# Patient Record
Sex: FEMALE | Race: WHITE | NOT HISPANIC OR LATINO | URBAN - METROPOLITAN AREA
[De-identification: names, ages, dates, MRNs, and addresses within clinical notes are randomized per-mention and may not be internally consistent; named-entity substitution may affect disease eponyms.]

---

## 2017-04-10 ENCOUNTER — EMERGENCY (EMERGENCY)
Facility: HOSPITAL | Age: 42
LOS: 1 days | Discharge: HOME | End: 2017-04-10
Attending: EMERGENCY MEDICINE
Payer: MEDICAID

## 2017-04-10 VITALS
OXYGEN SATURATION: 96 % | HEART RATE: 66 BPM | SYSTOLIC BLOOD PRESSURE: 108 MMHG | DIASTOLIC BLOOD PRESSURE: 66 MMHG | TEMPERATURE: 98 F | RESPIRATION RATE: 18 BRPM

## 2017-04-10 VITALS
HEART RATE: 89 BPM | SYSTOLIC BLOOD PRESSURE: 119 MMHG | DIASTOLIC BLOOD PRESSURE: 68 MMHG | WEIGHT: 175.05 LBS | HEIGHT: 64 IN | TEMPERATURE: 98 F | OXYGEN SATURATION: 90 % | RESPIRATION RATE: 18 BRPM

## 2017-04-10 DIAGNOSIS — Z88.0 ALLERGY STATUS TO PENICILLIN: ICD-10-CM

## 2017-04-10 DIAGNOSIS — F11.10 OPIOID ABUSE, UNCOMPLICATED: ICD-10-CM

## 2017-04-10 LAB
ALBUMIN SERPL ELPH-MCNC: 3.4 G/DL — LOW (ref 3.5–5)
ALP SERPL-CCNC: 65 U/L — SIGNIFICANT CHANGE UP (ref 40–120)
ALT FLD-CCNC: 22 U/L DA — SIGNIFICANT CHANGE UP (ref 10–60)
AMPHET UR-MCNC: NEGATIVE — SIGNIFICANT CHANGE UP
ANION GAP SERPL CALC-SCNC: 11 MMOL/L — SIGNIFICANT CHANGE UP (ref 5–17)
APPEARANCE UR: CLEAR — SIGNIFICANT CHANGE UP
AST SERPL-CCNC: 20 U/L — SIGNIFICANT CHANGE UP (ref 10–40)
BARBITURATES UR SCN-MCNC: NEGATIVE — SIGNIFICANT CHANGE UP
BENZODIAZ UR-MCNC: NEGATIVE — SIGNIFICANT CHANGE UP
BILIRUB SERPL-MCNC: 0.6 MG/DL — SIGNIFICANT CHANGE UP (ref 0.2–1.2)
BILIRUB UR-MCNC: NEGATIVE — SIGNIFICANT CHANGE UP
BUN SERPL-MCNC: 8 MG/DL — SIGNIFICANT CHANGE UP (ref 7–18)
CALCIUM SERPL-MCNC: 8.8 MG/DL — SIGNIFICANT CHANGE UP (ref 8.4–10.5)
CHLORIDE SERPL-SCNC: 106 MMOL/L — SIGNIFICANT CHANGE UP (ref 96–108)
CK SERPL-CCNC: 144 U/L — SIGNIFICANT CHANGE UP (ref 21–215)
CO2 SERPL-SCNC: 22 MMOL/L — SIGNIFICANT CHANGE UP (ref 22–31)
COCAINE METAB.OTHER UR-MCNC: POSITIVE
COLOR SPEC: YELLOW — SIGNIFICANT CHANGE UP
CREAT SERPL-MCNC: 0.44 MG/DL — LOW (ref 0.5–1.3)
DIFF PNL FLD: NEGATIVE — SIGNIFICANT CHANGE UP
GLUCOSE SERPL-MCNC: 125 MG/DL — HIGH (ref 70–99)
GLUCOSE UR QL: NEGATIVE — SIGNIFICANT CHANGE UP
HCG SERPL-ACNC: 5116 MIU/ML — SIGNIFICANT CHANGE UP
HCG UR QL: POSITIVE
HCT VFR BLD CALC: 39.3 % — SIGNIFICANT CHANGE UP (ref 34.5–45)
HGB BLD-MCNC: 13.6 G/DL — SIGNIFICANT CHANGE UP (ref 11.5–15.5)
KETONES UR-MCNC: ABNORMAL
LEUKOCYTE ESTERASE UR-ACNC: NEGATIVE — SIGNIFICANT CHANGE UP
MCHC RBC-ENTMCNC: 31.4 PG — SIGNIFICANT CHANGE UP (ref 27–34)
MCHC RBC-ENTMCNC: 34.7 GM/DL — SIGNIFICANT CHANGE UP (ref 32–36)
MCV RBC AUTO: 90.6 FL — SIGNIFICANT CHANGE UP (ref 80–100)
METHADONE UR-MCNC: NEGATIVE — SIGNIFICANT CHANGE UP
NITRITE UR-MCNC: NEGATIVE — SIGNIFICANT CHANGE UP
OPIATES UR-MCNC: POSITIVE
PCP SPEC-MCNC: SIGNIFICANT CHANGE UP
PCP UR-MCNC: NEGATIVE — SIGNIFICANT CHANGE UP
PH UR: 8 — SIGNIFICANT CHANGE UP (ref 4.8–8)
PLATELET # BLD AUTO: 234 K/UL — SIGNIFICANT CHANGE UP (ref 150–400)
POTASSIUM SERPL-MCNC: 3.6 MMOL/L — SIGNIFICANT CHANGE UP (ref 3.5–5.3)
POTASSIUM SERPL-SCNC: 3.6 MMOL/L — SIGNIFICANT CHANGE UP (ref 3.5–5.3)
PROT SERPL-MCNC: 7.4 G/DL — SIGNIFICANT CHANGE UP (ref 6–8.3)
PROT UR-MCNC: NEGATIVE — SIGNIFICANT CHANGE UP
RBC # BLD: 4.34 M/UL — SIGNIFICANT CHANGE UP (ref 3.8–5.2)
RBC # FLD: 12.3 % — SIGNIFICANT CHANGE UP (ref 10.3–14.5)
SODIUM SERPL-SCNC: 139 MMOL/L — SIGNIFICANT CHANGE UP (ref 135–145)
SP GR SPEC: 1.01 — SIGNIFICANT CHANGE UP (ref 1.01–1.02)
THC UR QL: NEGATIVE — SIGNIFICANT CHANGE UP
TROPONIN I SERPL-MCNC: <0.015 NG/ML — SIGNIFICANT CHANGE UP (ref 0–0.04)
TSH SERPL-MCNC: 0.42 UU/ML — SIGNIFICANT CHANGE UP (ref 0.34–4.82)
UROBILINOGEN FLD QL: NEGATIVE — SIGNIFICANT CHANGE UP
WBC # BLD: 8.8 K/UL — SIGNIFICANT CHANGE UP (ref 3.8–10.5)
WBC # FLD AUTO: 8.8 K/UL — SIGNIFICANT CHANGE UP (ref 3.8–10.5)

## 2017-04-10 PROCEDURE — 84443 ASSAY THYROID STIM HORMONE: CPT

## 2017-04-10 PROCEDURE — 93005 ELECTROCARDIOGRAM TRACING: CPT

## 2017-04-10 PROCEDURE — 84702 CHORIONIC GONADOTROPIN TEST: CPT

## 2017-04-10 PROCEDURE — 81003 URINALYSIS AUTO W/O SCOPE: CPT

## 2017-04-10 PROCEDURE — 80307 DRUG TEST PRSMV CHEM ANLYZR: CPT

## 2017-04-10 PROCEDURE — 85027 COMPLETE CBC AUTOMATED: CPT

## 2017-04-10 PROCEDURE — 81025 URINE PREGNANCY TEST: CPT

## 2017-04-10 PROCEDURE — 99285 EMERGENCY DEPT VISIT HI MDM: CPT | Mod: 25

## 2017-04-10 PROCEDURE — 99285 EMERGENCY DEPT VISIT HI MDM: CPT

## 2017-04-10 PROCEDURE — 80053 COMPREHEN METABOLIC PANEL: CPT

## 2017-04-10 PROCEDURE — 82550 ASSAY OF CK (CPK): CPT

## 2017-04-10 PROCEDURE — 84484 ASSAY OF TROPONIN QUANT: CPT

## 2017-04-10 RX ORDER — NALOXONE HYDROCHLORIDE 4 MG/.1ML
1 SPRAY NASAL ONCE
Qty: 0 | Refills: 0 | Status: COMPLETED | OUTPATIENT
Start: 2017-04-10 | End: 2017-04-10

## 2017-04-10 RX ORDER — NALOXONE HYDROCHLORIDE 4 MG/.1ML
2 SPRAY NASAL ONCE
Qty: 0 | Refills: 0 | Status: DISCONTINUED | OUTPATIENT
Start: 2017-04-10 | End: 2017-04-10

## 2017-04-10 RX ORDER — SODIUM CHLORIDE 9 MG/ML
1000 INJECTION INTRAMUSCULAR; INTRAVENOUS; SUBCUTANEOUS
Qty: 0 | Refills: 0 | Status: DISCONTINUED | OUTPATIENT
Start: 2017-04-10 | End: 2017-04-14

## 2017-04-10 NOTE — ED PROVIDER NOTE - OBJECTIVE STATEMENT
42 y/o F pt with PMHx of EtOH Abuse and Drug Abuse and PSHx of  BIBA to ED s/p unresponsiveness today. EMS states pt was found unresponsive in a bathroom stall [place] a criminal court; pt is currently alert but drowsy. Pt has a history of heroin abuse, as per EMS. Pt states drowsiness; pt has not slept in four days. Pt denies head trauma, general pain, CP, SOB, n/v/d, or any other complaints. Pt also denies taking any medications daily, or having taken any medication today. Allergies: Penicillin (short breath, hives).

## 2017-04-10 NOTE — ED PROVIDER NOTE - NS ED MD SCRIBE ATTENDING SCRIBE SECTIONS
PAST MEDICAL/SURGICAL/SOCIAL HISTORY/REVIEW OF SYSTEMS/PHYSICAL EXAM/HIV/DISPOSITION/HISTORY OF PRESENT ILLNESS/VITAL SIGNS( Pullset)

## 2017-04-10 NOTE — ED PROVIDER NOTE - CONDUCTED A DETAILED DISCUSSION WITH PATIENT AND/OR GUARDIAN REGARDING, MDM
return to ED if symptoms worsen, persist or questions arise/need for outpatient follow-up need for outpatient follow-up/return to ED if symptoms worsen, persist or questions arise/lab results

## 2017-04-10 NOTE — ED ADULT TRIAGE NOTE - CHIEF COMPLAINT QUOTE
BIBA found unresponsive in bathroom stall by security per EMS. alert in triage, drowsy answering questions appropriately. Hx Heroin abuse per EMS, denies using heroin/ narcotics today. Reports not sleeping in 4 days.

## 2017-04-10 NOTE — ED PROVIDER NOTE - CHPI ED SYMPTOMS NEG
no head trauma, no general pain, no chest pain, no shortness of breath, no diarrhea/no nausea/no vomiting

## 2017-04-10 NOTE — ED PROVIDER NOTE - MEDICAL DECISION MAKING DETAILS
42 y/o F pt with Hx of heroin abuse BIBA s/p being found unresponsive in criminal court bathroom today. 40 y/o F pt with Hx of heroin abuse BIBA s/p being found unresponsive in criminal court bathroom today; suspicion for heroin ingestion. Will give Narcan, check labs, check for pregnancy, and reassess.

## 2017-04-10 NOTE — ED PROVIDER NOTE - PROGRESS NOTE DETAILS
pt is well.  pt was initially given narcane for which pt woke up from.  pt thereafter began to be very aggitated and angry and very disruptive. pt was given some diazepam which kept pt's withdrawel si/sx at bay.  pt's labs were obtained and noticed that pt is also prgnant. pt denies pregnancy and states of unknown LMP date. Pt with no ab pain, no vag bleeding and no other sis/x.  pt advsied to f/u with PMD and ob/gyn this week. pt understands. pt was waken up and upon dischage was walking around the ED in no distress, no ataxia, coherent, able to make decisions.  Pt did make phone call to her friend. pt is alet and oriented. Pt discharged. Pt given verbal instructions and refused to sign and left without discharge paperwork.

## 2019-02-07 RX ADMIN — Medication 1 PATCH: at 07:30

## 2019-02-17 ENCOUNTER — INPATIENT (INPATIENT)
Facility: HOSPITAL | Age: 44
LOS: 114 days | Discharge: PSYCHIATRIC OR SUBSTANCE USE FACILITY | End: 2019-06-12
Attending: INTERNAL MEDICINE | Admitting: INTERNAL MEDICINE
Payer: MEDICAID

## 2019-02-17 VITALS
HEIGHT: 67 IN | RESPIRATION RATE: 24 BRPM | SYSTOLIC BLOOD PRESSURE: 131 MMHG | TEMPERATURE: 102 F | DIASTOLIC BLOOD PRESSURE: 61 MMHG | WEIGHT: 130.07 LBS | OXYGEN SATURATION: 93 % | HEART RATE: 141 BPM

## 2019-02-17 LAB
ALBUMIN SERPL ELPH-MCNC: 1.9 G/DL — LOW (ref 3.3–5)
ALP SERPL-CCNC: 328 U/L — HIGH (ref 40–120)
ALT FLD-CCNC: 54 U/L — SIGNIFICANT CHANGE UP (ref 12–78)
AMPHET UR-MCNC: NEGATIVE — SIGNIFICANT CHANGE UP
ANION GAP SERPL CALC-SCNC: 24 MMOL/L — HIGH (ref 5–17)
ANISOCYTOSIS BLD QL: SLIGHT — SIGNIFICANT CHANGE UP
APPEARANCE UR: ABNORMAL
APTT BLD: 25.8 SEC — LOW (ref 27.5–36.3)
AST SERPL-CCNC: 81 U/L — HIGH (ref 15–37)
BACTERIA # UR AUTO: ABNORMAL
BARBITURATES UR SCN-MCNC: NEGATIVE — SIGNIFICANT CHANGE UP
BASOPHILS # BLD AUTO: 0 K/UL — SIGNIFICANT CHANGE UP (ref 0–0.2)
BASOPHILS NFR BLD AUTO: 0 % — SIGNIFICANT CHANGE UP (ref 0–2)
BENZODIAZ UR-MCNC: NEGATIVE — SIGNIFICANT CHANGE UP
BILIRUB SERPL-MCNC: 1.7 MG/DL — HIGH (ref 0.2–1.2)
BILIRUB UR-MCNC: ABNORMAL
BUN SERPL-MCNC: 124 MG/DL — HIGH (ref 7–23)
BURR CELLS BLD QL SMEAR: SIGNIFICANT CHANGE UP
CALCIUM SERPL-MCNC: 10.4 MG/DL — HIGH (ref 8.5–10.1)
CHLORIDE SERPL-SCNC: 88 MMOL/L — LOW (ref 96–108)
CK SERPL-CCNC: 127 U/L — SIGNIFICANT CHANGE UP (ref 26–192)
CO2 SERPL-SCNC: 14 MMOL/L — LOW (ref 22–31)
COCAINE METAB.OTHER UR-MCNC: POSITIVE — SIGNIFICANT CHANGE UP
COLOR SPEC: ABNORMAL
CREAT SERPL-MCNC: 6.42 MG/DL — HIGH (ref 0.5–1.3)
DIFF PNL FLD: ABNORMAL
ELLIPTOCYTES BLD QL SMEAR: SLIGHT — SIGNIFICANT CHANGE UP
EOSINOPHIL # BLD AUTO: 0.12 K/UL — SIGNIFICANT CHANGE UP (ref 0–0.5)
EOSINOPHIL NFR BLD AUTO: 1 % — SIGNIFICANT CHANGE UP (ref 0–6)
EPI CELLS # UR: SIGNIFICANT CHANGE UP
ETHANOL SERPL-MCNC: <10 MG/DL — SIGNIFICANT CHANGE UP (ref 0–10)
FLU A RESULT: SIGNIFICANT CHANGE UP
FLU A RESULT: SIGNIFICANT CHANGE UP
FLUAV AG NPH QL: SIGNIFICANT CHANGE UP
FLUBV AG NPH QL: SIGNIFICANT CHANGE UP
GLUCOSE BLDC GLUCOMTR-MCNC: 178 MG/DL — HIGH (ref 70–99)
GLUCOSE SERPL-MCNC: 130 MG/DL — HIGH (ref 70–99)
GLUCOSE UR QL: NEGATIVE MG/DL — SIGNIFICANT CHANGE UP
HCG SERPL-ACNC: <1 MIU/ML — SIGNIFICANT CHANGE UP
HCT VFR BLD CALC: 45.2 % — HIGH (ref 34.5–45)
HGB BLD-MCNC: 14.8 G/DL — SIGNIFICANT CHANGE UP (ref 11.5–15.5)
INR BLD: 1.07 RATIO — SIGNIFICANT CHANGE UP (ref 0.88–1.16)
KETONES UR-MCNC: NEGATIVE — SIGNIFICANT CHANGE UP
LACTATE SERPL-SCNC: 6.4 MMOL/L — CRITICAL HIGH (ref 0.7–2)
LACTATE SERPL-SCNC: 7.2 MMOL/L — CRITICAL HIGH (ref 0.7–2)
LEUKOCYTE ESTERASE UR-ACNC: ABNORMAL
LYMPHOCYTES # BLD AUTO: 1.07 K/UL — SIGNIFICANT CHANGE UP (ref 1–3.3)
LYMPHOCYTES # BLD AUTO: 9 % — LOW (ref 13–44)
MACROCYTES BLD QL: SLIGHT — SIGNIFICANT CHANGE UP
MANUAL SMEAR VERIFICATION: SIGNIFICANT CHANGE UP
MCHC RBC-ENTMCNC: 23.2 PG — LOW (ref 27–34)
MCHC RBC-ENTMCNC: 32.7 GM/DL — SIGNIFICANT CHANGE UP (ref 32–36)
MCV RBC AUTO: 71 FL — LOW (ref 80–100)
METHADONE UR-MCNC: NEGATIVE — SIGNIFICANT CHANGE UP
MICROCYTES BLD QL: SLIGHT — SIGNIFICANT CHANGE UP
MONOCYTES # BLD AUTO: 0.12 K/UL — SIGNIFICANT CHANGE UP (ref 0–0.9)
MONOCYTES NFR BLD AUTO: 1 % — LOW (ref 2–14)
NEUTROPHILS # BLD AUTO: 10.62 K/UL — HIGH (ref 1.8–7.4)
NEUTROPHILS NFR BLD AUTO: 89 % — HIGH (ref 43–77)
NITRITE UR-MCNC: POSITIVE
NRBC # BLD: 0 /100 — SIGNIFICANT CHANGE UP (ref 0–0)
NRBC # BLD: SIGNIFICANT CHANGE UP /100 WBCS (ref 0–0)
OPIATES UR-MCNC: POSITIVE — SIGNIFICANT CHANGE UP
OVALOCYTES BLD QL SMEAR: SLIGHT — SIGNIFICANT CHANGE UP
PCP SPEC-MCNC: SIGNIFICANT CHANGE UP
PCP UR-MCNC: NEGATIVE — SIGNIFICANT CHANGE UP
PH UR: 5 — SIGNIFICANT CHANGE UP (ref 5–8)
PLAT MORPH BLD: NORMAL — SIGNIFICANT CHANGE UP
PLATELET # BLD AUTO: 38 K/UL — LOW (ref 150–400)
POTASSIUM SERPL-MCNC: 4.3 MMOL/L — SIGNIFICANT CHANGE UP (ref 3.5–5.3)
POTASSIUM SERPL-SCNC: 4.3 MMOL/L — SIGNIFICANT CHANGE UP (ref 3.5–5.3)
PROT SERPL-MCNC: 7.4 GM/DL — SIGNIFICANT CHANGE UP (ref 6–8.3)
PROT UR-MCNC: 100 MG/DL
PROTHROM AB SERPL-ACNC: 12 SEC — SIGNIFICANT CHANGE UP (ref 10–12.9)
RBC # BLD: 6.37 M/UL — HIGH (ref 3.8–5.2)
RBC # FLD: 18.7 % — HIGH (ref 10.3–14.5)
RBC BLD AUTO: ABNORMAL
RBC CASTS # UR COMP ASSIST: ABNORMAL /HPF (ref 0–4)
RSV RESULT: SIGNIFICANT CHANGE UP
RSV RNA RESP QL NAA+PROBE: SIGNIFICANT CHANGE UP
SALICYLATES SERPL-MCNC: 2.2 MG/DL — LOW (ref 2.8–20)
SODIUM SERPL-SCNC: 126 MMOL/L — LOW (ref 135–145)
SP GR SPEC: 1.01 — SIGNIFICANT CHANGE UP (ref 1.01–1.02)
THC UR QL: NEGATIVE — SIGNIFICANT CHANGE UP
UROBILINOGEN FLD QL: 1 MG/DL
WBC # BLD: 11.93 K/UL — HIGH (ref 3.8–10.5)
WBC # FLD AUTO: 11.93 K/UL — HIGH (ref 3.8–10.5)
WBC UR QL: ABNORMAL

## 2019-02-17 PROCEDURE — 99291 CRITICAL CARE FIRST HOUR: CPT

## 2019-02-17 PROCEDURE — 71250 CT THORAX DX C-: CPT | Mod: 26

## 2019-02-17 PROCEDURE — 74176 CT ABD & PELVIS W/O CONTRAST: CPT | Mod: 26

## 2019-02-17 PROCEDURE — 71045 X-RAY EXAM CHEST 1 VIEW: CPT | Mod: 26

## 2019-02-17 PROCEDURE — 70450 CT HEAD/BRAIN W/O DYE: CPT | Mod: 26

## 2019-02-17 RX ORDER — ACETAMINOPHEN 500 MG
975 TABLET ORAL ONCE
Qty: 0 | Refills: 0 | Status: COMPLETED | OUTPATIENT
Start: 2019-02-17 | End: 2019-02-17

## 2019-02-17 RX ORDER — SODIUM CHLORIDE 9 MG/ML
1850 INJECTION INTRAMUSCULAR; INTRAVENOUS; SUBCUTANEOUS ONCE
Qty: 0 | Refills: 0 | Status: COMPLETED | OUTPATIENT
Start: 2019-02-17 | End: 2019-02-17

## 2019-02-17 RX ORDER — VANCOMYCIN HCL 1 G
1000 VIAL (EA) INTRAVENOUS ONCE
Qty: 0 | Refills: 0 | Status: COMPLETED | OUTPATIENT
Start: 2019-02-17 | End: 2019-02-17

## 2019-02-17 RX ORDER — NICOTINE POLACRILEX 2 MG
1 GUM BUCCAL DAILY
Qty: 0 | Refills: 0 | Status: DISCONTINUED | OUTPATIENT
Start: 2019-02-17 | End: 2019-03-20

## 2019-02-17 RX ORDER — NALOXONE HYDROCHLORIDE 4 MG/.1ML
1 SPRAY NASAL ONCE
Qty: 0 | Refills: 0 | Status: COMPLETED | OUTPATIENT
Start: 2019-02-17 | End: 2019-02-17

## 2019-02-17 RX ORDER — VANCOMYCIN HCL 1 G
750 VIAL (EA) INTRAVENOUS
Qty: 0 | Refills: 0 | Status: DISCONTINUED | OUTPATIENT
Start: 2019-02-17 | End: 2019-02-18

## 2019-02-17 RX ORDER — SODIUM CHLORIDE 9 MG/ML
1000 INJECTION INTRAMUSCULAR; INTRAVENOUS; SUBCUTANEOUS ONCE
Qty: 0 | Refills: 0 | Status: COMPLETED | OUTPATIENT
Start: 2019-02-17 | End: 2019-02-17

## 2019-02-17 RX ORDER — HEPARIN SODIUM 5000 [USP'U]/ML
5000 INJECTION INTRAVENOUS; SUBCUTANEOUS EVERY 12 HOURS
Qty: 0 | Refills: 0 | Status: DISCONTINUED | OUTPATIENT
Start: 2019-02-17 | End: 2019-02-18

## 2019-02-17 RX ORDER — SODIUM CHLORIDE 9 MG/ML
1000 INJECTION, SOLUTION INTRAVENOUS
Qty: 0 | Refills: 0 | Status: DISCONTINUED | OUTPATIENT
Start: 2019-02-17 | End: 2019-02-20

## 2019-02-17 RX ADMIN — SODIUM CHLORIDE 1850 MILLILITER(S): 9 INJECTION INTRAMUSCULAR; INTRAVENOUS; SUBCUTANEOUS at 18:22

## 2019-02-17 RX ADMIN — SODIUM CHLORIDE 1850 MILLILITER(S): 9 INJECTION INTRAMUSCULAR; INTRAVENOUS; SUBCUTANEOUS at 20:10

## 2019-02-17 RX ADMIN — Medication 250 MILLIGRAM(S): at 20:10

## 2019-02-17 RX ADMIN — Medication 975 MILLIGRAM(S): at 20:10

## 2019-02-17 RX ADMIN — Medication 1000 MILLIGRAM(S): at 21:30

## 2019-02-17 RX ADMIN — NALOXONE HYDROCHLORIDE 1 MILLIGRAM(S): 4 SPRAY NASAL at 22:26

## 2019-02-17 RX ADMIN — SODIUM CHLORIDE 1000 MILLILITER(S): 9 INJECTION INTRAMUSCULAR; INTRAVENOUS; SUBCUTANEOUS at 22:38

## 2019-02-17 RX ADMIN — Medication 975 MILLIGRAM(S): at 20:40

## 2019-02-17 NOTE — ED ADULT NURSE NOTE - OBJECTIVE STATEMENT
as per friend, pt s/p fall down stairs x 3 days, pt confused, lethargic post fall. pt received, lethargic, responsive to painful stimuli. tachypnea, tachycardia, hypotensive. chest xray done. pt on continuous monitor. as per friend, pt s/p fall down stairs x 3 days, pt known heroin user. pt confused, lethargic post fall. pt received, lethargic, responsive to painful stimuli. tachypnea, tachycardia, hypotensive. chest xray done. pt on continuous monitor.

## 2019-02-17 NOTE — H&P ADULT - NSHPLABSRESULTS_GEN_ALL_CORE
EXAM:  CT BRAIN                            PROCEDURE DATE:  02/17/2019        INTERPRETATION:  CLINICAL INFORMATION: Trauma. Fall.    TECHNIQUE: Noncontrast CT examination of the head was performed using   contiguous 5 mm CT images.    COMPARISON: There are no prior studies available for comparison.      FINDINGS:     There is no evidence of mass or acute intracranial hemorrhage. Ventricles   and sulci are normal in size and configuration for the patient's stated   age. No midline shift or other significant mass effect is noted. There is   no CT evidence of acute territorial infarct.     The visualized paranasal sinuses and tympanomastoid spaces are clear.   Orbits and orbital contents are unremarkable.    There is no depressed calvarial fracture.      IMPRESSION:     No evidence of acute intracranial hemorrhage, midline shift or CT   evidence of acute territorial infarct.    If the patient's symptoms persist, consider short interval follow-up head   CT or brain MRI if there are no MRI contraindications.      MANISHA RODRIGUEZ M.D., ATTENDING RADIOLOGIST  This document has been electronically signed. Feb 17 2019  8:36PM

## 2019-02-17 NOTE — H&P ADULT - NSHPPHYSICALEXAM_GEN_ALL_CORE
PHYSICAL EXAM:    GENERAL: NAD, poorly groomed  HEAD:  Atraumatic, Normocephalic  EYES: EOMI, PERRLA, conjunctiva and sclera clear  ENMT: No tonsillar erythema, exudates, or enlargement; Dry membranes, poor dentition  NECK: Supple, No JVD, Normal thyroid  NERVOUS SYSTEM:  lethargic, follows commands   CHEST/LUNG: +breath sounds bilaterally; + rales, no rhonchi, wheezing, or rubs  HEART: increased rate, regular rhythm; No murmurs, rubs, or gallops  ABDOMEN: Soft, +RUQ tenderness, + distension, Bowel sounds present  EXTREMITIES:  2+ Peripheral Pulses, No clubbing, cyanosis, +1 edema B/L LE  LYMPH: No lymphadenopathy noted  SKIN: multiple areas of diffuse scar tissue B/L LE and B/L UE

## 2019-02-17 NOTE — ED ADULT NURSE NOTE - ED STAT RN HANDOFF DETAILS
pt lethargic, febile, hypotensive, tachypnic, on cardiac monitor, report given to MELITON mccall pt lethargic, febrile, hypotensive, tachypnic, on cardiac monitor, report given to MELITON mccall

## 2019-02-17 NOTE — ED ADULT TRIAGE NOTE - NS ED NOTE AC HIGH RISK COUNTRIES
Endoscopy Patient Instructions


Date / Procedure(s) Performed


Jan 11, 2017.


EGD





Allergy Information


Coded Allergies:  


     Ephedrine (Verified  Adverse Reaction, Unknown, CAUSES BLOOD PRESSURE TO 

GO UP, 1/10/17)





Discharge Date / Findings


Jan 11, 2017.


Schatzki's Ring


Hiatal hernia





Medication Instructions


OK to resume all medications today as prescribed


 Reported Home Medications








 Medications  Dose


 Route/Sig


 Max Daily Dose Days Date Category Dose


Instructions


 


 Simbrinza


  (Brinzolamide-Brimonidine


 Tartr) 1 Krysta Krysta  1 Drop


 OPL BID


    1/10/17 Reported 


 


 Zantac


  (Ranitidine HCl)


 150 Mg Tab  150 Mg


 PO BID


    1/10/17 Reported 


 


 Magnesium 250 mg


  (Magnesium) 1 Tab


 Tab  1 Tab


 PO QAM


    1/10/17 Reported 


 


 Zocor


  (Simvastatin) 20


 Mg Tab  20 Mg


 PO QPM


   90 11/17/16 Reported 


 


 Jantoven


  (Warfarin Sodium)


 2 Mg Tab  1 Mg


 PO 3XWK


    12/14/15 Reported 


 


 Coumadin


  (Warfarin Sodium)


 2 Mg Tab  2 Mg


 PO 4XWK


    12/8/15 Reported 


 


 Calcium 600 + D


  (Calcium


 Carbonate-Vitamin


 D) 1 Tab Tab  1 Tabs


 PO BID


    11/27/13 Reported 


 


 Cozaar (Losartan


 Potassium) 50 Mg


 Tab  50 Mg


 PO BID


    11/27/13 Reported  WILL TAKE IN PM DEPENDING ON BP











Provider Instructions





Activity Restrictions





-  No exercising or heavy lifting for 24 hours. 


-  Do not drink alcohol the day of the procedure.


-  Do not drive a car or operate machinery until the day after the procedure.


-  Do not make any important decisions or sign important papers in 24 hours 

after the procedure.





Following Day:





-  Return to full activity which may include returning to work/school.





Diet





Start your diet with liquids and light foods (jello, soup, juice, toast).  Then 

eat your usual diet if not nauseated.





Treatment For Common After Affects





For mild abdominal pain, bloating, or excessive gas:





-  Rest


-  Eat lightly


-  Lie on right side





Follow-Up Information


Follow-up with Dr. Swift as scheduled





Anesthesia Information





What You Should Know





You have had a procedure that required some medicine to reduce anxiety and 

discomfort. This treatment is called moderate sedation.  


After receiving the treatment, you may be sleepy, but you will be able to 

breathe on your own.  The effects of the treatment may last for several hours.








Follow these instructions along with Activity/Diet recommendations noted above:





*  Do NOT do anything where dizziness or clumsiness would be dangerous.





*  Rest quietly at home today, then you can be up and about tomorrow.





*  Have a responsible person stay with you the rest of today.





*  You may have had an I.V. today.  If so, you may take the dressing off later 

today.





Recommendations


 


Call your doctor if:





*  Trouble breathing 





*  Continuous vomiting for more than 24 hours





*  Temperature above 101 degrees





*  Severe abdominal pain or bloating





*  Pain not relieved by pain medicine ordered





*  There is increased drainage or redness from any incision





*  A large amount of rectal bleeding greater than 2-3 tablespoons. 


   (If you had a polyp/s removed or have hemorrhoids, a small amount of blood -


    from the rectum is to be expected.)





*  You have any unanswered questions or concerns.





IN THE EVENT OF A SERIOUS EMERGENCY, GO TO THE NEAREST EMERGENCY ROOM





       Your discharge instructions were prepared by provider Christoph Martinez.


 Patient Instructions Signature Page








Kaye Carlson 











Patient (or Guardian) Signature/Date:____________________________________ I 

have read and understand the instructions given to me by my caregivers.








Caregiver/RN/Doctor Signature/Date:____________________________________








The above-named patient and/or guardian has received patient instructions on 

this date.


























+  Original Patient Signature Page (only) stays with chart.  Please make copy 

for patient. No

## 2019-02-17 NOTE — ED ADULT NURSE NOTE - NSIMPLEMENTINTERV_GEN_ALL_ED
Implemented All Fall Risk Interventions:  [place] to call system. Call bell, personal items and telephone within reach. Instruct patient to call for assistance. Room bathroom lighting operational. Non-slip footwear when patient is off stretcher. Physically safe environment: no spills, clutter or unnecessary equipment. Stretcher in lowest position, wheels locked, appropriate side rails in place. Provide visual cue, wrist band, yellow gown, etc. Monitor gait and stability. Monitor for mental status changes and reorient to person, place, and time. Review medications for side effects contributing to fall risk. Reinforce activity limits and safety measures with patient and family.

## 2019-02-17 NOTE — H&P ADULT - HISTORY OF PRESENT ILLNESS
44 Y/O female w/ PMHx of IV heroin abuse brought in by EMS for lethargy and cough. As per EMS, pt was found laying in bed at home, lethargic however able to follow some commands. EMS reports that pt was attempting to detox from heroin, and last known use was 3 days prior to ED presentation. As per ED, pts boyfriend reports a hx of + pt cough productive of white sputum and a fall down a flight of stairs 2/16. Labs showed serum lactate of 7.2, BUN/Cr 124/6.42, and WBC of of 11.93. Tmax 102.4, urine + for cocaine, opiates, nitrites and many bacteria. ICU called for further evaluation.

## 2019-02-17 NOTE — ED ADULT TRIAGE NOTE - CHIEF COMPLAINT QUOTE
patien confuse with difficulty breahting , as per boyfriend alana had a fall down the stairs 3 days ago , and is wvery weak and confuse since , alana is using heroin

## 2019-02-17 NOTE — H&P ADULT - ATTENDING COMMENTS
43 year old female with septic shock, now with MRSA bactermia and EFRAIN giacomoley 2/2 hypovolemia, sepsis    Plan  -TTE to rule out vegetation on valve  -vanco by level given EFRAIN  -ID consult  -Maintain MAP greater than 65 mm HG  -daily blood cultures  -follow up urine culture  -Monitor for sign of opiod withdrawal  -clonidine PRN withdrawal symptoms  - monitor uop and cr.  Maintain UOP at 0.5 mL/kg/hr.   -conitnue care in MICU    I have decided to review and order of clinical lab tests  Relevant radiology studies were reviewed  Decision made to obtain available old records  Discussion of test results with performing physician  Review and summarization of old records obtaining history from PA  I have visualized independent imaging, EKGs and radiologies studies available but not otherwise officially read  Patient is critically ill and could decompensate imminently.  The patient required immediate attention  The medically necessary treatment decisions were required due to possibility of imminent threat to the patient  the patient is unable  to participate in giving history and/or making treatment decisions;     ccm 40 min

## 2019-02-17 NOTE — ED PROVIDER NOTE - OBJECTIVE STATEMENT
Pt is a 42 yo lady with a pmhx of IV heroin abuse who presents to the ED with boyfriend because she has been coughing, and has been less alert. She last had heroin 3 days ago per boyfriend. Has been coughing, productive of white sputum. Pt has also fallen down a flight of stairs yesterday. Pt denies any chest pain, any headache. Is slow to respond, but does know name and location.

## 2019-02-18 PROBLEM — F19.10 OTHER PSYCHOACTIVE SUBSTANCE ABUSE, UNCOMPLICATED: Chronic | Status: ACTIVE | Noted: 2017-04-11

## 2019-02-18 PROBLEM — F10.10 ALCOHOL ABUSE, UNCOMPLICATED: Chronic | Status: ACTIVE | Noted: 2017-04-11

## 2019-02-18 LAB
ALBUMIN SERPL ELPH-MCNC: 1.5 G/DL — LOW (ref 3.3–5)
ALP SERPL-CCNC: 261 U/L — HIGH (ref 40–120)
ALT FLD-CCNC: 61 U/L — SIGNIFICANT CHANGE UP (ref 12–78)
ANION GAP SERPL CALC-SCNC: 15 MMOL/L — SIGNIFICANT CHANGE UP (ref 5–17)
ANION GAP SERPL CALC-SCNC: 23 MMOL/L — HIGH (ref 5–17)
AST SERPL-CCNC: 168 U/L — HIGH (ref 15–37)
BASE EXCESS BLDA CALC-SCNC: -12.8 MMOL/L — LOW (ref -2–2)
BASOPHILS # BLD AUTO: 0.11 K/UL — SIGNIFICANT CHANGE UP (ref 0–0.2)
BASOPHILS NFR BLD AUTO: 0.7 % — SIGNIFICANT CHANGE UP (ref 0–2)
BILIRUB DIRECT SERPL-MCNC: 1.48 MG/DL — HIGH (ref 0.05–0.2)
BILIRUB INDIRECT FLD-MCNC: 0.4 MG/DL — SIGNIFICANT CHANGE UP (ref 0.2–1)
BILIRUB SERPL-MCNC: 1.9 MG/DL — HIGH (ref 0.2–1.2)
BLOOD GAS COMMENTS: SIGNIFICANT CHANGE UP
BLOOD GAS SOURCE: SIGNIFICANT CHANGE UP
BUN SERPL-MCNC: 119 MG/DL — HIGH (ref 7–23)
BUN SERPL-MCNC: 129 MG/DL — HIGH (ref 7–23)
CALCIUM SERPL-MCNC: 8.2 MG/DL — LOW (ref 8.5–10.1)
CALCIUM SERPL-MCNC: 8.9 MG/DL — SIGNIFICANT CHANGE UP (ref 8.5–10.1)
CHLORIDE SERPL-SCNC: 103 MMOL/L — SIGNIFICANT CHANGE UP (ref 96–108)
CHLORIDE SERPL-SCNC: 95 MMOL/L — LOW (ref 96–108)
CO2 SERPL-SCNC: 13 MMOL/L — LOW (ref 22–31)
CO2 SERPL-SCNC: 17 MMOL/L — LOW (ref 22–31)
CREAT SERPL-MCNC: 4.64 MG/DL — HIGH (ref 0.5–1.3)
CREAT SERPL-MCNC: 5.75 MG/DL — HIGH (ref 0.5–1.3)
EOSINOPHIL # BLD AUTO: 0.01 K/UL — SIGNIFICANT CHANGE UP (ref 0–0.5)
EOSINOPHIL NFR BLD AUTO: 0.1 % — SIGNIFICANT CHANGE UP (ref 0–6)
GLUCOSE SERPL-MCNC: 102 MG/DL — HIGH (ref 70–99)
GLUCOSE SERPL-MCNC: 77 MG/DL — SIGNIFICANT CHANGE UP (ref 70–99)
GRAM STN FLD: SIGNIFICANT CHANGE UP
GRAM STN FLD: SIGNIFICANT CHANGE UP
HCO3 BLDA-SCNC: 12 MMOL/L — LOW (ref 21–29)
HCT VFR BLD CALC: 31.5 % — LOW (ref 34.5–45)
HCT VFR BLD CALC: 34.5 % — SIGNIFICANT CHANGE UP (ref 34.5–45)
HGB BLD-MCNC: 10.6 G/DL — LOW (ref 11.5–15.5)
HGB BLD-MCNC: 11.3 G/DL — LOW (ref 11.5–15.5)
HOROWITZ INDEX BLDA+IHG-RTO: 1 — SIGNIFICANT CHANGE UP
IMM GRANULOCYTES NFR BLD AUTO: 2.1 % — HIGH (ref 0–1.5)
LACTATE SERPL-SCNC: 2.2 MMOL/L — HIGH (ref 0.7–2)
LACTATE SERPL-SCNC: 8.3 MMOL/L — CRITICAL HIGH (ref 0.7–2)
LYMPHOCYTES # BLD AUTO: 0.71 K/UL — LOW (ref 1–3.3)
LYMPHOCYTES # BLD AUTO: 4.6 % — LOW (ref 13–44)
MAGNESIUM SERPL-MCNC: 2.8 MG/DL — HIGH (ref 1.6–2.6)
MCHC RBC-ENTMCNC: 23.3 PG — LOW (ref 27–34)
MCHC RBC-ENTMCNC: 23.6 PG — LOW (ref 27–34)
MCHC RBC-ENTMCNC: 32.8 GM/DL — SIGNIFICANT CHANGE UP (ref 32–36)
MCHC RBC-ENTMCNC: 33.7 GM/DL — SIGNIFICANT CHANGE UP (ref 32–36)
MCV RBC AUTO: 70.2 FL — LOW (ref 80–100)
MCV RBC AUTO: 71.3 FL — LOW (ref 80–100)
METHOD TYPE: SIGNIFICANT CHANGE UP
MONOCYTES # BLD AUTO: 0.71 K/UL — SIGNIFICANT CHANGE UP (ref 0–0.9)
MONOCYTES NFR BLD AUTO: 4.6 % — SIGNIFICANT CHANGE UP (ref 2–14)
MRSA SPEC QL CULT: SIGNIFICANT CHANGE UP
NEUTROPHILS # BLD AUTO: 13.41 K/UL — HIGH (ref 1.8–7.4)
NEUTROPHILS NFR BLD AUTO: 87.9 % — HIGH (ref 43–77)
NRBC # BLD: 0 /100 WBCS — SIGNIFICANT CHANGE UP (ref 0–0)
NRBC # BLD: 0 /100 WBCS — SIGNIFICANT CHANGE UP (ref 0–0)
PCO2 BLDA: 27 MMHG — LOW (ref 32–46)
PH BLD: 7.28 — LOW (ref 7.35–7.45)
PHOSPHATE SERPL-MCNC: 2.6 MG/DL — SIGNIFICANT CHANGE UP (ref 2.5–4.5)
PLATELET # BLD AUTO: 30 K/UL — LOW (ref 150–400)
PLATELET # BLD AUTO: 36 K/UL — LOW (ref 150–400)
PO2 BLDA: 238 MMHG — HIGH (ref 74–108)
POTASSIUM SERPL-MCNC: 3.7 MMOL/L — SIGNIFICANT CHANGE UP (ref 3.5–5.3)
POTASSIUM SERPL-MCNC: 4.1 MMOL/L — SIGNIFICANT CHANGE UP (ref 3.5–5.3)
POTASSIUM SERPL-SCNC: 3.7 MMOL/L — SIGNIFICANT CHANGE UP (ref 3.5–5.3)
POTASSIUM SERPL-SCNC: 4.1 MMOL/L — SIGNIFICANT CHANGE UP (ref 3.5–5.3)
PROT SERPL-MCNC: 5.6 GM/DL — LOW (ref 6–8.3)
RBC # BLD: 4.49 M/UL — SIGNIFICANT CHANGE UP (ref 3.8–5.2)
RBC # BLD: 4.84 M/UL — SIGNIFICANT CHANGE UP (ref 3.8–5.2)
RBC # FLD: 17.7 % — HIGH (ref 10.3–14.5)
RBC # FLD: 17.7 % — HIGH (ref 10.3–14.5)
SAO2 % BLDA: 99 % — HIGH (ref 92–96)
SODIUM SERPL-SCNC: 131 MMOL/L — LOW (ref 135–145)
SODIUM SERPL-SCNC: 135 MMOL/L — SIGNIFICANT CHANGE UP (ref 135–145)
SPECIMEN SOURCE: SIGNIFICANT CHANGE UP
SPECIMEN SOURCE: SIGNIFICANT CHANGE UP
VANCOMYCIN FLD-MCNC: 9.3 UG/ML — SIGNIFICANT CHANGE UP
WBC # BLD: 15.27 K/UL — HIGH (ref 3.8–10.5)
WBC # BLD: 18.9 K/UL — HIGH (ref 3.8–10.5)
WBC # FLD AUTO: 15.27 K/UL — HIGH (ref 3.8–10.5)
WBC # FLD AUTO: 18.9 K/UL — HIGH (ref 3.8–10.5)

## 2019-02-18 PROCEDURE — 99291 CRITICAL CARE FIRST HOUR: CPT

## 2019-02-18 PROCEDURE — 93306 TTE W/DOPPLER COMPLETE: CPT | Mod: 26

## 2019-02-18 PROCEDURE — 71045 X-RAY EXAM CHEST 1 VIEW: CPT | Mod: 26

## 2019-02-18 RX ORDER — CHLORHEXIDINE GLUCONATE 213 G/1000ML
1 SOLUTION TOPICAL
Qty: 0 | Refills: 0 | Status: DISCONTINUED | OUTPATIENT
Start: 2019-02-18 | End: 2019-02-23

## 2019-02-18 RX ORDER — ACETAMINOPHEN 500 MG
650 TABLET ORAL ONCE
Qty: 0 | Refills: 0 | Status: COMPLETED | OUTPATIENT
Start: 2019-02-18 | End: 2019-02-18

## 2019-02-18 RX ORDER — ACETAMINOPHEN 500 MG
650 TABLET ORAL EVERY 6 HOURS
Qty: 0 | Refills: 0 | Status: DISCONTINUED | OUTPATIENT
Start: 2019-02-18 | End: 2019-02-22

## 2019-02-18 RX ORDER — SODIUM BICARBONATE 1 MEQ/ML
50 SYRINGE (ML) INTRAVENOUS
Qty: 0 | Refills: 0 | Status: COMPLETED | OUTPATIENT
Start: 2019-02-18 | End: 2019-02-18

## 2019-02-18 RX ORDER — VANCOMYCIN HCL 1 G
1000 VIAL (EA) INTRAVENOUS ONCE
Qty: 0 | Refills: 0 | Status: COMPLETED | OUTPATIENT
Start: 2019-02-18 | End: 2019-02-18

## 2019-02-18 RX ORDER — SODIUM CHLORIDE 9 MG/ML
500 INJECTION INTRAMUSCULAR; INTRAVENOUS; SUBCUTANEOUS ONCE
Qty: 0 | Refills: 0 | Status: COMPLETED | OUTPATIENT
Start: 2019-02-18 | End: 2019-02-18

## 2019-02-18 RX ADMIN — Medication 1 PATCH: at 20:03

## 2019-02-18 RX ADMIN — Medication 650 MILLIGRAM(S): at 01:11

## 2019-02-18 RX ADMIN — Medication 2 MILLIGRAM(S): at 23:00

## 2019-02-18 RX ADMIN — Medication 1 PATCH: at 13:41

## 2019-02-18 RX ADMIN — Medication 0.1 MILLIGRAM(S): at 20:24

## 2019-02-18 RX ADMIN — Medication 250 MILLIGRAM(S): at 17:52

## 2019-02-18 RX ADMIN — SODIUM CHLORIDE 125 MILLILITER(S): 9 INJECTION, SOLUTION INTRAVENOUS at 09:30

## 2019-02-18 RX ADMIN — HEPARIN SODIUM 5000 UNIT(S): 5000 INJECTION INTRAVENOUS; SUBCUTANEOUS at 05:59

## 2019-02-18 RX ADMIN — SODIUM CHLORIDE 1000 MILLILITER(S): 9 INJECTION INTRAMUSCULAR; INTRAVENOUS; SUBCUTANEOUS at 05:59

## 2019-02-18 RX ADMIN — SODIUM CHLORIDE 1000 MILLILITER(S): 9 INJECTION INTRAMUSCULAR; INTRAVENOUS; SUBCUTANEOUS at 06:30

## 2019-02-18 RX ADMIN — Medication 50 MILLIEQUIVALENT(S): at 01:17

## 2019-02-18 RX ADMIN — Medication 50 MILLIEQUIVALENT(S): at 01:12

## 2019-02-18 RX ADMIN — SODIUM CHLORIDE 125 MILLILITER(S): 9 INJECTION, SOLUTION INTRAVENOUS at 00:20

## 2019-02-18 RX ADMIN — Medication 650 MILLIGRAM(S): at 02:00

## 2019-02-18 NOTE — DIETITIAN INITIAL EVALUATION ADULT. - NS AS NUTRI INTERV ENTERAL NUTRITION
Schedule/If pt remains lethargic consider NGT feeding start Vital AF 1.2 @ 30ml/hr to goal rate 55ml/wi=[id number], 1584kcal & 99gm protein/Composition/Insert enteral feeding tube/Route/Concentration/Rate/Volume

## 2019-02-18 NOTE — PROGRESS NOTE ADULT - SUBJECTIVE AND OBJECTIVE BOX
CC: Patient is a 43y old  Female who presents with a chief complaint of AMS (2019 22:56)      ## HPI:HPI:  42 Y/O female w/ PMHx of IV heroin abuse brought in by EMS for lethargy and cough. As per EMS, pt was found laying in bed at home, lethargic however able to follow some commands. EMS reports that pt was attempting to detox from heroin, and last known use was 3 days prior to ED presentation. As per ED, pts boyfriend reports a hx of + pt cough productive of white sputum and a fall down a flight of stairs . Labs showed serum lactate of 7.2, BUN/Cr 124/6.42, and WBC of of 11.93. Tmax 102.4, urine + for cocaine, opiates, nitrites and many bacteria. ICU called for further evaluation. (2019 22:56)      O/N:    ## ROS:  .ros  ## EXAM  ICU Vital Signs Last 24 Hrs  T(C): 37.6 (2019 12:00), Max: 39.1 (2019 17:59)  T(F): 99.6 (2019 12:00), Max: 102.4 (2019 17:59)  HR: 115 (2019 12:00) (114 - 141)  BP: 110/50 (2019 12:00) (93/66 - 131/61)  BP(mean): 66 (2019 12:00) (59 - 83)  ABP: --  ABP(mean): --  RR: 30 (2019 12:00) (24 - 62)  SpO2: 96% (2019 12:00) (90% - 100%)    .normalpe  ## Labs:  Lab Results:  CBC  CBC Full  -  ( 2019 04:25 )  WBC Count : 15.27 K/uL  Hemoglobin : 11.3 g/dL  Hematocrit : 34.5 %  Platelet Count - Automated : 36 K/uL  Mean Cell Volume : 71.3 fl  Mean Cell Hemoglobin : 23.3 pg  Mean Cell Hemoglobin Concentration : 32.8 gm/dL  Auto Neutrophil # : 13.41 K/uL  Auto Lymphocyte # : 0.71 K/uL  Auto Monocyte # : 0.71 K/uL  Auto Eosinophil # : 0.01 K/uL  Auto Basophil # : 0.11 K/uL  Auto Neutrophil % : 87.9 %  Auto Lymphocyte % : 4.6 %  Auto Monocyte % : 4.6 %  Auto Eosinophil % : 0.1 %  Auto Basophil % : 0.7 %    .		Differential:	[] Automated		[] Manual  Chemistry                        11.3   15.27 )-----------( 36       ( 2019 04:25 )             34.5         131<L>  |  95<L>  |  119<H>  ----------------------------<  77  4.1   |  13<L>  |  5.75<H>    Ca    8.9      2019 04:25  Phos  2.6       Mg     2.8         TPro  5.6<L>  /  Alb  1.5<L>  /  TBili  1.9<H>  /  DBili  1.48<H>  /  AST  168<H>  /  ALT  61  /  AlkPhos  261<H>      LIVER FUNCTIONS - ( 2019 04:25 )  Alb: 1.5 g/dL / Pro: 5.6 gm/dL / ALK PHOS: 261 U/L / ALT: 61 U/L / AST: 168 U/L / GGT: x           PT/INR - ( 2019 18:26 )   PT: 12.0 sec;   INR: 1.07 ratio         PTT - ( 2019 18:26 )  PTT:25.8 sec  Urinalysis Basic - ( 2019 19:28 )    Color: Ni / Appearance: very cloudy / S.015 / pH: x  Gluc: x / Ketone: Negative  / Bili: Small / Urobili: 1 mg/dL   Blood: x / Protein: 100 mg/dL / Nitrite: Positive   Leuk Esterase: Moderate / RBC: 6-10 /HPF / WBC 6-10   Sq Epi: x / Non Sq Epi: Occasional / Bacteria: Many        ABG - ( 2019 00:10 )  pH, Arterial: x     pH, Blood: 7.28  /  pCO2: 27    /  pO2: 238   / HCO3: 12    / Base Excess: -12.8 /  SaO2: 99                Lactate, Blood: 8.3 mmol/L <HH> (19 @ 04:25)  Lactate, Blood: 6.4 mmol/L <HH> (19 @ 22:19)  Lactate, Blood: 7.2 mmol/L <HH> (19 @ 18:26)      Lactate, Blood: 8.3 mmol/L (19 @ 04:25)  Lactate, Blood: 6.4 mmol/L (19 @ 22:19)  Lactate, Blood: 7.2 mmol/L (19 @ 18:26)      MICROBIOLOGY/CULTURES:  Culture Results:   Growth in aerobic bottle: Gram Positive Cocci in Clusters  Growth in anaerobic bottle: Gram Positive Cocci in Clusters ( @ 00:24)  Culture Results:   Growth in aerobic bottle: Gram Positive Cocci in Clusters  "Due to technical problems, Proteus sp. will Not be reported as part of  the BCID panel until further notice"  ***Blood Panel PCR results on this specimen are available  approximately 3 hours after the Gram stain result.***  Gram stain, PCR, and/or culture results may not always  correspond due to difference in methodologies.  ************************************************************  This PCR assay was performed using [employer].  The following targets are tested for: Enterococcus,  vancomycin resistant enterococci, Listeria monocytogenes,  coagulase negative staphylococci, S. aureus,  methicillin resistant S. aureus, Streptococcus agalactiae  (Group B), S. pneumoniae, S.pyogenes (Group A),  Acinetobacter baumannii, Enterobacter cloacae, E. coli,  Klebsiella oxytoca, K. pneumoniae, Proteus sp.,  Serratia marcescens, Haemophilus influenzae,  Neisseria meningitidis, Pseudomonas aeruginosa, Candida  albicans, C. glabrata, C krusei, C parapsilosis,  C. tropicalis and the KPC resistance gene.  Growth in anaerobic bottle: Gram Positive Cocci in Clusters ( @ 00:24)      RADIOLOGY RESULTS:< from: CT Chest No Cont (19 @ 23:18) >  IMPRESSION:   1. Multiple nodular and cavitary mass lesions are seen in the lungs.   Largest lesion is in right lower lobe measuring up to 2.0 cm.   differential diagnosis includes infection (including septic emboli), and   neoplasm (including squamous cell cancer).  2. Large dense right lower lobe dependent consolidation, likely   pneumonia. Trace right pleural effusion.    < end of copied text >          ## Medications:  MEDICATIONS  (STANDING):  heparin  Injectable 5000 Unit(s) SubCutaneous every 12 hours  lactated ringers. 1000 milliLiter(s) (125 mL/Hr) IV Continuous <Continuous>  nicotine - 21 mG/24Hr(s) Patch 1 patch Transdermal daily    ## O/E:I&O's Summary    2019 07:  -  2019 07:00  --------------------------------------------------------  IN: 3000 mL / OUT: 0 mL / NET: 3000 mL    2019 07:  -  2019 12:38  --------------------------------------------------------  IN: 625 mL / OUT: 250 mL / NET: 375 mL        ## Daily Issues  - Analgesia: N/A  - Sedation: N/A  - HOB elevation: Y  - GI ppx (ulcers): N/A  - DVT ppx: Hep SC  - Nutrition: PO diet  - Central line: N  - Rubin: Y      ## Code status:  Goals of care discussion: [x] yes [ ] no  [x] full code  [ ] DNR  [ ] DNI  [ ] CHELSI

## 2019-02-18 NOTE — PHARMACY COMMUNICATION NOTE - COMMENTS
s/w alana santillan - aware bp 110/62 but clonidine is for withdrawal symptoms; alana santillan will monitor bp

## 2019-02-18 NOTE — PROVIDER CONTACT NOTE (EICU) - ACTION/TREATMENT ORDERED:
now on vanco and levaquin to cover CAP and blood stream infection  the patient may have staph bacteremia

## 2019-02-18 NOTE — PATIENT PROFILE ADULT - NSSTREETDRUGWI_GEN_A_NUR
intense desire for drugs/diaphoresis/mood swings/irritability/nausea/agitation/depression/difficulty concentrating/disorientation/tremor(s)/vomiting

## 2019-02-18 NOTE — DIETITIAN INITIAL EVALUATION ADULT. - OTHER INFO
Pt seen for CCU admission. Unable to obtain wt & diet hx due to lethargy; pt with polysubstance abuse(ETOHA, Heroin, Cocaine,opiates), septic shock & EFRAIN.  Last BM ?

## 2019-02-18 NOTE — PROGRESS NOTE ADULT - ASSESSMENT
43 year old female with septic shock, now with MRSA bactermia septic emboli and EFRAIN giacomoley 2/2 hypovolemia, sepsis    Plan  -TTE to rule out vegetation on valve  -vanco by level given EFRAIN  -ID consult  -Maintain MAP greater than 65 mm HG  -daily blood cultures  -follow up urine culture  -Monitor for sign of opiod withdrawal  -clonidine PRN withdrawal symptoms  - monitor uop and cr.  Maintain UOP at 0.5 mL/kg/hr.   -conitnue care in MICU    I have decided to review and order of clinical lab tests  Relevant radiology studies were reviewed  Decision made to obtain available old records  Discussion of test results with performing physician  Review and summarization of old records obtaining history from PA  I have visualized independent imaging, EKGs and radiologies studies available but not otherwise officially read  Patient is critically ill and could decompensate imminently.  The patient required immediate attention  The medically necessary treatment decisions were required due to possibility of imminent threat to the patient  the patient is unable  to participate in giving history and/or making treatment decisions;     ccm 40 min .

## 2019-02-18 NOTE — CONSULT NOTE ADULT - SUBJECTIVE AND OBJECTIVE BOX
44 Y/O female w/ PMHx of IV heroin abuse brought in by EMS for lethargy and cough. As per EMS, pt was found laying in bed at home, lethargic however able to follow some commands. EMS reports that pt was attempting to detox from heroin, and last known use was 3 days prior to ED presentation. As per ED, pts boyfriend reports a hx of + pt cough productive of white sputum and a fall down a flight of stairs .   The patient is not able to provide any additional history.  Diagnosed with pneumonia, bacteremia, suspected endocarditis with septic emboli, elevated LFT's.  Noted to be in EFRAIN. Available EHR records renal function was normal in 2017.  Presently in ICU, lethargic, states her name, answers simple questions.  On IVF, makes urine.  No pressors.    PAST MEDICAL & SURGICAL HISTORY:  ETOH abuse  Drug abuse  Smoker  C Section    Allergies:  Penicillin (Short breath; Hives)    MEDICATIONS  (STANDING):  heparin  Injectable 5000 Unit(s) SubCutaneous every 12 hours  lactated ringers. 1000 milliLiter(s) (125 mL/Hr) IV Continuous <Continuous>  levoFLOXacin IVPB 750 milliGRAM(s) IV Intermittent once  levoFLOXacin IVPB      nicotine - 21 mG/24Hr(s) Patch 1 patch Transdermal daily    MEDICATIONS  (PRN):  acetaminophen  Suppository .. 650 milliGRAM(s) Rectal every 6 hours PRN Temp greater or equal to 38C (100.4F), Mild Pain (1 - 3)    I&O's Detail    2019 07:  -  2019 07:00  --------------------------------------------------------  IN:    lactated ringers.: 1000 mL    Sodium Chloride 0.9% IV Bolus: 2000 mL  Total IN: 3000 mL    OUT:  Total OUT: 0 mL    Total NET: 3000 mL      2019 07:  -  2019 12:30  --------------------------------------------------------  IN:    lactated ringers.: 625 mL  Total IN: 625 mL    OUT:    Intermittent Catheterization - Urethral: 250 mL  Total OUT: 250 mL    Total NET: 375 mL      Vital Signs Last 24 Hrs  T(C): 37.6 (2019 12:00), Max: 39.1 (2019 17:59)  T(F): 99.6 (2019 12:00), Max: 102.4 (2019 17:59)  HR: 115 (2019 12:00) (114 - 141)  BP: 110/50 (2019 12:00) (93/66 - 131/61)  BP(mean): 66 (2019 12:00) (59 - 83)  RR: 30 (2019 12:00) (24 - 62)  SpO2: 96% (2019 12:00) (90% - 100%)    PHYSICAL EXAM:  General: NAD, responds to name, states her name, answers simple questions, follows requests, moving all limbs  Poor hygiene  No JVD  Dry oral mucosa  Respiratory: b/l air entry, rhonchi  Cardiovascular: S1 S2 tachy, SM present  Gastrointestinal: soft, mildly directly diffusely tender, BS present  Extremities:  no edema  Feet warm  Skin with multiple needles entry sites, excoriations    POCT Blood Glucose.: 178 mg/dL (2019 18:09)        131<L>  |  95<L>  |  119<H>  ----------------------------<  77  4.1   |  13<L>  |  5.75<H>    Ca    8.9      2019 04:25  Phos  2.6       Mg     2.8         TPro  5.6<L>  /  Alb  1.5<L>  /  TBili  1.9<H>  /  DBili  1.48<H>  /  AST  168<H>  /  ALT  61  /  AlkPhos  261<H>      Hemoglobin: 11.3 g/dL ( @ 04:25)  Hematocrit: 34.5 % ( @ 04:25)  Potassium, Serum: 4.1 mmol/L ( @ 04:25)  Blood Urea Nitrogen, Serum: 119 mg/dL ( 04:25)  Calcium, Total Serum: 8.9 mg/dL ( @ 04:25)  Hemoglobin: 14.8 g/dL ( @ 18:26)  Hematocrit: 45.2 % ( @ 18:)  Potassium, Serum: 4.3 mmol/L ( @ 18:)  Blood Urea Nitrogen, Serum: 124 mg/dL ( @ :)  Calcium, Total Serum: 10.4 mg/dL ( @ :)      Creatinine, Serum: 5.75 ( 04:25)  Creatinine, Serum: 6.42 ( @ 18:)    CBC Full  -  ( 2019 04:25 )  WBC Count : 15.27 K/uL  Hemoglobin : 11.3 g/dL  Hematocrit : 34.5 %  Platelet Count - Automated : 36 K/uL  Mean Cell Volume : 71.3 fl  Mean Cell Hemoglobin : 23.3 pg  Mean Cell Hemoglobin Concentration : 32.8 gm/dL  Auto Neutrophil # : 13.41 K/uL  Auto Lymphocyte # : 0.71 K/uL  Auto Monocyte # : 0.71 K/uL  Auto Eosinophil # : 0.01 K/uL  Auto Basophil # : 0.11 K/uL  Auto Neutrophil % : 87.9 %  Auto Lymphocyte % : 4.6 %  Auto Monocyte % : 4.6 %  Auto Eosinophil % : 0.1 %  Auto Basophil % : 0.7 %    Urinalysis Basic - ( 2019 19:28 )    Color: Ni / Appearance: very cloudy / S.015 / pH: x  Gluc: x / Ketone: Negative  / Bili: Small / Urobili: 1 mg/dL   Blood: x / Protein: 100 mg/dL / Nitrite: Positive   Leuk Esterase: Moderate / RBC: 6-10 /HPF / WBC 6-10   Sq Epi: x / Non Sq Epi: Occasional / Bacteria: Many      ABG - ( 2019 00:10 )  pH, Arterial: x     pH, Blood: 7.28  /  pCO2: 27    /  pO2: 238   / HCO3: 12    / Base Excess: -12.8 /  SaO2: 99

## 2019-02-18 NOTE — PHARMACY COMMUNICATION NOTE - COMMENTS
s/w alana collado - pt's kidney fx is improving and random level after x1 vanco dose is 9.3; ok to give x1 vanco and will monitor

## 2019-02-18 NOTE — DIETITIAN INITIAL EVALUATION ADULT. - PERTINENT LABORATORY DATA
02-18 Na 131 mmol/L<L> Glu 77 mg/dL K+ 4.1 mmol/L Cr 5.75 mg/dL<H>  mg/dL<H> Phos 2.6 mg/dL Alb 1.5 g/dL<L> PAB n/a   Hgb 11.3 g/dL<L> Hct 34.5 % HgA1C n/a    Glucose, Serum: 77 mg/dL, WBC15.27, GFR=8

## 2019-02-18 NOTE — DIETITIAN INITIAL EVALUATION ADULT. - PERTINENT MEDS FT
acetaminophen  Suppository .. PRN  heparin  Injectable  lactated ringers.  nicotine - 21 mG/24Hr(s) Patch

## 2019-02-18 NOTE — PATIENT PROFILE ADULT - LIVES WITH
Problem: Patient Care Overview  Goal: Plan of Care Review  Outcome: Ongoing (interventions implemented as appropriate)  POC reviewed with patient. Patient AAOx4, calm and cooperative. VSS. No notable neuro or behavioral changes throughout shift. Sitter at bedside.        alone

## 2019-02-19 LAB
-  AMIKACIN: SIGNIFICANT CHANGE UP
-  AMPICILLIN/SULBACTAM: SIGNIFICANT CHANGE UP
-  AMPICILLIN: SIGNIFICANT CHANGE UP
-  AZTREONAM: SIGNIFICANT CHANGE UP
-  CEFAZOLIN: SIGNIFICANT CHANGE UP
-  CEFEPIME: SIGNIFICANT CHANGE UP
-  CEFOXITIN: SIGNIFICANT CHANGE UP
-  CEFTRIAXONE: SIGNIFICANT CHANGE UP
-  CIPROFLOXACIN: SIGNIFICANT CHANGE UP
-  ERTAPENEM: SIGNIFICANT CHANGE UP
-  GENTAMICIN: SIGNIFICANT CHANGE UP
-  IMIPENEM: SIGNIFICANT CHANGE UP
-  LEVOFLOXACIN: SIGNIFICANT CHANGE UP
-  MEROPENEM: SIGNIFICANT CHANGE UP
-  NITROFURANTOIN: SIGNIFICANT CHANGE UP
-  PIPERACILLIN/TAZOBACTAM: SIGNIFICANT CHANGE UP
-  TIGECYCLINE: SIGNIFICANT CHANGE UP
-  TOBRAMYCIN: SIGNIFICANT CHANGE UP
-  TRIMETHOPRIM/SULFAMETHOXAZOLE: SIGNIFICANT CHANGE UP
ANION GAP SERPL CALC-SCNC: 12 MMOL/L — SIGNIFICANT CHANGE UP (ref 5–17)
BUN SERPL-MCNC: 118 MG/DL — HIGH (ref 7–23)
CALCIUM SERPL-MCNC: 8.3 MG/DL — LOW (ref 8.5–10.1)
CHLORIDE SERPL-SCNC: 105 MMOL/L — SIGNIFICANT CHANGE UP (ref 96–108)
CO2 SERPL-SCNC: 20 MMOL/L — LOW (ref 22–31)
CREAT SERPL-MCNC: 3.07 MG/DL — HIGH (ref 0.5–1.3)
CULTURE RESULTS: SIGNIFICANT CHANGE UP
GLUCOSE SERPL-MCNC: 101 MG/DL — HIGH (ref 70–99)
GRAM STN FLD: SIGNIFICANT CHANGE UP
HAV IGM SER-ACNC: SIGNIFICANT CHANGE UP
HBV CORE IGM SER-ACNC: SIGNIFICANT CHANGE UP
HBV SURFACE AG SER-ACNC: SIGNIFICANT CHANGE UP
HCT VFR BLD CALC: 30.2 % — LOW (ref 34.5–45)
HCV AB S/CO SERPL IA: 15.18 S/CO — HIGH (ref 0–0.79)
HCV AB SERPL-IMP: REACTIVE
HGB BLD-MCNC: 10.3 G/DL — LOW (ref 11.5–15.5)
HIV 1 & 2 AB SERPL IA.RAPID: SIGNIFICANT CHANGE UP
MAGNESIUM SERPL-MCNC: 3 MG/DL — HIGH (ref 1.6–2.6)
MCHC RBC-ENTMCNC: 23.7 PG — LOW (ref 27–34)
MCHC RBC-ENTMCNC: 34.1 GM/DL — SIGNIFICANT CHANGE UP (ref 32–36)
MCV RBC AUTO: 69.6 FL — LOW (ref 80–100)
METHOD TYPE: SIGNIFICANT CHANGE UP
NRBC # BLD: 0 /100 WBCS — SIGNIFICANT CHANGE UP (ref 0–0)
ORGANISM # SPEC MICROSCOPIC CNT: SIGNIFICANT CHANGE UP
ORGANISM # SPEC MICROSCOPIC CNT: SIGNIFICANT CHANGE UP
PHOSPHATE SERPL-MCNC: 2 MG/DL — LOW (ref 2.5–4.5)
PLATELET # BLD AUTO: 22 K/UL — LOW (ref 150–400)
POTASSIUM SERPL-MCNC: 3.1 MMOL/L — LOW (ref 3.5–5.3)
POTASSIUM SERPL-SCNC: 3.1 MMOL/L — LOW (ref 3.5–5.3)
RBC # BLD: 4.34 M/UL — SIGNIFICANT CHANGE UP (ref 3.8–5.2)
RBC # FLD: 17.7 % — HIGH (ref 10.3–14.5)
SODIUM SERPL-SCNC: 137 MMOL/L — SIGNIFICANT CHANGE UP (ref 135–145)
SPECIMEN SOURCE: SIGNIFICANT CHANGE UP
SPECIMEN SOURCE: SIGNIFICANT CHANGE UP
VANCOMYCIN FLD-MCNC: 17 UG/ML — SIGNIFICANT CHANGE UP
WBC # BLD: 13.47 K/UL — HIGH (ref 3.8–10.5)
WBC # FLD AUTO: 13.47 K/UL — HIGH (ref 3.8–10.5)

## 2019-02-19 PROCEDURE — 99291 CRITICAL CARE FIRST HOUR: CPT

## 2019-02-19 PROCEDURE — 76700 US EXAM ABDOM COMPLETE: CPT | Mod: 26

## 2019-02-19 RX ORDER — POTASSIUM CHLORIDE 20 MEQ
20 PACKET (EA) ORAL ONCE
Qty: 0 | Refills: 0 | Status: COMPLETED | OUTPATIENT
Start: 2019-02-19 | End: 2019-02-19

## 2019-02-19 RX ORDER — IBUPROFEN 200 MG
400 TABLET ORAL ONCE
Qty: 0 | Refills: 0 | Status: COMPLETED | OUTPATIENT
Start: 2019-02-19 | End: 2019-02-19

## 2019-02-19 RX ADMIN — Medication 20 MILLIEQUIVALENT(S): at 14:59

## 2019-02-19 RX ADMIN — Medication 1 PATCH: at 12:50

## 2019-02-19 RX ADMIN — Medication 1 PATCH: at 19:56

## 2019-02-19 RX ADMIN — Medication 650 MILLIGRAM(S): at 16:05

## 2019-02-19 RX ADMIN — CHLORHEXIDINE GLUCONATE 1 APPLICATION(S): 213 SOLUTION TOPICAL at 06:00

## 2019-02-19 RX ADMIN — Medication 400 MILLIGRAM(S): at 18:21

## 2019-02-19 RX ADMIN — Medication 1 PATCH: at 07:00

## 2019-02-19 RX ADMIN — Medication 650 MILLIGRAM(S): at 16:04

## 2019-02-19 RX ADMIN — SODIUM CHLORIDE 125 MILLILITER(S): 9 INJECTION, SOLUTION INTRAVENOUS at 11:30

## 2019-02-19 NOTE — PROGRESS NOTE ADULT - ASSESSMENT
Polysubstance abuse.  Pneumonia.  Bacteremia, likely endocarditis with septic emboli.  UTI.  EFRAIN several days old likely hypovolemic/sepsis related ATN/and/or infection TIN/GN.  Slowly resolving.  Continue with volume expansion.  Antibiotics as per I.D.  Dose all meds for eGFR of 30 cc/min.  Monitor Vanco levels.  Avoid nephrotoxins/hypotension as possible.  Monitor renal indices and urine output.  Monitor electrolytes and replete K/Mg/Phos as indicated.  Thank you.

## 2019-02-19 NOTE — PROGRESS NOTE ADULT - SUBJECTIVE AND OBJECTIVE BOX
Patient seen in follow up for EFRAIN. Remains in ICU. Still encephalopathic but more alert. Takes P.O.     42 Y/O female w/ PMHx of IV heroin abuse brought in by EMS for lethargy and cough. As per EMS, pt was found laying in bed at home, lethargic however able to follow some commands. EMS reports that pt was attempting to detox from heroin, and last known use was 3 days prior to ED presentation. As per ED, pts boyfriend reports a hx of + pt cough productive of white sputum and a fall down a flight of stairs 2/16.   Diagnosed with pneumonia, bacteremia, suspected endocarditis with septic emboli, elevated LFT's, and EFRAIN.     MEDICATIONS  (STANDING):  chlorhexidine 4% Liquid 1 Application(s) Topical <User Schedule>  lactated ringers. 1000 milliLiter(s) (125 mL/Hr) IV Continuous <Continuous>  levoFLOXacin IVPB 250 milliGRAM(s) IV Intermittent every 48 hours  nicotine - 21 mG/24Hr(s) Patch 1 patch Transdermal daily    MEDICATIONS  (PRN):  acetaminophen  Suppository .. 650 milliGRAM(s) Rectal every 6 hours PRN Temp greater or equal to 38C (100.4F), Mild Pain (1 - 3)    T(C): 38.4 (02-19-19 @ 16:00), Max: 38.4 (02-19-19 @ 16:00)  HR: 109 (02-19-19 @ 16:00) (94 - 118)  BP: 138/77 (02-19-19 @ 16:00) (93/66 - 146/83)  RR: 33 (02-19-19 @ 16:00) (23 - 42)  SpO2: 100% (02-19-19 @ 16:00) (93% - 100%)  Wt(kg): --  I&O's Detail    18 Feb 2019 07:01  -  19 Feb 2019 07:00  --------------------------------------------------------  IN:    IV PiggyBack: 250 mL    lactated ringers.: 2750 mL    Oral Fluid: 120 mL  Total IN: 3120 mL    OUT:    Intermittent Catheterization - Urethral: 250 mL  Total OUT: 250 mL    Total NET: 2870 mL      19 Feb 2019 07:01  -  19 Feb 2019 16:14  --------------------------------------------------------  IN:    lactated ringers.: 750 mL    Oral Fluid: 480 mL  Total IN: 1230 mL    OUT:  Total OUT: 0 mL    Total NET: 1230 mL    PHYSICAL EXAM:  General: NAD  Dry sloughing oral mucosa  No JVD  Respiratory: b/l air entry, rhonchi  Cardiovascular: S1 S2 reg  Gastrointestinal: soft, mildly directly tender in RUQ  Extremities: no edema    CBC Full  -  ( 19 Feb 2019 06:11 )  WBC Count : 13.47 K/uL  Hemoglobin : 10.3 g/dL  Hematocrit : 30.2 %  Platelet Count - Automated : 22 K/uL  Mean Cell Volume : 69.6 fl  Mean Cell Hemoglobin : 23.7 pg  Mean Cell Hemoglobin Concentration : 34.1 gm/dL  Auto Neutrophil # : x  Auto Lymphocyte # : x  Auto Monocyte # : x  Auto Eosinophil # : x  Auto Basophil # : x  Auto Neutrophil % : x  Auto Lymphocyte % : x  Auto Monocyte % : x  Auto Eosinophil % : x  Auto Basophil % : x    02-19    137  |  105  |  118<H>  ----------------------------<  101<H>  3.1<L>   |  20<L>  |  3.07<H>    Ca    8.3<L>      19 Feb 2019 06:11  Phos  2.0     02-19  Mg     3.0     02-19    TPro  5.6<L>  /  Alb  1.5<L>  /  TBili  1.9<H>  /  DBili  1.48<H>  /  AST  168<H>  /  ALT  61  /  AlkPhos  261<H>  02-18    ABG - ( 18 Feb 2019 00:10 )  pH, Arterial: x     pH, Blood: 7.28  /  pCO2: 27    /  pO2: 238   / HCO3: 12    / Base Excess: -12.8 /  SaO2: 99          Sodium, Serum: 137 (02-19 @ 06:11)  Sodium, Serum: 135 (02-18 @ 15:26)  Sodium, Serum: 131 (02-18 @ 04:25)  Sodium, Serum: 126 (02-17 @ 18:26)    Creatinine, Serum: 3.07 (02-19 @ 06:11)  Creatinine, Serum: 4.64 (02-18 @ 15:26)  Creatinine, Serum: 5.75 (02-18 @ 04:25)  Creatinine, Serum: 6.42 (02-17 @ 18:26)    Potassium, Serum: 3.1 (02-19 @ 06:11)  Potassium, Serum: 3.7 (02-18 @ 15:26)  Potassium, Serum: 4.1 (02-18 @ 04:25)  Potassium, Serum: 4.3 (02-17 @ 18:26)    Hemoglobin: 10.3 (02-19 @ 06:11)  Hemoglobin: 10.6 (02-18 @ 15:26)  Hemoglobin: 11.3 (02-18 @ 04:25)  Hemoglobin: 14.8 (02-17 @ 18:26)

## 2019-02-19 NOTE — PROGRESS NOTE ADULT - ASSESSMENT
43F PMH IVDA, alcohol abuse, hx of pancreatitis, alcohol hepatitis, alcohol withdrawal, left frontoparietal subdural hematoma 2008 without need for neurosurgical intervention, bacterial vaginitis here with severe sepsis, septic shock, MRSA bacteremia, likely septic emboli, PNA, UTI, ARF with ATN, lactic acidosis, acute metabolic encephelopathy, likely endocarditis.      1. NEURO  - CT head without acute pathology  - encephalopathy and poor mental status may be in setting of sepsis and underlying bacteremia/PNA/UTI  - if there is no improvement in mental status will get MRI brain as septic embolic within differential  - monitor for withdrawal symptoms both from drug standpoint and from alcohol withdrawal    2. CV  - TTE without definitive valvular vegetation, however unable to clearly visualize tricuspid valve  - will have cardiology evaluate for LIZ  - hypotension resolved s/p IVF resuscitation, has not required pressors for hemodynamic support    3. PULM  - RLL PNA along with cavitary lesions noted on CT chest likely due to septic emboli due to staph infection which can cause cavitary lesions  - check sputum culture  - remains tachypneic, likely due to sepsis, lactic acidosis, and fevers    4. RENAL  - Cr slowly downtrending with IVF   - ARF with ATN likely in setting of sepsis, hypovolemia, hypotension and with underlying UTI  - supplement hypokalemia  - appreciate renal follow up    5. HEME  - thrombocytopenia possibly due to sepsis  - cont to monitor counts and for bleed    6. ID  - MRSA bacteremia with likely septic emboli to lungs with PNA and cavitary lesion  - check sputum cx  - on vanco by level due to ARF  - also with UTI  - remains on levaquin  - follow up culture sensitivities  - RVP negative  - concern for endocarditis with IVDU, will d/w cardiology LIZ    7. GEN  - spoke to boyfriend who lives in [place], he states he will be returning home tomorrow  - pt has a daughter but he does not know contact info for family  - pt lives by self  - social work eval    Critical Care Time: 50 minutes

## 2019-02-20 LAB
-  AMPICILLIN/SULBACTAM: SIGNIFICANT CHANGE UP
-  CEFAZOLIN: SIGNIFICANT CHANGE UP
-  CLINDAMYCIN: SIGNIFICANT CHANGE UP
-  DAPTOMYCIN: SIGNIFICANT CHANGE UP
-  ERYTHROMYCIN: SIGNIFICANT CHANGE UP
-  GENTAMICIN: SIGNIFICANT CHANGE UP
-  LINEZOLID: SIGNIFICANT CHANGE UP
-  OXACILLIN: SIGNIFICANT CHANGE UP
-  PENICILLIN: SIGNIFICANT CHANGE UP
-  RIFAMPIN: SIGNIFICANT CHANGE UP
-  TETRACYCLINE: SIGNIFICANT CHANGE UP
-  TRIMETHOPRIM/SULFAMETHOXAZOLE: SIGNIFICANT CHANGE UP
-  VANCOMYCIN: SIGNIFICANT CHANGE UP
ANION GAP SERPL CALC-SCNC: 11 MMOL/L — SIGNIFICANT CHANGE UP (ref 5–17)
BASE EXCESS BLDA CALC-SCNC: 0.9 MMOL/L — SIGNIFICANT CHANGE UP (ref -2–2)
BASE EXCESS BLDA CALC-SCNC: 1 MMOL/L — SIGNIFICANT CHANGE UP (ref -2–2)
BLD GP AB SCN SERPL QL: SIGNIFICANT CHANGE UP
BLOOD GAS COMMENTS: SIGNIFICANT CHANGE UP
BLOOD GAS SOURCE: SIGNIFICANT CHANGE UP
BLOOD GAS SOURCE: SIGNIFICANT CHANGE UP
BUN SERPL-MCNC: 75 MG/DL — HIGH (ref 7–23)
CALCIUM SERPL-MCNC: 8.4 MG/DL — LOW (ref 8.5–10.1)
CHLORIDE SERPL-SCNC: 115 MMOL/L — HIGH (ref 96–108)
CK MB CFR SERPL CALC: 1.5 NG/ML — SIGNIFICANT CHANGE UP (ref 0.5–3.6)
CO2 SERPL-SCNC: 21 MMOL/L — LOW (ref 22–31)
CREAT SERPL-MCNC: 1.34 MG/DL — HIGH (ref 0.5–1.3)
CULTURE RESULTS: SIGNIFICANT CHANGE UP
GLUCOSE SERPL-MCNC: 176 MG/DL — HIGH (ref 70–99)
GRAM STN FLD: SIGNIFICANT CHANGE UP
HCO3 BLDA-SCNC: 23 MMOL/L — SIGNIFICANT CHANGE UP (ref 21–29)
HCO3 BLDA-SCNC: 24 MMOL/L — SIGNIFICANT CHANGE UP (ref 21–29)
HCT VFR BLD CALC: 30 % — LOW (ref 34.5–45)
HGB BLD-MCNC: 10 G/DL — LOW (ref 11.5–15.5)
HOROWITZ INDEX BLDA+IHG-RTO: 100 — SIGNIFICANT CHANGE UP
HOROWITZ INDEX BLDA+IHG-RTO: 40 — SIGNIFICANT CHANGE UP
LACTATE SERPL-SCNC: 1.7 MMOL/L — SIGNIFICANT CHANGE UP (ref 0.7–2)
LEGIONELLA AG UR QL: NEGATIVE — SIGNIFICANT CHANGE UP
MAGNESIUM SERPL-MCNC: 2.4 MG/DL — SIGNIFICANT CHANGE UP (ref 1.6–2.6)
MCHC RBC-ENTMCNC: 23.8 PG — LOW (ref 27–34)
MCHC RBC-ENTMCNC: 33.3 GM/DL — SIGNIFICANT CHANGE UP (ref 32–36)
MCV RBC AUTO: 71.4 FL — LOW (ref 80–100)
METHOD TYPE: SIGNIFICANT CHANGE UP
NRBC # BLD: 0 /100 WBCS — SIGNIFICANT CHANGE UP (ref 0–0)
ORGANISM # SPEC MICROSCOPIC CNT: SIGNIFICANT CHANGE UP
PCO2 BLDA: 30 MMHG — LOW (ref 32–46)
PCO2 BLDA: 35 MMHG — SIGNIFICANT CHANGE UP (ref 32–46)
PH BLD: 7.46 — HIGH (ref 7.35–7.45)
PH BLD: 7.5 — HIGH (ref 7.35–7.45)
PHOSPHATE SERPL-MCNC: 2 MG/DL — LOW (ref 2.5–4.5)
PLATELET # BLD AUTO: 30 K/UL — LOW (ref 150–400)
PO2 BLDA: 319 MMHG — HIGH (ref 74–108)
PO2 BLDA: 85 MMHG — SIGNIFICANT CHANGE UP (ref 74–108)
POTASSIUM SERPL-MCNC: 2.9 MMOL/L — CRITICAL LOW (ref 3.5–5.3)
POTASSIUM SERPL-SCNC: 2.9 MMOL/L — CRITICAL LOW (ref 3.5–5.3)
RBC # BLD: 4.2 M/UL — SIGNIFICANT CHANGE UP (ref 3.8–5.2)
RBC # FLD: 18 % — HIGH (ref 10.3–14.5)
SAO2 % BLDA: 100 % — HIGH (ref 92–96)
SAO2 % BLDA: 96 % — SIGNIFICANT CHANGE UP (ref 92–96)
SODIUM SERPL-SCNC: 147 MMOL/L — HIGH (ref 135–145)
SPECIMEN SOURCE: SIGNIFICANT CHANGE UP
T3 SERPL-MCNC: 35 NG/DL — LOW (ref 80–200)
T4 AB SER-ACNC: 1.9 UG/DL — LOW (ref 4.6–12)
TROPONIN I SERPL-MCNC: 0.06 NG/ML — HIGH (ref 0.01–0.04)
TSH SERPL-MCNC: 0.31 UIU/ML — LOW (ref 0.36–3.74)
WBC # BLD: 12.62 K/UL — HIGH (ref 3.8–10.5)
WBC # FLD AUTO: 12.62 K/UL — HIGH (ref 3.8–10.5)

## 2019-02-20 PROCEDURE — 76937 US GUIDE VASCULAR ACCESS: CPT | Mod: 26,59

## 2019-02-20 PROCEDURE — 99291 CRITICAL CARE FIRST HOUR: CPT

## 2019-02-20 PROCEDURE — 71045 X-RAY EXAM CHEST 1 VIEW: CPT | Mod: 26

## 2019-02-20 PROCEDURE — 36569 INSJ PICC 5 YR+ W/O IMAGING: CPT

## 2019-02-20 PROCEDURE — 99223 1ST HOSP IP/OBS HIGH 75: CPT

## 2019-02-20 RX ORDER — POTASSIUM CHLORIDE 20 MEQ
40 PACKET (EA) ORAL EVERY 4 HOURS
Qty: 0 | Refills: 0 | Status: COMPLETED | OUTPATIENT
Start: 2019-02-20 | End: 2019-02-20

## 2019-02-20 RX ORDER — CEFAZOLIN SODIUM 1 G
2000 VIAL (EA) INJECTION EVERY 8 HOURS
Qty: 0 | Refills: 0 | Status: DISCONTINUED | OUTPATIENT
Start: 2019-02-20 | End: 2019-02-20

## 2019-02-20 RX ORDER — MIDAZOLAM HYDROCHLORIDE 1 MG/ML
0.02 INJECTION, SOLUTION INTRAMUSCULAR; INTRAVENOUS
Qty: 100 | Refills: 0 | Status: DISCONTINUED | OUTPATIENT
Start: 2019-02-20 | End: 2019-02-22

## 2019-02-20 RX ORDER — POTASSIUM CHLORIDE 20 MEQ
40 PACKET (EA) ORAL EVERY 4 HOURS
Qty: 0 | Refills: 0 | Status: DISCONTINUED | OUTPATIENT
Start: 2019-02-20 | End: 2019-02-20

## 2019-02-20 RX ORDER — DAPTOMYCIN 500 MG/10ML
430 INJECTION, POWDER, LYOPHILIZED, FOR SOLUTION INTRAVENOUS ONCE
Qty: 0 | Refills: 0 | Status: DISCONTINUED | OUTPATIENT
Start: 2019-02-20 | End: 2019-02-20

## 2019-02-20 RX ORDER — DAPTOMYCIN 500 MG/10ML
INJECTION, POWDER, LYOPHILIZED, FOR SOLUTION INTRAVENOUS
Qty: 0 | Refills: 0 | Status: DISCONTINUED | OUTPATIENT
Start: 2019-02-20 | End: 2019-02-20

## 2019-02-20 RX ORDER — CHLORHEXIDINE GLUCONATE 213 G/1000ML
15 SOLUTION TOPICAL
Qty: 0 | Refills: 0 | Status: DISCONTINUED | OUTPATIENT
Start: 2019-02-20 | End: 2019-02-25

## 2019-02-20 RX ORDER — CHLORHEXIDINE GLUCONATE 213 G/1000ML
1 SOLUTION TOPICAL
Qty: 0 | Refills: 0 | Status: DISCONTINUED | OUTPATIENT
Start: 2019-02-20 | End: 2019-03-20

## 2019-02-20 RX ORDER — IBUPROFEN 200 MG
400 TABLET ORAL ONCE
Qty: 0 | Refills: 0 | Status: COMPLETED | OUTPATIENT
Start: 2019-02-20 | End: 2019-02-21

## 2019-02-20 RX ORDER — ACETAMINOPHEN 500 MG
650 TABLET ORAL EVERY 6 HOURS
Qty: 0 | Refills: 0 | Status: DISCONTINUED | OUTPATIENT
Start: 2019-02-20 | End: 2019-02-22

## 2019-02-20 RX ORDER — MIDAZOLAM HYDROCHLORIDE 1 MG/ML
4 INJECTION, SOLUTION INTRAMUSCULAR; INTRAVENOUS ONCE
Qty: 0 | Refills: 0 | Status: DISCONTINUED | OUTPATIENT
Start: 2019-02-20 | End: 2019-02-20

## 2019-02-20 RX ORDER — POTASSIUM PHOSPHATE, MONOBASIC POTASSIUM PHOSPHATE, DIBASIC 236; 224 MG/ML; MG/ML
15 INJECTION, SOLUTION INTRAVENOUS ONCE
Qty: 0 | Refills: 0 | Status: DISCONTINUED | OUTPATIENT
Start: 2019-02-20 | End: 2019-02-20

## 2019-02-20 RX ORDER — CEFAZOLIN SODIUM 1 G
2000 VIAL (EA) INJECTION ONCE
Qty: 0 | Refills: 0 | Status: COMPLETED | OUTPATIENT
Start: 2019-02-20 | End: 2019-02-20

## 2019-02-20 RX ORDER — VANCOMYCIN HCL 1 G
1000 VIAL (EA) INTRAVENOUS EVERY 12 HOURS
Qty: 0 | Refills: 0 | Status: DISCONTINUED | OUTPATIENT
Start: 2019-02-20 | End: 2019-02-21

## 2019-02-20 RX ORDER — NOREPINEPHRINE BITARTRATE/D5W 8 MG/250ML
0.05 PLASTIC BAG, INJECTION (ML) INTRAVENOUS
Qty: 8 | Refills: 0 | Status: DISCONTINUED | OUTPATIENT
Start: 2019-02-20 | End: 2019-02-22

## 2019-02-20 RX ORDER — CIPROFLOXACIN LACTATE 400MG/40ML
400 VIAL (ML) INTRAVENOUS EVERY 12 HOURS
Qty: 0 | Refills: 0 | Status: DISCONTINUED | OUTPATIENT
Start: 2019-02-20 | End: 2019-02-21

## 2019-02-20 RX ORDER — DOPAMINE HYDROCHLORIDE 40 MG/ML
2 INJECTION, SOLUTION, CONCENTRATE INTRAVENOUS
Qty: 400 | Refills: 0 | Status: DISCONTINUED | OUTPATIENT
Start: 2019-02-20 | End: 2019-02-20

## 2019-02-20 RX ORDER — SODIUM,POTASSIUM PHOSPHATES 278-250MG
1 POWDER IN PACKET (EA) ORAL EVERY 4 HOURS
Qty: 0 | Refills: 0 | Status: COMPLETED | OUTPATIENT
Start: 2019-02-20 | End: 2019-02-21

## 2019-02-20 RX ORDER — PANTOPRAZOLE SODIUM 20 MG/1
40 TABLET, DELAYED RELEASE ORAL DAILY
Qty: 0 | Refills: 0 | Status: DISCONTINUED | OUTPATIENT
Start: 2019-02-20 | End: 2019-02-25

## 2019-02-20 RX ORDER — MIDAZOLAM HYDROCHLORIDE 1 MG/ML
2 INJECTION, SOLUTION INTRAMUSCULAR; INTRAVENOUS ONCE
Qty: 0 | Refills: 0 | Status: DISCONTINUED | OUTPATIENT
Start: 2019-02-20 | End: 2019-02-20

## 2019-02-20 RX ORDER — SODIUM CHLORIDE 9 MG/ML
1000 INJECTION, SOLUTION INTRAVENOUS
Qty: 0 | Refills: 0 | Status: DISCONTINUED | OUTPATIENT
Start: 2019-02-20 | End: 2019-02-21

## 2019-02-20 RX ORDER — PROPOFOL 10 MG/ML
5 INJECTION, EMULSION INTRAVENOUS
Qty: 1000 | Refills: 0 | Status: DISCONTINUED | OUTPATIENT
Start: 2019-02-20 | End: 2019-02-22

## 2019-02-20 RX ORDER — CEFAZOLIN SODIUM 1 G
VIAL (EA) INJECTION
Qty: 0 | Refills: 0 | Status: DISCONTINUED | OUTPATIENT
Start: 2019-02-20 | End: 2019-02-20

## 2019-02-20 RX ADMIN — Medication 1 PATCH: at 18:50

## 2019-02-20 RX ADMIN — Medication 40 MILLIEQUIVALENT(S): at 13:47

## 2019-02-20 RX ADMIN — Medication 650 MILLIGRAM(S): at 19:21

## 2019-02-20 RX ADMIN — DOPAMINE HYDROCHLORIDE 5.35 MICROGRAM(S)/KG/MIN: 40 INJECTION, SOLUTION, CONCENTRATE INTRAVENOUS at 07:03

## 2019-02-20 RX ADMIN — PANTOPRAZOLE SODIUM 40 MILLIGRAM(S): 20 TABLET, DELAYED RELEASE ORAL at 11:54

## 2019-02-20 RX ADMIN — Medication 6.68 MICROGRAM(S)/KG/MIN: at 22:35

## 2019-02-20 RX ADMIN — Medication 250 MILLIGRAM(S): at 11:53

## 2019-02-20 RX ADMIN — Medication 650 MILLIGRAM(S): at 18:21

## 2019-02-20 RX ADMIN — MIDAZOLAM HYDROCHLORIDE 2 MILLIGRAM(S): 1 INJECTION, SOLUTION INTRAMUSCULAR; INTRAVENOUS at 18:21

## 2019-02-20 RX ADMIN — Medication 250 MILLIGRAM(S): at 22:35

## 2019-02-20 RX ADMIN — Medication 1 TABLET(S): at 17:17

## 2019-02-20 RX ADMIN — MIDAZOLAM HYDROCHLORIDE 4 MILLIGRAM(S): 1 INJECTION, SOLUTION INTRAMUSCULAR; INTRAVENOUS at 06:55

## 2019-02-20 RX ADMIN — CHLORHEXIDINE GLUCONATE 1 APPLICATION(S): 213 SOLUTION TOPICAL at 04:00

## 2019-02-20 RX ADMIN — Medication 1 PATCH: at 11:54

## 2019-02-20 RX ADMIN — SODIUM CHLORIDE 125 MILLILITER(S): 9 INJECTION, SOLUTION INTRAVENOUS at 04:01

## 2019-02-20 RX ADMIN — Medication 1 PATCH: at 19:00

## 2019-02-20 RX ADMIN — PROPOFOL 2.14 MICROGRAM(S)/KG/MIN: 10 INJECTION, EMULSION INTRAVENOUS at 22:36

## 2019-02-20 RX ADMIN — Medication 650 MILLIGRAM(S): at 05:00

## 2019-02-20 RX ADMIN — Medication 200 MILLIGRAM(S): at 18:21

## 2019-02-20 RX ADMIN — CHLORHEXIDINE GLUCONATE 15 MILLILITER(S): 213 SOLUTION TOPICAL at 17:17

## 2019-02-20 RX ADMIN — Medication 1 TABLET(S): at 22:34

## 2019-02-20 RX ADMIN — Medication 6.68 MICROGRAM(S)/KG/MIN: at 11:11

## 2019-02-20 RX ADMIN — Medication 100 MILLIGRAM(S): at 06:06

## 2019-02-20 RX ADMIN — Medication 40 MILLIEQUIVALENT(S): at 17:17

## 2019-02-20 RX ADMIN — Medication 650 MILLIGRAM(S): at 04:00

## 2019-02-20 RX ADMIN — PROPOFOL 2.14 MICROGRAM(S)/KG/MIN: 10 INJECTION, EMULSION INTRAVENOUS at 11:11

## 2019-02-20 RX ADMIN — MIDAZOLAM HYDROCHLORIDE 1.43 MG/KG/HR: 1 INJECTION, SOLUTION INTRAMUSCULAR; INTRAVENOUS at 22:35

## 2019-02-20 RX ADMIN — PROPOFOL 2.14 MICROGRAM(S)/KG/MIN: 10 INJECTION, EMULSION INTRAVENOUS at 13:47

## 2019-02-20 RX ADMIN — SODIUM CHLORIDE 60 MILLILITER(S): 9 INJECTION, SOLUTION INTRAVENOUS at 21:00

## 2019-02-20 NOTE — PROGRESS NOTE ADULT - SUBJECTIVE AND OBJECTIVE BOX
Patient seen in follow up for EFRAIN. Events noted, was intubated, likely polysubstance withdrawal. Off IVF at this time.  44 Y/O female w/ PMHx of IV heroin abuse/Cocaine/ETOH in ICU WITH pneumonia, MRSA bacteremia, suspected endocarditis with septic emboli, elevated LFT's, and EFRAIN.     ETOH abuse  Drug abuse    MEDICATIONS  (STANDING):  chlorhexidine 0.12% Liquid 15 milliLiter(s) Oral Mucosa two times a day  chlorhexidine 4% Liquid 1 Application(s) Topical <User Schedule>  chlorhexidine 4% Liquid 1 Application(s) Topical <User Schedule>  ciprofloxacin   IVPB 400 milliGRAM(s) IV Intermittent every 12 hours  ibuprofen  Tablet. 400 milliGRAM(s) Oral once  nicotine - 21 mG/24Hr(s) Patch 1 patch Transdermal daily  norepinephrine Infusion 0.05 MICROgram(s)/kG/Min (6.684 mL/Hr) IV Continuous <Continuous>  pantoprazole   Suspension 40 milliGRAM(s) Oral daily  potassium acid phosphate/sodium acid phosphate tablet (K-PHOS No. 2) 1 Tablet(s) Oral every 4 hours  propofol Infusion 5 MICROgram(s)/kG/Min (2.139 mL/Hr) IV Continuous <Continuous>  vancomycin  IVPB 1000 milliGRAM(s) IV Intermittent every 12 hours    MEDICATIONS  (PRN):  acetaminophen   Tablet .. 650 milliGRAM(s) Oral every 6 hours PRN Temp greater or equal to 38C (100.4F), Mild Pain (1 - 3)  acetaminophen  Suppository .. 650 milliGRAM(s) Rectal every 6 hours PRN Temp greater or equal to 38C (100.4F), Mild Pain (1 - 3)    T(C): 37.8 (02-20-19 @ 15:54), Max: 39.8 (02-20-19 @ 05:00)  HR: 94 (02-20-19 @ 17:30) (74 - 141)  BP: 103/59 (02-20-19 @ 14:00) (88/48 - 154/122)  RR: 25 (02-20-19 @ 14:00) (19 - 49)  SpO2: 100% (02-20-19 @ 17:30) (95% - 100%)  Wt(kg): --  I&O's Detail    19 Feb 2019 07:01  -  20 Feb 2019 07:00  --------------------------------------------------------  IN:    IV PiggyBack: 50 mL    lactated ringers.: 2750 mL    Oral Fluid: 600 mL  Total IN: 3400 mL    OUT:    Voided: 800 mL  Total OUT: 800 mL    Total NET: 2600 mL      20 Feb 2019 07:01  -  20 Feb 2019 17:48  --------------------------------------------------------  IN:    DOPamine Infusion: 82.8 mL    propofol Infusion: 36.2 mL  Total IN: 119 mL    OUT:  Total OUT: 0 mL    Total NET: 119 mL      PHYSICAL EXAM:  General: vented, tremulous  No JVD  Respiratory: b/l air entry, rhonchi  Cardiovascular: S1 S2 reg  Gastrointestinal: soft, mildly directly tender in RUQ  Extremities: small dependent edema    CBC Full  -  ( 20 Feb 2019 07:06 )  WBC Count : 12.62 K/uL  Hemoglobin : 10.0 g/dL  Hematocrit : 30.0 %  Platelet Count - Automated : 30 K/uL  Mean Cell Volume : 71.4 fl  Mean Cell Hemoglobin : 23.8 pg  Mean Cell Hemoglobin Concentration : 33.3 gm/dL  Auto Neutrophil # : x  Auto Lymphocyte # : x  Auto Monocyte # : x  Auto Eosinophil # : x  Auto Basophil # : x  Auto Neutrophil % : x  Auto Lymphocyte % : x  Auto Monocyte % : x  Auto Eosinophil % : x  Auto Basophil % : x    02-20    147<H>  |  115<H>  |  75<H>  ----------------------------<  176<H>  2.9<LL>   |  21<L>  |  1.34<H>    Ca    8.4<L>      20 Feb 2019 07:08  Phos  2.0     02-20  Mg     2.4     02-20      ABG - ( 20 Feb 2019 08:56 )  pH, Arterial: x     pH, Blood: 7.46  /  pCO2: 35    /  pO2: 85    / HCO3: 24    / Base Excess: 1.0   /  SaO2: 96                  Sodium, Serum: 147 (02-20 @ 07:08)  Sodium, Serum: 137 (02-19 @ 06:11)  Sodium, Serum: 135 (02-18 @ 15:26)  Sodium, Serum: 131 (02-18 @ 04:25)  Sodium, Serum: 126 (02-17 @ 18:26)    Creatinine, Serum: 1.34 (02-20 @ 07:08)  Creatinine, Serum: 3.07 (02-19 @ 06:11)  Creatinine, Serum: 4.64 (02-18 @ 15:26)  Creatinine, Serum: 5.75 (02-18 @ 04:25)  Creatinine, Serum: 6.42 (02-17 @ 18:26)    Potassium, Serum: 2.9 (02-20 @ 07:08)  Potassium, Serum: 3.1 (02-19 @ 06:11)  Potassium, Serum: 3.7 (02-18 @ 15:26)  Potassium, Serum: 4.1 (02-18 @ 04:25)  Potassium, Serum: 4.3 (02-17 @ 18:26)    Hemoglobin: 10.0 (02-20 @ 07:06)  Hemoglobin: 10.3 (02-19 @ 06:11)  Hemoglobin: 10.6 (02-18 @ 15:26)  Hemoglobin: 11.3 (02-18 @ 04:25)  Hemoglobin: 14.8 (02-17 @ 18:26)

## 2019-02-20 NOTE — CONSULT NOTE ADULT - SUBJECTIVE AND OBJECTIVE BOX
CHIEF COMPLAINT:  Patient is a 43y old  Female who presents with a chief complaint of AMS      HPI:  44 Y/O female w/ PMHx of IV heroin abuse brought in by EMS for lethargy and cough. As per EMS, pt was found laying in bed at home, lethargic however able to follow some commands. EMS reports that pt was attempting to detox from heroin, and last known use was 3 days prior to ED presentation. As per ED, pts boyfriend reports a hx of + pt cough productive of white sputum and a fall down a flight of stairs 2/16. Labs showed serum lactate of 7.2, BUN/Cr 124/6.42, and WBC of of 11.93. Tmax 102.4, urine + for cocaine, opiates, nitrites and many bacteria. Intubated for resp protections. On pressors now. MRSA positive blood cultures.    ALLERGIES:  penicillin (Short breath; Hives)    Home Medications:  Unknown    PAST MEDICAL & SURGICAL HISTORY:  ETOH abuse  Drug abuse  C Section      FAMILY HISTORY:  Unknown    SOCIAL HISTORY:  ETOH abuse  Drug abuse    REVIEW OF SYSTEMS:  Unable as pt intubated.      PHYSICAL EXAM:  Vital Signs:  Vital Signs Last 24 Hrs  T(C): 39.1 (20 Feb 2019 08:04), Max: 39.8 (20 Feb 2019 05:00)  T(F): 102.4 (20 Feb 2019 08:04), Max: 103.7 (20 Feb 2019 05:00)  HR: 111 (20 Feb 2019 10:30) (90 - 141)  BP: 121/70 (20 Feb 2019 10:30) (92/51 - 154/122)  BP(mean): 82 (20 Feb 2019 10:30) (56 - 129)  RR: 25 (20 Feb 2019 10:30) (19 - 49)  SpO2: 100% (20 Feb 2019 10:30) (95% - 100%)  I&O's Summary    19 Feb 2019 07:01  -  20 Feb 2019 07:00  --------------------------------------------------------  IN: 3400 mL / OUT: 800 mL / NET: 2600 mL    20 Feb 2019 07:01  -  20 Feb 2019 10:57  --------------------------------------------------------  IN: 119 mL / OUT: 0 mL / NET: 119 mL    Tele: SR/SB/ST  General:  Poor overall appearance, intubated on vent.  HEENT:  NC/AT, patent nares w/ pink mucosa, no thyromegaly, nodules, adenopathy, no JVD  Chest:  Full & symmetric excursion, no increased effort, breath sounds diminished throughout. No wheeze.  Cardiovascular:  Regular rhythm, S1, S2, 1/6 BEBO at Right base.  Abdomen:  Soft, non-tender, non-distended  Extremities: no edema.  Skin:  warm/dry  Neuro/Psych: unable to assess.    LABORATORY:                          10.0   12.62 )-----------( 30       ( 20 Feb 2019 07:06 )             30.0     02-20    147<H>  |  115<H>  |  75<H>  ----------------------------<  176<H>  2.9<LL>   |  21<L>  |  1.34<H>    Ca    8.4<L>      20 Feb 2019 07:08  Phos  2.0     02-20  Mg     2.4     02-20      ABG - ( 20 Feb 2019 08:56 )  pH, Arterial: x     pH, Blood: 7.46  /  pCO2: 35    /  pO2: 85    / HCO3: 24    / Base Excess: 1.0   /  SaO2: 96          CARDIAC MARKERS ( 20 Feb 2019 07:08 )  .060 ng/mL / x     / x     / x     / 1.5 ng/mL    Gram Stain:   Growth in aerobic bottle: Gram Positive Cocci in Clusters  Growth in anaerobic bottle: Gram Positive Cocci in Clusters (02.19.19 @ 09:45)      IMAGING:  ecg: SR, nonspecific ST T abnls.      < from: Xray Chest 1 View- PORTABLE-Urgent (02.20.19 @ 08:57) >  IMPRESSION:   Life supporting devices in appropriate position.  Mild improvement in lower right lung opacity.    < end of copied text >    < from: CT Abdomen and Pelvis No Cont (02.17.19 @ 23:18) >  Impression: No acute abdominal/pelvic pathology. Large stool burden.    < end of copied text >    < from: TTE Echo Doppler w/o Cont (02.18.19 @ 09:46) >     PHYSICIAN INTERPRETATION:  Left Ventricle: Normal left ventricular size and wall thicknesses, with   normal systolic function.  Left ventricular ejection fraction, by visual estimation, is 60 to 65%.  Right Ventricle: Normal right ventricular size and function.  Left Atrium: The left atrium is normal in size. Normal left atrial size.  Right Atrium: The right atrium is normal in size. Normal right atrial   size.  Pericardium: There is no evidence of pericardial effusion.  Mitral Valve: Structurally normal mitral valve, with normal leaflet   excursion. No evidence of mitral valve regurgitation is seen.  Tricuspid Valve: The tricuspid valve is not well seen. Trivial tricuspid   regurgitation is visualized. Estimated pulmonary artery systolic pressure   is 35.2 mmHg assuming a right atrial pressure of 5 mmHg, which is   consistent with mild pulmonary hypertension. Somewhat poorly visualized,   cannot rule out possibility of valvular vegetations. Suggest LIZ, if   clinically warranted, for further evaluation.  Aortic Valve: Normal trileaflet aortic valve with normal opening. Peak Av   velocity is 2.15 m/s, mean transaortic gradient equals 9.6 mmHg. The   aortic valve mean gradient is 9.6 mmHgconsistent with normally opening   aortic valve. No evidence of aortic valve regurgitation is seen.  Pulmonic Valve: Structurally normal pulmonic valve, with normal leaflet   excursion. Mild pulmonic valve regurgitation.  Aorta: The aortic root and ascending aorta are structurally normal, with   no evidence of dilitation.  Pulmonary Artery: The main pulmonary artery is normal in size.       Summary:   1. Left ventricular ejection fraction, by visual estimation, is 60 to   65%.   2. There is no left ventricular hypertrophy.   3. Trace tricuspid regurgitation.   4. Pulmonic valve regurgitation.   5. Estimated pulmonary artery systolic pressure is 35.2 mmHg assuming a   right atrial pressure of 5 mmHg, which is consistent with mild pulmonary   hypertension.    < end of copied text >    Tricuspid Valve: The tricuspid valve is not well seen. Trivial tricuspid   regurgitation is visualized. Estimated pulmonary artery systolic pressure   is 35.2 mmHg assuming a right atrial pressure of 5 mmHg, which is   MEDICATIONS  (STANDING):  chlorhexidine 0.12% Liquid 15 milliLiter(s) Oral Mucosa two times a day  chlorhexidine 4% Liquid 1 Application(s) Topical <User Schedule>  ciprofloxacin   IVPB 400 milliGRAM(s) IV Intermittent every 12 hours  ibuprofen  Tablet. 400 milliGRAM(s) Oral once  nicotine - 21 mG/24Hr(s) Patch 1 patch Transdermal daily  norepinephrine Infusion 0.05 MICROgram(s)/kG/Min (6.684 mL/Hr) IV Continuous <Continuous>  pantoprazole   Suspension 40 milliGRAM(s) Oral daily  potassium chloride   Powder 40 milliEquivalent(s) Oral every 4 hours  potassium phosphate IVPB 15 milliMole(s) IV Intermittent once  propofol Infusion 5 MICROgram(s)/kG/Min (2.139 mL/Hr) IV Continuous <Continuous>  vancomycin  IVPB 1000 milliGRAM(s) IV Intermittent every 12 hours  consistent with mild pulmonary hypertension. Somewhat poorly visualized,   cannot rule out possibility of valvular vegetations. Suggest LIZ, if   clinically warranted, for further evaluation.    ASSESSMENT:  44 Y/O female w/ PMHx of IV heroin abuse brought in by EMS for lethargy and cough. As per EMS, pt was found laying in bed at home, lethargic however able to follow some commands. EMS reports that pt was attempting to detox from heroin, and last known use was 3 days prior to ED presentation. As per ED, pts boyfriend reports a hx of + pt cough productive of white sputum and a fall down a flight of stairs 2/16. Labs showed serum lactate of 7.2, BUN/Cr 124/6.42, and WBC of of 11.93. Tmax 102.4, urine + for cocaine, opiates, nitrites and many bacteria. Intubated for resp protections. On pressors now. MRSA positive blood cultures.    PLAN:     ICU/CCU care.    MEDICATIONS  (STANDING):  chlorhexidine 0.12% Liquid 15 milliLiter(s) Oral Mucosa two times a day  chlorhexidine 4% Liquid 1 Application(s) Topical <User Schedule>  ciprofloxacin   IVPB 400 milliGRAM(s) IV Intermittent every 12 hours  ibuprofen  Tablet. 400 milliGRAM(s) Oral once  nicotine - 21 mG/24Hr(s) Patch 1 patch Transdermal daily  norepinephrine Infusion 0.05 MICROgram(s)/kG/Min (6.684 mL/Hr) IV Continuous <Continuous>  pantoprazole   Suspension 40 milliGRAM(s) Oral daily  potassium chloride   Powder 40 milliEquivalent(s) Oral every 4 hours  potassium phosphate IVPB 15 milliMole(s) IV Intermittent once  propofol Infusion 5 MICROgram(s)/kG/Min (2.139 mL/Hr) IV Continuous <Continuous>  vancomycin  IVPB 1000 milliGRAM(s) IV Intermittent every 12 hours    Treatment for presumed endocarditis and actual sepsis.  Watch for withdrawal symptoms.  Resp/pressor support.  LIZ if needed to clarify valvular disease.  Monitor for signs of HF/valve failure/cardiogenic failure/ongoing sepsis.  May need to transfer to tertiary center if decompensates.    Patricio Lawton MD, FACC, FASSTEPHANIE, TERESITA, FACP  Director, Heart Failure Services  [facility]  , Department of Cardiology  [facility]

## 2019-02-20 NOTE — PROGRESS NOTE ADULT - ASSESSMENT
Polysubstance abuse.  Pneumonia.  MRSA bacteremia, likely endocarditis with septic emboli.  UTI.  EFRAIN due to sepsis related ATN/and/or infection TIN/GN.  Slowly resolving.  Antibiotics as per I.D.  Monitor Vanco levels.  Avoid nephrotoxins/NSAIDS/hypotension as possible.  Monitor renal indices and urine output.  Add benzodiazepines for sedation.  Monitor electrolytes and replete K/Mg/Phos PRN.  Thank you.

## 2019-02-20 NOTE — PROCEDURE NOTE - PROCEDURE
<<-----Click on this checkbox to enter Procedure Midline catheter insertion  02/20/2019    Active  JERMEY

## 2019-02-20 NOTE — PROGRESS NOTE ADULT - SUBJECTIVE AND OBJECTIVE BOX
HPI:  43F PMH of IV drug abuse (heroin, cocaine), alcohol abuse (daily vodka use), hx of pancreatitis, alcohol hepatitis, alcohol withdrawal, left frontoparietal subdural hematoma 2008 without need for neurosurgical intervention, bacterial vaginitis brought in by EMS for lethargy and cough. Pt was found laying in bed at home, lethargic but able to follow some commands. EMS reports that pt was attempting to detox from heroin with last known use 3 days prior to ED presentation. Boyfriend reports a hx of productive cough, a fall down a flight of stairs on 2/16. Labs with u tox + cocaine and opiates, serum lactate of 7.2, BUN/Cr 124/6.42, and WBC of 11.93. febrile with T 102.4, UA + nitrites. Hypotensive SBP 90s.     24 hr events:  still with fevers  worsening lethargy  tachypneic  overall has been tachychardic, but became bradycardic this morning to the 30s placed on dopamine  intubated this morning       ## ROS:  [x] unable to obtain due to intubation      ## Labs:    Complete Blood Count (02.20.19 @ 07:06)        Nucleated RBC: 0 /100 WBCs    WBC Count: 12.62 K/uL    RBC Count: 4.20 M/uL    Hemoglobin: 10.0 g/dL    Hematocrit: 30.0 %    Mean Cell Volume: 71.4 fl    Mean Cell Hemoglobin: 23.8 pg    Mean Cell Hemoglobin Conc: 33.3 gm/dL    Red Cell Distrib Width: 18.0 %    Platelet Count 30    Basic Metabolic Panel (02.20.19 @ 07:08)    Sodium, Serum: 147 mmol/L    Potassium, Serum: 2.9 mmol/L    Chloride, Serum: 115 mmol/L    Carbon Dioxide, Serum: 21 mmol/L    Anion Gap, Serum: 11 mmol/L    Blood Urea Nitrogen, Serum: 75 mg/dL    Creatinine, Serum: 1.34 mg/dL    Glucose, Serum: 176 mg/dL    Calcium, Total Serum: 8.4 mg/dL    Culture - Blood (02.18.19 @ 00:24)    Specimen Source: .Blood    Culture Results: Growth in aerobic and anaerobic bottles: Methicillin resistant    Staphylococcus aureus    Culture - Blood (02.18.19 @ 00:24)    Specimen Source: .Blood    Culture Results: Growth in aerobic and anaerobic bottles: Methicillin resistant    Staphylococcus aureus    Culture - Blood in AM (02.19.19 @ 09:45)    Growth in aerobic bottle: Gram Positive Cocci in Clusters    Growth in anaerobic bottle: Gram Positive Cocci in Clusters    Specimen Source: .Blood      Culture - Urine (02.18.19 @ 01:00)    Specimen Source: .Urine    Culture Results: >100,000 CFU/ml Escherichia coli          ## Imaging:  CXR < from: Xray Chest 1 View- PORTABLE-Urgent (02.20.19 @ 08:57) >  Life supporting devices in appropriate position.  Mild improvement in lower right lung opacity.        ## Medications:  chlorhexidine 0.12% Liquid 15 milliLiter(s) Oral Mucosa two times a day  chlorhexidine 4% Liquid 1 Application(s) Topical <User Schedule>  chlorhexidine 4% Liquid 1 Application(s) Topical <User Schedule>  ciprofloxacin   IVPB 400 milliGRAM(s) IV Intermittent every 12 hours  ibuprofen  Tablet. 400 milliGRAM(s) Oral once  nicotine - 21 mG/24Hr(s) Patch 1 patch Transdermal daily  norepinephrine Infusion 0.05 MICROgram(s)/kG/Min (6.684 mL/Hr) IV Continuous   pantoprazole   Suspension 40 milliGRAM(s) Oral daily  potassium acid phosphate/sodium acid phosphate tablet (K-PHOS No. 2) 1 Tablet(s) Oral every 4 hours  propofol Infusion 5 MICROgram(s)/kG/Min (2.139 mL/Hr) IV Continuous <Continuous>  vancomycin  IVPB 1000 milliGRAM(s) IV Intermittent every 12 hours    MEDICATIONS  (PRN):  acetaminophen   Tablet .. 650 milliGRAM(s) Oral every 6 hours PRN Temp greater or equal to 38C (100.4F), Mild Pain (1 - 3)  acetaminophen  Suppository .. 650 milliGRAM(s) Rectal every 6 hours PRN Temp greater or equal to 38C (100.4F), Mild Pain (1 - 3)    T(C): 37.8 (02-20-19 @ 15:54), Max: 39.8 (02-20-19 @ 05:00)  HR: 94 (02-20-19 @ 17:30) (74 - 141)  BP: 103/59 (02-20-19 @ 14:00) (88/48 - 154/122)  RR: 25 (02-20-19 @ 14:00) (19 - 49)  SpO2: 100% (02-20-19 @ 17:30) (95% - 100%)    I&O's Detail    19 Feb 2019 07:01  -  20 Feb 2019 07:00  --------------------------------------------------------  IN:    IV PiggyBack: 50 mL    lactated ringers.: 2750 mL    Oral Fluid: 600 mL  Total IN: 3400 mL    OUT:    Voided: 800 mL  Total OUT: 800 mL    Total NET: 2600 mL      20 Feb 2019 07:01  -  20 Feb 2019 17:48  --------------------------------------------------------  IN:    DOPamine Infusion: 82.8 mL    propofol Infusion: 36.2 mL  Total IN: 119 mL    OUT:  Total OUT: 0 mL    Total NET: 119 mL        ## P/E:  Gen: orally intubated, sedated  HEENT: PERRL, ETT in place  Resp: CTA B/L, mechanical breath sounds  CVS: S1S2 RRR  Abd: soft NT/ND +BS  Ext: no c/c/e, multiple skin scabs and scars throughout lower extremities  Neuro: sedated    CENTRAL LINE: [ ] YES [x] NO  LOCATION:   DATE INSERTED:  REMOVE: [ ] YES [ ] NO      PAN: [x] YES [ ] NO    DATE INSERTED:  REMOVE:  [ ] YES [ ] NO      A-LINE:  [ ] YES [x] NO  LOCATION:   DATE INSERTED:  REMOVE:  [ ] YES [ ] NO  EXPLAIN:    GLOBAL ISSUE/BEST PRACTICE:  Analgesia: n/a  Sedation: propofol  HOB elevation: yes  Stress ulcer prophylaxis: protonix  VTE prophylaxis: heparin sc  Oral Care: chlorhexidine  Glycemic control: n/a  Nutrition: npo    CODE STATUS: [x] full code  [ ] DNR  [ ] DNI  [ ] [facility]  Goals of care discussion: [ ] yes

## 2019-02-21 DIAGNOSIS — N17.9 ACUTE KIDNEY FAILURE, UNSPECIFIED: ICD-10-CM

## 2019-02-21 DIAGNOSIS — R76.8 OTHER SPECIFIED ABNORMAL IMMUNOLOGICAL FINDINGS IN SERUM: ICD-10-CM

## 2019-02-21 DIAGNOSIS — A41.02 SEPSIS DUE TO METHICILLIN RESISTANT STAPHYLOCOCCUS AUREUS: ICD-10-CM

## 2019-02-21 DIAGNOSIS — J15.211 PNEUMONIA DUE TO METHICILLIN SUSCEPTIBLE STAPHYLOCOCCUS AUREUS: ICD-10-CM

## 2019-02-21 DIAGNOSIS — J96.00 ACUTE RESPIRATORY FAILURE, UNSPECIFIED WHETHER WITH HYPOXIA OR HYPERCAPNIA: ICD-10-CM

## 2019-02-21 DIAGNOSIS — R78.81 BACTEREMIA: ICD-10-CM

## 2019-02-21 LAB
ANION GAP SERPL CALC-SCNC: 8 MMOL/L — SIGNIFICANT CHANGE UP (ref 5–17)
BASE EXCESS BLDA CALC-SCNC: 0.9 MMOL/L — SIGNIFICANT CHANGE UP (ref -2–2)
BLOOD GAS COMMENTS: SIGNIFICANT CHANGE UP
BLOOD GAS COMMENTS: SIGNIFICANT CHANGE UP
BLOOD GAS SOURCE: SIGNIFICANT CHANGE UP
BUN SERPL-MCNC: 62 MG/DL — HIGH (ref 7–23)
CALCIUM SERPL-MCNC: 7.8 MG/DL — LOW (ref 8.5–10.1)
CHLORIDE SERPL-SCNC: 116 MMOL/L — HIGH (ref 96–108)
CO2 SERPL-SCNC: 24 MMOL/L — SIGNIFICANT CHANGE UP (ref 22–31)
CREAT SERPL-MCNC: 0.99 MG/DL — SIGNIFICANT CHANGE UP (ref 0.5–1.3)
GLUCOSE SERPL-MCNC: 189 MG/DL — HIGH (ref 70–99)
GRAM STN FLD: SIGNIFICANT CHANGE UP
HCO3 BLDA-SCNC: 23 MMOL/L — SIGNIFICANT CHANGE UP (ref 21–29)
HCT VFR BLD CALC: 29.6 % — LOW (ref 34.5–45)
HCV RNA FLD QL NAA+PROBE: SIGNIFICANT CHANGE UP
HCV RNA SPEC QL PROBE+SIG AMP: DETECTED
HGB BLD-MCNC: 9.3 G/DL — LOW (ref 11.5–15.5)
HOROWITZ INDEX BLDA+IHG-RTO: 40 — SIGNIFICANT CHANGE UP
MAGNESIUM SERPL-MCNC: 2.3 MG/DL — SIGNIFICANT CHANGE UP (ref 1.6–2.6)
MCHC RBC-ENTMCNC: 23.4 PG — LOW (ref 27–34)
MCHC RBC-ENTMCNC: 31.4 GM/DL — LOW (ref 32–36)
MCV RBC AUTO: 74.4 FL — LOW (ref 80–100)
NRBC # BLD: 0 /100 WBCS — SIGNIFICANT CHANGE UP (ref 0–0)
PCO2 BLDA: 29 MMHG — LOW (ref 32–46)
PH BLD: 7.51 — HIGH (ref 7.35–7.45)
PHOSPHATE SERPL-MCNC: 3 MG/DL — SIGNIFICANT CHANGE UP (ref 2.5–4.5)
PLATELET # BLD AUTO: 39 K/UL — LOW (ref 150–400)
PO2 BLDA: 75 MMHG — SIGNIFICANT CHANGE UP (ref 74–108)
POTASSIUM SERPL-MCNC: 3.3 MMOL/L — LOW (ref 3.5–5.3)
POTASSIUM SERPL-SCNC: 3.3 MMOL/L — LOW (ref 3.5–5.3)
RBC # BLD: 3.98 M/UL — SIGNIFICANT CHANGE UP (ref 3.8–5.2)
RBC # FLD: 18.5 % — HIGH (ref 10.3–14.5)
SAO2 % BLDA: 96 % — SIGNIFICANT CHANGE UP (ref 92–96)
SODIUM SERPL-SCNC: 148 MMOL/L — HIGH (ref 135–145)
SPECIMEN SOURCE: SIGNIFICANT CHANGE UP
VANCOMYCIN TROUGH SERPL-MCNC: 25.7 UG/ML — CRITICAL HIGH (ref 10–20)
WBC # BLD: 12.09 K/UL — HIGH (ref 3.8–10.5)
WBC # FLD AUTO: 12.09 K/UL — HIGH (ref 3.8–10.5)

## 2019-02-21 PROCEDURE — 99291 CRITICAL CARE FIRST HOUR: CPT

## 2019-02-21 PROCEDURE — 99221 1ST HOSP IP/OBS SF/LOW 40: CPT

## 2019-02-21 RX ORDER — FENTANYL CITRATE 50 UG/ML
50 INJECTION INTRAVENOUS ONCE
Qty: 0 | Refills: 0 | Status: DISCONTINUED | OUTPATIENT
Start: 2019-02-21 | End: 2019-02-21

## 2019-02-21 RX ORDER — AZTREONAM 2 G
2000 VIAL (EA) INJECTION EVERY 8 HOURS
Qty: 0 | Refills: 0 | Status: DISCONTINUED | OUTPATIENT
Start: 2019-02-21 | End: 2019-02-25

## 2019-02-21 RX ORDER — AZTREONAM 2 G
2000 VIAL (EA) INJECTION ONCE
Qty: 0 | Refills: 0 | Status: COMPLETED | OUTPATIENT
Start: 2019-02-21 | End: 2019-02-21

## 2019-02-21 RX ORDER — FENTANYL CITRATE 50 UG/ML
0.5 INJECTION INTRAVENOUS
Qty: 2500 | Refills: 0 | Status: DISCONTINUED | OUTPATIENT
Start: 2019-02-21 | End: 2019-02-25

## 2019-02-21 RX ORDER — SODIUM CHLORIDE 9 MG/ML
1000 INJECTION, SOLUTION INTRAVENOUS
Qty: 0 | Refills: 0 | Status: DISCONTINUED | OUTPATIENT
Start: 2019-02-21 | End: 2019-02-22

## 2019-02-21 RX ORDER — AZTREONAM 2 G
VIAL (EA) INJECTION
Qty: 0 | Refills: 0 | Status: DISCONTINUED | OUTPATIENT
Start: 2019-02-21 | End: 2019-02-25

## 2019-02-21 RX ORDER — VANCOMYCIN HCL 1 G
1000 VIAL (EA) INTRAVENOUS EVERY 8 HOURS
Qty: 0 | Refills: 0 | Status: DISCONTINUED | OUTPATIENT
Start: 2019-02-21 | End: 2019-02-21

## 2019-02-21 RX ORDER — FUROSEMIDE 40 MG
40 TABLET ORAL ONCE
Qty: 0 | Refills: 0 | Status: DISCONTINUED | OUTPATIENT
Start: 2019-02-21 | End: 2019-02-21

## 2019-02-21 RX ORDER — VANCOMYCIN HCL 1 G
1000 VIAL (EA) INTRAVENOUS EVERY 12 HOURS
Qty: 0 | Refills: 0 | Status: DISCONTINUED | OUTPATIENT
Start: 2019-02-21 | End: 2019-02-23

## 2019-02-21 RX ORDER — POTASSIUM CHLORIDE 20 MEQ
10 PACKET (EA) ORAL
Qty: 0 | Refills: 0 | Status: COMPLETED | OUTPATIENT
Start: 2019-02-21 | End: 2019-02-21

## 2019-02-21 RX ORDER — DEXMEDETOMIDINE HYDROCHLORIDE IN 0.9% SODIUM CHLORIDE 4 UG/ML
0.2 INJECTION INTRAVENOUS
Qty: 200 | Refills: 0 | Status: DISCONTINUED | OUTPATIENT
Start: 2019-02-21 | End: 2019-02-23

## 2019-02-21 RX ADMIN — Medication 100 MILLIEQUIVALENT(S): at 08:32

## 2019-02-21 RX ADMIN — Medication 100 MILLIGRAM(S): at 12:00

## 2019-02-21 RX ADMIN — Medication 1 TABLET(S): at 08:33

## 2019-02-21 RX ADMIN — FENTANYL CITRATE 50 MICROGRAM(S): 50 INJECTION INTRAVENOUS at 09:30

## 2019-02-21 RX ADMIN — Medication 1 PATCH: at 19:24

## 2019-02-21 RX ADMIN — Medication 1 TABLET(S): at 02:28

## 2019-02-21 RX ADMIN — CHLORHEXIDINE GLUCONATE 15 MILLILITER(S): 213 SOLUTION TOPICAL at 05:25

## 2019-02-21 RX ADMIN — Medication 1 TABLET(S): at 12:00

## 2019-02-21 RX ADMIN — FENTANYL CITRATE 50 MICROGRAM(S): 50 INJECTION INTRAVENOUS at 09:15

## 2019-02-21 RX ADMIN — Medication 400 MILLIGRAM(S): at 13:00

## 2019-02-21 RX ADMIN — Medication 100 MILLIGRAM(S): at 22:05

## 2019-02-21 RX ADMIN — Medication 650 MILLIGRAM(S): at 09:33

## 2019-02-21 RX ADMIN — SODIUM CHLORIDE 50 MILLILITER(S): 9 INJECTION, SOLUTION INTRAVENOUS at 12:01

## 2019-02-21 RX ADMIN — DEXMEDETOMIDINE HYDROCHLORIDE IN 0.9% SODIUM CHLORIDE 3.56 MICROGRAM(S)/KG/HR: 4 INJECTION INTRAVENOUS at 17:25

## 2019-02-21 RX ADMIN — MIDAZOLAM HYDROCHLORIDE 1.43 MG/KG/HR: 1 INJECTION, SOLUTION INTRAMUSCULAR; INTRAVENOUS at 10:44

## 2019-02-21 RX ADMIN — Medication 100 MILLIEQUIVALENT(S): at 09:00

## 2019-02-21 RX ADMIN — DEXMEDETOMIDINE HYDROCHLORIDE IN 0.9% SODIUM CHLORIDE 3.56 MICROGRAM(S)/KG/HR: 4 INJECTION INTRAVENOUS at 14:18

## 2019-02-21 RX ADMIN — Medication 250 MILLIGRAM(S): at 05:25

## 2019-02-21 RX ADMIN — Medication 650 MILLIGRAM(S): at 15:03

## 2019-02-21 RX ADMIN — Medication 650 MILLIGRAM(S): at 08:33

## 2019-02-21 RX ADMIN — FENTANYL CITRATE 3.56 MICROGRAM(S)/KG/HR: 50 INJECTION INTRAVENOUS at 13:40

## 2019-02-21 RX ADMIN — Medication 1 PATCH: at 07:30

## 2019-02-21 RX ADMIN — Medication 200 MILLIGRAM(S): at 05:25

## 2019-02-21 RX ADMIN — DEXMEDETOMIDINE HYDROCHLORIDE IN 0.9% SODIUM CHLORIDE 3.56 MICROGRAM(S)/KG/HR: 4 INJECTION INTRAVENOUS at 12:01

## 2019-02-21 RX ADMIN — Medication 250 MILLIGRAM(S): at 14:30

## 2019-02-21 RX ADMIN — Medication 6.68 MICROGRAM(S)/KG/MIN: at 08:36

## 2019-02-21 RX ADMIN — CHLORHEXIDINE GLUCONATE 1 APPLICATION(S): 213 SOLUTION TOPICAL at 05:24

## 2019-02-21 RX ADMIN — CHLORHEXIDINE GLUCONATE 15 MILLILITER(S): 213 SOLUTION TOPICAL at 17:25

## 2019-02-21 RX ADMIN — Medication 650 MILLIGRAM(S): at 16:00

## 2019-02-21 RX ADMIN — Medication 100 MILLIEQUIVALENT(S): at 07:04

## 2019-02-21 RX ADMIN — Medication 6.68 MICROGRAM(S)/KG/MIN: at 22:06

## 2019-02-21 RX ADMIN — PANTOPRAZOLE SODIUM 40 MILLIGRAM(S): 20 TABLET, DELAYED RELEASE ORAL at 12:00

## 2019-02-21 RX ADMIN — Medication 1 TABLET(S): at 05:27

## 2019-02-21 RX ADMIN — Medication 1 PATCH: at 13:40

## 2019-02-21 RX ADMIN — DEXMEDETOMIDINE HYDROCHLORIDE IN 0.9% SODIUM CHLORIDE 3.56 MICROGRAM(S)/KG/HR: 4 INJECTION INTRAVENOUS at 22:05

## 2019-02-21 RX ADMIN — Medication 1 PATCH: at 13:00

## 2019-02-21 RX ADMIN — Medication 400 MILLIGRAM(S): at 12:03

## 2019-02-21 RX ADMIN — Medication 6.68 MICROGRAM(S)/KG/MIN: at 10:44

## 2019-02-21 NOTE — CONSULT NOTE ADULT - SUBJECTIVE AND OBJECTIVE BOX
Infectious Diseases - Attending at Dr. Alcantara    HPI:  44 Y/O female w/ PMHx of IV heroin abuse brought in by EMS for lethargy and cough. As per EMS, pt was found laying in bed at home, lethargic however able to follow some commands. EMS reports that pt was attempting to detox from heroin, and last known use was 3 days prior to ED presentation. As per ED, pts boyfriend reports a hx of + pt cough productive of white sputum and a fall down a flight of stairs 2/16. Labs showed serum lactate of 7.2, BUN/Cr 124/6.42, and WBC of of 11.93. Tmax 102.4, urine + for cocaine, opiates, nitrites and many bacteria. ICU called for further evaluation. (17 Feb 2019 22:56)  Pt found to be MRSA bacteremic and septic shock and intubated .      PAST MEDICAL & SURGICAL HISTORY:  ETOH abuse  Drug abuse  C Section      Allergies    penicillin (Short breath; Hives)    Intolerances        FAMILY HISTORY: unknown      SOCIAL HISTORY: IVDA+    REVIEW OF SYSTEMS:    as per [place] , pt intubated      MEDICATIONS  (STANDING):  aztreonam  IVPB 2000 milliGRAM(s) IV Intermittent every 8 hours  aztreonam  IVPB      chlorhexidine 0.12% Liquid 15 milliLiter(s) Oral Mucosa two times a day  chlorhexidine 4% Liquid 1 Application(s) Topical <User Schedule>  chlorhexidine 4% Liquid 1 Application(s) Topical <User Schedule>  dexmedetomidine Infusion 0.2 MICROgram(s)/kG/Hr (3.565 mL/Hr) IV Continuous <Continuous>  dextrose 5%. 1000 milliLiter(s) (50 mL/Hr) IV Continuous <Continuous>  fentaNYL   Infusion. 0.5 MICROgram(s)/kG/Hr (3.565 mL/Hr) IV Continuous <Continuous>  midazolam Infusion 0.02 mG/kG/Hr (1.426 mL/Hr) IV Continuous <Continuous>  nicotine - 21 mG/24Hr(s) Patch 1 patch Transdermal daily  norepinephrine Infusion 0.05 MICROgram(s)/kG/Min (6.684 mL/Hr) IV Continuous <Continuous>  pantoprazole   Suspension 40 milliGRAM(s) Oral daily  propofol Infusion 5 MICROgram(s)/kG/Min (2.139 mL/Hr) IV Continuous <Continuous>  vancomycin  IVPB 1000 milliGRAM(s) IV Intermittent every 8 hours    MEDICATIONS  (PRN):  acetaminophen   Tablet .. 650 milliGRAM(s) Oral every 6 hours PRN Temp greater or equal to 38C (100.4F), Mild Pain (1 - 3)  acetaminophen  Suppository .. 650 milliGRAM(s) Rectal every 6 hours PRN Temp greater or equal to 38C (100.4F), Mild Pain (1 - 3)      Vital Signs Last 24 Hrs  T(C): 39.7 (21 Feb 2019 11:50), Max: 39.7 (21 Feb 2019 11:50)  T(F): 103.5 (21 Feb 2019 11:50), Max: 103.5 (21 Feb 2019 11:50)  HR: 86 (21 Feb 2019 13:00) (58 - 110)  BP: 93/44 (21 Feb 2019 13:00) (93/44 - 140/80)  BP(mean): 56 (21 Feb 2019 13:00) (56 - 97)  RR: 38 (21 Feb 2019 13:00) (20 - 43)  SpO2: 97% (21 Feb 2019 13:00) (96% - 100%)    PHYSICAL EXAM:    Constitutional: well developed  HEENT: PERRL  Neck: suppler,intubated  Back: no sacral decub  Respiratory: crackles all lung fields, vent dependent  Cardiovascular: S1 and S2, RRR, no M/G/R  Gastrointestinal: BS+, soft, ND  Extremities: No peripheral edema  Vascular: 2+ peripheral pulses  Neurological:  cant be assesed  Skin: No rashes      LABS:                        9.3    12.09 )-----------( 39       ( 21 Feb 2019 03:45 )             29.6     02-21    148<H>  |  116<H>  |  62<H>  ----------------------------<  189<H>  3.3<L>   |  24  |  0.99    Ca    7.8<L>      21 Feb 2019 03:45  Phos  3.0     02-21  Mg     2.3     02-21            MICROBIOLOGY:  RECENT CULTURES:  02-20 .Sputum trap XXXX   Rare polymorphonuclear leukocytes per low power field  Rare Squamous epithelial cells per low power field  Rare Gram Positive Rods per oil power field  Rare Yeast like cells per oil power field   Normal Respiratory Melissa present    02-19 .Blood XXXX   Growth in aerobic bottle: Gram Positive Cocci in Clusters  Growth in anaerobic bottle: Gram Positive Cocci in Clusters   Growth in aerobic and anaerobic bottles: Methicillin resistant  Staphylococcus aureus  See previous culture  # [id number]    02-18 .Urine Escherichia coli XXXX   >100,000 CFU/ml Escherichia coli    02-18 .Blood Blood Culture PCR  Methicillin resistant Staphylococcus aureus   Growth in aerobic bottle: Gram Positive Cocci in Clusters  Growth in anaerobic bottle: Gram Positive Cocci in Clusters   Growth in aerobic and anaerobic bottles: Methicillin resistant  Staphylococcus aureus  See previous culture [id number]          RADIOLOGY & ADDITIONAL STUDIES:    CT chest/Abdo  IMPRESSION:   1. Multiple nodular and cavitary mass lesions are seen in the lungs.   Largest lesion is in right lower lobe measuring up to 2.0 cm.   differential diagnosis includes infection (including septic emboli), and   neoplasm (including squamous cell cancer).  2. Large dense right lower lobe dependent consolidation, likely   pneumonia. Trace right pleural effusion.    CT head: no abscess

## 2019-02-21 NOTE — PROVIDER CONTACT NOTE (CRITICAL VALUE NOTIFICATION) - TEST AND RESULT REPORTED:
growth in aerobic & anerobic bottles MRSA, Growth in anerobic & aerobic bottles MRSA,collected on 2/19 5.30 AM final report the same

## 2019-02-21 NOTE — PROGRESS NOTE ADULT - SUBJECTIVE AND OBJECTIVE BOX
Subjective: remains vent dependent. FiO2 40%. Still on mod rate Levophed.       MEDICATIONS  (STANDING):  chlorhexidine 0.12% Liquid 15 milliLiter(s) Oral Mucosa two times a day  chlorhexidine 4% Liquid 1 Application(s) Topical <User Schedule>  chlorhexidine 4% Liquid 1 Application(s) Topical <User Schedule>  ciprofloxacin   IVPB 400 milliGRAM(s) IV Intermittent every 12 hours  ibuprofen  Tablet. 400 milliGRAM(s) Oral once  lactated ringers. 1000 milliLiter(s) (60 mL/Hr) IV Continuous <Continuous>  midazolam Infusion 0.02 mG/kG/Hr (1.426 mL/Hr) IV Continuous <Continuous>  nicotine - 21 mG/24Hr(s) Patch 1 patch Transdermal daily  norepinephrine Infusion 0.05 MICROgram(s)/kG/Min (6.684 mL/Hr) IV Continuous <Continuous>  pantoprazole   Suspension 40 milliGRAM(s) Oral daily  potassium acid phosphate/sodium acid phosphate tablet (K-PHOS No. 2) 1 Tablet(s) Oral every 4 hours  potassium chloride  10 mEq/100 mL IVPB 10 milliEquivalent(s) IV Intermittent every 1 hour  propofol Infusion 5 MICROgram(s)/kG/Min (2.139 mL/Hr) IV Continuous <Continuous>  vancomycin  IVPB 1000 milliGRAM(s) IV Intermittent every 12 hours    MEDICATIONS  (PRN):  acetaminophen   Tablet .. 650 milliGRAM(s) Oral every 6 hours PRN Temp greater or equal to 38C (100.4F), Mild Pain (1 - 3)  acetaminophen  Suppository .. 650 milliGRAM(s) Rectal every 6 hours PRN Temp greater or equal to 38C (100.4F), Mild Pain (1 - 3)          T(C): 37.8 (02-21-19 @ 05:00), Max: 39.1 (02-20-19 @ 08:04)  HR: 94 (02-21-19 @ 06:55) (58 - 141)  BP: 108/64 (02-21-19 @ 06:30) (88/48 - 154/122)  RR: 30 (02-21-19 @ 06:30) (20 - 36)  SpO2: 100% (02-21-19 @ 06:55) (96% - 100%)  Wt(kg): --    ABG - ( 20 Feb 2019 08:56 )  pH, Arterial: x     pH, Blood: 7.46  /  pCO2: 35    /  pO2: 85    / HCO3: 24    / Base Excess: 1.0   /  SaO2: 96                  I&O's Detail    20 Feb 2019 07:01  -  21 Feb 2019 07:00  --------------------------------------------------------  IN:    DOPamine Infusion: 82.8 mL    Free Water: 500 mL    lactated ringers.: 660 mL    midazolam Infusion: 64 mL    norepinephrine Infusion: 448.3 mL    propofol Infusion: 330.4 mL    Solution: 1000 mL    Solution: 200 mL  Total IN: 3285.5 mL    OUT:    Indwelling Catheter - Urethral: 1985 mL  Total OUT: 1985 mL    Total NET: 1300.5 mL          Mode: AC/ CMV (Assist Control/ Continuous Mandatory Ventilation)  RR (machine): 18  TV (machine): 450  FiO2: 40  PEEP: 5  ITime: 0.9  MAP: 17  PIP: 29       PHYSICAL EXAM:    GENERAL: vented  EYES: EOMI, PERRLA, conjunctiva and sclera clear  NECK: Supple, no inc in JVP  CHEST/LUNG: coarse BS  HEART: S1S2  ABDOMEN: Soft, Nontender, Nondistended; Bowel sounds present  EXTREMITIES:  no edema  NEURO: no asterixis      LABS:  CBC Full  -  ( 21 Feb 2019 03:45 )  WBC Count : 12.09 K/uL  Hemoglobin : 9.3 g/dL  Hematocrit : 29.6 %  Platelet Count - Automated : 39 K/uL  Mean Cell Volume : 74.4 fl  Mean Cell Hemoglobin : 23.4 pg  Mean Cell Hemoglobin Concentration : 31.4 gm/dL  Auto Neutrophil # : x  Auto Lymphocyte # : x  Auto Monocyte # : x  Auto Eosinophil # : x  Auto Basophil # : x  Auto Neutrophil % : x  Auto Lymphocyte % : x  Auto Monocyte % : x  Auto Eosinophil % : x  Auto Basophil % : x    02-21    148<H>  |  116<H>  |  62<H>  ----------------------------<  189<H>  3.3<L>   |  24  |  0.99    Ca    7.8<L>      21 Feb 2019 03:45  Phos  3.0     02-21  Mg     2.3     02-21          Culture Results:   Growth in aerobic and anaerobic bottles: Methicillin resistant  Staphylococcus aureus  See previous culture  # [id number] (02-19 @ 09:45)        Impression:  * EFRAIN -- ischemic ATN. Peak Cr 6.42. Resolving  * MRSA bacteremia, Septic shock  * Endocarditis suspected  * Multisubstance addiction    Recommendations:   * Wean off vasopressor  * Caution with NSAIDs/Ibuprofen  * Monitor Vanco T to avoid nephrotoxicity  * Supplement K  * Enteral water as needed to mitigate hyperNa

## 2019-02-21 NOTE — CONSULT NOTE ADULT - SUBJECTIVE AND OBJECTIVE BOX
Chief Complaint:  Patient is a 43y old  Female who presents with a chief complaint of AMS (2019 15:33)      HPI:  44 Y/O female w/ PMHx of IV heroin abuse brought in by EMS for lethargy and cough. As per EMS, pt was found laying in bed at home, lethargic however able to follow some commands. EMS reports that pt was attempting to detox from heroin, and last known use was 3 days prior to ED presentation. As per ED, pts boyfriend reports a hx of + pt cough productive of white sputum and a fall down a flight of stairs . Labs showed serum lactate of 7.2, BUN/Cr 124/6.42, and WBC of of 11.93. Tmax 102.4, urine + for cocaine, opiates, nitrites and many bacteria. ICU called for further evaluation. (2019 22:56) We were asked to evaluate patient for to evalutae patient for HEP C+ ab      PMH/PSH:PAST MEDICAL & SURGICAL HISTORY:  ETOH abuse  Drug abuse  C Section      Allergies:  penicillin (Short breath; Hives)      Medications:  acetaminophen   Tablet .. 650 milliGRAM(s) Oral every 6 hours PRN  acetaminophen  Suppository .. 650 milliGRAM(s) Rectal every 6 hours PRN  aztreonam  IVPB 2000 milliGRAM(s) IV Intermittent every 8 hours  aztreonam  IVPB      chlorhexidine 0.12% Liquid 15 milliLiter(s) Oral Mucosa two times a day  chlorhexidine 4% Liquid 1 Application(s) Topical <User Schedule>  chlorhexidine 4% Liquid 1 Application(s) Topical <User Schedule>  dexmedetomidine Infusion 0.2 MICROgram(s)/kG/Hr IV Continuous <Continuous>  dextrose 5%. 1000 milliLiter(s) IV Continuous <Continuous>  fentaNYL   Infusion. 0.5 MICROgram(s)/kG/Hr IV Continuous <Continuous>  midazolam Infusion 0.02 mG/kG/Hr IV Continuous <Continuous>  nicotine - 21 mG/24Hr(s) Patch 1 patch Transdermal daily  norepinephrine Infusion 0.05 MICROgram(s)/kG/Min IV Continuous <Continuous>  pantoprazole   Suspension 40 milliGRAM(s) Oral daily  propofol Infusion 5 MICROgram(s)/kG/Min IV Continuous <Continuous>      Review of Systems:    LIMITED secondary to AMS and intubation    Relevant Family History:   FAMILY HISTORY:      Relevant Social History: Alcohol ( +) , Tobacco ( +) , Illicit drugs (+ )     Physical Exam:    Vital Signs:  Vital Signs Last 24 Hrs  T(C): 38.8 (2019 20:30), Max: 39.7 (2019 11:50)  T(F): 102 (2019 20:30), Max: 103.5 (2019 11:50)  HR: 80 (2019 20:30) (58 - 110)  BP: 111/56 (2019 20:30) (84/33 - 140/80)  BP(mean): 69 (2019 20:30) (46 - 91)  RR: 33 (2019 20:30) (20 - 43)  SpO2: 100% (2019 20:30) (93% - 100%)  Daily     Daily Weight in k.1 (2019 05:00)    General:  Appears stated age, well-groomed, well-nourished, no distress  HEENT:  NC/AT,  conjunctivae clear and pink, no thyromegaly, nodules, adenopathy, no JVD, anicteric sclera  Chest:  Full & symmetric excursion, no increased effort, breath sounds clear  Cardiovascular:  Regular rhythm, S1, S2, no murmur/rub/S3/S4, no abdominal bruit, no edema  Abdomen:  Soft, non tender, non distended, normoactive bowel sounds,  no masses , no hepatosplenomegaly, no signs of chronic liver disease  Extremities:  no cyanosis, clubbing or edema  Skin:  No rash/erythema/ecchymoses/petechiae/wounds/abscess/warm/dry  Neuro/Psych:  no responsive on vent    Laboratory:                          9.3    12.09 )-----------( 39       ( 2019 03:45 )             29.6     02-    148<H>  |  116<H>  |  62<H>  ----------------------------<  189<H>  3.3<L>   |  24  |  0.99    Ca    7.8<L>      2019 03:45  Phos  3.0       Mg     2.3       Acute Hepatitis Panel (19 @ 19:20)      Hepatitis C Virus S/CO Ratio: 15.18 S/CO    Hepatitis B Core IgM Antibody: Nonreact    Hepatitis B Surface Antigen: Nonreact    Hepatitis A IgM Antibody: Nonreact      Intake and Output    19 @ 07:01  -  19 @ 07:00  --------------------------------------------------------  IN: 3385.5 mL / OUT: 1985 mL / NET: 1400.5 mL    19 @ 07:01  -  19 @ 21:11  --------------------------------------------------------  IN: 1611.9 mL / OUT: 905 mL / NET: 706.9 mL        Imaging:  EXAM:  CT ABDOMEN AND PELVIS                        PROCEDURE DATE:  2019    INTERPRETATION:  Clinical Information: Abdominal pain. IV drug abuse.    Comparison: 10/31/2008    Technique: Noncontrast CT abdomen and pelvis with axial,sagittal,   coronal reconstructions.     Findings:    Hepatobiliary: Liver, biliary tree, gallbladder are unremarkable.  Spleen: Unremarkable.  Pancreas: Unremarkable.  Adrenal glands: Unremarkable.  Urinary: Kidneys, ureters, urinary bladder are unremarkable.   Reproductive organs: Uterus and adnexa are unremarkable.  Gastrointestinal: The stomach, small bowel, large bowel, and appendix are   unremarkable. Large stool burden.  Peritoneum: Unremarkable.  Vasculature: Unremarkable.  Retroperitoneum: Unremarkable.  Subcutaneous tissues: Unremarkable.  Neuromusculoskeletal: Mild degenerative changes.    Impression: No acute abdominal/pelvic pathology. Large stool burden.      JULIETA CARROLL M.D., ATTENDING RADIOLOGIST  This document has been electronically signed. 2019 10:29AM

## 2019-02-21 NOTE — CONSULT NOTE ADULT - PROBLEM SELECTOR RECOMMENDATION 2
check HEP C RNA BY PCR QUANTITATIVE AND GENOTYPE and AFP.  sonogram when stable  possible treat ment as outpatient

## 2019-02-21 NOTE — PROGRESS NOTE ADULT - ASSESSMENT
44 y/o F w/polysubstance abuse presenting with severe sepsis with septic shock secondary to MRSA bacteremia. High concern for endocarditis. EFRAIN likely secondary to sepsis now improved. Acute respiratory failure requiring intubation. New diagnosis of HCV.    Neuro: Possible withdrawal from opiates/alcohol  - Sedation as needed goal RASS -1 to 0  - Precedex gtt, fentanyl PRN  - Wean midazolam  - Attempt to obtain methadone dosing (Patient in program outside of hospital, although unclear which one)    Resp:  - Daily SAT/SBT    CV:  - Titrate pressors as tolerated goal MAP >= 65  - Will contact cardiology to obtain LIZ to evaluate for endocarditis. TTE negative    ID: MRSA bacteremia, E. Coli UTI  - Vancomycin, aztreonam    Renal: EFRAIN  - Trend Cr, avoid nephrotoxins    GI: HCV  - Outpatient follow up    FEN:  - Tube feeds  - Replete lytes PRN  - D5 gtt and free water for hypernatremia    PPx:  - SCDs, PPI    Attending critical care time 45 minutes

## 2019-02-21 NOTE — PROGRESS NOTE ADULT - SUBJECTIVE AND OBJECTIVE BOX
HPI:  44 Y/O female w/ PMHx of IV heroin abuse brought in by EMS for lethargy and cough. As per EMS, pt was found laying in bed at home, lethargic however able to follow some commands. EMS reports that pt was attempting to detox from heroin, and last known use was 3 days prior to ED presentation. As per ED, pts boyfriend reports a hx of + pt cough productive of white sputum and a fall down a flight of stairs 2/16. Labs showed serum lactate of 7.2, BUN/Cr 124/6.42, and WBC of of 11.93. Tmax 102.4, urine + for cocaine, opiates, nitrites and many bacteria. ICU called for further evaluation. (17 Feb 2019 22:56)      24 hr events: No acute events overnight. Intubated and sedated, unable to obtain history.    ## ROS:  [x ] unable to obtain    ## Vitals  ICU Vital Signs Last 24 Hrs  T(C): 39.1 (21 Feb 2019 07:30), Max: 39.1 (21 Feb 2019 07:30)  T(F): 102.3 (21 Feb 2019 07:30), Max: 102.3 (21 Feb 2019 07:30)  HR: 104 (21 Feb 2019 09:30) (58 - 104)  BP: 114/58 (21 Feb 2019 09:30) (88/48 - 140/80)  BP(mean): 72 (21 Feb 2019 09:30) (58 - 97)  ABP: --  ABP(mean): --  RR: 20 (21 Feb 2019 09:30) (20 - 36)  SpO2: 100% (21 Feb 2019 09:30) (97% - 100%)      ## Physical Exam:  Gen:  HEENT:  Resp:  CV:  Abd:  Ext:  Neuro:    ## Vent Data  Mode: AC/ CMV (Assist Control/ Continuous Mandatory Ventilation)  RR (machine): 18  TV (machine): 450  FiO2: 40  PEEP: 5  ITime: 0.9  MAP: 18  PIP: 28      ## Labs:  Chem:  02-21    148<H>  |  116<H>  |  62<H>  ----------------------------<  189<H>  3.3<L>   |  24  |  0.99    Ca    7.8<L>      21 Feb 2019 03:45  Phos  3.0     02-21  Mg     2.3     02-21        CBC:                        9.3    12.09 )-----------( 39       ( 21 Feb 2019 03:45 )             29.6     Coags:          ## Cardiac  CARDIAC MARKERS ( 20 Feb 2019 07:08 )  .060 ng/mL / x     / x     / x     / 1.5 ng/mL        ## Blood Gas  ABG - ( 21 Feb 2019 08:39 )  pH, Arterial: x     pH, Blood: 7.51  /  pCO2: 29    /  pO2: 75    / HCO3: 23    / Base Excess: 0.9   /  SaO2: 96                  #I/Os  I&O's Detail    20 Feb 2019 07:01  -  21 Feb 2019 07:00  --------------------------------------------------------  IN:    DOPamine Infusion: 82.8 mL    Free Water: 500 mL    lactated ringers.: 660 mL    midazolam Infusion: 64 mL    norepinephrine Infusion: 448.3 mL    propofol Infusion: 330.4 mL    Solution: 1000 mL    Solution: 100 mL    Solution: 200 mL  Total IN: 3385.5 mL    OUT:    Indwelling Catheter - Urethral: 1985 mL  Total OUT: 1985 mL    Total NET: 1400.5 mL      21 Feb 2019 07:01  -  21 Feb 2019 10:42  --------------------------------------------------------  IN:    midazolam Infusion: 2.8 mL    norepinephrine Infusion: 26.8 mL  Total IN: 29.6 mL    OUT:    Indwelling Catheter - Urethral: 150 mL  Total OUT: 150 mL    Total NET: -120.4 mL          ## Imaging:    ## Medications:  MEDICATIONS  (STANDING):  aztreonam  IVPB 2000 milliGRAM(s) IV Intermittent once  aztreonam  IVPB 2000 milliGRAM(s) IV Intermittent every 8 hours  aztreonam  IVPB      chlorhexidine 0.12% Liquid 15 milliLiter(s) Oral Mucosa two times a day  chlorhexidine 4% Liquid 1 Application(s) Topical <User Schedule>  chlorhexidine 4% Liquid 1 Application(s) Topical <User Schedule>  dextrose 5%. 1000 milliLiter(s) (50 mL/Hr) IV Continuous <Continuous>  fentaNYL    Injectable 50 MICROGram(s) IV Push once  ibuprofen  Tablet. 400 milliGRAM(s) Oral once  midazolam Infusion 0.02 mG/kG/Hr (1.426 mL/Hr) IV Continuous <Continuous>  nicotine - 21 mG/24Hr(s) Patch 1 patch Transdermal daily  norepinephrine Infusion 0.05 MICROgram(s)/kG/Min (6.684 mL/Hr) IV Continuous <Continuous>  pantoprazole   Suspension 40 milliGRAM(s) Oral daily  potassium acid phosphate/sodium acid phosphate tablet (K-PHOS No. 2) 1 Tablet(s) Oral every 4 hours  potassium chloride  10 mEq/100 mL IVPB 10 milliEquivalent(s) IV Intermittent every 1 hour  propofol Infusion 5 MICROgram(s)/kG/Min (2.139 mL/Hr) IV Continuous <Continuous>  vancomycin  IVPB 1000 milliGRAM(s) IV Intermittent every 12 hours    MEDICATIONS  (PRN):  acetaminophen   Tablet .. 650 milliGRAM(s) Oral every 6 hours PRN Temp greater or equal to 38C (100.4F), Mild Pain (1 - 3)  acetaminophen  Suppository .. 650 milliGRAM(s) Rectal every 6 hours PRN Temp greater or equal to 38C (100.4F), Mild Pain (1 - 3) HPI:  42 Y/O female w/ PMHx of IV heroin abuse brought in by EMS for lethargy and cough. As per EMS, pt was found laying in bed at home, lethargic however able to follow some commands. EMS reports that pt was attempting to detox from heroin, and last known use was 3 days prior to ED presentation. As per ED, pts boyfriend reports a hx of + pt cough productive of white sputum and a fall down a flight of stairs 2/16. Labs showed serum lactate of 7.2, BUN/Cr 124/6.42, and WBC of of 11.93. Tmax 102.4, urine + for cocaine, opiates, nitrites and many bacteria. ICU called for further evaluation. (17 Feb 2019 22:56)      24 hr events: No acute events overnight. Intubated and sedated, unable to obtain history.    ## ROS:  [x ] unable to obtain    ## Vitals  ICU Vital Signs Last 24 Hrs  T(C): 39.1 (21 Feb 2019 07:30), Max: 39.1 (21 Feb 2019 07:30)  T(F): 102.3 (21 Feb 2019 07:30), Max: 102.3 (21 Feb 2019 07:30)  HR: 104 (21 Feb 2019 09:30) (58 - 104)  BP: 114/58 (21 Feb 2019 09:30) (88/48 - 140/80)  BP(mean): 72 (21 Feb 2019 09:30) (58 - 97)  ABP: --  ABP(mean): --  RR: 20 (21 Feb 2019 09:30) (20 - 36)  SpO2: 100% (21 Feb 2019 09:30) (97% - 100%)      ## Physical Exam:  Gen: Adult female lying in bed, NAD  HEENT: NC/AT sclerae anicteric. ET tube in place  Resp: Overbreathing vent. Mechanical breath sounds b/l  CV: S1, S2  Abd: Soft + BS  Ext: Trace edema  Neuro: Sedated, Opens eyes to touch. Does not follow commands    ## Vent Data  Mode: AC/ CMV (Assist Control/ Continuous Mandatory Ventilation)  RR (machine): 18  TV (machine): 450  FiO2: 40  PEEP: 5  ITime: 0.9  MAP: 18  PIP: 28      ## Labs:  Chem:  02-21    148<H>  |  116<H>  |  62<H>  ----------------------------<  189<H>  3.3<L>   |  24  |  0.99    Ca    7.8<L>      21 Feb 2019 03:45  Phos  3.0     02-21  Mg     2.3     02-21        CBC:                        9.3    12.09 )-----------( 39       ( 21 Feb 2019 03:45 )             29.6     Coags:          ## Cardiac  CARDIAC MARKERS ( 20 Feb 2019 07:08 )  .060 ng/mL / x     / x     / x     / 1.5 ng/mL        ## Blood Gas  ABG - ( 21 Feb 2019 08:39 )  pH, Arterial: x     pH, Blood: 7.51  /  pCO2: 29    /  pO2: 75    / HCO3: 23    / Base Excess: 0.9   /  SaO2: 96                  #I/Os  I&O's Detail    20 Feb 2019 07:01  -  21 Feb 2019 07:00  --------------------------------------------------------  IN:    DOPamine Infusion: 82.8 mL    Free Water: 500 mL    lactated ringers.: 660 mL    midazolam Infusion: 64 mL    norepinephrine Infusion: 448.3 mL    propofol Infusion: 330.4 mL    Solution: 1000 mL    Solution: 100 mL    Solution: 200 mL  Total IN: 3385.5 mL    OUT:    Indwelling Catheter - Urethral: 1985 mL  Total OUT: 1985 mL    Total NET: 1400.5 mL      21 Feb 2019 07:01  -  21 Feb 2019 10:42  --------------------------------------------------------  IN:    midazolam Infusion: 2.8 mL    norepinephrine Infusion: 26.8 mL  Total IN: 29.6 mL    OUT:    Indwelling Catheter - Urethral: 150 mL  Total OUT: 150 mL    Total NET: -120.4 mL          ## Imaging:    ## Medications:  MEDICATIONS  (STANDING):  aztreonam  IVPB 2000 milliGRAM(s) IV Intermittent once  aztreonam  IVPB 2000 milliGRAM(s) IV Intermittent every 8 hours  aztreonam  IVPB      chlorhexidine 0.12% Liquid 15 milliLiter(s) Oral Mucosa two times a day  chlorhexidine 4% Liquid 1 Application(s) Topical <User Schedule>  chlorhexidine 4% Liquid 1 Application(s) Topical <User Schedule>  dextrose 5%. 1000 milliLiter(s) (50 mL/Hr) IV Continuous <Continuous>  fentaNYL    Injectable 50 MICROGram(s) IV Push once  ibuprofen  Tablet. 400 milliGRAM(s) Oral once  midazolam Infusion 0.02 mG/kG/Hr (1.426 mL/Hr) IV Continuous <Continuous>  nicotine - 21 mG/24Hr(s) Patch 1 patch Transdermal daily  norepinephrine Infusion 0.05 MICROgram(s)/kG/Min (6.684 mL/Hr) IV Continuous <Continuous>  pantoprazole   Suspension 40 milliGRAM(s) Oral daily  potassium acid phosphate/sodium acid phosphate tablet (K-PHOS No. 2) 1 Tablet(s) Oral every 4 hours  potassium chloride  10 mEq/100 mL IVPB 10 milliEquivalent(s) IV Intermittent every 1 hour  propofol Infusion 5 MICROgram(s)/kG/Min (2.139 mL/Hr) IV Continuous <Continuous>  vancomycin  IVPB 1000 milliGRAM(s) IV Intermittent every 12 hours    MEDICATIONS  (PRN):  acetaminophen   Tablet .. 650 milliGRAM(s) Oral every 6 hours PRN Temp greater or equal to 38C (100.4F), Mild Pain (1 - 3)  acetaminophen  Suppository .. 650 milliGRAM(s) Rectal every 6 hours PRN Temp greater or equal to 38C (100.4F), Mild Pain (1 - 3)

## 2019-02-21 NOTE — PROVIDER CONTACT NOTE (CRITICAL VALUE NOTIFICATION) - TEST AND RESULT REPORTED:
blood culture prelimary results from 21st february, positive growth in aerobic bottle, gram positive cocci in clusters

## 2019-02-22 LAB
AFP-TM SERPL-MCNC: 2.4 NG/ML — SIGNIFICANT CHANGE UP
ALBUMIN SERPL ELPH-MCNC: 1.4 G/DL — LOW (ref 3.3–5)
ALBUMIN SERPL ELPH-MCNC: 1.5 G/DL — LOW (ref 3.3–5)
ALP SERPL-CCNC: 208 U/L — HIGH (ref 40–120)
ALP SERPL-CCNC: 227 U/L — HIGH (ref 40–120)
ALT FLD-CCNC: 27 U/L — SIGNIFICANT CHANGE UP (ref 12–78)
ALT FLD-CCNC: 31 U/L — SIGNIFICANT CHANGE UP (ref 12–78)
ANION GAP SERPL CALC-SCNC: 10 MMOL/L — SIGNIFICANT CHANGE UP (ref 5–17)
ANION GAP SERPL CALC-SCNC: 9 MMOL/L — SIGNIFICANT CHANGE UP (ref 5–17)
AST SERPL-CCNC: 41 U/L — HIGH (ref 15–37)
AST SERPL-CCNC: 48 U/L — HIGH (ref 15–37)
BILIRUB SERPL-MCNC: 0.9 MG/DL — SIGNIFICANT CHANGE UP (ref 0.2–1.2)
BILIRUB SERPL-MCNC: 0.9 MG/DL — SIGNIFICANT CHANGE UP (ref 0.2–1.2)
BUN SERPL-MCNC: 30 MG/DL — HIGH (ref 7–23)
BUN SERPL-MCNC: 40 MG/DL — HIGH (ref 7–23)
CALCIUM SERPL-MCNC: 7 MG/DL — LOW (ref 8.5–10.1)
CALCIUM SERPL-MCNC: 7.1 MG/DL — LOW (ref 8.5–10.1)
CHLORIDE SERPL-SCNC: 114 MMOL/L — HIGH (ref 96–108)
CHLORIDE SERPL-SCNC: 114 MMOL/L — HIGH (ref 96–108)
CO2 SERPL-SCNC: 20 MMOL/L — LOW (ref 22–31)
CO2 SERPL-SCNC: 21 MMOL/L — LOW (ref 22–31)
CREAT SERPL-MCNC: 0.65 MG/DL — SIGNIFICANT CHANGE UP (ref 0.5–1.3)
CREAT SERPL-MCNC: 0.73 MG/DL — SIGNIFICANT CHANGE UP (ref 0.5–1.3)
CULTURE RESULTS: SIGNIFICANT CHANGE UP
GLUCOSE SERPL-MCNC: 131 MG/DL — HIGH (ref 70–99)
GLUCOSE SERPL-MCNC: 144 MG/DL — HIGH (ref 70–99)
GRAM STN FLD: SIGNIFICANT CHANGE UP
HCT VFR BLD CALC: 29.8 % — LOW (ref 34.5–45)
HCV RNA SERPL NAA DL=5-ACNC: 16 IU/ML — HIGH
HCV RNA SPEC NAA+PROBE-LOG IU: 1.2 LOGIU/ML — HIGH
HGB BLD-MCNC: 9 G/DL — LOW (ref 11.5–15.5)
MAGNESIUM SERPL-MCNC: 1.8 MG/DL — SIGNIFICANT CHANGE UP (ref 1.6–2.6)
MAGNESIUM SERPL-MCNC: 1.9 MG/DL — SIGNIFICANT CHANGE UP (ref 1.6–2.6)
MCHC RBC-ENTMCNC: 23.1 PG — LOW (ref 27–34)
MCHC RBC-ENTMCNC: 30.2 GM/DL — LOW (ref 32–36)
MCV RBC AUTO: 76.4 FL — LOW (ref 80–100)
NRBC # BLD: 0 /100 WBCS — SIGNIFICANT CHANGE UP (ref 0–0)
PHOSPHATE SERPL-MCNC: 3.4 MG/DL — SIGNIFICANT CHANGE UP (ref 2.5–4.5)
PHOSPHATE SERPL-MCNC: 3.8 MG/DL — SIGNIFICANT CHANGE UP (ref 2.5–4.5)
PLATELET # BLD AUTO: 56 K/UL — LOW (ref 150–400)
POTASSIUM SERPL-MCNC: 3.6 MMOL/L — SIGNIFICANT CHANGE UP (ref 3.5–5.3)
POTASSIUM SERPL-MCNC: 3.8 MMOL/L — SIGNIFICANT CHANGE UP (ref 3.5–5.3)
POTASSIUM SERPL-SCNC: 3.6 MMOL/L — SIGNIFICANT CHANGE UP (ref 3.5–5.3)
POTASSIUM SERPL-SCNC: 3.8 MMOL/L — SIGNIFICANT CHANGE UP (ref 3.5–5.3)
PROT SERPL-MCNC: 6.5 GM/DL — SIGNIFICANT CHANGE UP (ref 6–8.3)
PROT SERPL-MCNC: 6.6 GM/DL — SIGNIFICANT CHANGE UP (ref 6–8.3)
RBC # BLD: 3.9 M/UL — SIGNIFICANT CHANGE UP (ref 3.8–5.2)
RBC # FLD: 18.5 % — HIGH (ref 10.3–14.5)
SODIUM SERPL-SCNC: 144 MMOL/L — SIGNIFICANT CHANGE UP (ref 135–145)
SODIUM SERPL-SCNC: 144 MMOL/L — SIGNIFICANT CHANGE UP (ref 135–145)
SPECIMEN SOURCE: SIGNIFICANT CHANGE UP
VANCOMYCIN FLD-MCNC: 15.8 UG/ML — SIGNIFICANT CHANGE UP
VANCOMYCIN TROUGH SERPL-MCNC: 11.4 UG/ML — SIGNIFICANT CHANGE UP (ref 10–20)
WBC # BLD: 15.18 K/UL — HIGH (ref 3.8–10.5)
WBC # FLD AUTO: 15.18 K/UL — HIGH (ref 3.8–10.5)

## 2019-02-22 PROCEDURE — 99291 CRITICAL CARE FIRST HOUR: CPT

## 2019-02-22 PROCEDURE — 99233 SBSQ HOSP IP/OBS HIGH 50: CPT

## 2019-02-22 PROCEDURE — 71045 X-RAY EXAM CHEST 1 VIEW: CPT | Mod: 26

## 2019-02-22 RX ORDER — METHADONE HYDROCHLORIDE 40 MG/1
20 TABLET ORAL EVERY 24 HOURS
Qty: 0 | Refills: 0 | Status: DISCONTINUED | OUTPATIENT
Start: 2019-02-22 | End: 2019-02-22

## 2019-02-22 RX ORDER — NOREPINEPHRINE BITARTRATE/D5W 8 MG/250ML
0.05 PLASTIC BAG, INJECTION (ML) INTRAVENOUS
Qty: 8 | Refills: 0 | Status: DISCONTINUED | OUTPATIENT
Start: 2019-02-22 | End: 2019-02-25

## 2019-02-22 RX ORDER — METHADONE HYDROCHLORIDE 40 MG/1
20 TABLET ORAL DAILY
Qty: 0 | Refills: 0 | Status: DISCONTINUED | OUTPATIENT
Start: 2019-02-22 | End: 2019-03-01

## 2019-02-22 RX ORDER — DOPAMINE HYDROCHLORIDE 40 MG/ML
10 INJECTION, SOLUTION, CONCENTRATE INTRAVENOUS
Qty: 400 | Refills: 0 | Status: DISCONTINUED | OUTPATIENT
Start: 2019-02-22 | End: 2019-02-22

## 2019-02-22 RX ORDER — SODIUM CHLORIDE 9 MG/ML
1000 INJECTION, SOLUTION INTRAVENOUS ONCE
Qty: 0 | Refills: 0 | Status: COMPLETED | OUTPATIENT
Start: 2019-02-22 | End: 2019-02-22

## 2019-02-22 RX ORDER — ACETAMINOPHEN 500 MG
1000 TABLET ORAL EVERY 8 HOURS
Qty: 0 | Refills: 0 | Status: COMPLETED | OUTPATIENT
Start: 2019-02-22 | End: 2019-02-22

## 2019-02-22 RX ORDER — MIDODRINE HYDROCHLORIDE 2.5 MG/1
10 TABLET ORAL EVERY 8 HOURS
Qty: 0 | Refills: 0 | Status: DISCONTINUED | OUTPATIENT
Start: 2019-02-22 | End: 2019-02-22

## 2019-02-22 RX ORDER — PROPOFOL 10 MG/ML
20 INJECTION, EMULSION INTRAVENOUS
Qty: 1000 | Refills: 0 | Status: DISCONTINUED | OUTPATIENT
Start: 2019-02-22 | End: 2019-02-23

## 2019-02-22 RX ORDER — MIDAZOLAM HYDROCHLORIDE 1 MG/ML
4 INJECTION, SOLUTION INTRAMUSCULAR; INTRAVENOUS ONCE
Qty: 0 | Refills: 0 | Status: DISCONTINUED | OUTPATIENT
Start: 2019-02-22 | End: 2019-02-22

## 2019-02-22 RX ADMIN — Medication 250 MILLIGRAM(S): at 05:29

## 2019-02-22 RX ADMIN — Medication 1 PATCH: at 11:32

## 2019-02-22 RX ADMIN — Medication 650 MILLIGRAM(S): at 04:00

## 2019-02-22 RX ADMIN — Medication 100 MILLIGRAM(S): at 21:21

## 2019-02-22 RX ADMIN — MIDODRINE HYDROCHLORIDE 10 MILLIGRAM(S): 2.5 TABLET ORAL at 08:09

## 2019-02-22 RX ADMIN — Medication 650 MILLIGRAM(S): at 11:25

## 2019-02-22 RX ADMIN — CHLORHEXIDINE GLUCONATE 15 MILLILITER(S): 213 SOLUTION TOPICAL at 17:26

## 2019-02-22 RX ADMIN — METHADONE HYDROCHLORIDE 20 MILLIGRAM(S): 40 TABLET ORAL at 17:26

## 2019-02-22 RX ADMIN — Medication 1 PATCH: at 11:28

## 2019-02-22 RX ADMIN — FENTANYL CITRATE 3.56 MICROGRAM(S)/KG/HR: 50 INJECTION INTRAVENOUS at 20:07

## 2019-02-22 RX ADMIN — Medication 6.68 MICROGRAM(S)/KG/MIN: at 21:50

## 2019-02-22 RX ADMIN — DEXMEDETOMIDINE HYDROCHLORIDE IN 0.9% SODIUM CHLORIDE 3.56 MICROGRAM(S)/KG/HR: 4 INJECTION INTRAVENOUS at 09:26

## 2019-02-22 RX ADMIN — CHLORHEXIDINE GLUCONATE 1 APPLICATION(S): 213 SOLUTION TOPICAL at 05:29

## 2019-02-22 RX ADMIN — DEXMEDETOMIDINE HYDROCHLORIDE IN 0.9% SODIUM CHLORIDE 3.56 MICROGRAM(S)/KG/HR: 4 INJECTION INTRAVENOUS at 06:10

## 2019-02-22 RX ADMIN — FENTANYL CITRATE 3.56 MICROGRAM(S)/KG/HR: 50 INJECTION INTRAVENOUS at 12:26

## 2019-02-22 RX ADMIN — Medication 650 MILLIGRAM(S): at 11:23

## 2019-02-22 RX ADMIN — PANTOPRAZOLE SODIUM 40 MILLIGRAM(S): 20 TABLET, DELAYED RELEASE ORAL at 11:23

## 2019-02-22 RX ADMIN — Medication 1000 MILLIGRAM(S): at 18:30

## 2019-02-22 RX ADMIN — CHLORHEXIDINE GLUCONATE 15 MILLILITER(S): 213 SOLUTION TOPICAL at 05:29

## 2019-02-22 RX ADMIN — Medication 100 MILLIGRAM(S): at 13:14

## 2019-02-22 RX ADMIN — CHLORHEXIDINE GLUCONATE 1 APPLICATION(S): 213 SOLUTION TOPICAL at 06:48

## 2019-02-22 RX ADMIN — DEXMEDETOMIDINE HYDROCHLORIDE IN 0.9% SODIUM CHLORIDE 3.56 MICROGRAM(S)/KG/HR: 4 INJECTION INTRAVENOUS at 05:29

## 2019-02-22 RX ADMIN — MIDODRINE HYDROCHLORIDE 10 MILLIGRAM(S): 2.5 TABLET ORAL at 13:41

## 2019-02-22 RX ADMIN — SODIUM CHLORIDE 1000 MILLILITER(S): 9 INJECTION, SOLUTION INTRAVENOUS at 18:23

## 2019-02-22 RX ADMIN — Medication 6.68 MICROGRAM(S)/KG/MIN: at 13:14

## 2019-02-22 RX ADMIN — DEXMEDETOMIDINE HYDROCHLORIDE IN 0.9% SODIUM CHLORIDE 3.56 MICROGRAM(S)/KG/HR: 4 INJECTION INTRAVENOUS at 02:21

## 2019-02-22 RX ADMIN — Medication 100 MILLIGRAM(S): at 05:29

## 2019-02-22 RX ADMIN — Medication 1 PATCH: at 19:22

## 2019-02-22 RX ADMIN — DEXMEDETOMIDINE HYDROCHLORIDE IN 0.9% SODIUM CHLORIDE 3.56 MICROGRAM(S)/KG/HR: 4 INJECTION INTRAVENOUS at 16:51

## 2019-02-22 RX ADMIN — Medication 250 MILLIGRAM(S): at 17:41

## 2019-02-22 RX ADMIN — FENTANYL CITRATE 3.56 MICROGRAM(S)/KG/HR: 50 INJECTION INTRAVENOUS at 04:40

## 2019-02-22 RX ADMIN — DEXMEDETOMIDINE HYDROCHLORIDE IN 0.9% SODIUM CHLORIDE 3.56 MICROGRAM(S)/KG/HR: 4 INJECTION INTRAVENOUS at 12:36

## 2019-02-22 RX ADMIN — MIDAZOLAM HYDROCHLORIDE 4 MILLIGRAM(S): 1 INJECTION, SOLUTION INTRAMUSCULAR; INTRAVENOUS at 12:37

## 2019-02-22 RX ADMIN — DOPAMINE HYDROCHLORIDE 26.74 MICROGRAM(S)/KG/MIN: 40 INJECTION, SOLUTION, CONCENTRATE INTRAVENOUS at 15:36

## 2019-02-22 RX ADMIN — Medication 650 MILLIGRAM(S): at 03:48

## 2019-02-22 RX ADMIN — Medication 1 PATCH: at 07:20

## 2019-02-22 RX ADMIN — Medication 6.68 MICROGRAM(S)/KG/MIN: at 18:00

## 2019-02-22 RX ADMIN — Medication 400 MILLIGRAM(S): at 16:51

## 2019-02-22 NOTE — PROGRESS NOTE ADULT - ASSESSMENT
43 female with ETOH, Drug abuse now septic shock and VDRF.   EFRAIN most likely due to ATN which has improved.   Now on vancomycin, need to monitor trough.

## 2019-02-22 NOTE — PROVIDER CONTACT NOTE (CRITICAL VALUE NOTIFICATION) - TEST AND RESULT REPORTED:
Blood culture  from 2/21- Aerobic bottle positive for gram Blood culture  from 2/21- Aerobic bottle positive for gram positive cocci in clusters

## 2019-02-22 NOTE — PROGRESS NOTE ADULT - SUBJECTIVE AND OBJECTIVE BOX
HPI:  44 Y/O female w/ PMHx of IV heroin abuse brought in by EMS for lethargy and cough. As per EMS, pt was found laying in bed at home, lethargic however able to follow some commands. EMS reports that pt was attempting to detox from heroin, and last known use was 3 days prior to ED presentation. As per ED, pts boyfriend reports a hx of + pt cough productive of white sputum and a fall down a flight of stairs 2/16. Labs showed serum lactate of 7.2, BUN/Cr 124/6.42, and WBC of of 11.93. Tmax 102.4, urine + for cocaine, opiates, nitrites and many bacteria. ICU called for further evaluation. (17 Feb 2019 22:56)      24 hr events: No acute events. Repeat blood cultures from yesterday are still positive. Remains intubated and sedated.    ## ROS:  [x ] unable to obtain    ## Vitals  ICU Vital Signs Last 24 Hrs  T(C): 36.6 (22 Feb 2019 07:30), Max: 39.7 (21 Feb 2019 11:50)  T(F): 97.8 (22 Feb 2019 07:30), Max: 103.5 (21 Feb 2019 11:50)  HR: 97 (22 Feb 2019 10:30) (61 - 110)  BP: 111/86 (22 Feb 2019 10:30) (84/33 - 128/67)  BP(mean): 92 (22 Feb 2019 10:30) (46 - 92)  ABP: --  ABP(mean): --  RR: 37 (22 Feb 2019 10:30) (21 - 43)  SpO2: 100% (22 Feb 2019 10:30) (93% - 100%)      ## Physical Exam:  Gen: Adult female lying in bed, NAD  HEENT: NC/AT sclerae anicteric. ET tube in place  Resp: Overbreathing vent. Mechanical breath sounds b/l  CV: S1, S2  Abd: Soft + BS  Ext: Trace edema  Neuro: Sedated, Opens eyes to touch. Does not follow commands    ## Vent Data  Mode: AC/ CMV (Assist Control/ Continuous Mandatory Ventilation)  RR (machine): 15  TV (machine): 350  FiO2: 40  PEEP: 5  ITime: 0.9  MAP: 9  PIP: 14      ## Labs:  Chem:  02-22    144  |  114<H>  |  40<H>  ----------------------------<  131<H>  3.6   |  20<L>  |  0.73    Ca    7.0<L>      22 Feb 2019 04:19  Phos  3.4     02-22  Mg     1.9     02-22    TPro  6.5  /  Alb  1.5<L>  /  TBili  0.9  /  DBili  x   /  AST  48<H>  /  ALT  31  /  AlkPhos  227<H>  02-22    LIVER FUNCTIONS - ( 22 Feb 2019 04:19 )  Alb: 1.5 g/dL / Pro: 6.5 gm/dL / ALK PHOS: 227 U/L / ALT: 31 U/L / AST: 48 U/L / GGT: x           CBC:                        9.0    15.18 )-----------( 56       ( 22 Feb 2019 04:19 )             29.8     Coags:          ## Cardiac        ## Blood Gas  ABG - ( 21 Feb 2019 08:39 )  pH, Arterial: x     pH, Blood: 7.51  /  pCO2: 29    /  pO2: 75    / HCO3: 23    / Base Excess: 0.9   /  SaO2: 96                  #I/Os  I&O's Detail    21 Feb 2019 07:01  -  22 Feb 2019 07:00  --------------------------------------------------------  IN:    dexmedetomidine Infusion: 278.6 mL    dextrose 5%.: 950 mL    fentaNYL Infusion.: 316.8 mL    Free Water: 400 mL    midazolam Infusion: 41.1 mL    norepinephrine Infusion: 598.8 mL    Solution: 200 mL    Solution: 500 mL  Total IN: 3285.3 mL    OUT:    Indwelling Catheter - Urethral: 2105 mL  Total OUT: 2105 mL    Total NET: 1180.3 mL      22 Feb 2019 07:01  -  22 Feb 2019 11:09  --------------------------------------------------------  IN:    dexmedetomidine Infusion: 14.3 mL    dextrose 5%.: 50 mL    fentaNYL Infusion.: 32 mL    norepinephrine Infusion: 32.7 mL  Total IN: 129 mL    OUT:    Indwelling Catheter - Urethral: 475 mL  Total OUT: 475 mL    Total NET: -346 mL          ## Imaging:    ## Medications:  MEDICATIONS  (STANDING):  aztreonam  IVPB 2000 milliGRAM(s) IV Intermittent every 8 hours  aztreonam  IVPB      chlorhexidine 0.12% Liquid 15 milliLiter(s) Oral Mucosa two times a day  chlorhexidine 4% Liquid 1 Application(s) Topical <User Schedule>  chlorhexidine 4% Liquid 1 Application(s) Topical <User Schedule>  dexmedetomidine Infusion 0.2 MICROgram(s)/kG/Hr (3.565 mL/Hr) IV Continuous <Continuous>  dextrose 5%. 1000 milliLiter(s) (50 mL/Hr) IV Continuous <Continuous>  fentaNYL   Infusion. 0.5 MICROgram(s)/kG/Hr (3.565 mL/Hr) IV Continuous <Continuous>  midodrine 10 milliGRAM(s) Oral every 8 hours  nicotine - 21 mG/24Hr(s) Patch 1 patch Transdermal daily  norepinephrine Infusion 0.05 MICROgram(s)/kG/Min (6.684 mL/Hr) IV Continuous <Continuous>  pantoprazole   Suspension 40 milliGRAM(s) Oral daily  vancomycin  IVPB 1000 milliGRAM(s) IV Intermittent every 12 hours    MEDICATIONS  (PRN):  acetaminophen   Tablet .. 650 milliGRAM(s) Oral every 6 hours PRN Temp greater or equal to 38C (100.4F), Mild Pain (1 - 3)  acetaminophen  Suppository .. 650 milliGRAM(s) Rectal every 6 hours PRN Temp greater or equal to 38C (100.4F), Mild Pain (1 - 3)

## 2019-02-22 NOTE — PROGRESS NOTE ADULT - ASSESSMENT
44 y/o F w/polysubstance abuse presenting with severe sepsis with septic shock secondary to MRSA bacteremia. High concern for endocarditis. EFRAIN likely secondary to sepsis now improved. Acute respiratory failure requiring intubation. New diagnosis of HCV.    Neuro: Possible withdrawal from opiates/alcohol  - Sedation as needed goal RASS -1 to 0  - Precedex gtt, fentanyl PRN  - Wean midazolam  - Unable to locate patients methadone clinic card    Resp:  - Daily SAT/SBT    CV:  - Titrate pressors as tolerated goal MAP >= 65  - Midodrine 10mg q8hrs  - Will contact cardiology to obtain LIZ to evaluate for endocarditis. TTE negative    ID: MRSA bacteremia, E. Coli UTI  - Vancomycin, aztreonam    Renal: EFRAIN  - Trend Cr, avoid nephrotoxins    GI: HCV  - Outpatient follow up    FEN:  - Tube feeds  - Replete lytes PRN  - Hypernatremia improved. D/C D5 gtt, continue free water    PPx:  - SCDs, PPI    Attending critical care time 35 minutes

## 2019-02-22 NOTE — PROGRESS NOTE ADULT - PROBLEM SELECTOR PLAN 1
septic shock with very high fever  sec to MRSA BSI  cont vanco ,started at q 12 today as level yesterday was high (WNL today )   repeat blood c/s in am   so far blood c/s from 2/17,2/19 and 2/21 positive  LIZ on monday

## 2019-02-22 NOTE — PROGRESS NOTE ADULT - SUBJECTIVE AND OBJECTIVE BOX
LUISA RAWLS  43y  Female    Patient is a 43y old  Female who presents with a chief complaint of AMS (21 Feb 2019 21:10)      seen in CCU. on levophed      PAST MEDICAL & SURGICAL HISTORY:  ETOH abuse  Drug abuse  C Section          PHYSICAL EXAM:    T(C): 36.6 (02-22-19 @ 07:30), Max: 39.7 (02-21-19 @ 11:50)  HR: 97 (02-22-19 @ 10:30) (61 - 110)  BP: 111/86 (02-22-19 @ 10:30) (84/33 - 128/67)  RR: 37 (02-22-19 @ 10:30) (21 - 43)  SpO2: 100% (02-22-19 @ 10:30) (93% - 100%)  Wt(kg): --    I&O's Detail    21 Feb 2019 07:01  -  22 Feb 2019 07:00  --------------------------------------------------------  IN:    dexmedetomidine Infusion: 278.6 mL    dextrose 5%.: 950 mL    fentaNYL Infusion.: 316.8 mL    Free Water: 400 mL    midazolam Infusion: 41.1 mL    norepinephrine Infusion: 598.8 mL    Solution: 200 mL    Solution: 500 mL  Total IN: 3285.3 mL    OUT:    Indwelling Catheter - Urethral: 2105 mL  Total OUT: 2105 mL    Total NET: 1180.3 mL      22 Feb 2019 07:01  -  22 Feb 2019 11:04  --------------------------------------------------------  IN:    dexmedetomidine Infusion: 14.3 mL    dextrose 5%.: 50 mL    fentaNYL Infusion.: 32 mL    norepinephrine Infusion: 32.7 mL  Total IN: 129 mL    OUT:    Indwelling Catheter - Urethral: 475 mL  Total OUT: 475 mL    Total NET: -346 mL          Respiratory: clear anteriorly, decreased BS at bases  Cardiovascular: S1 S2  Gastrointestinal: soft NT ND +BS  Extremities: one plus edema   Neuro: lethargic.    MEDICATIONS  (STANDING):  aztreonam  IVPB 2000 milliGRAM(s) IV Intermittent every 8 hours  aztreonam  IVPB      chlorhexidine 0.12% Liquid 15 milliLiter(s) Oral Mucosa two times a day  chlorhexidine 4% Liquid 1 Application(s) Topical <User Schedule>  chlorhexidine 4% Liquid 1 Application(s) Topical <User Schedule>  dexmedetomidine Infusion 0.2 MICROgram(s)/kG/Hr (3.565 mL/Hr) IV Continuous <Continuous>  dextrose 5%. 1000 milliLiter(s) (50 mL/Hr) IV Continuous <Continuous>  fentaNYL   Infusion. 0.5 MICROgram(s)/kG/Hr (3.565 mL/Hr) IV Continuous <Continuous>  midodrine 10 milliGRAM(s) Oral every 8 hours  nicotine - 21 mG/24Hr(s) Patch 1 patch Transdermal daily  norepinephrine Infusion 0.05 MICROgram(s)/kG/Min (6.684 mL/Hr) IV Continuous <Continuous>  pantoprazole   Suspension 40 milliGRAM(s) Oral daily  vancomycin  IVPB 1000 milliGRAM(s) IV Intermittent every 12 hours    MEDICATIONS  (PRN):  acetaminophen   Tablet .. 650 milliGRAM(s) Oral every 6 hours PRN Temp greater or equal to 38C (100.4F), Mild Pain (1 - 3)  acetaminophen  Suppository .. 650 milliGRAM(s) Rectal every 6 hours PRN Temp greater or equal to 38C (100.4F), Mild Pain (1 - 3)                            9.0    15.18 )-----------( 56       ( 22 Feb 2019 04:19 )             29.8       02-22    144  |  114<H>  |  40<H>  ----------------------------<  131<H>  3.6   |  20<L>  |  0.73    Ca    7.0<L>      22 Feb 2019 04:19  Phos  3.4     02-22  Mg     1.9     02-22    TPro  6.5  /  Alb  1.5<L>  /  TBili  0.9  /  DBili  x   /  AST  48<H>  /  ALT  31  /  AlkPhos  227<H>  02-22

## 2019-02-22 NOTE — PROGRESS NOTE ADULT - SUBJECTIVE AND OBJECTIVE BOX
CHIEF COMPLAINT:  Patient is a 43y old  Female who presents with a chief complaint of AMS      HPI:  44 Y/O female w/ PMHx of IV heroin abuse brought in by EMS for lethargy and cough. As per EMS, pt was found laying in bed at home, lethargic however able to follow some commands. EMS reports that pt was attempting to detox from heroin, and last known use was 3 days prior to ED presentation. As per ED, pts boyfriend reports a hx of + pt cough productive of white sputum and a fall down a flight of stairs 2/16. Labs showed serum lactate of 7.2, BUN/Cr 124/6.42, and WBC of of 11.93. Tmax 102.4, urine + for cocaine, opiates, nitrites and many bacteria. Intubated for resp protections. On pressors now. MRSA positive blood cultures. Spoke with team and obtained consent for LIZ for Monday.      REVIEW OF SYSTEMS:  Unable as pt intubated.      PHYSICAL EXAM:  ICU Vital Signs Last 24 Hrs  T(C): 39.9 (22 Feb 2019 12:00), Max: 40.6 (22 Feb 2019 11:28)  T(F): 103.9 (22 Feb 2019 12:00), Max: 105 (22 Feb 2019 11:28)  HR: 92 (22 Feb 2019 12:30) (61 - 101)  BP: 114/52 (22 Feb 2019 12:30) (84/33 - 128/67)  BP(mean): 67 (22 Feb 2019 12:30) (46 - 92)  RR: 47 (22 Feb 2019 12:30) (21 - 47)  SpO2: 100% (22 Feb 2019 12:30) (93% - 100%)      Tele: SR  General:  Poor overall appearance, intubated on vent.  HEENT:  NC/AT, patent nares w/ pink mucosa, no thyromegaly, nodules, adenopathy, no JVD  Chest:  Full & symmetric excursion, no increased effort, breath sounds diminished throughout. No wheeze.  Cardiovascular:  Regular rhythm, S1, S2, 1/6 BEBO at Right base.  Abdomen:  Soft, non-tender, non-distended  Extremities: no edema.  Skin:  warm/dry  Neuro/Psych: unable to assess.    LABORATORY:                        9.0    15.18 )-----------( 56       ( 22 Feb 2019 04:19 )             29.8     02-22    144  |  114<H>  |  40<H>  ----------------------------<  131<H>  3.6   |  20<L>  |  0.73    Ca    7.0<L>      22 Feb 2019 04:19  Phos  3.4     02-22  Mg     1.9     02-22    TPro  6.5  /  Alb  1.5<L>  /  TBili  0.9  /  DBili  x   /  AST  48<H>  /  ALT  31  /  AlkPhos  227<H>  02-22      Gram Stain:   Growth in aerobic bottle: Gram Positive Cocci in Clusters  Growth in anaerobic bottle: Gram Positive Cocci in Clusters (02.19.19 @ 09:45)      IMAGING:  ecg: SR, nonspecific ST T abnls.      < from: Xray Chest 1 View- PORTABLE-Urgent (02.20.19 @ 08:57) >  IMPRESSION:   Life supporting devices in appropriate position.  Mild improvement in lower right lung opacity.    < end of copied text >    < from: CT Abdomen and Pelvis No Cont (02.17.19 @ 23:18) >  Impression: No acute abdominal/pelvic pathology. Large stool burden.    < end of copied text >    < from: TTE Echo Doppler w/o Cont (02.18.19 @ 09:46) >     PHYSICIAN INTERPRETATION:  Left Ventricle: Normal left ventricular size and wall thicknesses, with   normal systolic function.  Left ventricular ejection fraction, by visual estimation, is 60 to 65%.  Right Ventricle: Normal right ventricular size and function.  Left Atrium: The left atrium is normal in size. Normal left atrial size.  Right Atrium: The right atrium is normal in size. Normal right atrial   size.  Pericardium: There is no evidence of pericardial effusion.  Mitral Valve: Structurally normal mitral valve, with normal leaflet   excursion. No evidence of mitral valve regurgitation is seen.  Tricuspid Valve: The tricuspid valve is not well seen. Trivial tricuspid   regurgitation is visualized. Estimated pulmonary artery systolic pressure   is 35.2 mmHg assuming a right atrial pressure of 5 mmHg, which is   consistent with mild pulmonary hypertension. Somewhat poorly visualized,   cannot rule out possibility of valvular vegetations. Suggest LIZ, if   clinically warranted, for further evaluation.  Aortic Valve: Normal trileaflet aortic valve with normal opening. Peak Av   velocity is 2.15 m/s, mean transaortic gradient equals 9.6 mmHg. The   aortic valve mean gradient is 9.6 mmHgconsistent with normally opening   aortic valve. No evidence of aortic valve regurgitation is seen.  Pulmonic Valve: Structurally normal pulmonic valve, with normal leaflet   excursion. Mild pulmonic valve regurgitation.  Aorta: The aortic root and ascending aorta are structurally normal, with   no evidence of dilitation.  Pulmonary Artery: The main pulmonary artery is normal in size.       Summary:   1. Left ventricular ejection fraction, by visual estimation, is 60 to   65%.   2. There is no left ventricular hypertrophy.   3. Trace tricuspid regurgitation.   4. Pulmonic valve regurgitation.   5. Estimated pulmonary artery systolic pressure is 35.2 mmHg assuming a   right atrial pressure of 5 mmHg, which is consistent with mild pulmonary   hypertension.    < end of copied text >    Tricuspid Valve: The tricuspid valve is not well seen. Trivial tricuspid   regurgitation is visualized. Estimated pulmonary artery systolic pressure   is 35.2 mmHg assuming a right atrial pressure of 5 mmHg, which is   MEDICATIONS  (STANDING):  chlorhexidine 0.12% Liquid 15 milliLiter(s) Oral Mucosa two times a day  chlorhexidine 4% Liquid 1 Application(s) Topical <User Schedule>  ciprofloxacin   IVPB 400 milliGRAM(s) IV Intermittent every 12 hours  ibuprofen  Tablet. 400 milliGRAM(s) Oral once  nicotine - 21 mG/24Hr(s) Patch 1 patch Transdermal daily  norepinephrine Infusion 0.05 MICROgram(s)/kG/Min (6.684 mL/Hr) IV Continuous <Continuous>  pantoprazole   Suspension 40 milliGRAM(s) Oral daily  potassium chloride   Powder 40 milliEquivalent(s) Oral every 4 hours  potassium phosphate IVPB 15 milliMole(s) IV Intermittent once  propofol Infusion 5 MICROgram(s)/kG/Min (2.139 mL/Hr) IV Continuous <Continuous>  vancomycin  IVPB 1000 milliGRAM(s) IV Intermittent every 12 hours  consistent with mild pulmonary hypertension. Somewhat poorly visualized,   cannot rule out possibility of valvular vegetations. Suggest LIZ, if   clinically warranted, for further evaluation.    ASSESSMENT:  44 Y/O female w/ PMHx of IV heroin abuse brought in by EMS for lethargy and cough. As per EMS, pt was found laying in bed at home, lethargic however able to follow some commands. EMS reports that pt was attempting to detox from heroin, and last known use was 3 days prior to ED presentation. As per ED, pts boyfriend reports a hx of + pt cough productive of white sputum and a fall down a flight of stairs 2/16. Labs showed serum lactate of 7.2, BUN/Cr 124/6.42, and WBC of of 11.93. Tmax 102.4, urine + for cocaine, opiates, nitrites and many bacteria. Intubated for resp protections. On pressors now. MRSA positive blood cultures.    PLAN:       MEDICATIONS  (STANDING):  aztreonam  IVPB 2000 milliGRAM(s) IV Intermittent every 8 hours  chlorhexidine 0.12% Liquid 15 milliLiter(s) Oral Mucosa two times a day  chlorhexidine 4% Liquid 1 Application(s) Topical <User Schedule>  chlorhexidine 4% Liquid 1 Application(s) Topical <User Schedule>  dexmedetomidine Infusion 0.2 MICROgram(s)/kG/Hr (3.565 mL/Hr) IV Continuous <Continuous>  fentaNYL   Infusion. 0.5 MICROgram(s)/kG/Hr (3.565 mL/Hr) IV Continuous <Continuous>  midodrine 10 milliGRAM(s) Oral every 8 hours  nicotine - 21 mG/24Hr(s) Patch 1 patch Transdermal daily  norepinephrine Infusion 0.05 MICROgram(s)/kG/Min (6.684 mL/Hr) IV Continuous <Continuous>  pantoprazole   Suspension 40 milliGRAM(s) Oral daily  propofol Infusion 20 MICROgram(s)/kG/Min (8.556 mL/Hr) IV Continuous <Continuous>  vancomycin  IVPB 1000 milliGRAM(s) IV Intermittent every 12 hours    LIZ to help clarify endocarditis and valve status.  Risks/benfits/alts explained to pt's mother Kim by phone. Verbalized understanding and agreed to above.  Pressor & vent support.  Will need inpatient care management given constellation of medical problems.    Patricio Lawton MD, FACC, TAMIKO, TERESITA, FACP  Director, Heart Failure Services  [facility]  , Department of Cardiology  [facility]

## 2019-02-22 NOTE — PROGRESS NOTE ADULT - SUBJECTIVE AND OBJECTIVE BOX
Patient is a 43y old  Female who presents with a chief complaint of AMS (22 Feb 2019 13:23)      INTERVAL HPI / OVERNIGHT EVENTS: high fever, still intubated and sedated ,on pressor    MEDICATIONS  (STANDING):  aztreonam  IVPB 2000 milliGRAM(s) IV Intermittent every 8 hours  aztreonam  IVPB      chlorhexidine 0.12% Liquid 15 milliLiter(s) Oral Mucosa two times a day  chlorhexidine 4% Liquid 1 Application(s) Topical <User Schedule>  chlorhexidine 4% Liquid 1 Application(s) Topical <User Schedule>  dexmedetomidine Infusion 0.2 MICROgram(s)/kG/Hr (3.565 mL/Hr) IV Continuous <Continuous>  DOPamine Infusion 10 MICROgram(s)/kG/Min (26.738 mL/Hr) IV Continuous <Continuous>  fentaNYL   Infusion. 0.5 MICROgram(s)/kG/Hr (3.565 mL/Hr) IV Continuous <Continuous>  methadone   Solution 20 milliGRAM(s) Oral every 24 hours  midodrine 10 milliGRAM(s) Oral every 8 hours  nicotine - 21 mG/24Hr(s) Patch 1 patch Transdermal daily  norepinephrine Infusion 0.05 MICROgram(s)/kG/Min (6.684 mL/Hr) IV Continuous <Continuous>  pantoprazole   Suspension 40 milliGRAM(s) Oral daily  propofol Infusion 20 MICROgram(s)/kG/Min (8.556 mL/Hr) IV Continuous <Continuous>  vancomycin  IVPB 1000 milliGRAM(s) IV Intermittent every 12 hours    MEDICATIONS  (PRN):  acetaminophen   Tablet .. 650 milliGRAM(s) Oral every 6 hours PRN Temp greater or equal to 38C (100.4F), Mild Pain (1 - 3)  acetaminophen  Suppository .. 650 milliGRAM(s) Rectal every 6 hours PRN Temp greater or equal to 38C (100.4F), Mild Pain (1 - 3)      Vital Signs Last 24 Hrs  T(C): 37.7 (22 Feb 2019 15:15), Max: 40.6 (22 Feb 2019 11:28)  T(F): 99.9 (22 Feb 2019 15:15), Max: 105 (22 Feb 2019 11:28)  HR: 43 (22 Feb 2019 15:05) (43 - 101)  BP: 126/47 (22 Feb 2019 15:05) (84/33 - 128/67)  BP(mean): 64 (22 Feb 2019 15:05) (46 - 94)  RR: 22 (22 Feb 2019 15:05) (21 - 47)  SpO2: 100% (22 Feb 2019 15:05) (97% - 100%)    Review of systems:  as per EZEQUIEL,pt intubated        PHYSICAL EXAM:  General :NAD  Constitutional:  well-groomed, well-developed  Respiratory: Coarse , intubated  Cardiovascular: S1 and S2, RRR, no M/G/R  Gastrointestinal: BS+, soft, NT/ND  Extremities: No peripheral edema  Vascular: 2+ peripheral pulses  Skin: No rashes      LABS:                        9.0    15.18 )-----------( 56       ( 22 Feb 2019 04:19 )             29.8     02-22    144  |  114<H>  |  40<H>  ----------------------------<  131<H>  3.6   |  20<L>  |  0.73    Ca    7.0<L>      22 Feb 2019 04:19  Phos  3.4     02-22  Mg     1.9     02-22    TPro  6.5  /  Alb  1.5<L>  /  TBili  0.9  /  DBili  x   /  AST  48<H>  /  ALT  31  /  AlkPhos  227<H>  02-22          MICROBIOLOGY:  RECENT CULTURES:  02-21 .Blood XXXX   Growth in aerobic bottle: Gram Positive Cocci in Clusters   Growth in aerobic bottle: Gram Positive Cocci in Clusters    02-21 .Blood Received only Aerobic (Blue Top) Bottle. XXXX   Growth in aerobic bottle: Gram Positive Cocci in Clusters   Growth in aerobic bottle: Gram Positive Cocci in Clusters    02-21 .Blood XXXX   Growth in aerobic bottle: Gram Positive Cocci in Clusters  Growth in anaerobic bottle: Gram Positive Cocci in Clusters   Growth in aerobic bottle: Gram Positive Cocci in Clusters  Growth in anaerobic bottle: Gram Positive Cocci in Clusters    02-20 .Sputum trap XXXX   Rare polymorphonuclear leukocytes per low power field  Rare Squamous epithelial cells per low power field  Rare Gram Positive Rods per oil power field  Rare Yeast like cells per oil power field   Normal Respiratory Melissa present    02-19 .Blood XXXX   Growth in aerobic bottle: Gram Positive Cocci in Clusters  Growth in anaerobic bottle: Gram Positive Cocci in Clusters   Growth in aerobic and anaerobic bottles: Methicillin resistant  Staphylococcus aureus  See previous culture  # [id number]    02-18 .Urine Escherichia coli XXXX   >100,000 CFU/ml Escherichia coli    02-18 .Blood Blood Culture PCR  Methicillin resistant Staphylococcus aureus   Growth in aerobic bottle: Gram Positive Cocci in Clusters  Growth in anaerobic bottle: Gram Positive Cocci in Clusters   Growth in aerobic and anaerobic bottles: Methicillin resistant  Staphylococcus aureus  See previous culture [id number]          RADIOLOGY & ADDITIONAL STUDIES:

## 2019-02-23 LAB
-  AMPICILLIN/SULBACTAM: SIGNIFICANT CHANGE UP
-  CEFAZOLIN: SIGNIFICANT CHANGE UP
-  CLINDAMYCIN: SIGNIFICANT CHANGE UP
-  DAPTOMYCIN: SIGNIFICANT CHANGE UP
-  ERYTHROMYCIN: SIGNIFICANT CHANGE UP
-  GENTAMICIN: SIGNIFICANT CHANGE UP
-  LINEZOLID: SIGNIFICANT CHANGE UP
-  OXACILLIN: SIGNIFICANT CHANGE UP
-  PENICILLIN: SIGNIFICANT CHANGE UP
-  RIFAMPIN: SIGNIFICANT CHANGE UP
-  TETRACYCLINE: SIGNIFICANT CHANGE UP
-  TRIMETHOPRIM/SULFAMETHOXAZOLE: SIGNIFICANT CHANGE UP
-  VANCOMYCIN: SIGNIFICANT CHANGE UP
ALBUMIN SERPL ELPH-MCNC: 1.4 G/DL — LOW (ref 3.3–5)
ALP SERPL-CCNC: 180 U/L — HIGH (ref 40–120)
ALT FLD-CCNC: 23 U/L — SIGNIFICANT CHANGE UP (ref 12–78)
ANION GAP SERPL CALC-SCNC: 9 MMOL/L — SIGNIFICANT CHANGE UP (ref 5–17)
AST SERPL-CCNC: 35 U/L — SIGNIFICANT CHANGE UP (ref 15–37)
BILIRUB SERPL-MCNC: 0.7 MG/DL — SIGNIFICANT CHANGE UP (ref 0.2–1.2)
BUN SERPL-MCNC: 24 MG/DL — HIGH (ref 7–23)
CALCIUM SERPL-MCNC: 7 MG/DL — LOW (ref 8.5–10.1)
CHLORIDE SERPL-SCNC: 113 MMOL/L — HIGH (ref 96–108)
CO2 SERPL-SCNC: 22 MMOL/L — SIGNIFICANT CHANGE UP (ref 22–31)
CREAT SERPL-MCNC: 0.57 MG/DL — SIGNIFICANT CHANGE UP (ref 0.5–1.3)
CULTURE RESULTS: SIGNIFICANT CHANGE UP
CULTURE RESULTS: SIGNIFICANT CHANGE UP
GLUCOSE SERPL-MCNC: 117 MG/DL — HIGH (ref 70–99)
GRAM STN FLD: SIGNIFICANT CHANGE UP
HCT VFR BLD CALC: 31.7 % — LOW (ref 34.5–45)
HGB BLD-MCNC: 9.4 G/DL — LOW (ref 11.5–15.5)
MAGNESIUM SERPL-MCNC: 1.6 MG/DL — SIGNIFICANT CHANGE UP (ref 1.6–2.6)
MCHC RBC-ENTMCNC: 23.3 PG — LOW (ref 27–34)
MCHC RBC-ENTMCNC: 29.7 GM/DL — LOW (ref 32–36)
MCV RBC AUTO: 78.5 FL — LOW (ref 80–100)
METHOD TYPE: SIGNIFICANT CHANGE UP
NRBC # BLD: 0 /100 WBCS — SIGNIFICANT CHANGE UP (ref 0–0)
ORGANISM # SPEC MICROSCOPIC CNT: SIGNIFICANT CHANGE UP
ORGANISM # SPEC MICROSCOPIC CNT: SIGNIFICANT CHANGE UP
PHOSPHATE SERPL-MCNC: 3.7 MG/DL — SIGNIFICANT CHANGE UP (ref 2.5–4.5)
PLATELET # BLD AUTO: 80 K/UL — LOW (ref 150–400)
POTASSIUM SERPL-MCNC: 4.1 MMOL/L — SIGNIFICANT CHANGE UP (ref 3.5–5.3)
POTASSIUM SERPL-SCNC: 4.1 MMOL/L — SIGNIFICANT CHANGE UP (ref 3.5–5.3)
PROT SERPL-MCNC: 6.4 GM/DL — SIGNIFICANT CHANGE UP (ref 6–8.3)
RBC # BLD: 4.04 M/UL — SIGNIFICANT CHANGE UP (ref 3.8–5.2)
RBC # FLD: 18.4 % — HIGH (ref 10.3–14.5)
SODIUM SERPL-SCNC: 144 MMOL/L — SIGNIFICANT CHANGE UP (ref 135–145)
SPECIMEN SOURCE: SIGNIFICANT CHANGE UP
TROPONIN I SERPL-MCNC: 0.03 NG/ML — SIGNIFICANT CHANGE UP (ref 0.01–0.04)
WBC # BLD: 19.6 K/UL — HIGH (ref 3.8–10.5)
WBC # FLD AUTO: 19.6 K/UL — HIGH (ref 3.8–10.5)

## 2019-02-23 PROCEDURE — 99291 CRITICAL CARE FIRST HOUR: CPT

## 2019-02-23 RX ORDER — ACETAMINOPHEN 500 MG
650 TABLET ORAL EVERY 6 HOURS
Qty: 0 | Refills: 0 | Status: DISCONTINUED | OUTPATIENT
Start: 2019-02-23 | End: 2019-03-11

## 2019-02-23 RX ORDER — MAGNESIUM SULFATE 500 MG/ML
1 VIAL (ML) INJECTION
Qty: 0 | Refills: 0 | Status: DISCONTINUED | OUTPATIENT
Start: 2019-02-23 | End: 2019-02-23

## 2019-02-23 RX ORDER — VANCOMYCIN HCL 1 G
1250 VIAL (EA) INTRAVENOUS EVERY 12 HOURS
Qty: 0 | Refills: 0 | Status: DISCONTINUED | OUTPATIENT
Start: 2019-02-23 | End: 2019-02-25

## 2019-02-23 RX ADMIN — Medication 650 MILLIGRAM(S): at 12:41

## 2019-02-23 RX ADMIN — Medication 1 PATCH: at 11:52

## 2019-02-23 RX ADMIN — CHLORHEXIDINE GLUCONATE 15 MILLILITER(S): 213 SOLUTION TOPICAL at 05:04

## 2019-02-23 RX ADMIN — Medication 100 MILLIGRAM(S): at 13:32

## 2019-02-23 RX ADMIN — Medication 650 MILLIGRAM(S): at 18:50

## 2019-02-23 RX ADMIN — Medication 100 MILLIGRAM(S): at 21:42

## 2019-02-23 RX ADMIN — Medication 6.68 MICROGRAM(S)/KG/MIN: at 11:11

## 2019-02-23 RX ADMIN — Medication 650 MILLIGRAM(S): at 23:41

## 2019-02-23 RX ADMIN — CHLORHEXIDINE GLUCONATE 1 APPLICATION(S): 213 SOLUTION TOPICAL at 05:06

## 2019-02-23 RX ADMIN — Medication 250 MILLIGRAM(S): at 05:04

## 2019-02-23 RX ADMIN — Medication 1 PATCH: at 19:30

## 2019-02-23 RX ADMIN — Medication 166.67 MILLIGRAM(S): at 17:47

## 2019-02-23 RX ADMIN — Medication 6.68 MICROGRAM(S)/KG/MIN: at 01:12

## 2019-02-23 RX ADMIN — Medication 650 MILLIGRAM(S): at 17:47

## 2019-02-23 RX ADMIN — FENTANYL CITRATE 3.56 MICROGRAM(S)/KG/HR: 50 INJECTION INTRAVENOUS at 06:41

## 2019-02-23 RX ADMIN — Medication 100 MILLIGRAM(S): at 05:04

## 2019-02-23 RX ADMIN — Medication 1 PATCH: at 11:02

## 2019-02-23 RX ADMIN — Medication 1 PATCH: at 09:06

## 2019-02-23 RX ADMIN — FENTANYL CITRATE 3.56 MICROGRAM(S)/KG/HR: 50 INJECTION INTRAVENOUS at 04:42

## 2019-02-23 RX ADMIN — METHADONE HYDROCHLORIDE 20 MILLIGRAM(S): 40 TABLET ORAL at 11:10

## 2019-02-23 RX ADMIN — Medication 100 GRAM(S): at 08:29

## 2019-02-23 RX ADMIN — CHLORHEXIDINE GLUCONATE 15 MILLILITER(S): 213 SOLUTION TOPICAL at 17:47

## 2019-02-23 RX ADMIN — Medication 100 GRAM(S): at 07:25

## 2019-02-23 RX ADMIN — Medication 6.68 MICROGRAM(S)/KG/MIN: at 04:42

## 2019-02-23 RX ADMIN — Medication 650 MILLIGRAM(S): at 11:10

## 2019-02-23 RX ADMIN — PANTOPRAZOLE SODIUM 40 MILLIGRAM(S): 20 TABLET, DELAYED RELEASE ORAL at 11:23

## 2019-02-23 NOTE — PROGRESS NOTE ADULT - ASSESSMENT
43F PMH IVDA, alcohol abuse, hx of pancreatitis, alcohol hepatitis, alcohol withdrawal, left frontoparietal subdural hematoma 2008 without need for neurosurgical intervention, bacterial vaginitis here with severe sepsis, septic shock, persistent MRSA bacteremia, septic emboli, PNA, acute hypoxic respiratory failure requiring intubation, UTI, ARF with ATN, lactic acidosis, acute metabolic encephelopathy, likely endocarditis. Now with tachy-guerline arrhythmias.        1. NEURO  - CT head without acute pathology  - initial encephalopathy and poor mental status in setting of sepsis and underlying bacteremia/PNA/UTI  - monitor for withdrawal symptoms: cont methadone  - daily sedation vacation      2. CV  - TTE without definitive valvular vegetation, however unable to clearly visualize tricuspid valve  - plan for LIZ monday to evaluate for endocarditis   - remains in septic shock on levophed: off dopamine as pt's bradycardia resolved but became tachycardic  - off midodrine, off precedex due to bradycardia      3. PULM  - RLL PNA along with R cavitary lesions and multiple nodular lesions noted on CT chest likely due to septic emboli due to staph infection which can cause cavitary lesions  - cont mechanical ventilation  - daily wean as tolerates  - holding on extubation due to thick copious ETT secretions as well as recent hemodynamic fluctuations in HR and current shock state      4. RENAL  - Cr now normalized  - ARF with ATN likely in setting of sepsis, hypovolemia, hypotension and with underlying UTI  - monitor electrolytes  - appreciate renal follow up    5. HEME  - thrombocytopenia possibly due to sepsis as well as chronic alcohol abuse  - cont to monitor counts and for bleed    6. ID  - MRSA bacteremia with likely septic emboli to lungs with PNA and cavitary lesion  - vanco level 11 only, will increase vanco to 1250mg q12 hours and check vanco trough  - pt has yet to clear her bacteremia, clincially has endocarditis  - still spiking fevers, leukocytosis increasing today, will cont to monitor  - for LIZ Monday  - UTI: cont with aztreonam  - ID follow up    7. GEN  - family (mother, sister, cousin) have arrived at bedside.   - family updated on pt's condition  - prognosis remains guarded, pt remains critically ill    Critical Care Time: 45 minutes

## 2019-02-23 NOTE — PROGRESS NOTE ADULT - SUBJECTIVE AND OBJECTIVE BOX
HPI:  43F PMH of IV drug abuse (heroin, cocaine), alcohol abuse (daily vodka use), hx of pancreatitis, alcohol hepatitis, alcohol withdrawal, left frontoparietal subdural hematoma 2008 without need for neurosurgical intervention, bacterial vaginitis brought in by EMS for lethargy and cough. Pt was found by boyfriend who lives out of state laying in bed at home, lethargic but able to follow some commands. EMS reports that pt was attempting to detox from heroin with last known use 3 days prior to ED presentation. Boyfriend reports a hx of productive cough, a fall down a flight of stairs on 2/16. Labs with u tox + cocaine and opiates, serum lactate of 7.2, BUN/Cr 124/6.42, and WBC of 11.93. febrile with T 102.4, UA + nitrites. Hypotensive SBP 90s. Found to have MRSA bacteremia, PNA with cavitary lesions, septic emboli, UTI. Course with tachy-guerline syndrome, worsening lethargy and tachypnea requiring intubation, septic shock on pressors.       24 hr events:  febrile to 105 yesterday, fever today with Tm 102  on cooling blanket  midodrine/precedex/propfol d/madeline due to bradycardia to HR 40s  pt was placed on dopamine due to bradycardia but became tachycardic and with PVCs so dopamine d/madeline  remains on levophed  most recent blood cultures as of 2/22: still positive  started on methadone (pt's home dose)  tolerated weaning for several hours however not extubated due to copious thick ETT secretions and recent overnight hemodynamic instability  leukocytosis increased today    ## ROS:  [x] unable to obtain due to intubation      ## Labs:  CBC:                        9.4    19.60 )-----------( 80       ( 23 Feb 2019 04:35 )             31.7     Chem:  02-23    144  |  113<H>  |  24<H>  ----------------------------<  117<H>  4.1   |  22          |  0.57    Ca    7.0<L>      23 Feb 2019 04:35  Phos  3.7     02-23  Mg     1.6     02-23    TPro  6.4  /  Alb  1.4<L>  /  TBili  0.7  /  AST  35  /  ALT  23  /  AlkPhos  180<H>  02-23        Vancomycin Level, Trough -Pre 4th Dose, order if dosed q6/8/12h (02.22.19 @ 18:03)    Vancomycin Level, Trough: 11.4 ug/mL      Culture - Blood in AM (02.22.19 @ 10:08)    Specimen Source: .Blood    Culture Results:     Growth in aerobic bottle: Gram Positive Cocci in Clusters    Growth in anaerobic bottle: Gram Positive Cocci in Clusters    Culture - Blood (02.21.19 @ 23:18)    Specimen Source: .Blood    Culture Results: Growth in aerobic and anaerobic bottles: Staphylococcus aureus    Culture - Blood (02.21.19 @ 18:41)    Specimen Source: .Blood Received only Aerobic (Blue Top) Bottle.    Culture Results: Growth in aerobic bottle: Staphylococcus aureus    Culture - Blood (02.21.19 @ 10:29)    Specimen Source: .Blood    Culture Results: Growth in aerobic and anaerobic bottles: Methicillin resistant Staphylococcus aureus        Culture - Blood in AM (02.19.19 @ 09:45)    Specimen Source: .Blood    Culture Results: Growth in aerobic and anaerobic bottles: Methicillin resistant Staphylococcus aureus      Culture - Blood (02.18.19 @ 00:24)    Specimen Source: .Blood    Organism: Methicillin resistant Staphylococcus aureus        Culture - Urine (02.18.19 @ 01:00)    Specimen Source: .Urine    Culture Results: >100,000 CFU/ml Escherichia coli       ## Imaging:  CXR < from: Xray Chest 1 View AP/PA. (02.22.19 @ 07:39) >   Endotracheal tube tip 3 vertebralbodies above the nasrin   which may be advanced slightly. New small left pleural effusion and   unchanged bilateral scattered patchy opacities compared to 2/20/2018      ## Medications:  aztreonam  IVPB 2000 milliGRAM(s) IV Intermittent every 8 hours      vancomycin  IVPB 1250 milliGRAM(s) IV Intermittent every 12 hours    norepinephrine Infusion 0.05 MICROgram(s)/kG/Min IV Continuous <Continuous>      pantoprazole   Suspension 40 milliGRAM(s) Oral daily    acetaminophen   Tablet .. 650 milliGRAM(s) Oral every 6 hours PRN  fentaNYL   Infusion. 0.5 MICROgram(s)/kG/Hr IV Continuous <Continuous>  methadone    Tablet 20 milliGRAM(s) Oral daily      ## Vitals:  T(C): 37.8 (02-23-19 @ 15:06), Max: 38.9 (02-23-19 @ 11:00)  HR: 101 (02-23-19 @ 17:47) (55 - 104)  BP: 109/49 (02-23-19 @ 17:00) (94/39 - 153/35)  BP(mean): 63 (02-23-19 @ 17:00) (53 - 105)  RR: 25 (02-23-19 @ 17:00) (17 - 37)  SpO2: 97% (02-23-19 @ 17:47) (97% - 100%)  Vent: Mode: CPAP with PS, RR (patient): 21, FiO2: 35, PEEP: 5, PS: 5, PIP: 11        02-22 @ 07:01  -  02-23 @ 07:00  --------------------------------------------------------  IN: 3523.4 mL / OUT: 2475 mL / NET: 1048.4 mL    02-23 @ 07:01  -  02-23 @ 17:57  --------------------------------------------------------  IN: 723.6 mL / OUT: 1585 mL / NET: -861.4 mL          ## P/E:  Gen: lying comfortably in bed in no apparent distress, orally intubated  HEENT: PERRL, ETT/OGT in place  Resp: CTA B/L, no wheeze  CVS: S1S2 RRR, + systolic murmur   Abd: soft NT/ND +BS  Ext: mild bilateral upper and lower extremity edema  Neuro: awake and arousable on sedation vacation, follows commands    CENTRAL LINE: [ ] YES [x] NO  LOCATION:   DATE INSERTED:  REMOVE: [ ] YES [ ] NO      PAN: [x] YES [ ] NO    DATE INSERTED:  REMOVE:  [ ] YES [ ] NO      A-LINE:  [ ] YES [x] NO  LOCATION:   DATE INSERTED:  REMOVE:  [ ] YES [ ] NO  EXPLAIN:    GLOBAL ISSUE/BEST PRACTICE:  Analgesia: n/a  Sedation: fentanyl  HOB elevation: yes  Stress ulcer prophylaxis: protonix  VTE prophylaxis: bilateral compression devices due to thrombocytopenia   Oral Care: chlorhexidine   Glycemic control: n/a  Nutrition: vital AF OGT feeds    CODE STATUS: [x] full code  [ ] DNR  [ ] DNI  [ ] MOLST  Goals of care discussion: [x] yes

## 2019-02-24 LAB
ANION GAP SERPL CALC-SCNC: 6 MMOL/L — SIGNIFICANT CHANGE UP (ref 5–17)
APTT BLD: 35 SEC — SIGNIFICANT CHANGE UP (ref 27.5–36.3)
BUN SERPL-MCNC: 19 MG/DL — SIGNIFICANT CHANGE UP (ref 7–23)
CALCIUM SERPL-MCNC: 7.3 MG/DL — LOW (ref 8.5–10.1)
CHLORIDE SERPL-SCNC: 113 MMOL/L — HIGH (ref 96–108)
CO2 SERPL-SCNC: 25 MMOL/L — SIGNIFICANT CHANGE UP (ref 22–31)
CREAT SERPL-MCNC: 0.45 MG/DL — LOW (ref 0.5–1.3)
CULTURE RESULTS: SIGNIFICANT CHANGE UP
CULTURE RESULTS: SIGNIFICANT CHANGE UP
GLUCOSE SERPL-MCNC: 114 MG/DL — HIGH (ref 70–99)
GRAM STN FLD: SIGNIFICANT CHANGE UP
HCT VFR BLD CALC: 23.2 % — LOW (ref 34.5–45)
HCT VFR BLD CALC: 23.6 % — LOW (ref 34.5–45)
HGB BLD-MCNC: 7.3 G/DL — LOW (ref 11.5–15.5)
HGB BLD-MCNC: 7.3 G/DL — LOW (ref 11.5–15.5)
INR BLD: 1.17 RATIO — HIGH (ref 0.88–1.16)
MAGNESIUM SERPL-MCNC: 2.1 MG/DL — SIGNIFICANT CHANGE UP (ref 1.6–2.6)
MCHC RBC-ENTMCNC: 23.9 PG — LOW (ref 27–34)
MCHC RBC-ENTMCNC: 24.1 PG — LOW (ref 27–34)
MCHC RBC-ENTMCNC: 30.9 GM/DL — LOW (ref 32–36)
MCHC RBC-ENTMCNC: 31.5 GM/DL — LOW (ref 32–36)
MCV RBC AUTO: 76.6 FL — LOW (ref 80–100)
MCV RBC AUTO: 77.1 FL — LOW (ref 80–100)
NRBC # BLD: 0 /100 WBCS — SIGNIFICANT CHANGE UP (ref 0–0)
NRBC # BLD: 0 /100 WBCS — SIGNIFICANT CHANGE UP (ref 0–0)
PHOSPHATE SERPL-MCNC: 2.7 MG/DL — SIGNIFICANT CHANGE UP (ref 2.5–4.5)
PLATELET # BLD AUTO: 91 K/UL — LOW (ref 150–400)
PLATELET # BLD AUTO: 92 K/UL — LOW (ref 150–400)
POTASSIUM SERPL-MCNC: 3.5 MMOL/L — SIGNIFICANT CHANGE UP (ref 3.5–5.3)
POTASSIUM SERPL-SCNC: 3.5 MMOL/L — SIGNIFICANT CHANGE UP (ref 3.5–5.3)
PROTHROM AB SERPL-ACNC: 13.2 SEC — HIGH (ref 10–12.9)
RBC # BLD: 3.03 M/UL — LOW (ref 3.8–5.2)
RBC # BLD: 3.06 M/UL — LOW (ref 3.8–5.2)
RBC # FLD: 18 % — HIGH (ref 10.3–14.5)
RBC # FLD: 18 % — HIGH (ref 10.3–14.5)
SODIUM SERPL-SCNC: 144 MMOL/L — SIGNIFICANT CHANGE UP (ref 135–145)
SPECIMEN SOURCE: SIGNIFICANT CHANGE UP
VANCOMYCIN TROUGH SERPL-MCNC: 13.6 UG/ML — SIGNIFICANT CHANGE UP (ref 10–20)
WBC # BLD: 9.61 K/UL — SIGNIFICANT CHANGE UP (ref 3.8–10.5)
WBC # BLD: 9.98 K/UL — SIGNIFICANT CHANGE UP (ref 3.8–10.5)
WBC # FLD AUTO: 9.61 K/UL — SIGNIFICANT CHANGE UP (ref 3.8–10.5)
WBC # FLD AUTO: 9.98 K/UL — SIGNIFICANT CHANGE UP (ref 3.8–10.5)

## 2019-02-24 PROCEDURE — 71045 X-RAY EXAM CHEST 1 VIEW: CPT | Mod: 26

## 2019-02-24 PROCEDURE — 99291 CRITICAL CARE FIRST HOUR: CPT

## 2019-02-24 PROCEDURE — 99233 SBSQ HOSP IP/OBS HIGH 50: CPT

## 2019-02-24 RX ORDER — POTASSIUM CHLORIDE 20 MEQ
20 PACKET (EA) ORAL ONCE
Qty: 0 | Refills: 0 | Status: COMPLETED | OUTPATIENT
Start: 2019-02-24 | End: 2019-02-24

## 2019-02-24 RX ADMIN — CHLORHEXIDINE GLUCONATE 15 MILLILITER(S): 213 SOLUTION TOPICAL at 05:14

## 2019-02-24 RX ADMIN — Medication 650 MILLIGRAM(S): at 15:54

## 2019-02-24 RX ADMIN — Medication 650 MILLIGRAM(S): at 21:03

## 2019-02-24 RX ADMIN — Medication 1 PATCH: at 08:08

## 2019-02-24 RX ADMIN — CHLORHEXIDINE GLUCONATE 15 MILLILITER(S): 213 SOLUTION TOPICAL at 17:33

## 2019-02-24 RX ADMIN — Medication 100 MILLIGRAM(S): at 13:40

## 2019-02-24 RX ADMIN — Medication 1 PATCH: at 11:29

## 2019-02-24 RX ADMIN — Medication 20 MILLIEQUIVALENT(S): at 05:14

## 2019-02-24 RX ADMIN — METHADONE HYDROCHLORIDE 20 MILLIGRAM(S): 40 TABLET ORAL at 11:29

## 2019-02-24 RX ADMIN — Medication 650 MILLIGRAM(S): at 15:25

## 2019-02-24 RX ADMIN — Medication 100 MILLIGRAM(S): at 05:14

## 2019-02-24 RX ADMIN — Medication 166.67 MILLIGRAM(S): at 17:33

## 2019-02-24 RX ADMIN — FENTANYL CITRATE 3.56 MICROGRAM(S)/KG/HR: 50 INJECTION INTRAVENOUS at 03:16

## 2019-02-24 RX ADMIN — CHLORHEXIDINE GLUCONATE 1 APPLICATION(S): 213 SOLUTION TOPICAL at 00:00

## 2019-02-24 RX ADMIN — Medication 1 PATCH: at 11:28

## 2019-02-24 RX ADMIN — Medication 100 MILLIGRAM(S): at 21:01

## 2019-02-24 RX ADMIN — Medication 650 MILLIGRAM(S): at 06:26

## 2019-02-24 RX ADMIN — Medication 650 MILLIGRAM(S): at 22:00

## 2019-02-24 RX ADMIN — Medication 1 PATCH: at 19:15

## 2019-02-24 RX ADMIN — PANTOPRAZOLE SODIUM 40 MILLIGRAM(S): 20 TABLET, DELAYED RELEASE ORAL at 11:29

## 2019-02-24 RX ADMIN — Medication 166.67 MILLIGRAM(S): at 06:14

## 2019-02-24 RX ADMIN — Medication 650 MILLIGRAM(S): at 00:30

## 2019-02-24 NOTE — PROGRESS NOTE ADULT - ASSESSMENT
43F PMH IVDA, alcohol abuse, hx of pancreatitis, alcohol hepatitis, alcohol withdrawal, left frontoparietal subdural hematoma 2008 without need for neurosurgical intervention, bacterial vaginitis here with severe sepsis, septic shock, persistent MRSA bacteremia, septic emboli, PNA, acute hypoxic respiratory failure requiring intubation, UTI, ARF with ATN, lactic acidosis, acute metabolic encephelopathy, likely endocarditis. Newly diagnosed Hepatitis C. Now with tachy-guerline arrhythmias.        1. NEURO  - CT head without acute pathology  - initial encephalopathy and poor mental status in setting of sepsis and underlying bacteremia/PNA/UTI  - monitor for withdrawal symptoms: cont methadone  - daily sedation vacation: awake and responsive on sedation vacation      2. CV  - TTE without definitive valvular vegetation, however unable to clearly visualize tricuspid valve  - plan for LIZ monday to evaluate for endocarditis   - remains in septic shock on levophed: off dopamine as pt's bradycardia resolved but became tachycardic  - off midodrine, off precedex due to bradycardia  - in last 24 hours pt without any further bradycardia      3. PULM  - RLL PNA along with R cavitary lesions and multiple nodular lesions noted on CT chest likely due to septic emboli due to staph infection which can cause cavitary lesions  - cont mechanical ventilation  - daily wean as tolerates  - holding on extubation due to thick copious ETT secretions as well as for planned LIZ Monday      4. RENAL  - Cr now normalized  - ARF with ATN likely in setting of sepsis, hypovolemia, hypotension and with underlying UTI  - monitor electrolytes      5. HEME  - thrombocytopenia possibly due to sepsis as well as chronic alcohol abuse  - cont to monitor counts and for bleed  - noted on labs to become progressively more anemic, no evidence of bleeding  , monitor H/H    6. ID  - MRSA bacteremia with likely septic emboli to lungs with PNA and cavitary lesion  - vanco level 13 but this was not a vanco trough as vanco was just increased to 1250mg q12 hours yesterday evening,   - vanco trough to be checked before AM dose 2/25  - pt has yet to clear her bacteremia since admission, clinically has endocarditis  - still spiking fevers, leukocytosis improving today, will cont to monitor  - for LIZ Monday  - UTI: cont with aztreonam  - ID follow up    7. GI  - newly diagnosed Hep C  - outpatient treatment      8. GEN  - family (mother, sister, cousin, brother) at bedside.   - family updated on pt's condition  - prognosis remains guarded, pt remains critically ill    Critical Care Time: 45 minutes

## 2019-02-24 NOTE — PROVIDER CONTACT NOTE (CRITICAL VALUE NOTIFICATION) - TEST AND RESULT REPORTED:
Positive blood cultures from 2/21/19 Positive gram cocci in aerobic and anaerobic  bottles final report MRSA in blood. Also same result in 2nd bottle .

## 2019-02-24 NOTE — PROVIDER CONTACT NOTE (CRITICAL VALUE NOTIFICATION) - TEST AND RESULT REPORTED:
BC from 2/23/19 growth in the aerobic bottle for gram + cocci in clusters. 2/22/19 final report shows growth in the aerobic and anerobic bottle for MRSA. 2/21/19 growth in the aerobic and anerobic bottle for MRSA.

## 2019-02-24 NOTE — PROGRESS NOTE ADULT - SUBJECTIVE AND OBJECTIVE BOX
HPI:  42 Y/O female w/ PMHx of IV heroin abuse brought in by EMS for lethargy and cough. As per EMS, pt was found laying in bed at home, lethargic however able to follow some commands. EMS reports that pt was attempting to detox from heroin, and last known use was 3 days prior to ED presentation. As per ED, pts boyfriend reports a hx of + pt cough productive of white sputum and a fall down a flight of stairs 2/16. Labs showed serum lactate of 7.2, BUN/Cr 124/6.42, and WBC of of 11.93. Tmax 102.4, urine + for cocaine, opiates, nitrites and many bacteria. Intubated for resp protections. MRSA positive blood cultures. Spoke with team and obtained consent for LIZ for Monday.      REVIEW OF SYSTEMS:  Unable as pt intubated. Opens eyes and seems to respond by nodding yes/no to questions.      PHYSICAL EXAM:  Vital Signs Last 24 Hrs  T(C): 37.6 (24 Feb 2019 19:07), Max: 39 (23 Feb 2019 22:00)  T(F): 99.7 (24 Feb 2019 19:07), Max: 102.2 (23 Feb 2019 22:00)  HR: 94 (24 Feb 2019 19:00) (91 - 120)  BP: 107/63 (24 Feb 2019 19:00) (79/65 - 131/67)  BP(mean): 72 (24 Feb 2019 19:00) (55 - 80)  RR: 25 (24 Feb 2019 19:00) (19 - 32)  SpO2: 100% (24 Feb 2019 19:00) (97% - 100%)      Tele: SR  General:  Poor overall appearance, intubated on vent.  HEENT:  NC/AT, patent nares w/ pink mucosa, no thyromegaly, nodules, adenopathy, no JVD  Chest:  Full & symmetric excursion, no increased effort, breath sounds diminished throughout. No wheeze.  Cardiovascular:  Regular rhythm, S1, S2, 1/6 BEBO at Right base.  Abdomen:  Soft, non-tender, non-distended  Extremities: no edema.  Skin:  warm/dry  Neuro/Psych: unable to assess.    LABORATORY:                                   7.3    9.61  )-----------( 92       ( 24 Feb 2019 09:35 )             23.6     02-24    144  |  113<H>  |  19  ----------------------------<  114<H>  3.5   |  25  |  0.45<L>    Ca    7.3<L>      24 Feb 2019 03:29  Phos  2.7     02-24  Mg     2.1     02-24    TPro  6.4  /  Alb  1.4<L>  /  TBili  0.7  /  DBili  x   /  AST  35  /  ALT  23  /  AlkPhos  180<H>  02-23      Gram Stain:   Growth in aerobic bottle: Gram Positive Cocci in Clusters  Growth in anaerobic bottle: Gram Positive Cocci in Clusters (02.19.19 @ 09:45)      IMAGING:  ecg: SR, nonspecific ST T abnls.      < from: Xray Chest 1 View- PORTABLE-Urgent (02.20.19 @ 08:57) >  IMPRESSION:   Life supporting devices in appropriate position.  Mild improvement in lower right lung opacity.    < end of copied text >    < from: CT Abdomen and Pelvis No Cont (02.17.19 @ 23:18) >  Impression: No acute abdominal/pelvic pathology. Large stool burden.    < end of copied text >    < from: TTE Echo Doppler w/o Cont (02.18.19 @ 09:46) >     PHYSICIAN INTERPRETATION:  Left Ventricle: Normal left ventricular size and wall thicknesses, with   normal systolic function.  Left ventricular ejection fraction, by visual estimation, is 60 to 65%.  Right Ventricle: Normal right ventricular size and function.  Left Atrium: The left atrium is normal in size. Normal left atrial size.  Right Atrium: The right atrium is normal in size. Normal right atrial   size.  Pericardium: There is no evidence of pericardial effusion.  Mitral Valve: Structurally normal mitral valve, with normal leaflet   excursion. No evidence of mitral valve regurgitation is seen.  Tricuspid Valve: The tricuspid valve is not well seen. Trivial tricuspid   regurgitation is visualized. Estimated pulmonary artery systolic pressure   is 35.2 mmHg assuming a right atrial pressure of 5 mmHg, which is   consistent with mild pulmonary hypertension. Somewhat poorly visualized,   cannot rule out possibility of valvular vegetations. Suggest LIZ, if   clinically warranted, for further evaluation.  Aortic Valve: Normal trileaflet aortic valve with normal opening. Peak Av   velocity is 2.15 m/s, mean transaortic gradient equals 9.6 mmHg. The   aortic valve mean gradient is 9.6 mmHgconsistent with normally opening   aortic valve. No evidence of aortic valve regurgitation is seen.  Pulmonic Valve: Structurally normal pulmonic valve, with normal leaflet   excursion. Mild pulmonic valve regurgitation.  Aorta: The aortic root and ascending aorta are structurally normal, with   no evidence of dilitation.  Pulmonary Artery: The main pulmonary artery is normal in size.       Summary:   1. Left ventricular ejection fraction, by visual estimation, is 60 to   65%.   2. There is no left ventricular hypertrophy.   3. Trace tricuspid regurgitation.   4. Pulmonic valve regurgitation.   5. Estimated pulmonary artery systolic pressure is 35.2 mmHg assuming a   right atrial pressure of 5 mmHg, which is consistent with mild pulmonary   hypertension.    < end of copied text >    Tricuspid Valve: The tricuspid valve is not well seen. Trivial tricuspid   regurgitation is visualized. Estimated pulmonary artery systolic pressure   is 35.2 mmHg assuming a right atrial pressure of 5 mmHg, which is   MEDICATIONS  (STANDING):  chlorhexidine 0.12% Liquid 15 milliLiter(s) Oral Mucosa two times a day  chlorhexidine 4% Liquid 1 Application(s) Topical <User Schedule>  ciprofloxacin   IVPB 400 milliGRAM(s) IV Intermittent every 12 hours  ibuprofen  Tablet. 400 milliGRAM(s) Oral once  nicotine - 21 mG/24Hr(s) Patch 1 patch Transdermal daily  norepinephrine Infusion 0.05 MICROgram(s)/kG/Min (6.684 mL/Hr) IV Continuous <Continuous>  pantoprazole   Suspension 40 milliGRAM(s) Oral daily  potassium chloride   Powder 40 milliEquivalent(s) Oral every 4 hours  potassium phosphate IVPB 15 milliMole(s) IV Intermittent once  propofol Infusion 5 MICROgram(s)/kG/Min (2.139 mL/Hr) IV Continuous <Continuous>  vancomycin  IVPB 1000 milliGRAM(s) IV Intermittent every 12 hours  consistent with mild pulmonary hypertension. Somewhat poorly visualized,   cannot rule out possibility of valvular vegetations. Suggest LIZ, if   clinically warranted, for further evaluation.    ASSESSMENT:  42 Y/O female w/ PMHx of IV heroin abuse brought in by EMS for lethargy and cough. As per EMS, pt was found laying in bed at home, lethargic however able to follow some commands. EMS reports that pt was attempting to detox from heroin, and last known use was 3 days prior to ED presentation. As per ED, pts boyfriend reports a hx of + pt cough productive of white sputum and a fall down a flight of stairs 2/16. Labs showed serum lactate of 7.2, BUN/Cr 124/6.42, and WBC of of 11.93. Tmax 102.4, urine + for cocaine, opiates, nitrites and many bacteria. Intubated for resp protections. On pressors now. MRSA positive blood cultures.    PLAN:       To better clarify Tricuspid (and perhaps) other valvular vegetation (s), to have a LIZ under current intubated state to also assess for other higher risk valve status (annulus involvement, abscess formation etc.)  episodes of bradycardia/tachycardia may point to complications of vavlular veg.    Supportive ICU care managment.  abx/vent support.  Opiate withdrawal treatment as well.    MEDICATIONS  (STANDING):  aztreonam  IVPB 2000 milliGRAM(s) IV Intermittent every 8 hours  aztreonam  IVPB      chlorhexidine 0.12% Liquid 15 milliLiter(s) Oral Mucosa two times a day  chlorhexidine 4% Liquid 1 Application(s) Topical <User Schedule>  fentaNYL   Infusion. 0.5 MICROgram(s)/kG/Hr (3.565 mL/Hr) IV Continuous <Continuous>  methadone    Tablet 20 milliGRAM(s) Oral daily  nicotine - 21 mG/24Hr(s) Patch 1 patch Transdermal daily  norepinephrine Infusion 0.05 MICROgram(s)/kG/Min (6.684 mL/Hr) IV Continuous <Continuous>  pantoprazole   Suspension 40 milliGRAM(s) Oral daily  vancomycin  IVPB 1250 milliGRAM(s) IV Intermittent every 12 hours    Patricio Lawton MD, FACC, FASE, FASNC, FACP  Director, Heart Failure Services  [facility]  , Department of Cardiology  [facility]

## 2019-02-24 NOTE — PROGRESS NOTE ADULT - SUBJECTIVE AND OBJECTIVE BOX
HPI:  43F PMH of IV drug abuse (heroin, cocaine), alcohol abuse (daily vodka use), hx of pancreatitis, alcohol hepatitis, alcohol withdrawal, left frontoparietal subdural hematoma 2008 without need for neurosurgical intervention, bacterial vaginitis brought in by EMS for lethargy and cough. Pt was found by boyfriend who lives out of state laying in bed at home, lethargic but able to follow some commands. EMS reports that pt was attempting to detox from heroin with last known use 3 days prior to ED presentation. Boyfriend reports a hx of productive cough, a fall down a flight of stairs on 2/16. Labs with u tox + cocaine and opiates, serum lactate of 7.2, BUN/Cr 124/6.42, and WBC of 11.93. febrile with T 102.4, UA + nitrites. Hypotensive SBP 90s. Found to have MRSA bacteremia, PNA with cavitary lesions, septic emboli, UTI. Course with tachy-guerline syndrome, worsening lethargy and tachypnea requiring intubation, septic shock on pressors.       24 hr events:  still with fevers 101.9 yesterday night, 101.8 this morning  on cooling blanket  remains on levophed in shock state  most recent blood cultures as of 2/23: still positive  tolerating weaning however copious thick ETT secretions   leukocytosis improved today      ## ROS:  [x] unable to obtain due to intubation        ## Labs:  CBC:                        7.3    9.61  )-----------( 92       ( 24 Feb 2019 09:35 )             23.6     Chem:  02-24    144  |  113<H>  |  19  ----------------------------<  114<H>  3.5   |  25  |  0.45<L>    Ca    7.3<L>      24 Feb 2019 03:29  Phos  2.7     02-24  Mg     2.1     02-24    TPro  6.4  /  Alb  1.4<L>  /  TBili  0.7  /  DBili  x   /  AST  35  /  ALT  23  /  AlkPhos  180<H>  02-23    Coags:  PT/INR - ( 24 Feb 2019 03:29 )   PT: 13.2 sec;   INR: 1.17 ratio    PTT - ( 24 Feb 2019 03:29 )  PTT:35.0 sec    Vancomycin Level, (02.24.19 @ 03:29)    Vancomycin Level: 13.6 ug/mL      Culture - Blood in AM (02.23.19 @ 13:31)     Specimen Source: .Blood    Culture Results: Growth in aerobic bottle: Methicillin resistant Staphylococcus aureus      Culture - Blood in AM (02.22.19 @ 10:08)    Specimen Source: .Blood    Culture Results: Growth in aerobic and anaerobic bottles: Methicillin resistant    Staphylococcus aureus    Culture - Blood (02.21.19 @ 23:18)    Specimen Source: .Blood    Culture Results: Growth in aerobic and anaerobic bottles: Methicillin resistant    Staphylococcus aureus      ## Medications:  aztreonam  IVPB 2000 milliGRAM(s) IV Intermittent every 8 hours    vancomycin  IVPB 1250 milliGRAM(s) IV Intermittent every 12 hours    norepinephrine Infusion 0.05 MICROgram(s)/kG/Min IV Continuous <Continuous>    pantoprazole   Suspension 40 milliGRAM(s) Oral daily    acetaminophen   Tablet .. 650 milliGRAM(s) Oral every 6 hours PRN  fentaNYL   Infusion. 0.5 MICROgram(s)/kG/Hr IV Continuous <Continuous>  methadone    Tablet 20 milliGRAM(s) Oral daily      ## Vitals:  T(C): 37.7 (02-24-19 @ 23:40), Max: 38.8 (02-24-19 @ 11:00)  HR: 96 (02-25-19 @ 01:14) (91 - 111)  BP: 118/70 (02-25-19 @ 01:00) (79/65 - 131/67)  BP(mean): 79 (02-25-19 @ 01:00) (58 - 80)  RR: 26 (02-25-19 @ 01:00) (19 - 31)  SpO2: 100% (02-25-19 @ 01:14) (97% - 100%)  Vent: Mode: AC/ CMV (Assist Control/ Continuous Mandatory Ventilation), RR (machine): 15, RR (patient): 26, TV (machine): 350, FiO2: 40, PEEP: 5, PIP: 14        02-23 @ 07:01  -  02-24 @ 07:00  --------------------------------------------------------  IN: 2136.2 mL / OUT: 2510 mL / NET: -373.8 mL    02-24 @ 07:01  - 02-25 @ 01:16  --------------------------------------------------------  IN: 1300 mL / OUT: 1525 mL / NET: -225 mL          ## P/E:  Gen: lying comfortably in bed in no apparent distress, orally intubated  HEENT: PERRL, ETT/OGT in place  Resp: bilateral rhonchi and coarse breath sounds, no wheeze  CVS: S1S2 RRR, + systolic murmur   Abd: soft NT/ND +BS  Ext:  bilateral upper and lower extremity edema  Neuro: awake and arousable on sedation vacation, follows commands    CENTRAL LINE: [ ] YES [x] NO  LOCATION:   DATE INSERTED:  REMOVE: [ ] YES [ ] NO      PAN: [x] YES [ ] NO    DATE INSERTED:  REMOVE:  [x] YES [ ] NO      A-LINE:  [ ] YES [x] NO  LOCATION:   DATE INSERTED:  REMOVE:  [ ] YES [ ] NO  EXPLAIN:    GLOBAL ISSUE/BEST PRACTICE:  Analgesia: n/a  Sedation: fentanyl  HOB elevation: yes  Stress ulcer prophylaxis: protonix  VTE prophylaxis: bilateral compression devices due to thrombocytopenia   Oral Care: chlorhexidine   Glycemic control: n/a  Nutrition: vital AF OGT feeds    CODE STATUS: [x] full code  [ ] DNR  [ ] DNI  [ ] [facility]  Goals of care discussion: [x] yes

## 2019-02-24 NOTE — PROVIDER CONTACT NOTE (CRITICAL VALUE NOTIFICATION) - TEST AND RESULT REPORTED:
Blood cultures from 2/23/19. Positive for growth ,Gram positive cocci in clusters noted in both bottles.

## 2019-02-25 LAB
ANION GAP SERPL CALC-SCNC: 8 MMOL/L — SIGNIFICANT CHANGE UP (ref 5–17)
BUN SERPL-MCNC: 14 MG/DL — SIGNIFICANT CHANGE UP (ref 7–23)
CALCIUM SERPL-MCNC: 7.2 MG/DL — LOW (ref 8.5–10.1)
CHLORIDE SERPL-SCNC: 109 MMOL/L — HIGH (ref 96–108)
CO2 SERPL-SCNC: 27 MMOL/L — SIGNIFICANT CHANGE UP (ref 22–31)
CREAT SERPL-MCNC: 0.37 MG/DL — LOW (ref 0.5–1.3)
CULTURE RESULTS: SIGNIFICANT CHANGE UP
GLUCOSE SERPL-MCNC: 111 MG/DL — HIGH (ref 70–99)
GRAM STN FLD: SIGNIFICANT CHANGE UP
HCT VFR BLD CALC: 24.3 % — LOW (ref 34.5–45)
HGB BLD-MCNC: 7.4 G/DL — LOW (ref 11.5–15.5)
MAGNESIUM SERPL-MCNC: 1.8 MG/DL — SIGNIFICANT CHANGE UP (ref 1.6–2.6)
MCHC RBC-ENTMCNC: 23.6 PG — LOW (ref 27–34)
MCHC RBC-ENTMCNC: 30.5 GM/DL — LOW (ref 32–36)
MCV RBC AUTO: 77.6 FL — LOW (ref 80–100)
NRBC # BLD: 0 /100 WBCS — SIGNIFICANT CHANGE UP (ref 0–0)
PHOSPHATE SERPL-MCNC: 2.6 MG/DL — SIGNIFICANT CHANGE UP (ref 2.5–4.5)
PLATELET # BLD AUTO: 125 K/UL — LOW (ref 150–400)
POTASSIUM SERPL-MCNC: 3.4 MMOL/L — LOW (ref 3.5–5.3)
POTASSIUM SERPL-SCNC: 3.4 MMOL/L — LOW (ref 3.5–5.3)
RBC # BLD: 3.13 M/UL — LOW (ref 3.8–5.2)
RBC # FLD: 18.1 % — HIGH (ref 10.3–14.5)
SODIUM SERPL-SCNC: 144 MMOL/L — SIGNIFICANT CHANGE UP (ref 135–145)
SPECIMEN SOURCE: SIGNIFICANT CHANGE UP
VANCOMYCIN TROUGH SERPL-MCNC: 11.5 UG/ML — SIGNIFICANT CHANGE UP (ref 10–20)
WBC # BLD: 7.76 K/UL — SIGNIFICANT CHANGE UP (ref 3.8–10.5)
WBC # FLD AUTO: 7.76 K/UL — SIGNIFICANT CHANGE UP (ref 3.8–10.5)

## 2019-02-25 PROCEDURE — 99291 CRITICAL CARE FIRST HOUR: CPT

## 2019-02-25 PROCEDURE — 99233 SBSQ HOSP IP/OBS HIGH 50: CPT

## 2019-02-25 PROCEDURE — 93312 ECHO TRANSESOPHAGEAL: CPT | Mod: 26

## 2019-02-25 PROCEDURE — 99232 SBSQ HOSP IP/OBS MODERATE 35: CPT | Mod: 25

## 2019-02-25 RX ORDER — PROPOFOL 10 MG/ML
10 INJECTION, EMULSION INTRAVENOUS
Qty: 1000 | Refills: 0 | Status: DISCONTINUED | OUTPATIENT
Start: 2019-02-25 | End: 2019-02-25

## 2019-02-25 RX ORDER — ACETAMINOPHEN 500 MG
1000 TABLET ORAL ONCE
Qty: 0 | Refills: 0 | Status: COMPLETED | OUTPATIENT
Start: 2019-02-25 | End: 2019-02-25

## 2019-02-25 RX ORDER — DAPTOMYCIN 500 MG/10ML
INJECTION, POWDER, LYOPHILIZED, FOR SOLUTION INTRAVENOUS
Qty: 0 | Refills: 0 | Status: DISCONTINUED | OUTPATIENT
Start: 2019-02-25 | End: 2019-02-25

## 2019-02-25 RX ORDER — POTASSIUM CHLORIDE 20 MEQ
40 PACKET (EA) ORAL ONCE
Qty: 0 | Refills: 0 | Status: COMPLETED | OUTPATIENT
Start: 2019-02-25 | End: 2019-02-25

## 2019-02-25 RX ORDER — VANCOMYCIN HCL 1 G
1000 VIAL (EA) INTRAVENOUS EVERY 8 HOURS
Qty: 0 | Refills: 0 | Status: DISCONTINUED | OUTPATIENT
Start: 2019-02-25 | End: 2019-02-27

## 2019-02-25 RX ADMIN — Medication 650 MILLIGRAM(S): at 15:49

## 2019-02-25 RX ADMIN — Medication 1 PATCH: at 15:19

## 2019-02-25 RX ADMIN — Medication 650 MILLIGRAM(S): at 06:00

## 2019-02-25 RX ADMIN — CHLORHEXIDINE GLUCONATE 15 MILLILITER(S): 213 SOLUTION TOPICAL at 05:12

## 2019-02-25 RX ADMIN — METHADONE HYDROCHLORIDE 20 MILLIGRAM(S): 40 TABLET ORAL at 15:21

## 2019-02-25 RX ADMIN — Medication 40 MILLIEQUIVALENT(S): at 05:43

## 2019-02-25 RX ADMIN — Medication 1000 MILLIGRAM(S): at 22:00

## 2019-02-25 RX ADMIN — Medication 100 MILLIGRAM(S): at 05:12

## 2019-02-25 RX ADMIN — Medication 650 MILLIGRAM(S): at 05:12

## 2019-02-25 RX ADMIN — Medication 250 MILLIGRAM(S): at 21:22

## 2019-02-25 RX ADMIN — Medication 166.67 MILLIGRAM(S): at 06:51

## 2019-02-25 RX ADMIN — Medication 400 MILLIGRAM(S): at 21:41

## 2019-02-25 RX ADMIN — CHLORHEXIDINE GLUCONATE 1 APPLICATION(S): 213 SOLUTION TOPICAL at 01:30

## 2019-02-25 RX ADMIN — Medication 250 MILLIGRAM(S): at 15:31

## 2019-02-25 RX ADMIN — Medication 1 PATCH: at 19:39

## 2019-02-25 RX ADMIN — PANTOPRAZOLE SODIUM 40 MILLIGRAM(S): 20 TABLET, DELAYED RELEASE ORAL at 15:21

## 2019-02-25 RX ADMIN — Medication 1 PATCH: at 12:00

## 2019-02-25 RX ADMIN — Medication 650 MILLIGRAM(S): at 15:19

## 2019-02-25 NOTE — CHART NOTE - NSCHARTNOTEFT_GEN_A_CORE
Assessment: Pt seen for follow-up continues Critical Care. Pt with polysubstance abuse opiates, heroin & ETOH remains intubated on mechanical vent support. Pt currently NPO on C-pap due to weaning & pending LIZ today. Noted hypernatremia resolved with s/p free water 250ml q6h (2/21-2/22) & s/p IVF bolus total 4850ml (2/17-2/22).    Factors impacting intake: [ ] none [ ] nausea  [ ] vomiting [ ] diarrhea [ ] constipation  [ ]chewing problems [ ] swallowing issues  [x ] other: Acute respiratory failure due to sedation due to withdrawal of polysubstance abuse.     Diet Prescription: Diet, NPO with Tube Feed:   Tube Feeding Modality: Orogastric  Vital AF  Total Volume for 24 Hours (mL): 1200  Continuous  Starting Tube Feed Rate {mL per Hour}: 20  Increase Tube Feed Rate by (mL): 10     Every 6 hours  Until Goal Tube Feed Rate (mL per Hour): 50  Tube Feed Duration (in Hours): 24  Tube Feed Start Time: 11:00 (02-23-19 @ 10:39)  Diet, NPO after Midnight:      NPO Start Date: 24-Feb-2019,   NPO Start Time: 23:59 (02-24-19 @ 19:06)    Intake: TF provides 1200ml, 1440kcal & 90gm protein. Pt currently NPO today 2/25 pending LIZ. Pt received total volume 605ml x 24 hours. Pt received 48% energy & 75% protein needs x 24hours. Noted pt received <50% energy needs x 8 days of hospitalization.  Residuals=0-20ml & last BM x 1(2/25).     Current Weight:  Wt=72.5kg(2/24/19), Wt=71.6kg(2/20/19)  % Weight Change  1.5kg wt gain x 4 days((2%) x 4 days due to increased edema    Physical appearance: +3 generalized edema; Nutrition focused physical exam conducted; Subcutaneous fat loss: [Mild ] Orbital fat pads region, [unable ]Buccal fat region, [mild ]Triceps region,  [WNL ]Ribs region.  Muscle wasting: [Mild ]Temples region, [unable ]Clavicle region, [WNL ]Shoulder region, [Unable ]Scapula region, [edematous ]Interosseous region,  [edematous ]thigh region, [unable ]Calf region    Pertinent Medications: MEDICATIONS  (STANDING):  chlorhexidine 0.12% Liquid 15 milliLiter(s) Oral Mucosa two times a day  chlorhexidine 4% Liquid 1 Application(s) Topical <User Schedule>  fentaNYL   Infusion. 0.5 MICROgram(s)/kG/Hr (3.565 mL/Hr) IV Continuous <Continuous>  methadone    Tablet 20 milliGRAM(s) Oral daily  nicotine - 21 mG/24Hr(s) Patch 1 patch Transdermal daily  norepinephrine Infusion 0.05 MICROgram(s)/kG/Min (6.684 mL/Hr) IV Continuous <Continuous>  pantoprazole   Suspension 40 milliGRAM(s) Oral daily  vancomycin  IVPB 1000 milliGRAM(s) IV Intermittent every 8 hours    MEDICATIONS  (PRN):  acetaminophen   Tablet .. 650 milliGRAM(s) Oral every 6 hours PRN Temp greater or equal to 38C (100.4F)    Pertinent Labs: 02-25 Na 144 mmol/L Glu 111 mg/dL<H> K+ 3.4 mmol/L<L> Cr 0.37 mg/dL<L> BUN 14 mg/dL Phos 2.6 mg/dL Alb n/a   PAB n/a   Hgb 7.4 g/dL<L> Hct 24.3 %<L> HgA1C n/a    Glucose, Serum: 111 mg/dL <H>   24Hr FS:  Skin: Left heel stage I    Estimated Needs:   [ ] no change since previous assessment   [x ] recalculated:  Energy needs [id number](30-35kcal/kg IBW) & protein needs (73-85gm/kg IBW) & fluid needs 1800-2100ml (30-35ml/kg IBW)    Previous Nutrition Diagnosis:   [ ] Inadequate Energy Intake [ ]Inadequate Oral Intake [ ] Excessive Energy Intake [x] Inadequate energy intake  [ ] Underweight [ ] Increased Nutrient Needs [ ] Overweight/Obesity   [ ] Altered GI Function [ ] Unintended Weight Loss [ ] Food & Nutrition Related Knowledge Deficit [ ] severe Malnutrition [ ] moderate malnutrition    Nutrition Diagnosis is [x ] ongoing  [ ] resolved  [ ] improved  [ ] not applicable   Previous Goal: Pt to meet >75% energy & protein needs via TF if pt unable to resume po; not met    New Nutrition Diagnosis: [ ] Inadequate Energy Intake [ ]Inadequate Oral Intake [ ] Excessive Energy Intake   [ ] Underweight [ ] Increased Nutrient Needs [ ] Overweight/Obesity   [ ] Altered GI Function [ ] Unintended Weight Loss [ ] Food & Nutrition Related Knowledge Deficit [x ] Acute moderate Malnutrition     Related to: Inadequate energy & protein intake related to acute respiratory failure & severe sepsis with septic shock due to MRSA & s/p EFRAIN    As evidenced by: Mild fat loss & <50% nutritional needs x 8 days    Goal: Pt to meet >75% energy & protein needs via TF if pt unable to resume po      Interventions:   Recommend  [ ] Continue:  [ ] Change Diet To:  [x ] Nutrition Supplement: Increase OGT feeding Vital AF 1.2 @ 55ml/df=5715aw, 1584kcal & 99gm protein  [ ] Nutrition Support:  [x ] Other: Bedside dysphagia screening to assess pt ability to resume po feeding    Monitoring and Evaluation:   [ ] PO intake [ x ] Tolerance to diet prescription [ x ] weights [ x ] labs[ x ] follow up per protocol  [ ] other: Assessment: Pt seen for follow-up continues Critical Care. Pt with polysubstance abuse opiates, heroin & ETOH remains intubated on mechanical vent support. Pt currently NPO on C-pap due to weaning & pending LIZ today. Noted hypernatremia resolved with s/p free water 250ml q6h (2/21-2/22) & s/p IVF bolus total 4850ml (2/17-2/22).    Factors impacting intake: [ ] none [ ] nausea  [ ] vomiting [ ] diarrhea [ ] constipation  [ ]chewing problems [ ] swallowing issues  [x ] other: Acute respiratory failure due to sedation due to withdrawal of polysubstance abuse.     Diet Prescription: Diet, NPO with Tube Feed:   Tube Feeding Modality: Orogastric  Vital AF  Total Volume for 24 Hours (mL): 1200  Continuous  Starting Tube Feed Rate {mL per Hour}: 20  Increase Tube Feed Rate by (mL): 10     Every 6 hours  Until Goal Tube Feed Rate (mL per Hour): 50  Tube Feed Duration (in Hours): 24  Tube Feed Start Time: 11:00 (02-23-19 @ 10:39)  Diet, NPO after Midnight:      NPO Start Date: 24-Feb-2019,   NPO Start Time: 23:59 (02-24-19 @ 19:06)    Intake: TF provides 1200ml, 1440kcal & 90gm protein. Pt currently NPO today 2/25 pending LIZ. Pt received total volume 605ml x 24 hours. Pt received 48% energy & 75% protein needs x 24hours. Noted pt received <50% energy needs x 8 days of hospitalization.  Residuals=0-20ml & last BM x 1(2/25).     Current Weight:  Wt=72.5kg(2/24/19), Wt=71.6kg(2/20/19)  % Weight Change  1.5kg wt gain x 4 days((2%) x 4 days due to increased edema    Physical appearance: +3 generalized edema; Nutrition focused physical exam conducted; Subcutaneous fat loss: [Mild ] Orbital fat pads region, [unable ]Buccal fat region, [mild ]Triceps region,  [WNL ]Ribs region.  Muscle wasting: [Mild ]Temples region, [unable ]Clavicle region, [WNL ]Shoulder region, [Unable ]Scapula region, [edematous ]Interosseous region,  [edematous ]thigh region, [unable ]Calf region    Pertinent Medications: MEDICATIONS  (STANDING):  chlorhexidine 0.12% Liquid 15 milliLiter(s) Oral Mucosa two times a day  chlorhexidine 4% Liquid 1 Application(s) Topical <User Schedule>  fentaNYL   Infusion. 0.5 MICROgram(s)/kG/Hr (3.565 mL/Hr) IV Continuous <Continuous>  methadone    Tablet 20 milliGRAM(s) Oral daily  nicotine - 21 mG/24Hr(s) Patch 1 patch Transdermal daily  norepinephrine Infusion 0.05 MICROgram(s)/kG/Min (6.684 mL/Hr) IV Continuous <Continuous>  pantoprazole   Suspension 40 milliGRAM(s) Oral daily  vancomycin  IVPB 1000 milliGRAM(s) IV Intermittent every 8 hours    MEDICATIONS  (PRN):  acetaminophen   Tablet .. 650 milliGRAM(s) Oral every 6 hours PRN Temp greater or equal to 38C (100.4F)    Pertinent Labs: 02-25 Na 144 mmol/L Glu 111 mg/dL<H> K+ 3.4 mmol/L<L> Cr 0.37 mg/dL<L> BUN 14 mg/dL Phos 2.6 mg/dL Alb n/a   PAB n/a   Hgb 7.4 g/dL<L> Hct 24.3 %<L> HgA1C n/a    Glucose, Serum: 111 mg/dL <H>   24Hr FS:  Skin: Left heel stage I    Estimated Needs:   [ ] no change since previous assessment   [x ] recalculated:  Energy needs 3612=3578lwwe(30-35kcal/kg IBW) & protein needs (73-85gm/kg IBW) & fluid needs 1800-2100ml (30-35ml/kg IBW)    Previous Nutrition Diagnosis:   [ ] Inadequate Energy Intake [ ]Inadequate Oral Intake [ ] Excessive Energy Intake [x] Inadequate energy intake  [ ] Underweight [ ] Increased Nutrient Needs [ ] Overweight/Obesity   [ ] Altered GI Function [ ] Unintended Weight Loss [ ] Food & Nutrition Related Knowledge Deficit [ ] severe Malnutrition [ ] moderate malnutrition    Nutrition Diagnosis is [x ] ongoing  [ ] resolved  [ ] improved  [ ] not applicable   Previous Goal: Pt to meet >75% energy & protein needs via TF if pt unable to resume po; not met    New Nutrition Diagnosis: [ ] Inadequate Energy Intake [ ]Inadequate Oral Intake [ ] Excessive Energy Intake   [ ] Underweight [ ] Increased Nutrient Needs [ ] Overweight/Obesity   [ ] Altered GI Function [ ] Unintended Weight Loss [ ] Food & Nutrition Related Knowledge Deficit [x ] Acute moderate Malnutrition     Related to: Inadequate energy & protein intake related to acute respiratory failure & severe sepsis with septic shock due to MRSA & s/p EFRAIN    As evidenced by: Mild fat loss & <50% nutritional needs x 8 days    Goal: Pt to meet >75% energy & protein needs via TF if pt unable to resume po      Interventions:   Recommend  [ ] Continue:  [ ] Change Diet To:  [x ] Nutrition Supplement: Increase OGT feeding Vital AF 1.2 @ 55ml/tx=7331ts, 1584kcal & 99gm protein  [ ] Nutrition Support:  [x ] Other: Bedside dysphagia screening when medically feasible to assess pt ability to resume po feeding    Monitoring and Evaluation:   [ ] PO intake [ x ] Tolerance to diet prescription [ x ] weights [ x ] labs[ x ] follow up per protocol  [ ] other: Assessment: Pt seen for follow-up continues Critical Care. Pt with polysubstance abuse opiates, heroin & ETOH remains intubated on mechanical vent support. Pt currently NPO on C-pap due to weaning & pending LIZ today. Noted hypernatremia resolved with s/p free water 250ml q6h (2/21-2/22) & s/p IVF bolus total 4850ml (2/17-2/22).    Factors impacting intake: [ ] none [ ] nausea  [ ] vomiting [ ] diarrhea [ ] constipation  [ ]chewing problems [ ] swallowing issues  [x ] other: Acute respiratory failure due to sedation due to withdrawal of polysubstance abuse.     Diet Prescription: Diet, NPO with Tube Feed:   Tube Feeding Modality: Orogastric  Vital AF  Total Volume for 24 Hours (mL): 1200  Continuous  Starting Tube Feed Rate {mL per Hour}: 20  Increase Tube Feed Rate by (mL): 10     Every 6 hours  Until Goal Tube Feed Rate (mL per Hour): 50  Tube Feed Duration (in Hours): 24  Tube Feed Start Time: 11:00 (02-23-19 @ 10:39)  Diet, NPO after Midnight:      NPO Start Date: 24-Feb-2019,   NPO Start Time: 23:59 (02-24-19 @ 19:06)    Intake: TF provides 1200ml, 1440kcal & 90gm protein. Pt currently NPO today 2/25 pending LIZ. Pt received total volume 605ml x 24 hours. Pt received 48% energy & 75% protein needs x 24hours. Noted pt received <50% energy needs x 8 days of hospitalization.  Residuals=0-20ml & last BM x 1(2/25).     Current Weight:  Wt=72.5kg(2/24/19), Wt=71.6kg(2/20/19)  % Weight Change  1.5kg wt gain x 4 days((2%) x 4 days due to increased edema    Physical appearance: +3 generalized edema; Nutrition focused physical exam conducted; Subcutaneous fat loss: [Mild ] Orbital fat pads region, [unable ]Buccal fat region, [mild ]Triceps region,  [WNL ]Ribs region.  Muscle wasting: [Mild ]Temples region, [unable ]Clavicle region, [WNL ]Shoulder region, [Unable ]Scapula region, [edematous ]Interosseous region,  [edematous ]thigh region, [unable ]Calf region    Pertinent Medications: MEDICATIONS  (STANDING):  chlorhexidine 0.12% Liquid 15 milliLiter(s) Oral Mucosa two times a day  chlorhexidine 4% Liquid 1 Application(s) Topical <User Schedule>  fentaNYL   Infusion. 0.5 MICROgram(s)/kG/Hr (3.565 mL/Hr) IV Continuous <Continuous>  methadone    Tablet 20 milliGRAM(s) Oral daily  nicotine - 21 mG/24Hr(s) Patch 1 patch Transdermal daily  norepinephrine Infusion 0.05 MICROgram(s)/kG/Min (6.684 mL/Hr) IV Continuous <Continuous>  pantoprazole   Suspension 40 milliGRAM(s) Oral daily  vancomycin  IVPB 1000 milliGRAM(s) IV Intermittent every 8 hours    MEDICATIONS  (PRN):  acetaminophen   Tablet .. 650 milliGRAM(s) Oral every 6 hours PRN Temp greater or equal to 38C (100.4F)    Pertinent Labs: 02-25 Na 144 mmol/L Glu 111 mg/dL<H> K+ 3.4 mmol/L<L> Cr 0.37 mg/dL<L> BUN 14 mg/dL Phos 2.6 mg/dL Alb n/a   PAB n/a   Hgb 7.4 g/dL<L> Hct 24.3 %<L> HgA1C n/a    Glucose, Serum: 111 mg/dL <H>   24Hr FS:  Skin: Left heel stage I    Estimated Needs:   [ ] no change since previous assessment   [x ] recalculated:  Energy needs 1800-2100kcal(30-35kcal/kg IBW) & protein needs 73-85gm (1.2-1.4gm/kg IBW) & fluid needs 1800-2100ml (30-35ml/kg IBW)    Previous Nutrition Diagnosis:   [ ] Inadequate Energy Intake [ ]Inadequate Oral Intake [ ] Excessive Energy Intake [x] Inadequate energy intake  [ ] Underweight [ ] Increased Nutrient Needs [ ] Overweight/Obesity   [ ] Altered GI Function [ ] Unintended Weight Loss [ ] Food & Nutrition Related Knowledge Deficit [ ] severe Malnutrition [ ] moderate malnutrition    Nutrition Diagnosis is [x ] ongoing  [ ] resolved  [ ] improved  [ ] not applicable   Previous Goal: Pt to meet >75% energy & protein needs via TF if pt unable to resume po; not met    New Nutrition Diagnosis: [ ] Inadequate Energy Intake [ ]Inadequate Oral Intake [ ] Excessive Energy Intake   [ ] Underweight [ ] Increased Nutrient Needs [ ] Overweight/Obesity   [ ] Altered GI Function [ ] Unintended Weight Loss [ ] Food & Nutrition Related Knowledge Deficit [x ] Acute moderate Malnutrition     Related to: Inadequate energy & protein intake related to acute respiratory failure & severe sepsis with septic shock due to MRSA & s/p EFRAIN    As evidenced by: Mild fat loss & <50% nutritional needs x 8 days    Goal: Pt to meet >75% energy & protein needs via TF if pt unable to resume po      Interventions:   Recommend  [ ] Continue:  [ ] Change Diet To:  [x ] Nutrition Supplement: Increase OGT feeding Vital AF 1.2 @ 55ml/zo=7774bx, 1584kcal & 99gm protein  [ ] Nutrition Support:  [x ] Other: Bedside dysphagia screening when medically feasible to assess pt ability to resume po feeding    Monitoring and Evaluation:   [ ] PO intake [ x ] Tolerance to diet prescription [ x ] weights [ x ] labs[ x ] follow up per protocol  [ ] other:

## 2019-02-25 NOTE — PROGRESS NOTE ADULT - ASSESSMENT
44 Y/O female w/ PMHx of IV heroin abuse. Intubated for resp protection. Febrile, + MRSA on multiple blood cultures.    PLAN:       LIZ to better clarify Tricuspid (and perhaps) other valvular vegetation (s),;  episodes of bradycardia/tachycardia may point to complications of vavlular veg.  Keep off feeds until LIZ today.

## 2019-02-25 NOTE — PROGRESS NOTE ADULT - SUBJECTIVE AND OBJECTIVE BOX
HPI:  42 Y/O female w/ PMHx of IV heroin abuse.  BIBEMS for lethargy and cough. Pt found laying in bed at home, lethargic however able to follow some commands. EMS reports that pt was attempting to detox from heroin, and last known use was 3 days prior to ED presentation. As per ED, pts boyfriend reports a hx of + pt cough productive of white sputum and a fall down a flight of stairs on 2/16. Labs showed serum lactate of 7.2, BUN/Cr 124/6.42, and WBC of of 11.93. Tmax 102.4, urine + for cocaine, opiates, nitrites and many bacteria. Intubated for resp protections. MRSA positive blood cultures. Scheduled for LIZ later today.      REVIEW OF SYSTEMS:  Unable as pt intubated. Opens eyes and seems to respond by nodding yes/no to questions.      PHYSICAL EXAM:  ICU Vital Signs Last 24 Hrs  T(C): 37.9 (25 Feb 2019 06:00), Max: 38.8 (24 Feb 2019 11:00)  T(F): 100.3 (25 Feb 2019 06:00), Max: 101.8 (24 Feb 2019 11:00)  HR: 102 (25 Feb 2019 06:30) (92 - 118)  BP: 124/68 (25 Feb 2019 06:00) (100/53 - 146/83)  BP(mean): 78 (25 Feb 2019 06:00) (64 - 94)  ABP: --  ABP(mean): --  RR: 26 (25 Feb 2019 06:00) (19 - 31)  SpO2: 61% (25 Feb 2019 06:30) (61% - 100%)      Tele: SR  General:  Poor overall appearance, intubated on vent.  HEENT:  NC/AT, patent nares w/ pink mucosa, no thyromegaly, nodules, adenopathy, no JVD  Chest:  Full & symmetric excursion, no increased effort, breath sounds diminished throughout. No wheeze.  Cardiovascular:  Regular rhythm, S1, S2, 1/6 BEBO at Right base.  Abdomen:  Soft, non-tender, non-distended  Extremities: no edema.  Skin:  warm/dry  Neuro/Psych: unable to assess.    LABORATORY:                                   7.3    9.61  )-----------( 92       ( 24 Feb 2019 09:35 )             23.6     02-24    144  |  113<H>  |  19  ----------------------------<  114<H>  3.5   |  25  |  0.45<L>    Ca    7.3<L>      24 Feb 2019 03:29  Phos  2.7     02-24  Mg     2.1     02-24    TPro  6.4  /  Alb  1.4<L>  /  TBili  0.7  /  DBili  x   /  AST  35  /  ALT  23  /  AlkPhos  180<H>  02-23      Gram Stain:   Growth in aerobic bottle: Gram Positive Cocci in Clusters  Growth in anaerobic bottle: Gram Positive Cocci in Clusters (02.19.19 @ 09:45)      IMAGING:  ecg: SR, nonspecific ST T abnls.      < from: Xray Chest 1 View- PORTABLE-Urgent (02.20.19 @ 08:57) >  IMPRESSION:   Life supporting devices in appropriate position.  Mild improvement in lower right lung opacity.    < end of copied text >    < from: CT Abdomen and Pelvis No Cont (02.17.19 @ 23:18) >  Impression: No acute abdominal/pelvic pathology. Large stool burden.    < end of copied text >    < from: TTE Echo Doppler w/o Cont (02.18.19 @ 09:46) >     PHYSICIAN INTERPRETATION:  Left Ventricle: Normal left ventricular size and wall thicknesses, with normal systolic function.  Left ventricular ejection fraction, by visual estimation, is 60 to 65%.  Right Ventricle: Normal right ventricular size and function.  Left Atrium: The left atrium is normal in size. Normal left atrial size.  Right Atrium: The right atrium is normal in size. Normal right atrial size.  Pericardium: There is no evidence of pericardial effusion.  Mitral Valve: Structurally normal mitral valve, with normal leaflet excursion. No evidence of mitral valve regurgitation is seen.  Tricuspid Valve: The tricuspid valve is not well seen. Trivial tricuspid regurgitation is visualized. Estimated pulmonary artery systolic pressure is 35.2 mmHg assuming a right atrial pressure of 5 mmHg, which is consistent with mild pulmonary hypertension. Somewhat poorly visualized, cannot rule out possibility of valvular vegetations. Suggest LIZ, if clinically warranted, for further evaluation.  Aortic Valve: Normal trileaflet aortic valve with normal opening. Peak Av velocity is 2.15 m/s, mean transaortic gradient equals 9.6 mmHg. The aortic valve mean gradient is 9.6 mmHg consistent with normally opening aortic valve. No evidence of aortic valve regurgitation is seen.  Pulmonic Valve: Structurally normal pulmonic valve, with normal leaflet excursion. Mild pulmonic valve regurgitation.  Aorta: The aortic root and ascending aorta are structurally normal, with no evidence of dilatation  Pulmonary Artery: The main pulmonary artery is normal in size.  Tricuspid Valve: The tricuspid valve is not well seen. Trivial tricuspid   regurgitation is visualized. Estimated pulmonary artery systolic pressure   is 35.2 mmHg consistent with mild pulmonary hypertension. Somewhat poorly visualized, cannot rule out possibility of valvular vegetations. Suggest LIZ, if   clinically warranted, for further evaluation.          Summary:   1. Left ventricular ejection fraction, by visual estimation, is 60 to 65%.   2. There is no left ventricular hypertrophy.   3. Trace tricuspid regurgitation.   4. Pulmonic valve regurgitation.   5. Estimated pulmonary artery systolic pressure is 35.2 mmHg assuming a right atrial pressure of 5 mmHg, which is consistent with mild pulmonary hypertension.    < end of copied text >      MEDICATIONS  (STANDING):  chlorhexidine 0.12% Liquid 15 milliLiter(s) Oral Mucosa two times a day  chlorhexidine 4% Liquid 1 Application(s) Topical <User Schedule>  ciprofloxacin   IVPB 400 milliGRAM(s) IV Intermittent every 12 hours  ibuprofen  Tablet. 400 milliGRAM(s) Oral once  nicotine - 21 mG/24Hr(s) Patch 1 patch Transdermal daily  norepinephrine Infusion 0.05 MICROgram(s)/kG/Min (6.684 mL/Hr) IV Continuous <Continuous>  pantoprazole   Suspension 40 milliGRAM(s) Oral daily  potassium chloride   Powder 40 milliEquivalent(s) Oral every 4 hours  potassium phosphate IVPB 15 milliMole(s) IV Intermittent once  propofol Infusion 5 MICROgram(s)/kG/Min (2.139 mL/Hr) IV Continuous <Continuous>  vancomycin  IVPB 1000 milliGRAM(s) IV Intermittent every 12 hours

## 2019-02-25 NOTE — PROGRESS NOTE ADULT - ASSESSMENT
44 y/o F w/polysubstance abuse presenting with severe sepsis with septic shock secondary to MRSA bacteremia. High concern for endocarditis. EFRAIN likely secondary to sepsis now improved. Acute respiratory failure requiring intubation. New diagnosis of HCV.    Neuro: Possible withdrawal from opiates/alcohol  - Sedation as needed goal RASS -1 to 0  - Unable to locate patients methadone clinic card    Resp:  - Daily SAT/SBT    CV:  - Titrate pressors as tolerated goal MAP >= 65  - No midodrine as patient became bradycardic with midodrine  - LIZ planned for today    ID: MRSA bacteremia, E. Coli UTI  - Continue vanc, completed course of aztreonam    Renal: EFRAIN resolved  - Avoid nephrotoxins    GI: HCV  - Outpatient follow up    FEN:  - Tube feeds  - Replete lytes PRN  - Hypernatremia improved. Continue free water    PPx:  - SCDs, PPI    Attending critical care time 35 minutes

## 2019-02-25 NOTE — CHART NOTE - NSCHARTNOTEFT_GEN_A_CORE
Preliminary report:    There is evidence of a large vegetation on the medial cusp of her lateral tricuspid valve leaflet which is pedunculated and homogeneous.  No evidence of significant regurgitation was seen, no annular abscess noted although imaging was suboptimal to assess this.  Although the pulmonic valve was not well seen , there was no evidence of regurgitation noted.    No complications were encountered.

## 2019-02-25 NOTE — CHART NOTE - NSCHARTNOTEFT_GEN_A_CORE
Upon Nutritional Assessment by the Registered Dietitian your patient was determined to meet criteria / has evidence of the following diagnosis/diagnoses:          [ ]  Mild Protein Calorie Malnutrition        [x ]  Moderate Protein Calorie Malnutrition        [ ] Severe Protein Calorie Malnutrition        [ ] Unspecified Protein Calorie Malnutrition        [ ] Underweight / BMI <19        [ ] Morbid Obesity / BMI > 40      Findings as based on:  •  Comprehensive nutrition assessment and consultation  •  Calorie counts (nutrient intake analysis)  •  Food acceptance and intake status from observations by staff  •  Follow up  •  Patient education  •  Intervention secondary to interdisciplinary rounds  •   concerns      Treatment:    The following diet has been recommended:  Increase OGT feeding Vital AF 1.2 @ 55ml/uw=6095ye, 1584kcal & 99gm protein     PROVIDER Section:     By signing this assessment you are acknowledging and agree with the diagnosis/diagnoses assigned by the Registered Dietitian    Comments:

## 2019-02-25 NOTE — PROGRESS NOTE ADULT - SUBJECTIVE AND OBJECTIVE BOX
Gastroenterology  Patient seen and examined bedside resting comfortably.  Intubated     T(F): 99.8 (02-25-19 @ 08:00), Max: 101.7 (02-25-19 @ 04:00)  HR: 113 (02-25-19 @ 11:00) (92 - 118)  BP: 120/71 (02-25-19 @ 11:00) (105/61 - 146/83)  RR: 28 (02-25-19 @ 11:00) (19 - 31)  SpO2: 94% (02-25-19 @ 10:20) (61% - 100%)  Wt(kg): --  CAPILLARY BLOOD GLUCOSE          PHYSICAL EXAM:  General: NAD, WDWN.   Neuro:  Alert on vent  HEENT: NCAT, EOMI, conjunctiva clear  CV: +S1+S2 regular rate and rhythm  Lung: clear to ausculation bilaterally, respirations nonlabored, good inspiratory effort  Abdomen: soft, Non Tender, No distention Normal active BS  Extremities: no cyanosis, clubbing or edema    LABS:                        7.4    7.76  )-----------( 125      ( 25 Feb 2019 03:29 )             24.3     02-25    144  |  109<H>  |  14  ----------------------------<  111<H>  3.4<L>   |  27  |  0.37<L>    Ca    7.2<L>      25 Feb 2019 03:29  Phos  2.6     02-25  Mg     1.8     02-25        PT/INR - ( 24 Feb 2019 03:29 )   PT: 13.2 sec;   INR: 1.17 ratio         PTT - ( 24 Feb 2019 03:29 )  PTT:35.0 sec  I&O's Detail    24 Feb 2019 07:01  -  25 Feb 2019 07:00  --------------------------------------------------------  IN:    Solution: 300 mL    Solution: 500 mL    Vital HN: 850 mL  Total IN: 1650 mL    OUT:    Indwelling Catheter - Urethral: 2125 mL  Total OUT: 2125 mL    Total NET: -475 mL        02-25 @ 03:29    144 | 109 | 14  /7.2 | 1.8 | 2.6  _______________________/  3.4 | 27 | 0.37                           \par     Hepatitis C Virus RNA Detection by PCR (02.22.19 @ 11:01)    Hepatitis C RNA, Log Value: 1.20: HCV RNA Quantification by RT-PCR using Hall m2000:  Assay dynamic range:  12 IU/mL to 100,000,000 HCV RNA IU/mL  1.08 (log10) IU/mL to 8.00 (log10) IU/mL  Assay reference range: Not Detected  The results of this test should be interpreted with consideration of all  clinical and laboratory findings. A result of  "No HCV RNA Detected" does  not preclude the possibility of infection with hepatitis C virus.  In  particular, caution should be used when interpreting low level positive  results when the test is used for diagnostic purposes. LogIU/mL    Hepatitis C Virus RNA Detection by PCR: 16 IU/mL    =      < from: US Abdomen Complete (02.19.19 @ 10:08) >    EXAM:  US ABDOMINAL COMPLETE                            PROCEDURE DATE:  02/19/2019          INTERPRETATION:  CLINICAL INFORMATION: Abdominal pain    COMPARISON: CT dated 2/17/2019    TECHNIQUE: Sonography of the abdomen.     FINDINGS:    Liver: Enlarged.    Bile ducts: Common bile duct measures 5.1 mm.     Gallbladder: Sludge. No cholelithiasis or wall thickening. Negative   sonographic Villela's sign.        Pancreas: Prominent pancreatic duct (3 mm).    Spleen: 12.6 cm. Within normal limits.    Right kidney: 13.6 cm. No hydronephrosis.        Left kidney: 13 cm.  No hydronephrosis. 1.2 cm cyst.    Ascites: None.    Aorta and IVC: Visualized portions are within normal limits.    IMPRESSION:     Hepatomegaly.  No cholelithiasis or biliary ductal dilatation.  QASIM PENDLETON M.D., ATTENDING RADIOLOGIST  This document has been electronically signed. Feb 19 2019 10:56AM

## 2019-02-25 NOTE — PROGRESS NOTE ADULT - SUBJECTIVE AND OBJECTIVE BOX
Patient is a 43y old  Female who presents with a chief complaint of AMS (26 Feb 2019 08:17)      INTERVAL HPI / OVERNIGHT EVENTS: awake today    MEDICATIONS  (STANDING):  chlorhexidine 4% Liquid 1 Application(s) Topical <User Schedule>  methadone    Tablet 20 milliGRAM(s) Oral daily  nicotine - 21 mG/24Hr(s) Patch 1 patch Transdermal daily  vancomycin  IVPB 1000 milliGRAM(s) IV Intermittent every 8 hours    MEDICATIONS  (PRN):  acetaminophen   Tablet .. 650 milliGRAM(s) Oral every 6 hours PRN Temp greater or equal to 38C (100.4F)      Vital Signs Last 24 Hrs  Tmax : 102.9  Review of systems:  General : + fever, fatigue  CVS : no chest pain, palpitations  Lungs : s/p vent  GI : no abdominal pain, vomiting, diarrhea   : no dysuria, hematuria        PHYSICAL EXAM:  General :NAD  Constitutional: well-groomed, well-developed  Respiratory: coarse breath sounds,on vent+  Cardiovascular: S1 and S2, RRR, no M/G/R  Gastrointestinal: BS+, soft, NT/ND  Extremities: No peripheral edema  Vascular: 2+ peripheral pulses  Skin: No rashes      LABS:             WBC wnl            MICROBIOLOGY:  RECENT CULTURES:  02-23 .Blood XXXX   Growth in aerobic bottle: Gram Positive Cocci in Clusters  Growth in anaerobic bottle: Gram Positive Cocci in Clusters   Growth in aerobic and anaerobic bottles: Methicillin resistant  Staphylococcus aureus  See previous culture [id number]    02-22 .Blood XXXX   Growth in aerobic bottle: Gram Positive Cocci in Clusters  Growth in anaerobic bottle: Gram Positive Cocci in Clusters   Growth in aerobic and anaerobic bottles: Methicillin resistant  Staphylococcus aureus  See previous culture [id number]    02-21 .Blood XXXX   Growth in aerobic bottle: Gram Positive Cocci in Clusters  Growth in anaerobic bottle: Gram Positive Cocci in Clusters   Growth in aerobic and anaerobic bottles: Methicillin resistant  Staphylococcus aureus  See previous culture [id number]    02-21 .Blood Received only Aerobic (Blue Top) Bottle. XXXX   Growth in aerobic bottle: Gram Positive Cocci in Clusters   Growth in aerobic bottle: Methicillin resistant Staphylococcus aureus    02-21 .Blood Methicillin resistant Staphylococcus aureus   Growth in aerobic bottle: Gram Positive Cocci in Clusters  Growth in anaerobic bottle: Gram Positive Cocci in Clusters   Growth in aerobic and anaerobic bottles: Methicillin resistant  Staphylococcus aureus    02-20 .Sputum trap XXXX   Rare polymorphonuclear leukocytes per low power field  Rare Squamous epithelial cells per low power field  Rare Gram Positive Rods per oil power field  Rare Yeast like cells per oil power field   Normal Respiratory Melissa present          RADIOLOGY & ADDITIONAL STUDIES:

## 2019-02-25 NOTE — PROGRESS NOTE ADULT - PROBLEM SELECTOR PLAN 2
as above  still positive though levels of vanco subtherapeutic   was changed to IV vanco q 8   repeat blood c/s today   May add dapto   planned for LIZ later in the day

## 2019-02-25 NOTE — PROGRESS NOTE ADULT - SUBJECTIVE AND OBJECTIVE BOX
HPI:  44 Y/O female w/ PMHx of IV heroin abuse brought in by EMS for lethargy and cough. As per EMS, pt was found laying in bed at home, lethargic however able to follow some commands. EMS reports that pt was attempting to detox from heroin, and last known use was 3 days prior to ED presentation. As per ED, pts boyfriend reports a hx of + pt cough productive of white sputum and a fall down a flight of stairs 2/16. Labs showed serum lactate of 7.2, BUN/Cr 124/6.42, and WBC of of 11.93. Tmax 102.4, urine + for cocaine, opiates, nitrites and many bacteria. ICU called for further evaluation. (17 Feb 2019 22:56)      24 hr events: No acute events. Blood cultures remain persistently positive. Patient reports no pain, nausea, emesis, or diarrhea. Remains febrile.     ## ROS:  [x ] unable to obtain    ## Vitals  ICU Vital Signs Last 24 Hrs  T(C): 37.9 (25 Feb 2019 06:00), Max: 38.8 (24 Feb 2019 11:00)  T(F): 100.3 (25 Feb 2019 06:00), Max: 101.8 (24 Feb 2019 11:00)  HR: 101 (25 Feb 2019 08:00) (92 - 118)  BP: 111/55 (25 Feb 2019 07:00) (105/61 - 146/83)  BP(mean): 67 (25 Feb 2019 07:00) (67 - 94)  ABP: --  ABP(mean): --  RR: 27 (25 Feb 2019 08:00) (19 - 31)  SpO2: 61% (25 Feb 2019 06:30) (61% - 100%)      ## Physical Exam:  Gen: Adult female lying in bed, NAD  HEENT: NC/AT sclerae anicteric. ET tube in place  Resp: Overbreathing vent. Mechanical breath sounds b/l  CV: S1, S2  Abd: Soft + BS  Ext: Trace edema  Neuro: Calm, lying in bed, awake, follows commands    ## Vent Data  Mode: CPAP with PS  FiO2: 35  PEEP: 5  PS: 5  PIP: 11      ## Labs:  Chem:  02-25    144  |  109<H>  |  14  ----------------------------<  111<H>  3.4<L>   |  27  |  0.37<L>    Ca    7.2<L>      25 Feb 2019 03:29  Phos  2.6     02-25  Mg     1.8     02-25        CBC:                        7.4    7.76  )-----------( 125      ( 25 Feb 2019 03:29 )             24.3     Coags:  PT/INR - ( 24 Feb 2019 03:29 )   PT: 13.2 sec;   INR: 1.17 ratio         PTT - ( 24 Feb 2019 03:29 )  PTT:35.0 sec        ## Cardiac        ## Blood Gas      #I/Os  I&O's Detail    24 Feb 2019 07:01  -  25 Feb 2019 07:00  --------------------------------------------------------  IN:    Solution: 300 mL    Solution: 500 mL    Vital HN: 850 mL  Total IN: 1650 mL    OUT:    Indwelling Catheter - Urethral: 2125 mL  Total OUT: 2125 mL    Total NET: -475 mL          ## Imaging:    ## Medications:  MEDICATIONS  (STANDING):  aztreonam  IVPB 2000 milliGRAM(s) IV Intermittent every 8 hours  aztreonam  IVPB      chlorhexidine 0.12% Liquid 15 milliLiter(s) Oral Mucosa two times a day  chlorhexidine 4% Liquid 1 Application(s) Topical <User Schedule>  fentaNYL   Infusion. 0.5 MICROgram(s)/kG/Hr (3.565 mL/Hr) IV Continuous <Continuous>  methadone    Tablet 20 milliGRAM(s) Oral daily  nicotine - 21 mG/24Hr(s) Patch 1 patch Transdermal daily  norepinephrine Infusion 0.05 MICROgram(s)/kG/Min (6.684 mL/Hr) IV Continuous <Continuous>  pantoprazole   Suspension 40 milliGRAM(s) Oral daily  vancomycin  IVPB 1000 milliGRAM(s) IV Intermittent every 8 hours    MEDICATIONS  (PRN):  acetaminophen   Tablet .. 650 milliGRAM(s) Oral every 6 hours PRN Temp greater or equal to 38C (100.4F)

## 2019-02-25 NOTE — PROGRESS NOTE ADULT - PROBLEM SELECTOR PLAN 1
septic shock with very high fever still   sec to MRSA BSI  cont vanco ,started at q 12 today as level yesterday was high (WNL today )   repeat blood c/s in am   so far blood c/s from 2/17,2/19 and 2/21 positive  LIZ on monday

## 2019-02-26 LAB
A2 MACROGLOB SERPL-MCNC: 145 MG/DL — SIGNIFICANT CHANGE UP (ref 110–276)
ALT SERPL W P-5'-P-CCNC: 30 IU/L — SIGNIFICANT CHANGE UP (ref 0–40)
ANION GAP SERPL CALC-SCNC: 7 MMOL/L — SIGNIFICANT CHANGE UP (ref 5–17)
APO A-I SERPL-MCNC: 37 MG/DL — LOW (ref 116–209)
BILIRUB SERPL-MCNC: 0.7 MG/DL — SIGNIFICANT CHANGE UP (ref 0–1.2)
BLD GP AB SCN SERPL QL: SIGNIFICANT CHANGE UP
BUN SERPL-MCNC: 11 MG/DL — SIGNIFICANT CHANGE UP (ref 7–23)
CALCIUM SERPL-MCNC: 7.1 MG/DL — LOW (ref 8.5–10.1)
CHLORIDE SERPL-SCNC: 107 MMOL/L — SIGNIFICANT CHANGE UP (ref 96–108)
CO2 SERPL-SCNC: 28 MMOL/L — SIGNIFICANT CHANGE UP (ref 22–31)
COMMENT 2:: SIGNIFICANT CHANGE UP
CREAT SERPL-MCNC: 0.32 MG/DL — LOW (ref 0.5–1.3)
FIBROSIS SCORE: 0.34 — HIGH (ref 0–0.21)
FIBROSIS SCORING:: SIGNIFICANT CHANGE UP
FIBROSIS STAGE: SIGNIFICANT CHANGE UP
GGT SERPL-CCNC: 215 IU/L — HIGH (ref 0–60)
GLUCOSE SERPL-MCNC: 88 MG/DL — SIGNIFICANT CHANGE UP (ref 70–99)
GRAM STN FLD: SIGNIFICANT CHANGE UP
HAPTOGLOB SERPL-MCNC: 432 MG/DL — HIGH (ref 34–200)
HCT VFR BLD CALC: 21.3 % — LOW (ref 34.5–45)
HCT VFR BLD CALC: 21.7 % — LOW (ref 34.5–45)
HCT VFR BLD CALC: 24.4 % — LOW (ref 34.5–45)
HGB BLD-MCNC: 6.4 G/DL — CRITICAL LOW (ref 11.5–15.5)
HGB BLD-MCNC: 6.7 G/DL — CRITICAL LOW (ref 11.5–15.5)
HGB BLD-MCNC: 7.6 G/DL — LOW (ref 11.5–15.5)
INTERPRETATIONS:: SIGNIFICANT CHANGE UP
LIMITATIONS:: SIGNIFICANT CHANGE UP
MAGNESIUM SERPL-MCNC: 1.6 MG/DL — SIGNIFICANT CHANGE UP (ref 1.6–2.6)
MCHC RBC-ENTMCNC: 23.6 PG — LOW (ref 27–34)
MCHC RBC-ENTMCNC: 24.2 PG — LOW (ref 27–34)
MCHC RBC-ENTMCNC: 24.6 PG — LOW (ref 27–34)
MCHC RBC-ENTMCNC: 30 GM/DL — LOW (ref 32–36)
MCHC RBC-ENTMCNC: 30.9 GM/DL — LOW (ref 32–36)
MCHC RBC-ENTMCNC: 31.1 GM/DL — LOW (ref 32–36)
MCV RBC AUTO: 78.3 FL — LOW (ref 80–100)
MCV RBC AUTO: 78.6 FL — LOW (ref 80–100)
MCV RBC AUTO: 79 FL — LOW (ref 80–100)
NECROINFLAMM ACTIVITY SCORING:: SIGNIFICANT CHANGE UP
NECROINFLAMMAT ACTIVITY GRADE: SIGNIFICANT CHANGE UP
NECROINFLAMMAT ACTIVITY SCORE: 0.15 — SIGNIFICANT CHANGE UP (ref 0–0.17)
NRBC # BLD: 0 /100 WBCS — SIGNIFICANT CHANGE UP (ref 0–0)
PHOSPHATE SERPL-MCNC: 3.2 MG/DL — SIGNIFICANT CHANGE UP (ref 2.5–4.5)
PLATELET # BLD AUTO: 155 K/UL — SIGNIFICANT CHANGE UP (ref 150–400)
PLATELET # BLD AUTO: 159 K/UL — SIGNIFICANT CHANGE UP (ref 150–400)
PLATELET # BLD AUTO: 191 K/UL — SIGNIFICANT CHANGE UP (ref 150–400)
POTASSIUM SERPL-MCNC: 3.4 MMOL/L — LOW (ref 3.5–5.3)
POTASSIUM SERPL-SCNC: 3.4 MMOL/L — LOW (ref 3.5–5.3)
RBC # BLD: 2.71 M/UL — LOW (ref 3.8–5.2)
RBC # BLD: 2.77 M/UL — LOW (ref 3.8–5.2)
RBC # BLD: 3.09 M/UL — LOW (ref 3.8–5.2)
RBC # FLD: 17.8 % — HIGH (ref 10.3–14.5)
RBC # FLD: 17.9 % — HIGH (ref 10.3–14.5)
RBC # FLD: 18 % — HIGH (ref 10.3–14.5)
SODIUM SERPL-SCNC: 142 MMOL/L — SIGNIFICANT CHANGE UP (ref 135–145)
SPECIMEN SOURCE: SIGNIFICANT CHANGE UP
VANCOMYCIN TROUGH SERPL-MCNC: 13 UG/ML — SIGNIFICANT CHANGE UP (ref 10–20)
WBC # BLD: 7.51 K/UL — SIGNIFICANT CHANGE UP (ref 3.8–10.5)
WBC # BLD: 8.02 K/UL — SIGNIFICANT CHANGE UP (ref 3.8–10.5)
WBC # BLD: 8.91 K/UL — SIGNIFICANT CHANGE UP (ref 3.8–10.5)
WBC # FLD AUTO: 7.51 K/UL — SIGNIFICANT CHANGE UP (ref 3.8–10.5)
WBC # FLD AUTO: 8.02 K/UL — SIGNIFICANT CHANGE UP (ref 3.8–10.5)
WBC # FLD AUTO: 8.91 K/UL — SIGNIFICANT CHANGE UP (ref 3.8–10.5)

## 2019-02-26 PROCEDURE — 99233 SBSQ HOSP IP/OBS HIGH 50: CPT

## 2019-02-26 RX ORDER — GENTAMICIN SULFATE 40 MG/ML
210 VIAL (ML) INJECTION ONCE
Qty: 0 | Refills: 0 | Status: COMPLETED | OUTPATIENT
Start: 2019-02-26 | End: 2019-02-26

## 2019-02-26 RX ORDER — POTASSIUM CHLORIDE 20 MEQ
10 PACKET (EA) ORAL
Qty: 0 | Refills: 0 | Status: COMPLETED | OUTPATIENT
Start: 2019-02-26 | End: 2019-02-26

## 2019-02-26 RX ADMIN — Medication 250 MILLIGRAM(S): at 21:30

## 2019-02-26 RX ADMIN — Medication 1 PATCH: at 19:15

## 2019-02-26 RX ADMIN — Medication 100 MILLIEQUIVALENT(S): at 08:00

## 2019-02-26 RX ADMIN — Medication 200 MILLIGRAM(S): at 18:26

## 2019-02-26 RX ADMIN — Medication 1 PATCH: at 13:09

## 2019-02-26 RX ADMIN — Medication 250 MILLIGRAM(S): at 05:26

## 2019-02-26 RX ADMIN — CHLORHEXIDINE GLUCONATE 1 APPLICATION(S): 213 SOLUTION TOPICAL at 13:05

## 2019-02-26 RX ADMIN — Medication 650 MILLIGRAM(S): at 19:00

## 2019-02-26 RX ADMIN — METHADONE HYDROCHLORIDE 20 MILLIGRAM(S): 40 TABLET ORAL at 13:09

## 2019-02-26 RX ADMIN — Medication 1 PATCH: at 08:00

## 2019-02-26 RX ADMIN — Medication 100 MILLIEQUIVALENT(S): at 05:50

## 2019-02-26 RX ADMIN — Medication 650 MILLIGRAM(S): at 09:06

## 2019-02-26 RX ADMIN — Medication 650 MILLIGRAM(S): at 08:36

## 2019-02-26 RX ADMIN — Medication 250 MILLIGRAM(S): at 13:30

## 2019-02-26 RX ADMIN — Medication 650 MILLIGRAM(S): at 18:26

## 2019-02-26 RX ADMIN — Medication 100 MILLIEQUIVALENT(S): at 06:48

## 2019-02-26 NOTE — SWALLOW BEDSIDE ASSESSMENT ADULT - ASR SWALLOW LABIAL MOBILITY
scabs in the both corners of her lips/impaired retraction/impaired coordination impaired coordination/scabs in the both corners of her lips- SLP provided oral care/impaired retraction

## 2019-02-26 NOTE — SWALLOW BEDSIDE ASSESSMENT ADULT - SWALLOW EVAL: PATIENT/FAMILY GOALS STATEMENT
pt wanted something to drink, requested juice pt wanted something to drink, requested juice. pt denied difficulty swallowing prior to admission to the hospital

## 2019-02-26 NOTE — PROGRESS NOTE ADULT - ASSESSMENT
42 y/o F w/polysubstance abuse presenting with severe sepsis with septic shock secondary to MRSA bacteremia. LIZ positive for endocarditis. EFRAIN likely secondary to sepsis now improved. Acute respiratory failure requiring intubation. New diagnosis of HCV.    - Continue vanc, completed course of aztreonam  - Daily cultures until cleared  - May require CT surgical evaluation for possible surgery if blood cultures continue to remain persitently positive  - Continue methadone for opiate abuse  - Outpatient follow up for HCV  - PT/OT  - Speech and swallow  - Downgrade to floor

## 2019-02-26 NOTE — SWALLOW BEDSIDE ASSESSMENT ADULT - SLP PERTINENT HISTORY OF CURRENT PROBLEM
severe sepsis, septic shock, MRSA bacteremia, likely septic emboli, PNA, UTI, ARF with ATN, lactic acidosis, acute metabolic encephelopathy, likely endocarditis.

## 2019-02-26 NOTE — SWALLOW BEDSIDE ASSESSMENT ADULT - ORAL PREPARATORY PHASE
Decreased mastication ability/c/o pain while chewing 2/2 "sores in her mouth" pt indicated bilaterally, front of lateral sulcus Within functional limits Reduced oral grading

## 2019-02-26 NOTE — SWALLOW BEDSIDE ASSESSMENT ADULT - MODE OF PRESENTATION
fed by clinician fed by clinician/straw spoon/fed by clinician straw/cup/fed by clinician fed by clinician/straw/cup

## 2019-02-26 NOTE — SWALLOW BEDSIDE ASSESSMENT ADULT - H & P REVIEW
yes/44 Y/O female w/ PMHx of IV heroin abuse brought in by EMS for lethargy and cough. As per EMS, pt was found laying in bed at home, lethargic however able to follow some commands. EMS reports that pt was attempting to detox from heroin, and last known use was 3 days prior to ED presentation. As per ED, pts boyfriend reports a hx of + pt cough productive of white sputum and a fall down a flight of stairs 2/16. Labs showed serum lactate of 7.2, BUN/Cr 124/6.42, and WBC of of 11.93. Tmax 102.4, urine + for cocaine, opiates, nitrites and many bacteria. ICU called for further evaluation. hospital course complicated by intubation on 2/20/2019 and pt extubated 2/25/2019

## 2019-02-26 NOTE — SWALLOW BEDSIDE ASSESSMENT ADULT - PHARYNGEAL PHASE
Within functional limits Delayed pharyngeal swallow/Cough post oral intake Delayed pharyngeal swallow

## 2019-02-26 NOTE — SWALLOW BEDSIDE ASSESSMENT ADULT - COMMENTS
CXR Findings/Impression: ETT tip above nasrin. NG tip below diaphragm. Unremarkable heart. Trace right pleural effusion. No lung consolidation.    CT head 2/17/2019 IMPRESSION: No evidence of acute intracranial hemorrhage, midline shift or CT evidence of acute territorial infarct. pt expectorated thick, wht/green/tan colored mucus with cough CT head 2/17/2019 IMPRESSION: No evidence of acute intracranial hemorrhage, midline shift or CT evidence of acute territorial infarct.    CT chest no contrast 2/17/2019 IMPRESSION: 1. Multiple nodular and cavitary mass lesions are seen in the lungs. Largest lesion is in right lower lobe measuring up to 2.0 cm. differential diagnosis includes infection (including septic emboli), and neoplasm (including squamous cell cancer).  2. Large dense right lower lobe dependent consolidation, likely pneumonia. Trace right pleural effusion.    CXR 2/24/2019Findings/Impression: ETT tip above nasrin. NG tip below diaphragm. Unremarkable heart. Trace right pleural effusion. No lung consolidation.

## 2019-02-26 NOTE — SWALLOW BEDSIDE ASSESSMENT ADULT - SLP GENERAL OBSERVATIONS
pt seen bedside alert and oriented x4. pt responded to simple orientation/want questions, verbalized her needs and was able to follow one step directions. pt reported her voice was "raspy" when asked re vocal quality and SLP noted low volume.

## 2019-02-26 NOTE — PROVIDER CONTACT NOTE (CRITICAL VALUE NOTIFICATION) - TEST AND RESULT REPORTED:
Blood culture sent on 2/25/19 preliminary report shows growth in anerobic bottle Gram +ve cocci in clusters

## 2019-02-26 NOTE — SWALLOW BEDSIDE ASSESSMENT ADULT - SWALLOW EVAL: RECOMMENDED FEEDING/EATING TECHNIQUES
maintain upright posture during/after eating for 30 mins/oral hygiene/small sips/bites/crush medication (when feasible)

## 2019-02-26 NOTE — CHART NOTE - NSCHARTNOTEFT_GEN_A_CORE
Patient is a 44 Y/O female w/ PMHx of IV heroin abuse brought in by EMS for lethargy and cough. As per EMS, pt was found laying in bed at home, lethargic however able to follow some commands. EMS reports that pt was attempting to detox from heroin, and last known use was 3 days prior to ED presentation. As per ED, pts boyfriend reports a hx of + pt cough productive of white sputum and a fall down a flight of stairs 2/16. Labs showed serum lactate of 7.2, BUN/Cr 124/6.42, and WBC of of 11.93. Tmax 102.4, urine + for cocaine, opiates, nitrites and many bacteria.  Patient admitted to critical care in septic shock with MRSA bacteremia, in EFRAIN, and also endocarditis, as LIZ done 2/25:  evidence of a large vegetation on the medial cusp of her lateral tricuspid valve leaflet which is pedunculated and homogeneous.    PAtient had been now off levophed drip, and is extubated on 2/25/19 doing better on nasal cannula, awake and alert with some confusion, BP had been stable.  Blood culture had been positive up to date. on vancomycin 1g fletcher 8hours being followed by ID.  possible CT surgery evaluation for continues positive blood cultures.    Patient was started on methadone 20mg is doing ok so far. Patient is a 44 Y/O female w/ PMHx of IV heroin abuse brought in by EMS for lethargy and cough. As per EMS, pt was found laying in bed at home, lethargic however able to follow some commands. EMS reports that pt was attempting to detox from heroin, and last known use was 3 days prior to ED presentation. As per ED, pts boyfriend reports a hx of + pt cough productive of white sputum and a fall down a flight of stairs 2/16. Labs showed serum lactate of 7.2, BUN/Cr 124/6.42, and WBC of of 11.93. Tmax 102.4, urine + for cocaine, opiates, nitrites and many bacteria.  Patient admitted to critical care in septic shock with MRSA bacteremia, in EFRAIN, and also endocarditis, as LIZ done 2/25:  evidence of a large vegetation on the medial cusp of her lateral tricuspid valve leaflet which is pedunculated and homogeneous.    PAtient had been now off levophed drip, and is extubated on 2/25/19 doing better on nasal cannula, awake and alert with some confusion, BP had been stable.  Blood culture had been positive up to date. on vancomycin 1g fletcher 8hours being followed by ID.  possible CT surgery evaluation for continues positive blood cultures.    Patient was started on methadone 20mg is doing ok so far.  Patient placed for transfer to med/surg  signed out to Hospitalist carrying BP #[phone] Patient is a 44 Y/O female w/ PMHx of IV heroin abuse, HCV,  brought in by EMS for lethargy and cough. As per EMS, pt was found laying in bed at home, lethargic however able to follow some commands. EMS reports that pt was attempting to detox from heroin, and last known use was 3 days prior to ED presentation. As per ED, pts boyfriend reports a hx of + pt cough productive of white sputum and a fall down a flight of stairs 2/16. Labs showed serum lactate of 7.2, BUN/Cr 124/6.42, and WBC of of 11.93. Tmax 102.4, urine + for cocaine, opiates, nitrites and many bacteria.  Patient admitted to critical care in septic shock with MRSA bacteremia, in EFRAIN, and also endocarditis, as LIZ done 2/25:  evidence of a large vegetation on the medial cusp of her lateral tricuspid valve leaflet which is pedunculated and homogeneous.    PAtient had been now off levophed drip, and is extubated on 2/25/19 doing better on nasal cannula, awake and alert with some confusion, BP had been stable.  Blood culture had been positive up to date. on vancomycin 1g fletcher 8hours being followed by ID.  possible CT surgery evaluation for continues positive blood cultures.    Patient was started on methadone 20mg is doing ok so far.    Patient needs outpatient follow up on Hep C management.   Patient placed for transfer to med/surg  signed out to Hospitalist carrying BP #[phone]

## 2019-02-26 NOTE — SWALLOW BEDSIDE ASSESSMENT ADULT - SWALLOW EVAL: DIAGNOSIS
pt presented with oropharyngeal phases of swallow marked by reduced mastication/bolus formation with solid may be 2/2 scabs in both corners of her lips- pt c/o pain while chewing, suspect slight delay in swallow trigger with adequate laryngeal elevation and inconsistent cough with thin liquid trials

## 2019-02-26 NOTE — PROGRESS NOTE ADULT - SUBJECTIVE AND OBJECTIVE BOX
HPI:  44 Y/O female w/ PMHx of IV heroin abuse brought in by EMS for lethargy and cough. As per EMS, pt was found laying in bed at home, lethargic however able to follow some commands. EMS reports that pt was attempting to detox from heroin, and last known use was 3 days prior to ED presentation. As per ED, pts boyfriend reports a hx of + pt cough productive of white sputum and a fall down a flight of stairs 2/16. Labs showed serum lactate of 7.2, BUN/Cr 124/6.42, and WBC of of 11.93. Tmax 102.4, urine + for cocaine, opiates, nitrites and many bacteria. ICU called for further evaluation. (17 Feb 2019 22:56)      24 hr events:  Had ILZ showing tricuspid valve vegitation. Extubated. Did well overnight. Remains febrile.     ## ROS:  See above. ROS otherwise negative.    ## Vitals  ICU Vital Signs Last 24 Hrs  T(C): 38.2 (26 Feb 2019 07:09), Max: 39.4 (25 Feb 2019 21:22)  T(F): 100.8 (26 Feb 2019 07:09), Max: 102.9 (25 Feb 2019 21:22)  HR: 93 (26 Feb 2019 07:00) (77 - 114)  BP: 118/63 (26 Feb 2019 07:00) (99/55 - 147/114)  BP(mean): 76 (26 Feb 2019 07:00) (59 - 119)  ABP: --  ABP(mean): --  RR: 23 (26 Feb 2019 07:00) (11 - 35)  SpO2: 100% (26 Feb 2019 07:00) (94% - 100%)      ## Physical Exam:  Gen:  HEENT:  Resp:  CV:  Abd:  Ext:  Neuro:    ## Vent Data  Mode: AC/ CMV (Assist Control/ Continuous Mandatory Ventilation)  RR (machine): 15  TV (machine): 350  FiO2: 40  PEEP: 5  ITime: 0.91  MAP: 12  PIP: 19      ## Labs:  Chem:  02-26    142  |  107  |  11  ----------------------------<  88  3.4<L>   |  28  |  0.32<L>    Ca    7.1<L>      26 Feb 2019 03:54  Phos  3.2     02-26  Mg     1.6     02-26        CBC:                        6.7    8.02  )-----------( 159      ( 26 Feb 2019 05:16 )             21.7     Coags:          ## Cardiac        ## Blood Gas      #I/Os  I&O's Detail    25 Feb 2019 07:01  -  26 Feb 2019 07:00  --------------------------------------------------------  IN:    Solution: 200 mL    Solution: 100 mL    Solution: 750 mL  Total IN: 1050 mL    OUT:    Indwelling Catheter - Urethral: 1760 mL  Total OUT: 1760 mL    Total NET: -710 mL          ## Imaging:    ## Medications:  MEDICATIONS  (STANDING):  chlorhexidine 4% Liquid 1 Application(s) Topical <User Schedule>  methadone    Tablet 20 milliGRAM(s) Oral daily  nicotine - 21 mG/24Hr(s) Patch 1 patch Transdermal daily  potassium chloride  10 mEq/100 mL IVPB 10 milliEquivalent(s) IV Intermittent every 1 hour  vancomycin  IVPB 1000 milliGRAM(s) IV Intermittent every 8 hours    MEDICATIONS  (PRN):  acetaminophen   Tablet .. 650 milliGRAM(s) Oral every 6 hours PRN Temp greater or equal to 38C (100.4F) HPI:  42 Y/O female w/ PMHx of IV heroin abuse brought in by EMS for lethargy and cough. As per EMS, pt was found laying in bed at home, lethargic however able to follow some commands. EMS reports that pt was attempting to detox from heroin, and last known use was 3 days prior to ED presentation. As per ED, pts boyfriend reports a hx of + pt cough productive of white sputum and a fall down a flight of stairs 2/16. Labs showed serum lactate of 7.2, BUN/Cr 124/6.42, and WBC of of 11.93. Tmax 102.4, urine + for cocaine, opiates, nitrites and many bacteria. ICU called for further evaluation. (17 Feb 2019 22:56)      24 hr events:  Had LIZ showing tricuspid valve vegitation. Extubated. Did well overnight. Remains febrile.     ## ROS:  See above. ROS otherwise negative.    ## Vitals  ICU Vital Signs Last 24 Hrs  T(C): 38.2 (26 Feb 2019 07:09), Max: 39.4 (25 Feb 2019 21:22)  T(F): 100.8 (26 Feb 2019 07:09), Max: 102.9 (25 Feb 2019 21:22)  HR: 93 (26 Feb 2019 07:00) (77 - 114)  BP: 118/63 (26 Feb 2019 07:00) (99/55 - 147/114)  BP(mean): 76 (26 Feb 2019 07:00) (59 - 119)  ABP: --  ABP(mean): --  RR: 23 (26 Feb 2019 07:00) (11 - 35)  SpO2: 100% (26 Feb 2019 07:00) (94% - 100%)      ## Physical Exam:  Gen: Adult female lying in bed, NAD  HEENT: NC/AT sclerae anicteric  Resp: No increased WOB, CTAB  CV: S1, S2  Abd: Soft, + BS  Ext: WWP  Neuro: Awake, alert, follows commands, moves fingers and toes, too weak to lift arms and legs    ## Vent Data  Mode: AC/ CMV (Assist Control/ Continuous Mandatory Ventilation)  RR (machine): 15  TV (machine): 350  FiO2: 40  PEEP: 5  ITime: 0.91  MAP: 12  PIP: 19      ## Labs:  Chem:  02-26    142  |  107  |  11  ----------------------------<  88  3.4<L>   |  28  |  0.32<L>    Ca    7.1<L>      26 Feb 2019 03:54  Phos  3.2     02-26  Mg     1.6     02-26        CBC:                        6.7    8.02  )-----------( 159      ( 26 Feb 2019 05:16 )             21.7     Coags:          ## Cardiac        ## Blood Gas      #I/Os  I&O's Detail    25 Feb 2019 07:01  -  26 Feb 2019 07:00  --------------------------------------------------------  IN:    Solution: 200 mL    Solution: 100 mL    Solution: 750 mL  Total IN: 1050 mL    OUT:    Indwelling Catheter - Urethral: 1760 mL  Total OUT: 1760 mL    Total NET: -710 mL          ## Imaging:    ## Medications:  MEDICATIONS  (STANDING):  chlorhexidine 4% Liquid 1 Application(s) Topical <User Schedule>  methadone    Tablet 20 milliGRAM(s) Oral daily  nicotine - 21 mG/24Hr(s) Patch 1 patch Transdermal daily  potassium chloride  10 mEq/100 mL IVPB 10 milliEquivalent(s) IV Intermittent every 1 hour  vancomycin  IVPB 1000 milliGRAM(s) IV Intermittent every 8 hours    MEDICATIONS  (PRN):  acetaminophen   Tablet .. 650 milliGRAM(s) Oral every 6 hours PRN Temp greater or equal to 38C (100.4F)

## 2019-02-26 NOTE — SWALLOW BEDSIDE ASSESSMENT ADULT - SWALLOW EVAL: SECRETION MANAGEMENT
wet upper airway/breath sounds/RN reported pt with upper airway secretions and that the pt was able to bring them up.

## 2019-02-26 NOTE — PROGRESS NOTE ADULT - SUBJECTIVE AND OBJECTIVE BOX
Gastroenterology  Patient seen and examined bedside resting comfortably.  No complaints offered.   No abdominal pain  Extubated   T(F): 100.2 (02-26-19 @ 12:00), Max: 102.9 (02-25-19 @ 21:22)  HR: 107 (02-26-19 @ 15:30) (77 - 114)  BP: 98/49 (02-26-19 @ 15:00) (98/49 - 121/81)  RR: 26 (02-26-19 @ 15:30) (11 - 33)  SpO2: 100% (02-26-19 @ 15:30) (95% - 100%)  Wt(kg): --  CAPILLARY BLOOD GLUCOSE          PHYSICAL EXAM:  General: NAD, WDWN.   Neuro:  Alert & responsive  HEENT: NCAT, EOMI, conjunctiva clear  CV: +S1+S2 regular rate and rhythm  Lung: clear to ausculation bilaterally, respirations nonlabored, good inspiratory effort  Abdomen: soft, Non Tender, No distention Normal active BS  Extremities: no cyanosis, clubbing or edema    LABS:                        7.6    8.91  )-----------( 191      ( 26 Feb 2019 13:44 )             24.4     02-26    142  |  107  |  11  ----------------------------<  88  3.4<L>   |  28  |  0.32<L>    Ca    7.1<L>      26 Feb 2019 03:54  Phos  3.2     02-26  Mg     1.6     02-26          I&O's Detail    25 Feb 2019 07:01  -  26 Feb 2019 07:00  --------------------------------------------------------  IN:    Solution: 200 mL    Solution: 100 mL    Solution: 750 mL  Total IN: 1050 mL    OUT:    Indwelling Catheter - Urethral: 1760 mL  Total OUT: 1760 mL    Total NET: -710 mL      26 Feb 2019 07:01  -  26 Feb 2019 17:21  --------------------------------------------------------  IN:    Solution: 100 mL  Total IN: 100 mL    OUT:  Total OUT: 0 mL    Total NET: 100 mL        02-26 @ 03:54    142 | 107 | 11  /7.1 | 1.6 | 3.2  _______________________/  3.4 | 28 | 0.32                           \par

## 2019-02-27 DIAGNOSIS — I36.8 OTHER NONRHEUMATIC TRICUSPID VALVE DISORDERS: ICD-10-CM

## 2019-02-27 DIAGNOSIS — R50.9 FEVER, UNSPECIFIED: ICD-10-CM

## 2019-02-27 DIAGNOSIS — I76 SEPTIC ARTERIAL EMBOLISM: ICD-10-CM

## 2019-02-27 LAB
ANION GAP SERPL CALC-SCNC: 8 MMOL/L — SIGNIFICANT CHANGE UP (ref 5–17)
BASOPHILS # BLD AUTO: 0.03 K/UL — SIGNIFICANT CHANGE UP (ref 0–0.2)
BASOPHILS NFR BLD AUTO: 0.3 % — SIGNIFICANT CHANGE UP (ref 0–2)
BUN SERPL-MCNC: 9 MG/DL — SIGNIFICANT CHANGE UP (ref 7–23)
CALCIUM SERPL-MCNC: 7.5 MG/DL — LOW (ref 8.5–10.1)
CHLORIDE SERPL-SCNC: 103 MMOL/L — SIGNIFICANT CHANGE UP (ref 96–108)
CO2 SERPL-SCNC: 25 MMOL/L — SIGNIFICANT CHANGE UP (ref 22–31)
CREAT SERPL-MCNC: 0.27 MG/DL — LOW (ref 0.5–1.3)
EOSINOPHIL # BLD AUTO: 0.05 K/UL — SIGNIFICANT CHANGE UP (ref 0–0.5)
EOSINOPHIL NFR BLD AUTO: 0.6 % — SIGNIFICANT CHANGE UP (ref 0–6)
GLUCOSE SERPL-MCNC: 92 MG/DL — SIGNIFICANT CHANGE UP (ref 70–99)
GRAM STN FLD: SIGNIFICANT CHANGE UP
HCT VFR BLD CALC: 25.5 % — LOW (ref 34.5–45)
HGB BLD-MCNC: 7.9 G/DL — LOW (ref 11.5–15.5)
IMM GRANULOCYTES NFR BLD AUTO: 0.9 % — SIGNIFICANT CHANGE UP (ref 0–1.5)
LYMPHOCYTES # BLD AUTO: 1.83 K/UL — SIGNIFICANT CHANGE UP (ref 1–3.3)
LYMPHOCYTES # BLD AUTO: 20.2 % — SIGNIFICANT CHANGE UP (ref 13–44)
MAGNESIUM SERPL-MCNC: 1.6 MG/DL — SIGNIFICANT CHANGE UP (ref 1.6–2.6)
MCHC RBC-ENTMCNC: 24.5 PG — LOW (ref 27–34)
MCHC RBC-ENTMCNC: 31 GM/DL — LOW (ref 32–36)
MCV RBC AUTO: 79.2 FL — LOW (ref 80–100)
MONOCYTES # BLD AUTO: 0.36 K/UL — SIGNIFICANT CHANGE UP (ref 0–0.9)
MONOCYTES NFR BLD AUTO: 4 % — SIGNIFICANT CHANGE UP (ref 2–14)
NEUTROPHILS # BLD AUTO: 6.72 K/UL — SIGNIFICANT CHANGE UP (ref 1.8–7.4)
NEUTROPHILS NFR BLD AUTO: 74 % — SIGNIFICANT CHANGE UP (ref 43–77)
NRBC # BLD: 0 /100 WBCS — SIGNIFICANT CHANGE UP (ref 0–0)
PHOSPHATE SERPL-MCNC: 3.3 MG/DL — SIGNIFICANT CHANGE UP (ref 2.5–4.5)
PLATELET # BLD AUTO: 230 K/UL — SIGNIFICANT CHANGE UP (ref 150–400)
POTASSIUM SERPL-MCNC: 3.7 MMOL/L — SIGNIFICANT CHANGE UP (ref 3.5–5.3)
POTASSIUM SERPL-SCNC: 3.7 MMOL/L — SIGNIFICANT CHANGE UP (ref 3.5–5.3)
RBC # BLD: 3.22 M/UL — LOW (ref 3.8–5.2)
RBC # FLD: 18.2 % — HIGH (ref 10.3–14.5)
SODIUM SERPL-SCNC: 136 MMOL/L — SIGNIFICANT CHANGE UP (ref 135–145)
SPECIMEN SOURCE: SIGNIFICANT CHANGE UP
WBC # BLD: 9.07 K/UL — SIGNIFICANT CHANGE UP (ref 3.8–10.5)
WBC # FLD AUTO: 9.07 K/UL — SIGNIFICANT CHANGE UP (ref 3.8–10.5)

## 2019-02-27 PROCEDURE — 99233 SBSQ HOSP IP/OBS HIGH 50: CPT

## 2019-02-27 RX ORDER — DAPTOMYCIN 500 MG/10ML
430 INJECTION, POWDER, LYOPHILIZED, FOR SOLUTION INTRAVENOUS ONCE
Qty: 0 | Refills: 0 | Status: COMPLETED | OUTPATIENT
Start: 2019-02-27 | End: 2019-02-27

## 2019-02-27 RX ORDER — VANCOMYCIN HCL 1 G
1250 VIAL (EA) INTRAVENOUS EVERY 8 HOURS
Qty: 0 | Refills: 0 | Status: DISCONTINUED | OUTPATIENT
Start: 2019-02-27 | End: 2019-03-05

## 2019-02-27 RX ORDER — DAPTOMYCIN 500 MG/10ML
INJECTION, POWDER, LYOPHILIZED, FOR SOLUTION INTRAVENOUS
Qty: 0 | Refills: 0 | Status: DISCONTINUED | OUTPATIENT
Start: 2019-02-27 | End: 2019-03-07

## 2019-02-27 RX ORDER — DAPTOMYCIN 500 MG/10ML
430 INJECTION, POWDER, LYOPHILIZED, FOR SOLUTION INTRAVENOUS EVERY 24 HOURS
Qty: 0 | Refills: 0 | Status: DISCONTINUED | OUTPATIENT
Start: 2019-02-28 | End: 2019-03-07

## 2019-02-27 RX ADMIN — DAPTOMYCIN 117.2 MILLIGRAM(S): 500 INJECTION, POWDER, LYOPHILIZED, FOR SOLUTION INTRAVENOUS at 17:08

## 2019-02-27 RX ADMIN — Medication 650 MILLIGRAM(S): at 13:38

## 2019-02-27 RX ADMIN — Medication 250 MILLIGRAM(S): at 05:39

## 2019-02-27 RX ADMIN — Medication 650 MILLIGRAM(S): at 18:49

## 2019-02-27 RX ADMIN — CHLORHEXIDINE GLUCONATE 1 APPLICATION(S): 213 SOLUTION TOPICAL at 05:35

## 2019-02-27 RX ADMIN — METHADONE HYDROCHLORIDE 20 MILLIGRAM(S): 40 TABLET ORAL at 12:29

## 2019-02-27 RX ADMIN — Medication 650 MILLIGRAM(S): at 05:50

## 2019-02-27 RX ADMIN — Medication 166.67 MILLIGRAM(S): at 21:47

## 2019-02-27 RX ADMIN — Medication 650 MILLIGRAM(S): at 07:37

## 2019-02-27 RX ADMIN — Medication 166.67 MILLIGRAM(S): at 14:48

## 2019-02-27 RX ADMIN — Medication 1 PATCH: at 12:28

## 2019-02-27 RX ADMIN — Medication 650 MILLIGRAM(S): at 18:03

## 2019-02-27 RX ADMIN — Medication 1 PATCH: at 12:31

## 2019-02-27 RX ADMIN — Medication 650 MILLIGRAM(S): at 12:28

## 2019-02-27 RX ADMIN — Medication 1 PATCH: at 07:12

## 2019-02-27 NOTE — PROGRESS NOTE ADULT - SUBJECTIVE AND OBJECTIVE BOX
INTERVAL HPI/OVERNIGHT EVENTS:  Pt seen and examined at bedside.     Allergies/Intolerance: penicillin (Short breath; Hives)      MEDICATIONS  (STANDING):  chlorhexidine 4% Liquid 1 Application(s) Topical <User Schedule>  methadone    Tablet 20 milliGRAM(s) Oral daily  nicotine - 21 mG/24Hr(s) Patch 1 patch Transdermal daily  vancomycin  IVPB 1250 milliGRAM(s) IV Intermittent every 8 hours    MEDICATIONS  (PRN):  acetaminophen   Tablet .. 650 milliGRAM(s) Oral every 6 hours PRN Temp greater or equal to 38C (100.4F)        ROS: all systems reviewed and wnl      PHYSICAL EXAMINATION:  Vital Signs Last 24 Hrs  T(C): 38 (27 Feb 2019 12:00), Max: 40 (26 Feb 2019 19:08)  T(F): 100.4 (27 Feb 2019 12:00), Max: 104 (26 Feb 2019 19:08)  HR: 89 (27 Feb 2019 12:00) (85 - 125)  BP: 115/69 (27 Feb 2019 12:00) (98/49 - 125/63)  BP(mean): 80 (27 Feb 2019 12:00) (60 - 80)  RR: 26 (27 Feb 2019 12:00) (17 - 30)  SpO2: 100% (27 Feb 2019 12:00) (93% - 100%)  CAPILLARY BLOOD GLUCOSE          02-26 @ 07:01 - 02-27 @ 07:00  --------------------------------------------------------  IN: 1050 mL / OUT: 1010 mL / NET: 40 mL    02-27 @ 07:01 - 02-27 @ 13:53  --------------------------------------------------------  IN: 340 mL / OUT: 0 mL / NET: 340 mL        GENERAL:   NECK: supple, No JVD  CHEST/LUNG: clear to auscultation bilaterally; no rales, rhonchi, or wheezing b/l  HEART: normal S1, S2  ABDOMEN: BS+, soft, ND, NT   EXTREMITIES:  pulses palpable; no clubbing, cyanosis, or edema b/l LEs  SKIN: no rashes or lesions      LABS:                        7.9    9.07  )-----------( 230      ( 27 Feb 2019 04:20 )             25.5     02-27    136  |  103  |  9   ----------------------------<  92  3.7   |  25  |  0.27<L>    Ca    7.5<L>      27 Feb 2019 04:20  Phos  3.3     02-27  Mg     1.6     02-27 INTERVAL HPI/OVERNIGHT EVENTS:  Pt seen and examined at bedside.     Allergies/Intolerance: penicillin (Short breath; Hives)      MEDICATIONS  (STANDING):  chlorhexidine 4% Liquid 1 Application(s) Topical <User Schedule>  methadone    Tablet 20 milliGRAM(s) Oral daily  nicotine - 21 mG/24Hr(s) Patch 1 patch Transdermal daily  vancomycin  IVPB 1250 milliGRAM(s) IV Intermittent every 8 hours    MEDICATIONS  (PRN):  acetaminophen   Tablet .. 650 milliGRAM(s) Oral every 6 hours PRN Temp greater or equal to 38C (100.4F)        ROS: all systems reviewed and wnl      PHYSICAL EXAMINATION:  Vital Signs Last 24 Hrs  T(C): 38 (27 Feb 2019 12:00), Max: 40 (26 Feb 2019 19:08)  T(F): 100.4 (27 Feb 2019 12:00), Max: 104 (26 Feb 2019 19:08)  HR: 89 (27 Feb 2019 12:00) (85 - 125)  BP: 115/69 (27 Feb 2019 12:00) (98/49 - 125/63)  BP(mean): 80 (27 Feb 2019 12:00) (60 - 80)  RR: 26 (27 Feb 2019 12:00) (17 - 30)  SpO2: 100% (27 Feb 2019 12:00) (93% - 100%)  CAPILLARY BLOOD GLUCOSE          02-26 @ 07:01 - 02-27 @ 07:00  --------------------------------------------------------  IN: 1050 mL / OUT: 1010 mL / NET: 40 mL    02-27 @ 07:01 - 02-27 @ 13:53  --------------------------------------------------------  IN: 340 mL / OUT: 0 mL / NET: 340 mL        GENERAL: in bed, no new complaints, no CP or SOB  NECK: supple, No JVD  CHEST/LUNG: clear to auscultation bilaterally; no rales, rhonchi, or wheezing b/l  HEART: normal S1, S2  ABDOMEN: BS+, soft, ND, NT   EXTREMITIES:  pulses palpable; no clubbing, cyanosis, or edema b/l LEs  SKIN: no rashes or lesions      LABS:                        7.9    9.07  )-----------( 230      ( 27 Feb 2019 04:20 )             25.5     02-27    136  |  103  |  9   ----------------------------<  92  3.7   |  25  |  0.27<L>    Ca    7.5<L>      27 Feb 2019 04:20  Phos  3.3     02-27  Mg     1.6     02-27

## 2019-02-27 NOTE — PROGRESS NOTE ADULT - ASSESSMENT
44 y/o F w/polysubstance abuse presenting with severe sepsis with septic shock secondary to MRSA bacteremia. LIZ positive for endocarditis. EFRAIN likely secondary to sepsis now improved. Acute respiratory failure requiring intubation. New diagnosis of HCV.    - Continue IV Vancomycin 1,250 mg every 8 hours, completed course of aztreonam, now on IV Daptomycin 430 mg/day.    - Daily cultures until cleared. TTE confirms tricuspid endocarditis.  HGB acceptable 7.9, follow daily.     - May require CT surgical evaluation for possible surgery if blood cultures continue to remain persitently positive  - Continue methadone for opiate abuse  - Outpatient follow up for HCV  - PT/OT  - Speech and swallow  - Downgrade to floor 42 y/o F w/polysubstance abuse presenting with severe sepsis with septic shock secondary to MRSA bacteremia. LIZ positive for endocarditis. EFRAIN likely secondary to sepsis now improved. Acute respiratory failure requiring intubation. New diagnosis of HCV.    - Continue IV Vancomycin 1,250 mg every 8 hours, completed course of aztreonam, now on IV Daptomycin 430 mg/day.    - Daily cultures until cleared. TTE confirms tricuspid endocarditis.  HGB acceptable 7.9, follow daily.        Follow daily blood cultures for clearing of bacteremia. May require CT surgical evaluation for possible surgery if blood cultures continue to remain        persitently positive  - Continue methadone for opiate abuse  - Outpatient follow up for HCV  - PT/OT  - Speech and swallow  - Downgrade to floor

## 2019-02-27 NOTE — PROGRESS NOTE ADULT - PROBLEM SELECTOR PLAN 1
cont vanco ,increasing the dose as still subtherapeutic  repeat levels  add dapto   cont vanco for few more days then stop(as lung involved as well)

## 2019-02-27 NOTE — PROGRESS NOTE ADULT - PROBLEM SELECTOR PLAN 2
TV IE no abscess noted  antibiotics plan as above    as pt persistent bacteremic will benefit from CT consult for a possible TV valvectomy

## 2019-02-27 NOTE — PROGRESS NOTE ADULT - SUBJECTIVE AND OBJECTIVE BOX
Patient is a 43y old  Female who presents with a chief complaint of AMS (27 Feb 2019 13:53)      INTERVAL HPI / OVERNIGHT EVENTS: high fever with chills    MEDICATIONS  (STANDING):  chlorhexidine 4% Liquid 1 Application(s) Topical <User Schedule>  methadone    Tablet 20 milliGRAM(s) Oral daily  nicotine - 21 mG/24Hr(s) Patch 1 patch Transdermal daily  vancomycin  IVPB 1250 milliGRAM(s) IV Intermittent every 8 hours    MEDICATIONS  (PRN):  acetaminophen   Tablet .. 650 milliGRAM(s) Oral every 6 hours PRN Temp greater or equal to 38C (100.4F)      Vital Signs Last 24 Hrs  T(C): 38.3 (27 Feb 2019 15:32), Max: 40 (26 Feb 2019 19:08)  T(F): 101 (27 Feb 2019 15:32), Max: 104 (26 Feb 2019 19:08)  HR: 102 (27 Feb 2019 15:00) (85 - 125)  BP: 89/46 (27 Feb 2019 15:00) (89/46 - 125/63)  BP(mean): 80 (27 Feb 2019 12:00) (65 - 80)  RR: 18 (27 Feb 2019 15:00) (17 - 27)  SpO2: 100% (27 Feb 2019 15:00) (93% - 100%)    Review of systems:  General :fever with chills  CVS : no chest pain, palpitations  Lungs : no shortness of breath, cough  GI : no abdominal pain,vomiting, diarrhea   : no dysuria,hematuria        PHYSICAL EXAM:  General :NAD  Constitutional:  well-groomed, well-developed  Respiratory: CTAB/L  Cardiovascular: S1 and S2, RRR, no M/G/R  Gastrointestinal: BS+, soft, NT/ND  Extremities: No peripheral edema  Vascular: 2+ peripheral pulses  Skin: No rashes      LABS:                        7.9    9.07  )-----------( 230      ( 27 Feb 2019 04:20 )             25.5     02-27    136  |  103  |  9   ----------------------------<  92  3.7   |  25  |  0.27<L>    Ca    7.5<L>      27 Feb 2019 04:20  Phos  3.3     02-27  Mg     1.6     02-27            MICROBIOLOGY:  RECENT CULTURES:  02-26 .Blood XXXX   Growth in aerobic bottle: Gram Positive Cocci in Clusters   Growth in aerobic bottle: Gram Positive Cocci in Clusters    02-25 .Blood XXXX   Growth in anaerobic bottle: Gram Positive Cocci in Clusters  Growth in aerobic bottle: Gram Positive Cocci in Clusters   Growth in anaerobic bottle: Gram Positive Cocci in Clusters  Growth in aerobic bottle: Gram Positive Cocci in Clusters    02-23 .Blood XXXX   Growth in aerobic bottle: Gram Positive Cocci in Clusters  Growth in anaerobic bottle: Gram Positive Cocci in Clusters   Growth in aerobic and anaerobic bottles: Methicillin resistant  Staphylococcus aureus  See previous culture [id number]    02-22 .Blood XXXX   Growth in aerobic bottle: Gram Positive Cocci in Clusters  Growth in anaerobic bottle: Gram Positive Cocci in Clusters   Growth in aerobic and anaerobic bottles: Methicillin resistant  Staphylococcus aureus  See previous culture [id number]    02-21 .Blood XXXX   Growth in aerobic bottle: Gram Positive Cocci in Clusters  Growth in anaerobic bottle: Gram Positive Cocci in Clusters   Growth in aerobic and anaerobic bottles: Methicillin resistant  Staphylococcus aureus  See previous culture [id number]    02-21 .Blood Received only Aerobic (Blue Top) Bottle. XXXX   Growth in aerobic bottle: Gram Positive Cocci in Clusters   Growth in aerobic bottle: Methicillin resistant Staphylococcus aureus    02-21 .Blood Methicillin resistant Staphylococcus aureus   Growth in aerobic bottle: Gram Positive Cocci in Clusters  Growth in anaerobic bottle: Gram Positive Cocci in Clusters   Growth in aerobic and anaerobic bottles: Methicillin resistant  Staphylococcus aureus          RADIOLOGY & ADDITIONAL STUDIES:

## 2019-02-28 LAB
-  AMPICILLIN/SULBACTAM: SIGNIFICANT CHANGE UP
-  CEFAZOLIN: SIGNIFICANT CHANGE UP
-  CLINDAMYCIN: SIGNIFICANT CHANGE UP
-  DAPTOMYCIN: SIGNIFICANT CHANGE UP
-  ERYTHROMYCIN: SIGNIFICANT CHANGE UP
-  GENTAMICIN: SIGNIFICANT CHANGE UP
-  LINEZOLID: SIGNIFICANT CHANGE UP
-  OXACILLIN: SIGNIFICANT CHANGE UP
-  RIFAMPIN: SIGNIFICANT CHANGE UP
-  TETRACYCLINE: SIGNIFICANT CHANGE UP
-  TRIMETHOPRIM/SULFAMETHOXAZOLE: SIGNIFICANT CHANGE UP
-  VANCOMYCIN: SIGNIFICANT CHANGE UP
ANION GAP SERPL CALC-SCNC: 9 MMOL/L — SIGNIFICANT CHANGE UP (ref 5–17)
BUN SERPL-MCNC: 10 MG/DL — SIGNIFICANT CHANGE UP (ref 7–23)
CALCIUM SERPL-MCNC: 7.9 MG/DL — LOW (ref 8.5–10.1)
CHLORIDE SERPL-SCNC: 101 MMOL/L — SIGNIFICANT CHANGE UP (ref 96–108)
CO2 SERPL-SCNC: 25 MMOL/L — SIGNIFICANT CHANGE UP (ref 22–31)
CREAT SERPL-MCNC: 0.4 MG/DL — LOW (ref 0.5–1.3)
CULTURE RESULTS: SIGNIFICANT CHANGE UP
GLUCOSE SERPL-MCNC: 92 MG/DL — SIGNIFICANT CHANGE UP (ref 70–99)
GRAM STN FLD: SIGNIFICANT CHANGE UP
METHOD TYPE: SIGNIFICANT CHANGE UP
ORGANISM # SPEC MICROSCOPIC CNT: SIGNIFICANT CHANGE UP
ORGANISM # SPEC MICROSCOPIC CNT: SIGNIFICANT CHANGE UP
POTASSIUM SERPL-MCNC: 3.7 MMOL/L — SIGNIFICANT CHANGE UP (ref 3.5–5.3)
POTASSIUM SERPL-SCNC: 3.7 MMOL/L — SIGNIFICANT CHANGE UP (ref 3.5–5.3)
SODIUM SERPL-SCNC: 135 MMOL/L — SIGNIFICANT CHANGE UP (ref 135–145)
SPECIMEN SOURCE: SIGNIFICANT CHANGE UP
VANCOMYCIN TROUGH SERPL-MCNC: 18.4 UG/ML — SIGNIFICANT CHANGE UP (ref 10–20)

## 2019-02-28 PROCEDURE — 99233 SBSQ HOSP IP/OBS HIGH 50: CPT

## 2019-02-28 RX ADMIN — Medication 650 MILLIGRAM(S): at 14:52

## 2019-02-28 RX ADMIN — METHADONE HYDROCHLORIDE 20 MILLIGRAM(S): 40 TABLET ORAL at 13:16

## 2019-02-28 RX ADMIN — Medication 166.67 MILLIGRAM(S): at 14:49

## 2019-02-28 RX ADMIN — Medication 1 PATCH: at 13:40

## 2019-02-28 RX ADMIN — CHLORHEXIDINE GLUCONATE 1 APPLICATION(S): 213 SOLUTION TOPICAL at 13:40

## 2019-02-28 RX ADMIN — Medication 1 PATCH: at 13:16

## 2019-02-28 RX ADMIN — Medication 650 MILLIGRAM(S): at 13:27

## 2019-02-28 RX ADMIN — Medication 650 MILLIGRAM(S): at 21:00

## 2019-02-28 RX ADMIN — Medication 1 PATCH: at 13:00

## 2019-02-28 RX ADMIN — Medication 166.67 MILLIGRAM(S): at 21:14

## 2019-02-28 RX ADMIN — Medication 166.67 MILLIGRAM(S): at 06:00

## 2019-02-28 RX ADMIN — Medication 650 MILLIGRAM(S): at 20:26

## 2019-02-28 RX ADMIN — DAPTOMYCIN 117.2 MILLIGRAM(S): 500 INJECTION, POWDER, LYOPHILIZED, FOR SOLUTION INTRAVENOUS at 16:36

## 2019-02-28 NOTE — PROVIDER CONTACT NOTE (CRITICAL VALUE NOTIFICATION) - ACTION/TREATMENT ORDERED:
Patient is on antibiotic treatment, Daptomycin IVPB and Vancomycin IVBP Patient is on antibiotic treatment, Daptomycin IVPB and Vancomycin IVBP. Josefa NP will check.

## 2019-02-28 NOTE — PROGRESS NOTE ADULT - SUBJECTIVE AND OBJECTIVE BOX
Patient is a 43y old  Female who presents with a chief complaint of AMS (28 Feb 2019 13:17)      INTERVAL HPI / OVERNIGHT EVENTS: transferred out of ICU    MEDICATIONS  (STANDING):  chlorhexidine 4% Liquid 1 Application(s) Topical <User Schedule>  DAPTOmycin IVPB 430 milliGRAM(s) IV Intermittent every 24 hours  DAPTOmycin IVPB      methadone    Tablet 20 milliGRAM(s) Oral daily  nicotine - 21 mG/24Hr(s) Patch 1 patch Transdermal daily  vancomycin  IVPB 1250 milliGRAM(s) IV Intermittent every 8 hours    MEDICATIONS  (PRN):  acetaminophen   Tablet .. 650 milliGRAM(s) Oral every 6 hours PRN Temp greater or equal to 38C (100.4F)      Vital Signs Last 24 Hrs  T(C): 38.1 (28 Feb 2019 13:00), Max: 38.8 (28 Feb 2019 09:57)  T(F): 100.6 (28 Feb 2019 13:00), Max: 101.9 (28 Feb 2019 09:57)  HR: 85 (28 Feb 2019 11:18) (85 - 97)  BP: 103/60 (28 Feb 2019 11:18) (96/50 - 114/75)  BP(mean): 61 (27 Feb 2019 16:00) (61 - 61)  RR: 16 (28 Feb 2019 11:18) (16 - 23)  SpO2: 98% (28 Feb 2019 11:18) (98% - 100%)    Review of systems:  General :+ fever /chills, fatigue  CVS : no chest pain, palpitations  Lungs : no shortness of breath, cough  GI : no abdominal pain, vomiting, diarrhea   : no dysuria, hematuria        PHYSICAL EXAM:  General :NAD  Constitutional: well-developed  Respiratory: CTAB/L  Cardiovascular: S1 and S2, RRR, no M/G/R  Gastrointestinal: BS+, soft, NT/ND  Extremities: No peripheral edema  Vascular: 2+ peripheral pulses  Skin: No rashes      LABS:                        7.9    9.07  )-----------( 230      ( 27 Feb 2019 04:20 )             25.5     02-28    135  |  101  |  10  ----------------------------<  92  3.7   |  25  |  0.40<L>    Ca    7.9<L>      28 Feb 2019 12:40  Phos  3.3     02-27  Mg     1.6     02-27            MICROBIOLOGY:  RECENT CULTURES:  02-27 .Blood XXXX   Growth in anaerobic bottle: Gram Positive Cocci in Clusters   Growth in anaerobic bottle: Gram Positive Cocci in Clusters    02-26 .Blood XXXX   Growth in aerobic bottle: Gram Positive Cocci in Clusters   Growth in aerobic bottle: Staphylococcus aureus  Growth in aerobic bottle: Staphylococcus aureus  See previous culture [id number]    02-25 .Blood XXXX   Growth in anaerobic bottle: Gram Positive Cocci in Clusters  Growth in aerobic bottle: Gram Positive Cocci in Clusters   Growth in aerobic and anaerobic bottles: Staphylococcus aureus    02-23 .Blood XXXX   Growth in aerobic bottle: Gram Positive Cocci in Clusters  Growth in anaerobic bottle: Gram Positive Cocci in Clusters   Growth in aerobic and anaerobic bottles: Methicillin resistant  Staphylococcus aureus  See previous culture [id number]    02-22 .Blood XXXX   Growth in aerobic bottle: Gram Positive Cocci in Clusters  Growth in anaerobic bottle: Gram Positive Cocci in Clusters   Growth in aerobic and anaerobic bottles: Methicillin resistant  Staphylococcus aureus  See previous culture [id number]    02-21 .Blood XXXX   Growth in aerobic bottle: Gram Positive Cocci in Clusters  Growth in anaerobic bottle: Gram Positive Cocci in Clusters   Growth in aerobic and anaerobic bottles: Methicillin resistant  Staphylococcus aureus  See previous culture [id number]    02-21 .Blood Received only Aerobic (Blue Top) Bottle. XXXX   Growth in aerobic bottle: Gram Positive Cocci in Clusters   Growth in aerobic bottle: Methicillin resistant Staphylococcus aureus          RADIOLOGY & ADDITIONAL STUDIES:

## 2019-02-28 NOTE — PROGRESS NOTE ADULT - ASSESSMENT
42 y/o F w/polysubstance abuse presenting with severe sepsis with septic shock secondary to MRSA bacteremia. LIZ positive for endocarditis. EFRAIN likely secondary to sepsis now improved. Acute respiratory failure requiring intubation. New diagnosis of HCV.    - Continue IV Vancomycin 1,250 mg every 8 hours, completed course of aztreonam, now on IV Daptomycin 430 mg/day.    - Daily cultures until cleared. TTE confirms tricuspid endocarditis.  HGB acceptable 7.9, follow daily.        Follow daily blood cultures for clearing of bacteremia. May require CT surgical evaluation for possible surgery if blood cultures continue to remain        persitently positive. Continue methadone for opiate abuse 20 mg/day.   CT chest non-contrast ordered to r/o septic pulmonary emboli.     - Outpatient follow up for HCV  - PT/OT  - Speech and swallow  - Downgrade to floor

## 2019-02-28 NOTE — PROGRESS NOTE ADULT - SUBJECTIVE AND OBJECTIVE BOX
INTERVAL HPI/OVERNIGHT EVENTS:  Pt seen and examined at bedside.     Allergies/Intolerance: penicillin (Short breath; Hives)      MEDICATIONS  (STANDING):  chlorhexidine 4% Liquid 1 Application(s) Topical <User Schedule>  DAPTOmycin IVPB 430 milliGRAM(s) IV Intermittent every 24 hours  DAPTOmycin IVPB      methadone    Tablet 20 milliGRAM(s) Oral daily  nicotine - 21 mG/24Hr(s) Patch 1 patch Transdermal daily  vancomycin  IVPB 1250 milliGRAM(s) IV Intermittent every 8 hours    MEDICATIONS  (PRN):  acetaminophen   Tablet .. 650 milliGRAM(s) Oral every 6 hours PRN Temp greater or equal to 38C (100.4F)        ROS: all systems reviewed and wnl      PHYSICAL EXAMINATION:  Vital Signs Last 24 Hrs  T(C): 37.1 (28 Feb 2019 11:18), Max: 38.8 (28 Feb 2019 09:57)  T(F): 101.6 (28 Feb 2019 11:18), Max: 101.9 (28 Feb 2019 09:57)  HR: 85 (28 Feb 2019 11:18) (85 - 102)  BP: 103/60 (28 Feb 2019 11:18) (89/46 - 114/75)  BP(mean): 61 (27 Feb 2019 16:00) (51 - 61)  RR: 16 (28 Feb 2019 11:18) (16 - 23)  SpO2: 98% (28 Feb 2019 11:18) (98% - 100%)  CAPILLARY BLOOD GLUCOSE          02-27 @ 07:01  -  02-28 @ 07:00  --------------------------------------------------------  IN: 760 mL / OUT: 300 mL / NET: 460 mL        GENERAL:   NECK: supple, No JVD  CHEST/LUNG: clear to auscultation bilaterally; no rales, rhonchi, or wheezing b/l  HEART: normal S1, S2  ABDOMEN: BS+, soft, ND, NT   EXTREMITIES:  pulses palpable; no clubbing, cyanosis, or edema b/l LEs  SKIN: no rashes or lesions      LABS:                        7.9    9.07  )-----------( 230      ( 27 Feb 2019 04:20 )             25.5     02-28    135  |  101  |  10  ----------------------------<  92  3.7   |  25  |  0.40<L>    Ca    7.9<L>      28 Feb 2019 12:40  Phos  3.3     02-27  Mg     1.6     02-27 INTERVAL HPI/OVERNIGHT EVENTS:  Pt seen and examined at bedside.     Allergies/Intolerance: penicillin (Short breath; Hives)      MEDICATIONS  (STANDING):  chlorhexidine 4% Liquid 1 Application(s) Topical <User Schedule>  DAPTOmycin IVPB 430 milliGRAM(s) IV Intermittent every 24 hours  DAPTOmycin IVPB      methadone    Tablet 20 milliGRAM(s) Oral daily  nicotine - 21 mG/24Hr(s) Patch 1 patch Transdermal daily  vancomycin  IVPB 1250 milliGRAM(s) IV Intermittent every 8 hours    MEDICATIONS  (PRN):  acetaminophen   Tablet .. 650 milliGRAM(s) Oral every 6 hours PRN Temp greater or equal to 38C (100.4F)        ROS: all systems reviewed and wnl      PHYSICAL EXAMINATION:  Vital Signs Last 24 Hrs  T(C): 37.1 (28 Feb 2019 11:18), Max: 38.8 (28 Feb 2019 09:57)  T(F): 101.6 (28 Feb 2019 11:18), Max: 101.9 (28 Feb 2019 09:57)  HR: 85 (28 Feb 2019 11:18) (85 - 102)  BP: 103/60 (28 Feb 2019 11:18) (89/46 - 114/75)  BP(mean): 61 (27 Feb 2019 16:00) (51 - 61)  RR: 16 (28 Feb 2019 11:18) (16 - 23)  SpO2: 98% (28 Feb 2019 11:18) (98% - 100%)  CAPILLARY BLOOD GLUCOSE          02-27 @ 07:01  -  02-28 @ 07:00  --------------------------------------------------------  IN: 760 mL / OUT: 300 mL / NET: 460 mL        GENERAL: stable in bed, NAD, still fevers, no joint pains  NECK: supple, No JVD  CHEST/LUNG: clear to auscultation bilaterally; no rales, rhonchi, or wheezing b/l  HEART: normal S1, S2  ABDOMEN: BS+, soft, ND, NT   EXTREMITIES:  pulses palpable; no clubbing, cyanosis, or edema b/l LEs  SKIN: no rashes or lesions      LABS:                        7.9    9.07  )-----------( 230      ( 27 Feb 2019 04:20 )             25.5     02-28    135  |  101  |  10  ----------------------------<  92  3.7   |  25  |  0.40<L>    Ca    7.9<L>      28 Feb 2019 12:40  Phos  3.3     02-27  Mg     1.6     02-27

## 2019-02-28 NOTE — PROGRESS NOTE ADULT - PROBLEM SELECTOR PLAN 1
cont vanco at current dose vanco level now therapeutic   cont dapto   repeat blood c/s from 2/27 still positive  spoke with Dr Tracy and he will have his colleague Dr Patino (cardiac surgeon ) call me to discuss  pt may require valvectomy of TV as not clearing bacteremia

## 2019-03-01 LAB
ANION GAP SERPL CALC-SCNC: 8 MMOL/L — SIGNIFICANT CHANGE UP (ref 5–17)
BUN SERPL-MCNC: 9 MG/DL — SIGNIFICANT CHANGE UP (ref 7–23)
CALCIUM SERPL-MCNC: 7.9 MG/DL — LOW (ref 8.5–10.1)
CHLORIDE SERPL-SCNC: 99 MMOL/L — SIGNIFICANT CHANGE UP (ref 96–108)
CO2 SERPL-SCNC: 26 MMOL/L — SIGNIFICANT CHANGE UP (ref 22–31)
CREAT SERPL-MCNC: 0.39 MG/DL — LOW (ref 0.5–1.3)
CULTURE RESULTS: SIGNIFICANT CHANGE UP
CULTURE RESULTS: SIGNIFICANT CHANGE UP
GLUCOSE SERPL-MCNC: 127 MG/DL — HIGH (ref 70–99)
GRAM STN FLD: SIGNIFICANT CHANGE UP
GRAM STN FLD: SIGNIFICANT CHANGE UP
HCT VFR BLD CALC: 25 % — LOW (ref 34.5–45)
HGB BLD-MCNC: 7.7 G/DL — LOW (ref 11.5–15.5)
MCHC RBC-ENTMCNC: 24.3 PG — LOW (ref 27–34)
MCHC RBC-ENTMCNC: 30.8 GM/DL — LOW (ref 32–36)
MCV RBC AUTO: 78.9 FL — LOW (ref 80–100)
NRBC # BLD: 0 /100 WBCS — SIGNIFICANT CHANGE UP (ref 0–0)
PLATELET # BLD AUTO: 263 K/UL — SIGNIFICANT CHANGE UP (ref 150–400)
POTASSIUM SERPL-MCNC: 3.3 MMOL/L — LOW (ref 3.5–5.3)
POTASSIUM SERPL-SCNC: 3.3 MMOL/L — LOW (ref 3.5–5.3)
RBC # BLD: 3.17 M/UL — LOW (ref 3.8–5.2)
RBC # FLD: 18.4 % — HIGH (ref 10.3–14.5)
SODIUM SERPL-SCNC: 133 MMOL/L — LOW (ref 135–145)
SPECIMEN SOURCE: SIGNIFICANT CHANGE UP
SPECIMEN SOURCE: SIGNIFICANT CHANGE UP
WBC # BLD: 7.47 K/UL — SIGNIFICANT CHANGE UP (ref 3.8–10.5)
WBC # FLD AUTO: 7.47 K/UL — SIGNIFICANT CHANGE UP (ref 3.8–10.5)

## 2019-03-01 PROCEDURE — 99233 SBSQ HOSP IP/OBS HIGH 50: CPT

## 2019-03-01 PROCEDURE — 71250 CT THORAX DX C-: CPT | Mod: 26

## 2019-03-01 RX ORDER — CEFTRIAXONE 500 MG/1
1 INJECTION, POWDER, FOR SOLUTION INTRAMUSCULAR; INTRAVENOUS EVERY 24 HOURS
Qty: 0 | Refills: 0 | Status: DISCONTINUED | OUTPATIENT
Start: 2019-03-01 | End: 2019-03-01

## 2019-03-01 RX ORDER — METHADONE HYDROCHLORIDE 40 MG/1
20 TABLET ORAL DAILY
Qty: 0 | Refills: 0 | Status: DISCONTINUED | OUTPATIENT
Start: 2019-03-01 | End: 2019-03-08

## 2019-03-01 RX ADMIN — Medication 166.67 MILLIGRAM(S): at 15:00

## 2019-03-01 RX ADMIN — Medication 650 MILLIGRAM(S): at 07:38

## 2019-03-01 RX ADMIN — Medication 1 PATCH: at 12:22

## 2019-03-01 RX ADMIN — METHADONE HYDROCHLORIDE 20 MILLIGRAM(S): 40 TABLET ORAL at 12:23

## 2019-03-01 RX ADMIN — Medication 1 TABLET(S): at 12:22

## 2019-03-01 RX ADMIN — Medication 650 MILLIGRAM(S): at 16:36

## 2019-03-01 RX ADMIN — Medication 650 MILLIGRAM(S): at 22:18

## 2019-03-01 RX ADMIN — Medication 166.67 MILLIGRAM(S): at 05:37

## 2019-03-01 RX ADMIN — DAPTOMYCIN 117.2 MILLIGRAM(S): 500 INJECTION, POWDER, LYOPHILIZED, FOR SOLUTION INTRAVENOUS at 16:36

## 2019-03-01 RX ADMIN — Medication 1 PATCH: at 12:23

## 2019-03-01 RX ADMIN — Medication 166.67 MILLIGRAM(S): at 21:47

## 2019-03-01 RX ADMIN — Medication 1 PATCH: at 07:41

## 2019-03-01 RX ADMIN — Medication 650 MILLIGRAM(S): at 23:30

## 2019-03-01 RX ADMIN — CHLORHEXIDINE GLUCONATE 1 APPLICATION(S): 213 SOLUTION TOPICAL at 11:03

## 2019-03-01 NOTE — PROGRESS NOTE ADULT - SUBJECTIVE AND OBJECTIVE BOX
INTERVAL HPI/OVERNIGHT EVENTS:  Pt seen and examined at bedside.     Allergies/Intolerance: penicillin (Short breath; Hives)      MEDICATIONS  (STANDING):  chlorhexidine 4% Liquid 1 Application(s) Topical <User Schedule>  DAPTOmycin IVPB 430 milliGRAM(s) IV Intermittent every 24 hours  DAPTOmycin IVPB      multivitamin 1 Tablet(s) Oral daily  nicotine - 21 mG/24Hr(s) Patch 1 patch Transdermal daily  vancomycin  IVPB 1250 milliGRAM(s) IV Intermittent every 8 hours    MEDICATIONS  (PRN):  acetaminophen   Tablet .. 650 milliGRAM(s) Oral every 6 hours PRN Temp greater or equal to 38C (100.4F)        ROS: all systems reviewed and wnl      PHYSICAL EXAMINATION:  Vital Signs Last 24 Hrs  T(C): 37.1 (01 Mar 2019 12:00), Max: 39 (28 Feb 2019 20:00)  T(F): 98.8 (01 Mar 2019 12:00), Max: 102.2 (28 Feb 2019 20:00)  HR: 96 (01 Mar 2019 12:00) (88 - 96)  BP: 113/64 (01 Mar 2019 12:00) (95/51 - 113/64)  BP(mean): --  RR: 16 (01 Mar 2019 12:00) (16 - 20)  SpO2: 98% (01 Mar 2019 12:00) (97% - 100%)  CAPILLARY BLOOD GLUCOSE          02-28 @ 07:01  -  03-01 @ 07:00  --------------------------------------------------------  IN: 800 mL / OUT: 800 mL / NET: 0 mL        GENERAL:   NECK: supple, No JVD  CHEST/LUNG: clear to auscultation bilaterally; no rales, rhonchi, or wheezing b/l  HEART: normal S1, S2  ABDOMEN: BS+, soft, ND, NT   EXTREMITIES:  pulses palpable; no clubbing, cyanosis, or edema b/l LEs  SKIN: no rashes or lesions      LABS:                        7.7    7.47  )-----------( 263      ( 01 Mar 2019 08:06 )             25.0     03-01    133<L>  |  99  |  9   ----------------------------<  127<H>  3.3<L>   |  26  |  0.39<L>    Ca    7.9<L>      01 Mar 2019 08:06 INTERVAL HPI/OVERNIGHT EVENTS:  Pt seen and examined at bedside.     Allergies/Intolerance: penicillin (Short breath; Hives)      MEDICATIONS  (STANDING):  chlorhexidine 4% Liquid 1 Application(s) Topical <User Schedule>  DAPTOmycin IVPB 430 milliGRAM(s) IV Intermittent every 24 hours  DAPTOmycin IVPB      multivitamin 1 Tablet(s) Oral daily  nicotine - 21 mG/24Hr(s) Patch 1 patch Transdermal daily  vancomycin  IVPB 1250 milliGRAM(s) IV Intermittent every 8 hours    MEDICATIONS  (PRN):  acetaminophen   Tablet .. 650 milliGRAM(s) Oral every 6 hours PRN Temp greater or equal to 38C (100.4F)        ROS: all systems reviewed and wnl      PHYSICAL EXAMINATION:  Vital Signs Last 24 Hrs  T(C): 37.1 (01 Mar 2019 12:00), Max: 39 (28 Feb 2019 20:00)  T(F): 98.8 (01 Mar 2019 12:00), Max: 102.2 (28 Feb 2019 20:00)  HR: 96 (01 Mar 2019 12:00) (88 - 96)  BP: 113/64 (01 Mar 2019 12:00) (95/51 - 113/64)  BP(mean): --  RR: 16 (01 Mar 2019 12:00) (16 - 20)  SpO2: 98% (01 Mar 2019 12:00) (97% - 100%)  CAPILLARY BLOOD GLUCOSE          02-28 @ 07:01  -  03-01 @ 07:00  --------------------------------------------------------  IN: 800 mL / OUT: 800 mL / NET: 0 mL        GENERAL: comfortable in bed, no new complaints. Still low grade fevers  NECK: supple, No JVD  CHEST/LUNG: clear to auscultation bilaterally; no rales, rhonchi, or wheezing b/l  HEART: normal S1, S2  ABDOMEN: BS+, soft, ND, NT   EXTREMITIES:  pulses palpable; no clubbing, cyanosis, or edema b/l LEs  SKIN: no rashes or lesions      LABS:                        7.7    7.47  )-----------( 263      ( 01 Mar 2019 08:06 )             25.0     03-01    133<L>  |  99  |  9   ----------------------------<  127<H>  3.3<L>   |  26  |  0.39<L>    Ca    7.9<L>      01 Mar 2019 08:06

## 2019-03-01 NOTE — PROGRESS NOTE ADULT - ASSESSMENT
42 y/o F w/polysubstance abuse presenting with severe sepsis with septic shock secondary to MRSA bacteremia. LIZ positive for endocarditis. EFRAIN likely secondary to sepsis now improved. Acute respiratory failure requiring intubation. New diagnosis of HCV.    - Continue IV Vancomycin 1,250 mg every 8 hours and IV Daptomycin 430 mg/day.    - Daily cultures until cleared. TTE confirms tricuspid endocarditis.  HGB acceptable 7.9, follow daily.        Follow daily blood cultures for clearing of bacteremia. May require CT surgical evaluation for possible surgery if blood cultures continue to remain        persitently positive. Continue methadone for opiate abuse 20 mg/day.   CT chest non-contrast ordered confirms septic pulmonary emboli  and some have worsened.  CT surgery is informed of case. May need transfer to [facility] after weekend for tricuspid valve surgey.        - Outpatient follow up for HCV    - PT/OT    DVT prophylaxis with PAS given anemia of 7.7.

## 2019-03-01 NOTE — PROGRESS NOTE ADULT - SUBJECTIVE AND OBJECTIVE BOX
Patient is a 43y old  Female who presents with a chief complaint of AMS (01 Mar 2019 12:25)      INTERVAL HPI / OVERNIGHT EVENTS:    MEDICATIONS  (STANDING):  chlorhexidine 4% Liquid 1 Application(s) Topical <User Schedule>  DAPTOmycin IVPB 430 milliGRAM(s) IV Intermittent every 24 hours  DAPTOmycin IVPB      methadone    Tablet 20 milliGRAM(s) Oral daily  multivitamin 1 Tablet(s) Oral daily  nicotine - 21 mG/24Hr(s) Patch 1 patch Transdermal daily  vancomycin  IVPB 1250 milliGRAM(s) IV Intermittent every 8 hours    MEDICATIONS  (PRN):  acetaminophen   Tablet .. 650 milliGRAM(s) Oral every 6 hours PRN Temp greater or equal to 38C (100.4F)      Vital Signs Last 24 Hrs  T(C): 37.1 (01 Mar 2019 12:00), Max: 39 (28 Feb 2019 20:00)  T(F): 98.8 (01 Mar 2019 12:00), Max: 102.2 (28 Feb 2019 20:00)  HR: 100 (01 Mar 2019 15:38) (88 - 100)  BP: 105/62 (01 Mar 2019 15:38) (95/51 - 113/64)  BP(mean): --  RR: 20 (01 Mar 2019 15:38) (16 - 20)  SpO2: 99% (01 Mar 2019 15:38) (97% - 100%)    Review of systems:  General : no fever /chills,fatigue  CVS : no chest pain, palpitations  Lungs : no shortness of breath, cough  GI : no abdominal pain,vomiting, diarrhea   : no dysuria,hematuria        PHYSICAL EXAM:  General :NAD  Constitutional:  well-groomed, well-developed  Respiratory: CTAB/L  Cardiovascular: S1 and S2, RRR, no M/G/R  Gastrointestinal: BS+, soft, NT/ND  Extremities: No peripheral edema  Vascular: 2+ peripheral pulses  Skin: No rashes      LABS:                        7.7    7.47  )-----------( 263      ( 01 Mar 2019 08:06 )             25.0     03-01    133<L>  |  99  |  9   ----------------------------<  127<H>  3.3<L>   |  26  |  0.39<L>    Ca    7.9<L>      01 Mar 2019 08:06            MICROBIOLOGY:   RECENT CULTURES:  02-28 .Blood XXXX XXXX   No growth to date.    02-27 .Blood XXXX   Growth in anaerobic bottle: Gram Positive Cocci in Clusters   Growth in anaerobic bottle: Methicillin resistant Staphylococcus aureus  See previous culture [id number]    02-26 .Blood XXXX   Growth in aerobic bottle: Gram Positive Cocci in Clusters   Growth in aerobic bottle: Methicillin resistant Staphylococcus aureus  See previous culture [id number]    02-25 .Blood Methicillin resistant Staphylococcus aureus   Growth in anaerobic bottle: Gram Positive Cocci in Clusters  Growth in aerobic bottle: Gram Positive Cocci in Clusters   Growth in aerobic and anaerobic bottles: Methicillin resistant  Staphylococcus aureus    02-23 .Blood XXXX   Growth in aerobic bottle: Gram Positive Cocci in Clusters  Growth in anaerobic bottle: Gram Positive Cocci in Clusters   Growth in aerobic and anaerobic bottles: Methicillin resistant  Staphylococcus aureus  See previous culture [id number]          RADIOLOGY & ADDITIONAL STUDIES:

## 2019-03-02 LAB — VANCOMYCIN TROUGH SERPL-MCNC: 16.1 UG/ML — SIGNIFICANT CHANGE UP (ref 10–20)

## 2019-03-02 PROCEDURE — 99233 SBSQ HOSP IP/OBS HIGH 50: CPT

## 2019-03-02 RX ORDER — DOCUSATE SODIUM 100 MG
100 CAPSULE ORAL THREE TIMES A DAY
Qty: 0 | Refills: 0 | Status: DISCONTINUED | OUTPATIENT
Start: 2019-03-02 | End: 2019-03-20

## 2019-03-02 RX ORDER — FOLIC ACID 0.8 MG
1 TABLET ORAL DAILY
Qty: 0 | Refills: 0 | Status: DISCONTINUED | OUTPATIENT
Start: 2019-03-02 | End: 2019-03-20

## 2019-03-02 RX ORDER — SENNA PLUS 8.6 MG/1
2 TABLET ORAL AT BEDTIME
Qty: 0 | Refills: 0 | Status: DISCONTINUED | OUTPATIENT
Start: 2019-03-02 | End: 2019-03-20

## 2019-03-02 RX ADMIN — Medication 1 PATCH: at 13:13

## 2019-03-02 RX ADMIN — Medication 166.67 MILLIGRAM(S): at 06:57

## 2019-03-02 RX ADMIN — Medication 166.67 MILLIGRAM(S): at 22:05

## 2019-03-02 RX ADMIN — Medication 1 PATCH: at 13:16

## 2019-03-02 RX ADMIN — CHLORHEXIDINE GLUCONATE 1 APPLICATION(S): 213 SOLUTION TOPICAL at 11:00

## 2019-03-02 RX ADMIN — Medication 650 MILLIGRAM(S): at 23:10

## 2019-03-02 RX ADMIN — DAPTOMYCIN 117.2 MILLIGRAM(S): 500 INJECTION, POWDER, LYOPHILIZED, FOR SOLUTION INTRAVENOUS at 15:24

## 2019-03-02 RX ADMIN — Medication 166.67 MILLIGRAM(S): at 13:17

## 2019-03-02 RX ADMIN — METHADONE HYDROCHLORIDE 20 MILLIGRAM(S): 40 TABLET ORAL at 12:01

## 2019-03-02 RX ADMIN — Medication 650 MILLIGRAM(S): at 15:24

## 2019-03-02 RX ADMIN — Medication 650 MILLIGRAM(S): at 06:24

## 2019-03-02 RX ADMIN — Medication 100 MILLIGRAM(S): at 22:04

## 2019-03-02 RX ADMIN — Medication 650 MILLIGRAM(S): at 22:04

## 2019-03-02 RX ADMIN — SENNA PLUS 2 TABLET(S): 8.6 TABLET ORAL at 22:04

## 2019-03-02 RX ADMIN — Medication 1 TABLET(S): at 12:01

## 2019-03-02 RX ADMIN — Medication 650 MILLIGRAM(S): at 07:00

## 2019-03-02 NOTE — PROGRESS NOTE ADULT - ASSESSMENT
42 y/o F w/polysubstance abuse presenting with severe sepsis with septic shock secondary to MRSA bacteremia. LIZ positive for endocarditis. EFRAIN likely secondary to sepsis now improved. Acute respiratory failure requiring intubation. New diagnosis of HCV.        1 Sepsis secondary to MRSA Bacteremia  +fever 38.2 otherwise vitals stable tylenol prn  - Continue IV Vancomycin 1,250 mg every 8 hours and IV Daptomycin 430 mg/day.    - Daily cultures, Follow daily blood cultures for clearing of bacteremia  -TTE confirms tricuspid endocarditis.     -May require CT surgical evaluation for possible surgery if blood cultures continue to remain persitently positive.    2 Endocarditis  -TTE confirms tricuspid endocarditis.   CT chest non-contrast ordered confirms septic pulmonary emboli  and some have worsened.  CT surgery is informed of case. May need transfer to [facility] after weekend for tricuspid valve surgey.       2 Opiate and polysubstance abuse  - Continue methadone for opiate abuse 20 mg/day.        - Outpatient follow up for HCV    3 Left knee skin abscess  +fever 38.2 otherwise vitals stable tylenol prn  drainage spontaneously  dressing in place  will continue to [facility]    - PT/OT    DVT prophylaxis with PAS given anemia of 7.7. 42 y/o F w/polysubstance abuse presenting with severe sepsis with septic shock secondary to MRSA bacteremia. LIZ positive for endocarditis. EFRAIN likely secondary to sepsis now improved. Acute respiratory failure requiring intubation. New diagnosis of HCV.        1 Sepsis secondary to MRSA Bacteremia  +fever 38.2 otherwise vitals stable tylenol prn  - Continue IV Vancomycin 1,250 mg every 8 hours and IV Daptomycin 430 mg/day.    - Daily cultures, Follow daily blood cultures for clearing of bacteremia  -TTE confirms tricuspid endocarditis.     -May require CT surgical evaluation for possible surgery if blood cultures continue to remain persitently positive.    2 Endocarditis  -TTE confirms tricuspid endocarditis.   CT chest non-contrast ordered confirms septic pulmonary emboli  and some have worsened.  CT surgery is informed of case. May need transfer to [facility] after weekend for tricuspid valve surgey.       2 Opiate and polysubstance abuse  - Continue methadone for opiate abuse 20 mg/day.        - Outpatient follow up for HCV    3 Left knee skin abscess  +fever 38.2 otherwise vitals stable tylenol prn  drainage spontaneously  dressing in place  will continue to [facility]    4-Anemia  -hgb 7.7  vitals stable   due to active infection will  hold off on iron supplements  -folic acid po daily  -multivitamin po daily  -type and screen [facility]  [facility]         - PT/OT    DVT prophylaxis with PAS given anemia of 7.7. 44 y/o F w/polysubstance abuse presenting with severe sepsis with septic shock secondary to MRSA bacteremia. LIZ positive for endocarditis. EFRAIN likely secondary to sepsis now improved. Acute respiratory failure requiring intubation. New diagnosis of HCV.        1 Sepsis secondary to MRSA Bacteremia  +fever 38.2 otherwise vitals stable tylenol prn  - Continue IV Vancomycin 1,250 mg every 8 hours and IV Daptomycin 430 mg/day.    - Daily cultures, Follow daily blood cultures for clearing of bacteremia  -TTE confirms tricuspid endocarditis.     -May require CT surgical evaluation for possible surgery if blood cultures continue to remain persitently positive.    2 Endocarditis  -TTE confirms tricuspid endocarditis.   CT chest non-contrast ordered confirms septic pulmonary emboli  and some have worsened.  CT surgery is informed of case. May need transfer to [facility] after weekend for tricuspid valve surgey.       2 Opiate and polysubstance abuse  - Continue methadone for opiate abuse 20 mg/day.        - Outpatient follow up for HCV    3 Left knee skin abscess  +fever 38.2 otherwise vitals stable tylenol prn  drainage spontaneously  dressing in place  will continue to [facility]    4-Anemia  -hgb 7.7  vitals stable   due to active infection will  hold off on iron supplements  -folic acid po daily  -multivitamin po daily  -type and screen in am  [facility] stool  ordered         5 [facility]    DVT prophylaxis with PAS given anemia of 7.7.

## 2019-03-02 NOTE — PROGRESS NOTE ADULT - SUBJECTIVE AND OBJECTIVE BOX
43y old  Female admitted w chief complaint of AMS (01 Mar 2019 16:52)    Patient seen and examined at bedside, No acute overnight events.  Slept well  Denies fever, chest pain, SOB, abdominal pain, diarrhea, constipation, calf pain.  No palpitation  Ambulator status: OOB w assist  Patient  eating well, voiding and last BM         VS:   Vital Signs Last 24 Hrs  T(C): 38.2 (02 Mar 2019 16:59), Max: 38.6 (01 Mar 2019 22:10)  T(F): 100.7 (02 Mar 2019 16:59), Max: 101.5 (01 Mar 2019 22:10)  HR: 97 (02 Mar 2019 16:59) (92 - 101)  BP: 108/51 (02 Mar 2019 16:59) (101/49 - 112/61)  BP(mean): --  RR: 18 (02 Mar 2019 16:59) (17 - 18)  SpO2: 97% (02 Mar 2019 16:59) (92% - 97%)    Physical Exam:     General: Pt appears calm and relaxed in bed  Eyes: EOMI, PERRLA pupils 4mm bl  ENMT: moist membranes, no throat erythema/exudates no lymphadenopathy, no JVD,   Respiratory: BL CTA, no wheezes, rubs, nor rhonchi, good BL air entry  Cardiovascular: regular rate and rhythm, +S1/+S2, no murmurs, rubs, gallops  Gastrointestinal: soft nontender, non distended, no rebound, guarding rigidity, bowel sounds in all 4 quadrants  Extremities: no clubbing, cyanosis, erythema,  No lower extremity edema, +ROM x4 extremites  Vascular: radial and Dorsal pedal pulses palpable BL  Neurological: no gross motor/sensory deficits  Skin: + left knee w dressing clean dry intact,   Musculoskeletal: strength equal BL   Psychiatric: no new changes         Labs:                        7.7    7.47  )-----------( 263      ( 01 Mar 2019 08:06 )             25.0   03-01    133<L>  |  99  |  9   ----------------------------<  127<H>  3.3<L>   |  26  |  0.39<L>    Ca    7.9<L>      01 Mar 2019 08:06        03-01 @ 07:01  -  03-02 @ 07:00  --------------------------------------------------------  IN: 601 mL / OUT: 900 mL / NET: -299 mL            Medications:  MEDICATIONS  (STANDING):  chlorhexidine 4% Liquid 1 Application(s) Topical <User Schedule>  DAPTOmycin IVPB 430 milliGRAM(s) IV Intermittent every 24 hours  DAPTOmycin IVPB      methadone    Tablet 20 milliGRAM(s) Oral daily  multivitamin 1 Tablet(s) Oral daily  nicotine - 21 mG/24Hr(s) Patch 1 patch Transdermal daily  vancomycin  IVPB 1250 milliGRAM(s) IV Intermittent every 8 hours    MEDICATIONS  (PRN):  acetaminophen   Tablet .. 650 milliGRAM(s) Oral every 6 hours PRN Temp greater or equal to 38C (100.4F) 43y old  Female admitted w chief complaint of AMS (01 Mar 2019 16:52)    Patient seen and examined at bedside, No acute overnight events.  Slept well, just took shower this morning  Denies fever, chest pain, SOB, abdominal pain, diarrhea, constipation, calf pain.  No palpitation  Ambulator status: OOB w assist  Patient  eating well, voiding       VS:   Vital Signs Last 24 Hrs  T(C): 38.2 (02 Mar 2019 16:59), Max: 38.6 (01 Mar 2019 22:10)  T(F): 100.7 (02 Mar 2019 16:59), Max: 101.5 (01 Mar 2019 22:10)  HR: 97 (02 Mar 2019 16:59) (92 - 101)  BP: 108/51 (02 Mar 2019 16:59) (101/49 - 112/61)  BP(mean): --  RR: 18 (02 Mar 2019 16:59) (17 - 18)  SpO2: 97% (02 Mar 2019 16:59) (92% - 97%)    Physical Exam:     General: Pt appears calm and relaxed in bed  Eyes: EOMI, PERRLA pupils 4mm bl  ENMT: moist membranes, no throat erythema/exudates no lymphadenopathy, no JVD,   Respiratory: BL CTA, no wheezes, rubs, nor rhonchi, good BL air entry  Cardiovascular: regular rate and rhythm, +S1/+S2, no murmurs, rubs, gallops  Gastrointestinal: soft nontender, non distended, no rebound, guarding rigidity, bowel sounds in all 4 quadrants  Extremities: no clubbing, cyanosis, erythema,  No lower extremity edema, +ROM x4 extremites  Vascular: radial and Dorsal pedal pulses palpable BL  Neurological: no gross motor/sensory deficits  Skin: + left knee w dressing clean dry intact,   Musculoskeletal: strength equal BL   Psychiatric: stable appropriate conversation, behavior normal at this time        Labs:                        7.7    7.47  )-----------( 263      ( 01 Mar 2019 08:06 )             25.0   03-01    133<L>  |  99  |  9   ----------------------------<  127<H>  3.3<L>   |  26  |  0.39<L>    Ca    7.9<L>      01 Mar 2019 08:06        03-01 @ 07:01  -  03-02 @ 07:00  --------------------------------------------------------  IN: 601 mL / OUT: 900 mL / NET: -299 mL            Medications:  MEDICATIONS  (STANDING):  chlorhexidine 4% Liquid 1 Application(s) Topical <User Schedule>  DAPTOmycin IVPB 430 milliGRAM(s) IV Intermittent every 24 hours  DAPTOmycin IVPB      methadone    Tablet 20 milliGRAM(s) Oral daily  multivitamin 1 Tablet(s) Oral daily  nicotine - 21 mG/24Hr(s) Patch 1 patch Transdermal daily  vancomycin  IVPB 1250 milliGRAM(s) IV Intermittent every 8 hours    MEDICATIONS  (PRN):  acetaminophen   Tablet .. 650 milliGRAM(s) Oral every 6 hours PRN Temp greater or equal to 38C (100.4F)

## 2019-03-03 LAB
ANION GAP SERPL CALC-SCNC: 8 MMOL/L — SIGNIFICANT CHANGE UP (ref 5–17)
BASOPHILS # BLD AUTO: 0 K/UL — SIGNIFICANT CHANGE UP (ref 0–0.2)
BASOPHILS NFR BLD AUTO: 0 % — SIGNIFICANT CHANGE UP (ref 0–2)
BUN SERPL-MCNC: 9 MG/DL — SIGNIFICANT CHANGE UP (ref 7–23)
CALCIUM SERPL-MCNC: 8.3 MG/DL — LOW (ref 8.5–10.1)
CHLORIDE SERPL-SCNC: 99 MMOL/L — SIGNIFICANT CHANGE UP (ref 96–108)
CO2 SERPL-SCNC: 29 MMOL/L — SIGNIFICANT CHANGE UP (ref 22–31)
CREAT SERPL-MCNC: 0.56 MG/DL — SIGNIFICANT CHANGE UP (ref 0.5–1.3)
EOSINOPHIL # BLD AUTO: 0 K/UL — SIGNIFICANT CHANGE UP (ref 0–0.5)
EOSINOPHIL NFR BLD AUTO: 0 % — SIGNIFICANT CHANGE UP (ref 0–6)
FLU A RESULT: SIGNIFICANT CHANGE UP
FLU A RESULT: SIGNIFICANT CHANGE UP
FLUAV AG NPH QL: SIGNIFICANT CHANGE UP
FLUBV AG NPH QL: SIGNIFICANT CHANGE UP
GLUCOSE SERPL-MCNC: 123 MG/DL — HIGH (ref 70–99)
HCT VFR BLD CALC: 26.4 % — LOW (ref 34.5–45)
HGB BLD-MCNC: 8 G/DL — LOW (ref 11.5–15.5)
LACTATE SERPL-SCNC: 2.4 MMOL/L — HIGH (ref 0.7–2)
LYMPHOCYTES # BLD AUTO: 1.49 K/UL — SIGNIFICANT CHANGE UP (ref 1–3.3)
LYMPHOCYTES # BLD AUTO: 17 % — SIGNIFICANT CHANGE UP (ref 13–44)
MAGNESIUM SERPL-MCNC: 1.8 MG/DL — SIGNIFICANT CHANGE UP (ref 1.6–2.6)
MCHC RBC-ENTMCNC: 24.5 PG — LOW (ref 27–34)
MCHC RBC-ENTMCNC: 30.3 GM/DL — LOW (ref 32–36)
MCV RBC AUTO: 81 FL — SIGNIFICANT CHANGE UP (ref 80–100)
MONOCYTES # BLD AUTO: 0.53 K/UL — SIGNIFICANT CHANGE UP (ref 0–0.9)
MONOCYTES NFR BLD AUTO: 6 % — SIGNIFICANT CHANGE UP (ref 2–14)
NEUTROPHILS # BLD AUTO: 6.58 K/UL — SIGNIFICANT CHANGE UP (ref 1.8–7.4)
NEUTROPHILS NFR BLD AUTO: 74 % — SIGNIFICANT CHANGE UP (ref 43–77)
NRBC # BLD: SIGNIFICANT CHANGE UP /100 WBCS (ref 0–0)
PLATELET # BLD AUTO: 329 K/UL — SIGNIFICANT CHANGE UP (ref 150–400)
POTASSIUM SERPL-MCNC: 3.4 MMOL/L — LOW (ref 3.5–5.3)
POTASSIUM SERPL-SCNC: 3.4 MMOL/L — LOW (ref 3.5–5.3)
RBC # BLD: 3.26 M/UL — LOW (ref 3.8–5.2)
RBC # FLD: 18.7 % — HIGH (ref 10.3–14.5)
RSV RESULT: SIGNIFICANT CHANGE UP
RSV RNA RESP QL NAA+PROBE: SIGNIFICANT CHANGE UP
SODIUM SERPL-SCNC: 136 MMOL/L — SIGNIFICANT CHANGE UP (ref 135–145)
WBC # BLD: 8.77 K/UL — SIGNIFICANT CHANGE UP (ref 3.8–10.5)
WBC # FLD AUTO: 8.77 K/UL — SIGNIFICANT CHANGE UP (ref 3.8–10.5)

## 2019-03-03 PROCEDURE — 99233 SBSQ HOSP IP/OBS HIGH 50: CPT

## 2019-03-03 RX ORDER — POTASSIUM CHLORIDE 20 MEQ
40 PACKET (EA) ORAL ONCE
Qty: 0 | Refills: 0 | Status: COMPLETED | OUTPATIENT
Start: 2019-03-03 | End: 2019-03-03

## 2019-03-03 RX ORDER — IBUPROFEN 200 MG
600 TABLET ORAL ONCE
Qty: 0 | Refills: 0 | Status: COMPLETED | OUTPATIENT
Start: 2019-03-03 | End: 2019-03-03

## 2019-03-03 RX ADMIN — Medication 600 MILLIGRAM(S): at 19:02

## 2019-03-03 RX ADMIN — Medication 100 MILLIGRAM(S): at 22:09

## 2019-03-03 RX ADMIN — Medication 166.67 MILLIGRAM(S): at 22:09

## 2019-03-03 RX ADMIN — Medication 1 MILLIGRAM(S): at 12:18

## 2019-03-03 RX ADMIN — METHADONE HYDROCHLORIDE 20 MILLIGRAM(S): 40 TABLET ORAL at 12:18

## 2019-03-03 RX ADMIN — Medication 166.67 MILLIGRAM(S): at 05:27

## 2019-03-03 RX ADMIN — SENNA PLUS 2 TABLET(S): 8.6 TABLET ORAL at 22:09

## 2019-03-03 RX ADMIN — Medication 1 PATCH: at 14:32

## 2019-03-03 RX ADMIN — Medication 100 MILLIGRAM(S): at 05:26

## 2019-03-03 RX ADMIN — Medication 650 MILLIGRAM(S): at 05:26

## 2019-03-03 RX ADMIN — Medication 1 TABLET(S): at 12:18

## 2019-03-03 RX ADMIN — Medication 600 MILLIGRAM(S): at 18:30

## 2019-03-03 RX ADMIN — Medication 166.67 MILLIGRAM(S): at 13:47

## 2019-03-03 RX ADMIN — Medication 100 MILLIGRAM(S): at 14:33

## 2019-03-03 RX ADMIN — Medication 650 MILLIGRAM(S): at 16:55

## 2019-03-03 RX ADMIN — Medication 5 MILLIGRAM(S): at 19:01

## 2019-03-03 RX ADMIN — DAPTOMYCIN 117.2 MILLIGRAM(S): 500 INJECTION, POWDER, LYOPHILIZED, FOR SOLUTION INTRAVENOUS at 16:55

## 2019-03-03 RX ADMIN — CHLORHEXIDINE GLUCONATE 1 APPLICATION(S): 213 SOLUTION TOPICAL at 12:19

## 2019-03-03 RX ADMIN — Medication 40 MILLIEQUIVALENT(S): at 16:55

## 2019-03-03 NOTE — CHART NOTE - NSCHARTNOTEFT_GEN_A_CORE
Medicine Hospitalist PA    Was notified by RN of fevers. Pt seen and examined denies any complaints. 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 Being treated with Vancomycin and Dapto. Blood cultures have been negative since march 1st. STAT repeat BC x 2. STAT Lactate. STAT Flu/RVP. Discussed with Dr. Garcia, who is planning to transfer patient this week for TV surgery. She has discussed with Dr. Tracy/Dr. Patino Cardiothoracic Sx.     Vital Signs Last 24 Hrs  T(C): 38.2 (03 Mar 2019 19:44), Max: 39.3 (03 Mar 2019 18:02)  T(F): 100.8 (03 Mar 2019 19:44), Max: 102.7 (03 Mar 2019 18:02)  HR: 105 (03 Mar 2019 18:02) (94 - 105)  BP: 123/65 (03 Mar 2019 18:02) (110/62 - 123/65)  RR: 18 (03 Mar 2019 18:02) (16 - 18)  SpO2: 92% (03 Mar 2019 18:02) (92% - 98%)    General: awake, alert & oriented x 3, track marks  HEENT: EOM intact, SINDHU, tongue midline   CV: S1 S2  Resp: Good air entry b/l, no wheezing, no rhonchi  Abd: Soft, NT/ND, BS+  Ext: Left knee abscess, no LE edema, pulses intact, no calf tenderness  Neuro: good finger , no arm/leg drift, sensations intact, no facial droop     A/P: 43 YOF with a PMHx of IV heroin abuse admitted with TV endocarditis, on dapto and vanco.  - STAT BC x2   - STAT Lactate  - Monitor Fevers  - Hot packs/warming blanket  - STAT flu/RVP  - D/w Dr. Garcia who will plan to possibly transfer pt this week for TV surgery  - Continue to monitor

## 2019-03-03 NOTE — PROGRESS NOTE ADULT - SUBJECTIVE AND OBJECTIVE BOX
43y old  Female admitted w chief complaint of AMS     Patient seen and examined at bedside, No acute overnight events.  Slept well,   Denies, chest pain, SOB, abdominal pain, diarrhea, constipation, calf pain.  No palpitation  + fever overnight 101.8F  Ambulator status: OOB w assist  Patient  eating well, voiding       VS:   Vital Signs Last 24 Hrs  T(C): 37.5 (03 Mar 2019 11:47), Max: 38.8 (02 Mar 2019 21:59)  T(F): 99.5 (03 Mar 2019 11:47), Max: 101.8 (02 Mar 2019 21:59)  HR: 94 (03 Mar 2019 11:47) (94 - 97)  BP: 110/62 (03 Mar 2019 11:47) (108/51 - 114/62)  BP(mean): --  RR: 16 (03 Mar 2019 11:47) (16 - 18)  SpO2: 98% (03 Mar 2019 11:47) (96% - 98%)  Physical Exam:     General: Pt appears calm and relaxed in bed  Eyes: EOMI, PERRLA pupils 4mm bl  ENMT: moist membranes, no throat erythema/exudates no lymphadenopathy, no JVD,   Respiratory: BL CTA, no wheezes, rubs, nor rhonchi, good BL air entry  Cardiovascular: regular rate and rhythm, +S1/+S2, no murmurs, rubs, gallops  Gastrointestinal: soft nontender, non distended, no rebound, guarding rigidity, bowel sounds in all 4 quadrants  Extremities: no clubbing, cyanosis, erythema,  No lower extremity edema, +ROM x4 extremites  Vascular: radial and Dorsal pedal pulses palpable BL  Neurological: no gross motor/sensory deficits  Skin: + left knee open granulation tissue noted w draining wound some pus noted, approx round 3cmWx 3cm L  Musculoskeletal: strength equal BL   Psychiatric: stable appropriate conversation, behavior normal at this time        Labs:                                         8.0    8.77  )-----------( 329      ( 03 Mar 2019 10:12 )             26.4   03-03    136  |  99  |  9   ----------------------------<  123<H>  3.4<L>   |  29  |  0.56    Ca    8.3<L>      03 Mar 2019 10:12  Mg     1.8     03-03        03-01 @ 07:01  -  03-02 @ 07:00  --------------------------------------------------------  IN: 601 mL / OUT: 900 mL / NET: -299 mL            Medications:  MEDICATIONS  (STANDING):  chlorhexidine 4% Liquid 1 Application(s) Topical <User Schedule>  DAPTOmycin IVPB 430 milliGRAM(s) IV Intermittent every 24 hours  DAPTOmycin IVPB      methadone    Tablet 20 milliGRAM(s) Oral daily  multivitamin 1 Tablet(s) Oral daily  nicotine - 21 mG/24Hr(s) Patch 1 patch Transdermal daily  vancomycin  IVPB 1250 milliGRAM(s) IV Intermittent every 8 hours    MEDICATIONS  (PRN):  acetaminophen   Tablet .. 650 milliGRAM(s) Oral every 6 hours PRN Temp greater or equal to 38C (100.4F)

## 2019-03-03 NOTE — PROGRESS NOTE ADULT - ASSESSMENT
44 y/o F w/polysubstance abuse presenting with severe sepsis with septic shock secondary to MRSA bacteremia. LIZ positive for endocarditis. EFRAIN likely secondary to sepsis now improved. Acute respiratory failure requiring intubation. New diagnosis of HCV.        1 Sepsis secondary to MRSA Bacteremia  +fever 101.8 otherwise vitals stable tylenol prn  - Continue IV Vancomycin 1,250 mg every 8 hours and IV Daptomycin 430 mg/day.    - Daily cultures, Follow daily blood cultures for clearing of bacteremia  -TTE confirms tricuspid endocarditis.     -May require CT surgical evaluation for possible surgery if blood cultures continue to remain persitently positive.    2 Endocarditis  -TTE confirms tricuspid endocarditis.   CT chest non-contrast ordered confirms septic pulmonary emboli  and some have worsened.  CT surgery is informed of case. May need transfer to [facility] after weekend for tricuspid valve surgey.       2 Opiate and polysubstance abuse  - Continue methadone for opiate abuse 20 mg/day.        - Outpatient follow up for HCV    3 Left knee skin abscess  +fever 101.8 otherwise vitals stable tylenol prn  drainage spontaneously  dressing to be  place rn notified  will continue to [facility]    4-Anemia  -hgb 8 improved  vitals stable   due to active infection will  hold off on iron supplements  -folic acid po daily  -multivitamin po daily  -type and screen in am  guia stool  ordered         5 Preventative Health Care    DVT prophylaxis with PAS given anemia of 8 44 y/o F w/polysubstance abuse presenting with severe sepsis with septic shock secondary to MRSA bacteremia. LIZ positive for endocarditis. EFRAIN likely secondary to sepsis now improved. Acute respiratory failure requiring intubation. New diagnosis of HCV.        1 Sepsis secondary to MRSA Bacteremia  +fever 101.8 otherwise vitals stable tylenol prn  - Continue IV Vancomycin 1,250 mg every 8 hours and IV Daptomycin 430 mg/day.    - Daily cultures, Follow daily blood cultures for clearing of bacteremia  -TTE confirms tricuspid endocarditis.     -May require CT surgical evaluation for possible surgery if blood cultures continue to remain persitently positive.    2 Endocarditis  -TTE confirms tricuspid endocarditis.   CT chest non-contrast ordered confirms septic pulmonary emboli  and some have worsened.  CT surgery is informed of case. May need transfer to [facility] after weekend for tricuspid valve surgey.       2 Opiate and polysubstance abuse  - Continue methadone for opiate abuse 20 mg/day.        - Outpatient follow up for HCV    3 Left knee skin abscess/ wound  +fever 101.8 otherwise vitals stable tylenol prn  drainage spontaneously  dressing to be  place rn notified, and orders placed for dsg changes BID if worsen consider wound care consult  will continue to [facility]    4-Anemia  -hgb 8 improved  vitals stable   due to active infection will  hold off on iron supplements  -folic acid po daily  -multivitamin po daily  -type and screen in am  guiac stool  ordered     5- Hypokalemia-  Potassium 40x 1now for 3.4K  will repeat in am        5 Preventative Health Care    DVT prophylaxis with PAS given anemia of 8

## 2019-03-04 DIAGNOSIS — F19.10 OTHER PSYCHOACTIVE SUBSTANCE ABUSE, UNCOMPLICATED: ICD-10-CM

## 2019-03-04 DIAGNOSIS — E44.0 MODERATE PROTEIN-CALORIE MALNUTRITION: ICD-10-CM

## 2019-03-04 LAB
ANION GAP SERPL CALC-SCNC: 9 MMOL/L — SIGNIFICANT CHANGE UP (ref 5–17)
BASOPHILS # BLD AUTO: 0 K/UL — SIGNIFICANT CHANGE UP (ref 0–0.2)
BASOPHILS NFR BLD AUTO: 0 % — SIGNIFICANT CHANGE UP (ref 0–2)
BUN SERPL-MCNC: 13 MG/DL — SIGNIFICANT CHANGE UP (ref 7–23)
CALCIUM SERPL-MCNC: 8.3 MG/DL — LOW (ref 8.5–10.1)
CHLORIDE SERPL-SCNC: 97 MMOL/L — SIGNIFICANT CHANGE UP (ref 96–108)
CO2 SERPL-SCNC: 28 MMOL/L — SIGNIFICANT CHANGE UP (ref 22–31)
CREAT SERPL-MCNC: 0.48 MG/DL — LOW (ref 0.5–1.3)
EOSINOPHIL # BLD AUTO: 0 K/UL — SIGNIFICANT CHANGE UP (ref 0–0.5)
EOSINOPHIL NFR BLD AUTO: 0 % — SIGNIFICANT CHANGE UP (ref 0–6)
GLUCOSE SERPL-MCNC: 106 MG/DL — HIGH (ref 70–99)
HCT VFR BLD CALC: 22.5 % — LOW (ref 34.5–45)
HGB BLD-MCNC: 7.1 G/DL — LOW (ref 11.5–15.5)
LYMPHOCYTES # BLD AUTO: 1.52 K/UL — SIGNIFICANT CHANGE UP (ref 1–3.3)
LYMPHOCYTES # BLD AUTO: 19 % — SIGNIFICANT CHANGE UP (ref 13–44)
MCHC RBC-ENTMCNC: 24.8 PG — LOW (ref 27–34)
MCHC RBC-ENTMCNC: 31.6 GM/DL — LOW (ref 32–36)
MCV RBC AUTO: 78.7 FL — LOW (ref 80–100)
MONOCYTES # BLD AUTO: 0.56 K/UL — SIGNIFICANT CHANGE UP (ref 0–0.9)
MONOCYTES NFR BLD AUTO: 7 % — SIGNIFICANT CHANGE UP (ref 2–14)
NEUTROPHILS # BLD AUTO: 5.93 K/UL — SIGNIFICANT CHANGE UP (ref 1.8–7.4)
NEUTROPHILS NFR BLD AUTO: 67 % — SIGNIFICANT CHANGE UP (ref 43–77)
NRBC # BLD: SIGNIFICANT CHANGE UP /100 WBCS (ref 0–0)
PLATELET # BLD AUTO: 345 K/UL — SIGNIFICANT CHANGE UP (ref 150–400)
POTASSIUM SERPL-MCNC: 3.8 MMOL/L — SIGNIFICANT CHANGE UP (ref 3.5–5.3)
POTASSIUM SERPL-SCNC: 3.8 MMOL/L — SIGNIFICANT CHANGE UP (ref 3.5–5.3)
RAPID RVP RESULT: SIGNIFICANT CHANGE UP
RBC # BLD: 2.86 M/UL — LOW (ref 3.8–5.2)
RBC # FLD: 18.1 % — HIGH (ref 10.3–14.5)
SODIUM SERPL-SCNC: 134 MMOL/L — LOW (ref 135–145)
WBC # BLD: 8.01 K/UL — SIGNIFICANT CHANGE UP (ref 3.8–10.5)
WBC # FLD AUTO: 8.01 K/UL — SIGNIFICANT CHANGE UP (ref 3.8–10.5)

## 2019-03-04 PROCEDURE — 99232 SBSQ HOSP IP/OBS MODERATE 35: CPT

## 2019-03-04 PROCEDURE — 99233 SBSQ HOSP IP/OBS HIGH 50: CPT

## 2019-03-04 RX ADMIN — Medication 100 MILLIGRAM(S): at 22:59

## 2019-03-04 RX ADMIN — Medication 1 PATCH: at 12:08

## 2019-03-04 RX ADMIN — Medication 1 TABLET(S): at 12:09

## 2019-03-04 RX ADMIN — DAPTOMYCIN 117.2 MILLIGRAM(S): 500 INJECTION, POWDER, LYOPHILIZED, FOR SOLUTION INTRAVENOUS at 16:20

## 2019-03-04 RX ADMIN — Medication 650 MILLIGRAM(S): at 23:58

## 2019-03-04 RX ADMIN — Medication 650 MILLIGRAM(S): at 15:01

## 2019-03-04 RX ADMIN — Medication 5 MILLIGRAM(S): at 22:59

## 2019-03-04 RX ADMIN — Medication 166.67 MILLIGRAM(S): at 05:24

## 2019-03-04 RX ADMIN — SENNA PLUS 2 TABLET(S): 8.6 TABLET ORAL at 22:59

## 2019-03-04 RX ADMIN — Medication 100 MILLIGRAM(S): at 05:24

## 2019-03-04 RX ADMIN — Medication 166.67 MILLIGRAM(S): at 14:56

## 2019-03-04 RX ADMIN — CHLORHEXIDINE GLUCONATE 1 APPLICATION(S): 213 SOLUTION TOPICAL at 12:08

## 2019-03-04 RX ADMIN — Medication 1 PATCH: at 12:11

## 2019-03-04 RX ADMIN — Medication 100 MILLIGRAM(S): at 13:54

## 2019-03-04 RX ADMIN — METHADONE HYDROCHLORIDE 20 MILLIGRAM(S): 40 TABLET ORAL at 12:09

## 2019-03-04 RX ADMIN — Medication 650 MILLIGRAM(S): at 16:01

## 2019-03-04 RX ADMIN — Medication 1 MILLIGRAM(S): at 12:09

## 2019-03-04 RX ADMIN — Medication 166.67 MILLIGRAM(S): at 22:58

## 2019-03-04 RX ADMIN — Medication 650 MILLIGRAM(S): at 22:58

## 2019-03-04 NOTE — PROGRESS NOTE ADULT - PROBLEM SELECTOR PLAN 1
cont vanco at current dose vanco level now therapeutic   cont dapto   repeat blood c/s since 3/1 negative   will d/c dapto in next few days  spoke with Dr Tracy and he will have his colleague Dr Patino (cardiac surgeon ) call me to discuss  pt may require valvectomy of TV as not clearing bacteremia cont vanco at current dose , vanco level now therapeutic   cont dapto for now  repeat blood c/s since 3/1 negative   will d/c dapto in next few days  will hold off transfer as fever resolving and bacteremia clearing up   will need 6 weeks total of antibiotics from 3/1/19, cant be discharged to outpatient  as is active IVDA.

## 2019-03-04 NOTE — PROGRESS NOTE ADULT - PROBLEM SELECTOR PLAN 3
- Continue IV Vancomycin 1,250 mg every 8 hours and IV Daptomycin 430 mg/day.    -  Follow daily blood cultures for clearing of bacteremia, at present negative   - TTE confirms tricuspid endocarditis.     -May require CT surgical intervention  if blood cultures continue to remain persitently positive.  -- discussed w/

## 2019-03-04 NOTE — PROGRESS NOTE ADULT - ASSESSMENT
44 Y/O female w/ PMHx of IV heroin abuse, HCV,  brought in by EMS for lethargy and cough.    Labs showed serum lactate of 7.2, BUN/Cr 124/6.42, and WBC of of 11.93. Tmax 102.4, urine + for cocaine, opiates, nitrites and many bacteria.  Patient admitted to critical care in septic shock with MRSA bacteremia, in EFRAIN, and also endocarditis, intubated, on pressors. extubated on 2/25 and has been off pressors   LIZ done 2/25:  Moderately sized vegetation on the tricuspid valve, suggestive of bacterial endocarditis  repeat blood c/s since 3/1 negative       Plan    ABx as per ID, dapto/vanco, T max  today 99.3  For CT surgery evaluation as per ID  on daily methadone 20mg 44 Y/O female w/ PMHx of IV heroin abuse, HCV,  brought in by EMS for lethargy and cough.    Labs showed serum lactate of 7.2, BUN/Cr 124/6.42, and WBC of of 11.93. Tmax 102.4, urine + for cocaine, opiates, nitrites and many bacteria.  Patient admitted to critical care in septic shock with MRSA bacteremia, in EFRAIN, and also endocarditis, intubated, on pressors. extubated on 2/25 and has been off pressors   LIZ done 2/25:  Moderately sized vegetation on the tricuspid valve, suggestive of bacterial endocarditis  Normal EF on echo  repeat blood c/s since 3/1 negative       Plan    ABx as per ID, dapto/vanco, T max  today 99.3  For CT surgery evaluation as per ID for the TV endocarditis   EKG repeated today, No evidence of heart block   on daily methadone 20mg  Nutritional support   consider  PRBC transfusion for h/h 7.1 /22.5

## 2019-03-04 NOTE — PROGRESS NOTE ADULT - SUBJECTIVE AND OBJECTIVE BOX
Patient is a 43y old  Female who presents with a chief complaint of AMS (03 Mar 2019 15:12)       OVERNIGHT EVENTS:    MEDICATIONS  (STANDING):  chlorhexidine 4% Liquid 1 Application(s) Topical <User Schedule>  DAPTOmycin IVPB 430 milliGRAM(s) IV Intermittent every 24 hours  DAPTOmycin IVPB      docusate sodium 100 milliGRAM(s) Oral three times a day  folic acid 1 milliGRAM(s) Oral daily  methadone    Tablet 20 milliGRAM(s) Oral daily  multivitamin 1 Tablet(s) Oral daily  nicotine - 21 mG/24Hr(s) Patch 1 patch Transdermal daily  senna 2 Tablet(s) Oral at bedtime  vancomycin  IVPB 1250 milliGRAM(s) IV Intermittent every 8 hours    MEDICATIONS  (PRN):  acetaminophen   Tablet .. 650 milliGRAM(s) Oral every 6 hours PRN Temp greater or equal to 38C (100.4F)  bisacodyl 5 milliGRAM(s) Oral every 12 hours PRN Constipation  Vital Signs Last 24 Hrs  T(C): 37.4 (04 Mar 2019 11:03), Max: 39.3 (03 Mar 2019 18:02)  T(F): 99.3 (04 Mar 2019 11:03), Max: 102.7 (03 Mar 2019 18:02)  HR: 87 (04 Mar 2019 11:03) (80 - 105)  BP: 102/63 (04 Mar 2019 11:03) (97/63 - 123/65)  BP(mean): --  RR: 16 (04 Mar 2019 11:03) (16 - 18)  SpO2: 99% (04 Mar 2019 11:03) (92% - 100%)    PHYSICAL EXAM:  GENERAL: NAD, well-groomed, well-developed  HEAD:  Atraumatic, Normocephalic  EYES: EOMI, PERRLA, conjunctiva and sclera clear  ENMT: No tonsillar erythema, exudates, or enlargement; Moist mucous membranes   NECK: Supple, No JVD   NERVOUS SYSTEM:  Alert & Oriented X3, Good concentration  CHEST/LUNG: Clear to auscultation  bilaterally; No rales, rhonchi, wheezing, or rubs  HEART: Regular rate and rhythm; No murmurs, rubs, or gallops  ABDOMEN: Soft, Nontender, Nondistended; Bowel sounds present  EXTREMITIES:  2+ Peripheral Pulses, No clubbing, cyanosis, or edema       LABS:                        7.1    8.01  )-----------( 345      ( 04 Mar 2019 09:51 )             22.5     03-04    134<L>  |  97  |  13  ----------------------------<  106<H>  3.8   |  28  |  0.48<L>    Ca    8.3<L>      04 Mar 2019 09:51  Mg     1.8     03-03         cardiac markers     CAPILLARY BLOOD GLUCOSE        Cultures= Culture - Blood in AM (03.03.19 @ 12:02)    Specimen Source: .Blood    Culture Results:   No growth to date.        RADIOLOGY & ADDITIONAL TESTS:    Imaging Personally Reviewed:  [ ] YES  [ ] NO    Consultant(s) Notes Reviewed:  [ ] YES  [ ] NO    Care Discussed with Consultants/Other Providers [ ] YES  [ ] NO

## 2019-03-04 NOTE — PROGRESS NOTE ADULT - ASSESSMENT
44 y/o F w/polysubstance abuse presenting with severe sepsis with septic shock secondary to MRSA bacteremia. LIZ positive for endocarditis. EFRAIN likely secondary to sepsis now improved. Acute respiratory failure requiring intubation. New diagnosis of HCV.

## 2019-03-04 NOTE — PROGRESS NOTE ADULT - SUBJECTIVE AND OBJECTIVE BOX
Patient is a 43y old  Female who presents with a chief complaint of AMS (04 Mar 2019 14:55)      INTERVAL HPI / OVERNIGHT EVENTS: fever resolving    MEDICATIONS  (STANDING):  chlorhexidine 4% Liquid 1 Application(s) Topical <User Schedule>  DAPTOmycin IVPB 430 milliGRAM(s) IV Intermittent every 24 hours  DAPTOmycin IVPB      docusate sodium 100 milliGRAM(s) Oral three times a day  folic acid 1 milliGRAM(s) Oral daily  methadone    Tablet 20 milliGRAM(s) Oral daily  multivitamin 1 Tablet(s) Oral daily  nicotine - 21 mG/24Hr(s) Patch 1 patch Transdermal daily  senna 2 Tablet(s) Oral at bedtime  vancomycin  IVPB 1250 milliGRAM(s) IV Intermittent every 8 hours    MEDICATIONS  (PRN):  acetaminophen   Tablet .. 650 milliGRAM(s) Oral every 6 hours PRN Temp greater or equal to 38C (100.4F)  bisacodyl 5 milliGRAM(s) Oral every 12 hours PRN Constipation      Vital Signs Last 24 Hrs  T(C): 37.4 (04 Mar 2019 11:03), Max: 39.3 (03 Mar 2019 18:02)  T(F): 99.3 (04 Mar 2019 11:03), Max: 102.7 (03 Mar 2019 18:02)  HR: 102 (04 Mar 2019 15:33) (80 - 105)  BP: 106/56 (04 Mar 2019 15:33) (97/63 - 123/65)  BP(mean): --  RR: 19 (04 Mar 2019 15:33) (16 - 19)  SpO2: 93% (04 Mar 2019 15:33) (92% - 100%)    Review of systems:  General : + fever /chills, fatigue  CVS : no chest pain, palpitations  Lungs : no shortness of breath, cough  GI : no abdominal pain, vomiting, diarrhea   : no dysuria, hematuria        PHYSICAL EXAM:  General :NAD  Constitutional:  well-groomed, well-developed  Respiratory: CTAB/L  Cardiovascular: S1 and S2, RRR, no M/G/R  Gastrointestinal: BS+, soft, NT/ND  Extremities: No peripheral edema  Vascular: 2+ peripheral pulses  Skin: skin popping + left knee wound with scab .      LABS:                        7.1    8.01  )-----------( 345      ( 04 Mar 2019 09:51 )             22.5     03-04    134<L>  |  97  |  13  ----------------------------<  106<H>  3.8   |  28  |  0.48<L>    Ca    8.3<L>      04 Mar 2019 09:51  Mg     1.8     03-03            MICROBIOLOGY:  RECENT CULTURES:  03-03 .Blood XXXX XXXX   No growth to date.    03-02 .Blood XXXX XXXX   No growth to date.    03-01 .Blood XXXX XXXX   No growth to date.    03-01 .Blood XXXX XXXX   No growth to date.    02-28 .Blood XXXX XXXX   No growth to date.    02-27 .Blood XXXX   Growth in anaerobic bottle: Gram Positive Cocci in Clusters   Growth in anaerobic bottle: Methicillin resistant Staphylococcus aureus  See previous culture [id number]    02-26 .Blood XXXX   Growth in aerobic bottle: Gram Positive Cocci in Clusters   Growth in aerobic bottle: Methicillin resistant Staphylococcus aureus  See previous culture [id number]    02-25 .Blood Methicillin resistant Staphylococcus aureus   Growth in anaerobic bottle: Gram Positive Cocci in Clusters  Growth in aerobic bottle: Gram Positive Cocci in Clusters   Growth in aerobic and anaerobic bottles: Methicillin resistant  Staphylococcus aureus          RADIOLOGY & ADDITIONAL STUDIES:

## 2019-03-04 NOTE — PROGRESS NOTE ADULT - PROBLEM SELECTOR PLAN 1
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   -CT chest non-contrast ordered confirms septic pulmonary emboli  and some have worsened.  CT surgery on board. May need transfer to [facility]   for tricuspid valve surgery if fever persists this week too as per Ct Sx.  - discussed w/

## 2019-03-04 NOTE — PROGRESS NOTE ADULT - SUBJECTIVE AND OBJECTIVE BOX
Patient is a 43y old  Female who presents with a chief complaint of AMS (04 Mar 2019 15:55)      PAST MEDICAL & SURGICAL HISTORY:  ETOH abuse  Drug abuse  HCV  C Section    INTERVAL HISTORY: Resting in bed, not in any acute distress   	  MEDICATIONS:  MEDICATIONS  (STANDING):  chlorhexidine 4% Liquid 1 Application(s) Topical <User Schedule>  DAPTOmycin IVPB 430 milliGRAM(s) IV Intermittent every 24 hours  DAPTOmycin IVPB      docusate sodium 100 milliGRAM(s) Oral three times a day  folic acid 1 milliGRAM(s) Oral daily  methadone    Tablet 20 milliGRAM(s) Oral daily  multivitamin 1 Tablet(s) Oral daily  nicotine - 21 mG/24Hr(s) Patch 1 patch Transdermal daily  senna 2 Tablet(s) Oral at bedtime  vancomycin  IVPB 1250 milliGRAM(s) IV Intermittent every 8 hours    MEDICATIONS  (PRN):  acetaminophen   Tablet .. 650 milliGRAM(s) Oral every 6 hours PRN Temp greater or equal to 38C (100.4F)  bisacodyl 5 milliGRAM(s) Oral every 12 hours PRN Constipation      Vitals:  T(F): 99.3 (03-04-19 @ 11:03), Max: 102.7 (03-03-19 @ 18:02)  HR: 102 (03-04-19 @ 15:33) (80 - 105)  BP: 106/56 (03-04-19 @ 15:33) (97/63 - 123/65)  RR: 19 (03-04-19 @ 15:33) (16 - 19)  SpO2: 93% (03-04-19 @ 15:33) (92% - 100%)  Wt(kg): -- 73.7 kg    03-03 @ 07:01  -  03-04 @ 07:00  --------------------------------------------------------  IN:    Solution: 500 mL  Total IN: 500 mL    OUT:  Total OUT: 0 mL    Total NET: 500 mL    PHYSICAL EXAM:  Neuro: Awake, responsive  CV: S1 S2 RRR  Lungs: CTABL  GI: Soft, BS +, ND, NT  Extremities: No edema    DIAGNOSTIC TESTING:    LIZ: < from: LIZ Echo Doppler (02.25.19 @ 13:40) >   1. Left ventricular ejection fraction, by visual estimation, is 65 to   70%.   2. There is no left ventricular hypertrophy.   3. Moderately sized vegetation on the tricuspid valve, suggestive of   bacterial endocarditis.    < end of copied text >  [x ] Echocardiogram: < from: TTE Echo Doppler w/o Cont (02.18.19 @ 09:46) >   1. Left ventricular ejection fraction, by visual estimation, is 60 to   65%.   2. There is no left ventricular hypertrophy.   3. Trace tricuspid regurgitation.   4. Pulmonic valve regurgitation.   5. Estimated pulmonary artery systolic pressure is 35.2 mmHg assuming a   right atrial pressure of 5 mmHg, which is consistent with mild pulmonary   hypertension.    < end of copied text >      LABS:	 	    04 Mar 2019 09:51    134    |  97     |  13     ----------------------------<  106    3.8     |  28     |  0.48   03 Mar 2019 10:12    136    |  99     |  9      ----------------------------<  123    3.4     |  29     |  0.56     Ca    8.3        04 Mar 2019 09:51  Mg     1.8       03 Mar 2019 10:12                         7.1    8.01  )-----------( 345      ( 04 Mar 2019 09:51 )             22.5 ,                       8.0    8.77  )-----------( 329      ( 03 Mar 2019 10:12 )             26.4 Patient is a 43y old  Female who presents with a chief complaint of AMS (04 Mar 2019 15:55)      PAST MEDICAL & SURGICAL HISTORY:  ETOH abuse  Drug abuse  HCV  C Section    INTERVAL HISTORY: Resting in bed, not in any acute distress   	  MEDICATIONS:  MEDICATIONS  (STANDING):  chlorhexidine 4% Liquid 1 Application(s) Topical <User Schedule>  DAPTOmycin IVPB 430 milliGRAM(s) IV Intermittent every 24 hours  DAPTOmycin IVPB      docusate sodium 100 milliGRAM(s) Oral three times a day  folic acid 1 milliGRAM(s) Oral daily  methadone    Tablet 20 milliGRAM(s) Oral daily  multivitamin 1 Tablet(s) Oral daily  nicotine - 21 mG/24Hr(s) Patch 1 patch Transdermal daily  senna 2 Tablet(s) Oral at bedtime  vancomycin  IVPB 1250 milliGRAM(s) IV Intermittent every 8 hours    MEDICATIONS  (PRN):  acetaminophen   Tablet .. 650 milliGRAM(s) Oral every 6 hours PRN Temp greater or equal to 38C (100.4F)  bisacodyl 5 milliGRAM(s) Oral every 12 hours PRN Constipation      Vitals:  T(F): 99.3 (03-04-19 @ 11:03), Max: 102.7 (03-03-19 @ 18:02)  HR: 102 (03-04-19 @ 15:33) (80 - 105)  BP: 106/56 (03-04-19 @ 15:33) (97/63 - 123/65)  RR: 19 (03-04-19 @ 15:33) (16 - 19)  SpO2: 93% (03-04-19 @ 15:33) (92% - 100%)  Wt(kg): -- 73.7 kg    03-03 @ 07:01  -  03-04 @ 07:00  --------------------------------------------------------  IN:    Solution: 500 mL  Total IN: 500 mL    OUT:  Total OUT: 0 mL    Total NET: 500 mL    PHYSICAL EXAM:  Neuro: Awake, responsive  CV: S1 S2 RRR, + 1/6 BEBO  Lungs: CTABL  GI: Soft, BS +, ND, NT  Extremities: No edema    DIAGNOSTIC TESTING:    LIZ: < from: LIZ Echo Doppler (02.25.19 @ 13:40) >   1. Left ventricular ejection fraction, by visual estimation, is 65 to   70%.   2. There is no left ventricular hypertrophy.   3. Moderately sized vegetation on the tricuspid valve, suggestive of   bacterial endocarditis.    < end of copied text >  [x ] Echocardiogram: < from: TTE Echo Doppler w/o Cont (02.18.19 @ 09:46) >   1. Left ventricular ejection fraction, by visual estimation, is 60 to   65%.   2. There is no left ventricular hypertrophy.   3. Trace tricuspid regurgitation.   4. Pulmonic valve regurgitation.   5. Estimated pulmonary artery systolic pressure is 35.2 mmHg assuming a   right atrial pressure of 5 mmHg, which is consistent with mild pulmonary   hypertension.    < end of copied text >    EKG: ,  sinus     LABS:	 	    04 Mar 2019 09:51    134    |  97     |  13     ----------------------------<  106    3.8     |  28     |  0.48   03 Mar 2019 10:12    136    |  99     |  9      ----------------------------<  123    3.4     |  29     |  0.56     Ca    8.3        04 Mar 2019 09:51  Mg     1.8       03 Mar 2019 10:12                         7.1    8.01  )-----------( 345      ( 04 Mar 2019 09:51 )             22.5 ,                       8.0    8.77  )-----------( 329      ( 03 Mar 2019 10:12 )             26.4

## 2019-03-04 NOTE — CHART NOTE - NSCHARTNOTEFT_GEN_A_CORE
Assessment:     Pt c sepsis 2/2 MRSA bacteremia, endocarditis, opiate and substance abuse, left knee abscess, anemia, s/p EFRAIN, s/p acute respiratory failure, s/p CCU adm  PMH include ETOH abuse.  Pt was unable to provide wt. hx, unsure of wt. PTA, stated that she lived alone PTA.    Factors impacting intake: [ ] none [ ] nausea  [ ] vomiting [ ] diarrhea [ ] constipation  [ x]chewing problems, appeared to have some missing teeth [ x]swallowing issues; 02/26, swallow evaluation recommended Dysphagia 3 Soft - Honey Consistency Fluids, pt denies swallowing difficulty, pt stated that she disliked honey thick liquids and MD changed diet order  [x ] other: stated that mouth is all cut up from being on NGT feeding    Diet Prescription: Diet, Regular:   Supplement Feeding Modality:  Oral  Ensure Enlive Cans or Servings Per Day:  1       Frequency:  Two Times a day (03-01-19 @ 14:56)    Intake: <25% of lunch meal, likes cold Ensure Enlive, requesting Ensure 3 x day.    Current Weight: 03/03, 73.7 kg. 02/17, 71.3 kg, c wt. gain of 2.4 kg  % Weight Change: 3.3%  03/04, 1+ edema of arms, 2+ edema of feet noted     Nutrition focused physical exam conducted; Subcutaneous fat Exam;  [ moderate ]  Orbital fat pads region,  [ unable  ]Buccal fat region,  [mild  ]triceps region, [  unable]ribs region.  Muscle Exam; [  mild]temples region, [moderate  ]clavicle region, [ moderate ]shoulder region, [ unable  ]Scapula region, [ mild ]Interosseous region, [  mild]thigh region, [  mild]Calf region    Pertinent Medications: MEDICATIONS  (STANDING):  chlorhexidine 4% Liquid 1 Application(s) Topical <User Schedule>  DAPTOmycin IVPB 430 milliGRAM(s) IV Intermittent every 24 hours  DAPTOmycin IVPB      docusate sodium 100 milliGRAM(s) Oral three times a day  folic acid 1 milliGRAM(s) Oral daily  methadone    Tablet 20 milliGRAM(s) Oral daily  multivitamin 1 Tablet(s) Oral daily  nicotine - 21 mG/24Hr(s) Patch 1 patch Transdermal daily  senna 2 Tablet(s) Oral at bedtime  vancomycin  IVPB 1250 milliGRAM(s) IV Intermittent every 8 hours    MEDICATIONS  (PRN):  acetaminophen   Tablet .. 650 milliGRAM(s) Oral every 6 hours PRN Temp greater or equal to 38C (100.4F)  bisacodyl 5 milliGRAM(s) Oral every 12 hours PRN Constipation    Pertinent Labs: 03-04 Na134 mmol/L<L> Glu 106 mg/dL<H> K+ 3.8 mmol/L Cr  0.48 mg/dL<L> BUN 13 mg/dL 02-27 Phos 3.3 mg/dL     CAPILLARY BLOOD GLUCOSE        Skin: pressure ulcer of right heel and wound left knee abscess noted     Estimated Needs:   [ x] no change since previous assessment(02-25)  [ ] recalculated:     Previous Nutrition Diagnosis:   [ ] Inadequate Energy Intake [ ]Inadequate Oral Intake [ ] Excessive Energy Intake   [ ] Underweight [ ] Increased Nutrient Needs [ ] Overweight/Obesity   [ ] Altered GI Function [ ] Unintended Weight Loss [ ] Food & Nutrition Related Knowledge Deficit [x ] Malnutrition; moderate malnutrition in context of chronic illness     Nutrition Diagnosis is [x ] ongoing  [ ] resolved [ ] not applicable     pt to meet >75% gof energy and protein needs via oral feeding; not met     New Nutrition Diagnosis: [x ] not applicable       Interventions:   Recommend  [x ] Change Diet To: soft( dental soft) c  Ensure Enlive 3 x day=1125 calories, 60 grams protein   [ ] Nutrition Supplement  [ ] Nutrition Support  [ x] Other: monitor for altered chew/swallow     Monitoring and Evaluation:   [x ] PO intake [ x ] Tolerance to diet prescription [ x ] weights [ x ] labs[ x ] follow up per protocol  [ ] other: Assessment:     Pt c sepsis 2/2 MRSA bacteremia, endocarditis, opiate and substance abuse, left knee abscess, anemia, s/p EFRAIN, s/p acute respiratory failure, s/p CCU adm  PMH include ETOH abuse.  Pt was unable to provide wt. hx, unsure of wt. PTA, stated that she lived alone PTA.    Factors impacting intake: [ ] none [ ] nausea  [ ] vomiting [ ] diarrhea [ ] constipation  [ x]chewing problems, appeared to have some missing teeth [ x]swallowing issues; 02/26, swallow evaluation recommended Dysphagia 3 Soft - Honey Consistency Fluids, pt denies swallowing difficulty, pt stated that she disliked honey thick liquids and MD changed diet order  [x ] other: stated that mouth is all cut up from being on NGT feeding    Diet Prescription: Diet, Regular:   Supplement Feeding Modality:  Oral  Ensure Enlive Cans or Servings Per Day:  1       Frequency:  Two Times a day (03-01-19 @ 14:56)    Intake: <25% of lunch meal, likes cold Ensure Enlive, requesting Ensure 3 x day.    Current Weight: 03/03, 73.7 kg. 02/17, 71.3 kg, c wt. gain of 2.4 kg  % Weight Change: 3.3%  03/04, 1+ edema of arms, 2+ edema of feet noted     Nutrition focused physical exam conducted; Subcutaneous fat Exam;  [ moderate ]  Orbital fat pads region,  [ unable  ]Buccal fat region,  [mild  ]triceps region, [  unable]ribs region.  Muscle Exam; [  mild]temples region, [moderate  ]clavicle region, [ moderate ]shoulder region, [ unable  ]Scapula region, [ mild ]Interosseous region, [  mild]thigh region, [  mild]Calf region    Pertinent Medications: MEDICATIONS  (STANDING):  chlorhexidine 4% Liquid 1 Application(s) Topical <User Schedule>  DAPTOmycin IVPB 430 milliGRAM(s) IV Intermittent every 24 hours  DAPTOmycin IVPB      docusate sodium 100 milliGRAM(s) Oral three times a day  folic acid 1 milliGRAM(s) Oral daily  methadone    Tablet 20 milliGRAM(s) Oral daily  multivitamin 1 Tablet(s) Oral daily  nicotine - 21 mG/24Hr(s) Patch 1 patch Transdermal daily  senna 2 Tablet(s) Oral at bedtime  vancomycin  IVPB 1250 milliGRAM(s) IV Intermittent every 8 hours    MEDICATIONS  (PRN):  acetaminophen   Tablet .. 650 milliGRAM(s) Oral every 6 hours PRN Temp greater or equal to 38C (100.4F)  bisacodyl 5 milliGRAM(s) Oral every 12 hours PRN Constipation    Pertinent Labs: 03-04 Na134 mmol/L<L> Glu 106 mg/dL<H> K+ 3.8 mmol/L Cr  0.48 mg/dL<L> BUN 13 mg/dL 02-27 Phos 3.3 mg/dL     CAPILLARY BLOOD GLUCOSE        Skin: pressure ulcer of right heel and wound left knee abscess noted     Estimated Needs:   [ x] no change since previous assessment(02-25)  [ ] recalculated:     Previous Nutrition Diagnosis:   [ ] Inadequate Energy Intake [ ]Inadequate Oral Intake [ ] Excessive Energy Intake   [ ] Underweight [ ] Increased Nutrient Needs [ ] Overweight/Obesity   [ ] Altered GI Function [ ] Unintended Weight Loss [ ] Food & Nutrition Related Knowledge Deficit [x ] Malnutrition; moderate malnutrition in context of acute illness     Nutrition Diagnosis is [x ] ongoing  [ ] resolved [ ] not applicable     pt to meet >75% gof energy and protein needs via oral feeding; not met     New Nutrition Diagnosis: [x ] not applicable       Interventions:   Recommend  [x ] Change Diet To: soft( dental soft) c  Ensure Enlive 3 x day=1125 calories, 60 grams protein   [ ] Nutrition Supplement  [ ] Nutrition Support  [ x] Other: monitor for altered chew/swallow     Monitoring and Evaluation:   [x ] PO intake [ x ] Tolerance to diet prescription [ x ] weights [ x ] labs[ x ] follow up per protocol  [ ] other:

## 2019-03-05 LAB
ANION GAP SERPL CALC-SCNC: 7 MMOL/L — SIGNIFICANT CHANGE UP (ref 5–17)
BUN SERPL-MCNC: 13 MG/DL — SIGNIFICANT CHANGE UP (ref 7–23)
CALCIUM SERPL-MCNC: 8.6 MG/DL — SIGNIFICANT CHANGE UP (ref 8.5–10.1)
CHLORIDE SERPL-SCNC: 95 MMOL/L — LOW (ref 96–108)
CO2 SERPL-SCNC: 29 MMOL/L — SIGNIFICANT CHANGE UP (ref 22–31)
CREAT SERPL-MCNC: 0.6 MG/DL — SIGNIFICANT CHANGE UP (ref 0.5–1.3)
CULTURE RESULTS: SIGNIFICANT CHANGE UP
GLUCOSE SERPL-MCNC: 138 MG/DL — HIGH (ref 70–99)
HCT VFR BLD CALC: 22.9 % — LOW (ref 34.5–45)
HGB BLD-MCNC: 7.2 G/DL — LOW (ref 11.5–15.5)
MCHC RBC-ENTMCNC: 24.7 PG — LOW (ref 27–34)
MCHC RBC-ENTMCNC: 31.4 GM/DL — LOW (ref 32–36)
MCV RBC AUTO: 78.4 FL — LOW (ref 80–100)
NRBC # BLD: 0 /100 WBCS — SIGNIFICANT CHANGE UP (ref 0–0)
PLATELET # BLD AUTO: 409 K/UL — HIGH (ref 150–400)
POTASSIUM SERPL-MCNC: 3.9 MMOL/L — SIGNIFICANT CHANGE UP (ref 3.5–5.3)
POTASSIUM SERPL-SCNC: 3.9 MMOL/L — SIGNIFICANT CHANGE UP (ref 3.5–5.3)
RBC # BLD: 2.92 M/UL — LOW (ref 3.8–5.2)
RBC # FLD: 18.3 % — HIGH (ref 10.3–14.5)
SODIUM SERPL-SCNC: 131 MMOL/L — LOW (ref 135–145)
SPECIMEN SOURCE: SIGNIFICANT CHANGE UP
VANCOMYCIN TROUGH SERPL-MCNC: 22.9 UG/ML — HIGH (ref 10–20)
WBC # BLD: 11.64 K/UL — HIGH (ref 3.8–10.5)
WBC # FLD AUTO: 11.64 K/UL — HIGH (ref 3.8–10.5)

## 2019-03-05 PROCEDURE — 99233 SBSQ HOSP IP/OBS HIGH 50: CPT

## 2019-03-05 PROCEDURE — 99232 SBSQ HOSP IP/OBS MODERATE 35: CPT

## 2019-03-05 RX ADMIN — Medication 100 MILLIGRAM(S): at 22:48

## 2019-03-05 RX ADMIN — DAPTOMYCIN 117.2 MILLIGRAM(S): 500 INJECTION, POWDER, LYOPHILIZED, FOR SOLUTION INTRAVENOUS at 15:25

## 2019-03-05 RX ADMIN — Medication 1 PATCH: at 11:48

## 2019-03-05 RX ADMIN — METHADONE HYDROCHLORIDE 20 MILLIGRAM(S): 40 TABLET ORAL at 11:48

## 2019-03-05 RX ADMIN — CHLORHEXIDINE GLUCONATE 1 APPLICATION(S): 213 SOLUTION TOPICAL at 10:40

## 2019-03-05 RX ADMIN — Medication 1 MILLIGRAM(S): at 11:48

## 2019-03-05 RX ADMIN — SENNA PLUS 2 TABLET(S): 8.6 TABLET ORAL at 22:48

## 2019-03-05 RX ADMIN — Medication 100 MILLIGRAM(S): at 06:43

## 2019-03-05 RX ADMIN — Medication 1 TABLET(S): at 11:48

## 2019-03-05 RX ADMIN — Medication 1 PATCH: at 06:46

## 2019-03-05 RX ADMIN — Medication 650 MILLIGRAM(S): at 08:25

## 2019-03-05 RX ADMIN — Medication 650 MILLIGRAM(S): at 18:47

## 2019-03-05 NOTE — PROGRESS NOTE ADULT - ASSESSMENT
44 Y/O female w/ PMHx of IV heroin abuse, HCV,  brought in by EMS for lethargy and cough.    Labs showed serum lactate of 7.2, BUN/Cr 124/6.42, and WBC of of 11.93. Tmax 102.4, urine + for cocaine, opiates, nitrites and many bacteria.  Patient admitted to critical care in septic shock with MRSA bacteremia, in EFRAIN, and also endocarditis, intubated, on pressors. extubated on 2/25 and has been off pressors   LIZ done 2/25:  Moderately sized vegetation on the tricuspid valve, suggestive of bacterial endocarditis  Normal EF on echo  repeat blood c/s since 3/1 negative       Plan    ABx as per ID,  T max  today 100.4, wbc 11.6  For CT surgery evaluation as per ID for the TV endocarditis   EKG repeated with no evidence of heart block   on daily methadone 20mg  Nutritional support   consider  PRBC transfusion for low  h/h 7.2 /22.9 42 Y/O female w/ PMHx of IV heroin abuse, HCV,  brought in by EMS for lethargy and cough.    Labs showed serum lactate of 7.2, BUN/Cr 124/6.42, and WBC of of 11.93. Tmax 102.4, urine + for cocaine, opiates, nitrites and many bacteria.  Patient admitted to critical care in septic shock with MRSA bacteremia, in EFRAIN, and also endocarditis, intubated, on pressors. extubated on 2/25 and has been off pressors   LIZ done 2/25:  Moderately sized vegetation on the tricuspid valve, suggestive of bacterial endocarditis  Normal EF on echo  repeat blood c/s since 3/1 negative       Plan    ABx as per ID,  T max  today 100.4, wbc 11.6 <- 8.01  For CT surgery evaluation as per ID for the TV endocarditis   EKG repeated on 3/4/19 with no evidence of heart block   on daily methadone 20mg/nicotine patch  Nutritional support   consider  PRBC transfusion for low  h/h 7.2 /22.9

## 2019-03-05 NOTE — PROGRESS NOTE ADULT - PROBLEM SELECTOR PLAN 1
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   -CT chest non-contrast ordered confirms septic pulmonary emboli  and some have worsened.  CT surgery on board. May need transfer to [facility]   for tricuspid valve surgery if fever persists this week too as per Ct Sx.  so far blood cx negative   - discussed w/

## 2019-03-05 NOTE — PROGRESS NOTE ADULT - PROBLEM SELECTOR PLAN 3
- vanc trough=22, held vanc  - Continue   IV Daptomycin 430 mg/day.    -  Follow daily blood cultures for clearing of bacteremia, at present negative   - TTE confirms tricuspid endocarditis.     -May require CT surgical intervention  if blood cultures continue to remain persitently positive.  -- discussed w/

## 2019-03-05 NOTE — PROGRESS NOTE ADULT - SUBJECTIVE AND OBJECTIVE BOX
Patient is a 43y old  Female who presents with a chief complaint of AMS (04 Mar 2019 16:14)    PAST MEDICAL & SURGICAL HISTORY:  ETOH abuse  Drug abuse  C Section    INTERVAL HISTORY: Resting in bed, not in any acute distress   	  MEDICATIONS:  MEDICATIONS  (STANDING):  chlorhexidine 4% Liquid 1 Application(s) Topical <User Schedule>  DAPTOmycin IVPB 430 milliGRAM(s) IV Intermittent every 24 hours  DAPTOmycin IVPB      docusate sodium 100 milliGRAM(s) Oral three times a day  folic acid 1 milliGRAM(s) Oral daily  methadone    Tablet 20 milliGRAM(s) Oral daily  multivitamin 1 Tablet(s) Oral daily  nicotine - 21 mG/24Hr(s) Patch 1 patch Transdermal daily  senna 2 Tablet(s) Oral at bedtime    MEDICATIONS  (PRN):  acetaminophen   Tablet .. 650 milliGRAM(s) Oral every 6 hours PRN Temp greater or equal to 38C (100.4F)  bisacodyl 5 milliGRAM(s) Oral every 12 hours PRN Constipation      Vitals:  T(F): 98.6 (03-05-19 @ 11:46), Max: 102.2 (03-04-19 @ 22:56)  HR: 90 (03-05-19 @ 11:46) (90 - 102)  BP: 111/65 (03-05-19 @ 11:46) (102/66 - 111/67)  RR: 16 (03-05-19 @ 11:46) (16 - 19)  SpO2: 98% (03-05-19 @ 11:46) (93% - 98%)    03-04 @ 07:01  -  03-05 @ 07:00  --------------------------------------------------------  IN:    Solution: 50 mL    Solution: 500 mL  Total IN: 550 mL    OUT:    Voided: 905 mL  Total OUT: 905 mL    Total NET: -355 mL    PHYSICAL EXAM:  Neuro: Awake, responsive  CV: S1 S2 RRR,   Lungs: CTABL  GI: Soft, BS +, ND, NT  Extremities: No edema    RADIOLOGY: < from: CT Chest No Cont (03.01.19 @ 11:32) >  Small bilateral pleural effusions, loculated on the right, increased   compared to the prior study. Unchanged right lower lobe dependent  consolidation.    Prominent mediastinal and axillary lymph nodes, possibly reactive    < end of copied text >      DIAGNOSTIC TESTING:    [x ] Echocardiogram:  < from: TTE Echo Doppler w/o Cont (02.18.19 @ 09:46) >   1. Left ventricular ejection fraction, by visual estimation, is 60 to   65%.   2. There is no left ventricular hypertrophy.   3. Trace tricuspid regurgitation.   4. Pulmonic valve regurgitation.   5. Estimated pulmonary artery systolic pressure is 35.2 mmHg assuming a   right atrial pressure of 5 mmHg, which is consistent with mild pulmonary   hypertension.    < end of copied text >  LIZ:   < from: LIZ Echo Doppler (02.25.19 @ 13:40) >   1. Left ventricular ejection fraction, by visual estimation, is 65 to   70%.   2. There is no left ventricular hypertrophy.   3. Moderately sized vegetation on the tricuspid valve, suggestive of   bacterial endocarditis.    < end of copied text >    LABS:	 	    05 Mar 2019 10:09    131    |  95     |  13     ----------------------------<  138    3.9     |  29     |  0.60   04 Mar 2019 09:51    134    |  97     |  13     ----------------------------<  106    3.8     |  28     |  0.48   03 Mar 2019 10:12    136    |  99     |  9      ----------------------------<  123    3.4     |  29     |  0.56     Ca    8.6        05 Mar 2019 10:09                          7.2    11.64 )-----------( 409      ( 05 Mar 2019 10:09 )             22.9 ,                       7.1    8.01  )-----------( 345      ( 04 Mar 2019 09:51 )             22.5 ,                       8.0    8.77  )-----------( 329      ( 03 Mar 2019 10:12 )             26.4 Patient is a 43y old  Female who presents with a chief complaint of AMS (04 Mar 2019 16:14)    PAST MEDICAL & SURGICAL HISTORY:  ETOH abuse  Drug abuse  HCV  C Section    INTERVAL HISTORY: Resting in bed, not in any acute distress, states "I feel like I am getting a fever again", denies ay sob or dizziness   	  MEDICATIONS:  MEDICATIONS  (STANDING):  chlorhexidine 4% Liquid 1 Application(s) Topical <User Schedule>  DAPTOmycin IVPB 430 milliGRAM(s) IV Intermittent every 24 hours  DAPTOmycin IVPB      docusate sodium 100 milliGRAM(s) Oral three times a day  folic acid 1 milliGRAM(s) Oral daily  methadone    Tablet 20 milliGRAM(s) Oral daily  multivitamin 1 Tablet(s) Oral daily  nicotine - 21 mG/24Hr(s) Patch 1 patch Transdermal daily  senna 2 Tablet(s) Oral at bedtime    MEDICATIONS  (PRN):  acetaminophen   Tablet .. 650 milliGRAM(s) Oral every 6 hours PRN Temp greater or equal to 38C (100.4F)  bisacodyl 5 milliGRAM(s) Oral every 12 hours PRN Constipation      Vitals:  T(F): 98.6 (03-05-19 @ 11:46), Max: 102.2 (03-04-19 @ 22:56)  HR: 90 (03-05-19 @ 11:46) (90 - 102)  BP: 111/65 (03-05-19 @ 11:46) (102/66 - 111/67)  RR: 16 (03-05-19 @ 11:46) (16 - 19)  SpO2: 98% (03-05-19 @ 11:46) (93% - 98%)    03-04 @ 07:01  -  03-05 @ 07:00  --------------------------------------------------------  IN:    Solution: 50 mL    Solution: 500 mL  Total IN: 550 mL    OUT:    Voided: 905 mL  Total OUT: 905 mL    Total NET: -355 mL    PHYSICAL EXAM:  Neuro: Awake, responsive  CV: S1 S2 RRR, 2/6 BEBO  Lungs: CTABL  GI: Soft, BS +, ND, NT  Extremities: No edema    RADIOLOGY: < from: CT Chest No Cont (03.01.19 @ 11:32) >  Small bilateral pleural effusions, loculated on the right, increased   compared to the prior study. Unchanged right lower lobe dependent  consolidation.    Prominent mediastinal and axillary lymph nodes, possibly reactive    < end of copied text >      DIAGNOSTIC TESTING:    [x ] Echocardiogram:  < from: TTE Echo Doppler w/o Cont (02.18.19 @ 09:46) >   1. Left ventricular ejection fraction, by visual estimation, is 60 to   65%.   2. There is no left ventricular hypertrophy.   3. Trace tricuspid regurgitation.   4. Pulmonic valve regurgitation.   5. Estimated pulmonary artery systolic pressure is 35.2 mmHg assuming a   right atrial pressure of 5 mmHg, which is consistent with mild pulmonary   hypertension.    < end of copied text >  LIZ:   < from: LIZ Echo Doppler (02.25.19 @ 13:40) >   1. Left ventricular ejection fraction, by visual estimation, is 65 to   70%.   2. There is no left ventricular hypertrophy.   3. Moderately sized vegetation on the tricuspid valve, suggestive of   bacterial endocarditis.    < end of copied text >    LABS:	 	    05 Mar 2019 10:09    131    |  95     |  13     ----------------------------<  138    3.9     |  29     |  0.60   04 Mar 2019 09:51    134    |  97     |  13     ----------------------------<  106    3.8     |  28     |  0.48   03 Mar 2019 10:12    136    |  99     |  9      ----------------------------<  123    3.4     |  29     |  0.56     Ca    8.6        05 Mar 2019 10:09                          7.2    11.64 )-----------( 409      ( 05 Mar 2019 10:09 )             22.9 ,                       7.1    8.01  )-----------( 345      ( 04 Mar 2019 09:51 )             22.5 ,                       8.0    8.77  )-----------( 329      ( 03 Mar 2019 10:12 )             26.4

## 2019-03-05 NOTE — PROGRESS NOTE ADULT - SUBJECTIVE AND OBJECTIVE BOX
Patient is a 43y old  Female who presents with a chief complaint of AMS (05 Mar 2019 14:49)       OVERNIGHT EVENTS:    MEDICATIONS  (STANDING):  chlorhexidine 4% Liquid 1 Application(s) Topical <User Schedule>  DAPTOmycin IVPB 430 milliGRAM(s) IV Intermittent every 24 hours  DAPTOmycin IVPB      docusate sodium 100 milliGRAM(s) Oral three times a day  folic acid 1 milliGRAM(s) Oral daily  methadone    Tablet 20 milliGRAM(s) Oral daily  multivitamin 1 Tablet(s) Oral daily  nicotine - 21 mG/24Hr(s) Patch 1 patch Transdermal daily  senna 2 Tablet(s) Oral at bedtime    MEDICATIONS  (PRN):  acetaminophen   Tablet .. 650 milliGRAM(s) Oral every 6 hours PRN Temp greater or equal to 38C (100.4F)  bisacodyl 5 milliGRAM(s) Oral every 12 hours PRN Constipation     Vital Signs Last 24 Hrs  T(C): 37 (05 Mar 2019 11:46), Max: 39 (04 Mar 2019 22:56)  T(F): 98.6 (05 Mar 2019 11:46), Max: 102.2 (04 Mar 2019 22:56)  HR: 90 (05 Mar 2019 11:46) (90 - 94)  BP: 111/65 (05 Mar 2019 11:46) (102/66 - 111/67)  BP(mean): --  RR: 16 (05 Mar 2019 11:46) (16 - 18)  SpO2: 98% (05 Mar 2019 11:46) (97% - 98%)     PHYSICAL EXAM:  GENERAL: NAD, well-groomed, well-developed  HEAD:  Atraumatic, Normocephalic  EYES: EOMI, PERRLA, conjunctiva and sclera clear  ENMT: No tonsillar erythema, exudates, or enlargement; Moist mucous membranes   NECK: Supple, No JVD   NERVOUS SYSTEM:  Alert & Oriented X3, Good concentration  CHEST/LUNG: Clear to auscultation  bilaterally; No rales, rhonchi, wheezing, or rubs  HEART: Regular rate and rhythm; No murmurs, rubs, or gallops  ABDOMEN: Soft, Nontender, Nondistended; Bowel sounds present  EXTREMITIES:  dressing in place left knee, multiple track marks    LABS:                        7.2    11.64 )-----------( 409      ( 05 Mar 2019 10:09 )             22.9     03-05    131<L>  |  95<L>  |  13  ----------------------------<  138<H>  3.9   |  29  |  0.60    Ca    8.6      05 Mar 2019 10:09         cardiac markers     CAPILLARY BLOOD GLUCOSE        Cultures    RADIOLOGY & ADDITIONAL TESTS:    Imaging Personally Reviewed:  [x ] YES  [ ] NO    Consultant(s) Notes Reviewed:  [ x] YES  [ ] NO    Care Discussed with Consultants/Other Providers [ x] YES  [ ] NO

## 2019-03-06 DIAGNOSIS — D64.9 ANEMIA, UNSPECIFIED: ICD-10-CM

## 2019-03-06 LAB
ANION GAP SERPL CALC-SCNC: 9 MMOL/L — SIGNIFICANT CHANGE UP (ref 5–17)
BUN SERPL-MCNC: 15 MG/DL — SIGNIFICANT CHANGE UP (ref 7–23)
CALCIUM SERPL-MCNC: 8.8 MG/DL — SIGNIFICANT CHANGE UP (ref 8.5–10.1)
CHLORIDE SERPL-SCNC: 94 MMOL/L — LOW (ref 96–108)
CO2 SERPL-SCNC: 28 MMOL/L — SIGNIFICANT CHANGE UP (ref 22–31)
CREAT SERPL-MCNC: 0.59 MG/DL — SIGNIFICANT CHANGE UP (ref 0.5–1.3)
CULTURE RESULTS: SIGNIFICANT CHANGE UP
CULTURE RESULTS: SIGNIFICANT CHANGE UP
GLUCOSE BLDC GLUCOMTR-MCNC: 130 MG/DL — HIGH (ref 70–99)
GLUCOSE SERPL-MCNC: 109 MG/DL — HIGH (ref 70–99)
HCT VFR BLD CALC: 22.8 % — LOW (ref 34.5–45)
HGB BLD-MCNC: 6.9 G/DL — CRITICAL LOW (ref 11.5–15.5)
MCHC RBC-ENTMCNC: 24.1 PG — LOW (ref 27–34)
MCHC RBC-ENTMCNC: 30.3 GM/DL — LOW (ref 32–36)
MCV RBC AUTO: 79.7 FL — LOW (ref 80–100)
NRBC # BLD: 0 /100 WBCS — SIGNIFICANT CHANGE UP (ref 0–0)
PLATELET # BLD AUTO: 439 K/UL — HIGH (ref 150–400)
POTASSIUM SERPL-MCNC: 4.2 MMOL/L — SIGNIFICANT CHANGE UP (ref 3.5–5.3)
POTASSIUM SERPL-SCNC: 4.2 MMOL/L — SIGNIFICANT CHANGE UP (ref 3.5–5.3)
RBC # BLD: 2.86 M/UL — LOW (ref 3.8–5.2)
RBC # FLD: 18.1 % — HIGH (ref 10.3–14.5)
SODIUM SERPL-SCNC: 131 MMOL/L — LOW (ref 135–145)
SPECIMEN SOURCE: SIGNIFICANT CHANGE UP
SPECIMEN SOURCE: SIGNIFICANT CHANGE UP
WBC # BLD: 13.24 K/UL — HIGH (ref 3.8–10.5)
WBC # FLD AUTO: 13.24 K/UL — HIGH (ref 3.8–10.5)

## 2019-03-06 PROCEDURE — 99233 SBSQ HOSP IP/OBS HIGH 50: CPT

## 2019-03-06 PROCEDURE — 99232 SBSQ HOSP IP/OBS MODERATE 35: CPT

## 2019-03-06 RX ORDER — VANCOMYCIN HCL 1 G
1500 VIAL (EA) INTRAVENOUS ONCE
Qty: 0 | Refills: 0 | Status: COMPLETED | OUTPATIENT
Start: 2019-03-06 | End: 2019-03-06

## 2019-03-06 RX ORDER — AZTREONAM 2 G
2000 VIAL (EA) INJECTION EVERY 8 HOURS
Qty: 0 | Refills: 0 | Status: DISCONTINUED | OUTPATIENT
Start: 2019-03-06 | End: 2019-03-19

## 2019-03-06 RX ORDER — VANCOMYCIN HCL 1 G
1500 VIAL (EA) INTRAVENOUS EVERY 12 HOURS
Qty: 0 | Refills: 0 | Status: DISCONTINUED | OUTPATIENT
Start: 2019-03-07 | End: 2019-03-10

## 2019-03-06 RX ORDER — MULTIVIT WITH MIN/MFOLATE/K2 340-15/3 G
1 POWDER (GRAM) ORAL ONCE
Qty: 0 | Refills: 0 | Status: COMPLETED | OUTPATIENT
Start: 2019-03-06 | End: 2019-03-06

## 2019-03-06 RX ORDER — VANCOMYCIN HCL 1 G
VIAL (EA) INTRAVENOUS
Qty: 0 | Refills: 0 | Status: DISCONTINUED | OUTPATIENT
Start: 2019-03-06 | End: 2019-03-10

## 2019-03-06 RX ADMIN — CHLORHEXIDINE GLUCONATE 1 APPLICATION(S): 213 SOLUTION TOPICAL at 06:57

## 2019-03-06 RX ADMIN — Medication 1 PATCH: at 12:03

## 2019-03-06 RX ADMIN — DAPTOMYCIN 117.2 MILLIGRAM(S): 500 INJECTION, POWDER, LYOPHILIZED, FOR SOLUTION INTRAVENOUS at 17:53

## 2019-03-06 RX ADMIN — Medication 100 MILLIGRAM(S): at 22:37

## 2019-03-06 RX ADMIN — Medication 100 MILLIGRAM(S): at 16:04

## 2019-03-06 RX ADMIN — Medication 1 TABLET(S): at 11:54

## 2019-03-06 RX ADMIN — Medication 650 MILLIGRAM(S): at 11:54

## 2019-03-06 RX ADMIN — Medication 100 MILLIGRAM(S): at 05:07

## 2019-03-06 RX ADMIN — Medication 650 MILLIGRAM(S): at 20:35

## 2019-03-06 RX ADMIN — Medication 100 MILLIGRAM(S): at 14:30

## 2019-03-06 RX ADMIN — Medication 1 PATCH: at 00:03

## 2019-03-06 RX ADMIN — Medication 1 BOTTLE: at 17:54

## 2019-03-06 RX ADMIN — Medication 1 PATCH: at 07:39

## 2019-03-06 RX ADMIN — METHADONE HYDROCHLORIDE 20 MILLIGRAM(S): 40 TABLET ORAL at 11:54

## 2019-03-06 RX ADMIN — SENNA PLUS 2 TABLET(S): 8.6 TABLET ORAL at 22:37

## 2019-03-06 RX ADMIN — Medication 300 MILLIGRAM(S): at 18:41

## 2019-03-06 RX ADMIN — Medication 5 MILLIGRAM(S): at 05:07

## 2019-03-06 RX ADMIN — Medication 1 MILLIGRAM(S): at 11:54

## 2019-03-06 NOTE — PROGRESS NOTE ADULT - PROBLEM SELECTOR PLAN 1
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   -CT chest non-contrast ordered confirms septic pulmonary emboli  and some have worsened.  CT surgery on board. May need transfer to [facility]   for tricuspid valve surgery if fever persists this week too as per Ct Sx.  so far blood cx negative   - discussed w/   - on daptomycin, azactam, vancomycin IV

## 2019-03-06 NOTE — PROGRESS NOTE ADULT - SUBJECTIVE AND OBJECTIVE BOX
Patient is a 43y old  Female who presents with a chief complaint of AMS (06 Mar 2019 12:28)       OVERNIGHT EVENTS:    MEDICATIONS  (STANDING):  aztreonam  IVPB 2000 milliGRAM(s) IV Intermittent every 8 hours  chlorhexidine 4% Liquid 1 Application(s) Topical <User Schedule>  DAPTOmycin IVPB 430 milliGRAM(s) IV Intermittent every 24 hours  DAPTOmycin IVPB      docusate sodium 100 milliGRAM(s) Oral three times a day  folic acid 1 milliGRAM(s) Oral daily  magnesium citrate Oral Solution 1 Bottle Oral once  methadone    Tablet 20 milliGRAM(s) Oral daily  multivitamin 1 Tablet(s) Oral daily  nicotine - 21 mG/24Hr(s) Patch 1 patch Transdermal daily  senna 2 Tablet(s) Oral at bedtime  vancomycin  IVPB      vancomycin  IVPB 1500 milliGRAM(s) IV Intermittent once    MEDICATIONS  (PRN):  acetaminophen   Tablet .. 650 milliGRAM(s) Oral every 6 hours PRN Temp greater or equal to 38C (100.4F)  bisacodyl 5 milliGRAM(s) Oral every 12 hours PRN Constipation    Vital Signs Last 24 Hrs  T(C): 37 (06 Mar 2019 15:30), Max: 38.7 (05 Mar 2019 17:46)  T(F): 98.6 (06 Mar 2019 15:30), Max: 101.6 (05 Mar 2019 17:46)  HR: 90 (06 Mar 2019 15:30) (87 - 97)  BP: 106/65 (06 Mar 2019 15:30) (98/61 - 148/73)  BP(mean): --  RR: 18 (06 Mar 2019 15:30) (16 - 19)  SpO2: 97% (06 Mar 2019 15:30) (95% - 98%)      PHYSICAL EXAM:  GENERAL: NAD, well-groomed, well-developed  HEAD:  Atraumatic, Normocephalic  EYES: EOMI, PERRLA, conjunctiva and sclera clear  ENMT: No tonsillar erythema, exudates, or enlargement; Moist mucous membranes   NECK: Supple, No JVD   NERVOUS SYSTEM:  Alert & Oriented X3, Good concentration  CHEST/LUNG: Clear to auscultation  bilaterally; No rales, rhonchi, wheezing, or rubs  HEART: Regular rate and rhythm; No murmurs, rubs, or gallops  ABDOMEN: Soft, Nontender, Nondistended; Bowel sounds present  EXTREMITIES:  dressing in place left knee, multiple track marks    LABS:                        6.9    13.24 )-----------( 439      ( 06 Mar 2019 07:10 )             22.8     03-06    131<L>  |  94<L>  |  15  ----------------------------<  109<H>  4.2   |  28  |  0.59    Ca    8.8      06 Mar 2019 07:10         cardiac markers     CAPILLARY BLOOD GLUCOSE        Cultures    RADIOLOGY & ADDITIONAL TESTS:    Imaging Personally Reviewed:  [x ] YES  [ ] NO    Consultant(s) Notes Reviewed:  [x ] YES  [ ] NO    Care Discussed with Consultants/Other Providers [ x] YES  [ ] NO

## 2019-03-06 NOTE — PROGRESS NOTE ADULT - ASSESSMENT
42 y/o F w/polysubstance abuse presenting with severe sepsis with septic shock secondary to MRSA bacteremia. LIZ positive for endocarditis. EFRAIN likely secondary to sepsis now improved. Acute respiratory failure requiring intubation. New diagnosis of HCV. 42 y/o F w/polysubstance abuse presenting with severe sepsis with septic shock secondary to MRSA bacteremia. LIZ positive for endocarditis. EFRAIN likely secondary to sepsis now improved. Acute respiratory failure requiring intubation. New diagnosis of HCV.  called and left message to Kim [phone](pt's mother ) as per pt's request

## 2019-03-06 NOTE — PROGRESS NOTE ADULT - SUBJECTIVE AND OBJECTIVE BOX
Patient is a 43y old  Female who presents with a chief complaint of AMS (05 Mar 2019 16:23)      PAST MEDICAL & SURGICAL HISTORY:  ETOH abuse  Drug abuse  C Section      INTERVAL HISTORY: Patient seen and examined at the bedside. Patient is seen lying in bed watching television in no acute distress. Patient states that she feels well today, but complains of feeling feverish. Denies shortness of breath, chest pain, dyspnea on exertion, palpitations, edema.   	  MEDICATIONS:  MEDICATIONS  (STANDING):  chlorhexidine 4% Liquid 1 Application(s) Topical <User Schedule>  DAPTOmycin IVPB 430 milliGRAM(s) IV Intermittent every 24 hours  DAPTOmycin IVPB      docusate sodium 100 milliGRAM(s) Oral three times a day  folic acid 1 milliGRAM(s) Oral daily  methadone    Tablet 20 milliGRAM(s) Oral daily  multivitamin 1 Tablet(s) Oral daily  nicotine - 21 mG/24Hr(s) Patch 1 patch Transdermal daily  senna 2 Tablet(s) Oral at bedtime    MEDICATIONS  (PRN):  acetaminophen   Tablet .. 650 milliGRAM(s) Oral every 6 hours PRN Temp greater or equal to 38C (100.4F)  bisacodyl 5 milliGRAM(s) Oral every 12 hours PRN Constipation      Vitals:  T(F): 99.6 (03-06-19 @ 12:00), Max: 101.6 (03-05-19 @ 17:46)  HR: 97 (03-06-19 @ 12:00) (89 - 97)  BP: 110/57 (03-06-19 @ 12:00) (98/61 - 148/73)  RR: 18 (03-06-19 @ 12:00) (16 - 19)  SpO2: 97% (03-06-19 @ 12:00) (95% - 98%)  Wt(kg): 70.7kg    03-05 @ 07:01  -  03-06 @ 07:00  --------------------------------------------------------  IN:  Total IN: 0 mL    OUT:    Voided: 6 mL  Total OUT: 6 mL    Total NET: -6 mL              PHYSICAL EXAM:  Neuro: Awake, responsive  CV: S1 S2 RRR. Grade 2/6 BEBO  Lungs: CTABL  GI: Soft, BS +, ND, NT  Extremities: No edema      [ x] Echocardiogram:< from: LIZ Echo Doppler (02.25.19 @ 13:40) >    Summary:   1. Left ventricular ejection fraction, by visual estimation, is 65 to   70%.   2. There is no left ventricular hypertrophy.   3. Moderately sized vegetation on the tricuspid valve, suggestive of   bacterial endocarditis.    < end of copied text >    LABS:	 	  06 Mar 2019 07:10    131    |  94     |  15     ----------------------------<  109    4.2     |  28     |  0.59   05 Mar 2019 10:09    131    |  95     |  13     ----------------------------<  138    3.9     |  29     |  0.60   04 Mar 2019 09:51    134    |  97     |  13     ----------------------------<  106    3.8     |  28     |  0.48     Ca    8.8        06 Mar 2019 07:10                            6.9    13.24 )-----------( 439      ( 06 Mar 2019 07:10 )             22.8 ,                       7.2    11.64 )-----------( 409      ( 05 Mar 2019 10:09 )             22.9 ,                       7.1    8.01  )-----------( 345      ( 04 Mar 2019 09:51 )             22.5 Patient is a 43y old  Female who presents with a chief complaint of AMS (05 Mar 2019 16:23)      PAST MEDICAL & SURGICAL HISTORY:  ETOH abuse  Drug abuse  HCV  C Section      INTERVAL HISTORY: Patient seen and examined at the bedside. Patient is seen lying in bed watching television in no acute distress. Patient states that she feels well today, but complains of feeling feverish. Denies shortness of breath, chest pain, dyspnea on exertion, palpitations, edema.   	  MEDICATIONS:  MEDICATIONS  (STANDING):  chlorhexidine 4% Liquid 1 Application(s) Topical <User Schedule>  DAPTOmycin IVPB 430 milliGRAM(s) IV Intermittent every 24 hours  DAPTOmycin IVPB      docusate sodium 100 milliGRAM(s) Oral three times a day  folic acid 1 milliGRAM(s) Oral daily  methadone    Tablet 20 milliGRAM(s) Oral daily  multivitamin 1 Tablet(s) Oral daily  nicotine - 21 mG/24Hr(s) Patch 1 patch Transdermal daily  senna 2 Tablet(s) Oral at bedtime    MEDICATIONS  (PRN):  acetaminophen   Tablet .. 650 milliGRAM(s) Oral every 6 hours PRN Temp greater or equal to 38C (100.4F)  bisacodyl 5 milliGRAM(s) Oral every 12 hours PRN Constipation      Vitals:  T(F): 99.6 (03-06-19 @ 12:00), Max: 101.6 (03-05-19 @ 17:46)  HR: 97 (03-06-19 @ 12:00) (89 - 97)  BP: 110/57 (03-06-19 @ 12:00) (98/61 - 148/73)  RR: 18 (03-06-19 @ 12:00) (16 - 19)  SpO2: 97% (03-06-19 @ 12:00) (95% - 98%)  Wt(kg): 70.7kg    03-05 @ 07:01  -  03-06 @ 07:00  --------------------------------------------------------  IN:  Total IN: 0 mL    OUT:    Voided: 6 mL  Total OUT: 6 mL    Total NET: -6 mL              PHYSICAL EXAM:  Neuro: Awake, responsive  CV: S1 S2 RRR. Grade 2/6 BEBO  Lungs: CTABL  GI: Soft, BS +, ND, NT  Extremities: No edema      [ x] Echocardiogram:< from: LIZ Echo Doppler (02.25.19 @ 13:40) >    Summary:   1. Left ventricular ejection fraction, by visual estimation, is 65 to   70%.   2. There is no left ventricular hypertrophy.   3. Moderately sized vegetation on the tricuspid valve, suggestive of   bacterial endocarditis.    < end of copied text >    LABS:	 	  06 Mar 2019 07:10    131    |  94     |  15     ----------------------------<  109    4.2     |  28     |  0.59   05 Mar 2019 10:09    131    |  95     |  13     ----------------------------<  138    3.9     |  29     |  0.60   04 Mar 2019 09:51    134    |  97     |  13     ----------------------------<  106    3.8     |  28     |  0.48     Ca    8.8        06 Mar 2019 07:10                            6.9    13.24 )-----------( 439      ( 06 Mar 2019 07:10 )             22.8 ,                       7.2    11.64 )-----------( 409      ( 05 Mar 2019 10:09 )             22.9 ,                       7.1    8.01  )-----------( 345      ( 04 Mar 2019 09:51 )             22.5 Patient is a 43y old  Female who presents with a chief complaint of AMS (05 Mar 2019 16:23)      PAST MEDICAL & SURGICAL HISTORY:  ETOH abuse  Drug abuse  HCV  C Section      INTERVAL HISTORY: Patient seen and examined at the bedside. Patient is seen lying in bed watching television in no acute distress. Patient states that she feels well today, but complains of feeling feverish. Denies shortness of breath, chest pain, dyspnea on exertion, palpitations, edema.   	  MEDICATIONS:  MEDICATIONS  (STANDING):  chlorhexidine 4% Liquid 1 Application(s) Topical <User Schedule>  DAPTOmycin IVPB 430 milliGRAM(s) IV Intermittent every 24 hours  DAPTOmycin IVPB      docusate sodium 100 milliGRAM(s) Oral three times a day  folic acid 1 milliGRAM(s) Oral daily  methadone    Tablet 20 milliGRAM(s) Oral daily  multivitamin 1 Tablet(s) Oral daily  nicotine - 21 mG/24Hr(s) Patch 1 patch Transdermal daily  senna 2 Tablet(s) Oral at bedtime    MEDICATIONS  (PRN):  acetaminophen   Tablet .. 650 milliGRAM(s) Oral every 6 hours PRN Temp greater or equal to 38C (100.4F)  bisacodyl 5 milliGRAM(s) Oral every 12 hours PRN Constipation      Vitals:  T(F): 99.6 (03-06-19 @ 12:00), Max: 101.6 (03-05-19 @ 17:46)  HR: 97 (03-06-19 @ 12:00) (89 - 97)  BP: 110/57 (03-06-19 @ 12:00) (98/61 - 148/73)  RR: 18 (03-06-19 @ 12:00) (16 - 19)  SpO2: 97% (03-06-19 @ 12:00) (95% - 98%)  Wt(kg): 70.7kg    03-05 @ 07:01  -  03-06 @ 07:00  --------------------------------------------------------  IN:  Total IN: 0 mL    OUT:    Voided: 6 mL  Total OUT: 6 mL    Total NET: -6 mL    PHYSICAL EXAM:  Neuro: Awake, responsive  CV: S1 S2 RRR. Grade 2/6 BEBO  Lungs: CTABL  GI: Soft, BS +, ND, NT  Extremities: No edema      [ x] Echocardiogram: < from: TTE Echo Doppler w/o Cont (02.18.19 @ 09:46) >   1. Left ventricular ejection fraction, by visual estimation, is 60 to   65%.   2. There is no left ventricular hypertrophy.   3. Trace tricuspid regurgitation.   4. Pulmonic valve regurgitation.   5. Estimated pulmonary artery systolic pressure is 35.2 mmHg assuming a   right atrial pressure of 5 mmHg, which is consistent with mild pulmonary   hypertension.    < end of copied text >      < from: LIZ Echo Doppler (02.25.19 @ 13:40) >    Summary:   1. Left ventricular ejection fraction, by visual estimation, is 65 to   70%.   2. There is no left ventricular hypertrophy.   3. Moderately sized vegetation on the tricuspid valve, suggestive of   bacterial endocarditis.    < end of copied text >    LABS:	 	  06 Mar 2019 07:10    131    |  94     |  15     ----------------------------<  109    4.2     |  28     |  0.59   05 Mar 2019 10:09    131    |  95     |  13     ----------------------------<  138    3.9     |  29     |  0.60   04 Mar 2019 09:51    134    |  97     |  13     ----------------------------<  106    3.8     |  28     |  0.48     Ca    8.8        06 Mar 2019 07:10                            6.9    13.24 )-----------( 439      ( 06 Mar 2019 07:10 )             22.8 ,                       7.2    11.64 )-----------( 409      ( 05 Mar 2019 10:09 )             22.9 ,                       7.1    8.01  )-----------( 345      ( 04 Mar 2019 09:51 )             22.5

## 2019-03-06 NOTE — PROGRESS NOTE ADULT - PROBLEM SELECTOR PLAN 3
- Continue   IV Daptomycin 430 mg/day.    - on daptomycin, azactam, vancomycin IV  -  Follow daily blood cultures for clearing of bacteremia, at present negative   - TTE confirms tricuspid endocarditis.     -May require CT surgical intervention  if blood cultures continue to remain persitently positive.  -- discussed w/

## 2019-03-06 NOTE — PROGRESS NOTE ADULT - ASSESSMENT
42 Y/O female w/ PMHx of IV heroin abuse, HCV,  brought in by EMS for lethargy and cough.    Labs showed serum lactate of 7.2, BUN/Cr 124/6.42, and WBC of of 11.93. Tmax 102.4, urine + for cocaine, opiates, nitrites and many bacteria.  Patient admitted to critical care in septic shock with MRSA bacteremia, in EFRAIN, and also endocarditis, intubated, on pressors. extubated on 2/25 and has been off pressors   LIZ done 2/25:  Moderately sized vegetation on the tricuspid valve, suggestive of bacterial endocarditis  Normal EF on echo  repeat blood c/s since 3/1 negative   EKG 3/4/19 is unchanged. No heart block observed.      Plan    ABx as per ID, dapto/vanco, Febrile overnight Tmax 101.6  For CT surgery evaluation as per ID for the TV endocarditis   on daily methadone 20mg  C/w Nutritional Support  H/H 6.9/22.8 3/6/19. Highly consider PRBC transfusion in the setting of acute anemia. 42 Y/O female w/ PMHx of IV heroin abuse, HCV,  brought in by EMS for lethargy and cough.    Labs showed serum lactate of 7.2, BUN/Cr 124/6.42, and WBC of of 11.93. Tmax 102.4, urine + for cocaine, opiates, nitrites and many bacteria.  Patient admitted to critical care in septic shock with MRSA bacteremia, in EFRAIN, and also endocarditis, intubated, on pressors. extubated on 2/25 and has been off pressors   LIZ done 2/25:  Moderately sized vegetation on the tricuspid valve, suggestive of bacterial endocarditis  Normal EF on echo  repeat blood c/s since 3/1 negative   EKG 3/4/19 is unchanged. No heart block observed.      Plan    ABx as per ID, dapto/vanco, Febrile overnight Tmax 101.6  For CT surgery evaluation as per ID for the TV endocarditis   on daily methadone 20mg/nicotine patch   C/w Nutritional Support  H/H 6.9/22.8 3/6/19. For PRBC transfusion today   Dr. Yanes discussed case with [facility] C[facility], no plan for 42 Y/O female w/ PMHx of IV heroin abuse, HCV,  brought in by EMS for lethargy and cough.    Labs showed serum lactate of 7.2, BUN/Cr 124/6.42, and WBC of of 11.93. Tmax 102.4, urine + for cocaine, opiates, nitrites and many bacteria.  Patient admitted to critical care in septic shock with MRSA bacteremia, in EFRAIN, and also endocarditis, intubated, on pressors. extubated on 2/25 and has been off pressors   LIZ done 2/25:  Moderately sized vegetation on the tricuspid valve, suggestive of bacterial endocarditis  Normal EF on echo  repeat blood c/s since 3/1 negative   EKG 3/4/19 is unchanged. No heart block observed.  Pt clinically stable    Plan    ABx as per ID, dapto/vanco, Febrile overnight Tmax 101.6  For CT surgery evaluation as per ID for the TV endocarditis   on daily methadone 20mg/nicotine patch   C/w Nutritional Support  H/H 6.9/22.8 3/6/19. For PRBC transfusion today   Dr. Yanes discussed case with [facility], no plan for immediate transfer at this time

## 2019-03-07 LAB
ANION GAP SERPL CALC-SCNC: 8 MMOL/L — SIGNIFICANT CHANGE UP (ref 5–17)
BLD GP AB SCN SERPL QL: SIGNIFICANT CHANGE UP
BUN SERPL-MCNC: 18 MG/DL — SIGNIFICANT CHANGE UP (ref 7–23)
CALCIUM SERPL-MCNC: 9 MG/DL — SIGNIFICANT CHANGE UP (ref 8.5–10.1)
CHLORIDE SERPL-SCNC: 94 MMOL/L — LOW (ref 96–108)
CO2 SERPL-SCNC: 28 MMOL/L — SIGNIFICANT CHANGE UP (ref 22–31)
CREAT SERPL-MCNC: 0.57 MG/DL — SIGNIFICANT CHANGE UP (ref 0.5–1.3)
CULTURE RESULTS: SIGNIFICANT CHANGE UP
GLUCOSE SERPL-MCNC: 120 MG/DL — HIGH (ref 70–99)
HCT VFR BLD CALC: 24.7 % — LOW (ref 34.5–45)
HGB BLD-MCNC: 7.8 G/DL — LOW (ref 11.5–15.5)
MCHC RBC-ENTMCNC: 25.2 PG — LOW (ref 27–34)
MCHC RBC-ENTMCNC: 31.6 GM/DL — LOW (ref 32–36)
MCV RBC AUTO: 79.7 FL — LOW (ref 80–100)
NRBC # BLD: 0 /100 WBCS — SIGNIFICANT CHANGE UP (ref 0–0)
PLATELET # BLD AUTO: 447 K/UL — HIGH (ref 150–400)
POTASSIUM SERPL-MCNC: 4.5 MMOL/L — SIGNIFICANT CHANGE UP (ref 3.5–5.3)
POTASSIUM SERPL-SCNC: 4.5 MMOL/L — SIGNIFICANT CHANGE UP (ref 3.5–5.3)
RBC # BLD: 3.1 M/UL — LOW (ref 3.8–5.2)
RBC # FLD: 17.4 % — HIGH (ref 10.3–14.5)
SODIUM SERPL-SCNC: 130 MMOL/L — LOW (ref 135–145)
SPECIMEN SOURCE: SIGNIFICANT CHANGE UP
TSH SERPL-MCNC: 1.84 UU/ML — SIGNIFICANT CHANGE UP (ref 0.36–3.74)
WBC # BLD: 12.41 K/UL — HIGH (ref 3.8–10.5)
WBC # FLD AUTO: 12.41 K/UL — HIGH (ref 3.8–10.5)

## 2019-03-07 PROCEDURE — 99232 SBSQ HOSP IP/OBS MODERATE 35: CPT

## 2019-03-07 PROCEDURE — 99233 SBSQ HOSP IP/OBS HIGH 50: CPT

## 2019-03-07 RX ORDER — DIPHENHYDRAMINE HCL 50 MG
25 CAPSULE ORAL ONCE
Qty: 0 | Refills: 0 | Status: COMPLETED | OUTPATIENT
Start: 2019-03-07 | End: 2019-03-07

## 2019-03-07 RX ADMIN — Medication 650 MILLIGRAM(S): at 20:22

## 2019-03-07 RX ADMIN — Medication 1 PATCH: at 12:13

## 2019-03-07 RX ADMIN — Medication 100 MILLIGRAM(S): at 14:45

## 2019-03-07 RX ADMIN — Medication 100 MILLIGRAM(S): at 06:10

## 2019-03-07 RX ADMIN — METHADONE HYDROCHLORIDE 20 MILLIGRAM(S): 40 TABLET ORAL at 12:09

## 2019-03-07 RX ADMIN — SENNA PLUS 2 TABLET(S): 8.6 TABLET ORAL at 22:01

## 2019-03-07 RX ADMIN — Medication 1 PATCH: at 06:19

## 2019-03-07 RX ADMIN — Medication 100 MILLIGRAM(S): at 06:11

## 2019-03-07 RX ADMIN — Medication 100 MILLIGRAM(S): at 16:30

## 2019-03-07 RX ADMIN — Medication 1 PATCH: at 12:11

## 2019-03-07 RX ADMIN — Medication 1 MILLIGRAM(S): at 12:09

## 2019-03-07 RX ADMIN — Medication 25 MILLIGRAM(S): at 22:01

## 2019-03-07 RX ADMIN — Medication 300 MILLIGRAM(S): at 17:10

## 2019-03-07 RX ADMIN — Medication 650 MILLIGRAM(S): at 06:11

## 2019-03-07 RX ADMIN — Medication 1 PATCH: at 18:00

## 2019-03-07 RX ADMIN — Medication 650 MILLIGRAM(S): at 21:22

## 2019-03-07 RX ADMIN — Medication 100 MILLIGRAM(S): at 22:01

## 2019-03-07 RX ADMIN — Medication 1 TABLET(S): at 12:09

## 2019-03-07 RX ADMIN — CHLORHEXIDINE GLUCONATE 1 APPLICATION(S): 213 SOLUTION TOPICAL at 06:11

## 2019-03-07 RX ADMIN — Medication 300 MILLIGRAM(S): at 06:11

## 2019-03-07 NOTE — PROGRESS NOTE ADULT - SUBJECTIVE AND OBJECTIVE BOX
Subjective: somnolent. Declining serum Na noted. Has two pitchers of water on side table.       MEDICATIONS  (STANDING):  aztreonam  IVPB 2000 milliGRAM(s) IV Intermittent every 8 hours  chlorhexidine 4% Liquid 1 Application(s) Topical <User Schedule>  DAPTOmycin IVPB 430 milliGRAM(s) IV Intermittent every 24 hours  DAPTOmycin IVPB      docusate sodium 100 milliGRAM(s) Oral three times a day  folic acid 1 milliGRAM(s) Oral daily  methadone    Tablet 20 milliGRAM(s) Oral daily  multivitamin 1 Tablet(s) Oral daily  nicotine - 21 mG/24Hr(s) Patch 1 patch Transdermal daily  senna 2 Tablet(s) Oral at bedtime  vancomycin  IVPB      vancomycin  IVPB 1500 milliGRAM(s) IV Intermittent every 12 hours    MEDICATIONS  (PRN):  acetaminophen   Tablet .. 650 milliGRAM(s) Oral every 6 hours PRN Temp greater or equal to 38C (100.4F)  bisacodyl 5 milliGRAM(s) Oral every 12 hours PRN Constipation          T(C): 38 (03-07-19 @ 04:58), Max: 38.8 (03-06-19 @ 20:30)  HR: 95 (03-07-19 @ 04:58) (87 - 97)  BP: 120/72 (03-07-19 @ 04:58) (100/55 - 121/67)  RR: 17 (03-07-19 @ 04:58) (16 - 19)  SpO2: 95% (03-07-19 @ 04:58) (95% - 98%)  Wt(kg): --        I&O's Detail           PHYSICAL EXAM:    GENERAL: somnolent.   EYES: EOMI, PERRLA, conjunctiva and sclera clear  NECK: Supple, no inc in JVP  CHEST/LUNG: Clear  HEART: S1S2  ABDOMEN: Soft, Nontender, Nondistended; Bowel sounds present  EXTREMITIES:  no edema  NEURO: no asterixis      LABS:  CBC Full  -  ( 07 Mar 2019 08:05 )  WBC Count : 12.41 K/uL  Hemoglobin : 7.8 g/dL  Hematocrit : 24.7 %  Platelet Count - Automated : 447 K/uL  Mean Cell Volume : 79.7 fl  Mean Cell Hemoglobin : 25.2 pg  Mean Cell Hemoglobin Concentration : 31.6 gm/dL  Auto Neutrophil # : x  Auto Lymphocyte # : x  Auto Monocyte # : x  Auto Eosinophil # : x  Auto Basophil # : x  Auto Neutrophil % : x  Auto Lymphocyte % : x  Auto Monocyte % : x  Auto Eosinophil % : x  Auto Basophil % : x    03-07    130<L>  |  94<L>  |  18  ----------------------------<  120<H>  4.5   |  28  |  0.57    Ca    9.0      07 Mar 2019 08:05          Culture Results:   No growth to date. (03-06 @ 00:40)          Impression:  * HypoNa -- serum Na slowly declining. DDx: water excess, cirrhosis related high ADH state.   * S/p EFRAIN due to ischemic ATN  * Tricuspid endocarditis  * Septic pulm emboli    Recommendations:   * FR to 1000cc/day  * Check urine Na, serum urate, TSH  * Mix all IV meds in NS  * Monitor Vanco T to prevent toxicity

## 2019-03-07 NOTE — PROGRESS NOTE ADULT - ASSESSMENT
42 y/o F w/polysubstance abuse presenting with severe sepsis with septic shock secondary to MRSA bacteremia. LIZ positive for endocarditis. EFRAIN likely secondary to sepsis now improved. Acute respiratory failure requiring intubation. New diagnosis of HCV.  called and left message to Kim [phone](pt's mother ) as per pt's request

## 2019-03-07 NOTE — PROGRESS NOTE ADULT - ASSESSMENT
42 Y/O female w/ PMHx of IV heroin abuse, HCV,  brought in by EMS for lethargy and cough.    Labs showed serum lactate of 7.2, BUN/Cr 124/6.42, and WBC of of 11.93. Tmax 102.4, urine + for cocaine, opiates, nitrites and many bacteria.  Patient admitted to critical care in septic shock with MRSA bacteremia, in EFRAIN, and also endocarditis, intubated, on pressors. extubated on 2/25 and has been off pressors   LIZ done 2/25:  Moderately sized vegetation on the tricuspid valve, suggestive of bacterial endocarditis  Normal EF on echo  repeat blood c/s since 3/1 negative   EKG 3/4/19 is unchanged. No heart block observed.  remains to have fever intermittently   Pt clinically stable    Plan    ABx as per ID, dapto/vanco/Azactam, Febrile overnight Tmax 101.8  For CT surgery evaluation as per ID for the TV endocarditis   on daily methadone 20mg/nicotine patch   C/w Nutritional Support  H/H 6.9/22.8 3/6/19, s/p PRBC transfusion, now h/h 7.8/24.7   no plan for immediate transfer at this time 44 Y/O female w/ PMHx of IV heroin abuse, HCV,  brought in by EMS for lethargy and cough.    Labs showed serum lactate of 7.2, BUN/Cr 124/6.42, and WBC of of 11.93. Tmax 102.4, urine + for cocaine, opiates, nitrites and many bacteria.  Patient admitted to critical care in septic shock with MRSA bacteremia, in EFRAIN, and also endocarditis, intubated, on pressors. extubated on 2/25 and has been off pressors   LIZ done 2/25:  Moderately sized vegetation on the tricuspid valve, suggestive of bacterial endocarditis  Normal EF on echo  repeat blood c/s since 3/1 negative   EKG 3/4/19 is unchanged. No heart block observed.  remains to have fever intermittently   Pt clinically stable    Plan    ABx as per ID, dapto/vanco/Azactam, Febrile overnight Tmax 101.8  on daily methadone 20mg/nicotine patch   C/w Nutritional Support  H/H 6.9/22.8 3/6/19, s/p PRBC transfusion, now h/h 7.8/24.7  no plan for immediate transfer at this time for CT surgery evaluation  for the TV endocarditis

## 2019-03-07 NOTE — PROGRESS NOTE ADULT - PROBLEM SELECTOR PLAN 1
cont vanco at current dose , vanco level now therapeutic   repeat blood c/s since 3/1 negative   will hold off transfer as fever resolving and bacteremia clearing up   will need 6 weeks total of antibiotics from 3/1/19, cant be discharged to outpatient  as is active IVDA. cont vanco at current dose , vanco level now therapeutic   repeat blood c/s since 3/1 negative   will hold off transfer as fever resolving and bacteremia clearing up   will need 6 weeks total of antibiotics from 3/1/19, cant be discharged to outpatient  as is active IVDA.  d/c daptomycin

## 2019-03-07 NOTE — PROGRESS NOTE ADULT - SUBJECTIVE AND OBJECTIVE BOX
Patient is a 43y old  Female who presents with a chief complaint of AMS (07 Mar 2019 14:40)      INTERVAL HPI / OVERNIGHT EVENTS: fever+    MEDICATIONS  (STANDING):  aztreonam  IVPB 2000 milliGRAM(s) IV Intermittent every 8 hours  chlorhexidine 4% Liquid 1 Application(s) Topical <User Schedule>  docusate sodium 100 milliGRAM(s) Oral three times a day  folic acid 1 milliGRAM(s) Oral daily  methadone    Tablet 20 milliGRAM(s) Oral daily  multivitamin 1 Tablet(s) Oral daily  nicotine - 21 mG/24Hr(s) Patch 1 patch Transdermal daily  senna 2 Tablet(s) Oral at bedtime  vancomycin  IVPB      vancomycin  IVPB 1500 milliGRAM(s) IV Intermittent every 12 hours    MEDICATIONS  (PRN):  acetaminophen   Tablet .. 650 milliGRAM(s) Oral every 6 hours PRN Temp greater or equal to 38C (100.4F)  bisacodyl 5 milliGRAM(s) Oral every 12 hours PRN Constipation      Vital Signs Last 24 Hrs  T(C): 37.1 (07 Mar 2019 11:46), Max: 38.8 (06 Mar 2019 20:30)  T(F): 98.7 (07 Mar 2019 11:46), Max: 101.8 (06 Mar 2019 20:30)  HR: 95 (07 Mar 2019 16:27) (87 - 95)  BP: 109/66 (07 Mar 2019 16:27) (99/62 - 120/72)  BP(mean): --  RR: 16 (07 Mar 2019 16:27) (16 - 18)  SpO2: 95% (07 Mar 2019 16:27) (95% - 97%)    Review of systems:  General : + fever /chills,fatigue  CVS : no chest pain, palpitations  Lungs : no shortness of breath, cough  GI : no abdominal pain,vomiting, diarrhea   : no dysuria,hematuria        PHYSICAL EXAM:  General :NAD  Constitutional:  well-groomed, well-developed  Respiratory: CTAB/L  Cardiovascular: S1 and S2, RRR, no M/G/R  Gastrointestinal: BS+, soft, NT/ND  Extremities: No peripheral edema  Vascular: 2+ peripheral pulses  Skin: No rashes      LABS:                        7.8    12.41 )-----------( 447      ( 07 Mar 2019 08:05 )             24.7     03-07    130<L>  |  94<L>  |  18  ----------------------------<  120<H>  4.5   |  28  |  0.57    Ca    9.0      07 Mar 2019 08:05            MICROBIOLOGY:  RECENT CULTURES:  03-06 .Blood XXXX XXXX   No growth to date.    03-03 .Blood XXXX XXXX   No growth to date.    03-03 .Blood XXXX XXXX   No growth to date.    03-02 .Blood XXXX XXXX   No growth at 5 days.    03-01 .Blood XXXX XXXX   No growth at 5 days.    03-01 .Blood XXXX XXXX   No growth at 5 days.          RADIOLOGY & ADDITIONAL STUDIES: Patient is a 43y old  Female who presents with a chief complaint of AMS (07 Mar 2019 14:40)      INTERVAL HPI / OVERNIGHT EVENTS: fever+    MEDICATIONS  (STANDING):  aztreonam  IVPB 2000 milliGRAM(s) IV Intermittent every 8 hours  chlorhexidine 4% Liquid 1 Application(s) Topical <User Schedule>  docusate sodium 100 milliGRAM(s) Oral three times a day  folic acid 1 milliGRAM(s) Oral daily  methadone    Tablet 20 milliGRAM(s) Oral daily  multivitamin 1 Tablet(s) Oral daily  nicotine - 21 mG/24Hr(s) Patch 1 patch Transdermal daily  senna 2 Tablet(s) Oral at bedtime  vancomycin  IVPB      vancomycin  IVPB 1500 milliGRAM(s) IV Intermittent every 12 hours    MEDICATIONS  (PRN):  acetaminophen   Tablet .. 650 milliGRAM(s) Oral every 6 hours PRN Temp greater or equal to 38C (100.4F)  bisacodyl 5 milliGRAM(s) Oral every 12 hours PRN Constipation      Vital Signs Last 24 Hrs  T(C): 37.1 (07 Mar 2019 11:46), Max: 38.8 (06 Mar 2019 20:30)  T(F): 98.7 (07 Mar 2019 11:46), Max: 101.8 (06 Mar 2019 20:30)  HR: 95 (07 Mar 2019 16:27) (87 - 95)  BP: 109/66 (07 Mar 2019 16:27) (99/62 - 120/72)  BP(mean): --  RR: 16 (07 Mar 2019 16:27) (16 - 18)  SpO2: 95% (07 Mar 2019 16:27) (95% - 97%)    Review of systems:  General : + fever /chills,fatigue  CVS : no chest pain, palpitations  Lungs : no shortness of breath, cough  GI : no abdominal pain,vomiting, diarrhea   : no dysuria,hematuria        PHYSICAL EXAM:  General :NAD  Constitutional:  well-groomed, well-developed  Respiratory: CTAB/L  Cardiovascular: S1 and S2, RRR, no M/G/R  Gastrointestinal: BS+, soft, NT/ND  Extremities: No peripheral edema  Vascular: 2+ peripheral pulses  Skin: skin popping + on b/l  leg and left knee blister       LABS:                        7.8    12.41 )-----------( 447      ( 07 Mar 2019 08:05 )             24.7     03-07    130<L>  |  94<L>  |  18  ----------------------------<  120<H>  4.5   |  28  |  0.57    Ca    9.0      07 Mar 2019 08:05            MICROBIOLOGY:  RECENT CULTURES:  03-06 .Blood XXXX XXXX   No growth to date.    03-03 .Blood XXXX XXXX   No growth to date.    03-03 .Blood XXXX XXXX   No growth to date.    03-02 .Blood XXXX XXXX   No growth at 5 days.    03-01 .Blood XXXX XXXX   No growth at 5 days.    03-01 .Blood XXXX XXXX   No growth at 5 days.          RADIOLOGY & ADDITIONAL STUDIES:

## 2019-03-07 NOTE — PROGRESS NOTE ADULT - SUBJECTIVE AND OBJECTIVE BOX
Patient is a 43y old  Female who presents with a chief complaint of AMS (07 Mar 2019 13:23)      PAST MEDICAL & SURGICAL HISTORY:  ETOH abuse  Drug abuse  Hep C  C Section      INTERVAL HISTORY: resting in bed, not in any acute distress   	  MEDICATIONS:  MEDICATIONS  (STANDING):  aztreonam  IVPB 2000 milliGRAM(s) IV Intermittent every 8 hours  chlorhexidine 4% Liquid 1 Application(s) Topical <User Schedule>  DAPTOmycin IVPB 430 milliGRAM(s) IV Intermittent every 24 hours  DAPTOmycin IVPB      docusate sodium 100 milliGRAM(s) Oral three times a day  folic acid 1 milliGRAM(s) Oral daily  methadone    Tablet 20 milliGRAM(s) Oral daily  multivitamin 1 Tablet(s) Oral daily  nicotine - 21 mG/24Hr(s) Patch 1 patch Transdermal daily  senna 2 Tablet(s) Oral at bedtime  vancomycin  IVPB      vancomycin  IVPB 1500 milliGRAM(s) IV Intermittent every 12 hours    MEDICATIONS  (PRN):  acetaminophen   Tablet .. 650 milliGRAM(s) Oral every 6 hours PRN Temp greater or equal to 38C (100.4F)  bisacodyl 5 milliGRAM(s) Oral every 12 hours PRN Constipation    Vitals:  T(F): 98.7 (03-07-19 @ 11:46), Max: 101.8 (03-06-19 @ 20:30)  HR: 87 (03-07-19 @ 11:46) (87 - 95)  BP: 99/62 (03-07-19 @ 11:46) (99/62 - 121/67)  RR: 17 (03-07-19 @ 11:46) (16 - 19)  SpO2: 95% (03-07-19 @ 11:46) (95% - 97%)  Wt(kg): -- 70.4 kg    PHYSICAL EXAM:  Neuro: Awake, responsive  CV: S1 S2 RRR  Lungs: CTABL  GI: Soft, BS +, ND, NT  Extremities: No edema    DIAGNOSTIC TESTING:    [x ] Echocardiogram: < from: TTE Echo Doppler w/o Cont (02.18.19 @ 09:46) >   1. Left ventricular ejection fraction, by visual estimation, is 60 to   65%.   2. There is no left ventricular hypertrophy.   3. Trace tricuspid regurgitation.   4. Pulmonic valve regurgitation.   5. Estimated pulmonary artery systolic pressure is 35.2 mmHg assuming a   right atrial pressure of 5 mmHg, which is consistent with mild pulmonary   hypertension.    < end of copied text >    LIZ:  < from: LIZ Echo Doppler (02.25.19 @ 13:40) >   1. Left ventricular ejection fraction, by visual estimation, is 65 to   70%.   2. There is no left ventricular hypertrophy.   3. Moderately sized vegetation on the tricuspid valve, suggestive of   bacterial endocarditis.    < end of copied text >    LABS:	 	  07 Mar 2019 08:05    130    |  94     |  18     ----------------------------<  120    4.5     |  28     |  0.57   06 Mar 2019 07:10    131    |  94     |  15     ----------------------------<  109    4.2     |  28     |  0.59   05 Mar 2019 10:09    131    |  95     |  13     ----------------------------<  138    3.9     |  29     |  0.60     Ca    9.0        07 Mar 2019 08:05                          7.8    12.41 )-----------( 447      ( 07 Mar 2019 08:05 )             24.7 ,                       6.9    13.24 )-----------( 439      ( 06 Mar 2019 07:10 )             22.8 ,                       7.2    11.64 )-----------( 409      ( 05 Mar 2019 10:09 )             22.9 Patient is a 43y old  Female who presents with a chief complaint of AMS (07 Mar 2019 13:23)      PAST MEDICAL & SURGICAL HISTORY:  ETOH abuse  Drug abuse  Hep C  C Section      INTERVAL HISTORY: resting in chair, not in any acute distress   	  MEDICATIONS:  MEDICATIONS  (STANDING):  aztreonam  IVPB 2000 milliGRAM(s) IV Intermittent every 8 hours  chlorhexidine 4% Liquid 1 Application(s) Topical <User Schedule>  DAPTOmycin IVPB 430 milliGRAM(s) IV Intermittent every 24 hours  DAPTOmycin IVPB      docusate sodium 100 milliGRAM(s) Oral three times a day  folic acid 1 milliGRAM(s) Oral daily  methadone    Tablet 20 milliGRAM(s) Oral daily  multivitamin 1 Tablet(s) Oral daily  nicotine - 21 mG/24Hr(s) Patch 1 patch Transdermal daily  senna 2 Tablet(s) Oral at bedtime  vancomycin  IVPB      vancomycin  IVPB 1500 milliGRAM(s) IV Intermittent every 12 hours    MEDICATIONS  (PRN):  acetaminophen   Tablet .. 650 milliGRAM(s) Oral every 6 hours PRN Temp greater or equal to 38C (100.4F)  bisacodyl 5 milliGRAM(s) Oral every 12 hours PRN Constipation    Vitals:  T(F): 98.7 (03-07-19 @ 11:46), Max: 101.8 (03-06-19 @ 20:30)  HR: 87 (03-07-19 @ 11:46) (87 - 95)  BP: 99/62 (03-07-19 @ 11:46) (99/62 - 121/67)  RR: 17 (03-07-19 @ 11:46) (16 - 19)  SpO2: 95% (03-07-19 @ 11:46) (95% - 97%)  Wt(kg): -- 70.4 kg    PHYSICAL EXAM:  Neuro: Awake, responsive  CV: S1 S2 RRR, 2/6 BEBO  Lungs: CTABL  GI: Soft, BS +, ND, NT  Extremities: No edema    DIAGNOSTIC TESTING:    [x ] Echocardiogram: < from: TTE Echo Doppler w/o Cont (02.18.19 @ 09:46) >   1. Left ventricular ejection fraction, by visual estimation, is 60 to   65%.   2. There is no left ventricular hypertrophy.   3. Trace tricuspid regurgitation.   4. Pulmonic valve regurgitation.   5. Estimated pulmonary artery systolic pressure is 35.2 mmHg assuming a   right atrial pressure of 5 mmHg, which is consistent with mild pulmonary   hypertension.    < end of copied text >    LIZ:  < from: LIZ Echo Doppler (02.25.19 @ 13:40) >   1. Left ventricular ejection fraction, by visual estimation, is 65 to   70%.   2. There is no left ventricular hypertrophy.   3. Moderately sized vegetation on the tricuspid valve, suggestive of   bacterial endocarditis.    < end of copied text >    LABS:	 	  07 Mar 2019 08:05    130    |  94     |  18     ----------------------------<  120    4.5     |  28     |  0.57   06 Mar 2019 07:10    131    |  94     |  15     ----------------------------<  109    4.2     |  28     |  0.59   05 Mar 2019 10:09    131    |  95     |  13     ----------------------------<  138    3.9     |  29     |  0.60     Ca    9.0        07 Mar 2019 08:05                          7.8    12.41 )-----------( 447      ( 07 Mar 2019 08:05 )             24.7 ,                       6.9    13.24 )-----------( 439      ( 06 Mar 2019 07:10 )             22.8 ,                       7.2    11.64 )-----------( 409      ( 05 Mar 2019 10:09 )             22.9

## 2019-03-07 NOTE — PROGRESS NOTE ADULT - SUBJECTIVE AND OBJECTIVE BOX
Patient is a 43y old  Female who presents with a chief complaint of AMS (07 Mar 2019 11:55)       OVERNIGHT EVENTS:    MEDICATIONS  (STANDING):  aztreonam  IVPB 2000 milliGRAM(s) IV Intermittent every 8 hours  chlorhexidine 4% Liquid 1 Application(s) Topical <User Schedule>  DAPTOmycin IVPB 430 milliGRAM(s) IV Intermittent every 24 hours  DAPTOmycin IVPB      docusate sodium 100 milliGRAM(s) Oral three times a day  folic acid 1 milliGRAM(s) Oral daily  methadone    Tablet 20 milliGRAM(s) Oral daily  multivitamin 1 Tablet(s) Oral daily  nicotine - 21 mG/24Hr(s) Patch 1 patch Transdermal daily  senna 2 Tablet(s) Oral at bedtime  vancomycin  IVPB      vancomycin  IVPB 1500 milliGRAM(s) IV Intermittent every 12 hours    MEDICATIONS  (PRN):  acetaminophen   Tablet .. 650 milliGRAM(s) Oral every 6 hours PRN Temp greater or equal to 38C (100.4F)  bisacodyl 5 milliGRAM(s) Oral every 12 hours PRN Constipation       Vital Signs Last 24 Hrs  T(C): 37.1 (07 Mar 2019 11:46), Max: 38.8 (06 Mar 2019 20:30)  T(F): 98.7 (07 Mar 2019 11:46), Max: 101.8 (06 Mar 2019 20:30)  HR: 87 (07 Mar 2019 11:46) (87 - 95)  BP: 99/62 (07 Mar 2019 11:46) (99/62 - 121/67)  BP(mean): --  RR: 17 (07 Mar 2019 11:46) (16 - 19)  SpO2: 95% (07 Mar 2019 11:46) (95% - 98%)     PHYSICAL EXAM:  GENERAL: NAD, well-developed  HEAD:  Atraumatic, Normocephalic  EYES: EOMI, PERRLA, conjunctiva and sclera clear  ENMT: No tonsillar erythema, exudates, or enlargement; Moist mucous membranes   NECK: Supple, No JVD   NERVOUS SYSTEM:  Alert & Oriented X3, Good concentration  CHEST/LUNG: Clear to auscultation  bilaterally; No rales, rhonchi, wheezing, or rubs  HEART: Regular rate and rhythm; No murmurs, rubs, or gallops  ABDOMEN: Soft, Nontender, Nondistended; Bowel sounds present  EXTREMITIES:  dressing in place left knee, multiple track marks on the extremities    LABS:                        7.8    12.41 )-----------( 447      ( 07 Mar 2019 08:05 )             24.7     03-07    130<L>  |  94<L>  |  18  ----------------------------<  120<H>  4.5   |  28  |  0.57    Ca    9.0      07 Mar 2019 08:05         cardiac markers     CAPILLARY BLOOD GLUCOSE      POCT Blood Glucose.: 130 mg/dL (06 Mar 2019 23:28)    Cultures    RADIOLOGY & ADDITIONAL TESTS:    Imaging Personally Reviewed:  [ x] YES  [ ] NO    Consultant(s) Notes Reviewed:  [ x] YES  [ ] NO    Care Discussed with Consultants/Other Providers [x ] YES  [ ] NO

## 2019-03-07 NOTE — PROGRESS NOTE ADULT - PROBLEM SELECTOR PLAN 4
Yes CT chest shows  multiple cavitary lesions  repeat CT showed worsening but pt less symptomatic CT chest shows  multiple cavitary lesions  possible pneumonia too  add azactam

## 2019-03-08 LAB
ANION GAP SERPL CALC-SCNC: 9 MMOL/L — SIGNIFICANT CHANGE UP (ref 5–17)
BUN SERPL-MCNC: 22 MG/DL — SIGNIFICANT CHANGE UP (ref 7–23)
CALCIUM SERPL-MCNC: 8.9 MG/DL — SIGNIFICANT CHANGE UP (ref 8.5–10.1)
CHLORIDE SERPL-SCNC: 94 MMOL/L — LOW (ref 96–108)
CO2 SERPL-SCNC: 28 MMOL/L — SIGNIFICANT CHANGE UP (ref 22–31)
CREAT SERPL-MCNC: 0.69 MG/DL — SIGNIFICANT CHANGE UP (ref 0.5–1.3)
CULTURE RESULTS: SIGNIFICANT CHANGE UP
GLUCOSE SERPL-MCNC: 140 MG/DL — HIGH (ref 70–99)
HCT VFR BLD CALC: 26.1 % — LOW (ref 34.5–45)
HGB BLD-MCNC: 8.1 G/DL — LOW (ref 11.5–15.5)
MCHC RBC-ENTMCNC: 24.6 PG — LOW (ref 27–34)
MCHC RBC-ENTMCNC: 31 GM/DL — LOW (ref 32–36)
MCV RBC AUTO: 79.3 FL — LOW (ref 80–100)
NRBC # BLD: 0 /100 WBCS — SIGNIFICANT CHANGE UP (ref 0–0)
PLATELET # BLD AUTO: 466 K/UL — HIGH (ref 150–400)
POTASSIUM SERPL-MCNC: 4.4 MMOL/L — SIGNIFICANT CHANGE UP (ref 3.5–5.3)
POTASSIUM SERPL-SCNC: 4.4 MMOL/L — SIGNIFICANT CHANGE UP (ref 3.5–5.3)
RBC # BLD: 3.29 M/UL — LOW (ref 3.8–5.2)
RBC # FLD: 17.5 % — HIGH (ref 10.3–14.5)
SODIUM SERPL-SCNC: 131 MMOL/L — LOW (ref 135–145)
SODIUM UR-SCNC: 58 MMOL/L — SIGNIFICANT CHANGE UP
SPECIMEN SOURCE: SIGNIFICANT CHANGE UP
URATE SERPL-MCNC: 2.4 MG/DL — LOW (ref 2.5–7)
VANCOMYCIN TROUGH SERPL-MCNC: 13.4 UG/ML — SIGNIFICANT CHANGE UP (ref 10–20)
VANCOMYCIN TROUGH SERPL-MCNC: 18.8 UG/ML — SIGNIFICANT CHANGE UP (ref 10–20)
WBC # BLD: 11.23 K/UL — HIGH (ref 3.8–10.5)
WBC # FLD AUTO: 11.23 K/UL — HIGH (ref 3.8–10.5)

## 2019-03-08 PROCEDURE — 99233 SBSQ HOSP IP/OBS HIGH 50: CPT

## 2019-03-08 PROCEDURE — 99232 SBSQ HOSP IP/OBS MODERATE 35: CPT

## 2019-03-08 RX ORDER — NYSTATIN CREAM 100000 [USP'U]/G
1 CREAM TOPICAL
Qty: 0 | Refills: 0 | Status: DISCONTINUED | OUTPATIENT
Start: 2019-03-08 | End: 2019-03-20

## 2019-03-08 RX ORDER — METHADONE HYDROCHLORIDE 40 MG/1
20 TABLET ORAL DAILY
Qty: 0 | Refills: 0 | Status: DISCONTINUED | OUTPATIENT
Start: 2019-03-09 | End: 2019-03-12

## 2019-03-08 RX ORDER — DIPHENHYDRAMINE HCL 50 MG
25 CAPSULE ORAL ONCE
Qty: 0 | Refills: 0 | Status: COMPLETED | OUTPATIENT
Start: 2019-03-08 | End: 2019-03-09

## 2019-03-08 RX ADMIN — Medication 100 MILLIGRAM(S): at 05:00

## 2019-03-08 RX ADMIN — Medication 1 PATCH: at 06:52

## 2019-03-08 RX ADMIN — Medication 650 MILLIGRAM(S): at 21:11

## 2019-03-08 RX ADMIN — Medication 100 MILLIGRAM(S): at 15:54

## 2019-03-08 RX ADMIN — Medication 100 MILLIGRAM(S): at 22:17

## 2019-03-08 RX ADMIN — SENNA PLUS 2 TABLET(S): 8.6 TABLET ORAL at 22:17

## 2019-03-08 RX ADMIN — Medication 300 MILLIGRAM(S): at 05:43

## 2019-03-08 RX ADMIN — Medication 1 TABLET(S): at 12:46

## 2019-03-08 RX ADMIN — Medication 650 MILLIGRAM(S): at 13:30

## 2019-03-08 RX ADMIN — CHLORHEXIDINE GLUCONATE 1 APPLICATION(S): 213 SOLUTION TOPICAL at 05:00

## 2019-03-08 RX ADMIN — Medication 1 MILLIGRAM(S): at 12:46

## 2019-03-08 RX ADMIN — Medication 650 MILLIGRAM(S): at 12:25

## 2019-03-08 RX ADMIN — Medication 650 MILLIGRAM(S): at 23:00

## 2019-03-08 RX ADMIN — METHADONE HYDROCHLORIDE 20 MILLIGRAM(S): 40 TABLET ORAL at 12:45

## 2019-03-08 RX ADMIN — Medication 1 PATCH: at 18:33

## 2019-03-08 RX ADMIN — Medication 300 MILLIGRAM(S): at 18:33

## 2019-03-08 RX ADMIN — Medication 1 PATCH: at 12:44

## 2019-03-08 RX ADMIN — Medication 1 PATCH: at 12:45

## 2019-03-08 NOTE — PROGRESS NOTE ADULT - ASSESSMENT
42 Y/O female w/ PMHx of IV heroin abuse, HCV,  brought in by EMS for lethargy and cough.    Labs showed serum lactate of 7.2, BUN/Cr 124/6.42, and WBC of of 11.93. Tmax 102.4, urine + for cocaine, opiates, nitrites and many bacteria.  Patient admitted to critical care in septic shock with MRSA bacteremia, in EFRAIN, and also endocarditis, intubated, on pressors. extubated on 2/25 and has been off pressors   LIZ done 2/25:  Moderately sized vegetation on the tricuspid valve, suggestive of bacterial endocarditis  Normal EF on echo  CT Chest: Bilateral cavitary lesions are increased in size and number compared to 2/17/2019. Small bilateral pleural effusions, loculated on the right, increased compared to the prior study. Unchanged right lower lobe dependent consolidation. Prominent mediastinal and axillary lymph nodes, possibly reactive.  repeat blood c/s since 3/1 negative   EKG 3/4/19 is unchanged. No heart block observed.  remains to have fever intermittently   Pt clinically stable    Plan    ABx as per ID, vanco/Azactam  on daily methadone 20mg/nicotine patch   C/w Nutritional Support  H/H down to 6.9/22.8 3/6/19, s/p PRBC transfusion, now h/h 8.1/26.1  no plan for immediate transfer at this time for CT surgery evaluation  for the TV endocarditis 44 Y/O female w/ PMHx of IV heroin abuse, HCV,  brought in by EMS for lethargy and cough.    Labs showed serum lactate of 7.2, BUN/Cr 124/6.42, and WBC of of 11.93. Tmax 102.4, urine + for cocaine, opiates, nitrites and many bacteria.  Patient admitted to critical care in septic shock with MRSA bacteremia, in EFRAIN, and also endocarditis, intubated, on pressors. extubated on 2/25 and has been off pressors   LIZ done 2/25:  Moderately sized vegetation on the tricuspid valve, suggestive of bacterial endocarditis  Normal EF on echo  CT Chest: Bilateral cavitary lesions are increased in size and number compared to 2/17/2019. Small bilateral pleural effusions, loculated on the right, increased compared to the prior study. Unchanged right lower lobe dependent consolidation. Prominent mediastinal and axillary lymph nodes, possibly reactive.  repeat blood c/s since 3/1 negative   EKG 3/4/19 is unchanged. No heart block observed.  remains to have fever intermittently   Pt clinically stable    Plan    ABx as per ID, vanco/Azactam  on daily methadone 20mg/nicotine patch   C/w Nutritional Support  H/H down to 6.9/22.8 3/6/19, s/p PRBC transfusion, now h/h 8.1/26.1  no plan for immediate transfer at this time for CT surgery evaluation  for the TV endocarditis   will repeat TTE if clinically warranted   will sign off for now 44 Y/O female w/ PMHx of IV heroin abuse, HCV,  brought in by EMS for lethargy and cough.    Labs showed serum lactate of 7.2, BUN/Cr 124/6.42, and WBC of of 11.93. Tmax 102.4, urine + for cocaine, opiates, nitrites and many bacteria.  Patient admitted to critical care in septic shock with MRSA bacteremia, in EFRAIN, and also endocarditis, intubated, on pressors. extubated on 2/25 and has been off pressors   LIZ done 2/25:  Moderately sized vegetation on the tricuspid valve, suggestive of bacterial endocarditis  Normal EF on echo  CT Chest: Bilateral cavitary lesions are increased in size and number compared to 2/17/2019. Small bilateral pleural effusions, loculated on the right, increased compared to the prior study. Unchanged right lower lobe dependent consolidation. Prominent mediastinal and axillary lymph nodes, possibly reactive.  repeat blood c/s since 3/1 negative   EKG 3/4/19 is unchanged. No heart block observed.  remains to have fever intermittently   Pt clinically stable    Plan    ABx as per ID, vanco/Azactam  on daily methadone 20mg/nicotine patch   C/w Nutritional Support  H/H down to 6.9/22.8 3/6/19, s/p PRBC transfusion, now h/h 8.1/26.1  no plan for immediate transfer at this time for CT surgery evaluation  for the TV endocarditis, patient remains hemodynamically stable and cultures are clearing.   suggest repeat TTE prior to discharge  Please reconsult prn

## 2019-03-08 NOTE — PROGRESS NOTE ADULT - SUBJECTIVE AND OBJECTIVE BOX
Patient is a 43y old  Female who presents with a chief complaint of AMS (07 Mar 2019 17:17)    PAST MEDICAL & SURGICAL HISTORY:  ETOH abuse  Drug abuse  hep C  C Section      INTERVAL HISTORY: Resting in bed, not in any acute distress   	  MEDICATIONS:  MEDICATIONS  (STANDING):  aztreonam  IVPB 2000 milliGRAM(s) IV Intermittent every 8 hours  chlorhexidine 4% Liquid 1 Application(s) Topical <User Schedule>  docusate sodium 100 milliGRAM(s) Oral three times a day  folic acid 1 milliGRAM(s) Oral daily  multivitamin 1 Tablet(s) Oral daily  nicotine - 21 mG/24Hr(s) Patch 1 patch Transdermal daily  senna 2 Tablet(s) Oral at bedtime  vancomycin  IVPB      vancomycin  IVPB 1500 milliGRAM(s) IV Intermittent every 12 hours    MEDICATIONS  (PRN):  acetaminophen   Tablet .. 650 milliGRAM(s) Oral every 6 hours PRN Temp greater or equal to 38C (100.4F)  bisacodyl 5 milliGRAM(s) Oral every 12 hours PRN Constipation      Vitals:  T(F): 100.3 (03-08-19 @ 12:25), Max: 102.6 (03-07-19 @ 19:00)  HR: 86 (03-08-19 @ 15:46) (86 - 96)  BP: 103/55 (03-08-19 @ 15:46) (95/56 - 121/71)  RR: 18 (03-08-19 @ 15:46) (15 - 169)  SpO2: 96% (03-08-19 @ 15:46) (95% - 96%)      03-07 @ 07:01  -  03-08 @ 07:00  --------------------------------------------------------  IN:  Total IN: 0 mL    OUT:    Voided: 450 mL  Total OUT: 450 mL    Total NET: -450 mL    PHYSICAL EXAM:  Neuro: Awake, responsive  CV: S1 S2 RRR  Lungs: CTABL  GI: Soft, BS +, ND, NT  Extremities: No edema  < from: CT Chest No Cont (03.01.19 @ 11:32) >  Bilateral cavitary lesions are increased in size and number compared to   2/17/2019.     Small bilateral pleural effusions, loculated on the right, increased   compared to the prior study. Unchanged right lower lobe dependent  consolidation.    Prominent mediastinal and axillary lymph nodes, possibly reactive.    < end of copied text >    DIAGNOSTIC TESTING:    [ x] Echocardiogram: < from: TTE Echo Doppler w/o Cont (02.18.19 @ 09:46) >   1. Left ventricular ejection fraction, by visual estimation, is 60 to   65%.   2. There is no left ventricular hypertrophy.   3. Trace tricuspid regurgitation.   4. Pulmonic valve regurgitation.   5. Estimated pulmonary artery systolic pressure is 35.2 mmHg assuming a   right atrial pressure of 5 mmHg, which is consistent with mild pulmonary   hypertension.    < end of copied text >    < from: LIZ Echo Doppler (02.25.19 @ 13:40) >   1. Left ventricular ejection fraction, by visual estimation, is 65 to   70%.   2. There is no left ventricular hypertrophy.   3. Moderately sized vegetation on the tricuspid valve, suggestive of   bacterial endocarditis.    < end of copied text >      LABS:	 	    08 Mar 2019 04:38    131    |  94     |  22     ----------------------------<  140    4.4     |  28     |  0.69   07 Mar 2019 08:05    130    |  94     |  18     ----------------------------<  120    4.5     |  28     |  0.57   06 Mar 2019 07:10    131    |  94     |  15     ----------------------------<  109    4.2     |  28     |  0.59     Ca    8.9        08 Mar 2019 04:38                        8.1    11.23 )-----------( 466      ( 08 Mar 2019 04:38 )             26.1 ,                       7.8    12.41 )-----------( 447      ( 07 Mar 2019 08:05 )             24.7 ,                       6.9    13.24 )-----------( 439      ( 06 Mar 2019 07:10 )             22.8     TSH: Thyroid Stimulating Hormone, Serum: 1.840 uU/mL (03-07 @ 08:05) Patient is a 43y old  Female who presents with a chief complaint of AMS (07 Mar 2019 17:17)    PAST MEDICAL & SURGICAL HISTORY:  ETOH abuse  Drug abuse  hep C  C Section      INTERVAL HISTORY: Resting in bed, not in any acute distress, c/o muscle strain in her neck, denies any chest pain or sob    	  MEDICATIONS:  MEDICATIONS  (STANDING):  aztreonam  IVPB 2000 milliGRAM(s) IV Intermittent every 8 hours  chlorhexidine 4% Liquid 1 Application(s) Topical <User Schedule>  docusate sodium 100 milliGRAM(s) Oral three times a day  folic acid 1 milliGRAM(s) Oral daily  multivitamin 1 Tablet(s) Oral daily  nicotine - 21 mG/24Hr(s) Patch 1 patch Transdermal daily  senna 2 Tablet(s) Oral at bedtime  vancomycin  IVPB      vancomycin  IVPB 1500 milliGRAM(s) IV Intermittent every 12 hours    MEDICATIONS  (PRN):  acetaminophen   Tablet .. 650 milliGRAM(s) Oral every 6 hours PRN Temp greater or equal to 38C (100.4F)  bisacodyl 5 milliGRAM(s) Oral every 12 hours PRN Constipation      Vitals:  T(F): 100.3 (03-08-19 @ 12:25), Max: 102.6 (03-07-19 @ 19:00)  HR: 86 (03-08-19 @ 15:46) (86 - 96)  BP: 103/55 (03-08-19 @ 15:46) (95/56 - 121/71)  RR: 18 (03-08-19 @ 15:46) (15 - 169)  SpO2: 96% (03-08-19 @ 15:46) (95% - 96%)      03-07 @ 07:01  -  03-08 @ 07:00  --------------------------------------------------------  IN:  Total IN: 0 mL    OUT:    Voided: 450 mL  Total OUT: 450 mL    Total NET: -450 mL    PHYSICAL EXAM:  Neuro: Awake, responsive  CV: S1 S2 RRR, + 2/6 BEBO  Lungs: diminished to bases   GI: Soft, BS +, ND, NT  Extremities: No edema  < from: CT Chest No Cont (03.01.19 @ 11:32) >  Bilateral cavitary lesions are increased in size and number compared to   2/17/2019.     Small bilateral pleural effusions, loculated on the right, increased   compared to the prior study. Unchanged right lower lobe dependent  consolidation.    Prominent mediastinal and axillary lymph nodes, possibly reactive.    < end of copied text >    DIAGNOSTIC TESTING:    [ x] Echocardiogram: < from: TTE Echo Doppler w/o Cont (02.18.19 @ 09:46) >   1. Left ventricular ejection fraction, by visual estimation, is 60 to   65%.   2. There is no left ventricular hypertrophy.   3. Trace tricuspid regurgitation.   4. Pulmonic valve regurgitation.   5. Estimated pulmonary artery systolic pressure is 35.2 mmHg assuming a   right atrial pressure of 5 mmHg, which is consistent with mild pulmonary   hypertension.    < end of copied text >    < from: LIZ Echo Doppler (02.25.19 @ 13:40) >   1. Left ventricular ejection fraction, by visual estimation, is 65 to   70%.   2. There is no left ventricular hypertrophy.   3. Moderately sized vegetation on the tricuspid valve, suggestive of   bacterial endocarditis.    < end of copied text >      LABS:	 	    08 Mar 2019 04:38    131    |  94     |  22     ----------------------------<  140    4.4     |  28     |  0.69   07 Mar 2019 08:05    130    |  94     |  18     ----------------------------<  120    4.5     |  28     |  0.57   06 Mar 2019 07:10    131    |  94     |  15     ----------------------------<  109    4.2     |  28     |  0.59     Ca    8.9        08 Mar 2019 04:38                        8.1    11.23 )-----------( 466      ( 08 Mar 2019 04:38 )             26.1 ,                       7.8    12.41 )-----------( 447      ( 07 Mar 2019 08:05 )             24.7 ,                       6.9    13.24 )-----------( 439      ( 06 Mar 2019 07:10 )             22.8     TSH: Thyroid Stimulating Hormone, Serum: 1.840 uU/mL (03-07 @ 08:05)

## 2019-03-08 NOTE — PROGRESS NOTE ADULT - PROBLEM SELECTOR PLAN 1
cont vanco at current dose , vanco level now therapeutic   repeat blood c/s since 3/1 negative   still  occasional feverfebrile    will hold off transfer as fever resolving and bacteremia clearing up   will need 6 weeks total of antibiotics from 3/1/19, cant be discharged to outpatient  as is active IVDA.

## 2019-03-08 NOTE — PROGRESS NOTE ADULT - PROBLEM SELECTOR PLAN 1
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   -CT chest non-contrast ordered confirms septic pulmonary emboli  and some have worsened.  CT surgery on board. May need transfer to [facility]   for tricuspid valve surgery if bacteremia persists this week too as per Ct Sx.  so far blood cx negative   - discussed w/   - on daptomycin, azactam, vancomycin IV  - pt will need 6 weeks of IV ABX from the date of blood cx negative

## 2019-03-08 NOTE — PROGRESS NOTE ADULT - PROBLEM SELECTOR PLAN 4
CT chest shows  multiple cavitary lesions with pneumonia  cont azactam and vanco   repeat CT showed worsening but pt less symptomatic

## 2019-03-08 NOTE — PROGRESS NOTE ADULT - SUBJECTIVE AND OBJECTIVE BOX
HPI:  42 Y/O female w/ PMHx of IV heroin abuse brought in by EMS for lethargy and cough. As per EMS, pt was found laying in bed at home, lethargic however able to follow some commands. EMS reports that pt was attempting to detox from heroin, and last known use was 3 days prior to ED presentation. As per ED, pts boyfriend reports a hx of + pt cough productive of white sputum and a fall down a flight of stairs 2/16. Labs showed serum lactate of 7.2, BUN/Cr 124/6.42, and WBC of of 11.93. Tmax 102.4, urine + for cocaine, opiates, nitrites and many bacteria. ICU called for further evaluation. (17 Feb 2019 22:56)      Patient is a 43y old  Female who presents with a chief complaint of AMS (08 Mar 2019 16:32)      INTERVAL HPI/OVERNIGHT EVENTS: fever overnight     MEDICATIONS  (STANDING):  aztreonam  IVPB 2000 milliGRAM(s) IV Intermittent every 8 hours  chlorhexidine 4% Liquid 1 Application(s) Topical <User Schedule>  docusate sodium 100 milliGRAM(s) Oral three times a day  folic acid 1 milliGRAM(s) Oral daily  multivitamin 1 Tablet(s) Oral daily  nicotine - 21 mG/24Hr(s) Patch 1 patch Transdermal daily  senna 2 Tablet(s) Oral at bedtime  vancomycin  IVPB      vancomycin  IVPB 1500 milliGRAM(s) IV Intermittent every 12 hours    MEDICATIONS  (PRN):  acetaminophen   Tablet .. 650 milliGRAM(s) Oral every 6 hours PRN Temp greater or equal to 38C (100.4F)  bisacodyl 5 milliGRAM(s) Oral every 12 hours PRN Constipation      Allergies    penicillin (Short breath; Hives)    Intolerances        REVIEW OF SYSTEMS:  CONSTITUTIONAL: No fever, weight loss, or fatigue  EYES: No eye pain, visual disturbances, or discharge  ENMT:  No difficulty hearing, tinnitus, vertigo; No sinus or throat pain  NECK: No pain or stiffness  BREASTS: No pain, masses, or nipple discharge  RESPIRATORY: No cough, wheezing, chills or hemoptysis; No shortness of breath  CARDIOVASCULAR: No chest pain, palpitations, dizziness, or leg swelling  GASTROINTESTINAL: No abdominal or epigastric pain. No nausea, vomiting, or hematemesis; No diarrhea or constipation. No melena or hematochezia.  GENITOURINARY: No dysuria, frequency, hematuria, or incontinence  NEUROLOGICAL: No headaches, memory loss, loss of strength, numbness, or tremors  SKIN: No itching, burning, rashes, or lesions   LYMPH NODES: No enlarged glands  ENDOCRINE: No heat or cold intolerance; No hair loss  MUSCULOSKELETAL: No joint pain or swelling; No muscle, back, or extremity pain  PSYCHIATRIC: No depression, anxiety, mood swings, or difficulty sleeping  HEME/LYMPH: No easy bruising, or bleeding gums  ALLERGY AND IMMUNOLOGIC: No hives or eczema    Vital Signs Last 24 Hrs  T(C): 37.3 (08 Mar 2019 18:04), Max: 37.9 (08 Mar 2019 12:25)  T(F): 99.2 (08 Mar 2019 18:04), Max: 100.3 (08 Mar 2019 12:25)  HR: 90 (08 Mar 2019 18:04) (86 - 96)  BP: 101/58 (08 Mar 2019 18:04) (95/56 - 111/62)  BP(mean): --  RR: 18 (08 Mar 2019 18:04) (15 - 169)  SpO2: 96% (08 Mar 2019 18:04) (95% - 96%)    PHYSICAL EXAM:  GENERAL: NAD, well-groomed, well-developed  HEAD:  Atraumatic, Normocephalic  EYES: EOMI, PERRLA, conjunctiva and sclera clear  ENMT: No tonsillar erythema, exudates, or enlargement; Moist mucous membranes, Good dentition, No lesions  NECK: Supple, No JVD, Normal thyroid  NERVOUS SYSTEM:  Alert & Oriented X3, Good concentration; Motor Strength 5/5 B/L upper and lower extremities; DTRs 2+ intact and symmetric  CHEST/LUNG: Clear to percussion bilaterally; No rales, rhonchi, wheezing, or rubs  HEART: Regular rate and rhythm; No murmurs, rubs, or gallops  ABDOMEN: Soft, Nontender, Nondistended; Bowel sounds present  EXTREMITIES:  2+ Peripheral Pulses, No clubbing, cyanosis, or edema  LYMPH: No lymphadenopathy noted  SKIN: No rashes or lesions    LABS:                        8.1    11.23 )-----------( 466      ( 08 Mar 2019 04:38 )             26.1     03-08    131<L>  |  94<L>  |  22  ----------------------------<  140<H>  4.4   |  28  |  0.69    Ca    8.9      08 Mar 2019 04:38          CAPILLARY BLOOD GLUCOSE          RADIOLOGY & ADDITIONAL TESTS:    Imaging Personally Reviewed:  [ ] YES  [ ] NO    Consultant(s) Notes Reviewed:  [ ] YES  [ ] NO    Care Discussed with Consultants/Other Providers [ ] YES  [ ] NO

## 2019-03-08 NOTE — PROGRESS NOTE ADULT - SUBJECTIVE AND OBJECTIVE BOX
Patient is a 43y old  Female who presents with a chief complaint of AMS (07 Mar 2019 17:17)      INTERVAL HPI / OVERNIGHT EVENTS: fever+    MEDICATIONS  (STANDING):  aztreonam  IVPB 2000 milliGRAM(s) IV Intermittent every 8 hours  chlorhexidine 4% Liquid 1 Application(s) Topical <User Schedule>  docusate sodium 100 milliGRAM(s) Oral three times a day  folic acid 1 milliGRAM(s) Oral daily  multivitamin 1 Tablet(s) Oral daily  nicotine - 21 mG/24Hr(s) Patch 1 patch Transdermal daily  senna 2 Tablet(s) Oral at bedtime  vancomycin  IVPB      vancomycin  IVPB 1500 milliGRAM(s) IV Intermittent every 12 hours    MEDICATIONS  (PRN):  acetaminophen   Tablet .. 650 milliGRAM(s) Oral every 6 hours PRN Temp greater or equal to 38C (100.4F)  bisacodyl 5 milliGRAM(s) Oral every 12 hours PRN Constipation      Vital Signs Last 24 Hrs  T(C): 37.9 (08 Mar 2019 12:25), Max: 39.2 (07 Mar 2019 19:00)  T(F): 100.3 (08 Mar 2019 12:25), Max: 102.6 (07 Mar 2019 19:00)  HR: 86 (08 Mar 2019 15:46) (86 - 96)  BP: 103/55 (08 Mar 2019 15:46) (95/56 - 121/71)  BP(mean): --  RR: 18 (08 Mar 2019 15:46) (15 - 169)  SpO2: 96% (08 Mar 2019 15:46) (95% - 96%)    Review of systems:  General : + fever /chills,fatigue  CVS : no chest pain, palpitations  Lungs : no shortness of breath, cough  GI : no abdominal pain, vomiting, diarrhea   : no dysuria ,hematuria        PHYSICAL EXAM:  General :NAD  Constitutional:  well-groomed, well-developed  Respiratory: CTAB/L  Cardiovascular: S1 and S2, RRR, no M/G/R  Gastrointestinal: BS+, soft, NT/ND  Extremities: No peripheral edema  Vascular: 2+ peripheral pulses  Skin: skin popping, left knee wound with scab      LABS:                        8.1    11.23 )-----------( 466      ( 08 Mar 2019 04:38 )             26.1     03-08    131<L>  |  94<L>  |  22  ----------------------------<  140<H>  4.4   |  28  |  0.69    Ca    8.9      08 Mar 2019 04:38            MICROBIOLOGY:  RECENT CULTURES:  03-06 .Blood XXXX XXXX   No growth to date.    03-03 .Blood XXXX XXXX   No growth to date.    03-03 .Blood XXXX XXXX   No growth at 5 days.    03-02 .Blood XXXX XXXX   No growth at 5 days.          RADIOLOGY & ADDITIONAL STUDIES:

## 2019-03-08 NOTE — PROGRESS NOTE ADULT - ASSESSMENT
42 y/o F w/polysubstance abuse presenting with severe sepsis with septic shock secondary to MRSA bacteremia. LIZ positive for endocarditis. EFRAIN likely secondary to sepsis now improved. Acute respiratory failure requiring intubation. New diagnosis of HCV.   continue current management for now no imminent transfer

## 2019-03-09 DIAGNOSIS — E87.1 HYPO-OSMOLALITY AND HYPONATREMIA: ICD-10-CM

## 2019-03-09 LAB
ANION GAP SERPL CALC-SCNC: 9 MMOL/L — SIGNIFICANT CHANGE UP (ref 5–17)
BUN SERPL-MCNC: 24 MG/DL — HIGH (ref 7–23)
CALCIUM SERPL-MCNC: 9.3 MG/DL — SIGNIFICANT CHANGE UP (ref 8.5–10.1)
CHLORIDE SERPL-SCNC: 95 MMOL/L — LOW (ref 96–108)
CO2 SERPL-SCNC: 27 MMOL/L — SIGNIFICANT CHANGE UP (ref 22–31)
CREAT SERPL-MCNC: 0.68 MG/DL — SIGNIFICANT CHANGE UP (ref 0.5–1.3)
CULTURE RESULTS: SIGNIFICANT CHANGE UP
CULTURE RESULTS: SIGNIFICANT CHANGE UP
GLUCOSE SERPL-MCNC: 162 MG/DL — HIGH (ref 70–99)
HCT VFR BLD CALC: 25 % — LOW (ref 34.5–45)
HGB BLD-MCNC: 7.6 G/DL — LOW (ref 11.5–15.5)
MAGNESIUM SERPL-MCNC: 2.2 MG/DL — SIGNIFICANT CHANGE UP (ref 1.6–2.6)
MCHC RBC-ENTMCNC: 24.3 PG — LOW (ref 27–34)
MCHC RBC-ENTMCNC: 30.4 GM/DL — LOW (ref 32–36)
MCV RBC AUTO: 79.9 FL — LOW (ref 80–100)
NRBC # BLD: 0 /100 WBCS — SIGNIFICANT CHANGE UP (ref 0–0)
PHOSPHATE SERPL-MCNC: 5.6 MG/DL — HIGH (ref 2.5–4.5)
PLATELET # BLD AUTO: 455 K/UL — HIGH (ref 150–400)
POTASSIUM SERPL-MCNC: 4 MMOL/L — SIGNIFICANT CHANGE UP (ref 3.5–5.3)
POTASSIUM SERPL-SCNC: 4 MMOL/L — SIGNIFICANT CHANGE UP (ref 3.5–5.3)
RBC # BLD: 3.13 M/UL — LOW (ref 3.8–5.2)
RBC # FLD: 17.7 % — HIGH (ref 10.3–14.5)
SODIUM SERPL-SCNC: 131 MMOL/L — LOW (ref 135–145)
SPECIMEN SOURCE: SIGNIFICANT CHANGE UP
SPECIMEN SOURCE: SIGNIFICANT CHANGE UP
WBC # BLD: 10.47 K/UL — SIGNIFICANT CHANGE UP (ref 3.8–10.5)
WBC # FLD AUTO: 10.47 K/UL — SIGNIFICANT CHANGE UP (ref 3.8–10.5)

## 2019-03-09 PROCEDURE — 99233 SBSQ HOSP IP/OBS HIGH 50: CPT

## 2019-03-09 RX ADMIN — NYSTATIN CREAM 1 APPLICATION(S): 100000 CREAM TOPICAL at 17:49

## 2019-03-09 RX ADMIN — Medication 300 MILLIGRAM(S): at 17:50

## 2019-03-09 RX ADMIN — Medication 100 MILLIGRAM(S): at 05:45

## 2019-03-09 RX ADMIN — CHLORHEXIDINE GLUCONATE 1 APPLICATION(S): 213 SOLUTION TOPICAL at 05:46

## 2019-03-09 RX ADMIN — Medication 300 MILLIGRAM(S): at 05:46

## 2019-03-09 RX ADMIN — Medication 1 PATCH: at 07:54

## 2019-03-09 RX ADMIN — Medication 1 MILLIGRAM(S): at 11:51

## 2019-03-09 RX ADMIN — Medication 1 PATCH: at 14:32

## 2019-03-09 RX ADMIN — Medication 1 TABLET(S): at 11:51

## 2019-03-09 RX ADMIN — NYSTATIN CREAM 1 APPLICATION(S): 100000 CREAM TOPICAL at 05:45

## 2019-03-09 RX ADMIN — Medication 650 MILLIGRAM(S): at 15:55

## 2019-03-09 RX ADMIN — Medication 100 MILLIGRAM(S): at 14:32

## 2019-03-09 RX ADMIN — Medication 1 PATCH: at 14:31

## 2019-03-09 RX ADMIN — METHADONE HYDROCHLORIDE 20 MILLIGRAM(S): 40 TABLET ORAL at 11:51

## 2019-03-09 RX ADMIN — Medication 25 MILLIGRAM(S): at 00:28

## 2019-03-09 NOTE — PROGRESS NOTE ADULT - PROBLEM SELECTOR PLAN 1
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   -CT chest non-contrast ordered confirms septic pulmonary emboli  and some have worsened.  CT surgery on board. May need transfer to [facility]   for tricuspid valve surgery if bacteremia persists this week too as per Ct Sx.  so far blood cx negative   - discussed w/   - on daptomycin, azactam, vancomycin IV  - pt will need 6 weeks of IV ABX from the date of blood cx negative

## 2019-03-09 NOTE — PROGRESS NOTE ADULT - SUBJECTIVE AND OBJECTIVE BOX
HPI:  44 Y/O female w/ PMHx of IV heroin abuse brought in by EMS for lethargy and cough. As per EMS, pt was found laying in bed at home, lethargic however able to follow some commands. EMS reports that pt was attempting to detox from heroin, and last known use was 3 days prior to ED presentation. As per ED, pts boyfriend reports a hx of + pt cough productive of white sputum and a fall down a flight of stairs 2/16. Labs showed serum lactate of 7.2, BUN/Cr 124/6.42, and WBC of of 11.93. Tmax 102.4, urine + for cocaine, opiates, nitrites and many bacteria. ICU called for further evaluation. (17 Feb 2019 22:56)    Patient is a 43y old  Female who presents with a chief complaint of AMS (08 Mar 2019 20:16)      INTERVAL HPI/OVERNIGHT EVENTS: no acute events would like increase in methadone     MEDICATIONS  (STANDING):  aztreonam  IVPB 2000 milliGRAM(s) IV Intermittent every 8 hours  chlorhexidine 4% Liquid 1 Application(s) Topical <User Schedule>  docusate sodium 100 milliGRAM(s) Oral three times a day  folic acid 1 milliGRAM(s) Oral daily  methadone    Tablet 20 milliGRAM(s) Oral daily  multivitamin 1 Tablet(s) Oral daily  nicotine - 21 mG/24Hr(s) Patch 1 patch Transdermal daily  nystatin Powder 1 Application(s) Topical two times a day  senna 2 Tablet(s) Oral at bedtime  vancomycin  IVPB      vancomycin  IVPB 1500 milliGRAM(s) IV Intermittent every 12 hours    MEDICATIONS  (PRN):  acetaminophen   Tablet .. 650 milliGRAM(s) Oral every 6 hours PRN Temp greater or equal to 38C (100.4F)  bisacodyl 5 milliGRAM(s) Oral every 12 hours PRN Constipation      Allergies    penicillin (Short breath; Hives)    Intolerances        REVIEW OF SYSTEMS:  CONSTITUTIONAL: No fever, weight loss, or fatigue  EYES: No eye pain, visual disturbances, or discharge  ENMT:  No difficulty hearing, tinnitus, vertigo; No sinus or throat pain  NECK: No pain or stiffness  BREASTS: No pain, masses, or nipple discharge  RESPIRATORY: No cough, wheezing, chills or hemoptysis; No shortness of breath  CARDIOVASCULAR: No chest pain, palpitations, dizziness, or leg swelling  GASTROINTESTINAL: No abdominal or epigastric pain. No nausea, vomiting, or hematemesis; No diarrhea or constipation. No melena or hematochezia.  GENITOURINARY: No dysuria, frequency, hematuria, or incontinence  NEUROLOGICAL: No headaches, memory loss, loss of strength, numbness, or tremors  SKIN: No itching, burning, rashes, or lesions   LYMPH NODES: No enlarged glands  ENDOCRINE: No heat or cold intolerance; No hair loss  MUSCULOSKELETAL: No joint pain or swelling; No muscle, back, or extremity pain  PSYCHIATRIC: No depression, anxiety, mood swings, or difficulty sleeping  HEME/LYMPH: No easy bruising, or bleeding gums  ALLERGY AND IMMUNOLOGIC: No hives or eczema    Vital Signs Last 24 Hrs  T(C): 37.4 (09 Mar 2019 11:38), Max: 38.9 (08 Mar 2019 21:10)  T(F): 99.4 (09 Mar 2019 11:38), Max: 102 (08 Mar 2019 21:10)  HR: 90 (09 Mar 2019 11:38) (86 - 94)  BP: 94/55 (09 Mar 2019 11:38) (94/55 - 105/56)  BP(mean): --  RR: 16 (09 Mar 2019 11:38) (16 - 19)  SpO2: 98% (09 Mar 2019 11:38) (96% - 98%)    PHYSICAL EXAM:  GENERAL: NAD, well-groomed, well-developed  HEAD:  Atraumatic, Normocephalic  EYES: EOMI, PERRLA, conjunctiva and sclera clear  ENMT: No tonsillar erythema, exudates, or enlargement; Moist mucous membranes, Good dentition, No lesions  NECK: Supple, No JVD, Normal thyroid  NERVOUS SYSTEM:  Alert & Oriented X3, Good concentration; Motor Strength 5/5 B/L upper and lower extremities; DTRs 2+ intact and symmetric  CHEST/LUNG: Clear to percussion bilaterally; No rales, rhonchi, wheezing, or rubs  HEART: Regular rate and rhythm; No murmurs, rubs, or gallops  ABDOMEN: Soft, Nontender, Nondistended; Bowel sounds present  EXTREMITIES:  2+ Peripheral Pulses, No clubbing, cyanosis, or edema  LYMPH: No lymphadenopathy noted  SKIN: No rashes or lesions    LABS:                        7.6    10.47 )-----------( 455      ( 09 Mar 2019 07:40 )             25.0     03-09    131<L>  |  95<L>  |  24<H>  ----------------------------<  162<H>  4.0   |  27  |  0.68    Ca    9.3      09 Mar 2019 07:40  Phos  5.6     03-09  Mg     2.2     03-09          CAPILLARY BLOOD GLUCOSE          RADIOLOGY & ADDITIONAL TESTS:    Imaging Personally Reviewed:  [ X] YES  [ ] NO    Consultant(s) Notes Reviewed:  [X ] YES  [ ] NO    Care Discussed with Consultants/Other Providers [X ] YES  [ ] NO

## 2019-03-10 LAB
ANION GAP SERPL CALC-SCNC: 9 MMOL/L — SIGNIFICANT CHANGE UP (ref 5–17)
BLD GP AB SCN SERPL QL: SIGNIFICANT CHANGE UP
BUN SERPL-MCNC: 25 MG/DL — HIGH (ref 7–23)
CALCIUM SERPL-MCNC: 9.2 MG/DL — SIGNIFICANT CHANGE UP (ref 8.5–10.1)
CHLORIDE SERPL-SCNC: 96 MMOL/L — SIGNIFICANT CHANGE UP (ref 96–108)
CO2 SERPL-SCNC: 28 MMOL/L — SIGNIFICANT CHANGE UP (ref 22–31)
CREAT SERPL-MCNC: 0.56 MG/DL — SIGNIFICANT CHANGE UP (ref 0.5–1.3)
GLUCOSE SERPL-MCNC: 156 MG/DL — HIGH (ref 70–99)
HCT VFR BLD CALC: 24.6 % — LOW (ref 34.5–45)
HGB BLD-MCNC: 7.8 G/DL — LOW (ref 11.5–15.5)
MCHC RBC-ENTMCNC: 25.1 PG — LOW (ref 27–34)
MCHC RBC-ENTMCNC: 31.7 GM/DL — LOW (ref 32–36)
MCV RBC AUTO: 79.1 FL — LOW (ref 80–100)
NRBC # BLD: 0 /100 WBCS — SIGNIFICANT CHANGE UP (ref 0–0)
PLATELET # BLD AUTO: 492 K/UL — HIGH (ref 150–400)
POTASSIUM SERPL-MCNC: 3.9 MMOL/L — SIGNIFICANT CHANGE UP (ref 3.5–5.3)
POTASSIUM SERPL-SCNC: 3.9 MMOL/L — SIGNIFICANT CHANGE UP (ref 3.5–5.3)
RBC # BLD: 3.11 M/UL — LOW (ref 3.8–5.2)
RBC # FLD: 17.5 % — HIGH (ref 10.3–14.5)
SODIUM SERPL-SCNC: 133 MMOL/L — LOW (ref 135–145)
VANCOMYCIN TROUGH SERPL-MCNC: 20.6 UG/ML — HIGH (ref 10–20)
WBC # BLD: 9.94 K/UL — SIGNIFICANT CHANGE UP (ref 3.8–10.5)
WBC # FLD AUTO: 9.94 K/UL — SIGNIFICANT CHANGE UP (ref 3.8–10.5)

## 2019-03-10 PROCEDURE — 99233 SBSQ HOSP IP/OBS HIGH 50: CPT

## 2019-03-10 RX ORDER — VANCOMYCIN HCL 1 G
VIAL (EA) INTRAVENOUS
Qty: 0 | Refills: 0 | Status: DISCONTINUED | OUTPATIENT
Start: 2019-03-10 | End: 2019-03-11

## 2019-03-10 RX ORDER — VANCOMYCIN HCL 1 G
1250 VIAL (EA) INTRAVENOUS EVERY 12 HOURS
Qty: 0 | Refills: 0 | Status: DISCONTINUED | OUTPATIENT
Start: 2019-03-11 | End: 2019-03-11

## 2019-03-10 RX ORDER — VANCOMYCIN HCL 1 G
1250 VIAL (EA) INTRAVENOUS ONCE
Qty: 0 | Refills: 0 | Status: COMPLETED | OUTPATIENT
Start: 2019-03-10 | End: 2019-03-10

## 2019-03-10 RX ADMIN — Medication 1 MILLIGRAM(S): at 11:08

## 2019-03-10 RX ADMIN — Medication 650 MILLIGRAM(S): at 17:42

## 2019-03-10 RX ADMIN — Medication 1 PATCH: at 11:08

## 2019-03-10 RX ADMIN — NYSTATIN CREAM 1 APPLICATION(S): 100000 CREAM TOPICAL at 07:07

## 2019-03-10 RX ADMIN — Medication 166.67 MILLIGRAM(S): at 20:03

## 2019-03-10 RX ADMIN — Medication 100 MILLIGRAM(S): at 00:38

## 2019-03-10 RX ADMIN — Medication 100 MILLIGRAM(S): at 07:06

## 2019-03-10 RX ADMIN — Medication 300 MILLIGRAM(S): at 07:23

## 2019-03-10 RX ADMIN — Medication 650 MILLIGRAM(S): at 01:37

## 2019-03-10 RX ADMIN — Medication 650 MILLIGRAM(S): at 00:37

## 2019-03-10 RX ADMIN — Medication 100 MILLIGRAM(S): at 00:37

## 2019-03-10 RX ADMIN — Medication 1 PATCH: at 07:28

## 2019-03-10 RX ADMIN — Medication 100 MILLIGRAM(S): at 13:46

## 2019-03-10 RX ADMIN — CHLORHEXIDINE GLUCONATE 1 APPLICATION(S): 213 SOLUTION TOPICAL at 07:06

## 2019-03-10 RX ADMIN — Medication 100 MILLIGRAM(S): at 13:45

## 2019-03-10 RX ADMIN — SENNA PLUS 2 TABLET(S): 8.6 TABLET ORAL at 00:39

## 2019-03-10 RX ADMIN — Medication 1 TABLET(S): at 11:08

## 2019-03-10 RX ADMIN — Medication 1 PATCH: at 20:00

## 2019-03-10 RX ADMIN — Medication 1 PATCH: at 11:06

## 2019-03-10 RX ADMIN — NYSTATIN CREAM 1 APPLICATION(S): 100000 CREAM TOPICAL at 17:42

## 2019-03-10 RX ADMIN — METHADONE HYDROCHLORIDE 20 MILLIGRAM(S): 40 TABLET ORAL at 11:08

## 2019-03-10 RX ADMIN — Medication 100 MILLIGRAM(S): at 21:56

## 2019-03-10 RX ADMIN — SENNA PLUS 2 TABLET(S): 8.6 TABLET ORAL at 21:56

## 2019-03-10 NOTE — PROGRESS NOTE ADULT - ASSESSMENT
44 y/o F w/polysubstance abuse presenting with severe sepsis with septic shock secondary to MRSA bacteremia. LIZ positive for endocarditis. EFRAIN likely secondary to sepsis now improved. Acute respiratory failure requiring intubation. New diagnosis of HCV.continue current management for now no imminent transfer

## 2019-03-10 NOTE — PROGRESS NOTE ADULT - SUBJECTIVE AND OBJECTIVE BOX
HPI:  44 Y/O female w/ PMHx of IV heroin abuse brought in by EMS for lethargy and cough. As per EMS, pt was found laying in bed at home, lethargic however able to follow some commands. EMS reports that pt was attempting to detox from heroin, and last known use was 3 days prior to ED presentation. As per ED, pts boyfriend reports a hx of + pt cough productive of white sputum and a fall down a flight of stairs 2/16. Labs showed serum lactate of 7.2, BUN/Cr 124/6.42, and WBC of of 11.93. Tmax 102.4, urine + for cocaine, opiates, nitrites and many bacteria. ICU called for further evaluation. (17 Feb 2019 22:56)    Patient is a 43y old  Female who presents with a chief complaint of AMS (09 Mar 2019 12:16)      INTERVAL HPI/OVERNIGHT EVENTS: fever low grade overnight     MEDICATIONS  (STANDING):  aztreonam  IVPB 2000 milliGRAM(s) IV Intermittent every 8 hours  chlorhexidine 4% Liquid 1 Application(s) Topical <User Schedule>  docusate sodium 100 milliGRAM(s) Oral three times a day  folic acid 1 milliGRAM(s) Oral daily  methadone    Tablet 20 milliGRAM(s) Oral daily  multivitamin 1 Tablet(s) Oral daily  nicotine - 21 mG/24Hr(s) Patch 1 patch Transdermal daily  nystatin Powder 1 Application(s) Topical two times a day  senna 2 Tablet(s) Oral at bedtime  vancomycin  IVPB      vancomycin  IVPB 1500 milliGRAM(s) IV Intermittent every 12 hours    MEDICATIONS  (PRN):  acetaminophen   Tablet .. 650 milliGRAM(s) Oral every 6 hours PRN Temp greater or equal to 38C (100.4F)  bisacodyl 5 milliGRAM(s) Oral every 12 hours PRN Constipation      Allergies    penicillin (Short breath; Hives)    Intolerances        REVIEW OF SYSTEMS:  CONSTITUTIONAL: No fever, weight loss, or fatigue  EYES: No eye pain, visual disturbances, or discharge  ENMT:  No difficulty hearing, tinnitus, vertigo; No sinus or throat pain  NECK: No pain or stiffness  BREASTS: No pain, masses, or nipple discharge  RESPIRATORY: No cough, wheezing, chills or hemoptysis; No shortness of breath  CARDIOVASCULAR: No chest pain, palpitations, dizziness, or leg swelling  GASTROINTESTINAL: No abdominal or epigastric pain. No nausea, vomiting, or hematemesis; No diarrhea or constipation. No melena or hematochezia.  GENITOURINARY: No dysuria, frequency, hematuria, or incontinence  NEUROLOGICAL: No headaches, memory loss, loss of strength, numbness, or tremors  SKIN: bruises over legs LYMPH NODES: No enlarged glands  ENDOCRINE: No heat or cold intolerance; No hair loss  MUSCULOSKELETAL: No joint pain or swelling; No muscle, back, or extremity pain  PSYCHIATRIC: No depression, anxiety, mood swings, or difficulty sleeping  HEME/LYMPH: No easy bruising, or bleeding gums  ALLERGY AND IMMUNOLOGIC: No hives or eczema    Vital Signs Last 24 Hrs  T(C): 36.6 (10 Mar 2019 11:46), Max: 37.9 (10 Mar 2019 00:49)  T(F): 97.8 (10 Mar 2019 11:46), Max: 100.3 (10 Mar 2019 00:49)  HR: 81 (10 Mar 2019 11:46) (81 - 95)  BP: 108/62 (10 Mar 2019 11:46) (108/62 - 114/64)  BP(mean): --  RR: 16 (10 Mar 2019 11:46) (16 - 17)  SpO2: 98% (10 Mar 2019 11:46) (95% - 98%)    PHYSICAL EXAM:  GENERAL: poorly groomed HEAD:  Atraumatic, Normocephalic  EYES: EOMI, PERRLA, conjunctiva and sclera clear  ENMT: No tonsillar erythema, exudates, or enlargement; Moist mucous membranes, Good dentition, No lesions  NECK: Supple, No JVD, Normal thyroid  NERVOUS SYSTEM:  Alert & Oriented X3, Good concentration; Motor Strength 5/5 B/L upper and lower extremities; DTRs 2+ intact and symmetric  CHEST/LUNG: Clear to percussion bilaterally; No rales, rhonchi, wheezing, or rubs  HEART: Regular rate and rhythm; No murmurs, rubs, or gallops  ABDOMEN: Soft, Nontender, Nondistended; Bowel sounds present  EXTREMITIES:  2+ Peripheral Pulses, No clubbing, cyanosis, or edema PICC line   LYMPH: No lymphadenopathy noted  SKIN: No rashes or lesions    LABS:                        7.8    9.94  )-----------( 492      ( 10 Mar 2019 10:24 )             24.6     03-10    133<L>  |  96  |  25<H>  ----------------------------<  156<H>  3.9   |  28  |  0.56    Ca    9.2      10 Mar 2019 10:24  Phos  5.6     03-09  Mg     2.2     03-09          CAPILLARY BLOOD GLUCOSE          RADIOLOGY & ADDITIONAL TESTS:  < from: CT Chest No Cont (03.01.19 @ 11:32) >  Bilateral cavitary lesions are increased in size and number compared to   2/17/2019.     Small bilateral pleural effusions, loculated on the right, increased   compared to the prior study. Unchanged right lower lobe dependent  consolidation.    Prominent mediastinal and axillary lymph nodes, possibly reactive.        < end of copied text >    Imaging Personally Reviewed:  [X ] YES  [ ] NO    Consultant(s) Notes Reviewed:  [X ] YES  [ ] NO    Care Discussed with Consultants/Other Providers [ X] YES  [ ] NO

## 2019-03-10 NOTE — PROGRESS NOTE ADULT - PROBLEM SELECTOR PLAN 1
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   -CT chest non-contrast ordered confirms septic pulmonary emboli  and some have worsened.  CT surgery on board. May need transfer to [facility]   for tricuspid valve surgery if bacteremia persists this week too as per Ct Sx.  so far blood cx negative   - discussed w/   - on daptomycin, azactam, vancomycin IV  - pt will need 6 weeks of IV ABX from the date of blood cx negative

## 2019-03-11 ENCOUNTER — TRANSCRIPTION ENCOUNTER (OUTPATIENT)
Age: 44
End: 2019-03-11

## 2019-03-11 LAB
CULTURE RESULTS: SIGNIFICANT CHANGE UP
SPECIMEN SOURCE: SIGNIFICANT CHANGE UP

## 2019-03-11 PROCEDURE — 99233 SBSQ HOSP IP/OBS HIGH 50: CPT

## 2019-03-11 PROCEDURE — 99232 SBSQ HOSP IP/OBS MODERATE 35: CPT

## 2019-03-11 RX ORDER — VANCOMYCIN HCL 1 G
1250 VIAL (EA) INTRAVENOUS EVERY 12 HOURS
Qty: 0 | Refills: 0 | Status: DISCONTINUED | OUTPATIENT
Start: 2019-03-11 | End: 2019-03-13

## 2019-03-11 RX ORDER — ACETAMINOPHEN 500 MG
650 TABLET ORAL EVERY 6 HOURS
Qty: 0 | Refills: 0 | Status: DISCONTINUED | OUTPATIENT
Start: 2019-03-11 | End: 2019-03-20

## 2019-03-11 RX ADMIN — Medication 1 TABLET(S): at 11:24

## 2019-03-11 RX ADMIN — Medication 100 MILLIGRAM(S): at 13:46

## 2019-03-11 RX ADMIN — Medication 650 MILLIGRAM(S): at 22:01

## 2019-03-11 RX ADMIN — Medication 1 MILLIGRAM(S): at 11:24

## 2019-03-11 RX ADMIN — Medication 650 MILLIGRAM(S): at 17:34

## 2019-03-11 RX ADMIN — Medication 100 MILLIGRAM(S): at 23:29

## 2019-03-11 RX ADMIN — SENNA PLUS 2 TABLET(S): 8.6 TABLET ORAL at 21:48

## 2019-03-11 RX ADMIN — Medication 166.67 MILLIGRAM(S): at 17:32

## 2019-03-11 RX ADMIN — Medication 1 PATCH: at 11:24

## 2019-03-11 RX ADMIN — Medication 5 MILLIGRAM(S): at 17:35

## 2019-03-11 RX ADMIN — Medication 650 MILLIGRAM(S): at 07:02

## 2019-03-11 RX ADMIN — Medication 100 MILLIGRAM(S): at 05:57

## 2019-03-11 RX ADMIN — Medication 100 MILLIGRAM(S): at 05:55

## 2019-03-11 RX ADMIN — Medication 650 MILLIGRAM(S): at 06:02

## 2019-03-11 RX ADMIN — CHLORHEXIDINE GLUCONATE 1 APPLICATION(S): 213 SOLUTION TOPICAL at 05:57

## 2019-03-11 RX ADMIN — Medication 166.67 MILLIGRAM(S): at 06:02

## 2019-03-11 RX ADMIN — METHADONE HYDROCHLORIDE 20 MILLIGRAM(S): 40 TABLET ORAL at 11:24

## 2019-03-11 RX ADMIN — NYSTATIN CREAM 1 APPLICATION(S): 100000 CREAM TOPICAL at 17:33

## 2019-03-11 RX ADMIN — NYSTATIN CREAM 1 APPLICATION(S): 100000 CREAM TOPICAL at 05:55

## 2019-03-11 RX ADMIN — Medication 650 MILLIGRAM(S): at 22:37

## 2019-03-11 NOTE — PROGRESS NOTE ADULT - PROBLEM SELECTOR PLAN 1
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   -CT chest non-contrast ordered confirms septic pulmonary emboli  and some have worsened.  CT surgery on board. May need transfer to [facility]   for tricuspid valve surgery if bacteremia persists this week too as per Ct Sx.  so far blood cx negative   - discussed w/   - on daptomycin, azactam, vancomycin IV  - pt will need 6 weeks of IV ABX from the date of blood cx negative

## 2019-03-11 NOTE — DISCHARGE NOTE NURSING/CASE MANAGEMENT/SOCIAL WORK - NSDCDPATPORTLINK_GEN_ALL_CORE
You can access the [facility] Patient Portal, offered by [facility], by registering with the following website: [email or web address]

## 2019-03-11 NOTE — CHART NOTE - NSCHARTNOTEFT_GEN_A_CORE
Pt w/ endocarditis ; moderate protein - calorie malnutrition; bacteremia; drug abuse.    Factors impacting intake: [x ] none [ ] nausea  [ ] vomiting [ ] diarrhea [ ] constipation  [ ]chewing problems [ ] swallowing issues  [ ] other:     Diet Prescription: Diet, Soft:   Supplement Feeding Modality:  Oral  Ensure Enlive Cans or Servings Per Day:  3       Frequency:  Daily (03-04-19 @ 14:09)    Intake: 50 - 75% of meals 100 % of ensure enlive 8 oz three times per day (1050 emily, 60 gm pro)     Current Weight: 3/11 - 144.4 (65,5 kg) no edema noted ; 3/3 - 162.4 (73.7 kg) Edema - 1+ (L & R) arm, 2+ (L & R) foot  % Weight Change -  11% / 8.2 kg loss x 8 days no longer w/ edema noted.       Pertinent Medications: MEDICATIONS  (STANDING):  aztreonam  IVPB 2000 milliGRAM(s) IV Intermittent every 8 hours  chlorhexidine 4% Liquid 1 Application(s) Topical <User Schedule>  docusate sodium 100 milliGRAM(s) Oral three times a day  folic acid 1 milliGRAM(s) Oral daily  methadone    Tablet 20 milliGRAM(s) Oral daily  multivitamin 1 Tablet(s) Oral daily  nicotine - 21 mG/24Hr(s) Patch 1 patch Transdermal daily  nystatin Powder 1 Application(s) Topical two times a day  senna 2 Tablet(s) Oral at bedtime  vancomycin  IVPB 1250 milliGRAM(s) IV Intermittent every 12 hours    MEDICATIONS  (PRN):  acetaminophen   Tablet .. 650 milliGRAM(s) Oral every 6 hours PRN Temp greater or equal to 38C (100.4F)  bisacodyl 5 milliGRAM(s) Oral every 12 hours PRN Constipation    Pertinent Labs: 03-10 Na133 mmol/L<L> Glu 156 mg/dL<H> K+ 3.9 mmol/L Cr  0.56 mg/dL BUN 25 mg/dL<H> 03-09 Phos 5.6 mg/dL<H>     CAPILLARY BLOOD GLUCOSE        Skin: L knee abscess         R heel stage 1    Estimated Needs:   [x ] no change since previous assessment (2/25/2019)  [ ] recalculated:     Previous Nutrition Diagnosis:   [ ] Inadequate Energy Intake [ ]Inadequate Oral Intake [ ] Excessive Energy Intake   [ ] Underweight [ ] Increased Nutrient Needs [ ] Overweight/Obesity   [ ] Altered GI Function [ ] Unintended Weight Loss [ ] Food & Nutrition Related Knowledge Deficit [x ] Malnutrition - moderate, acute    Previous Goal: Pt to Meet calorie and protein needs > 75 % via oral feeding -> not met    Nutrition Diagnosis is [x ] ongoing  [ ] resolved [ ] not applicable     New Nutrition Diagnosis: [ x] not applicable       Interventions:   Recommend  [ x ] Continue Current diet (changed to Regular at patients request).   [ ] Change Diet To:  [ ] Nutrition Supplement  [ ] Nutrition Support  [x ] Other: encouraged small frequent meals.     Monitoring and Evaluation:   [ ] PO intake [ x ] Tolerance to diet prescription [ x ] weights [ x ] labs[ x ] follow up per protocol  [ ] other:

## 2019-03-11 NOTE — DISCHARGE NOTE NURSING/CASE MANAGEMENT/SOCIAL WORK - NSDCPNDISPN_GEN_ALL_CORE
Safe use, storage and disposal of opioids when prescribed/Education provided on the pain management plan of care/Side effects of pain management treatment/Activities of daily living, including home environment that might     exacerbate pain or reduce effectiveness of the pain management plan of care as well as strategies to address these issues/Opioids not applicable/not prescribed

## 2019-03-11 NOTE — PROGRESS NOTE ADULT - ASSESSMENT
42 y/o F w/polysubstance abuse presenting with severe sepsis with septic shock secondary to MRSA bacteremia. LIZ positive for endocarditis. EFRAIN likely secondary to sepsis now improved. Acute respiratory failure requiring intubation. New diagnosis of HCV.continue current management for now no imminent transfer

## 2019-03-11 NOTE — PROGRESS NOTE ADULT - SUBJECTIVE AND OBJECTIVE BOX
Patient is a 43y old  Female who presents with a chief complaint of AMS (10 Mar 2019 15:35)      INTERVAL HPI/OVERNIGHT EVENTS:  pt seen  at  bed  side   case  D/W  Pt  & Family  at bed side   MEDICATIONS  (STANDING):  aztreonam  IVPB 2000 milliGRAM(s) IV Intermittent every 8 hours  chlorhexidine 4% Liquid 1 Application(s) Topical <User Schedule>  docusate sodium 100 milliGRAM(s) Oral three times a day  folic acid 1 milliGRAM(s) Oral daily  methadone    Tablet 20 milliGRAM(s) Oral daily  multivitamin 1 Tablet(s) Oral daily  nicotine - 21 mG/24Hr(s) Patch 1 patch Transdermal daily  nystatin Powder 1 Application(s) Topical two times a day  senna 2 Tablet(s) Oral at bedtime  vancomycin  IVPB 1250 milliGRAM(s) IV Intermittent every 12 hours    MEDICATIONS  (PRN):  acetaminophen   Tablet .. 650 milliGRAM(s) Oral every 6 hours PRN Temp greater or equal to 38C (100.4F)  bisacodyl 5 milliGRAM(s) Oral every 12 hours PRN Constipation      Allergies    penicillin (Short breath; Hives)    Intolerances          Vital Signs Last 24 Hrs  T(C): 37.1 (11 Mar 2019 12:08), Max: 37.4 (11 Mar 2019 00:04)  T(F): 98.7 (11 Mar 2019 12:08), Max: 99.4 (11 Mar 2019 00:04)  HR: 88 (11 Mar 2019 12:08) (86 - 91)  BP: 106/57 (11 Mar 2019 12:08) (98/62 - 116/66)  BP(mean): --  RR: 16 (11 Mar 2019 12:08) (16 - 19)  SpO2: 98% (11 Mar 2019 12:08) (95% - 98%)    PHYSICAL EXAM:  pt  is  A X O X 3   Heart- S1 , S2 + R/R/R  Lung- B/L  air  entry   ABD- Soft , BS+   EXT- No edema   LABS:                        7.8    9.94  )-----------( 492      ( 10 Mar 2019 10:24 )             24.6     03-10    133<L>  |  96  |  25<H>  ----------------------------<  156<H>  3.9   |  28  |  0.56    Ca    9.2      10 Mar 2019 10:24          CAPILLARY BLOOD GLUCOSE        Lipid panel:           TSH     RADIOLOGY & ADDITIONAL TESTS:    Imaging Personally Reviewed:  [ ] YES  [ ] NO    Consultant(s) Notes Reviewed:  [ ] YES  [ ] NO    Care Discussed with Consultants/Other Providers [ ] YES  [ ] NO

## 2019-03-11 NOTE — PROGRESS NOTE ADULT - SUBJECTIVE AND OBJECTIVE BOX
Patient is a 43y old  Female who presents with a chief complaint of AMS (11 Mar 2019 12:30)      INTERVAL HPI / OVERNIGHT EVENTS:    MEDICATIONS  (STANDING):  aztreonam  IVPB 2000 milliGRAM(s) IV Intermittent every 8 hours  chlorhexidine 4% Liquid 1 Application(s) Topical <User Schedule>  docusate sodium 100 milliGRAM(s) Oral three times a day  folic acid 1 milliGRAM(s) Oral daily  methadone    Tablet 20 milliGRAM(s) Oral daily  multivitamin 1 Tablet(s) Oral daily  nicotine - 21 mG/24Hr(s) Patch 1 patch Transdermal daily  nystatin Powder 1 Application(s) Topical two times a day  senna 2 Tablet(s) Oral at bedtime  vancomycin  IVPB 1250 milliGRAM(s) IV Intermittent every 12 hours    MEDICATIONS  (PRN):  acetaminophen   Tablet .. 650 milliGRAM(s) Oral every 6 hours PRN Temp greater or equal to 38C (100.4F), Mild Pain (1 - 3)  bisacodyl 5 milliGRAM(s) Oral every 12 hours PRN Constipation      Vital Signs Last 24 Hrs  T(C): 38.3 (11 Mar 2019 18:46), Max: 38.3 (11 Mar 2019 18:46)  T(F): 100.9 (11 Mar 2019 18:46), Max: 100.9 (11 Mar 2019 18:46)  HR: 83 (11 Mar 2019 15:24) (83 - 91)  BP: 142/64 (11 Mar 2019 19:30) (98/62 - 144/66)  BP(mean): --  RR: 18 (11 Mar 2019 19:30) (16 - 18)  SpO2: 98% (11 Mar 2019 19:30) (95% - 98%)    Review of systems:  General : no fever /chills,fatigue  CVS : no chest pain, palpitations  Lungs : no shortness of breath, cough  GI : no abdominal pain,vomiting, diarrhea   : no dysuria,hematuria        PHYSICAL EXAM:  General :NAD  Constitutional:  well-groomed, well-developed  Respiratory: CTAB/L  Cardiovascular: S1 and S2, RRR, no M/G/R  Gastrointestinal: BS+, soft, NT/ND  Extremities: No peripheral edema  Vascular: 2+ peripheral pulses  Skin: No rashes      LABS:                        7.8    9.94  )-----------( 492      ( 10 Mar 2019 10:24 )             24.6     03-10    133<L>  |  96  |  25<H>  ----------------------------<  156<H>  3.9   |  28  |  0.56    Ca    9.2      10 Mar 2019 10:24            MICROBIOLOGY:  RECENT CULTURES:  03-08 .Blood XXXX XXXX   No growth to date.    03-06 .Blood XXXX XXXX   No growth at 5 days.          RADIOLOGY & ADDITIONAL STUDIES: Patient is a 43y old  Female who presents with a chief complaint of AMS (11 Mar 2019 12:30)      INTERVAL HPI / OVERNIGHT EVENTS: fever resolving    MEDICATIONS  (STANDING):  aztreonam  IVPB 2000 milliGRAM(s) IV Intermittent every 8 hours  chlorhexidine 4% Liquid 1 Application(s) Topical <User Schedule>  docusate sodium 100 milliGRAM(s) Oral three times a day  folic acid 1 milliGRAM(s) Oral daily  methadone    Tablet 20 milliGRAM(s) Oral daily  multivitamin 1 Tablet(s) Oral daily  nicotine - 21 mG/24Hr(s) Patch 1 patch Transdermal daily  nystatin Powder 1 Application(s) Topical two times a day  senna 2 Tablet(s) Oral at bedtime  vancomycin  IVPB 1250 milliGRAM(s) IV Intermittent every 12 hours    MEDICATIONS  (PRN):  acetaminophen   Tablet .. 650 milliGRAM(s) Oral every 6 hours PRN Temp greater or equal to 38C (100.4F), Mild Pain (1 - 3)  bisacodyl 5 milliGRAM(s) Oral every 12 hours PRN Constipation      Vital Signs Last 24 Hrs  T(C): 38.3 (11 Mar 2019 18:46), Max: 38.3 (11 Mar 2019 18:46)  T(F): 100.9 (11 Mar 2019 18:46), Max: 100.9 (11 Mar 2019 18:46)  HR: 83 (11 Mar 2019 15:24) (83 - 91)  BP: 142/64 (11 Mar 2019 19:30) (98/62 - 144/66)  BP(mean): --  RR: 18 (11 Mar 2019 19:30) (16 - 18)  SpO2: 98% (11 Mar 2019 19:30) (95% - 98%)    Review of systems:  General : no fever /chills,fatigue  CVS : no chest pain, palpitations  Lungs : no shortness of breath, cough  GI : no abdominal pain,vomiting, diarrhea   : no dysuria,hematuria        PHYSICAL EXAM:  General :NAD  Constitutional:  well-groomed, well-developed  Respiratory: CTAB/L  Cardiovascular: S1 and S2, RRR, no M/G/R  Gastrointestinal: BS+, soft, NT/ND  Extremities: No peripheral edema  Vascular: 2+ peripheral pulses  Skin: left knee blister ruptured spontaneously ,multiple old skin popping marks      LABS:                        7.8    9.94  )-----------( 492      ( 10 Mar 2019 10:24 )             24.6     03-10    133<L>  |  96  |  25<H>  ----------------------------<  156<H>  3.9   |  28  |  0.56    Ca    9.2      10 Mar 2019 10:24            MICROBIOLOGY:  RECENT CULTURES:  03-08 .Blood XXXX XXXX   No growth to date.    03-06 .Blood XXXX XXXX   No growth at 5 days.          RADIOLOGY & ADDITIONAL STUDIES:

## 2019-03-12 LAB
ALBUMIN SERPL ELPH-MCNC: 1.7 G/DL — LOW (ref 3.3–5)
ALP SERPL-CCNC: 302 U/L — HIGH (ref 40–120)
ALT FLD-CCNC: 100 U/L — HIGH (ref 12–78)
ANION GAP SERPL CALC-SCNC: 10 MMOL/L — SIGNIFICANT CHANGE UP (ref 5–17)
AST SERPL-CCNC: 76 U/L — HIGH (ref 15–37)
BILIRUB DIRECT SERPL-MCNC: 0.13 MG/DL — SIGNIFICANT CHANGE UP (ref 0.05–0.2)
BILIRUB INDIRECT FLD-MCNC: 0.2 MG/DL — SIGNIFICANT CHANGE UP (ref 0.2–1)
BILIRUB SERPL-MCNC: 0.3 MG/DL — SIGNIFICANT CHANGE UP (ref 0.2–1.2)
BUN SERPL-MCNC: 25 MG/DL — HIGH (ref 7–23)
CALCIUM SERPL-MCNC: 9.1 MG/DL — SIGNIFICANT CHANGE UP (ref 8.5–10.1)
CHLORIDE SERPL-SCNC: 96 MMOL/L — SIGNIFICANT CHANGE UP (ref 96–108)
CO2 SERPL-SCNC: 27 MMOL/L — SIGNIFICANT CHANGE UP (ref 22–31)
CREAT SERPL-MCNC: 0.59 MG/DL — SIGNIFICANT CHANGE UP (ref 0.5–1.3)
GLUCOSE SERPL-MCNC: 172 MG/DL — HIGH (ref 70–99)
HCT VFR BLD CALC: 25.9 % — LOW (ref 34.5–45)
HGB BLD-MCNC: 7.9 G/DL — LOW (ref 11.5–15.5)
MAGNESIUM SERPL-MCNC: 2.1 MG/DL — SIGNIFICANT CHANGE UP (ref 1.6–2.6)
MCHC RBC-ENTMCNC: 24.3 PG — LOW (ref 27–34)
MCHC RBC-ENTMCNC: 30.5 GM/DL — LOW (ref 32–36)
MCV RBC AUTO: 79.7 FL — LOW (ref 80–100)
NRBC # BLD: 0 /100 WBCS — SIGNIFICANT CHANGE UP (ref 0–0)
PHOSPHATE SERPL-MCNC: 5.3 MG/DL — HIGH (ref 2.5–4.5)
PLATELET # BLD AUTO: 479 K/UL — HIGH (ref 150–400)
POTASSIUM SERPL-MCNC: 3.8 MMOL/L — SIGNIFICANT CHANGE UP (ref 3.5–5.3)
POTASSIUM SERPL-SCNC: 3.8 MMOL/L — SIGNIFICANT CHANGE UP (ref 3.5–5.3)
PROT SERPL-MCNC: 7.4 GM/DL — SIGNIFICANT CHANGE UP (ref 6–8.3)
RBC # BLD: 3.25 M/UL — LOW (ref 3.8–5.2)
RBC # FLD: 17.3 % — HIGH (ref 10.3–14.5)
SODIUM SERPL-SCNC: 133 MMOL/L — LOW (ref 135–145)
VANCOMYCIN TROUGH SERPL-MCNC: 13.7 UG/ML — SIGNIFICANT CHANGE UP (ref 10–20)
WBC # BLD: 9.08 K/UL — SIGNIFICANT CHANGE UP (ref 3.8–10.5)
WBC # FLD AUTO: 9.08 K/UL — SIGNIFICANT CHANGE UP (ref 3.8–10.5)

## 2019-03-12 PROCEDURE — 99233 SBSQ HOSP IP/OBS HIGH 50: CPT

## 2019-03-12 RX ORDER — METHADONE HYDROCHLORIDE 40 MG/1
30 TABLET ORAL DAILY
Qty: 0 | Refills: 0 | Status: DISCONTINUED | OUTPATIENT
Start: 2019-03-12 | End: 2019-03-16

## 2019-03-12 RX ORDER — FLUCONAZOLE 150 MG/1
150 TABLET ORAL ONCE
Qty: 0 | Refills: 0 | Status: COMPLETED | OUTPATIENT
Start: 2019-03-12 | End: 2019-03-12

## 2019-03-12 RX ADMIN — NYSTATIN CREAM 1 APPLICATION(S): 100000 CREAM TOPICAL at 19:01

## 2019-03-12 RX ADMIN — Medication 5 MILLIGRAM(S): at 12:52

## 2019-03-12 RX ADMIN — Medication 166.67 MILLIGRAM(S): at 19:31

## 2019-03-12 RX ADMIN — METHADONE HYDROCHLORIDE 30 MILLIGRAM(S): 40 TABLET ORAL at 12:52

## 2019-03-12 RX ADMIN — Medication 100 MILLIGRAM(S): at 05:40

## 2019-03-12 RX ADMIN — NYSTATIN CREAM 1 APPLICATION(S): 100000 CREAM TOPICAL at 05:39

## 2019-03-12 RX ADMIN — Medication 166.67 MILLIGRAM(S): at 05:39

## 2019-03-12 RX ADMIN — Medication 1 PATCH: at 12:55

## 2019-03-12 RX ADMIN — Medication 650 MILLIGRAM(S): at 12:56

## 2019-03-12 RX ADMIN — Medication 1 TABLET(S): at 12:52

## 2019-03-12 RX ADMIN — Medication 100 MILLIGRAM(S): at 05:39

## 2019-03-12 RX ADMIN — Medication 650 MILLIGRAM(S): at 21:44

## 2019-03-12 RX ADMIN — Medication 1 MILLIGRAM(S): at 12:51

## 2019-03-12 RX ADMIN — Medication 100 MILLIGRAM(S): at 21:45

## 2019-03-12 RX ADMIN — SENNA PLUS 2 TABLET(S): 8.6 TABLET ORAL at 21:44

## 2019-03-12 RX ADMIN — FLUCONAZOLE 150 MILLIGRAM(S): 150 TABLET ORAL at 19:02

## 2019-03-12 RX ADMIN — Medication 650 MILLIGRAM(S): at 22:15

## 2019-03-12 RX ADMIN — Medication 100 MILLIGRAM(S): at 13:13

## 2019-03-12 RX ADMIN — Medication 1 PATCH: at 05:40

## 2019-03-12 RX ADMIN — Medication 100 MILLIGRAM(S): at 21:44

## 2019-03-12 RX ADMIN — CHLORHEXIDINE GLUCONATE 1 APPLICATION(S): 213 SOLUTION TOPICAL at 05:40

## 2019-03-12 NOTE — PROGRESS NOTE ADULT - SUBJECTIVE AND OBJECTIVE BOX
Patient is a 43y old  Female who presents with a chief complaint of AMS (13 Mar 2019 16:33)      INTERVAL HPI / OVERNIGHT EVENTS: fever resolved    MEDICATIONS  (STANDING):  aztreonam  IVPB 2000 milliGRAM(s) IV Intermittent every 8 hours  chlorhexidine 4% Liquid 1 Application(s) Topical <User Schedule>  docusate sodium 100 milliGRAM(s) Oral three times a day  ferrous    sulfate 325 milliGRAM(s) Oral daily  folic acid 1 milliGRAM(s) Oral daily  methadone    Tablet 30 milliGRAM(s) Oral daily  multivitamin 1 Tablet(s) Oral daily  nicotine - 21 mG/24Hr(s) Patch 1 patch Transdermal daily  nystatin Powder 1 Application(s) Topical two times a day  polyethylene glycol 3350 17 Gram(s) Oral daily  senna 2 Tablet(s) Oral at bedtime  sodium chloride 0.9%. 1000 milliLiter(s) (100 mL/Hr) IV Continuous <Continuous>  vancomycin  IVPB 1500 milliGRAM(s) IV Intermittent every 12 hours    MEDICATIONS  (PRN):  acetaminophen   Tablet .. 650 milliGRAM(s) Oral every 6 hours PRN Temp greater or equal to 38C (100.4F), Mild Pain (1 - 3)  bisacodyl 5 milliGRAM(s) Oral every 12 hours PRN Constipation      Vital Signs Last 24 Hrs  Tmax:afebrile    Review of systems:  General : no fever /chills, fatigue  CVS : no chest pain, palpitations  Lungs : no shortness of breath, cough  GI : no abdominal pain, vomiting, diarrhea   : no dysuria, hematuria        PHYSICAL EXAM:  General :NAD  Constitutional:well-developed  Respiratory: CTAB/L  Cardiovascular: S1 and S2, RRR, no M/G/R  Gastrointestinal: BS+, soft, NT/ND  Extremities: No peripheral edema  Vascular: 2+ peripheral pulses  Skin:skin popping      LABS:                             7.9    9.08  )-----------( 479      ( 12 Mar 2019 07:31 )             25.9     03-12    133<L>  |  96  |  25<H>  ----------------------------<  172<H>  3.8   |  27  |  0.59    Ca    9.1      12 Mar 2019 07:31  Phos  5.3     03-12  Mg     2.1     03-12    TPro  7.4  /  Alb  1.7<L>  /  TBili  0.3  /  DBili  .13  /  AST  76<H>  /  ALT  100<H>  /  AlkPhos  302<H>  03-12                MICROBIOLOGY:  RECENT CULTURES:  03-08 .Blood XXXX XXXX   No growth at 5 days.          RADIOLOGY & ADDITIONAL STUDIES:

## 2019-03-12 NOTE — PROGRESS NOTE ADULT - SUBJECTIVE AND OBJECTIVE BOX
Patient is a 43y old  Female who presents with a chief complaint of AMS (11 Mar 2019 22:15)      INTERVAL HPI: Pt seen and examined. States she has some chest pain but it is reproducible, states she was on 100mg methadone as outpt but was discontinued from program from using heroin. Also continues to have yeast infection in genital area without resolution with nystatin. Denies any other acute complaints at this time.     OVERNIGHT EVENTS: none noted  T(F): 98.6 (03-12-19 @ 05:36), Max: 100.9 (03-11-19 @ 18:46)  HR: 89 (03-12-19 @ 11:41) (83 - 89)  BP: 108/65 (03-12-19 @ 11:41) (106/57 - 144/66)  RR: 18 (03-12-19 @ 11:41) (16 - 18)  SpO2: 99% (03-12-19 @ 11:41) (95% - 99%)  I&O's Summary    11 Mar 2019 07:01  -  12 Mar 2019 07:00  --------------------------------------------------------  IN: 750 mL / OUT: 1000 mL / NET: -250 mL        REVIEW OF SYSTEMS:  CONSTITUTIONAL: No fever, weight loss, or fatigue  RESPIRATORY: No cough, wheezing, chills or hemoptysis; No shortness of breath  CARDIOVASCULAR: +chest pain, reproducible with rubbing chest  GASTROINTESTINAL: No abdominal or epigastric pain. No nausea, vomiting, or hematemesis; No diarrhea or constipation. No melena or hematochezia.  GENITOURINARY: No dysuria, frequency, hematuria, or incontinence except candidal like symptoms in genital area  NEUROLOGICAL: No headaches, memory loss, loss of strength, numbness, or tremors  SKIN: No itching, burning, rashes, or lesions   MUSCULOSKELETAL: No joint pain or swelling; No muscle, back, or extremity pain  PSYCHIATRIC: No depression, anxiety, mood swings, or difficulty sleeping    PHYSICAL EXAM:  GENERAL: NAD, well-groomed, well-developed  ENMT: No tonsillar erythema, exudates, or enlargement; Moist mucous membranes, Good dentition, No lesions  NERVOUS SYSTEM:  Alert & Oriented X3, Good concentration; Motor Strength 5/5 B/L upper and lower extremities  CHEST/LUNG: Clear to percussion bilaterally; No rales, rhonchi, wheezing, or rubs  HEART: Regular rate and rhythm; No murmurs, rubs, or gallops  ABDOMEN: Soft, Nontender, Nondistended; Bowel sounds present  EXTREMITIES:  2+ Peripheral Pulses, No clubbing, cyanosis, or edema  SKIN: No rashes or lesions    LABS:                        7.9    9.08  )-----------( 479      ( 12 Mar 2019 07:31 )             25.9     03-12    133<L>  |  96  |  25<H>  ----------------------------<  172<H>  3.8   |  27  |  0.59    Ca    9.1      12 Mar 2019 07:31  Phos  5.3     03-12  Mg     2.1     03-12    TPro  7.4  /  Alb  1.7<L>  /  TBili  0.3  /  DBili  .13  /  AST  76<H>  /  ALT  100<H>  /  AlkPhos  302<H>  03-12        CAPILLARY BLOOD GLUCOSE          03-08 @ 15:08   No growth to date.  --  --          MEDICATIONS  (STANDING):  aztreonam  IVPB 2000 milliGRAM(s) IV Intermittent every 8 hours  chlorhexidine 4% Liquid 1 Application(s) Topical <User Schedule>  docusate sodium 100 milliGRAM(s) Oral three times a day  fluconAZOLE   Tablet 150 milliGRAM(s) Oral once  folic acid 1 milliGRAM(s) Oral daily  methadone    Tablet 30 milliGRAM(s) Oral daily  multivitamin 1 Tablet(s) Oral daily  nicotine - 21 mG/24Hr(s) Patch 1 patch Transdermal daily  nystatin Powder 1 Application(s) Topical two times a day  senna 2 Tablet(s) Oral at bedtime  vancomycin  IVPB 1250 milliGRAM(s) IV Intermittent every 12 hours    MEDICATIONS  (PRN):  acetaminophen   Tablet .. 650 milliGRAM(s) Oral every 6 hours PRN Temp greater or equal to 38C (100.4F), Mild Pain (1 - 3)  bisacodyl 5 milliGRAM(s) Oral every 12 hours PRN Constipation

## 2019-03-12 NOTE — PROGRESS NOTE ADULT - ASSESSMENT
44 y/o F w/polysubstance abuse presenting with severe sepsis with septic shock secondary to MRSA bacteremia. LIZ positive for endocarditis. EFRAIN likely secondary to sepsis now improved. Acute respiratory failure requiring intubation. New diagnosis of HCV.continue current management for now no imminent transfer        Problem/Plan - 1:  ·  Problem: Endocarditis of tricuspid valve.  Plan: -TTE= tricuspid endocarditis.   -CT chest non-contrast ordered confirms septic pulmonary emboli  and some have worsened.  CT surgery on board. May need transfer to [facility]   for tricuspid valve surgery if bacteremia persists this week too as per Ct Sx.  so far blood cx negative   - apprec further recs ID    - on daptomycin, azactam, vancomycin IV  -check vanco trough  - pt will need 6 weeks of IV ABX from the date of blood cx negative.      Problem/Plan - 2:  ·  Problem: Moderate protein-calorie malnutrition.  Plan: -recs= soft w/Ensure Enlive 3 x day=1125 calories, 60 grams protein.      Problem/Plan - 3:  ·  Problem: Bacteremia due to methicillin resistant Staphylococcus aureus.  Plan: - Continue   IV Daptomycin 430 mg/day.    - on daptomycin, azactam, vancomycin IV  -  Follow daily blood cultures for clearing of bacteremia, at present negative   - TTE confirms tricuspid endocarditis.     -May require CT surgical intervention  if blood cultures continue to remain persitently positive.  -- discussed w/ .      Problem/Plan - 4:  ·  Problem: Drug abuse.  Plan: - Continue methadone for opiate abuse 30 mg/day.        - Outpatient follow up for HCV and opioid maintennce outpt re-enrollment program     Problem/Plan - 5:  ·  Problem: Anemia, unspecified type.  Plan: - transfused 1 unit PRBC   - monitor CBC.      Problem/Plan - 6:  Problem: Hyponatremia. Plan: SIADH consider water restriction   or NaCl tabs.     Problem/Plan - 7:  Problem: Chest pain. Plan: likely msk, monitor clinically, if not improving consider EKG and cardiac enzymes 42 y/o F w/polysubstance abuse presenting with severe sepsis with septic shock secondary to MRSA bacteremia. LIZ positive for endocarditis. EFRAIN likely secondary to sepsis now improved. Acute respiratory failure requiring intubation. New diagnosis of HCV.continue current management for now no imminent transfer        Problem/Plan - 1:  ·  Problem: Endocarditis of tricuspid valve.  Plan: -TTE= tricuspid endocarditis.   -CT chest non-contrast ordered confirms septic pulmonary emboli  and some have worsened.  CT surgery on board. May need transfer to [facility]   for tricuspid valve surgery if bacteremia persists this week too as per Ct Sx.  so far blood cx negative   - apprec further recs ID    - on daptomycin, azactam, vancomycin IV  -check vanco trough  - pt will need 6 weeks of IV ABX from the date of blood cx negative.      Problem/Plan - 2:  ·  Problem: Moderate protein-calorie malnutrition.  Plan: -recs= soft w/Ensure Enlive 3 x day=1125 calories, 60 grams protein.      Problem/Plan - 3:  ·  Problem: Bacteremia due to methicillin resistant Staphylococcus aureus.  Plan: - Continue   IV Daptomycin 430 mg/day.    - on daptomycin, azactam, vancomycin IV  -  Follow daily blood cultures for clearing of bacteremia, at present negative   - TTE confirms tricuspid endocarditis.     -May require CT surgical intervention  if blood cultures continue to remain persitently positive.  -- discussed w/ .      Problem/Plan - 4:  ·  Problem: Drug abuse.  Plan: - Continue methadone for opiate abuse 30 mg/day.        - Outpatient follow up for HCV and opioid maintennce outpt re-enrollment program     Problem/Plan - 5:  ·  Problem: Anemia, unspecified type.  Plan: - transfused 1 unit PRBC   - monitor CBC.      Problem/Plan - 6:  Problem: Hyponatremia. Plan: SIADH consider water restriction   or NaCl tabs.     Problem/Plan - 7:  Problem: Chest pain. Plan: likely msk, monitor clinically, if not improving consider EKG and cardiac enzymes    Problem/Plan - 8:  Problem: Candidal infection. Plan: diflucan 150mg x1 dose, cont nystatin, routine skin care

## 2019-03-13 LAB
ANION GAP SERPL CALC-SCNC: 8 MMOL/L — SIGNIFICANT CHANGE UP (ref 5–17)
BUN SERPL-MCNC: 24 MG/DL — HIGH (ref 7–23)
CALCIUM SERPL-MCNC: 9.3 MG/DL — SIGNIFICANT CHANGE UP (ref 8.5–10.1)
CHLORIDE SERPL-SCNC: 95 MMOL/L — LOW (ref 96–108)
CO2 SERPL-SCNC: 26 MMOL/L — SIGNIFICANT CHANGE UP (ref 22–31)
CREAT SERPL-MCNC: 0.45 MG/DL — LOW (ref 0.5–1.3)
CULTURE RESULTS: SIGNIFICANT CHANGE UP
CULTURE RESULTS: SIGNIFICANT CHANGE UP
GLUCOSE SERPL-MCNC: 120 MG/DL — HIGH (ref 70–99)
HCT VFR BLD CALC: 25.4 % — LOW (ref 34.5–45)
HGB BLD-MCNC: 8 G/DL — LOW (ref 11.5–15.5)
MCHC RBC-ENTMCNC: 25 PG — LOW (ref 27–34)
MCHC RBC-ENTMCNC: 31.5 GM/DL — LOW (ref 32–36)
MCV RBC AUTO: 79.4 FL — LOW (ref 80–100)
NRBC # BLD: 0 /100 WBCS — SIGNIFICANT CHANGE UP (ref 0–0)
PLATELET # BLD AUTO: 493 K/UL — HIGH (ref 150–400)
POTASSIUM SERPL-MCNC: 4.4 MMOL/L — SIGNIFICANT CHANGE UP (ref 3.5–5.3)
POTASSIUM SERPL-SCNC: 4.4 MMOL/L — SIGNIFICANT CHANGE UP (ref 3.5–5.3)
RBC # BLD: 3.2 M/UL — LOW (ref 3.8–5.2)
RBC # FLD: 17.2 % — HIGH (ref 10.3–14.5)
SODIUM SERPL-SCNC: 129 MMOL/L — LOW (ref 135–145)
SPECIMEN SOURCE: SIGNIFICANT CHANGE UP
SPECIMEN SOURCE: SIGNIFICANT CHANGE UP
WBC # BLD: 11.28 K/UL — HIGH (ref 3.8–10.5)
WBC # FLD AUTO: 11.28 K/UL — HIGH (ref 3.8–10.5)

## 2019-03-13 PROCEDURE — 99233 SBSQ HOSP IP/OBS HIGH 50: CPT

## 2019-03-13 RX ORDER — FERROUS SULFATE 325(65) MG
325 TABLET ORAL DAILY
Qty: 0 | Refills: 0 | Status: DISCONTINUED | OUTPATIENT
Start: 2019-03-13 | End: 2019-03-16

## 2019-03-13 RX ORDER — VANCOMYCIN HCL 1 G
1500 VIAL (EA) INTRAVENOUS EVERY 12 HOURS
Qty: 0 | Refills: 0 | Status: DISCONTINUED | OUTPATIENT
Start: 2019-03-13 | End: 2019-03-20

## 2019-03-13 RX ORDER — POLYETHYLENE GLYCOL 3350 17 G/17G
17 POWDER, FOR SOLUTION ORAL DAILY
Qty: 0 | Refills: 0 | Status: DISCONTINUED | OUTPATIENT
Start: 2019-03-13 | End: 2019-03-14

## 2019-03-13 RX ADMIN — Medication 166.67 MILLIGRAM(S): at 05:44

## 2019-03-13 RX ADMIN — SENNA PLUS 2 TABLET(S): 8.6 TABLET ORAL at 21:33

## 2019-03-13 RX ADMIN — Medication 1 MILLIGRAM(S): at 12:05

## 2019-03-13 RX ADMIN — Medication 1 PATCH: at 12:55

## 2019-03-13 RX ADMIN — Medication 650 MILLIGRAM(S): at 17:51

## 2019-03-13 RX ADMIN — Medication 300 MILLIGRAM(S): at 18:29

## 2019-03-13 RX ADMIN — Medication 100 MILLIGRAM(S): at 16:52

## 2019-03-13 RX ADMIN — Medication 100 MILLIGRAM(S): at 05:44

## 2019-03-13 RX ADMIN — METHADONE HYDROCHLORIDE 30 MILLIGRAM(S): 40 TABLET ORAL at 12:05

## 2019-03-13 RX ADMIN — NYSTATIN CREAM 1 APPLICATION(S): 100000 CREAM TOPICAL at 18:29

## 2019-03-13 RX ADMIN — NYSTATIN CREAM 1 APPLICATION(S): 100000 CREAM TOPICAL at 05:45

## 2019-03-13 RX ADMIN — Medication 1 PATCH: at 12:04

## 2019-03-13 RX ADMIN — Medication 1 TABLET(S): at 12:05

## 2019-03-13 RX ADMIN — CHLORHEXIDINE GLUCONATE 1 APPLICATION(S): 213 SOLUTION TOPICAL at 05:44

## 2019-03-13 RX ADMIN — Medication 100 MILLIGRAM(S): at 21:33

## 2019-03-13 RX ADMIN — Medication 650 MILLIGRAM(S): at 16:51

## 2019-03-13 NOTE — PROGRESS NOTE ADULT - SUBJECTIVE AND OBJECTIVE BOX
INTERVAL HPI/OVERNIGHT EVENTS:  Pt seen and examined at bedside.     Allergies/Intolerance: penicillin (Short breath; Hives)      MEDICATIONS  (STANDING):  aztreonam  IVPB 2000 milliGRAM(s) IV Intermittent every 8 hours  chlorhexidine 4% Liquid 1 Application(s) Topical <User Schedule>  docusate sodium 100 milliGRAM(s) Oral three times a day  folic acid 1 milliGRAM(s) Oral daily  methadone    Tablet 30 milliGRAM(s) Oral daily  multivitamin 1 Tablet(s) Oral daily  nicotine - 21 mG/24Hr(s) Patch 1 patch Transdermal daily  nystatin Powder 1 Application(s) Topical two times a day  senna 2 Tablet(s) Oral at bedtime  vancomycin  IVPB 1500 milliGRAM(s) IV Intermittent every 12 hours    MEDICATIONS  (PRN):  acetaminophen   Tablet .. 650 milliGRAM(s) Oral every 6 hours PRN Temp greater or equal to 38C (100.4F), Mild Pain (1 - 3)  bisacodyl 5 milliGRAM(s) Oral every 12 hours PRN Constipation        ROS: all systems reviewed and wnl      PHYSICAL EXAMINATION:  Vital Signs Last 24 Hrs  T(C): 37.1 (13 Mar 2019 12:05), Max: 38 (13 Mar 2019 00:00)  T(F): 98.7 (13 Mar 2019 12:05), Max: 100.4 (13 Mar 2019 00:00)  HR: 93 (13 Mar 2019 12:05) (81 - 95)  BP: 111/69 (13 Mar 2019 12:05) (95/62 - 114/64)  BP(mean): --  RR: 16 (13 Mar 2019 12:05) (16 - 18)  SpO2: 97% (13 Mar 2019 12:05) (95% - 98%)  CAPILLARY BLOOD GLUCOSE          03-12 @ 07:01  -  03-13 @ 07:00  --------------------------------------------------------  IN: 900 mL / OUT: 4 mL / NET: 896 mL        GENERAL:   NECK: supple, No JVD  CHEST/LUNG: clear to auscultation bilaterally; no rales, rhonchi, or wheezing b/l  HEART: normal S1, S2  ABDOMEN: BS+, soft, ND, NT   EXTREMITIES:  pulses palpable; no clubbing, cyanosis, or edema b/l LEs  SKIN: no rashes or lesions      LABS:                        8.0    11.28 )-----------( 493      ( 13 Mar 2019 08:03 )             25.4     03-13    129<L>  |  95<L>  |  24<H>  ----------------------------<  120<H>  4.4   |  26  |  0.45<L>    Ca    9.3      13 Mar 2019 08:03  Phos  5.3     03-12  Mg     2.1     03-12    TPro  7.4  /  Alb  1.7<L>  /  TBili  0.3  /  DBili  .13  /  AST  76<H>  /  ALT  100<H>  /  AlkPhos  302<H>  03-12

## 2019-03-13 NOTE — PROGRESS NOTE ADULT - ASSESSMENT
42 y/o F w/polysubstance abuse presenting with severe sepsis with septic shock secondary to MRSA bacteremia. LIZ positive for endocarditis. EFRAIN likely secondary to sepsis now improved. Acute respiratory failure requiring intubation. New diagnosis of HCV.continue current management for now no imminent transfer        Problem/Plan - 1:  ·  Problem: Endocarditis of tricuspid valve.  CT chest non-contrast ordered confirms septic pulmonary emboli to lungs and some have worsened.  CT surgery on board. No need for surgery now per ID as blood cultures have cleared. Apprec further recs ID  . On Azactam 2,000 mg every 8 hours and  Vancomycin IV 1,500 mg every 12 hours. check vanco trough. pt will need 6 weeks of IV ABX from the date of blood cx negative.      Problem/Plan - 2:  ·  Problem: Moderate protein-calorie malnutrition.  Plan: -recs= soft w/Ensure Enlive 3 x day=1125 calories, 60 grams protein.        Problem/Plan -3:  ·  Problem: Drug abuse.  Plan: - Continue methadone for opiate abuse 30 mg/day.        - Outpatient follow up for HCV and opioid maintennce outpt re-enrollment program     Problem/Plan - 5:  ·  Problem: Anemia, unspecified type.  Plan: - transfused 1 unit PRBC, monitor CBC.      Problem/Plan - 6:  Problem: Hyponatremia. Plan: SIADH consider water restriction   or NaCl tabs. 44 y/o F w/polysubstance abuse presenting with severe sepsis with septic shock secondary to MRSA bacteremia. LIZ positive for endocarditis. EFRAIN likely secondary to sepsis now improved. Acute respiratory failure requiring intubation. New diagnosis of HCV.continue current management for now no imminent transfer        Problem/Plan - 1:  ·  Problem: Endocarditis of tricuspid valve.  CT chest non-contrast ordered confirms septic pulmonary emboli to lungs and some have worsened.  CT surgery on board. No need for surgery now per ID as blood cultures have cleared. Apprec further recs ID  . On Azactam 2,000 mg every 8 hours and  Vancomycin IV 1,500 mg every 12 hours. check vanco trough. pt will need 6 weeks of IV ABX from the date of blood cx negative. BC negative from 3/08.      Problem/Plan - 2:  ·  Problem: Moderate protein-calorie malnutrition.  Plan: -recs= soft w/Ensure Enlive 3 x day=1125 calories, 60 grams protein.        Problem/Plan -3:  ·  Problem: Drug abuse.  Plan: - Continue methadone for opiate abuse 30 mg/day.        - Outpatient follow up for HCV and opioid maintennce outpt re-enrollment program     Problem/Plan - 5:  ·  Problem: Anemia, unspecified type.  Plan: - transfused 1 unit PRBC, monitor CBC. HGB stable today at 8.0.      Problem/Plan - 6:  Problem: Hyponatremia. Plan: SIADH consider water restriction or NaCl tabs. 44 y/o F w/polysubstance abuse presenting with severe sepsis with septic shock secondary to MRSA bacteremia. LIZ positive for endocarditis. EFRAIN likely secondary to sepsis now improved. Acute respiratory failure requiring intubation. New diagnosis of HCV.continue current management for now no imminent transfer        Problem/Plan - 1:  ·  Problem: Endocarditis of tricuspid valve.  CT chest non-contrast ordered confirms septic pulmonary emboli to lungs and some have worsened.  CT surgery on board. No need for surgery now per ID as blood cultures have cleared. Apprec further recs ID  . On Azactam 2,000 mg every 8 hours and  Vancomycin IV 1,500 mg every 12 hours. check vanco trough. pt will need 6 weeks of IV ABX from the date of blood cx negative. BC negative from 3/08.      Problem/Plan - 2:  ·  Problem: Moderate protein-calorie malnutrition.  Plan: -recs= soft w/Ensure Enlive 3 x day=1125 calories, 60 grams protein.      Problem/Plan -3:  ·  Problem: Drug abuse.  Plan: - Continue methadone for opiate abuse 30 mg/day.        - Outpatient follow up for HCV and opioid maintennce outpt re-enrollment program     Problem/Plan - 5:  ·  Problem: Anemia, unspecified type.  Plan: - transfused 1 unit PRBC, monitor CBC. HGB stable today at 8.0.   Add ferrous sulfate 325 mg/day.      Problem/Plan - 6:  Problem: Hyponatremia. Plan: SIADH consider water restriction or NaCl tabs.    Stable for rehab once authorization approved.

## 2019-03-13 NOTE — PROGRESS NOTE ADULT - SUBJECTIVE AND OBJECTIVE BOX
INTERVAL HPI/OVERNIGHT EVENTS:  Pt seen and examined at bedside.     Allergies/Intolerance: penicillin (Short breath; Hives)      MEDICATIONS  (STANDING):  aztreonam  IVPB 2000 milliGRAM(s) IV Intermittent every 8 hours  chlorhexidine 4% Liquid 1 Application(s) Topical <User Schedule>  docusate sodium 100 milliGRAM(s) Oral three times a day  folic acid 1 milliGRAM(s) Oral daily  methadone    Tablet 30 milliGRAM(s) Oral daily  multivitamin 1 Tablet(s) Oral daily  nicotine - 21 mG/24Hr(s) Patch 1 patch Transdermal daily  nystatin Powder 1 Application(s) Topical two times a day  polyethylene glycol 3350 17 Gram(s) Oral daily  senna 2 Tablet(s) Oral at bedtime  vancomycin  IVPB 1500 milliGRAM(s) IV Intermittent every 12 hours    MEDICATIONS  (PRN):  acetaminophen   Tablet .. 650 milliGRAM(s) Oral every 6 hours PRN Temp greater or equal to 38C (100.4F), Mild Pain (1 - 3)  bisacodyl 5 milliGRAM(s) Oral every 12 hours PRN Constipation        ROS: all systems reviewed and wnl      PHYSICAL EXAMINATION:  Vital Signs Last 24 Hrs  T(C): 37.1 (13 Mar 2019 12:05), Max: 38 (13 Mar 2019 00:00)  T(F): 98.7 (13 Mar 2019 12:05), Max: 100.4 (13 Mar 2019 00:00)  HR: 95 (13 Mar 2019 14:45) (81 - 95)  BP: 110/68 (13 Mar 2019 14:45) (95/62 - 114/64)  BP(mean): --  RR: 16 (13 Mar 2019 12:05) (16 - 18)  SpO2: 96% (13 Mar 2019 14:45) (95% - 98%)  CAPILLARY BLOOD GLUCOSE          03-12 @ 07:01  -  03-13 @ 07:00  --------------------------------------------------------  IN: 900 mL / OUT: 4 mL / NET: 896 mL        GENERAL:   NECK: supple, No JVD  CHEST/LUNG: clear to auscultation bilaterally; no rales, rhonchi, or wheezing b/l  HEART: normal S1, S2  ABDOMEN: BS+, soft, ND, NT   EXTREMITIES:  pulses palpable; no clubbing, cyanosis, or edema b/l LEs  SKIN: no rashes or lesions      LABS:                        8.0    11.28 )-----------( 493      ( 13 Mar 2019 08:03 )             25.4     03-13    129<L>  |  95<L>  |  24<H>  ----------------------------<  120<H>  4.4   |  26  |  0.45<L>    Ca    9.3      13 Mar 2019 08:03  Phos  5.3     03-12  Mg     2.1     03-12    TPro  7.4  /  Alb  1.7<L>  /  TBili  0.3  /  DBili  .13  /  AST  76<H>  /  ALT  100<H>  /  AlkPhos  302<H>  03-12 INTERVAL HPI/OVERNIGHT EVENTS:  Pt seen and examined at bedside.     Allergies/Intolerance: penicillin (Short breath; Hives)      MEDICATIONS  (STANDING):  aztreonam  IVPB 2000 milliGRAM(s) IV Intermittent every 8 hours  chlorhexidine 4% Liquid 1 Application(s) Topical <User Schedule>  docusate sodium 100 milliGRAM(s) Oral three times a day  folic acid 1 milliGRAM(s) Oral daily  methadone    Tablet 30 milliGRAM(s) Oral daily  multivitamin 1 Tablet(s) Oral daily  nicotine - 21 mG/24Hr(s) Patch 1 patch Transdermal daily  nystatin Powder 1 Application(s) Topical two times a day  polyethylene glycol 3350 17 Gram(s) Oral daily  senna 2 Tablet(s) Oral at bedtime  vancomycin  IVPB 1500 milliGRAM(s) IV Intermittent every 12 hours    MEDICATIONS  (PRN):  acetaminophen   Tablet .. 650 milliGRAM(s) Oral every 6 hours PRN Temp greater or equal to 38C (100.4F), Mild Pain (1 - 3)  bisacodyl 5 milliGRAM(s) Oral every 12 hours PRN Constipation        ROS: all systems reviewed and wnl      PHYSICAL EXAMINATION:  Vital Signs Last 24 Hrs  T(C): 37.1 (13 Mar 2019 12:05), Max: 38 (13 Mar 2019 00:00)  T(F): 98.7 (13 Mar 2019 12:05), Max: 100.4 (13 Mar 2019 00:00)  HR: 95 (13 Mar 2019 14:45) (81 - 95)  BP: 110/68 (13 Mar 2019 14:45) (95/62 - 114/64)  BP(mean): --  RR: 16 (13 Mar 2019 12:05) (16 - 18)  SpO2: 96% (13 Mar 2019 14:45) (95% - 98%)  CAPILLARY BLOOD GLUCOSE          03-12 @ 07:01  -  03-13 @ 07:00  --------------------------------------------------------  IN: 900 mL / OUT: 4 mL / NET: 896 mL        GENERAL: stable, no fevers, no SOB or diarrhea   NECK: supple, No JVD  CHEST/LUNG: clear to auscultation bilaterally; no rales, rhonchi, or wheezing b/l  HEART: normal S1, S2  ABDOMEN: BS+, soft, ND, NT   EXTREMITIES:  pulses palpable; no clubbing, cyanosis, or edema b/l LEs  right arm midline in place  SKIN: no rashes or lesions      LABS:                        8.0    11.28 )-----------( 493      ( 13 Mar 2019 08:03 )             25.4     03-13    129<L>  |  95<L>  |  24<H>  ----------------------------<  120<H>  4.4   |  26  |  0.45<L>    Ca    9.3      13 Mar 2019 08:03  Phos  5.3     03-12  Mg     2.1     03-12    TPro  7.4  /  Alb  1.7<L>  /  TBili  0.3  /  DBili  .13  /  AST  76<H>  /  ALT  100<H>  /  AlkPhos  302<H>  03-12

## 2019-03-13 NOTE — PROGRESS NOTE ADULT - SUBJECTIVE AND OBJECTIVE BOX
Patient is a 43y old  Female who presents with a chief complaint of AMS (13 Mar 2019 16:33)      INTERVAL HPI / OVERNIGHT EVENTS: low grade fever again today    MEDICATIONS  (STANDING):  aztreonam  IVPB 2000 milliGRAM(s) IV Intermittent every 8 hours  chlorhexidine 4% Liquid 1 Application(s) Topical <User Schedule>  docusate sodium 100 milliGRAM(s) Oral three times a day  ferrous    sulfate 325 milliGRAM(s) Oral daily  folic acid 1 milliGRAM(s) Oral daily  methadone    Tablet 30 milliGRAM(s) Oral daily  multivitamin 1 Tablet(s) Oral daily  nicotine - 21 mG/24Hr(s) Patch 1 patch Transdermal daily  nystatin Powder 1 Application(s) Topical two times a day  polyethylene glycol 3350 17 Gram(s) Oral daily  senna 2 Tablet(s) Oral at bedtime  sodium chloride 0.9%. 1000 milliLiter(s) (100 mL/Hr) IV Continuous <Continuous>  vancomycin  IVPB 1500 milliGRAM(s) IV Intermittent every 12 hours    MEDICATIONS  (PRN):  acetaminophen   Tablet .. 650 milliGRAM(s) Oral every 6 hours PRN Temp greater or equal to 38C (100.4F), Mild Pain (1 - 3)  bisacodyl 5 milliGRAM(s) Oral every 12 hours PRN Constipation      Vital Signs Last 24 Hrs  Tmax: 100.4    Review of systems:  General : no fever /chills, fatigue  CVS : no chest pain, palpitations  Lungs : no shortness of breath, cough  GI : no abdominal pain, vomiting, diarrhea   : no dysuria, hematuria        PHYSICAL EXAM:  General :NAD  Constitutional:   well-developed  Respiratory: CTAB/L  Cardiovascular: S1 and S2, RRR, no M/G/R  Gastrointestinal: BS+, soft, NT/ND  Extremities: No peripheral edema  Vascular: 2+ peripheral pulses  Skin: skin popping      LABS:                        8.0    11.28 )-----------( 493      ( 13 Mar 2019 08:03 )             25.4     03-13    129<L>  |  95<L>  |  24<H>  ----------------------------<  120<H>  4.4   |  26  |  0.45<L>    Ca    9.3      13 Mar 2019 08:03  Phos  5.3     03-12  Mg     2.1     03-12    TPro  7.4  /  Alb  1.7<L>  /  TBili  0.3  /  DBili  .13  /  AST  76<H>  /  ALT  100<H>  /  AlkPhos           MICROBIOLOGY:  RECENT CULTURES:  03-08 .Blood XXXX XXXX   No growth at 5 days.          RADIOLOGY & ADDITIONAL STUDIES:

## 2019-03-14 LAB
ALBUMIN SERPL ELPH-MCNC: 1.8 G/DL — LOW (ref 3.3–5)
ALP SERPL-CCNC: 300 U/L — HIGH (ref 40–120)
ALT FLD-CCNC: 80 U/L — HIGH (ref 12–78)
ANION GAP SERPL CALC-SCNC: 9 MMOL/L — SIGNIFICANT CHANGE UP (ref 5–17)
AST SERPL-CCNC: 54 U/L — HIGH (ref 15–37)
BASOPHILS # BLD AUTO: 0.02 K/UL — SIGNIFICANT CHANGE UP (ref 0–0.2)
BASOPHILS NFR BLD AUTO: 0.2 % — SIGNIFICANT CHANGE UP (ref 0–2)
BILIRUB SERPL-MCNC: 0.4 MG/DL — SIGNIFICANT CHANGE UP (ref 0.2–1.2)
BUN SERPL-MCNC: 24 MG/DL — HIGH (ref 7–23)
CALCIUM SERPL-MCNC: 9.6 MG/DL — SIGNIFICANT CHANGE UP (ref 8.5–10.1)
CHLORIDE SERPL-SCNC: 100 MMOL/L — SIGNIFICANT CHANGE UP (ref 96–108)
CO2 SERPL-SCNC: 25 MMOL/L — SIGNIFICANT CHANGE UP (ref 22–31)
CREAT SERPL-MCNC: 0.65 MG/DL — SIGNIFICANT CHANGE UP (ref 0.5–1.3)
EOSINOPHIL # BLD AUTO: 0.05 K/UL — SIGNIFICANT CHANGE UP (ref 0–0.5)
EOSINOPHIL NFR BLD AUTO: 0.4 % — SIGNIFICANT CHANGE UP (ref 0–6)
GLUCOSE SERPL-MCNC: 150 MG/DL — HIGH (ref 70–99)
HCT VFR BLD CALC: 28.3 % — LOW (ref 34.5–45)
HGB BLD-MCNC: 8.5 G/DL — LOW (ref 11.5–15.5)
IMM GRANULOCYTES NFR BLD AUTO: 1.6 % — HIGH (ref 0–1.5)
LYMPHOCYTES # BLD AUTO: 19.1 % — SIGNIFICANT CHANGE UP (ref 13–44)
LYMPHOCYTES # BLD AUTO: 2.17 K/UL — SIGNIFICANT CHANGE UP (ref 1–3.3)
MCHC RBC-ENTMCNC: 24.4 PG — LOW (ref 27–34)
MCHC RBC-ENTMCNC: 30 GM/DL — LOW (ref 32–36)
MCV RBC AUTO: 81.1 FL — SIGNIFICANT CHANGE UP (ref 80–100)
MONOCYTES # BLD AUTO: 0.68 K/UL — SIGNIFICANT CHANGE UP (ref 0–0.9)
MONOCYTES NFR BLD AUTO: 6 % — SIGNIFICANT CHANGE UP (ref 2–14)
NEUTROPHILS # BLD AUTO: 8.26 K/UL — HIGH (ref 1.8–7.4)
NEUTROPHILS NFR BLD AUTO: 72.7 % — SIGNIFICANT CHANGE UP (ref 43–77)
NRBC # BLD: 0 /100 WBCS — SIGNIFICANT CHANGE UP (ref 0–0)
PLATELET # BLD AUTO: 516 K/UL — HIGH (ref 150–400)
POTASSIUM SERPL-MCNC: 4.1 MMOL/L — SIGNIFICANT CHANGE UP (ref 3.5–5.3)
POTASSIUM SERPL-SCNC: 4.1 MMOL/L — SIGNIFICANT CHANGE UP (ref 3.5–5.3)
PROT SERPL-MCNC: 8.1 GM/DL — SIGNIFICANT CHANGE UP (ref 6–8.3)
RBC # BLD: 3.49 M/UL — LOW (ref 3.8–5.2)
RBC # FLD: 17.5 % — HIGH (ref 10.3–14.5)
SODIUM SERPL-SCNC: 134 MMOL/L — LOW (ref 135–145)
VANCOMYCIN TROUGH SERPL-MCNC: 17.2 UG/ML — SIGNIFICANT CHANGE UP (ref 10–20)
WBC # BLD: 11.36 K/UL — HIGH (ref 3.8–10.5)
WBC # FLD AUTO: 11.36 K/UL — HIGH (ref 3.8–10.5)

## 2019-03-14 PROCEDURE — 99233 SBSQ HOSP IP/OBS HIGH 50: CPT

## 2019-03-14 RX ORDER — SODIUM CHLORIDE 9 MG/ML
1000 INJECTION INTRAMUSCULAR; INTRAVENOUS; SUBCUTANEOUS
Qty: 0 | Refills: 0 | Status: DISCONTINUED | OUTPATIENT
Start: 2019-03-14 | End: 2019-03-15

## 2019-03-14 RX ORDER — POLYETHYLENE GLYCOL 3350 17 G/17G
17 POWDER, FOR SOLUTION ORAL
Qty: 0 | Refills: 0 | Status: DISCONTINUED | OUTPATIENT
Start: 2019-03-14 | End: 2019-03-20

## 2019-03-14 RX ORDER — NYSTATIN CREAM 100000 [USP'U]/G
1 CREAM TOPICAL
Qty: 0 | Refills: 0 | Status: DISCONTINUED | OUTPATIENT
Start: 2019-03-14 | End: 2019-03-20

## 2019-03-14 RX ORDER — LACTULOSE 10 G/15ML
30 SOLUTION ORAL
Qty: 0 | Refills: 0 | Status: DISCONTINUED | OUTPATIENT
Start: 2019-03-14 | End: 2019-03-15

## 2019-03-14 RX ADMIN — Medication 1 PATCH: at 11:48

## 2019-03-14 RX ADMIN — Medication 100 MILLIGRAM(S): at 05:53

## 2019-03-14 RX ADMIN — POLYETHYLENE GLYCOL 3350 17 GRAM(S): 17 POWDER, FOR SOLUTION ORAL at 11:48

## 2019-03-14 RX ADMIN — Medication 100 MILLIGRAM(S): at 22:02

## 2019-03-14 RX ADMIN — Medication 1 TABLET(S): at 11:48

## 2019-03-14 RX ADMIN — Medication 1 PATCH: at 11:50

## 2019-03-14 RX ADMIN — Medication 100 MILLIGRAM(S): at 22:04

## 2019-03-14 RX ADMIN — Medication 650 MILLIGRAM(S): at 04:39

## 2019-03-14 RX ADMIN — LACTULOSE 30 GRAM(S): 10 SOLUTION ORAL at 18:06

## 2019-03-14 RX ADMIN — Medication 325 MILLIGRAM(S): at 11:48

## 2019-03-14 RX ADMIN — Medication 1 MILLIGRAM(S): at 11:48

## 2019-03-14 RX ADMIN — POLYETHYLENE GLYCOL 3350 17 GRAM(S): 17 POWDER, FOR SOLUTION ORAL at 22:10

## 2019-03-14 RX ADMIN — Medication 300 MILLIGRAM(S): at 06:28

## 2019-03-14 RX ADMIN — Medication 650 MILLIGRAM(S): at 14:14

## 2019-03-14 RX ADMIN — NYSTATIN CREAM 1 APPLICATION(S): 100000 CREAM TOPICAL at 22:57

## 2019-03-14 RX ADMIN — Medication 650 MILLIGRAM(S): at 20:43

## 2019-03-14 RX ADMIN — SENNA PLUS 2 TABLET(S): 8.6 TABLET ORAL at 22:03

## 2019-03-14 RX ADMIN — CHLORHEXIDINE GLUCONATE 1 APPLICATION(S): 213 SOLUTION TOPICAL at 05:53

## 2019-03-14 RX ADMIN — Medication 650 MILLIGRAM(S): at 06:42

## 2019-03-14 RX ADMIN — SODIUM CHLORIDE 100 MILLILITER(S): 9 INJECTION INTRAMUSCULAR; INTRAVENOUS; SUBCUTANEOUS at 00:49

## 2019-03-14 RX ADMIN — Medication 100 MILLIGRAM(S): at 14:14

## 2019-03-14 RX ADMIN — NYSTATIN CREAM 1 APPLICATION(S): 100000 CREAM TOPICAL at 05:53

## 2019-03-14 RX ADMIN — METHADONE HYDROCHLORIDE 30 MILLIGRAM(S): 40 TABLET ORAL at 11:48

## 2019-03-14 RX ADMIN — Medication 650 MILLIGRAM(S): at 00:49

## 2019-03-14 RX ADMIN — Medication 300 MILLIGRAM(S): at 17:27

## 2019-03-14 RX ADMIN — Medication 1 PATCH: at 07:50

## 2019-03-14 NOTE — CHART NOTE - NSCHARTNOTEFT_GEN_A_CORE
[facility]    Called by RN that patient has a fever. Upon reviewing chart noted patient had a sodium level of 129, with a history of SIADH. Patient has been on fluid restriction as appropriate, however at this time is hypovolemic. Will give minimal fluids and repeat Na in the morning. Discussed with Dr. Chung.

## 2019-03-14 NOTE — PROGRESS NOTE ADULT - SUBJECTIVE AND OBJECTIVE BOX
Patient is a 43y old  Female who presents with a chief complaint of AMS (14 Mar 2019 13:33)      INTERVAL HPI / OVERNIGHT EVENTS: doing ok ,fever today    MEDICATIONS  (STANDING):  aztreonam  IVPB 2000 milliGRAM(s) IV Intermittent every 8 hours  chlorhexidine 4% Liquid 1 Application(s) Topical <User Schedule>  docusate sodium 100 milliGRAM(s) Oral three times a day  ferrous    sulfate 325 milliGRAM(s) Oral daily  folic acid 1 milliGRAM(s) Oral daily  lactulose Syrup 30 Gram(s) Oral two times a day  methadone    Tablet 30 milliGRAM(s) Oral daily  multivitamin 1 Tablet(s) Oral daily  nicotine - 21 mG/24Hr(s) Patch 1 patch Transdermal daily  nystatin Cream 1 Application(s) Topical two times a day  nystatin Powder 1 Application(s) Topical two times a day  polyethylene glycol 3350 17 Gram(s) Oral two times a day  senna 2 Tablet(s) Oral at bedtime  sodium chloride 0.9%. 1000 milliLiter(s) (100 mL/Hr) IV Continuous <Continuous>  vancomycin  IVPB 1500 milliGRAM(s) IV Intermittent every 12 hours    MEDICATIONS  (PRN):  acetaminophen   Tablet .. 650 milliGRAM(s) Oral every 6 hours PRN Temp greater or equal to 38C (100.4F), Mild Pain (1 - 3)  bisacodyl 5 milliGRAM(s) Oral every 12 hours PRN Constipation      Vital Signs Last 24 Hrs  Tmax: 101    Review of systems:  General :+fever, No chills, fatigue  CVS : no chest pain, palpitations  Lungs : no shortness of breath, cough  GI : no abdominal pain, vomiting, diarrhea   : no dysuria, hematuria        PHYSICAL EXAM:  General :NAD  Constitutional:  well-groomed, well-developed  Respiratory: CTAB/L  Cardiovascular: S1 and S2, RRR, no M/G/R  Gastrointestinal: BS+, soft, NT/ND  Extremities: No peripheral edema  Vascular: 2+ peripheral pulses  Skin: skin popping scars, left knee blister ,popped      LABS:                        8.5    11.36 )-----------( 516      ( 14 Mar 2019 07:22 )             28.3     03-14    134<L>  |  100  |  24<H>  ----------------------------<  150<H>  4.1   |  25  |  0.65    Ca    9.6      14 Mar 2019 07:22    TPro  8.1  /  Alb  1.8<L>  /  TBili  0.4  /  DBili  x   /  AST  54<H>  /  ALT  80<H>  /  AlkPhos  300<H>  03-14          MICROBIOLOGY:  RECENT CULTURES:  03-08 .Blood XXXX XXXX   No growth at 5 days.          RADIOLOGY & ADDITIONAL STUDIES:

## 2019-03-14 NOTE — PROGRESS NOTE ADULT - SUBJECTIVE AND OBJECTIVE BOX
INTERVAL HPI/OVERNIGHT EVENTS:  Pt seen and examined at bedside.     Allergies/Intolerance: penicillin (Short breath; Hives)      MEDICATIONS  (STANDING):  aztreonam  IVPB 2000 milliGRAM(s) IV Intermittent every 8 hours  chlorhexidine 4% Liquid 1 Application(s) Topical <User Schedule>  docusate sodium 100 milliGRAM(s) Oral three times a day  ferrous    sulfate 325 milliGRAM(s) Oral daily  folic acid 1 milliGRAM(s) Oral daily  methadone    Tablet 30 milliGRAM(s) Oral daily  multivitamin 1 Tablet(s) Oral daily  nicotine - 21 mG/24Hr(s) Patch 1 patch Transdermal daily  nystatin Powder 1 Application(s) Topical two times a day  polyethylene glycol 3350 17 Gram(s) Oral daily  senna 2 Tablet(s) Oral at bedtime  sodium chloride 0.9%. 1000 milliLiter(s) (100 mL/Hr) IV Continuous <Continuous>  vancomycin  IVPB 1500 milliGRAM(s) IV Intermittent every 12 hours    MEDICATIONS  (PRN):  acetaminophen   Tablet .. 650 milliGRAM(s) Oral every 6 hours PRN Temp greater or equal to 38C (100.4F), Mild Pain (1 - 3)  bisacodyl 5 milliGRAM(s) Oral every 12 hours PRN Constipation        ROS: all systems reviewed and wnl      PHYSICAL EXAMINATION:  Vital Signs Last 24 Hrs  T(C): 36.4 (14 Mar 2019 11:00), Max: 38.3 (13 Mar 2019 23:45)  T(F): 97.6 (14 Mar 2019 11:00), Max: 101 (13 Mar 2019 23:45)  HR: 84 (14 Mar 2019 11:00) (84 - 95)  BP: 90/48 (14 Mar 2019 11:00) (90/48 - 110/68)  BP(mean): --  RR: 17 (14 Mar 2019 11:00) (16 - 17)  SpO2: 97% (14 Mar 2019 11:00) (96% - 97%)  CAPILLARY BLOOD GLUCOSE          03-13 @ 07:01  -  03-14 @ 07:00  --------------------------------------------------------  IN: 1400 mL / OUT: 0 mL / NET: 1400 mL        GENERAL:   NECK: supple, No JVD  CHEST/LUNG: clear to auscultation bilaterally; no rales, rhonchi, or wheezing b/l  HEART: normal S1, S2  ABDOMEN: BS+, soft, ND, NT   EXTREMITIES:  pulses palpable; no clubbing, cyanosis, or edema b/l LEs  SKIN: no rashes or lesions      LABS:                        8.5    11.36 )-----------( 516      ( 14 Mar 2019 07:22 )             28.3     03-14    134<L>  |  100  |  24<H>  ----------------------------<  150<H>  4.1   |  25  |  0.65    Ca    9.6      14 Mar 2019 07:22    TPro  8.1  /  Alb  1.8<L>  /  TBili  0.4  /  DBili  x   /  AST  54<H>  /  ALT  80<H>  /  AlkPhos  300<H>  03-14 INTERVAL HPI/OVERNIGHT EVENTS:  Pt seen and examined at bedside.     Allergies/Intolerance: penicillin (Short breath; Hives)      MEDICATIONS  (STANDING):  aztreonam  IVPB 2000 milliGRAM(s) IV Intermittent every 8 hours  chlorhexidine 4% Liquid 1 Application(s) Topical <User Schedule>  docusate sodium 100 milliGRAM(s) Oral three times a day  ferrous    sulfate 325 milliGRAM(s) Oral daily  folic acid 1 milliGRAM(s) Oral daily  methadone    Tablet 30 milliGRAM(s) Oral daily  multivitamin 1 Tablet(s) Oral daily  nicotine - 21 mG/24Hr(s) Patch 1 patch Transdermal daily  nystatin Powder 1 Application(s) Topical two times a day  polyethylene glycol 3350 17 Gram(s) Oral daily  senna 2 Tablet(s) Oral at bedtime  sodium chloride 0.9%. 1000 milliLiter(s) (100 mL/Hr) IV Continuous <Continuous>  vancomycin  IVPB 1500 milliGRAM(s) IV Intermittent every 12 hours    MEDICATIONS  (PRN):  acetaminophen   Tablet .. 650 milliGRAM(s) Oral every 6 hours PRN Temp greater or equal to 38C (100.4F), Mild Pain (1 - 3)  bisacodyl 5 milliGRAM(s) Oral every 12 hours PRN Constipation        ROS: all systems reviewed and wnl      PHYSICAL EXAMINATION:  Vital Signs Last 24 Hrs  T(C): 36.4 (14 Mar 2019 11:00), Max: 38.3 (13 Mar 2019 23:45)  T(F): 97.6 (14 Mar 2019 11:00), Max: 101 (13 Mar 2019 23:45)  HR: 84 (14 Mar 2019 11:00) (84 - 95)  BP: 90/48 (14 Mar 2019 11:00) (90/48 - 110/68)  BP(mean): --  RR: 17 (14 Mar 2019 11:00) (16 - 17)  SpO2: 97% (14 Mar 2019 11:00) (96% - 97%)  CAPILLARY BLOOD GLUCOSE          03-13 @ 07:01  -  03-14 @ 07:00  --------------------------------------------------------  IN: 1400 mL / OUT: 0 mL / NET: 1400 mL        GENERAL: stable in bed, tolerates ABX without diarrhea or rash  NECK: supple, No JVD  CHEST/LUNG: clear to auscultation bilaterally; no rales, rhonchi, or wheezing b/l  HEART: normal S1, S2  ABDOMEN: BS+, soft, ND, NT   EXTREMITIES:  pulses palpable; no clubbing, cyanosis, or edema b/l LEs. Midline in place.   SKIN: no rashes or lesions      LABS:                        8.5    11.36 )-----------( 516      ( 14 Mar 2019 07:22 )             28.3     03-14    134<L>  |  100  |  24<H>  ----------------------------<  150<H>  4.1   |  25  |  0.65    Ca    9.6      14 Mar 2019 07:22    TPro  8.1  /  Alb  1.8<L>  /  TBili  0.4  /  DBili  x   /  AST  54<H>  /  ALT  80<H>  /  AlkPhos  300<H>  03-14

## 2019-03-14 NOTE — PROGRESS NOTE ADULT - ASSESSMENT
42 y/o F w/polysubstance abuse presenting with severe sepsis with septic shock secondary to MRSA bacteremia. LIZ positive for endocarditis. EFRAIN likely secondary to sepsis now improved. Acute respiratory failure requiring intubation. New diagnosis of HCV.continue current management for now no imminent transfer        Problem/Plan - 1:  ·  Problem: Endocarditis of tricuspid valve.  CT chest non-contrast ordered confirms septic pulmonary emboli to lungs and some have worsened.  CT surgery on board. Not planned now. ID notes blood cultures have cleared. Apprec further recs ID  . On Azactam 2,000 mg every 8 hours and  Vancomycin IV 1,500 mg every 12 hours. Check vanco trough. pt will need 6 weeks of IV ABX from the date of blood cx negative. BC negative from 3/08. Had fever to 100.4 past 24 hours. Repeat BC sent yesterday.      Problem/Plan - 2:  ·  Problem: Moderate protein-calorie malnutrition.  Plan: Rec soft w/Ensure Enlive 3 x day.       Problem/Plan -3:  ·  Problem: Drug abuse.  Plan: - Continue methadone for opiate abuse 30 mg/day.        - Outpatient follow up for HCV and opioid maintennce outpt re-enrollment program     Problem/Plan - 5:  ·  Problem: Anemia, unspecified type.  Plan: - transfused 1 unit PRBC, monitor CBC. HGB improved today to 8.5.   Add ferrous sulfate 325 mg/day.      Problem/Plan - 6:  Problem: Hyponatremia. Plan: Resolved today to 134, monitor daily.     Stable for rehab if afebrile.

## 2019-03-15 PROCEDURE — 99233 SBSQ HOSP IP/OBS HIGH 50: CPT

## 2019-03-15 RX ADMIN — Medication 300 MILLIGRAM(S): at 06:28

## 2019-03-15 RX ADMIN — SENNA PLUS 2 TABLET(S): 8.6 TABLET ORAL at 22:07

## 2019-03-15 RX ADMIN — METHADONE HYDROCHLORIDE 30 MILLIGRAM(S): 40 TABLET ORAL at 11:21

## 2019-03-15 RX ADMIN — Medication 650 MILLIGRAM(S): at 06:29

## 2019-03-15 RX ADMIN — Medication 1 PATCH: at 05:25

## 2019-03-15 RX ADMIN — Medication 1 PATCH: at 19:50

## 2019-03-15 RX ADMIN — Medication 1 MILLIGRAM(S): at 11:21

## 2019-03-15 RX ADMIN — NYSTATIN CREAM 1 APPLICATION(S): 100000 CREAM TOPICAL at 05:27

## 2019-03-15 RX ADMIN — POLYETHYLENE GLYCOL 3350 17 GRAM(S): 17 POWDER, FOR SOLUTION ORAL at 18:25

## 2019-03-15 RX ADMIN — LACTULOSE 30 GRAM(S): 10 SOLUTION ORAL at 05:27

## 2019-03-15 RX ADMIN — Medication 100 MILLIGRAM(S): at 13:10

## 2019-03-15 RX ADMIN — Medication 650 MILLIGRAM(S): at 05:29

## 2019-03-15 RX ADMIN — Medication 1 PATCH: at 11:25

## 2019-03-15 RX ADMIN — Medication 325 MILLIGRAM(S): at 11:21

## 2019-03-15 RX ADMIN — POLYETHYLENE GLYCOL 3350 17 GRAM(S): 17 POWDER, FOR SOLUTION ORAL at 05:28

## 2019-03-15 RX ADMIN — Medication 650 MILLIGRAM(S): at 14:02

## 2019-03-15 RX ADMIN — Medication 100 MILLIGRAM(S): at 05:26

## 2019-03-15 RX ADMIN — Medication 650 MILLIGRAM(S): at 13:10

## 2019-03-15 RX ADMIN — Medication 300 MILLIGRAM(S): at 18:24

## 2019-03-15 RX ADMIN — Medication 100 MILLIGRAM(S): at 05:27

## 2019-03-15 RX ADMIN — Medication 1 PATCH: at 11:21

## 2019-03-15 RX ADMIN — CHLORHEXIDINE GLUCONATE 1 APPLICATION(S): 213 SOLUTION TOPICAL at 05:28

## 2019-03-15 RX ADMIN — Medication 100 MILLIGRAM(S): at 22:40

## 2019-03-15 RX ADMIN — Medication 650 MILLIGRAM(S): at 19:45

## 2019-03-15 RX ADMIN — Medication 1 PATCH: at 07:53

## 2019-03-15 RX ADMIN — Medication 100 MILLIGRAM(S): at 22:07

## 2019-03-15 RX ADMIN — NYSTATIN CREAM 1 APPLICATION(S): 100000 CREAM TOPICAL at 18:25

## 2019-03-15 RX ADMIN — Medication 650 MILLIGRAM(S): at 20:45

## 2019-03-15 RX ADMIN — Medication 1 TABLET(S): at 11:21

## 2019-03-15 NOTE — PROGRESS NOTE ADULT - ASSESSMENT
42 y/o F w/polysubstance abuse presenting with severe sepsis with septic shock secondary to MRSA bacteremia. LIZ positive for endocarditis. EFRAIN likely secondary to sepsis now improved. Acute respiratory failure requiring intubation. New diagnosis of HCV.continue current management for now no imminent transfer        Problem/Plan - 1:  ·  Problem: Endocarditis of tricuspid valve.  CT chest non-contrast ordered confirms septic pulmonary emboli to lungs and some have worsened.  CT surgery on board. Not planned now. ID notes blood cultures have cleared. Apprec further recs ID  . On Azactam 2,000 mg every 8 hours and  Vancomycin IV 1,500 mg every 12 hours. Check vanco trough. pt will need 6 weeks of IV ABX from the date of blood cx negative. BC negative from 3/08. Had fever to 100.4 past 24 hours. Repeat BC sent yesterday. Need to be fully afebrile prior to discharge.      Problem/Plan - 2:  ·  Problem: Moderate protein-calorie malnutrition.  Plan: Rec soft w/Ensure Enlive 3 x day.       Problem/Plan -3:  ·  Problem: Drug abuse.  Plan: - Continue methadone for opiate abuse 30 mg/day.        - Outpatient follow up for HCV and opioid maintennce outpt re-enrollment program     Problem/Plan - 5:  ·  Problem: Anemia, unspecified type.  Plan: - transfused 1 unit PRBC, monitor CBC. HGB improved today to 8.5.   Add ferrous sulfate 325 mg/day.      Problem/Plan - 6:  Problem: Hyponatremia. Plan: Resolved today to 134, monitor daily.     Stable for rehab if afebrile.

## 2019-03-15 NOTE — PROGRESS NOTE ADULT - SUBJECTIVE AND OBJECTIVE BOX
Patient is a 43y old  Female who presents with a chief complaint of AMS (15 Mar 2019 14:28)      INTERVAL HPI / OVERNIGHT EVENTS: no new c/o    MEDICATIONS  (STANDING):  aztreonam  IVPB 2000 milliGRAM(s) IV Intermittent every 8 hours  chlorhexidine 4% Liquid 1 Application(s) Topical <User Schedule>  docusate sodium 100 milliGRAM(s) Oral three times a day  ferrous    sulfate 325 milliGRAM(s) Oral daily  folic acid 1 milliGRAM(s) Oral daily  methadone    Tablet 30 milliGRAM(s) Oral daily  multivitamin 1 Tablet(s) Oral daily  nicotine - 21 mG/24Hr(s) Patch 1 patch Transdermal daily  nystatin Cream 1 Application(s) Topical two times a day  nystatin Powder 1 Application(s) Topical two times a day  polyethylene glycol 3350 17 Gram(s) Oral two times a day  senna 2 Tablet(s) Oral at bedtime  vancomycin  IVPB 1500 milliGRAM(s) IV Intermittent every 12 hours    MEDICATIONS  (PRN):  acetaminophen   Tablet .. 650 milliGRAM(s) Oral every 6 hours PRN Temp greater or equal to 38C (100.4F), Mild Pain (1 - 3)  bisacodyl 5 milliGRAM(s) Oral every 12 hours PRN Constipation      Vital Signs Last 24 Hrs  T(C): 37.1 (15 Mar 2019 12:18), Max: 37.2 (14 Mar 2019 18:35)  T(F): 98.7 (15 Mar 2019 12:18), Max: 99 (14 Mar 2019 18:35)  HR: 80 (15 Mar 2019 12:18) (80 - 85)  BP: 102/57 (15 Mar 2019 12:18) (100/57 - 111/65)  BP(mean): --  RR: 18 (15 Mar 2019 12:18) (16 - 18)  SpO2: 97% (15 Mar 2019 12:18) (96% - 98%)    Review of systems:  General : no fever /chills, + fatigue  CVS : no chest pain, palpitations  Lungs : no shortness of breath, cough  GI : no abdominal pain, vomiting, diarrhea   : no dysuria hematuria        PHYSICAL EXAM:  General :NAD  Constitutional:  well-groomed, well-developed  Respiratory: CTAB/L  Cardiovascular: S1 and S2, RRR, no M/G/R  Gastrointestinal: BS+, soft, NT/ND  Extremities: No peripheral edema  Vascular: 2+ peripheral pulses  Skin: skin popping+      LABS:                        8.5    11.36 )-----------( 516      ( 14 Mar 2019 07:22 )             28.3     03-14    134<L>  |  100  |  24<H>  ----------------------------<  150<H>  4.1   |  25  |  0.65    Ca    9.6      14 Mar 2019 07:22    TPro  8.1  /  Alb  1.8<L>  /  TBili  0.4  /  DBili  x   /  AST  54<H>  /  ALT  80<H>  /  AlkPhos  300<H>  03-14          MICROBIOLOGY:  RECENT CULTURES:        RADIOLOGY & ADDITIONAL STUDIES:

## 2019-03-15 NOTE — PROGRESS NOTE ADULT - SUBJECTIVE AND OBJECTIVE BOX
INTERVAL HPI/OVERNIGHT EVENTS:  Pt seen and examined at bedside.     Allergies/Intolerance: penicillin (Short breath; Hives)      MEDICATIONS  (STANDING):  aztreonam  IVPB 2000 milliGRAM(s) IV Intermittent every 8 hours  chlorhexidine 4% Liquid 1 Application(s) Topical <User Schedule>  docusate sodium 100 milliGRAM(s) Oral three times a day  ferrous    sulfate 325 milliGRAM(s) Oral daily  folic acid 1 milliGRAM(s) Oral daily  lactulose Syrup 30 Gram(s) Oral two times a day  methadone    Tablet 30 milliGRAM(s) Oral daily  multivitamin 1 Tablet(s) Oral daily  nicotine - 21 mG/24Hr(s) Patch 1 patch Transdermal daily  nystatin Cream 1 Application(s) Topical two times a day  nystatin Powder 1 Application(s) Topical two times a day  polyethylene glycol 3350 17 Gram(s) Oral two times a day  senna 2 Tablet(s) Oral at bedtime  sodium chloride 0.9%. 1000 milliLiter(s) (100 mL/Hr) IV Continuous <Continuous>  vancomycin  IVPB 1500 milliGRAM(s) IV Intermittent every 12 hours    MEDICATIONS  (PRN):  acetaminophen   Tablet .. 650 milliGRAM(s) Oral every 6 hours PRN Temp greater or equal to 38C (100.4F), Mild Pain (1 - 3)  bisacodyl 5 milliGRAM(s) Oral every 12 hours PRN Constipation        ROS: all systems reviewed and wnl      PHYSICAL EXAMINATION:  Vital Signs Last 24 Hrs  T(C): 37.1 (15 Mar 2019 12:18), Max: 37.2 (14 Mar 2019 18:35)  T(F): 98.7 (15 Mar 2019 12:18), Max: 99 (14 Mar 2019 18:35)  HR: 80 (15 Mar 2019 12:18) (80 - 85)  BP: 102/57 (15 Mar 2019 12:18) (100/57 - 113/60)  BP(mean): 68 (14 Mar 2019 15:45) (68 - 68)  RR: 18 (15 Mar 2019 12:18) (16 - 18)  SpO2: 97% (15 Mar 2019 12:18) (96% - 98%)  CAPILLARY BLOOD GLUCOSE          03-14 @ 07:01  -  03-15 @ 07:00  --------------------------------------------------------  IN: 600 mL / OUT: 0 mL / NET: 600 mL        GENERAL:   NECK: supple, No JVD  CHEST/LUNG: clear to auscultation bilaterally; no rales, rhonchi, or wheezing b/l  HEART: normal S1, S2  ABDOMEN: BS+, soft, ND, NT   EXTREMITIES:  pulses palpable; no clubbing, cyanosis, or edema b/l LEs  SKIN: no rashes or lesions      LABS:                        8.5    11.36 )-----------( 516      ( 14 Mar 2019 07:22 )             28.3     03-14    134<L>  |  100  |  24<H>  ----------------------------<  150<H>  4.1   |  25  |  0.65    Ca    9.6      14 Mar 2019 07:22    TPro  8.1  /  Alb  1.8<L>  /  TBili  0.4  /  DBili  x   /  AST  54<H>  /  ALT  80<H>  /  AlkPhos  300<H>  03-14 INTERVAL HPI/OVERNIGHT EVENTS:  Pt seen and examined at bedside.     Allergies/Intolerance: penicillin (Short breath; Hives)      MEDICATIONS  (STANDING):  aztreonam  IVPB 2000 milliGRAM(s) IV Intermittent every 8 hours  chlorhexidine 4% Liquid 1 Application(s) Topical <User Schedule>  docusate sodium 100 milliGRAM(s) Oral three times a day  ferrous    sulfate 325 milliGRAM(s) Oral daily  folic acid 1 milliGRAM(s) Oral daily  lactulose Syrup 30 Gram(s) Oral two times a day  methadone    Tablet 30 milliGRAM(s) Oral daily  multivitamin 1 Tablet(s) Oral daily  nicotine - 21 mG/24Hr(s) Patch 1 patch Transdermal daily  nystatin Cream 1 Application(s) Topical two times a day  nystatin Powder 1 Application(s) Topical two times a day  polyethylene glycol 3350 17 Gram(s) Oral two times a day  senna 2 Tablet(s) Oral at bedtime  sodium chloride 0.9%. 1000 milliLiter(s) (100 mL/Hr) IV Continuous <Continuous>  vancomycin  IVPB 1500 milliGRAM(s) IV Intermittent every 12 hours    MEDICATIONS  (PRN):  acetaminophen   Tablet .. 650 milliGRAM(s) Oral every 6 hours PRN Temp greater or equal to 38C (100.4F), Mild Pain (1 - 3)  bisacodyl 5 milliGRAM(s) Oral every 12 hours PRN Constipation        ROS: all systems reviewed and wnl      PHYSICAL EXAMINATION:  Vital Signs Last 24 Hrs  T(C): 37.1 (15 Mar 2019 12:18), Max: 37.2 (14 Mar 2019 18:35)  T(F): 98.7 (15 Mar 2019 12:18), Max: 99 (14 Mar 2019 18:35)  HR: 80 (15 Mar 2019 12:18) (80 - 85)  BP: 102/57 (15 Mar 2019 12:18) (100/57 - 113/60)  BP(mean): 68 (14 Mar 2019 15:45) (68 - 68)  RR: 18 (15 Mar 2019 12:18) (16 - 18)  SpO2: 97% (15 Mar 2019 12:18) (96% - 98%)  CAPILLARY BLOOD GLUCOSE          03-14 @ 07:01  -  03-15 @ 07:00  --------------------------------------------------------  IN: 600 mL / OUT: 0 mL / NET: 600 mL        GENERAL: In chair, comfortable, had BM, no rash or diarrhea   NECK: supple, No JVD  CHEST/LUNG: clear to auscultation bilaterally; no rales, rhonchi, or wheezing b/l  HEART: normal S1, S2  ABDOMEN: BS+, soft, ND, NT   EXTREMITIES:  pulses palpable; no clubbing, cyanosis, or edema b/l LEs  Midline in place right arm  SKIN: no rashes or lesions      LABS:                        8.5    11.36 )-----------( 516      ( 14 Mar 2019 07:22 )             28.3     03-14    134<L>  |  100  |  24<H>  ----------------------------<  150<H>  4.1   |  25  |  0.65    Ca    9.6      14 Mar 2019 07:22    TPro  8.1  /  Alb  1.8<L>  /  TBili  0.4  /  DBili  x   /  AST  54<H>  /  ALT  80<H>  /  AlkPhos  300<H>  03-14

## 2019-03-16 PROCEDURE — 99232 SBSQ HOSP IP/OBS MODERATE 35: CPT

## 2019-03-16 RX ORDER — METHADONE HYDROCHLORIDE 40 MG/1
40 TABLET ORAL DAILY
Qty: 0 | Refills: 0 | Status: DISCONTINUED | OUTPATIENT
Start: 2019-03-16 | End: 2019-03-20

## 2019-03-16 RX ADMIN — Medication 1 PATCH: at 12:01

## 2019-03-16 RX ADMIN — Medication 650 MILLIGRAM(S): at 06:25

## 2019-03-16 RX ADMIN — Medication 100 MILLIGRAM(S): at 06:17

## 2019-03-16 RX ADMIN — Medication 1 PATCH: at 19:14

## 2019-03-16 RX ADMIN — Medication 300 MILLIGRAM(S): at 23:20

## 2019-03-16 RX ADMIN — NYSTATIN CREAM 1 APPLICATION(S): 100000 CREAM TOPICAL at 06:18

## 2019-03-16 RX ADMIN — NYSTATIN CREAM 1 APPLICATION(S): 100000 CREAM TOPICAL at 17:52

## 2019-03-16 RX ADMIN — Medication 650 MILLIGRAM(S): at 13:39

## 2019-03-16 RX ADMIN — Medication 300 MILLIGRAM(S): at 12:02

## 2019-03-16 RX ADMIN — Medication 1 TABLET(S): at 12:01

## 2019-03-16 RX ADMIN — Medication 1 PATCH: at 12:03

## 2019-03-16 RX ADMIN — POLYETHYLENE GLYCOL 3350 17 GRAM(S): 17 POWDER, FOR SOLUTION ORAL at 17:51

## 2019-03-16 RX ADMIN — Medication 100 MILLIGRAM(S): at 21:06

## 2019-03-16 RX ADMIN — METHADONE HYDROCHLORIDE 30 MILLIGRAM(S): 40 TABLET ORAL at 12:01

## 2019-03-16 RX ADMIN — Medication 1 PATCH: at 07:00

## 2019-03-16 RX ADMIN — POLYETHYLENE GLYCOL 3350 17 GRAM(S): 17 POWDER, FOR SOLUTION ORAL at 06:17

## 2019-03-16 RX ADMIN — Medication 100 MILLIGRAM(S): at 14:19

## 2019-03-16 RX ADMIN — Medication 325 MILLIGRAM(S): at 12:01

## 2019-03-16 RX ADMIN — Medication 650 MILLIGRAM(S): at 21:17

## 2019-03-16 RX ADMIN — Medication 650 MILLIGRAM(S): at 14:40

## 2019-03-16 RX ADMIN — Medication 650 MILLIGRAM(S): at 20:17

## 2019-03-16 RX ADMIN — Medication 1 MILLIGRAM(S): at 12:01

## 2019-03-16 RX ADMIN — Medication 100 MILLIGRAM(S): at 06:18

## 2019-03-16 RX ADMIN — Medication 100 MILLIGRAM(S): at 14:18

## 2019-03-16 RX ADMIN — CHLORHEXIDINE GLUCONATE 1 APPLICATION(S): 213 SOLUTION TOPICAL at 06:17

## 2019-03-16 RX ADMIN — Medication 650 MILLIGRAM(S): at 07:25

## 2019-03-16 NOTE — PROGRESS NOTE ADULT - SUBJECTIVE AND OBJECTIVE BOX
INTERVAL HPI/OVERNIGHT EVENTS:  Pt seen and examined at bedside.     Allergies/Intolerance: penicillin (Short breath; Hives)      MEDICATIONS  (STANDING):  aztreonam  IVPB 2000 milliGRAM(s) IV Intermittent every 8 hours  chlorhexidine 4% Liquid 1 Application(s) Topical <User Schedule>  docusate sodium 100 milliGRAM(s) Oral three times a day  ferrous    sulfate 325 milliGRAM(s) Oral daily  folic acid 1 milliGRAM(s) Oral daily  methadone    Tablet 30 milliGRAM(s) Oral daily  multivitamin 1 Tablet(s) Oral daily  nicotine - 21 mG/24Hr(s) Patch 1 patch Transdermal daily  nystatin Cream 1 Application(s) Topical two times a day  nystatin Powder 1 Application(s) Topical two times a day  polyethylene glycol 3350 17 Gram(s) Oral two times a day  senna 2 Tablet(s) Oral at bedtime  vancomycin  IVPB 1500 milliGRAM(s) IV Intermittent every 12 hours    MEDICATIONS  (PRN):  acetaminophen   Tablet .. 650 milliGRAM(s) Oral every 6 hours PRN Temp greater or equal to 38C (100.4F), Mild Pain (1 - 3)  bisacodyl 5 milliGRAM(s) Oral every 12 hours PRN Constipation        ROS: all systems reviewed and wnl      PHYSICAL EXAMINATION:  Vital Signs Last 24 Hrs  T(C): 37.2 (16 Mar 2019 05:25), Max: 37.2 (16 Mar 2019 05:25)  T(F): 98.9 (16 Mar 2019 05:25), Max: 98.9 (16 Mar 2019 05:25)  HR: 89 (16 Mar 2019 05:25) (84 - 89)  BP: 97/55 (16 Mar 2019 05:25) (97/55 - 101/57)  BP(mean): --  RR: 17 (16 Mar 2019 05:25) (17 - 17)  SpO2: 97% (16 Mar 2019 05:25) (97% - 97%)  CAPILLARY BLOOD GLUCOSE            GENERAL:   NECK: supple, No JVD  CHEST/LUNG: clear to auscultation bilaterally; no rales, rhonchi, or wheezing b/l  HEART: normal S1, S2  ABDOMEN: BS+, soft, ND, NT   EXTREMITIES:  pulses palpable; no clubbing, cyanosis, or edema b/l LEs  SKIN: no rashes or lesions      LABS: INTERVAL HPI/OVERNIGHT EVENTS:  Pt seen and examined at bedside.     Allergies/Intolerance: penicillin (Short breath; Hives)      MEDICATIONS  (STANDING):  aztreonam  IVPB 2000 milliGRAM(s) IV Intermittent every 8 hours  chlorhexidine 4% Liquid 1 Application(s) Topical <User Schedule>  docusate sodium 100 milliGRAM(s) Oral three times a day  ferrous    sulfate 325 milliGRAM(s) Oral daily  folic acid 1 milliGRAM(s) Oral daily  methadone    Tablet 30 milliGRAM(s) Oral daily  multivitamin 1 Tablet(s) Oral daily  nicotine - 21 mG/24Hr(s) Patch 1 patch Transdermal daily  nystatin Cream 1 Application(s) Topical two times a day  nystatin Powder 1 Application(s) Topical two times a day  polyethylene glycol 3350 17 Gram(s) Oral two times a day  senna 2 Tablet(s) Oral at bedtime  vancomycin  IVPB 1500 milliGRAM(s) IV Intermittent every 12 hours    MEDICATIONS  (PRN):  acetaminophen   Tablet .. 650 milliGRAM(s) Oral every 6 hours PRN Temp greater or equal to 38C (100.4F), Mild Pain (1 - 3)  bisacodyl 5 milliGRAM(s) Oral every 12 hours PRN Constipation        ROS: all systems reviewed and wnl      PHYSICAL EXAMINATION:  Vital Signs Last 24 Hrs  T(C): 37.2 (16 Mar 2019 05:25), Max: 37.2 (16 Mar 2019 05:25)  T(F): 98.9 (16 Mar 2019 05:25), Max: 98.9 (16 Mar 2019 05:25)  HR: 89 (16 Mar 2019 05:25) (84 - 89)  BP: 97/55 (16 Mar 2019 05:25) (97/55 - 101/57)  BP(mean): --  RR: 17 (16 Mar 2019 05:25) (17 - 17)  SpO2: 97% (16 Mar 2019 05:25) (97% - 97%)  CAPILLARY BLOOD GLUCOSE            GENERAL: stable in chair, wants to walk daily, no fevers for 24 hours, no diarrhea  NECK: supple, No JVD  CHEST/LUNG: clear to auscultation bilaterally; no rales, rhonchi, or wheezing b/l  HEART: normal S1, S2  ABDOMEN: BS+, soft, ND, NT   EXTREMITIES:  pulses palpable; no clubbing, cyanosis, or edema b/l LEs  SKIN: no rashes or lesions      LABS:

## 2019-03-16 NOTE — PROGRESS NOTE ADULT - ASSESSMENT
44 y/o F w/polysubstance abuse presenting with severe sepsis with septic shock secondary to MRSA bacteremia. LIZ positive for endocarditis. EFRAIN likely secondary to sepsis now improved. Acute respiratory failure requiring intubation. New diagnosis of HCV.continue current management for now no imminent transfer        Problem/Plan - 1:  ·  Problem: Endocarditis of tricuspid valve.  CT chest non-contrast ordered confirms septic pulmonary emboli to lungs and some have worsened.  CT surgery on board. Not planned now. ID notes blood cultures have cleared. Apprec further recs ID  . On Azactam 2,000 mg every 8 hours and  Vancomycin IV 1,500 mg every 12 hours. Check vanco trough. pt will need 6 weeks of IV ABX from the date of blood cx negative. BC negative from 3/08. Had fever to 100.4 past 24 hours. Repeat BC sent yesterday. Need to be fully afebrile prior to discharge.      Problem/Plan - 2:  ·  Problem: Moderate protein-calorie malnutrition.  Plan: Rec soft w/Ensure Enlive 3 x day.       Problem/Plan -3:  ·  Problem: Drug abuse.  Plan: - Continue methadone for opiate abuse, increase to 40 mg/day.        - Outpatient follow up for HCV and opioid maintennce outpt re-enrollment program     Problem/Plan - 5:  ·  Problem: Anemia, unspecified type.  Plan: - transfused 1 unit PRBC, monitor CBC. HGB improved today to 8.5.         Problem/Plan - 6:  Problem: Hyponatremia. Plan: Resolved today to 134, monitor daily.     Stable for rehab after weekend if afebrile.

## 2019-03-17 PROCEDURE — 99233 SBSQ HOSP IP/OBS HIGH 50: CPT

## 2019-03-17 RX ORDER — IBUPROFEN 200 MG
400 TABLET ORAL ONCE
Qty: 0 | Refills: 0 | Status: COMPLETED | OUTPATIENT
Start: 2019-03-17 | End: 2019-03-17

## 2019-03-17 RX ADMIN — Medication 400 MILLIGRAM(S): at 07:16

## 2019-03-17 RX ADMIN — Medication 100 MILLIGRAM(S): at 13:02

## 2019-03-17 RX ADMIN — Medication 1 MILLIGRAM(S): at 11:30

## 2019-03-17 RX ADMIN — Medication 1 PATCH: at 11:29

## 2019-03-17 RX ADMIN — NYSTATIN CREAM 1 APPLICATION(S): 100000 CREAM TOPICAL at 06:30

## 2019-03-17 RX ADMIN — SENNA PLUS 2 TABLET(S): 8.6 TABLET ORAL at 21:43

## 2019-03-17 RX ADMIN — Medication 650 MILLIGRAM(S): at 19:10

## 2019-03-17 RX ADMIN — Medication 1 TABLET(S): at 11:30

## 2019-03-17 RX ADMIN — Medication 650 MILLIGRAM(S): at 12:42

## 2019-03-17 RX ADMIN — METHADONE HYDROCHLORIDE 40 MILLIGRAM(S): 40 TABLET ORAL at 11:28

## 2019-03-17 RX ADMIN — Medication 400 MILLIGRAM(S): at 20:10

## 2019-03-17 RX ADMIN — POLYETHYLENE GLYCOL 3350 17 GRAM(S): 17 POWDER, FOR SOLUTION ORAL at 16:50

## 2019-03-17 RX ADMIN — Medication 400 MILLIGRAM(S): at 21:10

## 2019-03-17 RX ADMIN — Medication 650 MILLIGRAM(S): at 18:10

## 2019-03-17 RX ADMIN — Medication 1 PATCH: at 11:30

## 2019-03-17 RX ADMIN — Medication 1 PATCH: at 19:30

## 2019-03-17 RX ADMIN — Medication 100 MILLIGRAM(S): at 06:30

## 2019-03-17 RX ADMIN — NYSTATIN CREAM 1 APPLICATION(S): 100000 CREAM TOPICAL at 06:31

## 2019-03-17 RX ADMIN — NYSTATIN CREAM 1 APPLICATION(S): 100000 CREAM TOPICAL at 16:50

## 2019-03-17 RX ADMIN — CHLORHEXIDINE GLUCONATE 1 APPLICATION(S): 213 SOLUTION TOPICAL at 07:03

## 2019-03-17 RX ADMIN — Medication 100 MILLIGRAM(S): at 21:43

## 2019-03-17 RX ADMIN — POLYETHYLENE GLYCOL 3350 17 GRAM(S): 17 POWDER, FOR SOLUTION ORAL at 06:31

## 2019-03-17 RX ADMIN — Medication 300 MILLIGRAM(S): at 23:13

## 2019-03-17 RX ADMIN — Medication 650 MILLIGRAM(S): at 13:30

## 2019-03-17 RX ADMIN — Medication 650 MILLIGRAM(S): at 05:02

## 2019-03-17 RX ADMIN — Medication 300 MILLIGRAM(S): at 11:30

## 2019-03-17 RX ADMIN — Medication 650 MILLIGRAM(S): at 04:02

## 2019-03-17 NOTE — PROGRESS NOTE ADULT - SUBJECTIVE AND OBJECTIVE BOX
INTERVAL HPI/OVERNIGHT EVENTS:  Pt seen and examined at bedside.     Allergies/Intolerance: penicillin (Short breath; Hives)      MEDICATIONS  (STANDING):  aztreonam  IVPB 2000 milliGRAM(s) IV Intermittent every 8 hours  chlorhexidine 4% Liquid 1 Application(s) Topical <User Schedule>  docusate sodium 100 milliGRAM(s) Oral three times a day  folic acid 1 milliGRAM(s) Oral daily  methadone    Tablet 40 milliGRAM(s) Oral daily  multivitamin 1 Tablet(s) Oral daily  nicotine - 21 mG/24Hr(s) Patch 1 patch Transdermal daily  nystatin Cream 1 Application(s) Topical two times a day  nystatin Powder 1 Application(s) Topical two times a day  polyethylene glycol 3350 17 Gram(s) Oral two times a day  senna 2 Tablet(s) Oral at bedtime  vancomycin  IVPB 1500 milliGRAM(s) IV Intermittent every 12 hours    MEDICATIONS  (PRN):  acetaminophen   Tablet .. 650 milliGRAM(s) Oral every 6 hours PRN Temp greater or equal to 38C (100.4F), Mild Pain (1 - 3)  bisacodyl 5 milliGRAM(s) Oral every 12 hours PRN Constipation        ROS: all systems reviewed and wnl      PHYSICAL EXAMINATION:  Vital Signs Last 24 Hrs  T(C): 36.6 (17 Mar 2019 11:33), Max: 37.1 (16 Mar 2019 13:17)  T(F): 97.8 (17 Mar 2019 11:33), Max: 98.7 (16 Mar 2019 13:17)  HR: 84 (17 Mar 2019 11:33) (82 - 89)  BP: 97/52 (17 Mar 2019 11:33) (97/52 - 110/59)  BP(mean): --  RR: 17 (17 Mar 2019 11:33) (16 - 17)  SpO2: 97% (17 Mar 2019 11:33) (97% - 100%)  CAPILLARY BLOOD GLUCOSE          03-16 @ 07:01  -  03-17 @ 07:00  --------------------------------------------------------  IN: 100 mL / OUT: 0 mL / NET: 100 mL    03-17 @ 07:01 - 03-17 @ 13:05  --------------------------------------------------------  IN: 500 mL / OUT: 0 mL / NET: 500 mL        GENERAL:   NECK: supple, No JVD  CHEST/LUNG: clear to auscultation bilaterally; no rales, rhonchi, or wheezing b/l  HEART: normal S1, S2  ABDOMEN: BS+, soft, ND, NT   EXTREMITIES:  pulses palpable; no clubbing, cyanosis, or edema b/l LEs  SKIN: no rashes or lesions      LABS: INTERVAL HPI/OVERNIGHT EVENTS:  Pt seen and examined at bedside.     Allergies/Intolerance: penicillin (Short breath; Hives)      MEDICATIONS  (STANDING):  aztreonam  IVPB 2000 milliGRAM(s) IV Intermittent every 8 hours  chlorhexidine 4% Liquid 1 Application(s) Topical <User Schedule>  docusate sodium 100 milliGRAM(s) Oral three times a day  folic acid 1 milliGRAM(s) Oral daily  methadone    Tablet 40 milliGRAM(s) Oral daily  multivitamin 1 Tablet(s) Oral daily  nicotine - 21 mG/24Hr(s) Patch 1 patch Transdermal daily  nystatin Cream 1 Application(s) Topical two times a day  nystatin Powder 1 Application(s) Topical two times a day  polyethylene glycol 3350 17 Gram(s) Oral two times a day  senna 2 Tablet(s) Oral at bedtime  vancomycin  IVPB 1500 milliGRAM(s) IV Intermittent every 12 hours    MEDICATIONS  (PRN):  acetaminophen   Tablet .. 650 milliGRAM(s) Oral every 6 hours PRN Temp greater or equal to 38C (100.4F), Mild Pain (1 - 3)  bisacodyl 5 milliGRAM(s) Oral every 12 hours PRN Constipation        ROS: all systems reviewed and wnl      PHYSICAL EXAMINATION:  Vital Signs Last 24 Hrs  T(C): 36.6 (17 Mar 2019 11:33), Max: 37.1 (16 Mar 2019 13:17)  T(F): 97.8 (17 Mar 2019 11:33), Max: 98.7 (16 Mar 2019 13:17)  HR: 84 (17 Mar 2019 11:33) (82 - 89)  BP: 97/52 (17 Mar 2019 11:33) (97/52 - 110/59)  BP(mean): --  RR: 17 (17 Mar 2019 11:33) (16 - 17)  SpO2: 97% (17 Mar 2019 11:33) (97% - 100%)  CAPILLARY BLOOD GLUCOSE          03-16 @ 07:01  -  03-17 @ 07:00  --------------------------------------------------------  IN: 100 mL / OUT: 0 mL / NET: 100 mL    03-17 @ 07:01 - 03-17 @ 13:05  --------------------------------------------------------  IN: 500 mL / OUT: 0 mL / NET: 500 mL        GENERAL: stable, no fevers, cough or diarrhea  NECK: supple, No JVD  CHEST/LUNG: clear to auscultation bilaterally; no rales, rhonchi, or wheezing b/l  HEART: normal S1, S2  ABDOMEN: BS+, soft, ND, NT   EXTREMITIES:  pulses palpable; no clubbing, cyanosis, or edema b/l LEs  SKIN: no rashes or lesions      LABS:

## 2019-03-17 NOTE — PROGRESS NOTE ADULT - ASSESSMENT
42 y/o F w/polysubstance abuse presenting with severe sepsis with septic shock secondary to MRSA bacteremia. LIZ positive for endocarditis. EFRAIN likely secondary to sepsis now improved. Acute respiratory failure requiring intubation. New diagnosis of HCV.continue current management for now no imminent transfer      Problem/Plan - 1:  ·  Problem: Endocarditis of tricuspid valve.  CT chest non-contrast ordered confirms septic pulmonary emboli to lungs and some have worsened.  CT surgery on board. No surgery planned now. ID notes blood cultures have cleared. Apprec further recs ID  . On Azactam 2,000 mg every 8 hours and  Vancomycin IV 1,500 mg every 12 hours. Check vanco trough. pt will need 6 weeks of IV ABX from the date of blood cx negative. BC negative from 3/08.  Repeat BC sent yesterday. Need to be fully afebrile prior to discharge.  CT of head and neck non-contrast ordered to eval complaints of bilateral   arm and leg numbness and weakness.       Problem/Plan - 2:  ·  Problem: Moderate protein-calorie malnutrition.  Plan: Rec soft w/Ensure Enlive 3 x day.       Problem/Plan -3:  ·  Problem: Drug abuse.  Plan: - Continue methadone for opiate abuse, increase to 40 mg/day.        - Outpatient follow up for HCV and opioid maintennce outpt re-enrollment program     Problem/Plan - 5:  ·  Problem: Anemia, unspecified type.  Plan: - transfused 1 unit PRBC, monitor CBC. HGB improved today to 8.5.         Problem/Plan - 6:  Problem: Hyponatremia. Plan: Resolved today to 134, monitor daily.     Stable for rehab after weekend if afebrile.  Needs PICC line place prior to rehab.

## 2019-03-18 LAB
ANION GAP SERPL CALC-SCNC: 5 MMOL/L — SIGNIFICANT CHANGE UP (ref 5–17)
BUN SERPL-MCNC: 20 MG/DL — SIGNIFICANT CHANGE UP (ref 7–23)
CALCIUM SERPL-MCNC: 9 MG/DL — SIGNIFICANT CHANGE UP (ref 8.5–10.1)
CHLORIDE SERPL-SCNC: 100 MMOL/L — SIGNIFICANT CHANGE UP (ref 96–108)
CO2 SERPL-SCNC: 29 MMOL/L — SIGNIFICANT CHANGE UP (ref 22–31)
CREAT SERPL-MCNC: 0.51 MG/DL — SIGNIFICANT CHANGE UP (ref 0.5–1.3)
GLUCOSE SERPL-MCNC: 129 MG/DL — HIGH (ref 70–99)
HCT VFR BLD CALC: 27.8 % — LOW (ref 34.5–45)
HGB BLD-MCNC: 8.1 G/DL — LOW (ref 11.5–15.5)
MCHC RBC-ENTMCNC: 23.8 PG — LOW (ref 27–34)
MCHC RBC-ENTMCNC: 29.1 GM/DL — LOW (ref 32–36)
MCV RBC AUTO: 81.5 FL — SIGNIFICANT CHANGE UP (ref 80–100)
NRBC # BLD: 0 /100 WBCS — SIGNIFICANT CHANGE UP (ref 0–0)
PLATELET # BLD AUTO: 482 K/UL — HIGH (ref 150–400)
POTASSIUM SERPL-MCNC: 4.6 MMOL/L — SIGNIFICANT CHANGE UP (ref 3.5–5.3)
POTASSIUM SERPL-SCNC: 4.6 MMOL/L — SIGNIFICANT CHANGE UP (ref 3.5–5.3)
RBC # BLD: 3.41 M/UL — LOW (ref 3.8–5.2)
RBC # FLD: 17.4 % — HIGH (ref 10.3–14.5)
SODIUM SERPL-SCNC: 134 MMOL/L — LOW (ref 135–145)
VANCOMYCIN TROUGH SERPL-MCNC: 16.8 UG/ML — SIGNIFICANT CHANGE UP (ref 10–20)
WBC # BLD: 8.01 K/UL — SIGNIFICANT CHANGE UP (ref 3.8–10.5)
WBC # FLD AUTO: 8.01 K/UL — SIGNIFICANT CHANGE UP (ref 3.8–10.5)

## 2019-03-18 PROCEDURE — 99233 SBSQ HOSP IP/OBS HIGH 50: CPT

## 2019-03-18 PROCEDURE — 72125 CT NECK SPINE W/O DYE: CPT | Mod: 26

## 2019-03-18 PROCEDURE — 70450 CT HEAD/BRAIN W/O DYE: CPT | Mod: 26

## 2019-03-18 RX ORDER — ENOXAPARIN SODIUM 100 MG/ML
40 INJECTION SUBCUTANEOUS DAILY
Qty: 0 | Refills: 0 | Status: DISCONTINUED | OUTPATIENT
Start: 2019-03-18 | End: 2019-03-19

## 2019-03-18 RX ORDER — LIDOCAINE 4 G/100G
1 CREAM TOPICAL EVERY 24 HOURS
Qty: 0 | Refills: 0 | Status: DISCONTINUED | OUTPATIENT
Start: 2019-03-18 | End: 2019-03-20

## 2019-03-18 RX ORDER — GABAPENTIN 400 MG/1
300 CAPSULE ORAL THREE TIMES A DAY
Qty: 0 | Refills: 0 | Status: DISCONTINUED | OUTPATIENT
Start: 2019-03-18 | End: 2019-03-20

## 2019-03-18 RX ADMIN — CHLORHEXIDINE GLUCONATE 1 APPLICATION(S): 213 SOLUTION TOPICAL at 06:34

## 2019-03-18 RX ADMIN — Medication 650 MILLIGRAM(S): at 01:16

## 2019-03-18 RX ADMIN — Medication 1 PATCH: at 12:14

## 2019-03-18 RX ADMIN — Medication 1 PATCH: at 18:35

## 2019-03-18 RX ADMIN — Medication 1 TABLET(S): at 12:14

## 2019-03-18 RX ADMIN — Medication 650 MILLIGRAM(S): at 13:55

## 2019-03-18 RX ADMIN — NYSTATIN CREAM 1 APPLICATION(S): 100000 CREAM TOPICAL at 06:34

## 2019-03-18 RX ADMIN — Medication 100 MILLIGRAM(S): at 13:01

## 2019-03-18 RX ADMIN — Medication 100 MILLIGRAM(S): at 06:33

## 2019-03-18 RX ADMIN — Medication 100 MILLIGRAM(S): at 14:29

## 2019-03-18 RX ADMIN — Medication 1 PATCH: at 11:00

## 2019-03-18 RX ADMIN — Medication 650 MILLIGRAM(S): at 07:38

## 2019-03-18 RX ADMIN — Medication 1 MILLIGRAM(S): at 12:16

## 2019-03-18 RX ADMIN — Medication 1 PATCH: at 08:09

## 2019-03-18 RX ADMIN — Medication 650 MILLIGRAM(S): at 19:02

## 2019-03-18 RX ADMIN — Medication 650 MILLIGRAM(S): at 00:16

## 2019-03-18 RX ADMIN — NYSTATIN CREAM 1 APPLICATION(S): 100000 CREAM TOPICAL at 18:29

## 2019-03-18 RX ADMIN — Medication 650 MILLIGRAM(S): at 20:02

## 2019-03-18 RX ADMIN — Medication 300 MILLIGRAM(S): at 12:20

## 2019-03-18 RX ADMIN — NYSTATIN CREAM 1 APPLICATION(S): 100000 CREAM TOPICAL at 06:33

## 2019-03-18 RX ADMIN — METHADONE HYDROCHLORIDE 40 MILLIGRAM(S): 40 TABLET ORAL at 12:14

## 2019-03-18 RX ADMIN — Medication 100 MILLIGRAM(S): at 21:18

## 2019-03-18 RX ADMIN — Medication 650 MILLIGRAM(S): at 12:55

## 2019-03-18 RX ADMIN — GABAPENTIN 300 MILLIGRAM(S): 400 CAPSULE ORAL at 21:46

## 2019-03-18 RX ADMIN — POLYETHYLENE GLYCOL 3350 17 GRAM(S): 17 POWDER, FOR SOLUTION ORAL at 06:34

## 2019-03-18 RX ADMIN — POLYETHYLENE GLYCOL 3350 17 GRAM(S): 17 POWDER, FOR SOLUTION ORAL at 18:30

## 2019-03-18 RX ADMIN — Medication 300 MILLIGRAM(S): at 23:20

## 2019-03-18 RX ADMIN — NYSTATIN CREAM 1 APPLICATION(S): 100000 CREAM TOPICAL at 18:34

## 2019-03-18 RX ADMIN — Medication 650 MILLIGRAM(S): at 06:38

## 2019-03-18 RX ADMIN — LIDOCAINE 1 PATCH: 4 CREAM TOPICAL at 21:18

## 2019-03-18 RX ADMIN — SENNA PLUS 2 TABLET(S): 8.6 TABLET ORAL at 21:19

## 2019-03-18 NOTE — PROGRESS NOTE ADULT - SUBJECTIVE AND OBJECTIVE BOX
44 yo F w/polysubstance abuse presenting with severe sepsis with septic shock secondary to MRSA bacteremia w/ endocarditis on LIZ and EFRAIN thta has resolved and was diagnosis w/ HCV. She is lying in bed in NAD, reports neck pain.    MEDICATIONS  (STANDING):  aztreonam  IVPB 2000 milliGRAM(s) IV Intermittent every 8 hours  chlorhexidine 4% Liquid 1 Application(s) Topical <User Schedule>  docusate sodium 100 milliGRAM(s) Oral three times a day  folic acid 1 milliGRAM(s) Oral daily  gabapentin 300 milliGRAM(s) Oral three times a day  lidocaine   Patch 1 Patch Transdermal every 24 hours  methadone    Tablet 40 milliGRAM(s) Oral daily  multivitamin 1 Tablet(s) Oral daily  nicotine - 21 mG/24Hr(s) Patch 1 patch Transdermal daily  nystatin Cream 1 Application(s) Topical two times a day  nystatin Powder 1 Application(s) Topical two times a day  polyethylene glycol 3350 17 Gram(s) Oral two times a day  senna 2 Tablet(s) Oral at bedtime  vancomycin  IVPB 1500 milliGRAM(s) IV Intermittent every 12 hours    MEDICATIONS  (PRN):  acetaminophen   Tablet .. 650 milliGRAM(s) Oral every 6 hours PRN Temp greater or equal to 38C (100.4F), Mild Pain (1 - 3)  bisacodyl 5 milliGRAM(s) Oral every 12 hours PRN Constipation      Allergies    penicillin (Short breath; Hives)    Intolerances        Vital Signs Last 24 Hrs  T(C): 36.6 (18 Mar 2019 17:45), Max: 36.6 (18 Mar 2019 17:45)  T(F): 97.9 (18 Mar 2019 17:45), Max: 97.9 (18 Mar 2019 17:45)  HR: 89 (18 Mar 2019 17:45) (78 - 89)  BP: 98/59 (18 Mar 2019 17:45) (90/50 - 100/56)  BP(mean): --  RR: 16 (18 Mar 2019 17:45) (16 - 19)  SpO2: 96% (18 Mar 2019 17:45) (96% - 99%)    PHYSICAL EXAM:  GENERAL: NAD, well-groomed, well-developed  HEAD:  Atraumatic, Normocephalic  EYES: EOMI, PERRLA   NECK: Supple   NERVOUS SYSTEM:  Alert & Oriented X3, no neck tenderness   CHEST/LUNG: Clear to auscultation bilaterally; No rales, rhonchi, wheezing, or rubs  HEART: Regular rate and rhythm; No murmurs, rubs, or gallops  ABDOMEN: Soft, Nontender, Nondistended; Bowel sounds present  EXTREMITIES: No clubbing, cyanosis, or edema      LABS:                        8.1    8.01  )-----------( 482      ( 18 Mar 2019 12:39 )             27.8     03-18    134<L>  |  100  |  20  ----------------------------<  129<H>  4.6   |  29  |  0.51    Ca    9.0      18 Mar 2019 12:39          CAPILLARY BLOOD GLUCOSE          RADIOLOGY & ADDITIONAL TESTS:    03-17-19 @ 07:01  -  03-18-19 @ 07:00  --------------------------------------------------------  IN:    Oral Fluid: 1100 mL    Solution: 500 mL    Solution: 100 mL  Total IN: 1700 mL    OUT:  Total OUT: 0 mL    Total NET: 1700 mL      03-18-19 @ 07:01  -  03-18-19 @ 18:49  --------------------------------------------------------  IN:    Oral Fluid: 600 mL  Total IN: 600 mL    OUT:    Intermittent Catheterization - Urethral: 2 mL  Total OUT: 2 mL    Total NET: 598 mL

## 2019-03-18 NOTE — PROGRESS NOTE ADULT - SUBJECTIVE AND OBJECTIVE BOX
Patient is a 43y old  Female who presents with a chief complaint of AMS (17 Mar 2019 13:05)      INTERVAL HPI / OVERNIGHT EVENTS: fever resolved    MEDICATIONS  (STANDING):  aztreonam  IVPB 2000 milliGRAM(s) IV Intermittent every 8 hours  chlorhexidine 4% Liquid 1 Application(s) Topical <User Schedule>  docusate sodium 100 milliGRAM(s) Oral three times a day  folic acid 1 milliGRAM(s) Oral daily  methadone    Tablet 40 milliGRAM(s) Oral daily  multivitamin 1 Tablet(s) Oral daily  nicotine - 21 mG/24Hr(s) Patch 1 patch Transdermal daily  nystatin Cream 1 Application(s) Topical two times a day  nystatin Powder 1 Application(s) Topical two times a day  polyethylene glycol 3350 17 Gram(s) Oral two times a day  senna 2 Tablet(s) Oral at bedtime  vancomycin  IVPB 1500 milliGRAM(s) IV Intermittent every 12 hours    MEDICATIONS  (PRN):  acetaminophen   Tablet .. 650 milliGRAM(s) Oral every 6 hours PRN Temp greater or equal to 38C (100.4F), Mild Pain (1 - 3)  bisacodyl 5 milliGRAM(s) Oral every 12 hours PRN Constipation      Vital Signs Last 24 Hrs  T(C): 36.1 (18 Mar 2019 12:44), Max: 36.8 (17 Mar 2019 17:40)  T(F): 97 (18 Mar 2019 12:44), Max: 98.3 (17 Mar 2019 17:40)  HR: 89 (18 Mar 2019 12:44) (78 - 89)  BP: 97/53 (18 Mar 2019 12:44) (90/50 - 101/58)  BP(mean): --  RR: 16 (18 Mar 2019 12:44) (16 - 19)  SpO2: 99% (18 Mar 2019 12:44) (96% - 99%)    Review of systems:  General : no fever /chills, fatigue  CVS : no chest pain, palpitations  Lungs : no shortness of breath, cough  GI : no abdominal pain, vomiting, diarrhea   : no dysuria, hematuria        PHYSICAL EXAM:  General :NAD  Constitutional: well-developed  Respiratory: CTAB/L  Cardiovascular: S1 and S2, RRR, no M/G/R  Gastrointestinal: BS+, soft, NT/ND  Extremities: No peripheral edema  Vascular: 2+ peripheral pulses  Skin: skin popping       LABS:                        8.1    8.01  )-----------( 482      ( 18 Mar 2019 12:39 )             27.8     03-18    134<L>  |  100  |  20  ----------------------------<  129<H>  4.6   |  29  |  0.51    Ca    9.0      18 Mar 2019 12:39            MICROBIOLOGY:  RECENT CULTURES:        RADIOLOGY & ADDITIONAL STUDIES:

## 2019-03-18 NOTE — PROGRESS NOTE ADULT - ASSESSMENT
44 yo F w/polysubstance abuse presenting with severe sepsis with septic shock secondary to MRSA bacteremia w/ endocarditis on LIZ and EFRAIN thta has resolved and was diagnosis w/ HCV.     Endocarditis of tricuspid valve  - CT chest showed septic pulmonary emboli to lungs and some have worsened  - CT surgery on board - recommended no surgery  - Dr. Garcia stopped Azactam today  - c/w Vancomycin for a total of 6 weeks   - vanc trough 16.8 today  - BC negative from 3/08    Neck & bilateral shoulder pain  - CT of head and neck showed disc space narrowing and endplate irregularity at C4-5 which may represent typical degenerative change vs discitis - will get MRI of the cervical spine w/ contrast  - start gabapentin and lidocaine patch    Moderate protein-calorie malnutrition  - c/w soft diet w/Ensure Enlive 3 x day      Drug abuse  - c/w methadone for opiate abuse     HCV  - Outpatient follow up for HCV and opioid maintenance outpt re-enrollment program    Anemia  - status post 1 unit pRBC,  - watching H&H  - c/w folic acid     Hyponatremia  - Na is 134- will fluid restrict  - will get urine lytes    ·	Prophylaxis:  DVT: Lovenox  GI: PO diet

## 2019-03-18 NOTE — CHART NOTE - NSCHARTNOTEFT_GEN_A_CORE
Pt w/ endocarditis of tricuspid valve ; moderate protein calorie malnutrition; polysubxtance abuse; anemia; hyponatremia - resolved    Factors impacting intake: [x ] none [ ] nausea  [ ] vomiting [ ] diarrhea [ ] constipation  [ ]chewing problems [ ] swallowing issues  [ ] other:     Diet Prescription: Diet, Regular:   Supplement Feeding Modality:  Oral  Ensure Enlive Cans or Servings Per Day:  1       Frequency:  Three Times a day (03-11-19 @ 13:57)    Intake: 70 % w/ meals being brought from home. consumes 100%  ensure enlive 8 oz three times per day (1050 emily, 60 gm pro) daily.     Current Weight: 3/16 - 144.6 (65.6 kg) no edema noted ; 3/11 - 144.4 (65.5 kg) no edema noted  % Weight Change - .13 % / .1 kg gain x 5 days no edema noted    Nutrition focused physical exam conducted ;   Subcutaneous fat loss: [mild ] Orbital fat pads region, [mild ]Buccal fat region, [mild ]Triceps region,  [unable ]Ribs region.  Muscle wasting: [mild ]Temples region, [mild ]Clavicle region, [mild ]Shoulder region, [mild ]Scapula region, [mild ]Interosseous region,  [mild ]thigh region, [mild ]Calf region  d    Pertinent Medications: MEDICATIONS  (STANDING):  aztreonam  IVPB 2000 milliGRAM(s) IV Intermittent every 8 hours  chlorhexidine 4% Liquid 1 Application(s) Topical <User Schedule>  docusate sodium 100 milliGRAM(s) Oral three times a day  folic acid 1 milliGRAM(s) Oral daily  methadone    Tablet 40 milliGRAM(s) Oral daily  multivitamin 1 Tablet(s) Oral daily  nicotine - 21 mG/24Hr(s) Patch 1 patch Transdermal daily  nystatin Cream 1 Application(s) Topical two times a day  nystatin Powder 1 Application(s) Topical two times a day  polyethylene glycol 3350 17 Gram(s) Oral two times a day  senna 2 Tablet(s) Oral at bedtime  vancomycin  IVPB 1500 milliGRAM(s) IV Intermittent every 12 hours    MEDICATIONS  (PRN):  acetaminophen   Tablet .. 650 milliGRAM(s) Oral every 6 hours PRN Temp greater or equal to 38C (100.4F), Mild Pain (1 - 3)  bisacodyl 5 milliGRAM(s) Oral every 12 hours PRN Constipation    Pertinent Labs:  03-12 Phos 5.3 mg/dL<H> 03-14 Alb 1.8 g/dL<L>     CAPILLARY BLOOD GLUCOSE        Skin: L heel stage 1    Estimated Needs:   [x ] no change since previous assessment ( 2/25/2019 )  [ ] recalculated:     Previous Nutrition Diagnosis:   [ ] Inadequate Energy Intake [ ]Inadequate Oral Intake [ ] Excessive Energy Intake   [ ] Underweight [ ] Increased Nutrient Needs [ ] Overweight/Obesity   [ ] Altered GI Function [ ] Unintended Weight Loss [ ] Food & Nutrition Related Knowledge Deficit [x ] Malnutrition - Moderate, Acute    Previous Goal: Pt to Meet > 75 % energy & protein needs via meals- not met & supplement -. met (oral feedings) ->     Nutrition Diagnosis is [ x] ongoing  [ ] resolved [ ] not applicable     New Nutrition Diagnosis: [x ] not applicable       Interventions:   Recommend  [ x ] Continue current diet as Rx'd  [ ] Change Diet To:  [ ] Nutrition Supplement  [ ] Nutrition Support  [ ] Other:     Monitoring and Evaluation:   [x] PO intake [ x ] Tolerance to diet prescription [ x ] weights [ x ] labs[ x ] follow up per protocol  [ ] other:

## 2019-03-19 LAB
ANION GAP SERPL CALC-SCNC: 8 MMOL/L — SIGNIFICANT CHANGE UP (ref 5–17)
BUN SERPL-MCNC: 20 MG/DL — SIGNIFICANT CHANGE UP (ref 7–23)
CALCIUM SERPL-MCNC: 8.9 MG/DL — SIGNIFICANT CHANGE UP (ref 8.5–10.1)
CHLORIDE SERPL-SCNC: 98 MMOL/L — SIGNIFICANT CHANGE UP (ref 96–108)
CO2 SERPL-SCNC: 28 MMOL/L — SIGNIFICANT CHANGE UP (ref 22–31)
CREAT ?TM UR-MCNC: 16 MG/DL — SIGNIFICANT CHANGE UP
CREAT SERPL-MCNC: 0.49 MG/DL — LOW (ref 0.5–1.3)
GLUCOSE SERPL-MCNC: 98 MG/DL — SIGNIFICANT CHANGE UP (ref 70–99)
HCG UR QL: NEGATIVE — SIGNIFICANT CHANGE UP
HCT VFR BLD CALC: 23.6 % — LOW (ref 34.5–45)
HGB BLD-MCNC: 7.2 G/DL — LOW (ref 11.5–15.5)
MAGNESIUM SERPL-MCNC: 2 MG/DL — SIGNIFICANT CHANGE UP (ref 1.6–2.6)
MCHC RBC-ENTMCNC: 24.6 PG — LOW (ref 27–34)
MCHC RBC-ENTMCNC: 30.5 GM/DL — LOW (ref 32–36)
MCV RBC AUTO: 80.5 FL — SIGNIFICANT CHANGE UP (ref 80–100)
NRBC # BLD: 0 /100 WBCS — SIGNIFICANT CHANGE UP (ref 0–0)
OSMOLALITY UR: 196 MOS/KG — SIGNIFICANT CHANGE UP (ref 50–1200)
PHOSPHATE SERPL-MCNC: 4.6 MG/DL — HIGH (ref 2.5–4.5)
PLATELET # BLD AUTO: 508 K/UL — HIGH (ref 150–400)
POTASSIUM SERPL-MCNC: 4.6 MMOL/L — SIGNIFICANT CHANGE UP (ref 3.5–5.3)
POTASSIUM SERPL-SCNC: 4.6 MMOL/L — SIGNIFICANT CHANGE UP (ref 3.5–5.3)
RBC # BLD: 2.93 M/UL — LOW (ref 3.8–5.2)
RBC # FLD: 17.8 % — HIGH (ref 10.3–14.5)
SODIUM SERPL-SCNC: 134 MMOL/L — LOW (ref 135–145)
SODIUM UR-SCNC: <20 MMOL/L — SIGNIFICANT CHANGE UP
WBC # BLD: 9.07 K/UL — SIGNIFICANT CHANGE UP (ref 3.8–10.5)
WBC # FLD AUTO: 9.07 K/UL — SIGNIFICANT CHANGE UP (ref 3.8–10.5)

## 2019-03-19 PROCEDURE — 99233 SBSQ HOSP IP/OBS HIGH 50: CPT

## 2019-03-19 PROCEDURE — 72142 MRI NECK SPINE W/DYE: CPT | Mod: 26

## 2019-03-19 RX ADMIN — Medication 1 TABLET(S): at 12:18

## 2019-03-19 RX ADMIN — NYSTATIN CREAM 1 APPLICATION(S): 100000 CREAM TOPICAL at 06:13

## 2019-03-19 RX ADMIN — Medication 650 MILLIGRAM(S): at 08:37

## 2019-03-19 RX ADMIN — METHADONE HYDROCHLORIDE 40 MILLIGRAM(S): 40 TABLET ORAL at 12:18

## 2019-03-19 RX ADMIN — GABAPENTIN 300 MILLIGRAM(S): 400 CAPSULE ORAL at 14:07

## 2019-03-19 RX ADMIN — Medication 1 MILLIGRAM(S): at 12:18

## 2019-03-19 RX ADMIN — POLYETHYLENE GLYCOL 3350 17 GRAM(S): 17 POWDER, FOR SOLUTION ORAL at 18:35

## 2019-03-19 RX ADMIN — Medication 650 MILLIGRAM(S): at 14:07

## 2019-03-19 RX ADMIN — POLYETHYLENE GLYCOL 3350 17 GRAM(S): 17 POWDER, FOR SOLUTION ORAL at 06:13

## 2019-03-19 RX ADMIN — Medication 650 MILLIGRAM(S): at 15:07

## 2019-03-19 RX ADMIN — NYSTATIN CREAM 1 APPLICATION(S): 100000 CREAM TOPICAL at 06:12

## 2019-03-19 RX ADMIN — Medication 100 MILLIGRAM(S): at 14:14

## 2019-03-19 RX ADMIN — CHLORHEXIDINE GLUCONATE 1 APPLICATION(S): 213 SOLUTION TOPICAL at 09:49

## 2019-03-19 RX ADMIN — GABAPENTIN 300 MILLIGRAM(S): 400 CAPSULE ORAL at 06:11

## 2019-03-19 RX ADMIN — Medication 100 MILLIGRAM(S): at 14:07

## 2019-03-19 RX ADMIN — LIDOCAINE 1 PATCH: 4 CREAM TOPICAL at 08:49

## 2019-03-19 RX ADMIN — LIDOCAINE 1 PATCH: 4 CREAM TOPICAL at 18:01

## 2019-03-19 RX ADMIN — Medication 300 MILLIGRAM(S): at 23:54

## 2019-03-19 RX ADMIN — Medication 100 MILLIGRAM(S): at 06:11

## 2019-03-19 RX ADMIN — SENNA PLUS 2 TABLET(S): 8.6 TABLET ORAL at 22:00

## 2019-03-19 RX ADMIN — Medication 100 MILLIGRAM(S): at 22:00

## 2019-03-19 RX ADMIN — GABAPENTIN 300 MILLIGRAM(S): 400 CAPSULE ORAL at 22:00

## 2019-03-19 RX ADMIN — NYSTATIN CREAM 1 APPLICATION(S): 100000 CREAM TOPICAL at 18:05

## 2019-03-19 RX ADMIN — Medication 1 PATCH: at 12:21

## 2019-03-19 RX ADMIN — Medication 650 MILLIGRAM(S): at 20:10

## 2019-03-19 RX ADMIN — NYSTATIN CREAM 1 APPLICATION(S): 100000 CREAM TOPICAL at 17:59

## 2019-03-19 RX ADMIN — Medication 300 MILLIGRAM(S): at 12:18

## 2019-03-19 RX ADMIN — Medication 650 MILLIGRAM(S): at 07:37

## 2019-03-19 NOTE — PROGRESS NOTE ADULT - SUBJECTIVE AND OBJECTIVE BOX
42 yo F w/polysubstance abuse presenting with severe sepsis with septic shock secondary to MRSA bacteremia w/ endocarditis on LIZ and EFRAIN thta has resolved and was diagnosis w/ HCV. She is lying in bed in NAD, reports neck pain.    MEDICATIONS  (STANDING):  aztreonam  IVPB 2000 milliGRAM(s) IV Intermittent every 8 hours  chlorhexidine 4% Liquid 1 Application(s) Topical <User Schedule>  docusate sodium 100 milliGRAM(s) Oral three times a day  folic acid 1 milliGRAM(s) Oral daily  gabapentin 300 milliGRAM(s) Oral three times a day  lidocaine   Patch 1 Patch Transdermal every 24 hours  methadone    Tablet 40 milliGRAM(s) Oral daily  multivitamin 1 Tablet(s) Oral daily  nicotine - 21 mG/24Hr(s) Patch 1 patch Transdermal daily  nystatin Cream 1 Application(s) Topical two times a day  nystatin Powder 1 Application(s) Topical two times a day  polyethylene glycol 3350 17 Gram(s) Oral two times a day  senna 2 Tablet(s) Oral at bedtime  vancomycin  IVPB 1500 milliGRAM(s) IV Intermittent every 12 hours    MEDICATIONS  (PRN):  acetaminophen   Tablet .. 650 milliGRAM(s) Oral every 6 hours PRN Temp greater or equal to 38C (100.4F), Mild Pain (1 - 3)  bisacodyl 5 milliGRAM(s) Oral every 12 hours PRN Constipation      Allergies    penicillin (Short breath; Hives)    Intolerances        Vital Signs Last 24 Hrs  T(C): 37.2 (19 Mar 2019 11:03), Max: 37.6 (19 Mar 2019 05:25)  T(F): 99 (19 Mar 2019 11:03), Max: 99.7 (19 Mar 2019 05:25)  HR: 85 (19 Mar 2019 11:03) (82 - 90)  BP: 109/57 (19 Mar 2019 11:03) (97/53 - 109/57)  BP(mean): --  RR: 17 (19 Mar 2019 11:03) (16 - 17)  SpO2: 96% (19 Mar 2019 11:03) (96% - 99%)    PHYSICAL EXAM:  GENERAL: NAD, well-groomed, well-developed  HEAD:  Atraumatic, Normocephalic  EYES: EOMI, PERRLA   NECK: Supple   NERVOUS SYSTEM:  Alert & Oriented X3, no neck tenderness   CHEST/LUNG: Clear to auscultation bilaterally; No rales, rhonchi, wheezing, or rubs  HEART: Regular rate and rhythm; No murmurs, rubs, or gallops  ABDOMEN: Soft, Nontender, Nondistended; Bowel sounds present  EXTREMITIES: No clubbing, cyanosis, or edema      LABS:                        7.2    9.07  )-----------( 508      ( 19 Mar 2019 07:33 )             23.6     03-19    134<L>  |  98  |  20  ----------------------------<  98  4.6   |  28  |  0.49<L>    Ca    8.9      19 Mar 2019 07:33  Phos  4.6     03-19  Mg     2.0     03-19     RADIOLOGY & ADDITIONAL TESTS:    03-18-19 @ 07:01  -  03-19-19 @ 07:00  --------------------------------------------------------  IN:    Oral Fluid: 600 mL    Solution: 500 mL  Total IN: 1100 mL    OUT:    Intermittent Catheterization - Urethral: 2 mL  Total OUT: 2 mL    Total NET: 1098 mL

## 2019-03-19 NOTE — CONSULT NOTE ADULT - SUBJECTIVE AND OBJECTIVE BOX
Patient with C/O Neck pain and Bi-Lat UE Weakness, Ortho Consulted for MRI Findings of Cervical Spine Abscess and Discitis, Patient admitted for AMS for IV Drug Use, Denies Bladder/Bowel Incontinence, C/O of Numbness at fingers Bi-lat       PE: C-spine/Neck: Skin C/D/I, ROM Restricted Secondary to Pain        Bi-Lat UE: Limited ability to Abduct at Shoulders, Strength 3/5 Deltiod, FROM elbow/wrist/fingers, (+) Hyperreflexia at BR       Bi-Lat LE: Strength 5/5, Sensation/motor intact, FROM knee/ankle/toes, Neg Clonus

## 2019-03-19 NOTE — PROGRESS NOTE ADULT - ASSESSMENT
43F PMH IVDA, alcohol abuse, hx of pancreatitis, alcohol hepatitis, alcohol withdrawal, left frontoparietal subdural hematoma 2008 without need for neurosurgical intervention presented with severe sepsis with septic shock secondary to MRSA bacteremia w/ RLL PNA, UTI, endocarditis on LIZ and EFRAIN that has resolved and was diagnosis w/ HCV. Pt was initially intubated due to respiratory failure and put on pressors in ICU. She as extubated on 2/25 and transferred from ICU the following day.      Endocarditis of tricuspid valve  - CT chest showed septic pulmonary emboli to lungs and some have worsened  - CT surgery on board - recommended no surgery  - Dr. Garcia stopped Azactam today  - c/w Vancomycin for a total of 6 weeks   - vanc trough 16.8 today  - BC negative from 3/08    Neck & bilateral shoulder pain  - CT of head and neck showed disc space narrowing and endplate irregularity at C4-5 which may represent typical degenerative change vs discitis - will get MRI of the cervical spine w/ contrast  - start gabapentin and lidocaine patch    Moderate protein-calorie malnutrition  - c/w soft diet w/Ensure Enlive 3 x day      Drug abuse  - c/w methadone for opiate abuse     HCV  - Outpatient follow up for HCV and opioid maintenance outpt re-enrollment program    Anemia  - status post 1 unit pRBC,  - watching H&H  - c/w folic acid     Hyponatremia  - Na is 134- will fluid restrict  - will get urine lytes    Prophylaxis:   DVT: Lovenox  GI: PO diet 43 F w/ history of IVDA, alcohol abuse, hx of pancreatitis, alcohol hepatitis, alcohol withdrawal, left frontoparietal subdural hematoma 2008 without need for neurosurgical intervention presented with severe sepsis with septic shock secondary to MRSA bacteremia w/ RLL PNA, UTI, endocarditis on LIZ and EFRAIN that has resolved and was diagnosis w/ HCV. Pt was initially intubated due to respiratory failure and put on pressors in ICU. She as extubated on 2/25 and transferred from ICU the following day.      Endocarditis of tricuspid valve  - CT chest showed septic pulmonary emboli to lungs and some have worsened  - CT surgery on board - recommended no surgery  - Dr. Garcia stopped Azactam today  - c/w Vancomycin for a total of 6 weeks   - vanc trough 16.8 today  - BC negative from 3/08    Neck & bilateral shoulder pain  - CT of head and neck showed disc space narrowing and endplate irregularity at C4-5 which may represent typical degenerative change vs discitis - will get MRI of the cervical spine w/ contrast  - start gabapentin and lidocaine patch    Moderate protein-calorie malnutrition  - c/w soft diet w/Ensure Enlive 3 x day      Drug abuse  - c/w methadone for opiate abuse     HCV  - Outpatient follow up for HCV and opioid maintenance outpt re-enrollment program    Anemia  - status post 1 unit pRBC,  - watching H&H, will give pRBC in AM if Hgb<7  - c/w folic acid     Hyponatremia  - Na is 134  - c/w fluid restriction     Prophylaxis:   DVT: SCD, no chemical prophylaxis due to anemia  GI: PO diet

## 2019-03-19 NOTE — PROGRESS NOTE ADULT - SUBJECTIVE AND OBJECTIVE BOX
Patient is a 43y old  Female who presents with a chief complaint of AMS (19 Mar 2019 12:11)      INTERVAL HPI / OVERNIGHT EVENTS: neck pain +    MEDICATIONS  (STANDING):  chlorhexidine 4% Liquid 1 Application(s) Topical <User Schedule>  docusate sodium 100 milliGRAM(s) Oral three times a day  folic acid 1 milliGRAM(s) Oral daily  gabapentin 300 milliGRAM(s) Oral three times a day  lidocaine   Patch 1 Patch Transdermal every 24 hours  methadone    Tablet 40 milliGRAM(s) Oral daily  multivitamin 1 Tablet(s) Oral daily  nicotine - 21 mG/24Hr(s) Patch 1 patch Transdermal daily  nystatin Cream 1 Application(s) Topical two times a day  nystatin Powder 1 Application(s) Topical two times a day  polyethylene glycol 3350 17 Gram(s) Oral two times a day  senna 2 Tablet(s) Oral at bedtime  vancomycin  IVPB 1500 milliGRAM(s) IV Intermittent every 12 hours    MEDICATIONS  (PRN):  acetaminophen   Tablet .. 650 milliGRAM(s) Oral every 6 hours PRN Temp greater or equal to 38C (100.4F), Mild Pain (1 - 3)  bisacodyl 5 milliGRAM(s) Oral every 12 hours PRN Constipation      Vital Signs Last 24 Hrs  T(C): 37.2 (19 Mar 2019 11:03), Max: 37.6 (19 Mar 2019 05:25)  T(F): 99 (19 Mar 2019 11:03), Max: 99.7 (19 Mar 2019 05:25)  HR: 85 (19 Mar 2019 11:03) (82 - 90)  BP: 109/57 (19 Mar 2019 11:03) (98/59 - 109/57)  BP(mean): --  RR: 17 (19 Mar 2019 11:03) (16 - 17)  SpO2: 96% (19 Mar 2019 11:03) (96% - 98%)    Review of systems:  General : no fever /chills, + fatigue  CVS : no chest pain, palpitations  Lungs : no shortness of breath, cough  GI : no abdominal pain, vomiting, diarrhea   : no dysuria, hematuria        PHYSICAL EXAM:  General :NAD  Constitutional: well-groomed, well-developed  Respiratory: CTAB/L  Cardiovascular: S1 and S2, RRR, no M/G/R  Gastrointestinal: BS+, soft, NT/ND  Extremities: No peripheral edema  Vascular: 2+ peripheral pulses  Skin: skin poppings      LABS:                        7.2    9.07  )-----------( 508      ( 19 Mar 2019 07:33 )             23.6     03-19    134<L>  |  98  |  20  ----------------------------<  98  4.6   |  28  |  0.49<L>    Ca    8.9      19 Mar 2019 07:33  Phos  4.6     03-19  Mg     2.0     03-19            MICROBIOLOGY:  RECENT CULTURES:        RADIOLOGY & ADDITIONAL STUDIES:    CT chest:    IMPRESSION:    Disc space narrowing and endplate irregularity at C4-5 which may   represent typical degenerative change however concern for discitis raised   by reported history of bacteremia and presence of prevertebral edema.   Contrast-enhanced MRI of the cervical spine suggested

## 2019-03-19 NOTE — PROGRESS NOTE ADULT - NSICDXPROBLEM_GEN_ALL_CORE_FT
PROBLEM DIAGNOSES  Problem: Septic embolism  Assessment and Plan:     Problem: Fever, unspecified fever cause  Assessment and Plan: pt spiking fever again ,low grade today  spoke with cardiologist about repeat LIZ if continues to be febrile to see if IE is progressing to Tricuspid Valve damage .She agrees with it also recommended discussing [facility] surgeon Dr Gutierrez.Will monitor for fever closely .    Problem: Endocarditis of tricuspid valve  Assessment and Plan: as above    Problem: Pneumonia due to Staphylococcus aureus  Assessment and Plan:     Problem: Bacteremia due to methicillin resistant Staphylococcus aureus  Assessment and Plan: repeat blood c/s negaitve since last friday but now febrile again intermittently   plan as above  vanco level adequate   cont at present dose  cont azactam
PROBLEM DIAGNOSES  Problem: Septic embolism  Assessment and Plan:     Problem: Fever, unspecified fever cause  Assessment and Plan: pt spiking fever again ,low grade  but upto 101 today   spoke with cardiologsit about repeat LIZ if continues to be febrile to see if IE is progressnig toTricuspid Valve damage .She agrees with it also recommended discussing [facility] surgeon Dr Gutierrez.Will monitor for fever closely .    Problem: Endocarditis of tricuspid valve  Assessment and Plan:     Problem: Pneumonia due to Staphylococcus aureus  Assessment and Plan:     Problem: Bacteremia due to methicillin resistant Staphylococcus aureus  Assessment and Plan: repeat blood c/s negaitve since last friday but now febrile again intermittently   plan as above  vanco level adequate   cont at present dose  cont azactam
PROBLEM DIAGNOSES  Problem: Septic embolism  Assessment and Plan:     Problem: Fever, unspecified fever cause  Assessment and Plan: resolved  cont antibiotics     Problem: Endocarditis of tricuspid valve  Assessment and Plan: as above    Problem: Pneumonia due to Staphylococcus aureus  Assessment and Plan:     Problem: Bacteremia due to methicillin resistant Staphylococcus aureus  Assessment and Plan: repeat blood c/s negaitve since last friday but now febrile again intermittently   plan as above  vanco level adequate   cont at present dose till 4/13/19
PROBLEM DIAGNOSES  Problem: Septic embolism  Assessment and Plan:     Problem: Fever, unspecified fever cause  Assessment and Plan: resolved  cont antibiotics     Problem: Endocarditis of tricuspid valve  Assessment and Plan: as above    Problem: Pneumonia due to Staphylococcus aureus  Assessment and Plan:     Problem: Bacteremia due to methicillin resistant Staphylococcus aureus  Assessment and Plan: repeat blood c/s negaitve since last friday but now febrile again intermittently   plan as above  vanco level adequate   cont at present dose till 4/13/19
PROBLEM DIAGNOSES  Problem: Drug abuse  Assessment and Plan:     Problem: Septic embolism  Assessment and Plan:     Problem: Fever, unspecified fever cause  Assessment and Plan:     Problem: Endocarditis of tricuspid valve  Assessment and Plan:     Problem: Pneumonia due to Staphylococcus aureus  Assessment and Plan:     Problem: Bacteremia due to methicillin resistant Staphylococcus aureus  Assessment and Plan:

## 2019-03-20 DIAGNOSIS — M86.9 OSTEOMYELITIS, UNSPECIFIED: ICD-10-CM

## 2019-03-20 DIAGNOSIS — M46.49 DISCITIS, UNSPECIFIED, MULTIPLE SITES IN SPINE: ICD-10-CM

## 2019-03-20 DIAGNOSIS — M00.9 PYOGENIC ARTHRITIS, UNSPECIFIED: ICD-10-CM

## 2019-03-20 LAB
ANION GAP SERPL CALC-SCNC: 8 MMOL/L — SIGNIFICANT CHANGE UP (ref 5–17)
ANION GAP SERPL CALC-SCNC: 8 MMOL/L — SIGNIFICANT CHANGE UP (ref 5–17)
APPEARANCE UR: CLEAR — SIGNIFICANT CHANGE UP
APTT BLD: 30.1 SEC — SIGNIFICANT CHANGE UP (ref 27.5–36.3)
BASE EXCESS BLDA CALC-SCNC: 0.9 MMOL/L — SIGNIFICANT CHANGE UP (ref -2–2)
BILIRUB UR-MCNC: NEGATIVE — SIGNIFICANT CHANGE UP
BLOOD GAS COMMENTS: SIGNIFICANT CHANGE UP
BLOOD GAS SOURCE: SIGNIFICANT CHANGE UP
BUN SERPL-MCNC: 21 MG/DL — SIGNIFICANT CHANGE UP (ref 7–23)
BUN SERPL-MCNC: 22 MG/DL — SIGNIFICANT CHANGE UP (ref 7–23)
CALCIUM SERPL-MCNC: 8.4 MG/DL — LOW (ref 8.5–10.1)
CALCIUM SERPL-MCNC: 8.6 MG/DL — SIGNIFICANT CHANGE UP (ref 8.5–10.1)
CHLORIDE SERPL-SCNC: 98 MMOL/L — SIGNIFICANT CHANGE UP (ref 96–108)
CHLORIDE SERPL-SCNC: 99 MMOL/L — SIGNIFICANT CHANGE UP (ref 96–108)
CO2 SERPL-SCNC: 28 MMOL/L — SIGNIFICANT CHANGE UP (ref 22–31)
CO2 SERPL-SCNC: 28 MMOL/L — SIGNIFICANT CHANGE UP (ref 22–31)
COLOR SPEC: YELLOW — SIGNIFICANT CHANGE UP
CREAT SERPL-MCNC: 0.55 MG/DL — SIGNIFICANT CHANGE UP (ref 0.5–1.3)
CREAT SERPL-MCNC: 0.58 MG/DL — SIGNIFICANT CHANGE UP (ref 0.5–1.3)
CRP SERPL-MCNC: 14.22 MG/DL — HIGH (ref 0–0.4)
DIFF PNL FLD: ABNORMAL
ERYTHROCYTE [SEDIMENTATION RATE] IN BLOOD: >140 — SIGNIFICANT CHANGE UP (ref 0–15)
GLUCOSE SERPL-MCNC: 132 MG/DL — HIGH (ref 70–99)
GLUCOSE SERPL-MCNC: 91 MG/DL — SIGNIFICANT CHANGE UP (ref 70–99)
GLUCOSE UR QL: NEGATIVE MG/DL — SIGNIFICANT CHANGE UP
HCG UR QL: NEGATIVE — SIGNIFICANT CHANGE UP
HCO3 BLDA-SCNC: 28 MMOL/L — SIGNIFICANT CHANGE UP (ref 21–29)
HCT VFR BLD CALC: 22.3 % — LOW (ref 34.5–45)
HCT VFR BLD CALC: 23.7 % — LOW (ref 34.5–45)
HGB BLD-MCNC: 6.8 G/DL — CRITICAL LOW (ref 11.5–15.5)
HGB BLD-MCNC: 7 G/DL — CRITICAL LOW (ref 11.5–15.5)
HOROWITZ INDEX BLDA+IHG-RTO: 0.4 — SIGNIFICANT CHANGE UP
INR BLD: 1.14 RATIO — SIGNIFICANT CHANGE UP (ref 0.88–1.16)
KETONES UR-MCNC: NEGATIVE — SIGNIFICANT CHANGE UP
LEUKOCYTE ESTERASE UR-ACNC: ABNORMAL
MAGNESIUM SERPL-MCNC: 2 MG/DL — SIGNIFICANT CHANGE UP (ref 1.6–2.6)
MCHC RBC-ENTMCNC: 23.9 PG — LOW (ref 27–34)
MCHC RBC-ENTMCNC: 24.5 PG — LOW (ref 27–34)
MCHC RBC-ENTMCNC: 29.5 GM/DL — LOW (ref 32–36)
MCHC RBC-ENTMCNC: 30.5 GM/DL — LOW (ref 32–36)
MCV RBC AUTO: 80.5 FL — SIGNIFICANT CHANGE UP (ref 80–100)
MCV RBC AUTO: 80.9 FL — SIGNIFICANT CHANGE UP (ref 80–100)
NITRITE UR-MCNC: NEGATIVE — SIGNIFICANT CHANGE UP
NRBC # BLD: 0 /100 WBCS — SIGNIFICANT CHANGE UP (ref 0–0)
NRBC # BLD: 0 /100 WBCS — SIGNIFICANT CHANGE UP (ref 0–0)
OSMOLALITY SERPL: 286 MOS/KG — LOW (ref 289–308)
PCO2 BLDA: 62 MMHG — HIGH (ref 32–46)
PH BLD: 7.27 — LOW (ref 7.35–7.45)
PH UR: 7 — SIGNIFICANT CHANGE UP (ref 5–8)
PLATELET # BLD AUTO: 460 K/UL — HIGH (ref 150–400)
PLATELET # BLD AUTO: 479 K/UL — HIGH (ref 150–400)
PO2 BLDA: 124 MMHG — HIGH (ref 74–108)
POTASSIUM SERPL-MCNC: 4.1 MMOL/L — SIGNIFICANT CHANGE UP (ref 3.5–5.3)
POTASSIUM SERPL-MCNC: 4.2 MMOL/L — SIGNIFICANT CHANGE UP (ref 3.5–5.3)
POTASSIUM SERPL-SCNC: 4.1 MMOL/L — SIGNIFICANT CHANGE UP (ref 3.5–5.3)
POTASSIUM SERPL-SCNC: 4.2 MMOL/L — SIGNIFICANT CHANGE UP (ref 3.5–5.3)
PROT UR-MCNC: NEGATIVE MG/DL — SIGNIFICANT CHANGE UP
PROTHROM AB SERPL-ACNC: 12.8 SEC — SIGNIFICANT CHANGE UP (ref 10–12.9)
RBC # BLD: 2.77 M/UL — LOW (ref 3.8–5.2)
RBC # BLD: 2.93 M/UL — LOW (ref 3.8–5.2)
RBC # FLD: 17.8 % — HIGH (ref 10.3–14.5)
RBC # FLD: 17.8 % — HIGH (ref 10.3–14.5)
SAO2 % BLDA: 98 % — HIGH (ref 92–96)
SODIUM SERPL-SCNC: 134 MMOL/L — LOW (ref 135–145)
SODIUM SERPL-SCNC: 135 MMOL/L — SIGNIFICANT CHANGE UP (ref 135–145)
SP GR SPEC: 1 — LOW (ref 1.01–1.02)
UROBILINOGEN FLD QL: NEGATIVE MG/DL — SIGNIFICANT CHANGE UP
WBC # BLD: 8.76 K/UL — SIGNIFICANT CHANGE UP (ref 3.8–10.5)
WBC # BLD: 8.82 K/UL — SIGNIFICANT CHANGE UP (ref 3.8–10.5)
WBC # FLD AUTO: 8.76 K/UL — SIGNIFICANT CHANGE UP (ref 3.8–10.5)
WBC # FLD AUTO: 8.82 K/UL — SIGNIFICANT CHANGE UP (ref 3.8–10.5)

## 2019-03-20 PROCEDURE — 99232 SBSQ HOSP IP/OBS MODERATE 35: CPT

## 2019-03-20 PROCEDURE — 99291 CRITICAL CARE FIRST HOUR: CPT

## 2019-03-20 PROCEDURE — 93010 ELECTROCARDIOGRAM REPORT: CPT

## 2019-03-20 PROCEDURE — 99233 SBSQ HOSP IP/OBS HIGH 50: CPT

## 2019-03-20 PROCEDURE — 71045 X-RAY EXAM CHEST 1 VIEW: CPT | Mod: 26

## 2019-03-20 PROCEDURE — 36410 VNPNXR 3YR/> PHY/QHP DX/THER: CPT | Mod: 59

## 2019-03-20 PROCEDURE — 32555 ASPIRATE PLEURA W/ IMAGING: CPT

## 2019-03-20 PROCEDURE — 72157 MRI CHEST SPINE W/O & W/DYE: CPT | Mod: 26

## 2019-03-20 PROCEDURE — 70553 MRI BRAIN STEM W/O & W/DYE: CPT | Mod: 26

## 2019-03-20 PROCEDURE — 72158 MRI LUMBAR SPINE W/O & W/DYE: CPT | Mod: 26

## 2019-03-20 PROCEDURE — 76937 US GUIDE VASCULAR ACCESS: CPT | Mod: 26

## 2019-03-20 PROCEDURE — 72040 X-RAY EXAM NECK SPINE 2-3 VW: CPT | Mod: 26

## 2019-03-20 RX ORDER — SODIUM CHLORIDE 9 MG/ML
1000 INJECTION INTRAMUSCULAR; INTRAVENOUS; SUBCUTANEOUS
Qty: 0 | Refills: 0 | Status: DISCONTINUED | OUTPATIENT
Start: 2019-03-20 | End: 2019-03-20

## 2019-03-20 RX ORDER — METHADONE HYDROCHLORIDE 40 MG/1
40 TABLET ORAL DAILY
Qty: 0 | Refills: 0 | Status: DISCONTINUED | OUTPATIENT
Start: 2019-03-20 | End: 2019-03-21

## 2019-03-20 RX ORDER — GABAPENTIN 400 MG/1
600 CAPSULE ORAL THREE TIMES A DAY
Qty: 0 | Refills: 0 | Status: DISCONTINUED | OUTPATIENT
Start: 2019-03-20 | End: 2019-03-27

## 2019-03-20 RX ORDER — FENTANYL CITRATE 50 UG/ML
0.5 INJECTION INTRAVENOUS
Qty: 2500 | Refills: 0 | Status: DISCONTINUED | OUTPATIENT
Start: 2019-03-20 | End: 2019-03-21

## 2019-03-20 RX ORDER — PANTOPRAZOLE SODIUM 20 MG/1
40 TABLET, DELAYED RELEASE ORAL DAILY
Qty: 0 | Refills: 0 | Status: DISCONTINUED | OUTPATIENT
Start: 2019-03-20 | End: 2019-03-24

## 2019-03-20 RX ORDER — DIPHENHYDRAMINE HCL 50 MG
25 CAPSULE ORAL EVERY 4 HOURS
Qty: 0 | Refills: 0 | Status: DISCONTINUED | OUTPATIENT
Start: 2019-03-31 | End: 2019-05-08

## 2019-03-20 RX ORDER — FOLIC ACID 0.8 MG
1 TABLET ORAL DAILY
Qty: 0 | Refills: 0 | Status: DISCONTINUED | OUTPATIENT
Start: 2019-03-20 | End: 2019-05-08

## 2019-03-20 RX ORDER — CHLORHEXIDINE GLUCONATE 213 G/1000ML
1 SOLUTION TOPICAL
Qty: 0 | Refills: 0 | Status: DISCONTINUED | OUTPATIENT
Start: 2019-03-20 | End: 2019-05-08

## 2019-03-20 RX ORDER — VANCOMYCIN HCL 1 G
1500 VIAL (EA) INTRAVENOUS EVERY 12 HOURS
Qty: 0 | Refills: 0 | Status: DISCONTINUED | OUTPATIENT
Start: 2019-03-20 | End: 2019-03-22

## 2019-03-20 RX ORDER — FOLIC ACID 0.8 MG
1 TABLET ORAL DAILY
Qty: 0 | Refills: 0 | Status: DISCONTINUED | OUTPATIENT
Start: 2019-03-31 | End: 2019-03-31

## 2019-03-20 RX ORDER — ASCORBIC ACID 60 MG
500 TABLET,CHEWABLE ORAL
Qty: 0 | Refills: 0 | Status: DISCONTINUED | OUTPATIENT
Start: 2019-03-31 | End: 2019-05-08

## 2019-03-20 RX ORDER — LIDOCAINE 4 G/100G
1 CREAM TOPICAL EVERY 24 HOURS
Qty: 0 | Refills: 0 | Status: DISCONTINUED | OUTPATIENT
Start: 2019-03-20 | End: 2019-03-27

## 2019-03-20 RX ORDER — GABAPENTIN 400 MG/1
600 CAPSULE ORAL THREE TIMES A DAY
Qty: 0 | Refills: 0 | Status: DISCONTINUED | OUTPATIENT
Start: 2019-03-20 | End: 2019-03-20

## 2019-03-20 RX ADMIN — Medication 1 PATCH: at 11:34

## 2019-03-20 RX ADMIN — Medication 100 MILLIGRAM(S): at 06:16

## 2019-03-20 RX ADMIN — Medication 1 PATCH: at 18:04

## 2019-03-20 RX ADMIN — GABAPENTIN 600 MILLIGRAM(S): 400 CAPSULE ORAL at 13:53

## 2019-03-20 RX ADMIN — NYSTATIN CREAM 1 APPLICATION(S): 100000 CREAM TOPICAL at 06:16

## 2019-03-20 RX ADMIN — LIDOCAINE 1 PATCH: 4 CREAM TOPICAL at 18:03

## 2019-03-20 RX ADMIN — Medication 650 MILLIGRAM(S): at 11:32

## 2019-03-20 RX ADMIN — Medication 1 PATCH: at 11:32

## 2019-03-20 RX ADMIN — GABAPENTIN 300 MILLIGRAM(S): 400 CAPSULE ORAL at 06:16

## 2019-03-20 RX ADMIN — Medication 300 MILLIGRAM(S): at 11:55

## 2019-03-20 RX ADMIN — POLYETHYLENE GLYCOL 3350 17 GRAM(S): 17 POWDER, FOR SOLUTION ORAL at 06:17

## 2019-03-20 RX ADMIN — SODIUM CHLORIDE 75 MILLILITER(S): 9 INJECTION INTRAMUSCULAR; INTRAVENOUS; SUBCUTANEOUS at 16:55

## 2019-03-20 RX ADMIN — Medication 100 MILLIGRAM(S): at 13:53

## 2019-03-20 RX ADMIN — NYSTATIN CREAM 1 APPLICATION(S): 100000 CREAM TOPICAL at 17:48

## 2019-03-20 RX ADMIN — Medication 650 MILLIGRAM(S): at 12:30

## 2019-03-20 RX ADMIN — Medication 650 MILLIGRAM(S): at 02:29

## 2019-03-20 RX ADMIN — CHLORHEXIDINE GLUCONATE 1 APPLICATION(S): 213 SOLUTION TOPICAL at 11:30

## 2019-03-20 RX ADMIN — LIDOCAINE 1 PATCH: 4 CREAM TOPICAL at 08:07

## 2019-03-20 RX ADMIN — Medication 1 TABLET(S): at 11:32

## 2019-03-20 RX ADMIN — Medication 1 MILLIGRAM(S): at 11:35

## 2019-03-20 RX ADMIN — Medication 1 PATCH: at 08:08

## 2019-03-20 RX ADMIN — NYSTATIN CREAM 1 APPLICATION(S): 100000 CREAM TOPICAL at 17:49

## 2019-03-20 RX ADMIN — METHADONE HYDROCHLORIDE 40 MILLIGRAM(S): 40 TABLET ORAL at 11:32

## 2019-03-20 NOTE — PROGRESS NOTE ADULT - SUBJECTIVE AND OBJECTIVE BOX
[facility]    Operative Report    Date of Surgery: 03/20/2019    Patient: Kelly Fields    Surgeon: Rose Leslie DO – Orthopedic Surgery    Assistant: Dr. Chaka Pimentel    Medical Record Number: [id number]    Account Number: [id number]    Dictation:     Pre op Diagnosis:  Epidural Abscess C4-5, discitis C6-7, prevertebral abscess C2-T1, osteomyelitis C2, C4, C5, C6-C7  Post op Diagnosis: Same  Exploration of neck with debridment of prevertebral abscess from C3-C7  Debridement of anterior disc C6-7 - no gross abscess identified  Complete Discectomy C4-5  Partial corpectomy C4  partial corpectomy C5  Anterior instrumentation C4-5  Biomechanical Cage C4-5  Arthrodesis C4/5  Fluoroscopy supervision and reading of xray during the case.  Anesthesia: General Endotracheal Intubation with Neuromonitoring  Positioning: Supine on Flat table.  Neuromonitoring: MEP, SSEP  Mechanical Venodyne Compression Device  Complication None:  Estimated Blood Loss: minimal   Procedure Summary:    Preoperative Discussion: Risk and benefits of surgery were discussed extensively with the patient.     I had extensive discussion with patient that expectation of full recovery is unrealistic but spinal cord decompression gives her the chance to improve her symptoms and neck pain and hopefully regain strength.  She may need additional treatment for thoracic spine, cervical spine and lumbar.  She is a very sick patient.  Risk of mortality and long term morbidity from her illness is high.  Historically amount of disease in her body there is high risk of mortality in such patients.    Due to unstable neck, spinal cord decompression with fusion is recommended.  This was confirmed with intraoperative findings.    Patient with multilevel pathology.  Goal is to address  the most significant levels.      Risk and benefits discussed in detail and patient agreed to proceed.  All questions answered.  Including risk of paralysis, injury to esophagus, trachea, carotid vessels, swallowing difficulty that is usually transient, voice hoarseness, laryngeal injury as well as instrumentation, bone graft from cadaver bone and interbody cage.      Procedure in detail: Informed consent was obtained. Left sided of the neck was marked in the preoperative area. Patient received general anesthesia and IV bag was placed underneath the neck and shoulders were taped down to expose the disc space.  Baseline neuromonitoring signals were performed and noted to be diminished markedly.  We placed the neck in minimal extension and noted signal loss from the spinal cord.  Neck was flexed again.  Additional dose of corticosteroids given.  MAP increased to > 100.  Narcan given as well.  Then signal begin to improve.  Neurologist concerns noted but given her unstable spine, risk of waking patient up and delaying decompression likely further increased her risk.  Therefore proceeded ahead with decompression.    Neck was prepped and draped in sterile manner. Fluoroscopy was used for this case.  All bony prominences were well padded.  Baseline neuro monitoring signals obtained prior to neck positioning.     Approximately 3 cm incision was made in the horizontal fashion.  Platysma was identified and incised in the plane of the muscle layer.  Prevertebral fascia, deep cervical fascia, superficial cervical fascia all adhesed due to extensive inflammatory reaction from infection. Carotid sheath was identified and blunt dissection was made medial to the carotid sheath through the deep cervical fashion. Entire prevertebral fascia was thick inflammatory phlegmon  Normal neck planes completely obliterated.  Cultures are taken.  Careful attention was made to protect the trachea, esophagus and neck viscera.  Dissection was performed and C6/7 end plate was identified.  Longus Coli muscles were elevated using bipolar caughtery from the C4-C7.  Anterior vertebral body from C4/5/6/7 and hemostasis maintained.  Due infectious tissue there is general bleeding due to release of inflammatory tissue.  Surgiflo and packing is used.    Next attention was paid to discectomy at C4-5.  There is essentially no disc left,   Entire disc is eroded away.  More than 40 percent of the C4 vertebral body is eroded.   Superior endplate of C5 is eroded.  We performed decompression until we identified the PLL.  Using merced, extensive posterior osteophytes were removed.  All dead bone from C4 is removed until we have bleeding edges from C4 and C5.   Surgiflo is used to maintain hemostasis.  Post decompression neurologic signal improves.  Next, trials were performed and Nuvasive biomechanical cage packed with harvested autograft and DBX mixed used and packed in the disc space.  Patient's Kyphotic deformity is also corrected and she is in neutral alignment.    Next attention was paid to instrumentation. C4/5 cages was secured into the vertebral body with screw/plate instrumentation at this level.     Next attention is paid to C6-7 level. Small annulotomy is performed and area of the anterior disc that appeared infected on the MRI is removed.  No gross pus is draining.  End plate is curreted to get bleeding edge to debride loose infected bone but bone appeared normal.      Next attention was paid to irrigation.  7 Liters of irrigation is used to irrigate the entire cervical spine and deep and superfical neck.     Hemostasis was maintained and confirmed at the end of surgery. No active bleeding was identified. Wound was irrigated with sterile saline with bacitracin. KIRILL drain was placed. Subcutaneous tissue was closed with number 3.0 vicryl in interrupted fashion. Skin was closed with nylon.    Drain was secured with nylon stitch. Drain was actively draining and holding suction.    Sterile dressing was applied using gauge and tegaderm.    Patient was kept intubated as planned. Neuroexam will be performed once patient is more alert. [place] collar applied.    Estimated Blood Loss 10 ml    Rose Leslie  [employer] Signature  Rose Leslie DO    Electric signature  for submission to medical records.

## 2019-03-20 NOTE — PROGRESS NOTE ADULT - SUBJECTIVE AND OBJECTIVE BOX
42 yo F w/polysubstance abuse presenting with severe sepsis with septic shock secondary to MRSA bacteremia w/ endocarditis on LIZ and EFRAIN thta has resolved and was diagnosis w/ HCV. She is lying in bed in NAD. Pt was given blood in anticipation of spine surgery today and had a fever of 100.5.    MEDICATIONS  (STANDING):  chlorhexidine 4% Liquid 1 Application(s) Topical <User Schedule>  docusate sodium 100 milliGRAM(s) Oral three times a day  folic acid 1 milliGRAM(s) Oral daily  gabapentin 600 milliGRAM(s) Oral three times a day  lidocaine   Patch 1 Patch Transdermal every 24 hours  methadone    Tablet 40 milliGRAM(s) Oral daily  multivitamin 1 Tablet(s) Oral daily  nicotine - 21 mG/24Hr(s) Patch 1 patch Transdermal daily  nystatin Cream 1 Application(s) Topical two times a day  nystatin Powder 1 Application(s) Topical two times a day  polyethylene glycol 3350 17 Gram(s) Oral two times a day  senna 2 Tablet(s) Oral at bedtime  sodium chloride 0.9%. 1000 milliLiter(s) (75 mL/Hr) IV Continuous <Continuous>  vancomycin  IVPB 1500 milliGRAM(s) IV Intermittent every 12 hours    MEDICATIONS  (PRN):  acetaminophen   Tablet .. 650 milliGRAM(s) Oral every 6 hours PRN Temp greater or equal to 38C (100.4F), Mild Pain (1 - 3)  bisacodyl 5 milliGRAM(s) Oral every 12 hours PRN Constipation      Allergies    penicillin (Short breath; Hives)    Intolerances        Vital Signs Last 24 Hrs  T(C): 38.1 (20 Mar 2019 13:50), Max: 38.1 (20 Mar 2019 13:50)  T(F): 100.5 (20 Mar 2019 13:50), Max: 100.5 (20 Mar 2019 13:50)  HR: 93 (20 Mar 2019 13:50) (76 - 94)  BP: 106/60 (20 Mar 2019 13:50) (98/58 - 130/72)  BP(mean): --  RR: 16 (20 Mar 2019 13:50) (16 - 19)  SpO2: 100% (20 Mar 2019 13:50) (96% - 100%)    PHYSICAL EXAM:  GENERAL: NAD, well-groomed, well-developed  HEAD:  Atraumatic, Normocephalic  EYES: EOMI, PERRLA   NECK: Supple   NERVOUS SYSTEM:  Alert & Oriented X3, no neck tenderness   CHEST/LUNG: Clear to auscultation bilaterally; No rales, rhonchi, wheezing, or rubs  HEART: Regular rate and rhythm; No murmurs, rubs, or gallops  ABDOMEN: Soft, Nontender, Nondistended; Bowel sounds present  EXTREMITIES: No clubbing, cyanosis, or edema    LABS:                        7.0    8.82  )-----------( 460      ( 20 Mar 2019 04:09 )             23.7     03-20    134<L>  |  98  |  22  ----------------------------<  91  4.2   |  28  |  0.55    Ca    8.6      20 Mar 2019 04:09  Phos  4.6       Mg     2.0           PT/INR - ( 20 Mar 2019 04:09 )   PT: 12.8 sec;   INR: 1.14 ratio         PTT - ( 20 Mar 2019 04:09 )  PTT:30.1 sec  Urinalysis Basic - ( 20 Mar 2019 02:12 )    Color: Yellow / Appearance: Clear / S.005 / pH: x  Gluc: x / Ketone: Negative  / Bili: Negative / Urobili: Negative mg/dL   Blood: x / Protein: Negative mg/dL / Nitrite: Negative   Leuk Esterase: Trace / RBC: x / WBC 3-5   Sq Epi: x / Non Sq Epi: Few / Bacteria: Few      RADIOLOGY & ADDITIONAL TESTS:    19 @ 07:01  -  19 @ 07:00  --------------------------------------------------------  IN:    Oral Fluid: 260 mL    Packed Red Blood Cells: 296 mL    Solution: 100 mL    Solution: 1000 mL  Total IN: 1656 mL    OUT:  Total OUT: 0 mL    Total NET: 1656 mL      19 @ 07:01  -  19 @ 15:28  --------------------------------------------------------  IN:    Solution: 500 mL  Total IN: 500 mL    OUT:  Total OUT: 0 mL    Total NET: 500 mL

## 2019-03-20 NOTE — PROGRESS NOTE ADULT - SUBJECTIVE AND OBJECTIVE BOX
Pt s/e at bedside. Reports neck pain and BLUE weakness began approx a week ago when pt was sitting on some stairs and fell forward (pt was hospitalized during this time period - unclear if and when event actually occurred). Currently pt reports numbness/tingling in bilateral hands/foot which has been present for months according to pt. She denies hx of recent trauma, No bowel/bladder incontinence. Denies fevers/chills. Pain 10/10 worse with movement, primarily from nape of neck to mid scapular region.       Vital Signs Last 24 Hrs  T(C): 36.3 (03-20-19 @ 07:00), Max: 37.4 (03-19-19 @ 23:53)  T(F): 97.4 (03-20-19 @ 07:00), Max: 99.4 (03-19-19 @ 23:53)  HR: 81 (03-20-19 @ 07:00) (76 - 93)  BP: 115/66 (03-20-19 @ 07:00) (98/58 - 130/72)  BP(mean): --  RR: 19 (03-20-19 @ 07:00) (16 - 19)  SpO2: 99% (03-20-19 @ 07:00) (96% - 100%)    Gen: NAD    Spine PE:  Skin intact  No gross deformity  TTP along entire length of C spine, othwerise no midline TTP T/L/S spine  No bony step offs  No paraspinal muscle ttp/hypertonicity   Negative clonus  Negative babinski  Negative olmstead  Rectal exam refused by patient x2           Deltoid        Bicep        Tricep          Wrist Ext    Wrist Flex    Finger Flex          Finger Abd  R            4/5         4/5           4/5             4/5                4/5	           4/5                         4/5  L             2/5           3/5          3/5              3/5               3/5                   3/5                        3/5                Hip Flex       Quad     Ankle DF        Ankle PF       Toe Ext        Hamstring    R            5/5              5/5          5/5                 5/5                 5/5	                5/5  L            5/5              5/5           5/5                 5/5                 5/5                5/5    Sensory:            C5         C6         C7      C8       T1        (0=absent, 1=impaired, 2=normal, NT=not testable)  R         2            2           2        2         2  L          2            2           2        2         2               L2          L3         L4      L5       S1         (0=absent, 1=impaired, 2=normal, NT=not testable)  R         2            2            2        2        2  L          2            2           2        2         2    Paresthesias in stocking/glove distribution  Hyperreflexia (Patellar/Achilles)    Imaging: FU Advanced imaging (ordered) Pt s/e at bedside. Reports neck pain and BLUE weakness began approx a week ago when pt was sitting on some stairs and fell forward (pt was hospitalized during this time period - unclear if and when event actually occurred). Currently pt reports numbness/tingling in bilateral hands/foot which has been present for months according to pt. She denies hx of recent trauma, No bowel/bladder incontinence. Denies fevers/chills. Pain 10/10 worse with movement, primarily from nape of neck to mid scapular region.       Vital Signs Last 24 Hrs  T(C): 36.3 (03-20-19 @ 07:00), Max: 37.4 (03-19-19 @ 23:53)  T(F): 97.4 (03-20-19 @ 07:00), Max: 99.4 (03-19-19 @ 23:53)  HR: 81 (03-20-19 @ 07:00) (76 - 93)  BP: 115/66 (03-20-19 @ 07:00) (98/58 - 130/72)  BP(mean): --  RR: 19 (03-20-19 @ 07:00) (16 - 19)  SpO2: 99% (03-20-19 @ 07:00) (96% - 100%)    Gen: NAD    Spine PE:  Skin intact  No gross deformity  TTP along entire length of C spine, othwerise no midline TTP T/L/S spine  No bony step offs  No paraspinal muscle ttp/hypertonicity   Negative clonus  Negative babinski  Negative olmstead  Rectal exam refused by patient x2            Deltoid        Bicep        Tricep          Wrist Ext    Wrist Flex    Finger Flex          Finger Abd  L            4/5         4/5           4/5             4/5                4/5	           4/5                         4/5  R            2/5           3/5          3/5              3/5               3/5                   3/5                        3/5                Hip Flex       Quad     Ankle DF        Ankle PF       Toe Ext        Hamstring    L            5/5              5/5          5/5                 5/5                 5/5	                5/5  R            5/5              5/5           5/5                 5/5                 5/5                5/5    Sensory:            C5         C6         C7      C8       T1        (0=absent, 1=impaired, 2=normal, NT=not testable)  L         2            2           2        2         2  R          2            2           2        2         2               L2          L3         L4      L5       S1         (0=absent, 1=impaired, 2=normal, NT=not testable)  L         2            2            2        2        2  R          2            2           2        2         2

## 2019-03-20 NOTE — PROGRESS NOTE ADULT - ASSESSMENT
44 Y/O female w/ PMHx of IV heroin abuse, HCV,  brought in by EMS for lethargy and cough.    Labs showed serum lactate of 7.2, BUN/Cr 124/6.42, and WBC of of 11.93. Tmax 102.4, urine + for cocaine, opiates, nitrites and many bacteria.  Patient admitted to critical care in septic shock with MRSA bacteremia, in EFRAIN, and also endocarditis, intubated, on pressors. extubated on 2/25 and has been off pressors   LIZ done 2/25:  Moderately sized vegetation on the tricuspid valve, suggestive of bacterial endocarditis  Normal EF on echo  CT Chest: Bilateral cavitary lesions are increased in size and number compared to 2/17/2019. Small bilateral pleural effusions, loculated on the right, increased compared to the prior study. Unchanged right lower lobe dependent consolidation. Prominent mediastinal and axillary lymph nodes, possibly reactive.  repeat blood c/s since 3/1 negative   EKG 3/4/19 is unchanged. No heart block observed.  remains to have fever intermittently     Pt c/o neck pain bilateral UE weakness  MRI Spine: C4/C5 discitis/osteomyelitis with a small right of midline ventral epidural abscess. Mild cord compression. Possible discitis/osteomyelitis at C6/C7. Moderate anterior prevertebral effusion versus abscess/phlegmon within the C2/C3-C6/C7 level.Left C2 inferior facet osteomyelitis with adjacent soft tissue enhancement. Posterior soft tissue enhancement adjacent to the spinous processes of C4, C5, C6. Tiny soft tissue abscess measuring 5 mm adjacent to the C5 spinous process tip.    Plan for OR today      Plan    ABx as per ID, vanco  on daily methadone 20mg/nicotine patch   Hold VTE ppx  C/w Nutritional Support  H/H down to 6.8/22.3 3/20/19, 2 units PRBC transfusion in progress  no plan for immediate transfer at this time for CT surgery evaluation  for the TV endocarditis, patient remains hemodynamically stable and cultures are clearing.   suggest repeat TTE prior to discharge  Please reconsult prn 44 Y/O female w/ PMHx of IV heroin abuse, HCV,  brought in by EMS for lethargy and cough.    Labs showed serum lactate of 7.2, BUN/Cr 124/6.42, and WBC of of 11.93. Tmax 102.4, urine + for cocaine, opiates, nitrites and many bacteria.  Patient admitted to critical care in septic shock with MRSA bacteremia, in EFRAIN, and also endocarditis, intubated, on pressors. extubated on 2/25 and has been off pressors   LIZ done 2/25:  Moderately sized vegetation on the tricuspid valve, suggestive of bacterial endocarditis  Normal EF on echo  CT Chest: Bilateral cavitary lesions are increased in size and number compared to 2/17/2019. Small bilateral pleural effusions, loculated on the right, increased compared to the prior study. Unchanged right lower lobe dependent consolidation. Prominent mediastinal and axillary lymph nodes, possibly reactive.  repeat blood c/s since 3/8 negative   EKG 3/4/19 is unchanged. No heart block observed.      Pt with c/o neck pain bilateral UE weakness/numbness/tingling   MRI Spine: C4/C5 discitis/osteomyelitis with a small right of midline ventral epidural abscess. Mild cord compression. Possible discitis/osteomyelitis at C6/C7. Moderate anterior prevertebral effusion versus abscess/phlegmon within the C2/C3-C6/C7 level.Left C2 inferior facet osteomyelitis with adjacent soft tissue enhancement. Posterior soft tissue enhancement adjacent to the spinous processes of C4, C5, C6. Tiny soft tissue abscess measuring 5 mm adjacent to the C5 spinous process tip.    Ortho following,  for OR today as per ortho  ABx as per ID, currently on IV vanco  on daily methadone 20mg/nicotine patch   H/H down to 6.8/22.3 3/20/19, 2 units PRBC transfusion in progress  will obtain EKG to assess for any heart block 44 Y/O female w/ PMHx of IV heroin abuse, HCV,  brought in by EMS for lethargy and cough.    Labs showed serum lactate of 7.2, BUN/Cr 124/6.42, and WBC of of 11.93. Tmax 102.4, urine + for cocaine, opiates, nitrites and many bacteria.  Patient admitted to critical care in septic shock with MRSA bacteremia, in EFRAIN, and also endocarditis, intubated, on pressors. extubated on 2/25 and has been off pressors   LIZ done 2/25:  Moderately sized vegetation on the tricuspid valve, suggestive of bacterial endocarditis  Normal EF on echo  CT Chest: Bilateral cavitary lesions are increased in size and number compared to 2/17/2019. Small bilateral pleural effusions, loculated on the right, increased compared to the prior study. Unchanged right lower lobe dependent consolidation. Prominent mediastinal and axillary lymph nodes, possibly reactive.  repeat blood c/s since 3/8 negative   EKG 3/20/19 is unchanged from before. No heart block observed.      Pt with c/o neck pain bilateral UE weakness/numbness/tingling   MRI Spine: C4/C5 discitis/osteomyelitis with a small right of midline ventral epidural abscess. Mild cord compression. Possible discitis/osteomyelitis at C6/C7. Moderate anterior prevertebral effusion versus abscess/phlegmon within the C2/C3-C6/C7 level. Left C2 inferior facet osteomyelitis with adjacent soft tissue enhancement. Posterior soft tissue enhancement adjacent to the spinous processes of C4, C5, C6. Tiny soft tissue abscess measuring 5 mm adjacent to the C5 spinous process tip.  See MRI thoracic/lumbar spine report above.    Ortho following,  for OR today as per ortho  ABx as per ID, currently on IV vanco  on daily methadone 20mg/nicotine patch   H/H down to 6.8/22.3 3/20/19, 2 units PRBC transfusion completed. 44 Y/O female w/ PMHx of IV heroin abuse, HCV,  brought in by EMS for lethargy and cough.    Labs showed serum lactate of 7.2, BUN/Cr 124/6.42, and WBC of of 11.93. Tmax 102.4, urine + for cocaine, opiates, nitrites and many bacteria.  Patient admitted to critical care in septic shock with MRSA bacteremia, in EFRAIN, and also endocarditis, intubated, on pressors. extubated on 2/25 and has been off pressors   LIZ done 2/25:  Moderately sized vegetation on the tricuspid valve, suggestive of bacterial endocarditis  Normal EF on echo  CT Chest: Bilateral cavitary lesions are increased in size and number compared to 2/17/2019. Small bilateral pleural effusions, loculated on the right, increased compared to the prior study. Unchanged right lower lobe dependent consolidation. Prominent mediastinal and axillary lymph nodes, possibly reactive.  repeat blood c/s since 3/8 negative   EKG 3/20/19 is unchanged from before. No heart block observed.      Pt with c/o neck pain bilateral UE weakness/numbness/tingling   MRI Spine: C4/C5 discitis/osteomyelitis with a small right of midline ventral epidural abscess. Mild cord compression. Possible discitis/osteomyelitis at C6/C7. Moderate anterior prevertebral effusion versus abscess/phlegmon within the C2/C3-C6/C7 level. Left C2 inferior facet osteomyelitis with adjacent soft tissue enhancement. Posterior soft tissue enhancement adjacent to the spinous processes of C4, C5, C6. Tiny soft tissue abscess measuring 5 mm adjacent to the C5 spinous process tip.  See MRI thoracic/lumbar spine report above.    Ortho following,  for OR today as per ortho  ABx as per ID, currently on IV vanco  on daily methadone 20mg/nicotine patch   H/H down to 6.8/22.3 3/20/19, 2 units PRBC transfusion completed.    Pt with recent tricuspid valve endocarditis; at high risk for any procedure in general.  Currently HD stable from a cardiac standpoint... no evidence of CHF/arrhythmias/ischemia.    Concerned however in placing hardware in a patient with active infection and endocarditis... would recommend discussing with ID prior to any procedure.

## 2019-03-20 NOTE — PROGRESS NOTE ADULT - SUBJECTIVE AND OBJECTIVE BOX
Patient is a 43y old  Female who presents with a chief complaint of AMS (20 Mar 2019 10:20)      INTERVAL HPI / OVERNIGHT EVENTS: neck pain     MEDICATIONS  (STANDING):  chlorhexidine 4% Liquid 1 Application(s) Topical <User Schedule>  docusate sodium 100 milliGRAM(s) Oral three times a day  folic acid 1 milliGRAM(s) Oral daily  gabapentin 600 milliGRAM(s) Oral three times a day  lidocaine   Patch 1 Patch Transdermal every 24 hours  methadone    Tablet 40 milliGRAM(s) Oral daily  multivitamin 1 Tablet(s) Oral daily  nicotine - 21 mG/24Hr(s) Patch 1 patch Transdermal daily  nystatin Cream 1 Application(s) Topical two times a day  nystatin Powder 1 Application(s) Topical two times a day  polyethylene glycol 3350 17 Gram(s) Oral two times a day  senna 2 Tablet(s) Oral at bedtime  sodium chloride 0.9%. 1000 milliLiter(s) (75 mL/Hr) IV Continuous <Continuous>  vancomycin  IVPB 1500 milliGRAM(s) IV Intermittent every 12 hours    MEDICATIONS  (PRN):  acetaminophen   Tablet .. 650 milliGRAM(s) Oral every 6 hours PRN Temp greater or equal to 38C (100.4F), Mild Pain (1 - 3)  bisacodyl 5 milliGRAM(s) Oral every 12 hours PRN Constipation      Vital Signs Last 24 Hrs  T(C): 38.1 (20 Mar 2019 13:50), Max: 38.1 (20 Mar 2019 13:50)  T(F): 100.5 (20 Mar 2019 13:50), Max: 100.5 (20 Mar 2019 13:50)  HR: 93 (20 Mar 2019 13:50) (76 - 94)  BP: 106/60 (20 Mar 2019 13:50) (98/58 - 130/72)  BP(mean): --  RR: 16 (20 Mar 2019 13:50) (16 - 19)  SpO2: 100% (20 Mar 2019 13:50) (96% - 100%)    Review of systems:  General :+ fever /chills, fatigue  CVS : no chest pain, palpitations  Lungs : no shortness of breath, cough  GI : no abdominal pain, vomiting, diarrhea   : no dysuria, hematuria        PHYSICAL EXAM:  General :NAD, neck brace  Constitutional:  well-developed  Respiratory: CTAB/L  Cardiovascular: S1 and S2, RRR, murmur+  Gastrointestinal: BS+, soft, NT/ND  Extremities: No peripheral edema  Vascular: 2+ peripheral pulses  Skin: skin popping      LABS:                        7.0    8.82  )-----------( 460      ( 20 Mar 2019 04:09 )             23.7     03-20    134<L>  |  98  |  22  ----------------------------<  91  4.2   |  28  |  0.55    Ca    8.6      20 Mar 2019 04:09  Phos  4.6     03-19  Mg     2.0     03-20      Urinalysis Basic - ( 20 Mar 2019 02:12 )    Color: Yellow / Appearance: Clear / S.005 / pH: x  Gluc: x / Ketone: Negative  / Bili: Negative / Urobili: Negative mg/dL   Blood: x / Protein: Negative mg/dL / Nitrite: Negative   Leuk Esterase: Trace / RBC: x / WBC 3-5   Sq Epi: x / Non Sq Epi: Few / Bacteria: Few          MICROBIOLOGY:  RECENT CULTURES:        RADIOLOGY & ADDITIONAL STUDIES:    MRI Thoraco/lumbar and cervical spine    IMPRESSION:    1. Discitis/osteomyelitis at T11-T12 with surrounding anterolateral   paravertebral phlegmonous and inflammatory changes. Developing left-sided   anterolateral paravertebral abscess, as discussed.    2. Redemonstration of discitis/osteomyelitis at the C6-C7 (anteriorly)   and at C7-T1 (asymmetric towards the right side).    3. Septic arthritis involving the facet joints at T9-T10, T10-T11, L4-L5,   and L5-S1, as discussed. Suggestion of a thin epidural phlegmon/early   abscess dorsolaterally within the lumbar canal at the level of L4-L5.    4. Septic arthritis involving the left sternoclavicular joint space. This   can be more optimally evaluated with a contrast-enhanced chest MRI   examination, as clinically warranted.    5. Cavitary lung lesions which are better assessed on the prior chest,   abdomen, and pelvis CT exam.    6. Rim-enhancing left sided pleural fluid for which an empyema cannot be   excluded. Correlate with thoracentesis.    7. Disc herniation at the L4-L5 level with contact and probable   impingement of the descending right-sided nerve roots.

## 2019-03-20 NOTE — PROGRESS NOTE ADULT - SUBJECTIVE AND OBJECTIVE BOX
Patient is a 43y old  Female who presents with a chief complaint of AMS (20 Mar 2019 09:17)      PAST MEDICAL & SURGICAL HISTORY:  ETOH abuse  Drug abuse  C Section      INTERVAL HISTORY:  	  MEDICATIONS:  MEDICATIONS  (STANDING):  chlorhexidine 4% Liquid 1 Application(s) Topical <User Schedule>  docusate sodium 100 milliGRAM(s) Oral three times a day  folic acid 1 milliGRAM(s) Oral daily  gabapentin 600 milliGRAM(s) Oral three times a day  lidocaine   Patch 1 Patch Transdermal every 24 hours  methadone    Tablet 40 milliGRAM(s) Oral daily  multivitamin 1 Tablet(s) Oral daily  nicotine - 21 mG/24Hr(s) Patch 1 patch Transdermal daily  nystatin Cream 1 Application(s) Topical two times a day  nystatin Powder 1 Application(s) Topical two times a day  polyethylene glycol 3350 17 Gram(s) Oral two times a day  senna 2 Tablet(s) Oral at bedtime  sodium chloride 0.9%. 1000 milliLiter(s) (75 mL/Hr) IV Continuous <Continuous>  vancomycin  IVPB 1500 milliGRAM(s) IV Intermittent every 12 hours    MEDICATIONS  (PRN):  acetaminophen   Tablet .. 650 milliGRAM(s) Oral every 6 hours PRN Temp greater or equal to 38C (100.4F), Mild Pain (1 - 3)  bisacodyl 5 milliGRAM(s) Oral every 12 hours PRN Constipation      Vitals:  T(F): 97.4 (03-20-19 @ 07:00), Max: 99.4 (03-19-19 @ 23:53)  HR: 81 (03-20-19 @ 07:00) (76 - 93)  BP: 115/66 (03-20-19 @ 07:00) (98/58 - 130/72)  RR: 19 (03-20-19 @ 07:00) (16 - 19)  SpO2: 99% (03-20-19 @ 07:00) (96% - 100%)  Wt(kg): --    03-19 @ 07:01  -  03-20 @ 07:00  --------------------------------------------------------  IN:    Oral Fluid: 260 mL    Packed Red Blood Cells: 296 mL    Solution: 1000 mL    Solution: 100 mL  Total IN: 1656 mL    OUT:  Total OUT: 0 mL    Total NET: 1656 mL      PHYSICAL EXAM:  Neuro: Awake, responsive  CV: S1 S2 RRR  Lungs: CTABL  GI: Soft, BS +, ND, NT  Extremities: No edema    TELEMETRY:   	    ECG:  	    RADIOLOGY: < from: Xray Chest 1 View- PORTABLE-Routine (03.20.19 @ 05:35) >  FINDINGS:   Prior ET and NG tubes have been withdrawn.  Mild right-sided superior mediastinal widening again noted.  The cardiac silhouette is stable in appearance.  Stable small right pleural effusion.  No significant left pleural effusion.  Bibasilar atelectatic changes present- left more than right.  No pneumothorax.  No acute bony finding.    < from: CT Chest No Cont (03.01.19 @ 11:32) >  Bilateral cavitary lesions are increased in size and number compared to   2/17/2019.     Small bilateral pleural effusions, loculated on the right, increased   compared to the prior study. Unchanged right lower lobe dependent  consolidation.    Prominent mediastinal and axillary lymph nodes, possibly reactive.      < end of copied text >      < end of copied text >    < from: MR Cervical Spine w/ IV Cont (03.19.19 @ 17:18) >  IMPRESSION:    C4/C5 discitis/osteomyelitis with a small right of midline ventral   epidural abscess. Mild cord compression.  Possible discitis/osteomyelitis at C6/C7.   Moderate anterior prevertebral effusion versus abscess/phlegmon within   the C2/C3-C6/C7 level.  Left C2 inferior facet osteomyelitis with adjacent soft tissue   enhancement.  Posterior soft tissue enhancement adjacent to the spinous processes of   C4, C5, C6.   Tiny soft tissue abscess measuring 5 mm adjacent to the C5 spinous   process tip.    < end of copied text >      DIAGNOSTIC TESTING:    [x ] Echocardiogram: < from: LIZ Echo Doppler (02.25.19 @ 13:40) >  Summary:   1. Left ventricular ejection fraction, by visual estimation, is 65 to   70%.   2. There is no left ventricular hypertrophy.   3. Moderately sized vegetation on the tricuspid valve, suggestive of   bacterial endocarditis.    < end of copied text >    [ ]  Catheterization:  [ ] Stress Test:    OTHER: 	2    LABS:	 	          20 Mar 2019 04:09    134    |  98     |  22     ----------------------------<  91     4.2     |  28     |  0.55   20 Mar 2019 01:55    135    |  99     |  21     ----------------------------<  132    4.1     |  28     |  0.58   19 Mar 2019 07:33    134    |  98     |  20     ----------------------------<  98     4.6     |  28     |  0.49     Ca    8.6        20 Mar 2019 04:09  Phos  4.6       19 Mar 2019 07:33  Mg     2.0       20 Mar 2019 01:55                            7.0    8.82  )-----------( 460      ( 20 Mar 2019 04:09 )             23.7 ,                       6.8    8.76  )-----------( 479      ( 20 Mar 2019 01:55 )             22.3 ,                       7.2    9.07  )-----------( 508      ( 19 Mar 2019 07:33 )             23.6 ,                       8.1    8.01  )-----------( 482      ( 18 Mar 2019 12:39 )             27.8     PT/PTT- ( 20 Mar 2019 04:09 )   PT: 12.8 sec;  PTT: 30.1 sec      INR: 1.14 ratio (03-20 @ 04:09) Patient is a 43y old  Female who presents with a chief complaint of AMS (20 Mar 2019 09:17)      PAST MEDICAL & SURGICAL HISTORY:  ETOH abuse  Drug abuse  Hep C  C Section      INTERVAL HISTORY:  	  MEDICATIONS:  MEDICATIONS  (STANDING):  chlorhexidine 4% Liquid 1 Application(s) Topical <User Schedule>  docusate sodium 100 milliGRAM(s) Oral three times a day  folic acid 1 milliGRAM(s) Oral daily  gabapentin 600 milliGRAM(s) Oral three times a day  lidocaine   Patch 1 Patch Transdermal every 24 hours  methadone    Tablet 40 milliGRAM(s) Oral daily  multivitamin 1 Tablet(s) Oral daily  nicotine - 21 mG/24Hr(s) Patch 1 patch Transdermal daily  nystatin Cream 1 Application(s) Topical two times a day  nystatin Powder 1 Application(s) Topical two times a day  polyethylene glycol 3350 17 Gram(s) Oral two times a day  senna 2 Tablet(s) Oral at bedtime  sodium chloride 0.9%. 1000 milliLiter(s) (75 mL/Hr) IV Continuous <Continuous>  vancomycin  IVPB 1500 milliGRAM(s) IV Intermittent every 12 hours    MEDICATIONS  (PRN):  acetaminophen   Tablet .. 650 milliGRAM(s) Oral every 6 hours PRN Temp greater or equal to 38C (100.4F), Mild Pain (1 - 3)  bisacodyl 5 milliGRAM(s) Oral every 12 hours PRN Constipation    Vitals:  T(F): 97.4 (03-20-19 @ 07:00), Max: 99.4 (03-19-19 @ 23:53)  HR: 81 (03-20-19 @ 07:00) (76 - 93)  BP: 115/66 (03-20-19 @ 07:00) (98/58 - 130/72)  RR: 19 (03-20-19 @ 07:00) (16 - 19)  SpO2: 99% (03-20-19 @ 07:00) (96% - 100%)    03-19 @ 07:01  -  03-20 @ 07:00  --------------------------------------------------------  IN:    Oral Fluid: 260 mL    Packed Red Blood Cells: 296 mL    Solution: 1000 mL    Solution: 100 mL  Total IN: 1656 mL    OUT:  Total OUT: 0 mL    Total NET: 1656 mL    PHYSICAL EXAM:  Neuro: Awake, responsive  CV: S1 S2 RRR  Lungs: CTABL  GI: Soft, BS +, ND, NT  Extremities: No edema    RADIOLOGY: < from: Xray Chest 1 View- PORTABLE-Routine (03.20.19 @ 05:35) >  FINDINGS:   Prior ET and NG tubes have been withdrawn.  Mild right-sided superior mediastinal widening again noted.  The cardiac silhouette is stable in appearance.  Stable small right pleural effusion.  No significant left pleural effusion.  Bibasilar atelectatic changes present- left more than right.  No pneumothorax.  No acute bony finding.    < from: CT Chest No Cont (03.01.19 @ 11:32) >  Bilateral cavitary lesions are increased in size and number compared to   2/17/2019.     Small bilateral pleural effusions, loculated on the right, increased   compared to the prior study. Unchanged right lower lobe dependent  consolidation.    Prominent mediastinal and axillary lymph nodes, possibly reactive.      < end of copied text >      < end of copied text >    < from: MR Cervical Spine w/ IV Cont (03.19.19 @ 17:18) >  IMPRESSION:    C4/C5 discitis/osteomyelitis with a small right of midline ventral   epidural abscess. Mild cord compression.  Possible discitis/osteomyelitis at C6/C7.   Moderate anterior prevertebral effusion versus abscess/phlegmon within   the C2/C3-C6/C7 level.  Left C2 inferior facet osteomyelitis with adjacent soft tissue   enhancement.  Posterior soft tissue enhancement adjacent to the spinous processes of   C4, C5, C6.   Tiny soft tissue abscess measuring 5 mm adjacent to the C5 spinous   process tip.    < end of copied text >    DIAGNOSTIC TESTING:    [x ] Echocardiogram: < from: LIZ Echo Doppler (02.25.19 @ 13:40) >  Summary:   1. Left ventricular ejection fraction, by visual estimation, is 65 to   70%.   2. There is no left ventricular hypertrophy.   3. Moderately sized vegetation on the tricuspid valve, suggestive of   bacterial endocarditis.    < end of copied text >  < from: TTE Echo Doppler w/o Cont (02.18.19 @ 09:46) >   1. Left ventricular ejection fraction, by visual estimation, is 60 to   65%.   2. There is no left ventricular hypertrophy.   3. Trace tricuspid regurgitation.   4. Pulmonic valve regurgitation.   5. Estimated pulmonary artery systolic pressure is 35.2 mmHg assuming a   right atrial pressure of 5 mmHg, which is consistent with mild pulmonary   hypertension.    < end of copied text >    LABS:	 	    20 Mar 2019 04:09    134    |  98     |  22     ----------------------------<  91     4.2     |  28     |  0.55   20 Mar 2019 01:55    135    |  99     |  21     ----------------------------<  132    4.1     |  28     |  0.58   19 Mar 2019 07:33    134    |  98     |  20     ----------------------------<  98     4.6     |  28     |  0.49     Ca    8.6        20 Mar 2019 04:09  Phos  4.6       19 Mar 2019 07:33  Mg     2.0       20 Mar 2019 01:55                        7.0    8.82  )-----------( 460      ( 20 Mar 2019 04:09 )             23.7 ,                       6.8    8.76  )-----------( 479      ( 20 Mar 2019 01:55 )             22.3 ,                       7.2    9.07  )-----------( 508      ( 19 Mar 2019 07:33 )             23.6 ,                       8.1    8.01  )-----------( 482      ( 18 Mar 2019 12:39 )             27.8     PT/PTT- ( 20 Mar 2019 04:09 )   PT: 12.8 sec;  PTT: 30.1 sec    INR: 1.14 ratio (03-20 @ 04:09) Patient is a 43y old  Female who presents with a chief complaint of AMS (20 Mar 2019 09:17)      PAST MEDICAL & SURGICAL HISTORY:  ETOH abuse  Drug abuse  Hep C  C Section      INTERVAL HISTORY: Pt seen resting in bed in no acute distress, c/o neck pain and bilateral UE and feet weakness/numbness/tingling. Pt denies chest pain, palpitation, SOB or dizziness.   	  MEDICATIONS:  MEDICATIONS  (STANDING):  chlorhexidine 4% Liquid 1 Application(s) Topical <User Schedule>  docusate sodium 100 milliGRAM(s) Oral three times a day  folic acid 1 milliGRAM(s) Oral daily  gabapentin 600 milliGRAM(s) Oral three times a day  lidocaine   Patch 1 Patch Transdermal every 24 hours  methadone    Tablet 40 milliGRAM(s) Oral daily  multivitamin 1 Tablet(s) Oral daily  nicotine - 21 mG/24Hr(s) Patch 1 patch Transdermal daily  nystatin Cream 1 Application(s) Topical two times a day  nystatin Powder 1 Application(s) Topical two times a day  polyethylene glycol 3350 17 Gram(s) Oral two times a day  senna 2 Tablet(s) Oral at bedtime  sodium chloride 0.9%. 1000 milliLiter(s) (75 mL/Hr) IV Continuous <Continuous>  vancomycin  IVPB 1500 milliGRAM(s) IV Intermittent every 12 hours    MEDICATIONS  (PRN):  acetaminophen   Tablet .. 650 milliGRAM(s) Oral every 6 hours PRN Temp greater or equal to 38C (100.4F), Mild Pain (1 - 3)  bisacodyl 5 milliGRAM(s) Oral every 12 hours PRN Constipation    Vitals:  T(F): 97.4 (03-20-19 @ 07:00), Max: 99.4 (03-19-19 @ 23:53)  HR: 81 (03-20-19 @ 07:00) (76 - 93)  BP: 115/66 (03-20-19 @ 07:00) (98/58 - 130/72)  RR: 19 (03-20-19 @ 07:00) (16 - 19)  SpO2: 99% (03-20-19 @ 07:00) (96% - 100%)    03-19 @ 07:01  -  03-20 @ 07:00  --------------------------------------------------------  IN:    Oral Fluid: 260 mL    Packed Red Blood Cells: 296 mL    Solution: 1000 mL    Solution: 100 mL  Total IN: 1656 mL    OUT:  Total OUT: 0 mL    Total NET: 1656 mL    PHYSICAL EXAM:  Neuro: Awake, responsive  CV: S1 S2 RRR,+ SM  Lungs: Diminished breath sounds  GI: Soft, BS +, ND, NT  Extremities: No edema    RADIOLOGY: < from: Xray Chest 1 View- PORTABLE-Routine (03.20.19 @ 05:35) >  FINDINGS:   Prior ET and NG tubes have been withdrawn.  Mild right-sided superior mediastinal widening again noted.  The cardiac silhouette is stable in appearance.  Stable small right pleural effusion.  No significant left pleural effusion.  Bibasilar atelectatic changes present- left more than right.  No pneumothorax.  No acute bony finding.    < from: CT Chest No Cont (03.01.19 @ 11:32) >  Bilateral cavitary lesions are increased in size and number compared to   2/17/2019.     Small bilateral pleural effusions, loculated on the right, increased   compared to the prior study. Unchanged right lower lobe dependent  consolidation.    Prominent mediastinal and axillary lymph nodes, possibly reactive.      < end of copied text >      < end of copied text >    < from: MR Cervical Spine w/ IV Cont (03.19.19 @ 17:18) >  IMPRESSION:    C4/C5 discitis/osteomyelitis with a small right of midline ventral   epidural abscess. Mild cord compression.  Possible discitis/osteomyelitis at C6/C7.   Moderate anterior prevertebral effusion versus abscess/phlegmon within   the C2/C3-C6/C7 level.  Left C2 inferior facet osteomyelitis with adjacent soft tissue   enhancement.  Posterior soft tissue enhancement adjacent to the spinous processes of   C4, C5, C6.   Tiny soft tissue abscess measuring 5 mm adjacent to the C5 spinous   process tip.    < end of copied text >    < from: Xray Cervical Spine AP and Lateral Only (03.20.19 @ 10:42) >  MR study of March 19 showed findings of C4-5 level concerning for   discitis. There was alsoprevertebral swelling.    On present study curve reversal at the C4-5 level which is fairly   pronounced again noted. There is destruction of the C4-5 disc.    There is soft tissue swelling anterior to the C4-5 level.    Degeneration at C5-6 and C6-7noted.    No fractures are seen.    Findings are similar to CAT scan of March 18.    IMPRESSION: Findings particularly at C4-5 with anterior swelling again   noted.    < end of copied text >    < from: MR Head w/wo IV Cont (03.20.19 @ 10:22) >  IMPRESSION: No acute intracranial hemorrhage, acute ischemia, or abnormal   intracranial enhancement.    Redemonstration of discitis/osteomyelitis at C4-C5 with focal kyphotic   angulation associated prevertebral abscess.    < end of copied text >    20 Mar 2019 04:09  < from: MR Thoracic Spine w/wo IV Cont (03.20.19 @ 10:22) >  IMPRESSION:    1. Discitis/osteomyelitis at T11-T12 with surrounding anterolateral   paravertebral phlegmonous and inflammatory changes. Developing left-sided   anterolateral paravertebral abscess, as discussed.    2. Redemonstration of discitis/osteomyelitis at the C6-C7 (anteriorly)   and at C7-T1 (asymmetric towards the right side).    3. Septic arthritis involving the facet joints at T9-T10, T10-T11, L4-L5,   and L5-S1, as discussed. Suggestion of a thin epidural phlegmon/early   abscess dorsolaterally within the lumbar canal at the level of L4-L5.    4. Septic arthritis involving the left sternoclavicular joint space. This   can be more optimally evaluated with a contrast-enhanced chest MRI   examination, as clinically warranted.    5. Cavitary lung lesions which are better assessed on the prior chest,   abdomen, and pelvis CT exam.    6. Rim-enhancing left sided pleural fluid for which an empyema cannot be   excluded. Correlate with thoracentesis.    7. Disc herniation at the L4-L5 level with contact and probable   impingement of the descending right-sided nerve roots.      < end of copied text >    DIAGNOSTIC TESTING:    [x ] Echocardiogram: < from: LIZ Echo Doppler (02.25.19 @ 13:40) >  Summary:   1. Left ventricular ejection fraction, by visual estimation, is 65 to   70%.   2. There is no left ventricular hypertrophy.   3. Moderately sized vegetation on the tricuspid valve, suggestive of   bacterial endocarditis.    < end of copied text >  < from: TTE Echo Doppler w/o Cont (02.18.19 @ 09:46) >   1. Left ventricular ejection fraction, by visual estimation, is 60 to   65%.   2. There is no left ventricular hypertrophy.   3. Trace tricuspid regurgitation.   4. Pulmonic valve regurgitation.   5. Estimated pulmonary artery systolic pressure is 35.2 mmHg assuming a   right atrial pressure of 5 mmHg, which is consistent with mild pulmonary   hypertension.    < end of copied text >        LABS:	 	        134    |  98     |  22     ----------------------------<  91     4.2     |  28     |  0.55   20 Mar 2019 01:55    135    |  99     |  21     ----------------------------<  132    4.1     |  28     |  0.58   19 Mar 2019 07:33    134    |  98     |  20     ----------------------------<  98     4.6     |  28     |  0.49     Ca    8.6        20 Mar 2019 04:09  Phos  4.6       19 Mar 2019 07:33  Mg     2.0       20 Mar 2019 01:55                        7.0    8.82  )-----------( 460      ( 20 Mar 2019 04:09 )             23.7 ,                       6.8    8.76  )-----------( 479      ( 20 Mar 2019 01:55 )             22.3 ,                       7.2    9.07  )-----------( 508      ( 19 Mar 2019 07:33 )             23.6 ,                       8.1    8.01  )-----------( 482      ( 18 Mar 2019 12:39 )             27.8     PT/PTT- ( 20 Mar 2019 04:09 )   PT: 12.8 sec;  PTT: 30.1 sec    INR: 1.14 ratio (03-20 @ 04:09)

## 2019-03-20 NOTE — CONSULT NOTE ADULT - SUBJECTIVE AND OBJECTIVE BOX
Patient is a 43y old  Female who presents with a chief complaint of AMS (20 Mar 2019 15:28)        42 Y/O female w/ PMHx of IV heroin abuse brought in by EMS for lethargy and cough. As per EMS, pt was found laying in bed at home, lethargic however able to follow some commands. EMS reports that pt was attempting to detox from heroin, and last known use was 3 days prior to ED presentation. As per ED, pts boyfriend reports a hx of + pt cough productive of white sputum and a fall down a flight of stairs . Labs showed serum lactate of 7.2, BUN/Cr 124/6.42, and WBC of of 11.93. Tmax 102.4, urine + for cocaine, opiates, nitrites and many bacteria. ICU called for further evaluation. (2019 22:56)  Patient was admitted to critical care with severe sepsis with septic shock secondary to MRSA bacteremia w/ endocarditis on LIZ and EFRAIN thta has resolved and was diagnosis w/ HCV transferred out  of icu on 19.  While on the floor, patient c/o neck pain on 3/18  had CT neck done: < from: CT Cervical Spine No Cont (19 @ 11:49) >  Disc space narrowing and endplate irregularity at C4-5 which may   represent typical degenerative change however concern for discitis raised   by reported history of bacteremia and presence of prevertebral edema.   Contrast-enhanced MRI of the cervical spine suggested   then MRI C/spine: < from: MR Cervical Spine w/ IV Cont (19 @ 17:18) >  C4/C5 discitis/osteomyelitis with a small right of midline ventral   epidural abscess. Mild cord compression.  Possible discitis/osteomyelitis at C6/C7.   Moderate anterior prevertebral effusion versus abscess/phlegmon within   the C2/C3-C6/C7 level.  Left C2 inferior facet osteomyelitis with adjacent soft tissue   enhancement.  Posterior soft tissue enhancement adjacent to the spinous processes of   C4, C5, C6.   Tiny soft tissue abscess measuring 5 mm adjacent to the C5 spinous   process tip.    Today, patient underwent  Epidural Abscess C4-5, discitis C6-7, prevertebral abscess C2-T1, osteomyelitis C2, C4, C5, C6-C7, as per anesthesia, difficult intubation, and required some levophed during the procedure. Patient kept intubated and transferred to critical care post-op.          PAST MEDICAL & SURGICAL HISTORY:  ETOH abuse  Drug abuse  C Section    FAMILY HISTORY:    Social History: Allergies    penicillin (Short breath; Hives)    Intolerances        MEDICATIONS  (STANDING):  chlorhexidine 4% Liquid 1 Application(s) Topical <User Schedule>  folic acid 1 milliGRAM(s) Oral daily  gabapentin 600 milliGRAM(s) Oral three times a day  lidocaine   Patch 1 Patch Transdermal every 24 hours  methadone    Tablet 40 milliGRAM(s) Oral daily  multivitamin 1 Tablet(s) Oral daily  pantoprazole  Injectable 40 milliGRAM(s) IV Push daily  vancomycin  IVPB 1500 milliGRAM(s) IV Intermittent every 12 hours    MEDICATIONS  (PRN):      REVIEW OF SYSTEMS:    unable to obtain, pt on mv, sedated    PHYSICAL EXAM:    T(C): 37.2 (20 Mar 2019 17:30), Max: 38.1 (20 Mar 2019 13:50)  T(F): 98.9 (20 Mar 2019 17:30), Max: 100.5 (20 Mar 2019 13:50)  HR: 88 (20 Mar 2019 23:19) (79 - 94)  BP: 113/69 (20 Mar 2019 17:30) (98/58 - 117/64)  RR: 16 (20 Mar 2019 17:30) (16 - 20)  SpO2: 100% (20 Mar 2019 23:19) (96% - 100%)    GENERAL:lying in bed, sedated on mv  EYES: EOMI, PERRLA, conjunctiva and sclera clear  NECK:C-collar on, surgical incision, clean and dry dressing with raifq drain  NERVOUS SYSTEM:  sedated on mv, unable to access  CHEST/LUNG: CTAbilaterally; No rales, rhonchi, wheezing, or rubs  HEART: Regular rate and rhythm; No murmurs, rubs, or gallops  ABDOMEN: Soft, Nontender, Nondistended; Bowel sounds present  EXTREMITIES:  2+ Peripheral Pulses, No clubbing, cyanosis, or edema          LABS:                        7.0    8.82  )-----------( 460      ( 20 Mar 2019 04:09 )             23.7     03-20    134<L>  |  98  |  22  ----------------------------<  91  4.2   |  28  |  0.55    Ca    8.6      20 Mar 2019 04:09  Phos  4.6     03-19  Mg     2.0     03-20      PT/INR - ( 20 Mar 2019 04:09 )   PT: 12.8 sec;   INR: 1.14 ratio         PTT - ( 20 Mar 2019 04:09 )  PTT:30.1 sec  Urinalysis Basic - ( 20 Mar 2019 02:12 )    Color: Yellow / Appearance: Clear / S.005 / pH: x  Gluc: x / Ketone: Negative  / Bili: Negative / Urobili: Negative mg/dL   Blood: x / Protein: Negative mg/dL / Nitrite: Negative   Leuk Esterase: Trace / RBC: x / WBC 3-5   Sq Epi: x / Non Sq Epi: Few / Bacteria: Few            RADIOLOGY & ADDITIONAL STUDIES:    EKG:            CRITICAL CARE TIME SPENT:

## 2019-03-20 NOTE — PROVIDER CONTACT NOTE (CRITICAL VALUE NOTIFICATION) - ACTION/TREATMENT ORDERED:
-provider already aware of hgb  -2 units PRBCs ordered; pending to be given  -blood bank stated will call when blood is ready

## 2019-03-20 NOTE — PROGRESS NOTE ADULT - PROBLEM SELECTOR PLAN 1
based on MRI there is involvement of multiple spines of thoracic/lumbar and cervical spine   also has prevertebral collection  plan for ortho debridement   will need 6 more weeks of iv antibiotics from now  instrumentation during orhto sugery not preferredd but can be done if absolutely necessary  will cont vanco and make sure levels are adequate   fever today so repeat blood c/s

## 2019-03-20 NOTE — PROGRESS NOTE ADULT - ASSESSMENT
41yo F w/ C2-7 Prevertebral Abscess and Discitis/OM, plan for OR today with Dr. Leslie  Ordered 2U PRBC overnight for low H&H  Neurochecks q4hrs per nursing  FU [place] collar (ordered for postop)  FU Dr. Dallas for medical clearance  FU Dr. De Paz for cardiac clearance  FU Dr. Garcia for ID recommendations, continue IV ABx  Ordered XR C spine  Ordered MR Brain/T/L Spine with and without CT  Pain control  DVT PPx: SCDs, please hold all chemical PPx   FU CRP  NPO after breakfast  No IVF while NPO per medicine (Pt hyponatremic)  Plan for OR  today  Per pt request attempted to contact pt's mother (Sandra [phone]) who was unavail x2  Pt discussed w/ Dr. Leslie who is aware and agrees with the above plan.  Ortho to follow    P: Pain Control      Continue IV Antibiotics     Patient seen and examined with Dr. Leslie      Patient informed of Surgical and Non-Surgical treatment plans     Risk/Benefits Reviewed and patients questions answered      Ortho to F/U

## 2019-03-20 NOTE — CHART NOTE - NSCHARTNOTEFT_GEN_A_CORE
Orthopedic Preop Note    Preop Dx: C4-5 prevertebral abscess/osteomyelitis  Surgeon: Anuj  Procedure: irrigation and debridement with C2-7, C4-7 ACDF    Vital Signs Last 24 Hrs  T(C): 36.9 (20 Mar 2019 15:35), Max: 38.1 (20 Mar 2019 13:50)  T(F): 98.5 (20 Mar 2019 15:35), Max: 100.5 (20 Mar 2019 13:50)  HR: 92 (20 Mar 2019 15:35) (76 - 94)  BP: 103/57 (20 Mar 2019 15:35) (98/58 - 130/72)  BP(mean): --  RR: 20 (20 Mar 2019 15:35) (16 - 20)  SpO2: 98% (20 Mar 2019 15:35) (96% - 100%)  CBC Full  -  ( 20 Mar 2019 04:09 )  WBC Count : 8.82 K/uL  Hemoglobin : 7.0 g/dL  Hematocrit : 23.7 %  Platelet Count - Automated : 460 K/uL  Mean Cell Volume : 80.9 fl  Mean Cell Hemoglobin : 23.9 pg  Mean Cell Hemoglobin Concentration : 29.5 gm/dL  Auto Neutrophil # : x  Auto Lymphocyte # : x  Auto Monocyte # : x  Auto Eosinophil # : x  Auto Basophil # : x  Auto Neutrophil % : x  Auto Lymphocyte % : x  Auto Monocyte % : x  Auto Eosinophil % : x  Auto Basophil % : x    03-20    134<L>  |  98  |  22  ----------------------------<  91  4.2   |  28  |  0.55    Ca    8.6      20 Mar 2019 04:09  Phos  4.6     03-19  Mg     2.0     03-20      PT/INR - ( 20 Mar 2019 04:09 )   PT: 12.8 sec;   INR: 1.14 ratio         PTT - ( 20 Mar 2019 04:09 )  PTT:30.1 sec  Daily     Daily Weight in k.1 (20 Mar 2019 06:15)    EKG: In Chart  CXR: Done  UA: Done  Type and Screen: done  Clearance: documented (medicine, ID and cardiology)   Consent: In Chart     Plan:  OR 3/20 for irrigation and debridement C2-7 and all other indication procedures including possible ACDF C4-7 with Dr. Leslie  NPO except meds  hold anticoagulation   IVF while NPO  cleared for OR

## 2019-03-20 NOTE — PROGRESS NOTE ADULT - ASSESSMENT
43 F w/ history of IVDA, alcohol abuse, hx of pancreatitis, alcohol hepatitis, alcohol withdrawal, left frontoparietal subdural hematoma 2008 without need for neurosurgical intervention presented with severe sepsis with septic shock secondary to MRSA bacteremia w/ RLL PNA, UTI, endocarditis on LIZ and EFRAIN that has resolved and was diagnosis w/ HCV. Pt was initially intubated due to respiratory failure and put on pressors in ICU. She as extubated on 2/25 and transferred from ICU the following day.      Endocarditis of tricuspid valve  - CT chest showed septic pulmonary emboli to lungs and some have worsened - patient had empyema on MRI today - spoke w/ IR who will put in a chest tube on Friday  - CT surgery on board - recommended no surgery  - Dr. Garcia stopped Azactam today  - c/w Vancomycin for a total of 6 weeks   - vanc trough 16.8 on 3/19  - BC negative from 3/08  - IR will change midline today     Spinal osteo, abscess and cord compression    - CT of head and neck showed disc space narrowing and endplate irregularity at C4-5 which may represent typical degenerative change vs discitis   - MRI of the cervical spine w/ contrast showed C2-C7 discitis/osteomyelitis with a small right of midline ventral epidural abscess at C2-C7  - MRI of the thoracolumbar spine showed discitis/osteomyelitis at T11-T12 with paravertebral abscess, as well as discitis/osteomyelitis C6--T1, T9-T10, T10-T11, L4-L5, and L5-S1, and a phlegmon/early abscess dorsolaterally within the lumbar canal at the level of L4-L5. There was also a septic arthritis of  the left sternoclavicular joint space. There was also disc herniation at the L4-L5 level with contact and probable impingement of the descending right-sided nerve roots  - ortho will take patient to OR today  - increase gabapentin and c/w lidocaine patch    Moderate protein-calorie malnutrition  - c/w soft diet w/Ensure Enlive 3 x day      Drug abuse  - c/w methadone for opiate abuse     HCV  - Outpatient follow up for HCV and opioid maintenance outpt re-enrollment program    Anemia  - status post 1 unit pRBC,  - watching H&H, ortho ordered 2 units pRBC today in anticipation of surgery   - c/w folic acid     Hyponatremia  - Na is 134  - c/w fluid restriction     Prophylaxis:   DVT: SCD, no chemical prophylaxis due to anemia  GI: PO diet

## 2019-03-21 LAB
ANION GAP SERPL CALC-SCNC: 6 MMOL/L — SIGNIFICANT CHANGE UP (ref 5–17)
ANION GAP SERPL CALC-SCNC: 9 MMOL/L — SIGNIFICANT CHANGE UP (ref 5–17)
B PERT IGG+IGM PNL SER: CLEAR — SIGNIFICANT CHANGE UP
BUN SERPL-MCNC: 18 MG/DL — SIGNIFICANT CHANGE UP (ref 7–23)
BUN SERPL-MCNC: 20 MG/DL — SIGNIFICANT CHANGE UP (ref 7–23)
CALCIUM SERPL-MCNC: 8.9 MG/DL — SIGNIFICANT CHANGE UP (ref 8.5–10.1)
CALCIUM SERPL-MCNC: 9 MG/DL — SIGNIFICANT CHANGE UP (ref 8.5–10.1)
CHLORIDE SERPL-SCNC: 104 MMOL/L — SIGNIFICANT CHANGE UP (ref 96–108)
CHLORIDE SERPL-SCNC: 105 MMOL/L — SIGNIFICANT CHANGE UP (ref 96–108)
CO2 SERPL-SCNC: 24 MMOL/L — SIGNIFICANT CHANGE UP (ref 22–31)
CO2 SERPL-SCNC: 28 MMOL/L — SIGNIFICANT CHANGE UP (ref 22–31)
COLOR FLD: YELLOW — SIGNIFICANT CHANGE UP
COMMENT - FLUIDS: SIGNIFICANT CHANGE UP
CREAT SERPL-MCNC: 0.44 MG/DL — LOW (ref 0.5–1.3)
CREAT SERPL-MCNC: 0.46 MG/DL — LOW (ref 0.5–1.3)
EOSINOPHIL # FLD: 2 % — SIGNIFICANT CHANGE UP
FLUID INTAKE SUBSTANCE CLASS: SIGNIFICANT CHANGE UP
FLUID SEGMENTED GRANULOCYTES: 2 % — SIGNIFICANT CHANGE UP
GLUCOSE FLD-MCNC: 105 MG/DL — SIGNIFICANT CHANGE UP
GLUCOSE SERPL-MCNC: 162 MG/DL — HIGH (ref 70–99)
GLUCOSE SERPL-MCNC: 224 MG/DL — HIGH (ref 70–99)
GRAM STN FLD: SIGNIFICANT CHANGE UP
HCT VFR BLD CALC: 27.4 % — LOW (ref 34.5–45)
HCT VFR BLD CALC: 27.9 % — LOW (ref 34.5–45)
HGB BLD-MCNC: 8.5 G/DL — LOW (ref 11.5–15.5)
HGB BLD-MCNC: 8.7 G/DL — LOW (ref 11.5–15.5)
LDH SERPL L TO P-CCNC: 166 U/L — SIGNIFICANT CHANGE UP
LYMPHOCYTES # FLD: 58 % — SIGNIFICANT CHANGE UP
MAGNESIUM SERPL-MCNC: 2.1 MG/DL — SIGNIFICANT CHANGE UP (ref 1.6–2.6)
MCHC RBC-ENTMCNC: 25.4 PG — LOW (ref 27–34)
MCHC RBC-ENTMCNC: 26 PG — LOW (ref 27–34)
MCHC RBC-ENTMCNC: 31 GM/DL — LOW (ref 32–36)
MCHC RBC-ENTMCNC: 31.2 GM/DL — LOW (ref 32–36)
MCV RBC AUTO: 82 FL — SIGNIFICANT CHANGE UP (ref 80–100)
MCV RBC AUTO: 83.3 FL — SIGNIFICANT CHANGE UP (ref 80–100)
MONOS+MACROS # FLD: 31 % — SIGNIFICANT CHANGE UP
NRBC # BLD: 0 /100 WBCS — SIGNIFICANT CHANGE UP (ref 0–0)
NRBC # BLD: 0 /100 WBCS — SIGNIFICANT CHANGE UP (ref 0–0)
OTHER CELLS FLD MANUAL: 7 % — SIGNIFICANT CHANGE UP
PH FLD: 7.6 — SIGNIFICANT CHANGE UP
PHOSPHATE SERPL-MCNC: 6.8 MG/DL — HIGH (ref 2.5–4.5)
PLATELET # BLD AUTO: 487 K/UL — HIGH (ref 150–400)
PLATELET # BLD AUTO: 491 K/UL — HIGH (ref 150–400)
POTASSIUM SERPL-MCNC: 4.4 MMOL/L — SIGNIFICANT CHANGE UP (ref 3.5–5.3)
POTASSIUM SERPL-MCNC: 4.8 MMOL/L — SIGNIFICANT CHANGE UP (ref 3.5–5.3)
POTASSIUM SERPL-SCNC: 4.4 MMOL/L — SIGNIFICANT CHANGE UP (ref 3.5–5.3)
POTASSIUM SERPL-SCNC: 4.8 MMOL/L — SIGNIFICANT CHANGE UP (ref 3.5–5.3)
PROT FLD-MCNC: 4.4 G/DL — SIGNIFICANT CHANGE UP
RBC # BLD: 3.34 M/UL — LOW (ref 3.8–5.2)
RBC # BLD: 3.35 M/UL — LOW (ref 3.8–5.2)
RBC # FLD: 16.8 % — HIGH (ref 10.3–14.5)
RBC # FLD: 17.1 % — HIGH (ref 10.3–14.5)
RCV VOL RI: 80 /UL — HIGH (ref 0–0)
SODIUM SERPL-SCNC: 138 MMOL/L — SIGNIFICANT CHANGE UP (ref 135–145)
SODIUM SERPL-SCNC: 138 MMOL/L — SIGNIFICANT CHANGE UP (ref 135–145)
SPECIMEN SOURCE FLD: SIGNIFICANT CHANGE UP
SPECIMEN SOURCE: SIGNIFICANT CHANGE UP
TOTAL NUCLEATED CELL COUNT, BODY FLUID: 65 /UL — SIGNIFICANT CHANGE UP
TUBE TYPE: SIGNIFICANT CHANGE UP
WBC # BLD: 13.12 K/UL — HIGH (ref 3.8–10.5)
WBC # BLD: 8.65 K/UL — SIGNIFICANT CHANGE UP (ref 3.8–10.5)
WBC # FLD AUTO: 13.12 K/UL — HIGH (ref 3.8–10.5)
WBC # FLD AUTO: 8.65 K/UL — SIGNIFICANT CHANGE UP (ref 3.8–10.5)

## 2019-03-21 PROCEDURE — 71045 X-RAY EXAM CHEST 1 VIEW: CPT | Mod: 26

## 2019-03-21 PROCEDURE — 99291 CRITICAL CARE FIRST HOUR: CPT

## 2019-03-21 PROCEDURE — 99233 SBSQ HOSP IP/OBS HIGH 50: CPT

## 2019-03-21 RX ORDER — METHADONE HYDROCHLORIDE 40 MG/1
40 TABLET ORAL DAILY
Qty: 0 | Refills: 0 | Status: DISCONTINUED | OUTPATIENT
Start: 2019-03-24 | End: 2019-03-31

## 2019-03-21 RX ORDER — NICOTINE POLACRILEX 2 MG
1 GUM BUCCAL DAILY
Qty: 0 | Refills: 0 | Status: DISCONTINUED | OUTPATIENT
Start: 2019-03-21 | End: 2019-05-08

## 2019-03-21 RX ORDER — NOREPINEPHRINE BITARTRATE/D5W 8 MG/250ML
0.05 PLASTIC BAG, INJECTION (ML) INTRAVENOUS
Qty: 8 | Refills: 0 | Status: DISCONTINUED | OUTPATIENT
Start: 2019-03-21 | End: 2019-03-21

## 2019-03-21 RX ORDER — FENTANYL CITRATE 50 UG/ML
50 INJECTION INTRAVENOUS
Qty: 0 | Refills: 0 | Status: DISCONTINUED | OUTPATIENT
Start: 2019-03-21 | End: 2019-03-22

## 2019-03-21 RX ORDER — DEXAMETHASONE 0.5 MG/5ML
10 ELIXIR ORAL EVERY 8 HOURS
Qty: 0 | Refills: 0 | Status: COMPLETED | OUTPATIENT
Start: 2019-03-21 | End: 2019-03-22

## 2019-03-21 RX ORDER — DOCUSATE SODIUM 100 MG
100 CAPSULE ORAL THREE TIMES A DAY
Qty: 0 | Refills: 0 | Status: DISCONTINUED | OUTPATIENT
Start: 2019-03-21 | End: 2019-04-22

## 2019-03-21 RX ADMIN — Medication 100 MILLIGRAM(S): at 21:57

## 2019-03-21 RX ADMIN — Medication 1 MILLIGRAM(S): at 11:51

## 2019-03-21 RX ADMIN — LIDOCAINE 1 PATCH: 4 CREAM TOPICAL at 22:07

## 2019-03-21 RX ADMIN — Medication 100 MILLIGRAM(S): at 14:48

## 2019-03-21 RX ADMIN — Medication 300 MILLIGRAM(S): at 17:11

## 2019-03-21 RX ADMIN — FENTANYL CITRATE 50 MICROGRAM(S): 50 INJECTION INTRAVENOUS at 19:55

## 2019-03-21 RX ADMIN — FENTANYL CITRATE 50 MICROGRAM(S): 50 INJECTION INTRAVENOUS at 21:58

## 2019-03-21 RX ADMIN — FENTANYL CITRATE 50 MICROGRAM(S): 50 INJECTION INTRAVENOUS at 09:29

## 2019-03-21 RX ADMIN — Medication 102 MILLIGRAM(S): at 21:57

## 2019-03-21 RX ADMIN — Medication 102 MILLIGRAM(S): at 06:45

## 2019-03-21 RX ADMIN — PANTOPRAZOLE SODIUM 40 MILLIGRAM(S): 20 TABLET, DELAYED RELEASE ORAL at 11:50

## 2019-03-21 RX ADMIN — FENTANYL CITRATE 50 MICROGRAM(S): 50 INJECTION INTRAVENOUS at 11:50

## 2019-03-21 RX ADMIN — FENTANYL CITRATE 50 MICROGRAM(S): 50 INJECTION INTRAVENOUS at 16:40

## 2019-03-21 RX ADMIN — FENTANYL CITRATE 50 MICROGRAM(S): 50 INJECTION INTRAVENOUS at 17:13

## 2019-03-21 RX ADMIN — Medication 1 PATCH: at 21:55

## 2019-03-21 RX ADMIN — Medication 102 MILLIGRAM(S): at 14:48

## 2019-03-21 RX ADMIN — FENTANYL CITRATE 50 MICROGRAM(S): 50 INJECTION INTRAVENOUS at 15:10

## 2019-03-21 RX ADMIN — FENTANYL CITRATE 50 MICROGRAM(S): 50 INJECTION INTRAVENOUS at 14:47

## 2019-03-21 RX ADMIN — Medication 300 MILLIGRAM(S): at 06:47

## 2019-03-21 RX ADMIN — CHLORHEXIDINE GLUCONATE 1 APPLICATION(S): 213 SOLUTION TOPICAL at 14:40

## 2019-03-21 RX ADMIN — FENTANYL CITRATE 50 MICROGRAM(S): 50 INJECTION INTRAVENOUS at 09:05

## 2019-03-21 RX ADMIN — GABAPENTIN 600 MILLIGRAM(S): 400 CAPSULE ORAL at 14:40

## 2019-03-21 RX ADMIN — Medication 1 TABLET(S): at 11:51

## 2019-03-21 RX ADMIN — FENTANYL CITRATE 50 MICROGRAM(S): 50 INJECTION INTRAVENOUS at 12:10

## 2019-03-21 RX ADMIN — GABAPENTIN 600 MILLIGRAM(S): 400 CAPSULE ORAL at 21:57

## 2019-03-21 RX ADMIN — FENTANYL CITRATE 50 MICROGRAM(S): 50 INJECTION INTRAVENOUS at 19:38

## 2019-03-21 RX ADMIN — FENTANYL CITRATE 50 MICROGRAM(S): 50 INJECTION INTRAVENOUS at 22:15

## 2019-03-21 NOTE — PROGRESS NOTE ADULT - SUBJECTIVE AND OBJECTIVE BOX
Patient is a 43y old  Female who presents with a chief complaint of AMS (22 Mar 2019 10:45)      INTERVAL HPI / OVERNIGHT EVENTS: pt in ICU ,extubated now cs/p thoracentesis and cervical spinal surgery     MEDICATIONS  (STANDING):  chlorhexidine 4% Liquid 1 Application(s) Topical <User Schedule>  dexamethasone  IVPB 10 milliGRAM(s) IV Intermittent every 8 hours  docusate sodium 100 milliGRAM(s) Oral three times a day  folic acid 1 milliGRAM(s) Oral daily  gabapentin 600 milliGRAM(s) Oral three times a day  lidocaine   Patch 1 Patch Transdermal every 24 hours  multivitamin 1 Tablet(s) Oral daily  nicotine -  14 mG/24Hr(s) Patch 1 patch Transdermal daily  pantoprazole  Injectable 40 milliGRAM(s) IV Push daily  vancomycin  IVPB 1500 milliGRAM(s) IV Intermittent every 12 hours    MEDICATIONS  (PRN):  acetaminophen   Tablet .. 650 milliGRAM(s) Oral every 6 hours PRN Mild Pain (1 - 3)  fentaNYL    Injectable 50 MICROGram(s) IV Push every 1 hour PRN Moderate Pain (4 - 6)      Vital Signs Last 24 Hrs  Tmax :100.5    Review of systems:  General : no fever /chills, fatigue  CVS : no chest pain, palpitations  Lungs : no shortness of breath, cough  GI : no abdominal pain, vomiting, diarrhea   : no dysuria, hematuria        PHYSICAL EXAM:  General :NAD  Constitutional:  well-groomed, well-developed  Respiratory: CTAB/L  Cardiovascular: S1 and S2, RRR, no M/G/R  Gastrointestinal: BS+, soft, NT/ND  Extremities: No peripheral edema  Vascular: 2+ peripheral pulses  Skin: No rashes      LABS:             WBC 13--> 8.65            MICROBIOLOGY:  RECENT CULTURES:  03-21 .Tissue cervical disc #2 culture XXXX       03-21 .Blood XXXX XXXX   No growth to date.    03-21 .Body Fluid LEFT THORACENTESIS XXXX   polymorphonuclear leukocytes seen  No organisms seen  by cytocentrifuge   No growth          RADIOLOGY & ADDITIONAL STUDIES:

## 2019-03-21 NOTE — PROGRESS NOTE ADULT - PROBLEM SELECTOR PLAN 4
may need LIZ again as new spetic emboli to spine and worsening lung infection  speak with cardiology

## 2019-03-21 NOTE — PROGRESS NOTE ADULT - ASSESSMENT
43 year old female with heroin abuse s/p drainage of cervical abscess. now POD#1    Plan  -WBAT  -No hot liquids  -MAP greater than 80  -has not required levo  -follow up wound cultures  -vanc by level  -methadone  -follow up ortho recs  -advance diet  -continue micu level of care    I have decided to review and order of clinical lab tests  Relevant radiology studies were reviewed  Decision made to obtain available old records  Discussion of test results with performing physician  Review and summarization of old records obtaining history from EMR   discussion of case with another health care provider Ortho cx ICU NP  I have visualized independent imaging, EKGs and radiologies studies available but not otherwise officially read  Patient is critically ill and could decompensate imminently.  The patient required immediate attention    ccm 40 min

## 2019-03-21 NOTE — PROGRESS NOTE ADULT - SUBJECTIVE AND OBJECTIVE BOX
CC: Patient is a 43y old  Female who presents with a chief complaint of AMS (21 Mar 2019 06:14)      ## HPI:HPI:  44 Y/O female w/ PMHx of IV heroin abuse brought in by EMS for lethargy and cough. As per EMS, pt was found laying in bed at home, lethargic however able to follow some commands. EMS reports that pt was attempting to detox from heroin, and last known use was 3 days prior to ED presentation. As per ED, pts boyfriend reports a hx of + pt cough productive of white sputum and a fall down a flight of stairs . Labs showed serum lactate of 7.2, BUN/Cr 124/6.42, and WBC of of 11.93. Tmax 102.4, urine + for cocaine, opiates, nitrites and many bacteria. ICU called for further evaluation. (2019 22:56)      O/N: s/p drainage of cervical abscesses.  now extubated doing well    ## ROS:  CONSTITUTIONAL:  no weight loss or night sweats  HEENT:  Eyes:  No diplopia or blurred vision. ENT:  No earache or runny nose. + sore throat  CARDIOVASCULAR:  No pressure, squeezing, tightness, heaviness or aching about the chest, neck, axilla or epigastrium.  RESPIRATORY:  No cough, shortness of breath, PND or orthopnea.  GASTROINTESTINAL:  No nausea, vomiting or diarrhea.  GENITOURINARY:  No dysuria, frequency or urgency.  MUSCULOSKELETAL:  No joint pain  NEUROLOGIC:  No paresthesias, fasciculations, seizures or weakness.  PSYCHIATRIC:  No disorder of thought or mood.    ## EXAM  ICU Vital Signs Last 24 Hrs  T(C): 36.8 (21 Mar 2019 07:27), Max: 38.1 (20 Mar 2019 13:50)  T(F): 98.2 (21 Mar 2019 07:27), Max: 100.5 (20 Mar 2019 13:50)  HR: 89 (21 Mar 2019 11:23) (68 - 96)  BP: 107/66 (21 Mar 2019 01:00) (103/57 - 118/70)  BP(mean): 75 (21 Mar 2019 01:00) (73 - 81)  ABP: 121/69 (21 Mar 2019 11:23) (102/54 - 136/76)  ABP(mean): 90 (21 Mar 2019 11:23) (74 - 99)  RR: 19 (21 Mar 2019 11:23) (11 - 21)  SpO2: 96% (21 Mar 2019 11:23) (96% - 100%)    CON : NAD  EENT : EOMI, MMM  NECK : in cervical collar  RESP : CTAB no increased WOB  CARD : rrr no m/r/g  ABD : S NT ND NABS no rebound  EXT : No edema  NEURO : AAOX3 LITTLE  ## Labs:  Lab Results:  CBC  CBC Full  -  ( 21 Mar 2019 10:04 )  WBC Count : 8.65 K/uL  Hemoglobin : 8.5 g/dL  Hematocrit : 27.4 %  Platelet Count - Automated : 491 K/uL  Mean Cell Volume : 82.0 fl  Mean Cell Hemoglobin : 25.4 pg  Mean Cell Hemoglobin Concentration : 31.0 gm/dL  Auto Neutrophil # : x  Auto Lymphocyte # : x  Auto Monocyte # : x  Auto Eosinophil # : x  Auto Basophil # : x  Auto Neutrophil % : x  Auto Lymphocyte % : x  Auto Monocyte % : x  Auto Eosinophil % : x  Auto Basophil % : x    .		Differential:	[] Automated		[] Manual  Chemistry                        8.5    8.65  )-----------( 491      ( 21 Mar 2019 10:04 )             27.4     03-21    138  |  105  |  20  ----------------------------<  224<H>  4.4   |  24  |  0.46<L>    Ca    9.0      21 Mar 2019 10:04  Phos  6.8     03-21  Mg     2.1     03-21        PT/INR - ( 20 Mar 2019 04:09 )   PT: 12.8 sec;   INR: 1.14 ratio         PTT - ( 20 Mar 2019 04:09 )  PTT:30.1 sec  Urinalysis Basic - ( 20 Mar 2019 02:12 )    Color: Yellow / Appearance: Clear / S.005 / pH: x  Gluc: x / Ketone: Negative  / Bili: Negative / Urobili: Negative mg/dL   Blood: x / Protein: Negative mg/dL / Nitrite: Negative   Leuk Esterase: Trace / RBC: x / WBC 3-5   Sq Epi: x / Non Sq Epi: Few / Bacteria: Few        ABG - ( 20 Mar 2019 23:51 )  pH, Arterial: x     pH, Blood: 7.27  /  pCO2: 62    /  pO2: 124   / HCO3: 28    / Base Excess: 0.9   /  SaO2: 98            MICROBIOLOGY/CULTURES:  gram stain pending    RADIOLOGY RESULTS:  < from: Xray Chest 1 View- PORTABLE-Urgent (19 @ 02:02) >  No venous catheter/line is visualized.  ET tube tip is 3.3cm above the nasrin.  Stable mild cardiomegaly.  Persistent mild superior mediastinal widening.  Stable small right pleural effusion.  Left basilar atelectatic changes are stable  Mild increase/worsening of right basilar haziness - due to atelectasis -   underlying right basilar pneumonia not excluded in the right clinical   setting.  No pneumothorax  No acute bony finding.    IMPRESSION:   Stable small right pleural effusion  Stable left basilar atelectasis.  Mild worsening of right basilar opacity.    < end of copied text >        ## Medications:  MEDICATIONS  (STANDING):  chlorhexidine 4% Liquid 1 Application(s) Topical <User Schedule>  dexamethasone  IVPB 10 milliGRAM(s) IV Intermittent every 8 hours  folic acid 1 milliGRAM(s) Oral daily  gabapentin 600 milliGRAM(s) Oral three times a day  lidocaine   Patch 1 Patch Transdermal every 24 hours  methadone    Tablet 40 milliGRAM(s) Oral daily  multivitamin 1 Tablet(s) Oral daily  norepinephrine Infusion 0.05 MICROgram(s)/kG/Min (6.684 mL/Hr) IV Continuous <Continuous>  pantoprazole  Injectable 40 milliGRAM(s) IV Push daily  vancomycin  IVPB 1500 milliGRAM(s) IV Intermittent every 12 hours    ## O/E:I&O's Summary    20 Mar 2019 07:  -  21 Mar 2019 07:00  --------------------------------------------------------  IN: 1000 mL / OUT: 1535 mL / NET: -535 mL    21 Mar 2019 07:  -  21 Mar 2019 12:07  --------------------------------------------------------  IN: 0 mL / OUT: 250 mL / NET: -250 mL        ## Daily Issues  - Analgesia: N/A  - Sedation: N/A  - HOB elevation: Y  - GI ppx (ulcers): N/A  - DVT ppx: Hep SC  - Nutrition: PO diet  - Central line: N  - Rubin: N      ## Code status:  Goals of care discussion: [x] yes [ ] no  [x] full code  [ ] DNR  [ ] DNI  [ ] MOLST

## 2019-03-21 NOTE — PROGRESS NOTE ADULT - SUBJECTIVE AND OBJECTIVE BOX
Patient seen and examined. Currently intubated, not sedated. Pain controlled. No acute events.    Vital Signs Last 24 Hrs  T(C): 36.6 (21 Mar 2019 05:00), Max: 38.1 (20 Mar 2019 13:50)  T(F): 97.8 (21 Mar 2019 05:00), Max: 100.5 (20 Mar 2019 13:50)  HR: 79 (21 Mar 2019 06:25) (69 - 96)  BP: 107/66 (21 Mar 2019 01:00) (103/57 - 118/70)  BP(mean): 75 (21 Mar 2019 01:00) (73 - 81)  RR: 21 (21 Mar 2019 06:00) (11 - 21)  SpO2: 100% (21 Mar 2019 06:25) (98% - 100%)    Physical Exam    Gen: NAD    Spine:   Dressing c/d/i  unable to fully assess motor/sensory exam  patient opens eyes   able to squeeze hands and wiggle toes  DP +  Compartments soft  No calf ttp    A/P: 43y Female s/p C4-5 ACDF with irrigation and debridement     extubate and remove milan when OK with ICU   FU KIRILL output  please keep MAPs >80  decadron 10mg q8 for 48 hours  continue in aspen collar  neurochecks q4  Pain control  DVT ppx- SCDs  PT/OT, WBAT  FU labs  Dispo planning  D/w attending

## 2019-03-21 NOTE — PROGRESS NOTE ADULT - ASSESSMENT
42 Y/O female w/ PMHx of IV heroin abuse, HCV,  brought in by EMS for lethargy and cough.    Labs showed serum lactate of 7.2, BUN/Cr 124/6.42, and WBC of of 11.93. Tmax 102.4, urine + for cocaine, opiates, nitrites and many bacteria.  Patient admitted to critical care in septic shock with MRSA bacteremia, in EFRAIN, and also endocarditis, intubated, on pressors. extubated on 2/25 and has been off pressors   LIZ done 2/25:  Moderately sized vegetation on the tricuspid valve, suggestive of bacterial endocarditis  Normal EF on echo  CT Chest: Bilateral cavitary lesions are increased in size and number compared to 2/17/2019. Small bilateral pleural effusions, loculated on the right, increased compared to the prior study. Unchanged right lower lobe dependent consolidation. Prominent mediastinal and axillary lymph nodes, possibly reactive.  repeat blood c/s since 3/8 negative   EKG 3/20/19 is unchanged from before. No heart block observed.      Pt with c/o neck pain bilateral UE weakness/numbness/tingling   MRI Spine: C4/C5 discitis/osteomyelitis with a small right of midline ventral epidural abscess. Mild cord compression. Possible discitis/osteomyelitis at C6/C7. Moderate anterior prevertebral effusion versus abscess/phlegmon within the C2/C3-C6/C7 level. Left C2 inferior facet osteomyelitis with adjacent soft tissue enhancement. Posterior soft tissue enhancement adjacent to the spinous processes of C4, C5, C6. Tiny soft tissue abscess measuring 5 mm adjacent to the C5 spinous process tip.  See MRI thoracic/lumbar spine report above.    POD #1 s/p cervical abscess drainage.  She is currently extubated and HD stable.  Ortho following.  ABx as per ID, currently on IV vanco  on daily methadone 20mg/nicotine patch   H/H down to 6.8/22.3 3/20/19, 2 units PRBC transfusion completed -> 8.5/27.    Currently HD stable from a cardiac standpoint... no evidence of CHF/arrhythmias/ischemia.    Will sign off for now; please call back with any further question.

## 2019-03-21 NOTE — PROGRESS NOTE ADULT - SUBJECTIVE AND OBJECTIVE BOX
Patient seen and examined. Currently intubated, sedated. Pain controlled. No acute events.    Vital Signs Last 24 Hrs  T(C): 37.2 (03-20-19 @ 17:30), Max: 38.1 (03-20-19 @ 13:50)  T(F): 98.9 (03-20-19 @ 17:30), Max: 100.5 (03-20-19 @ 13:50)  HR: 88 (03-20-19 @ 23:19) (79 - 94)  BP: 113/69 (03-20-19 @ 17:30) (98/58 - 115/66)  BP(mean): --  RR: 16 (03-20-19 @ 17:30) (16 - 20)  SpO2: 100% (03-20-19 @ 23:19) (98% - 100%)    Physical Exam    Gen: NAD    Spine:   Dressing c/d/i  unable to assess motor/sensory exam  patient opens eyes only  DP +  Compartments soft  No calf ttp    A/P: 43y Female s/p C4-5 ACDF   extubate and remove milan when OK with ICU   Pain control  DVT ppx- SCDs  PT/OT, WBAT  FU labs  Dispo planning  D/w attending

## 2019-03-21 NOTE — PROGRESS NOTE ADULT - ASSESSMENT
A/P: 43y Female s/p C4-5 ACDF with irrigation and debridement, now POD 1  ADAT, no hot liquids  FU KIRILL output  please keep MAPs >80  decadron 10mg q8 for 48 hours  continue in aspen collar  neurochecks q4  Pain control  DVT ppx- SCDs  PT/OT, WBAT  FU labs  Dispo planning  D/w attending

## 2019-03-21 NOTE — PROGRESS NOTE ADULT - SUBJECTIVE AND OBJECTIVE BOX
Patient is a 43y old  Female who presents with a chief complaint of AMS (20 Mar 2019 09:17)      PAST MEDICAL & SURGICAL HISTORY:  ETOH abuse  Drug abuse  Hep C  C Section      INTERVAL HISTORY: Pt seen resting in bed in no acute distress, c/o neck pain and bilateral UE and feet weakness/numbness/tingling. Pt denies chest pain, palpitation, SOB or dizziness.     POD #1 s/p cervical abscess drainage.  She is currently extubated and HD stable.  	  MEDICATIONS  (STANDING):  chlorhexidine 4% Liquid 1 Application(s) Topical <User Schedule>  dexamethasone  IVPB 10 milliGRAM(s) IV Intermittent every 8 hours  docusate sodium 100 milliGRAM(s) Oral three times a day  folic acid 1 milliGRAM(s) Oral daily  gabapentin 600 milliGRAM(s) Oral three times a day  lidocaine   Patch 1 Patch Transdermal every 24 hours  methadone    Tablet 40 milliGRAM(s) Oral daily  multivitamin 1 Tablet(s) Oral daily  norepinephrine Infusion 0.05 MICROgram(s)/kG/Min (6.684 mL/Hr) IV Continuous <Continuous>  pantoprazole  Injectable 40 milliGRAM(s) IV Push daily  vancomycin  IVPB 1500 milliGRAM(s) IV Intermittent every 12 hours    MEDICATIONS  (PRN):  fentaNYL    Injectable 50 MICROGram(s) IV Push every 1 hour PRN Moderate Pain (4 - 6)      Vitals:  Vital Signs Last 24 Hrs  T(C): 36.7 (21 Mar 2019 15:33), Max: 37.2 (20 Mar 2019 17:30)  T(F): 98 (21 Mar 2019 15:33), Max: 98.9 (20 Mar 2019 17:30)  HR: 81 (21 Mar 2019 16:05) (68 - 96)  BP: 107/66 (21 Mar 2019 01:00) (107/66 - 118/70)  BP(mean): 75 (21 Mar 2019 01:00) (73 - 81)  RR: 19 (21 Mar 2019 16:05) (11 - 21)  SpO2: 98% (21 Mar 2019 16:05) (96% - 100%)    PHYSICAL EXAM:  Neuro: Awake, responsive  CV: S1 S2 RRR,+ SM  Lungs: Diminished breath sounds  GI: Soft, BS +, ND, NT  Extremities: No edema    RADIOLOGY: < from: Xray Chest 1 View- PORTABLE-Routine (03.20.19 @ 05:35) >  FINDINGS:   Prior ET and NG tubes have been withdrawn.  Mild right-sided superior mediastinal widening again noted.  The cardiac silhouette is stable in appearance.  Stable small right pleural effusion.  No significant left pleural effusion.  Bibasilar atelectatic changes present- left more than right.  No pneumothorax.  No acute bony finding.    < from: CT Chest No Cont (03.01.19 @ 11:32) >  Bilateral cavitary lesions are increased in size and number compared to   2/17/2019.     Small bilateral pleural effusions, loculated on the right, increased   compared to the prior study. Unchanged right lower lobe dependent  consolidation.    Prominent mediastinal and axillary lymph nodes, possibly reactive.      < end of copied text >      < end of copied text >    < from: MR Cervical Spine w/ IV Cont (03.19.19 @ 17:18) >  IMPRESSION:    C4/C5 discitis/osteomyelitis with a small right of midline ventral   epidural abscess. Mild cord compression.  Possible discitis/osteomyelitis at C6/C7.   Moderate anterior prevertebral effusion versus abscess/phlegmon within   the C2/C3-C6/C7 level.  Left C2 inferior facet osteomyelitis with adjacent soft tissue   enhancement.  Posterior soft tissue enhancement adjacent to the spinous processes of   C4, C5, C6.   Tiny soft tissue abscess measuring 5 mm adjacent to the C5 spinous   process tip.    < end of copied text >    < from: Xray Cervical Spine AP and Lateral Only (03.20.19 @ 10:42) >  MR study of March 19 showed findings of C4-5 level concerning for   discitis. There was alsoprevertebral swelling.    On present study curve reversal at the C4-5 level which is fairly   pronounced again noted. There is destruction of the C4-5 disc.    There is soft tissue swelling anterior to the C4-5 level.    Degeneration at C5-6 and C6-7noted.    No fractures are seen.    Findings are similar to CAT scan of March 18.    IMPRESSION: Findings particularly at C4-5 with anterior swelling again   noted.    < end of copied text >    < from: MR Head w/wo IV Cont (03.20.19 @ 10:22) >  IMPRESSION: No acute intracranial hemorrhage, acute ischemia, or abnormal   intracranial enhancement.    Redemonstration of discitis/osteomyelitis at C4-C5 with focal kyphotic   angulation associated prevertebral abscess.    < end of copied text >    20 Mar 2019 04:09  < from: MR Thoracic Spine w/wo IV Cont (03.20.19 @ 10:22) >  IMPRESSION:    1. Discitis/osteomyelitis at T11-T12 with surrounding anterolateral   paravertebral phlegmonous and inflammatory changes. Developing left-sided   anterolateral paravertebral abscess, as discussed.    2. Redemonstration of discitis/osteomyelitis at the C6-C7 (anteriorly)   and at C7-T1 (asymmetric towards the right side).    3. Septic arthritis involving the facet joints at T9-T10, T10-T11, L4-L5,   and L5-S1, as discussed. Suggestion of a thin epidural phlegmon/early   abscess dorsolaterally within the lumbar canal at the level of L4-L5.    4. Septic arthritis involving the left sternoclavicular joint space. This   can be more optimally evaluated with a contrast-enhanced chest MRI   examination, as clinically warranted.    5. Cavitary lung lesions which are better assessed on the prior chest,   abdomen, and pelvis CT exam.    6. Rim-enhancing left sided pleural fluid for which an empyema cannot be   excluded. Correlate with thoracentesis.    7. Disc herniation at the L4-L5 level with contact and probable   impingement of the descending right-sided nerve roots.      < end of copied text >    DIAGNOSTIC TESTING:    [x ] Echocardiogram: < from: LIZ Echo Doppler (02.25.19 @ 13:40) >  Summary:   1. Left ventricular ejection fraction, by visual estimation, is 65 to   70%.   2. There is no left ventricular hypertrophy.   3. Moderately sized vegetation on the tricuspid valve, suggestive of   bacterial endocarditis.    < end of copied text >  < from: TTE Echo Doppler w/o Cont (02.18.19 @ 09:46) >   1. Left ventricular ejection fraction, by visual estimation, is 60 to   65%.   2. There is no left ventricular hypertrophy.   3. Trace tricuspid regurgitation.   4. Pulmonic valve regurgitation.   5. Estimated pulmonary artery systolic pressure is 35.2 mmHg assuming a   right atrial pressure of 5 mmHg, which is consistent with mild pulmonary   hypertension.    < end of copied text >        LABS:	 	                          8.5    8.65  )-----------( 491      ( 21 Mar 2019 10:04 )             27.4     03-21    138  |  105  |  20  ----------------------------<  224<H>  4.4   |  24  |  0.46<L>    Ca    9.0      21 Mar 2019 10:04  Phos  6.8     03-21  Mg     2.1     03-21

## 2019-03-21 NOTE — PROGRESS NOTE ADULT - PROBLEM SELECTOR PLAN 1
s/p C4-5 ACDF with irrigation and debridement POD 1   follow up on c/s (repeat blood ) and tissue c/s and plerual fluid c/s  cont vanco for now

## 2019-03-21 NOTE — PROGRESS NOTE ADULT - SUBJECTIVE AND OBJECTIVE BOX
Gen: NAD    Spine PE:  Skin intact  Aspen Collar In Place  dressing c/d/i  KIRILL Drain in place   Negative clonus  Negative babinski  Negative olmstead           Deltoid        Bicep        Tricep          Wrist Ext    Wrist Flex    Finger Flex          Finger Abd  L            4/5         3/5           4/5             4/5                4/5	           4/5                    4/5  R            2/5 	   3/5           3/5             3/5              3/5                        3/5                   3/5                                      Hip Flex       Quad     Ankle DF        Ankle PF       Toe Ext        Hamstring    L            5/5 5/5 5/5 5/5 5/5 5/5  R            5/5 5/5 5/5 5/5 5/5 5/5    Sensory:            C5         C6         C7      C8       T1        (0=absent, 1=impaired, 2=normal, NT=not testable)  L         2            2           2        2         2  R          2            2           2        2         2               L2          L3         L4      L5       S1         (0=absent, 1=impaired, 2=normal, NT=not testable)  L         2            2            2        2        2  R          2            2           2        2         2    Paresthesias in stocking/glove distribution at baseline  Hyperreflexia (Patellar/Achilles)

## 2019-03-22 ENCOUNTER — TRANSCRIPTION ENCOUNTER (OUTPATIENT)
Age: 44
End: 2019-03-22

## 2019-03-22 DIAGNOSIS — M46.20 OSTEOMYELITIS OF VERTEBRA, SITE UNSPECIFIED: ICD-10-CM

## 2019-03-22 LAB
ANION GAP SERPL CALC-SCNC: 6 MMOL/L — SIGNIFICANT CHANGE UP (ref 5–17)
BUN SERPL-MCNC: 20 MG/DL — SIGNIFICANT CHANGE UP (ref 7–23)
CALCIUM SERPL-MCNC: 8.4 MG/DL — LOW (ref 8.5–10.1)
CHLORIDE SERPL-SCNC: 102 MMOL/L — SIGNIFICANT CHANGE UP (ref 96–108)
CO2 SERPL-SCNC: 27 MMOL/L — SIGNIFICANT CHANGE UP (ref 22–31)
CREAT SERPL-MCNC: 0.53 MG/DL — SIGNIFICANT CHANGE UP (ref 0.5–1.3)
CRP SERPL-MCNC: 7.56 MG/DL — HIGH (ref 0–0.4)
ERYTHROCYTE [SEDIMENTATION RATE] IN BLOOD: 116 MM/HR — HIGH (ref 0–15)
GLUCOSE SERPL-MCNC: 213 MG/DL — HIGH (ref 70–99)
HCT VFR BLD CALC: 26.6 % — LOW (ref 34.5–45)
HGB BLD-MCNC: 8.2 G/DL — LOW (ref 11.5–15.5)
MAGNESIUM SERPL-MCNC: 2.2 MG/DL — SIGNIFICANT CHANGE UP (ref 1.6–2.6)
MCHC RBC-ENTMCNC: 25.5 PG — LOW (ref 27–34)
MCHC RBC-ENTMCNC: 30.8 GM/DL — LOW (ref 32–36)
MCV RBC AUTO: 82.9 FL — SIGNIFICANT CHANGE UP (ref 80–100)
NRBC # BLD: 0 /100 WBCS — SIGNIFICANT CHANGE UP (ref 0–0)
PHOSPHATE SERPL-MCNC: 2.3 MG/DL — LOW (ref 2.5–4.5)
PLATELET # BLD AUTO: 464 K/UL — HIGH (ref 150–400)
POTASSIUM SERPL-MCNC: 4.5 MMOL/L — SIGNIFICANT CHANGE UP (ref 3.5–5.3)
POTASSIUM SERPL-SCNC: 4.5 MMOL/L — SIGNIFICANT CHANGE UP (ref 3.5–5.3)
RBC # BLD: 3.21 M/UL — LOW (ref 3.8–5.2)
RBC # FLD: 17.2 % — HIGH (ref 10.3–14.5)
SODIUM SERPL-SCNC: 135 MMOL/L — SIGNIFICANT CHANGE UP (ref 135–145)
WBC # BLD: 9.63 K/UL — SIGNIFICANT CHANGE UP (ref 3.8–10.5)
WBC # FLD AUTO: 9.63 K/UL — SIGNIFICANT CHANGE UP (ref 3.8–10.5)

## 2019-03-22 PROCEDURE — 99233 SBSQ HOSP IP/OBS HIGH 50: CPT

## 2019-03-22 PROCEDURE — 93306 TTE W/DOPPLER COMPLETE: CPT | Mod: 26

## 2019-03-22 PROCEDURE — 93010 ELECTROCARDIOGRAM REPORT: CPT

## 2019-03-22 RX ORDER — ACETAMINOPHEN 500 MG
650 TABLET ORAL EVERY 6 HOURS
Qty: 0 | Refills: 0 | Status: DISCONTINUED | OUTPATIENT
Start: 2019-03-22 | End: 2019-05-08

## 2019-03-22 RX ORDER — DAPTOMYCIN 500 MG/10ML
INJECTION, POWDER, LYOPHILIZED, FOR SOLUTION INTRAVENOUS
Qty: 0 | Refills: 0 | Status: DISCONTINUED | OUTPATIENT
Start: 2019-03-22 | End: 2019-04-08

## 2019-03-22 RX ORDER — SENNA PLUS 8.6 MG/1
2 TABLET ORAL AT BEDTIME
Qty: 0 | Refills: 0 | Status: DISCONTINUED | OUTPATIENT
Start: 2019-03-22 | End: 2019-05-08

## 2019-03-22 RX ORDER — LINEZOLID 600 MG/300ML
600 INJECTION, SOLUTION INTRAVENOUS EVERY 12 HOURS
Qty: 0 | Refills: 0 | Status: DISCONTINUED | OUTPATIENT
Start: 2019-03-22 | End: 2019-04-08

## 2019-03-22 RX ORDER — MORPHINE SULFATE 50 MG/1
2 CAPSULE, EXTENDED RELEASE ORAL EVERY 4 HOURS
Qty: 0 | Refills: 0 | Status: DISCONTINUED | OUTPATIENT
Start: 2019-03-22 | End: 2019-03-27

## 2019-03-22 RX ORDER — POLYETHYLENE GLYCOL 3350 17 G/17G
17 POWDER, FOR SOLUTION ORAL DAILY
Qty: 0 | Refills: 0 | Status: DISCONTINUED | OUTPATIENT
Start: 2019-03-22 | End: 2019-04-06

## 2019-03-22 RX ORDER — DAPTOMYCIN 500 MG/10ML
430 INJECTION, POWDER, LYOPHILIZED, FOR SOLUTION INTRAVENOUS EVERY 24 HOURS
Qty: 0 | Refills: 0 | Status: DISCONTINUED | OUTPATIENT
Start: 2019-03-23 | End: 2019-04-08

## 2019-03-22 RX ORDER — NYSTATIN CREAM 100000 [USP'U]/G
1 CREAM TOPICAL THREE TIMES A DAY
Qty: 0 | Refills: 0 | Status: DISCONTINUED | OUTPATIENT
Start: 2019-03-22 | End: 2019-05-08

## 2019-03-22 RX ORDER — DAPTOMYCIN 500 MG/10ML
430 INJECTION, POWDER, LYOPHILIZED, FOR SOLUTION INTRAVENOUS ONCE
Qty: 0 | Refills: 0 | Status: COMPLETED | OUTPATIENT
Start: 2019-03-22 | End: 2019-03-22

## 2019-03-22 RX ADMIN — LIDOCAINE 1 PATCH: 4 CREAM TOPICAL at 07:00

## 2019-03-22 RX ADMIN — Medication 650 MILLIGRAM(S): at 05:01

## 2019-03-22 RX ADMIN — MORPHINE SULFATE 2 MILLIGRAM(S): 50 CAPSULE, EXTENDED RELEASE ORAL at 15:44

## 2019-03-22 RX ADMIN — FENTANYL CITRATE 50 MICROGRAM(S): 50 INJECTION INTRAVENOUS at 09:21

## 2019-03-22 RX ADMIN — Medication 100 MILLIGRAM(S): at 14:35

## 2019-03-22 RX ADMIN — GABAPENTIN 600 MILLIGRAM(S): 400 CAPSULE ORAL at 14:35

## 2019-03-22 RX ADMIN — Medication 300 MILLIGRAM(S): at 05:01

## 2019-03-22 RX ADMIN — GABAPENTIN 600 MILLIGRAM(S): 400 CAPSULE ORAL at 22:48

## 2019-03-22 RX ADMIN — GABAPENTIN 600 MILLIGRAM(S): 400 CAPSULE ORAL at 05:01

## 2019-03-22 RX ADMIN — MORPHINE SULFATE 2 MILLIGRAM(S): 50 CAPSULE, EXTENDED RELEASE ORAL at 20:10

## 2019-03-22 RX ADMIN — Medication 102 MILLIGRAM(S): at 05:00

## 2019-03-22 RX ADMIN — LIDOCAINE 1 PATCH: 4 CREAM TOPICAL at 22:48

## 2019-03-22 RX ADMIN — Medication 650 MILLIGRAM(S): at 23:46

## 2019-03-22 RX ADMIN — Medication 100 MILLIGRAM(S): at 22:46

## 2019-03-22 RX ADMIN — MORPHINE SULFATE 2 MILLIGRAM(S): 50 CAPSULE, EXTENDED RELEASE ORAL at 21:40

## 2019-03-22 RX ADMIN — Medication 1 PATCH: at 22:00

## 2019-03-22 RX ADMIN — LIDOCAINE 1 PATCH: 4 CREAM TOPICAL at 11:11

## 2019-03-22 RX ADMIN — MORPHINE SULFATE 2 MILLIGRAM(S): 50 CAPSULE, EXTENDED RELEASE ORAL at 16:00

## 2019-03-22 RX ADMIN — Medication 1 MILLIGRAM(S): at 11:17

## 2019-03-22 RX ADMIN — Medication 650 MILLIGRAM(S): at 22:46

## 2019-03-22 RX ADMIN — LINEZOLID 600 MILLIGRAM(S): 600 INJECTION, SOLUTION INTRAVENOUS at 17:12

## 2019-03-22 RX ADMIN — Medication 1 PATCH: at 22:54

## 2019-03-22 RX ADMIN — Medication 102 MILLIGRAM(S): at 15:45

## 2019-03-22 RX ADMIN — SENNA PLUS 2 TABLET(S): 8.6 TABLET ORAL at 22:47

## 2019-03-22 RX ADMIN — FENTANYL CITRATE 50 MICROGRAM(S): 50 INJECTION INTRAVENOUS at 09:36

## 2019-03-22 RX ADMIN — Medication 100 MILLIGRAM(S): at 05:01

## 2019-03-22 RX ADMIN — Medication 1 PATCH: at 07:00

## 2019-03-22 RX ADMIN — Medication 1 PATCH: at 11:56

## 2019-03-22 RX ADMIN — NYSTATIN CREAM 1 APPLICATION(S): 100000 CREAM TOPICAL at 22:48

## 2019-03-22 RX ADMIN — Medication 102 MILLIGRAM(S): at 22:46

## 2019-03-22 RX ADMIN — DAPTOMYCIN 117.2 MILLIGRAM(S): 500 INJECTION, POWDER, LYOPHILIZED, FOR SOLUTION INTRAVENOUS at 12:31

## 2019-03-22 RX ADMIN — Medication 1 TABLET(S): at 11:17

## 2019-03-22 RX ADMIN — PANTOPRAZOLE SODIUM 40 MILLIGRAM(S): 20 TABLET, DELAYED RELEASE ORAL at 11:17

## 2019-03-22 RX ADMIN — MORPHINE SULFATE 2 MILLIGRAM(S): 50 CAPSULE, EXTENDED RELEASE ORAL at 12:12

## 2019-03-22 RX ADMIN — CHLORHEXIDINE GLUCONATE 1 APPLICATION(S): 213 SOLUTION TOPICAL at 11:17

## 2019-03-22 RX ADMIN — MORPHINE SULFATE 2 MILLIGRAM(S): 50 CAPSULE, EXTENDED RELEASE ORAL at 11:56

## 2019-03-22 NOTE — PROVIDER CONTACT NOTE (CRITICAL VALUE NOTIFICATION) - SITUATION
Blood culture result take 2/27/2019  Positive for blood culture growth in anaerobic bottle positive cocci in clusters
Lab called with positive growth in blood cultures . Ayesha KENNEDY aware.
pt. already on antibiotics vancomycin
Lab called with positive culture report. Ayesha KENNEDY made aware.
Lab called with primarily results of blood culture . Positive for MRSA
Trending CBC's
pt. already on antibiotics

## 2019-03-22 NOTE — CHART NOTE - NSCHARTNOTEFT_GEN_A_CORE
HPI    Patient is a 44 Y/O female w/ PMHx of IV heroin abuse, HCV,  brought in by EMS for lethargy and cough. As per EMS, pt was found laying in bed at home, lethargic however able to follow some commands. EMS reports that pt was attempting to detox from heroin, and last known use was 3 days prior to ED presentation. As per ED, pts boyfriend reports a hx of + pt cough productive of white sputum and a fall down a flight of stairs 2/16. Labs showed serum lactate of 7.2, BUN/Cr 124/6.42, and WBC of of 11.93. Tmax 102.4, urine + for cocaine, opiates, nitrites and many bacteria. Patient admitted to critical care in septic shock with MRSA bacteremia, in EFRAIN, and also endocarditis, as LIZ done 2/25:  evidence of a large vegetation on the medial cusp of her lateral tricuspid valve leaflet which is pedunculated and homogeneous. Extubated on 2/25/19 doing better on nasal cannula, awake and alert with some confusion, BP had been stable. Blood culture + on vancomycin 1g fletcher 8hours being followed by ID. Dr. Garcia  possible CT surgery evaluation for continues positive blood cultures.  Patient transferred to hospitalist service on 2/26. While on the floor, patient c/o neck pain on 3/18  had CT neck done: < from: CT Cervical Spine No Cont (03.18.19 @ 11:49) >  Disc space narrowing and endplate irregularity at C4-5 which may   represent typical degenerative change however concern for discitis raised   by reported history of bacteremia and presence of prevertebral edema.   Contrast-enhanced MRI of the cervical spine suggested   then MRI C/spine: < from: MR Cervical Spine w/ IV Cont (03.19.19 @ 17:18) >  C4/C5 discitis/osteomyelitis with a small right of midline ventral   epidural abscess. Mild cord compression.  Possible discitis/osteomyelitis at C6/C7.   Moderate anterior prevertebral effusion versus abscess/phlegmon within   the C2/C3-C6/C7 level.  Left C2 inferior facet osteomyelitis with adjacent soft tissue   enhancement.  Posterior soft tissue enhancement adjacent to the spinous processes of   C4, C5, C6.   Tiny soft tissue abscess measuring 5 mm adjacent to the C5 spinous process tip.      3/20, patient underwent  Epidural Abscess C4-5, discitis C6-7, prevertebral abscess C2-T1, osteomyelitis C2, C4, C5, C6-C7, as per anesthesia, difficult intubation, and required some levophed during the procedure. Patient kept intubated and transferred to critical care post-op. Patient extubated on 3/21 without difficulty. As per Ortho MAP to be > 80, Physical therapy ordered.  Continue with antibiotics as per ID now prn Daptomycin and Zyvox, Pain management with Morphine no fentanyl as interaction with antibiotics, To restart methadone 3/23; continue gabapentin and Lidocaine patch.     Patient seen and examined by ICU attending and stable for transfer to medicine service.    Report given to Dr Verduzco, hospitalist service    Stephanie Renee, KRISTA-C  critical care

## 2019-03-22 NOTE — CHART NOTE - NSCHARTNOTEFT_GEN_A_CORE
Assessment: Pt seen for admit to ICU and moderate malnutrition follow up. Pt transferred to ICU intubated post-op s/p irrigation & debridement of C2-7, C 4-7 ACDF & thoracentesis. Pt extubated 03-21 advanced diet to soft. Pt tolerating diet well with no N/V/D pt made aware nursing staff of constipation. Pt requested probiotic, ordering Cheng Active bid.    Factors impacting intake: [x ] none [ ] nausea  [ ] vomiting [ ] diarrhea [ ] constipation  [ ]chewing problems [ ] swallowing issues  [ ] other:     Diet Prescription: Diet, Soft:   Supplement Feeding Modality:  Oral  Ensure Enlive Cans or Servings Per Day:  1       Frequency:  Three Times a day (03-21-19 @ 12:41)    Intake: 50%     Current Weight: Weight (kg): 66.4 (03-14); 70.1 (03-22)  % Weight Change: 5% wt gain     Physical appearance: BMI 24.1; 1+ generalized edema noted; Nutrition focused physical exam conducted:  Subcutaneous fat loss: [mild ] Orbital fat pads region, [ WDL ]Buccal fat region, [ mid ]Triceps region,  [mild ]Ribs region.  Muscle wasting: [mild ]Temples region, [ mild ]Clavicle region, [ WDL ]Shoulder region, [ unable ]Scapula region, [ WDL ]Interosseous region,  [mild  ]thigh region, [ mild ]Calf region    Pertinent Medications: MEDICATIONS  (STANDING):  chlorhexidine 4% Liquid 1 Application(s) Topical <User Schedule>  DAPTOmycin IVPB      dexamethasone  IVPB 10 milliGRAM(s) IV Intermittent every 8 hours  docusate sodium 100 milliGRAM(s) Oral three times a day  folic acid 1 milliGRAM(s) Oral daily  gabapentin 600 milliGRAM(s) Oral three times a day  lidocaine   Patch 1 Patch Transdermal every 24 hours  linezolid    Tablet 600 milliGRAM(s) Oral every 12 hours  multivitamin 1 Tablet(s) Oral daily  nicotine -  14 mG/24Hr(s) Patch 1 patch Transdermal daily  pantoprazole  Injectable 40 milliGRAM(s) IV Push daily    MEDICATIONS  (PRN):  acetaminophen   Tablet .. 650 milliGRAM(s) Oral every 6 hours PRN Mild Pain (1 - 3)  morphine  - Injectable 2 milliGRAM(s) IV Push every 4 hours PRN Moderate Pain (4 - 6)    Pertinent Labs: 03-22 Na 135 mmol/L Glu 213 mg/dL<H> K+ 4.5 mmol/L Cr 0.53 mg/dL BUN 20 mg/dL Phos 2.3 mg/dL<L> Alb n/a   PAB n/a   Hgb 8.2 g/dL<L> Hct 26.6 %<L> HgA1C n/a    Glucose, Serum: 213 mg/dL <H>   24Hr FS:  Skin: stage 1 left heal; abscess left knee     Estimated Needs:   [ x ] no change since previous assessment (02/25) - maintain increased protein needs to promote wound healing   [ ] recalculated:     Previous Nutrition Diagnosis:   [ ] Inadequate Energy Intake [ ]Inadequate Oral Intake [ ] Excessive Energy Intake   [ ] Underweight [ ] Increased Nutrient Needs [ ] Overweight/Obesity   [ ] Altered GI Function [ ] Unintended Weight Loss [ ] Food & Nutrition Related Knowledge Deficit [ ] severe Malnutrition [x ] moderate acute malnutrition    Nutrition Diagnosis is [x ] ongoing  [ ] resolved  [ ] improved  [ ] not applicable   Previous Goal: Pt to Meet > 75 % energy & protein needs via meals/supplement; tolerate PO diet (not met; met)       New Nutrition Diagnosis: [ x ] not applicable       Interventions:   Recommend  [ x ] Continue: regular diet soft; Ensure Enlive x 3/day (provides 1050 kcal, 60 g protein)   [ ] Change Diet To:  [ x ] Nutrition Supplement: Cheng Active pro-biotic bid  [ ] Nutrition Support:  [ ] Other:     Monitoring and Evaluation:   [ x ] PO intake [ x ] Tolerance to diet prescription [ x ] weights [ x ] labs[ x ] follow up per protocol  [ ] other:

## 2019-03-22 NOTE — DISCHARGE NOTE PROVIDER - CARE PROVIDERS DIRECT ADDRESSES
,DirectAddress_Unknown ,DirectAddress_Unknown,DirectAddress_Unknown ,DirectAddress_Unknown,DirectAddress_Unknown,[email or web address] ,[email or web address],[email or web address],DirectAddress_Unknown ,DirectAddress_Unknown,[email or web address],DirectAddress_Unknown,DirectAddress_Unknown

## 2019-03-22 NOTE — DISCHARGE NOTE PROVIDER - NSDCCPTREATMENT_GEN_ALL_CORE_FT
PRINCIPAL PROCEDURE  Procedure: Anterior cervical discectomy with fusion (ACDF)  Findings and Treatment: 1. Walk plenty  2. No lifting over 5 lbs  3. Use cervical collar at all times until cleared by Dr. Leslie.  4. Keep bandage on, clean and dry. Change to a new one if gets wet or dirty. Ok to shower from waist down (neck surgery only). Make sure you have a bar to hold onto in the shower.   5. Pain meds: eRx sent to your pharmacy electronically, you need to pick it up  6. No driving on pain meds  7. See your surgeon Dr. Leslie in about 10 days in the office. Call to schedule. PRINCIPAL PROCEDURE  Procedure: Anterior cervical discectomy with fusion (ACDF)  Findings and Treatment: 1. Walk plenty  2. No lifting over 5 lbs  3. Use cervical collar at all times until cleared by Dr. Leslie.  4. Keep bandage on, clean and dry. Change to a new one if gets wet or dirty. Ok to shower from waist down (neck surgery only). Make sure you have a bar to hold onto in the shower.   5. Pain meds: eRx sent to your pharmacy electronically, you need to pick it up  6. No driving on pain meds  7. See your surgeon Dr. Leslie in about 10 days in the office. Call to schedule.  8. Recommend PO Abx per ID for 1 year, re evaluation at that time for possible lifelong suppressive Abx

## 2019-03-22 NOTE — PROVIDER CONTACT NOTE (CRITICAL VALUE NOTIFICATION) - TEST AND RESULT REPORTED:
tissue culture gram stain  3/20/19 2035 gram stain result moderate polymorphic neuclosites per low powerfield, rare gram positive cocci impaired per oil powerfield   3/20/19 2100 gram stain result same as previous result

## 2019-03-22 NOTE — PROGRESS NOTE ADULT - SUBJECTIVE AND OBJECTIVE BOX
CC: Patient is a 43y old  Female who presents with a chief complaint of AMS (22 Mar 2019 07:41)      ## HPI:HPI:  42 Y/O female w/ PMHx of IV heroin abuse brought in by EMS for lethargy and cough. As per EMS, pt was found laying in bed at home, lethargic however able to follow some commands. EMS reports that pt was attempting to detox from heroin, and last known use was 3 days prior to ED presentation. As per ED, pts boyfriend reports a hx of + pt cough productive of white sputum and a fall down a flight of stairs 2/16. Labs showed serum lactate of 7.2, BUN/Cr 124/6.42, and WBC of of 11.93. Tmax 102.4, urine + for cocaine, opiates, nitrites and many bacteria. ICU called for further evaluation. (17 Feb 2019 22:56)      O/N: KENIA, compalins of b/l tingling UE    ## ROS:  CONSTITUTIONAL:  no weight loss or night sweats  HEENT:  Eyes:  No diplopia or blurred vision. ENT:  No earache, sore throat or runny nose.  CARDIOVASCULAR:  No pressure, squeezing, tightness, heaviness or aching about the chest, neck, axilla or epigastrium.  RESPIRATORY:  No cough, shortness of breath, PND or orthopnea.  GASTROINTESTINAL:  No nausea, vomiting or diarrhea.  GENITOURINARY:  No dysuria, frequency or urgency.  MUSCULOSKELETAL:  + neck pain  NEUROLOGIC:  No paresthesias, fasciculations, seizures or weakness.  PSYCHIATRIC:  No disorder of thought or mood.  HEMATOLOGICAL:  No easy bruising or bleeding.   ## EXAM  ICU Vital Signs Last 24 Hrs  T(C): 36.7 (22 Mar 2019 07:30), Max: 36.8 (21 Mar 2019 19:20)  T(F): 98 (22 Mar 2019 07:30), Max: 98.3 (21 Mar 2019 19:20)  HR: 78 (22 Mar 2019 10:20) (56 - 89)  BP: 132/78 (22 Mar 2019 10:20) (115/51 - 137/76)  BP(mean): 88 (22 Mar 2019 08:00) (56 - 94)  ABP: 126/71 (22 Mar 2019 01:00) (106/70 - 142/78)  ABP(mean): 93 (22 Mar 2019 01:00) (83 - 105)  RR: 18 (22 Mar 2019 10:20) (14 - 21)  SpO2: 100% (22 Mar 2019 10:20) (96% - 100%)    CON : NAD  EENT : EOMI, MMM  NECK : Full ROM  RESP : CTAB no increased WOB  CARD : rrr no m/r/g  ABD : S NT ND NABS no rebound  EXT : No edema  NEURO : AAOX3 LITTLE  ## Labs:  Lab Results:  CBC  CBC Full  -  ( 22 Mar 2019 04:14 )  WBC Count : 9.63 K/uL  Hemoglobin : 8.2 g/dL  Hematocrit : 26.6 %  Platelet Count - Automated : 464 K/uL  Mean Cell Volume : 82.9 fl  Mean Cell Hemoglobin : 25.5 pg  Mean Cell Hemoglobin Concentration : 30.8 gm/dL  Auto Neutrophil # : x  Auto Lymphocyte # : x  Auto Monocyte # : x  Auto Eosinophil # : x  Auto Basophil # : x  Auto Neutrophil % : x  Auto Lymphocyte % : x  Auto Monocyte % : x  Auto Eosinophil % : x  Auto Basophil % : x    .		Differential:	[] Automated		[] Manual  Chemistry                        8.2    9.63  )-----------( 464      ( 22 Mar 2019 04:14 )             26.6     03-22    135  |  102  |  20  ----------------------------<  213<H>  4.5   |  27  |  0.53    Ca    8.4<L>      22 Mar 2019 04:14  Phos  2.3     03-22  Mg     2.2     03-22        ABG - ( 20 Mar 2019 23:51 )  pH, Arterial: x     pH, Blood: 7.27  /  pCO2: 62    /  pO2: 124   / HCO3: 28    / Base Excess: 0.9   /  SaO2: 98          MICROBIOLOGY/CULTURES:  Gram Stain:   Moderate polymorphonuclear leukocytes per low power field  Rare Gram positive cocci in pairs per oil power field (03.21.19 @ 09:42)    Culture Results:   No growth to date. (03-21 @ 09:33)  Culture Results:   No growth (03-21 @ 04:12)      RADIOLOGY RESULTS:  < from: Xray Chest 1 View- PORTABLE-Urgent (03.21.19 @ 02:02) >  No venous catheter/line is visualized.  ET tube tip is 3.3cm above the nasrin.  Stable mild cardiomegaly.  Persistent mild superior mediastinal widening.  Stable small right pleural effusion.  Left basilar atelectatic changes are stable  Mild increase/worsening of right basilar haziness - due to atelectasis -   underlying right basilar pneumonia not excluded in the right clinical   setting.  No pneumothorax  No acute bony finding.    IMPRESSION:   Stable small right pleural effusion  Stable left basilar atelectasis.  Mild worsening of right basilar opacity.    < end of copied text >    ## Medications:  MEDICATIONS  (STANDING):  chlorhexidine 4% Liquid 1 Application(s) Topical <User Schedule>  dexamethasone  IVPB 10 milliGRAM(s) IV Intermittent every 8 hours  docusate sodium 100 milliGRAM(s) Oral three times a day  folic acid 1 milliGRAM(s) Oral daily  gabapentin 600 milliGRAM(s) Oral three times a day  lidocaine   Patch 1 Patch Transdermal every 24 hours  multivitamin 1 Tablet(s) Oral daily  nicotine -  14 mG/24Hr(s) Patch 1 patch Transdermal daily  pantoprazole  Injectable 40 milliGRAM(s) IV Push daily  vancomycin  IVPB 1500 milliGRAM(s) IV Intermittent every 12 hours    ## O/E:I&O's Summary    21 Mar 2019 07:01  -  22 Mar 2019 07:00  --------------------------------------------------------  IN: 1200 mL / OUT: 4255 mL / NET: -3055 mL    22 Mar 2019 07:01  -  22 Mar 2019 10:45  --------------------------------------------------------  IN: 0 mL / OUT: 350 mL / NET: -350 mL        ## Daily Issues  - Analgesia: N/A  - Sedation: N/A  - HOB elevation: Y  - GI ppx (ulcers): ppi  - DVT ppx: scd  - Nutrition: PO diet  - Central line: N  - Rubin: N      ## Code status:  Goals of care discussion: [x] yes [ ] no  [x] full code  [ ] DNR  [ ] DNI  [ ] CHELSI

## 2019-03-22 NOTE — DISCHARGE NOTE PROVIDER - PROVIDER TOKENS
PROVIDER:[TOKEN:[[id number]]] PROVIDER:[TOKEN:[[id number]]],PROVIDER:[TOKEN:[[id number]]] PROVIDER:[TOKEN:[[id number]]],PROVIDER:[TOKEN:[[id number]]],PROVIDER:[TOKEN:[[id number]]] PROVIDER:[TOKEN:[[id number]],FOLLOWUP:[2 weeks]],PROVIDER:[TOKEN:[[id number]],FOLLOWUP:[2 weeks]],FREE:[LAST:[Feit],FIRST:[Daniel],PHONE:[phone]],FAX:[(   )    -],ADDRESS:[[address]],FOLLOWUP:[1-3 days]] PROVIDER:[TOKEN:[[id number]],FOLLOWUP:[2 weeks]],PROVIDER:[TOKEN:[[id number]],FOLLOWUP:[2 weeks]],PROVIDER:[TOKEN:[[id number]],FOLLOWUP:[2 weeks]],FREE:[LAST:[Feit],FIRST:[Daniel],PHONE:[phone]],FAX:[(   )    -],ADDRESS:[[address]],FOLLOWUP:[1-3 days]]

## 2019-03-22 NOTE — DISCHARGE NOTE PROVIDER - CARE PROVIDER_API CALL
Rose Leslie (DO)  Orthopaedic Surgery Orthopaedics Surgery  [address] [facility], [place] [address]  Phone: [phone]  Fax: [phone]  Follow Up Time: Rose Leslie (DO)  Orthopaedic Surgery Orthopaedics Surgery  [address] [facility], [place] [address]  Phone: [phone]  Fax: [phone]  Follow Up Time:     Denisa Krause)  Infectious Disease; Internal Medicine  [address] [place] [address]  Phone: [phone]  Fax: [phone]  Follow Up Time: Rose Leslie (DO)  Orthopaedic Surgery Orthopaedics Surgery  [address] [facility], [address]  [place] [address]  Phone: [phone]  Fax: [phone]  Follow Up Time:     Denisa Krause)  Infectious Disease; Internal Medicine  [address] [address]  Phone: [phone]  Fax: [phone]  Follow Up Time:     Daniel Sumner)  Internal Medicine  [address] [facility], [facility] [address]  [place] [address]  Phone: [phone]  Fax: [phone]  Follow Up Time: Rose Leslie (DO)  Orthopaedic Surgery Orthopaedics Surgery  [address] [facility], [address]  [place] [address]  Phone: [phone]  Fax: [phone]  Follow Up Time: 2 weeks    Lilian Garcia)  Infectious Disease; Internal Medicine  [address] [place] [address]  Phone: [phone]  Fax: [phone]  Follow Up Time: 2 weeks    Daniel Sumner  [facility]  [address] [place], [place]  Phone: [phone]  Fax: (   )    -  Follow Up Time: 1-3 days Rose Leslie (DO)  Orthopaedic Surgery Orthopaedics Surgery  [address] [facility], [address]  [place] [address]  Phone: [phone]  Fax: [phone]  Follow Up Time: 2 weeks    Lilian Garcia)  Infectious Disease; Internal Medicine  [address] [place] [address]  Phone: [phone]  Fax: [phone]  Follow Up Time: 2 weeks    Xavi Pennington)  Obstetrics and Gynecology  [address] [place] [address]  Phone: [phone]  Fax: [phone]  Follow Up Time: 2 weeks    Daniel Sumner  [facility]  [address] [place], [place]  Phone: [phone]  Fax: (   )    -  Follow Up Time: 1-3 days

## 2019-03-22 NOTE — DISCHARGE NOTE PROVIDER - NSDCCPCAREPLAN_GEN_ALL_CORE_FT
PRINCIPAL DISCHARGE DIAGNOSIS  Diagnosis: Sepsis, due to unspecified organism  Assessment and Plan of Treatment: PRINCIPAL DISCHARGE DIAGNOSIS  Diagnosis: Sepsis due to methicillin resistant Staphylococcus aureus (MRSA)  Assessment and Plan of Treatment: oral antibiotics as prescribed. zyvox for two weeks, then doxyccycline twice daily for 1 year then lifelong suppressive antibiotics, doxycycline daily      SECONDARY DISCHARGE DIAGNOSES  Diagnosis: Current smoker  Assessment and Plan of Treatment: smoking cessation counseling provided, nicotine patch    Diagnosis: Vertebral abscess  Assessment and Plan of Treatment: Vertebral abscess  1. Pain control  2. Walking with full weight bearing as tolerated, with assistive devices (walker/cane) as needed. Keep Cervical Collar (Aspen Collar) on at all times until cleared by your surgeon Dr. Leslie   3. Physical therapy as needed  4. Follow up with Dr. Leslie as outpatient in 7-10 days after discharge from the hospital or rehab, call office for appointment  5. Keep dressing clean and dry. Remove on post op day three  6. No baths/hot tubs or soaks.      Diagnosis: Acute respiratory failure, unspecified whether with hypoxia or hypercapnia  Assessment and Plan of Treatment: resolved/hypoxic and hypercapnic    Diagnosis: Hepatitis C antibody test positive  Assessment and Plan of Treatment: follow-up with GI as outpatient    Diagnosis: Endocarditis of tricuspid valve  Assessment and Plan of Treatment: continue with antibiotics    Diagnosis: IVDU (intravenous drug user)  Assessment and Plan of Treatment: continue methadone, PRINCIPAL DISCHARGE DIAGNOSIS  Diagnosis: Sepsis due to methicillin resistant Staphylococcus aureus (MRSA)  Assessment and Plan of Treatment: oral antibiotics as prescribed. zyvox for two weeks, then doxyccycline twice daily for 1 year then lifelong suppressive antibiotics, doxycycline daily      SECONDARY DISCHARGE DIAGNOSES  Diagnosis: Current smoker  Assessment and Plan of Treatment: smoking cessation counseling provided, nicotine patch    Diagnosis: Acute respiratory failure, unspecified whether with hypoxia or hypercapnia  Assessment and Plan of Treatment: resolved/hypoxic and hypercapnic    Diagnosis: Endocarditis of tricuspid valve  Assessment and Plan of Treatment: continue with antibiotics    Diagnosis: Hepatitis C antibody test positive  Assessment and Plan of Treatment: follow-up with GI as outpatient    Diagnosis: Vertebral abscess  Assessment and Plan of Treatment: Vertebral abscess  1. Pain control  2. Walking with full weight bearing as tolerated, with assistive devices (walker/cane) as needed. Keep Cervical Collar (Aspen Collar) on at all times until cleared by your surgeon Dr. Leslie   3. Physical therapy as needed  4. Follow up with Dr. Leslie as outpatient in 7-10 days after discharge from the hospital or rehab, call office for appointment  5. Keep dressing clean and dry. Remove on post op day three  6. No baths/hot tubs or soaks.      Diagnosis: IVDU (intravenous drug user)  Assessment and Plan of Treatment: continue methadone,    Diagnosis: Vaginal candidiasis  Assessment and Plan of Treatment: s/p diflucan course.   Follow up with Infectious Disease PRINCIPAL DISCHARGE DIAGNOSIS  Diagnosis: Sepsis due to methicillin resistant Staphylococcus aureus (MRSA)  Assessment and Plan of Treatment: oral antibiotics as prescribed. zyvox for two weeks, then doxyccycline twice daily for 1 year then lifelong suppressive antibiotics, doxycycline daily      SECONDARY DISCHARGE DIAGNOSES  Diagnosis: Current smoker  Assessment and Plan of Treatment: smoking cessation counseling provided, nicotine patch    Diagnosis: Acute respiratory failure, unspecified whether with hypoxia or hypercapnia  Assessment and Plan of Treatment: resolved/hypoxic and hypercapnic    Diagnosis: Endocarditis of tricuspid valve  Assessment and Plan of Treatment: continue with antibiotics    Diagnosis: Hepatitis C antibody test positive  Assessment and Plan of Treatment: follow-up with GI as outpatient    Diagnosis: Vertebral abscess  Assessment and Plan of Treatment: Vertebral abscess  1. Pain control  2. Walking with full weight bearing as tolerated, with assistive devices (walker/cane) as needed. Keep Cervical Collar (Aspen Collar) on at all times until cleared by your surgeon Dr. Leslie   3. Physical therapy as needed  4. Follow up with Dr. Leslie as outpatient in 7-10 days after discharge from the hospital or rehab, call office for appointment  5. Keep dressing clean and dry. Remove on post op day three  6. No baths/hot tubs or soaks.      Diagnosis: IVDU (intravenous drug user)  Assessment and Plan of Treatment: continue methadone,    Diagnosis: Vaginal candidiasis  Assessment and Plan of Treatment: s/p diflucan course.   Recommend follow up with Gynecologist (can follow up with Dr. Almazan)

## 2019-03-22 NOTE — PROGRESS NOTE ADULT - SUBJECTIVE AND OBJECTIVE BOX
Patient is a 43y old  Female who presents with a chief complaint of AMS (22 Mar 2019 16:46)      INTERVAL HPI / OVERNIGHT EVENTS:    MEDICATIONS  (STANDING):  chlorhexidine 4% Liquid 1 Application(s) Topical <User Schedule>  DAPTOmycin IVPB      dexamethasone  IVPB 10 milliGRAM(s) IV Intermittent every 8 hours  docusate sodium 100 milliGRAM(s) Oral three times a day  folic acid 1 milliGRAM(s) Oral daily  gabapentin 600 milliGRAM(s) Oral three times a day  lidocaine   Patch 1 Patch Transdermal every 24 hours  linezolid    Tablet 600 milliGRAM(s) Oral every 12 hours  multivitamin 1 Tablet(s) Oral daily  nicotine -  14 mG/24Hr(s) Patch 1 patch Transdermal daily  pantoprazole  Injectable 40 milliGRAM(s) IV Push daily    MEDICATIONS  (PRN):  acetaminophen   Tablet .. 650 milliGRAM(s) Oral every 6 hours PRN Mild Pain (1 - 3)  morphine  - Injectable 2 milliGRAM(s) IV Push every 4 hours PRN Moderate Pain (4 - 6)      Vital Signs Last 24 Hrs  T(C): 36.7 (22 Mar 2019 10:53), Max: 36.8 (21 Mar 2019 19:20)  T(F): 98 (22 Mar 2019 10:53), Max: 98.3 (21 Mar 2019 19:20)  HR: 65 (22 Mar 2019 13:10) (56 - 78)  BP: 135/79 (22 Mar 2019 13:10) (115/51 - 137/76)  BP(mean): 92 (22 Mar 2019 13:10) (56 - 94)  RR: 19 (22 Mar 2019 11:00) (14 - 21)  SpO2: 99% (22 Mar 2019 13:10) (96% - 100%)    Review of systems:  General : no fever /chills,fatigue  CVS : no chest pain, palpitations  Lungs : no shortness of breath, cough  GI : no abdominal pain,vomiting, diarrhea   : no dysuria,hematuria        PHYSICAL EXAM:  General :NAD  Constitutional:  well-groomed, well-developed  Respiratory: CTAB/L  Cardiovascular: S1 and S2, RRR, no M/G/R  Gastrointestinal: BS+, soft, NT/ND  Extremities: No peripheral edema  Vascular: 2+ peripheral pulses  Skin: No rashes      LABS:                        8.2    9.63  )-----------( 464      ( 22 Mar 2019 04:14 )             26.6     03-22    135  |  102  |  20  ----------------------------<  213<H>  4.5   |  27  |  0.53    Ca    8.4<L>      22 Mar 2019 04:14  Phos  2.3     03-22  Mg     2.2     03-22            MICROBIOLOGY:  RECENT CULTURES:  03-21 .Tissue cervical disc #2 culture XXXX   Moderate polymorphonuclear leukocytes per low power field  Rare Gram positive cocci in pairs per oil power field   Few Staphylococcus aureus    03-21 .Tissue c4-c5 disc culture c/s ewb XXXX   Moderate polymorphonuclear leukocytes per low power field  Rare Gram positive cocci in pairs per oil power field   Few Staphylococcus aureus    03-21 .Blood XXXX XXXX   No growth to date.    03-21 .Surgical Swab cervical disc #1 culture c/s XXXX XXXX   Numerous Staphylococcus aureus    03-21 .Surgical Swab prevertebral culture c/s XXXX XXXX   No growth    03-21 .Body Fluid LEFT THORACENTESIS XXXX   polymorphonuclear leukocytes seen  No organisms seen  by cytocentrifuge   No growth          RADIOLOGY & ADDITIONAL STUDIES:

## 2019-03-22 NOTE — DISCHARGE NOTE PROVIDER - HOSPITAL COURSE
42 Y/O female w/ PMHx of IV heroin abuse brought in by EMS for lethargy and cough. As per EMS, pt was found laying in bed at home, lethargic however able to follow some commands. EMS reports that pt was attempting to detox from heroin, and last known use was 3 days prior to ED presentation. As per ED, pts boyfriend reports a hx of + pt cough productive of white sputum and a fall down a flight of stairs 2/16. Labs showed serum lactate of 7.2, BUN/Cr 124/6.42, and WBC of of 11.93. Tmax 102.4, urine + for cocaine, opiates, nitrites and many bacteria.     Extremely complicated, long hospital course.    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 She was also diagnosis w/ HCV. Pt was initially intubated due to respiratory failure and put on pressors in ICU. She as extubated on 2/25 and transferred from ICU the following day. Pt had a C4-5 ACDF with irrigation and debridement on 3/20 due to spinal abscess and osteo and was kept intubated post procedure and transferred again to ICU, where she was extubated on 3/21. Pt was subsequently found to have  T11/12 OM discitis with an epidural phlegmon from T9-L1. She was taken to the OR 5/8 for posterior spinal fusion & T8-L2 Laminectomy. She was again transferred to ICU for post op care and was extubated without difficulty on 5/9.   Discharge planning in process         Hepatitis c ab positive - will need to fu as hepatology as outpatient, alk phos trending down Extremely complicated, long hospital course.    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 She was also diagnosis w/ HCV. Pt was initially intubated due to respiratory failure and put on pressors in ICU. She as extubated on 2/25 and transferred from ICU the following day. Pt had a C4-5 ACDF with irrigation and debridement on 3/20 due to spinal abscess and osteo and was kept intubated post procedure and transferred again to ICU, where she was extubated on 3/21. Pt was subsequently found to have  T11/12 OM discitis with an epidural phlegmon from T9-L1. She was taken to the OR 5/8 for posterior spinal fusion & T8-L2 Laminectomy. She was again transferred to ICU for post op care and was extubated without difficulty on 5/9.           Hepatitis c ab positive - will need to fu with hepatology as outpatient,    EFRAIN resolved.    Repeat mris and spinal xrays ok as per Ortho spine and ID.  Pt. medically cleared for discharge on zyvox for 2 weeks then change to doxycycline BID for one year then lifelong doxycycline daily as per ID.     Substance abuse, social work exploring inpatient drug rehab vs. outpatient methadone maintenance.    Pt. will need close follow-up with spinal surgeon, ID and referral to PMD. (Dr. Sumner)    Pt. states she is adamant about not using drugs again because of this experience but still feels she needs methadone for both substance abuse and pain management.      She states that she has a place to live. Extremely complicated, long hospital course.    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 She was also diagnosis w/ HCV. Pt was initially intubated due to respiratory failure and put on pressors in ICU. She as extubated on 2/25 and transferred from ICU the following day. Pt had a C4-5 ACDF with irrigation and debridement on 3/20 due to spinal abscess and osteo and was kept intubated post procedure and transferred again to ICU, where she was extubated on 3/21. Pt was subsequently found to have  T11/12 OM discitis with an epidural phlegmon from T9-L1. She was taken to the OR 5/8 for posterior spinal fusion & T8-L2 Laminectomy. She was again transferred to ICU for post op care and was extubated without difficulty on 5/9.           Hepatitis c ab positive - will need to fu with hepatology as outpatient,    EFRAIN resolved.    Repeat mris and spinal xrays ok as per Ortho spine and ID.  Pt. medically cleared for discharge on zyvox for 2 weeks then change to doxycycline BID for one year then lifelong doxycycline daily as per ID.     Substance abuse, social work exploring inpatient drug rehab vs. outpatient methadone maintenance.    Pt. will need close follow-up with spinal surgeon, ID and referral to PMD. (Dr. Sumner)    Pt. states she is adamant about not using drugs again because of this experience but still feels she needs methadone for both substance abuse and pain management.      She states that she has a place to live.        See SW note, patient declined phone interview for inpatient rehab at [facility]. Plan is for [facility] methadone clinic , discharge sunday (recommended by [facility] Methadone [facility] director, for safest discharge) and then appt. at clinic monday am.  Pt. is elgible for transportation and SW to educate patient on how to set up Extremely complicated, long hospital course.    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 She was also diagnosis w/ HCV. Pt was initially intubated due to respiratory failure and put on pressors in ICU. She as extubated on 2/25 and transferred from ICU the following day. Pt had a C4-5 ACDF with irrigation and debridement on 3/20 due to spinal abscess and osteo and was kept intubated post procedure and transferred again to ICU, where she was extubated on 3/21. Pt was subsequently found to have  T11/12 OM discitis with an epidural phlegmon from T9-L1. She was taken to the OR 5/8 for posterior spinal fusion & T8-L2 Laminectomy. She was again transferred to ICU for post op care and was extubated without difficulty on 5/9.           Hepatitis c ab positive - will need to fu with hepatology as outpatient,    EFRAIN resolved.    Repeat mris and spinal xrays ok as per Ortho spine and ID.  Pt. medically cleared for discharge on zyvox for 2 weeks then change to doxycycline BID for one year then lifelong doxycycline daily as per ID.     Substance abuse, social work exploring inpatient drug rehab vs. outpatient methadone maintenance.    Pt. will need close follow-up with spinal surgeon, ID and referral to PMD. (Dr. Sumner)    Pt. states she is adamant about not using drugs again because of this experience but still feels she needs methadone for both substance abuse and pain management.      She states that she has a place to live.        See SW note, patient declined phone interview for inpatient rehab at [facility]. Does not want to go to inpatient rehab. Plan is for [facility] methadone clinic , discharge monday am (taxi will take her to [facility] program) (recommended by [facility] Methadone Clinic director, for safest discharge)Pt. is elgible for transportation and SW to educate patient on how to set up

## 2019-03-22 NOTE — PROGRESS NOTE ADULT - ASSESSMENT
43 year old female with heroin abuse s/p drainage of cervical abscess. now POD#2    Plan  -WBAT  -MAP greater than 80  -has not required levo  -follow up wound cultures  -vanc by level for mrsa  -methadone fentanyl for pain control  -follow up ortho recs  -advance diet  -transfer to floor 43 year old female with heroin abuse s/p drainage of cervical abscess. now POD#2    Plan  -WBAT  -MAP greater than 80  -has not required levo  -follow up wound cultures  -switch vanc to dapto and linezolid per ID  -change fent to morphine  -follow up ortho recs  -advance diet  -transfer to floor

## 2019-03-22 NOTE — PROGRESS NOTE ADULT - SUBJECTIVE AND OBJECTIVE BOX
Gen: NAD    Spine PE:  Skin intact  Aspen Collar In Place  dressing c/d/i  KIRILL Drain in place   Negative clonus  Negative babinski  Negative olmstead           Deltoid        Bicep        Tricep          Wrist Ext    Wrist Flex    Finger Flex          Finger Abd  L            4/5 4/5 4/5 4/5 4/5 4/5 4/5  R            2/5 	   3/5           3/5             3/5              3/5                        3/5                   3/5                                      Hip Flex       Quad     Ankle DF        Ankle PF       Toe Ext        Hamstring    L            5/5 5/5 5/5 5/5 5/5 5/5  R            5/5 5/5 5/5 5/5 5/5 5/5    Sensory:            C5         C6         C7      C8       T1        (0=absent, 1=impaired, 2=normal, NT=not testable)  L         2            2           2        2         2  R          2            2           2        2         2               L2          L3         L4      L5       S1         (0=absent, 1=impaired, 2=normal, NT=not testable)  L         2            2            2        2        2  R          2            2           2        2         2    Paresthesias in stocking/glove distribution at baseline  Hyperreflexia (Patellar/Achilles)

## 2019-03-22 NOTE — PROGRESS NOTE ADULT - ASSESSMENT
A/P: 43y Female s/p C4-5 ACDF with irrigation and debridement, now POD 2    Pain control   FU KIRILL output  please keep MAPs >80  decadron 10mg q8 for 48 hours, will monitor once off steroids  OR Cx's growing GPCP, FU sensitivities, patient will need stronger abx regimen as pt has failed extensive course of abx.  recommend re-imaging with LIZ, re consulting CT surgery for operative intervention of vegetation.  Pt likely throwing septic emboli leading to multiple abscesses in spine/lungs.    No plan for return to OR at this time, will continue to monitor for improvement or RUE weakness which has been present since before surgery.    will likely order repeat MRI to follow OM/Discitis of T and L spine levels  continue in aspen collar  neurochecks q4  Pain control  DVT ppx- SCDs  PT/OT, WBAT  FU labs  will d/w attending and advise if plan changes  Dispo planning

## 2019-03-23 LAB
-  AMPICILLIN/SULBACTAM: SIGNIFICANT CHANGE UP
-  AMPICILLIN/SULBACTAM: SIGNIFICANT CHANGE UP
-  CEFAZOLIN: SIGNIFICANT CHANGE UP
-  CEFAZOLIN: SIGNIFICANT CHANGE UP
-  CLINDAMYCIN: SIGNIFICANT CHANGE UP
-  CLINDAMYCIN: SIGNIFICANT CHANGE UP
-  DAPTOMYCIN: SIGNIFICANT CHANGE UP
-  DAPTOMYCIN: SIGNIFICANT CHANGE UP
-  ERYTHROMYCIN: SIGNIFICANT CHANGE UP
-  ERYTHROMYCIN: SIGNIFICANT CHANGE UP
-  GENTAMICIN: SIGNIFICANT CHANGE UP
-  GENTAMICIN: SIGNIFICANT CHANGE UP
-  LINEZOLID: SIGNIFICANT CHANGE UP
-  LINEZOLID: SIGNIFICANT CHANGE UP
-  OXACILLIN: SIGNIFICANT CHANGE UP
-  OXACILLIN: SIGNIFICANT CHANGE UP
-  PENICILLIN: SIGNIFICANT CHANGE UP
-  PENICILLIN: SIGNIFICANT CHANGE UP
-  RIFAMPIN: SIGNIFICANT CHANGE UP
-  RIFAMPIN: SIGNIFICANT CHANGE UP
-  TETRACYCLINE: SIGNIFICANT CHANGE UP
-  TETRACYCLINE: SIGNIFICANT CHANGE UP
-  TRIMETHOPRIM/SULFAMETHOXAZOLE: SIGNIFICANT CHANGE UP
-  TRIMETHOPRIM/SULFAMETHOXAZOLE: SIGNIFICANT CHANGE UP
-  VANCOMYCIN: SIGNIFICANT CHANGE UP
-  VANCOMYCIN: SIGNIFICANT CHANGE UP
ALBUMIN SERPL ELPH-MCNC: 1.9 G/DL — LOW (ref 3.3–5)
ALP SERPL-CCNC: 170 U/L — HIGH (ref 40–120)
ALT FLD-CCNC: 80 U/L — HIGH (ref 12–78)
ANION GAP SERPL CALC-SCNC: 9 MMOL/L — SIGNIFICANT CHANGE UP (ref 5–17)
AST SERPL-CCNC: 55 U/L — HIGH (ref 15–37)
BILIRUB SERPL-MCNC: 0.2 MG/DL — SIGNIFICANT CHANGE UP (ref 0.2–1.2)
BUN SERPL-MCNC: 28 MG/DL — HIGH (ref 7–23)
CALCIUM SERPL-MCNC: 8.5 MG/DL — SIGNIFICANT CHANGE UP (ref 8.5–10.1)
CHLORIDE SERPL-SCNC: 105 MMOL/L — SIGNIFICANT CHANGE UP (ref 96–108)
CO2 SERPL-SCNC: 23 MMOL/L — SIGNIFICANT CHANGE UP (ref 22–31)
CREAT SERPL-MCNC: 0.6 MG/DL — SIGNIFICANT CHANGE UP (ref 0.5–1.3)
GLUCOSE SERPL-MCNC: 137 MG/DL — HIGH (ref 70–99)
HCT VFR BLD CALC: 30.5 % — LOW (ref 34.5–45)
HGB BLD-MCNC: 9.2 G/DL — LOW (ref 11.5–15.5)
MAGNESIUM SERPL-MCNC: 1.9 MG/DL — SIGNIFICANT CHANGE UP (ref 1.6–2.6)
MCHC RBC-ENTMCNC: 25.3 PG — LOW (ref 27–34)
MCHC RBC-ENTMCNC: 30.2 GM/DL — LOW (ref 32–36)
MCV RBC AUTO: 84 FL — SIGNIFICANT CHANGE UP (ref 80–100)
METHOD TYPE: SIGNIFICANT CHANGE UP
METHOD TYPE: SIGNIFICANT CHANGE UP
NRBC # BLD: 0 /100 WBCS — SIGNIFICANT CHANGE UP (ref 0–0)
PHOSPHATE SERPL-MCNC: 2.4 MG/DL — LOW (ref 2.5–4.5)
PLATELET # BLD AUTO: 483 K/UL — HIGH (ref 150–400)
POTASSIUM SERPL-MCNC: 4.4 MMOL/L — SIGNIFICANT CHANGE UP (ref 3.5–5.3)
POTASSIUM SERPL-SCNC: 4.4 MMOL/L — SIGNIFICANT CHANGE UP (ref 3.5–5.3)
PROT SERPL-MCNC: 6.9 GM/DL — SIGNIFICANT CHANGE UP (ref 6–8.3)
RBC # BLD: 3.63 M/UL — LOW (ref 3.8–5.2)
RBC # FLD: 17.7 % — HIGH (ref 10.3–14.5)
SODIUM SERPL-SCNC: 137 MMOL/L — SIGNIFICANT CHANGE UP (ref 135–145)
WBC # BLD: 11.95 K/UL — HIGH (ref 3.8–10.5)
WBC # FLD AUTO: 11.95 K/UL — HIGH (ref 3.8–10.5)

## 2019-03-23 PROCEDURE — 99233 SBSQ HOSP IP/OBS HIGH 50: CPT

## 2019-03-23 RX ORDER — SODIUM,POTASSIUM PHOSPHATES 278-250MG
1 POWDER IN PACKET (EA) ORAL
Qty: 0 | Refills: 0 | Status: DISCONTINUED | OUTPATIENT
Start: 2019-03-23 | End: 2019-03-26

## 2019-03-23 RX ADMIN — GABAPENTIN 600 MILLIGRAM(S): 400 CAPSULE ORAL at 06:03

## 2019-03-23 RX ADMIN — MORPHINE SULFATE 2 MILLIGRAM(S): 50 CAPSULE, EXTENDED RELEASE ORAL at 20:33

## 2019-03-23 RX ADMIN — Medication 650 MILLIGRAM(S): at 19:01

## 2019-03-23 RX ADMIN — Medication 1 PATCH: at 12:15

## 2019-03-23 RX ADMIN — MORPHINE SULFATE 2 MILLIGRAM(S): 50 CAPSULE, EXTENDED RELEASE ORAL at 16:29

## 2019-03-23 RX ADMIN — LINEZOLID 600 MILLIGRAM(S): 600 INJECTION, SOLUTION INTRAVENOUS at 16:29

## 2019-03-23 RX ADMIN — MORPHINE SULFATE 2 MILLIGRAM(S): 50 CAPSULE, EXTENDED RELEASE ORAL at 09:00

## 2019-03-23 RX ADMIN — MORPHINE SULFATE 2 MILLIGRAM(S): 50 CAPSULE, EXTENDED RELEASE ORAL at 00:11

## 2019-03-23 RX ADMIN — LIDOCAINE 1 PATCH: 4 CREAM TOPICAL at 10:20

## 2019-03-23 RX ADMIN — MORPHINE SULFATE 2 MILLIGRAM(S): 50 CAPSULE, EXTENDED RELEASE ORAL at 17:00

## 2019-03-23 RX ADMIN — MORPHINE SULFATE 2 MILLIGRAM(S): 50 CAPSULE, EXTENDED RELEASE ORAL at 12:48

## 2019-03-23 RX ADMIN — NYSTATIN CREAM 1 APPLICATION(S): 100000 CREAM TOPICAL at 06:03

## 2019-03-23 RX ADMIN — LINEZOLID 600 MILLIGRAM(S): 600 INJECTION, SOLUTION INTRAVENOUS at 06:03

## 2019-03-23 RX ADMIN — Medication 1 TABLET(S): at 12:21

## 2019-03-23 RX ADMIN — NYSTATIN CREAM 1 APPLICATION(S): 100000 CREAM TOPICAL at 13:52

## 2019-03-23 RX ADMIN — MORPHINE SULFATE 2 MILLIGRAM(S): 50 CAPSULE, EXTENDED RELEASE ORAL at 04:35

## 2019-03-23 RX ADMIN — MORPHINE SULFATE 2 MILLIGRAM(S): 50 CAPSULE, EXTENDED RELEASE ORAL at 04:05

## 2019-03-23 RX ADMIN — GABAPENTIN 600 MILLIGRAM(S): 400 CAPSULE ORAL at 21:48

## 2019-03-23 RX ADMIN — Medication 650 MILLIGRAM(S): at 11:00

## 2019-03-23 RX ADMIN — Medication 100 MILLIGRAM(S): at 13:51

## 2019-03-23 RX ADMIN — MORPHINE SULFATE 2 MILLIGRAM(S): 50 CAPSULE, EXTENDED RELEASE ORAL at 00:41

## 2019-03-23 RX ADMIN — GABAPENTIN 600 MILLIGRAM(S): 400 CAPSULE ORAL at 13:51

## 2019-03-23 RX ADMIN — Medication 1 TABLET(S): at 21:48

## 2019-03-23 RX ADMIN — DAPTOMYCIN 117.2 MILLIGRAM(S): 500 INJECTION, POWDER, LYOPHILIZED, FOR SOLUTION INTRAVENOUS at 12:21

## 2019-03-23 RX ADMIN — Medication 650 MILLIGRAM(S): at 10:06

## 2019-03-23 RX ADMIN — NYSTATIN CREAM 1 APPLICATION(S): 100000 CREAM TOPICAL at 21:48

## 2019-03-23 RX ADMIN — MORPHINE SULFATE 2 MILLIGRAM(S): 50 CAPSULE, EXTENDED RELEASE ORAL at 08:36

## 2019-03-23 RX ADMIN — SENNA PLUS 2 TABLET(S): 8.6 TABLET ORAL at 21:48

## 2019-03-23 RX ADMIN — LIDOCAINE 1 PATCH: 4 CREAM TOPICAL at 08:35

## 2019-03-23 RX ADMIN — Medication 100 MILLIGRAM(S): at 21:48

## 2019-03-23 RX ADMIN — MORPHINE SULFATE 2 MILLIGRAM(S): 50 CAPSULE, EXTENDED RELEASE ORAL at 21:17

## 2019-03-23 RX ADMIN — Medication 1 PATCH: at 12:21

## 2019-03-23 RX ADMIN — PANTOPRAZOLE SODIUM 40 MILLIGRAM(S): 20 TABLET, DELAYED RELEASE ORAL at 12:21

## 2019-03-23 RX ADMIN — Medication 1 PATCH: at 08:35

## 2019-03-23 RX ADMIN — Medication 1 MILLIGRAM(S): at 12:21

## 2019-03-23 RX ADMIN — CHLORHEXIDINE GLUCONATE 1 APPLICATION(S): 213 SOLUTION TOPICAL at 06:02

## 2019-03-23 RX ADMIN — MORPHINE SULFATE 2 MILLIGRAM(S): 50 CAPSULE, EXTENDED RELEASE ORAL at 12:22

## 2019-03-23 RX ADMIN — Medication 100 MILLIGRAM(S): at 06:02

## 2019-03-23 NOTE — PROGRESS NOTE ADULT - ASSESSMENT
A/P: 43y Female s/p C4-5 ACDF with irrigation and debridement, now POD 3  Pain control   FU KIRILL output - may be able to DC this AM  ABx while JPs are in  please keep MAPs >80  Pt now off steroids, will monitor  OR Cx's growing GPCP, FU sensitivities, patient will need stronger abx regimen as pt has failed extensive course of abx.   FU LIZ ordered yesterday  ecommend re-imaging with LIZ, re consulting CT surgery for operative intervention of vegetation.  Pt likely throwing septic emboli leading to multiple abscesses in spine/lungs.    No plan for return to OR at this time, will continue to monitor for improvement or RUE weakness which has been present since before surgery.    will likely order repeat MRI to follow OM/Discitis of T and L spine levels on Monday or Tuesday  continue in aspen collar  neurochecks q4  Pain control  DVT ppx- SCDs  PT/OT, WBAT  FU labs  will d/w attending and advise if plan changes  Dispo planning

## 2019-03-23 NOTE — PROGRESS NOTE ADULT - SUBJECTIVE AND OBJECTIVE BOX
Pt seen and examined at bedside after stable transfer from ICU to floor. Pain well controlled. Pt states she would like drain to come out as soon as possible. Denies N/V/CP/Dyspnea/Calf tenderness bilaterally.      Gen: NAD    Spine PE:  Skin intact  Aspen Collar In Place  dressing c/d/i  KIRILL Drain in place   Negative clonus  Negative babinski  Negative olmstead           Deltoid        Bicep        Tricep          Wrist Ext    Wrist Flex    Finger Flex          Finger Abd  L            4/5         4/5           4/5             4/5                4/5	           4/5                    4/5  R            2/5 	   3/5           3/5             3/5              3/5                        3/5                   3/5                                      Hip Flex       Quad     Ankle DF        Ankle PF       Toe Ext        Hamstring    L            5/5              5/5          5/5                 5/5                 5/5	                5/5  R            5/5 5/5           5/5                 5/5                 5/5                5/5    Sensory:            C5         C6         C7      C8       T1        (0=absent, 1=impaired, 2=normal, NT=not testable)  L         2            2           2        2         2  R          2            2           2        2         2               L2          L3         L4      L5       S1         (0=absent, 1=impaired, 2=normal, NT=not testable)  L         2            2            2        2        2  R          2            2           2        2         2    Paresthesias in stocking/glove distribution at baseline  Hyperreflexia (Patellar/Achilles)

## 2019-03-23 NOTE — PROGRESS NOTE ADULT - SUBJECTIVE AND OBJECTIVE BOX
42 yo F w/polysubstance abuse presenting with severe sepsis with septic shock secondary to MRSA bacteremia w/ endocarditis on LIZ and EFRAIN that has resolved and was diagnosis w/ HCV. She is lying in bed in NAD.       MEDICATIONS  (STANDING):  chlorhexidine 4% Liquid 1 Application(s) Topical <User Schedule>  DAPTOmycin IVPB      DAPTOmycin IVPB 430 milliGRAM(s) IV Intermittent every 24 hours  docusate sodium 100 milliGRAM(s) Oral three times a day  folic acid 1 milliGRAM(s) Oral daily  gabapentin 600 milliGRAM(s) Oral three times a day  lidocaine   Patch 1 Patch Transdermal every 24 hours  linezolid    Tablet 600 milliGRAM(s) Oral every 12 hours  multivitamin 1 Tablet(s) Oral daily  nicotine -  14 mG/24Hr(s) Patch 1 patch Transdermal daily  nystatin Powder 1 Application(s) Topical three times a day  pantoprazole  Injectable 40 milliGRAM(s) IV Push daily  senna 2 Tablet(s) Oral at bedtime    MEDICATIONS  (PRN):  acetaminophen   Tablet .. 650 milliGRAM(s) Oral every 6 hours PRN Mild Pain (1 - 3)  morphine  - Injectable 2 milliGRAM(s) IV Push every 4 hours PRN Moderate Pain (4 - 6)  polyethylene glycol 3350 17 Gram(s) Oral daily PRN Constipation      Allergies    penicillin (Short breath; Hives)    Intolerances        Vital Signs Last 24 Hrs  T(C): 36.7 (23 Mar 2019 17:04), Max: 36.7 (23 Mar 2019 17:04)  T(F): 98 (23 Mar 2019 17:04), Max: 98 (23 Mar 2019 17:04)  HR: 78 (23 Mar 2019 17:04) (65 - 78)  BP: 126/80 (23 Mar 2019 17:04) (124/78 - 137/84)  BP(mean): --  RR: 18 (23 Mar 2019 17:04) (16 - 19)  SpO2: 100% (23 Mar 2019 17:04) (95% - 100%)    PHYSICAL EXAM:  GENERAL: NAD, well-groomed, well-developed  HEAD:  Atraumatic, Normocephalic  EYES: EOMI, PERRLA   NECK: Supple   NERVOUS SYSTEM:  Alert & Oriented X3, no neck tenderness   CHEST/LUNG: Clear to auscultation bilaterally; No rales, rhonchi, wheezing, or rubs  HEART: Regular rate and rhythm; No murmurs, rubs, or gallops  ABDOMEN: Soft, Nontender, Nondistended; Bowel sounds present  EXTREMITIES: No clubbing, cyanosis, or edema    LABS:                        9.2    11.95 )-----------( 483      ( 23 Mar 2019 10:13 )             30.5     03-23    137  |  105  |  28<H>  ----------------------------<  137<H>  4.4   |  23  |  0.60    Ca    8.5      23 Mar 2019 10:13  Phos  2.4     03-23  Mg     1.9     03-23    TPro  6.9  /  Alb  1.9<L>  /  TBili  0.2  /  DBili  x   /  AST  55<H>  /  ALT  80<H>  /  AlkPhos  170<H>  03-23        CAPILLARY BLOOD GLUCOSE          Culture - Tissue with Gram Stain (collected 21 Mar 2019 09:42)  Source: .Tissue cervical disc #2 culture  Gram Stain (prelim) (22 Mar 2019 11:20):    Moderate polymorphonuclear leukocytes per low power field    Rare Gram positive cocci in pairs per oil power field  Preliminary Report (22 Mar 2019 11:20):    Few Staphylococcus aureus    Culture - Tissue with Gram Stain (collected 21 Mar 2019 09:38)  Source: .Tissue c4-c5 disc culture c/s ewb  Gram Stain (prelim) (22 Mar 2019 11:33):    Moderate polymorphonuclear leukocytes per low power field    Rare Gram positive cocci in pairs per oil power field  Preliminary Report (23 Mar 2019 08:21):    Few Methicillin resistant Staphylococcus aureus  Organism: Methicillin resistant Staphylococcus aureus (23 Mar 2019 08:20)  Organism: Methicillin resistant Staphylococcus aureus (23 Mar 2019 08:20)    Culture - Blood (collected 21 Mar 2019 09:33)  Source: .Blood  Preliminary Report (22 Mar 2019 10:01):    No growth to date.    Culture - Surgical Swab (collected 21 Mar 2019 09:31)  Source: .Surgical Swab cervical disc #1 culture c/s  Preliminary Report (23 Mar 2019 08:27):    Numerous Methicillin resistant Staphylococcus aureus  Organism: Methicillin resistant Staphylococcus aureus (23 Mar 2019 08:26)  Organism: Methicillin resistant Staphylococcus aureus (23 Mar 2019 08:26)    Culture - Surgical Swab (collected 21 Mar 2019 09:26)  Source: .Surgical Swab prevertebral culture c/s  Preliminary Report (22 Mar 2019 10:48):    No growth    Culture - Body Fluid with Gram Stain (collected 21 Mar 2019 04:12)  Source: .Body Fluid LEFT THORACENTESIS  Gram Stain (prelim) (22 Mar 2019 09:06)     polymorphonuclear leukocytes seen    No organisms seen    by cytocentrifuge  Preliminary Report (22 Mar 2019 09:06):    No growth      RADIOLOGY & ADDITIONAL TESTS:    03-22-19 @ 07:01  -  03-23-19 @ 07:00  --------------------------------------------------------  IN:    Oral Fluid: 800 mL    Solution: 150 mL  Total IN: 950 mL    OUT:    Drain: 25 mL    Indwelling Catheter - Urethral: 150 mL    Voided: 450 mL  Total OUT: 625 mL    Total NET: 325 mL      03-23-19 @ 07:01  -  03-23-19 @ 20:54  --------------------------------------------------------  IN:  Total IN: 0 mL    OUT:    Drain: 15 mL  Total OUT: 15 mL    Total NET: -15 mL

## 2019-03-24 LAB
-  AMPICILLIN/SULBACTAM: SIGNIFICANT CHANGE UP
-  CEFAZOLIN: SIGNIFICANT CHANGE UP
-  CLINDAMYCIN: SIGNIFICANT CHANGE UP
-  DAPTOMYCIN: SIGNIFICANT CHANGE UP
-  ERYTHROMYCIN: SIGNIFICANT CHANGE UP
-  GENTAMICIN: SIGNIFICANT CHANGE UP
-  LINEZOLID: SIGNIFICANT CHANGE UP
-  OXACILLIN: SIGNIFICANT CHANGE UP
-  RIFAMPIN: SIGNIFICANT CHANGE UP
-  TETRACYCLINE: SIGNIFICANT CHANGE UP
-  TRIMETHOPRIM/SULFAMETHOXAZOLE: SIGNIFICANT CHANGE UP
-  VANCOMYCIN: SIGNIFICANT CHANGE UP
ANION GAP SERPL CALC-SCNC: 7 MMOL/L — SIGNIFICANT CHANGE UP (ref 5–17)
BUN SERPL-MCNC: 33 MG/DL — HIGH (ref 7–23)
CALCIUM SERPL-MCNC: 8.2 MG/DL — LOW (ref 8.5–10.1)
CHLORIDE SERPL-SCNC: 106 MMOL/L — SIGNIFICANT CHANGE UP (ref 96–108)
CO2 SERPL-SCNC: 26 MMOL/L — SIGNIFICANT CHANGE UP (ref 22–31)
CREAT SERPL-MCNC: 0.55 MG/DL — SIGNIFICANT CHANGE UP (ref 0.5–1.3)
CRP SERPL-MCNC: 1.16 MG/DL — HIGH (ref 0–0.4)
GLUCOSE SERPL-MCNC: 87 MG/DL — SIGNIFICANT CHANGE UP (ref 70–99)
HCT VFR BLD CALC: 30.8 % — LOW (ref 34.5–45)
HGB BLD-MCNC: 9.5 G/DL — LOW (ref 11.5–15.5)
MAGNESIUM SERPL-MCNC: 2 MG/DL — SIGNIFICANT CHANGE UP (ref 1.6–2.6)
MCHC RBC-ENTMCNC: 25.8 PG — LOW (ref 27–34)
MCHC RBC-ENTMCNC: 30.8 GM/DL — LOW (ref 32–36)
MCV RBC AUTO: 83.7 FL — SIGNIFICANT CHANGE UP (ref 80–100)
METHOD TYPE: SIGNIFICANT CHANGE UP
NRBC # BLD: 0 /100 WBCS — SIGNIFICANT CHANGE UP (ref 0–0)
PHOSPHATE SERPL-MCNC: 2.7 MG/DL — SIGNIFICANT CHANGE UP (ref 2.5–4.5)
PLATELET # BLD AUTO: 455 K/UL — HIGH (ref 150–400)
POTASSIUM SERPL-MCNC: 4.2 MMOL/L — SIGNIFICANT CHANGE UP (ref 3.5–5.3)
POTASSIUM SERPL-SCNC: 4.2 MMOL/L — SIGNIFICANT CHANGE UP (ref 3.5–5.3)
PROCALCITONIN SERPL-MCNC: 0.22 NG/ML — HIGH (ref 0.02–0.1)
RBC # BLD: 3.68 M/UL — LOW (ref 3.8–5.2)
RBC # FLD: 18 % — HIGH (ref 10.3–14.5)
SODIUM SERPL-SCNC: 139 MMOL/L — SIGNIFICANT CHANGE UP (ref 135–145)
WBC # BLD: 11.04 K/UL — HIGH (ref 3.8–10.5)
WBC # FLD AUTO: 11.04 K/UL — HIGH (ref 3.8–10.5)

## 2019-03-24 PROCEDURE — 99233 SBSQ HOSP IP/OBS HIGH 50: CPT

## 2019-03-24 RX ORDER — NICOTINE POLACRILEX 2 MG
1 GUM BUCCAL ONCE
Qty: 0 | Refills: 0 | Status: COMPLETED | OUTPATIENT
Start: 2019-03-24 | End: 2019-03-24

## 2019-03-24 RX ORDER — PANTOPRAZOLE SODIUM 20 MG/1
40 TABLET, DELAYED RELEASE ORAL
Qty: 0 | Refills: 0 | Status: DISCONTINUED | OUTPATIENT
Start: 2019-03-25 | End: 2019-05-08

## 2019-03-24 RX ADMIN — Medication 650 MILLIGRAM(S): at 17:44

## 2019-03-24 RX ADMIN — MORPHINE SULFATE 2 MILLIGRAM(S): 50 CAPSULE, EXTENDED RELEASE ORAL at 23:31

## 2019-03-24 RX ADMIN — NYSTATIN CREAM 1 APPLICATION(S): 100000 CREAM TOPICAL at 13:55

## 2019-03-24 RX ADMIN — Medication 1 TABLET(S): at 12:03

## 2019-03-24 RX ADMIN — Medication 1 PATCH: at 08:21

## 2019-03-24 RX ADMIN — GABAPENTIN 600 MILLIGRAM(S): 400 CAPSULE ORAL at 22:06

## 2019-03-24 RX ADMIN — MORPHINE SULFATE 2 MILLIGRAM(S): 50 CAPSULE, EXTENDED RELEASE ORAL at 19:23

## 2019-03-24 RX ADMIN — METHADONE HYDROCHLORIDE 40 MILLIGRAM(S): 40 TABLET ORAL at 11:10

## 2019-03-24 RX ADMIN — Medication 1 PATCH: at 18:27

## 2019-03-24 RX ADMIN — Medication 1 PATCH: at 11:11

## 2019-03-24 RX ADMIN — Medication 1 MILLIGRAM(S): at 11:11

## 2019-03-24 RX ADMIN — LIDOCAINE 1 PATCH: 4 CREAM TOPICAL at 23:31

## 2019-03-24 RX ADMIN — LIDOCAINE 1 PATCH: 4 CREAM TOPICAL at 08:21

## 2019-03-24 RX ADMIN — PANTOPRAZOLE SODIUM 40 MILLIGRAM(S): 20 TABLET, DELAYED RELEASE ORAL at 11:10

## 2019-03-24 RX ADMIN — MORPHINE SULFATE 2 MILLIGRAM(S): 50 CAPSULE, EXTENDED RELEASE ORAL at 15:20

## 2019-03-24 RX ADMIN — MORPHINE SULFATE 2 MILLIGRAM(S): 50 CAPSULE, EXTENDED RELEASE ORAL at 10:22

## 2019-03-24 RX ADMIN — MORPHINE SULFATE 2 MILLIGRAM(S): 50 CAPSULE, EXTENDED RELEASE ORAL at 04:52

## 2019-03-24 RX ADMIN — MORPHINE SULFATE 2 MILLIGRAM(S): 50 CAPSULE, EXTENDED RELEASE ORAL at 10:07

## 2019-03-24 RX ADMIN — Medication 100 MILLIGRAM(S): at 05:13

## 2019-03-24 RX ADMIN — NYSTATIN CREAM 1 APPLICATION(S): 100000 CREAM TOPICAL at 05:14

## 2019-03-24 RX ADMIN — CHLORHEXIDINE GLUCONATE 1 APPLICATION(S): 213 SOLUTION TOPICAL at 05:11

## 2019-03-24 RX ADMIN — MORPHINE SULFATE 2 MILLIGRAM(S): 50 CAPSULE, EXTENDED RELEASE ORAL at 15:35

## 2019-03-24 RX ADMIN — Medication 1 TABLET(S): at 17:44

## 2019-03-24 RX ADMIN — DAPTOMYCIN 117.2 MILLIGRAM(S): 500 INJECTION, POWDER, LYOPHILIZED, FOR SOLUTION INTRAVENOUS at 11:11

## 2019-03-24 RX ADMIN — MORPHINE SULFATE 2 MILLIGRAM(S): 50 CAPSULE, EXTENDED RELEASE ORAL at 20:00

## 2019-03-24 RX ADMIN — LIDOCAINE 1 PATCH: 4 CREAM TOPICAL at 00:14

## 2019-03-24 RX ADMIN — LINEZOLID 600 MILLIGRAM(S): 600 INJECTION, SOLUTION INTRAVENOUS at 17:44

## 2019-03-24 RX ADMIN — Medication 1 TABLET(S): at 22:06

## 2019-03-24 RX ADMIN — Medication 100 MILLIGRAM(S): at 13:54

## 2019-03-24 RX ADMIN — NYSTATIN CREAM 1 APPLICATION(S): 100000 CREAM TOPICAL at 22:07

## 2019-03-24 RX ADMIN — Medication 1 TABLET(S): at 09:13

## 2019-03-24 RX ADMIN — LINEZOLID 600 MILLIGRAM(S): 600 INJECTION, SOLUTION INTRAVENOUS at 05:13

## 2019-03-24 RX ADMIN — GABAPENTIN 600 MILLIGRAM(S): 400 CAPSULE ORAL at 05:13

## 2019-03-24 RX ADMIN — LIDOCAINE 1 PATCH: 4 CREAM TOPICAL at 11:16

## 2019-03-24 RX ADMIN — Medication 100 MILLIGRAM(S): at 22:06

## 2019-03-24 RX ADMIN — MORPHINE SULFATE 2 MILLIGRAM(S): 50 CAPSULE, EXTENDED RELEASE ORAL at 00:34

## 2019-03-24 RX ADMIN — MORPHINE SULFATE 2 MILLIGRAM(S): 50 CAPSULE, EXTENDED RELEASE ORAL at 01:28

## 2019-03-24 RX ADMIN — Medication 1 PATCH: at 22:32

## 2019-03-24 RX ADMIN — GABAPENTIN 600 MILLIGRAM(S): 400 CAPSULE ORAL at 13:54

## 2019-03-24 RX ADMIN — Medication 1 TABLET(S): at 11:11

## 2019-03-24 RX ADMIN — Medication 650 MILLIGRAM(S): at 18:44

## 2019-03-24 RX ADMIN — SENNA PLUS 2 TABLET(S): 8.6 TABLET ORAL at 22:06

## 2019-03-24 NOTE — PROGRESS NOTE ADULT - SUBJECTIVE AND OBJECTIVE BOX
42 yo F w/polysubstance abuse presenting with severe sepsis with septic shock secondary to MRSA bacteremia w/ endocarditis on ILZ and EFRAIN that has resolved and was diagnosis w/ HCV. She is lying in bed in NAD.       MEDICATIONS  (STANDING):  chlorhexidine 4% Liquid 1 Application(s) Topical <User Schedule>  DAPTOmycin IVPB       DAPTOmycin IVPB 430 milliGRAM(s) IV Intermittent every 24 hours  docusate sodium 100 milliGRAM(s) Oral three times a day  folic acid 1 milliGRAM(s) Oral daily  gabapentin 600 milliGRAM(s) Oral three times a day  lidocaine   Patch 1 Patch Transdermal every 24 hours  linezolid    Tablet 600 milliGRAM(s) Oral every 12 hours  methadone    Tablet 40 milliGRAM(s) Oral daily  multivitamin 1 Tablet(s) Oral daily  nicotine -  14 mG/24Hr(s) Patch 1 patch Transdermal daily  nystatin Powder 1 Application(s) Topical three times a day  potassium acid phosphate/sodium acid phosphate tablet (K-PHOS No. 2) 1 Tablet(s) Oral four times a day with meals  senna 2 Tablet(s) Oral at bedtime    MEDICATIONS  (PRN):  acetaminophen   Tablet .. 650 milliGRAM(s) Oral every 6 hours PRN Mild Pain (1 - 3)  morphine  - Injectable 2 milliGRAM(s) IV Push every 4 hours PRN Moderate Pain (4 - 6)  polyethylene glycol 3350 17 Gram(s) Oral daily PRN Constipation      Allergies    penicillin (Short breath; Hives)    Intolerances    Vital Signs Last 24 Hrs  T(C): 36.6 (24 Mar 2019 17:21), Max: 36.6 (24 Mar 2019 17:21)  T(F): 97.8 (24 Mar 2019 17:21), Max: 97.8 (24 Mar 2019 17:21)  HR: 88 (24 Mar 2019 17:21) (74 - 88)  BP: 118/65 (24 Mar 2019 17:21) (110/65 - 132/84)  BP(mean): --  RR: 17 (24 Mar 2019 17:21) (17 - 18)  SpO2: 98% (24 Mar 2019 17:21) (98% - 98%)    PHYSICAL EXAM:  GENERAL: NAD, well-groomed, well-developed  HEAD:  Atraumatic, Normocephalic  EYES: EOMI, PERRLA   NECK: Supple w/ collar and KIRILL drain  NERVOUS SYSTEM:  Alert & Oriented X3, no neck tenderness   CHEST/LUNG: Clear to auscultation bilaterally; No rales, rhonchi, wheezing, or rubs  HEART: Regular rate and rhythm; No murmurs, rubs, or gallops  ABDOMEN: Soft, Nontender, Nondistended; Bowel sounds present  EXTREMITIES: No clubbing, cyanosis, or edema      LABS:                        9.5    11.04 )-----------( 455      ( 24 Mar 2019 08:50 )             30.8     03-24    139  |  106  |  33<H>  ----------------------------<  87  4.2   |  26  |  0.55    Ca    8.2<L>      24 Mar 2019 08:50  Phos  2.7     03-24  Mg     2.0     03-24    TPro  6.9  /  Alb  1.9<L>  /  TBili  0.2  /  DBili  x   /  AST  55<H>  /  ALT  80<H>  /  AlkPhos  170<H>  03-23        CAPILLARY BLOOD GLUCOSE          Culture - Tissue with Gram Stain (collected 21 Mar 2019 09:42)  Source: .Tissue cervical disc #2 culture  Gram Stain (prelim) (22 Mar 2019 11:20):    Moderate polymorphonuclear leukocytes per low power field    Rare Gram positive cocci in pairs per oil power field  Preliminary Report (24 Mar 2019 12:21):    Few Methicillin resistant Staphylococcus aureus  Organism: Methicillin resistant Staphylococcus aureus (24 Mar 2019 12:21)  Organism: Methicillin resistant Staphylococcus aureus (24 Mar 2019 12:21)    Culture - Tissue with Gram Stain (collected 21 Mar 2019 09:38)  Source: .Tissue c4-c5 disc culture c/s ewb  Gram Stain (prelim) (22 Mar 2019 11:33):    Moderate polymorphonuclear leukocytes per low power field    Rare Gram positive cocci in pairs per oil power field  Preliminary Report (23 Mar 2019 08:21):    Few Methicillin resistant Staphylococcus aureus  Organism: Methicillin resistant Staphylococcus aureus (23 Mar 2019 08:20)  Organism: Methicillin resistant Staphylococcus aureus (23 Mar 2019 08:20)    Culture - Blood (collected 21 Mar 2019 09:33)  Source: .Blood  Preliminary Report (22 Mar 2019 10:01):    No growth to date.    Culture - Surgical Swab (collected 21 Mar 2019 09:31)  Source: .Surgical Swab cervical disc #1 culture c/s  Preliminary Report (23 Mar 2019 08:27):    Numerous Methicillin resistant Staphylococcus aureus  Organism: Methicillin resistant Staphylococcus aureus (23 Mar 2019 08:26)  Organism: Methicillin resistant Staphylococcus aureus (23 Mar 2019 08:26)    Culture - Surgical Swab (collected 21 Mar 2019 09:26)  Source: .Surgical Swab prevertebral culture c/s  Preliminary Report (22 Mar 2019 10:48):    No growth    Culture - Body Fluid with Gram Stain (collected 21 Mar 2019 04:12)  Source: .Body Fluid LEFT THORACENTESIS  Gram Stain (prelim) (22 Mar 2019 09:06):    polymorphonuclear leukocytes seen    No organisms seen    by cytocentrifuge  Preliminary Report (22 Mar 2019 09:06):    No growth      RADIOLOGY & ADDITIONAL TESTS:    03-23-19 @ 07:01  -  03-24-19 @ 07:00  --------------------------------------------------------  IN:    Oral Fluid: 450 mL  Total IN: 450 mL    OUT:    Drain: 25 mL  Total OUT: 25 mL    Total NET: 425 mL 42 yo F w/polysubstance abuse presenting with severe sepsis with septic shock secondary to MRSA bacteremia w/ endocarditis on LIZ and EFRAIN that has resolved and was diagnosis w/ HCV. She is lying in bed in NAD.       MEDICATIONS  (STANDING):  chlorhexidine 4% Liquid 1 Application(s) Topical <User Schedule>  DAPTOmycin IVPB       DAPTOmycin IVPB 430 milliGRAM(s) IV Intermittent every 24 hours  docusate sodium 100 milliGRAM(s) Oral three times a day  folic acid 1 milliGRAM(s) Oral daily  gabapentin 600 milliGRAM(s) Oral three times a day  lidocaine   Patch 1 Patch Transdermal every 24 hours  linezolid    Tablet 600 milliGRAM(s) Oral every 12 hours  methadone    Tablet 40 milliGRAM(s) Oral daily  multivitamin 1 Tablet(s) Oral daily  nicotine -  14 mG/24Hr(s) Patch 1 patch Transdermal daily  nystatin Powder 1 Application(s) Topical three times a day  potassium acid phosphate/sodium acid phosphate tablet (K-PHOS No. 2) 1 Tablet(s) Oral four times a day with meals  senna 2 Tablet(s) Oral at bedtime    MEDICATIONS  (PRN):  acetaminophen   Tablet .. 650 milliGRAM(s) Oral every 6 hours PRN Mild Pain (1 - 3)  morphine  - Injectable 2 milliGRAM(s) IV Push every 4 hours PRN Moderate Pain (4 - 6)  polyethylene glycol 3350 17 Gram(s) Oral daily PRN Constipation      Allergies    penicillin (Short breath; Hives)    Intolerances    Vital Signs Last 24 Hrs  T(C): 36.6 (24 Mar 2019 17:21), Max: 36.6 (24 Mar 2019 17:21)  T(F): 97.8 (24 Mar 2019 17:21), Max: 97.8 (24 Mar 2019 17:21)  HR: 88 (24 Mar 2019 17:21) (74 - 88)  BP: 118/65 (24 Mar 2019 17:21) (110/65 - 132/84)  BP(mean): --  RR: 17 (24 Mar 2019 17:21) (17 - 18)  SpO2: 98% (24 Mar 2019 17:21) (98% - 98%)    PHYSICAL EXAM:  GENERAL: NAD, well-groomed, well-developed  HEAD:  Atraumatic, Normocephalic  EYES: EOMI, PERRLA   NECK: Supple w/ collar, KIRILL drain removed  NERVOUS SYSTEM:  Alert & Oriented X3, no neck tenderness   CHEST/LUNG: Clear to auscultation bilaterally; No rales, rhonchi, wheezing, or rubs  HEART: Regular rate and rhythm; No murmurs, rubs, or gallops  ABDOMEN: Soft, Nontender, Nondistended; Bowel sounds present  EXTREMITIES: No clubbing, cyanosis, or edema      LABS:                        9.5    11.04 )-----------( 455      ( 24 Mar 2019 08:50 )             30.8     03-24    139  |  106  |  33<H>  ----------------------------<  87  4.2   |  26  |  0.55    Ca    8.2<L>      24 Mar 2019 08:50  Phos  2.7     03-24  Mg     2.0     03-24    TPro  6.9  /  Alb  1.9<L>  /  TBili  0.2  /  DBili  x   /  AST  55<H>  /  ALT  80<H>  /  AlkPhos  170<H>  03-23        CAPILLARY BLOOD GLUCOSE          Culture - Tissue with Gram Stain (collected 21 Mar 2019 09:42)  Source: .Tissue cervical disc #2 culture  Gram Stain (prelim) (22 Mar 2019 11:20):    Moderate polymorphonuclear leukocytes per low power field    Rare Gram positive cocci in pairs per oil power field  Preliminary Report (24 Mar 2019 12:21):    Few Methicillin resistant Staphylococcus aureus  Organism: Methicillin resistant Staphylococcus aureus (24 Mar 2019 12:21)  Organism: Methicillin resistant Staphylococcus aureus (24 Mar 2019 12:21)    Culture - Tissue with Gram Stain (collected 21 Mar 2019 09:38)  Source: .Tissue c4-c5 disc culture c/s ewb  Gram Stain (prelim) (22 Mar 2019 11:33):    Moderate polymorphonuclear leukocytes per low power field    Rare Gram positive cocci in pairs per oil power field  Preliminary Report (23 Mar 2019 08:21):    Few Methicillin resistant Staphylococcus aureus  Organism: Methicillin resistant Staphylococcus aureus (23 Mar 2019 08:20)  Organism: Methicillin resistant Staphylococcus aureus (23 Mar 2019 08:20)    Culture - Blood (collected 21 Mar 2019 09:33)  Source: .Blood  Preliminary Report (22 Mar 2019 10:01):    No growth to date.    Culture - Surgical Swab (collected 21 Mar 2019 09:31)  Source: .Surgical Swab cervical disc #1 culture c/s  Preliminary Report (23 Mar 2019 08:27):    Numerous Methicillin resistant Staphylococcus aureus  Organism: Methicillin resistant Staphylococcus aureus (23 Mar 2019 08:26)  Organism: Methicillin resistant Staphylococcus aureus (23 Mar 2019 08:26)    Culture - Surgical Swab (collected 21 Mar 2019 09:26)  Source: .Surgical Swab prevertebral culture c/s  Preliminary Report (22 Mar 2019 10:48):    No growth    Culture - Body Fluid with Gram Stain (collected 21 Mar 2019 04:12)  Source: .Body Fluid LEFT THORACENTESIS  Gram Stain (prelim) (22 Mar 2019 09:06):    polymorphonuclear leukocytes seen    No organisms seen    by cytocentrifuge  Preliminary Report (22 Mar 2019 09:06):    No growth      RADIOLOGY & ADDITIONAL TESTS:    03-23-19 @ 07:01  -  03-24-19 @ 07:00  --------------------------------------------------------  IN:    Oral Fluid: 450 mL  Total IN: 450 mL    OUT:    Drain: 25 mL  Total OUT: 25 mL    Total NET: 425 mL

## 2019-03-24 NOTE — PROGRESS NOTE ADULT - ASSESSMENT
43 F w/ history of IVDA, alcohol abuse, hx of pancreatitis, alcohol hepatitis, alcohol withdrawal, left frontoparietal subdural hematoma 2008 without need for neurosurgical intervention presented with severe sepsis with septic shock secondary to MRSA bacteremia w/ RLL PNA, UTI, endocarditis on LIZ and EFRAIN that has resolved and was diagnosis w/ HCV. Pt was initially intubated due to respiratory failure and put on pressors in ICU. She as extubated on 2/25 and transferred from ICU the following day. Pt had a C4-5 ACDF with irrigation and debridement on 3/20 due to spinal abscess and osteo and was kept intubated post procedure and transferred again to ICU, where she was extubated on 3/21 and transferred back to the med service the following day.     Endocarditis of tricuspid valve  - CT chest showed septic pulmonary emboli to lungs and some have worsened - status post thoracentesis on 3/20  - as per ID, patient is on dapto & Zyvox   - repeat BC negative from 3/21  - repeat echo showed a pedunculated mobile mass seen adherent to lateral leaflet of the tricuspid valve w/ no interval changes noted in valve structure or regurgitation    Spinal osteo, abscess and cord compression    - CT of head and neck showed disc space narrowing and endplate irregularity at C4-5 which may represent typical degenerative change vs discitis   - MRI of the cervical spine w/ contrast showed C2-C7 discitis/osteomyelitis with a small right of midline ventral epidural abscess at C2-C7  - MRI of the thoracolumbar spine showed discitis/osteomyelitis at T11-T12 with paravertebral abscess, as well as discitis/osteomyelitis C6--T1, T9-T10, T10-T11, L4-L5, and L5-S1, and a phlegmon/early abscess dorsolaterally within the lumbar canal at the level of L4-L5. There was also a septic arthritis of  the left sternoclavicular joint space. There was also disc herniation at the L4-L5 level with contact and probable impingement of the descending right-sided nerve roots  - status post s/p C4-5 ACDF with irrigation and debridement on 3/20  - c/w gabapentin and lidocaine patch  - wound cx grew MRSA  - as per ortho, they have no plan to return to OR at this time & will get a repeat MRI to follow OM/Discitis of T and L spine levels on Monday or Tuesday  - c/w aspen collar   - as per ID, patient is on dapto & Zyvox     Moderate protein-calorie malnutrition  - c/w soft diet w/Ensure Enlive 3 x day      Drug abuse  - c/w methadone for opiate abuse     HCV  - Outpatient follow up for HCV and opioid maintenance outpt re-enrollment program    Anemia  - status post 1 unit pRBC,  - watching H&H, ortho ordered 2 units pRBC today in anticipation of surgery   - c/w folic acid     Hypophosphatemia  - replaced    Prophylaxis:   DVT: SCD, no chemical prophylaxis due to anemia  GI: Protonix

## 2019-03-24 NOTE — PROGRESS NOTE ADULT - ASSESSMENT
A/P: 43y Female s/p C4-5 ACDF with irrigation and debridement, now POD 4  Pain control   FU KIRILL output - may be able to DC this AM  ABx while JPs are in  please keep MAPs >80  Pt now off steroids, will monitor  OR Cx's growing MRSA, ABX per ID  LIZ demonstrates no change in vegetation  Pt will need Re-eval from CT surgery to determine what to do about vegetation, pt will only continue to throw septic emboli if likely source is not dealt with  No plan for return to OR at this time, will continue to monitor for improvement or RUE weakness which has been present since before surgery.    will likely order repeat MRI to follow OM/Discitis of T and L spine levels on Monday or Tuesday  continue in aspen collar  Pain control  DVT ppx- SCDs  PT/OT, WBAT  FU labs  will d/w attending and advise if plan changes  Dispo planning

## 2019-03-24 NOTE — PROGRESS NOTE ADULT - SUBJECTIVE AND OBJECTIVE BOX
Pt seen and examined at bedside after stable. Pain well controlled. Pt states she would like drain to come out as soon as possible. Denies N/V/CP/Dyspnea/Calf tenderness bilaterally.      Gen: NAD    Spine PE:  Skin intact  Aspen Collar In Place  dressing c/d/i  KIRILL Drain in place   Negative clonus  Negative babinski  Negative olmstead           Deltoid        Bicep        Tricep          Wrist Ext    Wrist Flex    Finger Flex          Finger Abd  L            4/5         4/5           4/5             4/5                4/5	           4/5                    4/5  R            2/5 	   3/5           4/5             3/5              4/5                        4/5                   4/5                                      Hip Flex       Quad     Ankle DF        Ankle PF       Toe Ext        Hamstring    L            5/5              5/5          5/5                 5/5                 5/5	                5/5  R            5/5              5/5           5/5                 5/5                 5/5                5/5    Sensory:            C5         C6         C7      C8       T1        (0=absent, 1=impaired, 2=normal, NT=not testable)  L         2            2           2        2         2  R          2            2           2        2         2               L2          L3         L4      L5       S1         (0=absent, 1=impaired, 2=normal, NT=not testable)  L         2            2            2        2        2  R          2            2           2        2         2    Paresthesias in stocking/glove distribution at baseline, states feet are improving

## 2019-03-25 PROCEDURE — 99233 SBSQ HOSP IP/OBS HIGH 50: CPT

## 2019-03-25 RX ORDER — ACETAMINOPHEN 500 MG
650 TABLET ORAL EVERY 6 HOURS
Qty: 0 | Refills: 0 | Status: DISCONTINUED | OUTPATIENT
Start: 2019-03-25 | End: 2019-05-08

## 2019-03-25 RX ADMIN — MORPHINE SULFATE 2 MILLIGRAM(S): 50 CAPSULE, EXTENDED RELEASE ORAL at 18:21

## 2019-03-25 RX ADMIN — PANTOPRAZOLE SODIUM 40 MILLIGRAM(S): 20 TABLET, DELAYED RELEASE ORAL at 09:49

## 2019-03-25 RX ADMIN — LIDOCAINE 1 PATCH: 4 CREAM TOPICAL at 17:50

## 2019-03-25 RX ADMIN — MORPHINE SULFATE 2 MILLIGRAM(S): 50 CAPSULE, EXTENDED RELEASE ORAL at 06:07

## 2019-03-25 RX ADMIN — MORPHINE SULFATE 2 MILLIGRAM(S): 50 CAPSULE, EXTENDED RELEASE ORAL at 22:40

## 2019-03-25 RX ADMIN — DAPTOMYCIN 117.2 MILLIGRAM(S): 500 INJECTION, POWDER, LYOPHILIZED, FOR SOLUTION INTRAVENOUS at 12:59

## 2019-03-25 RX ADMIN — MORPHINE SULFATE 2 MILLIGRAM(S): 50 CAPSULE, EXTENDED RELEASE ORAL at 18:07

## 2019-03-25 RX ADMIN — LINEZOLID 600 MILLIGRAM(S): 600 INJECTION, SOLUTION INTRAVENOUS at 18:06

## 2019-03-25 RX ADMIN — LINEZOLID 600 MILLIGRAM(S): 600 INJECTION, SOLUTION INTRAVENOUS at 05:37

## 2019-03-25 RX ADMIN — Medication 1 PATCH: at 12:59

## 2019-03-25 RX ADMIN — MORPHINE SULFATE 2 MILLIGRAM(S): 50 CAPSULE, EXTENDED RELEASE ORAL at 00:01

## 2019-03-25 RX ADMIN — Medication 1 MILLIGRAM(S): at 13:00

## 2019-03-25 RX ADMIN — GABAPENTIN 600 MILLIGRAM(S): 400 CAPSULE ORAL at 05:37

## 2019-03-25 RX ADMIN — GABAPENTIN 600 MILLIGRAM(S): 400 CAPSULE ORAL at 13:40

## 2019-03-25 RX ADMIN — Medication 1 TABLET(S): at 22:21

## 2019-03-25 RX ADMIN — Medication 650 MILLIGRAM(S): at 06:30

## 2019-03-25 RX ADMIN — METHADONE HYDROCHLORIDE 40 MILLIGRAM(S): 40 TABLET ORAL at 13:40

## 2019-03-25 RX ADMIN — Medication 1 PATCH: at 13:01

## 2019-03-25 RX ADMIN — NYSTATIN CREAM 1 APPLICATION(S): 100000 CREAM TOPICAL at 22:24

## 2019-03-25 RX ADMIN — Medication 100 MILLIGRAM(S): at 22:21

## 2019-03-25 RX ADMIN — Medication 1 TABLET(S): at 18:06

## 2019-03-25 RX ADMIN — GABAPENTIN 600 MILLIGRAM(S): 400 CAPSULE ORAL at 22:21

## 2019-03-25 RX ADMIN — MORPHINE SULFATE 2 MILLIGRAM(S): 50 CAPSULE, EXTENDED RELEASE ORAL at 14:07

## 2019-03-25 RX ADMIN — MORPHINE SULFATE 2 MILLIGRAM(S): 50 CAPSULE, EXTENDED RELEASE ORAL at 14:30

## 2019-03-25 RX ADMIN — SENNA PLUS 2 TABLET(S): 8.6 TABLET ORAL at 22:21

## 2019-03-25 RX ADMIN — CHLORHEXIDINE GLUCONATE 1 APPLICATION(S): 213 SOLUTION TOPICAL at 05:37

## 2019-03-25 RX ADMIN — MORPHINE SULFATE 2 MILLIGRAM(S): 50 CAPSULE, EXTENDED RELEASE ORAL at 05:37

## 2019-03-25 RX ADMIN — Medication 1 PATCH: at 20:00

## 2019-03-25 RX ADMIN — Medication 100 MILLIGRAM(S): at 05:37

## 2019-03-25 RX ADMIN — NYSTATIN CREAM 1 APPLICATION(S): 100000 CREAM TOPICAL at 05:42

## 2019-03-25 RX ADMIN — Medication 1 TABLET(S): at 13:00

## 2019-03-25 RX ADMIN — MORPHINE SULFATE 2 MILLIGRAM(S): 50 CAPSULE, EXTENDED RELEASE ORAL at 09:42

## 2019-03-25 RX ADMIN — Medication 650 MILLIGRAM(S): at 06:00

## 2019-03-25 RX ADMIN — Medication 1 TABLET(S): at 12:59

## 2019-03-25 RX ADMIN — Medication 1 TABLET(S): at 09:47

## 2019-03-25 RX ADMIN — Medication 100 MILLIGRAM(S): at 13:42

## 2019-03-25 RX ADMIN — NYSTATIN CREAM 1 APPLICATION(S): 100000 CREAM TOPICAL at 13:40

## 2019-03-25 RX ADMIN — MORPHINE SULFATE 2 MILLIGRAM(S): 50 CAPSULE, EXTENDED RELEASE ORAL at 10:00

## 2019-03-25 RX ADMIN — MORPHINE SULFATE 2 MILLIGRAM(S): 50 CAPSULE, EXTENDED RELEASE ORAL at 22:19

## 2019-03-25 NOTE — PROGRESS NOTE ADULT - SUBJECTIVE AND OBJECTIVE BOX
Patient is a 43y old  Female who presents with a chief complaint of AMS (25 Mar 2019 05:59)      INTERVAL HPI / OVERNIGHT EVENTS: no new event    MEDICATIONS  (STANDING):  chlorhexidine 4% Liquid 1 Application(s) Topical <User Schedule>  DAPTOmycin IVPB      DAPTOmycin IVPB 430 milliGRAM(s) IV Intermittent every 24 hours  docusate sodium 100 milliGRAM(s) Oral three times a day  folic acid 1 milliGRAM(s) Oral daily  gabapentin 600 milliGRAM(s) Oral three times a day  lidocaine   Patch 1 Patch Transdermal every 24 hours  linezolid    Tablet 600 milliGRAM(s) Oral every 12 hours  methadone    Tablet 40 milliGRAM(s) Oral daily  multivitamin 1 Tablet(s) Oral daily  nicotine -  14 mG/24Hr(s) Patch 1 patch Transdermal daily  nystatin Powder 1 Application(s) Topical three times a day  pantoprazole    Tablet 40 milliGRAM(s) Oral before breakfast  potassium acid phosphate/sodium acid phosphate tablet (K-PHOS No. 2) 1 Tablet(s) Oral four times a day with meals  senna 2 Tablet(s) Oral at bedtime    MEDICATIONS  (PRN):  acetaminophen   Tablet .. 650 milliGRAM(s) Oral every 6 hours PRN Mild Pain (1 - 3)  acetaminophen   Tablet .. 650 milliGRAM(s) Oral every 6 hours PRN Temp greater or equal to 38C (100.4F)  morphine  - Injectable 2 milliGRAM(s) IV Push every 4 hours PRN Moderate Pain (4 - 6)  polyethylene glycol 3350 17 Gram(s) Oral daily PRN Constipation      Vital Signs Last 24 Hrs  T(C): 36.3 (25 Mar 2019 11:28), Max: 38.3 (25 Mar 2019 05:24)  T(F): 97.3 (25 Mar 2019 11:28), Max: 100.9 (25 Mar 2019 05:24)  HR: 102 (25 Mar 2019 05:24) (88 - 102)  BP: 128/76 (25 Mar 2019 11:28) (110/60 - 128/76)  BP(mean): --  RR: 16 (25 Mar 2019 11:28) (16 - 17)  SpO2: 98% (25 Mar 2019 11:28) (97% - 98%)    Review of systems:  General : low grade  fever /chills, +fatigue  CVS : no chest pain, palpitations  Lungs : no shortness of breath, cough  GI : no abdominal pain, vomiting, diarrhea   : no dysuria, hematuria        PHYSICAL EXAM:   General :NAD,neck in brace, hemovac removed  Constitutional: well-developed  Respiratory: CTAB/L  Cardiovascular: S1 and S2, RRR, + murmur  Gastrointestinal: BS+, soft, NT/ND  Extremities: No peripheral edema  Vascular: 2+ peripheral pulses  Skin:skin poppings  Midline +    LABS:                        9.5    11.04 )-----------( 455      ( 24 Mar 2019 08:50 )             30.8     03-24    139  |  106  |  33<H>  ----------------------------<  87  4.2   |  26  |  0.55    Ca    8.2<L>      24 Mar 2019 08:50  Phos  2.7     03-24  Mg     2.0     03-24            MICROBIOLOGY:  RECENT CULTURES:  03-21 .Tissue cervical disc #2 culture Methicillin resistant Staphylococcus aureus   Moderate polymorphonuclear leukocytes per low power field  Rare Gram positive cocci in pairs per oil power field   Few Methicillin resistant Staphylococcus aureus    03-21 .Tissue c4-c5 disc culture c/s ewb Methicillin resistant Staphylococcus aureus   Moderate polymorphonuclear leukocytes per low power field  Rare Gram positive cocci in pairs per oil power field   Few Methicillin resistant Staphylococcus aureus    03-21 .Blood XXXX XXXX   No growth to date.    03-21 .Surgical Swab cervical disc #1 culture c/s Methicillin resistant Staphylococcus aureus XXXX   Numerous Methicillin resistant Staphylococcus aureus    03-21 .Surgical Swab prevertebral culture c/s XXXX XXXX   No growth    03-21 .Body Fluid LEFT THORACENTESIS XXXX   polymorphonuclear leukocytes seen  No organisms seen  by cytocentrifuge   No growth          RADIOLOGY & ADDITIONAL STUDIES:  2 d echo : pedunculated vegitation on TV ,no abscess damage

## 2019-03-25 NOTE — PROGRESS NOTE ADULT - SUBJECTIVE AND OBJECTIVE BOX
Patient is a 43y old  Female who presents with a chief complaint of AMS (25 Mar 2019 16:18)      OVERNIGHT EVENTS: none     REVIEW OF SYSTEMS: denies chest pain/SOB, diaphoresis, no F/C, cough, dizziness, headache, blurry vision, nausea, vomiting, abdominal pain. All others review of systems negative     MEDICATIONS  (STANDING):  chlorhexidine 4% Liquid 1 Application(s) Topical <User Schedule>  DAPTOmycin IVPB      DAPTOmycin IVPB 430 milliGRAM(s) IV Intermittent every 24 hours  docusate sodium 100 milliGRAM(s) Oral three times a day  folic acid 1 milliGRAM(s) Oral daily  gabapentin 600 milliGRAM(s) Oral three times a day  lidocaine   Patch 1 Patch Transdermal every 24 hours  linezolid    Tablet 600 milliGRAM(s) Oral every 12 hours  methadone    Tablet 40 milliGRAM(s) Oral daily  multivitamin 1 Tablet(s) Oral daily  nicotine -  14 mG/24Hr(s) Patch 1 patch Transdermal daily  nystatin Powder 1 Application(s) Topical three times a day  pantoprazole    Tablet 40 milliGRAM(s) Oral before breakfast  potassium acid phosphate/sodium acid phosphate tablet (K-PHOS No. 2) 1 Tablet(s) Oral four times a day with meals  senna 2 Tablet(s) Oral at bedtime    MEDICATIONS  (PRN):  acetaminophen   Tablet .. 650 milliGRAM(s) Oral every 6 hours PRN Mild Pain (1 - 3)  acetaminophen   Tablet .. 650 milliGRAM(s) Oral every 6 hours PRN Temp greater or equal to 38C (100.4F)  morphine  - Injectable 2 milliGRAM(s) IV Push every 4 hours PRN Moderate Pain (4 - 6)  polyethylene glycol 3350 17 Gram(s) Oral daily PRN Constipation      Allergies    penicillin (Short breath; Hives)    Intolerances        T(F): 98.7 (03-25-19 @ 17:32), Max: 100.9 (03-25-19 @ 05:24)  HR: 92 (03-25-19 @ 17:32) (92 - 102)  BP: 107/58 (03-25-19 @ 17:32) (107/58 - 128/76)  RR: 17 (03-25-19 @ 17:32) (16 - 17)  SpO2: 98% (03-25-19 @ 17:32) (97% - 98%)  Wt(kg): --    PHYSICAL EXAM:  GENERAL: NAD, well-groomed, well-developed  HEAD:  Atraumatic, Normocephalic  EYES: EOMI, PERRLA, conjunctiva and sclera clear  ENMT: No tonsillar erythema, exudates, or enlargement; Moist mucous membranes, Good dentition, No lesions  NECK: Supple w/ collar  NERVOUS SYSTEM:  Alert & Oriented X3, Good concentration; Motor Strength 5/5 B/L upper and lower extremities; DTRs 2+ intact and symmetric  CHEST/LUNG: Clear to percussion bilaterally; No rales, rhonchi, wheezing, or rubs BL  HEART: Regular rate and rhythm; No murmurs, rubs, or gallops  ABDOMEN: Soft, Nontender, Nondistended; Bowel sounds present  EXTREMITIES:  2+ Peripheral Pulses, No clubbing, cyanosis, or edema BL LE  LYMPH: No lymphadenopathy noted  SKIN: No rashes or lesions    LABS:                        9.5    11.04 )-----------( 455      ( 24 Mar 2019 08:50 )             30.8     03-24    139  |  106  |  33<H>  ----------------------------<  87  4.2   |  26  |  0.55    Ca    8.2<L>      24 Mar 2019 08:50  Phos  2.7     03-24  Mg     2.0     03-24          Cultures; Culture - Tissue with Gram Stain (collected 21 Mar 2019 09:42)  Source: .Tissue cervical disc #2 culture  Gram Stain (prelim) (22 Mar 2019 11:20):    Moderate polymorphonuclear leukocytes per low power field    Rare Gram positive cocci in pairs per oil power field  Preliminary Report (24 Mar 2019 12:21):    Few Methicillin resistant Staphylococcus aureus  Organism: Methicillin resistant Staphylococcus aureus (24 Mar 2019 12:21)  Organism: Methicillin resistant Staphylococcus aureus (24 Mar 2019 12:21)    Culture - Tissue with Gram Stain (collected 21 Mar 2019 09:38)  Source: .Tissue c4-c5 disc culture c/s ewb  Gram Stain (prelim) (22 Mar 2019 11:33):    Moderate polymorphonuclear leukocytes per low power field    Rare Gram positive cocci in pairs per oil power field  Preliminary Report (23 Mar 2019 08:21):    Few Methicillin resistant Staphylococcus aureus  Organism: Methicillin resistant Staphylococcus aureus (23 Mar 2019 08:20)  Organism: Methicillin resistant Staphylococcus aureus (23 Mar 2019 08:20)    Culture - Blood (collected 21 Mar 2019 09:33)  Source: .Blood  Preliminary Report (22 Mar 2019 10:01):    No growth to date.    Culture - Surgical Swab (collected 21 Mar 2019 09:31)  Source: .Surgical Swab cervical disc #1 culture c/s  Preliminary Report (23 Mar 2019 08:27):    Numerous Methicillin resistant Staphylococcus aureus  Organism: Methicillin resistant Staphylococcus aureus (23 Mar 2019 08:26)  Organism: Methicillin resistant Staphylococcus aureus (23 Mar 2019 08:26)    Culture - Surgical Swab (collected 21 Mar 2019 09:26)  Source: .Surgical Swab prevertebral culture c/s  Preliminary Report (22 Mar 2019 10:48):    No growth    Culture - Body Fluid with Gram Stain (collected 21 Mar 2019 04:12)  Source: .Body Fluid LEFT THORACENTESIS  Gram Stain (prelim) (22 Mar 2019 09:06):    polymorphonuclear leukocytes seen    No organisms seen    by cytocentrifuge  Preliminary Report (22 Mar 2019 09:06):    No growth          RADIOLOGY & ADDITIONAL TESTS:    Imaging Personally Reviewed:  [ x] YES      Consultant(s) Notes Reviewed:  [x ] YES     Care Discussed with [ x] Consultants [X ] Patient [ ] Family  [x ]    [x ]  Other; RN

## 2019-03-25 NOTE — PROGRESS NOTE ADULT - PROBLEM SELECTOR PLAN 1
s/p C4-5 ACDF with irrigation and debridement   OR c/s MRSA   cont Dapto (will require 6 more weeks )

## 2019-03-25 NOTE — PROGRESS NOTE ADULT - ASSESSMENT
A/P: 43y Female s/p C4-5 ACDF with irrigation and debridement, now POD 4  Pain control   please keep MAPs >80  Pt now off steroids, will monitor  OR Cx's growing MRSA, ABX per ID  LIZ demonstrates no change in vegetation  Pt will need Re-eval from CT surgery to determine what to do about vegetation, pt will only continue to throw septic emboli if likely source is not dealt with  No plan for return to OR at this time, will continue to monitor for improvement or RUE weakness which has been present since before surgery.    will likely order repeat MRI to follow OM/Discitis of T and L spine levels on Monday or Tuesday  continue in aspen collar  Pain control  DVT ppx- SCDs  PT/OT, WBAT  FU labs  will d/w attending and advise if plan changes  Dispo planning

## 2019-03-25 NOTE — PROGRESS NOTE ADULT - PROBLEM SELECTOR PLAN 4
with IE: diskitis/OM ?Spinal abscesses /Parapneumonic effusion   repeat blood c/s from last week negative  occasional low grade fever   s/p thoracentesis : fluids neg  cont dapto and zyvox

## 2019-03-25 NOTE — PROGRESS NOTE ADULT - SUBJECTIVE AND OBJECTIVE BOX
Pt seen and examined at bedside after stable. Complaints of pain this AM. Denies CP/Dyspnea/Calf tenderness bilaterally. No issues breathing or swallowing after KIRILL drain DC yesterday afternoon.      Gen: NAD    Spine PE:  Skin intact  Aspen Collar In Place  dressing c/d/i  Negative clonus  Negative babinski  Negative olmstead           Deltoid        Bicep        Tricep          Wrist Ext    Wrist Flex    Finger Flex          Finger Abd  L            4/5         4/5           4/5             4/5                4/5	           4/5                    4/5  R            2/5 	   3/5           4/5             3/5 4/5                        4/5 4/5                                      Hip Flex       Quad     Ankle DF        Ankle PF       Toe Ext        Hamstring    L            5/5 5/5          5/5                 5/5 5/5 5/5  R            5/5 5/5 5/5 5/5 5/5 5/5    Sensory:            C5         C6         C7      C8       T1        (0=absent, 1=impaired, 2=normal, NT=not testable)  L         2            2           2        2         2  R          2            2           2        2         2               L2          L3         L4      L5       S1         (0=absent, 1=impaired, 2=normal, NT=not testable)  L         2            2            2        2        2  R          2            2           2        2         2    Paresthesias in stocking/glove distribution at baseline, states feet are improving

## 2019-03-26 LAB
ANION GAP SERPL CALC-SCNC: 9 MMOL/L — SIGNIFICANT CHANGE UP (ref 5–17)
BUN SERPL-MCNC: 26 MG/DL — HIGH (ref 7–23)
CALCIUM SERPL-MCNC: 8.3 MG/DL — LOW (ref 8.5–10.1)
CHLORIDE SERPL-SCNC: 99 MMOL/L — SIGNIFICANT CHANGE UP (ref 96–108)
CO2 SERPL-SCNC: 26 MMOL/L — SIGNIFICANT CHANGE UP (ref 22–31)
CREAT SERPL-MCNC: 0.52 MG/DL — SIGNIFICANT CHANGE UP (ref 0.5–1.3)
CULTURE RESULTS: SIGNIFICANT CHANGE UP
GLUCOSE SERPL-MCNC: 137 MG/DL — HIGH (ref 70–99)
GRAM STN FLD: SIGNIFICANT CHANGE UP
HCG UR QL: NEGATIVE — SIGNIFICANT CHANGE UP
HCT VFR BLD CALC: 33.3 % — LOW (ref 34.5–45)
HGB BLD-MCNC: 10.3 G/DL — LOW (ref 11.5–15.5)
MAGNESIUM SERPL-MCNC: 1.7 MG/DL — SIGNIFICANT CHANGE UP (ref 1.6–2.6)
MCHC RBC-ENTMCNC: 26 PG — LOW (ref 27–34)
MCHC RBC-ENTMCNC: 30.9 GM/DL — LOW (ref 32–36)
MCV RBC AUTO: 84.1 FL — SIGNIFICANT CHANGE UP (ref 80–100)
NRBC # BLD: 0 /100 WBCS — SIGNIFICANT CHANGE UP (ref 0–0)
ORGANISM # SPEC MICROSCOPIC CNT: SIGNIFICANT CHANGE UP
PHOSPHATE SERPL-MCNC: 4 MG/DL — SIGNIFICANT CHANGE UP (ref 2.5–4.5)
PLATELET # BLD AUTO: 390 K/UL — SIGNIFICANT CHANGE UP (ref 150–400)
POTASSIUM SERPL-MCNC: 4.1 MMOL/L — SIGNIFICANT CHANGE UP (ref 3.5–5.3)
POTASSIUM SERPL-SCNC: 4.1 MMOL/L — SIGNIFICANT CHANGE UP (ref 3.5–5.3)
RBC # BLD: 3.96 M/UL — SIGNIFICANT CHANGE UP (ref 3.8–5.2)
RBC # FLD: 19.6 % — HIGH (ref 10.3–14.5)
SODIUM SERPL-SCNC: 134 MMOL/L — LOW (ref 135–145)
SPECIMEN SOURCE: SIGNIFICANT CHANGE UP
WBC # BLD: 12.91 K/UL — HIGH (ref 3.8–10.5)
WBC # FLD AUTO: 12.91 K/UL — HIGH (ref 3.8–10.5)

## 2019-03-26 PROCEDURE — 99233 SBSQ HOSP IP/OBS HIGH 50: CPT

## 2019-03-26 PROCEDURE — 72147 MRI CHEST SPINE W/DYE: CPT | Mod: 26

## 2019-03-26 PROCEDURE — 72158 MRI LUMBAR SPINE W/O & W/DYE: CPT | Mod: 26

## 2019-03-26 RX ADMIN — MORPHINE SULFATE 2 MILLIGRAM(S): 50 CAPSULE, EXTENDED RELEASE ORAL at 07:08

## 2019-03-26 RX ADMIN — Medication 1 PATCH: at 19:55

## 2019-03-26 RX ADMIN — CHLORHEXIDINE GLUCONATE 1 APPLICATION(S): 213 SOLUTION TOPICAL at 06:37

## 2019-03-26 RX ADMIN — Medication 650 MILLIGRAM(S): at 00:07

## 2019-03-26 RX ADMIN — LIDOCAINE 1 PATCH: 4 CREAM TOPICAL at 00:01

## 2019-03-26 RX ADMIN — Medication 650 MILLIGRAM(S): at 01:10

## 2019-03-26 RX ADMIN — LINEZOLID 600 MILLIGRAM(S): 600 INJECTION, SOLUTION INTRAVENOUS at 17:08

## 2019-03-26 RX ADMIN — NYSTATIN CREAM 1 APPLICATION(S): 100000 CREAM TOPICAL at 13:55

## 2019-03-26 RX ADMIN — Medication 1 TABLET(S): at 11:53

## 2019-03-26 RX ADMIN — MORPHINE SULFATE 2 MILLIGRAM(S): 50 CAPSULE, EXTENDED RELEASE ORAL at 21:38

## 2019-03-26 RX ADMIN — Medication 100 MILLIGRAM(S): at 13:54

## 2019-03-26 RX ADMIN — PANTOPRAZOLE SODIUM 40 MILLIGRAM(S): 20 TABLET, DELAYED RELEASE ORAL at 06:37

## 2019-03-26 RX ADMIN — MORPHINE SULFATE 2 MILLIGRAM(S): 50 CAPSULE, EXTENDED RELEASE ORAL at 11:55

## 2019-03-26 RX ADMIN — Medication 1 PATCH: at 07:58

## 2019-03-26 RX ADMIN — LIDOCAINE 1 PATCH: 4 CREAM TOPICAL at 23:00

## 2019-03-26 RX ADMIN — Medication 650 MILLIGRAM(S): at 13:55

## 2019-03-26 RX ADMIN — GABAPENTIN 600 MILLIGRAM(S): 400 CAPSULE ORAL at 06:37

## 2019-03-26 RX ADMIN — LINEZOLID 600 MILLIGRAM(S): 600 INJECTION, SOLUTION INTRAVENOUS at 06:37

## 2019-03-26 RX ADMIN — MORPHINE SULFATE 2 MILLIGRAM(S): 50 CAPSULE, EXTENDED RELEASE ORAL at 21:50

## 2019-03-26 RX ADMIN — Medication 100 MILLIGRAM(S): at 06:37

## 2019-03-26 RX ADMIN — MORPHINE SULFATE 2 MILLIGRAM(S): 50 CAPSULE, EXTENDED RELEASE ORAL at 02:40

## 2019-03-26 RX ADMIN — NYSTATIN CREAM 1 APPLICATION(S): 100000 CREAM TOPICAL at 21:38

## 2019-03-26 RX ADMIN — Medication 100 MILLIGRAM(S): at 21:38

## 2019-03-26 RX ADMIN — SENNA PLUS 2 TABLET(S): 8.6 TABLET ORAL at 21:38

## 2019-03-26 RX ADMIN — GABAPENTIN 600 MILLIGRAM(S): 400 CAPSULE ORAL at 21:38

## 2019-03-26 RX ADMIN — MORPHINE SULFATE 2 MILLIGRAM(S): 50 CAPSULE, EXTENDED RELEASE ORAL at 02:23

## 2019-03-26 RX ADMIN — MORPHINE SULFATE 2 MILLIGRAM(S): 50 CAPSULE, EXTENDED RELEASE ORAL at 17:09

## 2019-03-26 RX ADMIN — MORPHINE SULFATE 2 MILLIGRAM(S): 50 CAPSULE, EXTENDED RELEASE ORAL at 06:35

## 2019-03-26 RX ADMIN — Medication 1 MILLIGRAM(S): at 11:53

## 2019-03-26 RX ADMIN — DAPTOMYCIN 117.2 MILLIGRAM(S): 500 INJECTION, POWDER, LYOPHILIZED, FOR SOLUTION INTRAVENOUS at 12:10

## 2019-03-26 RX ADMIN — GABAPENTIN 600 MILLIGRAM(S): 400 CAPSULE ORAL at 13:54

## 2019-03-26 RX ADMIN — Medication 1 PATCH: at 11:54

## 2019-03-26 RX ADMIN — METHADONE HYDROCHLORIDE 40 MILLIGRAM(S): 40 TABLET ORAL at 11:53

## 2019-03-26 NOTE — PROGRESS NOTE ADULT - ASSESSMENT
A/P: 43y Female s/p C4-5 ACDF with irrigation and debridement, now POD 5  FI  MR T/L Spine with and without contrast   Pain control   please keep MAPs >80  Pt now off steroids, will monitor  OR Cx's growing MRSA, ABX per ID  LIZ demonstrates no change in vegetation  Pt will need Re-eval from CT surgery to determine what to do about vegetation, pt will only continue to throw septic emboli if likely source is not dealt with  No plan for return to OR at this time, will continue to monitor for improvement or RUE weakness which has been present since before surgery.    will likely order repeat MRI to follow OM/Discitis of T and L spine levels on Monday or Tuesday  continue in aspen collar  Pain control  DVT ppx- SCDs  PT/OT, WBAT  FU labs  will d/w attending and advise if plan changes  Dispo planning

## 2019-03-26 NOTE — PROGRESS NOTE ADULT - PROBLEM SELECTOR PLAN 4
with IE: diskitis/OM /Spinal abscesses /Parapneumonic effusion   repeat blood c/s from last week negative  occasional low grade fever   s/p thoracentesis : fluids neg  cont dapto and zyvox

## 2019-03-26 NOTE — PROGRESS NOTE ADULT - SUBJECTIVE AND OBJECTIVE BOX
Patient is a 43y old  Female who presents with a chief complaint of AMS (26 Mar 2019 12:50)      INTERVAL HPI / OVERNIGHT EVENTS:    MEDICATIONS  (STANDING):  chlorhexidine 4% Liquid 1 Application(s) Topical <User Schedule>  DAPTOmycin IVPB      DAPTOmycin IVPB 430 milliGRAM(s) IV Intermittent every 24 hours  docusate sodium 100 milliGRAM(s) Oral three times a day  folic acid 1 milliGRAM(s) Oral daily  gabapentin 600 milliGRAM(s) Oral three times a day  lidocaine   Patch 1 Patch Transdermal every 24 hours  linezolid    Tablet 600 milliGRAM(s) Oral every 12 hours  methadone    Tablet 40 milliGRAM(s) Oral daily  multivitamin 1 Tablet(s) Oral daily  nicotine -  14 mG/24Hr(s) Patch 1 patch Transdermal daily  nystatin Powder 1 Application(s) Topical three times a day  pantoprazole    Tablet 40 milliGRAM(s) Oral before breakfast  senna 2 Tablet(s) Oral at bedtime    MEDICATIONS  (PRN):  acetaminophen   Tablet .. 650 milliGRAM(s) Oral every 6 hours PRN Mild Pain (1 - 3)  acetaminophen   Tablet .. 650 milliGRAM(s) Oral every 6 hours PRN Temp greater or equal to 38C (100.4F)  morphine  - Injectable 2 milliGRAM(s) IV Push every 4 hours PRN Moderate Pain (4 - 6)  polyethylene glycol 3350 17 Gram(s) Oral daily PRN Constipation      Vital Signs Last 24 Hrs  T(C): 38.1 (26 Mar 2019 13:50), Max: 38.1 (26 Mar 2019 13:50)  T(F): 100.6 (26 Mar 2019 13:50), Max: 100.6 (26 Mar 2019 13:50)  HR: 83 (26 Mar 2019 12:40) (83 - 97)  BP: 105/66 (26 Mar 2019 12:40) (105/66 - 128/67)  BP(mean): --  RR: 18 (26 Mar 2019 12:40) (17 - 18)  SpO2: 95% (26 Mar 2019 12:40) (95% - 98%)    Review of systems:  General : no fever /chills,fatigue  CVS : no chest pain, palpitations  Lungs : no shortness of breath, cough  GI : no abdominal pain,vomiting, diarrhea   : no dysuria,hematuria        PHYSICAL EXAM:  General :NAD  Constitutional:  well-groomed, well-developed  Respiratory: CTAB/L  Cardiovascular: S1 and S2, RRR, no M/G/R  Gastrointestinal: BS+, soft, NT/ND  Extremities: No peripheral edema  Vascular: 2+ peripheral pulses  Skin: No rashes      LABS:                        10.3   12.91 )-----------( 390      ( 26 Mar 2019 04:37 )             33.3     03-26    134<L>  |  99  |  26<H>  ----------------------------<  137<H>  4.1   |  26  |  0.52    Ca    8.3<L>      26 Mar 2019 04:37  Phos  4.0     03-26  Mg     1.7     03-26            MICROBIOLOGY:  RECENT CULTURES:  03-21 .Tissue cervical disc #2 culture Methicillin resistant Staphylococcus aureus   Moderate polymorphonuclear leukocytes per low power field  Rare Gram positive cocci in pairs per oil power field   Few Methicillin resistant Staphylococcus aureus    03-21 .Tissue c4-c5 disc culture c/s ewb Methicillin resistant Staphylococcus aureus   Moderate polymorphonuclear leukocytes per low power field  Rare Gram positive cocci in pairs per oil power field   Few Methicillin resistant Staphylococcus aureus    03-21 .Blood XXXX XXXX   No growth at 5 days.    03-21 .Surgical Swab cervical disc #1 culture c/s Methicillin resistant Staphylococcus aureus XXXX   Numerous Methicillin resistant Staphylococcus aureus    03-21 .Surgical Swab prevertebral culture c/s XXXX XXXX   No growth at 5 days    03-21 .Body Fluid LEFT THORACENTESIS XXXX   polymorphonuclear leukocytes seen  No organisms seen  by cytocentrifuge   No growth at 5 days          RADIOLOGY & ADDITIONAL STUDIES: Patient is a 43y old  Female who presents with a chief complaint of AMS (26 Mar 2019 12:50)      INTERVAL HPI / OVERNIGHT EVENTS: low grade fever     MEDICATIONS  (STANDING):  chlorhexidine 4% Liquid 1 Application(s) Topical <User Schedule>  DAPTOmycin IVPB      DAPTOmycin IVPB 430 milliGRAM(s) IV Intermittent every 24 hours  docusate sodium 100 milliGRAM(s) Oral three times a day  folic acid 1 milliGRAM(s) Oral daily  gabapentin 600 milliGRAM(s) Oral three times a day  lidocaine   Patch 1 Patch Transdermal every 24 hours  linezolid    Tablet 600 milliGRAM(s) Oral every 12 hours  methadone    Tablet 40 milliGRAM(s) Oral daily  multivitamin 1 Tablet(s) Oral daily  nicotine -  14 mG/24Hr(s) Patch 1 patch Transdermal daily  nystatin Powder 1 Application(s) Topical three times a day  pantoprazole    Tablet 40 milliGRAM(s) Oral before breakfast  senna 2 Tablet(s) Oral at bedtime    MEDICATIONS  (PRN):  acetaminophen   Tablet .. 650 milliGRAM(s) Oral every 6 hours PRN Mild Pain (1 - 3)  acetaminophen   Tablet .. 650 milliGRAM(s) Oral every 6 hours PRN Temp greater or equal to 38C (100.4F)  morphine  - Injectable 2 milliGRAM(s) IV Push every 4 hours PRN Moderate Pain (4 - 6)  polyethylene glycol 3350 17 Gram(s) Oral daily PRN Constipation      Vital Signs Last 24 Hrs  T(C): 38.1 (26 Mar 2019 13:50), Max: 38.1 (26 Mar 2019 13:50)  T(F): 100.6 (26 Mar 2019 13:50), Max: 100.6 (26 Mar 2019 13:50)  HR: 83 (26 Mar 2019 12:40) (83 - 97)  BP: 105/66 (26 Mar 2019 12:40) (105/66 - 128/67)  BP(mean): --  RR: 18 (26 Mar 2019 12:40) (17 - 18)  SpO2: 95% (26 Mar 2019 12:40) (95% - 98%)    Review of systems:  General : + fever /chills,fatigue  CVS : no chest pain, palpitations  Lungs : no shortness of breath, cough  GI : no abdominal pain,vomiting, diarrhea   : no dysuria,hematuria        PHYSICAL EXAM:  General :NAD  Constitutional:  well-groomed, well-developed  Respiratory: CTAB/L  Cardiovascular: S1 and S2, RRR, murmur  Gastrointestinal: BS+, soft, NT/ND  Extremities: No peripheral edema  Vascular: 2+ peripheral pulses  Skin:skin poppings      LABS:                        10.3   12.91 )-----------( 390      ( 26 Mar 2019 04:37 )             33.3     03-26    134<L>  |  99  |  26<H>  ----------------------------<  137<H>  4.1   |  26  |  0.52    Ca    8.3<L>      26 Mar 2019 04:37  Phos  4.0     03-26  Mg     1.7     03-26            MICROBIOLOGY:  RECENT CULTURES:  03-21 .Tissue cervical disc #2 culture Methicillin resistant Staphylococcus aureus   Moderate polymorphonuclear leukocytes per low power field  Rare Gram positive cocci in pairs per oil power field   Few Methicillin resistant Staphylococcus aureus    03-21 .Tissue c4-c5 disc culture c/s ewb Methicillin resistant Staphylococcus aureus   Moderate polymorphonuclear leukocytes per low power field  Rare Gram positive cocci in pairs per oil power field   Few Methicillin resistant Staphylococcus aureus    03-21 .Blood XXXX XXXX   No growth at 5 days.    03-21 .Surgical Swab cervical disc #1 culture c/s Methicillin resistant Staphylococcus aureus XXXX   Numerous Methicillin resistant Staphylococcus aureus    03-21 .Surgical Swab prevertebral culture c/s XXXX XXXX   No growth at 5 days    03-21 .Body Fluid LEFT THORACENTESIS XXXX   polymorphonuclear leukocytes seen  No organisms seen  by cytocentrifuge   No growth at 5 days          RADIOLOGY & ADDITIONAL STUDIES:

## 2019-03-26 NOTE — PROGRESS NOTE ADULT - ASSESSMENT
43 F w/ history of IVDA, alcohol abuse, hx of pancreatitis, alcohol hepatitis, alcohol withdrawal, left frontoparietal subdural hematoma 2008 without need for neurosurgical intervention presented with severe sepsis with septic shock secondary to MRSA bacteremia w/ RLL PNA, UTI, endocarditis on LIZ and EFRAIN that has resolved and was diagnosis w/ HCV. Pt was initially intubated due to respiratory failure and put on pressors in ICU. She as extubated on 2/25 and transferred from ICU the following day. Pt had a C4-5 ACDF with irrigation and debridement on 3/20 due to spinal abscess and osteo and was kept intubated post procedure and transferred again to ICU, where she was extubated on 3/21 and transferred back to the med service the following day.     septic shock secondary to MRSA bacteremia w/ RLL PNA, UTI, endocarditis on LIZ   - resolved     Endocarditis of tricuspid valve  - CT chest showed septic pulmonary emboli to lungs and some have worsened - status post thoracentesis on 3/20  - as per ID, patient is on dapto & Zyvox   - repeat BC negative from 3/21  - repeat echo showed a pedunculated mobile mass seen adherent to lateral leaflet of the tricuspid valve w/ no interval changes noted in valve structure or regurgitation    Spinal osteo, abscess and cord compression    - CT of head and neck showed disc space narrowing and endplate irregularity at C4-5 which may represent typical degenerative change vs discitis   - MRI of the cervical spine w/ contrast showed C2-C7 discitis/osteomyelitis with a small right of midline ventral epidural abscess at C2-C7  - MRI of the thoracolumbar spine showed discitis/osteomyelitis at T11-T12 with paravertebral abscess, as well as discitis/osteomyelitis C6--T1, T9-T10, T10-T11, L4-L5, and L5-S1, and a phlegmon/early abscess dorsolaterally within the lumbar canal at the level of L4-L5. There was also a septic arthritis of  the left sternoclavicular joint space. There was also disc herniation at the L4-L5 level with contact and probable impingement of the descending right-sided nerve roots  - status post s/p C4-5 ACDF with irrigation and debridement on 3/20  - c/w gabapentin and lidocaine patch  - wound cx grew MRSA  - as per ortho, they have no plan to return to OR at this time & will get a repeat MRI to follow OM/Discitis of T and L spine levels on Monday or Tuesday  - c/w aspen collar   - as per ID, patient is on dapto & Zyvox     Moderate protein-calorie malnutrition  - c/w soft diet w/Ensure Enlive 3 x day      Drug abuse  - c/w methadone for opiate abuse     HCV  - Outpatient follow up for HCV and opioid maintenance outpt re-enrollment program    Anemia due to poor nutrition  - status post 1 unit pRBC,  - c/w folic acid     Hypophosphatemia  - replaced    Prophylaxis:   DVT: SCD, no chemical prophylaxis due to anemia  GI: Protonix

## 2019-03-26 NOTE — PROGRESS NOTE ADULT - SUBJECTIVE AND OBJECTIVE BOX
Pt seen and examined at bedside after stable. Complaints of pain this AM. Denies CP/Dyspnea/Calf tenderness bilaterally.     Vital Signs Last 24 Hrs  T(C): 36.8 (26 Mar 2019 05:17), Max: 37.8 (25 Mar 2019 23:45)  T(F): 98.3 (26 Mar 2019 05:17), Max: 100 (25 Mar 2019 23:45)  HR: 89 (26 Mar 2019 05:17) (89 - 97)  BP: 108/60 (26 Mar 2019 05:17) (107/58 - 128/76)  BP(mean): --  RR: 17 (26 Mar 2019 05:17) (16 - 17)  SpO2: 98% (26 Mar 2019 05:17) (98% - 98%)    Gen: NAD    Spine PE:  Skin intact  Aspen Collar In Place  dressing c/d/i  Negative clonus  Negative babinski  Negative olmstead           Deltoid        Bicep        Tricep          Wrist Ext    Wrist Flex    Finger Flex          Finger Abd  L            4/5         4/5           4/5             4/5                4/5	           4/5                    4/5  R            2/5 	   3/5           4/5             3/5              4/5                        4/5                   4/5                                      Hip Flex       Quad     Ankle DF        Ankle PF       Toe Ext        Hamstring    L            5/5              5/5          5/5                 5/5                 5/5	                5/5  R            5/5              5/5           5/5                 5/5                 5/5                5/5    Sensory:            C5         C6         C7      C8       T1        (0=absent, 1=impaired, 2=normal, NT=not testable)  L         2            2           2        2         2  R          2            2           2        2         2               L2          L3         L4      L5       S1         (0=absent, 1=impaired, 2=normal, NT=not testable)  L         2            2            2        2        2  R          2            2           2        2         2    Paresthesias in stocking/glove distribution at baseline, states feet are improving

## 2019-03-26 NOTE — PROGRESS NOTE ADULT - SUBJECTIVE AND OBJECTIVE BOX
Patient is a 43y old  Female who presents with a chief complaint of AMS (26 Mar 2019 08:00)      OVERNIGHT EVENTS:  none     REVIEW OF SYSTEMS: denies chest pain/SOB, diaphoresis, no F/C, cough, dizziness, headache, blurry vision, nausea, vomiting, abdominal pain. All others review of systems negative     MEDICATIONS  (STANDING):  chlorhexidine 4% Liquid 1 Application(s) Topical <User Schedule>  DAPTOmycin IVPB      DAPTOmycin IVPB 430 milliGRAM(s) IV Intermittent every 24 hours  docusate sodium 100 milliGRAM(s) Oral three times a day  folic acid 1 milliGRAM(s) Oral daily  gabapentin 600 milliGRAM(s) Oral three times a day  lidocaine   Patch 1 Patch Transdermal every 24 hours  linezolid    Tablet 600 milliGRAM(s) Oral every 12 hours  methadone    Tablet 40 milliGRAM(s) Oral daily  multivitamin 1 Tablet(s) Oral daily  nicotine -  14 mG/24Hr(s) Patch 1 patch Transdermal daily  nystatin Powder 1 Application(s) Topical three times a day  pantoprazole    Tablet 40 milliGRAM(s) Oral before breakfast  potassium acid phosphate/sodium acid phosphate tablet (K-PHOS No. 2) 1 Tablet(s) Oral four times a day with meals  senna 2 Tablet(s) Oral at bedtime    MEDICATIONS  (PRN):  acetaminophen   Tablet .. 650 milliGRAM(s) Oral every 6 hours PRN Mild Pain (1 - 3)  acetaminophen   Tablet .. 650 milliGRAM(s) Oral every 6 hours PRN Temp greater or equal to 38C (100.4F)  morphine  - Injectable 2 milliGRAM(s) IV Push every 4 hours PRN Moderate Pain (4 - 6)  polyethylene glycol 3350 17 Gram(s) Oral daily PRN Constipation      Allergies    penicillin (Short breath; Hives)    Intolerances        T(F): 98.4 (03-26-19 @ 11:33), Max: 100 (03-25-19 @ 23:45)  HR: 83 (03-26-19 @ 12:40) (83 - 97)  BP: 105/66 (03-26-19 @ 12:40) (105/66 - 128/67)  RR: 18 (03-26-19 @ 12:40) (17 - 18)  SpO2: 95% (03-26-19 @ 12:40) (95% - 98%)  Wt(kg): --    PHYSICAL EXAM:  GENERAL: NAD, well-groomed, well-developed  HEAD:  Atraumatic, Normocephalic  EYES: EOMI, PERRLA, conjunctiva and sclera clear  ENMT: No tonsillar erythema, exudates, or enlargement; Moist mucous membranes, Good dentition, No lesions  NECK: Supple, No JVD, Normal thyroid  NERVOUS SYSTEM:  Alert & Oriented X2, Good concentration; RUE weakness POA  CHEST/LUNG: Clear to percussion bilaterally; No rales, rhonchi, wheezing, or rubs BL  HEART: Regular rate and rhythm; No murmurs, rubs, or gallops  ABDOMEN: Soft, Nontender, Nondistended; Bowel sounds present  EXTREMITIES:  2+ Peripheral Pulses, No clubbing, cyanosis, or edema BL LE  LYMPH: No lymphadenopathy noted  SKIN: No rashes or lesions    LABS:                        10.3   12.91 )-----------( 390      ( 26 Mar 2019 04:37 )             33.3     03-26    134<L>  |  99  |  26<H>  ----------------------------<  137<H>  4.1   |  26  |  0.52    Ca    8.3<L>      26 Mar 2019 04:37  Phos  4.0     03-26  Mg     1.7     03-26      Cultures; Culture - Tissue with Gram Stain (collected 21 Mar 2019 09:42)  Source: .Tissue cervical disc #2 culture  Gram Stain (prelim) (22 Mar 2019 11:20):    Moderate polymorphonuclear leukocytes per low power field    Rare Gram positive cocci in pairs per oil power field  Preliminary Report (24 Mar 2019 12:21):    Few Methicillin resistant Staphylococcus aureus  Organism: Methicillin resistant Staphylococcus aureus (24 Mar 2019 12:21)  Organism: Methicillin resistant Staphylococcus aureus (24 Mar 2019 12:21)    Culture - Tissue with Gram Stain (collected 21 Mar 2019 09:38)  Source: .Tissue c4-c5 disc culture c/s ewb  Gram Stain (prelim) (22 Mar 2019 11:33):    Moderate polymorphonuclear leukocytes per low power field    Rare Gram positive cocci in pairs per oil power field  Preliminary Report (23 Mar 2019 08:21):    Few Methicillin resistant Staphylococcus aureus  Organism: Methicillin resistant Staphylococcus aureus (23 Mar 2019 08:20)  Organism: Methicillin resistant Staphylococcus aureus (23 Mar 2019 08:20)    Culture - Blood (collected 21 Mar 2019 09:33)  Source: .Blood  Preliminary Report (22 Mar 2019 10:01):    No growth to date.    Culture - Surgical Swab (collected 21 Mar 2019 09:31)  Source: .Surgical Swab cervical disc #1 culture c/s  Preliminary Report (23 Mar 2019 08:27):    Numerous Methicillin resistant Staphylococcus aureus  Organism: Methicillin resistant Staphylococcus aureus (23 Mar 2019 08:26)  Organism: Methicillin resistant Staphylococcus aureus (23 Mar 2019 08:26)    Culture - Surgical Swab (collected 21 Mar 2019 09:26)  Source: .Surgical Swab prevertebral culture c/s  Preliminary Report (22 Mar 2019 10:48):    No growth    Culture - Body Fluid with Gram Stain (collected 21 Mar 2019 04:12)  Source: .Body Fluid LEFT THORACENTESIS  Gram Stain (prelim) (22 Mar 2019 09:06):    polymorphonuclear leukocytes seen    No organisms seen    by cytocentrifuge  Preliminary Report (22 Mar 2019 09:06):    No growth      RADIOLOGY & ADDITIONAL TESTS:    Imaging Personally Reviewed:  [ x] YES      Consultant(s) Notes Reviewed:  [x ] YES     Care Discussed with [ x] Consultants [X ] Patient [ ] Family  [x ]    [x ]  Other; RN

## 2019-03-26 NOTE — PROGRESS NOTE ADULT - PROBLEM SELECTOR PLAN 1
s/p C4-5 ACDF with irrigation and debridement   OR c/s MRSA   cont Dapto (will require 6 more weeks )  with low grade fever and leukocytosis   f/u on Repeat MRI ordered today

## 2019-03-27 PROCEDURE — 99233 SBSQ HOSP IP/OBS HIGH 50: CPT

## 2019-03-27 PROCEDURE — 72040 X-RAY EXAM NECK SPINE 2-3 VW: CPT | Mod: 26

## 2019-03-27 RX ORDER — MORPHINE SULFATE 50 MG/1
2 CAPSULE, EXTENDED RELEASE ORAL EVERY 6 HOURS
Qty: 0 | Refills: 0 | Status: DISCONTINUED | OUTPATIENT
Start: 2019-03-27 | End: 2019-03-28

## 2019-03-27 RX ORDER — LIDOCAINE 4 G/100G
1 CREAM TOPICAL EVERY 24 HOURS
Qty: 0 | Refills: 0 | Status: DISCONTINUED | OUTPATIENT
Start: 2019-03-27 | End: 2019-03-27

## 2019-03-27 RX ORDER — GABAPENTIN 400 MG/1
800 CAPSULE ORAL THREE TIMES A DAY
Qty: 0 | Refills: 0 | Status: DISCONTINUED | OUTPATIENT
Start: 2019-03-27 | End: 2019-05-08

## 2019-03-27 RX ORDER — LIDOCAINE 4 G/100G
1 CREAM TOPICAL EVERY 24 HOURS
Qty: 0 | Refills: 0 | Status: DISCONTINUED | OUTPATIENT
Start: 2019-03-27 | End: 2019-05-08

## 2019-03-27 RX ORDER — CELECOXIB 200 MG/1
200 CAPSULE ORAL DAILY
Qty: 0 | Refills: 0 | Status: DISCONTINUED | OUTPATIENT
Start: 2019-03-27 | End: 2019-05-08

## 2019-03-27 RX ORDER — OXYCODONE HYDROCHLORIDE 5 MG/1
10 TABLET ORAL EVERY 4 HOURS
Qty: 0 | Refills: 0 | Status: DISCONTINUED | OUTPATIENT
Start: 2019-03-27 | End: 2019-03-28

## 2019-03-27 RX ADMIN — MORPHINE SULFATE 2 MILLIGRAM(S): 50 CAPSULE, EXTENDED RELEASE ORAL at 23:56

## 2019-03-27 RX ADMIN — LIDOCAINE 1 PATCH: 4 CREAM TOPICAL at 23:56

## 2019-03-27 RX ADMIN — LINEZOLID 600 MILLIGRAM(S): 600 INJECTION, SOLUTION INTRAVENOUS at 05:18

## 2019-03-27 RX ADMIN — Medication 100 MILLIGRAM(S): at 21:31

## 2019-03-27 RX ADMIN — Medication 100 MILLIGRAM(S): at 13:51

## 2019-03-27 RX ADMIN — GABAPENTIN 600 MILLIGRAM(S): 400 CAPSULE ORAL at 13:51

## 2019-03-27 RX ADMIN — MORPHINE SULFATE 2 MILLIGRAM(S): 50 CAPSULE, EXTENDED RELEASE ORAL at 13:23

## 2019-03-27 RX ADMIN — LINEZOLID 600 MILLIGRAM(S): 600 INJECTION, SOLUTION INTRAVENOUS at 17:11

## 2019-03-27 RX ADMIN — Medication 1 PATCH: at 12:02

## 2019-03-27 RX ADMIN — MORPHINE SULFATE 2 MILLIGRAM(S): 50 CAPSULE, EXTENDED RELEASE ORAL at 04:45

## 2019-03-27 RX ADMIN — Medication 100 MILLIGRAM(S): at 05:18

## 2019-03-27 RX ADMIN — MORPHINE SULFATE 2 MILLIGRAM(S): 50 CAPSULE, EXTENDED RELEASE ORAL at 08:51

## 2019-03-27 RX ADMIN — PANTOPRAZOLE SODIUM 40 MILLIGRAM(S): 20 TABLET, DELAYED RELEASE ORAL at 08:35

## 2019-03-27 RX ADMIN — MORPHINE SULFATE 2 MILLIGRAM(S): 50 CAPSULE, EXTENDED RELEASE ORAL at 04:26

## 2019-03-27 RX ADMIN — LIDOCAINE 1 PATCH: 4 CREAM TOPICAL at 23:57

## 2019-03-27 RX ADMIN — Medication 1 TABLET(S): at 12:01

## 2019-03-27 RX ADMIN — MORPHINE SULFATE 2 MILLIGRAM(S): 50 CAPSULE, EXTENDED RELEASE ORAL at 08:35

## 2019-03-27 RX ADMIN — MORPHINE SULFATE 2 MILLIGRAM(S): 50 CAPSULE, EXTENDED RELEASE ORAL at 17:11

## 2019-03-27 RX ADMIN — DAPTOMYCIN 117.2 MILLIGRAM(S): 500 INJECTION, POWDER, LYOPHILIZED, FOR SOLUTION INTRAVENOUS at 12:01

## 2019-03-27 RX ADMIN — OXYCODONE HYDROCHLORIDE 10 MILLIGRAM(S): 5 TABLET ORAL at 21:00

## 2019-03-27 RX ADMIN — Medication 1 MILLIGRAM(S): at 12:01

## 2019-03-27 RX ADMIN — POLYETHYLENE GLYCOL 3350 17 GRAM(S): 17 POWDER, FOR SOLUTION ORAL at 08:35

## 2019-03-27 RX ADMIN — METHADONE HYDROCHLORIDE 40 MILLIGRAM(S): 40 TABLET ORAL at 12:01

## 2019-03-27 RX ADMIN — CHLORHEXIDINE GLUCONATE 1 APPLICATION(S): 213 SOLUTION TOPICAL at 05:18

## 2019-03-27 RX ADMIN — LIDOCAINE 1 PATCH: 4 CREAM TOPICAL at 07:48

## 2019-03-27 RX ADMIN — CELECOXIB 200 MILLIGRAM(S): 200 CAPSULE ORAL at 15:48

## 2019-03-27 RX ADMIN — GABAPENTIN 600 MILLIGRAM(S): 400 CAPSULE ORAL at 05:18

## 2019-03-27 RX ADMIN — SENNA PLUS 2 TABLET(S): 8.6 TABLET ORAL at 21:31

## 2019-03-27 RX ADMIN — Medication 1 PATCH: at 12:00

## 2019-03-27 RX ADMIN — Medication 1 PATCH: at 07:48

## 2019-03-27 RX ADMIN — GABAPENTIN 800 MILLIGRAM(S): 400 CAPSULE ORAL at 21:31

## 2019-03-27 RX ADMIN — NYSTATIN CREAM 1 APPLICATION(S): 100000 CREAM TOPICAL at 13:51

## 2019-03-27 RX ADMIN — LIDOCAINE 1 PATCH: 4 CREAM TOPICAL at 12:00

## 2019-03-27 RX ADMIN — NYSTATIN CREAM 1 APPLICATION(S): 100000 CREAM TOPICAL at 21:30

## 2019-03-27 RX ADMIN — NYSTATIN CREAM 1 APPLICATION(S): 100000 CREAM TOPICAL at 05:18

## 2019-03-27 RX ADMIN — Medication 1 PATCH: at 19:29

## 2019-03-27 RX ADMIN — CELECOXIB 200 MILLIGRAM(S): 200 CAPSULE ORAL at 15:11

## 2019-03-27 RX ADMIN — MORPHINE SULFATE 2 MILLIGRAM(S): 50 CAPSULE, EXTENDED RELEASE ORAL at 13:08

## 2019-03-27 RX ADMIN — OXYCODONE HYDROCHLORIDE 10 MILLIGRAM(S): 5 TABLET ORAL at 20:00

## 2019-03-27 NOTE — PROGRESS NOTE ADULT - SUBJECTIVE AND OBJECTIVE BOX
Patient is a 43y old  Female who presents with a chief complaint of AMS (27 Mar 2019 13:59)      INTERVAL HPI / OVERNIGHT EVENTS: doing ok     MEDICATIONS  (STANDING):  celecoxib 200 milliGRAM(s) Oral daily  chlorhexidine 4% Liquid 1 Application(s) Topical <User Schedule>  DAPTOmycin IVPB      DAPTOmycin IVPB 430 milliGRAM(s) IV Intermittent every 24 hours  docusate sodium 100 milliGRAM(s) Oral three times a day  folic acid 1 milliGRAM(s) Oral daily  gabapentin 800 milliGRAM(s) Oral three times a day  lidocaine   Patch 1 Patch Transdermal every 24 hours  linezolid    Tablet 600 milliGRAM(s) Oral every 12 hours  methadone    Tablet 40 milliGRAM(s) Oral daily  morphine  - Injectable 2 milliGRAM(s) IV Push every 6 hours  multivitamin 1 Tablet(s) Oral daily  nicotine -  14 mG/24Hr(s) Patch 1 patch Transdermal daily  nystatin Powder 1 Application(s) Topical three times a day  pantoprazole    Tablet 40 milliGRAM(s) Oral before breakfast  senna 2 Tablet(s) Oral at bedtime    MEDICATIONS  (PRN):  acetaminophen   Tablet .. 650 milliGRAM(s) Oral every 6 hours PRN Mild Pain (1 - 3)  acetaminophen   Tablet .. 650 milliGRAM(s) Oral every 6 hours PRN Temp greater or equal to 38C (100.4F)  oxyCODONE    IR 10 milliGRAM(s) Oral every 4 hours PRN Moderate Pain (4 - 6)  polyethylene glycol 3350 17 Gram(s) Oral daily PRN Constipation      Vital Signs Last 24 Hrs  T(C): 36.9 (27 Mar 2019 12:00), Max: 37.2 (26 Mar 2019 16:54)  T(F): 98.4 (27 Mar 2019 12:00), Max: 98.9 (26 Mar 2019 16:54)  HR: 98 (27 Mar 2019 12:00) (92 - 98)  BP: 106/62 (27 Mar 2019 12:00) (106/62 - 122/63)  BP(mean): --  RR: 18 (27 Mar 2019 12:00) (17 - 20)  SpO2: 98% (27 Mar 2019 12:00) (98% - 98%)    Review of systems:  General : no fever /chills, fatigue  CVS : no chest pain, palpitations  Lungs : no shortness of breath, cough  GI : no abdominal pain, vomiting, diarrhea   : no dysuria, hematuria        PHYSICAL EXAM:  General :NAD  Constitutional:  well-groomed, well-developed  Respiratory: CTAB/L  Cardiovascular: S1 and S2, RRR,murmur +  Gastrointestinal: BS+, soft, NT/ND  Extremities: No peripheral edema  Vascular: 2+ peripheral pulses  Skin: skin popping lesions      LABS:                        10.3   12.91 )-----------( 390      ( 26 Mar 2019 04:37 )             33.3     03-26    134<L>  |  99  |  26<H>  ----------------------------<  137<H>  4.1   |  26  |  0.52    Ca    8.3<L>      26 Mar 2019 04:37  Phos  4.0     03-26  Mg     1.7     03-26            MICROBIOLOGY:  RECENT CULTURES:  03-21 .Tissue cervical disc #2 culture Methicillin resistant Staphylococcus aureus   Moderate polymorphonuclear leukocytes per low power field  Rare Gram positive cocci in pairs per oil power field   Few Methicillin resistant Staphylococcus aureus    03-21 .Tissue c4-c5 disc culture c/s ewb Methicillin resistant Staphylococcus aureus   Moderate polymorphonuclear leukocytes per low power field  Rare Gram positive cocci in pairs per oil power field   Few Methicillin resistant Staphylococcus aureus    03-21 .Blood XXXX XXXX   No growth at 5 days.    03-21 .Surgical Swab cervical disc #1 culture c/s Methicillin resistant Staphylococcus aureus XXXX   Numerous Methicillin resistant Staphylococcus aureus    03-21 .Surgical Swab prevertebral culture c/s XXXX XXXX   No growth at 5 days    03-21 .Body Fluid LEFT THORACENTESIS XXXX   polymorphonuclear leukocytes seen  No organisms seen  by cytocentrifuge   No growth at 5 days          RADIOLOGY & ADDITIONAL STUDIES:

## 2019-03-27 NOTE — PROGRESS NOTE ADULT - SUBJECTIVE AND OBJECTIVE BOX
Pt seen and examined at bedside after stable. Reports pain is controlled this am. Denies CP/Dyspnea/Calf tenderness bilaterally.     Vital Signs Last 24 Hrs  T(C): 37.1 (27 Mar 2019 05:33), Max: 38.1 (26 Mar 2019 13:50)  T(F): 98.7 (27 Mar 2019 05:33), Max: 100.6 (26 Mar 2019 13:50)  HR: 97 (27 Mar 2019 05:33) (83 - 97)  BP: 117/63 (27 Mar 2019 05:33) (105/66 - 128/67)  BP(mean): --  RR: 20 (27 Mar 2019 05:33) (16 - 20)  SpO2: 98% (27 Mar 2019 05:33) (95% - 98%)    Gen: NAD    Spine PE:  Skin intact  Aspen Collar In Place  dressing c/d/i  Negative clonus  Negative babinski  Negative olmstead           Deltoid        Bicep        Tricep          Wrist Ext    Wrist Flex    Finger Flex          Finger Abd  L            4/5         4/5           4/5             4/5                4/5	           4/5                    4/5  R            2/5 	   3/5           4/5             3/5              4/5                        4/5                   4/5                                      Hip Flex       Quad     Ankle DF        Ankle PF       Toe Ext        Hamstring    L            5/5              5/5          5/5                 5/5                 5/5	                5/5  R            5/5              5/5           5/5                 5/5                 5/5                5/5    Sensory:            C5         C6         C7      C8       T1        (0=absent, 1=impaired, 2=normal, NT=not testable)  L         2            2           2        2         2  R          2            2           2        2         2               L2          L3         L4      L5       S1         (0=absent, 1=impaired, 2=normal, NT=not testable)  L         2            2            2        2        2  R          2            2           2        2         2    Paresthesias in stocking/glove distribution at baseline, states feet are improving

## 2019-03-27 NOTE — PROGRESS NOTE ADULT - ASSESSMENT
A/P: 43y Female s/p C4-5 ACDF with irrigation and debridement, now POD 7    MRI T/L spine repeated yesterday, no change from prior MRI, stable as of now.   Pain control   please keep MAPs >80  OR Cx's growing MRSA, ABX per ID  LIZ demonstrates no change in vegetation  No plan for return to OR at this time, will continue to monitor for improvement or RUE weakness which has been present since before surgery.    continue in aspen collar  Dry dressing changes as needed.   Pain control  DVT ppx- SCDs  PT/OT, WBAT  FU labs  will d/w attending and advise if plan changes  Dispo planning

## 2019-03-27 NOTE — PROGRESS NOTE ADULT - ASSESSMENT
43 F w/ history of IVDA, alcohol abuse, hx of pancreatitis, alcohol hepatitis, alcohol withdrawal, left frontoparietal subdural hematoma 2008 without need for neurosurgical intervention presented with severe sepsis with septic shock secondary to MRSA bacteremia w/ RLL PNA, UTI, endocarditis on LIZ and EFRAIN that has resolved and was diagnosis w/ HCV. Pt was initially intubated due to respiratory failure and put on pressors in ICU. She as extubated on 2/25 and transferred from ICU the following day. Pt had a C4-5 ACDF with irrigation and debridement on 3/20 due to spinal abscess and osteo and was kept intubated post procedure and transferred again to ICU, where she was extubated on 3/21 and transferred back to the med service the following day.     septic shock secondary to MRSA bacteremia w/ RLL PNA, UTI, endocarditis on LIZ   - resolved     Endocarditis of tricuspid valve  - CT chest showed septic pulmonary emboli to lungs and some have worsened - status post thoracentesis on 3/20  - as per ID, patient is on dapto & Zyvox   - repeat BC negative from 3/21  - repeat echo showed a pedunculated mobile mass seen adherent to lateral leaflet of the tricuspid valve w/ no interval changes noted in valve structure or regurgitation    Spinal osteo, abscess and cord compression    - CT of head and neck showed disc space narrowing and endplate irregularity at C4-5 which may represent typical degenerative change vs discitis   - MRI of the cervical spine w/ contrast showed C2-C7 discitis/osteomyelitis with a small right of midline ventral epidural abscess at C2-C7  - MRI of the thoracolumbar spine showed discitis/osteomyelitis at T11-T12 with paravertebral abscess, as well as discitis/osteomyelitis C6--T1, T9-T10, T10-T11, L4-L5, and L5-S1, and a phlegmon/early abscess dorsolaterally within the lumbar canal at the level of L4-L5. There was also a septic arthritis of  the left sternoclavicular joint space. There was also disc herniation at the L4-L5 level with contact and probable impingement of the descending right-sided nerve roots  - status post s/p C4-5 ACDF with irrigation and debridement on 3/20  - c/w gabapentin and lidocaine patch  - wound cx grew MRSA  - as per ortho, they have no plan to return to OR at this time & will get a repeat MRI to follow OM/Discitis of T and L spine levels on Monday or Tuesday  - c/w aspen collar   - as per ID, patient is on dapto & Zyvox     Moderate protein-calorie malnutrition  - c/w soft diet w/Ensure Enlive 3 x day      Drug abuse  - c/w methadone for opiate abuse     HCV  - Outpatient follow up for HCV and opioid maintenance outpt re-enrollment program    Anemia due to poor nutrition  - status post 1 unit pRBC,  - c/w folic acid     Hypophosphatemia  - replaced    Prophylaxis:   DVT: SCD, no chemical prophylaxis due to anemia  GI: Protonix 43 F w/ history of IVDA, alcohol abuse, hx of pancreatitis, alcohol hepatitis, alcohol withdrawal, left frontoparietal subdural hematoma 2008 without need for neurosurgical intervention presented with severe sepsis with septic shock secondary to MRSA bacteremia w/ RLL PNA, UTI, endocarditis on LIZ and EFRAIN that has resolved and was diagnosis w/ HCV. Pt was initially intubated due to respiratory failure and put on pressors in ICU. She as extubated on 2/25 and transferred from ICU the following day. Pt had a C4-5 ACDF with irrigation and debridement on 3/20 due to spinal abscess and osteo and was kept intubated post procedure and transferred again to ICU, where she was extubated on 3/21 and transferred back to the med service the following day.     septic shock secondary to MRSA bacteremia w/ RLL PNA, UTI, endocarditis on LIZ   - resolved     Endocarditis of tricuspid valve  - CT chest showed septic pulmonary emboli to lungs and some have worsened - status post thoracentesis on 3/20  - as per ID, patient is on dapto & Zyvox   - repeat BC negative from 3/21  - repeat echo showed a pedunculated mobile mass seen adherent to lateral leaflet of the tricuspid valve w/ no interval changes noted in valve structure or regurgitation    Spinal osteo, abscess and cord compression    - CT of head and neck showed disc space narrowing and endplate irregularity at C4-5 which may represent typical degenerative change vs discitis   - MRI of the cervical spine w/ contrast showed C2-C7 discitis/osteomyelitis with a small right of midline ventral epidural abscess at C2-C7  - MRI of the thoracolumbar spine showed discitis/osteomyelitis at T11-T12 with paravertebral abscess, as well as discitis/osteomyelitis C6--T1, T9-T10, T10-T11, L4-L5, and L5-S1, and a phlegmon/early abscess dorsolaterally within the lumbar canal at the level of L4-L5. There was also a septic arthritis of  the left sternoclavicular joint space. There was also disc herniation at the L4-L5 level with contact and probable impingement of the descending right-sided nerve roots  - status post s/p C4-5 ACDF with irrigation and debridement on 3/20  - c/w gabapentin and lidocaine patch  - wound cx grew MRSA  - as per ortho, they have no plan to return to OR at this time & will get a repeat MRI to follow OM/Discitis of T and L spine levels on Monday or Tuesday  - c/w aspen collar   - as per ID, patient is on dapto & Zyvox     Moderate protein-calorie malnutrition  - c/w soft diet w/Ensure Enlive 3 x day      Drug abuse  - c/w methadone for opiate abuse   - decrease morphine q6 h today and taper off  - add celebrex, inc gabapentin, add oxycodone     HCV  - Outpatient follow up for HCV and opioid maintenance outpt re-enrollment program    Anemia due to poor nutrition  - status post 1 unit pRBC,  - c/w folic acid     Hypophosphatemia  - replaced    Prophylaxis:   DVT: SCD, no chemical prophylaxis due to anemia  GI: Protonix

## 2019-03-27 NOTE — PROGRESS NOTE ADULT - SUBJECTIVE AND OBJECTIVE BOX
Patient is a 43y old  Female who presents with a chief complaint of AMS (27 Mar 2019 06:07)      OVERNIGHT EVENTS:  none     REVIEW OF SYSTEMS: denies chest pain/SOB, diaphoresis, no F/C, cough, dizziness, headache, blurry vision, nausea, vomiting, abdominal pain. All others review of systems negative     MEDICATIONS  (STANDING):  celecoxib 200 milliGRAM(s) Oral daily  chlorhexidine 4% Liquid 1 Application(s) Topical <User Schedule>  DAPTOmycin IVPB      DAPTOmycin IVPB 430 milliGRAM(s) IV Intermittent every 24 hours  docusate sodium 100 milliGRAM(s) Oral three times a day  folic acid 1 milliGRAM(s) Oral daily  gabapentin 800 milliGRAM(s) Oral three times a day  lidocaine   Patch 1 Patch Transdermal every 24 hours  linezolid    Tablet 600 milliGRAM(s) Oral every 12 hours  methadone    Tablet 40 milliGRAM(s) Oral daily  morphine  - Injectable 2 milliGRAM(s) IV Push every 6 hours  multivitamin 1 Tablet(s) Oral daily  nicotine -  14 mG/24Hr(s) Patch 1 patch Transdermal daily  nystatin Powder 1 Application(s) Topical three times a day  pantoprazole    Tablet 40 milliGRAM(s) Oral before breakfast  senna 2 Tablet(s) Oral at bedtime    MEDICATIONS  (PRN):  acetaminophen   Tablet .. 650 milliGRAM(s) Oral every 6 hours PRN Mild Pain (1 - 3)  acetaminophen   Tablet .. 650 milliGRAM(s) Oral every 6 hours PRN Temp greater or equal to 38C (100.4F)  oxyCODONE    IR 10 milliGRAM(s) Oral every 4 hours PRN Moderate Pain (4 - 6)  polyethylene glycol 3350 17 Gram(s) Oral daily PRN Constipation      Allergies    penicillin (Short breath; Hives)    Intolerances        T(F): 98.4 (03-27-19 @ 12:00), Max: 98.9 (03-26-19 @ 16:54)  HR: 98 (03-27-19 @ 12:00) (88 - 98)  BP: 106/62 (03-27-19 @ 12:00) (106/62 - 122/63)  RR: 18 (03-27-19 @ 12:00) (16 - 20)  SpO2: 98% (03-27-19 @ 12:00) (97% - 98%)  Wt(kg): --    PHYSICAL EXAM:  GENERAL: NAD, well-groomed, well-developed  HEAD:  Atraumatic, Normocephalic  EYES: EOMI, PERRLA, conjunctiva and sclera clear  ENMT: No tonsillar erythema, exudates, or enlargement; Moist mucous membranes, Good dentition, No lesions  NECK: +neck collar  NERVOUS SYSTEM:  Alert & Oriented X3, Good concentration; Motor Strength 5/5 B/L upper and lower extremities; DTRs 2+ intact and symmetric  CHEST/LUNG: Clear to percussion bilaterally; No rales, rhonchi, wheezing, or rubs BL  HEART: Regular rate and rhythm; No murmurs, rubs, or gallops  ABDOMEN: Soft, Nontender, Nondistended; Bowel sounds present  EXTREMITIES:  2+ Peripheral Pulses, No clubbing, cyanosis, or edema BL LE  LYMPH: No lymphadenopathy noted  SKIN: No rashes or lesions    LABS:                        10.3   12.91 )-----------( 390      ( 26 Mar 2019 04:37 )             33.3     03-26    134<L>  |  99  |  26<H>  ----------------------------<  137<H>  4.1   |  26  |  0.52    Ca    8.3<L>      26 Mar 2019 04:37  Phos  4.0     03-26  Mg     1.7     03-26      Cultures; Culture - Tissue with Gram Stain (collected 21 Mar 2019 09:42)  Source: .Tissue cervical disc #2 culture  Gram Stain (prelim) (22 Mar 2019 11:20):    Moderate polymorphonuclear leukocytes per low power field    Rare Gram positive cocci in pairs per oil power field  Preliminary Report (24 Mar 2019 12:21):    Few Methicillin resistant Staphylococcus aureus  Organism: Methicillin resistant Staphylococcus aureus (24 Mar 2019 12:21)  Organism: Methicillin resistant Staphylococcus aureus (24 Mar 2019 12:21)    Culture - Tissue with Gram Stain (collected 21 Mar 2019 09:38)  Source: .Tissue c4-c5 disc culture c/s ewb  Gram Stain (prelim) (22 Mar 2019 11:33):    Moderate polymorphonuclear leukocytes per low power field    Rare Gram positive cocci in pairs per oil power field  Preliminary Report (23 Mar 2019 08:21):    Few Methicillin resistant Staphylococcus aureus  Organism: Methicillin resistant Staphylococcus aureus (23 Mar 2019 08:20)  Organism: Methicillin resistant Staphylococcus aureus (23 Mar 2019 08:20)    Culture - Blood (collected 21 Mar 2019 09:33)  Source: .Blood  Preliminary Report (22 Mar 2019 10:01):    No growth to date.    Culture - Surgical Swab (collected 21 Mar 2019 09:31)  Source: .Surgical Swab cervical disc #1 culture c/s  Preliminary Report (23 Mar 2019 08:27):    Numerous Methicillin resistant Staphylococcus aureus  Organism: Methicillin resistant Staphylococcus aureus (23 Mar 2019 08:26)  Organism: Methicillin resistant Staphylococcus aureus (23 Mar 2019 08:26)    Culture - Surgical Swab (collected 21 Mar 2019 09:26)  Source: .Surgical Swab prevertebral culture c/s  Preliminary Report (22 Mar 2019 10:48):    No growth    Culture - Body Fluid with Gram Stain (collected 21 Mar 2019 04:12)  Source: .Body Fluid LEFT THORACENTESIS  Gram Stain (prelim) (22 Mar 2019 09:06):    polymorphonuclear leukocytes seen    No organisms seen    by cytocentrifuge  Preliminary Report (22 Mar 2019 09:06):    No growth      RADIOLOGY & ADDITIONAL TESTS:    Imaging Personally Reviewed:  [ x] YES    < from: Xray Chest 1 View- PORTABLE-Urgent (03.21.19 @ 02:02) >  Stable small right pleural effusion  Stable left basilar atelectasis.  Mild worsening of right basilar opacity.    Consultant(s) Notes Reviewed:  [x ] YES     Care Discussed with [ x] Consultants [X ] Patient [ ] Family  [x ]    [x ]  Other; RN

## 2019-03-28 LAB
B PERT IGG+IGM PNL SER: ABNORMAL
COLOR FLD: YELLOW — SIGNIFICANT CHANGE UP
FLUID INTAKE SUBSTANCE CLASS: SIGNIFICANT CHANGE UP
GRAM STN FLD: SIGNIFICANT CHANGE UP
GRAM STN FLD: SIGNIFICANT CHANGE UP
RCV VOL RI: 2000 /UL — HIGH (ref 0–5)
SPECIMEN SOURCE: SIGNIFICANT CHANGE UP
SPECIMEN SOURCE: SIGNIFICANT CHANGE UP
TOTAL NUCLEATED CELL COUNT, BODY FLUID: HIGH /UL (ref 0–5)
TUBE TYPE: SIGNIFICANT CHANGE UP

## 2019-03-28 PROCEDURE — 73720 MRI LWR EXTREMITY W/O&W/DYE: CPT | Mod: 26,LT

## 2019-03-28 PROCEDURE — 99233 SBSQ HOSP IP/OBS HIGH 50: CPT

## 2019-03-28 RX ORDER — OXYCODONE HYDROCHLORIDE 5 MG/1
20 TABLET ORAL EVERY 12 HOURS
Qty: 0 | Refills: 0 | Status: DISCONTINUED | OUTPATIENT
Start: 2019-03-28 | End: 2019-03-28

## 2019-03-28 RX ORDER — OXYCODONE HYDROCHLORIDE 5 MG/1
20 TABLET ORAL EVERY 4 HOURS
Qty: 0 | Refills: 0 | Status: DISCONTINUED | OUTPATIENT
Start: 2019-03-28 | End: 2019-04-04

## 2019-03-28 RX ORDER — MORPHINE SULFATE 50 MG/1
2 CAPSULE, EXTENDED RELEASE ORAL EVERY 8 HOURS
Qty: 0 | Refills: 0 | Status: DISCONTINUED | OUTPATIENT
Start: 2019-03-28 | End: 2019-03-30

## 2019-03-28 RX ORDER — OXYCODONE HYDROCHLORIDE 5 MG/1
20 TABLET ORAL EVERY 12 HOURS
Qty: 0 | Refills: 0 | Status: DISCONTINUED | OUTPATIENT
Start: 2019-03-28 | End: 2019-03-30

## 2019-03-28 RX ORDER — MORPHINE SULFATE 50 MG/1
2 CAPSULE, EXTENDED RELEASE ORAL ONCE
Qty: 0 | Refills: 0 | Status: DISCONTINUED | OUTPATIENT
Start: 2019-03-28 | End: 2019-03-28

## 2019-03-28 RX ADMIN — Medication 1 PATCH: at 12:27

## 2019-03-28 RX ADMIN — Medication 1 PATCH: at 18:32

## 2019-03-28 RX ADMIN — Medication 1 PATCH: at 08:17

## 2019-03-28 RX ADMIN — LIDOCAINE 1 PATCH: 4 CREAM TOPICAL at 08:14

## 2019-03-28 RX ADMIN — Medication 650 MILLIGRAM(S): at 05:39

## 2019-03-28 RX ADMIN — LINEZOLID 600 MILLIGRAM(S): 600 INJECTION, SOLUTION INTRAVENOUS at 05:39

## 2019-03-28 RX ADMIN — GABAPENTIN 800 MILLIGRAM(S): 400 CAPSULE ORAL at 05:40

## 2019-03-28 RX ADMIN — GABAPENTIN 800 MILLIGRAM(S): 400 CAPSULE ORAL at 22:32

## 2019-03-28 RX ADMIN — Medication 1 TABLET(S): at 12:29

## 2019-03-28 RX ADMIN — MORPHINE SULFATE 2 MILLIGRAM(S): 50 CAPSULE, EXTENDED RELEASE ORAL at 23:03

## 2019-03-28 RX ADMIN — OXYCODONE HYDROCHLORIDE 20 MILLIGRAM(S): 5 TABLET ORAL at 20:00

## 2019-03-28 RX ADMIN — METHADONE HYDROCHLORIDE 40 MILLIGRAM(S): 40 TABLET ORAL at 12:29

## 2019-03-28 RX ADMIN — Medication 1 PATCH: at 12:29

## 2019-03-28 RX ADMIN — NYSTATIN CREAM 1 APPLICATION(S): 100000 CREAM TOPICAL at 05:38

## 2019-03-28 RX ADMIN — OXYCODONE HYDROCHLORIDE 20 MILLIGRAM(S): 5 TABLET ORAL at 16:17

## 2019-03-28 RX ADMIN — Medication 100 MILLIGRAM(S): at 22:32

## 2019-03-28 RX ADMIN — PANTOPRAZOLE SODIUM 40 MILLIGRAM(S): 20 TABLET, DELAYED RELEASE ORAL at 08:31

## 2019-03-28 RX ADMIN — DAPTOMYCIN 117.2 MILLIGRAM(S): 500 INJECTION, POWDER, LYOPHILIZED, FOR SOLUTION INTRAVENOUS at 12:30

## 2019-03-28 RX ADMIN — LIDOCAINE 1 PATCH: 4 CREAM TOPICAL at 12:26

## 2019-03-28 RX ADMIN — OXYCODONE HYDROCHLORIDE 10 MILLIGRAM(S): 5 TABLET ORAL at 10:27

## 2019-03-28 RX ADMIN — CHLORHEXIDINE GLUCONATE 1 APPLICATION(S): 213 SOLUTION TOPICAL at 05:38

## 2019-03-28 RX ADMIN — MORPHINE SULFATE 2 MILLIGRAM(S): 50 CAPSULE, EXTENDED RELEASE ORAL at 00:11

## 2019-03-28 RX ADMIN — MORPHINE SULFATE 2 MILLIGRAM(S): 50 CAPSULE, EXTENDED RELEASE ORAL at 12:30

## 2019-03-28 RX ADMIN — OXYCODONE HYDROCHLORIDE 10 MILLIGRAM(S): 5 TABLET ORAL at 04:33

## 2019-03-28 RX ADMIN — LINEZOLID 600 MILLIGRAM(S): 600 INJECTION, SOLUTION INTRAVENOUS at 18:50

## 2019-03-28 RX ADMIN — SENNA PLUS 2 TABLET(S): 8.6 TABLET ORAL at 22:33

## 2019-03-28 RX ADMIN — Medication 100 MILLIGRAM(S): at 05:39

## 2019-03-28 RX ADMIN — MORPHINE SULFATE 2 MILLIGRAM(S): 50 CAPSULE, EXTENDED RELEASE ORAL at 22:33

## 2019-03-28 RX ADMIN — LIDOCAINE 1 PATCH: 4 CREAM TOPICAL at 12:27

## 2019-03-28 RX ADMIN — MORPHINE SULFATE 2 MILLIGRAM(S): 50 CAPSULE, EXTENDED RELEASE ORAL at 05:53

## 2019-03-28 RX ADMIN — NYSTATIN CREAM 1 APPLICATION(S): 100000 CREAM TOPICAL at 16:19

## 2019-03-28 RX ADMIN — Medication 650 MILLIGRAM(S): at 06:39

## 2019-03-28 RX ADMIN — CELECOXIB 200 MILLIGRAM(S): 200 CAPSULE ORAL at 12:29

## 2019-03-28 RX ADMIN — MORPHINE SULFATE 2 MILLIGRAM(S): 50 CAPSULE, EXTENDED RELEASE ORAL at 12:45

## 2019-03-28 RX ADMIN — MORPHINE SULFATE 2 MILLIGRAM(S): 50 CAPSULE, EXTENDED RELEASE ORAL at 05:38

## 2019-03-28 RX ADMIN — NYSTATIN CREAM 1 APPLICATION(S): 100000 CREAM TOPICAL at 22:34

## 2019-03-28 RX ADMIN — OXYCODONE HYDROCHLORIDE 10 MILLIGRAM(S): 5 TABLET ORAL at 11:27

## 2019-03-28 RX ADMIN — CELECOXIB 200 MILLIGRAM(S): 200 CAPSULE ORAL at 13:29

## 2019-03-28 RX ADMIN — Medication 100 MILLIGRAM(S): at 16:18

## 2019-03-28 RX ADMIN — MORPHINE SULFATE 2 MILLIGRAM(S): 50 CAPSULE, EXTENDED RELEASE ORAL at 18:39

## 2019-03-28 RX ADMIN — LIDOCAINE 1 PATCH: 4 CREAM TOPICAL at 23:57

## 2019-03-28 RX ADMIN — MORPHINE SULFATE 2 MILLIGRAM(S): 50 CAPSULE, EXTENDED RELEASE ORAL at 18:55

## 2019-03-28 RX ADMIN — OXYCODONE HYDROCHLORIDE 10 MILLIGRAM(S): 5 TABLET ORAL at 03:33

## 2019-03-28 RX ADMIN — Medication 1 MILLIGRAM(S): at 12:29

## 2019-03-28 RX ADMIN — OXYCODONE HYDROCHLORIDE 20 MILLIGRAM(S): 5 TABLET ORAL at 19:34

## 2019-03-28 RX ADMIN — GABAPENTIN 800 MILLIGRAM(S): 400 CAPSULE ORAL at 16:19

## 2019-03-28 NOTE — CHART NOTE - NSCHARTNOTEFT_GEN_A_CORE
L Knee Aspiration w/ Cell Count [id number] - not consistent with septic arthritis  R Knee Aspiration clotted per lab, will send to core lab per MD request  Pt with effusion in BL Hip and R Rectus Femoris Abscess - Will contact IR in AM to aspirate   Pt likely has reactive arthritis within the setting of MRSA bacteria  Discussed with attending Dr. Leslie who is aware of above plan. L Knee Aspiration w/ Cell Count [id number] - not consistent with septic arthritis  R Knee Aspiration clotted per lab, will send to core lab per MD request  Pt with effusion in BL Hip and R Rectus Femoris Abscess - Will contact IR in AM to aspirate   Pt likely has reactive arthritis within the setting of MRSA bacteria  FU Cx/Carole/Gs on BL Knee Asp  Per PT WBAT without issues, will continue to monitor  Discussed with attending Dr. eLslie who is aware of above plan. L Knee Aspiration w/ Cell Count [id number] - not consistent with septic arthritis  R Knee Aspiration clotted per lab, will send to core lab per MD request  Pt with effusion in BL Hip and R Rectus Femoris Abscess - Will contact IR in AM to aspirate   Pt likely has reactive arthritis within the setting of MRSA bacteria  FU Cx/Carole/Gs on BL Knee Asp  Per PT WBAT without issues  No plan for operative intervention at this time  will continue to monitor  Discussed with attending Dr. Leslie who is aware of above plan.

## 2019-03-28 NOTE — PROGRESS NOTE ADULT - SUBJECTIVE AND OBJECTIVE BOX
Patient is a 43y old  Female who presents with a chief complaint of AMS (28 Mar 2019 14:26)      INTERVAL HPI / OVERNIGHT EVENTS: no new events    MEDICATIONS  (STANDING):  celecoxib 200 milliGRAM(s) Oral daily  chlorhexidine 4% Liquid 1 Application(s) Topical <User Schedule>  DAPTOmycin IVPB      DAPTOmycin IVPB 430 milliGRAM(s) IV Intermittent every 24 hours  docusate sodium 100 milliGRAM(s) Oral three times a day  folic acid 1 milliGRAM(s) Oral daily  gabapentin 800 milliGRAM(s) Oral three times a day  lidocaine   Patch 1 Patch Transdermal every 24 hours  lidocaine   Patch 1 Patch Transdermal every 24 hours  linezolid    Tablet 600 milliGRAM(s) Oral every 12 hours  methadone    Tablet 40 milliGRAM(s) Oral daily  morphine  - Injectable 2 milliGRAM(s) IV Push every 8 hours  multivitamin 1 Tablet(s) Oral daily  nicotine -  14 mG/24Hr(s) Patch 1 patch Transdermal daily  nystatin Powder 1 Application(s) Topical three times a day  oxyCODONE  ER Tablet 20 milliGRAM(s) Oral every 12 hours  pantoprazole    Tablet 40 milliGRAM(s) Oral before breakfast  senna 2 Tablet(s) Oral at bedtime    MEDICATIONS  (PRN):  acetaminophen   Tablet .. 650 milliGRAM(s) Oral every 6 hours PRN Mild Pain (1 - 3)  acetaminophen   Tablet .. 650 milliGRAM(s) Oral every 6 hours PRN Temp greater or equal to 38C (100.4F)  oxyCODONE    IR 20 milliGRAM(s) Oral every 4 hours PRN Moderate Pain (4 - 6)  polyethylene glycol 3350 17 Gram(s) Oral daily PRN Constipation      Vital Signs Last 24 Hrs  T(C): 36.7 (28 Mar 2019 12:24), Max: 37.8 (28 Mar 2019 05:09)  T(F): 98.1 (28 Mar 2019 12:24), Max: 100.1 (28 Mar 2019 05:09)  HR: 82 (28 Mar 2019 12:24) (82 - 103)  BP: 126/68 (28 Mar 2019 12:24) (108/58 - 126/68)  BP(mean): --  RR: 16 (28 Mar 2019 12:24) (16 - 17)  SpO2: 98% (28 Mar 2019 12:24) (97% - 100%)    Review of systems:  General : no fever /chills ,fatigue  CVS : no chest pain, palpitations  Lungs : no shortness of breath, cough  GI : no abdominal pain, vomiting, diarrhea   : no dysuria, hematuria        PHYSICAL EXAM:  General :NAD  Constitutional: well-groomed, well-developed  Respiratory: CTAB/L  Cardiovascular: S1 and S2, RRR, no M/G/R  Gastrointestinal: BS+, soft, NT/ND  Extremities: No peripheral edema  Vascular: 2+ peripheral pulses  Skin: skin popping scars      LABS:                MICROBIOLOGY:  RECENT CULTURES:        RADIOLOGY & ADDITIONAL STUDIES:

## 2019-03-28 NOTE — PROGRESS NOTE ADULT - SUBJECTIVE AND OBJECTIVE BOX
Pt seen and examined at bedside after stable. Reports pain is controlled this am. Denies CP/Dyspnea/Calf tenderness bilaterally.     Vital Signs Last 24 Hrs  T(C): 37.8 (28 Mar 2019 05:09), Max: 37.8 (28 Mar 2019 05:09)  T(F): 100.1 (28 Mar 2019 05:09), Max: 100.1 (28 Mar 2019 05:09)  HR: 103 (28 Mar 2019 05:09) (89 - 103)  BP: 116/59 (28 Mar 2019 05:09) (106/62 - 116/60)  BP(mean): --  RR: 17 (28 Mar 2019 05:09) (17 - 18)  SpO2: 97% (28 Mar 2019 05:09) (97% - 100%)    Gen: NAD    Spine PE:  Skin intact  Aspen Collar In Place  dressing c/d/i  Negative clonus  Negative babinski  Negative olmstead           Deltoid        Bicep        Tricep          Wrist Ext    Wrist Flex    Finger Flex          Finger Abd  L            4/5         4/5           4/5             4/5                4/5	           4/5                    4/5  R            2/5 	   3/5           4/5             3/5              4/5                        4/5                   4/5                                      Hip Flex       Quad     Ankle DF        Ankle PF       Toe Ext        Hamstring    L            5/5              5/5          5/5                 5/5                 5/5	                5/5  R            5/5              5/5           5/5                 5/5                 5/5                5/5    Sensory:            C5         C6         C7      C8       T1        (0=absent, 1=impaired, 2=normal, NT=not testable)  L         2            2           2        2         2  R          2            2           2        2         2               L2          L3         L4      L5       S1         (0=absent, 1=impaired, 2=normal, NT=not testable)  L         2            2            2        2        2  R          2            2           2        2         2    Paresthesias in stocking/glove distribution at baseline, states feet are improving Pt seen and examined at bedside after stable. Reports pain is controlled this am. Denies CP/Dyspnea/Calf tenderness bilaterally.     oVital Signs Last 24 Hrs  T(C): 37.8 (28 Mar 2019 05:09), Max: 37.8 (28 Mar 2019 05:09)  T(F): 100.1 (28 Mar 2019 05:09), Max: 100.1 (28 Mar 2019 05:09)  HR: 103 (28 Mar 2019 05:09) (89 - 103)  BP: 116/59 (28 Mar 2019 05:09) (106/62 - 116/60)  BP(mean): --  RR: 17 (28 Mar 2019 05:09) (17 - 18)  SpO2: 97% (28 Mar 2019 05:09) (97% - 100%)    Gen: NAD    Spine PE:  Skin intact  Aspen Collar In Place  dressing c/d/i  Negative clonus  Negative babinski  Negative olmstead           Deltoid        Bicep        Tricep          Wrist Ext    Wrist Flex    Finger Flex          Finger Abd  L            4/5         4/5           4/5             4/5                4/5	           4/5                    4/5  R            2/5 	   3/5           4/5             3/5              4/5                        4/5                   4/5                                      Hip Flex       Quad     Ankle DF        Ankle PF       Toe Ext        Hamstring    L            5/5              5/5          5/5                 5/5                 5/5	                5/5  R            5/5              5/5           5/5                 5/5                 5/5                5/5    Sensory:            C5         C6         C7      C8       T1        (0=absent, 1=impaired, 2=normal, NT=not testable)  L         2            2           2        2         2  R          2            2           2        2         2               L2          L3         L4      L5       S1         (0=absent, 1=impaired, 2=normal, NT=not testable)  L         2            2            2        2        2  R          2            2           2        2         2    Paresthesias in stocking/glove distribution at baseline, states feet are improving Pt seen and examined at bedside after stable. Reports pain is controlled this am. Reports of discomfort in left hip and knee, able to bear weight. Denies CP/Dyspnea/Calf tenderness bilaterally.     oVital Signs Last 24 Hrs  T(C): 37.8 (28 Mar 2019 05:09), Max: 37.8 (28 Mar 2019 05:09)  T(F): 100.1 (28 Mar 2019 05:09), Max: 100.1 (28 Mar 2019 05:09)  HR: 103 (28 Mar 2019 05:09) (89 - 103)  BP: 116/59 (28 Mar 2019 05:09) (106/62 - 116/60)  BP(mean): --  RR: 17 (28 Mar 2019 05:09) (17 - 18)  SpO2: 97% (28 Mar 2019 05:09) (97% - 100%)    Gen: NAD    Spine PE:  Skin intact  Aspen Collar In Place  dressing c/d/i  Negative clonus  Negative babinski  Negative olmstead           Deltoid        Bicep        Tricep          Wrist Ext    Wrist Flex    Finger Flex          Finger Abd  L            4/5         4/5           4/5             4/5                4/5	           4/5                    4/5  R            2/5 	   3/5           4/5             3/5              4/5                        4/5                   4/5                                      Hip Flex       Quad     Ankle DF        Ankle PF       Toe Ext        Hamstring    L            5/5              5/5          5/5                 5/5                 5/5	                5/5  R            5/5              5/5           5/5                 5/5                 5/5                5/5    Sensory:            C5         C6         C7      C8       T1        (0=absent, 1=impaired, 2=normal, NT=not testable)  L         2            2           2        2         2  R          2            2           2        2         2               L2          L3         L4      L5       S1         (0=absent, 1=impaired, 2=normal, NT=not testable)  L         2            2            2        2        2  R          2            2           2        2         2    Paresthesias in stocking/glove distribution at baseline, states feet are improving

## 2019-03-28 NOTE — PROGRESS NOTE ADULT - PROBLEM SELECTOR PLAN 1
s/p C4-5 ACDF with irrigation and debridement   OR c/s MRSA   cont Dapto (will require 6 total weeks )

## 2019-03-28 NOTE — PROGRESS NOTE ADULT - ASSESSMENT
43 F w/ history of IVDA, alcohol abuse, hx of pancreatitis, alcohol hepatitis, alcohol withdrawal, left frontoparietal subdural hematoma 2008 without need for neurosurgical intervention presented with severe sepsis with septic shock secondary to MRSA bacteremia w/ RLL PNA, UTI, endocarditis on LIZ and EFRAIN that has resolved and was diagnosis w/ HCV. Pt was initially intubated due to respiratory failure and put on pressors in ICU. She as extubated on 2/25 and transferred from ICU the following day. Pt had a C4-5 ACDF with irrigation and debridement on 3/20 due to spinal abscess and osteo and was kept intubated post procedure and transferred again to ICU, where she was extubated on 3/21 and transferred back to the med service the following day.     septic shock secondary to MRSA bacteremia w/ RLL PNA, UTI, endocarditis on LIZ   - resolved     Endocarditis of tricuspid valve  - status post thoracentesis on 3/20  - as per ID, patient is on dapto & Zyvox   - repeat BC negative from 3/21  - repeat echo showed a pedunculated mobile mass seen adherent to lateral leaflet of the tricuspid valve w/ no interval changes noted in valve structure or regurgitation    Spinal osteo, abscess and cord compression    - CT of head and neck showed disc space narrowing and endplate irregularity at C4-5 which may represent typical degenerative change vs discitis   - MRI of the cervical spine w/ contrast showed C2-C7 discitis/osteomyelitis with a small right of midline ventral epidural abscess at C2-C7  - MRI of the thoracolumbar spine showed discitis/osteomyelitis at T11-T12 with paravertebral abscess, as well as discitis/osteomyelitis C6--T1, T9-T10, T10-T11, L4-L5, and L5-S1, and a phlegmon/early abscess dorsolaterally within the lumbar canal at the level of L4-L5. There was also a septic arthritis of  the left sternoclavicular joint space. There was also disc herniation at the L4-L5 level with contact and probable impingement of the descending right-sided nerve roots  - status post s/p C4-5 ACDF with irrigation and debridement on 3/20  - c/w gabapentin and lidocaine patch  - wound cx grew MRSA  - c/w aspen collar   - as per ID, patient is on dapto & Zyvox     Moderate protein-calorie malnutrition  - c/w soft diet w/Ensure Enlive 3 x day      IVDrug abuse  - c/w methadone for opiate abuse   - decrease morphine q8 h today and taper off  - cont celebrex, gabapentin, inc oxycodone dose, add oxycontin    HCV  - Outpatient follow up for HCV and opioid maintenance outpt re-enrollment program    Anemia due to poor nutrition  - status post 1 unit pRBC,  - c/w folic acid     Hypophosphatemia  - replaced    Prophylaxis:   DVT: SCD, no chemical prophylaxis due to anemia  GI: Protonix

## 2019-03-28 NOTE — CHART NOTE - NSCHARTNOTEFT_GEN_A_CORE
Pt adm c sepsis, septic shock 2/2 MRSA bacteremia, s/p thoracentesis, spinal osteo, abscess, endocarditis, OM, discitis, moderate protein calorie malnutrition, drug abuse, s/p EFRAIN, anemia due to poor nutrition, hx of ETOH abuse, pancreatitis, hepatitis.   Pt requested to increase     Factors impacting intake: [ ] none [ ] nausea  [ ] vomiting [ ] diarrhea [ x] constipation  [ ]chewing problems [ ] swallowing issues  [ ] other:     Diet Prescription: Diet, Regular:   Supplement Feeding Modality:  Oral  Ensure Enlive Cans or Servings Per Day:  1       Frequency:  Two Times a day  DanActive Cans or Servings Per Day:  1       Frequency:  Two Times a day (03-24-19 @ 16:13)    Intake: pt did not eat lunch when seen due to constipation, c 60->75->100% as per documentation     Current Weight: 03/18, 68.8 kg, 02/20, 71.3 kg, c wt. loss of 2.5 kg  % Weight Change: 3.5%  Nutrition focused physical exam conducted:  Subcutaneous fat loss: edema of left leg noted [ Mild ] Orbital fat pads region, [ WDL ]Buccal fat region, [ Mild ]Triceps region,  [ unable ]Ribs region.  Muscle wasting: [ Mild ]Temples region, [ Mild  ]Clavicle region, [ WDL ]Shoulder region, [ unable ]Scapula region, [ WDL ]Interosseous region,  [ WNL  ]thigh region, [ Mild; right]Calf region      Pertinent Medications: MEDICATIONS  (STANDING):  celecoxib 200 milliGRAM(s) Oral daily  chlorhexidine 4% Liquid 1 Application(s) Topical <User Schedule>  DAPTOmycin IVPB      DAPTOmycin IVPB 430 milliGRAM(s) IV Intermittent every 24 hours  docusate sodium 100 milliGRAM(s) Oral three times a day  folic acid 1 milliGRAM(s) Oral daily  gabapentin 800 milliGRAM(s) Oral three times a day  lidocaine   Patch 1 Patch Transdermal every 24 hours  lidocaine   Patch 1 Patch Transdermal every 24 hours  linezolid    Tablet 600 milliGRAM(s) Oral every 12 hours  methadone    Tablet 40 milliGRAM(s) Oral daily  morphine  - Injectable 2 milliGRAM(s) IV Push every 6 hours  multivitamin 1 Tablet(s) Oral daily  nicotine -  14 mG/24Hr(s) Patch 1 patch Transdermal daily  nystatin Powder 1 Application(s) Topical three times a day  pantoprazole    Tablet 40 milliGRAM(s) Oral before breakfast  senna 2 Tablet(s) Oral at bedtime    MEDICATIONS  (PRN):  acetaminophen   Tablet .. 650 milliGRAM(s) Oral every 6 hours PRN Mild Pain (1 - 3)  acetaminophen   Tablet .. 650 milliGRAM(s) Oral every 6 hours PRN Temp greater or equal to 38C (100.4F)  oxyCODONE    IR 10 milliGRAM(s) Oral every 4 hours PRN Moderate Pain (4 - 6)  polyethylene glycol 3350 17 Gram(s) Oral daily PRN Constipation    Pertinent Labs: 03-26 Na134 mmol/L<L> Glu 137 mg/dL<H> K+ 4.1 mmol/L Cr  0.52 mg/dL BUN 26 mg/dL<H> 03-26 Phos 4.0 mg/dL 03-23 Alb 1.9 g/dL<L>     CAPILLARY BLOOD GLUCOSE        Skin: pressure ulcer stage I left heel noted & wound of left knee; abrasion noted     Estimated Needs:   [x ] no change since previous assessment( 02/25/19)  [ ] recalculated:     Previous Nutrition Diagnosis:   [ ] Inadequate Energy Intake [ ]Inadequate Oral Intake [ ] Excessive Energy Intake   [ ] Underweight [ ] Increased Nutrient Needs [ ] Overweight/Obesity   [ ] Altered GI Function [ ] Unintended Weight Loss [ ] Food & Nutrition Related Knowledge Deficit [x ] Malnutrition; moderate malnutrition in context of acute illness      Nutrition Diagnosis is [x ] ongoing [ ] resolved [ ] not applicable     Goal: pt to Meet > 75 % energy & protein needs via meals/supplement; not consistently met    New Nutrition Diagnosis: [ x] not applicable       Interventions:   Recommend  [ ] Change Diet To:   [ x] Nutrition Supplement:  Ensure Enlive 3 x day=1125 calories, 60 grams protein   [ ] Nutrition Support  [x ] Other: will add stewed prunes daily c breakfast due to c/o constipation     Monitoring and Evaluation:   [ x] PO intake [ x ] Tolerance to diet prescription [ x ] weights [ x ] labs[ x ] follow up per protocol  [x ] other: BMs Pt adm c sepsis, septic shock 2/2 MRSA bacteremia, s/p thoracentesis, spinal osteo, abscess, endocarditis, OM, discitis, moderate protein calorie malnutrition, drug abuse, s/p EFRAIN, anemia due to poor nutrition, hx of ETOH abuse, pancreatitis, hepatitis.   Pt requested to increase     Factors impacting intake: [ ] none [ ] nausea  [ ] vomiting [ ] diarrhea [ x] constipation  [ ]chewing problems [ ] swallowing issues  [ ] other:     Diet Prescription: Diet, Regular:   Supplement Feeding Modality:  Oral  Ensure Enlive Cans or Servings Per Day:  1       Frequency:  Two Times a day  DanActive Cans or Servings Per Day:  1       Frequency:  Two Times a day (03-24-19 @ 16:13)    Intake: pt did not eat lunch when seen due to constipation, c 60->75->100% as per documentation     Current Weight: 03/18, 68.8 kg, 02/20, 71.3 kg, c wt. loss of 2.5 kg  % Weight Change: 3.5%  Nutrition focused physical exam conducted:  Subcutaneous fat loss: edema of right leg noted [ Mild ] Orbital fat pads region, [ WDL ]Buccal fat region, [ Mild ]Triceps region,  [ unable ]Ribs region.  Muscle wasting: [ Mild ]Temples region, [ Mild  ]Clavicle region, [ WDL ]Shoulder region, [ unable ]Scapula region, [ WDL ]Interosseous region,  [ WNL  ]thigh region, [ Mild; left ]Calf region      Pertinent Medications: MEDICATIONS  (STANDING):  celecoxib 200 milliGRAM(s) Oral daily  chlorhexidine 4% Liquid 1 Application(s) Topical <User Schedule>  DAPTOmycin IVPB      DAPTOmycin IVPB 430 milliGRAM(s) IV Intermittent every 24 hours  docusate sodium 100 milliGRAM(s) Oral three times a day  folic acid 1 milliGRAM(s) Oral daily  gabapentin 800 milliGRAM(s) Oral three times a day  lidocaine   Patch 1 Patch Transdermal every 24 hours  lidocaine   Patch 1 Patch Transdermal every 24 hours  linezolid    Tablet 600 milliGRAM(s) Oral every 12 hours  methadone    Tablet 40 milliGRAM(s) Oral daily  morphine  - Injectable 2 milliGRAM(s) IV Push every 6 hours  multivitamin 1 Tablet(s) Oral daily  nicotine -  14 mG/24Hr(s) Patch 1 patch Transdermal daily  nystatin Powder 1 Application(s) Topical three times a day  pantoprazole    Tablet 40 milliGRAM(s) Oral before breakfast  senna 2 Tablet(s) Oral at bedtime    MEDICATIONS  (PRN):  acetaminophen   Tablet .. 650 milliGRAM(s) Oral every 6 hours PRN Mild Pain (1 - 3)  acetaminophen   Tablet .. 650 milliGRAM(s) Oral every 6 hours PRN Temp greater or equal to 38C (100.4F)  oxyCODONE    IR 10 milliGRAM(s) Oral every 4 hours PRN Moderate Pain (4 - 6)  polyethylene glycol 3350 17 Gram(s) Oral daily PRN Constipation    Pertinent Labs: 03-26 Na134 mmol/L<L> Glu 137 mg/dL<H> K+ 4.1 mmol/L Cr  0.52 mg/dL BUN 26 mg/dL<H> 03-26 Phos 4.0 mg/dL 03-23 Alb 1.9 g/dL<L>     CAPILLARY BLOOD GLUCOSE        Skin: pressure ulcer stage I left heel noted & wound of left knee; abrasion noted     Estimated Needs:   [x ] no change since previous assessment( 02/25/19)  [ ] recalculated:     Previous Nutrition Diagnosis:   [ ] Inadequate Energy Intake [ ]Inadequate Oral Intake [ ] Excessive Energy Intake   [ ] Underweight [ ] Increased Nutrient Needs [ ] Overweight/Obesity   [ ] Altered GI Function [ ] Unintended Weight Loss [ ] Food & Nutrition Related Knowledge Deficit [x ] Malnutrition; moderate malnutrition in context of acute illness      Nutrition Diagnosis is [x ] ongoing [ ] resolved [ ] not applicable     Goal: pt to Meet > 75 % energy & protein needs via meals/supplement; not consistently met    New Nutrition Diagnosis: [ x] not applicable       Interventions:   Recommend  [ ] Change Diet To:   [ x] Nutrition Supplement:  Ensure Enlive 3 x day=1125 calories, 60 grams protein   [ ] Nutrition Support  [x ] Other: will add stewed prunes daily c breakfast due to c/o constipation     Monitoring and Evaluation:   [ x] PO intake [ x ] Tolerance to diet prescription [ x ] weights [ x ] labs[ x ] follow up per protocol  [x ] other: BMs

## 2019-03-28 NOTE — PROGRESS NOTE ADULT - ASSESSMENT
A/P: 43y Female s/p C4-5 ACDF with irrigation and debridement, now POD 8        FU MR Left HIp and Left Knee ordered, patient reports discomfort   MRI T/L spine repeated yesterday, no change from prior MRI, stable as of now.   Pain control   please keep MAPs >80  OR Cx's growing MRSA, ABX per ID  LIZ demonstrates no change in vegetation  No plan for return to OR at this time, will continue to monitor for improvement or RUE weakness which has been present since before surgery.    continue in aspen collar  Dry dressing changes as needed.   Pain control  DVT ppx- SCDs  PT/OT, WBAT  FU labs  will d/w attending and advise if plan changes  Dispo planning A/P: 43y Female s/p C4-5 ACDF with irrigation and debridement, now POD 8  FU MR Left HIp and Left Knee ordered, patient reports discomfort   MRI T/L spine repeated yesterday, no change from prior MRI, stable as of now.   Pain control   please keep MAPs >80  OR Cx's growing MRSA, ABX per ID  Midline in Since Feb 20th (>30d) - Pt would benefit from new Midline placement to avoid further bacteremia/infection  LIZ demonstrates no change in vegetation  No plan for return to OR at this time, will continue to monitor for improvement or RUE weakness which has been present since before surgery.    continue in aspen collar  Dry dressing changes as needed.   Pain control  DVT ppx- SCDs  PT/OT, WBAT  FU labs  will d/w attending and advise if plan changes  Dispo planning A/P: 43y Female s/p C4-5 ACDF with irrigation and debridement, now POD 8  FU MR Left HIp and Left Knee ordered, patient reports discomfort in left knee and hip when examined with Dr. Leslie at bedside yesterday. Will FU to r/out pathology in Left hip and knee.   MRI T/L spine repeated, no change from prior MRI, stable as of now.   Pain control   please keep MAPs >80  OR Cx's growing MRSA, ABX per ID  Midline in Since Feb 20th (>30d) - Pt would benefit from new Midline placement to avoid further bacteremia/infection  LIZ demonstrates no change in vegetation  No plan for return to OR at this time, will continue to monitor for improvement or RUE weakness which has been present since before surgery.    continue in aspen collar  Dry dressing changes as needed.   Pain control  DVT ppx- SCDs  PT/OT, WBAT  FU labs  will d/w attending and advise if plan changes  Dispo planning

## 2019-03-28 NOTE — PROGRESS NOTE ADULT - SUBJECTIVE AND OBJECTIVE BOX
Patient is a 43y old  Female who presents with a chief complaint of AMS (28 Mar 2019 05:52)      OVERNIGHT EVENTS: C/O generalized body pain, nonspecific    REVIEW OF SYSTEMS: denies chest pain/SOB, diaphoresis, no F/C, cough, dizziness, headache, blurry vision, nausea, vomiting, abdominal pain. All others review of systems negative     MEDICATIONS  (STANDING):  celecoxib 200 milliGRAM(s) Oral daily  chlorhexidine 4% Liquid 1 Application(s) Topical <User Schedule>  DAPTOmycin IVPB      DAPTOmycin IVPB 430 milliGRAM(s) IV Intermittent every 24 hours  docusate sodium 100 milliGRAM(s) Oral three times a day  folic acid 1 milliGRAM(s) Oral daily  gabapentin 800 milliGRAM(s) Oral three times a day  lidocaine   Patch 1 Patch Transdermal every 24 hours  lidocaine   Patch 1 Patch Transdermal every 24 hours  linezolid    Tablet 600 milliGRAM(s) Oral every 12 hours  methadone    Tablet 40 milliGRAM(s) Oral daily  morphine  - Injectable 2 milliGRAM(s) IV Push every 8 hours  multivitamin 1 Tablet(s) Oral daily  nicotine -  14 mG/24Hr(s) Patch 1 patch Transdermal daily  nystatin Powder 1 Application(s) Topical three times a day  oxyCODONE  ER Tablet 20 milliGRAM(s) Oral every 12 hours  pantoprazole    Tablet 40 milliGRAM(s) Oral before breakfast  senna 2 Tablet(s) Oral at bedtime    MEDICATIONS  (PRN):  acetaminophen   Tablet .. 650 milliGRAM(s) Oral every 6 hours PRN Mild Pain (1 - 3)  acetaminophen   Tablet .. 650 milliGRAM(s) Oral every 6 hours PRN Temp greater or equal to 38C (100.4F)  oxyCODONE    IR 20 milliGRAM(s) Oral every 4 hours PRN Moderate Pain (4 - 6)  polyethylene glycol 3350 17 Gram(s) Oral daily PRN Constipation      Allergies    penicillin (Short breath; Hives)    Intolerances        T(F): 98.1 (03-28-19 @ 12:24), Max: 100.1 (03-28-19 @ 05:09)  HR: 82 (03-28-19 @ 12:24) (82 - 103)  BP: 126/68 (03-28-19 @ 12:24) (108/58 - 126/68)  RR: 16 (03-28-19 @ 12:24) (16 - 17)  SpO2: 98% (03-28-19 @ 12:24) (97% - 100%)  Wt(kg): --    PHYSICAL EXAM:  GENERAL: mild distress due to pain, well-groomed, well-developed  HEAD:  Atraumatic, Normocephalic  EYES: EOMI, PERRLA, conjunctiva and sclera clear  ENMT: No tonsillar erythema, exudates, or enlargement; Moist mucous membranes, Good dentition, No lesions  NECK: Supple, No JVD, Normal thyroid  NERVOUS SYSTEM:  Alert & Oriented X3, Good concentration; Motor Strength 5/5 B/L upper and lower extremities; DTRs 2+ intact and symmetric  CHEST/LUNG: Clear to percussion bilaterally; No rales, rhonchi, wheezing, or rubs BL  HEART: Regular rate and rhythm; No murmurs, rubs, or gallops  ABDOMEN: Soft, Nontender, Nondistended; Bowel sounds present  EXTREMITIES:  2+ Peripheral Pulses, No clubbing, cyanosis, or edema BL LE  LYMPH: No lymphadenopathy noted  SKIN: No rashes or lesions    LABS:    Cultures; Culture - Tissue with Gram Stain (collected 21 Mar 2019 09:42)  Source: .Tissue cervical disc #2 culture  Gram Stain (prelim) (22 Mar 2019 11:20):    Moderate polymorphonuclear leukocytes per low power field    Rare Gram positive cocci in pairs per oil power field  Preliminary Report (24 Mar 2019 12:21):    Few Methicillin resistant Staphylococcus aureus  Organism: Methicillin resistant Staphylococcus aureus (24 Mar 2019 12:21)  Organism: Methicillin resistant Staphylococcus aureus (24 Mar 2019 12:21)    Culture - Tissue with Gram Stain (collected 21 Mar 2019 09:38)  Source: .Tissue c4-c5 disc culture c/s ewb  Gram Stain (prelim) (22 Mar 2019 11:33):    Moderate polymorphonuclear leukocytes per low power field    Rare Gram positive cocci in pairs per oil power field  Preliminary Report (23 Mar 2019 08:21):    Few Methicillin resistant Staphylococcus aureus  Organism: Methicillin resistant Staphylococcus aureus (23 Mar 2019 08:20)  Organism: Methicillin resistant Staphylococcus aureus (23 Mar 2019 08:20)    Culture - Blood (collected 21 Mar 2019 09:33)  Source: .Blood  Preliminary Report (22 Mar 2019 10:01):    No growth to date.    Culture - Surgical Swab (collected 21 Mar 2019 09:31)  Source: .Surgical Swab cervical disc #1 culture c/s  Preliminary Report (23 Mar 2019 08:27):    Numerous Methicillin resistant Staphylococcus aureus  Organism: Methicillin resistant Staphylococcus aureus (23 Mar 2019 08:26)  Organism: Methicillin resistant Staphylococcus aureus (23 Mar 2019 08:26)    Culture - Surgical Swab (collected 21 Mar 2019 09:26)  Source: .Surgical Swab prevertebral culture c/s  Preliminary Report (22 Mar 2019 10:48):    No growth    Culture - Body Fluid with Gram Stain (collected 21 Mar 2019 04:12)  Source: .Body Fluid LEFT THORACENTESIS  Gram Stain (prelim) (22 Mar 2019 09:06):    polymorphonuclear leukocytes seen    No organisms seen    by cytocentrifuge  Preliminary Report (22 Mar 2019 09:06):    No growth      RADIOLOGY & ADDITIONAL TESTS:    Imaging Personally Reviewed:  [ x] YES    < from: Xray Chest 1 View- PORTABLE-Urgent (03.21.19 @ 02:02) >  Stable small right pleural effusion  Stable left basilar atelectasis.  Mild worsening of right basilar opacity.  < from: CT Chest No Cont (03.01.19 @ 11:32) >  Bilateral cavitary lesions are increased in size and number compared to   2/17/2019.     Small bilateral pleural effusions, loculated on the right, increased   compared to the prior study. Unchanged right lower lobe dependent  consolidation.    Prominent mediastinal and axillary lymph nodes, possibly reactive.      Consultant(s) Notes Reviewed:  [x ] YES     Care Discussed with [ x] Consultants [X ] Patient [ ] Family  [x ]    [x ]  Other; RN

## 2019-03-28 NOTE — CHART NOTE - NSCHARTNOTEFT_GEN_A_CORE
Pt was Discussed with Dr. Rehman. Labs and imagine reviewed, pt is medically optimized at this time for emergent procedure.

## 2019-03-29 LAB
ANION GAP SERPL CALC-SCNC: 9 MMOL/L — SIGNIFICANT CHANGE UP (ref 5–17)
B PERT IGG+IGM PNL SER: ABNORMAL
BUN SERPL-MCNC: 25 MG/DL — HIGH (ref 7–23)
CALCIUM SERPL-MCNC: 8.9 MG/DL — SIGNIFICANT CHANGE UP (ref 8.5–10.1)
CHLORIDE SERPL-SCNC: 100 MMOL/L — SIGNIFICANT CHANGE UP (ref 96–108)
CO2 SERPL-SCNC: 28 MMOL/L — SIGNIFICANT CHANGE UP (ref 22–31)
COLOR FLD: SIGNIFICANT CHANGE UP
COLOR FLD: YELLOW — SIGNIFICANT CHANGE UP
COLOR FLD: YELLOW — SIGNIFICANT CHANGE UP
CREAT SERPL-MCNC: 0.54 MG/DL — SIGNIFICANT CHANGE UP (ref 0.5–1.3)
EOSINOPHIL # FLD: 1 % — SIGNIFICANT CHANGE UP
FLUID INTAKE SUBSTANCE CLASS: SIGNIFICANT CHANGE UP
FLUID SEGMENTED GRANULOCYTES: 58 % — SIGNIFICANT CHANGE UP
FLUID SEGMENTED GRANULOCYTES: 93 % — SIGNIFICANT CHANGE UP
FLUID SEGMENTED GRANULOCYTES: 96.5 % — SIGNIFICANT CHANGE UP
FLUID SEGMENTED GRANULOCYTES: SIGNIFICANT CHANGE UP %
GLUCOSE SERPL-MCNC: 130 MG/DL — HIGH (ref 70–99)
GRAM STN FLD: SIGNIFICANT CHANGE UP
HCT VFR BLD CALC: 30.6 % — LOW (ref 34.5–45)
HGB BLD-MCNC: 9.4 G/DL — LOW (ref 11.5–15.5)
INR BLD: 1.13 RATIO — SIGNIFICANT CHANGE UP (ref 0.88–1.16)
LYMPHOCYTES # FLD: 12 % — SIGNIFICANT CHANGE UP
MCHC RBC-ENTMCNC: 25.8 PG — LOW (ref 27–34)
MCHC RBC-ENTMCNC: 30.7 GM/DL — LOW (ref 32–36)
MCV RBC AUTO: 84.1 FL — SIGNIFICANT CHANGE UP (ref 80–100)
MONOS+MACROS # FLD: 29 % — SIGNIFICANT CHANGE UP
MONOS+MACROS # FLD: 3.5 % — SIGNIFICANT CHANGE UP
MONOS+MACROS # FLD: 7 % — SIGNIFICANT CHANGE UP
NRBC # BLD: 0 /100 WBCS — SIGNIFICANT CHANGE UP (ref 0–0)
PLATELET # BLD AUTO: 417 K/UL — HIGH (ref 150–400)
POTASSIUM SERPL-MCNC: 4.1 MMOL/L — SIGNIFICANT CHANGE UP (ref 3.5–5.3)
POTASSIUM SERPL-SCNC: 4.1 MMOL/L — SIGNIFICANT CHANGE UP (ref 3.5–5.3)
PROTHROM AB SERPL-ACNC: 12.7 SEC — SIGNIFICANT CHANGE UP (ref 10–12.9)
RBC # BLD: 3.64 M/UL — LOW (ref 3.8–5.2)
RBC # FLD: 18.7 % — HIGH (ref 10.3–14.5)
RCV VOL RI: 9000 /UL — HIGH (ref 0–5)
RCV VOL RI: HIGH /UL (ref 0–5)
RCV VOL RI: HIGH /UL (ref 0–5)
SODIUM SERPL-SCNC: 137 MMOL/L — SIGNIFICANT CHANGE UP (ref 135–145)
SPECIMEN SOURCE: SIGNIFICANT CHANGE UP
SYNOVIAL CRYSTALS CLARITY: ABNORMAL
SYNOVIAL CRYSTALS COLOR: ABNORMAL
SYNOVIAL CRYSTALS ID: SIGNIFICANT CHANGE UP
SYNOVIAL CRYSTALS TUBE: SIGNIFICANT CHANGE UP
TOTAL NUCLEATED CELL COUNT, BODY FLUID: 2730 /UL — HIGH (ref 0–5)
TOTAL NUCLEATED CELL COUNT, BODY FLUID: HIGH /UL (ref 0–5)
TOTAL NUCLEATED CELL COUNT, BODY FLUID: HIGH /UL (ref 0–5)
TUBE TYPE: SIGNIFICANT CHANGE UP
WBC # BLD: 9.15 K/UL — SIGNIFICANT CHANGE UP (ref 3.8–10.5)
WBC # FLD AUTO: 9.15 K/UL — SIGNIFICANT CHANGE UP (ref 3.8–10.5)

## 2019-03-29 PROCEDURE — 99233 SBSQ HOSP IP/OBS HIGH 50: CPT

## 2019-03-29 PROCEDURE — 99232 SBSQ HOSP IP/OBS MODERATE 35: CPT

## 2019-03-29 PROCEDURE — 10160 PNXR ASPIR ABSC HMTMA BULLA: CPT | Mod: 59

## 2019-03-29 PROCEDURE — 20611 DRAIN/INJ JOINT/BURSA W/US: CPT | Mod: 50

## 2019-03-29 PROCEDURE — 76942 ECHO GUIDE FOR BIOPSY: CPT | Mod: 26,59

## 2019-03-29 RX ORDER — IOHEXOL 300 MG/ML
30 INJECTION, SOLUTION INTRAVENOUS ONCE
Qty: 0 | Refills: 0 | Status: COMPLETED | OUTPATIENT
Start: 2019-03-29 | End: 2019-03-31

## 2019-03-29 RX ADMIN — CELECOXIB 200 MILLIGRAM(S): 200 CAPSULE ORAL at 11:41

## 2019-03-29 RX ADMIN — LINEZOLID 600 MILLIGRAM(S): 600 INJECTION, SOLUTION INTRAVENOUS at 05:15

## 2019-03-29 RX ADMIN — MORPHINE SULFATE 2 MILLIGRAM(S): 50 CAPSULE, EXTENDED RELEASE ORAL at 21:35

## 2019-03-29 RX ADMIN — CHLORHEXIDINE GLUCONATE 1 APPLICATION(S): 213 SOLUTION TOPICAL at 05:19

## 2019-03-29 RX ADMIN — Medication 1 TABLET(S): at 11:41

## 2019-03-29 RX ADMIN — LIDOCAINE 1 PATCH: 4 CREAM TOPICAL at 23:24

## 2019-03-29 RX ADMIN — Medication 100 MILLIGRAM(S): at 05:14

## 2019-03-29 RX ADMIN — LINEZOLID 600 MILLIGRAM(S): 600 INJECTION, SOLUTION INTRAVENOUS at 17:10

## 2019-03-29 RX ADMIN — SENNA PLUS 2 TABLET(S): 8.6 TABLET ORAL at 21:01

## 2019-03-29 RX ADMIN — LIDOCAINE 1 PATCH: 4 CREAM TOPICAL at 06:33

## 2019-03-29 RX ADMIN — Medication 1 PATCH: at 06:36

## 2019-03-29 RX ADMIN — LIDOCAINE 1 PATCH: 4 CREAM TOPICAL at 06:32

## 2019-03-29 RX ADMIN — GABAPENTIN 800 MILLIGRAM(S): 400 CAPSULE ORAL at 21:01

## 2019-03-29 RX ADMIN — METHADONE HYDROCHLORIDE 40 MILLIGRAM(S): 40 TABLET ORAL at 11:41

## 2019-03-29 RX ADMIN — Medication 100 MILLIGRAM(S): at 21:01

## 2019-03-29 RX ADMIN — MORPHINE SULFATE 2 MILLIGRAM(S): 50 CAPSULE, EXTENDED RELEASE ORAL at 21:02

## 2019-03-29 RX ADMIN — LIDOCAINE 1 PATCH: 4 CREAM TOPICAL at 12:17

## 2019-03-29 RX ADMIN — PANTOPRAZOLE SODIUM 40 MILLIGRAM(S): 20 TABLET, DELAYED RELEASE ORAL at 06:40

## 2019-03-29 RX ADMIN — Medication 1 MILLIGRAM(S): at 11:41

## 2019-03-29 RX ADMIN — Medication 1 PATCH: at 11:43

## 2019-03-29 RX ADMIN — LIDOCAINE 1 PATCH: 4 CREAM TOPICAL at 23:23

## 2019-03-29 RX ADMIN — Medication 1 PATCH: at 19:02

## 2019-03-29 RX ADMIN — OXYCODONE HYDROCHLORIDE 20 MILLIGRAM(S): 5 TABLET ORAL at 23:24

## 2019-03-29 RX ADMIN — GABAPENTIN 800 MILLIGRAM(S): 400 CAPSULE ORAL at 13:23

## 2019-03-29 RX ADMIN — DAPTOMYCIN 117.2 MILLIGRAM(S): 500 INJECTION, POWDER, LYOPHILIZED, FOR SOLUTION INTRAVENOUS at 11:42

## 2019-03-29 RX ADMIN — MORPHINE SULFATE 2 MILLIGRAM(S): 50 CAPSULE, EXTENDED RELEASE ORAL at 06:00

## 2019-03-29 RX ADMIN — OXYCODONE HYDROCHLORIDE 20 MILLIGRAM(S): 5 TABLET ORAL at 13:21

## 2019-03-29 RX ADMIN — Medication 100 MILLIGRAM(S): at 13:22

## 2019-03-29 RX ADMIN — OXYCODONE HYDROCHLORIDE 20 MILLIGRAM(S): 5 TABLET ORAL at 07:32

## 2019-03-29 RX ADMIN — OXYCODONE HYDROCHLORIDE 20 MILLIGRAM(S): 5 TABLET ORAL at 14:24

## 2019-03-29 RX ADMIN — CELECOXIB 200 MILLIGRAM(S): 200 CAPSULE ORAL at 12:42

## 2019-03-29 RX ADMIN — POLYETHYLENE GLYCOL 3350 17 GRAM(S): 17 POWDER, FOR SOLUTION ORAL at 11:49

## 2019-03-29 RX ADMIN — OXYCODONE HYDROCHLORIDE 20 MILLIGRAM(S): 5 TABLET ORAL at 06:31

## 2019-03-29 RX ADMIN — LIDOCAINE 1 PATCH: 4 CREAM TOPICAL at 00:05

## 2019-03-29 RX ADMIN — MORPHINE SULFATE 2 MILLIGRAM(S): 50 CAPSULE, EXTENDED RELEASE ORAL at 05:14

## 2019-03-29 RX ADMIN — GABAPENTIN 800 MILLIGRAM(S): 400 CAPSULE ORAL at 05:14

## 2019-03-29 NOTE — PROGRESS NOTE ADULT - ASSESSMENT
43 F w/ history of IVDA, alcohol abuse, hx of pancreatitis, alcohol hepatitis, alcohol withdrawal, left frontoparietal subdural hematoma 2008 without need for neurosurgical intervention presented with severe sepsis with septic shock secondary to MRSA bacteremia w/ RLL PNA, UTI, endocarditis on LIZ and EFRAIN that has resolved and was diagnosis w/ HCV. Pt was initially intubated due to respiratory failure and put on pressors in ICU. She as extubated on 2/25 and transferred from ICU the following day. Pt had a C4-5 ACDF with irrigation and debridement on 3/20 due to spinal abscess and osteo and was kept intubated post procedure and transferred again to ICU, where she was extubated on 3/21 and transferred back to the med service the following day.     Septic arthritis involving BL Knees/hips;  - MRI of bilateral hips and knees were done demonstrating effusions and an abscess located in the right rectus femoris.   - Bilateral knees were aspirated by orthopedic team. Cell count for left knee at 14k and cell count for right knee at 2700.     septic shock secondary to MRSA bacteremia w/ RLL PNA, UTI, endocarditis on LIZ   - resolved     Endocarditis of tricuspid valve  - status post thoracentesis on 3/20  - as per ID, patient is on dapto & Zyvox   - repeat BC negative from 3/21  - repeat echo showed a pedunculated mobile mass seen adherent to lateral leaflet of the tricuspid valve w/ no interval changes noted in valve structure or regurgitation  - spoke with Dr. Gutierrez ([facility]) who suggested to obtain CT C/A/P and LIZ on Monday to assess metastatic abscesses in setting of Endocarditis, no obvious indication for cardiac surgical intervention as of today 3/29/19    Spinal osteo, abscess and cord compression    - CT of head and neck showed disc space narrowing and endplate irregularity at C4-5 which may represent typical degenerative change vs discitis   - MRI of the cervical spine w/ contrast showed C2-C7 discitis/osteomyelitis with a small right of midline ventral epidural abscess at C2-C7  - MRI of the thoracolumbar spine showed discitis/osteomyelitis at T11-T12 with paravertebral abscess, as well as discitis/osteomyelitis C6--T1, T9-T10, T10-T11, L4-L5, and L5-S1, and a phlegmon/early abscess dorsolaterally within the lumbar canal at the level of L4-L5. There was also a septic arthritis of  the left sternoclavicular joint space. There was also disc herniation at the L4-L5 level with contact and probable impingement of the descending right-sided nerve roots  - status post s/p C4-5 ACDF with irrigation and debridement on 3/20  - c/w gabapentin and lidocaine patch  - wound cx grew MRSA  - c/w aspen collar   - as per ID, patient is on dapto & Zyvox     Moderate protein-calorie malnutrition  - c/w soft diet w/Ensure Enlive 3 x day      IVDrug abuse  - c/w methadone for opiate abuse   - decrease morphine q8 h today and taper off  - cont celebrex, gabapentin, inc oxycodone dose, add oxycontin    HCV  - Outpatient follow up for HCV and opioid maintenance outpt re-enrollment program    Anemia due to poor nutrition  - status post 1 unit pRBC,  - c/w folic acid

## 2019-03-29 NOTE — CONSULT NOTE ADULT - SUBJECTIVE AND OBJECTIVE BOX
CHIEF COMPLAINT:  Patient is a 43y old  Female who presents with a chief complaint of AMS (29 Mar 2019 12:25)      HPI:  44 Y/O female w/ PMHx of IV heroin abuse brought in by EMS for lethargy and cough. As per EMS, pt was found laying in bed at home, lethargic however able to follow some commands. EMS reports that pt was attempting to detox from heroin, and last known use was 3 days prior to ED presentation. As per ED, pts boyfriend reports a hx of + pt cough productive of white sputum and a fall down a flight of stairs 2/16. Labs showed serum lactate of 7.2, BUN/Cr 124/6.42, and WBC of of 11.93. Tmax 102.4, urine + for cocaine, opiates, nitrites and many bacteria. ICU called for further evaluation. Treated recently for tricuspid valve endocarditis. Now with concerns of osteo/septic emboli.    ALLERGIES:  penicillin (Short breath; Hives)      MEDICATIONS  (STANDING):  celecoxib 200 milliGRAM(s) Oral daily  chlorhexidine 4% Liquid 1 Application(s) Topical <User Schedule>  DAPTOmycin IVPB      DAPTOmycin IVPB 430 milliGRAM(s) IV Intermittent every 24 hours  docusate sodium 100 milliGRAM(s) Oral three times a day  folic acid 1 milliGRAM(s) Oral daily  gabapentin 800 milliGRAM(s) Oral three times a day  iohexol 300 mG (iodine)/mL Oral Solution 30 milliLiter(s) Oral once  lidocaine   Patch 1 Patch Transdermal every 24 hours  lidocaine   Patch 1 Patch Transdermal every 24 hours  linezolid    Tablet 600 milliGRAM(s) Oral every 12 hours  methadone    Tablet 40 milliGRAM(s) Oral daily  morphine  - Injectable 2 milliGRAM(s) IV Push every 8 hours  multivitamin 1 Tablet(s) Oral daily  nicotine -  14 mG/24Hr(s) Patch 1 patch Transdermal daily  nystatin Powder 1 Application(s) Topical three times a day  oxyCODONE  ER Tablet 20 milliGRAM(s) Oral every 12 hours  pantoprazole    Tablet 40 milliGRAM(s) Oral before breakfast  senna 2 Tablet(s) Oral at bedtime    MEDICATIONS  (PRN):  acetaminophen   Tablet .. 650 milliGRAM(s) Oral every 6 hours PRN Mild Pain (1 - 3)  acetaminophen   Tablet .. 650 milliGRAM(s) Oral every 6 hours PRN Temp greater or equal to 38C (100.4F)  oxyCODONE    IR 20 milliGRAM(s) Oral every 4 hours PRN Moderate Pain (4 - 6)  polyethylene glycol 3350 17 Gram(s) Oral daily PRN Constipation    PAST MEDICAL & SURGICAL HISTORY:  ETOH abuse  Drug abuse  C Section      FAMILY HISTORY:  NA    SOCIAL HISTORY:  Heroin abuse noted.    REVIEW OF SYSTEMS:  General:  No wt loss, fevers, chills, night sweats  Eyes:  Good vision, no reported pain  ENT:  No sore throat, pain, runny nose, dysphagia  CV:  No pain, palpitations, hypo/hypertension  Resp:  No dyspnea, cough, tachypnea, wheezing  GI:  No pain, nausea, vomiting, diarrhea, constipation  :  No pain, bleeding, incontinence, nocturia  Muscle:  No pain, weakness  Breast:  No pain, abscess, mass, discharge  Neuro:  No weakness, tingling, memory problems  Psych:  No fatigue, insomnia, mood problems, depression  Endocrine:  No polyuria, polydipsia, cold/heat intolerance  Heme:  No petechiae, ecchymosis, easy bruisability  Skin:  No rash, edema    PHYSICAL EXAM:  Vital Signs:  Vital Signs Last 24 Hrs  T(C): 37.6 (29 Mar 2019 12:11), Max: 37.6 (29 Mar 2019 06:03)  T(F): 99.6 (29 Mar 2019 12:11), Max: 99.6 (29 Mar 2019 06:03)  HR: 94 (29 Mar 2019 12:11) (82 - 105)  BP: 111/62 (29 Mar 2019 12:11) (103/48 - 120/50)  RR: 18 (29 Mar 2019 12:11) (17 - 18)  SpO2: 99% (29 Mar 2019 12:11) (96% - 99%)  I&O's Summary      General:  Appears stated age, well-groomed, well-nourished, no distress  HEENT:  NC/AT, patent nares w/ pink mucosa, OP clear w/o lesions, PERRL, EOMI, conjunctivae clear, no thyromegaly, nodules, adenopathy, no JVD  Chest:  Full & symmetric excursion, no increased effort, breath sounds clear  Cardiovascular:  Regular rhythm, S1, S2, no murmur/rub/S3/S4, no carotid/femoral/abdominal bruit, radial/pedal pulses 2+, no edema  Abdomen:  Soft, non-tender, non-distended, normoactive bowel sounds, no HSM  Extremities:  Gait & station:   Digits:   Nails:   Joints, Bones, Muscles:   ROM:   Stability:  Skin:  No rash/erythema/ecchymoses/petechiae/wounds/abscess/warm/dry  Musculoskeletal:  Full ROM in all joints w/o swelling/tenderness/effusion  Neuro/Psych:  Alert, oriented, normal and symmetric strength in UEs, LEs, normal gait, sensation intact, affect:   mood:   appearance:       LABORATORY:                          9.4    9.15  )-----------( 417      ( 29 Mar 2019 06:39 )             30.6     03-29    137  |  100  |  25<H>  ----------------------------<  130<H>  4.1   |  28  |  0.54    Ca    8.9      29 Mar 2019 06:39      PT/INR - ( 29 Mar 2019 09:55 )   PT: 12.7 sec;   INR: 1.13 ratio         IMAGING:  ecg:      < from: TTE Limited Echo w/o Cont (03.22.19 @ 13:27) >  Summary:   1. Trace tricuspid regurgitation.   2. Peduncululated mobile mass seen adherent to lateral leaflet of the   tricuspid valve.   3. No interval changes noted in valve structure or regurgitation.    < end of copied text >  ASSESSMENT:  44 Y/O female w/ PMHx of IV heroin abuse brought in by EMS for lethargy and cough. As per EMS, pt was found laying in bed at home, lethargic however able to follow some commands. EMS reports that pt was attempting to detox from heroin, and last known use was 3 days prior to ED presentation. As per ED, pts boyfriend reports a hx of + pt cough productive of white sputum and a fall down a flight of stairs 2/16. Labs showed serum lactate of 7.2, BUN/Cr 124/6.42, and WBC of of 11.93. Tmax 102.4, urine + for cocaine, opiates, nitrites and many bacteria. ICU called for further evaluation. Treated recently for tricuspid valve endocarditis. Now with concerns of osteo/septic emboli.    PLAN:     MEDICATIONS  (STANDING):  celecoxib 200 milliGRAM(s) Oral daily  chlorhexidine 4% Liquid 1 Application(s) Topical <User Schedule>  DAPTOmycin IVPB      DAPTOmycin IVPB 430 milliGRAM(s) IV Intermittent every 24 hours  docusate sodium 100 milliGRAM(s) Oral three times a day  folic acid 1 milliGRAM(s) Oral daily  gabapentin 800 milliGRAM(s) Oral three times a day  iohexol 300 mG (iodine)/mL Oral Solution 30 milliLiter(s) Oral once  lidocaine   Patch 1 Patch Transdermal every 24 hours  lidocaine   Patch 1 Patch Transdermal every 24 hours  linezolid    Tablet 600 milliGRAM(s) Oral every 12 hours  methadone    Tablet 40 milliGRAM(s) Oral daily  morphine  - Injectable 2 milliGRAM(s) IV Push every 8 hours  multivitamin 1 Tablet(s) Oral daily  nicotine -  14 mG/24Hr(s) Patch 1 patch Transdermal daily  nystatin Powder 1 Application(s) Topical three times a day  oxyCODONE  ER Tablet 20 milliGRAM(s) Oral every 12 hours  pantoprazole    Tablet 40 milliGRAM(s) Oral before breakfast  senna 2 Tablet(s) Oral at bedtime    Detox,  ID  May need LIZ to reassess tricuspid valve r/o further decompensation.  Would get another surface echo first.  Have low threshold for transfer to [facility] for tertiary care and likely eventual inpatient rehab/skilled nursing.    Patricio Lawton MD, FACC, TAMIKO, TERESITA, FACP  Director, Heart Failure Services  [facility]  , Department of Cardiology  [facility] [employer] [facility] CHIEF COMPLAINT:  Patient is a 43y old  Female who presents with a chief complaint of AMS (29 Mar 2019 12:25)      HPI:  42 Y/O female w/ PMHx of IV heroin abuse brought in by EMS for lethargy and cough. As per EMS, pt was found laying in bed at home, lethargic however able to follow some commands. EMS reports that pt was attempting to detox from heroin, and last known use was 3 days prior to ED presentation. As per ED, pts boyfriend reports a hx of + pt cough productive of white sputum and a fall down a flight of stairs 2/16. Labs showed serum lactate of 7.2, BUN/Cr 124/6.42, and WBC of of 11.93. Tmax 102.4, urine + for cocaine, opiates, nitrites and many bacteria. ICU called for further evaluation. Treated recently for tricuspid valve endocarditis. Now with concerns bilateral hip effusions as well as right rectus femoris abscess and epidural abscess.      ALLERGIES:  penicillin (Short breath; Hives)      MEDICATIONS  (STANDING):  celecoxib 200 milliGRAM(s) Oral daily  chlorhexidine 4% Liquid 1 Application(s) Topical <User Schedule>  DAPTOmycin IVPB      DAPTOmycin IVPB 430 milliGRAM(s) IV Intermittent every 24 hours  docusate sodium 100 milliGRAM(s) Oral three times a day  folic acid 1 milliGRAM(s) Oral daily  gabapentin 800 milliGRAM(s) Oral three times a day  iohexol 300 mG (iodine)/mL Oral Solution 30 milliLiter(s) Oral once  lidocaine   Patch 1 Patch Transdermal every 24 hours  lidocaine   Patch 1 Patch Transdermal every 24 hours  linezolid    Tablet 600 milliGRAM(s) Oral every 12 hours  methadone    Tablet 40 milliGRAM(s) Oral daily  morphine  - Injectable 2 milliGRAM(s) IV Push every 8 hours  multivitamin 1 Tablet(s) Oral daily  nicotine -  14 mG/24Hr(s) Patch 1 patch Transdermal daily  nystatin Powder 1 Application(s) Topical three times a day  oxyCODONE  ER Tablet 20 milliGRAM(s) Oral every 12 hours  pantoprazole    Tablet 40 milliGRAM(s) Oral before breakfast  senna 2 Tablet(s) Oral at bedtime    MEDICATIONS  (PRN):  acetaminophen   Tablet .. 650 milliGRAM(s) Oral every 6 hours PRN Mild Pain (1 - 3)  acetaminophen   Tablet .. 650 milliGRAM(s) Oral every 6 hours PRN Temp greater or equal to 38C (100.4F)  oxyCODONE    IR 20 milliGRAM(s) Oral every 4 hours PRN Moderate Pain (4 - 6)  polyethylene glycol 3350 17 Gram(s) Oral daily PRN Constipation    PAST MEDICAL & SURGICAL HISTORY:  ETOH abuse  Drug abuse  C Section      FAMILY HISTORY:  NA    SOCIAL HISTORY:  Heroin abuse noted.    REVIEW OF SYSTEMS:  General:  No wt loss, fevers, chills, night sweats  Eyes:  Good vision, no reported pain  ENT:  No sore throat, pain, runny nose, dysphagia  CV:  No pain, palpitations, hypo/hypertension  Resp:  No dyspnea, cough, tachypnea, wheezing  GI:  No pain, nausea, vomiting, diarrhea, constipation  :  No pain, bleeding, incontinence, nocturia  Muscle:  No pain, weakness  Breast:  No pain, abscess, mass, discharge  Neuro:  No weakness, tingling, memory problems  Psych:  No fatigue, insomnia, mood problems, depression  Endocrine:  No polyuria, polydipsia, cold/heat intolerance  Heme:  No petechiae, ecchymosis, easy bruisability  Skin:  No rash, edema    PHYSICAL EXAM:  Vital Signs:  Vital Signs Last 24 Hrs  T(C): 37.6 (29 Mar 2019 12:11), Max: 37.6 (29 Mar 2019 06:03)  T(F): 99.6 (29 Mar 2019 12:11), Max: 99.6 (29 Mar 2019 06:03)  HR: 94 (29 Mar 2019 12:11) (82 - 105)  BP: 111/62 (29 Mar 2019 12:11) (103/48 - 120/50)  RR: 18 (29 Mar 2019 12:11) (17 - 18)  SpO2: 99% (29 Mar 2019 12:11) (96% - 99%)  I&O's Summary      General:  Appears stated age, well-groomed, well-nourished, no distress  HEENT:  NC/AT, patent nares w/ pink mucosa, OP clear w/o lesions, PERRL, EOMI, conjunctivae clear, no thyromegaly, nodules, adenopathy, no JVD  Chest:  Full & symmetric excursion, no increased effort, breath sounds clear  Cardiovascular:  Regular rhythm, S1, S2, no murmur/rub/S3/S4, no carotid/femoral/abdominal bruit, radial/pedal pulses 2+, no edema  Abdomen:  Soft, non-tender, non-distended, normoactive bowel sounds, no HSM  Extremities:  Gait & station:   Digits:   Nails:   Joints, Bones, Muscles:   ROM:   Stability:  Skin:  No rash/erythema/ecchymoses/petechiae/wounds/abscess/warm/dry  Musculoskeletal:  Full ROM in all joints w/o swelling/tenderness/effusion  Neuro/Psych:  Alert, oriented, normal and symmetric strength in UEs, LEs, normal gait, sensation intact, affect:   mood:   appearance:       LABORATORY:                          9.4    9.15  )-----------( 417      ( 29 Mar 2019 06:39 )             30.6     03-29    137  |  100  |  25<H>  ----------------------------<  130<H>  4.1   |  28  |  0.54    Ca    8.9      29 Mar 2019 06:39      PT/INR - ( 29 Mar 2019 09:55 )   PT: 12.7 sec;   INR: 1.13 ratio         IMAGING:  ecg:      < from: TTE Limited Echo w/o Cont (03.22.19 @ 13:27) >  Summary:   1. Trace tricuspid regurgitation.   2. Peduncululated mobile mass seen adherent to lateral leaflet of the   tricuspid valve.   3. No interval changes noted in valve structure or regurgitation.    < end of copied text >    ASSESSMENT:  42 Y/O female w/ PMHx of IV heroin abuse brought in by EMS for lethargy and cough. As per EMS, pt was found laying in bed at home, lethargic however able to follow some commands. EMS reports that pt was attempting to detox from heroin, and last known use was 3 days prior to ED presentation. As per ED, pts boyfriend reports a hx of + pt cough productive of white sputum and a fall down a flight of stairs 2/16. Labs showed serum lactate of 7.2, BUN/Cr 124/6.42, and WBC of of 11.93. Tmax 102.4, urine + for cocaine, opiates, nitrites and many bacteria. ICU called for further evaluation. Treated recently for tricuspid valve endocarditis. Now with concerns of osteo/septic emboli.    PLAN:     MEDICATIONS  (STANDING):  celecoxib 200 milliGRAM(s) Oral daily  chlorhexidine 4% Liquid 1 Application(s) Topical <User Schedule>  DAPTOmycin IVPB      DAPTOmycin IVPB 430 milliGRAM(s) IV Intermittent every 24 hours  docusate sodium 100 milliGRAM(s) Oral three times a day  folic acid 1 milliGRAM(s) Oral daily  gabapentin 800 milliGRAM(s) Oral three times a day  iohexol 300 mG (iodine)/mL Oral Solution 30 milliLiter(s) Oral once  lidocaine   Patch 1 Patch Transdermal every 24 hours  lidocaine   Patch 1 Patch Transdermal every 24 hours  linezolid    Tablet 600 milliGRAM(s) Oral every 12 hours  methadone    Tablet 40 milliGRAM(s) Oral daily  morphine  - Injectable 2 milliGRAM(s) IV Push every 8 hours  multivitamin 1 Tablet(s) Oral daily  nicotine -  14 mG/24Hr(s) Patch 1 patch Transdermal daily  nystatin Powder 1 Application(s) Topical three times a day  oxyCODONE  ER Tablet 20 milliGRAM(s) Oral every 12 hours  pantoprazole    Tablet 40 milliGRAM(s) Oral before breakfast  senna 2 Tablet(s) Oral at bedtime    Detox,  ID/ortho/IR drainage care.  May need LIZ to reassess tricuspid valve r/o further decompensation.  Would get another surface echo first.  Have low threshold for transfer to [facility] for tertiary care and likely eventual inpatient rehab/skilled nursing.    Patricio Lawton MD, FACC, FASE, FASNC, FACP  Director, Heart Failure Services  [facility]  , Department of Cardiology  [facility] CHIEF COMPLAINT:  Patient is a 43y old  Female who presents with a chief complaint of AMS (29 Mar 2019 12:25)      HPI:  44 Y/O female w/ PMHx of IV heroin abuse brought in by EMS for lethargy and cough. As per EMS, pt was found laying in bed at home, lethargic however able to follow some commands. EMS reports that pt was attempting to detox from heroin, and last known use was 3 days prior to ED presentation. As per ED, pts boyfriend reports a hx of + pt cough productive of white sputum and a fall down a flight of stairs 2/16. Labs showed serum lactate of 7.2, BUN/Cr 124/6.42, and WBC of of 11.93. Tmax 102.4, urine + for cocaine, opiates, nitrites and many bacteria. ICU called for further evaluation. Treated recently for tricuspid valve endocarditis. Now with concerns bilateral hip effusions as well as right rectus femoris abscess and epidural abscess.      ALLERGIES:  penicillin (Short breath; Hives)      MEDICATIONS  (STANDING):  celecoxib 200 milliGRAM(s) Oral daily  chlorhexidine 4% Liquid 1 Application(s) Topical <User Schedule>  DAPTOmycin IVPB      DAPTOmycin IVPB 430 milliGRAM(s) IV Intermittent every 24 hours  docusate sodium 100 milliGRAM(s) Oral three times a day  folic acid 1 milliGRAM(s) Oral daily  gabapentin 800 milliGRAM(s) Oral three times a day  iohexol 300 mG (iodine)/mL Oral Solution 30 milliLiter(s) Oral once  lidocaine   Patch 1 Patch Transdermal every 24 hours  lidocaine   Patch 1 Patch Transdermal every 24 hours  linezolid    Tablet 600 milliGRAM(s) Oral every 12 hours  methadone    Tablet 40 milliGRAM(s) Oral daily  morphine  - Injectable 2 milliGRAM(s) IV Push every 8 hours  multivitamin 1 Tablet(s) Oral daily  nicotine -  14 mG/24Hr(s) Patch 1 patch Transdermal daily  nystatin Powder 1 Application(s) Topical three times a day  oxyCODONE  ER Tablet 20 milliGRAM(s) Oral every 12 hours  pantoprazole    Tablet 40 milliGRAM(s) Oral before breakfast  senna 2 Tablet(s) Oral at bedtime    MEDICATIONS  (PRN):  acetaminophen   Tablet .. 650 milliGRAM(s) Oral every 6 hours PRN Mild Pain (1 - 3)  acetaminophen   Tablet .. 650 milliGRAM(s) Oral every 6 hours PRN Temp greater or equal to 38C (100.4F)  oxyCODONE    IR 20 milliGRAM(s) Oral every 4 hours PRN Moderate Pain (4 - 6)  polyethylene glycol 3350 17 Gram(s) Oral daily PRN Constipation    PAST MEDICAL & SURGICAL HISTORY:  ETOH abuse  Drug abuse  C Section      FAMILY HISTORY:  NA    SOCIAL HISTORY:  Heroin abuse noted.    REVIEW OF SYSTEMS:  General:  No wt loss, fevers, chills, night sweats  Eyes:  Good vision, no reported pain  ENT:  No sore throat, pain, runny nose, dysphagia  CV:  No pain, palpitations, hypo/hypertension  Resp:  No dyspnea, cough, tachypnea, wheezing  GI:  No pain, nausea, vomiting, diarrhea, constipation  :  No pain, bleeding, incontinence, nocturia  Muscle:  No pain, weakness  Breast:  No pain, abscess, mass, discharge  Neuro:  No weakness, tingling, memory problems  Psych:  No fatigue, insomnia, mood problems, depression  Endocrine:  No polyuria, polydipsia, cold/heat intolerance  Heme:  No petechiae, ecchymosis, easy bruisability  Skin:  No rash, edema    PHYSICAL EXAM:  Vital Signs:  Vital Signs Last 24 Hrs  T(C): 37.6 (29 Mar 2019 12:11), Max: 37.6 (29 Mar 2019 06:03)  T(F): 99.6 (29 Mar 2019 12:11), Max: 99.6 (29 Mar 2019 06:03)  HR: 94 (29 Mar 2019 12:11) (82 - 105)  BP: 111/62 (29 Mar 2019 12:11) (103/48 - 120/50)  RR: 18 (29 Mar 2019 12:11) (17 - 18)  SpO2: 99% (29 Mar 2019 12:11) (96% - 99%)  I&O's Summary      General:  Appears stated age, well-groomed, well-nourished, no distress  HEENT:  NC/AT, patent nares w/ pink mucosa, OP clear w/o lesions, PERRL, EOMI, conjunctivae clear, no thyromegaly, nodules, adenopathy, no JVD  Chest:  Full & symmetric excursion, no increased effort, breath sounds clear  Cardiovascular:  Regular rhythm, S1, S2, no murmur/rub/S3/S4, no carotid/femoral/abdominal bruit, radial/pedal pulses 2+, no edema  Abdomen:  Soft, non-tender, non-distended, normoactive bowel sounds, no HSM  Extremities:  Gait & station:   Digits:   Nails:   Joints, Bones, Muscles:   ROM:   Stability:  Skin:  No rash/erythema/ecchymoses/petechiae/wounds/abscess/warm/dry  Musculoskeletal:  Full ROM in all joints w/o swelling/tenderness/effusion  Neuro/Psych:  Alert, oriented, normal and symmetric strength in UEs, LEs, normal gait, sensation intact, affect:   mood:   appearance:       LABORATORY:                          9.4    9.15  )-----------( 417      ( 29 Mar 2019 06:39 )             30.6     03-29    137  |  100  |  25<H>  ----------------------------<  130<H>  4.1   |  28  |  0.54    Ca    8.9      29 Mar 2019 06:39      PT/INR - ( 29 Mar 2019 09:55 )   PT: 12.7 sec;   INR: 1.13 ratio         IMAGING:  ecg: < from: 12 Lead ECG (03.22.19 @ 11:05) >  Diagnosis Line Normal sinus rhythm  Normal ECG  When compared with ECG of 20-MAR-2019 00:45,  No significant change was found  Confirmed by Baldev TERRAZAS, Rashaun ([id number]    < end of copied text >        < from: TTE Limited Echo w/o Cont (03.22.19 @ 13:27) >  Summary:   1. Trace tricuspid regurgitation.   2. Peduncululated mobile mass seen adherent to lateral leaflet of the   tricuspid valve.   3. No interval changes noted in valve structure or regurgitation.    < end of copied text >    ASSESSMENT:  44 Y/O female w/ PMHx of IV heroin abuse brought in by EMS for lethargy and cough. As per EMS, pt was found laying in bed at home, lethargic however able to follow some commands. EMS reports that pt was attempting to detox from heroin, and last known use was 3 days prior to ED presentation. As per ED, pts boyfriend reports a hx of + pt cough productive of white sputum and a fall down a flight of stairs 2/16. Labs showed serum lactate of 7.2, BUN/Cr 124/6.42, and WBC of of 11.93. Tmax 102.4, urine + for cocaine, opiates, nitrites and many bacteria. ICU called for further evaluation. Treated recently for tricuspid valve endocarditis. Now with concerns of osteo/septic emboli.    PLAN:     MEDICATIONS  (STANDING):  celecoxib 200 milliGRAM(s) Oral daily  chlorhexidine 4% Liquid 1 Application(s) Topical <User Schedule>  DAPTOmycin IVPB      DAPTOmycin IVPB 430 milliGRAM(s) IV Intermittent every 24 hours  docusate sodium 100 milliGRAM(s) Oral three times a day  folic acid 1 milliGRAM(s) Oral daily  gabapentin 800 milliGRAM(s) Oral three times a day  iohexol 300 mG (iodine)/mL Oral Solution 30 milliLiter(s) Oral once  lidocaine   Patch 1 Patch Transdermal every 24 hours  lidocaine   Patch 1 Patch Transdermal every 24 hours  linezolid    Tablet 600 milliGRAM(s) Oral every 12 hours  methadone    Tablet 40 milliGRAM(s) Oral daily  morphine  - Injectable 2 milliGRAM(s) IV Push every 8 hours  multivitamin 1 Tablet(s) Oral daily  nicotine -  14 mG/24Hr(s) Patch 1 patch Transdermal daily  nystatin Powder 1 Application(s) Topical three times a day  oxyCODONE  ER Tablet 20 milliGRAM(s) Oral every 12 hours  pantoprazole    Tablet 40 milliGRAM(s) Oral before breakfast  senna 2 Tablet(s) Oral at bedtime    Detox,  ID/ortho/IR drainage care.  May need LIZ to reassess tricuspid valve r/o further decompensation.  Would get another surface echo first.  Have low threshold for transfer to [facility] for tertiary care and likely eventual inpatient rehab/skilled nursing.    Patricio Lawton MD, FACC, FASE, FASNC, FACP  Director, Heart Failure Services  [facility]  , Department of Cardiology  [facility]

## 2019-03-29 NOTE — CHART NOTE - NSCHARTNOTEFT_GEN_A_CORE
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 Leslie who is aware and agrees with above plan.  Will continue to follow

## 2019-03-29 NOTE — PROGRESS NOTE ADULT - SUBJECTIVE AND OBJECTIVE BOX
Pt seen and examined at bedside. Reports pain is controlled this am. Reports of continued discomfort in left hip and knee, able to bear weight and ambulate. Denies CP/Dyspnea/Calf tenderness bilaterally.     Vital Signs Last 24 Hrs  T(C): 37.2 (29 Mar 2019 00:30), Max: 37.2 (29 Mar 2019 00:30)  T(F): 98.9 (29 Mar 2019 00:30), Max: 98.9 (29 Mar 2019 00:30)  HR: 95 (29 Mar 2019 00:30) (82 - 95)  BP: 120/50 (29 Mar 2019 00:30) (103/48 - 126/68)  BP(mean): --  RR: 17 (29 Mar 2019 00:30) (16 - 17)  SpO2: 96% (29 Mar 2019 00:30) (96% - 98%)    Gen: NAD    Spine PE:  Skin intact  Aspen Collar In Place  dressing c/d/i  Negative clonus  Negative babinski  Negative olmstead           Deltoid        Bicep        Tricep          Wrist Ext    Wrist Flex    Finger Flex          Finger Abd  L            4/5         4/5           4/5             4/5                4/5	           4/5                    4/5  R            2/5 	   3/5           4/5             3/5              4/5                        4/5                   4/5                                      Hip Flex       Quad     Ankle DF        Ankle PF       Toe Ext        Hamstring    L            5/5              5/5          5/5                 5/5                 5/5	                5/5  R            5/5              5/5           5/5                 5/5                 5/5                5/5    Sensory:            C5         C6         C7      C8       T1        (0=absent, 1=impaired, 2=normal, NT=not testable)  L         2            2           2        2         2  R          2            2           2        2         2               L2          L3         L4      L5       S1         (0=absent, 1=impaired, 2=normal, NT=not testable)  L         2            2            2        2        2  R          2            2           2        2         2    Paresthesias in stocking/glove distribution at baseline, states feet are improving Pt seen and examined at bedside. Reports pain is controlled this am. Reports of continued discomfort in bilateral hips and knees (L>R), able to bear weight and ambulate. Denies CP/Dyspnea/Calf tenderness bilaterally.     Vital Signs Last 24 Hrs  T(C): 37.2 (29 Mar 2019 00:30), Max: 37.2 (29 Mar 2019 00:30)  T(F): 98.9 (29 Mar 2019 00:30), Max: 98.9 (29 Mar 2019 00:30)  HR: 95 (29 Mar 2019 00:30) (82 - 95)  BP: 120/50 (29 Mar 2019 00:30) (103/48 - 126/68)  BP(mean): --  RR: 17 (29 Mar 2019 00:30) (16 - 17)  SpO2: 96% (29 Mar 2019 00:30) (96% - 98%)    Gen: NAD    Spine PE:  Skin intact  Aspen Collar In Place  dressing c/d/i  Negative clonus  Negative babinski  Negative olmstead           Deltoid        Bicep        Tricep          Wrist Ext    Wrist Flex    Finger Flex          Finger Abd  L            4/5         4/5           4/5             4/5                4/5	           4/5                    4/5  R            2/5 	   3/5           4/5             3/5              4/5                        4/5                   4/5                                      Hip Flex       Quad     Ankle DF        Ankle PF       Toe Ext        Hamstring    L            5/5              5/5          5/5                 5/5                 5/5	                5/5  R            5/5              5/5           5/5                 5/5                 5/5                5/5    Sensory:            C5         C6         C7      C8       T1        (0=absent, 1=impaired, 2=normal, NT=not testable)  L         2            2           2        2         2  R          2            2           2        2         2               L2          L3         L4      L5       S1         (0=absent, 1=impaired, 2=normal, NT=not testable)  L         2            2            2        2        2  R          2            2           2        2         2    Paresthesias in stocking/glove distribution at baseline, states feet are improving Pt seen and examined at bedside. Reports pain is controlled this am. Reports of continued discomfort in bilateral hips and knees (L>R), able to bear weight and ambulate. Denies CP/Dyspnea/Calf tenderness bilaterally. MRI of bilateral hips and knees were done demonstrating effusions and an abscess located in the right rectus femoris. Bilateral knees were aspirated yesterday by orthopedic team. Cell count for left knee at 14k and cell count for right knee at 2700.       Vital Signs Last 24 Hrs  T(C): 37.2 (29 Mar 2019 00:30), Max: 37.2 (29 Mar 2019 00:30)  T(F): 98.9 (29 Mar 2019 00:30), Max: 98.9 (29 Mar 2019 00:30)  HR: 95 (29 Mar 2019 00:30) (82 - 95)  BP: 120/50 (29 Mar 2019 00:30) (103/48 - 126/68)  BP(mean): --  RR: 17 (29 Mar 2019 00:30) (16 - 17)  SpO2: 96% (29 Mar 2019 00:30) (96% - 98%)    Gen: NAD    Spine PE:  Skin intact  Aspen Collar In Place  dressing c/d/i  Negative clonus  Negative babinski  Negative olmstead           Deltoid        Bicep        Tricep          Wrist Ext    Wrist Flex    Finger Flex          Finger Abd  L            4/5         4/5           4/5             4/5                4/5	           4/5                    4/5  R            2/5 	   3/5           4/5             3/5              4/5                        4/5                   4/5                                      Hip Flex       Quad     Ankle DF        Ankle PF       Toe Ext        Hamstring    L            5/5              5/5          5/5                 5/5                 5/5	                5/5  R            5/5              5/5           5/5                 5/5                 5/5                5/5    Sensory:            C5         C6         C7      C8       T1        (0=absent, 1=impaired, 2=normal, NT=not testable)  L         2            2           2        2         2  R          2            2           2        2         2               L2          L3         L4      L5       S1         (0=absent, 1=impaired, 2=normal, NT=not testable)  L         2            2            2        2        2  R          2            2           2        2         2    Paresthesias in stocking/glove distribution at baseline, states feet are improving

## 2019-03-29 NOTE — PROGRESS NOTE ADULT - PROBLEM SELECTOR PLAN 1
s/p C4-5 ACDF with irrigation and debridement   OR c/s MRSA   cont Dapto (will require 6 total weeks ) s/p C4-5 ACDF with irrigation and debridement   OR c/s MRSA   cont Dapto (will require 8  total weeks )

## 2019-03-29 NOTE — PROGRESS NOTE ADULT - ASSESSMENT
A/P: 43y Female s/p C4-5 ACDF with irrigation and debridement, now POD 9    FU MR Left HIp and Left Knee ordered, patient reports discomfort in left knee and hip when examined with Dr. Leslie at bedside yesterday. Will FU to r/out pathology in Left hip and knee.   MRI T/L spine repeated, no change from prior MRI, stable as of now.   Pain control   please keep MAPs >80  OR Cx's growing MRSA, ABX per ID  Midline in Since Feb 20th (>30d) - Pt would benefit from new Midline placement to avoid further bacteremia/infection  LIZ demonstrates no change in vegetation  No plan for return to OR at this time, will continue to monitor for improvement or RUE weakness which has been present since before surgery.    continue in aspen collar  Dry dressing changes as needed.   Pain control  DVT ppx- SCDs  PT/OT, WBAT  FU labs  will d/w attending and advise if plan changes  Dispo planning A/P: 43y Female s/p C4-5 ACDF with irrigation and debridement, now POD 9    FU cultures and gram stain for bilateral knee aspirations done 3/28  based on lab results and physical exam, low suspicion of septic arthritis  currently recommending IR aspiration of bilateral hip effusions as well as right rectus femoris abscess  currently made NPO as patient may need wash out of bilateral hip and knee joints   MRI T/L spine repeated, no change from prior MRI, stable as of now.   Pain control   please keep MAPs >80  OR Cx's growing MRSA, ABX per ID  Midline in Since Feb 20th (>30d) - Pt would benefit from new Midline placement to avoid further bacteremia/infection  LIZ demonstrates no change in vegetation  No plan for return to OR for epidural abscesses at this time, will continue to monitor for improvement or RUE weakness which has been present since before surgery.    continue in aspen collar  Dry dressing changes as needed.   Pain control  DVT ppx- SCDs  PT/OT, WBAT  FU labs  will d/w attending and advise if plan changes

## 2019-03-29 NOTE — PROGRESS NOTE ADULT - SUBJECTIVE AND OBJECTIVE BOX
Patient is a 43y old  Female who presents with a chief complaint of AMS (29 Mar 2019 05:43)      OVERNIGHT EVENTS: acute BL hip/knees, generalized body pain    REVIEW OF SYSTEMS: denies chest pain/SOB, diaphoresis, no F/C, cough, dizziness, headache, blurry vision, nausea, vomiting, abdominal pain. All others review of systems negative     MEDICATIONS  (STANDING):  celecoxib 200 milliGRAM(s) Oral daily  chlorhexidine 4% Liquid 1 Application(s) Topical <User Schedule>  DAPTOmycin IVPB      DAPTOmycin IVPB 430 milliGRAM(s) IV Intermittent every 24 hours  docusate sodium 100 milliGRAM(s) Oral three times a day  folic acid 1 milliGRAM(s) Oral daily  gabapentin 800 milliGRAM(s) Oral three times a day  iohexol 300 mG (iodine)/mL Oral Solution 30 milliLiter(s) Oral once  lidocaine   Patch 1 Patch Transdermal every 24 hours  lidocaine   Patch 1 Patch Transdermal every 24 hours  linezolid    Tablet 600 milliGRAM(s) Oral every 12 hours  methadone    Tablet 40 milliGRAM(s) Oral daily  morphine  - Injectable 2 milliGRAM(s) IV Push every 8 hours  multivitamin 1 Tablet(s) Oral daily  nicotine -  14 mG/24Hr(s) Patch 1 patch Transdermal daily  nystatin Powder 1 Application(s) Topical three times a day  oxyCODONE  ER Tablet 20 milliGRAM(s) Oral every 12 hours  pantoprazole    Tablet 40 milliGRAM(s) Oral before breakfast  senna 2 Tablet(s) Oral at bedtime    MEDICATIONS  (PRN):  acetaminophen   Tablet .. 650 milliGRAM(s) Oral every 6 hours PRN Mild Pain (1 - 3)  acetaminophen   Tablet .. 650 milliGRAM(s) Oral every 6 hours PRN Temp greater or equal to 38C (100.4F)  oxyCODONE    IR 20 milliGRAM(s) Oral every 4 hours PRN Moderate Pain (4 - 6)  polyethylene glycol 3350 17 Gram(s) Oral daily PRN Constipation      Allergies    penicillin (Short breath; Hives)    Intolerances        T(F): 99.6 (03-29-19 @ 12:11), Max: 99.6 (03-29-19 @ 06:03)  HR: 94 (03-29-19 @ 12:11) (82 - 105)  BP: 111/62 (03-29-19 @ 12:11) (103/48 - 120/50)  RR: 18 (03-29-19 @ 12:11) (17 - 18)  SpO2: 99% (03-29-19 @ 12:11) (96% - 99%)  Wt(kg): --    PHYSICAL EXAM:  GENERAL: mild distress due to pain, well-groomed, well-developed  HEAD:  Atraumatic, Normocephalic  EYES: EOMI, PERRLA, conjunctiva and sclera clear  ENMT: No tonsillar erythema, exudates, or enlargement; Moist mucous membranes, Good dentition, No lesions  NECK: Supple, No JVD, Normal thyroid  NERVOUS SYSTEM:  Alert & Oriented X3, Good concentration; Motor Strength 4/5 B/L upper and lower extremities;  CHEST/LUNG: Clear to percussion bilaterally; No rales, rhonchi, wheezing, or rubs BL  HEART: Regular rate and rhythm; No murmurs, rubs, or gallops  ABDOMEN: Soft, Nontender, Nondistended; Bowel sounds present  BACK; Spinal tenderness  EXTREMITIES:  2+ Peripheral Pulses, No clubbing, cyanosis, or edema BL LE, BL Hip/knees TTP, R knee swollen  LYMPH: No lymphadenopathy noted  SKIN: No rashes or lesions      LABS:                        9.4    9.15  )-----------( 417      ( 29 Mar 2019 06:39 )             30.6     03-29    137  |  100  |  25<H>  ----------------------------<  130<H>  4.1   |  28  |  0.54    Ca    8.9      29 Mar 2019 06:39      PT/INR - ( 29 Mar 2019 09:55 )   PT: 12.7 sec;   INR: 1.13 ratio             Cultures; Culture - Tissue with Gram Stain (collected 21 Mar 2019 09:42)  Source: .Tissue cervical disc #2 culture  Gram Stain (prelim) (22 Mar 2019 11:20):    Moderate polymorphonuclear leukocytes per low power field    Rare Gram positive cocci in pairs per oil power field  Preliminary Report (24 Mar 2019 12:21):    Few Methicillin resistant Staphylococcus aureus  Organism: Methicillin resistant Staphylococcus aureus (24 Mar 2019 12:21)  Organism: Methicillin resistant Staphylococcus aureus (24 Mar 2019 12:21)    Culture - Tissue with Gram Stain (collected 21 Mar 2019 09:38)  Source: .Tissue c4-c5 disc culture c/s ewb  Gram Stain (prelim) (22 Mar 2019 11:33):    Moderate polymorphonuclear leukocytes per low power field    Rare Gram positive cocci in pairs per oil power field  Preliminary Report (23 Mar 2019 08:21):    Few Methicillin resistant Staphylococcus aureus  Organism: Methicillin resistant Staphylococcus aureus (23 Mar 2019 08:20)  Organism: Methicillin resistant Staphylococcus aureus (23 Mar 2019 08:20)    Culture - Blood (collected 21 Mar 2019 09:33)  Source: .Blood  Preliminary Report (22 Mar 2019 10:01):    No growth to date.    Culture - Surgical Swab (collected 21 Mar 2019 09:31)  Source: .Surgical Swab cervical disc #1 culture c/s  Preliminary Report (23 Mar 2019 08:27):    Numerous Methicillin resistant Staphylococcus aureus  Organism: Methicillin resistant Staphylococcus aureus (23 Mar 2019 08:26)  Organism: Methicillin resistant Staphylococcus aureus (23 Mar 2019 08:26)    Culture - Surgical Swab (collected 21 Mar 2019 09:26)  Source: .Surgical Swab prevertebral culture c/s  Preliminary Report (22 Mar 2019 10:48):    No growth    Culture - Body Fluid with Gram Stain (collected 21 Mar 2019 04:12)  Source: .Body Fluid LEFT THORACENTESIS  Gram Stain (prelim) (22 Mar 2019 09:06):    polymorphonuclear leukocytes seen    No organisms seen    by cytocentrifuge  Preliminary Report (22 Mar 2019 09:06):    No growth      RADIOLOGY & ADDITIONAL TESTS:    Imaging Personally Reviewed:  [ x] YES    < from: Xray Chest 1 View- PORTABLE-Urgent (03.21.19 @ 02:02) >  Stable small right pleural effusion  Stable left basilar atelectasis.  Mild worsening of right basilar opacity.  < from: CT Chest No Cont (03.01.19 @ 11:32) >  Bilateral cavitary lesions are increased in size and number compared to   2/17/2019.     Small bilateral pleural effusions, loculated on the right, increased   compared to the prior study. Unchanged right lower lobe dependent  consolidation.    Prominent mediastinal and axillary lymph nodes, possibly reactive.      Consultant(s) Notes Reviewed:  [x ] YES     Care Discussed with [ x] Consultants [X ] Patient [ ] Family  [x ]    [x ]  Other; RN

## 2019-03-29 NOTE — PROGRESS NOTE ADULT - ASSESSMENT
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 should be left sided infectious endocarditis   will need LIZ   seen and discussed with family by bedside   blood culture pos from 2/10 to 2/27 noted  linezolid added 3/22

## 2019-03-29 NOTE — PROGRESS NOTE ADULT - SUBJECTIVE AND OBJECTIVE BOX
HPI:  42 Y/O female w/ PMHx of IV heroin abuse brought in by EMS for lethargy and cough. As per EMS, pt was found laying in bed at home, lethargic however able to follow some commands. EMS reports that pt was attempting to detox from heroin, and last known use was 3 days prior to ED presentation. As per ED, pts boyfriend reports a hx of + pt cough productive of white sputum and a fall down a flight of stairs 2/16. Labs showed serum lactate of 7.2, BUN/Cr 124/6.42, and WBC of of 11.93. Tmax 102.4, urine + for cocaine, opiates, nitrites and many bacteria. ICU called for further evaluation. (17 Feb 2019 22:56)      Allergies    penicillin (Short breath; Hives)    Intolerances        MEDICATIONS  (STANDING):  celecoxib 200 milliGRAM(s) Oral daily  chlorhexidine 4% Liquid 1 Application(s) Topical <User Schedule>  DAPTOmycin IVPB      DAPTOmycin IVPB 430 milliGRAM(s) IV Intermittent every 24 hours  docusate sodium 100 milliGRAM(s) Oral three times a day  folic acid 1 milliGRAM(s) Oral daily  gabapentin 800 milliGRAM(s) Oral three times a day  iohexol 300 mG (iodine)/mL Oral Solution 30 milliLiter(s) Oral once  lidocaine   Patch 1 Patch Transdermal every 24 hours  lidocaine   Patch 1 Patch Transdermal every 24 hours  linezolid    Tablet 600 milliGRAM(s) Oral every 12 hours  methadone    Tablet 40 milliGRAM(s) Oral daily  morphine  - Injectable 2 milliGRAM(s) IV Push every 8 hours  multivitamin 1 Tablet(s) Oral daily  nicotine -  14 mG/24Hr(s) Patch 1 patch Transdermal daily  nystatin Powder 1 Application(s) Topical three times a day  oxyCODONE  ER Tablet 20 milliGRAM(s) Oral every 12 hours  pantoprazole    Tablet 40 milliGRAM(s) Oral before breakfast  senna 2 Tablet(s) Oral at bedtime    MEDICATIONS  (PRN):  acetaminophen   Tablet .. 650 milliGRAM(s) Oral every 6 hours PRN Mild Pain (1 - 3)  acetaminophen   Tablet .. 650 milliGRAM(s) Oral every 6 hours PRN Temp greater or equal to 38C (100.4F)  oxyCODONE    IR 20 milliGRAM(s) Oral every 4 hours PRN Moderate Pain (4 - 6)  polyethylene glycol 3350 17 Gram(s) Oral daily PRN Constipation      REVIEW OF SYSTEMS:    CONSTITUTIONAL: No fever, chills, weight loss, or fatigue  HEENT: No sore throat, runny nose, ear ache  RESPIRATORY: No cough, wheezing, No shortness of breath  CARDIOVASCULAR: No chest pain, palpitations, dizziness  GASTROINTESTINAL: No abdominal pain. No nausea, vomiting, diarrhea  GENITOURINARY: No dysuria, increase frequency, hematuria, or incontinence  NEUROLOGICAL: No headaches, memory loss, loss of strength, numbness, or tremors, no weakness  EXTREMITY: No pedal edema BLE  SKIN: No itching, burning, rashes, or lesions     VITAL SIGNS:  T(C): 36.9 (03-29-19 @ 17:01), Max: 37.6 (03-29-19 @ 06:03)  T(F): 98.5 (03-29-19 @ 17:01), Max: 99.6 (03-29-19 @ 06:03)  HR: 99 (03-29-19 @ 17:01) (94 - 105)  BP: 116/58 (03-29-19 @ 17:01) (105/57 - 120/50)  RR: 18 (03-29-19 @ 17:01) (17 - 18)  SpO2: 96% (03-29-19 @ 17:01) (96% - 99%)  Wt(kg): --    PHYSICAL EXAM:    GENERAL: not in any distress  HEENT: Neck is supple, normocephalic, atraumatic   CHEST/LUNG: Clear to auscultation bilaterally; No rales, rhonchi, wheezing  HEART: Regular rate and rhythm; No murmurs, rubs, or gallops  ABDOMEN: Soft, Nontender, Nondistended; Bowel sounds present, no rebound   EXTREMITIES:  2+ Peripheral Pulses, No clubbing, cyanosis, or edema  GENITOURINARY:   SKIN: No rashes or lesions  BACK: no pressor sore   NERVOUS SYSTEM:  Alert & Oriented X3, Good concentration  PSYCH: normal affect     LABS:                         9.4    9.15  )-----------( 417      ( 29 Mar 2019 06:39 )             30.6     03-29    137  |  100  |  25<H>  ----------------------------<  130<H>  4.1   |  28  |  0.54    Ca    8.9      29 Mar 2019 06:39        PT/INR - ( 29 Mar 2019 09:55 )   PT: 12.7 sec;   INR: 1.13 ratio                                 Culture Results:   No growth to date. (03-28 @ 21:03)  Culture Results:   No growth to date. (03-28 @ 21:02)                Radiology: HPI:  44 Y/O female w/ PMHx of IV heroin abuse brought in by EMS for lethargy and cough. As per EMS, pt was found laying in bed at home, lethargic however able to follow some commands. EMS reports that pt was attempting to detox from heroin, and last known use was 3 days prior to ED presentation. As per ED, pts boyfriend reports a hx of + pt cough productive of white sputum and a fall down a flight of stairs 2/16. Labs showed serum lactate of 7.2, BUN/Cr 124/6.42, and WBC of of 11.93. Tmax 102.4, urine + for cocaine, opiates, nitrites and many bacteria.   stabilised did well ;main issue is methicillin resitant staph aureus in blood       Allergies    penicillin (Short breath; Hives)    Intolerances        MEDICATIONS  (STANDING):  celecoxib 200 milliGRAM(s) Oral daily  chlorhexidine 4% Liquid 1 Application(s) Topical <User Schedule>  DAPTOmycin IVPB      DAPTOmycin IVPB 430 milliGRAM(s) IV Intermittent every 24 hours  docusate sodium 100 milliGRAM(s) Oral three times a day  folic acid 1 milliGRAM(s) Oral daily  gabapentin 800 milliGRAM(s) Oral three times a day  iohexol 300 mG (iodine)/mL Oral Solution 30 milliLiter(s) Oral once  lidocaine   Patch 1 Patch Transdermal every 24 hours  lidocaine   Patch 1 Patch Transdermal every 24 hours  linezolid    Tablet 600 milliGRAM(s) Oral every 12 hours  methadone    Tablet 40 milliGRAM(s) Oral daily  morphine  - Injectable 2 milliGRAM(s) IV Push every 8 hours  multivitamin 1 Tablet(s) Oral daily  nicotine -  14 mG/24Hr(s) Patch 1 patch Transdermal daily  nystatin Powder 1 Application(s) Topical three times a day  oxyCODONE  ER Tablet 20 milliGRAM(s) Oral every 12 hours  pantoprazole    Tablet 40 milliGRAM(s) Oral before breakfast  senna 2 Tablet(s) Oral at bedtime    MEDICATIONS  (PRN):  acetaminophen   Tablet .. 650 milliGRAM(s) Oral every 6 hours PRN Mild Pain (1 - 3)  acetaminophen   Tablet .. 650 milliGRAM(s) Oral every 6 hours PRN Temp greater or equal to 38C (100.4F)  oxyCODONE    IR 20 milliGRAM(s) Oral every 4 hours PRN Moderate Pain (4 - 6)  polyethylene glycol 3350 17 Gram(s) Oral daily PRN Constipation      REVIEW OF SYSTEMS:    CONSTITUTIONAL: No fever, chills, weight loss, or fatigue  HEENT: No sore throat, runny nose, ear ache  RESPIRATORY: No cough, wheezing, No shortness of breath  CARDIOVASCULAR: No chest pain, palpitations, dizziness  GASTROINTESTINAL: No abdominal pain. No nausea, vomiting, diarrhea  GENITOURINARY: No dysuria, increase frequency, hematuria, or incontinence  NEUROLOGICAL: No headaches, memory loss, loss of strength, numbness, or tremors, no weakness  EXTREMITY: No pedal edema BLE  SKIN: No itching, burning, rashes, or lesions     VITAL SIGNS:  T(C): 36.9 (03-29-19 @ 17:01), Max: 37.6 (03-29-19 @ 06:03)  T(F): 98.5 (03-29-19 @ 17:01), Max: 99.6 (03-29-19 @ 06:03)  HR: 99 (03-29-19 @ 17:01) (94 - 105)  BP: 116/58 (03-29-19 @ 17:01) (105/57 - 120/50)  RR: 18 (03-29-19 @ 17:01) (17 - 18)  SpO2: 96% (03-29-19 @ 17:01) (96% - 99%)  Wt(kg): --    PHYSICAL EXAM:    GENERAL: not in any distress  HEENT: Neck is supple, normocephalic, atraumatic   CHEST/LUNG: Clear to auscultation bilaterally; No rales, rhonchi, wheezing  HEART: Regular rate and rhythm;   plus  murmurs  no rubs gallop  ABDOMEN: Soft, Nontender, Nondistended; Bowel sounds present, no rebound   EXTREMITIES:  2+ Peripheral Pulses, No clubbing, cyanosis, or edema  SKIN: No rashes or lesions  BACK: no pressor sore   NERVOUS SYSTEM:  Alert & Oriented X3                      9.4    9.15  )-----------( 417      ( 29 Mar 2019 06:39 )             30.6     03-29    137  |  100  |  25<H>  ----------------------------<  130<H>  4.1   |  28  |  0.54    Ca    8.9      29 Mar 2019 06:39        PT/INR - ( 29 Mar 2019 09:55 )   PT: 12.7 sec;   INR: 1.13 ratio                                 Culture Results:   No growth to date. (03-28 @ 21:03)  Culture Results:   No growth to date. (03-28 @ 21:02)                Radiology:    < from: TTE Limited Echo w/o Cont (03.22.19 @ 13:27) >  Summary:   1. Trace tricuspid regurgitation.   2. Peduncululated mobile mass seen adherent to lateral leaflet of the   tricuspid valve.   3. No interval changes noted in valve structure or regurgitation.    [id number] Rashaun De Paz MD, FACC  Electronically signed on 3/23/2019 at 12:25:38 PM         *** Final ***                < end of copied text >    < from: CT Chest No Cont (03.01.19 @ 11:32) >  MPRESSION:     Bilateral cavitary lesions are increased in size and number compared to   2/17/2019.     Small bilateral pleural effusions, loculated on the right, increased   compared to the prior study. Unchanged right lower lobe dependent  consolidation.    Prominent mediastinal and axillary lymph nodes, possibly reactive.                    SKYLER REECE M.D., ATTENDING RADIOLOGIST  This document has been electronically signed. Mar  1 2019 12:06PM                < end of copied text >

## 2019-03-30 LAB
ANION GAP SERPL CALC-SCNC: 7 MMOL/L — SIGNIFICANT CHANGE UP (ref 5–17)
BUN SERPL-MCNC: 18 MG/DL — SIGNIFICANT CHANGE UP (ref 7–23)
CALCIUM SERPL-MCNC: 9.3 MG/DL — SIGNIFICANT CHANGE UP (ref 8.5–10.1)
CHLORIDE SERPL-SCNC: 102 MMOL/L — SIGNIFICANT CHANGE UP (ref 96–108)
CO2 SERPL-SCNC: 28 MMOL/L — SIGNIFICANT CHANGE UP (ref 22–31)
CREAT SERPL-MCNC: 0.37 MG/DL — LOW (ref 0.5–1.3)
GLUCOSE SERPL-MCNC: 112 MG/DL — HIGH (ref 70–99)
HCG SERPL-ACNC: <1 MIU/ML — SIGNIFICANT CHANGE UP
HCT VFR BLD CALC: 26.5 % — LOW (ref 34.5–45)
HGB BLD-MCNC: 8 G/DL — LOW (ref 11.5–15.5)
MCHC RBC-ENTMCNC: 25.6 PG — LOW (ref 27–34)
MCHC RBC-ENTMCNC: 30.2 GM/DL — LOW (ref 32–36)
MCV RBC AUTO: 84.9 FL — SIGNIFICANT CHANGE UP (ref 80–100)
NRBC # BLD: 0 /100 WBCS — SIGNIFICANT CHANGE UP (ref 0–0)
PLATELET # BLD AUTO: 346 K/UL — SIGNIFICANT CHANGE UP (ref 150–400)
POTASSIUM SERPL-MCNC: 4.2 MMOL/L — SIGNIFICANT CHANGE UP (ref 3.5–5.3)
POTASSIUM SERPL-SCNC: 4.2 MMOL/L — SIGNIFICANT CHANGE UP (ref 3.5–5.3)
RBC # BLD: 3.12 M/UL — LOW (ref 3.8–5.2)
RBC # FLD: 18.9 % — HIGH (ref 10.3–14.5)
SODIUM SERPL-SCNC: 137 MMOL/L — SIGNIFICANT CHANGE UP (ref 135–145)
SYNOVIAL CRYSTALS CLARITY: ABNORMAL
SYNOVIAL CRYSTALS CLARITY: ABNORMAL
SYNOVIAL CRYSTALS COLOR: YELLOW
SYNOVIAL CRYSTALS COLOR: YELLOW
SYNOVIAL CRYSTALS ID: ABNORMAL
SYNOVIAL CRYSTALS ID: ABNORMAL
SYNOVIAL CRYSTALS TUBE: SIGNIFICANT CHANGE UP
SYNOVIAL CRYSTALS TUBE: SIGNIFICANT CHANGE UP
WBC # BLD: 6.77 K/UL — SIGNIFICANT CHANGE UP (ref 3.8–10.5)
WBC # FLD AUTO: 6.77 K/UL — SIGNIFICANT CHANGE UP (ref 3.8–10.5)

## 2019-03-30 PROCEDURE — 99232 SBSQ HOSP IP/OBS MODERATE 35: CPT

## 2019-03-30 PROCEDURE — 99233 SBSQ HOSP IP/OBS HIGH 50: CPT

## 2019-03-30 RX ORDER — OXYCODONE HYDROCHLORIDE 5 MG/1
40 TABLET ORAL EVERY 12 HOURS
Qty: 0 | Refills: 0 | Status: DISCONTINUED | OUTPATIENT
Start: 2019-03-30 | End: 2019-04-06

## 2019-03-30 RX ORDER — MORPHINE SULFATE 50 MG/1
2 CAPSULE, EXTENDED RELEASE ORAL EVERY 12 HOURS
Qty: 0 | Refills: 0 | Status: DISCONTINUED | OUTPATIENT
Start: 2019-03-30 | End: 2019-03-31

## 2019-03-30 RX ADMIN — SENNA PLUS 2 TABLET(S): 8.6 TABLET ORAL at 22:53

## 2019-03-30 RX ADMIN — OXYCODONE HYDROCHLORIDE 20 MILLIGRAM(S): 5 TABLET ORAL at 23:59

## 2019-03-30 RX ADMIN — NYSTATIN CREAM 1 APPLICATION(S): 100000 CREAM TOPICAL at 14:52

## 2019-03-30 RX ADMIN — CELECOXIB 200 MILLIGRAM(S): 200 CAPSULE ORAL at 13:13

## 2019-03-30 RX ADMIN — MORPHINE SULFATE 2 MILLIGRAM(S): 50 CAPSULE, EXTENDED RELEASE ORAL at 20:30

## 2019-03-30 RX ADMIN — GABAPENTIN 800 MILLIGRAM(S): 400 CAPSULE ORAL at 06:31

## 2019-03-30 RX ADMIN — OXYCODONE HYDROCHLORIDE 20 MILLIGRAM(S): 5 TABLET ORAL at 00:04

## 2019-03-30 RX ADMIN — LIDOCAINE 1 PATCH: 4 CREAM TOPICAL at 22:59

## 2019-03-30 RX ADMIN — METHADONE HYDROCHLORIDE 40 MILLIGRAM(S): 40 TABLET ORAL at 12:13

## 2019-03-30 RX ADMIN — CELECOXIB 200 MILLIGRAM(S): 200 CAPSULE ORAL at 12:14

## 2019-03-30 RX ADMIN — Medication 100 MILLIGRAM(S): at 14:52

## 2019-03-30 RX ADMIN — Medication 1 PATCH: at 12:14

## 2019-03-30 RX ADMIN — PANTOPRAZOLE SODIUM 40 MILLIGRAM(S): 20 TABLET, DELAYED RELEASE ORAL at 06:32

## 2019-03-30 RX ADMIN — CHLORHEXIDINE GLUCONATE 1 APPLICATION(S): 213 SOLUTION TOPICAL at 06:34

## 2019-03-30 RX ADMIN — Medication 100 MILLIGRAM(S): at 22:53

## 2019-03-30 RX ADMIN — OXYCODONE HYDROCHLORIDE 20 MILLIGRAM(S): 5 TABLET ORAL at 22:59

## 2019-03-30 RX ADMIN — OXYCODONE HYDROCHLORIDE 40 MILLIGRAM(S): 5 TABLET ORAL at 18:44

## 2019-03-30 RX ADMIN — GABAPENTIN 800 MILLIGRAM(S): 400 CAPSULE ORAL at 22:53

## 2019-03-30 RX ADMIN — Medication 1 TABLET(S): at 12:13

## 2019-03-30 RX ADMIN — MORPHINE SULFATE 2 MILLIGRAM(S): 50 CAPSULE, EXTENDED RELEASE ORAL at 06:30

## 2019-03-30 RX ADMIN — Medication 1 MILLIGRAM(S): at 12:13

## 2019-03-30 RX ADMIN — MORPHINE SULFATE 2 MILLIGRAM(S): 50 CAPSULE, EXTENDED RELEASE ORAL at 20:15

## 2019-03-30 RX ADMIN — OXYCODONE HYDROCHLORIDE 40 MILLIGRAM(S): 5 TABLET ORAL at 17:44

## 2019-03-30 RX ADMIN — DAPTOMYCIN 117.2 MILLIGRAM(S): 500 INJECTION, POWDER, LYOPHILIZED, FOR SOLUTION INTRAVENOUS at 11:38

## 2019-03-30 RX ADMIN — LINEZOLID 600 MILLIGRAM(S): 600 INJECTION, SOLUTION INTRAVENOUS at 06:31

## 2019-03-30 RX ADMIN — GABAPENTIN 800 MILLIGRAM(S): 400 CAPSULE ORAL at 14:51

## 2019-03-30 RX ADMIN — Medication 100 MILLIGRAM(S): at 06:31

## 2019-03-30 RX ADMIN — LINEZOLID 600 MILLIGRAM(S): 600 INJECTION, SOLUTION INTRAVENOUS at 17:43

## 2019-03-30 RX ADMIN — OXYCODONE HYDROCHLORIDE 20 MILLIGRAM(S): 5 TABLET ORAL at 11:38

## 2019-03-30 RX ADMIN — Medication 1 PATCH: at 11:43

## 2019-03-30 RX ADMIN — Medication 1 PATCH: at 19:30

## 2019-03-30 RX ADMIN — OXYCODONE HYDROCHLORIDE 20 MILLIGRAM(S): 5 TABLET ORAL at 12:29

## 2019-03-30 NOTE — PROGRESS NOTE ADULT - SUBJECTIVE AND OBJECTIVE BOX
HPI:  42 Y/O female w/ PMHx of IV heroin abuse brought in by EMS for lethargy and cough. As per EMS, pt was found laying in bed at home, lethargic however able to follow some commands. EMS reports that pt was attempting to detox from heroin, and last known use was 3 days prior to ED presentation. As per ED, pts boyfriend reports a hx of + pt cough productive of white sputum and a fall down a flight of stairs 2/16. Labs showed serum lactate of 7.2, BUN/Cr 124/6.42, and WBC of of 11.93. Tmax 102.4, urine + for cocaine, opiates, nitrites and many bacteria. ICU called for further evaluation. Treated recently for tricuspid valve endocarditis. Now with concerns bilateral hip effusions as well as right rectus femoris abscess and epidural abscess. D/w pt and pt's family at bedside. Will hold off on LIZ for now. No clear clinical cardiac decompensation at present.      REVIEW OF SYSTEMS:  General:  No wt loss, fevers, chills, night sweats  Eyes:  Good vision, no reported pain  ENT:  No sore throat, pain, runny nose, dysphagia  CV:  No pain, palpitations, hypo/hypertension  Resp:  No dyspnea, cough, tachypnea, wheezing  GI:  No pain, nausea, vomiting, diarrhea, constipation  :  No pain, bleeding, incontinence, nocturia  Muscle:  No pain, weakness  Breast:  No pain, abscess, mass, discharge  Neuro:  No weakness, tingling, memory problems  Psych:  No fatigue, insomnia, mood problems, depression  Endocrine:  No polyuria, polydipsia, cold/heat intolerance  Heme:  No petechiae, ecchymosis, easy bruisability  Skin:  No rash, edema    PHYSICAL EXAM:  Vital Signs Last 24 Hrs  T(C): 37.8 (30 Mar 2019 13:41), Max: 37.8 (30 Mar 2019 13:41)  T(F): 100 (30 Mar 2019 13:41), Max: 100 (30 Mar 2019 13:41)  HR: 99 (30 Mar 2019 13:41) (76 - 99)  BP: 111/62 (30 Mar 2019 13:41) (98/58 - 119/48)  RR: 20 (30 Mar 2019 13:41) (18 - 20)  SpO2: 96% (30 Mar 2019 13:41) (96% - 98%)      General:  Appears stated age, well-groomed, well-nourished, no distress  HEENT:  NC/AT, patent nares w/ pink mucosa, OP clear w/o lesions, PERRL, EOMI, conjunctivae clear, no thyromegaly, nodules, adenopathy, no JVD  Chest:  Full & symmetric excursion, no increased effort, breath sounds clear  Cardiovascular:  Regular rhythm, S1, S2  Abdomen:  Soft, non-tender, non-distended  Extremities:  trace lower leg b/l ankle edema.  Skin:  warm/dry  Musculoskeletal:  Full ROM in all joints w/o swelling/tenderness/effusion  Neuro/Psych:  Alert, oriented    LABORATORY:                        8.0    6.77  )-----------( 346      ( 30 Mar 2019 06:35 )             26.5     03-30    137  |  102  |  18  ----------------------------<  112<H>  4.2   |  28  |  0.37<L>    Ca    9.3      30 Mar 2019 06:35           IMAGING:  ecg: < from: 12 Lead ECG (03.22.19 @ 11:05) >  Diagnosis Line Normal sinus rhythm  Normal ECG  When compared with ECG of 20-MAR-2019 00:45,  No significant change was found  Confirmed by Baldev TERRAZAS, Rashaun ([id number]    < end of copied text >        < from: TTE Limited Echo w/o Cont (03.22.19 @ 13:27) >  Summary:   1. Trace tricuspid regurgitation.   2. Peduncululated mobile mass seen adherent to lateral leaflet of the   tricuspid valve.   3. No interval changes noted in valve structure or regurgitation.    < end of copied text >    ASSESSMENT:  42 Y/O female w/ PMHx of IV heroin abuse brought in by EMS for lethargy and cough. As per EMS, pt was found laying in bed at home, lethargic however able to follow some commands. EMS reports that pt was attempting to detox from heroin, and last known use was 3 days prior to ED presentation. As per ED, pts boyfriend reports a hx of + pt cough productive of white sputum and a fall down a flight of stairs 2/16. Labs showed serum lactate of 7.2, BUN/Cr 124/6.42, and WBC of of 11.93. Tmax 102.4, urine + for cocaine, opiates, nitrites and many bacteria. ICU called for further evaluation. Treated recently for tricuspid valve endocarditis. Septic treatment.     PLAN:       MEDICATIONS  (STANDING):  celecoxib 200 milliGRAM(s) Oral daily  chlorhexidine 4% Liquid 1 Application(s) Topical <User Schedule>  DAPTOmycin IVPB 430 milliGRAM(s) IV Intermittent every 24 hours  docusate sodium 100 milliGRAM(s) Oral three times a day  folic acid 1 milliGRAM(s) Oral daily  gabapentin 800 milliGRAM(s) Oral three times a day  iohexol 300 mG (iodine)/mL Oral Solution 30 milliLiter(s) Oral once  lidocaine   Patch 1 Patch Transdermal every 24 hours  lidocaine   Patch 1 Patch Transdermal every 24 hours  linezolid    Tablet 600 milliGRAM(s) Oral every 12 hours  methadone    Tablet 40 milliGRAM(s) Oral daily  morphine  - Injectable 2 milliGRAM(s) IV Push every 12 hours  multivitamin 1 Tablet(s) Oral daily  nicotine -  14 mG/24Hr(s) Patch 1 patch Transdermal daily  nystatin Powder 1 Application(s) Topical three times a day  oxyCODONE  ER Tablet 40 milliGRAM(s) Oral every 12 hours  pantoprazole    Tablet 40 milliGRAM(s) Oral before breakfast  senna 2 Tablet(s) Oral at bedtime    Detox, wean off narcs and c/w methadone.  No LIZ needed at present. Pt also in C-collar which would make LIZ more difficult and riskier.  Re check limited follow up surface echo.    Patricio Lawton MD, FACC, FASE, FASNC, FACP  Director, Heart Failure Services  [facility]  , Department of Cardiology  [facility]

## 2019-03-30 NOTE — PROGRESS NOTE ADULT - ASSESSMENT
A/P: 43y Female s/p C4-5 ACDF with irrigation and debridement, now POD 10  FU cultures and gram stain for bilateral knee aspirations done 3/28, bl IR hip aspirations and rectus femoris aspiration from 3/29  based on lab results and physical exam, low suspicion of septic arthritis in b/l hip and knee  CT C/A/P ordered by medicine for further workup of septic process  LIZ demonstrates no change in vegetation  Pt reevaluated by cardiology, recommended repeat echo and possible transfer to [facility]  MRI T/L spine repeated, no change from prior MRI, stable as of now.   Pain control   OR Cx's growing MRSA, ABX per ID  No plan for return to OR for epidural abscesses or knee/hip washout at this time, will continue to monitor for improvement or RUE weakness which has been present since before surgery.    continue in aspen collar  Dry dressing changes as needed.   Pain control  DVT ppx- SCDs  PT/OT, WBAT  FU labs  will d/w attending and advise if plan changes

## 2019-03-30 NOTE — PROGRESS NOTE ADULT - SUBJECTIVE AND OBJECTIVE BOX
Pt seen and examined at bedside. Pain tolerable this AM. Denies CP/Dyspnea/Calf tenderness bilaterally. Pt s/p IR aspiration of b/l hips with cell counts ~20k, culture of IR aspiration rectus femoris abscess pending.    Vital Signs Last 24 Hrs  T(C): 37 (30 Mar 2019 05:38), Max: 37.6 (29 Mar 2019 08:49)  T(F): 98.6 (30 Mar 2019 05:38), Max: 99.6 (29 Mar 2019 08:49)  HR: 76 (30 Mar 2019 05:38) (76 - 105)  BP: 98/58 (30 Mar 2019 05:38) (98/58 - 119/48)  BP(mean): --  RR: 20 (30 Mar 2019 05:38) (18 - 20)  SpO2: 96% (30 Mar 2019 05:38) (96% - 99%)    Gen: NAD    Spine PE:  Skin intact  Aspen Collar In Place  dressing c/d/i  Negative clonus  Negative babinski  Negative olmstead           Deltoid        Bicep        Tricep          Wrist Ext    Wrist Flex    Finger Flex          Finger Abd  L            4/5         4/5           4/5             4/5                4/5	           4/5                    4/5  R            2/5 	   3/5           4/5             3/5              4/5                        4/5                   4/5                                      Hip Flex       Quad     Ankle DF        Ankle PF       Toe Ext        Hamstring    L            5/5              5/5          5/5                 5/5                 5/5	                5/5  R            5/5              5/5           5/5                 5/5                 5/5                5/5    Sensory:            C5         C6         C7      C8       T1        (0=absent, 1=impaired, 2=normal, NT=not testable)  L         2            2           2        2         2  R          2            2           2        2         2               L2          L3         L4      L5       S1         (0=absent, 1=impaired, 2=normal, NT=not testable)  L         2            2            2        2        2  R          2            2           2        2         2    Paresthesias in stocking/glove distribution at baseline, states feet are improving   Ace Wraps overlying BL Knees, Knee effusion in L knee stable from prior exam, able to AROM with very little pain.

## 2019-03-30 NOTE — PROGRESS NOTE ADULT - ASSESSMENT
43 F w/ history of IVDA, alcohol abuse, hx of pancreatitis, alcohol hepatitis, alcohol withdrawal, left frontoparietal subdural hematoma 2008 without need for neurosurgical intervention presented with severe sepsis with septic shock secondary to MRSA bacteremia w/ RLL PNA, UTI, endocarditis on LIZ and EFRAIN that has resolved and was diagnosis w/ HCV. Pt was initially intubated due to respiratory failure and put on pressors in ICU. She as extubated on 2/25 and transferred from ICU the following day. Pt had a C4-5 ACDF with irrigation and debridement on 3/20 due to spinal abscess and osteo and was kept intubated post procedure and transferred again to ICU, where she was extubated on 3/21 and transferred back to the med service the following day. HCV- Outpatient follow up for HCV and opioid maintenance outpt re-enrollment program    Septic arthritis involving BL Knees/hips;  - MRI of bilateral hips and knees were done demonstrating effusions and an abscess located in the right rectus femoris.   - Bilateral knees were aspirated by orthopedic team.   - as per ortho-No plan for orthopaedic operative intervention at this time. Should cultures grow pt will require operative intervention    septic shock secondary to MRSA bacteremia w/ RLL PNA, UTI, endocarditis on LIZ   - resolved     Endocarditis of tricuspid valve  - status post thoracentesis on 3/20  - as per ID, patient is on dapto & Zyvox   - repeat BC negative from 3/21  - repeat echo showed a pedunculated mobile mass seen adherent to lateral leaflet of the tricuspid valve w/ no interval changes noted in valve structure or regurgitation  - spoke with Dr. Gutierrez (CTS) who suggested to obtain CT C/A/P and LIZ on Monday to assess metastatic abscesses in setting of Endocarditis, no obvious indication for cardiac surgical intervention     Spinal osteo, abscess and cord compression    - CT of head and neck showed disc space narrowing and endplate irregularity at C4-5 which may represent typical degenerative change vs discitis   - MRI of the cervical spine w/ contrast showed C2-C7 discitis/osteomyelitis with a small right of midline ventral epidural abscess at C2-C7  - MRI of the thoracolumbar spine showed discitis/osteomyelitis at T11-T12 with paravertebral abscess, as well as discitis/osteomyelitis C6--T1, T9-T10, T10-T11, L4-L5, and L5-S1, and a phlegmon/early abscess dorsolaterally within the lumbar canal at the level of L4-L5. There was also a septic arthritis of  the left sternoclavicular joint space. There was also disc herniation at the L4-L5 level with contact and probable impingement of the descending right-sided nerve roots  - status post s/p C4-5 ACDF with irrigation and debridement on 3/20  - c/w gabapentin and lidocaine patch  - wound cx grew MRSA  - c/w aspen collar   - as per ID, patient is on dapto & Zyvox     Bacteremia due to methicillin resistant Staphylococcus aureus with IE: diskitis/OM /Spinal abscesses /Parapneumonic effusion   - repeat blood c/s from last week negative  - s/p thoracentesis : fluids neg    Moderate protein-calorie malnutrition  - c/w soft diet w/Ensure Enlive 3 x day      IVDrug abuse  - c/w methadone for opiate abuse   - decrease morphine q12 h today and taper off  - cont celebrex, gabapentin, oxycodone, inc oxycontin to 40 mg bid    Anemia due to poor nutrition  - status post 1 unit pRBC,  - c/w folic acid

## 2019-03-30 NOTE — PROGRESS NOTE ADULT - SUBJECTIVE AND OBJECTIVE BOX
Patient is a 43y old  Female who presents with a chief complaint of AMS (30 Mar 2019 07:53)      OVERNIGHT EVENTS:  acute BL hip/knees, generalized body pain, not at goal yet    REVIEW OF SYSTEMS: denies chest pain/SOB, diaphoresis, no F/C, cough, dizziness, headache, blurry vision, nausea, vomiting, abdominal pain. All others review of systems negative     MEDICATIONS  (STANDING):  celecoxib 200 milliGRAM(s) Oral daily  chlorhexidine 4% Liquid 1 Application(s) Topical <User Schedule>  DAPTOmycin IVPB      DAPTOmycin IVPB 430 milliGRAM(s) IV Intermittent every 24 hours  docusate sodium 100 milliGRAM(s) Oral three times a day  folic acid 1 milliGRAM(s) Oral daily  gabapentin 800 milliGRAM(s) Oral three times a day  iohexol 300 mG (iodine)/mL Oral Solution 30 milliLiter(s) Oral once  lidocaine   Patch 1 Patch Transdermal every 24 hours  lidocaine   Patch 1 Patch Transdermal every 24 hours  linezolid    Tablet 600 milliGRAM(s) Oral every 12 hours  methadone    Tablet 40 milliGRAM(s) Oral daily  morphine  - Injectable 2 milliGRAM(s) IV Push every 8 hours  multivitamin 1 Tablet(s) Oral daily  nicotine -  14 mG/24Hr(s) Patch 1 patch Transdermal daily  nystatin Powder 1 Application(s) Topical three times a day  oxyCODONE  ER Tablet 20 milliGRAM(s) Oral every 12 hours  pantoprazole    Tablet 40 milliGRAM(s) Oral before breakfast  senna 2 Tablet(s) Oral at bedtime    MEDICATIONS  (PRN):  acetaminophen   Tablet .. 650 milliGRAM(s) Oral every 6 hours PRN Mild Pain (1 - 3)  acetaminophen   Tablet .. 650 milliGRAM(s) Oral every 6 hours PRN Temp greater or equal to 38C (100.4F)  oxyCODONE    IR 20 milliGRAM(s) Oral every 4 hours PRN Moderate Pain (4 - 6)  polyethylene glycol 3350 17 Gram(s) Oral daily PRN Constipation      Allergies    penicillin (Short breath; Hives)    Intolerances        T(F): 98.6 (03-30-19 @ 05:38), Max: 98.8 (03-30-19 @ 00:17)  HR: 76 (03-30-19 @ 05:38) (76 - 99)  BP: 98/58 (03-30-19 @ 05:38) (98/58 - 119/48)  RR: 20 (03-30-19 @ 05:38) (18 - 20)  SpO2: 96% (03-30-19 @ 05:38) (96% - 98%)  Wt(kg): --    PHYSICAL EXAM:  GENERAL: mild distress due to pain, well-groomed, well-developed  HEAD:  Atraumatic, Normocephalic  EYES: EOMI, PERRLA, conjunctiva and sclera clear  ENMT: No tonsillar erythema, exudates, or enlargement; Moist mucous membranes, Good dentition, No lesions  NECK: Supple, No JVD, Normal thyroid  NERVOUS SYSTEM:  Alert & Oriented X3, Good concentration; Motor Strength 4/5 B/L upper and lower extremities;  CHEST/LUNG: Clear to percussion bilaterally; No rales, rhonchi, wheezing, or rubs BL  HEART: Regular rate and rhythm; No murmurs, rubs, or gallops  ABDOMEN: Soft, Nontender, Nondistended; Bowel sounds present  BACK; Spinal tenderness  EXTREMITIES:  2+ Peripheral Pulses, No clubbing, cyanosis, or edema BL LE, BL Hip/knees TTP, R knee swollen  LYMPH: No lymphadenopathy noted  SKIN: No rashes or lesions      LABS:                        8.0    6.77  )-----------( 346      ( 30 Mar 2019 06:35 )             26.5     03-30    137  |  102  |  18  ----------------------------<  112<H>  4.2   |  28  |  0.37<L>    Ca    9.3      30 Mar 2019 06:35      PT/INR - ( 29 Mar 2019 09:55 )   PT: 12.7 sec;   INR: 1.13 ratio         Cultures; Culture - Tissue with Gram Stain (collected 21 Mar 2019 09:42)  Source: .Tissue cervical disc #2 culture  Gram Stain (prelim) (22 Mar 2019 11:20):    Moderate polymorphonuclear leukocytes per low power field    Rare Gram positive cocci in pairs per oil power field  Preliminary Report (24 Mar 2019 12:21):    Few Methicillin resistant Staphylococcus aureus  Organism: Methicillin resistant Staphylococcus aureus (24 Mar 2019 12:21)  Organism: Methicillin resistant Staphylococcus aureus (24 Mar 2019 12:21)    Culture - Tissue with Gram Stain (collected 21 Mar 2019 09:38)  Source: .Tissue c4-c5 disc culture c/s ewb  Gram Stain (prelim) (22 Mar 2019 11:33):    Moderate polymorphonuclear leukocytes per low power field    Rare Gram positive cocci in pairs per oil power field  Preliminary Report (23 Mar 2019 08:21):    Few Methicillin resistant Staphylococcus aureus  Organism: Methicillin resistant Staphylococcus aureus (23 Mar 2019 08:20)  Organism: Methicillin resistant Staphylococcus aureus (23 Mar 2019 08:20)    Culture - Blood (collected 21 Mar 2019 09:33)  Source: .Blood  Preliminary Report (22 Mar 2019 10:01):    No growth to date.    Culture - Surgical Swab (collected 21 Mar 2019 09:31)  Source: .Surgical Swab cervical disc #1 culture c/s  Preliminary Report (23 Mar 2019 08:27):    Numerous Methicillin resistant Staphylococcus aureus  Organism: Methicillin resistant Staphylococcus aureus (23 Mar 2019 08:26)  Organism: Methicillin resistant Staphylococcus aureus (23 Mar 2019 08:26)    Culture - Surgical Swab (collected 21 Mar 2019 09:26)  Source: .Surgical Swab prevertebral culture c/s  Preliminary Report (22 Mar 2019 10:48):    No growth    Culture - Body Fluid with Gram Stain (collected 21 Mar 2019 04:12)  Source: .Body Fluid LEFT THORACENTESIS  Gram Stain (prelim) (22 Mar 2019 09:06):    polymorphonuclear leukocytes seen    No organisms seen    by cytocentrifuge  Preliminary Report (22 Mar 2019 09:06):    No growth      RADIOLOGY & ADDITIONAL TESTS:    Imaging Personally Reviewed:  [ x] YES    < from: Xray Chest 1 View- PORTABLE-Urgent (03.21.19 @ 02:02) >  Stable small right pleural effusion  Stable left basilar atelectasis.  Mild worsening of right basilar opacity.  < from: CT Chest No Cont (03.01.19 @ 11:32) >  Bilateral cavitary lesions are increased in size and number compared to   2/17/2019.     Small bilateral pleural effusions, loculated on the right, increased   compared to the prior study. Unchanged right lower lobe dependent  consolidation.    Prominent mediastinal and axillary lymph nodes, possibly reactive.      Consultant(s) Notes Reviewed:  [x ] YES     Care Discussed with [ x] Consultants [X ] Patient [ ] Family  [x ]    [x ]  Other; RN

## 2019-03-31 LAB
ANION GAP SERPL CALC-SCNC: 8 MMOL/L — SIGNIFICANT CHANGE UP (ref 5–17)
BUN SERPL-MCNC: 20 MG/DL — SIGNIFICANT CHANGE UP (ref 7–23)
CALCIUM SERPL-MCNC: 9.1 MG/DL — SIGNIFICANT CHANGE UP (ref 8.5–10.1)
CHLORIDE SERPL-SCNC: 100 MMOL/L — SIGNIFICANT CHANGE UP (ref 96–108)
CO2 SERPL-SCNC: 28 MMOL/L — SIGNIFICANT CHANGE UP (ref 22–31)
CREAT SERPL-MCNC: 0.47 MG/DL — LOW (ref 0.5–1.3)
GLUCOSE SERPL-MCNC: 118 MG/DL — HIGH (ref 70–99)
HCT VFR BLD CALC: 27 % — LOW (ref 34.5–45)
HGB BLD-MCNC: 8.3 G/DL — LOW (ref 11.5–15.5)
MCHC RBC-ENTMCNC: 25.6 PG — LOW (ref 27–34)
MCHC RBC-ENTMCNC: 30.7 GM/DL — LOW (ref 32–36)
MCV RBC AUTO: 83.3 FL — SIGNIFICANT CHANGE UP (ref 80–100)
NRBC # BLD: 0 /100 WBCS — SIGNIFICANT CHANGE UP (ref 0–0)
PLATELET # BLD AUTO: 361 K/UL — SIGNIFICANT CHANGE UP (ref 150–400)
POTASSIUM SERPL-MCNC: 4.2 MMOL/L — SIGNIFICANT CHANGE UP (ref 3.5–5.3)
POTASSIUM SERPL-SCNC: 4.2 MMOL/L — SIGNIFICANT CHANGE UP (ref 3.5–5.3)
RBC # BLD: 3.24 M/UL — LOW (ref 3.8–5.2)
RBC # FLD: 18.3 % — HIGH (ref 10.3–14.5)
SODIUM SERPL-SCNC: 136 MMOL/L — SIGNIFICANT CHANGE UP (ref 135–145)
WBC # BLD: 6.8 K/UL — SIGNIFICANT CHANGE UP (ref 3.8–10.5)
WBC # FLD AUTO: 6.8 K/UL — SIGNIFICANT CHANGE UP (ref 3.8–10.5)

## 2019-03-31 PROCEDURE — 74177 CT ABD & PELVIS W/CONTRAST: CPT | Mod: 26

## 2019-03-31 PROCEDURE — 99231 SBSQ HOSP IP/OBS SF/LOW 25: CPT

## 2019-03-31 PROCEDURE — 99233 SBSQ HOSP IP/OBS HIGH 50: CPT

## 2019-03-31 PROCEDURE — 71260 CT THORAX DX C+: CPT | Mod: 26

## 2019-03-31 RX ORDER — BENZOCAINE AND MENTHOL 5; 1 G/100ML; G/100ML
1 LIQUID ORAL
Qty: 0 | Refills: 0 | Status: DISCONTINUED | OUTPATIENT
Start: 2019-03-31 | End: 2019-03-31

## 2019-03-31 RX ADMIN — OXYCODONE HYDROCHLORIDE 20 MILLIGRAM(S): 5 TABLET ORAL at 14:00

## 2019-03-31 RX ADMIN — Medication 100 MILLIGRAM(S): at 22:36

## 2019-03-31 RX ADMIN — GABAPENTIN 800 MILLIGRAM(S): 400 CAPSULE ORAL at 13:09

## 2019-03-31 RX ADMIN — Medication 1 MILLIGRAM(S): at 11:43

## 2019-03-31 RX ADMIN — LIDOCAINE 1 PATCH: 4 CREAM TOPICAL at 11:39

## 2019-03-31 RX ADMIN — GABAPENTIN 800 MILLIGRAM(S): 400 CAPSULE ORAL at 05:37

## 2019-03-31 RX ADMIN — SENNA PLUS 2 TABLET(S): 8.6 TABLET ORAL at 22:35

## 2019-03-31 RX ADMIN — PANTOPRAZOLE SODIUM 40 MILLIGRAM(S): 20 TABLET, DELAYED RELEASE ORAL at 08:17

## 2019-03-31 RX ADMIN — CELECOXIB 200 MILLIGRAM(S): 200 CAPSULE ORAL at 12:40

## 2019-03-31 RX ADMIN — OXYCODONE HYDROCHLORIDE 20 MILLIGRAM(S): 5 TABLET ORAL at 04:07

## 2019-03-31 RX ADMIN — Medication 100 MILLIGRAM(S): at 05:37

## 2019-03-31 RX ADMIN — LIDOCAINE 1 PATCH: 4 CREAM TOPICAL at 07:40

## 2019-03-31 RX ADMIN — MORPHINE SULFATE 2 MILLIGRAM(S): 50 CAPSULE, EXTENDED RELEASE ORAL at 08:13

## 2019-03-31 RX ADMIN — Medication 1 TABLET(S): at 13:12

## 2019-03-31 RX ADMIN — OXYCODONE HYDROCHLORIDE 20 MILLIGRAM(S): 5 TABLET ORAL at 23:29

## 2019-03-31 RX ADMIN — Medication 500 MILLIGRAM(S): at 17:25

## 2019-03-31 RX ADMIN — Medication 1 TABLET(S): at 11:41

## 2019-03-31 RX ADMIN — LIDOCAINE 1 PATCH: 4 CREAM TOPICAL at 22:49

## 2019-03-31 RX ADMIN — NYSTATIN CREAM 1 APPLICATION(S): 100000 CREAM TOPICAL at 22:35

## 2019-03-31 RX ADMIN — CELECOXIB 200 MILLIGRAM(S): 200 CAPSULE ORAL at 11:42

## 2019-03-31 RX ADMIN — Medication 1 TABLET(S): at 22:36

## 2019-03-31 RX ADMIN — Medication 1 PATCH: at 11:40

## 2019-03-31 RX ADMIN — OXYCODONE HYDROCHLORIDE 40 MILLIGRAM(S): 5 TABLET ORAL at 05:45

## 2019-03-31 RX ADMIN — Medication 1 PATCH: at 11:42

## 2019-03-31 RX ADMIN — MORPHINE SULFATE 2 MILLIGRAM(S): 50 CAPSULE, EXTENDED RELEASE ORAL at 08:30

## 2019-03-31 RX ADMIN — OXYCODONE HYDROCHLORIDE 40 MILLIGRAM(S): 5 TABLET ORAL at 17:25

## 2019-03-31 RX ADMIN — METHADONE HYDROCHLORIDE 40 MILLIGRAM(S): 40 TABLET ORAL at 11:42

## 2019-03-31 RX ADMIN — LINEZOLID 600 MILLIGRAM(S): 600 INJECTION, SOLUTION INTRAVENOUS at 05:37

## 2019-03-31 RX ADMIN — GABAPENTIN 800 MILLIGRAM(S): 400 CAPSULE ORAL at 22:35

## 2019-03-31 RX ADMIN — OXYCODONE HYDROCHLORIDE 20 MILLIGRAM(S): 5 TABLET ORAL at 03:11

## 2019-03-31 RX ADMIN — OXYCODONE HYDROCHLORIDE 40 MILLIGRAM(S): 5 TABLET ORAL at 18:22

## 2019-03-31 RX ADMIN — CHLORHEXIDINE GLUCONATE 1 APPLICATION(S): 213 SOLUTION TOPICAL at 05:37

## 2019-03-31 RX ADMIN — OXYCODONE HYDROCHLORIDE 20 MILLIGRAM(S): 5 TABLET ORAL at 13:06

## 2019-03-31 RX ADMIN — LINEZOLID 600 MILLIGRAM(S): 600 INJECTION, SOLUTION INTRAVENOUS at 17:25

## 2019-03-31 RX ADMIN — NYSTATIN CREAM 1 APPLICATION(S): 100000 CREAM TOPICAL at 13:09

## 2019-03-31 RX ADMIN — LIDOCAINE 1 PATCH: 4 CREAM TOPICAL at 22:48

## 2019-03-31 RX ADMIN — OXYCODONE HYDROCHLORIDE 20 MILLIGRAM(S): 5 TABLET ORAL at 22:29

## 2019-03-31 RX ADMIN — Medication 100 MILLIGRAM(S): at 13:09

## 2019-03-31 RX ADMIN — OXYCODONE HYDROCHLORIDE 40 MILLIGRAM(S): 5 TABLET ORAL at 05:37

## 2019-03-31 RX ADMIN — DAPTOMYCIN 117.2 MILLIGRAM(S): 500 INJECTION, POWDER, LYOPHILIZED, FOR SOLUTION INTRAVENOUS at 11:43

## 2019-03-31 RX ADMIN — IOHEXOL 30 MILLILITER(S): 300 INJECTION, SOLUTION INTRAVENOUS at 08:33

## 2019-03-31 NOTE — PROGRESS NOTE ADULT - ASSESSMENT
43 F w/ history of IVDA, alcohol abuse, hx of pancreatitis, alcohol hepatitis, alcohol withdrawal, left frontoparietal subdural hematoma 2008 without need for neurosurgical intervention presented with severe sepsis with septic shock secondary to MRSA bacteremia w/ RLL PNA, UTI, endocarditis on LIZ and EFRAIN that has resolved and was diagnosis w/ HCV. Pt was initially intubated due to respiratory failure and put on pressors in ICU. She as extubated on 2/25 and transferred from ICU the following day. Pt had a C4-5 ACDF with irrigation and debridement on 3/20 due to spinal abscess and osteo and was kept intubated post procedure and transferred again to ICU, where she was extubated on 3/21 and transferred back to the med service the following day. HCV- Outpatient follow up for HCV and opioid maintenance outpt re-enrollment program    Septic arthritis involving BL Knees/hips;  - MRI of bilateral hips and knees were done demonstrating effusions and an abscess located in the right rectus femoris.   - Bilateral knees were aspirated by orthopedic team.   - as per ortho-No plan for orthopaedic operative intervention at this time. Should cultures grow pt will require operative intervention    septic shock secondary to MRSA bacteremia w/ RLL PNA, UTI, endocarditis on LIZ   - resolved     Endocarditis of tricuspid valve  - status post thoracentesis on 3/20  - as per ID, patient is on dapto & Zyvox   - repeat BC negative from 3/21  - repeat echo showed a pedunculated mobile mass seen adherent to lateral leaflet of the tricuspid valve w/ no interval changes noted in valve structure or regurgitation  - spoke with Dr. Gutierrez (CTS) who suggested to obtain CT C/A/P and LIZ to assess metastatic abscesses in setting of Endocarditis, no obvious indication for cardiac surgical intervention   - per cardio; No LIZ needed at present. Pt also in C-collar which would make LIZ more difficult and riskier as off 3/30.    Spinal osteo, abscess and cord compression    - CT of head and neck showed disc space narrowing and endplate irregularity at C4-5 which may represent typical degenerative change vs discitis   - MRI of the cervical spine w/ contrast showed C2-C7 discitis/osteomyelitis with a small right of midline ventral epidural abscess at C2-C7  - MRI of the thoracolumbar spine showed discitis/osteomyelitis at T11-T12 with paravertebral abscess, as well as discitis/osteomyelitis C6--T1, T9-T10, T10-T11, L4-L5, and L5-S1, and a phlegmon/early abscess dorsolaterally within the lumbar canal at the level of L4-L5. There was also a septic arthritis of  the left sternoclavicular joint space. There was also disc herniation at the L4-L5 level with contact and probable impingement of the descending right-sided nerve roots  - status post s/p C4-5 ACDF with irrigation and debridement on 3/20  - c/w gabapentin and lidocaine patch  - wound cx grew MRSA  - c/w aspen collar   - as per ID, patient is on dapto & Zyvox     Bacteremia due to methicillin resistant Staphylococcus aureus with IE: diskitis/OM /Spinal abscesses /Parapneumonic effusion   - repeat blood c/s from last week negative  - s/p thoracentesis : fluids neg    Moderate protein-calorie malnutrition  - c/w soft diet w/Ensure Enlive 3 x day      IVDrug abuse  - c/w methadone for opiate abuse   - dc morphine  - cont celebrex, gabapentin, oxycodone, oxycontin to 40 mg bid    Anemia due to poor nutrition  - status post 1 unit pRBC,  - c/w folic acid

## 2019-03-31 NOTE — PROGRESS NOTE ADULT - SUBJECTIVE AND OBJECTIVE BOX
Patient is a 43y old  Female who presents with a chief complaint of AMS (31 Mar 2019 07:49)      OVERNIGHT EVENTS:  acute BL hip/knees, generalized body pain stable on current regimen     REVIEW OF SYSTEMS: denies chest pain/SOB, diaphoresis, no F/C, cough, dizziness, headache, blurry vision, nausea, vomiting, abdominal pain. All others review of systems negative     MEDICATIONS  (STANDING):  ascorbic acid 500 milliGRAM(s) Oral two times a day  calcium carbonate 1250 mG  + Vitamin D (OsCal 500 + D) 1 Tablet(s) Oral three times a day  celecoxib 200 milliGRAM(s) Oral daily  chlorhexidine 4% Liquid 1 Application(s) Topical <User Schedule>  DAPTOmycin IVPB      DAPTOmycin IVPB 430 milliGRAM(s) IV Intermittent every 24 hours  docusate sodium 100 milliGRAM(s) Oral three times a day  folic acid 1 milliGRAM(s) Oral daily  gabapentin 800 milliGRAM(s) Oral three times a day  lidocaine   Patch 1 Patch Transdermal every 24 hours  lidocaine   Patch 1 Patch Transdermal every 24 hours  linezolid    Tablet 600 milliGRAM(s) Oral every 12 hours  morphine  - Injectable 2 milliGRAM(s) IV Push every 12 hours  multivitamin 1 Tablet(s) Oral daily  nicotine -  14 mG/24Hr(s) Patch 1 patch Transdermal daily  nystatin Powder 1 Application(s) Topical three times a day  oxyCODONE  ER Tablet 40 milliGRAM(s) Oral every 12 hours  pantoprazole    Tablet 40 milliGRAM(s) Oral before breakfast  senna 2 Tablet(s) Oral at bedtime    MEDICATIONS  (PRN):  acetaminophen   Tablet .. 650 milliGRAM(s) Oral every 6 hours PRN Mild Pain (1 - 3)  acetaminophen   Tablet .. 650 milliGRAM(s) Oral every 6 hours PRN Temp greater or equal to 38C (100.4F)  benzocaine 15 mG/menthol 3.6 mG Lozenge 1 Lozenge Oral every 3 hours PRN Sore Throat  diphenhydrAMINE 25 milliGRAM(s) Oral every 4 hours PRN Rash and/or Itching  oxyCODONE    IR 20 milliGRAM(s) Oral every 4 hours PRN Moderate Pain (4 - 6)  polyethylene glycol 3350 17 Gram(s) Oral daily PRN Constipation      Allergies    penicillin (Short breath; Hives)    Intolerances        T(F): 98.7 (03-31-19 @ 11:30), Max: 100 (03-30-19 @ 13:41)  HR: 93 (03-31-19 @ 11:30) (76 - 99)  BP: 113/62 (03-31-19 @ 11:30) (102/62 - 113/62)  RR: 18 (03-31-19 @ 11:30) (16 - 20)  SpO2: 97% (03-31-19 @ 11:30) (96% - 97%)  Wt(kg): --    PHYSICAL EXAM:  GENERAL: mild distress due to pain, well-groomed, well-developed  HEAD:  Atraumatic, Normocephalic  EYES: EOMI, PERRLA, conjunctiva and sclera clear  ENMT: No tonsillar erythema, exudates, or enlargement; Moist mucous membranes, Good dentition, No lesions  NECK: Supple, No JVD, Normal thyroid  NERVOUS SYSTEM:  Alert & Oriented X3, Good concentration; Motor Strength 4/5 B/L upper and lower extremities;  CHEST/LUNG: Clear to percussion bilaterally; No rales, rhonchi, wheezing, or rubs BL  HEART: Regular rate and rhythm; No murmurs, rubs, or gallops  ABDOMEN: Soft, Nontender, Nondistended; Bowel sounds present  BACK; Spinal tenderness  EXTREMITIES:  2+ Peripheral Pulses, No clubbing, cyanosis, or edema BL LE, BL Hip/knees TTP, R knee swollen  LYMPH: No lymphadenopathy noted  SKIN: No rashes or lesions      LABS:                        8.3    6.80  )-----------( 361      ( 31 Mar 2019 10:04 )             27.0     03-31    136  |  100  |  20  ----------------------------<  118<H>  4.2   |  28  |  0.47<L>    Ca    9.1      31 Mar 2019 10:04    Cultures; Culture - Tissue with Gram Stain (collected 21 Mar 2019 09:42)  Source: .Tissue cervical disc #2 culture  Gram Stain (prelim) (22 Mar 2019 11:20):    Moderate polymorphonuclear leukocytes per low power field    Rare Gram positive cocci in pairs per oil power field  Preliminary Report (24 Mar 2019 12:21):    Few Methicillin resistant Staphylococcus aureus  Organism: Methicillin resistant Staphylococcus aureus (24 Mar 2019 12:21)  Organism: Methicillin resistant Staphylococcus aureus (24 Mar 2019 12:21)    Culture - Tissue with Gram Stain (collected 21 Mar 2019 09:38)  Source: .Tissue c4-c5 disc culture c/s ewb  Gram Stain (prelim) (22 Mar 2019 11:33):    Moderate polymorphonuclear leukocytes per low power field    Rare Gram positive cocci in pairs per oil power field  Preliminary Report (23 Mar 2019 08:21):    Few Methicillin resistant Staphylococcus aureus  Organism: Methicillin resistant Staphylococcus aureus (23 Mar 2019 08:20)  Organism: Methicillin resistant Staphylococcus aureus (23 Mar 2019 08:20)    Culture - Blood (collected 21 Mar 2019 09:33)  Source: .Blood  Preliminary Report (22 Mar 2019 10:01):    No growth to date.    Culture - Surgical Swab (collected 21 Mar 2019 09:31)  Source: .Surgical Swab cervical disc #1 culture c/s  Preliminary Report (23 Mar 2019 08:27):    Numerous Methicillin resistant Staphylococcus aureus  Organism: Methicillin resistant Staphylococcus aureus (23 Mar 2019 08:26)  Organism: Methicillin resistant Staphylococcus aureus (23 Mar 2019 08:26)    Culture - Surgical Swab (collected 21 Mar 2019 09:26)  Source: .Surgical Swab prevertebral culture c/s  Preliminary Report (22 Mar 2019 10:48):    No growth    Culture - Body Fluid with Gram Stain (collected 21 Mar 2019 04:12)  Source: .Body Fluid LEFT THORACENTESIS  Gram Stain (prelim) (22 Mar 2019 09:06):    polymorphonuclear leukocytes seen    No organisms seen    by cytocentrifuge  Preliminary Report (22 Mar 2019 09:06):    No growth      RADIOLOGY & ADDITIONAL TESTS:    Imaging Personally Reviewed:  [ x] YES    < from: Xray Chest 1 View- PORTABLE-Urgent (03.21.19 @ 02:02) >  Stable small right pleural effusion  Stable left basilar atelectasis.  Mild worsening of right basilar opacity.  < from: CT Chest No Cont (03.01.19 @ 11:32) >  Bilateral cavitary lesions are increased in size and number compared to   2/17/2019.     Small bilateral pleural effusions, loculated on the right, increased   compared to the prior study. Unchanged right lower lobe dependent  consolidation.    Prominent mediastinal and axillary lymph nodes, possibly reactive.      Consultant(s) Notes Reviewed:  [x ] YES     Care Discussed with [ x] Consultants [X ] Patient [ ] Family  [x ]    [x ]  Other; RN

## 2019-03-31 NOTE — PROGRESS NOTE ADULT - SUBJECTIVE AND OBJECTIVE BOX
Pt seen and examined at bedside. Pain tolerable this AM. Denies CP/Dyspnea/Calf tenderness bilaterally. States she has global back pain and is leading to hip and LE pain b/l, improves with ambulation. Pt pain medication increased yesterday, pt more lethargic today.  Complains aspen collar is irritating neck, also c/o R foot swelling/edema    Vital Signs Last 24 Hrs  T(C): 36.1 (31 Mar 2019 05:28), Max: 37.8 (30 Mar 2019 13:41)  T(F): 97 (31 Mar 2019 05:28), Max: 100 (30 Mar 2019 13:41)  HR: 76 (31 Mar 2019 05:28) (76 - 99)  BP: 102/62 (31 Mar 2019 05:28) (102/62 - 111/62)  BP(mean): --  RR: 18 (31 Mar 2019 05:28) (16 - 20)  SpO2: 97% (31 Mar 2019 05:28) (96% - 97%)    Gen: NAD  Right foot swelling, minimal TTP, no pain with ankle ROM or with weight bearing  BL hip and knee ROM does not exhibit pain, no pain with weight bearing, no signs of erythema or worsening effusion of B/L Knees    Spine PE:  Skin intact  Aspen Collar In Place  dressing c/d/i  Negative clonus  Negative babinski  Negative olmstead  Ambulating in room with walker           Deltoid        Bicep        Tricep          Wrist Ext    Wrist Flex    Finger Flex          Finger Abd  L            4/5         4/5           4/5             4/5                4/5	           4/5                    4/5  R            2/5 	   3/5           4/5             3/5              4/5                        4/5                   4/5                                      Hip Flex       Quad     Ankle DF        Ankle PF       Toe Ext        Hamstring    L            5/5              5/5          5/5                 5/5                 5/5	                5/5  R            5/5              5/5           5/5                 5/5                 5/5                5/5    Sensory:            C5         C6         C7      C8       T1        (0=absent, 1=impaired, 2=normal, NT=not testable)  L         2            2           2        2         2  R          2            2           2        2         2               L2          L3         L4      L5       S1         (0=absent, 1=impaired, 2=normal, NT=not testable)  L         2            2            2        2        2  R          2            2           2        2         2    Paresthesias in stocking/glove distribution at baseline, states feet are improving

## 2019-03-31 NOTE — PROGRESS NOTE ADULT - ASSESSMENT
A/P: 43y Female s/p C4-5 ACDF with irrigation and debridement, now POD 11    D/w Dr Leslie, no contraindication to starting DVT ppx, DVT ppx per primary team  Will recommend decreasing narcotics, restarting methadone and weaning pt  FU cultures and gram stain for bilateral knee aspirations done 3/28, bl IR hip aspirations and rectus femoris aspiration from 3/29- NGTD, B/L hips demonstrate + CPPD crystals   based on lab results and physical exam, low suspicion of septic arthritis in b/l hip and knee  CT C/A/P ordered by medicine for further workup of septic process- not completed yesterday  LIZ demonstrates no change in vegetation  Pt reevaluated by cardiology, recommended repeat echo and possible transfer to [facility]  MRI T/L spine repeated, no change from prior MRI, stable as of now, will get repeat MRI in 2-4 weeks   Pain control   OR Cx's growing MRSA, ABX per ID  No plan for return to OR for epidural abscesses or knee/hip washout at this time, will continue to monitor for improvement or RUE weakness which has been present since before surgery.    continue in aspen collar  Dry dressing changes as needed.   Pain control  DVT ppx- SCD's, can begin chemical ppx  PT/OT, WBAT  FU labs  will d/w attending and advise if plan changes

## 2019-03-31 NOTE — PROGRESS NOTE ADULT - SUBJECTIVE AND OBJECTIVE BOX
HPI:  44 Y/O female w/ PMHx of IV heroin abuse brought in by EMS for lethargy and cough. As per EMS, pt was found laying in bed at home, lethargic however able to follow some commands. EMS reports that pt was attempting to detox from heroin, and last known use was 3 days prior to ED presentation. As per ED, pts boyfriend reports a hx of + pt cough productive of white sputum and a fall down a flight of stairs 2/16. Labs showed serum lactate of 7.2, BUN/Cr 124/6.42, and WBC of of 11.93. Tmax 102.4, urine + for cocaine, opiates, nitrites and many bacteria. ICU called for further evaluation. Treated recently for tricuspid valve endocarditis. Now with concerns bilateral hip effusions as well as right rectus femoris abscess and epidural abscess.      REVIEW OF SYSTEMS:  General:  No wt loss, fevers, chills, night sweats  Eyes:  Good vision, no reported pain  ENT:  No sore throat, pain, runny nose, dysphagia  CV:  No pain, palpitations, hypo/hypertension  Resp:  No dyspnea, cough, tachypnea, wheezing  GI:  No pain, nausea, vomiting, diarrhea, constipation  :  No pain, bleeding, incontinence, nocturia  Muscle:  No pain, weakness  Breast:  No pain, abscess, mass, discharge  Neuro:  No weakness, tingling, memory problems  Psych:  No fatigue, insomnia, mood problems, depression  Endocrine:  No polyuria, polydipsia, cold/heat intolerance  Heme:  No petechiae, ecchymosis, easy bruisability  Skin:  No rash, edema    PHYSICAL EXAM:  Vital Signs Last 24 Hrs  T(C): 37.1 (31 Mar 2019 11:30), Max: 37.2 (30 Mar 2019 16:15)  T(F): 98.7 (31 Mar 2019 11:30), Max: 99 (30 Mar 2019 16:15)  HR: 93 (31 Mar 2019 11:30) (76 - 94)  BP: 113/62 (31 Mar 2019 11:30) (102/62 - 113/62)  RR: 18 (31 Mar 2019 11:30) (16 - 18)  SpO2: 97% (31 Mar 2019 11:30) (96% - 97%)    General:  Appears stated age, well-groomed, well-nourished, no distress  HEENT:  NC/AT, patent nares w/ pink mucosa, OP clear w/o lesions, PERRL, EOMI, conjunctivae clear, no thyromegaly, nodules, adenopathy, no JVD  Chest:  Full & symmetric excursion, no increased effort, breath sounds clear  Cardiovascular:  Regular rhythm, S1, S2, no murmur/rub/S3/S4, no carotid/femoral/abdominal bruit, radial/pedal pulses 2+, no edema  Abdomen:  Soft, non-tender, non-distended, normoactive bowel sounds, no HSM  Extremities:  Gait & station:   Digits:   Nails:   Joints, Bones, Muscles:   ROM:   Stability:  Skin:  No rash/erythema/ecchymoses/petechiae/wounds/abscess/warm/dry  Musculoskeletal:  Full ROM in all joints w/o swelling/tenderness/effusion  Neuro/Psych:  Alert, oriented, normal and symmetric strength in UEs, LEs, normal gait, sensation intact, affect:   mood:   appearance:       LABORATORY:                                      8.3    6.80  )-----------( 361      ( 31 Mar 2019 10:04 )             27.0     03-31    136  |  100  |  20  ----------------------------<  118<H>  4.2   |  28  |  0.47<L>    Ca    9.1      31 Mar 2019 10:04         IMAGING:  ecg: < from: 12 Lead ECG (03.22.19 @ 11:05) >  Diagnosis Line Normal sinus rhythm  Normal ECG  When compared with ECG of 20-MAR-2019 00:45,  No significant change was found  Confirmed by Baldev ETRRAZAS, Rashaun ([id number]    < end of copied text >        < from: TTE Limited Echo w/o Cont (03.22.19 @ 13:27) >  Summary:   1. Trace tricuspid regurgitation.   2. Peduncululated mobile mass seen adherent to lateral leaflet of the   tricuspid valve.   3. No interval changes noted in valve structure or regurgitation.    < end of copied text >    ASSESSMENT:  44 Y/O female w/ PMHx of IV heroin abuse brought in by EMS for lethargy and cough. As per EMS, pt was found laying in bed at home, lethargic however able to follow some commands. EMS reports that pt was attempting to detox from heroin, and last known use was 3 days prior to ED presentation. As per ED, pts boyfriend reports a hx of + pt cough productive of white sputum and a fall down a flight of stairs 2/16. Labs showed serum lactate of 7.2, BUN/Cr 124/6.42, and WBC of of 11.93. Tmax 102.4, urine + for cocaine, opiates, nitrites and many bacteria. ICU called for further evaluation. Treated recently for tricuspid valve endocarditis. Now with concerns of osteo/septic emboli.    PLAN:         Detox,  ID/ortho/IR drainage care.    No LIZ indicated at present as there is no clear evidence of clinical cardiac decompensation.    Would get another surface echo first.    Try to avoid narcotics.     Patricio Lawton MD, FACC, TERESITA MORRISON, FACP  Director, Heart Failure Services  [facility]  , Department of Cardiology  [facility]

## 2019-04-01 LAB
ANION GAP SERPL CALC-SCNC: 9 MMOL/L — SIGNIFICANT CHANGE UP (ref 5–17)
BUN SERPL-MCNC: 16 MG/DL — SIGNIFICANT CHANGE UP (ref 7–23)
CALCIUM SERPL-MCNC: 9.6 MG/DL — SIGNIFICANT CHANGE UP (ref 8.5–10.1)
CHLORIDE SERPL-SCNC: 99 MMOL/L — SIGNIFICANT CHANGE UP (ref 96–108)
CO2 SERPL-SCNC: 28 MMOL/L — SIGNIFICANT CHANGE UP (ref 22–31)
CREAT SERPL-MCNC: 0.42 MG/DL — LOW (ref 0.5–1.3)
GLUCOSE SERPL-MCNC: 118 MG/DL — HIGH (ref 70–99)
HCT VFR BLD CALC: 30 % — LOW (ref 34.5–45)
HGB BLD-MCNC: 9 G/DL — LOW (ref 11.5–15.5)
MCHC RBC-ENTMCNC: 25.4 PG — LOW (ref 27–34)
MCHC RBC-ENTMCNC: 30 GM/DL — LOW (ref 32–36)
MCV RBC AUTO: 84.5 FL — SIGNIFICANT CHANGE UP (ref 80–100)
NRBC # BLD: 0 /100 WBCS — SIGNIFICANT CHANGE UP (ref 0–0)
PHOSPHATE SERPL-MCNC: 4.8 MG/DL — HIGH (ref 2.5–4.5)
PLATELET # BLD AUTO: 405 K/UL — HIGH (ref 150–400)
POTASSIUM SERPL-MCNC: 4.1 MMOL/L — SIGNIFICANT CHANGE UP (ref 3.5–5.3)
POTASSIUM SERPL-SCNC: 4.1 MMOL/L — SIGNIFICANT CHANGE UP (ref 3.5–5.3)
RBC # BLD: 3.55 M/UL — LOW (ref 3.8–5.2)
RBC # FLD: 18.5 % — HIGH (ref 10.3–14.5)
SODIUM SERPL-SCNC: 136 MMOL/L — SIGNIFICANT CHANGE UP (ref 135–145)
WBC # BLD: 8.08 K/UL — SIGNIFICANT CHANGE UP (ref 3.8–10.5)
WBC # FLD AUTO: 8.08 K/UL — SIGNIFICANT CHANGE UP (ref 3.8–10.5)

## 2019-04-01 PROCEDURE — 99233 SBSQ HOSP IP/OBS HIGH 50: CPT

## 2019-04-01 PROCEDURE — 99232 SBSQ HOSP IP/OBS MODERATE 35: CPT

## 2019-04-01 RX ORDER — FLUCONAZOLE 150 MG/1
150 TABLET ORAL ONCE
Qty: 0 | Refills: 0 | Status: COMPLETED | OUTPATIENT
Start: 2019-04-01 | End: 2019-04-01

## 2019-04-01 RX ORDER — ENOXAPARIN SODIUM 100 MG/ML
40 INJECTION SUBCUTANEOUS DAILY
Qty: 0 | Refills: 0 | Status: DISCONTINUED | OUTPATIENT
Start: 2019-04-01 | End: 2019-05-06

## 2019-04-01 RX ORDER — METHADONE HYDROCHLORIDE 40 MG/1
40 TABLET ORAL DAILY
Qty: 0 | Refills: 0 | Status: DISCONTINUED | OUTPATIENT
Start: 2019-04-01 | End: 2019-04-07

## 2019-04-01 RX ORDER — LACTOBACILLUS ACIDOPHILUS 100MM CELL
1 CAPSULE ORAL
Qty: 0 | Refills: 0 | Status: DISCONTINUED | OUTPATIENT
Start: 2019-04-01 | End: 2019-05-08

## 2019-04-01 RX ORDER — LACTOBACILLUS ACIDOPH-L.BULGARICUS 1 MILLION CELL CHEWABLE TABLET 1MM CELL
1 TABLET,CHEWABLE ORAL
Qty: 0 | Refills: 0 | Status: DISCONTINUED | OUTPATIENT
Start: 2019-04-01 | End: 2019-04-01

## 2019-04-01 RX ADMIN — LINEZOLID 600 MILLIGRAM(S): 600 INJECTION, SOLUTION INTRAVENOUS at 06:51

## 2019-04-01 RX ADMIN — OXYCODONE HYDROCHLORIDE 20 MILLIGRAM(S): 5 TABLET ORAL at 09:11

## 2019-04-01 RX ADMIN — Medication 650 MILLIGRAM(S): at 15:50

## 2019-04-01 RX ADMIN — OXYCODONE HYDROCHLORIDE 40 MILLIGRAM(S): 5 TABLET ORAL at 08:19

## 2019-04-01 RX ADMIN — NYSTATIN CREAM 1 APPLICATION(S): 100000 CREAM TOPICAL at 21:59

## 2019-04-01 RX ADMIN — Medication 500 MILLIGRAM(S): at 18:01

## 2019-04-01 RX ADMIN — OXYCODONE HYDROCHLORIDE 20 MILLIGRAM(S): 5 TABLET ORAL at 13:19

## 2019-04-01 RX ADMIN — OXYCODONE HYDROCHLORIDE 20 MILLIGRAM(S): 5 TABLET ORAL at 03:21

## 2019-04-01 RX ADMIN — Medication 1 TABLET(S): at 13:21

## 2019-04-01 RX ADMIN — OXYCODONE HYDROCHLORIDE 20 MILLIGRAM(S): 5 TABLET ORAL at 08:25

## 2019-04-01 RX ADMIN — CELECOXIB 200 MILLIGRAM(S): 200 CAPSULE ORAL at 13:19

## 2019-04-01 RX ADMIN — Medication 1 PATCH: at 08:32

## 2019-04-01 RX ADMIN — Medication 100 MILLIGRAM(S): at 13:21

## 2019-04-01 RX ADMIN — Medication 1 PATCH: at 11:10

## 2019-04-01 RX ADMIN — METHADONE HYDROCHLORIDE 40 MILLIGRAM(S): 40 TABLET ORAL at 13:21

## 2019-04-01 RX ADMIN — OXYCODONE HYDROCHLORIDE 40 MILLIGRAM(S): 5 TABLET ORAL at 18:45

## 2019-04-01 RX ADMIN — Medication 500 MILLIGRAM(S): at 07:02

## 2019-04-01 RX ADMIN — GABAPENTIN 800 MILLIGRAM(S): 400 CAPSULE ORAL at 13:24

## 2019-04-01 RX ADMIN — Medication 1 TABLET(S): at 18:01

## 2019-04-01 RX ADMIN — LIDOCAINE 1 PATCH: 4 CREAM TOPICAL at 08:32

## 2019-04-01 RX ADMIN — OXYCODONE HYDROCHLORIDE 20 MILLIGRAM(S): 5 TABLET ORAL at 12:27

## 2019-04-01 RX ADMIN — OXYCODONE HYDROCHLORIDE 40 MILLIGRAM(S): 5 TABLET ORAL at 06:49

## 2019-04-01 RX ADMIN — GABAPENTIN 800 MILLIGRAM(S): 400 CAPSULE ORAL at 21:59

## 2019-04-01 RX ADMIN — Medication 650 MILLIGRAM(S): at 17:28

## 2019-04-01 RX ADMIN — OXYCODONE HYDROCHLORIDE 20 MILLIGRAM(S): 5 TABLET ORAL at 22:30

## 2019-04-01 RX ADMIN — NYSTATIN CREAM 1 APPLICATION(S): 100000 CREAM TOPICAL at 06:52

## 2019-04-01 RX ADMIN — OXYCODONE HYDROCHLORIDE 20 MILLIGRAM(S): 5 TABLET ORAL at 22:06

## 2019-04-01 RX ADMIN — NYSTATIN CREAM 1 APPLICATION(S): 100000 CREAM TOPICAL at 13:26

## 2019-04-01 RX ADMIN — LIDOCAINE 1 PATCH: 4 CREAM TOPICAL at 11:10

## 2019-04-01 RX ADMIN — DAPTOMYCIN 117.2 MILLIGRAM(S): 500 INJECTION, POWDER, LYOPHILIZED, FOR SOLUTION INTRAVENOUS at 11:13

## 2019-04-01 RX ADMIN — GABAPENTIN 800 MILLIGRAM(S): 400 CAPSULE ORAL at 06:51

## 2019-04-01 RX ADMIN — Medication 1 PATCH: at 19:00

## 2019-04-01 RX ADMIN — Medication 1 PATCH: at 12:25

## 2019-04-01 RX ADMIN — FLUCONAZOLE 150 MILLIGRAM(S): 150 TABLET ORAL at 06:47

## 2019-04-01 RX ADMIN — Medication 100 MILLIGRAM(S): at 21:59

## 2019-04-01 RX ADMIN — CELECOXIB 200 MILLIGRAM(S): 200 CAPSULE ORAL at 12:27

## 2019-04-01 RX ADMIN — Medication 100 MILLIGRAM(S): at 06:52

## 2019-04-01 RX ADMIN — Medication 1 TABLET(S): at 21:59

## 2019-04-01 RX ADMIN — CHLORHEXIDINE GLUCONATE 1 APPLICATION(S): 213 SOLUTION TOPICAL at 07:01

## 2019-04-01 RX ADMIN — SENNA PLUS 2 TABLET(S): 8.6 TABLET ORAL at 22:00

## 2019-04-01 RX ADMIN — Medication 1 TABLET(S): at 06:52

## 2019-04-01 RX ADMIN — OXYCODONE HYDROCHLORIDE 40 MILLIGRAM(S): 5 TABLET ORAL at 18:01

## 2019-04-01 RX ADMIN — Medication 1 TABLET(S): at 12:26

## 2019-04-01 RX ADMIN — LINEZOLID 600 MILLIGRAM(S): 600 INJECTION, SOLUTION INTRAVENOUS at 18:01

## 2019-04-01 RX ADMIN — ENOXAPARIN SODIUM 40 MILLIGRAM(S): 100 INJECTION SUBCUTANEOUS at 13:20

## 2019-04-01 RX ADMIN — Medication 1 MILLIGRAM(S): at 12:27

## 2019-04-01 RX ADMIN — PANTOPRAZOLE SODIUM 40 MILLIGRAM(S): 20 TABLET, DELAYED RELEASE ORAL at 08:25

## 2019-04-01 NOTE — PROGRESS NOTE ADULT - ASSESSMENT
43 F w/ history of IVDA, alcohol abuse, hx of pancreatitis, alcohol hepatitis, alcohol withdrawal, left frontoparietal subdural hematoma 2008 without need for neurosurgical intervention presented with severe sepsis with septic shock secondary to MRSA bacteremia w/ RLL PNA, UTI, endocarditis on LIZ and EFRAIN that has resolved and was diagnosis w/ HCV. Pt was initially intubated due to respiratory failure and put on pressors in ICU. She as extubated on 2/25 and transferred from ICU the following day. Pt had a C4-5 ACDF with irrigation and debridement on 3/20 due to spinal abscess and osteo and was kept intubated post procedure and transferred again to ICU, where she was extubated on 3/21 and transferred back to the med service the following day. HCV- Outpatient follow up for HCV and opioid maintenance outpt re-enrollment program    Septic arthritis involving BL Knees/hips;  - MRI of bilateral hips and knees were done demonstrating effusions and an abscess located in the right rectus femoris.   - Bilateral knees were aspirated by orthopedic team. on 3/28/19 prelim ngtd   bilateral hips aspirated by orthopedic team on 3/29/19 prelim ngtd  - as per ortho-No plan for orthopaedic operative intervention at this time. Should cultures grow pt will require operative intervention    septic shock secondary to MRSA bacteremia w/ RLL PNA, UTI, endocarditis on LIZ at Tricuspid valve l  - resolved     Endocarditis of tricuspid valve  - status post thoracentesis on 3/20  - as per ID, patient is on dapto & Zyvox   - repeat BC negative from 3/21, will repeat again on today   - repeat echo showed a pedunculated mobile mass seen adherent to lateral leaflet of the tricuspid valve w/ no interval changes noted in valve structure or regurgitation    - my colleague spoke with Dr. Gutierrez (CTS) who suggested to obtain CT C/A/P and LIZ ( not clinically indicated as pt stable and will not  )  I  d/w cardiology on today 4/1/19 and will get TTE if any subtle change in TR valve will then get LIZ, i  already d/w ortho who states cervical collar could be removed for LIZ        Spinal osteo, abscess and cord compression    - CT of head and neck showed disc space narrowing and endplate irregularity at C4-5 which may represent typical degenerative change vs discitis   - MRI of the cervical spine w/ contrast showed C2-C7 discitis/osteomyelitis with a small right of midline ventral epidural abscess at C2-C7  - MRI of the thoracolumbar spine showed discitis/osteomyelitis at T11-T12 with paravertebral abscess, as well as discitis/osteomyelitis C6--T1, T9-T10, T10-T11, L4-L5, and L5-S1, and a phlegmon/early abscess dorsolaterally within the lumbar canal at the level of L4-L5. There was also a septic arthritis of  the left sternoclavicular joint space. There was also disc herniation at the L4-L5 level with contact and probable impingement of the descending right-sided nerve roots  - status post s/p C4-5 ACDF with irrigation and debridement on 3/20  - c/w gabapentin and lidocaine patch  - wound cx grew MRSA  - c/w [place] J  collar   - as per ID, patient is on dapto & Zyvox     Bacteremia due to methicillin resistant Staphylococcus aureus with IE: diskitis/OM /Spinal abscesses /Parapneumonic effusion   - most recent blood cx from 3/21/19 negative   - s/p thoracentesis : fluids neg    Moderate protein-calorie malnutrition  - c/w soft diet w/Ensure Enlive 3 x day      IVDrug abuse  - c/w methadone for opiate abuse   - dc morphine  - cont celebrex, gabapentin, oxycodone, Oxycontin to 40 mg bid    Anemia due multiple issues, surgery , blood draws,   poor nutrition and infection  - status post a few units of blood during this extensive hospital stay last one on 3/20/19  continue to monitor hgb   - c/w folic acid 43 F w/ history of IVDA, alcohol abuse, hx of pancreatitis, alcohol hepatitis, alcohol withdrawal, left frontoparietal subdural hematoma 2008 without need for neurosurgical intervention presented with severe sepsis with septic shock secondary to MRSA bacteremia w/ RLL PNA, UTI, endocarditis on LIZ and EFRAIN that has resolved and was diagnosis w/ HCV. Pt was initially intubated due to respiratory failure and put on pressors in ICU. She as extubated on 2/25 and transferred from ICU the following day. Pt had a C4-5 ACDF with irrigation and debridement on 3/20 due to spinal abscess and osteo and was kept intubated post procedure and transferred again to ICU, where she was extubated on 3/21 and transferred back to the med service the following day. HCV- Outpatient follow up for HCV and opioid maintenance outpt re-enrollment program    Septic arthritis involving BL Knees/hips;  - MRI of bilateral hips and knees were done demonstrating effusions and an abscess located in the right rectus femoris.   - Bilateral knees were aspirated by orthopedic team. on 3/28/19 prelim ngtd   bilateral hips aspirated by orthopedic team on 3/29/19 prelim ngtd  - as per ortho-No plan for orthopaedic operative intervention at this time. Should cultures grow pt will require operative intervention    septic shock secondary to MRSA bacteremia w/ RLL PNA, UTI, endocarditis on LIZ at Tricuspid valve l  - resolved     Endocarditis of tricuspid valve  - status post thoracentesis on 3/20  - as per ID, patient is on dapto & Zyvox   - repeat BC negative from 3/21, will repeat again on today   - repeat echo showed a pedunculated mobile mass seen adherent to lateral leaflet of the tricuspid valve w/ no interval changes noted in valve structure or regurgitation    - my colleague spoke with Dr. Gutierrez (CTS) who suggested to obtain CT C/A/P and LIZ ( not clinically indicated as pt stable and will not  )  I  d/w cardiology on today 4/1/19 and will get TTE if any subtle change in TR valve will then get LIZ, i  already d/w ortho who states cervical collar could be removed for LIZ        Spinal osteo, abscess and cord compression    - CT of head and neck showed disc space narrowing and endplate irregularity at C4-5 which may represent typical degenerative change vs discitis   - MRI of the cervical spine w/ contrast showed C2-C7 discitis/osteomyelitis with a small right of midline ventral epidural abscess at C2-C7  - MRI of the thoracolumbar spine showed discitis/osteomyelitis at T11-T12 with paravertebral abscess, as well as discitis/osteomyelitis C6--T1, T9-T10, T10-T11, L4-L5, and L5-S1, and a phlegmon/early abscess dorsolaterally within the lumbar canal at the level of L4-L5. There was also a septic arthritis of  the left sternoclavicular joint space. There was also disc herniation at the L4-L5 level with contact and probable impingement of the descending right-sided nerve roots  - status post s/p C4-5 ACDF with irrigation and debridement on 3/20  - c/w gabapentin and lidocaine patch  - wound cx grew MRSA  - c/w [place] J  collar   - as per ID, patient is on dapto & Zyvox     Bacteremia due to methicillin resistant Staphylococcus aureus with IE: diskitis/OM /Spinal abscesses /Parapneumonic effusion   - most recent blood cx from 3/21/19 negative   - s/p thoracentesis : fluids neg    Moderate protein-calorie malnutrition  - c/w soft diet w/Ensure Enlive 3 x day       edema to lower extremities check dopplers to r/o dvt   start dvt prophylaxis with Lovenox per my d/w orthopedic .       IVDrug abuse  - c/w methadone for opiate abuse   - - cont celebrex, gabapentin, oxycodone, Oxycontin to 40 mg bid    Anemia due multiple issues, surgery , blood draws,   poor nutrition and infection  - status post a few units of blood during this extensive hospital stay last one on 3/20/19  continue to monitor hgb   - c/w folic acid

## 2019-04-01 NOTE — PROGRESS NOTE ADULT - PROBLEM SELECTOR PLAN 4
with IE: diskitis/OM /Spinal abscesses /Parapneumonic effusion   repeat blood c/s from last week negative  occasional low grade fever   s/p thoracentesis : fluids neg  cont dapto and zyvox with IE: diskitis/OM /Spinal abscesses /Parapneumonic effusion   repeat blood c/s from last week negative  occasional low grade fever   Now also has possible septic hip and knee arthritis vs pseudogout   (Crystal + ,high WBC in synovial fluid but c/s neg )  underwent arthrocentesis by Ortho over last week   so far c/s neg   s/p thoracentesis for pleural effusion vs empyema : fluids neg  cont dapto and zyvox

## 2019-04-01 NOTE — PROGRESS NOTE ADULT - ASSESSMENT
44 Y/O female w/ PMHx of IV heroin abuse brought in by EMS for lethargy and cough. As per EMS, pt was found laying in bed at home, lethargic however able to follow some commands. EMS reports that pt was attempting to detox from heroin, and last known use was 3 days prior to ED presentation. As per ED, pts boyfriend reports a hx of + pt cough productive of white sputum and a fall down a flight of stairs 2/16. Labs showed serum lactate of 7.2, BUN/Cr 124/6.42, and WBC of of 11.93. Tmax 102.4, urine + for cocaine, opiates, nitrites and many bacteria. ICU called for further evaluation. Treated for tricuspid valve endocarditis. Now with concerns of osteo/septic emboli.    PLAN:     No LIZ indicated at present as there is no clear evidence of clinical cardiac decompensation.  Would get repeat TTe to assess for any interval changes in valve function.  d/c to LTC facility to complete IV antibiotics?

## 2019-04-01 NOTE — PROGRESS NOTE ADULT - SUBJECTIVE AND OBJECTIVE BOX
Patient is a 43y old  Female who presents with a chief complaint of AMS (31 Mar 2019 07:49)      OVERNIGHT EVENTS:  acute BL hip/knees, generalized body pain stable on current regimen       MEDICATIONS  (STANDING):  ascorbic acid 500 milliGRAM(s) Oral two times a day  calcium carbonate 1250 mG  + Vitamin D (OsCal 500 + D) 1 Tablet(s) Oral three times a day  celecoxib 200 milliGRAM(s) Oral daily  chlorhexidine 4% Liquid 1 Application(s) Topical <User Schedule>  DAPTOmycin IVPB      DAPTOmycin IVPB 430 milliGRAM(s) IV Intermittent every 24 hours  docusate sodium 100 milliGRAM(s) Oral three times a day  enoxaparin Injectable 40 milliGRAM(s) SubCutaneous daily  folic acid 1 milliGRAM(s) Oral daily  gabapentin 800 milliGRAM(s) Oral three times a day  lidocaine   Patch 1 Patch Transdermal every 24 hours  lidocaine   Patch 1 Patch Transdermal every 24 hours  linezolid    Tablet 600 milliGRAM(s) Oral every 12 hours  methadone    Tablet 40 milliGRAM(s) Oral daily  multivitamin 1 Tablet(s) Oral daily  nicotine -  14 mG/24Hr(s) Patch 1 patch Transdermal daily  nystatin Powder 1 Application(s) Topical three times a day  oxyCODONE  ER Tablet 40 milliGRAM(s) Oral every 12 hours  pantoprazole    Tablet 40 milliGRAM(s) Oral before breakfast  senna 2 Tablet(s) Oral at bedtime    MEDICATIONS  (PRN):  acetaminophen   Tablet .. 650 milliGRAM(s) Oral every 6 hours PRN Mild Pain (1 - 3)  acetaminophen   Tablet .. 650 milliGRAM(s) Oral every 6 hours PRN Temp greater or equal to 38C (100.4F)  diphenhydrAMINE 25 milliGRAM(s) Oral every 4 hours PRN Rash and/or Itching  oxyCODONE    IR 20 milliGRAM(s) Oral every 4 hours PRN Moderate Pain (4 - 6)  polyethylene glycol 3350 17 Gram(s) Oral daily PRN Constipation      Allergies    penicillin (Short breath; Hives)    Intolerances    Vital Signs Last 24 Hrs  T(C): 36.8 (01 Apr 2019 05:48), Max: 36.8 (01 Apr 2019 05:48)  T(F): 98.2 (01 Apr 2019 05:48), Max: 98.2 (01 Apr 2019 05:48)  HR: 95 (01 Apr 2019 12:02) (79 - 95)  BP: 116/63 (01 Apr 2019 12:02) (93/54 - 116/63)  BP(mean): --  RR: 17 (01 Apr 2019 12:02) (16 - 18)  SpO2: 97% (01 Apr 2019 12:02) (97% - 98%)    PHYSICAL EXAM:  GENERAL: mild distress due to pain, well-groomed, well-developed  HEAD:  Atraumatic, Normocephalic  EYES: EOMI, PERRLA, conjunctiva and sclera clear  ENMT: No tonsillar erythema, exudates, or enlargement; Moist mucous membranes, Good dentition, No lesions  NECK: Supple, No JVD, Normal thyroid  NERVOUS SYSTEM:  Alert & Oriented X3, Good concentration; Motor Strength 4/5 B/L upper and lower extremities;  CHEST/LUNG: Clear to percussion bilaterally; No rales, rhonchi, wheezing, or rubs BL  HEART: Regular rate and rhythm; No murmurs, rubs, or gallops  ABDOMEN: Soft, Nontender, Nondistended; Bowel sounds present  BACK; Spinal tenderness  EXTREMITIES:  2+ Peripheral Pulses, No clubbing, cyanosis, or edema BL LE, BL Hip/knees TTP, R knee swollen  LYMPH: No lymphadenopathy noted  SKIN: No rashes or lesions      LABS:                                      9.0    8.08  )-----------( 405      ( 01 Apr 2019 07:28 )             30.0   04-01    136  |  99  |  16  ----------------------------<  118<H>  4.1   |  28  |  0.42<L>    Ca    9.6      01 Apr 2019 07:28        Cultures; Culture - Tissue with Gram Stain (collected 21 Mar 2019 09:42)  Source: .Tissue cervical disc #2 culture  Gram Stain (prelim) (22 Mar 2019 11:20):    Moderate polymorphonuclear leukocytes per low power field    Rare Gram positive cocci in pairs per oil power field  Preliminary Report (24 Mar 2019 12:21):    Few Methicillin resistant Staphylococcus aureus  Organism: Methicillin resistant Staphylococcus aureus (24 Mar 2019 12:21)  Organism: Methicillin resistant Staphylococcus aureus (24 Mar 2019 12:21)    Culture - Tissue with Gram Stain (collected 21 Mar 2019 09:38)  Source: .Tissue c4-c5 disc culture c/s ewb  Gram Stain (prelim) (22 Mar 2019 11:33):    Moderate polymorphonuclear leukocytes per low power field    Rare Gram positive cocci in pairs per oil power field  Preliminary Report (23 Mar 2019 08:21):    Few Methicillin resistant Staphylococcus aureus  Organism: Methicillin resistant Staphylococcus aureus (23 Mar 2019 08:20)  Organism: Methicillin resistant Staphylococcus aureus (23 Mar 2019 08:20)    Culture - Blood (collected 21 Mar 2019 09:33)  Source: .Blood  Preliminary Report (22 Mar 2019 10:01):    No growth to date.    Culture - Surgical Swab (collected 21 Mar 2019 09:31)  Source: .Surgical Swab cervical disc #1 culture c/s  Preliminary Report (23 Mar 2019 08:27):    Numerous Methicillin resistant Staphylococcus aureus  Organism: Methicillin resistant Staphylococcus aureus (23 Mar 2019 08:26)  Organism: Methicillin resistant Staphylococcus aureus (23 Mar 2019 08:26)    Culture - Surgical Swab (collected 21 Mar 2019 09:26)  Source: .Surgical Swab prevertebral culture c/s  Preliminary Report (22 Mar 2019 10:48):    No growth    Culture - Body Fluid with Gram Stain (collected 21 Mar 2019 04:12)  Source: .Body Fluid LEFT THORACENTESIS  Gram Stain (prelim) (22 Mar 2019 09:06):    polymorphonuclear leukocytes seen    No organisms seen    by cytocentrifuge  Preliminary Report (22 Mar 2019 09:06):    No growth      RADIOLOGY & ADDITIONAL TESTS:    Imaging Personally Reviewed:  [ x] YES    < from: Xray Chest 1 View- PORTABLE-Urgent (03.21.19 @ 02:02) >  Stable small right pleural effusion  Stable left basilar atelectasis.  Mild worsening of right basilar opacity.  < from: CT Chest No Cont (03.01.19 @ 11:32) >  Bilateral cavitary lesions are increased in size and number compared to   2/17/2019.     Small bilateral pleural effusions, loculated on the right, increased   compared to the prior study. Unchanged right lower lobe dependent  consolidation.    Prominent mediastinal and axillary lymph nodes, possibly reactive.      Consultant(s) Notes Reviewed:  [x ] YES     Care Discussed with [ x] Consultants [X ] Patient [ ] Family  [x ]    [x ]  Other; RN Patient is a 43y old  Female who presents with a chief complaint of AMS (31 Mar 2019 07:49)      OVERNIGHT EVENTS:  in chair in nad       MEDICATIONS  (STANDING):  ascorbic acid 500 milliGRAM(s) Oral two times a day  calcium carbonate 1250 mG  + Vitamin D (OsCal 500 + D) 1 Tablet(s) Oral three times a day  celecoxib 200 milliGRAM(s) Oral daily  chlorhexidine 4% Liquid 1 Application(s) Topical <User Schedule>  DAPTOmycin IVPB      DAPTOmycin IVPB 430 milliGRAM(s) IV Intermittent every 24 hours  docusate sodium 100 milliGRAM(s) Oral three times a day  enoxaparin Injectable 40 milliGRAM(s) SubCutaneous daily  folic acid 1 milliGRAM(s) Oral daily  gabapentin 800 milliGRAM(s) Oral three times a day  lidocaine   Patch 1 Patch Transdermal every 24 hours  lidocaine   Patch 1 Patch Transdermal every 24 hours  linezolid    Tablet 600 milliGRAM(s) Oral every 12 hours  methadone    Tablet 40 milliGRAM(s) Oral daily  multivitamin 1 Tablet(s) Oral daily  nicotine -  14 mG/24Hr(s) Patch 1 patch Transdermal daily  nystatin Powder 1 Application(s) Topical three times a day  oxyCODONE  ER Tablet 40 milliGRAM(s) Oral every 12 hours  pantoprazole    Tablet 40 milliGRAM(s) Oral before breakfast  senna 2 Tablet(s) Oral at bedtime    MEDICATIONS  (PRN):  acetaminophen   Tablet .. 650 milliGRAM(s) Oral every 6 hours PRN Mild Pain (1 - 3)  acetaminophen   Tablet .. 650 milliGRAM(s) Oral every 6 hours PRN Temp greater or equal to 38C (100.4F)  diphenhydrAMINE 25 milliGRAM(s) Oral every 4 hours PRN Rash and/or Itching  oxyCODONE    IR 20 milliGRAM(s) Oral every 4 hours PRN Moderate Pain (4 - 6)  polyethylene glycol 3350 17 Gram(s) Oral daily PRN Constipation      Allergies    penicillin (Short breath; Hives)    Intolerances    Vital Signs Last 24 Hrs  T(C): 36.8 (01 Apr 2019 05:48), Max: 36.8 (01 Apr 2019 05:48)  T(F): 98.2 (01 Apr 2019 05:48), Max: 98.2 (01 Apr 2019 05:48)  HR: 95 (01 Apr 2019 12:02) (79 - 95)  BP: 116/63 (01 Apr 2019 12:02) (93/54 - 116/63)  BP(mean): --  RR: 17 (01 Apr 2019 12:02) (16 - 18)  SpO2: 97% (01 Apr 2019 12:02) (97% - 98%)    PHYSICAL EXAM:  GENERAL: in chair in nad  well-groomed, well-developed  HEAD:  Atraumatic, Normocephalic  EYES: EOMI, PERRLA, conjunctiva and sclera clear  ENMT: No tonsillar erythema, exudates, or enlargement; Moist mucous membranes, Good dentition, No lesions, cervical collar in place   NECK: Supple, No JVD, Normal thyroid  NERVOUS SYSTEM:  Alert & Oriented X3, Good concentration; Motor Strength 4/5 B/L upper and lower extremities;  CHEST/LUNG: Clear to percussion bilaterally; No rales, rhonchi, wheezing, or rubs BL  HEART: Regular rate and rhythm; No murmurs, rubs, or gallops  ABDOMEN: Soft, Nontender, Nondistended; Bowel sounds present  BACK; Spinal tenderness  EXTREMITIES:  2+ Peripheral Pulses, No clubbing, cyanosis, + edema to lower extremities LE,   BL Hip/knees TTP, R knee swollen  SKIN: No rashes or lesions      LABS:                                      9.0    8.08  )-----------( 405      ( 01 Apr 2019 07:28 )             30.0   04-01    136  |  99  |  16  ----------------------------<  118<H>  4.1   |  28  |  0.42<L>    Ca    9.6      01 Apr 2019 07:28        Cultures; Culture - Tissue with Gram Stain (collected 21 Mar 2019 09:42)  Source: .Tissue cervical disc #2 culture  Gram Stain (prelim) (22 Mar 2019 11:20):    Moderate polymorphonuclear leukocytes per low power field    Rare Gram positive cocci in pairs per oil power field  Preliminary Report (24 Mar 2019 12:21):    Few Methicillin resistant Staphylococcus aureus  Organism: Methicillin resistant Staphylococcus aureus (24 Mar 2019 12:21)  Organism: Methicillin resistant Staphylococcus aureus (24 Mar 2019 12:21)    Culture - Tissue with Gram Stain (collected 21 Mar 2019 09:38)  Source: .Tissue c4-c5 disc culture c/s ewb  Gram Stain (prelim) (22 Mar 2019 11:33):    Moderate polymorphonuclear leukocytes per low power field    Rare Gram positive cocci in pairs per oil power field  Preliminary Report (23 Mar 2019 08:21):    Few Methicillin resistant Staphylococcus aureus  Organism: Methicillin resistant Staphylococcus aureus (23 Mar 2019 08:20)  Organism: Methicillin resistant Staphylococcus aureus (23 Mar 2019 08:20)    Culture - Blood (collected 21 Mar 2019 09:33)  Source: .Blood  Preliminary Report (22 Mar 2019 10:01):    No growth to date.    Culture - Surgical Swab (collected 21 Mar 2019 09:31)  Source: .Surgical Swab cervical disc #1 culture c/s  Preliminary Report (23 Mar 2019 08:27):    Numerous Methicillin resistant Staphylococcus aureus  Organism: Methicillin resistant Staphylococcus aureus (23 Mar 2019 08:26)  Organism: Methicillin resistant Staphylococcus aureus (23 Mar 2019 08:26)    Culture - Surgical Swab (collected 21 Mar 2019 09:26)  Source: .Surgical Swab prevertebral culture c/s  Preliminary Report (22 Mar 2019 10:48):    No growth    Culture - Body Fluid with Gram Stain (collected 21 Mar 2019 04:12)  Source: .Body Fluid LEFT THORACENTESIS  Gram Stain (prelim) (22 Mar 2019 09:06):    polymorphonuclear leukocytes seen    No organisms seen    by cytocentrifuge  Preliminary Report (22 Mar 2019 09:06):    No growth      RADIOLOGY & ADDITIONAL TESTS:    Imaging Personally Reviewed:  [ x] YES    < from: Xray Chest 1 View- PORTABLE-Urgent (03.21.19 @ 02:02) >  Stable small right pleural effusion  Stable left basilar atelectasis.  Mild worsening of right basilar opacity.  < from: CT Chest No Cont (03.01.19 @ 11:32) >  Bilateral cavitary lesions are increased in size and number compared to   2/17/2019.     Small bilateral pleural effusions, loculated on the right, increased   compared to the prior study. Unchanged right lower lobe dependent  consolidation.    Prominent mediastinal and axillary lymph nodes, possibly reactive.      Consultant(s) Notes Reviewed:  [x ] YES     Care Discussed with [ x] Consultants [X ] Patient [ ] Family  [x ]    [x ]  Other; RN

## 2019-04-01 NOTE — PROGRESS NOTE ADULT - SUBJECTIVE AND OBJECTIVE BOX
HPI:  42 Y/O female w/ PMHx of IV heroin abuse brought in by EMS for lethargy and cough. As per EMS, pt was found laying in bed at home, lethargic however able to follow some commands. EMS reports that pt was attempting to detox from heroin, and last known use was 3 days prior to ED presentation. As per ED, pts boyfriend reports a hx of + pt cough productive of white sputum and a fall down a flight of stairs 2/16. Labs showed serum lactate of 7.2, BUN/Cr 124/6.42, and WBC of of 11.93. Tmax 102.4, urine + for cocaine, opiates, nitrites and many bacteria. ICU called for further evaluation. 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 Now with concerns bilateral hip effusions as well as right rectus femoris abscess and epidural abscess.  Patient currently ambulating, no significant pain, does not look or feel septic.    REVIEW OF SYSTEMS:  General:  No wt loss, fevers, chills, night sweats  Eyes:  Good vision, no reported pain  ENT:  No sore throat, pain, runny nose, dysphagia  CV:  No pain, palpitations, hypo/hypertension  Resp:  No dyspnea, cough, tachypnea, wheezing  GI:  No pain, nausea, vomiting, diarrhea, constipation  :  No pain, bleeding, incontinence, nocturia  Muscle:  No pain, weakness  Breast:  No pain, abscess, mass, discharge  Neuro:  No weakness, tingling, memory problems  Psych:  No fatigue, insomnia, mood problems, depression  Endocrine:  No polyuria, polydipsia, cold/heat intolerance  Heme:  No petechiae, ecchymosis, easy bruisability  Skin:  No rash, edema    PHYSICAL EXAM:  ICU Vital Signs Last 24 Hrs  T(C): 36.6 (01 Apr 2019 12:36), Max: 36.8 (01 Apr 2019 05:48)  T(F): 97.8 (01 Apr 2019 12:36), Max: 98.2 (01 Apr 2019 05:48)  HR: 87 (01 Apr 2019 16:24) (79 - 95)  BP: 116/66 (01 Apr 2019 12:36) (93/54 - 116/66)  BP(mean): --  ABP: --  ABP(mean): --  RR: 17 (01 Apr 2019 16:24) (16 - 18)  SpO2: 96% (01 Apr 2019 16:24) (96% - 98%)    LABORATORY:                                      8.3    6.80  )-----------( 361      ( 31 Mar 2019 10:04 )             27.0     03-31    136  |  100  |  20  ----------------------------<  118<H>  4.2   |  28  |  0.47<L>    Ca    9.1      31 Mar 2019 10:04         IMAGING:  ecg: < from: 12 Lead ECG (03.22.19 @ 11:05) >  Diagnosis Line Normal sinus rhythm  Normal ECG  When compared with ECG of 20-MAR-2019 00:45,  No significant change was found  Confirmed by Baldev TERRAZAS, Rashaun ([id number]    < end of copied text >        < from: TTE Limited Echo w/o Cont (03.22.19 @ 13:27) >  Summary:   1. Trace tricuspid regurgitation.   2. Peduncululated mobile mass seen adherent to lateral leaflet of the   tricuspid valve.   3. No interval changes noted in valve structure or regurgitation.    < end of copied text >

## 2019-04-01 NOTE — PROGRESS NOTE ADULT - PROBLEM SELECTOR PLAN 1
s/p C4-5 ACDF with irrigation and debridement   OR c/s MRSA   cont Dapto (will require 8  total weeks )

## 2019-04-01 NOTE — PROGRESS NOTE ADULT - SUBJECTIVE AND OBJECTIVE BOX
Pt seen and examined at bedside. Pain tolerable this AM. Patient was up and ambulating with walker to restroom this a.m. Denies CP/Dyspnea/Calf tenderness bilaterally. States she has global back pain and is leading to hip and LE pain b/l, however it is improved this am and improves with ambulation. Patient is tolerating [place], reports more comfortably than [place] collar. Pt reports R foot swelling, however no discomfort. No other complaints.         Vital Signs Last 24 Hrs  T(C): 36.7 (31 Mar 2019 23:35), Max: 37.1 (31 Mar 2019 11:30)  T(F): 98 (31 Mar 2019 23:35), Max: 98.7 (31 Mar 2019 11:30)  HR: 79 (31 Mar 2019 23:35) (76 - 93)  BP: 93/54 (31 Mar 2019 23:35) (93/54 - 113/62)  BP(mean): --  RR: 16 (31 Mar 2019 23:35) (16 - 18)  SpO2: 98% (31 Mar 2019 23:35) (97% - 98%)        Gen: NAD  BL hip and knee ROM does not exhibit pain, no pain with weight bearing, no signs of erythema or worsening effusion of B/L Knees. Pt was ambulating this am with walker, without discomfort of the b/l LE    Spine PE:  Skin intact  [place] Collar In Place  dressing c/d/i  Negative clonus  Negative babinski  Negative olmstead  Ambulating in room with walker           Deltoid        Bicep        Tricep          Wrist Ext    Wrist Flex    Finger Flex          Finger Abd  L            4/5         4/5           4/5             4/5                4/5	           4/5                    4/5  R            2/5 	   3/5           4/5             3/5              4/5                        4/5                   4/5                                      Hip Flex       Quad     Ankle DF        Ankle PF       Toe Ext        Hamstring    L            5/5              5/5          5/5                 5/5                 5/5	                5/5  R            5/5              5/5           5/5                 5/5                 5/5                5/5    Sensory:            C5         C6         C7      C8       T1        (0=absent, 1=impaired, 2=normal, NT=not testable)  L         2            2           2        2         2  R          2            2           2        2         2               L2          L3         L4      L5       S1         (0=absent, 1=impaired, 2=normal, NT=not testable)  L         2            2            2        2        2  R          2            2           2        2         2    Paresthesias in stocking/glove distribution at baseline, states feet are improving

## 2019-04-01 NOTE — PROGRESS NOTE ADULT - PROBLEM SELECTOR PLAN 5
as above  Repeat TTE no worsening or abscess formation   no indication for valvectomy at this time as above  Repeat TTE no worsening or abscess formation  with pt not having more fever /leukocytosis/sepsis or new (recurrent )  bacteremias  her chances of showering new septic emboli is less likely   repeat blood c/s today   Its very possible she seeded all these areas initially when she was bacteremic and febrile for first 11 days   In the event of new fever or bacteremia however we will def require a repeat LIZ

## 2019-04-01 NOTE — PROGRESS NOTE ADULT - SUBJECTIVE AND OBJECTIVE BOX
Patient is a 43y old  Female who presents with a chief complaint of AMS (01 Apr 2019 12:37)      INTERVAL HPI / OVERNIGHT EVENTS: no new c/o     MEDICATIONS  (STANDING):  ascorbic acid 500 milliGRAM(s) Oral two times a day  calcium carbonate 1250 mG  + Vitamin D (OsCal 500 + D) 1 Tablet(s) Oral three times a day  celecoxib 200 milliGRAM(s) Oral daily  chlorhexidine 4% Liquid 1 Application(s) Topical <User Schedule>  DAPTOmycin IVPB      DAPTOmycin IVPB 430 milliGRAM(s) IV Intermittent every 24 hours  docusate sodium 100 milliGRAM(s) Oral three times a day  enoxaparin Injectable 40 milliGRAM(s) SubCutaneous daily  folic acid 1 milliGRAM(s) Oral daily  gabapentin 800 milliGRAM(s) Oral three times a day  lactobacillus acidophilus 1 Tablet(s) Oral two times a day with meals  lidocaine   Patch 1 Patch Transdermal every 24 hours  lidocaine   Patch 1 Patch Transdermal every 24 hours  linezolid    Tablet 600 milliGRAM(s) Oral every 12 hours  methadone    Tablet 40 milliGRAM(s) Oral daily  multivitamin 1 Tablet(s) Oral daily  nicotine -  14 mG/24Hr(s) Patch 1 patch Transdermal daily  nystatin Powder 1 Application(s) Topical three times a day  oxyCODONE  ER Tablet 40 milliGRAM(s) Oral every 12 hours  pantoprazole    Tablet 40 milliGRAM(s) Oral before breakfast  senna 2 Tablet(s) Oral at bedtime    MEDICATIONS  (PRN):  acetaminophen   Tablet .. 650 milliGRAM(s) Oral every 6 hours PRN Mild Pain (1 - 3)  acetaminophen   Tablet .. 650 milliGRAM(s) Oral every 6 hours PRN Temp greater or equal to 38C (100.4F)  diphenhydrAMINE 25 milliGRAM(s) Oral every 4 hours PRN Rash and/or Itching  oxyCODONE    IR 20 milliGRAM(s) Oral every 4 hours PRN Moderate Pain (4 - 6)  polyethylene glycol 3350 17 Gram(s) Oral daily PRN Constipation      Vital Signs Last 24 Hrs  T(C): 36.6 (01 Apr 2019 12:36), Max: 36.8 (01 Apr 2019 05:48)  T(F): 97.8 (01 Apr 2019 12:36), Max: 98.2 (01 Apr 2019 05:48)  HR: 94 (01 Apr 2019 12:36) (79 - 95)  BP: 116/66 (01 Apr 2019 12:36) (93/54 - 116/66)  BP(mean): --  RR: 16 (01 Apr 2019 12:36) (16 - 18)  SpO2: 97% (01 Apr 2019 12:36) (97% - 98%)    Review of systems:  General : no fever /chills, no new worsening neck pain etc  CVS : no chest pain, palpitations  Lungs : no shortness of breath, cough  GI : no abdominal pain, vomiting, diarrhea   : no dysuria, hematuria        PHYSICAL EXAM:  General :NAD ,neck collar  Constitutional:normal built  Respiratory: CTAB/L  Cardiovascular: S1 and S2, RRR, no M/G/R  Gastrointestinal: BS+, soft, NT/ND  Extremities: No peripheral edema  Vascular: 2+ peripheral pulses  Skin: skin popping      LABS:                        9.0    8.08  )-----------( 405      ( 01 Apr 2019 07:28 )             30.0     04-01    136  |  99  |  16  ----------------------------<  118<H>  4.1   |  28  |  0.42<L>    Ca    9.6      01 Apr 2019 07:28            MICROBIOLOGY:  RECENT CULTURES:  03-29 Joint Fl left hip aspiration XXXX   Numerous polymorphonuclear leukocytes  No organisms seen  by cytocentrifuge   No growth    03-29 .Body Fluid right rectus femorus aspiration XXXX   No polymorphonuclear cells seen per low power field  No organisms seen per oil power field   No growth    03-29 Joint Fl right hip aspiration XXXX   Numerous polymorphonuclear leukocytes  No organisms seen  by cytocentrifuge   No growth    03-28 .Body Fluid XXXX   polymorphonuclear leukocytes seen  No organisms seen   No growth to date.    03-28 .Body Fluid XXXX   polymorphonuclear leukocytes seen  No organisms seen   No growth to date.          RADIOLOGY & ADDITIONAL STUDIES:  Moderate size left hip joint effusion with pericapsular edema and with   edema in the surrounding muscles. Large left knee joint effusion with   adjacent edema in the distal quadriceps muscles and gastrocnemius   muscles. Small right hip joint effusion with suspected pericapsular   edema. Small right knee joint effusion. Septic arthritis is in the   differential diagnosis for all of these joints. Joint fluid analysis   would be necessary to further evaluate.    4.5 x 1.3 x 0.5 cm rim-enhancing collection in the right rectus femoris   muscle at the level of the mid femoral diaphysis, suspicious for an   abscess.

## 2019-04-01 NOTE — PROGRESS NOTE ADULT - ASSESSMENT
A/P: 43y Female s/p C4-5 ACDF with irrigation and debridement, now POD 12    D/w Dr Leslie, no contraindication to starting DVT ppx, DVT ppx per primary team  Will recommend decreasing narcotics, restarting methadone and weaning pt  Will continue to Follow cultures and gram stain for bilateral knee aspirations done 3/28, bl IR hip aspirations and rectus femoris aspiration from 3/29- NGTD, B/L hips demonstrate + CPPD crystals   based on lab results and physical exam, low suspicion of septic arthritis in b/l hip and knee at this time. Patient is ambulating with walker well.   CT C/A/P ordered by medicine yesterday, completed, demonstrates some improved BL pleural effusion, will FU w/ medicine in regards to read.   LIZ demonstrates no change in vegetation, Cards recommending no new LIZ at this time, no signs of cardiac decompensation   Pt reevaluated by cardiology, recommended repeat surface echo, had discussed possible transfer to [facility] on 3/29, no current discussion for transfer in recent notes, will FU.   MRI T/L spine repeated, no change from prior MRI, stable as of now, will get repeat MRI in 2-4 weeks   Pain control   OR Cx's growing MRSA, ABX per ID  No plan for return to OR for epidural abscesses or knee/hip washout at this time, will continue to monitor for improvement or RUE weakness which has been present since before surgery.    continue in [place] J Collar  LSO at bedside, To be worn with ambulation   Dry dressing changes as needed.   Pain control  DVT ppx- SCD's, can begin chemical ppx  PT/OT, WBAT  FU labs  will d/w attending and advise if plan changes A/P: 43y Female s/p C4-5 ACDF with irrigation and debridement, now POD 12    D/w Dr Leslie, no contraindication to starting DVT ppx, DVT ppx per primary team  Will recommend decreasing narcotics, restarting methadone and weaning pt  Will continue to Follow cultures and gram stain for bilateral knee aspirations done 3/28, bl IR hip aspirations and rectus femoris aspiration from 3/29- NGTD, B/L hips demonstrate + CPPD crystals   based on lab results and physical exam, low suspicion of septic arthritis in b/l hip and knee at this time. Patient is ambulating with walker well.   CT C/A/P ordered by medicine yesterday, completed, demonstrates some improved BL pleural effusion, will FU w/ medicine in regards to read.   LIZ demonstrates no change in vegetation, Cards recommending no new LIZ at this time, no signs of cardiac decompensation   Pt reevaluated by cardiology, recommended repeat surface echo, had discussed possible transfer to [facility] on 3/29, no current discussion for transfer in recent notes, will FU.   MRI T/L spine repeated, no change from prior MRI, stable as of now, will get repeat MRI in 2-4 weeks   Pain control   OR Cx's growing MRSA, ABX per ID  No plan for return to OR for epidural abscesses or knee/hip washout at this time, will continue to monitor for improvement or RUE weakness which has been present since before surgery.    continue in [place] J Collar, Collar may be removed for LIZ procedure otherwise maintain collar at all times  LSO at bedside, To be worn with ambulation   Dry dressing changes as needed.   Pain control  DVT ppx- SCD's, can begin chemical ppx  PT/OT, WBAT  FU labs  will d/w attending and advise if plan changes A/P: 43y Female s/p C4-5 ACDF with irrigation and debridement, now POD 12    D/w Dr Leslie, no contraindication to starting DVT ppx, DVT ppx per primary team  Will recommend decreasing narcotics, restarting methadone and weaning pt  Will continue to Follow cultures and gram stain for bilateral knee aspirations done 3/28, bl IR hip aspirations and rectus femoris aspiration from 3/29- NGTD, B/L hips demonstrate + CPPD crystals   based on lab results and physical exam, low suspicion of septic arthritis in b/l hip and knee at this time. Patient is ambulating with walker well.   CT C/A/P ordered by medicine yesterday, completed, demonstrates some improved BL pleural effusion, will FU w/ medicine in regards to read.   LIZ demonstrates no change in vegetation, Cards recommending no new LIZ at this time, no signs of cardiac decompensation   Pt reevaluated by cardiology, recommended repeat surface echo, had discussed possible transfer to [facility] on 3/29, no current discussion for transfer in recent notes, will FU.   MRI T/L spine repeated, no change from prior MRI, stable as of now, will get repeat MRI in 2-4 weeks   Pain control   OR Cx's growing MRSA, ABX per ID  No plan for return to OR for epidural abscesses or knee/hip washout at this time, will continue to monitor for improvement or RUE weakness which has been present since before surgery.    continue in [place] J Collar, Collar may be removed for LIZ procedure for GENTLE neck maneuvers otherwise maintain collar at all times. PLEASE BE CAUTIOUS!  LSO at bedside, To be worn with ambulation   Dry dressing changes as needed.   Pain control  DVT ppx- SCD's, can begin chemical ppx  PT/OT, WBAT  FU labs  will d/w attending and advise if plan changes

## 2019-04-02 LAB
ANION GAP SERPL CALC-SCNC: 7 MMOL/L — SIGNIFICANT CHANGE UP (ref 5–17)
BUN SERPL-MCNC: 18 MG/DL — SIGNIFICANT CHANGE UP (ref 7–23)
CALCIUM SERPL-MCNC: 9.8 MG/DL — SIGNIFICANT CHANGE UP (ref 8.5–10.1)
CHLORIDE SERPL-SCNC: 99 MMOL/L — SIGNIFICANT CHANGE UP (ref 96–108)
CO2 SERPL-SCNC: 32 MMOL/L — HIGH (ref 22–31)
CREAT SERPL-MCNC: 0.47 MG/DL — LOW (ref 0.5–1.3)
CULTURE RESULTS: NO GROWTH — SIGNIFICANT CHANGE UP
CULTURE RESULTS: NO GROWTH — SIGNIFICANT CHANGE UP
CULTURE RESULTS: SIGNIFICANT CHANGE UP
GLUCOSE SERPL-MCNC: 98 MG/DL — SIGNIFICANT CHANGE UP (ref 70–99)
GRAM STN FLD: SIGNIFICANT CHANGE UP
GRAM STN FLD: SIGNIFICANT CHANGE UP
HCT VFR BLD CALC: 29 % — LOW (ref 34.5–45)
HGB BLD-MCNC: 8.8 G/DL — LOW (ref 11.5–15.5)
MAGNESIUM SERPL-MCNC: 2.2 MG/DL — SIGNIFICANT CHANGE UP (ref 1.6–2.6)
MCHC RBC-ENTMCNC: 25.5 PG — LOW (ref 27–34)
MCHC RBC-ENTMCNC: 30.3 GM/DL — LOW (ref 32–36)
MCV RBC AUTO: 84.1 FL — SIGNIFICANT CHANGE UP (ref 80–100)
NRBC # BLD: 0 /100 WBCS — SIGNIFICANT CHANGE UP (ref 0–0)
PHOSPHATE SERPL-MCNC: 5.6 MG/DL — HIGH (ref 2.5–4.5)
PLATELET # BLD AUTO: 389 K/UL — SIGNIFICANT CHANGE UP (ref 150–400)
POTASSIUM SERPL-MCNC: 4.6 MMOL/L — SIGNIFICANT CHANGE UP (ref 3.5–5.3)
POTASSIUM SERPL-SCNC: 4.6 MMOL/L — SIGNIFICANT CHANGE UP (ref 3.5–5.3)
PREALB SERPL-MCNC: 13 MG/DL — LOW (ref 20–40)
RBC # BLD: 3.45 M/UL — LOW (ref 3.8–5.2)
RBC # FLD: 18.4 % — HIGH (ref 10.3–14.5)
SODIUM SERPL-SCNC: 138 MMOL/L — SIGNIFICANT CHANGE UP (ref 135–145)
SPECIMEN SOURCE: SIGNIFICANT CHANGE UP
SPECIMEN SOURCE: SIGNIFICANT CHANGE UP
WBC # BLD: 7.6 K/UL — SIGNIFICANT CHANGE UP (ref 3.8–10.5)
WBC # FLD AUTO: 7.6 K/UL — SIGNIFICANT CHANGE UP (ref 3.8–10.5)

## 2019-04-02 PROCEDURE — 93970 EXTREMITY STUDY: CPT | Mod: 26

## 2019-04-02 PROCEDURE — 99233 SBSQ HOSP IP/OBS HIGH 50: CPT

## 2019-04-02 RX ORDER — COLCHICINE 0.6 MG
0.6 TABLET ORAL DAILY
Qty: 0 | Refills: 0 | Status: DISCONTINUED | OUTPATIENT
Start: 2019-04-02 | End: 2019-04-22

## 2019-04-02 RX ORDER — FUROSEMIDE 40 MG
20 TABLET ORAL ONCE
Qty: 0 | Refills: 0 | Status: COMPLETED | OUTPATIENT
Start: 2019-04-02 | End: 2019-04-02

## 2019-04-02 RX ADMIN — Medication 1 PATCH: at 13:49

## 2019-04-02 RX ADMIN — OXYCODONE HYDROCHLORIDE 20 MILLIGRAM(S): 5 TABLET ORAL at 14:54

## 2019-04-02 RX ADMIN — OXYCODONE HYDROCHLORIDE 20 MILLIGRAM(S): 5 TABLET ORAL at 03:10

## 2019-04-02 RX ADMIN — CHLORHEXIDINE GLUCONATE 1 APPLICATION(S): 213 SOLUTION TOPICAL at 06:51

## 2019-04-02 RX ADMIN — NYSTATIN CREAM 1 APPLICATION(S): 100000 CREAM TOPICAL at 13:52

## 2019-04-02 RX ADMIN — Medication 100 MILLIGRAM(S): at 05:55

## 2019-04-02 RX ADMIN — Medication 500 MILLIGRAM(S): at 05:55

## 2019-04-02 RX ADMIN — Medication 1 PATCH: at 18:30

## 2019-04-02 RX ADMIN — ENOXAPARIN SODIUM 40 MILLIGRAM(S): 100 INJECTION SUBCUTANEOUS at 13:49

## 2019-04-02 RX ADMIN — Medication 1 TABLET(S): at 22:19

## 2019-04-02 RX ADMIN — GABAPENTIN 800 MILLIGRAM(S): 400 CAPSULE ORAL at 22:19

## 2019-04-02 RX ADMIN — Medication 1 TABLET(S): at 18:24

## 2019-04-02 RX ADMIN — Medication 1 PATCH: at 09:09

## 2019-04-02 RX ADMIN — CELECOXIB 200 MILLIGRAM(S): 200 CAPSULE ORAL at 13:51

## 2019-04-02 RX ADMIN — Medication 500 MILLIGRAM(S): at 18:24

## 2019-04-02 RX ADMIN — OXYCODONE HYDROCHLORIDE 20 MILLIGRAM(S): 5 TABLET ORAL at 22:19

## 2019-04-02 RX ADMIN — Medication 1 PATCH: at 12:35

## 2019-04-02 RX ADMIN — Medication 1 TABLET(S): at 05:56

## 2019-04-02 RX ADMIN — PANTOPRAZOLE SODIUM 40 MILLIGRAM(S): 20 TABLET, DELAYED RELEASE ORAL at 06:01

## 2019-04-02 RX ADMIN — LIDOCAINE 1 PATCH: 4 CREAM TOPICAL at 00:45

## 2019-04-02 RX ADMIN — OXYCODONE HYDROCHLORIDE 40 MILLIGRAM(S): 5 TABLET ORAL at 05:56

## 2019-04-02 RX ADMIN — NYSTATIN CREAM 1 APPLICATION(S): 100000 CREAM TOPICAL at 05:57

## 2019-04-02 RX ADMIN — Medication 100 MILLIGRAM(S): at 22:19

## 2019-04-02 RX ADMIN — OXYCODONE HYDROCHLORIDE 20 MILLIGRAM(S): 5 TABLET ORAL at 22:50

## 2019-04-02 RX ADMIN — OXYCODONE HYDROCHLORIDE 40 MILLIGRAM(S): 5 TABLET ORAL at 18:24

## 2019-04-02 RX ADMIN — SENNA PLUS 2 TABLET(S): 8.6 TABLET ORAL at 22:19

## 2019-04-02 RX ADMIN — Medication 1 TABLET(S): at 13:50

## 2019-04-02 RX ADMIN — LIDOCAINE 1 PATCH: 4 CREAM TOPICAL at 00:47

## 2019-04-02 RX ADMIN — LIDOCAINE 1 PATCH: 4 CREAM TOPICAL at 12:35

## 2019-04-02 RX ADMIN — DAPTOMYCIN 117.2 MILLIGRAM(S): 500 INJECTION, POWDER, LYOPHILIZED, FOR SOLUTION INTRAVENOUS at 13:49

## 2019-04-02 RX ADMIN — LINEZOLID 600 MILLIGRAM(S): 600 INJECTION, SOLUTION INTRAVENOUS at 05:55

## 2019-04-02 RX ADMIN — Medication 1 TABLET(S): at 09:05

## 2019-04-02 RX ADMIN — NYSTATIN CREAM 1 APPLICATION(S): 100000 CREAM TOPICAL at 22:21

## 2019-04-02 RX ADMIN — GABAPENTIN 800 MILLIGRAM(S): 400 CAPSULE ORAL at 05:56

## 2019-04-02 RX ADMIN — Medication 20 MILLIGRAM(S): at 18:24

## 2019-04-02 RX ADMIN — OXYCODONE HYDROCHLORIDE 20 MILLIGRAM(S): 5 TABLET ORAL at 03:40

## 2019-04-02 RX ADMIN — LIDOCAINE 1 PATCH: 4 CREAM TOPICAL at 09:08

## 2019-04-02 RX ADMIN — GABAPENTIN 800 MILLIGRAM(S): 400 CAPSULE ORAL at 13:50

## 2019-04-02 RX ADMIN — LINEZOLID 600 MILLIGRAM(S): 600 INJECTION, SOLUTION INTRAVENOUS at 18:24

## 2019-04-02 RX ADMIN — OXYCODONE HYDROCHLORIDE 40 MILLIGRAM(S): 5 TABLET ORAL at 08:50

## 2019-04-02 RX ADMIN — Medication 1 MILLIGRAM(S): at 13:51

## 2019-04-02 RX ADMIN — Medication 100 MILLIGRAM(S): at 13:50

## 2019-04-02 RX ADMIN — METHADONE HYDROCHLORIDE 40 MILLIGRAM(S): 40 TABLET ORAL at 13:51

## 2019-04-02 NOTE — PROGRESS NOTE ADULT - ASSESSMENT
43 F w/ history of IVDA, alcohol abuse, hx of pancreatitis, alcohol hepatitis, alcohol withdrawal, left frontoparietal subdural hematoma 2008 without need for neurosurgical intervention presented with severe sepsis with septic shock secondary to MRSA bacteremia w/ RLL PNA, UTI, endocarditis on LIZ and EFRAIN that has resolved and was diagnosis w/ HCV. Pt was initially intubated due to respiratory failure and put on pressors in ICU. She as extubated on 2/25 and transferred from ICU the following day. Pt had a C4-5 ACDF with irrigation and debridement on 3/20 due to spinal abscess and osteo and was kept intubated post procedure and transferred again to ICU, where she was extubated on 3/21 and transferred back to the med service the following day. HCV- Outpatient follow up for HCV and opioid maintenance outpt re-enrollment program    Septic arthritis involving BL Knees/hips;  - MRI of bilateral hips and knees were done demonstrating effusions and an abscess located in the right rectus femoris.   - Bilateral knees were aspirated by orthopedic team. on 3/28/19 prelim ngtd   bilateral hips aspirated by orthopedic team on 3/29/19 prelim ngtd  - as per ortho-No plan for orthopaedic operative intervention at this time. Should cultures grow pt will require operative intervention    septic shock secondary to MRSA bacteremia w/ RLL PNA, UTI, endocarditis on LIZ at Tricuspid valve l  - resolved     Endocarditis of tricuspid valve  - status post thoracentesis on 3/20  - as per ID, patient is on dapto & Zyvox   - repeat BC negative from 3/21, repeated blood cx on 4/1/19 pending    - repeat echo showed a pedunculated mobile mass seen adherent to lateral leaflet of the tricuspid valve w/ no interval changes noted in valve structure or regurgitation    - my colleague spoke with Dr. Gutierrez (CTS) who suggested to obtain CT C/A/P and LIZ ( not clinically indicated as pt stable and will not  )  I  d/w cardiology on 4/1/19 and will get TTE if any subtle change in TR valve will then get LIZ, i  already d/w ortho who states cervical collar could be removed for LIZ        Spinal osteo, abscess and cord compression    - CT of head and neck showed disc space narrowing and endplate irregularity at C4-5 which may represent typical degenerative change vs discitis   - MRI of the cervical spine w/ contrast showed C2-C7 discitis/osteomyelitis with a small right of midline ventral epidural abscess at C2-C7  - MRI of the thoracolumbar spine showed discitis/osteomyelitis at T11-T12 with paravertebral abscess, as well as discitis/osteomyelitis C6--T1, T9-T10, T10-T11, L4-L5, and L5-S1, and a phlegmon/early abscess dorsolaterally within the lumbar canal at the level of L4-L5. There was also a septic arthritis of  the left sternoclavicular joint space. There was also disc herniation at the L4-L5 level with contact and probable impingement of the descending right-sided nerve roots  - status post s/p C4-5 ACDF with irrigation and debridement on 3/20  - c/w gabapentin and lidocaine patch  - wound cx grew MRSA  - c/w [place] J  collar   - as per ID, patient is on dapto & Zyvox     Bacteremia due to methicillin resistant Staphylococcus aureus with IE: diskitis/OM /Spinal abscesses /Parapneumonic effusion   - most recent blood cx from 3/21/19 negative   - s/p thoracentesis : fluids neg    Moderate protein-calorie malnutrition  - c/w soft diet w/Ensure Enlive 3 x day    will get nutrition consult      edema to lower extremities checked dopplers on 4/1/19 and no dvt    started dvt prophylaxis with Lovenox per my d/w orthopedic . on 4/1/19   prealbumin is low c/w malnutrition will get nutrition consult      IVDrug abuse  - c/w methadone for opiate abuse   - - cont celebrex, gabapentin, oxycodone, Oxycontin to 40 mg bid    Anemia due multiple issues, surgery , blood draws,   poor nutrition and infection  - status post a few units of blood during this extensive hospital stay last one on 3/20/19  continue to monitor hgb   - c/w folic acid 43 F w/ history of IVDA, alcohol abuse, hx of pancreatitis, alcohol hepatitis, alcohol withdrawal, left frontoparietal subdural hematoma 2008 without need for neurosurgical intervention presented with severe sepsis with septic shock secondary to MRSA bacteremia w/ RLL PNA, UTI, endocarditis on LIZ and EFRAIN that has resolved and was diagnosis w/ HCV. Pt was initially intubated due to respiratory failure and put on pressors in ICU. She as extubated on 2/25 and transferred from ICU the following day. Pt had a C4-5 ACDF with irrigation and debridement on 3/20 due to spinal abscess and osteo and was kept intubated post procedure and transferred again to ICU, where she was extubated on 3/21 and transferred back to the med service the following day. HCV- Outpatient follow up for HCV and opioid maintenance outpt re-enrollment program    Septic arthritis involving BL Knees/hips;  - MRI of bilateral hips and knees were done demonstrating effusions and an abscess located in the right rectus femoris.   - Bilateral knees were aspirated by orthopedic team. on 3/28/19 prelim ngtd   bilateral hips aspirated by orthopedic team on 3/29/19 prelim ngtd but cppd crystals seen c/w psuedo gout  - as per ortho-No plan for orthopaedic operative intervention at this time. Should cultures grow pt will require operative intervention      Pseudogout: start colchicine     septic shock secondary to MRSA bacteremia w/ RLL PNA, UTI, endocarditis on LIZ at Tricuspid valve l  - resolved     Endocarditis of tricuspid valve  - status post thoracentesis on 3/20  - as per ID, patient is on dapto & Zyvox   - repeat BC negative from 3/21, repeated blood cx on 4/1/19 pending    - repeat echo showed a pedunculated mobile mass seen adherent to lateral leaflet of the tricuspid valve w/ no interval changes noted in valve structure or regurgitation    - my colleague spoke with Dr. Gutierrez (CTS) who suggested to obtain CT C/A/P and LIZ ( not clinically indicated as pt stable and will not  )  I  d/w cardiology on 4/1/19 and will get TTE if any subtle change in TR valve will then get LIZ, i  already d/w ortho who states cervical collar could be removed for LIZ        Spinal osteo, abscess and cord compression    - CT of head and neck showed disc space narrowing and endplate irregularity at C4-5 which may represent typical degenerative change vs discitis   - MRI of the cervical spine w/ contrast showed C2-C7 discitis/osteomyelitis with a small right of midline ventral epidural abscess at C2-C7  - MRI of the thoracolumbar spine showed discitis/osteomyelitis at T11-T12 with paravertebral abscess, as well as discitis/osteomyelitis C6--T1, T9-T10, T10-T11, L4-L5, and L5-S1, and a phlegmon/early abscess dorsolaterally within the lumbar canal at the level of L4-L5. There was also a septic arthritis of  the left sternoclavicular joint space. There was also disc herniation at the L4-L5 level with contact and probable impingement of the descending right-sided nerve roots  - status post s/p C4-5 ACDF with irrigation and debridement on 3/20  - c/w gabapentin and lidocaine patch  - wound cx grew MRSA  - c/w [place] J  collar   - as per ID, patient is on dapto & Zyvox     Bacteremia due to methicillin resistant Staphylococcus aureus with IE: diskitis/OM /Spinal abscesses /Parapneumonic effusion   - most recent blood cx from 3/21/19 negative   - s/p thoracentesis : fluids neg    Moderate protein-calorie malnutrition  - c/w soft diet w/Ensure Enlive 3 x day    will get nutrition consult      edema to lower extremities checked dopplers on 4/1/19 and no dvt    started dvt prophylaxis with Lovenox per my d/w orthopedic . on 4/1/19   prealbumin is low c/w malnutrition will get nutrition consult      IVDrug abuse  - c/w methadone for opiate abuse   - - cont celebrex, gabapentin, oxycodone, Oxycontin to 40 mg bid    Anemia due multiple issues, surgery , blood draws,   poor nutrition and infection  - status post a few units of blood during this extensive hospital stay last one on 3/20/19  continue to monitor hgb   - c/w folic acid

## 2019-04-02 NOTE — CHART NOTE - NSCHARTNOTEFT_GEN_A_CORE
43y Female s/p C4-5 ACDF w/ I&D, now POD 13    Will continue to monitor patient however will transition to rounding on patient every other day in order to monitor for progression and development of symptoms relating to spinal surgery     Continue w/ Pain Rx, limit/decrease narcotics when possible, c/w Methadone  Will continue to follow all Cx from BL Knee Aspirations (3/28) NGTD, BL Hip Aspiration (3/29) NGTD/+CPPD, and Rectus Femoris Aspiration (3/29) NGTD  Low suspicion of septic arthritis in b/l hip and knee at this time. Patient is ambulating with walker well.   LIZ demonstrates no change in vegetation, Cards recommending no new LIZ at this time, no signs of cardiac decompensation   Pt reevaluated by cardiology, recommended repeat surface echo, had discussed possible transfer to [facility] on 3/29, no current discussion for transfer in recent notes, will FU.   Continue IV Abx per ID Team/Dr. Garcia  DVT PPx: SCDs/Lovenox per primary  PT/OT: WBAT, maintain [place] J Collar at all times, LSO to be worn when ambulating (pt non compliant)  Collar may be removed for LIZ procedure for GENTLE neck maneuvers otherwise maintain collar at all times. PLEASE BE CAUTIOUS!  No plan for return to OR for epidural abscesses or knee/hip washout at this time, will continue to monitor for improvement or RUE weakness which has been present since before surgery  Incentive Spirometry, OOB  Dry dressing changes as needed.   No further Orthopaedic surgical intervention at this time. Will continue to montior and advise as plan changes   Pt discussed with Dr. Leslie who is aware and agrees with above plan.

## 2019-04-02 NOTE — PROGRESS NOTE ADULT - SUBJECTIVE AND OBJECTIVE BOX
Patient is a 43y old  Female who presents with a chief complaint of AMS (31 Mar 2019 07:49)      OVERNIGHT EVENTS:  in chair in nad   MEDICATIONS  (STANDING):  ascorbic acid 500 milliGRAM(s) Oral two times a day  calcium carbonate 1250 mG  + Vitamin D (OsCal 500 + D) 1 Tablet(s) Oral three times a day  celecoxib 200 milliGRAM(s) Oral daily  chlorhexidine 4% Liquid 1 Application(s) Topical <User Schedule>  DAPTOmycin IVPB      DAPTOmycin IVPB 430 milliGRAM(s) IV Intermittent every 24 hours  docusate sodium 100 milliGRAM(s) Oral three times a day  enoxaparin Injectable 40 milliGRAM(s) SubCutaneous daily  folic acid 1 milliGRAM(s) Oral daily  gabapentin 800 milliGRAM(s) Oral three times a day  lactobacillus acidophilus 1 Tablet(s) Oral two times a day with meals  lidocaine   Patch 1 Patch Transdermal every 24 hours  lidocaine   Patch 1 Patch Transdermal every 24 hours  linezolid    Tablet 600 milliGRAM(s) Oral every 12 hours  methadone    Tablet 40 milliGRAM(s) Oral daily  multivitamin 1 Tablet(s) Oral daily  nicotine -  14 mG/24Hr(s) Patch 1 patch Transdermal daily  nystatin Powder 1 Application(s) Topical three times a day  oxyCODONE  ER Tablet 40 milliGRAM(s) Oral every 12 hours  pantoprazole    Tablet 40 milliGRAM(s) Oral before breakfast  senna 2 Tablet(s) Oral at bedtime    MEDICATIONS  (PRN):  acetaminophen   Tablet .. 650 milliGRAM(s) Oral every 6 hours PRN Mild Pain (1 - 3)  acetaminophen   Tablet .. 650 milliGRAM(s) Oral every 6 hours PRN Temp greater or equal to 38C (100.4F)  diphenhydrAMINE 25 milliGRAM(s) Oral every 4 hours PRN Rash and/or Itching  oxyCODONE    IR 20 milliGRAM(s) Oral every 4 hours PRN Moderate Pain (4 - 6)  polyethylene glycol 3350 17 Gram(s) Oral daily PRN Constipation    Allergies    penicillin (Short breath; Hives)    Intolerances      Vital Signs Last 24 Hrs  T(C): 36.8 (02 Apr 2019 11:57), Max: 37.1 (02 Apr 2019 04:00)  T(F): 98.2 (02 Apr 2019 11:57), Max: 98.8 (02 Apr 2019 04:00)  HR: 86 (02 Apr 2019 11:57) (86 - 96)  BP: 101/60 (02 Apr 2019 11:57) (101/60 - 116/66)  BP(mean): --  RR: 16 (02 Apr 2019 11:57) (16 - 17)  SpO2: 97% (02 Apr 2019 11:57) (96% - 98%)      PHYSICAL EXAM:  GENERAL: in chair in nad  well-groomed, well-developed  HEAD:  Atraumatic, Normocephalic  EYES: EOMI, PERRLA, conjunctiva and sclera clear  ENMT: No tonsillar erythema, exudates, or enlargement; Moist mucous membranes, Good dentition, No lesions, cervical collar in place   NECK: Supple, No JVD, Normal thyroid  NERVOUS SYSTEM:  Alert & Oriented X3, Good concentration; Motor Strength 4/5 B/L upper and lower extremities;  CHEST/LUNG: Clear to percussion bilaterally; No rales, rhonchi, wheezing, or rubs BL  HEART: Regular rate and rhythm; No murmurs, rubs, or gallops  ABDOMEN: Soft, Nontender, Nondistended; Bowel sounds present  BACK; Spinal tenderness  EXTREMITIES:  2+ Peripheral Pulses, No clubbing, cyanosis, + edema to lower extremities LE,   BL Hip/knees TTP, R knee swollen  SKIN: No rashes or lesions      LABS:                                              8.8    7.60  )-----------( 389      ( 02 Apr 2019 06:48 )             29.0   04-02    138  |  99  |  18  ----------------------------<  98  4.6   |  32<H>  |  0.47<L>    Ca    9.8      02 Apr 2019 06:48  Phos  5.6     04-02  Mg     2.2     04-02    Prealbumin, Serum (04.02.19 @ 09:16)    Prealbumin, Serum: 13 mg/dL      Cultures; Culture - Tissue with Gram Stain (collected 21 Mar 2019 09:42)  Source: .Tissue cervical disc #2 culture  Gram Stain (prelim) (22 Mar 2019 11:20):    Moderate polymorphonuclear leukocytes per low power field    Rare Gram positive cocci in pairs per oil power field  Preliminary Report (24 Mar 2019 12:21):    Few Methicillin resistant Staphylococcus aureus  Organism: Methicillin resistant Staphylococcus aureus (24 Mar 2019 12:21)  Organism: Methicillin resistant Staphylococcus aureus (24 Mar 2019 12:21)    Culture - Tissue with Gram Stain (collected 21 Mar 2019 09:38)  Source: .Tissue c4-c5 disc culture c/s ewb  Gram Stain (prelim) (22 Mar 2019 11:33):    Moderate polymorphonuclear leukocytes per low power field    Rare Gram positive cocci in pairs per oil power field  Preliminary Report (23 Mar 2019 08:21):    Few Methicillin resistant Staphylococcus aureus  Organism: Methicillin resistant Staphylococcus aureus (23 Mar 2019 08:20)  Organism: Methicillin resistant Staphylococcus aureus (23 Mar 2019 08:20)    Culture - Blood (collected 21 Mar 2019 09:33)  Source: .Blood  Preliminary Report (22 Mar 2019 10:01):    No growth to date.    Culture - Surgical Swab (collected 21 Mar 2019 09:31)  Source: .Surgical Swab cervical disc #1 culture c/s  Preliminary Report (23 Mar 2019 08:27):    Numerous Methicillin resistant Staphylococcus aureus  Organism: Methicillin resistant Staphylococcus aureus (23 Mar 2019 08:26)  Organism: Methicillin resistant Staphylococcus aureus (23 Mar 2019 08:26)    Culture - Surgical Swab (collected 21 Mar 2019 09:26)  Source: .Surgical Swab prevertebral culture c/s  Preliminary Report (22 Mar 2019 10:48):    No growth    Culture - Body Fluid with Gram Stain (collected 21 Mar 2019 04:12)  Source: .Body Fluid LEFT THORACENTESIS  Gram Stain (prelim) (22 Mar 2019 09:06):    polymorphonuclear leukocytes seen    No organisms seen    by cytocentrifuge  Preliminary Report (22 Mar 2019 09:06):    No growth      RADIOLOGY & ADDITIONAL TESTS:    Imaging Personally Reviewed:  [ x] YES    < from: Xray Chest 1 View- PORTABLE-Urgent (03.21.19 @ 02:02) >  Stable small right pleural effusion  Stable left basilar atelectasis.  Mild worsening of right basilar opacity.  < from: CT Chest No Cont (03.01.19 @ 11:32) >  Bilateral cavitary lesions are increased in size and number compared to   2/17/2019.     Small bilateral pleural effusions, loculated on the right, increased   compared to the prior study. Unchanged right lower lobe dependent  consolidation.    Prominent mediastinal and axillary lymph nodes, possibly reactive.      Consultant(s) Notes Reviewed:  [x ] YES     Care Discussed with [ x] Consultants [X ] Patient [ ] Family  [x ]    [x ]  Other; RN

## 2019-04-03 LAB
HCT VFR BLD CALC: 26.3 % — LOW (ref 34.5–45)
HGB BLD-MCNC: 7.9 G/DL — LOW (ref 11.5–15.5)
MCHC RBC-ENTMCNC: 25.3 PG — LOW (ref 27–34)
MCHC RBC-ENTMCNC: 30 GM/DL — LOW (ref 32–36)
MCV RBC AUTO: 84.3 FL — SIGNIFICANT CHANGE UP (ref 80–100)
NRBC # BLD: 0 /100 WBCS — SIGNIFICANT CHANGE UP (ref 0–0)
PLATELET # BLD AUTO: 296 K/UL — SIGNIFICANT CHANGE UP (ref 150–400)
RBC # BLD: 3.12 M/UL — LOW (ref 3.8–5.2)
RBC # FLD: 18.6 % — HIGH (ref 10.3–14.5)
WBC # BLD: 5.74 K/UL — SIGNIFICANT CHANGE UP (ref 3.8–10.5)
WBC # FLD AUTO: 5.74 K/UL — SIGNIFICANT CHANGE UP (ref 3.8–10.5)

## 2019-04-03 PROCEDURE — 99233 SBSQ HOSP IP/OBS HIGH 50: CPT

## 2019-04-03 RX ORDER — POLYETHYLENE GLYCOL 3350 17 G/17G
17 POWDER, FOR SOLUTION ORAL DAILY
Qty: 0 | Refills: 0 | Status: COMPLETED | OUTPATIENT
Start: 2019-04-03 | End: 2019-04-06

## 2019-04-03 RX ORDER — FUROSEMIDE 40 MG
20 TABLET ORAL ONCE
Qty: 0 | Refills: 0 | Status: COMPLETED | OUTPATIENT
Start: 2019-04-03 | End: 2019-04-03

## 2019-04-03 RX ADMIN — LIDOCAINE 1 PATCH: 4 CREAM TOPICAL at 12:05

## 2019-04-03 RX ADMIN — OXYCODONE HYDROCHLORIDE 20 MILLIGRAM(S): 5 TABLET ORAL at 23:35

## 2019-04-03 RX ADMIN — CELECOXIB 200 MILLIGRAM(S): 200 CAPSULE ORAL at 12:21

## 2019-04-03 RX ADMIN — Medication 1 PATCH: at 13:39

## 2019-04-03 RX ADMIN — OXYCODONE HYDROCHLORIDE 40 MILLIGRAM(S): 5 TABLET ORAL at 18:19

## 2019-04-03 RX ADMIN — Medication 100 MILLIGRAM(S): at 22:21

## 2019-04-03 RX ADMIN — Medication 20 MILLIGRAM(S): at 15:08

## 2019-04-03 RX ADMIN — NYSTATIN CREAM 1 APPLICATION(S): 100000 CREAM TOPICAL at 05:30

## 2019-04-03 RX ADMIN — OXYCODONE HYDROCHLORIDE 40 MILLIGRAM(S): 5 TABLET ORAL at 06:00

## 2019-04-03 RX ADMIN — NYSTATIN CREAM 1 APPLICATION(S): 100000 CREAM TOPICAL at 22:41

## 2019-04-03 RX ADMIN — OXYCODONE HYDROCHLORIDE 20 MILLIGRAM(S): 5 TABLET ORAL at 22:35

## 2019-04-03 RX ADMIN — OXYCODONE HYDROCHLORIDE 40 MILLIGRAM(S): 5 TABLET ORAL at 18:55

## 2019-04-03 RX ADMIN — OXYCODONE HYDROCHLORIDE 20 MILLIGRAM(S): 5 TABLET ORAL at 03:21

## 2019-04-03 RX ADMIN — OXYCODONE HYDROCHLORIDE 20 MILLIGRAM(S): 5 TABLET ORAL at 16:23

## 2019-04-03 RX ADMIN — LIDOCAINE 1 PATCH: 4 CREAM TOPICAL at 00:05

## 2019-04-03 RX ADMIN — Medication 100 MILLIGRAM(S): at 13:47

## 2019-04-03 RX ADMIN — OXYCODONE HYDROCHLORIDE 20 MILLIGRAM(S): 5 TABLET ORAL at 12:36

## 2019-04-03 RX ADMIN — CELECOXIB 200 MILLIGRAM(S): 200 CAPSULE ORAL at 13:42

## 2019-04-03 RX ADMIN — LIDOCAINE 1 PATCH: 4 CREAM TOPICAL at 08:17

## 2019-04-03 RX ADMIN — GABAPENTIN 800 MILLIGRAM(S): 400 CAPSULE ORAL at 13:48

## 2019-04-03 RX ADMIN — Medication 1 MILLIGRAM(S): at 12:19

## 2019-04-03 RX ADMIN — Medication 0.6 MILLIGRAM(S): at 12:19

## 2019-04-03 RX ADMIN — GABAPENTIN 800 MILLIGRAM(S): 400 CAPSULE ORAL at 22:21

## 2019-04-03 RX ADMIN — Medication 500 MILLIGRAM(S): at 17:33

## 2019-04-03 RX ADMIN — OXYCODONE HYDROCHLORIDE 20 MILLIGRAM(S): 5 TABLET ORAL at 11:47

## 2019-04-03 RX ADMIN — DAPTOMYCIN 117.2 MILLIGRAM(S): 500 INJECTION, POWDER, LYOPHILIZED, FOR SOLUTION INTRAVENOUS at 12:10

## 2019-04-03 RX ADMIN — Medication 1 TABLET(S): at 08:12

## 2019-04-03 RX ADMIN — OXYCODONE HYDROCHLORIDE 40 MILLIGRAM(S): 5 TABLET ORAL at 05:29

## 2019-04-03 RX ADMIN — Medication 1 TABLET(S): at 12:05

## 2019-04-03 RX ADMIN — Medication 1 PATCH: at 08:18

## 2019-04-03 RX ADMIN — SENNA PLUS 2 TABLET(S): 8.6 TABLET ORAL at 22:21

## 2019-04-03 RX ADMIN — LIDOCAINE 1 PATCH: 4 CREAM TOPICAL at 23:46

## 2019-04-03 RX ADMIN — LIDOCAINE 1 PATCH: 4 CREAM TOPICAL at 23:48

## 2019-04-03 RX ADMIN — Medication 1 PATCH: at 19:00

## 2019-04-03 RX ADMIN — Medication 1 PATCH: at 12:05

## 2019-04-03 RX ADMIN — Medication 100 MILLIGRAM(S): at 05:34

## 2019-04-03 RX ADMIN — LINEZOLID 600 MILLIGRAM(S): 600 INJECTION, SOLUTION INTRAVENOUS at 17:32

## 2019-04-03 RX ADMIN — Medication 0.6 MILLIGRAM(S): at 00:06

## 2019-04-03 RX ADMIN — GABAPENTIN 800 MILLIGRAM(S): 400 CAPSULE ORAL at 05:29

## 2019-04-03 RX ADMIN — POLYETHYLENE GLYCOL 3350 17 GRAM(S): 17 POWDER, FOR SOLUTION ORAL at 13:39

## 2019-04-03 RX ADMIN — PANTOPRAZOLE SODIUM 40 MILLIGRAM(S): 20 TABLET, DELAYED RELEASE ORAL at 06:11

## 2019-04-03 RX ADMIN — NYSTATIN CREAM 1 APPLICATION(S): 100000 CREAM TOPICAL at 13:42

## 2019-04-03 RX ADMIN — METHADONE HYDROCHLORIDE 40 MILLIGRAM(S): 40 TABLET ORAL at 12:05

## 2019-04-03 RX ADMIN — OXYCODONE HYDROCHLORIDE 20 MILLIGRAM(S): 5 TABLET ORAL at 16:03

## 2019-04-03 RX ADMIN — Medication 1 TABLET(S): at 22:21

## 2019-04-03 RX ADMIN — Medication 1 TABLET(S): at 17:32

## 2019-04-03 RX ADMIN — ENOXAPARIN SODIUM 40 MILLIGRAM(S): 100 INJECTION SUBCUTANEOUS at 12:19

## 2019-04-03 RX ADMIN — Medication 1 TABLET(S): at 05:29

## 2019-04-03 RX ADMIN — CHLORHEXIDINE GLUCONATE 1 APPLICATION(S): 213 SOLUTION TOPICAL at 05:31

## 2019-04-03 RX ADMIN — LINEZOLID 600 MILLIGRAM(S): 600 INJECTION, SOLUTION INTRAVENOUS at 05:29

## 2019-04-03 RX ADMIN — OXYCODONE HYDROCHLORIDE 20 MILLIGRAM(S): 5 TABLET ORAL at 03:50

## 2019-04-03 RX ADMIN — Medication 1 TABLET(S): at 13:47

## 2019-04-03 RX ADMIN — Medication 500 MILLIGRAM(S): at 05:29

## 2019-04-03 NOTE — PROGRESS NOTE ADULT - SUBJECTIVE AND OBJECTIVE BOX
Patient is a 43y old  Female who presents with a chief complaint of AMS (03 Apr 2019 11:34)      INTERVAL HPI / OVERNIGHT EVENTS: right knee pain +    MEDICATIONS  (STANDING):  ascorbic acid 500 milliGRAM(s) Oral two times a day  calcium carbonate 1250 mG  + Vitamin D (OsCal 500 + D) 1 Tablet(s) Oral three times a day  celecoxib 200 milliGRAM(s) Oral daily  chlorhexidine 4% Liquid 1 Application(s) Topical <User Schedule>  colchicine 0.6 milliGRAM(s) Oral daily  DAPTOmycin IVPB      DAPTOmycin IVPB 430 milliGRAM(s) IV Intermittent every 24 hours  docusate sodium 100 milliGRAM(s) Oral three times a day  enoxaparin Injectable 40 milliGRAM(s) SubCutaneous daily  folic acid 1 milliGRAM(s) Oral daily  gabapentin 800 milliGRAM(s) Oral three times a day  lactobacillus acidophilus 1 Tablet(s) Oral two times a day with meals  lidocaine   Patch 1 Patch Transdermal every 24 hours  lidocaine   Patch 1 Patch Transdermal every 24 hours  linezolid    Tablet 600 milliGRAM(s) Oral every 12 hours  methadone    Tablet 40 milliGRAM(s) Oral daily  multivitamin 1 Tablet(s) Oral daily  nicotine -  14 mG/24Hr(s) Patch 1 patch Transdermal daily  nystatin Powder 1 Application(s) Topical three times a day  oxyCODONE  ER Tablet 40 milliGRAM(s) Oral every 12 hours  pantoprazole    Tablet 40 milliGRAM(s) Oral before breakfast  polyethylene glycol 3350 17 Gram(s) Oral daily  senna 2 Tablet(s) Oral at bedtime    MEDICATIONS  (PRN):  acetaminophen   Tablet .. 650 milliGRAM(s) Oral every 6 hours PRN Mild Pain (1 - 3)  acetaminophen   Tablet .. 650 milliGRAM(s) Oral every 6 hours PRN Temp greater or equal to 38C (100.4F)  diphenhydrAMINE 25 milliGRAM(s) Oral every 4 hours PRN Rash and/or Itching  oxyCODONE    IR 20 milliGRAM(s) Oral every 4 hours PRN Moderate Pain (4 - 6)  polyethylene glycol 3350 17 Gram(s) Oral daily PRN Constipation      Vital Signs Last 24 Hrs  T(C): 37.3 (03 Apr 2019 12:01), Max: 37.3 (03 Apr 2019 12:01)  T(F): 99.2 (03 Apr 2019 12:01), Max: 99.2 (03 Apr 2019 12:01)  HR: 101 (03 Apr 2019 12:01) (77 - 101)  BP: 111/67 (03 Apr 2019 12:01) (105/47 - 111/67)  BP(mean): --  RR: 17 (03 Apr 2019 12:01) (15 - 18)  SpO2: 97% (03 Apr 2019 12:01) (95% - 99%)    Review of systems:  General : no fever /chills, fatigue  CVS : no chest pain, palpitations  Lungs : no shortness of breath, cough  GI : no abdominal pain , vomiting, diarrhea   : no dysuria, hematuria        PHYSICAL EXAM:  General :NAD, neck in collor  Constitutional: well-groomed, well-developed  Respiratory: CTAB/L  Cardiovascular: S1 and S2, RRR, no M/G/R  Gastrointestinal: BS+, soft, NT/ND  Extremities: No peripheral edema  Vascular: 2+ peripheral pulses  Skin: No rashes      LABS:                        7.9    5.74  )-----------( 296      ( 03 Apr 2019 08:28 )             26.3     04-02    138  |  99  |  18  ----------------------------<  98  4.6   |  32<H>  |  0.47<L>    Ca    9.8      02 Apr 2019 06:48  Phos  5.6     04-02  Mg     2.2     04-02            MICROBIOLOGY:  RECENT CULTURES:  04-02 .Blood XXXX XXXX   No growth to date.    03-29 Joint Fl left hip aspiration XXXX   Numerous polymorphonuclear leukocytes  No organisms seen  by cytocentrifuge   No growth    03-29 .Body Fluid right rectus femorus aspiration XXXX   No polymorphonuclear cells seen per low power field  No organisms seen per oil power field   No growth    03-29 Joint Fl right hip aspiration XXXX   Numerous polymorphonuclear leukocytes  No organisms seen  by cytocentrifuge   No growth    03-28 .Body Fluid XXXX   polymorphonuclear leukocytes seen  No organisms seen   No growth at 5 days  Culture in progress    03-28 .Body Fluid XXXX   polymorphonuclear leukocytes seen  No organisms seen   No growth          RADIOLOGY & ADDITIONAL STUDIES:

## 2019-04-03 NOTE — PROGRESS NOTE ADULT - ASSESSMENT
A/P: 43y Female s/p C4-5 ACDF with irrigation and debridement, now POD 14    Will recommend decreasing narcotics, restarting methadone and weaning pt  FU cultures and gram stain for bilateral knee aspirations done 3/28, bl IR hip aspirations and rectus femoris aspiration from 3/29- NGTD, B/L hips demonstrate + CPPD crystals   based on lab results and physical exam, low suspicion of septic arthritis in b/l hip and knee. Patients reports improved symptoms of b/l Hip pain, reporting more discomfort in the Right knee this am.   CT C/A/P performed 3/31  LIZ demonstrates no change in vegetation  Pt reevaluated by cardiology, recommended repeat echo  LIZ demonstrates no change in vegetation, Cards recommending no new LIZ at this time, no signs of cardiac decompensation   Pt reevaluated by cardiology, recommended repeat surface echo, had discussed possible transfer to [facility] on 3/29, no current discussion for transfer in recent notes, will FU.   Continue IV Abx per ID Team/Dr. Garcia  DVT PPx: SCDs/Lovenox per primary  PT/OT: WBAT, maintain [place]ck[place] J Collar at all times, LSO to be worn when ambulating (pt non compliant)  Collar may be removed for LIZ procedure for GENTLE neck maneuvers otherwise maintain collar at all times. PLEASE BE CAUTIOUS!  No plan for return to OR for epidural abscesses or knee/hip washout at this time, will continue to monitor for improvement or RUE weakness which has been present since before surgery  Incentive Spirometry, OOB  Dry dressing changes as needed.   No further Orthopaedic surgical intervention at this time. Will continue to montior and advise as plan changes   Pt discussed with Dr. Leslie who is aware and agrees with above plan. A/P: 43y Female s/p C4-5 ACDF with irrigation and debridement, now POD 14    Will recommend decreasing narcotics, restarting methadone and weaning pt  Will continue to FU cultures, for bilateral knee aspirations done 3/28, bl IR hip aspirations and rectus femoris aspiration from 3/29- NGTD, B/L hips demonstrate + CPPD crystals, Gram stain Negative.   based on lab results and physical exam, low suspicion of septic arthritis in b/l hip and knee. Patients reports improved symptoms of b/l Hip pain, reporting more discomfort in the Right knee this am.   CT C/A/P performed 3/31  LIZ demonstrates no change in vegetation  Pt reevaluated by cardiology, recommended repeat echo   Pt reevaluated by cardiology, recommended repeat surface echo, had discussed possible transfer to [facility] on 3/29, no current discussion for transfer in recent notes, will FU.   Continue IV Abx per ID Team/Dr. Garcia  DVT PPx: SCDs/Lovenox per primary  PT/OT: WBAT, maintain [place] J Collar at all times, LSO to be worn when ambulating (pt non compliant)  Collar may be removed for LIZ procedure for GENTLE neck maneuvers otherwise maintain collar at all times. PLEASE BE CAUTIOUS!  No plan for return to OR for epidural abscesses or knee/hip washout at this time, will continue to monitor for improvement or RUE weakness which has been present since before surgery  Incentive Spirometry, OOB  Dry dressing changes as needed.   No further Orthopaedic surgical intervention at this time. Will continue to montior and advise as plan changes   Pt discussed with Dr. Leslie who is aware and agrees with above plan.

## 2019-04-03 NOTE — PROGRESS NOTE ADULT - PROBLEM SELECTOR PLAN 4
with IE: diskitis/OM /Spinal abscesses /Parapneumonic effusion   repeat blood c/s from last week negative  occasional low grade fever   Now also has possible septic hip and knee arthritis vs pseudogout   (Crystal + ,high WBC in synovial fluid but c/s neg )  underwent arthrocentesis by Ortho over last week   so far c/s neg   s/p thoracentesis for pleural effusion vs empyema : fluids neg  cont dapto and zyvox  NO new bacteremia

## 2019-04-03 NOTE — PROGRESS NOTE ADULT - PROBLEM SELECTOR PLAN 1
s/p C4-5 ACDF with irrigation and debridement   OR c/s MRSA   cont Dapto (will require 6 more weeks at this point)

## 2019-04-03 NOTE — PROGRESS NOTE ADULT - ASSESSMENT
43 F w/ history of IVDA, alcohol abuse, hx of pancreatitis, alcohol hepatitis, alcohol withdrawal, left frontoparietal subdural hematoma 2008 without need for neurosurgical intervention presented with severe sepsis with septic shock secondary to MRSA bacteremia w/ RLL PNA, UTI, endocarditis on LIZ and EFRAIN that has resolved and was diagnosis w/ HCV. Pt was initially intubated due to respiratory failure and put on pressors in ICU. She as extubated on 2/25 and transferred from ICU the following day. Pt had a C4-5 ACDF with irrigation and debridement on 3/20 due to spinal abscess and osteo and was kept intubated post procedure and transferred again to ICU, where she was extubated on 3/21 and transferred back to the med service the following day. HCV- Outpatient follow up for HCV and opioid maintenance outpt re-enrollment program    Septic arthritis involving BL Knees/hips;  - MRI of bilateral hips and knees were done demonstrating effusions and an abscess located in the right rectus femoris.   - Bilateral knees were aspirated by orthopedic team. on 3/28/19 prelim ngtd   bilateral hips aspirated by orthopedic team on 3/29/19 prelim ngtd but cppd crystals seen c/w psuedo gout  - as per ortho-No plan for orthopaedic operative intervention at this time. Should cultures grow pt will require operative intervention      Pseudogout: started colchicine     septic shock secondary to MRSA bacteremia w/ RLL PNA, UTI, endocarditis on LIZ at Tricuspid valve l  - resolved     Endocarditis of tricuspid valve  - status post thoracentesis on 3/20  - as per ID, patient is on dapto & Zyvox   - repeat BC negative from 3/21, repeated blood cx on 4/1/19 pending    - repeat echo showed a pedunculated mobile mass seen adherent to lateral leaflet of the tricuspid valve w/ no interval changes noted in valve structure or regurgitation    - my colleague spoke with Dr. Gutierrez (CTS) who suggested to obtain CT C/A/P and LIZ ( not clinically indicated as pt stable and will not  )  I  d/w cardiology on 4/1/19 and will get TTE if any subtle change in TR valve will then get LIZ, i  already d/w ortho who states cervical collar could be removed for LIZ        Spinal osteo, abscess and cord compression    - CT of head and neck showed disc space narrowing and endplate irregularity at C4-5 which may represent typical degenerative change vs discitis   - MRI of the cervical spine w/ contrast showed C2-C7 discitis/osteomyelitis with a small right of midline ventral epidural abscess at C2-C7  - MRI of the thoracolumbar spine showed discitis/osteomyelitis at T11-T12 with paravertebral abscess, as well as discitis/osteomyelitis C6--T1, T9-T10, T10-T11, L4-L5, and L5-S1, and a phlegmon/early abscess dorsolaterally within the lumbar canal at the level of L4-L5. There was also a septic arthritis of  the left sternoclavicular joint space. There was also disc herniation at the L4-L5 level with contact and probable impingement of the descending right-sided nerve roots  - status post s/p C4-5 ACDF with irrigation and debridement on 3/20  - c/w gabapentin and lidocaine patch  - wound cx grew MRSA  - c/w [place] J  collar   - as per ID, patient is on dapto & Zyvox     Bacteremia due to methicillin resistant Staphylococcus aureus with IE: diskitis/OM /Spinal abscesses /Parapneumonic effusion   - most recent blood cx from 3/21/19 negative   - s/p thoracentesis : fluids neg    Moderate protein-calorie malnutrition  - c/w soft diet w/Ensure Enlive 3 x day         edema to lower extremities checked dopplers on 4/1/19 and no dvt    started dvt prophylaxis with Lovenox per my d/w orthopedic . on 4/1/19   prealbumin is low c/w moderate malnutrition     IVDrug abuse  - c/w methadone for opiate abuse   - - cont celebrex, gabapentin, oxycodone, Oxycontin to 40 mg bid    Anemia due multiple issues, surgery , blood draws,   poor nutrition and infection  - status post a few units of blood during this extensive hospital stay last one on 3/20/19  continue to monitor hgb , hgb today 7.9 continue to monitor .     - c/w folic acid

## 2019-04-03 NOTE — CHART NOTE - NSCHARTNOTEFT_GEN_A_CORE
Assessment: Pt seen for follow-up & MD consult 4/2. Pt with endocarditis s/p thoracentesis on 3/20 & spinal osteo, abscess & cord compression s/p I&D on 3/20. Pt with MRSA on IV abx & oxycodone.     Factors impacting intake: [ ] none [ ] nausea  [ ] vomiting [ ] diarrhea [ x] constipation  [ ]chewing problems [ ] swallowing issues  [x ] other: abdominal discomfort    Diet Prescription: Diet, Regular:   Supplement Feeding Modality:  Oral  Ensure Enlive Cans or Servings Per Day:  3       Frequency:  Daily (03-29-19 @ 16:58)    Intake: Po intake % most meals & 100% consjmed Ensure Enlive. Pt reports decreased po intake meals x 2-3 days due to c/o constipation; noted pt reports BM x 1(4/2), however continues to c/o abdominal discomfort due to constipation from pain medication. Pt on Miralax, Senna & Colace. Pt requests Miralax; RN notified as Miralax PRN. MD made aware & Miralax order 17gm daily x 3 days. Obtained food preferences. Pt enjoys grilled cheese.     Current Weight: Wt=70.2kg(4/1), Wt=72.1kg(2/28), Wt=71.6kg(2/20)  % Weight Change 1.9kg wt loss (2.6%) x 32 days    Physical appearance: +2 edema Sina feet; Nutrition focused physical exam conducted; Subcutaneous fat loss: [Mild ] Orbital fat pads region, [WNL ]Buccal fat region, [Moderate ]Triceps region,  [WNL ]Ribs region.  Muscle wasting: [WNL ]Temples region, [WNL ]Clavicle region, [WNL ]Shoulder region, [unable ]Scapula region, [WNL ]Interosseous region,  [mild ]thigh region, [Mild ]Calf region    Pertinent Medications: MEDICATIONS  (STANDING):  ascorbic acid 500 milliGRAM(s) Oral two times a day  calcium carbonate 1250 mG  + Vitamin D (OsCal 500 + D) 1 Tablet(s) Oral three times a day  celecoxib 200 milliGRAM(s) Oral daily  chlorhexidine 4% Liquid 1 Application(s) Topical <User Schedule>  colchicine 0.6 milliGRAM(s) Oral daily  DAPTOmycin IVPB      DAPTOmycin IVPB 430 milliGRAM(s) IV Intermittent every 24 hours  docusate sodium 100 milliGRAM(s) Oral three times a day  enoxaparin Injectable 40 milliGRAM(s) SubCutaneous daily  folic acid 1 milliGRAM(s) Oral daily  gabapentin 800 milliGRAM(s) Oral three times a day  lactobacillus acidophilus 1 Tablet(s) Oral two times a day with meals  lidocaine   Patch 1 Patch Transdermal every 24 hours  lidocaine   Patch 1 Patch Transdermal every 24 hours  linezolid    Tablet 600 milliGRAM(s) Oral every 12 hours  methadone    Tablet 40 milliGRAM(s) Oral daily  multivitamin 1 Tablet(s) Oral daily  nicotine -  14 mG/24Hr(s) Patch 1 patch Transdermal daily  nystatin Powder 1 Application(s) Topical three times a day  oxyCODONE  ER Tablet 40 milliGRAM(s) Oral every 12 hours  pantoprazole    Tablet 40 milliGRAM(s) Oral before breakfast  polyethylene glycol 3350 17 Gram(s) Oral daily  senna 2 Tablet(s) Oral at bedtime    MEDICATIONS  (PRN):  acetaminophen   Tablet .. 650 milliGRAM(s) Oral every 6 hours PRN Mild Pain (1 - 3)  acetaminophen   Tablet .. 650 milliGRAM(s) Oral every 6 hours PRN Temp greater or equal to 38C (100.4F)  diphenhydrAMINE 25 milliGRAM(s) Oral every 4 hours PRN Rash and/or Itching  oxyCODONE    IR 20 milliGRAM(s) Oral every 4 hours PRN Moderate Pain (4 - 6)  polyethylene glycol 3350 17 Gram(s) Oral daily PRN Constipation    Pertinent Labs: 04-02 Na 138   Glu 98   K+ 4.6   Cr 0.47   BUN 18   Phos n/a   Alb 1.9(3/23/19)   PAB 13   Hgb 7.9 g/dL<L> Hct 26.3 %<L> HgA1C n/a     24Hr FS:  Skin: Lt heel stage I    Estimated Needs:   [x ] no change since previous assessment (2/25/19)  [ ] recalculated:     Previous Nutrition Diagnosis:   [ ] Inadequate Energy Intake [ ]Inadequate Oral Intake [ ] Excessive Energy Intake   [ ] Underweight [ ] Increased Nutrient Needs [ ] Overweight/Obesity   [ ] Altered GI Function [ ] Unintended Weight Loss [ ] Food & Nutrition Related Knowledge Deficit [ ] severe Malnutrition [x ] Acute moderate malnutrition (based on Mild-moderate fat loss & mild muscle wasting)    Nutrition Diagnosis is [x ] ongoing  [ ] resolved  [ ] improved  [ ] not applicable   Previous Goal: Pt to meet >75% energy & protein needs via Meals/supplements; met for Ensure Enlive & inconsistent for meals    New Nutrition Diagnosis: [x ] not applicable       Interventions:   Recommend  [x] Continue: Regular/Ensure Enlive 3 x day(1050kcal & 60gm protein)  [ ] Change Diet To:  [ ] Nutrition Supplement:  [ ] Nutrition Support:  [x ] Other: Cater to food preferences.     Monitoring and Evaluation:   [x ] PO intake [ x ] Tolerance to diet prescription [ x ] weights [ x ] labs[ x ] follow up per protocol  [ ] other: Assessment: Pt seen for follow-up & MD consult 4/2. Pt with endocarditis s/p thoracentesis on 3/20 & spinal osteo, abscess & cord compression s/p I&D on 3/20. Pt with MRSA on IV abx & oxycodone.     Factors impacting intake: [ ] none [ ] nausea  [ ] vomiting [ ] diarrhea [ x] constipation  [ ]chewing problems [ ] swallowing issues  [x ] other: abdominal discomfort    Diet Prescription: Diet, Regular:   Supplement Feeding Modality:  Oral  Ensure Enlive Cans or Servings Per Day:  3       Frequency:  Daily (03-29-19 @ 16:58)    Intake: Po intake % most meals & 100% consumed Ensure Enlive. Pt reports decreased po intake meals x 2-3 days due to c/o constipation; noted pt reports BM x 1(4/2), however continues to c/o abdominal discomfort due to constipation from pain medication. Pt on Miralax, Senna & Colace. Pt requests Miralax; RN notified as Miralax PRN. MD made aware & Miralax order 17gm daily x 3 days. Obtained food preferences. Pt enjoys grilled cheese.     Current Weight: Wt=70.2kg(4/1), Wt=72.1kg(2/28), Wt=71.6kg(2/20)  % Weight Change 1.9kg wt loss (2.6%) x 32 days    Physical appearance: +2 edema Sina feet; Nutrition focused physical exam conducted; Subcutaneous fat loss: [Mild ] Orbital fat pads region, [WNL ]Buccal fat region, [Moderate ]Triceps region,  [WNL ]Ribs region.  Muscle wasting: [WNL ]Temples region, [WNL ]Clavicle region, [WNL ]Shoulder region, [unable ]Scapula region, [WNL ]Interosseous region,  [mild ]thigh region, [Mild ]Calf region    Pertinent Medications: MEDICATIONS  (STANDING):  ascorbic acid 500 milliGRAM(s) Oral two times a day  calcium carbonate 1250 mG  + Vitamin D (OsCal 500 + D) 1 Tablet(s) Oral three times a day  celecoxib 200 milliGRAM(s) Oral daily  chlorhexidine 4% Liquid 1 Application(s) Topical <User Schedule>  colchicine 0.6 milliGRAM(s) Oral daily  DAPTOmycin IVPB      DAPTOmycin IVPB 430 milliGRAM(s) IV Intermittent every 24 hours  docusate sodium 100 milliGRAM(s) Oral three times a day  enoxaparin Injectable 40 milliGRAM(s) SubCutaneous daily  folic acid 1 milliGRAM(s) Oral daily  gabapentin 800 milliGRAM(s) Oral three times a day  lactobacillus acidophilus 1 Tablet(s) Oral two times a day with meals  lidocaine   Patch 1 Patch Transdermal every 24 hours  lidocaine   Patch 1 Patch Transdermal every 24 hours  linezolid    Tablet 600 milliGRAM(s) Oral every 12 hours  methadone    Tablet 40 milliGRAM(s) Oral daily  multivitamin 1 Tablet(s) Oral daily  nicotine -  14 mG/24Hr(s) Patch 1 patch Transdermal daily  nystatin Powder 1 Application(s) Topical three times a day  oxyCODONE  ER Tablet 40 milliGRAM(s) Oral every 12 hours  pantoprazole    Tablet 40 milliGRAM(s) Oral before breakfast  polyethylene glycol 3350 17 Gram(s) Oral daily  senna 2 Tablet(s) Oral at bedtime    MEDICATIONS  (PRN):  acetaminophen   Tablet .. 650 milliGRAM(s) Oral every 6 hours PRN Mild Pain (1 - 3)  acetaminophen   Tablet .. 650 milliGRAM(s) Oral every 6 hours PRN Temp greater or equal to 38C (100.4F)  diphenhydrAMINE 25 milliGRAM(s) Oral every 4 hours PRN Rash and/or Itching  oxyCODONE    IR 20 milliGRAM(s) Oral every 4 hours PRN Moderate Pain (4 - 6)  polyethylene glycol 3350 17 Gram(s) Oral daily PRN Constipation    Pertinent Labs: 04-02 Na 138   Glu 98   K+ 4.6   Cr 0.47   BUN 18   Phos n/a   Alb 1.9(3/23/19)   PAB 13   Hgb 7.9 g/dL<L> Hct 26.3 %<L> HgA1C n/a     24Hr FS:  Skin: Lt heel stage I    Estimated Needs:   [x ] no change since previous assessment (2/25/19)  [ ] recalculated:     Previous Nutrition Diagnosis:   [ ] Inadequate Energy Intake [ ]Inadequate Oral Intake [ ] Excessive Energy Intake   [ ] Underweight [ ] Increased Nutrient Needs [ ] Overweight/Obesity   [ ] Altered GI Function [ ] Unintended Weight Loss [ ] Food & Nutrition Related Knowledge Deficit [ ] severe Malnutrition [x ] Acute moderate malnutrition (based on Mild-moderate fat loss & mild muscle wasting)    Nutrition Diagnosis is [x ] ongoing  [ ] resolved  [ ] improved  [ ] not applicable   Previous Goal: Pt to meet >75% energy & protein needs via Meals/supplements; met for Ensure Enlive & inconsistent for meals    New Nutrition Diagnosis: [x ] not applicable       Interventions:   Recommend  [x] Continue: Regular/Ensure Enlive 3 x day(1050kcal & 60gm protein)  [ ] Change Diet To:  [ ] Nutrition Supplement:  [ ] Nutrition Support:  [x ] Other: Cater to food preferences.     Monitoring and Evaluation:   [x ] PO intake [ x ] Tolerance to diet prescription [ x ] weights [ x ] labs[ x ] follow up per protocol  [ ] other:

## 2019-04-03 NOTE — PROGRESS NOTE ADULT - SUBJECTIVE AND OBJECTIVE BOX
Pt seen and examined at bedside. Pain tolerable this AM. Patient was up and ambulating with walker to restroom this a.m. Denies CP/Dyspnea/Calf tenderness bilaterally. States she has global back pain and is leading to hip and LE pain b/l, however it is improved this am and improves with ambulation. Pt wears LSO brace with PT, encouraged patient to wear LSO when out of bed to chair and when ambulating around the room. Patient is tolerating [place]agon[place] J. Pt reports Right knee pain this am, however able to bear weight. No other complaints.       Vital Signs Last 24 Hrs  T(C): 36.7 (03 Apr 2019 04:00), Max: 36.9 (03 Apr 2019 00:00)  T(F): 98 (03 Apr 2019 04:00), Max: 98.4 (03 Apr 2019 00:00)  HR: 77 (03 Apr 2019 04:00) (77 - 96)  BP: 109/64 (03 Apr 2019 04:00) (101/60 - 111/41)  BP(mean): --  RR: 16 (03 Apr 2019 04:00) (15 - 18)  SpO2: 96% (03 Apr 2019 04:00) (95% - 99%)        Gen: NAD  BL hip and knee ROM does not exhibit pain, no pain with weight bearing, no signs of erythema or worsening effusion of B/L Knees. Pt was ambulating this am with walker, without discomfort of the b/l LE    Spine PE:  Skin intact  Wagoner J Collar In Place  dressing c/d/i  Negative clonus  Negative babinski  Negative olmstead  Ambulating in room with walker           Deltoid        Bicep        Tricep          Wrist Ext    Wrist Flex    Finger Flex          Finger Abd  L            4/5         4/5           4/5             4/5                4/5	           4/5                    4/5  R            2/5 	   3/5           4/5             3/5              4/5                        4/5                   4/5                                      Hip Flex       Quad     Ankle DF        Ankle PF       Toe Ext        Hamstring    L            5/5              5/5          5/5                 5/5                 5/5	                5/5  R            5/5              5/5           5/5                 5/5                 5/5                5/5    Sensory:            C5         C6         C7      C8       T1        (0=absent, 1=impaired, 2=normal, NT=not testable)  L         2            2           2        2         2  R          2            2           2        2         2               L2          L3         L4      L5       S1         (0=absent, 1=impaired, 2=normal, NT=not testable)  L         2            2            2        2        2  R          2            2           2        2         2    Paresthesias in stocking/glove distribution at baseline, states feet are improving

## 2019-04-03 NOTE — PROGRESS NOTE ADULT - SUBJECTIVE AND OBJECTIVE BOX
Patient is a 43y old  Female who presents with a chief complaint of AMS (31 Mar 2019 07:49)      OVERNIGHT EVENTS:  in chair in nad     MEDICATIONS  (STANDING):  ascorbic acid 500 milliGRAM(s) Oral two times a day  calcium carbonate 1250 mG  + Vitamin D (OsCal 500 + D) 1 Tablet(s) Oral three times a day  celecoxib 200 milliGRAM(s) Oral daily  chlorhexidine 4% Liquid 1 Application(s) Topical <User Schedule>  colchicine 0.6 milliGRAM(s) Oral daily  DAPTOmycin IVPB      DAPTOmycin IVPB 430 milliGRAM(s) IV Intermittent every 24 hours  docusate sodium 100 milliGRAM(s) Oral three times a day  enoxaparin Injectable 40 milliGRAM(s) SubCutaneous daily  folic acid 1 milliGRAM(s) Oral daily  gabapentin 800 milliGRAM(s) Oral three times a day  lactobacillus acidophilus 1 Tablet(s) Oral two times a day with meals  lidocaine   Patch 1 Patch Transdermal every 24 hours  lidocaine   Patch 1 Patch Transdermal every 24 hours  linezolid    Tablet 600 milliGRAM(s) Oral every 12 hours  methadone    Tablet 40 milliGRAM(s) Oral daily  multivitamin 1 Tablet(s) Oral daily  nicotine -  14 mG/24Hr(s) Patch 1 patch Transdermal daily  nystatin Powder 1 Application(s) Topical three times a day  oxyCODONE  ER Tablet 40 milliGRAM(s) Oral every 12 hours  pantoprazole    Tablet 40 milliGRAM(s) Oral before breakfast  senna 2 Tablet(s) Oral at bedtime    MEDICATIONS  (PRN):  acetaminophen   Tablet .. 650 milliGRAM(s) Oral every 6 hours PRN Mild Pain (1 - 3)  acetaminophen   Tablet .. 650 milliGRAM(s) Oral every 6 hours PRN Temp greater or equal to 38C (100.4F)  diphenhydrAMINE 25 milliGRAM(s) Oral every 4 hours PRN Rash and/or Itching  oxyCODONE    IR 20 milliGRAM(s) Oral every 4 hours PRN Moderate Pain (4 - 6)  polyethylene glycol 3350 17 Gram(s) Oral daily PRN Constipation    Allergies    penicillin (Short breath; Hives)    Intolerances    Vital Signs Last 24 Hrs  T(C): 36.7 (03 Apr 2019 04:00), Max: 36.9 (03 Apr 2019 00:00)  T(F): 98 (03 Apr 2019 04:00), Max: 98.4 (03 Apr 2019 00:00)  HR: 77 (03 Apr 2019 04:00) (77 - 96)  BP: 109/64 (03 Apr 2019 04:00) (101/60 - 111/41)  BP(mean): --  RR: 16 (03 Apr 2019 04:00) (15 - 18)  SpO2: 96% (03 Apr 2019 04:00) (95% - 99%)    PHYSICAL EXAM:  GENERAL: in chair in nad  well-groomed, well-developed  HEAD:  Atraumatic, Normocephalic  EYES: EOMI, PERRLA, conjunctiva and sclera clear  ENMT: No tonsillar erythema, exudates, or enlargement; Moist mucous membranes, Good dentition, No lesions, cervical collar in place   NECK: Supple, No JVD, Normal thyroid  NERVOUS SYSTEM:  Alert & Oriented X3, Good concentration; Motor Strength 4/5 B/L upper and lower extremities;  CHEST/LUNG: Clear to percussion bilaterally; No rales, rhonchi, wheezing, or rubs BL  HEART: Regular rate and rhythm; No murmurs, rubs, or gallops  ABDOMEN: Soft, Nontender, Nondistended; Bowel sounds present  BACK; Spinal tenderness  EXTREMITIES:  2+ Peripheral Pulses, No clubbing, cyanosis, + edema to lower extremities LE,   BL Hip/knees TTP, R knee swollen  SKIN: No rashes or lesions      LABS:                                   7.9    5.74  )-----------( 296      ( 03 Apr 2019 08:28 )             26.3     04-02    138  |  99  |  18  ----------------------------<  98  4.6   |  32<H>  |  0.47<L>    Ca    9.8      02 Apr 2019 06:48  Phos  5.6     04-02  Mg     2.2     04-02            Prealbumin, Serum (04.02.19 @ 09:16)    Prealbumin, Serum: 13 mg/dL      Cultures; Culture - Tissue with Gram Stain (collected 21 Mar 2019 09:42)  Source: .Tissue cervical disc #2 culture  Gram Stain (prelim) (22 Mar 2019 11:20):    Moderate polymorphonuclear leukocytes per low power field    Rare Gram positive cocci in pairs per oil power field  Preliminary Report (24 Mar 2019 12:21):    Few Methicillin resistant Staphylococcus aureus  Organism: Methicillin resistant Staphylococcus aureus (24 Mar 2019 12:21)  Organism: Methicillin resistant Staphylococcus aureus (24 Mar 2019 12:21)    Culture - Tissue with Gram Stain (collected 21 Mar 2019 09:38)  Source: .Tissue c4-c5 disc culture c/s ewb  Gram Stain (prelim) (22 Mar 2019 11:33):    Moderate polymorphonuclear leukocytes per low power field    Rare Gram positive cocci in pairs per oil power field  Preliminary Report (23 Mar 2019 08:21):    Few Methicillin resistant Staphylococcus aureus  Organism: Methicillin resistant Staphylococcus aureus (23 Mar 2019 08:20)  Organism: Methicillin resistant Staphylococcus aureus (23 Mar 2019 08:20)    Culture - Blood (collected 21 Mar 2019 09:33)  Source: .Blood  Preliminary Report (22 Mar 2019 10:01):    No growth to date.    Culture - Surgical Swab (collected 21 Mar 2019 09:31)  Source: .Surgical Swab cervical disc #1 culture c/s  Preliminary Report (23 Mar 2019 08:27):    Numerous Methicillin resistant Staphylococcus aureus  Organism: Methicillin resistant Staphylococcus aureus (23 Mar 2019 08:26)  Organism: Methicillin resistant Staphylococcus aureus (23 Mar 2019 08:26)    Culture - Surgical Swab (collected 21 Mar 2019 09:26)  Source: .Surgical Swab prevertebral culture c/s  Preliminary Report (22 Mar 2019 10:48):    No growth    Culture - Body Fluid with Gram Stain (collected 21 Mar 2019 04:12)  Source: .Body Fluid LEFT THORACENTESIS  Gram Stain (prelim) (22 Mar 2019 09:06):    polymorphonuclear leukocytes seen    No organisms seen    by cytocentrifuge  Preliminary Report (22 Mar 2019 09:06):    No growth      RADIOLOGY & ADDITIONAL TESTS:    Imaging Personally Reviewed:  [ x] YES    < from: Xray Chest 1 View- PORTABLE-Urgent (03.21.19 @ 02:02) >  Stable small right pleural effusion  Stable left basilar atelectasis.  Mild worsening of right basilar opacity.  < from: CT Chest No Cont (03.01.19 @ 11:32) >  Bilateral cavitary lesions are increased in size and number compared to   2/17/2019.     Small bilateral pleural effusions, loculated on the right, increased   compared to the prior study. Unchanged right lower lobe dependent  consolidation.    Prominent mediastinal and axillary lymph nodes, possibly reactive.      Consultant(s) Notes Reviewed:  [x ] YES     Care Discussed with [ x] Consultants [X ] Patient [ ] Family  [x ]    [x ]  Other; RN

## 2019-04-03 NOTE — PROGRESS NOTE ADULT - PROBLEM SELECTOR PLAN 5
as above  Repeat TTE no worsening or abscess formation  with pt not having more fever /leukocytosis/sepsis or new (recurrent )  bacteremias  her chances of showering new septic emboli is less likely   repeat blood c/s today   Its very possible she seeded all these areas initially when she was bacteremic and febrile for first 11 days   In the event of new fever or bacteremia however we will def require a repeat LIZ

## 2019-04-04 LAB
ANION GAP SERPL CALC-SCNC: 10 MMOL/L — SIGNIFICANT CHANGE UP (ref 5–17)
BUN SERPL-MCNC: 16 MG/DL — SIGNIFICANT CHANGE UP (ref 7–23)
CALCIUM SERPL-MCNC: 9.4 MG/DL — SIGNIFICANT CHANGE UP (ref 8.5–10.1)
CHLORIDE SERPL-SCNC: 97 MMOL/L — SIGNIFICANT CHANGE UP (ref 96–108)
CO2 SERPL-SCNC: 29 MMOL/L — SIGNIFICANT CHANGE UP (ref 22–31)
CREAT SERPL-MCNC: 0.64 MG/DL — SIGNIFICANT CHANGE UP (ref 0.5–1.3)
GLUCOSE SERPL-MCNC: 130 MG/DL — HIGH (ref 70–99)
HCT VFR BLD CALC: 26.9 % — LOW (ref 34.5–45)
HGB BLD-MCNC: 8.2 G/DL — LOW (ref 11.5–15.5)
MAGNESIUM SERPL-MCNC: 2.1 MG/DL — SIGNIFICANT CHANGE UP (ref 1.6–2.6)
MCHC RBC-ENTMCNC: 25.5 PG — LOW (ref 27–34)
MCHC RBC-ENTMCNC: 30.5 GM/DL — LOW (ref 32–36)
MCV RBC AUTO: 83.8 FL — SIGNIFICANT CHANGE UP (ref 80–100)
NRBC # BLD: 0 /100 WBCS — SIGNIFICANT CHANGE UP (ref 0–0)
PHOSPHATE SERPL-MCNC: 5.4 MG/DL — HIGH (ref 2.5–4.5)
PLATELET # BLD AUTO: 318 K/UL — SIGNIFICANT CHANGE UP (ref 150–400)
POTASSIUM SERPL-MCNC: 4.3 MMOL/L — SIGNIFICANT CHANGE UP (ref 3.5–5.3)
POTASSIUM SERPL-SCNC: 4.3 MMOL/L — SIGNIFICANT CHANGE UP (ref 3.5–5.3)
RBC # BLD: 3.21 M/UL — LOW (ref 3.8–5.2)
RBC # FLD: 18.5 % — HIGH (ref 10.3–14.5)
SODIUM SERPL-SCNC: 136 MMOL/L — SIGNIFICANT CHANGE UP (ref 135–145)
WBC # BLD: 7.66 K/UL — SIGNIFICANT CHANGE UP (ref 3.8–10.5)
WBC # FLD AUTO: 7.66 K/UL — SIGNIFICANT CHANGE UP (ref 3.8–10.5)

## 2019-04-04 PROCEDURE — 99233 SBSQ HOSP IP/OBS HIGH 50: CPT

## 2019-04-04 RX ORDER — OXYCODONE HYDROCHLORIDE 5 MG/1
20 TABLET ORAL EVERY 4 HOURS
Qty: 0 | Refills: 0 | Status: DISCONTINUED | OUTPATIENT
Start: 2019-04-04 | End: 2019-04-09

## 2019-04-04 RX ORDER — FUROSEMIDE 40 MG
20 TABLET ORAL ONCE
Qty: 0 | Refills: 0 | Status: COMPLETED | OUTPATIENT
Start: 2019-04-04 | End: 2019-04-04

## 2019-04-04 RX ADMIN — OXYCODONE HYDROCHLORIDE 20 MILLIGRAM(S): 5 TABLET ORAL at 21:35

## 2019-04-04 RX ADMIN — Medication 100 MILLIGRAM(S): at 06:16

## 2019-04-04 RX ADMIN — POLYETHYLENE GLYCOL 3350 17 GRAM(S): 17 POWDER, FOR SOLUTION ORAL at 11:58

## 2019-04-04 RX ADMIN — Medication 1 TABLET(S): at 06:16

## 2019-04-04 RX ADMIN — Medication 20 MILLIGRAM(S): at 14:59

## 2019-04-04 RX ADMIN — NYSTATIN CREAM 1 APPLICATION(S): 100000 CREAM TOPICAL at 21:36

## 2019-04-04 RX ADMIN — OXYCODONE HYDROCHLORIDE 20 MILLIGRAM(S): 5 TABLET ORAL at 11:35

## 2019-04-04 RX ADMIN — LIDOCAINE 1 PATCH: 4 CREAM TOPICAL at 08:14

## 2019-04-04 RX ADMIN — Medication 1 PATCH: at 18:37

## 2019-04-04 RX ADMIN — Medication 1 PATCH: at 06:24

## 2019-04-04 RX ADMIN — POLYETHYLENE GLYCOL 3350 17 GRAM(S): 17 POWDER, FOR SOLUTION ORAL at 02:43

## 2019-04-04 RX ADMIN — Medication 1 TABLET(S): at 18:34

## 2019-04-04 RX ADMIN — OXYCODONE HYDROCHLORIDE 20 MILLIGRAM(S): 5 TABLET ORAL at 10:51

## 2019-04-04 RX ADMIN — Medication 1 PATCH: at 12:03

## 2019-04-04 RX ADMIN — GABAPENTIN 800 MILLIGRAM(S): 400 CAPSULE ORAL at 14:16

## 2019-04-04 RX ADMIN — OXYCODONE HYDROCHLORIDE 40 MILLIGRAM(S): 5 TABLET ORAL at 07:17

## 2019-04-04 RX ADMIN — NYSTATIN CREAM 1 APPLICATION(S): 100000 CREAM TOPICAL at 06:16

## 2019-04-04 RX ADMIN — Medication 0.6 MILLIGRAM(S): at 11:50

## 2019-04-04 RX ADMIN — LIDOCAINE 1 PATCH: 4 CREAM TOPICAL at 22:52

## 2019-04-04 RX ADMIN — LINEZOLID 600 MILLIGRAM(S): 600 INJECTION, SOLUTION INTRAVENOUS at 18:34

## 2019-04-04 RX ADMIN — ENOXAPARIN SODIUM 40 MILLIGRAM(S): 100 INJECTION SUBCUTANEOUS at 11:52

## 2019-04-04 RX ADMIN — OXYCODONE HYDROCHLORIDE 20 MILLIGRAM(S): 5 TABLET ORAL at 15:21

## 2019-04-04 RX ADMIN — OXYCODONE HYDROCHLORIDE 20 MILLIGRAM(S): 5 TABLET ORAL at 02:39

## 2019-04-04 RX ADMIN — LIDOCAINE 1 PATCH: 4 CREAM TOPICAL at 12:02

## 2019-04-04 RX ADMIN — CHLORHEXIDINE GLUCONATE 1 APPLICATION(S): 213 SOLUTION TOPICAL at 06:17

## 2019-04-04 RX ADMIN — Medication 1 TABLET(S): at 21:35

## 2019-04-04 RX ADMIN — Medication 1 TABLET(S): at 14:16

## 2019-04-04 RX ADMIN — Medication 1 TABLET(S): at 11:49

## 2019-04-04 RX ADMIN — METHADONE HYDROCHLORIDE 40 MILLIGRAM(S): 40 TABLET ORAL at 11:58

## 2019-04-04 RX ADMIN — Medication 100 MILLIGRAM(S): at 14:16

## 2019-04-04 RX ADMIN — LINEZOLID 600 MILLIGRAM(S): 600 INJECTION, SOLUTION INTRAVENOUS at 06:16

## 2019-04-04 RX ADMIN — Medication 100 MILLIGRAM(S): at 22:53

## 2019-04-04 RX ADMIN — DAPTOMYCIN 117.2 MILLIGRAM(S): 500 INJECTION, POWDER, LYOPHILIZED, FOR SOLUTION INTRAVENOUS at 10:39

## 2019-04-04 RX ADMIN — Medication 1 MILLIGRAM(S): at 11:52

## 2019-04-04 RX ADMIN — LIDOCAINE 1 PATCH: 4 CREAM TOPICAL at 22:53

## 2019-04-04 RX ADMIN — CELECOXIB 200 MILLIGRAM(S): 200 CAPSULE ORAL at 11:50

## 2019-04-04 RX ADMIN — PANTOPRAZOLE SODIUM 40 MILLIGRAM(S): 20 TABLET, DELAYED RELEASE ORAL at 06:16

## 2019-04-04 RX ADMIN — GABAPENTIN 800 MILLIGRAM(S): 400 CAPSULE ORAL at 21:35

## 2019-04-04 RX ADMIN — LIDOCAINE 1 PATCH: 4 CREAM TOPICAL at 08:13

## 2019-04-04 RX ADMIN — OXYCODONE HYDROCHLORIDE 40 MILLIGRAM(S): 5 TABLET ORAL at 18:55

## 2019-04-04 RX ADMIN — OXYCODONE HYDROCHLORIDE 20 MILLIGRAM(S): 5 TABLET ORAL at 03:39

## 2019-04-04 RX ADMIN — OXYCODONE HYDROCHLORIDE 40 MILLIGRAM(S): 5 TABLET ORAL at 18:34

## 2019-04-04 RX ADMIN — CELECOXIB 200 MILLIGRAM(S): 200 CAPSULE ORAL at 13:20

## 2019-04-04 RX ADMIN — Medication 1 PATCH: at 11:50

## 2019-04-04 RX ADMIN — GABAPENTIN 800 MILLIGRAM(S): 400 CAPSULE ORAL at 06:16

## 2019-04-04 RX ADMIN — OXYCODONE HYDROCHLORIDE 20 MILLIGRAM(S): 5 TABLET ORAL at 14:57

## 2019-04-04 RX ADMIN — SENNA PLUS 2 TABLET(S): 8.6 TABLET ORAL at 21:35

## 2019-04-04 RX ADMIN — OXYCODONE HYDROCHLORIDE 40 MILLIGRAM(S): 5 TABLET ORAL at 06:17

## 2019-04-04 RX ADMIN — Medication 500 MILLIGRAM(S): at 18:34

## 2019-04-04 RX ADMIN — Medication 500 MILLIGRAM(S): at 06:16

## 2019-04-04 RX ADMIN — OXYCODONE HYDROCHLORIDE 20 MILLIGRAM(S): 5 TABLET ORAL at 22:35

## 2019-04-04 RX ADMIN — NYSTATIN CREAM 1 APPLICATION(S): 100000 CREAM TOPICAL at 14:17

## 2019-04-04 RX ADMIN — Medication 1 TABLET(S): at 07:56

## 2019-04-04 NOTE — PROGRESS NOTE ADULT - SUBJECTIVE AND OBJECTIVE BOX
Patient is a 43y old  Female who presents with a chief complaint of AMS (31 Mar 2019 07:49)      OVERNIGHT EVENTS:  in chair in nad      MEDICATIONS  (STANDING):  ascorbic acid 500 milliGRAM(s) Oral two times a day  calcium carbonate 1250 mG  + Vitamin D (OsCal 500 + D) 1 Tablet(s) Oral three times a day  celecoxib 200 milliGRAM(s) Oral daily  chlorhexidine 4% Liquid 1 Application(s) Topical <User Schedule>  colchicine 0.6 milliGRAM(s) Oral daily  DAPTOmycin IVPB      DAPTOmycin IVPB 430 milliGRAM(s) IV Intermittent every 24 hours  docusate sodium 100 milliGRAM(s) Oral three times a day  enoxaparin Injectable 40 milliGRAM(s) SubCutaneous daily  folic acid 1 milliGRAM(s) Oral daily  gabapentin 800 milliGRAM(s) Oral three times a day  lactobacillus acidophilus 1 Tablet(s) Oral two times a day with meals  lidocaine   Patch 1 Patch Transdermal every 24 hours  lidocaine   Patch 1 Patch Transdermal every 24 hours  linezolid    Tablet 600 milliGRAM(s) Oral every 12 hours  methadone    Tablet 40 milliGRAM(s) Oral daily  multivitamin 1 Tablet(s) Oral daily  nicotine -  14 mG/24Hr(s) Patch 1 patch Transdermal daily  nystatin Powder 1 Application(s) Topical three times a day  oxyCODONE  ER Tablet 40 milliGRAM(s) Oral every 12 hours  pantoprazole    Tablet 40 milliGRAM(s) Oral before breakfast  polyethylene glycol 3350 17 Gram(s) Oral daily  senna 2 Tablet(s) Oral at bedtime    MEDICATIONS  (PRN):  acetaminophen   Tablet .. 650 milliGRAM(s) Oral every 6 hours PRN Mild Pain (1 - 3)  acetaminophen   Tablet .. 650 milliGRAM(s) Oral every 6 hours PRN Temp greater or equal to 38C (100.4F)  diphenhydrAMINE 25 milliGRAM(s) Oral every 4 hours PRN Rash and/or Itching  oxyCODONE    IR 20 milliGRAM(s) Oral every 4 hours PRN Moderate Pain (4 - 6)  polyethylene glycol 3350 17 Gram(s) Oral daily PRN Constipation    Allergies    penicillin (Short breath; Hives)    Intolerances    Vital Signs Last 24 Hrs  T(C): 37.6 (04 Apr 2019 05:21), Max: 37.6 (04 Apr 2019 05:21)  T(F): 99.6 (04 Apr 2019 05:21), Max: 99.6 (04 Apr 2019 05:21)  HR: 93 (04 Apr 2019 05:21) (80 - 101)  BP: 122/77 (04 Apr 2019 05:21) (98/58 - 122/77)  BP(mean): --  RR: 16 (04 Apr 2019 05:21) (16 - 18)  SpO2: 97% (04 Apr 2019 05:21) (96% - 98%)    PHYSICAL EXAM:  GENERAL: in chair in nad  well-groomed, well-developed  HEAD:  Atraumatic, Normocephalic  EYES: EOMI, PERRLA, conjunctiva and sclera clear  ENMT: No tonsillar erythema, exudates, or enlargement; Moist mucous membranes, Good dentition, No lesions, cervical collar in place   NECK: Supple, No JVD, Normal thyroid  NERVOUS SYSTEM:  Alert & Oriented X3, Good concentration; Motor Strength 4/5 B/L upper and lower extremities;  CHEST/LUNG: Clear to percussion bilaterally; No rales, rhonchi, wheezing, or rubs BL  HEART: Regular rate and rhythm; No murmurs, rubs, or gallops  ABDOMEN: Soft, Nontender, Nondistended; Bowel sounds present  BACK; Spinal tenderness  EXTREMITIES:  2+ Peripheral Pulses, No clubbing, cyanosis, + edema to lower extremities LE,   BL Hip/knees TTP, R knee swollen  SKIN: No rashes or lesions      LABS:                                       7.9    5.74  )-----------( 296      ( 03 Apr 2019 08:28 )             26.3            Prealbumin, Serum: 13 mg/dL      Cultures; Culture - Tissue with Gram Stain (collected 21 Mar 2019 09:42)  Source: .Tissue cervical disc #2 culture  Gram Stain (prelim) (22 Mar 2019 11:20):    Moderate polymorphonuclear leukocytes per low power field    Rare Gram positive cocci in pairs per oil power field  Preliminary Report (24 Mar 2019 12:21):    Few Methicillin resistant Staphylococcus aureus  Organism: Methicillin resistant Staphylococcus aureus (24 Mar 2019 12:21)  Organism: Methicillin resistant Staphylococcus aureus (24 Mar 2019 12:21)    Culture - Tissue with Gram Stain (collected 21 Mar 2019 09:38)  Source: .Tissue c4-c5 disc culture c/s ewb  Gram Stain (prelim) (22 Mar 2019 11:33):    Moderate polymorphonuclear leukocytes per low power field    Rare Gram positive cocci in pairs per oil power field  Preliminary Report (23 Mar 2019 08:21):    Few Methicillin resistant Staphylococcus aureus  Organism: Methicillin resistant Staphylococcus aureus (23 Mar 2019 08:20)  Organism: Methicillin resistant Staphylococcus aureus (23 Mar 2019 08:20)    Culture - Blood (collected 21 Mar 2019 09:33)  Source: .Blood  Preliminary Report (22 Mar 2019 10:01):    No growth to date.    Culture - Surgical Swab (collected 21 Mar 2019 09:31)  Source: .Surgical Swab cervical disc #1 culture c/s  Preliminary Report (23 Mar 2019 08:27):    Numerous Methicillin resistant Staphylococcus aureus  Organism: Methicillin resistant Staphylococcus aureus (23 Mar 2019 08:26)  Organism: Methicillin resistant Staphylococcus aureus (23 Mar 2019 08:26)    Culture - Surgical Swab (collected 21 Mar 2019 09:26)  Source: .Surgical Swab prevertebral culture c/s  Preliminary Report (22 Mar 2019 10:48):    No growth    Culture - Body Fluid with Gram Stain (collected 21 Mar 2019 04:12)  Source: .Body Fluid LEFT THORACENTESIS  Gram Stain (prelim) (22 Mar 2019 09:06):    polymorphonuclear leukocytes seen    No organisms seen    by cytocentrifuge  Preliminary Report (22 Mar 2019 09:06):    No growth      RADIOLOGY & ADDITIONAL TESTS:    Imaging Personally Reviewed:  [ x] YES    < from: Xray Chest 1 View- PORTABLE-Urgent (03.21.19 @ 02:02) >  Stable small right pleural effusion  Stable left basilar atelectasis.  Mild worsening of right basilar opacity.  < from: CT Chest No Cont (03.01.19 @ 11:32) >  Bilateral cavitary lesions are increased in size and number compared to   2/17/2019.     Small bilateral pleural effusions, loculated on the right, increased   compared to the prior study. Unchanged right lower lobe dependent  consolidation.    Prominent mediastinal and axillary lymph nodes, possibly reactive.      Consultant(s) Notes Reviewed:  [x ] YES     Care Discussed with [ x] Consultants [X ] Patient [ ] Family  [x ]    [x ]  Other; RN

## 2019-04-04 NOTE — PROGRESS NOTE ADULT - ASSESSMENT
43 F w/ history of IVDA, alcohol abuse, hx of pancreatitis, alcohol hepatitis, alcohol withdrawal, left frontoparietal subdural hematoma 2008 without need for neurosurgical intervention presented with severe sepsis with septic shock secondary to MRSA bacteremia w/ RLL PNA, UTI, endocarditis on LIZ and EFRAIN that has resolved and was diagnosis w/ HCV. Pt was initially intubated due to respiratory failure and put on pressors in ICU. She as extubated on 2/25 and transferred from ICU the following day. Pt had a C4-5 ACDF with irrigation and debridement on 3/20 due to spinal abscess and osteo and was kept intubated post procedure and transferred again to ICU, where she was extubated on 3/21 and transferred back to the med service the following day. HCV- Outpatient follow up for HCV and opioid maintenance outpt re-enrollment program    Septic arthritis involving BL Knees/hips;  - MRI of bilateral hips and knees were done demonstrating effusions and an abscess located in the right rectus femoris.   - Bilateral knees were aspirated by orthopedic team. on 3/28/19 prelim ngtd   bilateral hips aspirated by orthopedic team on 3/29/19 prelim ngtd but cppd crystals seen c/w psuedo gout  - as per ortho-No plan for orthopaedic operative intervention at this time. Should cultures grow pt will require operative intervention      Pseudogout: started colchicine     septic shock secondary to MRSA bacteremia w/ RLL PNA, UTI, endocarditis on LIZ at Tricuspid valve l  - resolved     Endocarditis of tricuspid valve  - status post thoracentesis on 3/20  - as per ID, patient is on dapto & Zyvox   - repeat BC negative from 3/21, repeated blood cx on 4/2/19 prelim ngtd     - repeat echo showed a pedunculated mobile mass seen adherent to lateral leaflet of the tricuspid valve w/ no interval changes noted in valve structure or regurgitation    - my colleague spoke with Dr. Gutierrez ([facility]) who suggested to obtain CT C/A/P and LIZ ( not clinically indicated as pt stable and will not  )  I  d/w cardiology on 4/1/19 and will get TTE if any subtle change in TR valve will then get LIZ, i  already d/w ortho who states cervical collar could be removed for LIZ if needed        Spinal osteo, abscess and cord compression    - CT of head and neck showed disc space narrowing and endplate irregularity at C4-5 which may represent typical degenerative change vs discitis   - MRI of the cervical spine w/ contrast showed C2-C7 discitis/osteomyelitis with a small right of midline ventral epidural abscess at C2-C7  - MRI of the thoracolumbar spine showed discitis/osteomyelitis at T11-T12 with paravertebral abscess, as well as discitis/osteomyelitis C6--T1, T9-T10, T10-T11, L4-L5, and L5-S1, and a phlegmon/early abscess dorsolaterally within the lumbar canal at the level of L4-L5. There was also a septic arthritis of  the left sternoclavicular joint space. There was also disc herniation at the L4-L5 level with contact and probable impingement of the descending right-sided nerve roots  - status post s/p C4-5 ACDF with irrigation and debridement on 3/20  - c/w gabapentin and lidocaine patch  - wound cx grew MRSA  - c/w [place] J  collar   - as per ID, patient is on dapto & Zyvox     Bacteremia due to methicillin resistant Staphylococcus aureus with IE: diskitis/OM /Spinal abscesses /Parapneumonic effusion   - most recent blood cx from 3/21/19 negative   - s/p thoracentesis : fluids neg    Moderate protein-calorie malnutrition  - c/w soft diet w/Ensure Enlive 3 x day         edema to lower extremities checked dopplers on 4/1/19 and no dvt    started dvt prophylaxis with Lovenox per my d/w orthopedic . on 4/1/19   prealbumin is low c/w moderate malnutrition     IVDrug abuse  - c/w methadone for opiate abuse   - - cont celebrex, gabapentin, oxycodone, Oxycontin to 40 mg bid    Anemia due multiple issues, surgery , blood draws,   poor nutrition and infection  - status post a few units of blood during this extensive hospital stay last one on 3/20/19  continue to monitor hgb , hgb 4/3/19  was 7.9 continue to monitor .  , f/u level today     - c/w folic acid

## 2019-04-05 LAB
CALCIUM SERPL-MCNC: 9.4 MG/DL — SIGNIFICANT CHANGE UP (ref 8.4–10.5)
PTH-INTACT FLD-MCNC: 24 PG/ML — SIGNIFICANT CHANGE UP (ref 15–65)

## 2019-04-05 PROCEDURE — 99233 SBSQ HOSP IP/OBS HIGH 50: CPT

## 2019-04-05 RX ADMIN — SENNA PLUS 2 TABLET(S): 8.6 TABLET ORAL at 22:24

## 2019-04-05 RX ADMIN — Medication 650 MILLIGRAM(S): at 23:23

## 2019-04-05 RX ADMIN — LIDOCAINE 1 PATCH: 4 CREAM TOPICAL at 06:19

## 2019-04-05 RX ADMIN — OXYCODONE HYDROCHLORIDE 40 MILLIGRAM(S): 5 TABLET ORAL at 05:55

## 2019-04-05 RX ADMIN — LIDOCAINE 1 PATCH: 4 CREAM TOPICAL at 10:16

## 2019-04-05 RX ADMIN — OXYCODONE HYDROCHLORIDE 40 MILLIGRAM(S): 5 TABLET ORAL at 19:00

## 2019-04-05 RX ADMIN — CELECOXIB 200 MILLIGRAM(S): 200 CAPSULE ORAL at 11:51

## 2019-04-05 RX ADMIN — OXYCODONE HYDROCHLORIDE 20 MILLIGRAM(S): 5 TABLET ORAL at 22:20

## 2019-04-05 RX ADMIN — Medication 500 MILLIGRAM(S): at 05:55

## 2019-04-05 RX ADMIN — OXYCODONE HYDROCHLORIDE 20 MILLIGRAM(S): 5 TABLET ORAL at 23:20

## 2019-04-05 RX ADMIN — CHLORHEXIDINE GLUCONATE 1 APPLICATION(S): 213 SOLUTION TOPICAL at 05:56

## 2019-04-05 RX ADMIN — OXYCODONE HYDROCHLORIDE 40 MILLIGRAM(S): 5 TABLET ORAL at 06:55

## 2019-04-05 RX ADMIN — ENOXAPARIN SODIUM 40 MILLIGRAM(S): 100 INJECTION SUBCUTANEOUS at 11:50

## 2019-04-05 RX ADMIN — LIDOCAINE 1 PATCH: 4 CREAM TOPICAL at 06:20

## 2019-04-05 RX ADMIN — LINEZOLID 600 MILLIGRAM(S): 600 INJECTION, SOLUTION INTRAVENOUS at 05:55

## 2019-04-05 RX ADMIN — Medication 1 TABLET(S): at 11:54

## 2019-04-05 RX ADMIN — METHADONE HYDROCHLORIDE 40 MILLIGRAM(S): 40 TABLET ORAL at 12:12

## 2019-04-05 RX ADMIN — NYSTATIN CREAM 1 APPLICATION(S): 100000 CREAM TOPICAL at 05:56

## 2019-04-05 RX ADMIN — PANTOPRAZOLE SODIUM 40 MILLIGRAM(S): 20 TABLET, DELAYED RELEASE ORAL at 08:01

## 2019-04-05 RX ADMIN — NYSTATIN CREAM 1 APPLICATION(S): 100000 CREAM TOPICAL at 22:24

## 2019-04-05 RX ADMIN — Medication 1 TABLET(S): at 22:20

## 2019-04-05 RX ADMIN — Medication 1 MILLIGRAM(S): at 11:54

## 2019-04-05 RX ADMIN — LIDOCAINE 1 PATCH: 4 CREAM TOPICAL at 23:17

## 2019-04-05 RX ADMIN — NYSTATIN CREAM 1 APPLICATION(S): 100000 CREAM TOPICAL at 15:04

## 2019-04-05 RX ADMIN — Medication 1 TABLET(S): at 18:16

## 2019-04-05 RX ADMIN — OXYCODONE HYDROCHLORIDE 20 MILLIGRAM(S): 5 TABLET ORAL at 11:15

## 2019-04-05 RX ADMIN — Medication 1 TABLET(S): at 05:55

## 2019-04-05 RX ADMIN — LINEZOLID 600 MILLIGRAM(S): 600 INJECTION, SOLUTION INTRAVENOUS at 18:16

## 2019-04-05 RX ADMIN — OXYCODONE HYDROCHLORIDE 20 MILLIGRAM(S): 5 TABLET ORAL at 10:24

## 2019-04-05 RX ADMIN — OXYCODONE HYDROCHLORIDE 20 MILLIGRAM(S): 5 TABLET ORAL at 15:09

## 2019-04-05 RX ADMIN — Medication 650 MILLIGRAM(S): at 23:20

## 2019-04-05 RX ADMIN — GABAPENTIN 800 MILLIGRAM(S): 400 CAPSULE ORAL at 05:55

## 2019-04-05 RX ADMIN — GABAPENTIN 800 MILLIGRAM(S): 400 CAPSULE ORAL at 22:24

## 2019-04-05 RX ADMIN — Medication 0.6 MILLIGRAM(S): at 11:51

## 2019-04-05 RX ADMIN — Medication 100 MILLIGRAM(S): at 05:55

## 2019-04-05 RX ADMIN — LIDOCAINE 1 PATCH: 4 CREAM TOPICAL at 23:16

## 2019-04-05 RX ADMIN — Medication 1 PATCH: at 12:13

## 2019-04-05 RX ADMIN — Medication 100 MILLIGRAM(S): at 22:24

## 2019-04-05 RX ADMIN — Medication 1 PATCH: at 22:23

## 2019-04-05 RX ADMIN — DAPTOMYCIN 117.2 MILLIGRAM(S): 500 INJECTION, POWDER, LYOPHILIZED, FOR SOLUTION INTRAVENOUS at 11:52

## 2019-04-05 RX ADMIN — CELECOXIB 200 MILLIGRAM(S): 200 CAPSULE ORAL at 12:28

## 2019-04-05 RX ADMIN — Medication 1 TABLET(S): at 15:02

## 2019-04-05 RX ADMIN — Medication 100 MILLIGRAM(S): at 15:03

## 2019-04-05 RX ADMIN — OXYCODONE HYDROCHLORIDE 40 MILLIGRAM(S): 5 TABLET ORAL at 18:17

## 2019-04-05 RX ADMIN — Medication 1 PATCH: at 11:51

## 2019-04-05 RX ADMIN — Medication 500 MILLIGRAM(S): at 18:17

## 2019-04-05 RX ADMIN — Medication 1 PATCH: at 06:43

## 2019-04-05 RX ADMIN — GABAPENTIN 800 MILLIGRAM(S): 400 CAPSULE ORAL at 15:03

## 2019-04-05 RX ADMIN — Medication 1 TABLET(S): at 08:01

## 2019-04-05 RX ADMIN — POLYETHYLENE GLYCOL 3350 17 GRAM(S): 17 POWDER, FOR SOLUTION ORAL at 11:50

## 2019-04-05 RX ADMIN — OXYCODONE HYDROCHLORIDE 20 MILLIGRAM(S): 5 TABLET ORAL at 19:00

## 2019-04-05 NOTE — PROGRESS NOTE ADULT - SUBJECTIVE AND OBJECTIVE BOX
Pt seen and examined at bedside. Right side of body half off bed - states she was getting up to use restroom overnight when she fell asleep. Continues to report frustration with c-collar along with global back pain however resting comfortably prior to encounter.  Denies CP/Dyspnea/Calf tenderness bilaterally. Hip and knee pain stable compared to prior days. Pt denies any new erythema/redness/swelling. No other complaints at this time.    Vital Signs Last 24 Hrs  T(C): 36.4 (05 Apr 2019 06:06), Max: 37.1 (04 Apr 2019 11:23)  T(F): 97.6 (05 Apr 2019 06:06), Max: 98.8 (04 Apr 2019 11:23)  HR: 89 (05 Apr 2019 06:06) (82 - 94)  BP: 124/78 (05 Apr 2019 06:06) (105/57 - 124/78)  BP(mean): --  RR: 16 (05 Apr 2019 06:06) (16 - 19)  SpO2: 100% (05 Apr 2019 06:06) (96% - 100%)      Gen: NAD  No pain with AROM of BL HIp/Knee, no signs of erythema or worsening effusion of B/L Knees. No pain with palpation of b/l hip and knee this morning    Spine PE:  Skin intact  [place] J Collar In Place  dressing c/d/i  Negative clonus  Negative babinski  Negative olmstead           Deltoid        Bicep        Tricep          Wrist Ext    Wrist Flex    Finger Flex          Finger Abd  L            4/5         4/5           4/5             4/5                4/5	           4/5                    4/5  R            2/5 	   3/5           4/5             3/5              4/5                        4/5                   4/5                                      Hip Flex       Quad     Ankle DF        Ankle PF       Toe Ext        Hamstring    L            5/5              5/5          5/5                 5/5                 5/5	                5/5  R            5/5              5/5           5/5                 5/5                 5/5                5/5    Sensory:            C5         C6         C7      C8       T1        (0=absent, 1=impaired, 2=normal, NT=not testable)  L         2            2           2        2         2  R          2            2           2        2         2               L2          L3         L4      L5       S1         (0=absent, 1=impaired, 2=normal, NT=not testable)  L         2            2            2        2        2  R          2            2           2        2         2    Paresthesias in stocking/glove distribution at baseline, states feet are improving

## 2019-04-05 NOTE — PROGRESS NOTE ADULT - SUBJECTIVE AND OBJECTIVE BOX
Patient is a 43y old  Female who presents with a chief complaint of AMS (31 Mar 2019 07:49)      OVERNIGHT EVENTS:  in chair in nad     MEDICATIONS  (STANDING):  ascorbic acid 500 milliGRAM(s) Oral two times a day  calcium carbonate 1250 mG  + Vitamin D (OsCal 500 + D) 1 Tablet(s) Oral three times a day  celecoxib 200 milliGRAM(s) Oral daily  chlorhexidine 4% Liquid 1 Application(s) Topical <User Schedule>  colchicine 0.6 milliGRAM(s) Oral daily  DAPTOmycin IVPB      DAPTOmycin IVPB 430 milliGRAM(s) IV Intermittent every 24 hours  docusate sodium 100 milliGRAM(s) Oral three times a day  enoxaparin Injectable 40 milliGRAM(s) SubCutaneous daily  folic acid 1 milliGRAM(s) Oral daily  gabapentin 800 milliGRAM(s) Oral three times a day  lactobacillus acidophilus 1 Tablet(s) Oral two times a day with meals  lidocaine   Patch 1 Patch Transdermal every 24 hours  lidocaine   Patch 1 Patch Transdermal every 24 hours  linezolid    Tablet 600 milliGRAM(s) Oral every 12 hours  methadone    Tablet 40 milliGRAM(s) Oral daily  multivitamin 1 Tablet(s) Oral daily  nicotine -  14 mG/24Hr(s) Patch 1 patch Transdermal daily  nystatin Powder 1 Application(s) Topical three times a day  oxyCODONE  ER Tablet 40 milliGRAM(s) Oral every 12 hours  pantoprazole    Tablet 40 milliGRAM(s) Oral before breakfast  polyethylene glycol 3350 17 Gram(s) Oral daily  senna 2 Tablet(s) Oral at bedtime    MEDICATIONS  (PRN):  acetaminophen   Tablet .. 650 milliGRAM(s) Oral every 6 hours PRN Mild Pain (1 - 3)  acetaminophen   Tablet .. 650 milliGRAM(s) Oral every 6 hours PRN Temp greater or equal to 38C (100.4F)  diphenhydrAMINE 25 milliGRAM(s) Oral every 4 hours PRN Rash and/or Itching  oxyCODONE    IR 20 milliGRAM(s) Oral every 4 hours PRN Moderate Pain (4 - 6)  polyethylene glycol 3350 17 Gram(s) Oral daily PRN Constipation    Allergies    penicillin (Short breath; Hives)    Intolerances    Vital Signs Last 24 Hrs  T(C): 37.6 (04 Apr 2019 05:21), Max: 37.6 (04 Apr 2019 05:21)  T(F): 99.6 (04 Apr 2019 05:21), Max: 99.6 (04 Apr 2019 05:21)  HR: 93 (04 Apr 2019 05:21) (80 - 101)  BP: 122/77 (04 Apr 2019 05:21) (98/58 - 122/77)  BP(mean): --  RR: 16 (04 Apr 2019 05:21) (16 - 18)  SpO2: 97% (04 Apr 2019 05:21) (96% - 98%)    PHYSICAL EXAM:  GENERAL: in chair in nad  well-groomed, well-developed  HEAD:  Atraumatic, Normocephalic  EYES: EOMI, PERRLA, conjunctiva and sclera clear  ENMT: No tonsillar erythema, exudates, or enlargement; Moist mucous membranes, Good dentition, No lesions, cervical collar in place   NECK: Supple, No JVD, Normal thyroid  NERVOUS SYSTEM:  Alert & Oriented X3, Good concentration; Motor Strength 4/5 B/L upper and lower extremities;  CHEST/LUNG: Clear to percussion bilaterally; No rales, rhonchi, wheezing, or rubs BL  HEART: Regular rate and rhythm; No murmurs, rubs, or gallops  ABDOMEN: Soft, Nontender, Nondistended; Bowel sounds present  BACK; Spinal tenderness  EXTREMITIES:  2+ Peripheral Pulses, No clubbing, cyanosis, + edema to lower extremities LE,   BL Hip/knees TTP, R knee swollen  SKIN: No rashes or lesions      LABS:                              Parathyroid Hormone Intact, Serum (04.05.19 @ 14:30)    Calcium.: 9.4 mg/dL    Intact PTH: 24: PTH METHOD: Roche  Guide for Interpretation of PTH and Calcium Results                           Calcium             PTH                           MG/DL               PG/ML  Normal                   8.4-10.5            15-65  Primary  Hyperparathyroidism      >10.5               >50  Non-PTH Hypercalcemia    >10.5               0-20  Hypoparathyroidism       <8.4                0-20  Pseudohypoparathyroid    <8.4                >50  This is intended as a guide only. Factors such as sunlight exposure,  Vitamin D status and renal function should be evaluated along with  clinical presentation. pg/mL          Prealbumin, Serum: 13 mg/dL      Cultures; Culture - Tissue with Gram Stain (collected 21 Mar 2019 09:42)  Source: .Tissue cervical disc #2 culture  Gram Stain (prelim) (22 Mar 2019 11:20):    Moderate polymorphonuclear leukocytes per low power field    Rare Gram positive cocci in pairs per oil power field  Preliminary Report (24 Mar 2019 12:21):    Few Methicillin resistant Staphylococcus aureus  Organism: Methicillin resistant Staphylococcus aureus (24 Mar 2019 12:21)  Organism: Methicillin resistant Staphylococcus aureus (24 Mar 2019 12:21)    Culture - Tissue with Gram Stain (collected 21 Mar 2019 09:38)  Source: .Tissue c4-c5 disc culture c/s ewb  Gram Stain (prelim) (22 Mar 2019 11:33):    Moderate polymorphonuclear leukocytes per low power field    Rare Gram positive cocci in pairs per oil power field  Preliminary Report (23 Mar 2019 08:21):    Few Methicillin resistant Staphylococcus aureus  Organism: Methicillin resistant Staphylococcus aureus (23 Mar 2019 08:20)  Organism: Methicillin resistant Staphylococcus aureus (23 Mar 2019 08:20)    Culture - Blood (collected 21 Mar 2019 09:33)  Source: .Blood  Preliminary Report (22 Mar 2019 10:01):    No growth to date.    Culture - Surgical Swab (collected 21 Mar 2019 09:31)  Source: .Surgical Swab cervical disc #1 culture c/s  Preliminary Report (23 Mar 2019 08:27):    Numerous Methicillin resistant Staphylococcus aureus  Organism: Methicillin resistant Staphylococcus aureus (23 Mar 2019 08:26)  Organism: Methicillin resistant Staphylococcus aureus (23 Mar 2019 08:26)    Culture - Surgical Swab (collected 21 Mar 2019 09:26)  Source: .Surgical Swab prevertebral culture c/s  Preliminary Report (22 Mar 2019 10:48):    No growth    Culture - Body Fluid with Gram Stain (collected 21 Mar 2019 04:12)  Source: .Body Fluid LEFT THORACENTESIS  Gram Stain (prelim) (22 Mar 2019 09:06):    polymorphonuclear leukocytes seen    No organisms seen    by cytocentrifuge  Preliminary Report (22 Mar 2019 09:06):    No growth      RADIOLOGY & ADDITIONAL TESTS:    Imaging Personally Reviewed:  [ x] YES    < from: Xray Chest 1 View- PORTABLE-Urgent (03.21.19 @ 02:02) >  Stable small right pleural effusion  Stable left basilar atelectasis.  Mild worsening of right basilar opacity.  < from: CT Chest No Cont (03.01.19 @ 11:32) >  Bilateral cavitary lesions are increased in size and number compared to   2/17/2019.     Small bilateral pleural effusions, loculated on the right, increased   compared to the prior study. Unchanged right lower lobe dependent  consolidation.    Prominent mediastinal and axillary lymph nodes, possibly reactive.      Consultant(s) Notes Reviewed:  [x ] YES     Care Discussed with [ x] Consultants [X ] Patient [ ] Family  [x ]    [x ]  Other; RN

## 2019-04-05 NOTE — PROGRESS NOTE ADULT - ASSESSMENT
43 F w/ history of IVDA, alcohol abuse, hx of pancreatitis, alcohol hepatitis, alcohol withdrawal, left frontoparietal subdural hematoma 2008 without need for neurosurgical intervention presented with severe sepsis with septic shock secondary to MRSA bacteremia w/ RLL PNA, UTI, endocarditis on LIZ and EFRAIN that has resolved and was diagnosis w/ HCV. Pt was initially intubated due to respiratory failure and put on pressors in ICU. She as extubated on 2/25 and transferred from ICU the following day. Pt had a C4-5 ACDF with irrigation and debridement on 3/20 due to spinal abscess and osteo and was kept intubated post procedure and transferred again to ICU, where she was extubated on 3/21 and transferred back to the med service the following day. HCV- Outpatient follow up for HCV and opioid maintenance outpt re-enrollment program    Septic arthritis involving BL Knees/hips;  - MRI of bilateral hips and knees were done demonstrating effusions and an abscess located in the right rectus femoris.   - Bilateral knees were aspirated by orthopedic team. on 3/28/19 prelim ngtd   bilateral hips aspirated by orthopedic team on 3/29/19 prelim ngtd but cppd crystals seen c/w psuedo gout  - as per ortho-No plan for orthopaedic operative intervention at this time. Should cultures grow pt will require operative intervention      Pseudogout: started colchicine     septic shock secondary to MRSA bacteremia w/ RLL PNA, UTI, endocarditis on LIZ at Tricuspid valve l  - resolved     Endocarditis of tricuspid valve  - status post thoracentesis on 3/20  - as per ID, patient is on dapto & Zyvox   - repeat BC negative from 3/21, repeated blood cx on 4/2/19 prelim ngtd     - repeat echo showed a pedunculated mobile mass seen adherent to lateral leaflet of the tricuspid valve w/ no interval changes noted in valve structure or regurgitation    - my colleague spoke with Dr. Gutierrez (CTS) who suggested to obtain CT C/A/P and LIZ ( not clinically indicated as pt stable and will not  )  I  d/w cardiology on 4/1/19 and will get TTE if any subtle change in TR valve will then get LIZ, i  already d/w ortho who states cervical collar could be removed for LIZ if needed   repeated echo from 4/2/19 shows stable TR vegetation          Spinal osteo, abscess and cord compression    - CT of head and neck showed disc space narrowing and endplate irregularity at C4-5 which may represent typical degenerative change vs discitis   - MRI of the cervical spine w/ contrast showed C2-C7 discitis/osteomyelitis with a small right of midline ventral epidural abscess at C2-C7  - MRI of the thoracolumbar spine showed discitis/osteomyelitis at T11-T12 with paravertebral abscess, as well as discitis/osteomyelitis C6--T1, T9-T10, T10-T11, L4-L5, and L5-S1, and a phlegmon/early abscess dorsolaterally within the lumbar canal at the level of L4-L5. There was also a septic arthritis of  the left sternoclavicular joint space. There was also disc herniation at the L4-L5 level with contact and probable impingement of the descending right-sided nerve roots  - status post s/p C4-5 ACDF with irrigation and debridement on 3/20  - c/w gabapentin and lidocaine patch  - wound cx grew MRSA  - c/w [place] J  collar   - as per ID, patient is on dapto & Zyvox     Bacteremia due to methicillin resistant Staphylococcus aureus with IE: diskitis/OM /Spinal abscesses /Parapneumonic effusion   - most recent blood cx from 3/21/19 negative   - s/p thoracentesis : fluids neg    Moderate protein-calorie malnutrition  - c/w soft diet w/Ensure Enlive 3 x day         edema to lower extremities checked dopplers on 4/1/19 and no dvt    started dvt prophylaxis with Lovenox per my d/w orthopedic . on 4/1/19   prealbumin is low c/w moderate malnutrition     IVDrug abuse  - c/w methadone for opiate abuse   - - cont celebrex, gabapentin, oxycodone, Oxycontin to 40 mg bid    Anemia due multiple issues, surgery , blood draws,   poor nutrition and infection  - status post a few units of blood during this extensive hospital stay last one on 3/20/19  continue to monitor hgb , hgb 4/3/19  was 7.9 continue to monitor .  , f/u level today     - c/w folic acid

## 2019-04-05 NOTE — PROGRESS NOTE ADULT - ASSESSMENT
A/P: 43y Female s/p C4-5 ACDF with irrigation and debridement, now POD 15    Continue to decrease narcotics and give methadone/NSAIDs etc.  Will continue to FU cultures, for bilateral knee aspirations done 3/28, bl IR hip aspirations and rectus femoris aspiration from 3/29- NGTD, B/L hips demonstrate + CPPD crystals, Gram stain Negative. No evidence for acute septic arthritis at this time. Will continue to monitor pt and cultures until final   LIZ demonstrates no change in vegetation, cardiology had discussed possible transfer to [facility] on 3/29, no current discussion for transfer or intervention at this time  Per ID no new bacteremia at this time, Continue IV Abx per ID Team/Dr. Garcia  DVT PPx: SCDs/Lovenox per primary  PT/OT: WBAT, maintain [place] Collar at all times, LSO to be worn when ambulating (pt intermittently compliant)  No plan for return to OR for epidural abscesses or knee/hip washout at this time, will continue to monitor for improvement or RUE weakness which has been present since before surgery  Incentive Spirometry, OOB  Dry dressing changes as needed.   No further Orthopaedic surgical intervention at this time. Will continue to [facility] and see patient Monday/Wednesday/Friday. Will advise if plan changes   Pt discussed with Dr. Leslie who is aware and agrees with above plan.

## 2019-04-06 LAB
ANION GAP SERPL CALC-SCNC: 9 MMOL/L — SIGNIFICANT CHANGE UP (ref 5–17)
BUN SERPL-MCNC: 21 MG/DL — SIGNIFICANT CHANGE UP (ref 7–23)
CALCIUM SERPL-MCNC: 9.7 MG/DL — SIGNIFICANT CHANGE UP (ref 8.5–10.1)
CHLORIDE SERPL-SCNC: 98 MMOL/L — SIGNIFICANT CHANGE UP (ref 96–108)
CO2 SERPL-SCNC: 31 MMOL/L — SIGNIFICANT CHANGE UP (ref 22–31)
CREAT SERPL-MCNC: 0.56 MG/DL — SIGNIFICANT CHANGE UP (ref 0.5–1.3)
GLUCOSE SERPL-MCNC: 98 MG/DL — SIGNIFICANT CHANGE UP (ref 70–99)
HCT VFR BLD CALC: 27.4 % — LOW (ref 34.5–45)
HGB BLD-MCNC: 8.3 G/DL — LOW (ref 11.5–15.5)
MAGNESIUM SERPL-MCNC: 2.3 MG/DL — SIGNIFICANT CHANGE UP (ref 1.6–2.6)
MCHC RBC-ENTMCNC: 25.8 PG — LOW (ref 27–34)
MCHC RBC-ENTMCNC: 30.3 GM/DL — LOW (ref 32–36)
MCV RBC AUTO: 85.1 FL — SIGNIFICANT CHANGE UP (ref 80–100)
NRBC # BLD: 0 /100 WBCS — SIGNIFICANT CHANGE UP (ref 0–0)
PHOSPHATE SERPL-MCNC: 4.9 MG/DL — HIGH (ref 2.5–4.5)
PLATELET # BLD AUTO: 340 K/UL — SIGNIFICANT CHANGE UP (ref 150–400)
POTASSIUM SERPL-MCNC: 4.6 MMOL/L — SIGNIFICANT CHANGE UP (ref 3.5–5.3)
POTASSIUM SERPL-SCNC: 4.6 MMOL/L — SIGNIFICANT CHANGE UP (ref 3.5–5.3)
RBC # BLD: 3.22 M/UL — LOW (ref 3.8–5.2)
RBC # FLD: 18.1 % — HIGH (ref 10.3–14.5)
SODIUM SERPL-SCNC: 138 MMOL/L — SIGNIFICANT CHANGE UP (ref 135–145)
WBC # BLD: 6.74 K/UL — SIGNIFICANT CHANGE UP (ref 3.8–10.5)
WBC # FLD AUTO: 6.74 K/UL — SIGNIFICANT CHANGE UP (ref 3.8–10.5)

## 2019-04-06 PROCEDURE — 99233 SBSQ HOSP IP/OBS HIGH 50: CPT

## 2019-04-06 RX ORDER — POLYETHYLENE GLYCOL 3350 17 G/17G
17 POWDER, FOR SOLUTION ORAL
Qty: 0 | Refills: 0 | Status: DISCONTINUED | OUTPATIENT
Start: 2019-04-06 | End: 2019-04-08

## 2019-04-06 RX ORDER — FUROSEMIDE 40 MG
10 TABLET ORAL ONCE
Qty: 0 | Refills: 0 | Status: COMPLETED | OUTPATIENT
Start: 2019-04-06 | End: 2019-04-06

## 2019-04-06 RX ADMIN — GABAPENTIN 800 MILLIGRAM(S): 400 CAPSULE ORAL at 13:03

## 2019-04-06 RX ADMIN — OXYCODONE HYDROCHLORIDE 40 MILLIGRAM(S): 5 TABLET ORAL at 17:44

## 2019-04-06 RX ADMIN — OXYCODONE HYDROCHLORIDE 20 MILLIGRAM(S): 5 TABLET ORAL at 10:11

## 2019-04-06 RX ADMIN — OXYCODONE HYDROCHLORIDE 40 MILLIGRAM(S): 5 TABLET ORAL at 18:30

## 2019-04-06 RX ADMIN — LINEZOLID 600 MILLIGRAM(S): 600 INJECTION, SOLUTION INTRAVENOUS at 17:43

## 2019-04-06 RX ADMIN — Medication 1 PATCH: at 11:21

## 2019-04-06 RX ADMIN — NYSTATIN CREAM 1 APPLICATION(S): 100000 CREAM TOPICAL at 05:35

## 2019-04-06 RX ADMIN — CELECOXIB 200 MILLIGRAM(S): 200 CAPSULE ORAL at 11:50

## 2019-04-06 RX ADMIN — PANTOPRAZOLE SODIUM 40 MILLIGRAM(S): 20 TABLET, DELAYED RELEASE ORAL at 08:06

## 2019-04-06 RX ADMIN — LIDOCAINE 1 PATCH: 4 CREAM TOPICAL at 13:06

## 2019-04-06 RX ADMIN — METHADONE HYDROCHLORIDE 40 MILLIGRAM(S): 40 TABLET ORAL at 11:12

## 2019-04-06 RX ADMIN — OXYCODONE HYDROCHLORIDE 40 MILLIGRAM(S): 5 TABLET ORAL at 05:52

## 2019-04-06 RX ADMIN — Medication 1 TABLET(S): at 21:32

## 2019-04-06 RX ADMIN — Medication 1 TABLET(S): at 13:10

## 2019-04-06 RX ADMIN — OXYCODONE HYDROCHLORIDE 40 MILLIGRAM(S): 5 TABLET ORAL at 05:34

## 2019-04-06 RX ADMIN — NYSTATIN CREAM 1 APPLICATION(S): 100000 CREAM TOPICAL at 21:35

## 2019-04-06 RX ADMIN — ENOXAPARIN SODIUM 40 MILLIGRAM(S): 100 INJECTION SUBCUTANEOUS at 11:06

## 2019-04-06 RX ADMIN — DAPTOMYCIN 117.2 MILLIGRAM(S): 500 INJECTION, POWDER, LYOPHILIZED, FOR SOLUTION INTRAVENOUS at 13:00

## 2019-04-06 RX ADMIN — CHLORHEXIDINE GLUCONATE 1 APPLICATION(S): 213 SOLUTION TOPICAL at 05:36

## 2019-04-06 RX ADMIN — CELECOXIB 200 MILLIGRAM(S): 200 CAPSULE ORAL at 11:07

## 2019-04-06 RX ADMIN — NYSTATIN CREAM 1 APPLICATION(S): 100000 CREAM TOPICAL at 13:03

## 2019-04-06 RX ADMIN — OXYCODONE HYDROCHLORIDE 20 MILLIGRAM(S): 5 TABLET ORAL at 15:15

## 2019-04-06 RX ADMIN — Medication 100 MILLIGRAM(S): at 05:35

## 2019-04-06 RX ADMIN — Medication 10 MILLIGRAM(S): at 20:56

## 2019-04-06 RX ADMIN — Medication 1 PATCH: at 11:06

## 2019-04-06 RX ADMIN — LIDOCAINE 1 PATCH: 4 CREAM TOPICAL at 08:09

## 2019-04-06 RX ADMIN — OXYCODONE HYDROCHLORIDE 20 MILLIGRAM(S): 5 TABLET ORAL at 14:33

## 2019-04-06 RX ADMIN — POLYETHYLENE GLYCOL 3350 17 GRAM(S): 17 POWDER, FOR SOLUTION ORAL at 11:12

## 2019-04-06 RX ADMIN — Medication 1 PATCH: at 08:07

## 2019-04-06 RX ADMIN — GABAPENTIN 800 MILLIGRAM(S): 400 CAPSULE ORAL at 05:34

## 2019-04-06 RX ADMIN — Medication 1 TABLET(S): at 05:35

## 2019-04-06 RX ADMIN — LIDOCAINE 1 PATCH: 4 CREAM TOPICAL at 08:10

## 2019-04-06 RX ADMIN — Medication 500 MILLIGRAM(S): at 05:35

## 2019-04-06 RX ADMIN — OXYCODONE HYDROCHLORIDE 20 MILLIGRAM(S): 5 TABLET ORAL at 21:59

## 2019-04-06 RX ADMIN — Medication 1 PATCH: at 18:01

## 2019-04-06 RX ADMIN — Medication 0.6 MILLIGRAM(S): at 11:07

## 2019-04-06 RX ADMIN — LIDOCAINE 1 PATCH: 4 CREAM TOPICAL at 11:07

## 2019-04-06 RX ADMIN — GABAPENTIN 800 MILLIGRAM(S): 400 CAPSULE ORAL at 21:32

## 2019-04-06 RX ADMIN — Medication 1 TABLET(S): at 08:06

## 2019-04-06 RX ADMIN — Medication 1 TABLET(S): at 17:43

## 2019-04-06 RX ADMIN — OXYCODONE HYDROCHLORIDE 20 MILLIGRAM(S): 5 TABLET ORAL at 03:27

## 2019-04-06 RX ADMIN — Medication 1 MILLIGRAM(S): at 11:06

## 2019-04-06 RX ADMIN — Medication 100 MILLIGRAM(S): at 13:03

## 2019-04-06 RX ADMIN — Medication 500 MILLIGRAM(S): at 17:43

## 2019-04-06 RX ADMIN — SENNA PLUS 2 TABLET(S): 8.6 TABLET ORAL at 21:32

## 2019-04-06 RX ADMIN — OXYCODONE HYDROCHLORIDE 20 MILLIGRAM(S): 5 TABLET ORAL at 11:26

## 2019-04-06 RX ADMIN — LINEZOLID 600 MILLIGRAM(S): 600 INJECTION, SOLUTION INTRAVENOUS at 05:35

## 2019-04-06 RX ADMIN — Medication 1 TABLET(S): at 11:07

## 2019-04-06 RX ADMIN — Medication 100 MILLIGRAM(S): at 21:32

## 2019-04-06 RX ADMIN — OXYCODONE HYDROCHLORIDE 20 MILLIGRAM(S): 5 TABLET ORAL at 02:27

## 2019-04-06 RX ADMIN — OXYCODONE HYDROCHLORIDE 20 MILLIGRAM(S): 5 TABLET ORAL at 21:37

## 2019-04-06 NOTE — PROGRESS NOTE ADULT - ASSESSMENT
43 F w/ history of IVDA, alcohol abuse, hx of pancreatitis, alcohol hepatitis, alcohol withdrawal, left frontoparietal subdural hematoma 2008 without need for neurosurgical intervention presented with severe sepsis with septic shock secondary to MRSA bacteremia w/ RLL PNA, UTI, endocarditis on LIZ and EFRAIN that has resolved and was diagnosis w/ HCV. Pt was initially intubated due to respiratory failure and put on pressors in ICU. She as extubated on 2/25 and transferred from ICU the following day. Pt had a C4-5 ACDF with irrigation and debridement on 3/20 due to spinal abscess and osteo and was kept intubated post procedure and transferred again to ICU, where she was extubated on 3/21 and transferred back to the med service the following day. HCV- Outpatient follow up for HCV and opioid maintenance outpt re-enrollment program    Septic arthritis involving BL Knees/hips;  - MRI of bilateral hips and knees were done demonstrating effusions and an abscess located in the right rectus femoris.   - Bilateral knees were aspirated by orthopedic team. on 3/28/19 prelim ngtd   bilateral hips aspirated by orthopedic team on 3/29/19 prelim ngtd but cppd crystals seen c/w psuedo gout  - as per ortho-No plan for orthopaedic operative intervention at this time. Should cultures grow pt will require operative intervention      Pseudogout: started colchicine     septic shock secondary to MRSA bacteremia w/ RLL PNA, UTI, endocarditis on LIZ at Tricuspid valve l  - resolved     Endocarditis of tricuspid valve  - status post thoracentesis on 3/20  - as per ID, patient is on dapto & Zyvox   - repeat BC negative from 3/21, repeated blood cx on 4/2/19 prelim ngtd     - repeat echo showed a pedunculated mobile mass seen adherent to lateral leaflet of the tricuspid valve w/ no interval changes noted in valve structure or regurgitation    - my colleague spoke with Dr. Gutierrez (CTS) who suggested to obtain CT C/A/P and LIZ ( not clinically indicated as pt stable and will not  )  I  d/w cardiology on 4/1/19 and will get TTE if any subtle change in TR valve will then get LIZ, i  already d/w ortho who states cervical collar could be removed for LIZ if needed   repeated echo from 4/2/19 shows stable TR vegetation          Spinal osteo, abscess and cord compression    - CT of head and neck showed disc space narrowing and endplate irregularity at C4-5 which may represent typical degenerative change vs discitis   - MRI of the cervical spine w/ contrast showed C2-C7 discitis/osteomyelitis with a small right of midline ventral epidural abscess at C2-C7  - MRI of the thoracolumbar spine showed discitis/osteomyelitis at T11-T12 with paravertebral abscess, as well as discitis/osteomyelitis C6--T1, T9-T10, T10-T11, L4-L5, and L5-S1, and a phlegmon/early abscess dorsolaterally within the lumbar canal at the level of L4-L5. There was also a septic arthritis of  the left sternoclavicular joint space. There was also disc herniation at the L4-L5 level with contact and probable impingement of the descending right-sided nerve roots  - status post s/p C4-5 ACDF with irrigation and debridement on 3/20  - c/w gabapentin and lidocaine patch  - wound cx grew MRSA  - c/w [place] J  collar   - as per ID, patient is on dapto & Zyvox     Bacteremia due to methicillin resistant Staphylococcus aureus with IE: diskitis/OM /Spinal abscesses /Parapneumonic effusion   - most recent blood cx from 3/21/19 negative   - s/p thoracentesis : fluids neg    Moderate protein-calorie malnutrition  - c/w soft diet w/Ensure Enlive 3 x day         edema to lower extremities checked dopplers on 4/1/19 and no dvt    started dvt prophylaxis with Lovenox per my d/w orthopedic . on 4/1/19   prealbumin is low c/w moderate malnutrition     IVDrug abuse  - c/w methadone for opiate abuse   - - cont celebrex, gabapentin, oxycodone, Oxycontin to 40 mg bid    Anemia due multiple issues, surgery , blood draws,   poor nutrition and infection  - status post a few units of blood during this extensive hospital stay last one on 3/20/19  continue to monitor hgb , hgb 4/3/19  was 7.9 continue to monitor .  , f/u level today     - c/w folic acid 43 F w/ history of IVDA, alcohol abuse, hx of pancreatitis, alcohol hepatitis, alcohol withdrawal, left frontoparietal subdural hematoma 2008 without need for neurosurgical intervention presented with severe sepsis with septic shock secondary to MRSA bacteremia w/ RLL PNA, UTI, endocarditis on LIZ and EFRAIN that has resolved and was diagnosis w/ HCV. Pt was initially intubated due to respiratory failure and put on pressors in ICU. She as extubated on 2/25 and transferred from ICU the following day. Pt had a C4-5 ACDF with irrigation and debridement on 3/20 due to spinal abscess and osteo and was kept intubated post procedure and transferred again to ICU, where she was extubated on 3/21 and transferred back to the med service the following day. HCV- Outpatient follow up for HCV and opioid maintenance outpt re-enrollment program    Septic arthritis involving BL Knees/hips;  - MRI of bilateral hips and knees were done demonstrating effusions and an abscess located in the right rectus femoris.   - Bilateral knees were aspirated by orthopedic team. on 3/28/19 prelim ngtd   bilateral hips aspirated by orthopedic team on 3/29/19 prelim ngtd but cppd crystals seen c/w psuedo gout  - as per ortho-No plan for orthopaedic operative intervention at this time. Should cultures grow pt will require operative intervention      Pseudogout: started colchicine     septic shock secondary to MRSA bacteremia w/ RLL PNA, UTI, endocarditis on LIZ at Tricuspid valve l  - resolved     Endocarditis of tricuspid valve  - status post thoracentesis on 3/20  - as per ID, patient is on dapto & Zyvox   - repeat BC negative from 3/21, repeated blood cx on 4/2/19 prelim ngtd     - repeat echo showed a pedunculated mobile mass seen adherent to lateral leaflet of the tricuspid valve w/ no interval changes noted in valve structure or regurgitation    - my colleague spoke with Dr. Gutierrez (CTS) who suggested to obtain CT C/A/P and LIZ ( not clinically indicated as pt stable and will not  )  I  d/w cardiology on 4/1/19 and will get TTE if any subtle change in TR valve will then get LIZ, i  already d/w ortho who states cervical collar could be removed for LIZ if needed   repeated echo from 4/2/19 shows stable TR vegetation          Spinal osteo, abscess and cord compression    - CT of head and neck showed disc space narrowing and endplate irregularity at C4-5 which may represent typical degenerative change vs discitis   - MRI of the cervical spine w/ contrast showed C2-C7 discitis/osteomyelitis with a small right of midline ventral epidural abscess at C2-C7  - MRI of the thoracolumbar spine showed discitis/osteomyelitis at T11-T12 with paravertebral abscess, as well as discitis/osteomyelitis C6--T1, T9-T10, T10-T11, L4-L5, and L5-S1, and a phlegmon/early abscess dorsolaterally within the lumbar canal at the level of L4-L5. There was also a septic arthritis of  the left sternoclavicular joint space. There was also disc herniation at the L4-L5 level with contact and probable impingement of the descending right-sided nerve roots  - status post s/p C4-5 ACDF with irrigation and debridement on 3/20  - c/w gabapentin and lidocaine patch  - wound cx grew MRSA  - c/w [place] J  collar   - as per ID, patient is on dapto & Zyvox     Bacteremia due to methicillin resistant Staphylococcus aureus with IE: diskitis/OM /Spinal abscesses /Parapneumonic effusion   - most recent blood cx from 3/21/19 negative   - s/p thoracentesis : fluids neg    Moderate protein-calorie malnutrition  - c/w soft diet w/Ensure Enlive 3 x day         edema to lower extremities checked dopplers on 4/1/19 and no dvt    started dvt prophylaxis with Lovenox per my d/w orthopedic . on 4/1/19   prealbumin is low c/w moderate malnutrition    give lasix     IVDrug abuse  - c/w methadone for opiate abuse   - - cont celebrex, gabapentin, oxycodone, Oxycontin to 40 mg bid    Anemia due multiple issues, surgery , blood draws,   poor nutrition and infection  - status post a few units of blood during this extensive hospital stay last one on 3/20/19  continue to monitor hgb , hgb 8.3 stable      - c/w folic acid    constipation: supportively pt takes pain meds rtc. will increase Miralax twice a day .   refuses enema.  and lactulose 43 F w/ history of IVDA, alcohol abuse, hx of pancreatitis, alcohol hepatitis, alcohol withdrawal, left frontoparietal subdural hematoma 2008 without need for neurosurgical intervention presented with severe sepsis with septic shock secondary to MRSA bacteremia w/ RLL PNA, UTI, endocarditis on LIZ and EFRAIN that has resolved and was diagnosis w/ HCV. Pt was initially intubated due to respiratory failure and put on pressors in ICU. She as extubated on 2/25 and transferred from ICU the following day. Pt had a C4-5 ACDF with irrigation and debridement on 3/20 due to spinal abscess and osteo and was kept intubated post procedure and transferred again to ICU, where she was extubated on 3/21 and transferred back to the med service the following day. HCV- Outpatient follow up for HCV and opioid maintenance outpt re-enrollment program    Septic arthritis involving BL Knees/hips;  - MRI of bilateral hips and knees were done demonstrating effusions and an abscess located in the right rectus femoris.   - Bilateral knees were aspirated by orthopedic team. on 3/28/19 prelim ngtd   bilateral hips aspirated by orthopedic team on 3/29/19 prelim ngtd but cppd crystals seen c/w psuedo gout  - as per ortho-No plan for orthopaedic operative intervention at this time. Should cultures grow pt will require operative intervention      Pseudogout: started colchicine     septic shock secondary to MRSA bacteremia w/ RLL PNA, UTI, endocarditis on LIZ at Tricuspid valve l  - resolved     Endocarditis of tricuspid valve  - status post thoracentesis on 3/20  - as per ID, patient is on dapto & Zyvox   - repeat BC negative from 3/21, repeated blood cx on 4/2/19 prelim ngtd     - repeat echo showed a pedunculated mobile mass seen adherent to lateral leaflet of the tricuspid valve w/ no interval changes noted in valve structure or regurgitation    - my colleague spoke with Dr. Gutierrez (CTS) who suggested to obtain CT C/A/P and LIZ ( not clinically indicated as pt stable and will not  )  I  d/w cardiology on 4/1/19 and will get TTE if any subtle change in TR valve will then get LIZ, i  already d/w ortho who states cervical collar could be removed for LIZ if needed   repeated echo from 4/2/19 shows stable TR vegetation          Spinal osteo, abscess and cord compression    - CT of head and neck showed disc space narrowing and endplate irregularity at C4-5 which may represent typical degenerative change vs discitis   - MRI of the cervical spine w/ contrast showed C2-C7 discitis/osteomyelitis with a small right of midline ventral epidural abscess at C2-C7  - MRI of the thoracolumbar spine showed discitis/osteomyelitis at T11-T12 with paravertebral abscess, as well as discitis/osteomyelitis C6--T1, T9-T10, T10-T11, L4-L5, and L5-S1, and a phlegmon/early abscess dorsolaterally within the lumbar canal at the level of L4-L5. There was also a septic arthritis of  the left sternoclavicular joint space. There was also disc herniation at the L4-L5 level with contact and probable impingement of the descending right-sided nerve roots  - status post s/p C4-5 ACDF with irrigation and debridement on 3/20  - c/w gabapentin and lidocaine patch  - wound cx grew MRSA  - c/w [place] J  collar   - as per ID, patient is on dapto & Zyvox     Bacteremia due to methicillin resistant Staphylococcus aureus with IE: diskitis/OM /Spinal abscesses /Parapneumonic effusion   - most recent blood cx from 3/21/19 negative   - s/p thoracentesis : fluids neg    Moderate protein-calorie malnutrition  - c/w soft diet w/Ensure Enlive 3 x day         edema to lower extremities checked dopplers on 4/1/19 and no dvt    started dvt prophylaxis with Lovenox per my d/w orthopedic . on 4/1/19   prealbumin is low c/w moderate malnutrition    give lasix     IVDrug abuse  - c/w methadone for opiate abuse   - - cont celebrex, gabapentin, oxycodone, Oxycontin to 40 mg bid    Anemia due multiple issues, surgery , blood draws,   poor nutrition and infection  - status post a few units of blood during this extensive hospital stay last one on 3/20/19  continue to monitor hgb , hgb 8.3 stable      - c/w folic acid    constipation: supportively pt takes pain meds rtc. will increase Miralax twice a day . kub compatible with constipation   refuses enema.  and lactulose

## 2019-04-06 NOTE — PROGRESS NOTE ADULT - SUBJECTIVE AND OBJECTIVE BOX
Patient is a 43y old  Female who presents with a chief complaint of AMS (31 Mar 2019 07:49)      OVERNIGHT EVENTS:  in chair in nad       MEDICATIONS  (STANDING):  ascorbic acid 500 milliGRAM(s) Oral two times a day  calcium carbonate 1250 mG  + Vitamin D (OsCal 500 + D) 1 Tablet(s) Oral three times a day  celecoxib 200 milliGRAM(s) Oral daily  chlorhexidine 4% Liquid 1 Application(s) Topical <User Schedule>  colchicine 0.6 milliGRAM(s) Oral daily  DAPTOmycin IVPB      DAPTOmycin IVPB 430 milliGRAM(s) IV Intermittent every 24 hours  docusate sodium 100 milliGRAM(s) Oral three times a day  enoxaparin Injectable 40 milliGRAM(s) SubCutaneous daily  folic acid 1 milliGRAM(s) Oral daily  gabapentin 800 milliGRAM(s) Oral three times a day  lactobacillus acidophilus 1 Tablet(s) Oral two times a day with meals  lidocaine   Patch 1 Patch Transdermal every 24 hours  lidocaine   Patch 1 Patch Transdermal every 24 hours  linezolid    Tablet 600 milliGRAM(s) Oral every 12 hours  methadone    Tablet 40 milliGRAM(s) Oral daily  multivitamin 1 Tablet(s) Oral daily  nicotine -  14 mG/24Hr(s) Patch 1 patch Transdermal daily  nystatin Powder 1 Application(s) Topical three times a day  oxyCODONE  ER Tablet 40 milliGRAM(s) Oral every 12 hours  pantoprazole    Tablet 40 milliGRAM(s) Oral before breakfast  senna 2 Tablet(s) Oral at bedtime    MEDICATIONS  (PRN):  acetaminophen   Tablet .. 650 milliGRAM(s) Oral every 6 hours PRN Mild Pain (1 - 3)  acetaminophen   Tablet .. 650 milliGRAM(s) Oral every 6 hours PRN Temp greater or equal to 38C (100.4F)  diphenhydrAMINE 25 milliGRAM(s) Oral every 4 hours PRN Rash and/or Itching  oxyCODONE    IR 20 milliGRAM(s) Oral every 4 hours PRN Moderate Pain (4 - 6)  polyethylene glycol 3350 17 Gram(s) Oral daily PRN Constipation  Allergies    penicillin (Short breath; Hives)    Intolerances    Vital Signs Last 24 Hrs  T(C): 36.6 (06 Apr 2019 11:41), Max: 36.6 (05 Apr 2019 17:13)  T(F): 97.8 (06 Apr 2019 11:41), Max: 97.9 (05 Apr 2019 17:13)  HR: 94 (06 Apr 2019 11:41) (85 - 94)  BP: 111/51 (06 Apr 2019 11:41) (99/67 - 131/81)  BP(mean): --  RR: 16 (06 Apr 2019 11:41) (16 - 18)  SpO2: 98% (06 Apr 2019 11:41) (97% - 99%)      PHYSICAL EXAM:  GENERAL: in chair in nad  well-groomed, well-developed  HEAD:  Atraumatic, Normocephalic  EYES: EOMI, PERRLA, conjunctiva and sclera clear  ENMT: No tonsillar erythema, exudates, or enlargement; Moist mucous membranes, Good dentition, No lesions, cervical collar in place   NECK: Supple, No JVD, Normal thyroid  NERVOUS SYSTEM:  Alert & Oriented X3, Good concentration; Motor Strength 4/5 B/L upper and lower extremities;  CHEST/LUNG: Clear to percussion bilaterally; No rales, rhonchi, wheezing, or rubs BL  HEART: Regular rate and rhythm; No murmurs, rubs, or gallops  ABDOMEN: Soft, Nontender, Nondistended; Bowel sounds present  BACK; Spinal tenderness  EXTREMITIES:  2+ Peripheral Pulses, No clubbing, cyanosis, + edema to lower extremities LE,   BL Hip/knees TTP, R knee swollen  SKIN: No rashes or lesions      LABS:                           8.3    6.74  )-----------( 340      ( 06 Apr 2019 07:34 )             27.4   04-06    138  |  98  |  21  ----------------------------<  98  4.6   |  31  |  0.56    Ca    9.7      06 Apr 2019 07:34  Phos  4.9     04-06  Mg     2.3     04-06                             Parathyroid Hormone Intact, Serum (04.05.19 @ 14:30)    Calcium.: 9.4 mg/dL    Intact PTH: 24: PTH METHOD: Roche  Guide for Interpretation of PTH and Calcium Results                           Calcium             PTH                           MG/DL               PG/ML  Normal                   8.4-10.5            15-65  Primary  Hyperparathyroidism      >10.5               >50  Non-PTH Hypercalcemia    >10.5               0-20  Hypoparathyroidism       <8.4                0-20  Pseudohypoparathyroid    <8.4                >50  This is intended as a guide only. Factors such as sunlight exposure,  Vitamin D status and renal function should be evaluated along with  clinical presentation. pg/mL          Prealbumin, Serum: 13 mg/dL      Cultures; Culture - Tissue with Gram Stain (collected 21 Mar 2019 09:42)  Source: .Tissue cervical disc #2 culture  Gram Stain (prelim) (22 Mar 2019 11:20):    Moderate polymorphonuclear leukocytes per low power field    Rare Gram positive cocci in pairs per oil power field  Preliminary Report (24 Mar 2019 12:21):    Few Methicillin resistant Staphylococcus aureus  Organism: Methicillin resistant Staphylococcus aureus (24 Mar 2019 12:21)  Organism: Methicillin resistant Staphylococcus aureus (24 Mar 2019 12:21)    Culture - Tissue with Gram Stain (collected 21 Mar 2019 09:38)  Source: .Tissue c4-c5 disc culture c/s ewb  Gram Stain (prelim) (22 Mar 2019 11:33):    Moderate polymorphonuclear leukocytes per low power field    Rare Gram positive cocci in pairs per oil power field  Preliminary Report (23 Mar 2019 08:21):    Few Methicillin resistant Staphylococcus aureus  Organism: Methicillin resistant Staphylococcus aureus (23 Mar 2019 08:20)  Organism: Methicillin resistant Staphylococcus aureus (23 Mar 2019 08:20)    Culture - Blood (collected 21 Mar 2019 09:33)  Source: .Blood  Preliminary Report (22 Mar 2019 10:01):    No growth to date.    Culture - Surgical Swab (collected 21 Mar 2019 09:31)  Source: .Surgical Swab cervical disc #1 culture c/s  Preliminary Report (23 Mar 2019 08:27):    Numerous Methicillin resistant Staphylococcus aureus  Organism: Methicillin resistant Staphylococcus aureus (23 Mar 2019 08:26)  Organism: Methicillin resistant Staphylococcus aureus (23 Mar 2019 08:26)    Culture - Surgical Swab (collected 21 Mar 2019 09:26)  Source: .Surgical Swab prevertebral culture c/s  Preliminary Report (22 Mar 2019 10:48):    No growth    Culture - Body Fluid with Gram Stain (collected 21 Mar 2019 04:12)  Source: .Body Fluid LEFT THORACENTESIS  Gram Stain (prelim) (22 Mar 2019 09:06):    polymorphonuclear leukocytes seen    No organisms seen    by cytocentrifuge  Preliminary Report (22 Mar 2019 09:06):    No growth      RADIOLOGY & ADDITIONAL TESTS:    Imaging Personally Reviewed:  [ x] YES    < from: Xray Chest 1 View- PORTABLE-Urgent (03.21.19 @ 02:02) >  Stable small right pleural effusion  Stable left basilar atelectasis.  Mild worsening of right basilar opacity.  < from: CT Chest No Cont (03.01.19 @ 11:32) >  Bilateral cavitary lesions are increased in size and number compared to   2/17/2019.     Small bilateral pleural effusions, loculated on the right, increased   compared to the prior study. Unchanged right lower lobe dependent  consolidation.    Prominent mediastinal and axillary lymph nodes, possibly reactive.      Consultant(s) Notes Reviewed:  [x ] YES     Care Discussed with [ x] Consultants [X ] Patient [ ] Family  [x ]    [x ]  Other; RN Patient is a 43y old  Female who presents with a chief complaint of AMS (31 Mar 2019 07:49)      OVERNIGHT EVENTS:  in bed in nad c/o leg swelling and abd pain     MEDICATIONS  (STANDING):  ascorbic acid 500 milliGRAM(s) Oral two times a day  calcium carbonate 1250 mG  + Vitamin D (OsCal 500 + D) 1 Tablet(s) Oral three times a day  celecoxib 200 milliGRAM(s) Oral daily  chlorhexidine 4% Liquid 1 Application(s) Topical <User Schedule>  colchicine 0.6 milliGRAM(s) Oral daily  DAPTOmycin IVPB      DAPTOmycin IVPB 430 milliGRAM(s) IV Intermittent every 24 hours  docusate sodium 100 milliGRAM(s) Oral three times a day  enoxaparin Injectable 40 milliGRAM(s) SubCutaneous daily  folic acid 1 milliGRAM(s) Oral daily  gabapentin 800 milliGRAM(s) Oral three times a day  lactobacillus acidophilus 1 Tablet(s) Oral two times a day with meals  lidocaine   Patch 1 Patch Transdermal every 24 hours  lidocaine   Patch 1 Patch Transdermal every 24 hours  linezolid    Tablet 600 milliGRAM(s) Oral every 12 hours  methadone    Tablet 40 milliGRAM(s) Oral daily  multivitamin 1 Tablet(s) Oral daily  nicotine -  14 mG/24Hr(s) Patch 1 patch Transdermal daily  nystatin Powder 1 Application(s) Topical three times a day  oxyCODONE  ER Tablet 40 milliGRAM(s) Oral every 12 hours  pantoprazole    Tablet 40 milliGRAM(s) Oral before breakfast  senna 2 Tablet(s) Oral at bedtime    MEDICATIONS  (PRN):  acetaminophen   Tablet .. 650 milliGRAM(s) Oral every 6 hours PRN Mild Pain (1 - 3)  acetaminophen   Tablet .. 650 milliGRAM(s) Oral every 6 hours PRN Temp greater or equal to 38C (100.4F)  diphenhydrAMINE 25 milliGRAM(s) Oral every 4 hours PRN Rash and/or Itching  oxyCODONE    IR 20 milliGRAM(s) Oral every 4 hours PRN Moderate Pain (4 - 6)  polyethylene glycol 3350 17 Gram(s) Oral daily PRN Constipation  Allergies    penicillin (Short breath; Hives)    Intolerances    Vital Signs Last 24 Hrs  T(C): 36.6 (06 Apr 2019 11:41), Max: 36.6 (05 Apr 2019 17:13)  T(F): 97.8 (06 Apr 2019 11:41), Max: 97.9 (05 Apr 2019 17:13)  HR: 94 (06 Apr 2019 11:41) (85 - 94)  BP: 111/51 (06 Apr 2019 11:41) (99/67 - 131/81)  BP(mean): --  RR: 16 (06 Apr 2019 11:41) (16 - 18)  SpO2: 98% (06 Apr 2019 11:41) (97% - 99%)      PHYSICAL EXAM:  GENERAL: in chair in nad  well-groomed, well-developed  HEAD:  Atraumatic, Normocephalic  EYES: EOMI, PERRLA, conjunctiva and sclera clear  ENMT: No tonsillar erythema, exudates, or enlargement; Moist mucous membranes, Good dentition, No lesions, cervical collar in place   NECK: Supple, No JVD, Normal thyroid  NERVOUS SYSTEM:  Alert & Oriented X3, Good concentration; Motor Strength 4/5 B/L upper and lower extremities;  CHEST/LUNG: Clear to percussion bilaterally; No rales, rhonchi, wheezing, or rubs BL  HEART: Regular rate and rhythm; No murmurs, rubs, or gallops  ABDOMEN: Soft, mild non specific tenderness Nondistended; Bowel sounds present  BACK; Spinal tenderness  EXTREMITIES:  2+ Peripheral Pulses, No clubbing, cyanosis, ++ edema to lower extremities LE,   BL Hip/knees TTP, R knee swollen  SKIN: No rashes or lesions      LABS:                           8.3    6.74  )-----------( 340      ( 06 Apr 2019 07:34 )             27.4   04-06    138  |  98  |  21  ----------------------------<  98  4.6   |  31  |  0.56    Ca    9.7      06 Apr 2019 07:34  Phos  4.9     04-06  Mg     2.3     04-06                             Parathyroid Hormone Intact, Serum (04.05.19 @ 14:30)    Calcium.: 9.4 mg/dL    Intact PTH: 24: PTH METHOD: Roche  Guide for Interpretation of PTH and Calcium Results                           Calcium             PTH                           MG/DL               PG/ML  Normal                   8.4-10.5            15-65  Primary  Hyperparathyroidism      >10.5               >50  Non-PTH Hypercalcemia    >10.5               0-20  Hypoparathyroidism       <8.4                0-20  Pseudohypoparathyroid    <8.4                >50  This is intended as a guide only. Factors such as sunlight exposure,  Vitamin D status and renal function should be evaluated along with  clinical presentation. pg/mL          Prealbumin, Serum: 13 mg/dL      Cultures; Culture - Tissue with Gram Stain (collected 21 Mar 2019 09:42)  Source: .Tissue cervical disc #2 culture  Gram Stain (prelim) (22 Mar 2019 11:20):    Moderate polymorphonuclear leukocytes per low power field    Rare Gram positive cocci in pairs per oil power field  Preliminary Report (24 Mar 2019 12:21):    Few Methicillin resistant Staphylococcus aureus  Organism: Methicillin resistant Staphylococcus aureus (24 Mar 2019 12:21)  Organism: Methicillin resistant Staphylococcus aureus (24 Mar 2019 12:21)    Culture - Tissue with Gram Stain (collected 21 Mar 2019 09:38)  Source: .Tissue c4-c5 disc culture c/s ewb  Gram Stain (prelim) (22 Mar 2019 11:33):    Moderate polymorphonuclear leukocytes per low power field    Rare Gram positive cocci in pairs per oil power field  Preliminary Report (23 Mar 2019 08:21):    Few Methicillin resistant Staphylococcus aureus  Organism: Methicillin resistant Staphylococcus aureus (23 Mar 2019 08:20)  Organism: Methicillin resistant Staphylococcus aureus (23 Mar 2019 08:20)    Culture - Blood (collected 21 Mar 2019 09:33)  Source: .Blood  Preliminary Report (22 Mar 2019 10:01):    No growth to date.    Culture - Surgical Swab (collected 21 Mar 2019 09:31)  Source: .Surgical Swab cervical disc #1 culture c/s  Preliminary Report (23 Mar 2019 08:27):    Numerous Methicillin resistant Staphylococcus aureus  Organism: Methicillin resistant Staphylococcus aureus (23 Mar 2019 08:26)  Organism: Methicillin resistant Staphylococcus aureus (23 Mar 2019 08:26)    Culture - Surgical Swab (collected 21 Mar 2019 09:26)  Source: .Surgical Swab prevertebral culture c/s  Preliminary Report (22 Mar 2019 10:48):    No growth    Culture - Body Fluid with Gram Stain (collected 21 Mar 2019 04:12)  Source: .Body Fluid LEFT THORACENTESIS  Gram Stain (prelim) (22 Mar 2019 09:06):    polymorphonuclear leukocytes seen    No organisms seen    by cytocentrifuge  Preliminary Report (22 Mar 2019 09:06):    No growth      RADIOLOGY & ADDITIONAL TESTS:    Imaging Personally Reviewed:  [ x] YES    < from: Xray Chest 1 View- PORTABLE-Urgent (03.21.19 @ 02:02) >  Stable small right pleural effusion  Stable left basilar atelectasis.  Mild worsening of right basilar opacity.  < from: CT Chest No Cont (03.01.19 @ 11:32) >  Bilateral cavitary lesions are increased in size and number compared to   2/17/2019.     Small bilateral pleural effusions, loculated on the right, increased   compared to the prior study. Unchanged right lower lobe dependent  consolidation.    Prominent mediastinal and axillary lymph nodes, possibly reactive.      Consultant(s) Notes Reviewed:  [x ] YES     Care Discussed with [ x] Consultants [X ] Patient [ ] Family  [x ]    [x ]  Other; RN

## 2019-04-07 LAB
ANION GAP SERPL CALC-SCNC: 6 MMOL/L — SIGNIFICANT CHANGE UP (ref 5–17)
BUN SERPL-MCNC: 16 MG/DL — SIGNIFICANT CHANGE UP (ref 7–23)
CALCIUM SERPL-MCNC: 8.9 MG/DL — SIGNIFICANT CHANGE UP (ref 8.5–10.1)
CHLORIDE SERPL-SCNC: 101 MMOL/L — SIGNIFICANT CHANGE UP (ref 96–108)
CO2 SERPL-SCNC: 32 MMOL/L — HIGH (ref 22–31)
CREAT SERPL-MCNC: 0.6 MG/DL — SIGNIFICANT CHANGE UP (ref 0.5–1.3)
CULTURE RESULTS: SIGNIFICANT CHANGE UP
GLUCOSE SERPL-MCNC: 154 MG/DL — HIGH (ref 70–99)
POTASSIUM SERPL-MCNC: 3.9 MMOL/L — SIGNIFICANT CHANGE UP (ref 3.5–5.3)
POTASSIUM SERPL-SCNC: 3.9 MMOL/L — SIGNIFICANT CHANGE UP (ref 3.5–5.3)
SODIUM SERPL-SCNC: 139 MMOL/L — SIGNIFICANT CHANGE UP (ref 135–145)
SPECIMEN SOURCE: SIGNIFICANT CHANGE UP

## 2019-04-07 PROCEDURE — 74018 RADEX ABDOMEN 1 VIEW: CPT | Mod: 26

## 2019-04-07 PROCEDURE — 99233 SBSQ HOSP IP/OBS HIGH 50: CPT

## 2019-04-07 RX ORDER — FUROSEMIDE 40 MG
10 TABLET ORAL ONCE
Qty: 0 | Refills: 0 | Status: COMPLETED | OUTPATIENT
Start: 2019-04-07 | End: 2019-04-07

## 2019-04-07 RX ORDER — OXYCODONE HYDROCHLORIDE 5 MG/1
40 TABLET ORAL EVERY 12 HOURS
Qty: 0 | Refills: 0 | Status: DISCONTINUED | OUTPATIENT
Start: 2019-04-07 | End: 2019-04-14

## 2019-04-07 RX ORDER — METHADONE HYDROCHLORIDE 40 MG/1
40 TABLET ORAL DAILY
Qty: 0 | Refills: 0 | Status: DISCONTINUED | OUTPATIENT
Start: 2019-04-09 | End: 2019-04-09

## 2019-04-07 RX ADMIN — LIDOCAINE 1 PATCH: 4 CREAM TOPICAL at 12:14

## 2019-04-07 RX ADMIN — CHLORHEXIDINE GLUCONATE 1 APPLICATION(S): 213 SOLUTION TOPICAL at 05:15

## 2019-04-07 RX ADMIN — OXYCODONE HYDROCHLORIDE 20 MILLIGRAM(S): 5 TABLET ORAL at 20:58

## 2019-04-07 RX ADMIN — Medication 1 PATCH: at 19:15

## 2019-04-07 RX ADMIN — GABAPENTIN 800 MILLIGRAM(S): 400 CAPSULE ORAL at 05:15

## 2019-04-07 RX ADMIN — Medication 1 TABLET(S): at 21:06

## 2019-04-07 RX ADMIN — CELECOXIB 200 MILLIGRAM(S): 200 CAPSULE ORAL at 13:55

## 2019-04-07 RX ADMIN — Medication 1 PATCH: at 14:03

## 2019-04-07 RX ADMIN — Medication 1 TABLET(S): at 13:59

## 2019-04-07 RX ADMIN — LINEZOLID 600 MILLIGRAM(S): 600 INJECTION, SOLUTION INTRAVENOUS at 18:38

## 2019-04-07 RX ADMIN — OXYCODONE HYDROCHLORIDE 20 MILLIGRAM(S): 5 TABLET ORAL at 08:00

## 2019-04-07 RX ADMIN — OXYCODONE HYDROCHLORIDE 20 MILLIGRAM(S): 5 TABLET ORAL at 13:00

## 2019-04-07 RX ADMIN — OXYCODONE HYDROCHLORIDE 20 MILLIGRAM(S): 5 TABLET ORAL at 03:30

## 2019-04-07 RX ADMIN — Medication 1 PATCH: at 12:02

## 2019-04-07 RX ADMIN — ENOXAPARIN SODIUM 40 MILLIGRAM(S): 100 INJECTION SUBCUTANEOUS at 12:03

## 2019-04-07 RX ADMIN — Medication 1 TABLET(S): at 05:15

## 2019-04-07 RX ADMIN — Medication 100 MILLIGRAM(S): at 21:06

## 2019-04-07 RX ADMIN — Medication 100 MILLIGRAM(S): at 13:59

## 2019-04-07 RX ADMIN — LIDOCAINE 1 PATCH: 4 CREAM TOPICAL at 00:26

## 2019-04-07 RX ADMIN — GABAPENTIN 800 MILLIGRAM(S): 400 CAPSULE ORAL at 21:06

## 2019-04-07 RX ADMIN — METHADONE HYDROCHLORIDE 40 MILLIGRAM(S): 40 TABLET ORAL at 12:01

## 2019-04-07 RX ADMIN — Medication 1 TABLET(S): at 18:38

## 2019-04-07 RX ADMIN — PANTOPRAZOLE SODIUM 40 MILLIGRAM(S): 20 TABLET, DELAYED RELEASE ORAL at 07:01

## 2019-04-07 RX ADMIN — NYSTATIN CREAM 1 APPLICATION(S): 100000 CREAM TOPICAL at 21:50

## 2019-04-07 RX ADMIN — Medication 1 MILLIGRAM(S): at 12:01

## 2019-04-07 RX ADMIN — Medication 10 MILLIGRAM(S): at 16:42

## 2019-04-07 RX ADMIN — OXYCODONE HYDROCHLORIDE 20 MILLIGRAM(S): 5 TABLET ORAL at 21:28

## 2019-04-07 RX ADMIN — SENNA PLUS 2 TABLET(S): 8.6 TABLET ORAL at 21:06

## 2019-04-07 RX ADMIN — NYSTATIN CREAM 1 APPLICATION(S): 100000 CREAM TOPICAL at 05:16

## 2019-04-07 RX ADMIN — Medication 500 MILLIGRAM(S): at 18:38

## 2019-04-07 RX ADMIN — LIDOCAINE 1 PATCH: 4 CREAM TOPICAL at 08:14

## 2019-04-07 RX ADMIN — NYSTATIN CREAM 1 APPLICATION(S): 100000 CREAM TOPICAL at 14:00

## 2019-04-07 RX ADMIN — OXYCODONE HYDROCHLORIDE 20 MILLIGRAM(S): 5 TABLET ORAL at 03:09

## 2019-04-07 RX ADMIN — Medication 1 PATCH: at 08:14

## 2019-04-07 RX ADMIN — LIDOCAINE 1 PATCH: 4 CREAM TOPICAL at 00:27

## 2019-04-07 RX ADMIN — Medication 1 TABLET(S): at 12:01

## 2019-04-07 RX ADMIN — OXYCODONE HYDROCHLORIDE 20 MILLIGRAM(S): 5 TABLET ORAL at 12:01

## 2019-04-07 RX ADMIN — OXYCODONE HYDROCHLORIDE 20 MILLIGRAM(S): 5 TABLET ORAL at 07:08

## 2019-04-07 RX ADMIN — POLYETHYLENE GLYCOL 3350 17 GRAM(S): 17 POWDER, FOR SOLUTION ORAL at 05:18

## 2019-04-07 RX ADMIN — OXYCODONE HYDROCHLORIDE 40 MILLIGRAM(S): 5 TABLET ORAL at 16:42

## 2019-04-07 RX ADMIN — GABAPENTIN 800 MILLIGRAM(S): 400 CAPSULE ORAL at 13:59

## 2019-04-07 RX ADMIN — DAPTOMYCIN 117.2 MILLIGRAM(S): 500 INJECTION, POWDER, LYOPHILIZED, FOR SOLUTION INTRAVENOUS at 12:03

## 2019-04-07 RX ADMIN — Medication 500 MILLIGRAM(S): at 05:15

## 2019-04-07 RX ADMIN — LINEZOLID 600 MILLIGRAM(S): 600 INJECTION, SOLUTION INTRAVENOUS at 05:17

## 2019-04-07 RX ADMIN — Medication 0.6 MILLIGRAM(S): at 12:01

## 2019-04-07 RX ADMIN — Medication 1 TABLET(S): at 08:21

## 2019-04-07 RX ADMIN — CELECOXIB 200 MILLIGRAM(S): 200 CAPSULE ORAL at 12:03

## 2019-04-07 RX ADMIN — OXYCODONE HYDROCHLORIDE 40 MILLIGRAM(S): 5 TABLET ORAL at 17:14

## 2019-04-07 RX ADMIN — Medication 100 MILLIGRAM(S): at 05:15

## 2019-04-08 DIAGNOSIS — F10.20 ALCOHOL DEPENDENCE, UNCOMPLICATED: ICD-10-CM

## 2019-04-08 DIAGNOSIS — F11.20 OPIOID DEPENDENCE, UNCOMPLICATED: ICD-10-CM

## 2019-04-08 PROCEDURE — 90792 PSYCH DIAG EVAL W/MED SRVCS: CPT

## 2019-04-08 PROCEDURE — 99233 SBSQ HOSP IP/OBS HIGH 50: CPT

## 2019-04-08 RX ORDER — VANCOMYCIN HCL 1 G
1000 VIAL (EA) INTRAVENOUS ONCE
Qty: 0 | Refills: 0 | Status: COMPLETED | OUTPATIENT
Start: 2019-04-08 | End: 2019-04-08

## 2019-04-08 RX ORDER — METHADONE HYDROCHLORIDE 40 MG/1
40 TABLET ORAL ONCE
Qty: 0 | Refills: 0 | Status: DISCONTINUED | OUTPATIENT
Start: 2019-04-08 | End: 2019-04-08

## 2019-04-08 RX ORDER — LACTULOSE 10 G/15ML
10 SOLUTION ORAL
Qty: 0 | Refills: 0 | Status: DISCONTINUED | OUTPATIENT
Start: 2019-04-08 | End: 2019-04-09

## 2019-04-08 RX ORDER — VANCOMYCIN HCL 1 G
VIAL (EA) INTRAVENOUS
Qty: 0 | Refills: 0 | Status: DISCONTINUED | OUTPATIENT
Start: 2019-04-08 | End: 2019-04-13

## 2019-04-08 RX ORDER — VANCOMYCIN HCL 1 G
1000 VIAL (EA) INTRAVENOUS EVERY 8 HOURS
Qty: 0 | Refills: 0 | Status: DISCONTINUED | OUTPATIENT
Start: 2019-04-08 | End: 2019-04-13

## 2019-04-08 RX ADMIN — OXYCODONE HYDROCHLORIDE 20 MILLIGRAM(S): 5 TABLET ORAL at 11:45

## 2019-04-08 RX ADMIN — ENOXAPARIN SODIUM 40 MILLIGRAM(S): 100 INJECTION SUBCUTANEOUS at 12:44

## 2019-04-08 RX ADMIN — NYSTATIN CREAM 1 APPLICATION(S): 100000 CREAM TOPICAL at 22:02

## 2019-04-08 RX ADMIN — LIDOCAINE 1 PATCH: 4 CREAM TOPICAL at 23:24

## 2019-04-08 RX ADMIN — Medication 100 MILLIGRAM(S): at 13:29

## 2019-04-08 RX ADMIN — Medication 1 PATCH: at 12:45

## 2019-04-08 RX ADMIN — OXYCODONE HYDROCHLORIDE 20 MILLIGRAM(S): 5 TABLET ORAL at 23:01

## 2019-04-08 RX ADMIN — CELECOXIB 200 MILLIGRAM(S): 200 CAPSULE ORAL at 12:45

## 2019-04-08 RX ADMIN — OXYCODONE HYDROCHLORIDE 20 MILLIGRAM(S): 5 TABLET ORAL at 16:15

## 2019-04-08 RX ADMIN — Medication 1 TABLET(S): at 08:31

## 2019-04-08 RX ADMIN — OXYCODONE HYDROCHLORIDE 40 MILLIGRAM(S): 5 TABLET ORAL at 07:10

## 2019-04-08 RX ADMIN — Medication 1 PATCH: at 08:32

## 2019-04-08 RX ADMIN — Medication 1 TABLET(S): at 12:44

## 2019-04-08 RX ADMIN — OXYCODONE HYDROCHLORIDE 20 MILLIGRAM(S): 5 TABLET ORAL at 15:24

## 2019-04-08 RX ADMIN — Medication 1 TABLET(S): at 06:11

## 2019-04-08 RX ADMIN — Medication 500 MILLIGRAM(S): at 06:11

## 2019-04-08 RX ADMIN — METHADONE HYDROCHLORIDE 40 MILLIGRAM(S): 40 TABLET ORAL at 15:24

## 2019-04-08 RX ADMIN — LIDOCAINE 1 PATCH: 4 CREAM TOPICAL at 12:38

## 2019-04-08 RX ADMIN — Medication 0.6 MILLIGRAM(S): at 12:45

## 2019-04-08 RX ADMIN — OXYCODONE HYDROCHLORIDE 20 MILLIGRAM(S): 5 TABLET ORAL at 10:54

## 2019-04-08 RX ADMIN — OXYCODONE HYDROCHLORIDE 20 MILLIGRAM(S): 5 TABLET ORAL at 01:14

## 2019-04-08 RX ADMIN — LIDOCAINE 1 PATCH: 4 CREAM TOPICAL at 08:32

## 2019-04-08 RX ADMIN — Medication 1 MILLIGRAM(S): at 12:46

## 2019-04-08 RX ADMIN — GABAPENTIN 800 MILLIGRAM(S): 400 CAPSULE ORAL at 13:28

## 2019-04-08 RX ADMIN — LIDOCAINE 1 PATCH: 4 CREAM TOPICAL at 00:33

## 2019-04-08 RX ADMIN — Medication 1 TABLET(S): at 22:01

## 2019-04-08 RX ADMIN — Medication 500 MILLIGRAM(S): at 17:53

## 2019-04-08 RX ADMIN — Medication 100 MILLIGRAM(S): at 06:11

## 2019-04-08 RX ADMIN — CHLORHEXIDINE GLUCONATE 1 APPLICATION(S): 213 SOLUTION TOPICAL at 06:12

## 2019-04-08 RX ADMIN — SENNA PLUS 2 TABLET(S): 8.6 TABLET ORAL at 22:01

## 2019-04-08 RX ADMIN — NYSTATIN CREAM 1 APPLICATION(S): 100000 CREAM TOPICAL at 15:26

## 2019-04-08 RX ADMIN — OXYCODONE HYDROCHLORIDE 40 MILLIGRAM(S): 5 TABLET ORAL at 17:54

## 2019-04-08 RX ADMIN — GABAPENTIN 800 MILLIGRAM(S): 400 CAPSULE ORAL at 22:01

## 2019-04-08 RX ADMIN — POLYETHYLENE GLYCOL 3350 17 GRAM(S): 17 POWDER, FOR SOLUTION ORAL at 06:11

## 2019-04-08 RX ADMIN — PANTOPRAZOLE SODIUM 40 MILLIGRAM(S): 20 TABLET, DELAYED RELEASE ORAL at 06:12

## 2019-04-08 RX ADMIN — OXYCODONE HYDROCHLORIDE 40 MILLIGRAM(S): 5 TABLET ORAL at 17:53

## 2019-04-08 RX ADMIN — Medication 100 MILLIGRAM(S): at 22:01

## 2019-04-08 RX ADMIN — GABAPENTIN 800 MILLIGRAM(S): 400 CAPSULE ORAL at 06:11

## 2019-04-08 RX ADMIN — Medication 1 PATCH: at 19:49

## 2019-04-08 RX ADMIN — CELECOXIB 200 MILLIGRAM(S): 200 CAPSULE ORAL at 13:24

## 2019-04-08 RX ADMIN — NYSTATIN CREAM 1 APPLICATION(S): 100000 CREAM TOPICAL at 06:12

## 2019-04-08 RX ADMIN — Medication 150 MILLIGRAM(S): at 22:01

## 2019-04-08 RX ADMIN — Medication 1 TABLET(S): at 13:29

## 2019-04-08 RX ADMIN — LINEZOLID 600 MILLIGRAM(S): 600 INJECTION, SOLUTION INTRAVENOUS at 06:11

## 2019-04-08 RX ADMIN — LIDOCAINE 1 PATCH: 4 CREAM TOPICAL at 08:31

## 2019-04-08 RX ADMIN — OXYCODONE HYDROCHLORIDE 40 MILLIGRAM(S): 5 TABLET ORAL at 06:11

## 2019-04-08 RX ADMIN — Medication 1 PATCH: at 12:38

## 2019-04-08 RX ADMIN — Medication 150 MILLIGRAM(S): at 13:29

## 2019-04-08 RX ADMIN — Medication 1 TABLET(S): at 17:53

## 2019-04-08 RX ADMIN — OXYCODONE HYDROCHLORIDE 20 MILLIGRAM(S): 5 TABLET ORAL at 01:44

## 2019-04-08 RX ADMIN — OXYCODONE HYDROCHLORIDE 20 MILLIGRAM(S): 5 TABLET ORAL at 22:01

## 2019-04-08 NOTE — PROGRESS NOTE ADULT - SUBJECTIVE AND OBJECTIVE BOX
Patient is a 43y old  Female who presents with a chief complaint of AMS (08 Apr 2019 07:17)      INTERVAL HPI / OVERNIGHT EVENTS: doing well,c/o mild vaginal itching     MEDICATIONS  (STANDING):  ascorbic acid 500 milliGRAM(s) Oral two times a day  calcium carbonate 1250 mG  + Vitamin D (OsCal 500 + D) 1 Tablet(s) Oral three times a day  celecoxib 200 milliGRAM(s) Oral daily  chlorhexidine 4% Liquid 1 Application(s) Topical <User Schedule>  colchicine 0.6 milliGRAM(s) Oral daily  docusate sodium 100 milliGRAM(s) Oral three times a day  enoxaparin Injectable 40 milliGRAM(s) SubCutaneous daily  folic acid 1 milliGRAM(s) Oral daily  gabapentin 800 milliGRAM(s) Oral three times a day  lactobacillus acidophilus 1 Tablet(s) Oral two times a day with meals  lidocaine   Patch 1 Patch Transdermal every 24 hours  lidocaine   Patch 1 Patch Transdermal every 24 hours  multivitamin 1 Tablet(s) Oral daily  nicotine -  14 mG/24Hr(s) Patch 1 patch Transdermal daily  nystatin Powder 1 Application(s) Topical three times a day  oxyCODONE  ER Tablet 40 milliGRAM(s) Oral every 12 hours  pantoprazole    Tablet 40 milliGRAM(s) Oral before breakfast  polyethylene glycol 3350 17 Gram(s) Oral two times a day  senna 2 Tablet(s) Oral at bedtime  vancomycin  IVPB      vancomycin  IVPB 1000 milliGRAM(s) IV Intermittent once  vancomycin  IVPB 1000 milliGRAM(s) IV Intermittent every 8 hours    MEDICATIONS  (PRN):  acetaminophen   Tablet .. 650 milliGRAM(s) Oral every 6 hours PRN Mild Pain (1 - 3)  acetaminophen   Tablet .. 650 milliGRAM(s) Oral every 6 hours PRN Temp greater or equal to 38C (100.4F)  diphenhydrAMINE 25 milliGRAM(s) Oral every 4 hours PRN Rash and/or Itching  oxyCODONE    IR 20 milliGRAM(s) Oral every 4 hours PRN Moderate Pain (4 - 6)      Vital Signs Last 24 Hrs  T(C): 36.8 (08 Apr 2019 12:03), Max: 36.8 (08 Apr 2019 04:00)  T(F): 98.2 (08 Apr 2019 12:03), Max: 98.3 (08 Apr 2019 04:00)  HR: 85 (08 Apr 2019 12:11) (75 - 89)  BP: 98/60 (08 Apr 2019 12:11) (97/55 - 116/67)  BP(mean): --  RR: 18 (08 Apr 2019 12:11) (18 - 18)  SpO2: 98% (08 Apr 2019 12:11) (94% - 99%)    Review of systems:  General : no fever /chills,fatigue  CVS : no chest pain, palpitations  Lungs : no shortness of breath, cough  GI : no abdominal pain,vomiting, diarrhea   : no dysuria,hematuria        PHYSICAL EXAM:  General :NAD,neck collar +  Constitutional: well-developed  Respiratory: CTAB/L  Cardiovascular: S1 and S2,murmur+  Gastrointestinal: BS+, soft, NT/ND  Extremities: No peripheral edema  Vascular: 2+ peripheral pulses  Skin: skin popping scars      LABS:    04-07    139  |  101  |  16  ----------------------------<  154<H>  3.9   |  32<H>  |  0.60    Ca    8.9      07 Apr 2019 10:49            MICROBIOLOGY:  RECENT CULTURES:  04-02 .Blood XXXX XXXX   No growth at 5 days.          RADIOLOGY & ADDITIONAL STUDIES:

## 2019-04-08 NOTE — PROGRESS NOTE ADULT - ASSESSMENT
43 F w/ history of IVDA, alcohol abuse, hx of pancreatitis, alcohol hepatitis, alcohol withdrawal, left frontoparietal subdural hematoma 2008 without need for neurosurgical intervention presented with severe sepsis with septic shock secondary to MRSA bacteremia w/ RLL PNA, UTI, endocarditis on LIZ and EFRAIN that has resolved and was diagnosis w/ HCV. Pt was initially intubated due to respiratory failure and put on pressors in ICU. She as extubated on 2/25 and transferred from ICU the following day. Pt had a C4-5 ACDF with irrigation and debridement on 3/20 due to spinal abscess and osteo and was kept intubated post procedure and transferred again to ICU, where she was extubated on 3/21 and transferred back to the med service the following day. HCV- Outpatient follow up for HCV and opioid maintenance outpt re-enrollment program    Septic arthritis involving BL Knees/hips;  - MRI of bilateral hips and knees were done demonstrating effusions and an abscess located in the right rectus femoris.   - Bilateral knees were aspirated by orthopedic team. on 3/28/19 prelim ngtd   bilateral hips aspirated by orthopedic team on 3/29/19 prelim ngtd but cppd crystals seen c/w psuedo gout  - as per ortho-No plan for orthopaedic operative intervention at this time. Should cultures grow pt will require operative intervention  - f/u repeat cxs   - remains afebrile       Pseudogout: started colchicine     septic shock secondary to MRSA bacteremia w/ RLL PNA, UTI, endocarditis on LIZ at Tricuspid valve l  - resolved     Endocarditis of tricuspid valve  - status post thoracentesis on 3/20  - as per ID, will switch to vancomycin   - repeat BC negative from 3/21, repeated blood cx on 4/2/19 prelim ngtd     - repeat echo showed a pedunculated mobile mass seen adherent to lateral leaflet of the tricuspid valve w/ no interval changes noted in valve structure or regurgitation  - case discussed with  Dr. Gutierrez ([facility]) who suggested to obtain CT C/A/P and LIZ ( not clinically indicated as pt stable and will not  )  I  d/w cardiology on 4/1/19 and will get TTE if any subtle change in TR valve will then get LIZ, i  already d/w ortho who states cervical collar could be removed for LIZ if needed   repeated echo from 4/2/19 shows stable TR vegetation          Spinal osteo, abscess and cord compression    - CT of head and neck showed disc space narrowing and endplate irregularity at C4-5 which may represent typical degenerative change vs discitis   - MRI of the cervical spine w/ contrast showed C2-C7 discitis/osteomyelitis with a small right of midline ventral epidural abscess at C2-C7  - MRI of the thoracolumbar spine showed discitis/osteomyelitis at T11-T12 with paravertebral abscess, as well as discitis/osteomyelitis C6--T1, T9-T10, T10-T11, L4-L5, and L5-S1, and a phlegmon/early abscess dorsolaterally within the lumbar canal at the level of L4-L5. There was also a septic arthritis of  the left sternoclavicular joint space. There was also disc herniation at the L4-L5 level with contact and probable impingement of the descending right-sided nerve roots  - status post s/p C4-5 ACDF with irrigation and debridement on 3/20  - c/w gabapentin and lidocaine patch  - wound cx grew MRSA  - c/w [place] J  collar   - as per ID, patient is on dapto & Zyvox     Bacteremia due to methicillin resistant Staphylococcus aureus with IE: diskitis/OM /Spinal abscesses /Parapneumonic effusion   - most recent blood cx from 3/21/19 negative   - s/p thoracentesis : fluids neg    Moderate protein-calorie malnutrition  - c/w soft diet w/Ensure Enlive 3 x day         edema to lower extremities checked dopplers on 4/1/19 and no dvt    started dvt prophylaxis with Lovenox per my d/w orthopedic . on 4/1/19   prealbumin is low c/w moderate malnutrition    give lasix     IVDrug abuse  - c/w methadone for opiate abuse   - case discussed with psych and will try to wean off oxycodone and increase methadone   - - cont celebrex, gabapentin, oxycodone, Oxycontin to 40 mg bid    Anemia due multiple issues, surgery , blood draws,   poor nutrition and infection  - status post a few units of blood during this extensive hospital stay last one on 3/20/19  continue to monitor hgb , hgb stable     - c/w folic acid    constipation: supportively pt takes pain meds rtc. kub compatible with constipation  start lactulose   senna colace

## 2019-04-08 NOTE — BEHAVIORAL HEALTH ASSESSMENT NOTE - DESCRIPTION (FIRST USE, LAST USE, QUANTITY, FREQUENCY, DURATION)
hx of alcohol abuse UTOX "+" on admission hx of daily up to 10 bags of IV heroin use; last used prior to admission

## 2019-04-08 NOTE — PROGRESS NOTE ADULT - SUBJECTIVE AND OBJECTIVE BOX
Patient is a 43y old  Female who presents with a chief complaint of AMS (31 Mar 2019 07:49)      OVERNIGHT EVENTS:  none       MEDICATIONS  (STANDING):  ascorbic acid 500 milliGRAM(s) Oral two times a day  calcium carbonate 1250 mG  + Vitamin D (OsCal 500 + D) 1 Tablet(s) Oral three times a day  celecoxib 200 milliGRAM(s) Oral daily  chlorhexidine 4% Liquid 1 Application(s) Topical <User Schedule>  colchicine 0.6 milliGRAM(s) Oral daily  docusate sodium 100 milliGRAM(s) Oral three times a day  enoxaparin Injectable 40 milliGRAM(s) SubCutaneous daily  folic acid 1 milliGRAM(s) Oral daily  gabapentin 800 milliGRAM(s) Oral three times a day  lactobacillus acidophilus 1 Tablet(s) Oral two times a day with meals  lactulose Syrup 10 Gram(s) Oral two times a day  lidocaine   Patch 1 Patch Transdermal every 24 hours  lidocaine   Patch 1 Patch Transdermal every 24 hours  multivitamin 1 Tablet(s) Oral daily  nicotine -  14 mG/24Hr(s) Patch 1 patch Transdermal daily  nystatin Powder 1 Application(s) Topical three times a day  oxyCODONE  ER Tablet 40 milliGRAM(s) Oral every 12 hours  pantoprazole    Tablet 40 milliGRAM(s) Oral before breakfast  senna 2 Tablet(s) Oral at bedtime  vancomycin  IVPB      vancomycin  IVPB 1000 milliGRAM(s) IV Intermittent every 8 hours    MEDICATIONS  (PRN):  acetaminophen   Tablet .. 650 milliGRAM(s) Oral every 6 hours PRN Mild Pain (1 - 3)  acetaminophen   Tablet .. 650 milliGRAM(s) Oral every 6 hours PRN Temp greater or equal to 38C (100.4F)  diphenhydrAMINE 25 milliGRAM(s) Oral every 4 hours PRN Rash and/or Itching  oxyCODONE    IR 20 milliGRAM(s) Oral every 4 hours PRN Moderate Pain (4 - 6)      Vital Signs Last 24 Hrs  T(C): 36.8 (08 Apr 2019 12:03), Max: 36.8 (08 Apr 2019 04:00)  T(F): 98.2 (08 Apr 2019 12:03), Max: 98.3 (08 Apr 2019 04:00)  HR: 85 (08 Apr 2019 12:11) (75 - 89)  BP: 98/60 (08 Apr 2019 12:11) (97/55 - 116/67)  BP(mean): --  RR: 18 (08 Apr 2019 12:11) (18 - 18)  SpO2: 98% (08 Apr 2019 12:11) (94% - 99%)      PHYSICAL EXAM:  GENERAL: in chair in nad  well-groomed, well-developed  HEAD:  Atraumatic, Normocephalic  EYES: EOMI, PERRLA, conjunctiva and sclera clear  ENMT: No tonsillar erythema, exudates, or enlargement; Moist mucous membranes, Good dentition, No lesions, cervical collar in place   NECK: Supple, No JVD, Normal thyroid  NERVOUS SYSTEM:  Alert & Oriented X3, Good concentration; Motor Strength 4/5 B/L upper and lower extremities;  CHEST/LUNG: Clear to percussion bilaterally; No rales, rhonchi, wheezing, or rubs BL  HEART: Regular rate and rhythm; No murmurs, rubs, or gallops  ABDOMEN: Soft, mild non specific tenderness Nondistended; Bowel sounds present  BACK; Spinal tenderness  EXTREMITIES:  2+ Peripheral Pulses, No clubbing, cyanosis, ++ edema to lower extremities LE,   BL Hip/knees TTP, R knee swollen  SKIN: No rashes or lesions      LABS:                          04-07    139  |  101  |  16  ----------------------------<  154<H>  3.9   |  32<H>  |  0.60    Ca    8.9      07 Apr 2019 10:49                                   Parathyroid Hormone Intact, Serum (04.05.19 @ 14:30)    Calcium.: 9.4 mg/dL    Intact PTH: 24: PTH METHOD: Roche  Guide for Interpretation of PTH and Calcium Results                           Calcium             PTH                           MG/DL               PG/ML  Normal                   8.4-10.5            15-65  Primary  Hyperparathyroidism      >10.5               >50  Non-PTH Hypercalcemia    >10.5               0-20  Hypoparathyroidism       <8.4                0-20  Pseudohypoparathyroid    <8.4                >50  This is intended as a guide only. Factors such as sunlight exposure,  Vitamin D status and renal function should be evaluated along with  clinical presentation. pg/mL          Prealbumin, Serum: 13 mg/dL      Cultures; Culture - Tissue with Gram Stain (collected 21 Mar 2019 09:42)  Source: .Tissue cervical disc #2 culture  Gram Stain (prelim) (22 Mar 2019 11:20):    Moderate polymorphonuclear leukocytes per low power field    Rare Gram positive cocci in pairs per oil power field  Preliminary Report (24 Mar 2019 12:21):    Few Methicillin resistant Staphylococcus aureus  Organism: Methicillin resistant Staphylococcus aureus (24 Mar 2019 12:21)  Organism: Methicillin resistant Staphylococcus aureus (24 Mar 2019 12:21)    Culture - Tissue with Gram Stain (collected 21 Mar 2019 09:38)  Source: .Tissue c4-c5 disc culture c/s ewb  Gram Stain (prelim) (22 Mar 2019 11:33):    Moderate polymorphonuclear leukocytes per low power field    Rare Gram positive cocci in pairs per oil power field  Preliminary Report (23 Mar 2019 08:21):    Few Methicillin resistant Staphylococcus aureus  Organism: Methicillin resistant Staphylococcus aureus (23 Mar 2019 08:20)  Organism: Methicillin resistant Staphylococcus aureus (23 Mar 2019 08:20)    Culture - Blood (collected 21 Mar 2019 09:33)  Source: .Blood  Preliminary Report (22 Mar 2019 10:01):    No growth to date.    Culture - Surgical Swab (collected 21 Mar 2019 09:31)  Source: .Surgical Swab cervical disc #1 culture c/s  Preliminary Report (23 Mar 2019 08:27):    Numerous Methicillin resistant Staphylococcus aureus  Organism: Methicillin resistant Staphylococcus aureus (23 Mar 2019 08:26)  Organism: Methicillin resistant Staphylococcus aureus (23 Mar 2019 08:26)    Culture - Surgical Swab (collected 21 Mar 2019 09:26)  Source: .Surgical Swab prevertebral culture c/s  Preliminary Report (22 Mar 2019 10:48):    No growth    Culture - Body Fluid with Gram Stain (collected 21 Mar 2019 04:12)  Source: .Body Fluid LEFT THORACENTESIS  Gram Stain (prelim) (22 Mar 2019 09:06):    polymorphonuclear leukocytes seen    No organisms seen    by cytocentrifuge  Preliminary Report (22 Mar 2019 09:06):    No growth      RADIOLOGY & ADDITIONAL TESTS:    Imaging Personally Reviewed:  [ x] YES    < from: Xray Chest 1 View- PORTABLE-Urgent (03.21.19 @ 02:02) >  Stable small right pleural effusion  Stable left basilar atelectasis.  Mild worsening of right basilar opacity.  < from: CT Chest No Cont (03.01.19 @ 11:32) >  Bilateral cavitary lesions are increased in size and number compared to   2/17/2019.     Small bilateral pleural effusions, loculated on the right, increased   compared to the prior study. Unchanged right lower lobe dependent  consolidation.    Prominent mediastinal and axillary lymph nodes, possibly reactive.      Consultant(s) Notes Reviewed:  [x ] YES     Care Discussed with [ x] Consultants [X ] Patient [ ] Family  [x ]    [x ]  Other; RN

## 2019-04-08 NOTE — PROGRESS NOTE ADULT - ASSESSMENT
A/P: 43y Female s/p C4-5 ACDF with irrigation and debridement, now POD 19    Continue to decrease narcotics and give methadone/NSAIDs etc.  Pt will be finishing course of abx for a long period in house, we have the opportunity to decrease all narcotic burden during hospital stay, recommend Psych c/s to aid  Will continue to FU cultures currently all NGTD, No evidence for acute septic arthritis at this time. Will continue to monitor pt and cultures until final   TTE demonstrates stable vegetation, Cardiac team recommends no intervention, however if unchanged after completion of abx therapy, pt will likely have continued episodes with septic emboli   Per ID no new bacteremia at this time, Continue IV Abx per ID Team/Dr. Garcia  DVT PPx: SCDs/Lovenox per primary  PT/OT: WBAT, maintain [place] Collar at all times, LSO to be worn when ambulating  No plan for return to OR for epidural abscesses or knee/hip washout at this time, will continue to monitor for improvement or RUE weakness which has been present since before surgery  Will d/w Dr Leslie about possible repeat MRI to determine efficacy of current therapy  Incentive Spirometry, OOB  Dry dressing changes as needed.   No further Orthopaedic surgical intervention at this time. Will continue to monitor and see patient Monday/Wednesday/Friday. Will advise if plan changes

## 2019-04-08 NOTE — BEHAVIORAL HEALTH ASSESSMENT NOTE - RISK ASSESSMENT
chronic elevated risk given long hx of heavy polysubstance abuse/dependence despite multiple serious medical consequences/chronic sequela v. female gender, age < 65 yrs; has been calm cooperative and denies suicidality

## 2019-04-08 NOTE — BEHAVIORAL HEALTH ASSESSMENT NOTE - AXIS III
s/p severe sepsis with septic shock secondary to MRSA bacteremia w/ RLL PNA, UTI, endocarditis on LIZ and EFRAIN; left frontoparietal subdural hematoma 2008; hx of alcohol induced pancreatitis; intubated 2/19; C4-5 ACDF with irrigation and debridement on 3/20/19 due to spinal abscess and osteo

## 2019-04-08 NOTE — PROGRESS NOTE ADULT - PROBLEM SELECTOR PLAN 4
with IE: diskitis/OM /Spinal abscesses /Parapneumonic effusion   repeat blood c/s from last week negative  occasional low grade fever   Now also has possible septic hip and knee arthritis vs pseudogout   (Crystal + ,high WBC in synovial fluid but c/s neg )  underwent arthrocentesis by Ortho over last week   so far c/s neg   s/p thoracentesis for pleural effusion vs empyema : fluids neg  cont dapto and zyvox  NO new bacteremia with IE: diskitis/OM /Spinal abscesses /Parapneumonic effusion   repeat blood c/s negative  Now also has possible septic hip and knee arthritis vs pseudogout   (Crystal + ,high WBC in synovial fluid but c/s neg )  underwent arthrocentesis by Ortho over last week   so far c/s neg   s/p thoracentesis for pleural effusion vs empyema : fluids neg  NO new bacteremia

## 2019-04-08 NOTE — BEHAVIORAL HEALTH ASSESSMENT NOTE - CONSEQUENCES
multiple abscesses requiring prolonged hospitalization, surgery and IV antibiotics treatment pancreatitis

## 2019-04-08 NOTE — BEHAVIORAL HEALTH ASSESSMENT NOTE - OTHER
CVM, ISTOP acute medical issues deferred at this time "doing my best" appropriate to context; solemn + reactive

## 2019-04-08 NOTE — PROGRESS NOTE ADULT - SUBJECTIVE AND OBJECTIVE BOX
Pt seen and examined at bedside. No complaints other than mid back pain which has been present, states her legs are swollen and tender, Denies CP/Dyspnea/Calf tenderness bilaterally. Hip and knee pain stable compared to prior days.  Mainly c/o Left knee pain that she says pops/cracks at times, but ambulating well with PT.  Pt denies any new erythema/redness/swelling. No other complaints at this time.  Expressed to pt and encourage ambulating further with PT and walking around room to decrease swelling.     Vital Signs Last 24 Hrs  T(C): 36.8 (08 Apr 2019 04:00), Max: 37 (07 Apr 2019 11:49)  T(F): 98.3 (08 Apr 2019 04:00), Max: 98.6 (07 Apr 2019 11:49)  HR: 75 (08 Apr 2019 04:00) (75 - 89)  BP: 112/66 (08 Apr 2019 04:00) (97/55 - 116/67)  BP(mean): --  RR: 18 (08 Apr 2019 04:00) (16 - 18)  SpO2: 94% (08 Apr 2019 04:00) (94% - 99%)      Gen: NAD  No pain with AROM of BL HIp/Knee, no signs of erythema or worsening effusion of B/L Knees. No pain with palpation of b/l hip and knee this morning, global muscular TTP to BL legs, non pitting edema of B/L Feet.     Spine PE:  Skin intact  severe TTP along lower thoracic and upper lumbar spine  [place] Collar In Place  Negative clonus  Negative babinski  Negative olmstead           Deltoid        Bicep        Tricep          Wrist Ext    Wrist Flex    Finger Flex          Finger Abd  L            4/5         4/5           4/5             4/5                4/5	           4/5                    4/5  R            3/5 	   3+/5           4+/5             3+/5              4+/5               5/5                   5/5                                      Hip Flex       Quad     Ankle DF        Ankle PF       Toe Ext        Hamstring    L            5/5              5/5          5/5                 5/5                 5/5	                5/5  R            5/5              5/5           5/5                 5/5                 5/5                5/5    Sensory:            C5         C6         C7      C8       T1        (0=absent, 1=impaired, 2=normal, NT=not testable)  L         2            2           2        2         2  R          2            2           2        2         2               L2          L3         L4      L5       S1         (0=absent, 1=impaired, 2=normal, NT=not testable)  L         2            2            2        2        2  R          2            2           2        2         2

## 2019-04-08 NOTE — BEHAVIORAL HEALTH ASSESSMENT NOTE - NSBHCHARTREVIEWIMAGING_PSY_A_CORE FT
TTE ; Possible vegitation on tricuspid valve looks same as before 03/ 22/2019  MRI of LE: Moderate size left hip joint effusion with pericapsular edema and with edema in the surrounding muscles. Large left knee joint effusion with   adjacent edema in the distal quadriceps muscles and gastrocnemius muscles. Small right hip joint effusion with suspected pericapsular edema. Small right knee joint effusion

## 2019-04-08 NOTE — BEHAVIORAL HEALTH ASSESSMENT NOTE - HPI (INCLUDE ILLNESS QUALITY, SEVERITY, DURATION, TIMING, CONTEXT, MODIFYING FACTORS, ASSOCIATED SIGNS AND SYMPTOMS)
ISTOP checked Reference #: [id number]   04/24/2018 04/24/2018 buprenorphine-naloxone 2-0.5 mg sl tablet  15 10 Melanie Lombardo MD     04/16/2018 04/16/2018 buprenorphine-naloxone 2-0.5 mg sl tablet  15 5 Melanie Lombardo MD 44yo female with years of heavy daily IV heroin use, hx of methadone and Suboxone maintenance treatment, recently discharged from her methadone clinic for using heroin, with hx Alcohol Abuse, + cocaine use (UTOX "+" for cocaine in ED) who was admitted on 2/17/19 with Endocarditis of tricuspid valve, Pneumonia due to Staphylococcus aureus, bacteremia, Anterior cervical discectomy with fusion (ACDF) & debridement due to spinal abscess on 3/20/19, BL Knee Aspirations (3/28) NGTD, BL Hip Aspiration (3/29) NGTD/+CPPD, and Rectus Femoris Aspiration (3/29) NGTD. Patient at this time is on methadone 40mg PO qd and Oxycodone ER for acute pain management with oxycodone IR PRNs.     ISTOP checked Reference #: [id number]   04/24/2018 04/24/2018 buprenorphine-naloxone 2-0.5 mg sl tablet  15 10 Melanie Lombardo MD     04/16/2018 04/16/2018 buprenorphine-naloxone 2-0.5 mg sl tablet  15 5 Melanie Lombardo MD 44yo female with years of heavy daily IV heroin use x 3-4 years, hx of methadone and Suboxone maintenance treatment, recently discharged from her methadone clinic for using heroin, with hx Alcohol Abuse, + cocaine use (UTOX "+" for cocaine in ED), with no hx of prior inpatient psychiatric admissions, no history of suicide attempts/aggression/violence, who was admitted on 2/17/19 with Endocarditis of tricuspid valve, Pneumonia due to Staphylococcus aureus, bacteremia, Anterior cervical discectomy with fusion (ACDF) & debridement due to spinal abscess on 3/20/19, BL Knee Aspirations (3/28) NGTD, BL Hip Aspiration (3/29) NGTD/+CPPD, and Rectus Femoris Aspiration (3/29) NGTD. Patient at this time is on methadone 40mg PO qd and Oxycodone ER for acute pain management with oxycodone IR PRNs.     Patient is calm, cooperative and open about her drug use. Patient tearful when talking about her back pain and feeling like she is debilitated and cannot move without pain medications. She is otherwise trying to make the best of her situation and denies heroin cravings / has no withdrawal sxs on methadone. risks/benefits/alternatives discussed, including using the methadone as the primary agent also for pain management which would require reduction/cross tapering from the oxycodone. Patient indicated understanding and is willing to try it if it would help her chances of rehab placement and as long as it helps with her pain. Denies suicidal/homicidal ideation, intent or plan.     ISTOP checked Reference #: [id number]   04/24/2018 04/24/2018 buprenorphine-naloxone 2-0.5 mg sl tablet  15 10 Melanie Lombardo MD     04/16/2018 04/16/2018 buprenorphine-naloxone 2-0.5 mg sl tablet  15 5 Melanie Lombardo MD

## 2019-04-08 NOTE — BEHAVIORAL HEALTH ASSESSMENT NOTE - SUMMARY
42 yo female with severe Opiate (IV heroin) Dependence requiring Maintenance Therapy with acute pain needing pain management. As Patient's oxycodone is tapered down, her methadone dose can be increased/adjusted to accommodate. As the degree of addiction increases in a patient, Methadone doses may reach those used for heroin-addicted patients with chronic pain in the range of  mg/day.   PLAN:  - would start with reducing the PRN Oxycodone IR by changing q4hrs PRN (TTD 120mg) to q6hrs PRN (TTD 80mg) (with simultaneous methadone increase from 40mg PO qd to 50mg PO qd and so on

## 2019-04-08 NOTE — PROGRESS NOTE ADULT - PROBLEM SELECTOR PLAN 1
s/p C4-5 ACDF with irrigation and debridement   OR c/s MRSA   changing back to Vanco 1 gm IV q 8hrs  will make sure levels optimum   awaiting placement to rehab(wont get placement with Dapto ) and no evidence of new infection   No new bacteremia since the dikitis/ OM/abscess as well as possible hip/knee septic arthritis  joint c.s neg  D/C zyvox as well

## 2019-04-09 LAB
ALBUMIN SERPL ELPH-MCNC: 2.5 G/DL — LOW (ref 3.3–5)
ALP SERPL-CCNC: 278 U/L — HIGH (ref 40–120)
ALT FLD-CCNC: 32 U/L — SIGNIFICANT CHANGE UP (ref 12–78)
ANION GAP SERPL CALC-SCNC: 7 MMOL/L — SIGNIFICANT CHANGE UP (ref 5–17)
AST SERPL-CCNC: 18 U/L — SIGNIFICANT CHANGE UP (ref 15–37)
BILIRUB SERPL-MCNC: 0.3 MG/DL — SIGNIFICANT CHANGE UP (ref 0.2–1.2)
BUN SERPL-MCNC: 17 MG/DL — SIGNIFICANT CHANGE UP (ref 7–23)
CALCIUM SERPL-MCNC: 9.8 MG/DL — SIGNIFICANT CHANGE UP (ref 8.5–10.1)
CHLORIDE SERPL-SCNC: 102 MMOL/L — SIGNIFICANT CHANGE UP (ref 96–108)
CO2 SERPL-SCNC: 29 MMOL/L — SIGNIFICANT CHANGE UP (ref 22–31)
CREAT SERPL-MCNC: 0.45 MG/DL — LOW (ref 0.5–1.3)
GLUCOSE SERPL-MCNC: 106 MG/DL — HIGH (ref 70–99)
HCT VFR BLD CALC: 30.3 % — LOW (ref 34.5–45)
HGB BLD-MCNC: 9 G/DL — LOW (ref 11.5–15.5)
MCHC RBC-ENTMCNC: 25.4 PG — LOW (ref 27–34)
MCHC RBC-ENTMCNC: 29.7 GM/DL — LOW (ref 32–36)
MCV RBC AUTO: 85.4 FL — SIGNIFICANT CHANGE UP (ref 80–100)
NRBC # BLD: 0 /100 WBCS — SIGNIFICANT CHANGE UP (ref 0–0)
PLATELET # BLD AUTO: 370 K/UL — SIGNIFICANT CHANGE UP (ref 150–400)
POTASSIUM SERPL-MCNC: 4.4 MMOL/L — SIGNIFICANT CHANGE UP (ref 3.5–5.3)
POTASSIUM SERPL-SCNC: 4.4 MMOL/L — SIGNIFICANT CHANGE UP (ref 3.5–5.3)
PROT SERPL-MCNC: 7.1 GM/DL — SIGNIFICANT CHANGE UP (ref 6–8.3)
RBC # BLD: 3.55 M/UL — LOW (ref 3.8–5.2)
RBC # FLD: 18.4 % — HIGH (ref 10.3–14.5)
SODIUM SERPL-SCNC: 138 MMOL/L — SIGNIFICANT CHANGE UP (ref 135–145)
VANCOMYCIN TROUGH SERPL-MCNC: 12.8 UG/ML — SIGNIFICANT CHANGE UP (ref 10–20)
WBC # BLD: 6.87 K/UL — SIGNIFICANT CHANGE UP (ref 3.8–10.5)
WBC # FLD AUTO: 6.87 K/UL — SIGNIFICANT CHANGE UP (ref 3.8–10.5)

## 2019-04-09 PROCEDURE — 99233 SBSQ HOSP IP/OBS HIGH 50: CPT

## 2019-04-09 RX ORDER — METHADONE HYDROCHLORIDE 40 MG/1
50 TABLET ORAL DAILY
Qty: 0 | Refills: 0 | Status: DISCONTINUED | OUTPATIENT
Start: 2019-04-09 | End: 2019-04-11

## 2019-04-09 RX ORDER — POLYETHYLENE GLYCOL 3350 17 G/17G
17 POWDER, FOR SOLUTION ORAL
Qty: 0 | Refills: 0 | Status: DISCONTINUED | OUTPATIENT
Start: 2019-04-09 | End: 2019-05-08

## 2019-04-09 RX ORDER — OXYCODONE HYDROCHLORIDE 5 MG/1
20 TABLET ORAL EVERY 6 HOURS
Qty: 0 | Refills: 0 | Status: DISCONTINUED | OUTPATIENT
Start: 2019-04-09 | End: 2019-04-13

## 2019-04-09 RX ADMIN — OXYCODONE HYDROCHLORIDE 20 MILLIGRAM(S): 5 TABLET ORAL at 23:46

## 2019-04-09 RX ADMIN — OXYCODONE HYDROCHLORIDE 20 MILLIGRAM(S): 5 TABLET ORAL at 03:43

## 2019-04-09 RX ADMIN — Medication 1 TABLET(S): at 08:20

## 2019-04-09 RX ADMIN — OXYCODONE HYDROCHLORIDE 20 MILLIGRAM(S): 5 TABLET ORAL at 13:01

## 2019-04-09 RX ADMIN — Medication 1 TABLET(S): at 18:34

## 2019-04-09 RX ADMIN — Medication 500 MILLIGRAM(S): at 05:43

## 2019-04-09 RX ADMIN — GABAPENTIN 800 MILLIGRAM(S): 400 CAPSULE ORAL at 22:49

## 2019-04-09 RX ADMIN — OXYCODONE HYDROCHLORIDE 40 MILLIGRAM(S): 5 TABLET ORAL at 18:33

## 2019-04-09 RX ADMIN — OXYCODONE HYDROCHLORIDE 40 MILLIGRAM(S): 5 TABLET ORAL at 19:05

## 2019-04-09 RX ADMIN — Medication 150 MILLIGRAM(S): at 15:28

## 2019-04-09 RX ADMIN — CELECOXIB 200 MILLIGRAM(S): 200 CAPSULE ORAL at 13:05

## 2019-04-09 RX ADMIN — PANTOPRAZOLE SODIUM 40 MILLIGRAM(S): 20 TABLET, DELAYED RELEASE ORAL at 08:20

## 2019-04-09 RX ADMIN — Medication 100 MILLIGRAM(S): at 05:43

## 2019-04-09 RX ADMIN — OXYCODONE HYDROCHLORIDE 40 MILLIGRAM(S): 5 TABLET ORAL at 05:42

## 2019-04-09 RX ADMIN — Medication 1 TABLET(S): at 05:43

## 2019-04-09 RX ADMIN — CELECOXIB 200 MILLIGRAM(S): 200 CAPSULE ORAL at 12:36

## 2019-04-09 RX ADMIN — Medication 500 MILLIGRAM(S): at 18:33

## 2019-04-09 RX ADMIN — OXYCODONE HYDROCHLORIDE 20 MILLIGRAM(S): 5 TABLET ORAL at 12:37

## 2019-04-09 RX ADMIN — METHADONE HYDROCHLORIDE 50 MILLIGRAM(S): 40 TABLET ORAL at 15:27

## 2019-04-09 RX ADMIN — Medication 150 MILLIGRAM(S): at 05:42

## 2019-04-09 RX ADMIN — OXYCODONE HYDROCHLORIDE 20 MILLIGRAM(S): 5 TABLET ORAL at 22:46

## 2019-04-09 RX ADMIN — Medication 1 TABLET(S): at 12:36

## 2019-04-09 RX ADMIN — LIDOCAINE 1 PATCH: 4 CREAM TOPICAL at 08:21

## 2019-04-09 RX ADMIN — OXYCODONE HYDROCHLORIDE 20 MILLIGRAM(S): 5 TABLET ORAL at 08:24

## 2019-04-09 RX ADMIN — ENOXAPARIN SODIUM 40 MILLIGRAM(S): 100 INJECTION SUBCUTANEOUS at 12:37

## 2019-04-09 RX ADMIN — Medication 1 PATCH: at 12:43

## 2019-04-09 RX ADMIN — Medication 100 MILLIGRAM(S): at 22:45

## 2019-04-09 RX ADMIN — LIDOCAINE 1 PATCH: 4 CREAM TOPICAL at 11:02

## 2019-04-09 RX ADMIN — Medication 150 MILLIGRAM(S): at 22:36

## 2019-04-09 RX ADMIN — Medication 1 PATCH: at 18:38

## 2019-04-09 RX ADMIN — Medication 1 MILLIGRAM(S): at 12:37

## 2019-04-09 RX ADMIN — Medication 0.6 MILLIGRAM(S): at 12:36

## 2019-04-09 RX ADMIN — NYSTATIN CREAM 1 APPLICATION(S): 100000 CREAM TOPICAL at 15:28

## 2019-04-09 RX ADMIN — NYSTATIN CREAM 1 APPLICATION(S): 100000 CREAM TOPICAL at 22:36

## 2019-04-09 RX ADMIN — Medication 1 PATCH: at 12:40

## 2019-04-09 RX ADMIN — CHLORHEXIDINE GLUCONATE 1 APPLICATION(S): 213 SOLUTION TOPICAL at 05:42

## 2019-04-09 RX ADMIN — Medication 1 PATCH: at 08:22

## 2019-04-09 RX ADMIN — Medication 100 MILLIGRAM(S): at 15:28

## 2019-04-09 RX ADMIN — NYSTATIN CREAM 1 APPLICATION(S): 100000 CREAM TOPICAL at 05:43

## 2019-04-09 RX ADMIN — GABAPENTIN 800 MILLIGRAM(S): 400 CAPSULE ORAL at 05:43

## 2019-04-09 RX ADMIN — OXYCODONE HYDROCHLORIDE 40 MILLIGRAM(S): 5 TABLET ORAL at 08:08

## 2019-04-09 RX ADMIN — POLYETHYLENE GLYCOL 3350 17 GRAM(S): 17 POWDER, FOR SOLUTION ORAL at 18:34

## 2019-04-09 RX ADMIN — GABAPENTIN 800 MILLIGRAM(S): 400 CAPSULE ORAL at 15:27

## 2019-04-09 RX ADMIN — Medication 1 TABLET(S): at 15:27

## 2019-04-09 RX ADMIN — OXYCODONE HYDROCHLORIDE 20 MILLIGRAM(S): 5 TABLET ORAL at 09:15

## 2019-04-09 RX ADMIN — OXYCODONE HYDROCHLORIDE 20 MILLIGRAM(S): 5 TABLET ORAL at 02:02

## 2019-04-09 RX ADMIN — SENNA PLUS 2 TABLET(S): 8.6 TABLET ORAL at 22:35

## 2019-04-09 NOTE — PROGRESS NOTE ADULT - SUBJECTIVE AND OBJECTIVE BOX
Patient is a 43y old  Female who presents with a chief complaint of AMS (31 Mar 2019 07:49)      OVERNIGHT EVENTS:  none       MEDICATIONS  (STANDING):  ascorbic acid 500 milliGRAM(s) Oral two times a day  calcium carbonate 1250 mG  + Vitamin D (OsCal 500 + D) 1 Tablet(s) Oral three times a day  celecoxib 200 milliGRAM(s) Oral daily  chlorhexidine 4% Liquid 1 Application(s) Topical <User Schedule>  colchicine 0.6 milliGRAM(s) Oral daily  docusate sodium 100 milliGRAM(s) Oral three times a day  enoxaparin Injectable 40 milliGRAM(s) SubCutaneous daily  folic acid 1 milliGRAM(s) Oral daily  gabapentin 800 milliGRAM(s) Oral three times a day  lactobacillus acidophilus 1 Tablet(s) Oral two times a day with meals  lidocaine   Patch 1 Patch Transdermal every 24 hours  lidocaine   Patch 1 Patch Transdermal every 24 hours  methadone    Tablet 50 milliGRAM(s) Oral daily  multivitamin 1 Tablet(s) Oral daily  nicotine -  14 mG/24Hr(s) Patch 1 patch Transdermal daily  nystatin Powder 1 Application(s) Topical three times a day  oxyCODONE  ER Tablet 40 milliGRAM(s) Oral every 12 hours  pantoprazole    Tablet 40 milliGRAM(s) Oral before breakfast  polyethylene glycol 3350 17 Gram(s) Oral two times a day  senna 2 Tablet(s) Oral at bedtime  vancomycin  IVPB      vancomycin  IVPB 1000 milliGRAM(s) IV Intermittent every 8 hours    MEDICATIONS  (PRN):  acetaminophen   Tablet .. 650 milliGRAM(s) Oral every 6 hours PRN Mild Pain (1 - 3)  acetaminophen   Tablet .. 650 milliGRAM(s) Oral every 6 hours PRN Temp greater or equal to 38C (100.4F)  diphenhydrAMINE 25 milliGRAM(s) Oral every 4 hours PRN Rash and/or Itching  oxyCODONE    IR 20 milliGRAM(s) Oral every 6 hours PRN Moderate Pain (4 - 6)        Vital Signs Last 24 Hrs  T(C): 36.7 (09 Apr 2019 05:25), Max: 36.8 (08 Apr 2019 18:21)  T(F): 98 (09 Apr 2019 05:25), Max: 98.3 (08 Apr 2019 23:34)  HR: 89 (09 Apr 2019 09:08) (77 - 89)  BP: 102/45 (09 Apr 2019 09:08) (95/56 - 118/66)  BP(mean): --  RR: 18 (09 Apr 2019 05:25) (16 - 18)  SpO2: 99% (09 Apr 2019 09:08) (96% - 99%)      PHYSICAL EXAM:  GENERAL: in chair in nad  well-groomed, well-developed  HEAD:  Atraumatic, Normocephalic  EYES: EOMI, PERRLA, conjunctiva and sclera clear  ENMT: No tonsillar erythema, exudates, or enlargement; Moist mucous membranes, Good dentition, No lesions, cervical collar in place   NECK: Supple, No JVD, Normal thyroid  NERVOUS SYSTEM:  Alert & Oriented X3, Good concentration; Motor Strength 4/5 B/L upper and lower extremities;  CHEST/LUNG: Clear to percussion bilaterally; No rales, rhonchi, wheezing, or rubs BL  HEART: Regular rate and rhythm; No murmurs, rubs, or gallops  ABDOMEN: Soft, mild non specific tenderness Nondistended; Bowel sounds present  BACK; Spinal tenderness  EXTREMITIES:  2+ Peripheral Pulses, No clubbing, cyanosis, ++ edema to lower extremities LE,   BL Hip/knees TTP, R knee swollen  SKIN: No rashes or lesions      LABS:                                      9.0    6.87  )-----------( 370      ( 09 Apr 2019 09:45 )             30.3     04-09    138  |  102  |  17  ----------------------------<  106<H>  4.4   |  29  |  0.45<L>    Ca    9.8      09 Apr 2019 09:45    TPro  7.1  /  Alb  2.5<L>  /  TBili  0.3  /  DBili  x   /  AST  18  /  ALT  32  /  AlkPhos  278<H>  04-09                                         Parathyroid Hormone Intact, Serum (04.05.19 @ 14:30)    Calcium.: 9.4 mg/dL    Intact PTH: 24: PTH METHOD: Roche  Guide for Interpretation of PTH and Calcium Results                           Calcium             PTH                           MG/DL               PG/ML  Normal                   8.4-10.5            15-65  Primary  Hyperparathyroidism      >10.5               >50  Non-PTH Hypercalcemia    >10.5               0-20  Hypoparathyroidism       <8.4                0-20  Pseudohypoparathyroid    <8.4                >50  This is intended as a guide only. Factors such as sunlight exposure,  Vitamin D status and renal function should be evaluated along with  clinical presentation. pg/mL          Prealbumin, Serum: 13 mg/dL      Cultures; Culture - Tissue with Gram Stain (collected 21 Mar 2019 09:42)  Source: .Tissue cervical disc #2 culture  Gram Stain (prelim) (22 Mar 2019 11:20):    Moderate polymorphonuclear leukocytes per low power field    Rare Gram positive cocci in pairs per oil power field  Preliminary Report (24 Mar 2019 12:21):    Few Methicillin resistant Staphylococcus aureus  Organism: Methicillin resistant Staphylococcus aureus (24 Mar 2019 12:21)  Organism: Methicillin resistant Staphylococcus aureus (24 Mar 2019 12:21)    Culture - Tissue with Gram Stain (collected 21 Mar 2019 09:38)  Source: .Tissue c4-c5 disc culture c/s ewb  Gram Stain (prelim) (22 Mar 2019 11:33):    Moderate polymorphonuclear leukocytes per low power field    Rare Gram positive cocci in pairs per oil power field  Preliminary Report (23 Mar 2019 08:21):    Few Methicillin resistant Staphylococcus aureus  Organism: Methicillin resistant Staphylococcus aureus (23 Mar 2019 08:20)  Organism: Methicillin resistant Staphylococcus aureus (23 Mar 2019 08:20)    Culture - Blood (collected 21 Mar 2019 09:33)  Source: .Blood  Preliminary Report (22 Mar 2019 10:01):    No growth to date.    Culture - Surgical Swab (collected 21 Mar 2019 09:31)  Source: .Surgical Swab cervical disc #1 culture c/s  Preliminary Report (23 Mar 2019 08:27):    Numerous Methicillin resistant Staphylococcus aureus  Organism: Methicillin resistant Staphylococcus aureus (23 Mar 2019 08:26)  Organism: Methicillin resistant Staphylococcus aureus (23 Mar 2019 08:26)    Culture - Surgical Swab (collected 21 Mar 2019 09:26)  Source: .Surgical Swab prevertebral culture c/s  Preliminary Report (22 Mar 2019 10:48):    No growth    Culture - Body Fluid with Gram Stain (collected 21 Mar 2019 04:12)  Source: .Body Fluid LEFT THORACENTESIS  Gram Stain (prelim) (22 Mar 2019 09:06):    polymorphonuclear leukocytes seen    No organisms seen    by cytocentrifuge  Preliminary Report (22 Mar 2019 09:06):    No growth      RADIOLOGY & ADDITIONAL TESTS:    Imaging Personally Reviewed:  [ x] YES    < from: Xray Chest 1 View- PORTABLE-Urgent (03.21.19 @ 02:02) >  Stable small right pleural effusion  Stable left basilar atelectasis.  Mild worsening of right basilar opacity.  < from: CT Chest No Cont (03.01.19 @ 11:32) >  Bilateral cavitary lesions are increased in size and number compared to   2/17/2019.     Small bilateral pleural effusions, loculated on the right, increased   compared to the prior study. Unchanged right lower lobe dependent  consolidation.    Prominent mediastinal and axillary lymph nodes, possibly reactive.      Consultant(s) Notes Reviewed:  [x ] YES     Care Discussed with [ x] Consultants [X ] Patient [ ] Family  [x ]    [x ]  Other; RN

## 2019-04-09 NOTE — PROGRESS NOTE ADULT - ASSESSMENT
43 F w/ history of IVDA, alcohol abuse, hx of pancreatitis, alcohol hepatitis, alcohol withdrawal, left frontoparietal subdural hematoma 2008 without need for neurosurgical intervention presented with severe sepsis with septic shock secondary to MRSA bacteremia w/ RLL PNA, UTI, endocarditis on LIZ and EFRAIN that has resolved and was diagnosis w/ HCV. Pt was initially intubated due to respiratory failure and put on pressors in ICU. She as extubated on 2/25 and transferred from ICU the following day. Pt had a C4-5 ACDF with irrigation and debridement on 3/20 due to spinal abscess and osteo and was kept intubated post procedure and transferred again to ICU, where she was extubated on 3/21 and transferred back to the med service the following day. HCV- Outpatient follow up for HCV and opioid maintenance outpt re-enrollment program    pt remains febrile and daptomycin was changed to vancomycin. Pt stable for discharge to rehab fro long term antibiotics, but finding an accepting facility is an issue right now.  Will continue to titrate off opioids and compensate with increasing methadone for pain control and for cravings     Septic arthritis involving BL Knees/hips;  - MRI of bilateral hips and knees were done demonstrating effusions and an abscess located in the right rectus femoris.   - Bilateral knees were aspirated by orthopedic team. on 3/28/19 prelim ngtd   bilateral hips aspirated by orthopedic team on 3/29/19 prelim ngtd but cppd crystals seen c/w psuedo gout  - as per ortho-No plan for orthopaedic operative intervention at this time.       Pseudogout: colchicine     septic shock secondary to MRSA bacteremia w/ RLL PNA, UTI, endocarditis on LIZ at Tricuspid valve l  - resolved     Endocarditis of tricuspid valve  - status post thoracentesis on 3/20  - as per ID, will switch to vancomycin   - repeat BC negative from 3/21, repeated blood cx on 4/2/19 prelim ngtd     - repeat echo showed a pedunculated mobile mass seen adherent to lateral leaflet of the tricuspid valve w/ no interval changes noted in valve structure or regurgitation  - case discussed with  Dr. Gutierrez (CTS) who suggested to obtain CT C/A/P and ILZ ( not clinically indicated as pt stable and will not  )  -repeated echo from 4/2/19 shows stable TR vegetation          Spinal osteo, abscess and cord compression    - CT of head and neck showed disc space narrowing and endplate irregularity at C4-5 which may represent typical degenerative change vs discitis   - MRI of the cervical spine w/ contrast showed C2-C7 discitis/osteomyelitis with a small right of midline ventral epidural abscess at C2-C7  - MRI of the thoracolumbar spine showed discitis/osteomyelitis at T11-T12 with paravertebral abscess, as well as discitis/osteomyelitis C6--T1, T9-T10, T10-T11, L4-L5, and L5-S1, and a phlegmon/early abscess dorsolaterally within the lumbar canal at the level of L4-L5. There was also a septic arthritis of  the left sternoclavicular joint space. There was also disc herniation at the L4-L5 level with contact and probable impingement of the descending right-sided nerve roots  - status post s/p C4-5 ACDF with irrigation and debridement on 3/20  - c/w gabapentin and lidocaine patch  - wound cx grew MRSA  - c/w [place] J  collar   - as per ID, patient is on dapto & Zyvox     Bacteremia due to methicillin resistant Staphylococcus aureus with IE: diskitis/OM /Spinal abscesses /Parapneumonic effusion   - most recent blood cx from 3/21/19 negative   - s/p thoracentesis : fluids neg    Moderate protein-calorie malnutrition  - c/w soft diet w/Ensure Enlive 3 x day         edema to lower extremities checked dopplers on 4/1/19 and no dvt    started dvt prophylaxis with Lovenox per my d/w orthopedic . on 4/1/19   prealbumin is low c/w moderate malnutrition    give lasix     IVDrug abuse  - c/w methadone for opiate abuse   - case discussed with psych and will try to wean off oxycodone and increase methadone   - - cont celebrex, gabapentin, oxycodone, Oxycontin to 40 mg bid    Anemia due multiple issues, surgery , blood draws,   poor nutrition and infection  - status post a few units of blood during this extensive hospital stay last one on 3/20/19  continue to monitor hgb , hgb stable     - c/w folic acid   - constipation: supportively pt takes pain meds rtc. kub compatible with constipation  senna colace miralax

## 2019-04-10 LAB
ANION GAP SERPL CALC-SCNC: 8 MMOL/L — SIGNIFICANT CHANGE UP (ref 5–17)
BUN SERPL-MCNC: 18 MG/DL — SIGNIFICANT CHANGE UP (ref 7–23)
CALCIUM SERPL-MCNC: 9.2 MG/DL — SIGNIFICANT CHANGE UP (ref 8.5–10.1)
CHLORIDE SERPL-SCNC: 102 MMOL/L — SIGNIFICANT CHANGE UP (ref 96–108)
CO2 SERPL-SCNC: 30 MMOL/L — SIGNIFICANT CHANGE UP (ref 22–31)
CREAT SERPL-MCNC: 0.49 MG/DL — LOW (ref 0.5–1.3)
GLUCOSE SERPL-MCNC: 119 MG/DL — HIGH (ref 70–99)
HCT VFR BLD CALC: 26.7 % — LOW (ref 34.5–45)
HGB BLD-MCNC: 8.2 G/DL — LOW (ref 11.5–15.5)
MCHC RBC-ENTMCNC: 26 PG — LOW (ref 27–34)
MCHC RBC-ENTMCNC: 30.7 GM/DL — LOW (ref 32–36)
MCV RBC AUTO: 84.8 FL — SIGNIFICANT CHANGE UP (ref 80–100)
NRBC # BLD: 0 /100 WBCS — SIGNIFICANT CHANGE UP (ref 0–0)
PLATELET # BLD AUTO: 339 K/UL — SIGNIFICANT CHANGE UP (ref 150–400)
POTASSIUM SERPL-MCNC: 4.4 MMOL/L — SIGNIFICANT CHANGE UP (ref 3.5–5.3)
POTASSIUM SERPL-SCNC: 4.4 MMOL/L — SIGNIFICANT CHANGE UP (ref 3.5–5.3)
RBC # BLD: 3.15 M/UL — LOW (ref 3.8–5.2)
RBC # FLD: 18.6 % — HIGH (ref 10.3–14.5)
SODIUM SERPL-SCNC: 140 MMOL/L — SIGNIFICANT CHANGE UP (ref 135–145)
VANCOMYCIN TROUGH SERPL-MCNC: 19.7 UG/ML — SIGNIFICANT CHANGE UP (ref 10–20)
WBC # BLD: 6.63 K/UL — SIGNIFICANT CHANGE UP (ref 3.8–10.5)
WBC # FLD AUTO: 6.63 K/UL — SIGNIFICANT CHANGE UP (ref 3.8–10.5)

## 2019-04-10 PROCEDURE — 99233 SBSQ HOSP IP/OBS HIGH 50: CPT

## 2019-04-10 RX ADMIN — GABAPENTIN 800 MILLIGRAM(S): 400 CAPSULE ORAL at 13:39

## 2019-04-10 RX ADMIN — LIDOCAINE 1 PATCH: 4 CREAM TOPICAL at 12:16

## 2019-04-10 RX ADMIN — Medication 0.6 MILLIGRAM(S): at 11:29

## 2019-04-10 RX ADMIN — OXYCODONE HYDROCHLORIDE 40 MILLIGRAM(S): 5 TABLET ORAL at 18:19

## 2019-04-10 RX ADMIN — CELECOXIB 200 MILLIGRAM(S): 200 CAPSULE ORAL at 11:27

## 2019-04-10 RX ADMIN — Medication 150 MILLIGRAM(S): at 21:24

## 2019-04-10 RX ADMIN — LIDOCAINE 1 PATCH: 4 CREAM TOPICAL at 00:44

## 2019-04-10 RX ADMIN — OXYCODONE HYDROCHLORIDE 20 MILLIGRAM(S): 5 TABLET ORAL at 13:55

## 2019-04-10 RX ADMIN — LIDOCAINE 1 PATCH: 4 CREAM TOPICAL at 08:00

## 2019-04-10 RX ADMIN — LIDOCAINE 1 PATCH: 4 CREAM TOPICAL at 00:45

## 2019-04-10 RX ADMIN — GABAPENTIN 800 MILLIGRAM(S): 400 CAPSULE ORAL at 21:25

## 2019-04-10 RX ADMIN — POLYETHYLENE GLYCOL 3350 17 GRAM(S): 17 POWDER, FOR SOLUTION ORAL at 18:19

## 2019-04-10 RX ADMIN — Medication 100 MILLIGRAM(S): at 21:25

## 2019-04-10 RX ADMIN — LIDOCAINE 1 PATCH: 4 CREAM TOPICAL at 12:15

## 2019-04-10 RX ADMIN — Medication 500 MILLIGRAM(S): at 18:19

## 2019-04-10 RX ADMIN — CELECOXIB 200 MILLIGRAM(S): 200 CAPSULE ORAL at 11:19

## 2019-04-10 RX ADMIN — NYSTATIN CREAM 1 APPLICATION(S): 100000 CREAM TOPICAL at 05:21

## 2019-04-10 RX ADMIN — Medication 1 PATCH: at 12:18

## 2019-04-10 RX ADMIN — Medication 500 MILLIGRAM(S): at 05:15

## 2019-04-10 RX ADMIN — POLYETHYLENE GLYCOL 3350 17 GRAM(S): 17 POWDER, FOR SOLUTION ORAL at 05:14

## 2019-04-10 RX ADMIN — ENOXAPARIN SODIUM 40 MILLIGRAM(S): 100 INJECTION SUBCUTANEOUS at 11:27

## 2019-04-10 RX ADMIN — Medication 1 PATCH: at 11:27

## 2019-04-10 RX ADMIN — Medication 100 MILLIGRAM(S): at 05:15

## 2019-04-10 RX ADMIN — Medication 150 MILLIGRAM(S): at 05:17

## 2019-04-10 RX ADMIN — Medication 1 MILLIGRAM(S): at 11:29

## 2019-04-10 RX ADMIN — Medication 1 TABLET(S): at 14:03

## 2019-04-10 RX ADMIN — OXYCODONE HYDROCHLORIDE 20 MILLIGRAM(S): 5 TABLET ORAL at 20:43

## 2019-04-10 RX ADMIN — Medication 150 MILLIGRAM(S): at 14:10

## 2019-04-10 RX ADMIN — NYSTATIN CREAM 1 APPLICATION(S): 100000 CREAM TOPICAL at 21:25

## 2019-04-10 RX ADMIN — OXYCODONE HYDROCHLORIDE 40 MILLIGRAM(S): 5 TABLET ORAL at 05:15

## 2019-04-10 RX ADMIN — Medication 1 PATCH: at 07:58

## 2019-04-10 RX ADMIN — LIDOCAINE 1 PATCH: 4 CREAM TOPICAL at 07:59

## 2019-04-10 RX ADMIN — OXYCODONE HYDROCHLORIDE 20 MILLIGRAM(S): 5 TABLET ORAL at 07:55

## 2019-04-10 RX ADMIN — LIDOCAINE 1 PATCH: 4 CREAM TOPICAL at 22:58

## 2019-04-10 RX ADMIN — NYSTATIN CREAM 1 APPLICATION(S): 100000 CREAM TOPICAL at 13:40

## 2019-04-10 RX ADMIN — Medication 1 TABLET(S): at 18:19

## 2019-04-10 RX ADMIN — OXYCODONE HYDROCHLORIDE 20 MILLIGRAM(S): 5 TABLET ORAL at 08:30

## 2019-04-10 RX ADMIN — Medication 100 MILLIGRAM(S): at 13:39

## 2019-04-10 RX ADMIN — GABAPENTIN 800 MILLIGRAM(S): 400 CAPSULE ORAL at 05:14

## 2019-04-10 RX ADMIN — CHLORHEXIDINE GLUCONATE 1 APPLICATION(S): 213 SOLUTION TOPICAL at 05:17

## 2019-04-10 RX ADMIN — OXYCODONE HYDROCHLORIDE 20 MILLIGRAM(S): 5 TABLET ORAL at 23:00

## 2019-04-10 RX ADMIN — OXYCODONE HYDROCHLORIDE 40 MILLIGRAM(S): 5 TABLET ORAL at 19:00

## 2019-04-10 RX ADMIN — Medication 1 TABLET(S): at 05:14

## 2019-04-10 RX ADMIN — SENNA PLUS 2 TABLET(S): 8.6 TABLET ORAL at 21:25

## 2019-04-10 RX ADMIN — Medication 1 TABLET(S): at 21:25

## 2019-04-10 RX ADMIN — Medication 1 PATCH: at 19:18

## 2019-04-10 RX ADMIN — Medication 1 TABLET(S): at 07:57

## 2019-04-10 RX ADMIN — METHADONE HYDROCHLORIDE 50 MILLIGRAM(S): 40 TABLET ORAL at 11:30

## 2019-04-10 RX ADMIN — Medication 1 TABLET(S): at 11:27

## 2019-04-10 RX ADMIN — OXYCODONE HYDROCHLORIDE 20 MILLIGRAM(S): 5 TABLET ORAL at 13:35

## 2019-04-10 RX ADMIN — PANTOPRAZOLE SODIUM 40 MILLIGRAM(S): 20 TABLET, DELAYED RELEASE ORAL at 07:55

## 2019-04-10 NOTE — PROGRESS NOTE ADULT - ASSESSMENT
43 F w/ history of IVDA, alcohol abuse, hx of pancreatitis, alcohol hepatitis, alcohol withdrawal, left frontoparietal subdural hematoma 2008 without need for neurosurgical intervention presented with severe sepsis with septic shock secondary to MRSA bacteremia w/ RLL PNA, UTI, endocarditis on LIZ and EFRAIN that has resolved and was diagnosis w/ HCV. Pt was initially intubated due to respiratory failure and put on pressors in ICU. She as extubated on 2/25 and transferred from ICU the following day. Pt had a C4-5 ACDF with irrigation and debridement on 3/20 due to spinal abscess and osteo and was kept intubated post procedure and transferred again to ICU, where she was extubated on 3/21 and transferred back to the med service the following day. HCV- Outpatient follow up for HCV and opioid maintenance outpt re-enrollment program    pt remains febrile and daptomycin was changed to vancomycin. Pt stable for discharge to rehab fro long term antibiotics, but finding an accepting facility is an issue right now.  PT requesting to keep current pain regimen right now and not adjust the methadone dose anymore   Ortho to order repeat MRI of the spine to evaluate post operative healing      Septic arthritis involving BL Knees/hips;  - MRI of bilateral hips and knees were done demonstrating effusions and an abscess located in the right rectus femoris.   - Bilateral knees were aspirated by orthopedic team. on 3/28/19 prelim ngtd   -bilateral hips aspirated by orthopedic team on 3/29/19 prelim ngtd but cppd crystals seen c/w psuedo gout  - as per ortho-No plan for orthopaedic operative intervention at this time.       Pseudogout: colchicine     septic shock secondary to MRSA bacteremia w/ RLL PNA, UTI, endocarditis on LIZ at Tricuspid valve l  - resolved     Endocarditis of tricuspid valve  - status post thoracentesis on 3/20  - as per ID, will switch to vancomycin   - repeat BC negative from 3/21, repeated blood cx on 4/2/19 prelim ngtd     - repeat echo showed a pedunculated mobile mass seen adherent to lateral leaflet of the tricuspid valve w/ no interval changes noted in valve structure or regurgitation  - case discussed with  Dr. Gutierrez (CTS) who suggested to obtain CT C/A/P and LIZ ( not clinically indicated as pt stable and will not  )  -repeated echo from 4/2/19 shows stable TR vegetation          Spinal osteo, abscess and cord compression    - CT of head and neck showed disc space narrowing and endplate irregularity at C4-5 which may represent typical degenerative change vs discitis   - MRI of the cervical spine w/ contrast showed C2-C7 discitis/osteomyelitis with a small right of midline ventral epidural abscess at C2-C7  - MRI of the thoracolumbar spine showed discitis/osteomyelitis at T11-T12 with paravertebral abscess, as well as discitis/osteomyelitis C6--T1, T9-T10, T10-T11, L4-L5, and L5-S1, and a phlegmon/early abscess dorsolaterally within the lumbar canal at the level of L4-L5. There was also a septic arthritis of  the left sternoclavicular joint space. There was also disc herniation at the L4-L5 level with contact and probable impingement of the descending right-sided nerve roots  - status post s/p C4-5 ACDF with irrigation and debridement on 3/20  - c/w gabapentin and lidocaine patch  - wound cx grew MRSA  - c/w [place] J  collar   - as per ID, patient is on dapto & Zyvox     Bacteremia due to methicillin resistant Staphylococcus aureus with IE: diskitis/OM /Spinal abscesses /Parapneumonic effusion   - most recent blood cx from 3/21/19 negative   - s/p thoracentesis : fluids neg    Moderate protein-calorie malnutrition  - c/w soft diet w/Ensure Enlive 3 x day         edema to lower extremities checked dopplers on 4/1/19 and no dvt    started dvt prophylaxis with Lovenox per my d/w orthopedic . on 4/1/19   prealbumin is low c/w moderate malnutrition    give lasix     IVDrug abuse  - c/w methadone for opiate abuse   - case discussed with psych and will try to wean off oxycodone and increase methadone   - - cont celebrex, gabapentin, oxycodone, Oxycontin to 40 mg bid    Anemia due multiple issues, surgery , blood draws,   poor nutrition and infection  - status post a few units of blood during this extensive hospital stay last one on 3/20/19  continue to monitor hgb , hgb stable     - c/w folic acid   - constipation: supportively pt takes pain meds rtc. kub compatible with constipation  senna colace miralax

## 2019-04-10 NOTE — CHART NOTE - NSCHARTNOTEFT_GEN_A_CORE
pt labs reviewed   vanco level appropriate at 19.7   will check again in few days  check bmp in two days  cont vanco  for MRSA bacteremia/IE/diskitis /om in IVDA pt   awaiting rehab

## 2019-04-10 NOTE — CHART NOTE - NSCHARTNOTEFT_GEN_A_CORE
Assessment: Pt seen for follow-up & continues acute moderate malnutrition. Pt with spinal osteo, abscess & cord compression. S/p C4-5ACDF with I&D 3/20 & bacteremia. Pt states appetite & po intake improving.     Factors impacting intake: [x ] none [ ] nausea  [ ] vomiting [ ] diarrhea [ ] constipation  [ ]chewing problems [ ] swallowing issues  [ ] other:     Diet Prescription: Diet, Regular:   Supplement Feeding Modality:  Oral  Ensure Enlive Cans or Servings Per Day:  3       Frequency:  Daily (03-29-19 @ 16:58)    Intake: Po intake improving ~80% consumed @ meals & 100% Ensure Enlive consumed & tolerated well feeds self pc tray prep. Pt states last BM x 1(4/9); pt on Miralax & PJ daily in am.     Current Weight: Weight (kg): Wt=72.6kg(4/9), Wt=70kg(3/23)  % Weight Change 2.6kg wt gain(4%) x 17 days.     Physical appearance: +1 edema Sina feet; Nutrition focused physical exam conducted; Subcutaneous fat loss: [Mild ] Orbital fat pads region, [WNL ]Buccal fat region, [Mild ]Triceps region,  [WNL ]Ribs region.  Muscle wasting: [Mild ]Temples region, [WNL ]Clavicle region, [WNL ]Shoulder region, [Unable ]Scapula region, [WNL ]Interosseous region,  [Mild ]thigh region, [Mild ]Calf region    Pertinent Medications: MEDICATIONS  (STANDING):  ascorbic acid 500 milliGRAM(s) Oral two times a day  calcium carbonate 1250 mG  + Vitamin D (OsCal 500 + D) 1 Tablet(s) Oral three times a day  celecoxib 200 milliGRAM(s) Oral daily  chlorhexidine 4% Liquid 1 Application(s) Topical <User Schedule>  colchicine 0.6 milliGRAM(s) Oral daily  docusate sodium 100 milliGRAM(s) Oral three times a day  enoxaparin Injectable 40 milliGRAM(s) SubCutaneous daily  folic acid 1 milliGRAM(s) Oral daily  gabapentin 800 milliGRAM(s) Oral three times a day  lactobacillus acidophilus 1 Tablet(s) Oral two times a day with meals  lidocaine   Patch 1 Patch Transdermal every 24 hours  lidocaine   Patch 1 Patch Transdermal every 24 hours  methadone    Tablet 50 milliGRAM(s) Oral daily  multivitamin 1 Tablet(s) Oral daily  nicotine -  14 mG/24Hr(s) Patch 1 patch Transdermal daily  nystatin Powder 1 Application(s) Topical three times a day  oxyCODONE  ER Tablet 40 milliGRAM(s) Oral every 12 hours  pantoprazole    Tablet 40 milliGRAM(s) Oral before breakfast  polyethylene glycol 3350 17 Gram(s) Oral two times a day  senna 2 Tablet(s) Oral at bedtime  vancomycin  IVPB      vancomycin  IVPB 1000 milliGRAM(s) IV Intermittent every 8 hours    MEDICATIONS  (PRN):  acetaminophen   Tablet .. 650 milliGRAM(s) Oral every 6 hours PRN Mild Pain (1 - 3)  acetaminophen   Tablet .. 650 milliGRAM(s) Oral every 6 hours PRN Temp greater or equal to 38C (100.4F)  diphenhydrAMINE 25 milliGRAM(s) Oral every 4 hours PRN Rash and/or Itching  oxyCODONE    IR 20 milliGRAM(s) Oral every 6 hours PRN Moderate Pain (4 - 6)    Pertinent Labs: 04-09 Na 138 mmol/L Glu 106 mg/dL<H> K+ 4.4 mmol/L Cr 0.45 mg/dL<L> BUN 17 mg/dL Phos n/a   Alb 2.5 g/dL<L> PAB n/a   Hgb 9.0 g/dL<L> Hct 30.3 %<L> HgA1C n/a     24Hr FS:  Skin: Lt heel stage I    Estimated Needs:   [x ] no change since previous assessment (2/25/19)  [ ] recalculated:     Previous Nutrition Diagnosis:   [ ] Inadequate Energy Intake [ ]Inadequate Oral Intake [ ] Excessive Energy Intake   [ ] Underweight [ ] Increased Nutrient Needs [ ] Overweight/Obesity   [ ] Altered GI Function [ ] Unintended Weight Loss [ ] Food & Nutrition Related Knowledge Deficit [ ] severe Malnutrition [x ]Acute moderate malnutrition (based on mild fat & muscle wasting)    Nutrition Diagnosis is [ ] ongoing  [ ] resolved  [x ] improved  [ ] not applicable   Previous Goal:     New Nutrition Diagnosis: [x ] not applicable       Interventions:   Recommend  [x ] Continue: Regular/Ensure Enlive 3 x day per pt request(1050kcal & 60gm protein) & PJ daily in am  [ ] Change Diet To:  [ ] Nutrition Supplement:  [ ] Nutrition Support:  [x ] Other: Cater to food preferences.     Monitoring and Evaluation:   [x ] PO intake [ x ] Tolerance to diet prescription [ x ] weights [ x ] labs[ x ] follow up per protocol  [ ] other: Assessment: Pt seen for follow-up & continues acute moderate malnutrition. Pt with spinal osteo, abscess & cord compression. S/p C4-5ACDF with I&D 3/20 & bacteremia. Pt states appetite & po intake improving.     Factors impacting intake: [x ] none [ ] nausea  [ ] vomiting [ ] diarrhea [ ] constipation  [ ]chewing problems [ ] swallowing issues  [ ] other:     Diet Prescription: Diet, Regular:   Supplement Feeding Modality:  Oral  Ensure Enlive Cans or Servings Per Day:  3       Frequency:  Daily (03-29-19 @ 16:58)    Intake: Po intake improving ~80% consumed @ meals & 100% Ensure Enlive consumed & tolerated well feeds self pc tray prep. Pt states last BM x 1(4/9); pt on Miralax & PJ daily in am.     Current Weight: Weight (kg): Wt=72.6kg(4/9), Wt=70kg(3/23)  % Weight Change 2.6kg wt gain(4%) x 17 days.     Physical appearance: +1 edema Sina feet; Nutrition focused physical exam conducted; Subcutaneous fat loss: [Mild ] Orbital fat pads region, [WNL ]Buccal fat region, [Mild ]Triceps region,  [WNL ]Ribs region.  Muscle wasting: [Mild ]Temples region, [WNL ]Clavicle region, [WNL ]Shoulder region, [Unable ]Scapula region, [WNL ]Interosseous region,  [Mild ]thigh region, [Mild ]Calf region    Pertinent Medications: MEDICATIONS  (STANDING):  ascorbic acid 500 milliGRAM(s) Oral two times a day  calcium carbonate 1250 mG  + Vitamin D (OsCal 500 + D) 1 Tablet(s) Oral three times a day  celecoxib 200 milliGRAM(s) Oral daily  chlorhexidine 4% Liquid 1 Application(s) Topical <User Schedule>  colchicine 0.6 milliGRAM(s) Oral daily  docusate sodium 100 milliGRAM(s) Oral three times a day  enoxaparin Injectable 40 milliGRAM(s) SubCutaneous daily  folic acid 1 milliGRAM(s) Oral daily  gabapentin 800 milliGRAM(s) Oral three times a day  lactobacillus acidophilus 1 Tablet(s) Oral two times a day with meals  lidocaine   Patch 1 Patch Transdermal every 24 hours  lidocaine   Patch 1 Patch Transdermal every 24 hours  methadone    Tablet 50 milliGRAM(s) Oral daily  multivitamin 1 Tablet(s) Oral daily  nicotine -  14 mG/24Hr(s) Patch 1 patch Transdermal daily  nystatin Powder 1 Application(s) Topical three times a day  oxyCODONE  ER Tablet 40 milliGRAM(s) Oral every 12 hours  pantoprazole    Tablet 40 milliGRAM(s) Oral before breakfast  polyethylene glycol 3350 17 Gram(s) Oral two times a day  senna 2 Tablet(s) Oral at bedtime  vancomycin  IVPB      vancomycin  IVPB 1000 milliGRAM(s) IV Intermittent every 8 hours    MEDICATIONS  (PRN):  acetaminophen   Tablet .. 650 milliGRAM(s) Oral every 6 hours PRN Mild Pain (1 - 3)  acetaminophen   Tablet .. 650 milliGRAM(s) Oral every 6 hours PRN Temp greater or equal to 38C (100.4F)  diphenhydrAMINE 25 milliGRAM(s) Oral every 4 hours PRN Rash and/or Itching  oxyCODONE    IR 20 milliGRAM(s) Oral every 6 hours PRN Moderate Pain (4 - 6)    Pertinent Labs: 04-09 Na 138 mmol/L Glu 106 mg/dL<H> K+ 4.4 mmol/L Cr 0.45 mg/dL<L> BUN 17 mg/dL Phos n/a   Alb 2.5 g/dL<L> PAB n/a   Hgb 9.0 g/dL<L> Hct 30.3 %<L> HgA1C n/a     24Hr FS:  Skin: Lt heel stage I    Estimated Needs:   [x ] no change since previous assessment (2/25/19)  [ ] recalculated:     Previous Nutrition Diagnosis:   [ ] Inadequate Energy Intake [ ]Inadequate Oral Intake [ ] Excessive Energy Intake   [ ] Underweight [ ] Increased Nutrient Needs [ ] Overweight/Obesity   [ ] Altered GI Function [ ] Unintended Weight Loss [ ] Food & Nutrition Related Knowledge Deficit [ ] severe Malnutrition [x ]Acute moderate malnutrition (based on mild fat & muscle wasting)    Nutrition Diagnosis is [ ] ongoing  [ ] resolved  [x ] improved  [ ] not applicable   Previous Goal: Pt to meet >75% energy & protein needs via meals/supplements; met    New Nutrition Diagnosis: [x ] not applicable       Interventions:   Recommend  [x ] Continue: Regular/Ensure Enlive 3 x day per pt request(1050kcal & 60gm protein) & PJ daily in am  [ ] Change Diet To:  [ ] Nutrition Supplement:  [ ] Nutrition Support:  [x ] Other: Cater to food preferences.     Monitoring and Evaluation:   [x ] PO intake [ x ] Tolerance to diet prescription [ x ] weights [ x ] labs[ x ] follow up per protocol  [ ] other:

## 2019-04-10 NOTE — PROGRESS NOTE ADULT - SUBJECTIVE AND OBJECTIVE BOX
Patient is a 43y old  Female who presents with a chief complaint of AMS (31 Mar 2019 07:49)      OVERNIGHT EVENTS:  none       MEDICATIONS  (STANDING):  ascorbic acid 500 milliGRAM(s) Oral two times a day  calcium carbonate 1250 mG  + Vitamin D (OsCal 500 + D) 1 Tablet(s) Oral three times a day  celecoxib 200 milliGRAM(s) Oral daily  chlorhexidine 4% Liquid 1 Application(s) Topical <User Schedule>  colchicine 0.6 milliGRAM(s) Oral daily  docusate sodium 100 milliGRAM(s) Oral three times a day  enoxaparin Injectable 40 milliGRAM(s) SubCutaneous daily  folic acid 1 milliGRAM(s) Oral daily  gabapentin 800 milliGRAM(s) Oral three times a day  lactobacillus acidophilus 1 Tablet(s) Oral two times a day with meals  lidocaine   Patch 1 Patch Transdermal every 24 hours  lidocaine   Patch 1 Patch Transdermal every 24 hours  methadone    Tablet 50 milliGRAM(s) Oral daily  multivitamin 1 Tablet(s) Oral daily  nicotine -  14 mG/24Hr(s) Patch 1 patch Transdermal daily  nystatin Powder 1 Application(s) Topical three times a day  oxyCODONE  ER Tablet 40 milliGRAM(s) Oral every 12 hours  pantoprazole    Tablet 40 milliGRAM(s) Oral before breakfast  polyethylene glycol 3350 17 Gram(s) Oral two times a day  senna 2 Tablet(s) Oral at bedtime  vancomycin  IVPB      vancomycin  IVPB 1000 milliGRAM(s) IV Intermittent every 8 hours    MEDICATIONS  (PRN):  acetaminophen   Tablet .. 650 milliGRAM(s) Oral every 6 hours PRN Mild Pain (1 - 3)  acetaminophen   Tablet .. 650 milliGRAM(s) Oral every 6 hours PRN Temp greater or equal to 38C (100.4F)  diphenhydrAMINE 25 milliGRAM(s) Oral every 4 hours PRN Rash and/or Itching  oxyCODONE    IR 20 milliGRAM(s) Oral every 6 hours PRN Moderate Pain (4 - 6)        Vital Signs Last 24 Hrs  T(C): 37.3 (10 Apr 2019 10:49), Max: 37.3 (10 Apr 2019 10:49)  T(F): 99.1 (10 Apr 2019 10:49), Max: 99.1 (10 Apr 2019 10:49)  HR: 92 (10 Apr 2019 11:03) (80 - 92)  BP: 120/60 (10 Apr 2019 11:03) (91/52 - 124/49)  BP(mean): --  RR: 18 (10 Apr 2019 11:03) (16 - 18)  SpO2: 98% (10 Apr 2019 11:03) (96% - 100%)      PHYSICAL EXAM:  GENERAL: in chair in nad  well-groomed, well-developed  HEAD:  Atraumatic, Normocephalic  EYES: EOMI, PERRLA, conjunctiva and sclera clear  ENMT: No tonsillar erythema, exudates, or enlargement; Moist mucous membranes, Good dentition, No lesions, cervical collar in place   NECK: Supple, No JVD, Normal thyroid  NERVOUS SYSTEM:  Alert & Oriented X3, Good concentration; Motor Strength 4/5 B/L upper and lower extremities;  CHEST/LUNG: Clear to percussion bilaterally; No rales, rhonchi, wheezing, or rubs BL  HEART: Regular rate and rhythm; No murmurs, rubs, or gallops  ABDOMEN: Soft, mild non specific tenderness Nondistended; Bowel sounds present  BACK; Spinal tenderness  EXTREMITIES:  2+ Peripheral Pulses, No clubbing, cyanosis, ++ edema to lower extremities LE,   BL Hip/knees TTP, R knee swollen  SKIN: No rashes or lesions      LABS:                                      8.2    6.63  )-----------( 339      ( 10 Apr 2019 12:32 )             26.7     04-10    140  |  102  |  18  ----------------------------<  119<H>  4.4   |  30  |  0.49<L>    Ca    9.2      10 Apr 2019 12:32    TPro  7.1  /  Alb  2.5<L>  /  TBili  0.3  /  DBili  x   /  AST  18  /  ALT  32  /  AlkPhos  278<H>  04-09          Consultant(s) Notes Reviewed:  [x ] YES     Care Discussed with [ x] Consultants [X ] Patient [ ] Family  [x ]    [x ]  Other; RN

## 2019-04-10 NOTE — PROGRESS NOTE ADULT - SUBJECTIVE AND OBJECTIVE BOX
Pt seen and examined at bedside. No complaints other than mid back pain which pt states is always there but can worsen at times, states her leg swelling is improving, Denies CP/Dyspnea/Calf tenderness bilaterally. Hip and knee pain improving and minimal compared to prior exams but ambulating well with PT.  Pt denies any new erythema/redness/swelling. No other complaints at this time.  Expressed to pt and encourage ambulating further with PT and walking around room to decrease swelling.     Vital Signs Last 24 Hrs  T(C): 36.8 (10 Apr 2019 05:24), Max: 36.9 (09 Apr 2019 17:29)  T(F): 98.3 (10 Apr 2019 05:24), Max: 98.4 (09 Apr 2019 17:29)  HR: 84 (10 Apr 2019 05:24) (80 - 89)  BP: 100/61 (10 Apr 2019 05:24) (91/52 - 102/45)  RR: 16 (10 Apr 2019 05:24) (16 - 18)  SpO2: 96% (10 Apr 2019 05:24) (96% - 100%)      Gen: NAD, in [place] J collar, resting comfortably in bed  No pain with AROM of BL Hip/Knee, no signs of erythema or worsening effusion of B/L Knees. No pain with palpation of b/l hip and knee this morning.    Spine PE:  Skin intact  severe TTP along lower thoracic and upper lumbar spine  [place] J Collar In Place  Negative clonus  Negative babinski  Negative olmstead           Deltoid        Bicep        Tricep          Wrist Ext    Wrist Flex    Finger Flex          Finger Abd  L            4/5         4/5           4/5             4/5                4/5	           4/5                    4/5  R            3/5 	   3+/5           4+/5             4+/5              4+/5               5/5                   5/5                                      Hip Flex       Quad     Ankle DF        Ankle PF       Toe Ext        Hamstring    L            5/5              5/5          5/5                 5/5                 5/5	                5/5  R            5/5              5/5           5/5                 5/5                 5/5                5/5    Sensory:            C5         C6         C7      C8       T1        (0=absent, 1=impaired, 2=normal, NT=not testable)  L         2            2           2        2         2  R          2            2           2        2         2               L2          L3         L4      L5       S1         (0=absent, 1=impaired, 2=normal, NT=not testable)  L         2            2            2        2        2  R          2            2           2        2         2    B/L hand numbness which has been present since admission

## 2019-04-10 NOTE — PROGRESS NOTE ADULT - ASSESSMENT
A/P: 43y Female s/p C4-5 ACDF with irrigation and debridement, now POD 21  Will order repeat MRI 4/16 of Axial spine to look treatment efficacy   Continue to decrease narcotics and give methadone/NSAIDs etc.  Pt will be finishing course of abx for a long period in house, we have the opportunity to decrease all narcotic burden during hospital stay, recommend Psych c/s to aid  Will continue to FU cultures currently all NGTD, No evidence for acute septic arthritis at this time. Will continue to monitor pt and cultures until final   Heart vegetation management per cardiology/ID, which are recommending no further management   Continue IV Abx per ID Team/Dr. Garcia  DVT PPx: SCDs/Lovenox per primary  PT/OT: WBAT, maintain [place] J Collar at all times, LSO to be worn when ambulating  No plan for return to OR for epidural abscesses or knee/hip washout at this time, will continue to monitor for improvement or RUE weakness which has been present since before surgery  Incentive Spirometry, OOB  Dry dressing changes as needed.   No further Orthopaedic surgical intervention at this time. Will continue to monitor and see patient Monday/Wednesday/Friday. Will advise if plan changes

## 2019-04-11 LAB
CULTURE RESULTS: SIGNIFICANT CHANGE UP
CULTURE RESULTS: SIGNIFICANT CHANGE UP
GRAM STN FLD: SIGNIFICANT CHANGE UP
SPECIMEN SOURCE: SIGNIFICANT CHANGE UP

## 2019-04-11 PROCEDURE — 99233 SBSQ HOSP IP/OBS HIGH 50: CPT

## 2019-04-11 RX ORDER — CYCLOBENZAPRINE HYDROCHLORIDE 10 MG/1
5 TABLET, FILM COATED ORAL ONCE
Qty: 0 | Refills: 0 | Status: COMPLETED | OUTPATIENT
Start: 2019-04-11 | End: 2019-04-11

## 2019-04-11 RX ORDER — METHADONE HYDROCHLORIDE 40 MG/1
40 TABLET ORAL DAILY
Qty: 0 | Refills: 0 | Status: DISCONTINUED | OUTPATIENT
Start: 2019-04-12 | End: 2019-04-12

## 2019-04-11 RX ADMIN — Medication 100 MILLIGRAM(S): at 22:13

## 2019-04-11 RX ADMIN — OXYCODONE HYDROCHLORIDE 40 MILLIGRAM(S): 5 TABLET ORAL at 18:25

## 2019-04-11 RX ADMIN — OXYCODONE HYDROCHLORIDE 20 MILLIGRAM(S): 5 TABLET ORAL at 02:41

## 2019-04-11 RX ADMIN — Medication 100 MILLIGRAM(S): at 05:55

## 2019-04-11 RX ADMIN — CELECOXIB 200 MILLIGRAM(S): 200 CAPSULE ORAL at 13:41

## 2019-04-11 RX ADMIN — GABAPENTIN 800 MILLIGRAM(S): 400 CAPSULE ORAL at 22:13

## 2019-04-11 RX ADMIN — PANTOPRAZOLE SODIUM 40 MILLIGRAM(S): 20 TABLET, DELAYED RELEASE ORAL at 06:01

## 2019-04-11 RX ADMIN — Medication 1 TABLET(S): at 05:55

## 2019-04-11 RX ADMIN — CHLORHEXIDINE GLUCONATE 1 APPLICATION(S): 213 SOLUTION TOPICAL at 05:56

## 2019-04-11 RX ADMIN — Medication 1 MILLIGRAM(S): at 13:41

## 2019-04-11 RX ADMIN — CYCLOBENZAPRINE HYDROCHLORIDE 5 MILLIGRAM(S): 10 TABLET, FILM COATED ORAL at 18:26

## 2019-04-11 RX ADMIN — NYSTATIN CREAM 1 APPLICATION(S): 100000 CREAM TOPICAL at 13:41

## 2019-04-11 RX ADMIN — ENOXAPARIN SODIUM 40 MILLIGRAM(S): 100 INJECTION SUBCUTANEOUS at 13:40

## 2019-04-11 RX ADMIN — NYSTATIN CREAM 1 APPLICATION(S): 100000 CREAM TOPICAL at 05:55

## 2019-04-11 RX ADMIN — OXYCODONE HYDROCHLORIDE 20 MILLIGRAM(S): 5 TABLET ORAL at 16:44

## 2019-04-11 RX ADMIN — METHADONE HYDROCHLORIDE 50 MILLIGRAM(S): 40 TABLET ORAL at 13:40

## 2019-04-11 RX ADMIN — Medication 500 MILLIGRAM(S): at 05:55

## 2019-04-11 RX ADMIN — Medication 1 TABLET(S): at 13:41

## 2019-04-11 RX ADMIN — OXYCODONE HYDROCHLORIDE 40 MILLIGRAM(S): 5 TABLET ORAL at 19:25

## 2019-04-11 RX ADMIN — Medication 150 MILLIGRAM(S): at 13:39

## 2019-04-11 RX ADMIN — OXYCODONE HYDROCHLORIDE 20 MILLIGRAM(S): 5 TABLET ORAL at 17:44

## 2019-04-11 RX ADMIN — Medication 500 MILLIGRAM(S): at 18:26

## 2019-04-11 RX ADMIN — OXYCODONE HYDROCHLORIDE 20 MILLIGRAM(S): 5 TABLET ORAL at 09:48

## 2019-04-11 RX ADMIN — Medication 1 TABLET(S): at 08:48

## 2019-04-11 RX ADMIN — OXYCODONE HYDROCHLORIDE 20 MILLIGRAM(S): 5 TABLET ORAL at 10:48

## 2019-04-11 RX ADMIN — Medication 1 TABLET(S): at 22:13

## 2019-04-11 RX ADMIN — Medication 1 PATCH: at 13:40

## 2019-04-11 RX ADMIN — OXYCODONE HYDROCHLORIDE 40 MILLIGRAM(S): 5 TABLET ORAL at 06:01

## 2019-04-11 RX ADMIN — Medication 1 PATCH: at 22:00

## 2019-04-11 RX ADMIN — OXYCODONE HYDROCHLORIDE 20 MILLIGRAM(S): 5 TABLET ORAL at 23:28

## 2019-04-11 RX ADMIN — Medication 150 MILLIGRAM(S): at 05:54

## 2019-04-11 RX ADMIN — SENNA PLUS 2 TABLET(S): 8.6 TABLET ORAL at 22:13

## 2019-04-11 RX ADMIN — Medication 0.6 MILLIGRAM(S): at 13:41

## 2019-04-11 RX ADMIN — Medication 100 MILLIGRAM(S): at 13:41

## 2019-04-11 RX ADMIN — POLYETHYLENE GLYCOL 3350 17 GRAM(S): 17 POWDER, FOR SOLUTION ORAL at 05:55

## 2019-04-11 RX ADMIN — Medication 150 MILLIGRAM(S): at 22:11

## 2019-04-11 RX ADMIN — GABAPENTIN 800 MILLIGRAM(S): 400 CAPSULE ORAL at 13:41

## 2019-04-11 RX ADMIN — NYSTATIN CREAM 1 APPLICATION(S): 100000 CREAM TOPICAL at 22:12

## 2019-04-11 RX ADMIN — Medication 1 TABLET(S): at 18:25

## 2019-04-11 RX ADMIN — GABAPENTIN 800 MILLIGRAM(S): 400 CAPSULE ORAL at 05:55

## 2019-04-11 RX ADMIN — POLYETHYLENE GLYCOL 3350 17 GRAM(S): 17 POWDER, FOR SOLUTION ORAL at 18:25

## 2019-04-11 NOTE — PROGRESS NOTE ADULT - SUBJECTIVE AND OBJECTIVE BOX
Patient is a 43y old  Female who presents with a chief complaint of AMS (10 Apr 2019 12:48)      INTERVAL HPI / OVERNIGHT EVENTS: doing ok     MEDICATIONS  (STANDING):  ascorbic acid 500 milliGRAM(s) Oral two times a day  calcium carbonate 1250 mG  + Vitamin D (OsCal 500 + D) 1 Tablet(s) Oral three times a day  celecoxib 200 milliGRAM(s) Oral daily  chlorhexidine 4% Liquid 1 Application(s) Topical <User Schedule>  colchicine 0.6 milliGRAM(s) Oral daily  docusate sodium 100 milliGRAM(s) Oral three times a day  enoxaparin Injectable 40 milliGRAM(s) SubCutaneous daily  folic acid 1 milliGRAM(s) Oral daily  gabapentin 800 milliGRAM(s) Oral three times a day  lactobacillus acidophilus 1 Tablet(s) Oral two times a day with meals  lidocaine   Patch 1 Patch Transdermal every 24 hours  lidocaine   Patch 1 Patch Transdermal every 24 hours  methadone    Tablet 50 milliGRAM(s) Oral daily  multivitamin 1 Tablet(s) Oral daily  nicotine -  14 mG/24Hr(s) Patch 1 patch Transdermal daily  nystatin Powder 1 Application(s) Topical three times a day  oxyCODONE  ER Tablet 40 milliGRAM(s) Oral every 12 hours  pantoprazole    Tablet 40 milliGRAM(s) Oral before breakfast  polyethylene glycol 3350 17 Gram(s) Oral two times a day  senna 2 Tablet(s) Oral at bedtime  vancomycin  IVPB      vancomycin  IVPB 1000 milliGRAM(s) IV Intermittent every 8 hours    MEDICATIONS  (PRN):  acetaminophen   Tablet .. 650 milliGRAM(s) Oral every 6 hours PRN Mild Pain (1 - 3)  acetaminophen   Tablet .. 650 milliGRAM(s) Oral every 6 hours PRN Temp greater or equal to 38C (100.4F)  diphenhydrAMINE 25 milliGRAM(s) Oral every 4 hours PRN Rash and/or Itching  oxyCODONE    IR 20 milliGRAM(s) Oral every 6 hours PRN Moderate Pain (4 - 6)      Vital Signs Last 24 Hrs  T(C): 36.4 (11 Apr 2019 11:56), Max: 37.3 (10 Apr 2019 18:15)  T(F): 97.5 (11 Apr 2019 11:56), Max: 99.2 (10 Apr 2019 18:15)  HR: 65 (11 Apr 2019 11:56) (65 - 91)  BP: 111/66 (11 Apr 2019 11:56) (100/60 - 114/60)  BP(mean): --  RR: 18 (11 Apr 2019 11:56) (16 - 18)  SpO2: 100% (11 Apr 2019 11:56) (97% - 100%)    Review of systems:  General : no fever /chills, fatigue  CVS : no chest pain, palpitations  Lungs : no shortness of breath, cough  GI : no abdominal pain, vomiting, diarrhea   : no dysuria,hematuria        PHYSICAL EXAM:  General :NAD  Constitutional:  well-groomed, well-developed  Respiratory: CTAB/L  Cardiovascular: S1 and S2, murmur  Gastrointestinal: BS+, soft, NT/ND  Extremities: No peripheral edema  Vascular: 2+ peripheral pulses  Skin: scars from previous skin poppings      LABS:                        8.2    6.63  )-----------( 339      ( 10 Apr 2019 12:32 )             26.7     04-10    140  |  102  |  18  ----------------------------<  119<H>  4.4   |  30  |  0.49<L>    Ca    9.2      10 Apr 2019 12:32            MICROBIOLOGY:  RECENT CULTURES:        RADIOLOGY & ADDITIONAL STUDIES:

## 2019-04-11 NOTE — PROGRESS NOTE ADULT - PROBLEM SELECTOR PLAN 4
with IE: diskitis/OM /Spinal abscesses /Parapneumonic effusion   repeat blood c/s negative  Now also has possible septic hip and knee arthritis vs pseudogout   (Crystal + ,high WBC in synovial fluid but c/s neg )  underwent arthrocentesis by Ortho over last week   so far c/s neg   s/p thoracentesis for pleural effusion vs empyema : fluids neg  NO new bacteremia

## 2019-04-11 NOTE — PROGRESS NOTE ADULT - ASSESSMENT
43 F w/ history of IVDA, alcohol abuse, hx of pancreatitis, alcohol hepatitis, alcohol withdrawal, left frontoparietal subdural hematoma 2008 without need for neurosurgical intervention presented with severe sepsis with septic shock secondary to MRSA bacteremia w/ RLL PNA, UTI, endocarditis on LIZ and EFRAIN that has resolved and was diagnosis w/ HCV. Pt was initially intubated due to respiratory failure and put on pressors in ICU. She as extubated on 2/25 and transferred from ICU the following day. Pt had a C4-5 ACDF with irrigation and debridement on 3/20 due to spinal abscess and osteo and was kept intubated post procedure and transferred again to ICU, where she was extubated on 3/21 and transferred back to the med service the following day.       pt remains febrile and daptomycin was changed to vancomycin. Pt stable for discharge to rehab for long term antibiotics, but finding an accepting facility is an issue right now.  after long discussion with the patient she requests to not be on the methadone anymore and wishes to remain on the pain meds. We discussed that she would benefit from being on the methadone due to her history and that we will titrate her off the pain medications and understands   Ortho to order repeat MRI of the spine to evaluate post operative healing      Septic arthritis involving BL Knees/hips;  - MRI of bilateral hips and knees were done demonstrating effusions and an abscess located in the right rectus femoris.   - Bilateral knees were aspirated by orthopedic team. on 3/28/19 prelim ngtd   -bilateral hips aspirated by orthopedic team on 3/29/19 prelim ngtd but cppd crystals seen c/w psuedo gout  - as per ortho-No plan for orthopaedic operative intervention at this time.       Pseudogout: colchicine     septic shock secondary to MRSA bacteremia w/ RLL PNA, UTI, endocarditis on LIZ at [facility]uspid valve l  - resolved     Endocarditis of tricuspid valve  - status post thoracentesis on 3/20  - as per ID, will switch to vancomycin   - repeat BC negative from 3/21, repeated blood cx on 4/2/19 prelim ngtd     - repeat echo showed a pedunculated mobile mass seen adherent to lateral leaflet of the tricuspid valve w/ no interval changes noted in valve structure or regurgitation  - case discussed with  Dr. Gutierrez (CTS) who suggested to obtain CT C/A/P and LIZ ( not clinically indicated as pt stable and will not  )  -repeated echo from 4/2/19 shows stable TR vegetation      HCV- Outpatient follow up for HCV     Spinal osteo, abscess and cord compression    - CT of head and neck showed disc space narrowing and endplate irregularity at C4-5 which may represent typical degenerative change vs discitis   - MRI of the cervical spine w/ contrast showed C2-C7 discitis/osteomyelitis with a small right of midline ventral epidural abscess at C2-C7  - MRI of the thoracolumbar spine showed discitis/osteomyelitis at T11-T12 with paravertebral abscess, as well as discitis/osteomyelitis C6--T1, T9-T10, T10-T11, L4-L5, and L5-S1, and a phlegmon/early abscess dorsolaterally within the lumbar canal at the level of L4-L5. There was also a septic arthritis of  the left sternoclavicular joint space. There was also disc herniation at the L4-L5 level with contact and probable impingement of the descending right-sided nerve roots  - status post s/p C4-5 ACDF with irrigation and debridement on 3/20  - c/w gabapentin and lidocaine patch  - wound cx grew MRSA  - c/w [place] J  collar   - as per ID, patient is on dapto & Zyvox     Bacteremia due to methicillin resistant Staphylococcus aureus with IE: diskitis/OM /Spinal abscesses /Parapneumonic effusion   - most recent blood cx from 3/21/19 negative   - s/p thoracentesis : fluids neg    Moderate protein-calorie malnutrition  - c/w soft diet w/Ensure Enlive 3 x day         edema to lower extremities checked dopplers on 4/1/19 and no dvt    started dvt prophylaxis with Lovenox per my d/w orthopedic . on 4/1/19   prealbumin is low c/w moderate malnutrition    give lasix     IVDrug abuse  - c/w methadone for opiate abuse   - case discussed with psych and will try to wean off oxycodone and increase methadone   - - cont celebrex, gabapentin, oxycodone, Oxycontin to 40 mg bid    Anemia due multiple issues, surgery , blood draws,   poor nutrition and infection  - status post a few units of blood during this extensive hospital stay last one on 3/20/19  continue to monitor hgb , hgb stable     - c/w folic acid   - constipation: supportively pt takes pain meds rtc. kub compatible with constipation  senna colace miralax  will add dulcolax

## 2019-04-11 NOTE — PROGRESS NOTE ADULT - PROBLEM SELECTOR PLAN 1
s/p C4-5 ACDF with irrigation and debridement   OR c/s MRSA   cont Vanco 1 gm IV q 8hrs  last levels 19.7  cr stable  will make sure levels optimum   awaiting placement to rehab(wont get placement with Dapto ) and no evidence of new infection   No new bacteremia since the dikitis/ OM/abscess as well as possible hip/knee septic arthritis  joint c.s neg  D/C zyvox as well

## 2019-04-11 NOTE — PROGRESS NOTE ADULT - SUBJECTIVE AND OBJECTIVE BOX
Patient is a 43y old  Female who presents with a chief complaint of AMS (31 Mar 2019 07:49)      OVERNIGHT EVENTS:  none.       MEDICATIONS  (STANDING):  ascorbic acid 500 milliGRAM(s) Oral two times a day  calcium carbonate 1250 mG  + Vitamin D (OsCal 500 + D) 1 Tablet(s) Oral three times a day  celecoxib 200 milliGRAM(s) Oral daily  chlorhexidine 4% Liquid 1 Application(s) Topical <User Schedule>  colchicine 0.6 milliGRAM(s) Oral daily  docusate sodium 100 milliGRAM(s) Oral three times a day  enoxaparin Injectable 40 milliGRAM(s) SubCutaneous daily  folic acid 1 milliGRAM(s) Oral daily  gabapentin 800 milliGRAM(s) Oral three times a day  lactobacillus acidophilus 1 Tablet(s) Oral two times a day with meals  lidocaine   Patch 1 Patch Transdermal every 24 hours  lidocaine   Patch 1 Patch Transdermal every 24 hours  methadone    Tablet 50 milliGRAM(s) Oral daily  multivitamin 1 Tablet(s) Oral daily  nicotine -  14 mG/24Hr(s) Patch 1 patch Transdermal daily  nystatin Powder 1 Application(s) Topical three times a day  oxyCODONE  ER Tablet 40 milliGRAM(s) Oral every 12 hours  pantoprazole    Tablet 40 milliGRAM(s) Oral before breakfast  polyethylene glycol 3350 17 Gram(s) Oral two times a day  senna 2 Tablet(s) Oral at bedtime  vancomycin  IVPB      vancomycin  IVPB 1000 milliGRAM(s) IV Intermittent every 8 hours    MEDICATIONS  (PRN):  acetaminophen   Tablet .. 650 milliGRAM(s) Oral every 6 hours PRN Mild Pain (1 - 3)  acetaminophen   Tablet .. 650 milliGRAM(s) Oral every 6 hours PRN Temp greater or equal to 38C (100.4F)  diphenhydrAMINE 25 milliGRAM(s) Oral every 4 hours PRN Rash and/or Itching  oxyCODONE    IR 20 milliGRAM(s) Oral every 6 hours PRN Moderate Pain (4 - 6)        Vital Signs Last 24 Hrs  T(C): 36.4 (11 Apr 2019 11:56), Max: 37.3 (10 Apr 2019 18:15)  T(F): 97.5 (11 Apr 2019 11:56), Max: 99.2 (10 Apr 2019 18:15)  HR: 65 (11 Apr 2019 11:56) (65 - 91)  BP: 111/66 (11 Apr 2019 11:56) (100/60 - 114/60)  BP(mean): --  RR: 18 (11 Apr 2019 11:56) (16 - 18)  SpO2: 100% (11 Apr 2019 11:56) (97% - 100%)      PHYSICAL EXAM:  GENERAL: in chair in nad  well-groomed, well-developed  HEAD:  Atraumatic, Normocephalic  EYES: EOMI, PERRLA, conjunctiva and sclera clear  ENMT: No tonsillar erythema, exudates, or enlargement; Moist mucous membranes, Good dentition, No lesions, cervical collar in place   NECK: Supple, No JVD, Normal thyroid  NERVOUS SYSTEM:  Alert & Oriented X3, Good concentration; Motor Strength 4/5 B/L upper and lower extremities;  CHEST/LUNG: Clear to percussion bilaterally; No rales, rhonchi, wheezing, or rubs BL  HEART: Regular rate and rhythm; No murmurs, rubs, or gallops  ABDOMEN: Soft, mild non specific tenderness Nondistended; Bowel sounds present  EXTREMITIES:  2+ Peripheral Pulses, No clubbing, cyanosis, ++ edema to lower extremities LE,   BL Hip/knees TTP, R knee swollen  SKIN: No rashes or lesions      LABS:                                                     8.2    6.63  )-----------( 339      ( 10 Apr 2019 12:32 )             26.7     04-10    140  |  102  |  18  ----------------------------<  119<H>  4.4   |  30  |  0.49<L>    Ca    9.2      10 Apr 2019 12:32                Consultant(s) Notes Reviewed:  [x ] YES     Care Discussed with [ x] Consultants [X ] Patient [ ] Family  [x ]    [x ]  Other; RN

## 2019-04-12 LAB
ANION GAP SERPL CALC-SCNC: 9 MMOL/L — SIGNIFICANT CHANGE UP (ref 5–17)
BUN SERPL-MCNC: 23 MG/DL — SIGNIFICANT CHANGE UP (ref 7–23)
CALCIUM SERPL-MCNC: 9.6 MG/DL — SIGNIFICANT CHANGE UP (ref 8.5–10.1)
CHLORIDE SERPL-SCNC: 98 MMOL/L — SIGNIFICANT CHANGE UP (ref 96–108)
CO2 SERPL-SCNC: 31 MMOL/L — SIGNIFICANT CHANGE UP (ref 22–31)
CREAT SERPL-MCNC: 0.48 MG/DL — LOW (ref 0.5–1.3)
CULTURE RESULTS: SIGNIFICANT CHANGE UP
CULTURE RESULTS: SIGNIFICANT CHANGE UP
GLUCOSE SERPL-MCNC: 127 MG/DL — HIGH (ref 70–99)
GRAM STN FLD: SIGNIFICANT CHANGE UP
GRAM STN FLD: SIGNIFICANT CHANGE UP
HCT VFR BLD CALC: 29 % — LOW (ref 34.5–45)
HGB BLD-MCNC: 8.7 G/DL — LOW (ref 11.5–15.5)
MCHC RBC-ENTMCNC: 25.4 PG — LOW (ref 27–34)
MCHC RBC-ENTMCNC: 30 GM/DL — LOW (ref 32–36)
MCV RBC AUTO: 84.8 FL — SIGNIFICANT CHANGE UP (ref 80–100)
NRBC # BLD: 0 /100 WBCS — SIGNIFICANT CHANGE UP (ref 0–0)
PLATELET # BLD AUTO: 364 K/UL — SIGNIFICANT CHANGE UP (ref 150–400)
POTASSIUM SERPL-MCNC: 4.4 MMOL/L — SIGNIFICANT CHANGE UP (ref 3.5–5.3)
POTASSIUM SERPL-SCNC: 4.4 MMOL/L — SIGNIFICANT CHANGE UP (ref 3.5–5.3)
RBC # BLD: 3.42 M/UL — LOW (ref 3.8–5.2)
RBC # FLD: 18.5 % — HIGH (ref 10.3–14.5)
SODIUM SERPL-SCNC: 138 MMOL/L — SIGNIFICANT CHANGE UP (ref 135–145)
SPECIMEN SOURCE: SIGNIFICANT CHANGE UP
SPECIMEN SOURCE: SIGNIFICANT CHANGE UP
WBC # BLD: 7.46 K/UL — SIGNIFICANT CHANGE UP (ref 3.8–10.5)
WBC # FLD AUTO: 7.46 K/UL — SIGNIFICANT CHANGE UP (ref 3.8–10.5)

## 2019-04-12 PROCEDURE — 99233 SBSQ HOSP IP/OBS HIGH 50: CPT

## 2019-04-12 RX ORDER — METHADONE HYDROCHLORIDE 40 MG/1
35 TABLET ORAL DAILY
Qty: 0 | Refills: 0 | Status: DISCONTINUED | OUTPATIENT
Start: 2019-04-13 | End: 2019-04-13

## 2019-04-12 RX ORDER — CYCLOBENZAPRINE HYDROCHLORIDE 10 MG/1
10 TABLET, FILM COATED ORAL THREE TIMES A DAY
Qty: 0 | Refills: 0 | Status: DISCONTINUED | OUTPATIENT
Start: 2019-04-12 | End: 2019-05-08

## 2019-04-12 RX ADMIN — Medication 500 MILLIGRAM(S): at 06:04

## 2019-04-12 RX ADMIN — NYSTATIN CREAM 1 APPLICATION(S): 100000 CREAM TOPICAL at 06:03

## 2019-04-12 RX ADMIN — Medication 1 MILLIGRAM(S): at 12:24

## 2019-04-12 RX ADMIN — Medication 650 MILLIGRAM(S): at 13:32

## 2019-04-12 RX ADMIN — NYSTATIN CREAM 1 APPLICATION(S): 100000 CREAM TOPICAL at 14:52

## 2019-04-12 RX ADMIN — LIDOCAINE 1 PATCH: 4 CREAM TOPICAL at 22:38

## 2019-04-12 RX ADMIN — CYCLOBENZAPRINE HYDROCHLORIDE 10 MILLIGRAM(S): 10 TABLET, FILM COATED ORAL at 12:44

## 2019-04-12 RX ADMIN — SENNA PLUS 2 TABLET(S): 8.6 TABLET ORAL at 22:31

## 2019-04-12 RX ADMIN — OXYCODONE HYDROCHLORIDE 40 MILLIGRAM(S): 5 TABLET ORAL at 18:29

## 2019-04-12 RX ADMIN — Medication 1 PATCH: at 08:44

## 2019-04-12 RX ADMIN — OXYCODONE HYDROCHLORIDE 20 MILLIGRAM(S): 5 TABLET ORAL at 08:42

## 2019-04-12 RX ADMIN — NYSTATIN CREAM 1 APPLICATION(S): 100000 CREAM TOPICAL at 22:57

## 2019-04-12 RX ADMIN — Medication 1 PATCH: at 12:48

## 2019-04-12 RX ADMIN — OXYCODONE HYDROCHLORIDE 20 MILLIGRAM(S): 5 TABLET ORAL at 14:50

## 2019-04-12 RX ADMIN — Medication 650 MILLIGRAM(S): at 12:49

## 2019-04-12 RX ADMIN — Medication 1 PATCH: at 12:25

## 2019-04-12 RX ADMIN — Medication 10 MILLIGRAM(S): at 12:41

## 2019-04-12 RX ADMIN — CELECOXIB 200 MILLIGRAM(S): 200 CAPSULE ORAL at 12:23

## 2019-04-12 RX ADMIN — Medication 150 MILLIGRAM(S): at 14:52

## 2019-04-12 RX ADMIN — CYCLOBENZAPRINE HYDROCHLORIDE 10 MILLIGRAM(S): 10 TABLET, FILM COATED ORAL at 19:29

## 2019-04-12 RX ADMIN — ENOXAPARIN SODIUM 40 MILLIGRAM(S): 100 INJECTION SUBCUTANEOUS at 12:24

## 2019-04-12 RX ADMIN — OXYCODONE HYDROCHLORIDE 20 MILLIGRAM(S): 5 TABLET ORAL at 09:30

## 2019-04-12 RX ADMIN — OXYCODONE HYDROCHLORIDE 40 MILLIGRAM(S): 5 TABLET ORAL at 06:16

## 2019-04-12 RX ADMIN — LIDOCAINE 1 PATCH: 4 CREAM TOPICAL at 12:48

## 2019-04-12 RX ADMIN — OXYCODONE HYDROCHLORIDE 40 MILLIGRAM(S): 5 TABLET ORAL at 06:03

## 2019-04-12 RX ADMIN — Medication 1 TABLET(S): at 08:42

## 2019-04-12 RX ADMIN — LIDOCAINE 1 PATCH: 4 CREAM TOPICAL at 00:31

## 2019-04-12 RX ADMIN — POLYETHYLENE GLYCOL 3350 17 GRAM(S): 17 POWDER, FOR SOLUTION ORAL at 18:30

## 2019-04-12 RX ADMIN — GABAPENTIN 800 MILLIGRAM(S): 400 CAPSULE ORAL at 14:51

## 2019-04-12 RX ADMIN — Medication 650 MILLIGRAM(S): at 04:06

## 2019-04-12 RX ADMIN — Medication 1 TABLET(S): at 22:31

## 2019-04-12 RX ADMIN — Medication 100 MILLIGRAM(S): at 22:31

## 2019-04-12 RX ADMIN — Medication 100 MILLIGRAM(S): at 06:04

## 2019-04-12 RX ADMIN — OXYCODONE HYDROCHLORIDE 20 MILLIGRAM(S): 5 TABLET ORAL at 23:47

## 2019-04-12 RX ADMIN — Medication 1 PATCH: at 19:58

## 2019-04-12 RX ADMIN — Medication 100 MILLIGRAM(S): at 14:53

## 2019-04-12 RX ADMIN — LIDOCAINE 1 PATCH: 4 CREAM TOPICAL at 08:44

## 2019-04-12 RX ADMIN — Medication 1 TABLET(S): at 06:04

## 2019-04-12 RX ADMIN — GABAPENTIN 800 MILLIGRAM(S): 400 CAPSULE ORAL at 06:04

## 2019-04-12 RX ADMIN — OXYCODONE HYDROCHLORIDE 20 MILLIGRAM(S): 5 TABLET ORAL at 15:34

## 2019-04-12 RX ADMIN — Medication 150 MILLIGRAM(S): at 22:30

## 2019-04-12 RX ADMIN — CELECOXIB 200 MILLIGRAM(S): 200 CAPSULE ORAL at 12:58

## 2019-04-12 RX ADMIN — OXYCODONE HYDROCHLORIDE 40 MILLIGRAM(S): 5 TABLET ORAL at 19:00

## 2019-04-12 RX ADMIN — Medication 1 TABLET(S): at 18:30

## 2019-04-12 RX ADMIN — Medication 150 MILLIGRAM(S): at 06:11

## 2019-04-12 RX ADMIN — Medication 500 MILLIGRAM(S): at 18:30

## 2019-04-12 RX ADMIN — Medication 1 TABLET(S): at 12:24

## 2019-04-12 RX ADMIN — POLYETHYLENE GLYCOL 3350 17 GRAM(S): 17 POWDER, FOR SOLUTION ORAL at 06:14

## 2019-04-12 RX ADMIN — Medication 1 TABLET(S): at 14:50

## 2019-04-12 RX ADMIN — PANTOPRAZOLE SODIUM 40 MILLIGRAM(S): 20 TABLET, DELAYED RELEASE ORAL at 06:04

## 2019-04-12 RX ADMIN — Medication 0.6 MILLIGRAM(S): at 12:45

## 2019-04-12 RX ADMIN — GABAPENTIN 800 MILLIGRAM(S): 400 CAPSULE ORAL at 22:31

## 2019-04-12 RX ADMIN — METHADONE HYDROCHLORIDE 40 MILLIGRAM(S): 40 TABLET ORAL at 12:39

## 2019-04-12 RX ADMIN — CHLORHEXIDINE GLUCONATE 1 APPLICATION(S): 213 SOLUTION TOPICAL at 06:03

## 2019-04-12 NOTE — PROGRESS NOTE BEHAVIORAL HEALTH - SUMMARY
44 yo female with severe Opiate (IV heroin) Dependence requiring Maintenance Therapy with acute pain needing pain management. Agreeable to be tapered off of the methadone at this time to increase chances of placement. Methadone dose decrease as per Primary Team's decision (5mg q2days or 10mg q2days while holding the same standing Oxycodone ER 40mg PO q12hrs and Oxycodone IR 20mg q4hrs prn).  PLAN:  - patient is otherwise psychiatrically cleared for discharge/transfer
42 yo female with severe Opiate (IV heroin) Dependence requiring Maintenance Therapy with acute pain needing pain management. As Patient's oxycodone is tapered down, her methadone dose can be increased/adjusted to accommodate. As the degree of addiction increases in a patient, Methadone doses may reach those used for heroin-addicted patients with chronic pain in the range of  mg/day.   PLAN:  - started PRN Oxycodone IR reduction from q4hrs PRN (TTD 120mg) to q6hrs PRN (TTD 80mg) (with simultaneous methadone increase from 40mg PO qd to 50mg PO qd with goal for methadone monotherapy yesterday, but Patient at this time would like to hold the plan and not make any more changes
44 yo female with severe Opiate (IV heroin) Dependence requiring Maintenance Therapy with acute pain needing pain management. As Patient's oxycodone is tapered down, her methadone dose can be increased/adjusted to accommodate. As the degree of addiction increases in a patient, Methadone doses may reach those used for heroin-addicted patients with chronic pain in the range of  mg/day.   PLAN:  - started PRN Oxycodone IR reduction from q4hrs PRN (TTD 120mg) to q6hrs PRN (TTD 80mg) (with simultaneous methadone increase from 40mg PO qd to 50mg PO qd with goal for methadone monotherapy

## 2019-04-12 NOTE — PROGRESS NOTE ADULT - SUBJECTIVE AND OBJECTIVE BOX
Pt seen and examined at bedside. C/o  mid back pain which pt states is always there but can worsen at times, otherwise no other pain complaints, Denies CP/Dyspnea/Calf tenderness bilaterally. Hip and knee pain improving and minimal compared to prior exams but ambulating well with PT and is using cane now. Expressed to pt and encourage ambulating further with PT and walking around room to decrease swelling.     Vital Signs Last 24 Hrs  T(C): 36.3 (12 Apr 2019 05:16), Max: 36.8 (11 Apr 2019 23:35)  T(F): 97.3 (12 Apr 2019 05:16), Max: 98.2 (11 Apr 2019 23:35)  HR: 94 (12 Apr 2019 05:16) (65 - 95)  BP: 135/81 (12 Apr 2019 05:16) (105/52 - 135/81)  BP(mean): --  RR: 18 (12 Apr 2019 05:16) (16 - 18)  SpO2: 100% (12 Apr 2019 05:16) (96% - 100%)      Gen: NAD, in [place] J collar, resting comfortably in bed  No pain with AROM of BL Hip/Knee, no signs of erythema or worsening effusion of B/L Knees. No pain with palpation of b/l hip and knee this morning.    Spine PE:  Skin intact  severe TTP along lower thoracic and upper lumbar spine  [place] J Collar In Place  Negative clonus  Negative babinski  Negative olmstead           Deltoid        Bicep        Tricep          Wrist Ext    Wrist Flex    Finger Flex          Finger Abd  L            4/5         4/5           4/5             4/5                4/5	           4/5                    4/5  R            3/5 	   3+/5           4+/5             4+/5              4+/5               5/5                   5/5                                      Hip Flex       Quad     Ankle DF        Ankle PF       Toe Ext        Hamstring    L            5/5              5/5          5/5                 5/5                 5/5	                5/5  R            5/5              5/5           5/5                 5/5                 5/5                5/5    Sensory:            C5         C6         C7      C8       T1        (0=absent, 1=impaired, 2=normal, NT=not testable)  L         2            2           2        2         2  R          2            2           2        2         2               L2          L3         L4      L5       S1         (0=absent, 1=impaired, 2=normal, NT=not testable)  L         2            2            2        2        2  R          2            2           2        2         2    B/L hand numbness which has been present since admission

## 2019-04-12 NOTE — PROGRESS NOTE BEHAVIORAL HEALTH - SECONDARY DX1
Alcohol use disorder, moderate, in controlled environment

## 2019-04-12 NOTE — PROGRESS NOTE ADULT - SUBJECTIVE AND OBJECTIVE BOX
Patient is a 43y old  Female who presents with a chief complaint of AMS (31 Mar 2019 07:49)      OVERNIGHT EVENTS:  none.       MEDICATIONS  (STANDING):  ascorbic acid 500 milliGRAM(s) Oral two times a day  bisacodyl Suppository 10 milliGRAM(s) Rectal daily  calcium carbonate 1250 mG  + Vitamin D (OsCal 500 + D) 1 Tablet(s) Oral three times a day  celecoxib 200 milliGRAM(s) Oral daily  chlorhexidine 4% Liquid 1 Application(s) Topical <User Schedule>  colchicine 0.6 milliGRAM(s) Oral daily  docusate sodium 100 milliGRAM(s) Oral three times a day  enoxaparin Injectable 40 milliGRAM(s) SubCutaneous daily  folic acid 1 milliGRAM(s) Oral daily  gabapentin 800 milliGRAM(s) Oral three times a day  lactobacillus acidophilus 1 Tablet(s) Oral two times a day with meals  lidocaine   Patch 1 Patch Transdermal every 24 hours  lidocaine   Patch 1 Patch Transdermal every 24 hours  methadone    Tablet 35 milliGRAM(s) Oral daily  multivitamin 1 Tablet(s) Oral daily  nicotine -  14 mG/24Hr(s) Patch 1 patch Transdermal daily  nystatin Powder 1 Application(s) Topical three times a day  oxyCODONE  ER Tablet 40 milliGRAM(s) Oral every 12 hours  pantoprazole    Tablet 40 milliGRAM(s) Oral before breakfast  polyethylene glycol 3350 17 Gram(s) Oral two times a day  senna 2 Tablet(s) Oral at bedtime  vancomycin  IVPB      vancomycin  IVPB 1000 milliGRAM(s) IV Intermittent every 8 hours    MEDICATIONS  (PRN):  acetaminophen   Tablet .. 650 milliGRAM(s) Oral every 6 hours PRN Mild Pain (1 - 3)  acetaminophen   Tablet .. 650 milliGRAM(s) Oral every 6 hours PRN Temp greater or equal to 38C (100.4F)  cyclobenzaprine 10 milliGRAM(s) Oral three times a day PRN Muscle Spasm  diphenhydrAMINE 25 milliGRAM(s) Oral every 4 hours PRN Rash and/or Itching  oxyCODONE    IR 20 milliGRAM(s) Oral every 6 hours PRN Moderate Pain (4 - 6)      Vital Signs Last 24 Hrs  T(C): 37.1 (12 Apr 2019 11:25), Max: 37.1 (12 Apr 2019 11:25)  T(F): 98.8 (12 Apr 2019 11:25), Max: 98.8 (12 Apr 2019 11:25)  HR: 90 (12 Apr 2019 14:36) (87 - 95)  BP: 105/56 (12 Apr 2019 11:25) (105/52 - 135/81)  BP(mean): --  RR: 17 (12 Apr 2019 14:36) (16 - 18)  SpO2: 97% (12 Apr 2019 14:36) (96% - 100%)      PHYSICAL EXAM:  GENERAL: in chair in nad  well-groomed, well-developed  HEAD:  Atraumatic, Normocephalic  EYES: EOMI, PERRLA, conjunctiva and sclera clear  ENMT: No tonsillar erythema, exudates, or enlargement; Moist mucous membranes, Good dentition, No lesions, cervical collar in place   NECK: Supple, No JVD, Normal thyroid  NERVOUS SYSTEM:  Alert & Oriented X3, Good concentration; Motor Strength 4/5 B/L upper and lower extremities;  CHEST/LUNG: Clear to percussion bilaterally; No rales, rhonchi, wheezing, or rubs BL  HEART: Regular rate and rhythm; No murmurs, rubs, or gallops  ABDOMEN: Soft, mild non specific tenderness Nondistended; Bowel sounds present  EXTREMITIES:  2+ Peripheral Pulses, No clubbing, cyanosis, ++ edema to lower extremities LE,   BL Hip/knees TTP, R knee swollen  SKIN: No rashes or lesions      LABS:                                                                8.7    7.46  )-----------( 364      ( 12 Apr 2019 10:02 )             29.0     04-12    138  |  98  |  23  ----------------------------<  127<H>  4.4   |  31  |  0.48<L>    Ca    9.6      12 Apr 2019 10:02                        Consultant(s) Notes Reviewed:  [x ] YES     Care Discussed with [ x] Consultants [X ] Patient [ ] Family  [x ]    [x ]  Other; RN

## 2019-04-12 NOTE — PROGRESS NOTE BEHAVIORAL HEALTH - NSBHCHARTREVIEWVS_PSY_A_CORE FT
Heart Rate Heart Rate (beats/min): 92 /min; /60; SpO2 (%) SpO2 (%): 99 %
Heart Rate Heart Rate (beats/min): 89 /min; /45; SpO2 (%) SpO2 (%): 99 %
Temp (F): 98.8: HR 95; /56; RR 16 /min; SpO2 (%) SpO2 (%): 98 %

## 2019-04-12 NOTE — PROGRESS NOTE BEHAVIORAL HEALTH - NSBHFUPINTERVALHXFT_PSY_A_CORE
Patient started on slow weaning off of the Oxycodone IR PRN with concurrent dose increase to her methadone with main goal is to attempt monotherapy (ie methadone) for both her pain management and opiate dependence.
Chart, labs, interval events reviewed. Patient agreed to be tapered off of the methadone and continued on the oxycodone IR and ER so she would be able to be accepted in a rehab. Writer recommended 10mg dose increase every 2 days; Patient asked for 5mg decrease every 2 days. Ultimate decision as per Primary team; this was also explained to patient. Otherwise, same clinical presentation with no acute psychiatric signs / symptoms. Denies suicidal/homicidal ideation, intent, plan - hopeful for recovery, strong family support - Pt's mother comes to visit and helps wash patient etc.
Patient at this time would like to hold off on further cross-tapering from Oxycodone to methadone as she would like to wait to see what the repeat MRI will show and moreover, she feels like the methadone is not as an effective pain medication for her as the Oxycodone. Patient understands risks/benefits/alternatives of chronic opiate use and has capacity to make her decision. Same clinical presentation otherwise.

## 2019-04-12 NOTE — PROGRESS NOTE BEHAVIORAL HEALTH - PRIMARY DX
Heroin use disorder, severe, in controlled environment

## 2019-04-12 NOTE — PROGRESS NOTE ADULT - ASSESSMENT
A/P: 43y Female s/p C4-5 ACDF with irrigation and debridement, now POD 23    Will order repeat MRI w/wo contrast 4/16 of Axial spine to look treatment efficacy   Continue to decrease narcotics and give methadone/NSAIDs etc.  Pt will be finishing course of abx for a long period in house, we have the opportunity to decrease all narcotic burden during hospital stay, recommend Psych c/s to aid  All cultures NGF, no fevers  Heart vegetation management per cardiology/ID, which are recommending no further management   Continue IV Abx per ID Team/Dr. Garcia  DVT PPx: SCDs/Lovenox per primary  PT/OT: WBAT, maintain [place] J Collar at all times, LSO to be worn when ambulating  Further recs of LBP with MRI, pain at level of OM/discitis which is likely causing pain  No plan for return to OR for epidural abscesses or knee/hip washout at this time, will continue to monitor for improvement or RUE weakness which has been present since before surgery  Incentive Spirometry, OOB  Dry dressing changes as needed.   No further Orthopaedic surgical intervention at this time. Will continue to monitor and see patient Monday/Wednesday/Friday. Will advise if plan changes

## 2019-04-12 NOTE — PROGRESS NOTE ADULT - ASSESSMENT
43 F w/ history of IVDA, alcohol abuse, hx of pancreatitis, alcohol hepatitis, alcohol withdrawal, left frontoparietal subdural hematoma 2008 without need for neurosurgical intervention presented with severe sepsis with septic shock secondary to MRSA bacteremia w/ RLL PNA, UTI, endocarditis on LIZ and EFRAIN that has resolved and was diagnosis w/ HCV. Pt was initially intubated due to respiratory failure and put on pressors in ICU. She as extubated on 2/25 and transferred from ICU the following day. Pt had a C4-5 ACDF with irrigation and debridement on 3/20 due to spinal abscess and osteo and was kept intubated post procedure and transferred again to ICU, where she was extubated on 3/21 and transferred back to the med service the following day.       pt remains febrile and daptomycin was changed to vancomycin. Pt stable for discharge to rehab for long term antibiotics, but finding an accepting facility is an issue right now.  after long discussion with the patient she requests to not be on the methadone anymore and wishes to remain on the pain meds. We discussed that she would benefit from being on the methadone due to her history and that we will titrate her off the pain medications and understands   Ortho to consider  repeating  MRI of the spine to evaluate post operative healing  PT ambulating well with walker         Septic arthritis involving BL Knees/hips;  - MRI of bilateral hips and knees were done demonstrating effusions and an abscess located in the right rectus femoris.   - Bilateral knees were aspirated by orthopedic team. on 3/28/19 prelim ngtd   -bilateral hips aspirated by orthopedic team on 3/29/19 prelim ngtd but cppd crystals seen c/w psuedo gout  - as per ortho-No plan for orthopaedic operative intervention at this time.       Pseudogout: colchicine     septic shock secondary to MRSA bacteremia w/ RLL PNA, UTI, endocarditis on LIZ at Tricuspid valve l  - resolved     Endocarditis of tricuspid valve  - status post thoracentesis on 3/20  - as per ID, will switch to vancomycin   - repeat BC negative from 3/21, repeated blood cx on 4/2/19 prelim ngtd     - repeat echo showed a pedunculated mobile mass seen adherent to lateral leaflet of the tricuspid valve w/ no interval changes noted in valve structure or regurgitation  - case discussed with  Dr. Gutierrez (CTS) who suggested to obtain CT C/A/P and LIZ ( not clinically indicated as pt stable and will not  )  -repeated echo from 4/2/19 shows stable TR vegetation      HCV- Outpatient follow up for HCV     Spinal osteo, abscess and cord compression    - CT of head and neck showed disc space narrowing and endplate irregularity at C4-5 which may represent typical degenerative change vs discitis   - MRI of the cervical spine w/ contrast showed C2-C7 discitis/osteomyelitis with a small right of midline ventral epidural abscess at C2-C7  - MRI of the thoracolumbar spine showed discitis/osteomyelitis at T11-T12 with paravertebral abscess, as well as discitis/osteomyelitis C6--T1, T9-T10, T10-T11, L4-L5, and L5-S1, and a phlegmon/early abscess dorsolaterally within the lumbar canal at the level of L4-L5. There was also a septic arthritis of  the left sternoclavicular joint space. There was also disc herniation at the L4-L5 level with contact and probable impingement of the descending right-sided nerve roots  - status post s/p C4-5 ACDF with irrigation and debridement on 3/20  - c/w gabapentin and lidocaine patch  - wound cx grew MRSA  - c/w [place] J  collar   - as per ID, patient is on dapto & Zyvox     Bacteremia due to methicillin resistant Staphylococcus aureus with IE: diskitis/OM /Spinal abscesses /Parapneumonic effusion   - most recent blood cx from 3/21/19 negative   - s/p thoracentesis : fluids neg    Moderate protein-calorie malnutrition  - c/w soft diet w/Ensure Enlive 3 x day         edema to lower extremities checked dopplers on 4/1/19 and no dvt    started dvt prophylaxis with Lovenox per my d/w orthopedic . on 4/1/19   prealbumin is low c/w moderate malnutrition    give lasix prn     Anemia due multiple issues, surgery , poor nutrition and infection  - status post a few units of blood during this extensive hospital stay last one on 3/20/19   hgb stable   - c/w folic acid      constipation:  senna colace miralax dulcolax

## 2019-04-12 NOTE — PROGRESS NOTE BEHAVIORAL HEALTH - NSBHCONSULTFOLLOWAFTERCARE_PSY_A_CORE FT
awaiting acceptance to [facility]/facility to finish to IV antibiotics treatment

## 2019-04-13 LAB
ANION GAP SERPL CALC-SCNC: 10 MMOL/L — SIGNIFICANT CHANGE UP (ref 5–17)
BUN SERPL-MCNC: 26 MG/DL — HIGH (ref 7–23)
CALCIUM SERPL-MCNC: 10.1 MG/DL — SIGNIFICANT CHANGE UP (ref 8.5–10.1)
CHLORIDE SERPL-SCNC: 98 MMOL/L — SIGNIFICANT CHANGE UP (ref 96–108)
CO2 SERPL-SCNC: 29 MMOL/L — SIGNIFICANT CHANGE UP (ref 22–31)
CREAT SERPL-MCNC: 0.55 MG/DL — SIGNIFICANT CHANGE UP (ref 0.5–1.3)
GLUCOSE SERPL-MCNC: 108 MG/DL — HIGH (ref 70–99)
HCT VFR BLD CALC: 29.5 % — LOW (ref 34.5–45)
HGB BLD-MCNC: 8.9 G/DL — LOW (ref 11.5–15.5)
MCHC RBC-ENTMCNC: 25.4 PG — LOW (ref 27–34)
MCHC RBC-ENTMCNC: 30.2 GM/DL — LOW (ref 32–36)
MCV RBC AUTO: 84 FL — SIGNIFICANT CHANGE UP (ref 80–100)
NRBC # BLD: 0 /100 WBCS — SIGNIFICANT CHANGE UP (ref 0–0)
PLATELET # BLD AUTO: 384 K/UL — SIGNIFICANT CHANGE UP (ref 150–400)
POTASSIUM SERPL-MCNC: 4.7 MMOL/L — SIGNIFICANT CHANGE UP (ref 3.5–5.3)
POTASSIUM SERPL-SCNC: 4.7 MMOL/L — SIGNIFICANT CHANGE UP (ref 3.5–5.3)
RBC # BLD: 3.51 M/UL — LOW (ref 3.8–5.2)
RBC # FLD: 18.3 % — HIGH (ref 10.3–14.5)
SODIUM SERPL-SCNC: 137 MMOL/L — SIGNIFICANT CHANGE UP (ref 135–145)
VANCOMYCIN TROUGH SERPL-MCNC: 13 UG/ML — SIGNIFICANT CHANGE UP (ref 10–20)
VANCOMYCIN TROUGH SERPL-MCNC: 25.1 UG/ML — CRITICAL HIGH (ref 10–20)
WBC # BLD: 7.16 K/UL — SIGNIFICANT CHANGE UP (ref 3.8–10.5)
WBC # FLD AUTO: 7.16 K/UL — SIGNIFICANT CHANGE UP (ref 3.8–10.5)

## 2019-04-13 PROCEDURE — 72146 MRI CHEST SPINE W/O DYE: CPT | Mod: 26

## 2019-04-13 PROCEDURE — 72148 MRI LUMBAR SPINE W/O DYE: CPT | Mod: 26

## 2019-04-13 PROCEDURE — 99233 SBSQ HOSP IP/OBS HIGH 50: CPT

## 2019-04-13 RX ORDER — METHADONE HYDROCHLORIDE 40 MG/1
30 TABLET ORAL DAILY
Qty: 0 | Refills: 0 | Status: DISCONTINUED | OUTPATIENT
Start: 2019-04-13 | End: 2019-04-17

## 2019-04-13 RX ORDER — VANCOMYCIN HCL 1 G
1000 VIAL (EA) INTRAVENOUS EVERY 12 HOURS
Qty: 0 | Refills: 0 | Status: DISCONTINUED | OUTPATIENT
Start: 2019-04-13 | End: 2019-04-15

## 2019-04-13 RX ORDER — OXYCODONE HYDROCHLORIDE 5 MG/1
20 TABLET ORAL EVERY 4 HOURS
Qty: 0 | Refills: 0 | Status: DISCONTINUED | OUTPATIENT
Start: 2019-04-13 | End: 2019-04-20

## 2019-04-13 RX ADMIN — OXYCODONE HYDROCHLORIDE 40 MILLIGRAM(S): 5 TABLET ORAL at 18:40

## 2019-04-13 RX ADMIN — OXYCODONE HYDROCHLORIDE 20 MILLIGRAM(S): 5 TABLET ORAL at 10:13

## 2019-04-13 RX ADMIN — CELECOXIB 200 MILLIGRAM(S): 200 CAPSULE ORAL at 13:18

## 2019-04-13 RX ADMIN — NYSTATIN CREAM 1 APPLICATION(S): 100000 CREAM TOPICAL at 22:35

## 2019-04-13 RX ADMIN — Medication 1 TABLET(S): at 18:40

## 2019-04-13 RX ADMIN — Medication 650 MILLIGRAM(S): at 23:46

## 2019-04-13 RX ADMIN — LIDOCAINE 1 PATCH: 4 CREAM TOPICAL at 12:08

## 2019-04-13 RX ADMIN — OXYCODONE HYDROCHLORIDE 20 MILLIGRAM(S): 5 TABLET ORAL at 20:47

## 2019-04-13 RX ADMIN — Medication 500 MILLIGRAM(S): at 06:44

## 2019-04-13 RX ADMIN — OXYCODONE HYDROCHLORIDE 20 MILLIGRAM(S): 5 TABLET ORAL at 15:27

## 2019-04-13 RX ADMIN — Medication 650 MILLIGRAM(S): at 22:46

## 2019-04-13 RX ADMIN — Medication 1 TABLET(S): at 08:44

## 2019-04-13 RX ADMIN — Medication 650 MILLIGRAM(S): at 02:59

## 2019-04-13 RX ADMIN — GABAPENTIN 800 MILLIGRAM(S): 400 CAPSULE ORAL at 14:26

## 2019-04-13 RX ADMIN — CYCLOBENZAPRINE HYDROCHLORIDE 10 MILLIGRAM(S): 10 TABLET, FILM COATED ORAL at 01:59

## 2019-04-13 RX ADMIN — Medication 1 PATCH: at 11:49

## 2019-04-13 RX ADMIN — Medication 100 MILLIGRAM(S): at 06:44

## 2019-04-13 RX ADMIN — NYSTATIN CREAM 1 APPLICATION(S): 100000 CREAM TOPICAL at 06:44

## 2019-04-13 RX ADMIN — NYSTATIN CREAM 1 APPLICATION(S): 100000 CREAM TOPICAL at 14:28

## 2019-04-13 RX ADMIN — CELECOXIB 200 MILLIGRAM(S): 200 CAPSULE ORAL at 11:49

## 2019-04-13 RX ADMIN — Medication 1 PATCH: at 12:08

## 2019-04-13 RX ADMIN — Medication 1 TABLET(S): at 22:35

## 2019-04-13 RX ADMIN — Medication 650 MILLIGRAM(S): at 01:59

## 2019-04-13 RX ADMIN — PANTOPRAZOLE SODIUM 40 MILLIGRAM(S): 20 TABLET, DELAYED RELEASE ORAL at 10:07

## 2019-04-13 RX ADMIN — Medication 1 TABLET(S): at 06:44

## 2019-04-13 RX ADMIN — POLYETHYLENE GLYCOL 3350 17 GRAM(S): 17 POWDER, FOR SOLUTION ORAL at 18:40

## 2019-04-13 RX ADMIN — OXYCODONE HYDROCHLORIDE 20 MILLIGRAM(S): 5 TABLET ORAL at 14:27

## 2019-04-13 RX ADMIN — CHLORHEXIDINE GLUCONATE 1 APPLICATION(S): 213 SOLUTION TOPICAL at 06:44

## 2019-04-13 RX ADMIN — LIDOCAINE 1 PATCH: 4 CREAM TOPICAL at 22:47

## 2019-04-13 RX ADMIN — METHADONE HYDROCHLORIDE 30 MILLIGRAM(S): 40 TABLET ORAL at 12:04

## 2019-04-13 RX ADMIN — CYCLOBENZAPRINE HYDROCHLORIDE 10 MILLIGRAM(S): 10 TABLET, FILM COATED ORAL at 22:37

## 2019-04-13 RX ADMIN — OXYCODONE HYDROCHLORIDE 20 MILLIGRAM(S): 5 TABLET ORAL at 00:47

## 2019-04-13 RX ADMIN — GABAPENTIN 800 MILLIGRAM(S): 400 CAPSULE ORAL at 22:35

## 2019-04-13 RX ADMIN — Medication 1 MILLIGRAM(S): at 11:49

## 2019-04-13 RX ADMIN — GABAPENTIN 800 MILLIGRAM(S): 400 CAPSULE ORAL at 06:44

## 2019-04-13 RX ADMIN — OXYCODONE HYDROCHLORIDE 20 MILLIGRAM(S): 5 TABLET ORAL at 21:47

## 2019-04-13 RX ADMIN — OXYCODONE HYDROCHLORIDE 40 MILLIGRAM(S): 5 TABLET ORAL at 06:45

## 2019-04-13 RX ADMIN — Medication 1 TABLET(S): at 11:49

## 2019-04-13 RX ADMIN — POLYETHYLENE GLYCOL 3350 17 GRAM(S): 17 POWDER, FOR SOLUTION ORAL at 06:44

## 2019-04-13 RX ADMIN — Medication 100 MILLIGRAM(S): at 14:26

## 2019-04-13 RX ADMIN — Medication 250 MILLIGRAM(S): at 18:41

## 2019-04-13 RX ADMIN — Medication 100 MILLIGRAM(S): at 22:35

## 2019-04-13 RX ADMIN — OXYCODONE HYDROCHLORIDE 40 MILLIGRAM(S): 5 TABLET ORAL at 19:40

## 2019-04-13 RX ADMIN — Medication 500 MILLIGRAM(S): at 18:41

## 2019-04-13 RX ADMIN — SENNA PLUS 2 TABLET(S): 8.6 TABLET ORAL at 22:35

## 2019-04-13 RX ADMIN — OXYCODONE HYDROCHLORIDE 20 MILLIGRAM(S): 5 TABLET ORAL at 11:13

## 2019-04-13 RX ADMIN — Medication 0.6 MILLIGRAM(S): at 11:48

## 2019-04-13 RX ADMIN — Medication 10 MILLIGRAM(S): at 11:51

## 2019-04-13 RX ADMIN — Medication 1 TABLET(S): at 14:26

## 2019-04-13 RX ADMIN — CYCLOBENZAPRINE HYDROCHLORIDE 10 MILLIGRAM(S): 10 TABLET, FILM COATED ORAL at 11:49

## 2019-04-13 RX ADMIN — ENOXAPARIN SODIUM 40 MILLIGRAM(S): 100 INJECTION SUBCUTANEOUS at 11:48

## 2019-04-13 NOTE — PROGRESS NOTE ADULT - SUBJECTIVE AND OBJECTIVE BOX
Patient is a 43y old  Female who presents with a chief complaint of AMS (31 Mar 2019 07:49)      OVERNIGHT EVENTS:  none.       MEDICATIONS  (STANDING):  ascorbic acid 500 milliGRAM(s) Oral two times a day  bisacodyl Suppository 10 milliGRAM(s) Rectal daily  calcium carbonate 1250 mG  + Vitamin D (OsCal 500 + D) 1 Tablet(s) Oral three times a day  celecoxib 200 milliGRAM(s) Oral daily  chlorhexidine 4% Liquid 1 Application(s) Topical <User Schedule>  colchicine 0.6 milliGRAM(s) Oral daily  docusate sodium 100 milliGRAM(s) Oral three times a day  enoxaparin Injectable 40 milliGRAM(s) SubCutaneous daily  folic acid 1 milliGRAM(s) Oral daily  gabapentin 800 milliGRAM(s) Oral three times a day  lactobacillus acidophilus 1 Tablet(s) Oral two times a day with meals  lidocaine   Patch 1 Patch Transdermal every 24 hours  lidocaine   Patch 1 Patch Transdermal every 24 hours  methadone    Tablet 30 milliGRAM(s) Oral daily  multivitamin 1 Tablet(s) Oral daily  nicotine -  14 mG/24Hr(s) Patch 1 patch Transdermal daily  nystatin Powder 1 Application(s) Topical three times a day  oxyCODONE  ER Tablet 40 milliGRAM(s) Oral every 12 hours  pantoprazole    Tablet 40 milliGRAM(s) Oral before breakfast  polyethylene glycol 3350 17 Gram(s) Oral two times a day  senna 2 Tablet(s) Oral at bedtime  vancomycin  IVPB 1000 milliGRAM(s) IV Intermittent every 12 hours    MEDICATIONS  (PRN):  acetaminophen   Tablet .. 650 milliGRAM(s) Oral every 6 hours PRN Mild Pain (1 - 3)  acetaminophen   Tablet .. 650 milliGRAM(s) Oral every 6 hours PRN Temp greater or equal to 38C (100.4F)  cyclobenzaprine 10 milliGRAM(s) Oral three times a day PRN Muscle Spasm  diphenhydrAMINE 25 milliGRAM(s) Oral every 4 hours PRN Rash and/or Itching  oxyCODONE    IR 20 milliGRAM(s) Oral every 4 hours PRN Moderate Pain (4 - 6)      Vital Signs Last 24 Hrs  T(C): 36.5 (13 Apr 2019 11:17), Max: 36.5 (13 Apr 2019 11:17)  T(F): 97.7 (13 Apr 2019 11:17), Max: 97.7 (13 Apr 2019 11:17)  HR: 96 (13 Apr 2019 11:45) (85 - 96)  BP: 127/82 (13 Apr 2019 11:45) (91/53 - 127/82)  BP(mean): --  RR: 18 (13 Apr 2019 11:45) (16 - 18)  SpO2: 98% (13 Apr 2019 11:45) (97% - 100%)      PHYSICAL EXAM:  GENERAL: in chair in nad  well-groomed, well-developed  HEAD:  Atraumatic, Normocephalic  EYES: EOMI, PERRLA, conjunctiva and sclera clear  ENMT: No tonsillar erythema, exudates, or enlargement; Moist mucous membranes, Good dentition, No lesions, cervical collar in place   NECK: Supple, No JVD, Normal thyroid  NERVOUS SYSTEM:  Alert & Oriented X3, Good concentration; Motor Strength 4/5 B/L upper and lower extremities;  CHEST/LUNG: Clear to percussion bilaterally; No rales, rhonchi, wheezing, or rubs BL  HEART: Regular rate and rhythm; No murmurs, rubs, or gallops  ABDOMEN: Soft, mild non specific tenderness Nondistended; Bowel sounds present  EXTREMITIES:  2+ Peripheral Pulses, No clubbing, cyanosis, ++ edema to lower extremities LE,   BL Hip/knees TTP, R knee swollen  SKIN: No rashes or lesions      LABS:                                                                8.9    7.16  )-----------( 384      ( 13 Apr 2019 05:26 )             29.5     04-13    137  |  98  |  26<H>  ----------------------------<  108<H>  4.7   |  29  |  0.55    Ca    10.1      13 Apr 2019 05:26                                Consultant(s) Notes Reviewed:  [x ] YES     Care Discussed with [ x] Consultants [X ] Patient [ ] Family  [x ]    [x ]  Other; RN

## 2019-04-13 NOTE — PROVIDER CONTACT NOTE (CRITICAL VALUE NOTIFICATION) - BACKGROUND
Patient admitted for sepsis due to unspecified organism
-pt admitted for sepsis  -contact isolation for bacteremia  -currently on 1000mg vancomycin every 8 hrs

## 2019-04-13 NOTE — PROVIDER CONTACT NOTE (CRITICAL VALUE NOTIFICATION) - ACTION/TREATMENT ORDERED:
-provider agreed with recommendation  -vanco to be reordered with dose adjustment -provider agreed with recommendation  -vanco to be reordered   -subsequent trough to be collected pre-administration of next vanco dose

## 2019-04-13 NOTE — PROGRESS NOTE ADULT - ASSESSMENT
43 F w/ history of IVDA, alcohol abuse, hx of pancreatitis, alcohol hepatitis, alcohol withdrawal, left frontoparietal subdural hematoma 2008 without need for neurosurgical intervention presented with severe sepsis with septic shock secondary to MRSA bacteremia w/ RLL PNA, UTI, endocarditis on LIZ and EFRAIN that has resolved and was diagnosis w/ HCV. Pt was initially intubated due to respiratory failure and put on pressors in ICU. She as extubated on 2/25 and transferred from ICU the following day. Pt had a C4-5 ACDF with irrigation and debridement on 3/20 due to spinal abscess and osteo and was kept intubated post procedure and transferred again to ICU, where she was extubated on 3/21 and transferred back to the med service the following day.       pt remains febrile and daptomycin was changed to vancomycin. Pt stable for discharge to rehab for long term antibiotics, but finding an accepting facility is an issue right now.  after long discussion with the patient she requests to not be on the methadone anymore and wishes to remain on the pain meds. We discussed that she would benefit from being on the methadone due to her history and that we will titrate her off the pain medications and understands   Ortho ordered repeating  MRI of the spine to evaluate post operative healing  PT ambulating well with walker         Septic arthritis involving BL Knees/hips;  - MRI of bilateral hips and knees were done demonstrating effusions and an abscess located in the right rectus femoris.   - Bilateral knees were aspirated by orthopedic team. on 3/28/19 prelim ngtd   -bilateral hips aspirated by orthopedic team on 3/29/19 prelim ngtd but cppd crystals seen c/w psuedo gout  - as per ortho-No plan for orthopaedic operative intervention at this time.       Pseudogout: colchicine     septic shock secondary to MRSA bacteremia w/ RLL PNA, UTI, endocarditis on LIZ at Tricuspid valve l  - resolved     Endocarditis of tricuspid valve  - status post thoracentesis on 3/20  - as per ID, will switch to vancomycin   - repeat BC negative from 3/21, repeated blood cx on 4/2/19 prelim ngtd     - repeat echo showed a pedunculated mobile mass seen adherent to lateral leaflet of the tricuspid valve w/ no interval changes noted in valve structure or regurgitation  - case discussed with  Dr. Gutierrez ([facility]) who suggested to obtain CT C/A/P and LIZ ( not clinically indicated as pt stable and will not  )  -repeated echo from 4/2/19 shows stable TR vegetation      HCV- Outpatient follow up for HCV     Spinal osteo, abscess and cord compression    - CT of head and neck showed disc space narrowing and endplate irregularity at C4-5 which may represent typical degenerative change vs discitis   - MRI of the cervical spine w/ contrast showed C2-C7 discitis/osteomyelitis with a small right of midline ventral epidural abscess at C2-C7  - MRI of the thoracolumbar spine showed discitis/osteomyelitis at T11-T12 with paravertebral abscess, as well as discitis/osteomyelitis C6--T1, T9-T10, T10-T11, L4-L5, and L5-S1, and a phlegmon/early abscess dorsolaterally within the lumbar canal at the level of L4-L5. There was also a septic arthritis of  the left sternoclavicular joint space. There was also disc herniation at the L4-L5 level with contact and probable impingement of the descending right-sided nerve roots  - status post s/p C4-5 ACDF with irrigation and debridement on 3/20  - c/w gabapentin and lidocaine patch  - wound cx grew MRSA  - c/w [place] J  collar     Bacteremia due to methicillin resistant Staphylococcus aureus with IE: diskitis/OM /Spinal abscesses /Parapneumonic effusion   - most recent blood cx from 3/21/19 negative   - s/p thoracentesis : fluids neg    Moderate protein-calorie malnutrition  - c/w soft diet w/Ensure Enlive 3 x day         edema to lower extremities checked dopplers on 4/1/19 and no dvt    started dvt prophylaxis with Lovenox per my d/w orthopedic . on 4/1/19   prealbumin is low c/w moderate malnutrition    give lasix prn     Anemia due multiple issues, surgery , poor nutrition and infection  - status post a few units of blood during this extensive hospital stay last one on 3/20/19   hgb stable   - c/w folic acid      constipation:  senna colace miralax dulcolax 43 F w/ history of IVDA, alcohol abuse, hx of pancreatitis, alcohol hepatitis, alcohol withdrawal, left frontoparietal subdural hematoma 2008 without need for neurosurgical intervention presented with severe sepsis with septic shock secondary to MRSA bacteremia w/ RLL PNA, UTI, endocarditis on LIZ and EFRAIN that has resolved and was diagnosis w/ HCV. Pt was initially intubated due to respiratory failure and put on pressors in ICU. She as extubated on 2/25 and transferred from ICU the following day. Pt had a C4-5 ACDF with irrigation and debridement on 3/20 due to spinal abscess and osteo and was kept intubated post procedure and transferred again to ICU, where she was extubated on 3/21 and transferred back to the med service the following day.       pt remains febrile and daptomycin was changed to vancomycin. Pt stable for discharge to rehab for long term antibiotics, but finding an accepting facility is an issue right now.  after long discussion with the patient she requests to not be on the methadone anymore and wishes to remain on the pain meds. We discussed that she would benefit from being on the methadone due to her history and that we will titrate her off the pain medications and understands   Ortho ordered repeat MRI of the spine to evaluate post operative healing  PT ambulating well with walker     vanco adjusted today for elevated trough. BMP stable       Septic arthritis involving BL Knees/hips;  - MRI of bilateral hips and knees were done demonstrating effusions and an abscess located in the right rectus femoris.   - Bilateral knees were aspirated by orthopedic team. on 3/28/19 prelim ngtd   -bilateral hips aspirated by orthopedic team on 3/29/19 prelim ngtd but cppd crystals seen c/w psuedo gout  - as per ortho-No plan for orthopaedic operative intervention at this time.       Pseudogout: colchicine     septic shock secondary to MRSA bacteremia w/ RLL PNA, UTI, endocarditis on LIZ at Tricuspid valve l  - resolved     Endocarditis of tricuspid valve  - status post thoracentesis on 3/20  - repeat BC negative from 3/21, repeated blood cx on 4/2/19 prelim ngtd     - repeat echo showed a pedunculated mobile mass seen adherent to lateral leaflet of the tricuspid valve w/ no interval changes noted in valve structure or regurgitation  - case discussed with  Dr. Gutierrez (CTS) who suggested to obtain CT C/A/P and LIZ ( not clinically indicated as pt stable and will not  )  -repeated echo from 4/2/19 shows stable TR vegetation      HCV- Outpatient follow up for HCV     Spinal osteo, abscess and cord compression    - CT of head and neck showed disc space narrowing and endplate irregularity at C4-5 which may represent typical degenerative change vs discitis   - MRI of the cervical spine w/ contrast showed C2-C7 discitis/osteomyelitis with a small right of midline ventral epidural abscess at C2-C7  - MRI of the thoracolumbar spine showed discitis/osteomyelitis at T11-T12 with paravertebral abscess, as well as discitis/osteomyelitis C6--T1, T9-T10, T10-T11, L4-L5, and L5-S1, and a phlegmon/early abscess dorsolaterally within the lumbar canal at the level of L4-L5. There was also a septic arthritis of  the left sternoclavicular joint space. There was also disc herniation at the L4-L5 level with contact and probable impingement of the descending right-sided nerve roots  - status post s/p C4-5 ACDF with irrigation and debridement on 3/20  - c/w gabapentin and lidocaine patch  - wound cx grew MRSA  - c/w [place] J  collar     Bacteremia due to methicillin resistant Staphylococcus aureus with IE: diskitis/OM /Spinal abscesses /Parapneumonic effusion   - s/p thoracentesis- fluids neg    Moderate protein-calorie malnutrition  - c/w soft diet w/Ensure Enlive 3 x day         edema to lower extremities checked dopplers on 4/1/19 and no dvt    started dvt prophylaxis with Lovenox per my d/w orthopedic . on 4/1/19   prealbumin is low c/w moderate malnutrition    give lasix prn     Anemia due multiple issues, surgery , poor nutrition and infection  - status post a few units of blood during this extensive hospital stay last one on 3/20/19   hgb stable   - c/w folic acid      constipation:  senna colace miralax dulcolax

## 2019-04-13 NOTE — CHART NOTE - NSCHARTNOTEFT_GEN_A_CORE
Medicine Hospitalist PA    Received critical value vanco trough 25.1. 6AM vanco dose held. Repeat vanco trough in the afternoon. Vancomycin dose changed to 1g q12h. Signed out to day team to follow up trough level.

## 2019-04-14 LAB
ANION GAP SERPL CALC-SCNC: 9 MMOL/L — SIGNIFICANT CHANGE UP (ref 5–17)
BUN SERPL-MCNC: 24 MG/DL — HIGH (ref 7–23)
CALCIUM SERPL-MCNC: 9.5 MG/DL — SIGNIFICANT CHANGE UP (ref 8.5–10.1)
CHLORIDE SERPL-SCNC: 100 MMOL/L — SIGNIFICANT CHANGE UP (ref 96–108)
CO2 SERPL-SCNC: 29 MMOL/L — SIGNIFICANT CHANGE UP (ref 22–31)
CREAT SERPL-MCNC: 0.4 MG/DL — LOW (ref 0.5–1.3)
GLUCOSE SERPL-MCNC: 109 MG/DL — HIGH (ref 70–99)
POTASSIUM SERPL-MCNC: 4.1 MMOL/L — SIGNIFICANT CHANGE UP (ref 3.5–5.3)
POTASSIUM SERPL-SCNC: 4.1 MMOL/L — SIGNIFICANT CHANGE UP (ref 3.5–5.3)
SODIUM SERPL-SCNC: 138 MMOL/L — SIGNIFICANT CHANGE UP (ref 135–145)

## 2019-04-14 PROCEDURE — 99233 SBSQ HOSP IP/OBS HIGH 50: CPT

## 2019-04-14 RX ORDER — SODIUM CHLORIDE 9 MG/ML
1000 INJECTION INTRAMUSCULAR; INTRAVENOUS; SUBCUTANEOUS
Qty: 0 | Refills: 0 | Status: DISCONTINUED | OUTPATIENT
Start: 2019-04-14 | End: 2019-04-17

## 2019-04-14 RX ORDER — SODIUM CHLORIDE 9 MG/ML
1000 INJECTION INTRAMUSCULAR; INTRAVENOUS; SUBCUTANEOUS ONCE
Qty: 0 | Refills: 0 | Status: COMPLETED | OUTPATIENT
Start: 2019-04-14 | End: 2019-04-14

## 2019-04-14 RX ADMIN — CYCLOBENZAPRINE HYDROCHLORIDE 10 MILLIGRAM(S): 10 TABLET, FILM COATED ORAL at 18:06

## 2019-04-14 RX ADMIN — METHADONE HYDROCHLORIDE 30 MILLIGRAM(S): 40 TABLET ORAL at 12:03

## 2019-04-14 RX ADMIN — LIDOCAINE 1 PATCH: 4 CREAM TOPICAL at 23:21

## 2019-04-14 RX ADMIN — LIDOCAINE 1 PATCH: 4 CREAM TOPICAL at 06:53

## 2019-04-14 RX ADMIN — CYCLOBENZAPRINE HYDROCHLORIDE 10 MILLIGRAM(S): 10 TABLET, FILM COATED ORAL at 07:48

## 2019-04-14 RX ADMIN — OXYCODONE HYDROCHLORIDE 40 MILLIGRAM(S): 5 TABLET ORAL at 06:08

## 2019-04-14 RX ADMIN — Medication 1 PATCH: at 12:04

## 2019-04-14 RX ADMIN — POLYETHYLENE GLYCOL 3350 17 GRAM(S): 17 POWDER, FOR SOLUTION ORAL at 18:06

## 2019-04-14 RX ADMIN — OXYCODONE HYDROCHLORIDE 20 MILLIGRAM(S): 5 TABLET ORAL at 08:48

## 2019-04-14 RX ADMIN — Medication 100 MILLIGRAM(S): at 06:06

## 2019-04-14 RX ADMIN — OXYCODONE HYDROCHLORIDE 20 MILLIGRAM(S): 5 TABLET ORAL at 01:49

## 2019-04-14 RX ADMIN — GABAPENTIN 800 MILLIGRAM(S): 400 CAPSULE ORAL at 06:06

## 2019-04-14 RX ADMIN — LIDOCAINE 1 PATCH: 4 CREAM TOPICAL at 06:54

## 2019-04-14 RX ADMIN — Medication 1 TABLET(S): at 18:09

## 2019-04-14 RX ADMIN — NYSTATIN CREAM 1 APPLICATION(S): 100000 CREAM TOPICAL at 06:06

## 2019-04-14 RX ADMIN — OXYCODONE HYDROCHLORIDE 20 MILLIGRAM(S): 5 TABLET ORAL at 02:49

## 2019-04-14 RX ADMIN — OXYCODONE HYDROCHLORIDE 40 MILLIGRAM(S): 5 TABLET ORAL at 19:07

## 2019-04-14 RX ADMIN — Medication 650 MILLIGRAM(S): at 15:12

## 2019-04-14 RX ADMIN — OXYCODONE HYDROCHLORIDE 20 MILLIGRAM(S): 5 TABLET ORAL at 13:03

## 2019-04-14 RX ADMIN — Medication 1 PATCH: at 06:53

## 2019-04-14 RX ADMIN — SENNA PLUS 2 TABLET(S): 8.6 TABLET ORAL at 21:07

## 2019-04-14 RX ADMIN — OXYCODONE HYDROCHLORIDE 40 MILLIGRAM(S): 5 TABLET ORAL at 07:08

## 2019-04-14 RX ADMIN — Medication 250 MILLIGRAM(S): at 06:05

## 2019-04-14 RX ADMIN — CHLORHEXIDINE GLUCONATE 1 APPLICATION(S): 213 SOLUTION TOPICAL at 06:06

## 2019-04-14 RX ADMIN — Medication 500 MILLIGRAM(S): at 18:09

## 2019-04-14 RX ADMIN — ENOXAPARIN SODIUM 40 MILLIGRAM(S): 100 INJECTION SUBCUTANEOUS at 12:03

## 2019-04-14 RX ADMIN — GABAPENTIN 800 MILLIGRAM(S): 400 CAPSULE ORAL at 21:07

## 2019-04-14 RX ADMIN — Medication 0.6 MILLIGRAM(S): at 15:11

## 2019-04-14 RX ADMIN — Medication 1 TABLET(S): at 21:07

## 2019-04-14 RX ADMIN — OXYCODONE HYDROCHLORIDE 20 MILLIGRAM(S): 5 TABLET ORAL at 12:03

## 2019-04-14 RX ADMIN — OXYCODONE HYDROCHLORIDE 20 MILLIGRAM(S): 5 TABLET ORAL at 22:56

## 2019-04-14 RX ADMIN — OXYCODONE HYDROCHLORIDE 20 MILLIGRAM(S): 5 TABLET ORAL at 07:48

## 2019-04-14 RX ADMIN — Medication 1 TABLET(S): at 15:11

## 2019-04-14 RX ADMIN — Medication 100 MILLIGRAM(S): at 15:11

## 2019-04-14 RX ADMIN — NYSTATIN CREAM 1 APPLICATION(S): 100000 CREAM TOPICAL at 15:13

## 2019-04-14 RX ADMIN — SODIUM CHLORIDE 2000 MILLILITER(S): 9 INJECTION INTRAMUSCULAR; INTRAVENOUS; SUBCUTANEOUS at 18:49

## 2019-04-14 RX ADMIN — CELECOXIB 200 MILLIGRAM(S): 200 CAPSULE ORAL at 12:04

## 2019-04-14 RX ADMIN — Medication 1 MILLIGRAM(S): at 12:04

## 2019-04-14 RX ADMIN — Medication 650 MILLIGRAM(S): at 16:12

## 2019-04-14 RX ADMIN — OXYCODONE HYDROCHLORIDE 40 MILLIGRAM(S): 5 TABLET ORAL at 18:06

## 2019-04-14 RX ADMIN — Medication 1 TABLET(S): at 12:04

## 2019-04-14 RX ADMIN — Medication 250 MILLIGRAM(S): at 18:06

## 2019-04-14 RX ADMIN — Medication 1 TABLET(S): at 07:48

## 2019-04-14 RX ADMIN — Medication 1 TABLET(S): at 06:06

## 2019-04-14 RX ADMIN — GABAPENTIN 800 MILLIGRAM(S): 400 CAPSULE ORAL at 15:11

## 2019-04-14 RX ADMIN — OXYCODONE HYDROCHLORIDE 20 MILLIGRAM(S): 5 TABLET ORAL at 21:56

## 2019-04-14 RX ADMIN — Medication 500 MILLIGRAM(S): at 06:06

## 2019-04-14 RX ADMIN — Medication 100 MILLIGRAM(S): at 21:07

## 2019-04-14 RX ADMIN — PANTOPRAZOLE SODIUM 40 MILLIGRAM(S): 20 TABLET, DELAYED RELEASE ORAL at 06:06

## 2019-04-14 NOTE — PROGRESS NOTE ADULT - ASSESSMENT
43 F w/ history of IVDA, alcohol abuse, hx of pancreatitis, alcohol hepatitis, alcohol withdrawal, left frontoparietal subdural hematoma 2008 without need for neurosurgical intervention presented with severe sepsis with septic shock secondary to MRSA bacteremia w/ RLL PNA, UTI, endocarditis on LIZ and EFRAIN that has resolved and was diagnosis w/ HCV. Pt was initially intubated due to respiratory failure and put on pressors in ICU. She as extubated on 2/25 and transferred from ICU the following day. Pt had a C4-5 ACDF with irrigation and debridement on 3/20 due to spinal abscess and osteo and was kept intubated post procedure and transferred again to ICU, where she was extubated on 3/21 and transferred back to the med service the following day.       pt remains febrile and daptomycin was changed to vancomycin. Pt stable for discharge to rehab for long term antibiotics, but finding an accepting facility is an issue right now.  after long discussion with the patient she requests to not be on the methadone anymore and wishes to remain on the pain meds. We discussed that she would benefit from being on the methadone due to her history and that we will titrate her off the pain medications and understands    MRI of the spine reviewed  to evaluate post operative healing. will discuss finding with ortho. will add on mri c/spine    PT ambulating well with walker and remains afebrile   vanco adjusted today for elevated trough. BMP stable       Septic arthritis involving BL Knees/hips;  - MRI of bilateral hips and knees were done demonstrating effusions and an abscess located in the right rectus femoris.   - Bilateral knees were aspirated by orthopedic team. on 3/28/19 ngtd   -bilateral hips aspirated by orthopedic team on 3/29/19 prelim ngtd but cppd crystals seen c/w psuedo gout  - as per ortho-No plan for orthopaedic operative intervention at this time.       Pseudogout: colchicine     septic shock secondary to MRSA bacteremia w/ RLL PNA, UTI, endocarditis on LIZ at Tricuspid valve l  - resolved     Endocarditis of tricuspid valve  - status post thoracentesis on 3/20  - repeat BC negative from 3/21, repeated blood cx on 4/2/19 prelim ngtd     - repeat echo showed a pedunculated mobile mass seen adherent to lateral leaflet of the tricuspid valve w/ no interval changes noted in valve structure or regurgitation  - case discussed with  Dr. Gutierrez (CTS) who suggested to obtain CT C/A/P and LIZ ( not clinically indicated as pt stable and will not  )  -repeated echo from 4/2/19 shows stable TR vegetation      HCV- Outpatient follow up for HCV     Spinal osteo, abscess and cord compression    - CT of head and neck showed disc space narrowing and endplate irregularity at C4-5 which may represent typical degenerative change vs discitis   - MRI of the cervical spine w/ contrast showed C2-C7 discitis/osteomyelitis with a small right of midline ventral epidural abscess at C2-C7  - MRI of the thoracolumbar spine showed discitis/osteomyelitis at T11-T12 with paravertebral abscess, as well as discitis/osteomyelitis C6--T1, T9-T10, T10-T11, L4-L5, and L5-S1, and a phlegmon/early abscess dorsolaterally within the lumbar canal at the level of L4-L5. There was also a septic arthritis of  the left sternoclavicular joint space. There was also disc herniation at the L4-L5 level with contact and probable impingement of the descending right-sided nerve roots  - status post s/p C4-5 ACDF with irrigation and debridement on 3/20  - c/w gabapentin and lidocaine patch  - wound cx grew MRSA  - c/w [place] J  collar     Bacteremia due to methicillin resistant Staphylococcus aureus with IE: diskitis/OM /Spinal abscesses /Parapneumonic effusion   - s/p thoracentesis- fluids neg    Moderate protein-calorie malnutrition  - c/w soft diet w/Ensure Enlive 3 x day         edema to lower extremities checked dopplers on 4/1/19 and no dvt    started dvt prophylaxis with Lovenox per my d/w orthopedic . on 4/1/19   prealbumin is low c/w moderate malnutrition    give lasix prn     Anemia due multiple issues, surgery , poor nutrition and infection  - status post a few units of blood during this extensive hospital stay last one on 3/20/19   hgb stable   - c/w folic acid      constipation:  senna colace miralax dulcolax

## 2019-04-14 NOTE — PROGRESS NOTE ADULT - SUBJECTIVE AND OBJECTIVE BOX
Patient is a 43y old  Female who presents with a chief complaint of AMS (31 Mar 2019 07:49)      OVERNIGHT EVENTS:  none. ambulating well on RA      MEDICATIONS  (STANDING):  ascorbic acid 500 milliGRAM(s) Oral two times a day  bisacodyl Suppository 10 milliGRAM(s) Rectal daily  calcium carbonate 1250 mG  + Vitamin D (OsCal 500 + D) 1 Tablet(s) Oral three times a day  celecoxib 200 milliGRAM(s) Oral daily  chlorhexidine 4% Liquid 1 Application(s) Topical <User Schedule>  colchicine 0.6 milliGRAM(s) Oral daily  docusate sodium 100 milliGRAM(s) Oral three times a day  enoxaparin Injectable 40 milliGRAM(s) SubCutaneous daily  folic acid 1 milliGRAM(s) Oral daily  gabapentin 800 milliGRAM(s) Oral three times a day  lactobacillus acidophilus 1 Tablet(s) Oral two times a day with meals  lidocaine   Patch 1 Patch Transdermal every 24 hours  lidocaine   Patch 1 Patch Transdermal every 24 hours  methadone    Tablet 30 milliGRAM(s) Oral daily  multivitamin 1 Tablet(s) Oral daily  nicotine -  14 mG/24Hr(s) Patch 1 patch Transdermal daily  nystatin Powder 1 Application(s) Topical three times a day  oxyCODONE  ER Tablet 40 milliGRAM(s) Oral every 12 hours  pantoprazole    Tablet 40 milliGRAM(s) Oral before breakfast  polyethylene glycol 3350 17 Gram(s) Oral two times a day  senna 2 Tablet(s) Oral at bedtime  vancomycin  IVPB 1000 milliGRAM(s) IV Intermittent every 12 hours    MEDICATIONS  (PRN):  acetaminophen   Tablet .. 650 milliGRAM(s) Oral every 6 hours PRN Mild Pain (1 - 3)  acetaminophen   Tablet .. 650 milliGRAM(s) Oral every 6 hours PRN Temp greater or equal to 38C (100.4F)  cyclobenzaprine 10 milliGRAM(s) Oral three times a day PRN Muscle Spasm  diphenhydrAMINE 25 milliGRAM(s) Oral every 4 hours PRN Rash and/or Itching  oxyCODONE    IR 20 milliGRAM(s) Oral every 4 hours PRN Moderate Pain (4 - 6)      Vital Signs Last 24 Hrs  T(C): 36.9 (14 Apr 2019 11:15), Max: 37.1 (13 Apr 2019 23:36)  T(F): 98.4 (14 Apr 2019 11:15), Max: 98.7 (13 Apr 2019 23:36)  HR: 100 (14 Apr 2019 14:28) (94 - 105)  BP: 110/60 (14 Apr 2019 14:28) (100/53 - 126/75)  BP(mean): --  RR: 16 (14 Apr 2019 14:28) (16 - 18)  SpO2: 98% (14 Apr 2019 14:28) (97% - 100%)      PHYSICAL EXAM:  GENERAL: in chair in nad  well-groomed, well-developed  HEAD:  Atraumatic, Normocephalic  EYES: EOMI, PERRLA, conjunctiva and sclera clear  ENMT: No tonsillar erythema, exudates, or enlargement; Moist mucous membranes, Good dentition, No lesions, cervical collar in place   NECK: Supple, No JVD, Normal thyroid  NERVOUS SYSTEM:  Alert & Oriented X3, Good concentration; Motor Strength 4/5 B/L upper and lower extremities;  CHEST/LUNG: Clear to percussion bilaterally; No rales, rhonchi, wheezing, or rubs BL  HEART: Regular rate and rhythm; No murmurs, rubs, or gallops  ABDOMEN: Soft, mild non specific tenderness Nondistended; Bowel sounds present  EXTREMITIES:  2+ Peripheral Pulses, No clubbing, cyanosis  SKIN: No rashes or lesions      LABS:                                                                    8.9    7.16  )-----------( 384      ( 13 Apr 2019 05:26 )             29.5     04-14    138  |  100  |  24<H>  ----------------------------<  109<H>  4.1   |  29  |  0.40<L>    Ca    9.5      14 Apr 2019 08:40                                  Consultant(s) Notes Reviewed:  [x ] YES     Care Discussed with [ x] Consultants [X ] Patient [ ] Family  [x ]    [x ]  Other; RN

## 2019-04-15 LAB
BASOPHILS # BLD AUTO: 0.02 K/UL — SIGNIFICANT CHANGE UP (ref 0–0.2)
BASOPHILS NFR BLD AUTO: 0.3 % — SIGNIFICANT CHANGE UP (ref 0–2)
EOSINOPHIL # BLD AUTO: 0.35 K/UL — SIGNIFICANT CHANGE UP (ref 0–0.5)
EOSINOPHIL NFR BLD AUTO: 5.5 % — SIGNIFICANT CHANGE UP (ref 0–6)
HCT VFR BLD CALC: 30.2 % — LOW (ref 34.5–45)
HGB BLD-MCNC: 9 G/DL — LOW (ref 11.5–15.5)
IMM GRANULOCYTES NFR BLD AUTO: 0.8 % — SIGNIFICANT CHANGE UP (ref 0–1.5)
LYMPHOCYTES # BLD AUTO: 2.54 K/UL — SIGNIFICANT CHANGE UP (ref 1–3.3)
LYMPHOCYTES # BLD AUTO: 39.7 % — SIGNIFICANT CHANGE UP (ref 13–44)
MCHC RBC-ENTMCNC: 25.4 PG — LOW (ref 27–34)
MCHC RBC-ENTMCNC: 29.8 GM/DL — LOW (ref 32–36)
MCV RBC AUTO: 85.1 FL — SIGNIFICANT CHANGE UP (ref 80–100)
MONOCYTES # BLD AUTO: 0.7 K/UL — SIGNIFICANT CHANGE UP (ref 0–0.9)
MONOCYTES NFR BLD AUTO: 10.9 % — SIGNIFICANT CHANGE UP (ref 2–14)
NEUTROPHILS # BLD AUTO: 2.74 K/UL — SIGNIFICANT CHANGE UP (ref 1.8–7.4)
NEUTROPHILS NFR BLD AUTO: 42.8 % — LOW (ref 43–77)
NRBC # BLD: 0 /100 WBCS — SIGNIFICANT CHANGE UP (ref 0–0)
PLATELET # BLD AUTO: 322 K/UL — SIGNIFICANT CHANGE UP (ref 150–400)
RBC # BLD: 3.55 M/UL — LOW (ref 3.8–5.2)
RBC # FLD: 18.3 % — HIGH (ref 10.3–14.5)
VANCOMYCIN TROUGH SERPL-MCNC: 11.8 UG/ML — SIGNIFICANT CHANGE UP (ref 10–20)
VANCOMYCIN TROUGH SERPL-MCNC: 23.8 UG/ML — HIGH (ref 10–20)
WBC # BLD: 6.4 K/UL — SIGNIFICANT CHANGE UP (ref 3.8–10.5)
WBC # FLD AUTO: 6.4 K/UL — SIGNIFICANT CHANGE UP (ref 3.8–10.5)

## 2019-04-15 PROCEDURE — 99233 SBSQ HOSP IP/OBS HIGH 50: CPT

## 2019-04-15 RX ORDER — OXYCODONE HYDROCHLORIDE 5 MG/1
40 TABLET ORAL EVERY 12 HOURS
Qty: 0 | Refills: 0 | Status: DISCONTINUED | OUTPATIENT
Start: 2019-04-15 | End: 2019-04-22

## 2019-04-15 RX ORDER — VANCOMYCIN HCL 1 G
1000 VIAL (EA) INTRAVENOUS EVERY 12 HOURS
Qty: 0 | Refills: 0 | Status: DISCONTINUED | OUTPATIENT
Start: 2019-04-15 | End: 2019-04-18

## 2019-04-15 RX ADMIN — Medication 10 MILLIGRAM(S): at 11:52

## 2019-04-15 RX ADMIN — POLYETHYLENE GLYCOL 3350 17 GRAM(S): 17 POWDER, FOR SOLUTION ORAL at 18:55

## 2019-04-15 RX ADMIN — Medication 1 MILLIGRAM(S): at 11:53

## 2019-04-15 RX ADMIN — Medication 1 TABLET(S): at 08:55

## 2019-04-15 RX ADMIN — NYSTATIN CREAM 1 APPLICATION(S): 100000 CREAM TOPICAL at 16:18

## 2019-04-15 RX ADMIN — Medication 650 MILLIGRAM(S): at 08:56

## 2019-04-15 RX ADMIN — OXYCODONE HYDROCHLORIDE 20 MILLIGRAM(S): 5 TABLET ORAL at 06:09

## 2019-04-15 RX ADMIN — Medication 1 TABLET(S): at 11:50

## 2019-04-15 RX ADMIN — Medication 100 MILLIGRAM(S): at 21:38

## 2019-04-15 RX ADMIN — CELECOXIB 200 MILLIGRAM(S): 200 CAPSULE ORAL at 11:50

## 2019-04-15 RX ADMIN — Medication 1 PATCH: at 11:56

## 2019-04-15 RX ADMIN — OXYCODONE HYDROCHLORIDE 20 MILLIGRAM(S): 5 TABLET ORAL at 21:38

## 2019-04-15 RX ADMIN — Medication 650 MILLIGRAM(S): at 09:32

## 2019-04-15 RX ADMIN — PANTOPRAZOLE SODIUM 40 MILLIGRAM(S): 20 TABLET, DELAYED RELEASE ORAL at 08:55

## 2019-04-15 RX ADMIN — OXYCODONE HYDROCHLORIDE 20 MILLIGRAM(S): 5 TABLET ORAL at 06:57

## 2019-04-15 RX ADMIN — OXYCODONE HYDROCHLORIDE 20 MILLIGRAM(S): 5 TABLET ORAL at 11:51

## 2019-04-15 RX ADMIN — Medication 1 TABLET(S): at 21:39

## 2019-04-15 RX ADMIN — Medication 1 TABLET(S): at 16:17

## 2019-04-15 RX ADMIN — OXYCODONE HYDROCHLORIDE 20 MILLIGRAM(S): 5 TABLET ORAL at 02:07

## 2019-04-15 RX ADMIN — OXYCODONE HYDROCHLORIDE 40 MILLIGRAM(S): 5 TABLET ORAL at 19:30

## 2019-04-15 RX ADMIN — Medication 0.6 MILLIGRAM(S): at 11:50

## 2019-04-15 RX ADMIN — POLYETHYLENE GLYCOL 3350 17 GRAM(S): 17 POWDER, FOR SOLUTION ORAL at 06:09

## 2019-04-15 RX ADMIN — Medication 1 PATCH: at 05:41

## 2019-04-15 RX ADMIN — CELECOXIB 200 MILLIGRAM(S): 200 CAPSULE ORAL at 12:18

## 2019-04-15 RX ADMIN — OXYCODONE HYDROCHLORIDE 40 MILLIGRAM(S): 5 TABLET ORAL at 18:53

## 2019-04-15 RX ADMIN — LIDOCAINE 1 PATCH: 4 CREAM TOPICAL at 11:56

## 2019-04-15 RX ADMIN — SODIUM CHLORIDE 75 MILLILITER(S): 9 INJECTION INTRAMUSCULAR; INTRAVENOUS; SUBCUTANEOUS at 19:44

## 2019-04-15 RX ADMIN — ENOXAPARIN SODIUM 40 MILLIGRAM(S): 100 INJECTION SUBCUTANEOUS at 11:53

## 2019-04-15 RX ADMIN — OXYCODONE HYDROCHLORIDE 20 MILLIGRAM(S): 5 TABLET ORAL at 17:05

## 2019-04-15 RX ADMIN — OXYCODONE HYDROCHLORIDE 20 MILLIGRAM(S): 5 TABLET ORAL at 03:07

## 2019-04-15 RX ADMIN — Medication 100 MILLIGRAM(S): at 16:18

## 2019-04-15 RX ADMIN — Medication 1 PATCH: at 18:57

## 2019-04-15 RX ADMIN — LIDOCAINE 1 PATCH: 4 CREAM TOPICAL at 08:58

## 2019-04-15 RX ADMIN — OXYCODONE HYDROCHLORIDE 20 MILLIGRAM(S): 5 TABLET ORAL at 12:30

## 2019-04-15 RX ADMIN — SENNA PLUS 2 TABLET(S): 8.6 TABLET ORAL at 21:38

## 2019-04-15 RX ADMIN — GABAPENTIN 800 MILLIGRAM(S): 400 CAPSULE ORAL at 21:39

## 2019-04-15 RX ADMIN — CHLORHEXIDINE GLUCONATE 1 APPLICATION(S): 213 SOLUTION TOPICAL at 05:45

## 2019-04-15 RX ADMIN — NYSTATIN CREAM 1 APPLICATION(S): 100000 CREAM TOPICAL at 21:39

## 2019-04-15 RX ADMIN — CYCLOBENZAPRINE HYDROCHLORIDE 10 MILLIGRAM(S): 10 TABLET, FILM COATED ORAL at 06:20

## 2019-04-15 RX ADMIN — Medication 250 MILLIGRAM(S): at 06:09

## 2019-04-15 RX ADMIN — OXYCODONE HYDROCHLORIDE 20 MILLIGRAM(S): 5 TABLET ORAL at 16:23

## 2019-04-15 RX ADMIN — Medication 1 PATCH: at 11:50

## 2019-04-15 RX ADMIN — Medication 500 MILLIGRAM(S): at 18:53

## 2019-04-15 RX ADMIN — GABAPENTIN 800 MILLIGRAM(S): 400 CAPSULE ORAL at 05:42

## 2019-04-15 RX ADMIN — Medication 1 TABLET(S): at 18:53

## 2019-04-15 RX ADMIN — GABAPENTIN 800 MILLIGRAM(S): 400 CAPSULE ORAL at 16:15

## 2019-04-15 RX ADMIN — Medication 500 MILLIGRAM(S): at 05:42

## 2019-04-15 RX ADMIN — Medication 1 TABLET(S): at 05:42

## 2019-04-15 RX ADMIN — METHADONE HYDROCHLORIDE 30 MILLIGRAM(S): 40 TABLET ORAL at 12:06

## 2019-04-15 RX ADMIN — Medication 250 MILLIGRAM(S): at 18:56

## 2019-04-15 RX ADMIN — Medication 100 MILLIGRAM(S): at 05:42

## 2019-04-15 NOTE — PROGRESS NOTE ADULT - ASSESSMENT
A/P: 43y Female s/p C4-5 ACDF with irrigation and debridement, now POD 26    Continue to decrease narcotics and give methadone/NSAIDs etc.  Pt will be finishing course of abx for a long period in house, we have the opportunity to decrease all narcotic burden during hospital stay, recommend Psych c/s to aid  FU TLSO brace  All cultures NGF, no fevers  Heart vegetation management per cardiology/ID, which are recommending no further management   Continue IV Abx per ID Team/Dr. Garcia  DVT PPx: SCDs/Lovenox per primary  PT/OT: WBAT, maintain [place] J Collar at all times, LSO to be worn when ambulating  Further recs of LBP with MRI, pain at level of OM/discitis which is likely causing pain  No plan for return to OR for epidural abscesses or knee/hip washout at this time, will continue to monitor for improvement or RUE weakness which has been present since before surgery  Incentive Spirometry, OOB  Dry dressing changes as needed.   No further Orthopaedic surgical intervention at this time. Will continue to monitor and see patient Monday/Wednesday/Friday. Will advise if plan changes

## 2019-04-15 NOTE — PROGRESS NOTE ADULT - PROBLEM SELECTOR PLAN 1
s/p C4-5 ACDF with irrigation and debridement   OR c/s MRSA   Held off vanco as level high   restart tomorrow with 1.25 gm Q 12 dosage   last levels 19.7  cr stable  will make sure levels optimum   awaiting placement to rehab(wont get placement with Dapto ) and no evidence of new infection   No new bacteremia since the dikitis/ OM/abscess as well as possible hip/knee septic arthritis  joint c.s neg  D/C zyvox as well

## 2019-04-15 NOTE — PROGRESS NOTE ADULT - SUBJECTIVE AND OBJECTIVE BOX
Pt seen and examined at bedside. C/o  mid back pain which pt states is always there but can worsen at times, otherwise no other pain complaints, Denies CP/Dyspnea/Calf tenderness bilaterally. No new numbness/tingling. Hip and knee pain improving and minimal compared to prior exams but ambulating well with PT and is using cane now. Expressed to pt and encourage ambulating further with PT and walking around room to decrease swelling. Patient concerned with pain medications. MRI of thoracic and lumbar spine done this weekend, no interval change except at T11-12 with increase kyphosis.     Vital Signs Last 24 Hrs  T(C): 36.3 (12 Apr 2019 05:16), Max: 36.8 (11 Apr 2019 23:35)  T(F): 97.3 (12 Apr 2019 05:16), Max: 98.2 (11 Apr 2019 23:35)  HR: 94 (12 Apr 2019 05:16) (65 - 95)  BP: 135/81 (12 Apr 2019 05:16) (105/52 - 135/81)  BP(mean): --  RR: 18 (12 Apr 2019 05:16) (16 - 18)  SpO2: 100% (12 Apr 2019 05:16) (96% - 100%)      Gen: NAD, in [place] J collar, resting comfortably in bed  No pain with AROM of BL Hip/Knee, no signs of erythema or worsening effusion of B/L Knees. No pain with palpation of b/l hip and knee this morning.    Spine PE:  Skin intact  severe TTP along lower thoracic and upper lumbar spine  [place] J Collar In Place  Negative clonus  Negative babinski  Negative olmstead           Deltoid        Bicep        Tricep          Wrist Ext    Wrist Flex    Finger Flex          Finger Abd  L            4/5         4/5           4/5             4/5                4/5	           4/5                    4/5  R            3/5 	   3+/5           4+/5             4+/5              4+/5               5/5                   5/5                                      Hip Flex       Quad     Ankle DF        Ankle PF       Toe Ext        Hamstring    L            5/5              5/5          5/5                 5/5                 5/5	                5/5  R            5/5              5/5           5/5                 5/5                 5/5                5/5    Sensory:              C5         C6         C7      C8       T1        (0=absent, 1=impaired, 2=normal, NT=not testable)  L         2            2           2        2         2  R          2            2           2        2         2               L2          L3         L4      L5       S1         (0=absent, 1=impaired, 2=normal, NT=not testable)  L         2            2            2        2        2  R          2            2           2        2         2    B/L hand numbness which has been present since admission

## 2019-04-15 NOTE — PROGRESS NOTE ADULT - PROBLEM SELECTOR PLAN 5
as above  Repeat TTE no worsening or abscess formation  with pt not having more fever /leukocytosis/sepsis or new (recurrent )  bacteremias  her chances of showering new septic emboli is less likely   repeat blood c/s neg  Its very possible she seeded all these areas initially when she was bacteremic and febrile for first 11 days   In the event of new fever or bacteremia however we will def require a repeat LIZ

## 2019-04-15 NOTE — PROGRESS NOTE ADULT - PROBLEM SELECTOR PLAN 3
with diskitis /OM of multiple spinal levels   repeat MRI stable from 4/13 stable Ortho reval by Dr Leslie  appreciated

## 2019-04-15 NOTE — PROGRESS NOTE ADULT - SUBJECTIVE AND OBJECTIVE BOX
Patient is a 43y old  Female who presents with a chief complaint of AMS (15 Apr 2019 06:19)      INTERVAL HPI / OVERNIGHT EVENTS: walking with PT    MEDICATIONS  (STANDING):  ascorbic acid 500 milliGRAM(s) Oral two times a day  bisacodyl Suppository 10 milliGRAM(s) Rectal daily  calcium carbonate 1250 mG  + Vitamin D (OsCal 500 + D) 1 Tablet(s) Oral three times a day  celecoxib 200 milliGRAM(s) Oral daily  chlorhexidine 4% Liquid 1 Application(s) Topical <User Schedule>  colchicine 0.6 milliGRAM(s) Oral daily  docusate sodium 100 milliGRAM(s) Oral three times a day  enoxaparin Injectable 40 milliGRAM(s) SubCutaneous daily  folic acid 1 milliGRAM(s) Oral daily  gabapentin 800 milliGRAM(s) Oral three times a day  lactobacillus acidophilus 1 Tablet(s) Oral two times a day with meals  lidocaine   Patch 1 Patch Transdermal every 24 hours  lidocaine   Patch 1 Patch Transdermal every 24 hours  methadone    Tablet 30 milliGRAM(s) Oral daily  multivitamin 1 Tablet(s) Oral daily  nicotine -  14 mG/24Hr(s) Patch 1 patch Transdermal daily  nystatin Powder 1 Application(s) Topical three times a day  oxyCODONE  ER Tablet 40 milliGRAM(s) Oral every 12 hours  pantoprazole    Tablet 40 milliGRAM(s) Oral before breakfast  polyethylene glycol 3350 17 Gram(s) Oral two times a day  senna 2 Tablet(s) Oral at bedtime  sodium chloride 0.9%. 1000 milliLiter(s) (75 mL/Hr) IV Continuous <Continuous>    MEDICATIONS  (PRN):  acetaminophen   Tablet .. 650 milliGRAM(s) Oral every 6 hours PRN Mild Pain (1 - 3)  acetaminophen   Tablet .. 650 milliGRAM(s) Oral every 6 hours PRN Temp greater or equal to 38C (100.4F)  cyclobenzaprine 10 milliGRAM(s) Oral three times a day PRN Muscle Spasm  diphenhydrAMINE 25 milliGRAM(s) Oral every 4 hours PRN Rash and/or Itching  oxyCODONE    IR 20 milliGRAM(s) Oral every 4 hours PRN Moderate Pain (4 - 6)      Vital Signs Last 24 Hrs  T(C): 36.3 (15 Apr 2019 12:22), Max: 36.9 (14 Apr 2019 18:16)  T(F): 97.4 (15 Apr 2019 12:22), Max: 98.4 (14 Apr 2019 18:16)  HR: 90 (15 Apr 2019 12:22) (89 - 100)  BP: 126/87 (15 Apr 2019 12:22) (89/47 - 126/87)  BP(mean): --  RR: 18 (15 Apr 2019 12:22) (16 - 18)  SpO2: 98% (15 Apr 2019 12:22) (97% - 99%)    Review of systems:  General : no fever /chills ,fatigue  CVS : no chest pain, palpitations  Lungs : no shortness of breath, cough  GI : no abdominal pain, vomiting, diarrhea   : no dysuria ,hematuria        PHYSICAL EXAM:  General :NAD.Neck Collar  Constitutional:  well-groomed, well-developed  Respiratory: CTAB/L  Cardiovascular: S1 and S2, murmur+  Gastrointestinal: BS+, soft, NT/ND  Extremities: No peripheral edema  Vascular: 2+ peripheral pulses      LABS:                        9.0    6.40  )-----------( 322      ( 15 Apr 2019 12:35 )             30.2     04-14    138  |  100  |  24<H>  ----------------------------<  109<H>  4.1   |  29  |  0.40<L>    Ca    9.5      14 Apr 2019 08:40            MICROBIOLOGY:  RECENT CULTURES:        RADIOLOGY & ADDITIONAL STUDIES:    MRI Thoracic spine  IMPRESSION:   1. Discitis and osteomyelitis at T11-T12. Progression since prior   examination with increasing erosion of the endplates And resulting   kyphosis.   There is mild paraspinous and  ventral epidural phlegmon without evidence   of abscess   epidural extension has increased since priorscan with effacement of   ventral   subarachnoid space and slight cord impingement particularly to the left   of   midline. There is also encroachment on the left foramen.   2. Edema in the facets at T9-T10 and T10-11 is present consistent with   septic   arthritis. This has decreased from prior scan.   3. At the superior edge of the sagittal views there is discitis and   osteomyelitis at C6-7. This has not grossly changed from prior scan. This   evaluation is limited without axial images. Recommend cervical spine MRI   for   further evaluation.    MRI Lumbar spine      IMPRESSION:   1. Marrow edema and adjacent inflammatory changes involving the L4-L5   facets   bilaterally and the L5-S1 facets, primarily on the right. This is   consistent   with septic arthritis. The extent does not appear as severe as reported   on   lumbar spine MRIs dated 03/26/2019 and 03/20/2019. There are no images   available for direct comparison.   2. No evidence of discitis or abscess in the lumbar spine.   3. L4-L5: Annular fissure and right paracentral disc herniation which was   reported on prior scan.

## 2019-04-15 NOTE — PROGRESS NOTE ADULT - SUBJECTIVE AND OBJECTIVE BOX
Pt. was seen during orthopedic rounds.    I personally reviewed all her imaging studies due to osteomyelitis and discitis.    Findings at C6-7 are compared to earlier MRI from 2 weeks ago and are identical    Thoracic spine: Due to osteomyelitis and discitis she has focal kyphosis that we will address with TLSO brace to be worn only when ambulating for support.  No plan for acute intervention.  Neuroexam is stable.  She may need fusion at this level in future but will hold off on additional metal implants given stable findings and lack of worsening abscess formation in comparison to all follow up imaging from before.    Lumbar spine:  Stable neuro exam.  Epidural abscess not progressing in comparison to before.      A: Patient with diffuse disease with lack of systemic symptoms now of sepsis.  lack of febrile episodes noted.    P:  Continue long term antibiotics treatment for now.  may need permanent suppressive treatment with oral abx if any signs of recurrence.  Repeat imaging studies in 3 months unless there is change in neuroexam for spine.  3 set of imaging show lack of worsening of infection.

## 2019-04-15 NOTE — PROGRESS NOTE ADULT - ASSESSMENT
43 F w/ history of IVDA, alcohol abuse, hx of pancreatitis, alcohol hepatitis, alcohol withdrawal, left frontoparietal subdural hematoma 2008 without need for neurosurgical intervention presented with severe sepsis with septic shock secondary to MRSA bacteremia w/ RLL PNA, UTI, endocarditis on LIZ and EFRAIN that has resolved and was diagnosis w/ HCV. Pt was initially intubated due to respiratory failure and put on pressors in ICU. She as extubated on 2/25 and transferred from ICU the following day. Pt had a C4-5 ACDF with irrigation and debridement on 3/20 due to spinal abscess and osteo and was kept intubated post procedure and transferred again to ICU, where she was extubated on 3/21 and transferred back to the med service the following day.       pt remains febrile and daptomycin was changed to vancomycin. Pt stable for discharge to rehab for long term antibiotics, but finding an accepting facility is an issue right now.  after long discussion with the patient she requests to not be on the methadone anymore and wishes to remain on the pain meds. We discussed that she would benefit from being on the methadone due to her history and that we will titrate her off the pain medications and understands    MRI of the spine reviewed  to evaluate post operative healing and as per Dr. Gaona appears unchanged. NO need for mri c/spine    PT ambulating well with walker and remains afebrile       Septic arthritis involving BL Knees/hips;  - MRI of bilateral hips and knees were done demonstrating effusions and an abscess located in the right rectus femoris.   - Bilateral knees were aspirated by orthopedic team. on 3/28/19 ngtd   -bilateral hips aspirated by orthopedic team on 3/29/19 prelim ngtd but cppd crystals seen c/w psuedo gout  - as per ortho-No plan for orthopaedic operative intervention at this time.       Pseudogout: colchicine     septic shock secondary to MRSA bacteremia w/ RLL PNA, UTI, endocarditis on LIZ at Tricuspid valve l  - resolved     Endocarditis of tricuspid valve  - status post thoracentesis on 3/20  - repeat BC negative from 3/21, repeated blood cx on 4/2/19 prelim ngtd     - repeat echo showed a pedunculated mobile mass seen adherent to lateral leaflet of the tricuspid valve w/ no interval changes noted in valve structure or regurgitation  - case discussed with  Dr. Gutierrez (CTS) who suggested to obtain CT C/A/P and LIZ ( not clinically indicated as pt stable and will not  )  -repeated echo from 4/2/19 shows stable TR vegetation      HCV- Outpatient follow up for HCV     Spinal osteo, abscess and cord compression    - CT of head and neck showed disc space narrowing and endplate irregularity at C4-5 which may represent typical degenerative change vs discitis   - MRI of the cervical spine w/ contrast showed C2-C7 discitis/osteomyelitis with a small right of midline ventral epidural abscess at C2-C7  - MRI of the thoracolumbar spine showed discitis/osteomyelitis at T11-T12 with paravertebral abscess, as well as discitis/osteomyelitis C6--T1, T9-T10, T10-T11, L4-L5, and L5-S1, and a phlegmon/early abscess dorsolaterally within the lumbar canal at the level of L4-L5. There was also a septic arthritis of  the left sternoclavicular joint space. There was also disc herniation at the L4-L5 level with contact and probable impingement of the descending right-sided nerve roots  - status post s/p C4-5 ACDF with irrigation and debridement on 3/20  - c/w gabapentin and lidocaine patch  - wound cx grew MRSA  - c/w [place] J  collar     Bacteremia due to methicillin resistant Staphylococcus aureus with IE: diskitis/OM /Spinal abscesses /Parapneumonic effusion   - s/p thoracentesis- fluids neg    Moderate protein-calorie malnutrition  - c/w soft diet w/Ensure Enlive 3 x day         edema to lower extremities checked dopplers on 4/1/19 and no dvt    started dvt prophylaxis with Lovenox per my d/w orthopedic . on 4/1/19   prealbumin is low c/w moderate malnutrition    give lasix prn     Anemia due multiple issues, surgery , poor nutrition and infection  - status post a few units of blood during this extensive hospital stay last one on 3/20/19   hgb stable   - c/w folic acid      constipation:  senna colace miralax dulcolax  pt still symptomatic   consider adding movantic 25 mg, but dc all other laxative tx prior to starting

## 2019-04-15 NOTE — PROGRESS NOTE ADULT - SUBJECTIVE AND OBJECTIVE BOX
Patient is a 43y old  Female who presents with a chief complaint of AMS (31 Mar 2019 07:49)      OVERNIGHT EVENTS:  none. ambulating well on RA with walker     MEDICATIONS  (STANDING):  ascorbic acid 500 milliGRAM(s) Oral two times a day  bisacodyl Suppository 10 milliGRAM(s) Rectal daily  calcium carbonate 1250 mG  + Vitamin D (OsCal 500 + D) 1 Tablet(s) Oral three times a day  celecoxib 200 milliGRAM(s) Oral daily  chlorhexidine 4% Liquid 1 Application(s) Topical <User Schedule>  colchicine 0.6 milliGRAM(s) Oral daily  docusate sodium 100 milliGRAM(s) Oral three times a day  enoxaparin Injectable 40 milliGRAM(s) SubCutaneous daily  folic acid 1 milliGRAM(s) Oral daily  gabapentin 800 milliGRAM(s) Oral three times a day  lactobacillus acidophilus 1 Tablet(s) Oral two times a day with meals  lidocaine   Patch 1 Patch Transdermal every 24 hours  lidocaine   Patch 1 Patch Transdermal every 24 hours  methadone    Tablet 30 milliGRAM(s) Oral daily  multivitamin 1 Tablet(s) Oral daily  nicotine -  14 mG/24Hr(s) Patch 1 patch Transdermal daily  nystatin Powder 1 Application(s) Topical three times a day  oxyCODONE  ER Tablet 40 milliGRAM(s) Oral every 12 hours  pantoprazole    Tablet 40 milliGRAM(s) Oral before breakfast  polyethylene glycol 3350 17 Gram(s) Oral two times a day  senna 2 Tablet(s) Oral at bedtime  sodium chloride 0.9%. 1000 milliLiter(s) (75 mL/Hr) IV Continuous <Continuous>    MEDICATIONS  (PRN):  acetaminophen   Tablet .. 650 milliGRAM(s) Oral every 6 hours PRN Mild Pain (1 - 3)  acetaminophen   Tablet .. 650 milliGRAM(s) Oral every 6 hours PRN Temp greater or equal to 38C (100.4F)  cyclobenzaprine 10 milliGRAM(s) Oral three times a day PRN Muscle Spasm  diphenhydrAMINE 25 milliGRAM(s) Oral every 4 hours PRN Rash and/or Itching  oxyCODONE    IR 20 milliGRAM(s) Oral every 4 hours PRN Moderate Pain (4 - 6)          Vital Signs Last 24 Hrs  T(C): 36.3 (15 Apr 2019 12:22), Max: 36.9 (14 Apr 2019 18:16)  T(F): 97.4 (15 Apr 2019 12:22), Max: 98.4 (14 Apr 2019 18:16)  HR: 90 (15 Apr 2019 12:22) (89 - 100)  BP: 126/87 (15 Apr 2019 12:22) (89/47 - 126/87)  BP(mean): --  RR: 18 (15 Apr 2019 12:22) (16 - 18)  SpO2: 98% (15 Apr 2019 12:22) (97% - 99%)      PHYSICAL EXAM:  GENERAL: in chair in nad  well-groomed, well-developed  HEAD:  Atraumatic, Normocephalic  EYES: EOMI, PERRLA, conjunctiva and sclera clear  ENMT: No tonsillar erythema, exudates, or enlargement; Moist mucous membranes, Good dentition, No lesions, cervical collar in place   NECK: Supple, No JVD, Normal thyroid  NERVOUS SYSTEM:  Alert & Oriented X3, Good concentration; Motor Strength 4/5 B/L upper and lower extremities;  CHEST/LUNG: Clear to percussion bilaterally; No rales, rhonchi, wheezing, or rubs BL  HEART: Regular rate and rhythm; No murmurs, rubs, or gallops  ABDOMEN: Soft, mild non specific tenderness Nondistended; Bowel sounds present  EXTREMITIES:  2+ Peripheral Pulses, No clubbing, cyanosis  SKIN: No rashes or lesions      LABS:                                                                                       9.0    6.40  )-----------( 322      ( 15 Apr 2019 12:35 )             30.2     04-14    138  |  100  |  24<H>  ----------------------------<  109<H>  4.1   |  29  |  0.40<L>    Ca    9.5      14 Apr 2019 08:40                                Consultant(s) Notes Reviewed:  [x ] YES     Care Discussed with [ x] Consultants [X ] Patient [ ] Family  [x ]    [x ]  Other; RN

## 2019-04-16 PROCEDURE — 99233 SBSQ HOSP IP/OBS HIGH 50: CPT

## 2019-04-16 PROCEDURE — 99232 SBSQ HOSP IP/OBS MODERATE 35: CPT

## 2019-04-16 RX ADMIN — LIDOCAINE 1 PATCH: 4 CREAM TOPICAL at 00:28

## 2019-04-16 RX ADMIN — Medication 250 MILLIGRAM(S): at 06:12

## 2019-04-16 RX ADMIN — OXYCODONE HYDROCHLORIDE 20 MILLIGRAM(S): 5 TABLET ORAL at 13:23

## 2019-04-16 RX ADMIN — Medication 1 TABLET(S): at 06:05

## 2019-04-16 RX ADMIN — Medication 1 TABLET(S): at 18:24

## 2019-04-16 RX ADMIN — Medication 100 MILLIGRAM(S): at 06:05

## 2019-04-16 RX ADMIN — NYSTATIN CREAM 1 APPLICATION(S): 100000 CREAM TOPICAL at 21:02

## 2019-04-16 RX ADMIN — CHLORHEXIDINE GLUCONATE 1 APPLICATION(S): 213 SOLUTION TOPICAL at 06:05

## 2019-04-16 RX ADMIN — Medication 100 MILLIGRAM(S): at 13:22

## 2019-04-16 RX ADMIN — LIDOCAINE 1 PATCH: 4 CREAM TOPICAL at 13:35

## 2019-04-16 RX ADMIN — PANTOPRAZOLE SODIUM 40 MILLIGRAM(S): 20 TABLET, DELAYED RELEASE ORAL at 06:05

## 2019-04-16 RX ADMIN — Medication 1 MILLIGRAM(S): at 13:23

## 2019-04-16 RX ADMIN — Medication 1 TABLET(S): at 13:23

## 2019-04-16 RX ADMIN — OXYCODONE HYDROCHLORIDE 20 MILLIGRAM(S): 5 TABLET ORAL at 14:23

## 2019-04-16 RX ADMIN — OXYCODONE HYDROCHLORIDE 20 MILLIGRAM(S): 5 TABLET ORAL at 02:49

## 2019-04-16 RX ADMIN — Medication 1 PATCH: at 13:24

## 2019-04-16 RX ADMIN — NYSTATIN CREAM 1 APPLICATION(S): 100000 CREAM TOPICAL at 06:05

## 2019-04-16 RX ADMIN — Medication 1 PATCH: at 13:35

## 2019-04-16 RX ADMIN — Medication 500 MILLIGRAM(S): at 18:24

## 2019-04-16 RX ADMIN — Medication 500 MILLIGRAM(S): at 06:05

## 2019-04-16 RX ADMIN — METHADONE HYDROCHLORIDE 30 MILLIGRAM(S): 40 TABLET ORAL at 13:22

## 2019-04-16 RX ADMIN — CELECOXIB 200 MILLIGRAM(S): 200 CAPSULE ORAL at 13:25

## 2019-04-16 RX ADMIN — OXYCODONE HYDROCHLORIDE 40 MILLIGRAM(S): 5 TABLET ORAL at 18:24

## 2019-04-16 RX ADMIN — Medication 1 TABLET(S): at 21:03

## 2019-04-16 RX ADMIN — OXYCODONE HYDROCHLORIDE 40 MILLIGRAM(S): 5 TABLET ORAL at 06:04

## 2019-04-16 RX ADMIN — POLYETHYLENE GLYCOL 3350 17 GRAM(S): 17 POWDER, FOR SOLUTION ORAL at 18:24

## 2019-04-16 RX ADMIN — ENOXAPARIN SODIUM 40 MILLIGRAM(S): 100 INJECTION SUBCUTANEOUS at 13:23

## 2019-04-16 RX ADMIN — Medication 250 MILLIGRAM(S): at 18:24

## 2019-04-16 RX ADMIN — OXYCODONE HYDROCHLORIDE 40 MILLIGRAM(S): 5 TABLET ORAL at 06:12

## 2019-04-16 RX ADMIN — GABAPENTIN 800 MILLIGRAM(S): 400 CAPSULE ORAL at 13:25

## 2019-04-16 RX ADMIN — Medication 0.6 MILLIGRAM(S): at 13:22

## 2019-04-16 RX ADMIN — GABAPENTIN 800 MILLIGRAM(S): 400 CAPSULE ORAL at 06:05

## 2019-04-16 RX ADMIN — OXYCODONE HYDROCHLORIDE 20 MILLIGRAM(S): 5 TABLET ORAL at 21:07

## 2019-04-16 RX ADMIN — SENNA PLUS 2 TABLET(S): 8.6 TABLET ORAL at 21:03

## 2019-04-16 RX ADMIN — OXYCODONE HYDROCHLORIDE 20 MILLIGRAM(S): 5 TABLET ORAL at 10:06

## 2019-04-16 RX ADMIN — LIDOCAINE 1 PATCH: 4 CREAM TOPICAL at 00:27

## 2019-04-16 RX ADMIN — OXYCODONE HYDROCHLORIDE 20 MILLIGRAM(S): 5 TABLET ORAL at 09:05

## 2019-04-16 RX ADMIN — Medication 100 MILLIGRAM(S): at 21:02

## 2019-04-16 RX ADMIN — GABAPENTIN 800 MILLIGRAM(S): 400 CAPSULE ORAL at 21:03

## 2019-04-16 RX ADMIN — CYCLOBENZAPRINE HYDROCHLORIDE 10 MILLIGRAM(S): 10 TABLET, FILM COATED ORAL at 04:54

## 2019-04-16 RX ADMIN — OXYCODONE HYDROCHLORIDE 40 MILLIGRAM(S): 5 TABLET ORAL at 19:24

## 2019-04-16 RX ADMIN — POLYETHYLENE GLYCOL 3350 17 GRAM(S): 17 POWDER, FOR SOLUTION ORAL at 06:04

## 2019-04-16 RX ADMIN — Medication 1 TABLET(S): at 09:05

## 2019-04-16 NOTE — PROGRESS NOTE ADULT - PROBLEM SELECTOR PLAN 1
s/p C4-5 ACDF with irrigation and debridement   OR c/s MRSA   cont vanco q 12 hr  vanco level before 4th dose and then will check vanco level ,BMP q two times a week   check CBC q weekly   No new bacteremia since the dikitis/ OM/abscess as well as possible hip/knee septic arthritis  joint c.s neg

## 2019-04-16 NOTE — PROGRESS NOTE ADULT - ASSESSMENT
43 F w/ history of IVDA, alcohol abuse, hx of pancreatitis, alcohol hepatitis, alcohol withdrawal, left frontoparietal subdural hematoma 2008 without need for neurosurgical intervention presented with severe sepsis with septic shock secondary to MRSA bacteremia w/ RLL PNA, UTI, endocarditis on LIZ and EFRAIN that has resolved and was diagnosis w/ HCV. Pt was initially intubated due to respiratory failure and put on pressors in ICU. She as extubated on 2/25 and transferred from ICU the following day. Pt had a C4-5 ACDF with irrigation and debridement on 3/20 due to spinal abscess and osteo and was kept intubated post procedure and transferred again to ICU, where she was extubated on 3/21 and transferred back to the med service the following day.   Pt stable for discharge to rehab for long term antibiotics, but finding an accepting facility is an issue right now.  after long discussion with the patient she requests to not be on the methadone anymore and wishes to remain on the pain meds. We discussed that she would benefit from being on the methadone due to her history and that we will titrate her off the pain medications and understands    MRI of the spine reviewed  to evaluate post operative healing and as per Dr. Gaona appears unchanged. NO need for mri c/spine    PT ambulating well with walker and remains afebrile       Septic arthritis involving BL Knees/hips;  - MRI of bilateral hips and knees were done demonstrating effusions and an abscess located in the right rectus femoris.   - Bilateral knees were aspirated by orthopedic team. on 3/28/19 ngtd   -bilateral hips aspirated by orthopedic team on 3/29/19 prelim ngtd but cppd crystals seen c/w psuedo gout  - as per ortho-No plan for orthopaedic operative intervention at this time.       Pseudogout: colchicine     septic shock secondary to MRSA bacteremia w/ RLL PNA, UTI, endocarditis on LIZ at Tricuspid valve l  - resolved     Endocarditis of tricuspid valve  - status post thoracentesis on 3/20  - repeat BC negative from 3/21, repeated blood cx on 4/2/19 prelim ngtd     - repeat echo showed a pedunculated mobile mass seen adherent to lateral leaflet of the tricuspid valve w/ no interval changes noted in valve structure or regurgitation  - case discussed with  Dr. Gutierrez (CTS) who suggested to obtain CT C/A/P and LIZ ( not clinically indicated as pt stable and will not  )  -repeated echo from 4/2/19 shows stable TR vegetation      HCV- Outpatient follow up for HCV     Spinal osteo, abscess and cord compression    - CT of head and neck showed disc space narrowing and endplate irregularity at C4-5 which may represent typical degenerative change vs discitis   - MRI of the cervical spine w/ contrast showed C2-C7 discitis/osteomyelitis with a small right of midline ventral epidural abscess at C2-C7  - MRI of the thoracolumbar spine showed discitis/osteomyelitis at T11-T12 with paravertebral abscess, as well as discitis/osteomyelitis C6--T1, T9-T10, T10-T11, L4-L5, and L5-S1, and a phlegmon/early abscess dorsolaterally within the lumbar canal at the level of L4-L5. There was also a septic arthritis of  the left sternoclavicular joint space. There was also disc herniation at the L4-L5 level with contact and probable impingement of the descending right-sided nerve roots  - status post s/p C4-5 ACDF with irrigation and debridement on 3/20  - c/w gabapentin and lidocaine patch  - wound cx grew MRSA  - c/w [place] J  collar     Bacteremia due to methicillin resistant Staphylococcus aureus with IE: diskitis/OM /Spinal abscesses /Parapneumonic effusion   - s/p thoracentesis- fluids neg    Moderate protein-calorie malnutrition  - c/w soft diet w/Ensure Enlive 3 x day         edema to lower extremities checked dopplers on 4/1/19 and no dvt    started dvt prophylaxis with Lovenox per my d/w orthopedic . on 4/1/19   prealbumin is low c/w moderate malnutrition    give lasix prn     Anemia due multiple issues, surgery , poor nutrition and infection  - status post a few units of blood during this extensive hospital stay last one on 3/20/19   hgb stable   - c/w folic acid      constipation:  senna colace miralax dulcolax

## 2019-04-16 NOTE — PROGRESS NOTE ADULT - SUBJECTIVE AND OBJECTIVE BOX
Patient is a 43y old  Female who presents with a chief complaint of AMS (31 Mar 2019 07:49)    INTERVAL HPI/OVERNIGHT EVENTS:   Patient seen and examined.    MEDICATIONS  (STANDING):  ascorbic acid 500 milliGRAM(s) Oral two times a day  bisacodyl Suppository 10 milliGRAM(s) Rectal daily  calcium carbonate 1250 mG  + Vitamin D (OsCal 500 + D) 1 Tablet(s) Oral three times a day  celecoxib 200 milliGRAM(s) Oral daily  chlorhexidine 4% Liquid 1 Application(s) Topical <User Schedule>  colchicine 0.6 milliGRAM(s) Oral daily  docusate sodium 100 milliGRAM(s) Oral three times a day  enoxaparin Injectable 40 milliGRAM(s) SubCutaneous daily  folic acid 1 milliGRAM(s) Oral daily  gabapentin 800 milliGRAM(s) Oral three times a day  lactobacillus acidophilus 1 Tablet(s) Oral two times a day with meals  lidocaine   Patch 1 Patch Transdermal every 24 hours  lidocaine   Patch 1 Patch Transdermal every 24 hours  methadone    Tablet 30 milliGRAM(s) Oral daily  multivitamin 1 Tablet(s) Oral daily  nicotine -  14 mG/24Hr(s) Patch 1 patch Transdermal daily  nystatin Powder 1 Application(s) Topical three times a day  oxyCODONE  ER Tablet 40 milliGRAM(s) Oral every 12 hours  pantoprazole    Tablet 40 milliGRAM(s) Oral before breakfast  polyethylene glycol 3350 17 Gram(s) Oral two times a day  senna 2 Tablet(s) Oral at bedtime  sodium chloride 0.9%. 1000 milliLiter(s) (75 mL/Hr) IV Continuous <Continuous>  vancomycin  IVPB 1000 milliGRAM(s) IV Intermittent every 12 hours    MEDICATIONS  (PRN):  acetaminophen   Tablet .. 650 milliGRAM(s) Oral every 6 hours PRN Mild Pain (1 - 3)  acetaminophen   Tablet .. 650 milliGRAM(s) Oral every 6 hours PRN Temp greater or equal to 38C (100.4F)  cyclobenzaprine 10 milliGRAM(s) Oral three times a day PRN Muscle Spasm  diphenhydrAMINE 25 milliGRAM(s) Oral every 4 hours PRN Rash and/or Itching  oxyCODONE    IR 20 milliGRAM(s) Oral every 4 hours PRN Moderate Pain (4 - 6)      REVIEW OF SYSTEMS:  See HPI,  all others negative    PHYSICAL EXAM:  Vital Signs Last 24 Hrs  T(C): 36.9 (16 Apr 2019 05:50), Max: 36.9 (16 Apr 2019 05:50)  T(F): 98.5 (16 Apr 2019 05:50), Max: 98.5 (16 Apr 2019 05:50)  HR: 92 (16 Apr 2019 05:50) (67 - 92)  BP: 103/63 (16 Apr 2019 05:50) (103/63 - 118/67)  BP(mean): --  RR: 18 (16 Apr 2019 05:50) (16 - 18)  SpO2: 98% (16 Apr 2019 05:50) (97% - 98%)    GENERAL: NAD  HEAD:  Atraumatic, Normocephalic  EYES: EOMI, PERRLA, conjunctiva and sclera clear  ENMT: No tonsillar erythema, exudates, or enlargement; Moist mucous membranes, Good dentition, No lesions  NECK: neckc ollar  NERVOUS SYSTEM:  Good concentration; Moving all 4 extremities; No gross sensory deficits, No facial droop  CHEST WALL: No masses  CHEST/LUNG: Clear to auscultation bilaterally; No rales, rhonchi, wheezing, or rubs  HEART: Regular rate and rhythm; No murmurs, rubs, or gallops  ABDOMEN: Soft, Nontender, Nondistended, Bowel sounds present, No palpable masses or organomegaly, No bruits  EXTREMITIES:  dec rom 2/2 pain  LYMPH: No lymphadenopathy  SKIN: No rashes or lesions    LABS:                 RADIOLOGY & ADDITIONAL TESTS:

## 2019-04-16 NOTE — PROGRESS NOTE ADULT - SUBJECTIVE AND OBJECTIVE BOX
Patient is a 43y old  Female who presents with a chief complaint of AMS (16 Apr 2019 14:52)      INTERVAL HPI / OVERNIGHT EVENTS:    MEDICATIONS  (STANDING):  ascorbic acid 500 milliGRAM(s) Oral two times a day  bisacodyl Suppository 10 milliGRAM(s) Rectal daily  calcium carbonate 1250 mG  + Vitamin D (OsCal 500 + D) 1 Tablet(s) Oral three times a day  celecoxib 200 milliGRAM(s) Oral daily  chlorhexidine 4% Liquid 1 Application(s) Topical <User Schedule>  colchicine 0.6 milliGRAM(s) Oral daily  docusate sodium 100 milliGRAM(s) Oral three times a day  enoxaparin Injectable 40 milliGRAM(s) SubCutaneous daily  folic acid 1 milliGRAM(s) Oral daily  gabapentin 800 milliGRAM(s) Oral three times a day  lactobacillus acidophilus 1 Tablet(s) Oral two times a day with meals  lidocaine   Patch 1 Patch Transdermal every 24 hours  lidocaine   Patch 1 Patch Transdermal every 24 hours  methadone    Tablet 30 milliGRAM(s) Oral daily  multivitamin 1 Tablet(s) Oral daily  nicotine -  14 mG/24Hr(s) Patch 1 patch Transdermal daily  nystatin Powder 1 Application(s) Topical three times a day  oxyCODONE  ER Tablet 40 milliGRAM(s) Oral every 12 hours  pantoprazole    Tablet 40 milliGRAM(s) Oral before breakfast  polyethylene glycol 3350 17 Gram(s) Oral two times a day  senna 2 Tablet(s) Oral at bedtime  sodium chloride 0.9%. 1000 milliLiter(s) (75 mL/Hr) IV Continuous <Continuous>  vancomycin  IVPB 1000 milliGRAM(s) IV Intermittent every 12 hours    MEDICATIONS  (PRN):  acetaminophen   Tablet .. 650 milliGRAM(s) Oral every 6 hours PRN Mild Pain (1 - 3)  acetaminophen   Tablet .. 650 milliGRAM(s) Oral every 6 hours PRN Temp greater or equal to 38C (100.4F)  cyclobenzaprine 10 milliGRAM(s) Oral three times a day PRN Muscle Spasm  diphenhydrAMINE 25 milliGRAM(s) Oral every 4 hours PRN Rash and/or Itching  oxyCODONE    IR 20 milliGRAM(s) Oral every 4 hours PRN Moderate Pain (4 - 6)      Vital Signs Last 24 Hrs  T(C): 36.9 (16 Apr 2019 05:50), Max: 36.9 (16 Apr 2019 05:50)  T(F): 98.5 (16 Apr 2019 05:50), Max: 98.5 (16 Apr 2019 05:50)  HR: 92 (16 Apr 2019 05:50) (85 - 92)  BP: 103/63 (16 Apr 2019 05:50) (103/63 - 107/68)  BP(mean): --  RR: 18 (16 Apr 2019 05:50) (16 - 18)  SpO2: 98% (16 Apr 2019 05:50) (97% - 98%)    Review of systems:  General : no fever /chills,fatigue  CVS : no chest pain, palpitations  Lungs : no shortness of breath, cough  GI : no abdominal pain,vomiting, diarrhea   : no dysuria,hematuria        PHYSICAL EXAM:  General :NAD  Constitutional:  well-groomed, well-developed  Respiratory: CTAB/L  Cardiovascular: S1 and S2, RRR, no M/G/R  Gastrointestinal: BS+, soft, NT/ND  Extremities: No peripheral edema  Vascular: 2+ peripheral pulses  Skin: No rashes      LABS:                        9.0    6.40  )-----------( 322      ( 15 Apr 2019 12:35 )             30.2                 MICROBIOLOGY:  RECENT CULTURES:        RADIOLOGY & ADDITIONAL STUDIES:

## 2019-04-17 LAB — VANCOMYCIN TROUGH SERPL-MCNC: 9.8 UG/ML — LOW (ref 10–20)

## 2019-04-17 PROCEDURE — 99233 SBSQ HOSP IP/OBS HIGH 50: CPT

## 2019-04-17 PROCEDURE — 99232 SBSQ HOSP IP/OBS MODERATE 35: CPT

## 2019-04-17 RX ORDER — METHADONE HYDROCHLORIDE 40 MG/1
20 TABLET ORAL DAILY
Qty: 0 | Refills: 0 | Status: DISCONTINUED | OUTPATIENT
Start: 2019-04-17 | End: 2019-04-21

## 2019-04-17 RX ADMIN — POLYETHYLENE GLYCOL 3350 17 GRAM(S): 17 POWDER, FOR SOLUTION ORAL at 05:14

## 2019-04-17 RX ADMIN — GABAPENTIN 800 MILLIGRAM(S): 400 CAPSULE ORAL at 21:10

## 2019-04-17 RX ADMIN — SENNA PLUS 2 TABLET(S): 8.6 TABLET ORAL at 21:10

## 2019-04-17 RX ADMIN — CELECOXIB 200 MILLIGRAM(S): 200 CAPSULE ORAL at 12:45

## 2019-04-17 RX ADMIN — Medication 1 MILLIGRAM(S): at 12:06

## 2019-04-17 RX ADMIN — LIDOCAINE 1 PATCH: 4 CREAM TOPICAL at 08:41

## 2019-04-17 RX ADMIN — Medication 250 MILLIGRAM(S): at 19:14

## 2019-04-17 RX ADMIN — Medication 10 MILLIGRAM(S): at 12:08

## 2019-04-17 RX ADMIN — OXYCODONE HYDROCHLORIDE 20 MILLIGRAM(S): 5 TABLET ORAL at 01:22

## 2019-04-17 RX ADMIN — OXYCODONE HYDROCHLORIDE 40 MILLIGRAM(S): 5 TABLET ORAL at 05:13

## 2019-04-17 RX ADMIN — GABAPENTIN 800 MILLIGRAM(S): 400 CAPSULE ORAL at 15:22

## 2019-04-17 RX ADMIN — CYCLOBENZAPRINE HYDROCHLORIDE 10 MILLIGRAM(S): 10 TABLET, FILM COATED ORAL at 06:50

## 2019-04-17 RX ADMIN — OXYCODONE HYDROCHLORIDE 20 MILLIGRAM(S): 5 TABLET ORAL at 12:48

## 2019-04-17 RX ADMIN — PANTOPRAZOLE SODIUM 40 MILLIGRAM(S): 20 TABLET, DELAYED RELEASE ORAL at 06:18

## 2019-04-17 RX ADMIN — OXYCODONE HYDROCHLORIDE 40 MILLIGRAM(S): 5 TABLET ORAL at 17:45

## 2019-04-17 RX ADMIN — LIDOCAINE 1 PATCH: 4 CREAM TOPICAL at 13:39

## 2019-04-17 RX ADMIN — NYSTATIN CREAM 1 APPLICATION(S): 100000 CREAM TOPICAL at 21:11

## 2019-04-17 RX ADMIN — OXYCODONE HYDROCHLORIDE 20 MILLIGRAM(S): 5 TABLET ORAL at 08:43

## 2019-04-17 RX ADMIN — Medication 1 PATCH: at 22:49

## 2019-04-17 RX ADMIN — POLYETHYLENE GLYCOL 3350 17 GRAM(S): 17 POWDER, FOR SOLUTION ORAL at 17:15

## 2019-04-17 RX ADMIN — LIDOCAINE 1 PATCH: 4 CREAM TOPICAL at 01:19

## 2019-04-17 RX ADMIN — Medication 1 TABLET(S): at 17:15

## 2019-04-17 RX ADMIN — CELECOXIB 200 MILLIGRAM(S): 200 CAPSULE ORAL at 12:05

## 2019-04-17 RX ADMIN — ENOXAPARIN SODIUM 40 MILLIGRAM(S): 100 INJECTION SUBCUTANEOUS at 12:08

## 2019-04-17 RX ADMIN — Medication 1 TABLET(S): at 05:14

## 2019-04-17 RX ADMIN — Medication 100 MILLIGRAM(S): at 21:10

## 2019-04-17 RX ADMIN — Medication 0.6 MILLIGRAM(S): at 12:06

## 2019-04-17 RX ADMIN — OXYCODONE HYDROCHLORIDE 20 MILLIGRAM(S): 5 TABLET ORAL at 13:40

## 2019-04-17 RX ADMIN — OXYCODONE HYDROCHLORIDE 20 MILLIGRAM(S): 5 TABLET ORAL at 22:40

## 2019-04-17 RX ADMIN — Medication 1 PATCH: at 12:06

## 2019-04-17 RX ADMIN — OXYCODONE HYDROCHLORIDE 20 MILLIGRAM(S): 5 TABLET ORAL at 10:00

## 2019-04-17 RX ADMIN — METHADONE HYDROCHLORIDE 20 MILLIGRAM(S): 40 TABLET ORAL at 12:06

## 2019-04-17 RX ADMIN — GABAPENTIN 800 MILLIGRAM(S): 400 CAPSULE ORAL at 05:14

## 2019-04-17 RX ADMIN — Medication 500 MILLIGRAM(S): at 17:15

## 2019-04-17 RX ADMIN — CYCLOBENZAPRINE HYDROCHLORIDE 10 MILLIGRAM(S): 10 TABLET, FILM COATED ORAL at 12:47

## 2019-04-17 RX ADMIN — CHLORHEXIDINE GLUCONATE 1 APPLICATION(S): 213 SOLUTION TOPICAL at 05:13

## 2019-04-17 RX ADMIN — NYSTATIN CREAM 1 APPLICATION(S): 100000 CREAM TOPICAL at 05:15

## 2019-04-17 RX ADMIN — OXYCODONE HYDROCHLORIDE 40 MILLIGRAM(S): 5 TABLET ORAL at 17:15

## 2019-04-17 RX ADMIN — OXYCODONE HYDROCHLORIDE 20 MILLIGRAM(S): 5 TABLET ORAL at 21:10

## 2019-04-17 RX ADMIN — Medication 1 TABLET(S): at 15:22

## 2019-04-17 RX ADMIN — NYSTATIN CREAM 1 APPLICATION(S): 100000 CREAM TOPICAL at 15:23

## 2019-04-17 RX ADMIN — Medication 100 MILLIGRAM(S): at 15:22

## 2019-04-17 RX ADMIN — Medication 500 MILLIGRAM(S): at 05:14

## 2019-04-17 RX ADMIN — Medication 1 PATCH: at 08:43

## 2019-04-17 RX ADMIN — Medication 1 TABLET(S): at 08:42

## 2019-04-17 RX ADMIN — Medication 1 PATCH: at 13:39

## 2019-04-17 RX ADMIN — Medication 100 MILLIGRAM(S): at 05:14

## 2019-04-17 RX ADMIN — CYCLOBENZAPRINE HYDROCHLORIDE 10 MILLIGRAM(S): 10 TABLET, FILM COATED ORAL at 19:20

## 2019-04-17 RX ADMIN — Medication 1 TABLET(S): at 12:06

## 2019-04-17 RX ADMIN — Medication 250 MILLIGRAM(S): at 05:15

## 2019-04-17 RX ADMIN — Medication 1 TABLET(S): at 21:09

## 2019-04-17 NOTE — PROGRESS NOTE ADULT - PROBLEM SELECTOR PLAN 4
with IE: diskitis/OM /Spinal abscesses /Parapneumonic effusion   repeat blood c/s negative  possible septic hip and knee arthritis vs pseudogout   (Crystal + ,high WBC in synovial fluid but c/s neg )  underwent arthrocentesis by Ortho over last week   so far c/s neg   s/p thoracentesis for pleural effusion vs empyema : fluids neg  N new bacteremia

## 2019-04-17 NOTE — PROGRESS NOTE ADULT - PROBLEM SELECTOR PLAN 1
s/p C4-5 ACDF with irrigation and debridement   OR c/s MRSA   cont vanco q 12 hr  vanco level to be done today (refused yesterday ) and then will check vanco level , BMP q two times a week   check CBC q weekly   No new bacteremia since the dikitis/ OM/abscess as well as possible hip/knee septic arthritis  joint c.s neg  cont vanco till 5/15/19 (8 weeks from initial diagnosis on MRI)  will need repeat MRI at that time and will discuss with Dr Leslie before finishing antibiotics

## 2019-04-17 NOTE — PROGRESS NOTE ADULT - ASSESSMENT
43 F w/ history of IVDA, alcohol abuse, hx of pancreatitis, alcohol hepatitis, alcohol withdrawal, left frontoparietal subdural hematoma 2008 without need for neurosurgical intervention presented with severe sepsis with septic shock secondary to MRSA bacteremia w/ RLL PNA, UTI, endocarditis on LIZ and EFRAIN that has resolved and was diagnosis w/ HCV. Pt was initially intubated due to respiratory failure and put on pressors in ICU. She as extubated on 2/25 and transferred from ICU the following day. Pt had a C4-5 ACDF with irrigation and debridement on 3/20 due to spinal abscess and osteo and was kept intubated post procedure and transferred again to ICU, where she was extubated on 3/21 and transferred back to the med service the following day.   Pt stable for discharge to rehab for long term antibiotics, but finding an accepting facility is an issue right now.  after long discussion with the patient she requests to not be on the methadone anymore and wishes to remain on the pain meds. We discussed that she would benefit from being on the methadone due to her history and that we will titrate her off the pain medications and understands    MRI of the spine reviewed  to evaluate post operative healing and as per Dr. Gaona appears unchanged. NO need for mri c/spine    PT ambulating well with walker and remains afebrile       Septic arthritis involving BL Knees/hips;  - MRI of bilateral hips and knees were done demonstrating effusions and an abscess located in the right rectus femoris.   - Bilateral knees were aspirated by orthopedic team. on 3/28/19 ngtd   -bilateral hips aspirated by orthopedic team on 3/29/19 prelim ngtd but cppd crystals seen c/w psuedo gout  - as per ortho-No plan for orthopaedic operative intervention at this time.       Pseudogout: colchicine     septic shock secondary to MRSA bacteremia w/ RLL PNA, UTI, endocarditis on LIZ at Tricuspid valve resolved     Endocarditis of tricuspid valve  - status post thoracentesis on 3/20  - repeat BC negative from 3/21, repeated blood cx on 4/2/19 prelim ngtd     - repeat echo showed a pedunculated mobile mass seen adherent to lateral leaflet of the tricuspid valve w/ no interval changes noted in valve structure or regurgitation  - case discussed with  Dr. Gutierrez ([facility]) who suggested to obtain CT C/A/P and LIZ ( not clinically indicated as pt stable and will not  )  -repeated echo from 4/2/19 shows stable TR vegetation      HCV- Outpatient follow up for HCV     Spinal osteo, abscess and cord compression    - CT of head and neck showed disc space narrowing and endplate irregularity at C4-5 which may represent typical degenerative change vs discitis   - MRI of the cervical spine w/ contrast showed C2-C7 discitis/osteomyelitis with a small right of midline ventral epidural abscess at C2-C7  - MRI of the thoracolumbar spine showed discitis/osteomyelitis at T11-T12 with paravertebral abscess, as well as discitis/osteomyelitis C6--T1, T9-T10, T10-T11, L4-L5, and L5-S1, and a phlegmon/early abscess dorsolaterally within the lumbar canal at the level of L4-L5. There was also a septic arthritis of  the left sternoclavicular joint space. There was also disc herniation at the L4-L5 level with contact and probable impingement of the descending right-sided nerve roots  - status post s/p C4-5 ACDF with irrigation and debridement on 3/20  - c/w gabapentin and lidocaine patch  - wound cx grew MRSA  - c/w [place] J  collar     Bacteremia due to methicillin resistant Staphylococcus aureus with IE: diskitis/OM /Spinal abscesses /Parapneumonic effusion   - s/p thoracentesis- fluids neg    Moderate protein-calorie malnutrition  - c/w soft diet w/Ensure Enlive 3 x day         edema to lower extremities checked dopplers on 4/1/19 and no dvt    started dvt prophylaxis with Lovenox per my d/w orthopedic . on 4/1/19   prealbumin is low c/w moderate malnutrition    give lasix prn     Anemia due multiple issues, surgery , poor nutrition and infection  - status post a few units of blood during this extensive hospital stay last one on 3/20/19   hgb stable   - c/w folic acid      constipation:  senna colace miralax dulcolax 43 F w/ history of IVDA, alcohol abuse, hx of pancreatitis, alcohol hepatitis, alcohol withdrawal, left frontoparietal subdural hematoma 2008 without need for neurosurgical intervention presented with severe sepsis with septic shock secondary to MRSA bacteremia w/ RLL PNA, UTI, endocarditis on LIZ and EFRAIN that has resolved and was diagnosis w/ HCV. Pt was initially intubated due to respiratory failure and put on pressors in ICU. She as extubated on 2/25 and transferred from ICU the following day. Pt had a C4-5 ACDF with irrigation and debridement on 3/20 due to spinal abscess and osteo and was kept intubated post procedure and transferred again to ICU, where she was extubated on 3/21 and transferred back to the med service the following day.   Pt stable for discharge to rehab for long term antibiotics, but finding an accepting facility is an issue right now.  after long discussion with the patient she requests to not be on the methadone anymore and wishes to remain on the pain meds. We discussed that she would benefit from being on the methadone due to her history and that we will titrate her off the pain medications and understands    MRI of the spine reviewed  to evaluate post operative healing and as per Dr. Gaona appears unchanged. NO need for mri c/spine    PT ambulating well with walker and remains afebrile       Septic arthritis involving BL Knees/hips;  - MRI of bilateral hips and knees were done demonstrating effusions and an abscess located in the right rectus femoris.   - Bilateral knees were aspirated by orthopedic team. on 3/28/19 ngtd   -bilateral hips aspirated by orthopedic team on 3/29/19 prelim ngtd but cppd crystals seen c/w psuedo gout  - as per ortho-No plan for orthopaedic operative intervention at this time.       Pseudogout: colchicine     septic shock secondary to MRSA bacteremia w/ RLL PNA, UTI, endocarditis on LIZ at Tricuspid valve resolved     Endocarditis of tricuspid valve  - status post thoracentesis on 3/20  - repeat BC negative from 3/21, repeated blood cx on 4/2/19 prelim ngtd     - repeat echo showed a pedunculated mobile mass seen adherent to lateral leaflet of the tricuspid valve w/ no interval changes noted in valve structure or regurgitation  - case discussed with  Dr. Gutierrez (CTS) who suggested to obtain CT C/A/P and LIZ ( not clinically indicated as pt stable and will not  )  -repeated echo from 4/2/19 shows stable TR vegetation      HCV- Outpatient follow up for HCV     Spinal osteo, abscess and cord compression    - CT of head and neck showed disc space narrowing and endplate irregularity at C4-5 which may represent typical degenerative change vs discitis   - MRI of the cervical spine w/ contrast showed C2-C7 discitis/osteomyelitis with a small right of midline ventral epidural abscess at C2-C7  - MRI of the thoracolumbar spine showed discitis/osteomyelitis at T11-T12 with paravertebral abscess, as well as discitis/osteomyelitis C6--T1, T9-T10, T10-T11, L4-L5, and L5-S1, and a phlegmon/early abscess dorsolaterally within the lumbar canal at the level of L4-L5. There was also a septic arthritis of  the left sternoclavicular joint space. There was also disc herniation at the L4-L5 level with contact and probable impingement of the descending right-sided nerve roots  - status post s/p C4-5 ACDF with irrigation and debridement on 3/20  - c/w gabapentin and lidocaine patch  - wound cx grew MRSA  - c/w [place] J  collar     Bacteremia due to methicillin resistant Staphylococcus aureus with IE: diskitis/OM /Spinal abscesses /Parapneumonic effusion   - s/p thoracentesis- fluids neg    Moderate protein-calorie malnutrition  - c/w soft diet w/Ensure Enlive 3 x day      stop IVF as maintenance.        edema to lower extremities checked dopplers on 4/1/19 and no dvt    started dvt prophylaxis with Lovenox per my d/w orthopedic . on 4/1/19   prealbumin is low c/w moderate malnutrition    give lasix prn     Anemia due multiple issues, surgery , poor nutrition and infection  - status post a few units of blood during this extensive hospital stay last one on 3/20/19   hgb stable   - c/w folic acid      constipation:  senna colace miralax dulcolax

## 2019-04-17 NOTE — PROGRESS NOTE ADULT - SUBJECTIVE AND OBJECTIVE BOX
INTERVAL HPI/OVERNIGHT EVENTS:  Pt seen and examined at bedside.     Allergies/Intolerance: penicillin (Short breath; Hives)      MEDICATIONS  (STANDING):  ascorbic acid 500 milliGRAM(s) Oral two times a day  bisacodyl Suppository 10 milliGRAM(s) Rectal daily  calcium carbonate 1250 mG  + Vitamin D (OsCal 500 + D) 1 Tablet(s) Oral three times a day  celecoxib 200 milliGRAM(s) Oral daily  chlorhexidine 4% Liquid 1 Application(s) Topical <User Schedule>  colchicine 0.6 milliGRAM(s) Oral daily  docusate sodium 100 milliGRAM(s) Oral three times a day  enoxaparin Injectable 40 milliGRAM(s) SubCutaneous daily  folic acid 1 milliGRAM(s) Oral daily  gabapentin 800 milliGRAM(s) Oral three times a day  lactobacillus acidophilus 1 Tablet(s) Oral two times a day with meals  lidocaine   Patch 1 Patch Transdermal every 24 hours  lidocaine   Patch 1 Patch Transdermal every 24 hours  methadone    Tablet 20 milliGRAM(s) Oral daily  multivitamin 1 Tablet(s) Oral daily  nicotine -  14 mG/24Hr(s) Patch 1 patch Transdermal daily  nystatin Powder 1 Application(s) Topical three times a day  oxyCODONE  ER Tablet 40 milliGRAM(s) Oral every 12 hours  pantoprazole    Tablet 40 milliGRAM(s) Oral before breakfast  polyethylene glycol 3350 17 Gram(s) Oral two times a day  senna 2 Tablet(s) Oral at bedtime  sodium chloride 0.9%. 1000 milliLiter(s) (75 mL/Hr) IV Continuous <Continuous>  vancomycin  IVPB 1000 milliGRAM(s) IV Intermittent every 12 hours    MEDICATIONS  (PRN):  acetaminophen   Tablet .. 650 milliGRAM(s) Oral every 6 hours PRN Mild Pain (1 - 3)  acetaminophen   Tablet .. 650 milliGRAM(s) Oral every 6 hours PRN Temp greater or equal to 38C (100.4F)  cyclobenzaprine 10 milliGRAM(s) Oral three times a day PRN Muscle Spasm  diphenhydrAMINE 25 milliGRAM(s) Oral every 4 hours PRN Rash and/or Itching  oxyCODONE    IR 20 milliGRAM(s) Oral every 4 hours PRN Moderate Pain (4 - 6)        ROS: all systems reviewed and wnl      PHYSICAL EXAMINATION:  Vital Signs Last 24 Hrs  T(C): 37.3 (17 Apr 2019 12:15), Max: 37.3 (17 Apr 2019 12:15)  T(F): 99.2 (17 Apr 2019 12:15), Max: 99.2 (17 Apr 2019 12:15)  HR: 102 (17 Apr 2019 12:16) (87 - 102)  BP: 120/74 (17 Apr 2019 12:16) (106/66 - 131/74)  BP(mean): --  RR: 17 (17 Apr 2019 12:16) (16 - 18)  SpO2: 97% (17 Apr 2019 12:16) (97% - 100%)  CAPILLARY BLOOD GLUCOSE          04-16 @ 07:01  -  04-17 @ 07:00  --------------------------------------------------------  IN: 1125 mL / OUT: 600 mL / NET: 525 mL    04-17 @ 07:01  -  04-17 @ 13:13  --------------------------------------------------------  IN: 825 mL / OUT: 0 mL / NET: 825 mL        GENERAL:   NECK: supple, No JVD  CHEST/LUNG: clear to auscultation bilaterally; no rales, rhonchi, or wheezing b/l  HEART: normal S1, S2  ABDOMEN: BS+, soft, ND, NT   EXTREMITIES:  pulses palpable; no clubbing, cyanosis, or edema b/l LEs  SKIN: no rashes or lesions      LABS: INTERVAL HPI/OVERNIGHT EVENTS:  Pt seen and examined at bedside.     Allergies/Intolerance: penicillin (Short breath; Hives)      MEDICATIONS  (STANDING):  ascorbic acid 500 milliGRAM(s) Oral two times a day  bisacodyl Suppository 10 milliGRAM(s) Rectal daily  calcium carbonate 1250 mG  + Vitamin D (OsCal 500 + D) 1 Tablet(s) Oral three times a day  celecoxib 200 milliGRAM(s) Oral daily  chlorhexidine 4% Liquid 1 Application(s) Topical <User Schedule>  colchicine 0.6 milliGRAM(s) Oral daily  docusate sodium 100 milliGRAM(s) Oral three times a day  enoxaparin Injectable 40 milliGRAM(s) SubCutaneous daily  folic acid 1 milliGRAM(s) Oral daily  gabapentin 800 milliGRAM(s) Oral three times a day  lactobacillus acidophilus 1 Tablet(s) Oral two times a day with meals  lidocaine   Patch 1 Patch Transdermal every 24 hours  lidocaine   Patch 1 Patch Transdermal every 24 hours  methadone    Tablet 20 milliGRAM(s) Oral daily  multivitamin 1 Tablet(s) Oral daily  nicotine -  14 mG/24Hr(s) Patch 1 patch Transdermal daily  nystatin Powder 1 Application(s) Topical three times a day  oxyCODONE  ER Tablet 40 milliGRAM(s) Oral every 12 hours  pantoprazole    Tablet 40 milliGRAM(s) Oral before breakfast  polyethylene glycol 3350 17 Gram(s) Oral two times a day  senna 2 Tablet(s) Oral at bedtime  sodium chloride 0.9%. 1000 milliLiter(s) (75 mL/Hr) IV Continuous <Continuous>  vancomycin  IVPB 1000 milliGRAM(s) IV Intermittent every 12 hours    MEDICATIONS  (PRN):  acetaminophen   Tablet .. 650 milliGRAM(s) Oral every 6 hours PRN Mild Pain (1 - 3)  acetaminophen   Tablet .. 650 milliGRAM(s) Oral every 6 hours PRN Temp greater or equal to 38C (100.4F)  cyclobenzaprine 10 milliGRAM(s) Oral three times a day PRN Muscle Spasm  diphenhydrAMINE 25 milliGRAM(s) Oral every 4 hours PRN Rash and/or Itching  oxyCODONE    IR 20 milliGRAM(s) Oral every 4 hours PRN Moderate Pain (4 - 6)        ROS: all systems reviewed and wnl      PHYSICAL EXAMINATION:  Vital Signs Last 24 Hrs  T(C): 37.3 (17 Apr 2019 12:15), Max: 37.3 (17 Apr 2019 12:15)  T(F): 99.2 (17 Apr 2019 12:15), Max: 99.2 (17 Apr 2019 12:15)  HR: 102 (17 Apr 2019 12:16) (87 - 102)  BP: 120/74 (17 Apr 2019 12:16) (106/66 - 131/74)  BP(mean): --  RR: 17 (17 Apr 2019 12:16) (16 - 18)  SpO2: 97% (17 Apr 2019 12:16) (97% - 100%)  CAPILLARY BLOOD GLUCOSE          04-16 @ 07:01  -  04-17 @ 07:00  --------------------------------------------------------  IN: 1125 mL / OUT: 600 mL / NET: 525 mL    04-17 @ 07:01  -  04-17 @ 13:13  --------------------------------------------------------  IN: 825 mL / OUT: 0 mL / NET: 825 mL        GENERAL: stable in bed, NAD, no fevers, rash or diarrhea, no LBP  NECK: supple, No JVD  CHEST/LUNG: clear to auscultation bilaterally; no rales, rhonchi, or wheezing b/l  HEART: normal S1, S2  ABDOMEN: BS+, soft, ND, NT   EXTREMITIES:  pulses palpable; no clubbing, cyanosis, or edema b/l LEs  SKIN: no rashes or lesions      LABS:

## 2019-04-17 NOTE — CHART NOTE - NSCHARTNOTEFT_GEN_A_CORE
Assessment: Pt seen for follow-up. Pt with Bacteremia, vertebral abscess & Endocarditis continues IV abx. Pt tolerating po diet with no GI distress. Last BM x 1(4/17).    Factors impacting intake: [x ] none [ ] nausea  [ ] vomiting [ ] diarrhea [ ] constipation  [ ]chewing problems [ ] swallowing issues  [ ] other:     Diet Prescription: Diet, Regular:   Supplement Feeding Modality:  Oral  Ensure Enlive Cans or Servings Per Day:  3       Frequency:  Daily (03-29-19 @ 16:58)    Intake: Po intake 90-95% @ meals & 100% consumed supplements feeds self I. Continue to cater to food preferences. Pt enjoys hamburger & grilled cheese.     Current Weight: Wt=72.6kg(4/9/19), Wt=73.2kg(3/5)  % Weight Change 0.6kg wt loss x 35 days    Physical appearance: +2 edema Sina feet; Nutrition focused physical exam conducted; Subcutaneous fat loss: [WNL ] Orbital fat pads region, [WNL ]Buccal fat region, [WNL ]Triceps region,  [WNL ]Ribs region.  Muscle wasting: [WNL ]Temples region, [WNL ]Clavicle region, [WNL ]Shoulder region, [unable ]Scapula region, [WNL ]Interosseous region,  [WNL ]thigh region, [WNL ]Calf region    Pertinent Medications: MEDICATIONS  (STANDING):  ascorbic acid 500 milliGRAM(s) Oral two times a day  bisacodyl Suppository 10 milliGRAM(s) Rectal daily  calcium carbonate 1250 mG  + Vitamin D (OsCal 500 + D) 1 Tablet(s) Oral three times a day  celecoxib 200 milliGRAM(s) Oral daily  chlorhexidine 4% Liquid 1 Application(s) Topical <User Schedule>  colchicine 0.6 milliGRAM(s) Oral daily  docusate sodium 100 milliGRAM(s) Oral three times a day  enoxaparin Injectable 40 milliGRAM(s) SubCutaneous daily  folic acid 1 milliGRAM(s) Oral daily  gabapentin 800 milliGRAM(s) Oral three times a day  lactobacillus acidophilus 1 Tablet(s) Oral two times a day with meals  lidocaine   Patch 1 Patch Transdermal every 24 hours  lidocaine   Patch 1 Patch Transdermal every 24 hours  methadone    Tablet 20 milliGRAM(s) Oral daily  multivitamin 1 Tablet(s) Oral daily  nicotine -  14 mG/24Hr(s) Patch 1 patch Transdermal daily  nystatin Powder 1 Application(s) Topical three times a day  oxyCODONE  ER Tablet 40 milliGRAM(s) Oral every 12 hours  pantoprazole    Tablet 40 milliGRAM(s) Oral before breakfast  polyethylene glycol 3350 17 Gram(s) Oral two times a day  senna 2 Tablet(s) Oral at bedtime  sodium chloride 0.9%. 1000 milliLiter(s) (75 mL/Hr) IV Continuous <Continuous>  vancomycin  IVPB 1000 milliGRAM(s) IV Intermittent every 12 hours    MEDICATIONS  (PRN):  acetaminophen   Tablet .. 650 milliGRAM(s) Oral every 6 hours PRN Mild Pain (1 - 3)  acetaminophen   Tablet .. 650 milliGRAM(s) Oral every 6 hours PRN Temp greater or equal to 38C (100.4F)  cyclobenzaprine 10 milliGRAM(s) Oral three times a day PRN Muscle Spasm  diphenhydrAMINE 25 milliGRAM(s) Oral every 4 hours PRN Rash and/or Itching  oxyCODONE    IR 20 milliGRAM(s) Oral every 4 hours PRN Moderate Pain (4 - 6)    Pertinent Labs: 04-14 Na 138   Glu 109   K+ 4.1   Cr 0.40   BUN 24   Phos n/a   Alb 2.5(4/9)   PAB n/a   Hgb 9.0 g/dL<L> Hct 30.2 %<L> HgA1C n/a     24Hr FS:  Skin: Lt heel stage I    Estimated Needs:   [ ] no change since previous assessment   [x ] recalculated: energy needs 1500-1800kcal (25-30kcal/kg IBW) & protein needs 60-75kg (1.0-1.2gm/kg IBW) & fluid needs 1500-1800ml(25-30ml/kg IBW)    Previous Nutrition Diagnosis:   [ ] Inadequate Energy Intake [ ]Inadequate Oral Intake [ ] Excessive Energy Intake   [ ] Underweight [ ] Increased Nutrient Needs [ ] Overweight/Obesity   [ ] Altered GI Function [ ] Unintended Weight Loss [ ] Food & Nutrition Related Knowledge Deficit [x ] Acute mild Malnutrition     Nutrition Diagnosis is [ ] ongoing  [x ] resolved  [ ] improved  [ ] not applicable   Previous Goal: Pt to meet >75% energy & protein needs via meals/supplements; met    New Nutrition Diagnosis: No New Dx      Interventions:   Recommend  [x ] Continue: Regular/Ensure Enlive daily(350kcal & 20gm protein)  [ ] Change Diet To:  [ ] Nutrition Supplement:  [ ] Nutrition Support:  [x ] Other: Continue to cater to food preferences.    Monitoring and Evaluation:   [x ] PO intake [ x ] Tolerance to diet prescription [ x ] weights [ x ] labs[ x ] follow up per protocol  [ ] other: Assessment: Pt seen for follow-up. Pt with Bacteremia, vertebral abscess & Endocarditis continues IV abx. Pt tolerating po diet with no GI distress. Last BM x 1(4/17). Noted acute mild malnutrition resolved due to pt consuming meeting energy & protein needs.     Factors impacting intake: [x ] none [ ] nausea  [ ] vomiting [ ] diarrhea [ ] constipation  [ ]chewing problems [ ] swallowing issues  [ ] other:     Diet Prescription: Diet, Regular:   Supplement Feeding Modality:  Oral  Ensure Enlive Cans or Servings Per Day:  3       Frequency:  Daily (03-29-19 @ 16:58)    Intake: Po intake 90-95% @ meals & 100% consumed supplements feeds self I. Continue to cater to food preferences. Pt enjoys hamburger & grilled cheese.     Current Weight: Wt=72.6kg(4/9/19), Wt=73.2kg(3/5)  % Weight Change 0.6kg wt loss x 35 days    Physical appearance: +2 edema Sina feet; Nutrition focused physical exam conducted; Subcutaneous fat loss: [WNL ] Orbital fat pads region, [WNL ]Buccal fat region, [WNL ]Triceps region,  [WNL ]Ribs region.  Muscle wasting: [WNL ]Temples region, [WNL ]Clavicle region, [WNL ]Shoulder region, [unable ]Scapula region, [WNL ]Interosseous region,  [WNL ]thigh region, [WNL ]Calf region    Pertinent Medications: MEDICATIONS  (STANDING):  ascorbic acid 500 milliGRAM(s) Oral two times a day  bisacodyl Suppository 10 milliGRAM(s) Rectal daily  calcium carbonate 1250 mG  + Vitamin D (OsCal 500 + D) 1 Tablet(s) Oral three times a day  celecoxib 200 milliGRAM(s) Oral daily  chlorhexidine 4% Liquid 1 Application(s) Topical <User Schedule>  colchicine 0.6 milliGRAM(s) Oral daily  docusate sodium 100 milliGRAM(s) Oral three times a day  enoxaparin Injectable 40 milliGRAM(s) SubCutaneous daily  folic acid 1 milliGRAM(s) Oral daily  gabapentin 800 milliGRAM(s) Oral three times a day  lactobacillus acidophilus 1 Tablet(s) Oral two times a day with meals  lidocaine   Patch 1 Patch Transdermal every 24 hours  lidocaine   Patch 1 Patch Transdermal every 24 hours  methadone    Tablet 20 milliGRAM(s) Oral daily  multivitamin 1 Tablet(s) Oral daily  nicotine -  14 mG/24Hr(s) Patch 1 patch Transdermal daily  nystatin Powder 1 Application(s) Topical three times a day  oxyCODONE  ER Tablet 40 milliGRAM(s) Oral every 12 hours  pantoprazole    Tablet 40 milliGRAM(s) Oral before breakfast  polyethylene glycol 3350 17 Gram(s) Oral two times a day  senna 2 Tablet(s) Oral at bedtime  sodium chloride 0.9%. 1000 milliLiter(s) (75 mL/Hr) IV Continuous <Continuous>  vancomycin  IVPB 1000 milliGRAM(s) IV Intermittent every 12 hours    MEDICATIONS  (PRN):  acetaminophen   Tablet .. 650 milliGRAM(s) Oral every 6 hours PRN Mild Pain (1 - 3)  acetaminophen   Tablet .. 650 milliGRAM(s) Oral every 6 hours PRN Temp greater or equal to 38C (100.4F)  cyclobenzaprine 10 milliGRAM(s) Oral three times a day PRN Muscle Spasm  diphenhydrAMINE 25 milliGRAM(s) Oral every 4 hours PRN Rash and/or Itching  oxyCODONE    IR 20 milliGRAM(s) Oral every 4 hours PRN Moderate Pain (4 - 6)    Pertinent Labs: 04-14 Na 138   Glu 109   K+ 4.1   Cr 0.40   BUN 24   Phos n/a   Alb 2.5(4/9)   PAB n/a   Hgb 9.0 g/dL<L> Hct 30.2 %<L> HgA1C n/a     24Hr FS:  Skin: Lt heel stage I    Estimated Needs:   [ ] no change since previous assessment   [x ] recalculated: energy needs 1500-1800kcal (25-30kcal/kg IBW) & protein needs 60-75kg (1.0-1.2gm/kg IBW) & fluid needs 1500-1800ml(25-30ml/kg IBW)    Previous Nutrition Diagnosis:   [ ] Inadequate Energy Intake [ ]Inadequate Oral Intake [ ] Excessive Energy Intake   [ ] Underweight [ ] Increased Nutrient Needs [ ] Overweight/Obesity   [ ] Altered GI Function [ ] Unintended Weight Loss [ ] Food & Nutrition Related Knowledge Deficit [x ] Acute mild Malnutrition     Nutrition Diagnosis is [ ] ongoing  [x ] resolved  [ ] improved  [ ] not applicable   Previous Goal: Pt to meet >75% energy & protein needs via meals/supplements; met    New Nutrition Diagnosis: No New Dx      Interventions:   Recommend  [x ] Continue: Regular/Ensure Enlive daily(350kcal & 20gm protein)  [ ] Change Diet To:  [ ] Nutrition Supplement:  [ ] Nutrition Support:  [x ] Other: Continue to cater to food preferences.    Monitoring and Evaluation:   [x ] PO intake [ x ] Tolerance to diet prescription [ x ] weights [ x ] labs[ x ] follow up per protocol  [ ] other:

## 2019-04-17 NOTE — PROGRESS NOTE ADULT - SUBJECTIVE AND OBJECTIVE BOX
Patient is a 43y old  Female who presents with a chief complaint of AMS (17 Apr 2019 13:12)      INTERVAL HPI / OVERNIGHT EVENTS: doing better    MEDICATIONS  (STANDING):  ascorbic acid 500 milliGRAM(s) Oral two times a day  bisacodyl Suppository 10 milliGRAM(s) Rectal daily  calcium carbonate 1250 mG  + Vitamin D (OsCal 500 + D) 1 Tablet(s) Oral three times a day  celecoxib 200 milliGRAM(s) Oral daily  chlorhexidine 4% Liquid 1 Application(s) Topical <User Schedule>  colchicine 0.6 milliGRAM(s) Oral daily  docusate sodium 100 milliGRAM(s) Oral three times a day  enoxaparin Injectable 40 milliGRAM(s) SubCutaneous daily  folic acid 1 milliGRAM(s) Oral daily  gabapentin 800 milliGRAM(s) Oral three times a day  lactobacillus acidophilus 1 Tablet(s) Oral two times a day with meals  lidocaine   Patch 1 Patch Transdermal every 24 hours  lidocaine   Patch 1 Patch Transdermal every 24 hours  methadone    Tablet 20 milliGRAM(s) Oral daily  multivitamin 1 Tablet(s) Oral daily  nicotine -  14 mG/24Hr(s) Patch 1 patch Transdermal daily  nystatin Powder 1 Application(s) Topical three times a day  oxyCODONE  ER Tablet 40 milliGRAM(s) Oral every 12 hours  pantoprazole    Tablet 40 milliGRAM(s) Oral before breakfast  polyethylene glycol 3350 17 Gram(s) Oral two times a day  senna 2 Tablet(s) Oral at bedtime  sodium chloride 0.9%. 1000 milliLiter(s) (75 mL/Hr) IV Continuous <Continuous>  vancomycin  IVPB 1000 milliGRAM(s) IV Intermittent every 12 hours    MEDICATIONS  (PRN):  acetaminophen   Tablet .. 650 milliGRAM(s) Oral every 6 hours PRN Mild Pain (1 - 3)  acetaminophen   Tablet .. 650 milliGRAM(s) Oral every 6 hours PRN Temp greater or equal to 38C (100.4F)  cyclobenzaprine 10 milliGRAM(s) Oral three times a day PRN Muscle Spasm  diphenhydrAMINE 25 milliGRAM(s) Oral every 4 hours PRN Rash and/or Itching  oxyCODONE    IR 20 milliGRAM(s) Oral every 4 hours PRN Moderate Pain (4 - 6)      Vital Signs Last 24 Hrs  T(C): 37.3 (17 Apr 2019 12:15), Max: 37.3 (17 Apr 2019 12:15)  T(F): 99.2 (17 Apr 2019 12:15), Max: 99.2 (17 Apr 2019 12:15)  HR: 102 (17 Apr 2019 12:16) (87 - 102)  BP: 120/74 (17 Apr 2019 12:16) (106/66 - 131/74)  BP(mean): --  RR: 17 (17 Apr 2019 12:16) (16 - 18)  SpO2: 97% (17 Apr 2019 12:16) (97% - 100%)    Review of systems:  General : no fever /chills, fatigue,back pain +  CVS : no chest pain, palpitations  Lungs : no shortness of breath, cough  GI : no abdominal pain,vomiting, diarrhea   : no dysuria,hematuria        PHYSICAL EXAM:  General :NAD  Constitutional: neck collar, well-groomed, well-developed  Respiratory: CTAB/L  Cardiovascular: S1 and S2, RRR, no M/G/R  Gastrointestinal: BS+, soft, NT/ND  Extremities: No peripheral edema  Vascular: 2+ peripheral pulses  Skin: No rashes      LABS:                MICROBIOLOGY:  RECENT CULTURES:        RADIOLOGY & ADDITIONAL STUDIES:

## 2019-04-18 LAB
ANION GAP SERPL CALC-SCNC: 6 MMOL/L — SIGNIFICANT CHANGE UP (ref 5–17)
BUN SERPL-MCNC: 20 MG/DL — SIGNIFICANT CHANGE UP (ref 7–23)
CALCIUM SERPL-MCNC: 9.4 MG/DL — SIGNIFICANT CHANGE UP (ref 8.5–10.1)
CHLORIDE SERPL-SCNC: 101 MMOL/L — SIGNIFICANT CHANGE UP (ref 96–108)
CO2 SERPL-SCNC: 30 MMOL/L — SIGNIFICANT CHANGE UP (ref 22–31)
CREAT SERPL-MCNC: 0.42 MG/DL — LOW (ref 0.5–1.3)
GLUCOSE SERPL-MCNC: 111 MG/DL — HIGH (ref 70–99)
HCT VFR BLD CALC: 29.1 % — LOW (ref 34.5–45)
HGB BLD-MCNC: 8.9 G/DL — LOW (ref 11.5–15.5)
MCHC RBC-ENTMCNC: 25.6 PG — LOW (ref 27–34)
MCHC RBC-ENTMCNC: 30.6 GM/DL — LOW (ref 32–36)
MCV RBC AUTO: 83.9 FL — SIGNIFICANT CHANGE UP (ref 80–100)
NRBC # BLD: 0 /100 WBCS — SIGNIFICANT CHANGE UP (ref 0–0)
PLATELET # BLD AUTO: 259 K/UL — SIGNIFICANT CHANGE UP (ref 150–400)
POTASSIUM SERPL-MCNC: 4.4 MMOL/L — SIGNIFICANT CHANGE UP (ref 3.5–5.3)
POTASSIUM SERPL-SCNC: 4.4 MMOL/L — SIGNIFICANT CHANGE UP (ref 3.5–5.3)
RBC # BLD: 3.47 M/UL — LOW (ref 3.8–5.2)
RBC # FLD: 17.8 % — HIGH (ref 10.3–14.5)
SODIUM SERPL-SCNC: 137 MMOL/L — SIGNIFICANT CHANGE UP (ref 135–145)
WBC # BLD: 6.09 K/UL — SIGNIFICANT CHANGE UP (ref 3.8–10.5)
WBC # FLD AUTO: 6.09 K/UL — SIGNIFICANT CHANGE UP (ref 3.8–10.5)

## 2019-04-18 PROCEDURE — 99233 SBSQ HOSP IP/OBS HIGH 50: CPT

## 2019-04-18 RX ORDER — VANCOMYCIN HCL 1 G
1500 VIAL (EA) INTRAVENOUS EVERY 12 HOURS
Qty: 0 | Refills: 0 | Status: DISCONTINUED | OUTPATIENT
Start: 2019-04-18 | End: 2019-04-19

## 2019-04-18 RX ORDER — MULTIVIT WITH MIN/MFOLATE/K2 340-15/3 G
1 POWDER (GRAM) ORAL ONCE
Qty: 0 | Refills: 0 | Status: COMPLETED | OUTPATIENT
Start: 2019-04-18 | End: 2019-04-18

## 2019-04-18 RX ADMIN — Medication 1 TABLET(S): at 11:36

## 2019-04-18 RX ADMIN — Medication 250 MILLIGRAM(S): at 06:14

## 2019-04-18 RX ADMIN — OXYCODONE HYDROCHLORIDE 20 MILLIGRAM(S): 5 TABLET ORAL at 13:16

## 2019-04-18 RX ADMIN — Medication 1 TABLET(S): at 21:06

## 2019-04-18 RX ADMIN — PANTOPRAZOLE SODIUM 40 MILLIGRAM(S): 20 TABLET, DELAYED RELEASE ORAL at 06:14

## 2019-04-18 RX ADMIN — NYSTATIN CREAM 1 APPLICATION(S): 100000 CREAM TOPICAL at 06:15

## 2019-04-18 RX ADMIN — GABAPENTIN 800 MILLIGRAM(S): 400 CAPSULE ORAL at 06:14

## 2019-04-18 RX ADMIN — OXYCODONE HYDROCHLORIDE 20 MILLIGRAM(S): 5 TABLET ORAL at 14:10

## 2019-04-18 RX ADMIN — CHLORHEXIDINE GLUCONATE 1 APPLICATION(S): 213 SOLUTION TOPICAL at 06:15

## 2019-04-18 RX ADMIN — METHADONE HYDROCHLORIDE 20 MILLIGRAM(S): 40 TABLET ORAL at 11:36

## 2019-04-18 RX ADMIN — NYSTATIN CREAM 1 APPLICATION(S): 100000 CREAM TOPICAL at 21:20

## 2019-04-18 RX ADMIN — NYSTATIN CREAM 1 APPLICATION(S): 100000 CREAM TOPICAL at 13:18

## 2019-04-18 RX ADMIN — Medication 1 MILLIGRAM(S): at 11:36

## 2019-04-18 RX ADMIN — Medication 500 MILLIGRAM(S): at 17:38

## 2019-04-18 RX ADMIN — Medication 1 PATCH: at 19:47

## 2019-04-18 RX ADMIN — Medication 100 MILLIGRAM(S): at 13:17

## 2019-04-18 RX ADMIN — POLYETHYLENE GLYCOL 3350 17 GRAM(S): 17 POWDER, FOR SOLUTION ORAL at 17:38

## 2019-04-18 RX ADMIN — OXYCODONE HYDROCHLORIDE 20 MILLIGRAM(S): 5 TABLET ORAL at 08:33

## 2019-04-18 RX ADMIN — OXYCODONE HYDROCHLORIDE 20 MILLIGRAM(S): 5 TABLET ORAL at 21:35

## 2019-04-18 RX ADMIN — Medication 1 TABLET(S): at 13:18

## 2019-04-18 RX ADMIN — GABAPENTIN 800 MILLIGRAM(S): 400 CAPSULE ORAL at 21:06

## 2019-04-18 RX ADMIN — CELECOXIB 200 MILLIGRAM(S): 200 CAPSULE ORAL at 12:15

## 2019-04-18 RX ADMIN — CYCLOBENZAPRINE HYDROCHLORIDE 10 MILLIGRAM(S): 10 TABLET, FILM COATED ORAL at 08:28

## 2019-04-18 RX ADMIN — Medication 100 MILLIGRAM(S): at 21:20

## 2019-04-18 RX ADMIN — OXYCODONE HYDROCHLORIDE 20 MILLIGRAM(S): 5 TABLET ORAL at 09:10

## 2019-04-18 RX ADMIN — CYCLOBENZAPRINE HYDROCHLORIDE 10 MILLIGRAM(S): 10 TABLET, FILM COATED ORAL at 18:19

## 2019-04-18 RX ADMIN — OXYCODONE HYDROCHLORIDE 40 MILLIGRAM(S): 5 TABLET ORAL at 06:52

## 2019-04-18 RX ADMIN — Medication 1 PATCH: at 11:36

## 2019-04-18 RX ADMIN — OXYCODONE HYDROCHLORIDE 40 MILLIGRAM(S): 5 TABLET ORAL at 06:15

## 2019-04-18 RX ADMIN — POLYETHYLENE GLYCOL 3350 17 GRAM(S): 17 POWDER, FOR SOLUTION ORAL at 06:26

## 2019-04-18 RX ADMIN — Medication 100 MILLIGRAM(S): at 06:14

## 2019-04-18 RX ADMIN — Medication 0.6 MILLIGRAM(S): at 13:17

## 2019-04-18 RX ADMIN — CELECOXIB 200 MILLIGRAM(S): 200 CAPSULE ORAL at 11:36

## 2019-04-18 RX ADMIN — SENNA PLUS 2 TABLET(S): 8.6 TABLET ORAL at 21:22

## 2019-04-18 RX ADMIN — OXYCODONE HYDROCHLORIDE 20 MILLIGRAM(S): 5 TABLET ORAL at 04:10

## 2019-04-18 RX ADMIN — OXYCODONE HYDROCHLORIDE 20 MILLIGRAM(S): 5 TABLET ORAL at 03:36

## 2019-04-18 RX ADMIN — Medication 1 TABLET(S): at 06:14

## 2019-04-18 RX ADMIN — OXYCODONE HYDROCHLORIDE 40 MILLIGRAM(S): 5 TABLET ORAL at 18:46

## 2019-04-18 RX ADMIN — Medication 10 MILLIGRAM(S): at 08:28

## 2019-04-18 RX ADMIN — Medication 1 TABLET(S): at 07:55

## 2019-04-18 RX ADMIN — Medication 1 TABLET(S): at 17:38

## 2019-04-18 RX ADMIN — OXYCODONE HYDROCHLORIDE 20 MILLIGRAM(S): 5 TABLET ORAL at 21:03

## 2019-04-18 RX ADMIN — Medication 1 BOTTLE: at 21:19

## 2019-04-18 RX ADMIN — CYCLOBENZAPRINE HYDROCHLORIDE 10 MILLIGRAM(S): 10 TABLET, FILM COATED ORAL at 03:36

## 2019-04-18 RX ADMIN — ENOXAPARIN SODIUM 40 MILLIGRAM(S): 100 INJECTION SUBCUTANEOUS at 11:37

## 2019-04-18 RX ADMIN — Medication 500 MILLIGRAM(S): at 06:14

## 2019-04-18 RX ADMIN — Medication 1 PATCH: at 12:22

## 2019-04-18 RX ADMIN — Medication 300 MILLIGRAM(S): at 17:38

## 2019-04-18 RX ADMIN — GABAPENTIN 800 MILLIGRAM(S): 400 CAPSULE ORAL at 13:20

## 2019-04-18 RX ADMIN — OXYCODONE HYDROCHLORIDE 40 MILLIGRAM(S): 5 TABLET ORAL at 17:38

## 2019-04-18 RX ADMIN — Medication 10 MILLIGRAM(S): at 11:36

## 2019-04-18 NOTE — PROGRESS NOTE ADULT - ASSESSMENT
43 F w/ history of IVDA, alcohol abuse, hx of pancreatitis, alcohol hepatitis, alcohol withdrawal, left frontoparietal subdural hematoma 2008 without need for neurosurgical intervention presented with severe sepsis with septic shock secondary to MRSA bacteremia w/ RLL PNA, UTI, endocarditis on ILZ and EFRAIN that has resolved and was diagnosis w/ HCV. Pt was initially intubated due to respiratory failure and put on pressors in ICU. She as extubated on 2/25 and transferred from ICU the following day. Pt had a C4-5 ACDF with irrigation and debridement on 3/20 due to spinal abscess and osteo and was kept intubated post procedure and transferred again to ICU, where she was extubated on 3/21 and transferred back to the med service the following day. HCV- Outpatient follow up for HCV and opioid maintenance outpt re-enrollment program    Septic arthritis involving BL Knees/hips;  - MRI of bilateral hips and knees were done demonstrating effusions and an abscess located in the right rectus femoris.   - Bilateral knees were aspirated by orthopedic team. on 3/28/19 ngtd   bilateral hips aspirated by orthopedic team on 3/29/19  ngtd but cppd crystals seen c/w psuedo gout  - as per ortho-No plan for orthopaedic operative intervention at this time. Should cultures grow pt will require operative intervention         Pseudogout: colchicine     septic shock secondary to MRSA bacteremia w/ RLL PNA, UTI, endocarditis on LIZ at Tricuspid valve   - resolved     Endocarditis of tricuspid valve  - status post thoracentesis on 3/20  - as per ID, patient is on dapto & Zyvox   - repeat BC negative from 3/21, repeated blood cx on 4/2/19  ngtd     - repeat echo showed a pedunculated mobile mass seen adherent to lateral leaflet of the tricuspid valve w/ no interval changes noted in valve structure or regurgitation    - repeated echo from 4/2/19 shows stable TR vegetation      Spinal osteo, abscess and cord compression    - CT of head and neck showed disc space narrowing and endplate irregularity at C4-5 which may represent typical degenerative change vs discitis   - MRI of the cervical spine w/ contrast showed C2-C7 discitis/osteomyelitis with a small right of midline ventral epidural abscess at C2-C7  - MRI of the thoracolumbar spine showed discitis/osteomyelitis at T11-T12 with paravertebral abscess, as well as discitis/osteomyelitis C6--T1, T9-T10, T10-T11, L4-L5, and L5-S1, and a phlegmon/early abscess dorsolaterally within the lumbar canal at the level of L4-L5. There was also a septic arthritis of  the left sternoclavicular joint space. There was also disc herniation at the L4-L5 level with contact and probable impingement of the descending right-sided nerve roots  - status post s/p C4-5 ACDF with irrigation and debridement on 3/20  - c/w gabapentin and lidocaine patch  - wound cx grew MRSA  - c/w [place] J  collar   - as per ID, patient is on vanco till may 15 2019  on 4/14/19 had mri t and l spine per orthofor stability see dr. muñoz note from 4/15/19        Bacteremia due to methicillin resistant Staphylococcus aureus with IE: diskitis/OM /Spinal abscesses /Parapneumonic effusion   - most recent blood cx from 3/21/19 negative   - s/p thoracentesis : fluids neg    Moderate protein-calorie malnutrition  - c/w soft diet w/Ensure Enlive 3 x day         edema to lower extremities checked dopplers on 4/1/19 and no dvt    started dvt prophylaxis with Lovenox per my d/w orthopedic . on 4/1/19   prealbumin is low c/w moderate malnutrition        IVDrug abuse  -tapering methadone    - - cont celebrex, gabapentin, oxycodone, Oxycontin to 40 mg bid    Anemia due multiple issues, surgery , blood draws,   poor nutrition and infection  - status post a few units of blood during this extensive hospital stay last one on 3/20/19  continue to monitor hgb , hgb 8.9 stable      - c/w folic acid    constipation: 43 F w/ history of IVDA, alcohol abuse, hx of pancreatitis, alcohol hepatitis, alcohol withdrawal, left frontoparietal subdural hematoma 2008 without need for neurosurgical intervention presented with severe sepsis with septic shock secondary to MRSA bacteremia w/ RLL PNA, UTI, endocarditis on LIZ and EFRAIN that has resolved and was diagnosis w/ HCV. Pt was initially intubated due to respiratory failure and put on pressors in ICU. She as extubated on 2/25 and transferred from ICU the following day. Pt had a C4-5 ACDF with irrigation and debridement on 3/20 due to spinal abscess and osteo and was kept intubated post procedure and transferred again to ICU, where she was extubated on 3/21 and transferred back to the med service the following day. HCV- Outpatient follow up for HCV and opioid maintenance outpt re-enrollment program    Septic arthritis involving BL Knees/hips;  - MRI of bilateral hips and knees were done demonstrating effusions and an abscess located in the right rectus femoris.   - Bilateral knees were aspirated by orthopedic team. on 3/28/19 ngtd   bilateral hips aspirated by orthopedic team on 3/29/19  ngtd but cppd crystals seen c/w psuedo gout  - as per ortho-No plan for orthopaedic operative intervention at this time. Should cultures grow pt will require operative intervention         Pseudogout: colchicine     septic shock secondary to MRSA bacteremia w/ RLL PNA, UTI, endocarditis on LIZ at Tricuspid valve   - resolved     Endocarditis of tricuspid valve  - status post thoracentesis on 3/20  - as per ID, patient is on dapto & Zyvox   - repeat BC negative from 3/21, repeated blood cx on 4/2/19  ngtd     - repeat echo showed a pedunculated mobile mass seen adherent to lateral leaflet of the tricuspid valve w/ no interval changes noted in valve structure or regurgitation    - repeated echo from 4/2/19 shows stable TR vegetation      Spinal osteo, abscess and cord compression    - CT of head and neck showed disc space narrowing and endplate irregularity at C4-5 which may represent typical degenerative change vs discitis   - MRI of the cervical spine w/ contrast showed C2-C7 discitis/osteomyelitis with a small right of midline ventral epidural abscess at C2-C7  - MRI of the thoracolumbar spine showed discitis/osteomyelitis at T11-T12 with paravertebral abscess, as well as discitis/osteomyelitis C6--T1, T9-T10, T10-T11, L4-L5, and L5-S1, and a phlegmon/early abscess dorsolaterally within the lumbar canal at the level of L4-L5. There was also a septic arthritis of  the left sternoclavicular joint space. There was also disc herniation at the L4-L5 level with contact and probable impingement of the descending right-sided nerve roots  - status post s/p C4-5 ACDF with irrigation and debridement on 3/20  - c/w gabapentin and lidocaine patch  - wound cx grew MRSA  - c/w [place] J  collar   - as per ID, patient is on vanco till may 15 2019  on 4/14/19 had mri t and l spine per orthofor stability see dr. muñoz note from 4/15/19        Bacteremia due to methicillin resistant Staphylococcus aureus with IE: diskitis/OM /Spinal abscesses /Parapneumonic effusion   - most recent blood cx from 3/21/19 negative   - s/p thoracentesis : fluids neg    Moderate protein-calorie malnutrition  - c/w soft diet w/Ensure Enlive 3 x day         edema to lower extremities checked dopplers on 4/1/19 and no dvt    started dvt prophylaxis with Lovenox per my d/w orthopedic . on 4/1/19   prealbumin is low c/w moderate malnutrition        IVDrug abuse  -tapering methadone    - - cont celebrex, gabapentin, oxycodone, Oxycontin to 40 mg bid    Anemia due multiple issues, surgery , blood draws,   poor nutrition and infection  - status post a few units of blood during this extensive hospital stay last one on 3/20/19  continue to monitor hgb , hgb 8.9 stable      - c/w folic acid    constipation: c/o constipation but wants to dictate what meds she takes. etc.

## 2019-04-18 NOTE — PROGRESS NOTE ADULT - SUBJECTIVE AND OBJECTIVE BOX
Patient is a 43y old  Female who presents with a chief complaint of AMS (18 Apr 2019 11:17)      INTERVAL HPI / OVERNIGHT EVENTS: no new c/o    MEDICATIONS  (STANDING):  ascorbic acid 500 milliGRAM(s) Oral two times a day  calcium carbonate 1250 mG  + Vitamin D (OsCal 500 + D) 1 Tablet(s) Oral three times a day  celecoxib 200 milliGRAM(s) Oral daily  chlorhexidine 4% Liquid 1 Application(s) Topical <User Schedule>  colchicine 0.6 milliGRAM(s) Oral daily  docusate sodium 100 milliGRAM(s) Oral three times a day  enoxaparin Injectable 40 milliGRAM(s) SubCutaneous daily  folic acid 1 milliGRAM(s) Oral daily  gabapentin 800 milliGRAM(s) Oral three times a day  lactobacillus acidophilus 1 Tablet(s) Oral two times a day with meals  lidocaine   Patch 1 Patch Transdermal every 24 hours  lidocaine   Patch 1 Patch Transdermal every 24 hours  methadone    Tablet 20 milliGRAM(s) Oral daily  multivitamin 1 Tablet(s) Oral daily  nicotine -  14 mG/24Hr(s) Patch 1 patch Transdermal daily  nystatin Powder 1 Application(s) Topical three times a day  oxyCODONE  ER Tablet 40 milliGRAM(s) Oral every 12 hours  pantoprazole    Tablet 40 milliGRAM(s) Oral before breakfast  polyethylene glycol 3350 17 Gram(s) Oral two times a day  senna 2 Tablet(s) Oral at bedtime  vancomycin  IVPB 1500 milliGRAM(s) IV Intermittent every 12 hours    MEDICATIONS  (PRN):  acetaminophen   Tablet .. 650 milliGRAM(s) Oral every 6 hours PRN Mild Pain (1 - 3)  acetaminophen   Tablet .. 650 milliGRAM(s) Oral every 6 hours PRN Temp greater or equal to 38C (100.4F)  bisacodyl Suppository 10 milliGRAM(s) Rectal daily PRN Constipation  cyclobenzaprine 10 milliGRAM(s) Oral three times a day PRN Muscle Spasm  diphenhydrAMINE 25 milliGRAM(s) Oral every 4 hours PRN Rash and/or Itching  oxyCODONE    IR 20 milliGRAM(s) Oral every 4 hours PRN Moderate Pain (4 - 6)      Vital Signs Last 24 Hrs  T(C): 36.7 (18 Apr 2019 11:15), Max: 36.7 (17 Apr 2019 18:21)  T(F): 98.1 (18 Apr 2019 11:15), Max: 98.1 (18 Apr 2019 11:15)  HR: 89 (18 Apr 2019 11:15) (89 - 102)  BP: 104/60 (18 Apr 2019 11:15) (101/65 - 122/75)  BP(mean): --  RR: 16 (18 Apr 2019 11:15) (16 - 18)  SpO2: 99% (18 Apr 2019 11:15) (98% - 100%)    Review of systems:  General : no fever /chills,fatigue,back pain  CVS : no chest pain, palpitations  Lungs : no shortness of breath, cough  GI : no abdominal pain,vomiting, diarrhea   : no dysuria,hematuria        PHYSICAL EXAM:  General :NAD,neck collar  Constitutional:  well-groomed, well-developed  Respiratory: CTAB/L  Cardiovascular: S1 and S2, RRR,murmur  Gastrointestinal: BS+, soft, NT/ND  Extremities: No peripheral edema  Vascular: 2+ peripheral pulses  Skin: No rashes      LABS:                        8.9    6.09  )-----------( 259      ( 18 Apr 2019 10:28 )             29.1     04-18    137  |  101  |  20  ----------------------------<  111<H>  4.4   |  30  |  0.42<L>    Ca    9.4      18 Apr 2019 10:28      Vanco level 9.8      MICROBIOLOGY:  RECENT CULTURES:        RADIOLOGY & ADDITIONAL STUDIES:

## 2019-04-18 NOTE — PROGRESS NOTE ADULT - SUBJECTIVE AND OBJECTIVE BOX
Patient is a 43y old  Female who presents with a chief complaint of AMS (31 Mar 2019 07:49)      OVERNIGHT EVENTS:  in bed in nad     MEDICATIONS  (STANDING):  ascorbic acid 500 milliGRAM(s) Oral two times a day  bisacodyl Suppository 10 milliGRAM(s) Rectal daily  calcium carbonate 1250 mG  + Vitamin D (OsCal 500 + D) 1 Tablet(s) Oral three times a day  celecoxib 200 milliGRAM(s) Oral daily  chlorhexidine 4% Liquid 1 Application(s) Topical <User Schedule>  colchicine 0.6 milliGRAM(s) Oral daily  docusate sodium 100 milliGRAM(s) Oral three times a day  enoxaparin Injectable 40 milliGRAM(s) SubCutaneous daily  folic acid 1 milliGRAM(s) Oral daily  gabapentin 800 milliGRAM(s) Oral three times a day  lactobacillus acidophilus 1 Tablet(s) Oral two times a day with meals  lidocaine   Patch 1 Patch Transdermal every 24 hours  lidocaine   Patch 1 Patch Transdermal every 24 hours  methadone    Tablet 20 milliGRAM(s) Oral daily  multivitamin 1 Tablet(s) Oral daily  nicotine -  14 mG/24Hr(s) Patch 1 patch Transdermal daily  nystatin Powder 1 Application(s) Topical three times a day  oxyCODONE  ER Tablet 40 milliGRAM(s) Oral every 12 hours  pantoprazole    Tablet 40 milliGRAM(s) Oral before breakfast  polyethylene glycol 3350 17 Gram(s) Oral two times a day  senna 2 Tablet(s) Oral at bedtime  vancomycin  IVPB 1000 milliGRAM(s) IV Intermittent every 12 hours    MEDICATIONS  (PRN):  acetaminophen   Tablet .. 650 milliGRAM(s) Oral every 6 hours PRN Mild Pain (1 - 3)  acetaminophen   Tablet .. 650 milliGRAM(s) Oral every 6 hours PRN Temp greater or equal to 38C (100.4F)  cyclobenzaprine 10 milliGRAM(s) Oral three times a day PRN Muscle Spasm  diphenhydrAMINE 25 milliGRAM(s) Oral every 4 hours PRN Rash and/or Itching  oxyCODONE    IR 20 milliGRAM(s) Oral every 4 hours PRN Moderate Pain (4 - 6)    Allergies    penicillin (Short breath; Hives)    Intolerances    Vital Signs Last 24 Hrs  T(C): 36.7 (18 Apr 2019 11:15), Max: 37.3 (17 Apr 2019 12:15)  T(F): 98.1 (18 Apr 2019 11:15), Max: 99.2 (17 Apr 2019 12:15)  HR: 89 (18 Apr 2019 11:15) (89 - 102)  BP: 104/60 (18 Apr 2019 11:15) (101/65 - 122/75)  BP(mean): --  RR: 16 (18 Apr 2019 11:15) (16 - 18)  SpO2: 99% (18 Apr 2019 11:15) (97% - 100%)    PHYSICAL EXAM:  GENERAL: in chair in nad  well-groomed, well-developed  HEAD:  Atraumatic, Normocephalic  EYES: EOMI, PERRLA, conjunctiva and sclera clear  ENMT: No tonsillar erythema, exudates, or enlargement; Moist mucous membranes, Good dentition, No lesions, cervical collar in place   NECK: Supple, No JVD, Normal thyroid  NERVOUS SYSTEM:  Alert & Oriented X3, Good concentration; Motor Strength 4/5 B/L upper and lower extremities;  CHEST/LUNG: Clear to percussion bilaterally; No rales, rhonchi, wheezing, or rubs BL  HEART: Regular rate and rhythm; No murmurs, rubs, or gallops  ABDOMEN: Soft, mild non specific tenderness Nondistended; Bowel sounds present  BACK; Spinal tenderness  EXTREMITIES:  2+ Peripheral Pulses, No clubbing, cyanosis, ++ edema to lower extremities LE,   BL Hip/knees TTP, R knee swollen  SKIN: No rashes or lesions      LABS:                                        8.9    6.09  )-----------( 259      ( 18 Apr 2019 10:28 )             29.1   04-18    137  |  101  |  20  ----------------------------<  111<H>  4.4   |  30  |  0.42<L>    Ca    9.4          RADIOLOGY & ADDITIONAL TESTS:        Consultant(s) Notes Reviewed:  [x ] YES     Care Discussed with [ x] Consultants [X ] Patient [ ] Family  [x ]    [x ]  Other; RN Patient is a 43y old  Female who presents with a chief complaint of AMS (31 Mar 2019 07:49)      OVERNIGHT EVENTS:  in bed in nad     MEDICATIONS  (STANDING):  ascorbic acid 500 milliGRAM(s) Oral two times a day  bisacodyl Suppository 10 milliGRAM(s) Rectal daily  calcium carbonate 1250 mG  + Vitamin D (OsCal 500 + D) 1 Tablet(s) Oral three times a day  celecoxib 200 milliGRAM(s) Oral daily  chlorhexidine 4% Liquid 1 Application(s) Topical <User Schedule>  colchicine 0.6 milliGRAM(s) Oral daily  docusate sodium 100 milliGRAM(s) Oral three times a day  enoxaparin Injectable 40 milliGRAM(s) SubCutaneous daily  folic acid 1 milliGRAM(s) Oral daily  gabapentin 800 milliGRAM(s) Oral three times a day  lactobacillus acidophilus 1 Tablet(s) Oral two times a day with meals  lidocaine   Patch 1 Patch Transdermal every 24 hours  lidocaine   Patch 1 Patch Transdermal every 24 hours  methadone    Tablet 20 milliGRAM(s) Oral daily  multivitamin 1 Tablet(s) Oral daily  nicotine -  14 mG/24Hr(s) Patch 1 patch Transdermal daily  nystatin Powder 1 Application(s) Topical three times a day  oxyCODONE  ER Tablet 40 milliGRAM(s) Oral every 12 hours  pantoprazole    Tablet 40 milliGRAM(s) Oral before breakfast  polyethylene glycol 3350 17 Gram(s) Oral two times a day  senna 2 Tablet(s) Oral at bedtime  vancomycin  IVPB 1000 milliGRAM(s) IV Intermittent every 12 hours    MEDICATIONS  (PRN):  acetaminophen   Tablet .. 650 milliGRAM(s) Oral every 6 hours PRN Mild Pain (1 - 3)  acetaminophen   Tablet .. 650 milliGRAM(s) Oral every 6 hours PRN Temp greater or equal to 38C (100.4F)  cyclobenzaprine 10 milliGRAM(s) Oral three times a day PRN Muscle Spasm  diphenhydrAMINE 25 milliGRAM(s) Oral every 4 hours PRN Rash and/or Itching  oxyCODONE    IR 20 milliGRAM(s) Oral every 4 hours PRN Moderate Pain (4 - 6)    Allergies    penicillin (Short breath; Hives)    Intolerances    Vital Signs Last 24 Hrs  T(C): 36.7 (18 Apr 2019 11:15), Max: 37.3 (17 Apr 2019 12:15)  T(F): 98.1 (18 Apr 2019 11:15), Max: 99.2 (17 Apr 2019 12:15)  HR: 89 (18 Apr 2019 11:15) (89 - 102)  BP: 104/60 (18 Apr 2019 11:15) (101/65 - 122/75)  BP(mean): --  RR: 16 (18 Apr 2019 11:15) (16 - 18)  SpO2: 99% (18 Apr 2019 11:15) (97% - 100%)    PHYSICAL EXAM:  GENERAL: in chair in nad  well-groomed, well-developed  HEAD:  Atraumatic, Normocephalic  EYES: EOMI, PERRLA, conjunctiva and sclera clear  ENMT: No tonsillar erythema, exudates, or enlargement; Moist mucous membranes, Good dentition, No lesions, cervical collar in place   NECK: Supple, No JVD, Normal thyroid  NERVOUS SYSTEM:  Alert & Oriented X3, Good concentration; Motor Strength 4/5 B/L upper and lower extremities;  CHEST/LUNG: Clear to percussion bilaterally; No rales, rhonchi, wheezing, or rubs BL  HEART: Regular rate and rhythm; No murmurs, rubs, or gallops  ABDOMEN: Soft, mild non specific tenderness Nondistended; Bowel sounds present  BACK; Spinal tenderness  EXTREMITIES:  2+ Peripheral Pulses, No clubbing, cyanosis,  improved edema to lower extremities LE,   BL Hip/knees TTP, R knee swollen  SKIN: No rashes or lesions      LABS:                                        8.9    6.09  )-----------( 259      ( 18 Apr 2019 10:28 )             29.1   04-18    137  |  101  |  20  ----------------------------<  111<H>  4.4   |  30  |  0.42<L>    Ca    9.4          RADIOLOGY & ADDITIONAL TESTS:        Consultant(s) Notes Reviewed:  [x ] YES     Care Discussed with [ x] Consultants [X ] Patient [ ] Family  [x ]    [x ]  Other; RN

## 2019-04-18 NOTE — PROGRESS NOTE ADULT - PROBLEM SELECTOR PLAN 1
s/p C4-5 ACDF with irrigation and debridement   OR c/s MRSA   change vanco to 1500 mg IV q 12hrs and check level before the fourth dose   if less than 15 will switch back to daptomycin as pt 's level are fluctuating and its very important that pt has good therapeutic dosage to take care of the disease burden  vanco level to be done tomorrow and then will check vanco level , BMP q two times a week check CBC q weekly   cont antibiotics  till 5/15/19 (8 weeks from initial diagnosis on MRI)  will need repeat MRI at that time and will discuss with Dr Leslie before finishing antibiotics

## 2019-04-19 PROCEDURE — 99233 SBSQ HOSP IP/OBS HIGH 50: CPT

## 2019-04-19 RX ORDER — LACTULOSE 10 G/15ML
10 SOLUTION ORAL
Qty: 0 | Refills: 0 | Status: DISCONTINUED | OUTPATIENT
Start: 2019-04-19 | End: 2019-04-19

## 2019-04-19 RX ORDER — LACTULOSE 10 G/15ML
10 SOLUTION ORAL
Qty: 0 | Refills: 0 | Status: COMPLETED | OUTPATIENT
Start: 2019-04-19 | End: 2019-04-22

## 2019-04-19 RX ORDER — VANCOMYCIN HCL 1 G
1000 VIAL (EA) INTRAVENOUS EVERY 8 HOURS
Qty: 0 | Refills: 0 | Status: DISCONTINUED | OUTPATIENT
Start: 2019-04-19 | End: 2019-04-27

## 2019-04-19 RX ADMIN — Medication 100 MILLIGRAM(S): at 13:15

## 2019-04-19 RX ADMIN — SENNA PLUS 2 TABLET(S): 8.6 TABLET ORAL at 22:42

## 2019-04-19 RX ADMIN — LIDOCAINE 1 PATCH: 4 CREAM TOPICAL at 23:59

## 2019-04-19 RX ADMIN — Medication 500 MILLIGRAM(S): at 17:51

## 2019-04-19 RX ADMIN — OXYCODONE HYDROCHLORIDE 20 MILLIGRAM(S): 5 TABLET ORAL at 22:57

## 2019-04-19 RX ADMIN — CYCLOBENZAPRINE HYDROCHLORIDE 10 MILLIGRAM(S): 10 TABLET, FILM COATED ORAL at 08:48

## 2019-04-19 RX ADMIN — LIDOCAINE 1 PATCH: 4 CREAM TOPICAL at 11:32

## 2019-04-19 RX ADMIN — Medication 1 TABLET(S): at 17:51

## 2019-04-19 RX ADMIN — GABAPENTIN 800 MILLIGRAM(S): 400 CAPSULE ORAL at 13:12

## 2019-04-19 RX ADMIN — GABAPENTIN 800 MILLIGRAM(S): 400 CAPSULE ORAL at 22:42

## 2019-04-19 RX ADMIN — Medication 100 MILLIGRAM(S): at 05:06

## 2019-04-19 RX ADMIN — NYSTATIN CREAM 1 APPLICATION(S): 100000 CREAM TOPICAL at 22:42

## 2019-04-19 RX ADMIN — Medication 650 MILLIGRAM(S): at 12:28

## 2019-04-19 RX ADMIN — Medication 1 TABLET(S): at 22:42

## 2019-04-19 RX ADMIN — Medication 100 MILLIGRAM(S): at 22:42

## 2019-04-19 RX ADMIN — POLYETHYLENE GLYCOL 3350 17 GRAM(S): 17 POWDER, FOR SOLUTION ORAL at 05:05

## 2019-04-19 RX ADMIN — CHLORHEXIDINE GLUCONATE 1 APPLICATION(S): 213 SOLUTION TOPICAL at 05:06

## 2019-04-19 RX ADMIN — Medication 1 PATCH: at 17:48

## 2019-04-19 RX ADMIN — LACTULOSE 10 GRAM(S): 10 SOLUTION ORAL at 14:12

## 2019-04-19 RX ADMIN — CELECOXIB 200 MILLIGRAM(S): 200 CAPSULE ORAL at 12:14

## 2019-04-19 RX ADMIN — OXYCODONE HYDROCHLORIDE 40 MILLIGRAM(S): 5 TABLET ORAL at 17:51

## 2019-04-19 RX ADMIN — OXYCODONE HYDROCHLORIDE 20 MILLIGRAM(S): 5 TABLET ORAL at 14:00

## 2019-04-19 RX ADMIN — Medication 250 MILLIGRAM(S): at 22:42

## 2019-04-19 RX ADMIN — Medication 300 MILLIGRAM(S): at 05:06

## 2019-04-19 RX ADMIN — OXYCODONE HYDROCHLORIDE 20 MILLIGRAM(S): 5 TABLET ORAL at 08:48

## 2019-04-19 RX ADMIN — LACTULOSE 10 GRAM(S): 10 SOLUTION ORAL at 17:50

## 2019-04-19 RX ADMIN — NYSTATIN CREAM 1 APPLICATION(S): 100000 CREAM TOPICAL at 13:13

## 2019-04-19 RX ADMIN — Medication 0.6 MILLIGRAM(S): at 11:42

## 2019-04-19 RX ADMIN — ENOXAPARIN SODIUM 40 MILLIGRAM(S): 100 INJECTION SUBCUTANEOUS at 11:42

## 2019-04-19 RX ADMIN — PANTOPRAZOLE SODIUM 40 MILLIGRAM(S): 20 TABLET, DELAYED RELEASE ORAL at 11:33

## 2019-04-19 RX ADMIN — GABAPENTIN 800 MILLIGRAM(S): 400 CAPSULE ORAL at 05:06

## 2019-04-19 RX ADMIN — LIDOCAINE 1 PATCH: 4 CREAM TOPICAL at 07:40

## 2019-04-19 RX ADMIN — Medication 1 TABLET(S): at 11:42

## 2019-04-19 RX ADMIN — Medication 650 MILLIGRAM(S): at 13:14

## 2019-04-19 RX ADMIN — OXYCODONE HYDROCHLORIDE 20 MILLIGRAM(S): 5 TABLET ORAL at 13:13

## 2019-04-19 RX ADMIN — OXYCODONE HYDROCHLORIDE 20 MILLIGRAM(S): 5 TABLET ORAL at 21:00

## 2019-04-19 RX ADMIN — OXYCODONE HYDROCHLORIDE 20 MILLIGRAM(S): 5 TABLET ORAL at 02:30

## 2019-04-19 RX ADMIN — Medication 1 TABLET(S): at 05:06

## 2019-04-19 RX ADMIN — Medication 1 ENEMA: at 17:51

## 2019-04-19 RX ADMIN — OXYCODONE HYDROCHLORIDE 20 MILLIGRAM(S): 5 TABLET ORAL at 02:00

## 2019-04-19 RX ADMIN — CYCLOBENZAPRINE HYDROCHLORIDE 10 MILLIGRAM(S): 10 TABLET, FILM COATED ORAL at 02:00

## 2019-04-19 RX ADMIN — NYSTATIN CREAM 1 APPLICATION(S): 100000 CREAM TOPICAL at 05:06

## 2019-04-19 RX ADMIN — POLYETHYLENE GLYCOL 3350 17 GRAM(S): 17 POWDER, FOR SOLUTION ORAL at 17:51

## 2019-04-19 RX ADMIN — LIDOCAINE 1 PATCH: 4 CREAM TOPICAL at 07:45

## 2019-04-19 RX ADMIN — Medication 1 TABLET(S): at 07:51

## 2019-04-19 RX ADMIN — OXYCODONE HYDROCHLORIDE 20 MILLIGRAM(S): 5 TABLET ORAL at 20:30

## 2019-04-19 RX ADMIN — Medication 1 PATCH: at 11:33

## 2019-04-19 RX ADMIN — METHADONE HYDROCHLORIDE 20 MILLIGRAM(S): 40 TABLET ORAL at 11:42

## 2019-04-19 RX ADMIN — LIDOCAINE 1 PATCH: 4 CREAM TOPICAL at 00:08

## 2019-04-19 RX ADMIN — LIDOCAINE 1 PATCH: 4 CREAM TOPICAL at 00:07

## 2019-04-19 RX ADMIN — OXYCODONE HYDROCHLORIDE 20 MILLIGRAM(S): 5 TABLET ORAL at 09:33

## 2019-04-19 RX ADMIN — Medication 1 PATCH: at 07:41

## 2019-04-19 RX ADMIN — OXYCODONE HYDROCHLORIDE 40 MILLIGRAM(S): 5 TABLET ORAL at 18:45

## 2019-04-19 RX ADMIN — Medication 1 PATCH: at 11:42

## 2019-04-19 RX ADMIN — CELECOXIB 200 MILLIGRAM(S): 200 CAPSULE ORAL at 11:42

## 2019-04-19 RX ADMIN — OXYCODONE HYDROCHLORIDE 40 MILLIGRAM(S): 5 TABLET ORAL at 05:05

## 2019-04-19 RX ADMIN — Medication 1 MILLIGRAM(S): at 11:42

## 2019-04-19 RX ADMIN — Medication 1 TABLET(S): at 13:13

## 2019-04-19 RX ADMIN — Medication 500 MILLIGRAM(S): at 05:06

## 2019-04-19 NOTE — PROGRESS NOTE ADULT - SUBJECTIVE AND OBJECTIVE BOX
Patient is a 43y old  Female who presents with a chief complaint of AMS (31 Mar 2019 07:49)      OVERNIGHT EVENTS:  in bed in nad     MEDICATIONS  (STANDING):  ascorbic acid 500 milliGRAM(s) Oral two times a day  calcium carbonate 1250 mG  + Vitamin D (OsCal 500 + D) 1 Tablet(s) Oral three times a day  celecoxib 200 milliGRAM(s) Oral daily  chlorhexidine 4% Liquid 1 Application(s) Topical <User Schedule>  colchicine 0.6 milliGRAM(s) Oral daily  docusate sodium 100 milliGRAM(s) Oral three times a day  enoxaparin Injectable 40 milliGRAM(s) SubCutaneous daily  folic acid 1 milliGRAM(s) Oral daily  gabapentin 800 milliGRAM(s) Oral three times a day  lactobacillus acidophilus 1 Tablet(s) Oral two times a day with meals  lidocaine   Patch 1 Patch Transdermal every 24 hours  lidocaine   Patch 1 Patch Transdermal every 24 hours  methadone    Tablet 20 milliGRAM(s) Oral daily  multivitamin 1 Tablet(s) Oral daily  nicotine -  14 mG/24Hr(s) Patch 1 patch Transdermal daily  nystatin Powder 1 Application(s) Topical three times a day  oxyCODONE  ER Tablet 40 milliGRAM(s) Oral every 12 hours  pantoprazole    Tablet 40 milliGRAM(s) Oral before breakfast  polyethylene glycol 3350 17 Gram(s) Oral two times a day  senna 2 Tablet(s) Oral at bedtime  vancomycin  IVPB 1500 milliGRAM(s) IV Intermittent every 12 hours    MEDICATIONS  (PRN):  acetaminophen   Tablet .. 650 milliGRAM(s) Oral every 6 hours PRN Mild Pain (1 - 3)  acetaminophen   Tablet .. 650 milliGRAM(s) Oral every 6 hours PRN Temp greater or equal to 38C (100.4F)  bisacodyl Suppository 10 milliGRAM(s) Rectal daily PRN Constipation  cyclobenzaprine 10 milliGRAM(s) Oral three times a day PRN Muscle Spasm  diphenhydrAMINE 25 milliGRAM(s) Oral every 4 hours PRN Rash and/or Itching  oxyCODONE    IR 20 milliGRAM(s) Oral every 4 hours PRN Moderate Pain (4 - 6)    Allergies    penicillin (Short breath; Hives)    Intolerances      Vital Signs Last 24 Hrs  T(C): 36.8 (19 Apr 2019 05:42), Max: 36.8 (19 Apr 2019 05:42)  T(F): 98.2 (19 Apr 2019 05:42), Max: 98.2 (19 Apr 2019 05:42)  HR: 92 (19 Apr 2019 05:42) (89 - 96)  BP: 97/59 (19 Apr 2019 05:42) (97/59 - 108/72)  BP(mean): --  RR: 18 (19 Apr 2019 05:42) (18 - 19)  SpO2: 99% (19 Apr 2019 05:42) (97% - 99%)    PHYSICAL EXAM:  GENERAL: in chair in nad  well-groomed, well-developed  HEAD:  Atraumatic, Normocephalic  EYES: EOMI, PERRLA, conjunctiva and sclera clear  ENMT: No tonsillar erythema, exudates, or enlargement; Moist mucous membranes, Good dentition, No lesions, cervical collar in place   NECK: Supple, No JVD, Normal thyroid  NERVOUS SYSTEM:  Alert & Oriented X3, Good concentration; Motor Strength 4/5 B/L upper and lower extremities;  CHEST/LUNG: Clear to percussion bilaterally; No rales, rhonchi, wheezing, or rubs BL  HEART: Regular rate and rhythm; No murmurs, rubs, or gallops  ABDOMEN: Soft, mild non specific tenderness Nondistended; Bowel sounds present  BACK; Spinal tenderness  EXTREMITIES:  2+ Peripheral Pulses, No clubbing, cyanosis,  improved edema to lower extremities LE,   BL Hip/knees TTP, R knee swollen  SKIN: No rashes or lesions      LABS:                                      8.9    6.09  )-----------( 259      ( 18 Apr 2019 10:28 )             29.1     04-18    137  |  101  |  20  ----------------------------<  111<H>  4.4   |  30  |  0.42<L>    Ca    9.4      18 Apr 2019 10:28               RADIOLOGY & ADDITIONAL TESTS:        Consultant(s) Notes Reviewed:  [x ] YES     Care Discussed with [ x] Consultants [X ] Patient [ ] Family  [x ]    [x ]  Other; RN Patient is a 43y old  Female who presents with a chief complaint of AMS (31 Mar 2019 07:49)      OVERNIGHT EVENTS:  in bed in nad     MEDICATIONS  (STANDING):  ascorbic acid 500 milliGRAM(s) Oral two times a day  calcium carbonate 1250 mG  + Vitamin D (OsCal 500 + D) 1 Tablet(s) Oral three times a day  celecoxib 200 milliGRAM(s) Oral daily  chlorhexidine 4% Liquid 1 Application(s) Topical <User Schedule>  colchicine 0.6 milliGRAM(s) Oral daily  docusate sodium 100 milliGRAM(s) Oral three times a day  enoxaparin Injectable 40 milliGRAM(s) SubCutaneous daily  folic acid 1 milliGRAM(s) Oral daily  gabapentin 800 milliGRAM(s) Oral three times a day  lactobacillus acidophilus 1 Tablet(s) Oral two times a day with meals  lidocaine   Patch 1 Patch Transdermal every 24 hours  lidocaine   Patch 1 Patch Transdermal every 24 hours  methadone    Tablet 20 milliGRAM(s) Oral daily  multivitamin 1 Tablet(s) Oral daily  nicotine -  14 mG/24Hr(s) Patch 1 patch Transdermal daily  nystatin Powder 1 Application(s) Topical three times a day  oxyCODONE  ER Tablet 40 milliGRAM(s) Oral every 12 hours  pantoprazole    Tablet 40 milliGRAM(s) Oral before breakfast  polyethylene glycol 3350 17 Gram(s) Oral two times a day  senna 2 Tablet(s) Oral at bedtime  vancomycin  IVPB 1500 milliGRAM(s) IV Intermittent every 12 hours    MEDICATIONS  (PRN):  acetaminophen   Tablet .. 650 milliGRAM(s) Oral every 6 hours PRN Mild Pain (1 - 3)  acetaminophen   Tablet .. 650 milliGRAM(s) Oral every 6 hours PRN Temp greater or equal to 38C (100.4F)  bisacodyl Suppository 10 milliGRAM(s) Rectal daily PRN Constipation  cyclobenzaprine 10 milliGRAM(s) Oral three times a day PRN Muscle Spasm  diphenhydrAMINE 25 milliGRAM(s) Oral every 4 hours PRN Rash and/or Itching  oxyCODONE    IR 20 milliGRAM(s) Oral every 4 hours PRN Moderate Pain (4 - 6)    Allergies    penicillin (Short breath; Hives)    Intolerances      Vital Signs Last 24 Hrs  T(C): 36.8 (19 Apr 2019 05:42), Max: 36.8 (19 Apr 2019 05:42)  T(F): 98.2 (19 Apr 2019 05:42), Max: 98.2 (19 Apr 2019 05:42)  HR: 92 (19 Apr 2019 05:42) (89 - 96)  BP: 97/59 (19 Apr 2019 05:42) (97/59 - 108/72)  BP(mean): --  RR: 18 (19 Apr 2019 05:42) (18 - 19)  SpO2: 99% (19 Apr 2019 05:42) (97% - 99%)    PHYSICAL EXAM:  GENERAL: in chair in nad  well-groomed, well-developed  HEAD:  Atraumatic, Normocephalic  EYES: EOMI, PERRLA, conjunctiva and sclera clear  ENMT: No tonsillar erythema, exudates, or enlargement; Moist mucous membranes, Good dentition, No lesions, cervical collar in place   NECK: Supple, No JVD, Normal thyroid  NERVOUS SYSTEM:  Alert & Oriented X3, Good concentration; Motor Strength 4/5 B/L upper and lower extremities;  CHEST/LUNG: Clear to percussion bilaterally; No rales, rhonchi, wheezing, or rubs BL  HEART: Regular rate and rhythm; No murmurs, rubs, or gallops  ABDOMEN: Soft, mild non specific tenderness distenedd Bowel sounds present  BACK; Spinal tenderness  EXTREMITIES:  2+ Peripheral Pulses, No clubbing, cyanosis,  improved edema to lower extremities LE,   BL Hip/knees TTP, R knee swollen  SKIN: No rashes or lesions      LABS:                                      8.9    6.09  )-----------( 259      ( 18 Apr 2019 10:28 )             29.1     04-18    137  |  101  |  20  ----------------------------<  111<H>  4.4   |  30  |  0.42<L>    Ca    9.4      18 Apr 2019 10:28               RADIOLOGY & ADDITIONAL TESTS:        Consultant(s) Notes Reviewed:  [x ] YES     Care Discussed with [ x] Consultants [X ] Patient [ ] Family  [x ]    [x ]  Other; RN

## 2019-04-19 NOTE — PROGRESS NOTE ADULT - SUBJECTIVE AND OBJECTIVE BOX
44 Y/O female w/ PMHx of IV heroin abuse brought in by EMS for lethargy and cough. As per EMS, pt was found laying in bed at home, lethargic however able to follow some commands. also h/o  fall down a flight of stairs 2/16. Labs showed serum lactate of 7.2, BUN/Cr 124/6.42, and WBC of of 11.93. Tmax 102.4, urine + for cocaine, opiates, nitrites and many bacteria.  Patient was admitted to critical care with severe sepsis with septic shock secondary to MRSA bacteremia w/ endocarditis on LIZ and EFRAIN thta has resolved and was diagnosis w/ HCV transferred out  of icu on 2/26/19.  While on the floor, patient c/o neck pain on 3/18  had CT neck done: < from: CT Cervical Spine No Cont (03.18.19 @ 11:49) >  Disc space narrowing and endplate irregularity at C4-5   Contrast-enhanced MRI of the cervical spine suggested   then MRI C/spine: < from: MR Cervical Spine w/ IV Cont (03.19.19 @ 17:18) >  C4/C5 discitis/osteomyelitis with a small right of midline ventral   epidural abscess. Mild cord compression.  Possible discitis/osteomyelitis at C6/C7.   Moderate anterior prevertebral effusion versus abscess/phlegmon within   the C2/C3-C6/C7 level.  Left C2 inferior facet osteomyelitis with adjacent soft tissue   enhancement.  Posterior soft tissue enhancement adjacent to the spinous processes of   C4, C5, C6.   Tiny soft tissue abscess measuring 5 mm adjacent to the C5 spinous   process tip.   on 3/20/2019 patient underwent  sx for Epidural Abscess C4-5, discitis C6-7, prevertebral abscess C2-T1, osteomyelitis C2, C4, C5, C6-C7,   stays on antibiotics at this time     MEDICATIONS  (STANDING):  ascorbic acid 500 milliGRAM(s) Oral two times a day  calcium carbonate 1250 mG  + Vitamin D (OsCal 500 + D) 1 Tablet(s) Oral three times a day  celecoxib 200 milliGRAM(s) Oral daily  chlorhexidine 4% Liquid 1 Application(s) Topical <User Schedule>  colchicine 0.6 milliGRAM(s) Oral daily  docusate sodium 100 milliGRAM(s) Oral three times a day  enoxaparin Injectable 40 milliGRAM(s) SubCutaneous daily  folic acid 1 milliGRAM(s) Oral daily  gabapentin 800 milliGRAM(s) Oral three times a day  lactobacillus acidophilus 1 Tablet(s) Oral two times a day with meals  lactulose Syrup 10 Gram(s) Oral two times a day  lidocaine   Patch 1 Patch Transdermal every 24 hours  lidocaine   Patch 1 Patch Transdermal every 24 hours  methadone    Tablet 20 milliGRAM(s) Oral daily  multivitamin 1 Tablet(s) Oral daily  nicotine -  14 mG/24Hr(s) Patch 1 patch Transdermal daily  nystatin Powder 1 Application(s) Topical three times a day  oxyCODONE  ER Tablet 40 milliGRAM(s) Oral every 12 hours  pantoprazole    Tablet 40 milliGRAM(s) Oral before breakfast  polyethylene glycol 3350 17 Gram(s) Oral two times a day  saline laxative (FLEET) Rectal Enema 1 Enema Rectal once  senna 2 Tablet(s) Oral at bedtime  vancomycin  IVPB 1500 milliGRAM(s) IV Intermittent every 12 hours    MEDICATIONS  (PRN):  acetaminophen   Tablet .. 650 milliGRAM(s) Oral every 6 hours PRN Mild Pain (1 - 3)  acetaminophen   Tablet .. 650 milliGRAM(s) Oral every 6 hours PRN Temp greater or equal to 38C (100.4F)  bisacodyl Suppository 10 milliGRAM(s) Rectal daily PRN Constipation  cyclobenzaprine 10 milliGRAM(s) Oral three times a day PRN Muscle Spasm  diphenhydrAMINE 25 milliGRAM(s) Oral every 4 hours PRN Rash and/or Itching  oxyCODONE    IR 20 milliGRAM(s) Oral every 4 hours PRN Moderate Pain (4 - 6)      REVIEW OF SYSTEMS:    no fevers   no nausea vomiting diarrhea   ongoing pain   no cough not short of breath   no cheat abdominal pain     VITAL SIGNS:  T(C): 36.7 (04-19-19 @ 12:00), Max: 36.8 (04-19-19 @ 05:42)  T(F): 98.1 (04-19-19 @ 12:00), Max: 98.2 (04-19-19 @ 05:42)  HR: 97 (04-19-19 @ 14:38) (89 - 97)  BP: 113/71 (04-19-19 @ 14:38) (97/59 - 121/70)  RR: 16 (04-19-19 @ 14:38) (16 - 19)  SpO2: 97% (04-19-19 @ 14:38) (97% - 99%)  Wt(kg): --    PHYSICAL EXAM:    GENERAL: not in any distress  HEENT: Neck is supple, normocephalic, atraumatic   neck collar plus   CHEST/LUNG: Clear to auscultation bilaterally; No rales, rhonchi, wheezing  HEART: Regular rate and rhythm; No murmurs, rubs, or gallops  ABDOMEN: Soft, Nontender, Nondistended; Bowel sounds present, no rebound   EXTREMITIES:  2+ Peripheral Pulses, No clubbing, cyanosis, or edema  SKIN: No rashes or lesions  BACK: no pressor sore   NERVOUS SYSTEM:  Alert & Oriented X3, Good concentration  PSYCH: normal affect     LABS:                         8.9    6.09  )-----------( 259      ( 18 Apr 2019 10:28 )             29.1     04-18    137  |  101  |  20  ----------------------------<  111<H>  4.4   |  30  |  0.42<L>    Ca    9.4      18 Apr 2019 10:28                          Vancomycin Level, Trough: 9.8 ug/mL (04-17 @ 18:33)                      Radiology:

## 2019-04-19 NOTE — PROGRESS NOTE ADULT - ASSESSMENT
43 F w/ history of IVDA, alcohol abuse, hx of pancreatitis, alcohol hepatitis, alcohol withdrawal, left frontoparietal subdural hematoma 2008 without need for neurosurgical intervention presented with severe sepsis with septic shock secondary to MRSA bacteremia w/ RLL PNA, UTI, endocarditis on LIZ and EFRAIN that has resolved and was diagnosis w/ HCV. Pt was initially intubated due to respiratory failure and put on pressors in ICU. She as extubated on 2/25 and transferred from ICU the following day. Pt had a C4-5 ACDF with irrigation and debridement on 3/20 due to spinal abscess and osteo and was kept intubated post procedure and transferred again to ICU, where she was extubated on 3/21 and transferred back to the med service the following day. HCV- Outpatient follow up for HCV and opioid maintenance outpt re-enrollment program    Septic arthritis involving BL Knees/hips;  - MRI of bilateral hips and knees were done demonstrating effusions and an abscess located in the right rectus femoris.   - Bilateral knees were aspirated by orthopedic team. on 3/28/19 ngtd   bilateral hips aspirated by orthopedic team on 3/29/19  ngtd but cppd crystals seen c/w psuedo gout  - as per ortho-No plan for orthopaedic operative intervention at this time. Should cultures grow pt will require operative intervention         Pseudogout: colchicine     septic shock secondary to MRSA bacteremia w/ RLL PNA, UTI, endocarditis on LIZ at Tricuspid valve   - resolved     Endocarditis of tricuspid valve  - status post thoracentesis on 3/20  - as per ID, patient is on dapto & Zyvox   - repeat BC negative from 3/21, repeated blood cx on 4/2/19  ngtd     - repeat echo showed a pedunculated mobile mass seen adherent to lateral leaflet of the tricuspid valve w/ no interval changes noted in valve structure or regurgitation    - repeated echo from 4/2/19 shows stable TR vegetation      Spinal osteo, abscess and cord compression    - CT of head and neck showed disc space narrowing and endplate irregularity at C4-5 which may represent typical degenerative change vs discitis   - MRI of the cervical spine w/ contrast showed C2-C7 discitis/osteomyelitis with a small right of midline ventral epidural abscess at C2-C7  - MRI of the thoracolumbar spine showed discitis/osteomyelitis at T11-T12 with paravertebral abscess, as well as discitis/osteomyelitis C6--T1, T9-T10, T10-T11, L4-L5, and L5-S1, and a phlegmon/early abscess dorsolaterally within the lumbar canal at the level of L4-L5. There was also a septic arthritis of  the left sternoclavicular joint space. There was also disc herniation at the L4-L5 level with contact and probable impingement of the descending right-sided nerve roots  - status post s/p C4-5 ACDF with irrigation and debridement on 3/20  - c/w gabapentin and lidocaine patch  - wound cx grew MRSA  - c/w [place] J  collar   - as per ID, patient is on vanco till may 15 2019  on 4/14/19 had mri t and l spine per ortho for stability see dr. muñoz note from 4/15/19        Bacteremia due to methicillin resistant Staphylococcus aureus with IE: diskitis/OM /Spinal abscesses /Parapneumonic effusion   - most recent blood cx from 3/21/19 negative   - s/p thoracentesis : fluids neg    Moderate protein-calorie malnutrition  - c/w soft diet w/Ensure Enlive 3 x day         edema to lower extremities checked dopplers on 4/1/19 and no dvt    started dvt prophylaxis with Lovenox per my d/w orthopedic  on 4/1/19   prealbumin is low c/w moderate malnutrition        IVDrug abuse  -tapering methadone    - - cont celebrex, gabapentin, oxycodone, Oxycontin to 40 mg bid    Anemia due multiple issues, surgery , blood draws,   poor nutrition and infection  - status post a few units of blood during this extensive hospital stay last one on 3/20/19  continue to monitor hgb , hgb 8.9 stable      - c/w folic acid      constipation: c/o constipation but wants to dictate what meds she takes. etc. 43 F w/ history of IVDA, alcohol abuse, hx of pancreatitis, alcohol hepatitis, alcohol withdrawal, left frontoparietal subdural hematoma 2008 without need for neurosurgical intervention presented with severe sepsis with septic shock secondary to MRSA bacteremia w/ RLL PNA, UTI, endocarditis on LIZ and EFRAIN that has resolved and was diagnosis w/ HCV. Pt was initially intubated due to respiratory failure and put on pressors in ICU. She as extubated on 2/25 and transferred from ICU the following day. Pt had a C4-5 ACDF with irrigation and debridement on 3/20 due to spinal abscess and osteo and was kept intubated post procedure and transferred again to ICU, where she was extubated on 3/21 and transferred back to the med service the following day. HCV- Outpatient follow up for HCV and opioid maintenance outpt re-enrollment program    Septic arthritis involving BL Knees/hips;  - MRI of bilateral hips and knees were done demonstrating effusions and an abscess located in the right rectus femoris.   - Bilateral knees were aspirated by orthopedic team. on 3/28/19 ngtd   bilateral hips aspirated by orthopedic team on 3/29/19  ngtd but cppd crystals seen c/w psuedo gout  - as per ortho-No plan for orthopaedic operative intervention at this time. Should cultures grow pt will require operative intervention         Pseudogout: colchicine     septic shock secondary to MRSA bacteremia w/ RLL PNA, UTI, endocarditis on LIZ at Tricuspid valve   - resolved     Endocarditis of tricuspid valve  - status post thoracentesis on 3/20  - as per ID, patient is on dapto & Zyvox   - repeat BC negative from 3/21, repeated blood cx on 4/2/19  ngtd     - repeat echo showed a pedunculated mobile mass seen adherent to lateral leaflet of the tricuspid valve w/ no interval changes noted in valve structure or regurgitation    - repeated echo from 4/2/19 shows stable TR vegetation      Spinal osteo, abscess and cord compression    - CT of head and neck showed disc space narrowing and endplate irregularity at C4-5 which may represent typical degenerative change vs discitis   - MRI of the cervical spine w/ contrast showed C2-C7 discitis/osteomyelitis with a small right of midline ventral epidural abscess at C2-C7  - MRI of the thoracolumbar spine showed discitis/osteomyelitis at T11-T12 with paravertebral abscess, as well as discitis/osteomyelitis C6--T1, T9-T10, T10-T11, L4-L5, and L5-S1, and a phlegmon/early abscess dorsolaterally within the lumbar canal at the level of L4-L5. There was also a septic arthritis of  the left sternoclavicular joint space. There was also disc herniation at the L4-L5 level with contact and probable impingement of the descending right-sided nerve roots  - status post s/p C4-5 ACDF with irrigation and debridement on 3/20  - c/w gabapentin and lidocaine patch  - wound cx grew MRSA  - c/w [place] J  collar   - as per ID, patient is on vanco till may 15 2019  on 4/14/19 had mri t and l spine per ortho for stability see dr. muñoz note from 4/15/19        Bacteremia due to methicillin resistant Staphylococcus aureus with IE: diskitis/OM /Spinal abscesses /Parapneumonic effusion   - most recent blood cx from 3/21/19 negative   - s/p thoracentesis : fluids neg    Moderate protein-calorie malnutrition  - c/w soft diet w/Ensure Enlive 3 x day         edema to lower extremities checked dopplers on 4/1/19 and no dvt    started dvt prophylaxis with Lovenox per my d/w orthopedic  on 4/1/19   prealbumin is low c/w moderate malnutrition        IVDrug abuse  -tapering methadone    - - cont celebrex, gabapentin, oxycodone, Oxycontin to 40 mg bid    Anemia due multiple issues, surgery , blood draws,   poor nutrition and infection  - status post a few units of blood during this extensive hospital stay last one on 3/20/19  continue to monitor hgb , hgb 8.9 stable      - c/w folic acid      constipation: c/o constipation but wants to dictate what meds she takes. etc.    explained to her in the detail for last time that  if she doesnt have a bowel movement and accepts the meds we are offering i will stop pain meds. now she agrees for enema and lactulose.   if needed will use golyteley

## 2019-04-19 NOTE — PROGRESS NOTE ADULT - ASSESSMENT
43 year old intravenous drug addict admitted with methicillin resitant staph aureus bacteremia ; infectious endocarditis ; cx discitis   on iv antibiotics   cant add rifampin ?? on methadone as will need higher dose of methadone   already has a lot of addiction problems

## 2019-04-20 LAB
CRP SERPL-MCNC: 6.47 MG/DL — HIGH (ref 0–0.4)
ERYTHROCYTE [SEDIMENTATION RATE] IN BLOOD: 90 MM/HR — HIGH (ref 0–15)
VANCOMYCIN TROUGH SERPL-MCNC: 15.1 UG/ML — SIGNIFICANT CHANGE UP (ref 10–20)

## 2019-04-20 PROCEDURE — 99233 SBSQ HOSP IP/OBS HIGH 50: CPT

## 2019-04-20 RX ORDER — SOD SULF/SODIUM/NAHCO3/KCL/PEG
4000 SOLUTION, RECONSTITUTED, ORAL ORAL ONCE
Qty: 0 | Refills: 0 | Status: DISCONTINUED | OUTPATIENT
Start: 2019-04-20 | End: 2019-04-21

## 2019-04-20 RX ADMIN — Medication 650 MILLIGRAM(S): at 02:46

## 2019-04-20 RX ADMIN — Medication 100 MILLIGRAM(S): at 13:42

## 2019-04-20 RX ADMIN — Medication 1 TABLET(S): at 17:14

## 2019-04-20 RX ADMIN — Medication 1 TABLET(S): at 05:34

## 2019-04-20 RX ADMIN — GABAPENTIN 800 MILLIGRAM(S): 400 CAPSULE ORAL at 13:42

## 2019-04-20 RX ADMIN — OXYCODONE HYDROCHLORIDE 40 MILLIGRAM(S): 5 TABLET ORAL at 18:00

## 2019-04-20 RX ADMIN — Medication 650 MILLIGRAM(S): at 07:43

## 2019-04-20 RX ADMIN — OXYCODONE HYDROCHLORIDE 20 MILLIGRAM(S): 5 TABLET ORAL at 06:00

## 2019-04-20 RX ADMIN — OXYCODONE HYDROCHLORIDE 20 MILLIGRAM(S): 5 TABLET ORAL at 14:27

## 2019-04-20 RX ADMIN — POLYETHYLENE GLYCOL 3350 17 GRAM(S): 17 POWDER, FOR SOLUTION ORAL at 05:34

## 2019-04-20 RX ADMIN — CELECOXIB 200 MILLIGRAM(S): 200 CAPSULE ORAL at 11:47

## 2019-04-20 RX ADMIN — OXYCODONE HYDROCHLORIDE 40 MILLIGRAM(S): 5 TABLET ORAL at 17:17

## 2019-04-20 RX ADMIN — CYCLOBENZAPRINE HYDROCHLORIDE 10 MILLIGRAM(S): 10 TABLET, FILM COATED ORAL at 02:47

## 2019-04-20 RX ADMIN — Medication 1 PATCH: at 19:37

## 2019-04-20 RX ADMIN — GABAPENTIN 800 MILLIGRAM(S): 400 CAPSULE ORAL at 22:28

## 2019-04-20 RX ADMIN — CELECOXIB 200 MILLIGRAM(S): 200 CAPSULE ORAL at 12:16

## 2019-04-20 RX ADMIN — PANTOPRAZOLE SODIUM 40 MILLIGRAM(S): 20 TABLET, DELAYED RELEASE ORAL at 07:41

## 2019-04-20 RX ADMIN — OXYCODONE HYDROCHLORIDE 20 MILLIGRAM(S): 5 TABLET ORAL at 22:27

## 2019-04-20 RX ADMIN — OXYCODONE HYDROCHLORIDE 40 MILLIGRAM(S): 5 TABLET ORAL at 06:59

## 2019-04-20 RX ADMIN — OXYCODONE HYDROCHLORIDE 20 MILLIGRAM(S): 5 TABLET ORAL at 05:33

## 2019-04-20 RX ADMIN — Medication 250 MILLIGRAM(S): at 06:38

## 2019-04-20 RX ADMIN — Medication 500 MILLIGRAM(S): at 05:34

## 2019-04-20 RX ADMIN — Medication 250 MILLIGRAM(S): at 13:42

## 2019-04-20 RX ADMIN — OXYCODONE HYDROCHLORIDE 20 MILLIGRAM(S): 5 TABLET ORAL at 00:35

## 2019-04-20 RX ADMIN — Medication 1 PATCH: at 11:47

## 2019-04-20 RX ADMIN — Medication 650 MILLIGRAM(S): at 08:15

## 2019-04-20 RX ADMIN — LIDOCAINE 1 PATCH: 4 CREAM TOPICAL at 11:06

## 2019-04-20 RX ADMIN — METHADONE HYDROCHLORIDE 20 MILLIGRAM(S): 40 TABLET ORAL at 11:47

## 2019-04-20 RX ADMIN — SENNA PLUS 2 TABLET(S): 8.6 TABLET ORAL at 22:33

## 2019-04-20 RX ADMIN — Medication 250 MILLIGRAM(S): at 22:27

## 2019-04-20 RX ADMIN — NYSTATIN CREAM 1 APPLICATION(S): 100000 CREAM TOPICAL at 05:38

## 2019-04-20 RX ADMIN — CHLORHEXIDINE GLUCONATE 1 APPLICATION(S): 213 SOLUTION TOPICAL at 05:37

## 2019-04-20 RX ADMIN — Medication 1 PATCH: at 07:38

## 2019-04-20 RX ADMIN — OXYCODONE HYDROCHLORIDE 20 MILLIGRAM(S): 5 TABLET ORAL at 09:43

## 2019-04-20 RX ADMIN — OXYCODONE HYDROCHLORIDE 40 MILLIGRAM(S): 5 TABLET ORAL at 06:36

## 2019-04-20 RX ADMIN — NYSTATIN CREAM 1 APPLICATION(S): 100000 CREAM TOPICAL at 22:33

## 2019-04-20 RX ADMIN — Medication 650 MILLIGRAM(S): at 03:15

## 2019-04-20 RX ADMIN — OXYCODONE HYDROCHLORIDE 20 MILLIGRAM(S): 5 TABLET ORAL at 10:18

## 2019-04-20 RX ADMIN — NYSTATIN CREAM 1 APPLICATION(S): 100000 CREAM TOPICAL at 13:45

## 2019-04-20 RX ADMIN — Medication 0.6 MILLIGRAM(S): at 11:46

## 2019-04-20 RX ADMIN — LIDOCAINE 1 PATCH: 4 CREAM TOPICAL at 07:38

## 2019-04-20 RX ADMIN — Medication 100 MILLIGRAM(S): at 22:28

## 2019-04-20 RX ADMIN — OXYCODONE HYDROCHLORIDE 20 MILLIGRAM(S): 5 TABLET ORAL at 18:23

## 2019-04-20 RX ADMIN — CYCLOBENZAPRINE HYDROCHLORIDE 10 MILLIGRAM(S): 10 TABLET, FILM COATED ORAL at 13:50

## 2019-04-20 RX ADMIN — Medication 1 TABLET(S): at 13:42

## 2019-04-20 RX ADMIN — OXYCODONE HYDROCHLORIDE 20 MILLIGRAM(S): 5 TABLET ORAL at 13:50

## 2019-04-20 RX ADMIN — Medication 500 MILLIGRAM(S): at 17:14

## 2019-04-20 RX ADMIN — POLYETHYLENE GLYCOL 3350 17 GRAM(S): 17 POWDER, FOR SOLUTION ORAL at 17:14

## 2019-04-20 RX ADMIN — OXYCODONE HYDROCHLORIDE 20 MILLIGRAM(S): 5 TABLET ORAL at 01:05

## 2019-04-20 RX ADMIN — Medication 100 MILLIGRAM(S): at 05:34

## 2019-04-20 RX ADMIN — ENOXAPARIN SODIUM 40 MILLIGRAM(S): 100 INJECTION SUBCUTANEOUS at 11:46

## 2019-04-20 RX ADMIN — OXYCODONE HYDROCHLORIDE 20 MILLIGRAM(S): 5 TABLET ORAL at 19:10

## 2019-04-20 RX ADMIN — Medication 1 PATCH: at 11:06

## 2019-04-20 RX ADMIN — LACTULOSE 10 GRAM(S): 10 SOLUTION ORAL at 17:14

## 2019-04-20 RX ADMIN — Medication 1 TABLET(S): at 11:47

## 2019-04-20 RX ADMIN — GABAPENTIN 800 MILLIGRAM(S): 400 CAPSULE ORAL at 05:35

## 2019-04-20 RX ADMIN — Medication 1 MILLIGRAM(S): at 11:46

## 2019-04-20 NOTE — PROGRESS NOTE ADULT - ASSESSMENT
43 F w/ history of IVDA, alcohol abuse, hx of pancreatitis, alcohol hepatitis, alcohol withdrawal, left frontoparietal subdural hematoma 2008 without need for neurosurgical intervention presented with severe sepsis with septic shock secondary to MRSA bacteremia w/ RLL PNA, UTI, endocarditis on LIZ and EFRAIN that has resolved and was diagnosis w/ HCV. Pt was initially intubated due to respiratory failure and put on pressors in ICU. She as extubated on 2/25 and transferred from ICU the following day. Pt had a C4-5 ACDF with irrigation and debridement on 3/20 due to spinal abscess and osteo and was kept intubated post procedure and transferred again to ICU, where she was extubated on 3/21 and transferred back to the med service the following day. HCV- Outpatient follow up for HCV and opioid maintenance outpt re-enrollment program    Septic arthritis involving BL Knees/hips;  - MRI of bilateral hips and knees were done demonstrating effusions and an abscess located in the right rectus femoris.   - Bilateral knees were aspirated by orthopedic team. on 3/28/19 ngtd   bilateral hips aspirated by orthopedic team on 3/29/19  ngtd but cppd crystals seen c/w psuedo gout  - as per ortho-No plan for orthopaedic operative intervention at this time. Should cultures grow pt will require operative intervention         Pseudogout: colchicine     septic shock secondary to MRSA bacteremia w/ RLL PNA, UTI, endocarditis on LIZ at Tricuspid valve   - resolved     Endocarditis of tricuspid valve  - status post thoracentesis on 3/20  - as per ID, patient is on dapto & Zyvox   - repeat BC negative from 3/21, repeated blood cx on 4/2/19  ngtd     - repeat echo showed a pedunculated mobile mass seen adherent to lateral leaflet of the tricuspid valve w/ no interval changes noted in valve structure or regurgitation    - repeated echo from 4/2/19 shows stable TR vegetation      Spinal osteo, abscess and cord compression    - CT of head and neck showed disc space narrowing and endplate irregularity at C4-5 which may represent typical degenerative change vs discitis   - MRI of the cervical spine w/ contrast showed C2-C7 discitis/osteomyelitis with a small right of midline ventral epidural abscess at C2-C7  - MRI of the thoracolumbar spine showed discitis/osteomyelitis at T11-T12 with paravertebral abscess, as well as discitis/osteomyelitis C6--T1, T9-T10, T10-T11, L4-L5, and L5-S1, and a phlegmon/early abscess dorsolaterally within the lumbar canal at the level of L4-L5. There was also a septic arthritis of  the left sternoclavicular joint space. There was also disc herniation at the L4-L5 level with contact and probable impingement of the descending right-sided nerve roots  - status post s/p C4-5 ACDF with irrigation and debridement on 3/20  - c/w gabapentin and lidocaine patch  - wound cx grew MRSA  - c/w [place] J  collar   - as per ID, patient is on vanco till may 15 2019  on 4/14/19 had mri t and l spine per ortho for stability see dr. muñoz note from 4/15/19        Bacteremia due to methicillin resistant Staphylococcus aureus with IE: diskitis/OM /Spinal abscesses /Parapneumonic effusion   - most recent blood cx from 3/21/19 negative   - s/p thoracentesis : fluids neg    Moderate protein-calorie malnutrition  - c/w soft diet w/Ensure Enlive 3 x day         edema to lower extremities checked dopplers on 4/1/19 and no dvt    started dvt prophylaxis with Lovenox per my d/w orthopedic  on 4/1/19   prealbumin is low c/w moderate malnutrition        IVDrug abuse  -tapering methadone    - - cont celebrex, gabapentin, oxycodone, Oxycontin to 40 mg bid    Anemia due multiple issues, surgery , blood draws,   poor nutrition and infection  - status post a few units of blood during this extensive hospital stay last one on 3/20/19  continue to monitor hgb , hgb 8.9 stable      - c/w folic acid      constipation: c/o constipation but wants to dictate what meds she takes. etc.    on 4/19/19 explained to her in the detail for last time that  if she doesnt have a bowel movement and accepts the meds we are offering i will stop pain meds. now she agrees for enema and lactulose.   s/p lactulose enema and daily Miralax without bm will start Golytely .

## 2019-04-20 NOTE — PROGRESS NOTE ADULT - SUBJECTIVE AND OBJECTIVE BOX
Patient is a 43y old  Female who presents with a chief complaint of AMS (31 Mar 2019 07:49)      OVERNIGHT EVENTS:  in bed in nad     MEDICATIONS  (STANDING):  ascorbic acid 500 milliGRAM(s) Oral two times a day  calcium carbonate 1250 mG  + Vitamin D (OsCal 500 + D) 1 Tablet(s) Oral three times a day  celecoxib 200 milliGRAM(s) Oral daily  chlorhexidine 4% Liquid 1 Application(s) Topical <User Schedule>  colchicine 0.6 milliGRAM(s) Oral daily  docusate sodium 100 milliGRAM(s) Oral three times a day  enoxaparin Injectable 40 milliGRAM(s) SubCutaneous daily  folic acid 1 milliGRAM(s) Oral daily  gabapentin 800 milliGRAM(s) Oral three times a day  lactobacillus acidophilus 1 Tablet(s) Oral two times a day with meals  lactulose Syrup 10 Gram(s) Oral two times a day  lidocaine   Patch 1 Patch Transdermal every 24 hours  lidocaine   Patch 1 Patch Transdermal every 24 hours  methadone    Tablet 20 milliGRAM(s) Oral daily  multivitamin 1 Tablet(s) Oral daily  nicotine -  14 mG/24Hr(s) Patch 1 patch Transdermal daily  nystatin Powder 1 Application(s) Topical three times a day  oxyCODONE  ER Tablet 40 milliGRAM(s) Oral every 12 hours  pantoprazole    Tablet 40 milliGRAM(s) Oral before breakfast  polyethylene glycol 3350 17 Gram(s) Oral two times a day  polyethylene glycol/electrolyte Solution. 4000 milliLiter(s) Oral once  senna 2 Tablet(s) Oral at bedtime  vancomycin  IVPB 1000 milliGRAM(s) IV Intermittent every 8 hours    MEDICATIONS  (PRN):  acetaminophen   Tablet .. 650 milliGRAM(s) Oral every 6 hours PRN Mild Pain (1 - 3)  acetaminophen   Tablet .. 650 milliGRAM(s) Oral every 6 hours PRN Temp greater or equal to 38C (100.4F)  bisacodyl Suppository 10 milliGRAM(s) Rectal daily PRN Constipation  cyclobenzaprine 10 milliGRAM(s) Oral three times a day PRN Muscle Spasm  diphenhydrAMINE 25 milliGRAM(s) Oral every 4 hours PRN Rash and/or Itching  oxyCODONE    IR 20 milliGRAM(s) Oral every 4 hours PRN Moderate Pain (4 - 6)  Allergies    penicillin (Short breath; Hives)    Intolerances      Vital Signs Last 24 Hrs  T(C): 36.8 (19 Apr 2019 05:42), Max: 36.8 (19 Apr 2019 05:42)  T(F): 98.2 (19 Apr 2019 05:42), Max: 98.2 (19 Apr 2019 05:42)  HR: 92 (19 Apr 2019 05:42) (89 - 96)  BP: 97/59 (19 Apr 2019 05:42) (97/59 - 108/72)  BP(mean): --  RR: 18 (19 Apr 2019 05:42) (18 - 19)  SpO2: 99% (19 Apr 2019 05:42) (97% - 99%)    PHYSICAL EXAM:  GENERAL: in chair in nad  well-groomed, well-developed  HEAD:  Atraumatic, Normocephalic  EYES: EOMI, PERRLA, conjunctiva and sclera clear  ENMT: No tonsillar erythema, exudates, or enlargement; Moist mucous membranes, Good dentition, No lesions, cervical collar in place   NECK: Supple, No JVD, Normal thyroid  NERVOUS SYSTEM:  Alert & Oriented X3, Good concentration; Motor Strength 4/5 B/L upper and lower extremities;  CHEST/LUNG: Clear to percussion bilaterally; No rales, rhonchi, wheezing, or rubs BL  HEART: Regular rate and rhythm; No murmurs, rubs, or gallops  ABDOMEN: Soft, mild non specific tenderness distended Bowel sounds present  BACK; Spinal tenderness  EXTREMITIES:  2+ Peripheral Pulses, No clubbing, cyanosis,  improved edema to lower extremities LE,   BL Hip/knees TTP, R knee swollen  SKIN: No rashes or lesions      LABS:                                      8.9    6.09  )-----------( 259      ( 18 Apr 2019 10:28 )             29.1     04-18    137  |  101  |  20  ----------------------------<  111<H>  4.4   |  30  |  0.42<L>    Ca    9.4      18 Apr 2019 10:28               RADIOLOGY & ADDITIONAL TESTS:        Consultant(s) Notes Reviewed:  [x ] YES     Care Discussed with [ x] Consultants [X ] Patient [ ] Family  [x ]    [x ]  Other; RN

## 2019-04-21 PROCEDURE — 99233 SBSQ HOSP IP/OBS HIGH 50: CPT

## 2019-04-21 PROCEDURE — 74018 RADEX ABDOMEN 1 VIEW: CPT | Mod: 26

## 2019-04-21 RX ORDER — METHADONE HYDROCHLORIDE 40 MG/1
10 TABLET ORAL DAILY
Qty: 0 | Refills: 0 | Status: DISCONTINUED | OUTPATIENT
Start: 2019-04-22 | End: 2019-04-23

## 2019-04-21 RX ORDER — OXYCODONE HYDROCHLORIDE 5 MG/1
20 TABLET ORAL ONCE
Qty: 0 | Refills: 0 | Status: DISCONTINUED | OUTPATIENT
Start: 2019-04-21 | End: 2019-04-21

## 2019-04-21 RX ORDER — OXYCODONE HYDROCHLORIDE 5 MG/1
20 TABLET ORAL EVERY 4 HOURS
Qty: 0 | Refills: 0 | Status: DISCONTINUED | OUTPATIENT
Start: 2019-04-21 | End: 2019-04-28

## 2019-04-21 RX ORDER — SOD SULF/SODIUM/NAHCO3/KCL/PEG
4000 SOLUTION, RECONSTITUTED, ORAL ORAL ONCE
Qty: 0 | Refills: 0 | Status: COMPLETED | OUTPATIENT
Start: 2019-04-21 | End: 2019-04-21

## 2019-04-21 RX ADMIN — Medication 500 MILLIGRAM(S): at 17:06

## 2019-04-21 RX ADMIN — Medication 250 MILLIGRAM(S): at 13:32

## 2019-04-21 RX ADMIN — Medication 0.6 MILLIGRAM(S): at 13:21

## 2019-04-21 RX ADMIN — OXYCODONE HYDROCHLORIDE 20 MILLIGRAM(S): 5 TABLET ORAL at 03:05

## 2019-04-21 RX ADMIN — Medication 650 MILLIGRAM(S): at 08:30

## 2019-04-21 RX ADMIN — Medication 100 MILLIGRAM(S): at 21:15

## 2019-04-21 RX ADMIN — LIDOCAINE 1 PATCH: 4 CREAM TOPICAL at 13:23

## 2019-04-21 RX ADMIN — LIDOCAINE 1 PATCH: 4 CREAM TOPICAL at 23:29

## 2019-04-21 RX ADMIN — Medication 1 TABLET(S): at 07:50

## 2019-04-21 RX ADMIN — Medication 1 PATCH: at 07:48

## 2019-04-21 RX ADMIN — NYSTATIN CREAM 1 APPLICATION(S): 100000 CREAM TOPICAL at 21:15

## 2019-04-21 RX ADMIN — OXYCODONE HYDROCHLORIDE 20 MILLIGRAM(S): 5 TABLET ORAL at 13:24

## 2019-04-21 RX ADMIN — Medication 250 MILLIGRAM(S): at 21:15

## 2019-04-21 RX ADMIN — LIDOCAINE 1 PATCH: 4 CREAM TOPICAL at 07:48

## 2019-04-21 RX ADMIN — METHADONE HYDROCHLORIDE 20 MILLIGRAM(S): 40 TABLET ORAL at 13:21

## 2019-04-21 RX ADMIN — NYSTATIN CREAM 1 APPLICATION(S): 100000 CREAM TOPICAL at 13:23

## 2019-04-21 RX ADMIN — Medication 1 TABLET(S): at 13:21

## 2019-04-21 RX ADMIN — Medication 1 PATCH: at 13:22

## 2019-04-21 RX ADMIN — OXYCODONE HYDROCHLORIDE 20 MILLIGRAM(S): 5 TABLET ORAL at 20:40

## 2019-04-21 RX ADMIN — Medication 1 ENEMA: at 16:27

## 2019-04-21 RX ADMIN — Medication 4000 MILLILITER(S): at 16:58

## 2019-04-21 RX ADMIN — Medication 500 MILLIGRAM(S): at 05:30

## 2019-04-21 RX ADMIN — OXYCODONE HYDROCHLORIDE 40 MILLIGRAM(S): 5 TABLET ORAL at 05:28

## 2019-04-21 RX ADMIN — Medication 650 MILLIGRAM(S): at 07:44

## 2019-04-21 RX ADMIN — GABAPENTIN 800 MILLIGRAM(S): 400 CAPSULE ORAL at 21:15

## 2019-04-21 RX ADMIN — Medication 100 MILLIGRAM(S): at 05:29

## 2019-04-21 RX ADMIN — CYCLOBENZAPRINE HYDROCHLORIDE 10 MILLIGRAM(S): 10 TABLET, FILM COATED ORAL at 13:31

## 2019-04-21 RX ADMIN — GABAPENTIN 800 MILLIGRAM(S): 400 CAPSULE ORAL at 13:21

## 2019-04-21 RX ADMIN — GABAPENTIN 800 MILLIGRAM(S): 400 CAPSULE ORAL at 05:29

## 2019-04-21 RX ADMIN — Medication 1 PATCH: at 11:24

## 2019-04-21 RX ADMIN — Medication 250 MILLIGRAM(S): at 05:28

## 2019-04-21 RX ADMIN — Medication 1 MILLIGRAM(S): at 13:21

## 2019-04-21 RX ADMIN — OXYCODONE HYDROCHLORIDE 40 MILLIGRAM(S): 5 TABLET ORAL at 18:55

## 2019-04-21 RX ADMIN — NYSTATIN CREAM 1 APPLICATION(S): 100000 CREAM TOPICAL at 05:30

## 2019-04-21 RX ADMIN — CHLORHEXIDINE GLUCONATE 1 APPLICATION(S): 213 SOLUTION TOPICAL at 05:30

## 2019-04-21 RX ADMIN — LACTULOSE 10 GRAM(S): 10 SOLUTION ORAL at 05:32

## 2019-04-21 RX ADMIN — Medication 1 TABLET(S): at 17:04

## 2019-04-21 RX ADMIN — LIDOCAINE 1 PATCH: 4 CREAM TOPICAL at 01:09

## 2019-04-21 RX ADMIN — OXYCODONE HYDROCHLORIDE 40 MILLIGRAM(S): 5 TABLET ORAL at 17:55

## 2019-04-21 RX ADMIN — Medication 100 MILLIGRAM(S): at 13:21

## 2019-04-21 RX ADMIN — OXYCODONE HYDROCHLORIDE 20 MILLIGRAM(S): 5 TABLET ORAL at 14:05

## 2019-04-21 RX ADMIN — OXYCODONE HYDROCHLORIDE 20 MILLIGRAM(S): 5 TABLET ORAL at 20:10

## 2019-04-21 RX ADMIN — CYCLOBENZAPRINE HYDROCHLORIDE 10 MILLIGRAM(S): 10 TABLET, FILM COATED ORAL at 00:59

## 2019-04-21 RX ADMIN — POLYETHYLENE GLYCOL 3350 17 GRAM(S): 17 POWDER, FOR SOLUTION ORAL at 05:30

## 2019-04-21 RX ADMIN — LIDOCAINE 1 PATCH: 4 CREAM TOPICAL at 01:08

## 2019-04-21 RX ADMIN — ENOXAPARIN SODIUM 40 MILLIGRAM(S): 100 INJECTION SUBCUTANEOUS at 13:22

## 2019-04-21 RX ADMIN — Medication 1 TABLET(S): at 05:30

## 2019-04-21 RX ADMIN — Medication 1 TABLET(S): at 01:44

## 2019-04-21 RX ADMIN — Medication 1 TABLET(S): at 21:15

## 2019-04-21 RX ADMIN — CELECOXIB 200 MILLIGRAM(S): 200 CAPSULE ORAL at 13:46

## 2019-04-21 RX ADMIN — LACTULOSE 10 GRAM(S): 10 SOLUTION ORAL at 17:07

## 2019-04-21 RX ADMIN — SENNA PLUS 2 TABLET(S): 8.6 TABLET ORAL at 21:15

## 2019-04-21 RX ADMIN — PANTOPRAZOLE SODIUM 40 MILLIGRAM(S): 20 TABLET, DELAYED RELEASE ORAL at 07:46

## 2019-04-21 RX ADMIN — CELECOXIB 200 MILLIGRAM(S): 200 CAPSULE ORAL at 13:21

## 2019-04-21 NOTE — PROGRESS NOTE ADULT - ASSESSMENT
43 F w/ history of IVDA, alcohol abuse, hx of pancreatitis, alcohol hepatitis, alcohol withdrawal, left frontoparietal subdural hematoma 2008 without need for neurosurgical intervention presented with severe sepsis with septic shock secondary to MRSA bacteremia w/ RLL PNA, UTI, endocarditis on LIZ and EFRAIN that has resolved and was diagnosis w/ HCV. Pt was initially intubated due to respiratory failure and put on pressors in ICU. She as extubated on 2/25 and transferred from ICU the following day. Pt had a C4-5 ACDF with irrigation and debridement on 3/20 due to spinal abscess and osteo and was kept intubated post procedure and transferred again to ICU, where she was extubated on 3/21 and transferred back to the med service the following day. HCV- Outpatient follow up for HCV and opioid maintenance outpt re-enrollment program    Septic arthritis involving BL Knees/hips;  - MRI of bilateral hips and knees were done demonstrating effusions and an abscess located in the right rectus femoris.   - Bilateral knees were aspirated by orthopedic team. on 3/28/19 ngtd   bilateral hips aspirated by orthopedic team on 3/29/19  ngtd but cppd crystals seen c/w psuedo gout  - as per ortho-No plan for orthopaedic operative intervention at this time. Should cultures grow pt will require operative intervention         Pseudogout: colchicine     septic shock secondary to MRSA bacteremia w/ RLL PNA, UTI, endocarditis on LIZ at Tricuspid valve   - resolved     Endocarditis of tricuspid valve  - status post thoracentesis on 3/20  - as per ID, patient is on dapto & Zyvox   - repeat BC negative from 3/21, repeated blood cx on 4/2/19  ngtd     - repeat echo showed a pedunculated mobile mass seen adherent to lateral leaflet of the tricuspid valve w/ no interval changes noted in valve structure or regurgitation    - repeated echo from 4/2/19 shows stable TR vegetation      Spinal osteo, abscess and cord compression    - CT of head and neck showed disc space narrowing and endplate irregularity at C4-5 which may represent typical degenerative change vs discitis   - MRI of the cervical spine w/ contrast showed C2-C7 discitis/osteomyelitis with a small right of midline ventral epidural abscess at C2-C7  - MRI of the thoracolumbar spine showed discitis/osteomyelitis at T11-T12 with paravertebral abscess, as well as discitis/osteomyelitis C6--T1, T9-T10, T10-T11, L4-L5, and L5-S1, and a phlegmon/early abscess dorsolaterally within the lumbar canal at the level of L4-L5. There was also a septic arthritis of  the left sternoclavicular joint space. There was also disc herniation at the L4-L5 level with contact and probable impingement of the descending right-sided nerve roots  - status post s/p C4-5 ACDF with irrigation and debridement on 3/20  - c/w gabapentin and lidocaine patch  - wound cx grew MRSA  - c/w [place] J  collar   - as per ID, patient is on vanco till may 15 2019  on 4/14/19 had mri t and l spine per ortho for stability see dr. muñoz note from 4/15/19        Bacteremia due to methicillin resistant Staphylococcus aureus with IE: diskitis/OM /Spinal abscesses /Parapneumonic effusion   - most recent blood cx from 3/21/19 negative   - s/p thoracentesis : fluids neg    Moderate protein-calorie malnutrition  - c/w soft diet w/Ensure Enlive 3 x day         edema to lower extremities checked dopplers on 4/1/19 and no dvt    started dvt prophylaxis with Lovenox per my d/w orthopedic  on 4/1/19   prealbumin is low c/w moderate malnutrition        IVDrug abuse  -tapering methadone    - - cont celebrex, gabapentin, oxycodone, Oxycontin to 40 mg bid    Anemia due multiple issues, surgery , blood draws,   poor nutrition and infection  - status post a few units of blood during this extensive hospital stay last one on 3/20/19  continue to monitor hgb , hgb 8.9 stable      - c/w folic acid      constipation: c/o constipation but wants to dictate what meds she takes. etc.    on 4/19/19 explained to her in the detail for last time that  if she doesnt have a bowel movement and accepts the meds we are offering i will stop pain meds. at that time carlos a aggred for eneam on 4/19/19 she states she had a bm on 4/20 19 but no documentation by staff.     kub done on 4/20/19 shows large amount of stool    explained to pat in detail rsik of bowel perforation and dying of septic shock or requiring urgent surgery can occur from constipation .    and that she needs to have bowel movements    will  also ask GI to see patients    consider deescalating opiates 43 F w/ history of IVDA, alcohol abuse, hx of pancreatitis, alcohol hepatitis, alcohol withdrawal, left frontoparietal subdural hematoma 2008 without need for neurosurgical intervention presented with severe sepsis with septic shock secondary to MRSA bacteremia w/ RLL PNA, UTI, endocarditis on LIZ and EFRAIN that has resolved and was diagnosis w/ HCV. Pt was initially intubated due to respiratory failure and put on pressors in ICU. She as extubated on 2/25 and transferred from ICU the following day. Pt had a C4-5 ACDF with irrigation and debridement on 3/20 due to spinal abscess and osteo and was kept intubated post procedure and transferred again to ICU, where she was extubated on 3/21 and transferred back to the med service the following day. HCV- Outpatient follow up for HCV and opioid maintenance outpt re-enrollment program    Septic arthritis involving BL Knees/hips;  - MRI of bilateral hips and knees were done demonstrating effusions and an abscess located in the right rectus femoris.   - Bilateral knees were aspirated by orthopedic team. on 3/28/19 ngtd   bilateral hips aspirated by orthopedic team on 3/29/19  ngtd but cppd crystals seen c/w psuedo gout  - as per ortho-No plan for orthopaedic operative intervention at this time. Should cultures grow pt will require operative intervention         Pseudogout: colchicine     septic shock secondary to MRSA bacteremia w/ RLL PNA, UTI, endocarditis on LIZ at Tricuspid valve   - resolved     Endocarditis of tricuspid valve  - status post thoracentesis on 3/20  - as per ID, patient is on dapto & Zyvox   - repeat BC negative from 3/21, repeated blood cx on 4/2/19  ngtd     - repeat echo showed a pedunculated mobile mass seen adherent to lateral leaflet of the tricuspid valve w/ no interval changes noted in valve structure or regurgitation    - repeated echo from 4/2/19 shows stable TR vegetation      Spinal osteo, abscess and cord compression    - CT of head and neck showed disc space narrowing and endplate irregularity at C4-5 which may represent typical degenerative change vs discitis   - MRI of the cervical spine w/ contrast showed C2-C7 discitis/osteomyelitis with a small right of midline ventral epidural abscess at C2-C7  - MRI of the thoracolumbar spine showed discitis/osteomyelitis at T11-T12 with paravertebral abscess, as well as discitis/osteomyelitis C6--T1, T9-T10, T10-T11, L4-L5, and L5-S1, and a phlegmon/early abscess dorsolaterally within the lumbar canal at the level of L4-L5. There was also a septic arthritis of  the left sternoclavicular joint space. There was also disc herniation at the L4-L5 level with contact and probable impingement of the descending right-sided nerve roots  - status post s/p C4-5 ACDF with irrigation and debridement on 3/20  - c/w gabapentin and lidocaine patch  - wound cx grew MRSA  - c/w [place] J  collar   - as per ID, patient is on vanco till may 15 2019  on 4/14/19 had mri t and l spine per ortho for stability see dr. muñoz note from 4/15/19        Bacteremia due to methicillin resistant Staphylococcus aureus with IE: diskitis/OM /Spinal abscesses /Parapneumonic effusion   - most recent blood cx from 3/21/19 negative   - s/p thoracentesis : fluids neg    Moderate protein-calorie malnutrition  - c/w soft diet w/Ensure Enlive 3 x day         edema to lower extremities checked dopplers on 4/1/19 and no dvt    started dvt prophylaxis with Lovenox per my d/w orthopedic  on 4/1/19   prealbumin is low c/w moderate malnutrition        IVDrug abuse  -tapering methadone    - - cont celebrex, gabapentin, oxycodone, Oxycontin to 40 mg bid    Anemia due multiple issues, surgery , blood draws,   poor nutrition and infection  - status post a few units of blood during this extensive hospital stay last one on 3/20/19  continue to monitor hgb , hgb 8.9 stable      - c/w folic acid      constipation: c/o constipation but wants to dictate what meds she takes. etc.    on 4/19/19 explained to her in the detail for last time that  if she doesnt have a bowel movement and accepts the meds we are offering i will stop pain meds. at that time carlos a aggred for eneam on 4/19/19 she states she had a bm on 4/20 19 but no documentation by staff.     kub done on 4/20/19 shows large amount of stool    explained to pat in detail rsik of bowel perforation and dying of septic shock or requiring urgent surgery can occur from constipation .    and that she needs to have bowel movements    will  also ask GI to see patients    consider deescalating opiates   discussed in  front of patients mom brother and sister in law that we danette stop the opiates if she doesn't have  a bm as the rsik to adverse gi effects are high    patients and family understand the risk of gi perforation    also advised pt that a staff member must see the bowel movement    starting golytely and giving an enema

## 2019-04-21 NOTE — PROGRESS NOTE ADULT - SUBJECTIVE AND OBJECTIVE BOX
Patient is a 43y old  Female who presents with a chief complaint of AMS (31 Mar 2019 07:49)      OVERNIGHT EVENTS:  in bed in nad    she states she had a bm yesterday although no one saw it even after i asked her to ensure nursing staff sees it to document it        MEDICATIONS  (STANDING):  ascorbic acid 500 milliGRAM(s) Oral two times a day  calcium carbonate 1250 mG  + Vitamin D (OsCal 500 + D) 1 Tablet(s) Oral three times a day  celecoxib 200 milliGRAM(s) Oral daily  chlorhexidine 4% Liquid 1 Application(s) Topical <User Schedule>  colchicine 0.6 milliGRAM(s) Oral daily  docusate sodium 100 milliGRAM(s) Oral three times a day  enoxaparin Injectable 40 milliGRAM(s) SubCutaneous daily  folic acid 1 milliGRAM(s) Oral daily  gabapentin 800 milliGRAM(s) Oral three times a day  lactobacillus acidophilus 1 Tablet(s) Oral two times a day with meals  lactulose Syrup 10 Gram(s) Oral two times a day  lidocaine   Patch 1 Patch Transdermal every 24 hours  lidocaine   Patch 1 Patch Transdermal every 24 hours  methadone    Tablet 20 milliGRAM(s) Oral daily  multivitamin 1 Tablet(s) Oral daily  nicotine -  14 mG/24Hr(s) Patch 1 patch Transdermal daily  nystatin Powder 1 Application(s) Topical three times a day  oxyCODONE  ER Tablet 40 milliGRAM(s) Oral every 12 hours  pantoprazole    Tablet 40 milliGRAM(s) Oral before breakfast  polyethylene glycol 3350 17 Gram(s) Oral two times a day  polyethylene glycol/electrolyte Solution. 4000 milliLiter(s) Oral once  senna 2 Tablet(s) Oral at bedtime  vancomycin  IVPB 1000 milliGRAM(s) IV Intermittent every 8 hours    MEDICATIONS  (PRN):  acetaminophen   Tablet .. 650 milliGRAM(s) Oral every 6 hours PRN Mild Pain (1 - 3)  acetaminophen   Tablet .. 650 milliGRAM(s) Oral every 6 hours PRN Temp greater or equal to 38C (100.4F)  bisacodyl Suppository 10 milliGRAM(s) Rectal daily PRN Constipation  cyclobenzaprine 10 milliGRAM(s) Oral three times a day PRN Muscle Spasm  diphenhydrAMINE 25 milliGRAM(s) Oral every 4 hours PRN Rash and/or Itching  oxyCODONE    IR 20 milliGRAM(s) Oral every 4 hours PRN Moderate Pain (4 - 6)    Allergies    penicillin (Short breath; Hives)    Intolerances      ICU Vital Signs Last 24 Hrs  T(C): 36 (21 Apr 2019 12:34), Max: 36.6 (21 Apr 2019 05:19)  T(F): 96.8 (21 Apr 2019 12:34), Max: 97.8 (21 Apr 2019 05:19)  HR: 94 (21 Apr 2019 12:34) (89 - 94)  BP: 105/- (21 Apr 2019 12:34) (101/74 - 123/81)  BP(mean): --  ABP: --  ABP(mean): --  RR: 18 (21 Apr 2019 12:34) (18 - 18)  SpO2: 98% (21 Apr 2019 12:34) (98% - 100%)    PHYSICAL EXAM:  GENERAL: in chair in nad  well-groomed, well-developed  HEAD:  Atraumatic, Normocephalic  EYES: EOMI, PERRLA, conjunctiva and sclera clear  ENMT: No tonsillar erythema, exudates, or enlargement; Moist mucous membranes, Good dentition, No lesions, cervical collar in place   NECK: Supple, No JVD, Normal thyroid  NERVOUS SYSTEM:  Alert & Oriented X3, Good concentration; Motor Strength 4/5 B/L upper and lower extremities;  CHEST/LUNG: Clear to percussion bilaterally; No rales, rhonchi, wheezing, or rubs BL  HEART: Regular rate and rhythm; No murmurs, rubs, or gallops  ABDOMEN: Soft, mild non specific tenderness distended Bowel sounds present  BACK; Spinal tenderness  EXTREMITIES:  2+ Peripheral Pulses, No clubbing, cyanosis,  improved edema to lower extremities LE,   BL Hip/knees TTP, R knee swollen  SKIN: No rashes or lesions      LABS:                                      8.9    6.09  )-----------( 259      ( 18 Apr 2019 10:28 )             29.1     04-18    137  |  101  |  20  ----------------------------<  111<H>  4.4   |  30  |  0.42<L>    Ca    9.4      18 Apr 2019 10:28               RADIOLOGY & ADDITIONAL TESTS:        Consultant(s) Notes Reviewed:  [x ] YES     Care Discussed with [ x] Consultants [X ] Patient [ ] Family  [x ]    [x ]  Other; RN

## 2019-04-22 LAB
ANION GAP SERPL CALC-SCNC: 11 MMOL/L — SIGNIFICANT CHANGE UP (ref 5–17)
BUN SERPL-MCNC: 11 MG/DL — SIGNIFICANT CHANGE UP (ref 7–23)
CALCIUM SERPL-MCNC: 10 MG/DL — SIGNIFICANT CHANGE UP (ref 8.5–10.1)
CHLORIDE SERPL-SCNC: 103 MMOL/L — SIGNIFICANT CHANGE UP (ref 96–108)
CO2 SERPL-SCNC: 27 MMOL/L — SIGNIFICANT CHANGE UP (ref 22–31)
CREAT SERPL-MCNC: 0.38 MG/DL — LOW (ref 0.5–1.3)
GLUCOSE SERPL-MCNC: 115 MG/DL — HIGH (ref 70–99)
POTASSIUM SERPL-MCNC: 3.6 MMOL/L — SIGNIFICANT CHANGE UP (ref 3.5–5.3)
POTASSIUM SERPL-SCNC: 3.6 MMOL/L — SIGNIFICANT CHANGE UP (ref 3.5–5.3)
SODIUM SERPL-SCNC: 141 MMOL/L — SIGNIFICANT CHANGE UP (ref 135–145)

## 2019-04-22 PROCEDURE — 99232 SBSQ HOSP IP/OBS MODERATE 35: CPT

## 2019-04-22 RX ORDER — NALOXEGOL OXALATE 12.5 MG/1
25 TABLET, FILM COATED ORAL
Qty: 0 | Refills: 0 | Status: DISCONTINUED | OUTPATIENT
Start: 2019-04-23 | End: 2019-05-08

## 2019-04-22 RX ADMIN — OXYCODONE HYDROCHLORIDE 20 MILLIGRAM(S): 5 TABLET ORAL at 16:12

## 2019-04-22 RX ADMIN — Medication 1 PATCH: at 08:50

## 2019-04-22 RX ADMIN — OXYCODONE HYDROCHLORIDE 20 MILLIGRAM(S): 5 TABLET ORAL at 20:38

## 2019-04-22 RX ADMIN — Medication 1 PATCH: at 18:42

## 2019-04-22 RX ADMIN — METHADONE HYDROCHLORIDE 10 MILLIGRAM(S): 40 TABLET ORAL at 11:42

## 2019-04-22 RX ADMIN — PANTOPRAZOLE SODIUM 40 MILLIGRAM(S): 20 TABLET, DELAYED RELEASE ORAL at 09:00

## 2019-04-22 RX ADMIN — Medication 1 PATCH: at 11:49

## 2019-04-22 RX ADMIN — OXYCODONE HYDROCHLORIDE 20 MILLIGRAM(S): 5 TABLET ORAL at 20:23

## 2019-04-22 RX ADMIN — Medication 1 MILLIGRAM(S): at 11:42

## 2019-04-22 RX ADMIN — Medication 1 TABLET(S): at 17:51

## 2019-04-22 RX ADMIN — NYSTATIN CREAM 1 APPLICATION(S): 100000 CREAM TOPICAL at 05:13

## 2019-04-22 RX ADMIN — OXYCODONE HYDROCHLORIDE 20 MILLIGRAM(S): 5 TABLET ORAL at 01:08

## 2019-04-22 RX ADMIN — Medication 250 MILLIGRAM(S): at 22:17

## 2019-04-22 RX ADMIN — Medication 100 MILLIGRAM(S): at 14:11

## 2019-04-22 RX ADMIN — POLYETHYLENE GLYCOL 3350 17 GRAM(S): 17 POWDER, FOR SOLUTION ORAL at 05:14

## 2019-04-22 RX ADMIN — Medication 1 TABLET(S): at 09:00

## 2019-04-22 RX ADMIN — OXYCODONE HYDROCHLORIDE 40 MILLIGRAM(S): 5 TABLET ORAL at 18:30

## 2019-04-22 RX ADMIN — LIDOCAINE 1 PATCH: 4 CREAM TOPICAL at 23:26

## 2019-04-22 RX ADMIN — OXYCODONE HYDROCHLORIDE 40 MILLIGRAM(S): 5 TABLET ORAL at 17:53

## 2019-04-22 RX ADMIN — SENNA PLUS 2 TABLET(S): 8.6 TABLET ORAL at 22:15

## 2019-04-22 RX ADMIN — CYCLOBENZAPRINE HYDROCHLORIDE 10 MILLIGRAM(S): 10 TABLET, FILM COATED ORAL at 16:12

## 2019-04-22 RX ADMIN — Medication 0.6 MILLIGRAM(S): at 11:43

## 2019-04-22 RX ADMIN — OXYCODONE HYDROCHLORIDE 20 MILLIGRAM(S): 5 TABLET ORAL at 00:38

## 2019-04-22 RX ADMIN — LIDOCAINE 1 PATCH: 4 CREAM TOPICAL at 11:48

## 2019-04-22 RX ADMIN — Medication 500 MILLIGRAM(S): at 17:51

## 2019-04-22 RX ADMIN — OXYCODONE HYDROCHLORIDE 40 MILLIGRAM(S): 5 TABLET ORAL at 05:12

## 2019-04-22 RX ADMIN — GABAPENTIN 800 MILLIGRAM(S): 400 CAPSULE ORAL at 22:17

## 2019-04-22 RX ADMIN — Medication 1 TABLET(S): at 22:15

## 2019-04-22 RX ADMIN — CELECOXIB 200 MILLIGRAM(S): 200 CAPSULE ORAL at 12:30

## 2019-04-22 RX ADMIN — Medication 100 MILLIGRAM(S): at 05:13

## 2019-04-22 RX ADMIN — OXYCODONE HYDROCHLORIDE 20 MILLIGRAM(S): 5 TABLET ORAL at 17:00

## 2019-04-22 RX ADMIN — LIDOCAINE 1 PATCH: 4 CREAM TOPICAL at 09:01

## 2019-04-22 RX ADMIN — NYSTATIN CREAM 1 APPLICATION(S): 100000 CREAM TOPICAL at 14:11

## 2019-04-22 RX ADMIN — Medication 1 TABLET(S): at 11:42

## 2019-04-22 RX ADMIN — OXYCODONE HYDROCHLORIDE 20 MILLIGRAM(S): 5 TABLET ORAL at 09:00

## 2019-04-22 RX ADMIN — Medication 1 TABLET(S): at 14:11

## 2019-04-22 RX ADMIN — LACTULOSE 10 GRAM(S): 10 SOLUTION ORAL at 05:12

## 2019-04-22 RX ADMIN — LIDOCAINE 1 PATCH: 4 CREAM TOPICAL at 09:02

## 2019-04-22 RX ADMIN — OXYCODONE HYDROCHLORIDE 20 MILLIGRAM(S): 5 TABLET ORAL at 09:55

## 2019-04-22 RX ADMIN — Medication 500 MILLIGRAM(S): at 05:13

## 2019-04-22 RX ADMIN — Medication 1 PATCH: at 11:41

## 2019-04-22 RX ADMIN — CHLORHEXIDINE GLUCONATE 1 APPLICATION(S): 213 SOLUTION TOPICAL at 05:12

## 2019-04-22 RX ADMIN — GABAPENTIN 800 MILLIGRAM(S): 400 CAPSULE ORAL at 14:11

## 2019-04-22 RX ADMIN — NYSTATIN CREAM 1 APPLICATION(S): 100000 CREAM TOPICAL at 22:18

## 2019-04-22 RX ADMIN — GABAPENTIN 800 MILLIGRAM(S): 400 CAPSULE ORAL at 05:13

## 2019-04-22 RX ADMIN — CELECOXIB 200 MILLIGRAM(S): 200 CAPSULE ORAL at 11:43

## 2019-04-22 RX ADMIN — POLYETHYLENE GLYCOL 3350 17 GRAM(S): 17 POWDER, FOR SOLUTION ORAL at 17:51

## 2019-04-22 RX ADMIN — Medication 1 TABLET(S): at 05:13

## 2019-04-22 RX ADMIN — ENOXAPARIN SODIUM 40 MILLIGRAM(S): 100 INJECTION SUBCUTANEOUS at 11:43

## 2019-04-22 RX ADMIN — Medication 250 MILLIGRAM(S): at 05:14

## 2019-04-22 RX ADMIN — Medication 250 MILLIGRAM(S): at 14:11

## 2019-04-22 NOTE — PROGRESS NOTE ADULT - ASSESSMENT
43 year old intravenous drug addict admitted with methicillin resitant staph aureus bacteremia ; infectious endocarditis ; cx discitis   on iv antibiotics   cant add rifampin ?? on methadone as will need higher dose of methadone   already has a lot of addiction problems 43 year old intravenous drug addict admitted with methicillin resitant staph aureus bacteremia ; infectious endocarditis ; cx discitis   on iv antibiotics   cant add rifampin ?? on methadone as will need higher dose of methadone   already has a lot of addiction problems   continue current treatment

## 2019-04-22 NOTE — PROGRESS NOTE ADULT - SUBJECTIVE AND OBJECTIVE BOX
HPI:  44 Y/O female w/ PMHx of IV heroin abuse brought in by EMS for lethargy and cough. As per EMS, pt was found laying in bed at home, lethargic however able to follow some commands. EMS reports that pt was attempting to detox from heroin, and last known use was 3 days prior to ED presentation. As per ED, pts boyfriend reports a hx of + pt cough productive of white sputum and a fall down a flight of stairs 2/16. Labs showed serum lactate of 7.2, BUN/Cr 124/6.42, and WBC of of 11.93. Tmax 102.4, urine + for cocaine, opiates, nitrites and many bacteria. ICU called for further evaluation. (17 Feb 2019 22:56)      Allergies    penicillin (Short breath; Hives)    Intolerances        MEDICATIONS  (STANDING):  ascorbic acid 500 milliGRAM(s) Oral two times a day  calcium carbonate 1250 mG  + Vitamin D (OsCal 500 + D) 1 Tablet(s) Oral three times a day  celecoxib 200 milliGRAM(s) Oral daily  chlorhexidine 4% Liquid 1 Application(s) Topical <User Schedule>  colchicine 0.6 milliGRAM(s) Oral daily  docusate sodium 100 milliGRAM(s) Oral three times a day  enoxaparin Injectable 40 milliGRAM(s) SubCutaneous daily  folic acid 1 milliGRAM(s) Oral daily  gabapentin 800 milliGRAM(s) Oral three times a day  lactobacillus acidophilus 1 Tablet(s) Oral two times a day with meals  lidocaine   Patch 1 Patch Transdermal every 24 hours  lidocaine   Patch 1 Patch Transdermal every 24 hours  methadone    Tablet 10 milliGRAM(s) Oral daily  multivitamin 1 Tablet(s) Oral daily  nicotine -  14 mG/24Hr(s) Patch 1 patch Transdermal daily  nystatin Powder 1 Application(s) Topical three times a day  oxyCODONE  ER Tablet 40 milliGRAM(s) Oral every 12 hours  pantoprazole    Tablet 40 milliGRAM(s) Oral before breakfast  polyethylene glycol 3350 17 Gram(s) Oral two times a day  senna 2 Tablet(s) Oral at bedtime  vancomycin  IVPB 1000 milliGRAM(s) IV Intermittent every 8 hours    MEDICATIONS  (PRN):  acetaminophen   Tablet .. 650 milliGRAM(s) Oral every 6 hours PRN Mild Pain (1 - 3)  acetaminophen   Tablet .. 650 milliGRAM(s) Oral every 6 hours PRN Temp greater or equal to 38C (100.4F)  bisacodyl Suppository 10 milliGRAM(s) Rectal daily PRN Constipation  cyclobenzaprine 10 milliGRAM(s) Oral three times a day PRN Muscle Spasm  diphenhydrAMINE 25 milliGRAM(s) Oral every 4 hours PRN Rash and/or Itching  oxyCODONE    IR 20 milliGRAM(s) Oral every 4 hours PRN Moderate Pain (4 - 6)      REVIEW OF SYSTEMS:    CONSTITUTIONAL: No fever, chills, weight loss, or fatigue  HEENT: No sore throat, runny nose, ear ache  RESPIRATORY: No cough, wheezing, No shortness of breath  CARDIOVASCULAR: No chest pain, palpitations, dizziness  GASTROINTESTINAL: No abdominal pain. No nausea, vomiting, diarrhea  GENITOURINARY: No dysuria, increase frequency, hematuria, or incontinence  NEUROLOGICAL: No headaches, memory loss, loss of strength, numbness, or tremors, no weakness  EXTREMITY: No pedal edema BLE  SKIN: No itching, burning, rashes, or lesions     VITAL SIGNS:  T(C): 36.2 (04-22-19 @ 11:18), Max: 36.2 (04-22-19 @ 00:00)  T(F): 97.1 (04-22-19 @ 11:18), Max: 97.1 (04-22-19 @ 00:00)  HR: 96 (04-22-19 @ 11:18) (86 - 96)  BP: 131/85 (04-22-19 @ 11:18) (130/88 - 137/93)  RR: 20 (04-22-19 @ 11:18) (18 - 20)  SpO2: 98% (04-22-19 @ 11:18) (97% - 99%)  Wt(kg): --    PHYSICAL EXAM:    GENERAL: not in any distress  HEENT: Neck is supple, normocephalic, atraumatic   CHEST/LUNG: Clear to auscultation bilaterally; No rales, rhonchi, wheezing  HEART: Regular rate and rhythm; No murmurs, rubs, or gallops  ABDOMEN: Soft, Nontender, Nondistended; Bowel sounds present, no rebound   EXTREMITIES:  2+ Peripheral Pulses, No clubbing, cyanosis, or edema  GENITOURINARY:   SKIN: No rashes or lesions  BACK: no pressor sore   NERVOUS SYSTEM:  Alert & Oriented X3, Good concentration  PSYCH: normal affect     LABS:     04-22    141  |  103  |  11  ----------------------------<  115<H>  3.6   |  27  |  0.38<L>    Ca    10.0      22 Apr 2019 09:03                      Sedimentation Rate, Erythrocyte: 90 mm/hr (04-20 @ 07:15)      Vancomycin Level, Trough: 15.1 ug/mL (04-20 @ 07:15)                      Radiology: HPI:  44 Y/O female w/ PMHx of IV heroin abuse brought in by EMS for lethargy and cough. As per EMS, pt was found laying in bed at home, lethargic however able to follow some commands. EMS reports that pt was attempting to detox from heroin, and last known use was 3 days prior to ED presentation. As per ED, pts boyfriend reports a hx of + pt cough productive of white sputum and a fall down a flight of stairs 2/16. Labs showed serum lactate of 7.2, BUN/Cr 124/6.42, and WBC of of 11.93. Tmax 102.4, urine + for cocaine, opiates, nitrites and many bacteria. ICU called for further evaluation. (17 Feb 2019 22:56)  atient was admitted to critical care with severe sepsis with septic shock secondary to MRSA bacteremia w/ endocarditis on LIZ and EFRAIN thta has resolved and was diagnosis w/ HCV transferred out  of icu on 2/26/19.  While on the floor, patient c/o neck pain on 3/18  had CT neck done: < from: CT Cervical Spine No Cont (03.18.19 @ 11:49) >  Disc space narrowing and endplate irregularity at C4-5   Contrast-enhanced MRI of the cervical spine suggested   then MRI C/spine: < from: MR Cervical Spine w/ IV Cont (03.19.19 @ 17:18) >  C4/C5 discitis/osteomyelitis with a small right of midline ventral   epidural abscess. Mild cord compression.  Possible discitis/osteomyelitis at C6/C7.   Moderate anterior prevertebral effusion versus abscess/phlegmon within   the C2/C3-C6/C7 level.  Left C2 inferior facet osteomyelitis with adjacent soft tissue   enhancement.  Posterior soft tissue enhancement adjacent to the spinous processes of   C4, C5, C6.   Tiny soft tissue abscess measuring 5 mm adjacent to the C5 spinous   process tip.   on 3/20/2019 patient underwent  sx for Epidural Abscess C4-5, discitis C6-7, prevertebral abscess C2-T1, osteomyelitis C2, C4, C5, C6-C7,   stays on antibiotics at this time       Allergies    penicillin (Short breath; Hives)    Intolerances        MEDICATIONS  (STANDING):  ascorbic acid 500 milliGRAM(s) Oral two times a day  calcium carbonate 1250 mG  + Vitamin D (OsCal 500 + D) 1 Tablet(s) Oral three times a day  celecoxib 200 milliGRAM(s) Oral daily  chlorhexidine 4% Liquid 1 Application(s) Topical <User Schedule>  colchicine 0.6 milliGRAM(s) Oral daily  docusate sodium 100 milliGRAM(s) Oral three times a day  enoxaparin Injectable 40 milliGRAM(s) SubCutaneous daily  folic acid 1 milliGRAM(s) Oral daily  gabapentin 800 milliGRAM(s) Oral three times a day  lactobacillus acidophilus 1 Tablet(s) Oral two times a day with meals  lidocaine   Patch 1 Patch Transdermal every 24 hours  lidocaine   Patch 1 Patch Transdermal every 24 hours  methadone    Tablet 10 milliGRAM(s) Oral daily  multivitamin 1 Tablet(s) Oral daily  nicotine -  14 mG/24Hr(s) Patch 1 patch Transdermal daily  nystatin Powder 1 Application(s) Topical three times a day  oxyCODONE  ER Tablet 40 milliGRAM(s) Oral every 12 hours  pantoprazole    Tablet 40 milliGRAM(s) Oral before breakfast  polyethylene glycol 3350 17 Gram(s) Oral two times a day  senna 2 Tablet(s) Oral at bedtime  vancomycin  IVPB 1000 milliGRAM(s) IV Intermittent every 8 hours    MEDICATIONS  (PRN):  acetaminophen   Tablet .. 650 milliGRAM(s) Oral every 6 hours PRN Mild Pain (1 - 3)  acetaminophen   Tablet .. 650 milliGRAM(s) Oral every 6 hours PRN Temp greater or equal to 38C (100.4F)  bisacodyl Suppository 10 milliGRAM(s) Rectal daily PRN Constipation  cyclobenzaprine 10 milliGRAM(s) Oral three times a day PRN Muscle Spasm  diphenhydrAMINE 25 milliGRAM(s) Oral every 4 hours PRN Rash and/or Itching  oxyCODONE    IR 20 milliGRAM(s) Oral every 4 hours PRN Moderate Pain (4 - 6)      REVIEW OF SYSTEMS:  feels fine   was sleeping   when i woke her up   had severe pain     VITAL SIGNS:  T(C): 36.2 (04-22-19 @ 11:18), Max: 36.2 (04-22-19 @ 00:00)  T(F): 97.1 (04-22-19 @ 11:18), Max: 97.1 (04-22-19 @ 00:00)  HR: 96 (04-22-19 @ 11:18) (86 - 96)  BP: 131/85 (04-22-19 @ 11:18) (130/88 - 137/93)  RR: 20 (04-22-19 @ 11:18) (18 - 20)  SpO2: 98% (04-22-19 @ 11:18) (97% - 99%)  Wt(kg): --    PHYSICAL EXAM:    GENERAL: not in any distress  HEENT: Neck is supple, normocephalic, atraumatic   CHEST/LUNG: Clear to auscultation bilaterally; No rales, rhonchi, wheezing  HEART: Regular rate and rhythm; No murmurs, rubs, or gallops  ABDOMEN: Soft, Nontender, Nondistended; Bowel sounds present, no rebound   EXTREMITIES:  2+ Peripheral Pulses, No clubbing, cyanosis, or edema  SKIN: No rashes or lesions  BACK: no pressor sore   NERVOUS SYSTEM:  Alert & Oriented X3, Good concentration  PSYCH: normal affect     LABS:     04-22    141  |  103  |  11  ----------------------------<  115<H>  3.6   |  27  |  0.38<L>    Ca    10.0      22 Apr 2019 09:03                      Sedimentation Rate, Erythrocyte: 90 mm/hr (04-20 @ 07:15)      Vancomycin Level, Trough: 15.1 ug/mL (04-20 @ 07:15)                      Radiology:

## 2019-04-22 NOTE — PROGRESS NOTE ADULT - ASSESSMENT
43 F w/ history of IVDA, alcohol abuse, hx of pancreatitis, alcohol hepatitis, alcohol withdrawal, left frontoparietal subdural hematoma 2008 without need for neurosurgical intervention presented with severe sepsis with septic shock secondary to MRSA bacteremia w/ RLL PNA, UTI, endocarditis on LIZ and EFRAIN that has resolved and was diagnosis w/ HCV. Pt was initially intubated due to respiratory failure and put on pressors in ICU. She as extubated on 2/25 and transferred from ICU the following day. Pt had a C4-5 ACDF with irrigation and debridement on 3/20 due to spinal abscess and osteo and was kept intubated post procedure and transferred again to ICU, where she was extubated on 3/21 and transferred back to the med service the following day. HCV- Outpatient follow up for HCV     Septic arthritis involving BL Knees/hips;  - MRI of bilateral hips and knees were done demonstrating effusions and an abscess located in the right rectus femoris.   - Bilateral knees were aspirated by orthopedic team. on 3/28/19 ngtd   bilateral hips aspirated by orthopedic team on 3/29/19  ngtd but cppd crystals seen c/w psuedo gout  - as per ortho-No plan for orthopaedic operative intervention at this time. Should cultures grow pt will require operative intervention         Pseudogout: d/c colchicine     septic shock secondary to MRSA bacteremia w/ RLL PNA, UTI, endocarditis on LIZ at Tricuspid valve   - resolved     Endocarditis of tricuspid valve  - status post thoracentesis on 3/20  - as per ID, patient is on vanco  - repeat BC  blood cx  ngtd     - repeat echo showed a pedunculated mobile mass seen adherent to lateral leaflet of the tricuspid valve w/ no interval changes noted in valve structure or regurgitation  - repeated echo from 4/2/19 shows stable TR vegetation      Spinal osteo, abscess and cord compression    - CT of head and neck showed disc space narrowing and endplate irregularity at C4-5 which may represent typical degenerative change vs discitis   - MRI of the cervical spine w/ contrast showed C2-C7 discitis/osteomyelitis with a small right of midline ventral epidural abscess at C2-C7  - MRI of the thoracolumbar spine showed discitis/osteomyelitis at T11-T12 with paravertebral abscess, as well as discitis/osteomyelitis C6--T1, T9-T10, T10-T11, L4-L5, and L5-S1, and a phlegmon/early abscess dorsolaterally within the lumbar canal at the level of L4-L5. There was also a septic arthritis of  the left sternoclavicular joint space. There was also disc herniation at the L4-L5 level with contact and probable impingement of the descending right-sided nerve roots  - status post s/p C4-5 ACDF with irrigation and debridement on 3/20  - c/w gabapentin and lidocaine patch  - wound cx grew MRSA  - c/w Delong J  collar   - as per ID, patient is on vanco till may 15 2019  on 4/14/19 had mri t and l spine per ortho for stability see dr. muñoz note from 4/15/19        Bacteremia due to methicillin resistant Staphylococcus aureus with IE: diskitis/OM /Spinal abscesses /Parapneumonic effusion   - most recent blood cx from 3/21/19 negative   - s/p thoracentesis : fluids neg    Moderate protein-calorie malnutrition  - c/w soft diet w/Ensure Enlive 3 x day         edema to lower extremities checked dopplers on 4/1/19 and no dvt    started dvt prophylaxis with Lovenox per my d/w orthopedic  on 4/1/19   prealbumin is low c/w moderate malnutrition        IVDrug abuse  -tapering methadone    - - cont celebrex, gabapentin, oxycodone, Oxycontin to 40 mg bid    Anemia due multiple issues, surgery , blood draws,   poor nutrition and infection  - status post a few units of blood during this extensive hospital stay last one on 3/20/19  continue to monitor hgb , hgb 8.9 stable      - c/w folic acid      constipation: + BM today after given golytley   - will start movantic in the am prior to breakfast for opiod induced constipation

## 2019-04-22 NOTE — PROGRESS NOTE ADULT - SUBJECTIVE AND OBJECTIVE BOX
Patient is a 43y old  Female who presents with a chief complaint of AMS (31 Mar 2019 07:49)      OVERNIGHT EVENTS:  multiple BM today     MEDICATIONS  (STANDING):  ascorbic acid 500 milliGRAM(s) Oral two times a day  calcium carbonate 1250 mG  + Vitamin D (OsCal 500 + D) 1 Tablet(s) Oral three times a day  celecoxib 200 milliGRAM(s) Oral daily  chlorhexidine 4% Liquid 1 Application(s) Topical <User Schedule>  colchicine 0.6 milliGRAM(s) Oral daily  docusate sodium 100 milliGRAM(s) Oral three times a day  enoxaparin Injectable 40 milliGRAM(s) SubCutaneous daily  folic acid 1 milliGRAM(s) Oral daily  gabapentin 800 milliGRAM(s) Oral three times a day  lactobacillus acidophilus 1 Tablet(s) Oral two times a day with meals  lidocaine   Patch 1 Patch Transdermal every 24 hours  lidocaine   Patch 1 Patch Transdermal every 24 hours  methadone    Tablet 10 milliGRAM(s) Oral daily  multivitamin 1 Tablet(s) Oral daily  nicotine -  14 mG/24Hr(s) Patch 1 patch Transdermal daily  nystatin Powder 1 Application(s) Topical three times a day  oxyCODONE  ER Tablet 40 milliGRAM(s) Oral every 12 hours  pantoprazole    Tablet 40 milliGRAM(s) Oral before breakfast  polyethylene glycol 3350 17 Gram(s) Oral two times a day  senna 2 Tablet(s) Oral at bedtime  vancomycin  IVPB 1000 milliGRAM(s) IV Intermittent every 8 hours    MEDICATIONS  (PRN):  acetaminophen   Tablet .. 650 milliGRAM(s) Oral every 6 hours PRN Mild Pain (1 - 3)  acetaminophen   Tablet .. 650 milliGRAM(s) Oral every 6 hours PRN Temp greater or equal to 38C (100.4F)  bisacodyl Suppository 10 milliGRAM(s) Rectal daily PRN Constipation  cyclobenzaprine 10 milliGRAM(s) Oral three times a day PRN Muscle Spasm  diphenhydrAMINE 25 milliGRAM(s) Oral every 4 hours PRN Rash and/or Itching  oxyCODONE    IR 20 milliGRAM(s) Oral every 4 hours PRN Moderate Pain (4 - 6)      Allergies    penicillin (Short breath; Hives)    Intolerances      04-22    141  |  103  |  11  ----------------------------<  115<H>  3.6   |  27  |  0.38<L>    Ca    10.0      22 Apr 2019 09:03              PHYSICAL EXAM:  GENERAL: in chair in nad  well-groomed, well-developed  HEAD:  Atraumatic, Normocephalic  EYES: EOMI, PERRLA, conjunctiva and sclera clear  ENMT: No tonsillar erythema, exudates, or enlargement; Moist mucous membranes, Good dentition, No lesions, cervical collar in place   NECK: Supple, No JVD, Normal thyroid  NERVOUS SYSTEM:  Alert & Oriented X3, Good concentration; Motor Strength 4/5 B/L upper and lower extremities;  CHEST/LUNG: Clear to percussion bilaterally; No rales, rhonchi, wheezing, or rubs BL  HEART: Regular rate and rhythm; No murmurs, rubs, or gallops  ABDOMEN: Soft, mild non specific tenderness distended Bowel sounds present  BACK; Spinal tenderness  EXTREMITIES:  2+ Peripheral Pulses, No clubbing, cyanosis,  improved edema to lower extremities LE,   BL Hip/knees TTP, R knee swollen  SKIN: No rashes or lesions      LABS:                              04-22    141  |  103  |  11  ----------------------------<  115<H>  3.6   |  27  |  0.38<L>    Ca    10.0      22 Apr 2019 09:03                       RADIOLOGY & ADDITIONAL TESTS:        Consultant(s) Notes Reviewed:  [x ] YES     Care Discussed with [ x] Consultants [X ] Patient [ ] Family  [x ]    [x ]  Other; RN

## 2019-04-23 LAB — VANCOMYCIN TROUGH SERPL-MCNC: 18.4 UG/ML — SIGNIFICANT CHANGE UP (ref 10–20)

## 2019-04-23 PROCEDURE — 99233 SBSQ HOSP IP/OBS HIGH 50: CPT

## 2019-04-23 RX ORDER — METHADONE HYDROCHLORIDE 40 MG/1
5 TABLET ORAL ONCE
Qty: 0 | Refills: 0 | Status: DISCONTINUED | OUTPATIENT
Start: 2019-04-23 | End: 2019-04-23

## 2019-04-23 RX ORDER — OXYCODONE HYDROCHLORIDE 5 MG/1
40 TABLET ORAL EVERY 12 HOURS
Qty: 0 | Refills: 0 | Status: DISCONTINUED | OUTPATIENT
Start: 2019-04-23 | End: 2019-04-30

## 2019-04-23 RX ADMIN — LIDOCAINE 1 PATCH: 4 CREAM TOPICAL at 08:54

## 2019-04-23 RX ADMIN — POLYETHYLENE GLYCOL 3350 17 GRAM(S): 17 POWDER, FOR SOLUTION ORAL at 18:35

## 2019-04-23 RX ADMIN — PANTOPRAZOLE SODIUM 40 MILLIGRAM(S): 20 TABLET, DELAYED RELEASE ORAL at 08:58

## 2019-04-23 RX ADMIN — Medication 1 TABLET(S): at 08:58

## 2019-04-23 RX ADMIN — ENOXAPARIN SODIUM 40 MILLIGRAM(S): 100 INJECTION SUBCUTANEOUS at 12:58

## 2019-04-23 RX ADMIN — OXYCODONE HYDROCHLORIDE 20 MILLIGRAM(S): 5 TABLET ORAL at 01:58

## 2019-04-23 RX ADMIN — Medication 1 PATCH: at 08:59

## 2019-04-23 RX ADMIN — Medication 650 MILLIGRAM(S): at 02:58

## 2019-04-23 RX ADMIN — CELECOXIB 200 MILLIGRAM(S): 200 CAPSULE ORAL at 14:31

## 2019-04-23 RX ADMIN — Medication 1 PATCH: at 18:23

## 2019-04-23 RX ADMIN — OXYCODONE HYDROCHLORIDE 40 MILLIGRAM(S): 5 TABLET ORAL at 19:13

## 2019-04-23 RX ADMIN — Medication 1 PATCH: at 12:53

## 2019-04-23 RX ADMIN — NYSTATIN CREAM 1 APPLICATION(S): 100000 CREAM TOPICAL at 06:26

## 2019-04-23 RX ADMIN — OXYCODONE HYDROCHLORIDE 20 MILLIGRAM(S): 5 TABLET ORAL at 16:35

## 2019-04-23 RX ADMIN — Medication 1 PATCH: at 12:56

## 2019-04-23 RX ADMIN — Medication 500 MILLIGRAM(S): at 06:26

## 2019-04-23 RX ADMIN — Medication 650 MILLIGRAM(S): at 13:02

## 2019-04-23 RX ADMIN — Medication 650 MILLIGRAM(S): at 14:22

## 2019-04-23 RX ADMIN — Medication 1 MILLIGRAM(S): at 12:58

## 2019-04-23 RX ADMIN — LIDOCAINE 1 PATCH: 4 CREAM TOPICAL at 12:51

## 2019-04-23 RX ADMIN — Medication 1 TABLET(S): at 18:35

## 2019-04-23 RX ADMIN — NALOXEGOL OXALATE 25 MILLIGRAM(S): 12.5 TABLET, FILM COATED ORAL at 08:58

## 2019-04-23 RX ADMIN — GABAPENTIN 800 MILLIGRAM(S): 400 CAPSULE ORAL at 06:26

## 2019-04-23 RX ADMIN — Medication 1 TABLET(S): at 12:57

## 2019-04-23 RX ADMIN — OXYCODONE HYDROCHLORIDE 20 MILLIGRAM(S): 5 TABLET ORAL at 19:14

## 2019-04-23 RX ADMIN — Medication 1 TABLET(S): at 06:26

## 2019-04-23 RX ADMIN — OXYCODONE HYDROCHLORIDE 20 MILLIGRAM(S): 5 TABLET ORAL at 23:54

## 2019-04-23 RX ADMIN — OXYCODONE HYDROCHLORIDE 20 MILLIGRAM(S): 5 TABLET ORAL at 10:35

## 2019-04-23 RX ADMIN — Medication 500 MILLIGRAM(S): at 18:44

## 2019-04-23 RX ADMIN — OXYCODONE HYDROCHLORIDE 20 MILLIGRAM(S): 5 TABLET ORAL at 14:36

## 2019-04-23 RX ADMIN — Medication 250 MILLIGRAM(S): at 13:11

## 2019-04-23 RX ADMIN — OXYCODONE HYDROCHLORIDE 20 MILLIGRAM(S): 5 TABLET ORAL at 06:31

## 2019-04-23 RX ADMIN — GABAPENTIN 800 MILLIGRAM(S): 400 CAPSULE ORAL at 23:54

## 2019-04-23 RX ADMIN — CYCLOBENZAPRINE HYDROCHLORIDE 10 MILLIGRAM(S): 10 TABLET, FILM COATED ORAL at 10:40

## 2019-04-23 RX ADMIN — SENNA PLUS 2 TABLET(S): 8.6 TABLET ORAL at 23:55

## 2019-04-23 RX ADMIN — OXYCODONE HYDROCHLORIDE 20 MILLIGRAM(S): 5 TABLET ORAL at 00:58

## 2019-04-23 RX ADMIN — OXYCODONE HYDROCHLORIDE 20 MILLIGRAM(S): 5 TABLET ORAL at 07:30

## 2019-04-23 RX ADMIN — Medication 1 TABLET(S): at 13:07

## 2019-04-23 RX ADMIN — OXYCODONE HYDROCHLORIDE 40 MILLIGRAM(S): 5 TABLET ORAL at 18:35

## 2019-04-23 RX ADMIN — NYSTATIN CREAM 1 APPLICATION(S): 100000 CREAM TOPICAL at 13:06

## 2019-04-23 RX ADMIN — OXYCODONE HYDROCHLORIDE 20 MILLIGRAM(S): 5 TABLET ORAL at 18:44

## 2019-04-23 RX ADMIN — Medication 1 TABLET(S): at 23:55

## 2019-04-23 RX ADMIN — CYCLOBENZAPRINE HYDROCHLORIDE 10 MILLIGRAM(S): 10 TABLET, FILM COATED ORAL at 01:56

## 2019-04-23 RX ADMIN — Medication 250 MILLIGRAM(S): at 07:03

## 2019-04-23 RX ADMIN — CELECOXIB 200 MILLIGRAM(S): 200 CAPSULE ORAL at 12:58

## 2019-04-23 RX ADMIN — Medication 250 MILLIGRAM(S): at 23:57

## 2019-04-23 RX ADMIN — Medication 650 MILLIGRAM(S): at 01:58

## 2019-04-23 RX ADMIN — OXYCODONE HYDROCHLORIDE 20 MILLIGRAM(S): 5 TABLET ORAL at 12:00

## 2019-04-23 RX ADMIN — METHADONE HYDROCHLORIDE 5 MILLIGRAM(S): 40 TABLET ORAL at 13:04

## 2019-04-23 RX ADMIN — GABAPENTIN 800 MILLIGRAM(S): 400 CAPSULE ORAL at 13:13

## 2019-04-23 RX ADMIN — CHLORHEXIDINE GLUCONATE 1 APPLICATION(S): 213 SOLUTION TOPICAL at 06:26

## 2019-04-23 NOTE — PROGRESS NOTE ADULT - ASSESSMENT
43 F w/ history of IVDA, alcohol abuse, hx of pancreatitis, alcohol hepatitis, alcohol withdrawal, left frontoparietal subdural hematoma 2008 without need for neurosurgical intervention presented with severe sepsis with septic shock secondary to MRSA bacteremia w/ RLL PNA, UTI, endocarditis on LIZ and EFRAIN that has resolved and was diagnosis w/ HCV. Pt was initially intubated due to respiratory failure and put on pressors in ICU. She as extubated on 2/25 and transferred from ICU the following day. Pt had a C4-5 ACDF with irrigation and debridement on 3/20 due to spinal abscess and osteo and was kept intubated post procedure and transferred again to ICU, where she was extubated on 3/21 and transferred back to the med service the following day. HCV- Outpatient follow up for HCV     Septic arthritis involving BL Knees/hips;  - MRI of bilateral hips and knees were done demonstrating effusions and an abscess located in the right rectus femoris.   - Bilateral knees were aspirated by orthopedic team. on 3/28/19 ngtd   bilateral hips aspirated by orthopedic team on 3/29/19  ngtd but cppd crystals seen c/w psuedo gout  - as per ortho-No plan for orthopaedic operative intervention at this time. Should cultures grow pt will require operative intervention         Pseudogout: d/c colchicine     septic shock secondary to MRSA bacteremia w/ RLL PNA, UTI, endocarditis on LIZ at Tricuspid valve   - resolved     Endocarditis of tricuspid valve  - status post thoracentesis on 3/20  - as per ID, patient is on vanco  - repeat BC  blood cx  ngtd     - repeat echo showed a pedunculated mobile mass seen adherent to lateral leaflet of the tricuspid valve w/ no interval changes noted in valve structure or regurgitation  - repeated echo from 4/2/19 shows stable TR vegetation      Spinal osteo, abscess and cord compression    - CT of head and neck showed disc space narrowing and endplate irregularity at C4-5 which may represent typical degenerative change vs discitis   - MRI of the cervical spine w/ contrast showed C2-C7 discitis/osteomyelitis with a small right of midline ventral epidural abscess at C2-C7  - MRI of the thoracolumbar spine showed discitis/osteomyelitis at T11-T12 with paravertebral abscess, as well as discitis/osteomyelitis C6--T1, T9-T10, T10-T11, L4-L5, and L5-S1, and a phlegmon/early abscess dorsolaterally within the lumbar canal at the level of L4-L5. There was also a septic arthritis of  the left sternoclavicular joint space. There was also disc herniation at the L4-L5 level with contact and probable impingement of the descending right-sided nerve roots  - status post s/p C4-5 ACDF with irrigation and debridement on 3/20  - c/w gabapentin and lidocaine patch  - wound cx grew MRSA  - c/w [place] J  collar   - as per ID, patient is on vanco till may 15 2019  on 4/14/19 had mri t and l spine per ortho for stability see dr. muñoz note from 4/15/19        Bacteremia due to methicillin resistant Staphylococcus aureus with IE: diskitis/OM /Spinal abscesses /Parapneumonic effusion   - most recent blood cx from 3/21/19 negative   - s/p thoracentesis : fluids neg    Moderate protein-calorie malnutrition  - c/w soft diet w/Ensure Enlive 3 x day         edema to lower extremities checked dopplers on 4/1/19 and no dvt    started dvt prophylaxis with Lovenox per my d/w orthopedic  on 4/1/19   prealbumin is low c/w moderate malnutrition        IVDrug abuse  -tapering methadone    - - cont celebrex, gabapentin, oxycodone, Oxycontin to 40 mg bid    Anemia due multiple issues, surgery , blood draws,   poor nutrition and infection  - status post a few units of blood during this extensive hospital stay last one on 3/20/19  continue to monitor hgb , hgb 8.9 stable      - c/w folic acid      constipation: resolved after  golytley   -  started movantic today  in the am prior to breakfast for opiod induced constipation

## 2019-04-23 NOTE — PROGRESS NOTE ADULT - SUBJECTIVE AND OBJECTIVE BOX
Patient is a 43y old  Female who presents with a chief complaint of AMS (31 Mar 2019 07:49)      OVERNIGHT EVENTS:  none     MEDICATIONS  (STANDING):  ascorbic acid 500 milliGRAM(s) Oral two times a day  calcium carbonate 1250 mG  + Vitamin D (OsCal 500 + D) 1 Tablet(s) Oral three times a day  celecoxib 200 milliGRAM(s) Oral daily  chlorhexidine 4% Liquid 1 Application(s) Topical <User Schedule>  enoxaparin Injectable 40 milliGRAM(s) SubCutaneous daily  folic acid 1 milliGRAM(s) Oral daily  gabapentin 800 milliGRAM(s) Oral three times a day  lactobacillus acidophilus 1 Tablet(s) Oral two times a day with meals  lidocaine   Patch 1 Patch Transdermal every 24 hours  lidocaine   Patch 1 Patch Transdermal every 24 hours  multivitamin 1 Tablet(s) Oral daily  naloxegol 25 milliGRAM(s) Oral before breakfast  nicotine -  14 mG/24Hr(s) Patch 1 patch Transdermal daily  nystatin Powder 1 Application(s) Topical three times a day  oxyCODONE  ER Tablet 40 milliGRAM(s) Oral every 12 hours  pantoprazole    Tablet 40 milliGRAM(s) Oral before breakfast  polyethylene glycol 3350 17 Gram(s) Oral two times a day  senna 2 Tablet(s) Oral at bedtime  vancomycin  IVPB 1000 milliGRAM(s) IV Intermittent every 8 hours    MEDICATIONS  (PRN):  acetaminophen   Tablet .. 650 milliGRAM(s) Oral every 6 hours PRN Mild Pain (1 - 3)  acetaminophen   Tablet .. 650 milliGRAM(s) Oral every 6 hours PRN Temp greater or equal to 38C (100.4F)  cyclobenzaprine 10 milliGRAM(s) Oral three times a day PRN Muscle Spasm  diphenhydrAMINE 25 milliGRAM(s) Oral every 4 hours PRN Rash and/or Itching  oxyCODONE    IR 20 milliGRAM(s) Oral every 4 hours PRN Moderate Pain (4 - 6)      Allergies    penicillin (Short breath; Hives)    Intolerances      04-22 04-22    141  |  103  |  11  ----------------------------<  115<H>  3.6   |  27  |  0.38<L>    Ca    10.0      22 Apr 2019 09:03                  PHYSICAL EXAM:  GENERAL: in chair in nad  well-groomed, well-developed  HEAD:  Atraumatic, Normocephalic  EYES: EOMI, PERRLA, conjunctiva and sclera clear  ENMT: No tonsillar erythema, exudates, or enlargement; Moist mucous membranes, Good dentition, No lesions, cervical collar in place   NECK: Supple, No JVD, Normal thyroid  NERVOUS SYSTEM:  Alert & Oriented X3, Good concentration; Motor Strength 4/5 B/L upper and lower extremities;  CHEST/LUNG: Clear to percussion bilaterally; No rales, rhonchi, wheezing, or rubs BL  HEART: Regular rate and rhythm; No murmurs, rubs, or gallops  ABDOMEN: Soft, mild non specific tenderness distended Bowel sounds present  BACK; Spinal tenderness  EXTREMITIES:  2+ Peripheral Pulses, No clubbing, cyanosis,  improved edema to lower extremities LE,   SKIN: No rashes or lesions      LABS:                              04-22    141  |  103  |  11  ----------------------------<  115<H>  3.6   |  27  |  0.38<L>    Ca    10.0      22 Apr 2019 09:03                       RADIOLOGY & ADDITIONAL TESTS:        Consultant(s) Notes Reviewed:  [x ] YES     Care Discussed with [ x] Consultants [X ] Patient [ ] Family  [x ]    [x ]  Other; RN

## 2019-04-23 NOTE — PROGRESS NOTE ADULT - ASSESSMENT
43 year old intravenous drug addict admitted with methicillin resitant staph aureus bacteremia ; infectious endocarditis ; cx discitis   on iv antibiotics   cant add rifampin ?? on methadone as will need higher dose of methadone   already has a lot of addiction problems   continue current treatment   vanco trough is good   esr noted

## 2019-04-23 NOTE — PROGRESS NOTE ADULT - SUBJECTIVE AND OBJECTIVE BOX
42 Y/O female w/ PMHx of IV heroin abuse brought in by EMS for lethargy and cough. As per EMS, pt was found laying in bed at home, lethargic however able to follow some commands. EMS reports that pt was attempting to detox from heroin, and last known use was 3 days prior to ED presentation. As per ED, pts boyfriend reports a hx of + pt cough productive of white sputum and a fall down a flight of stairs 2/16. Labs showed serum lactate of 7.2, BUN/Cr 124/6.42, and WBC of of 11.93. Tmax 102.4, urine + for cocaine, opiates, nitrites and many bacteria. adm feb 17   patient was admitted to critical care with severe sepsis with septic shock secondary to MRSA bacteremia w/ endocarditis on LIZ and EFRAIN thta has resolved and was diagnosis w/ HCV transferred out  of icu on 2/26/19.  While on the floor, patient c/o neck pain on 3/18  had CT neck done: < from: CT Cervical Spine No Cont (03.18.19 @ 11:49) >  Disc space narrowing and endplate irregularity at C4-5   Contrast-enhanced MRI of the cervical spine suggested   then MRI C/spine: < from: MR Cervical Spine w/ IV Cont (03.19.19 @ 17:18) >  C4/C5 discitis/osteomyelitis with a small right of midline ventral   epidural abscess. Mild cord compression.  Possible discitis/osteomyelitis at C6/C7.   Moderate anterior prevertebral effusion versus abscess/phlegmon within   the C2/C3-C6/C7 level.  Left C2 inferior facet osteomyelitis with adjacent soft tissue   enhancement.  Posterior soft tissue enhancement adjacent to the spinous processes of   C4, C5, C6.   Tiny soft tissue abscess measuring 5 mm adjacent to the C5 spinous   process tip.   on 3/20/2019 patient underwent  sx for Epidural Abscess C4-5, discitis C6-7, prevertebral abscess C2-T1, osteomyelitis C2, C4, C5, C6-C7,   stays on antibiotics at this time   Allergies    penicillin (Short breath; Hives)    Intolerances        MEDICATIONS  (STANDING):  ascorbic acid 500 milliGRAM(s) Oral two times a day  calcium carbonate 1250 mG  + Vitamin D (OsCal 500 + D) 1 Tablet(s) Oral three times a day  celecoxib 200 milliGRAM(s) Oral daily  chlorhexidine 4% Liquid 1 Application(s) Topical <User Schedule>  enoxaparin Injectable 40 milliGRAM(s) SubCutaneous daily  folic acid 1 milliGRAM(s) Oral daily  gabapentin 800 milliGRAM(s) Oral three times a day  lactobacillus acidophilus 1 Tablet(s) Oral two times a day with meals  lidocaine   Patch 1 Patch Transdermal every 24 hours  lidocaine   Patch 1 Patch Transdermal every 24 hours  multivitamin 1 Tablet(s) Oral daily  naloxegol 25 milliGRAM(s) Oral before breakfast  nicotine -  14 mG/24Hr(s) Patch 1 patch Transdermal daily  nystatin Powder 1 Application(s) Topical three times a day  oxyCODONE  ER Tablet 40 milliGRAM(s) Oral every 12 hours  pantoprazole    Tablet 40 milliGRAM(s) Oral before breakfast  polyethylene glycol 3350 17 Gram(s) Oral two times a day  senna 2 Tablet(s) Oral at bedtime  vancomycin  IVPB 1000 milliGRAM(s) IV Intermittent every 8 hours    MEDICATIONS  (PRN):  acetaminophen   Tablet .. 650 milliGRAM(s) Oral every 6 hours PRN Mild Pain (1 - 3)  acetaminophen   Tablet .. 650 milliGRAM(s) Oral every 6 hours PRN Temp greater or equal to 38C (100.4F)  cyclobenzaprine 10 milliGRAM(s) Oral three times a day PRN Muscle Spasm  diphenhydrAMINE 25 milliGRAM(s) Oral every 4 hours PRN Rash and/or Itching  oxyCODONE    IR 20 milliGRAM(s) Oral every 4 hours PRN Moderate Pain (4 - 6)      REVIEW OF SYSTEMS:    ongoing pain     VITAL SIGNS:  T(C): 37.1 (04-23-19 @ 19:29), Max: 37.1 (04-23-19 @ 19:29)  T(F): 98.8 (04-23-19 @ 19:29), Max: 98.8 (04-23-19 @ 19:29)  HR: 91 (04-23-19 @ 19:29) (73 - 96)  BP: 129/76 (04-23-19 @ 19:29) (129/76 - 147/90)  RR: 16 (04-23-19 @ 19:29) (16 - 18)  SpO2: 98% (04-23-19 @ 19:29) (96% - 98%)  Wt(kg): --    PHYSICAL EXAM:    GENERAL: not in any distress  HEENT: Neck is supple, normocephalic, atraumatic   CHEST/LUNG: Clear to auscultation bilaterally; No rales, rhonchi, wheezing  HEART: Regular rate and rhythm; No murmurs, rubs, or gallops  ABDOMEN: Soft, Nontender, Nondistended; Bowel sounds present, no rebound   EXTREMITIES:  2+ Peripheral Pulses, No clubbing, cyanosis, or edema  SKIN: No rashes or lesions  BACK: no pressor sore   NERVOUS SYSTEM:  Alert & Oriented X3, LABS:     04-22    141  |  103  |  11  ----------------------------<  115<H>  3.6   |  27  |  0.38<L>    Ca    10.0      22 Apr 2019 09:03                      Sedimentation Rate, Erythrocyte: 90 mm/hr (04-20 @ 07:15)      Vancomycin Level, Trough: 18.4 ug/mL (04-23 @ 06:22)                      Radiology:

## 2019-04-24 PROCEDURE — 99233 SBSQ HOSP IP/OBS HIGH 50: CPT

## 2019-04-24 PROCEDURE — 36569 INSJ PICC 5 YR+ W/O IMAGING: CPT

## 2019-04-24 PROCEDURE — 76937 US GUIDE VASCULAR ACCESS: CPT | Mod: 26,59

## 2019-04-24 RX ORDER — CHLORHEXIDINE GLUCONATE 213 G/1000ML
1 SOLUTION TOPICAL DAILY
Qty: 0 | Refills: 0 | Status: DISCONTINUED | OUTPATIENT
Start: 2019-04-24 | End: 2019-05-08

## 2019-04-24 RX ADMIN — CELECOXIB 200 MILLIGRAM(S): 200 CAPSULE ORAL at 12:07

## 2019-04-24 RX ADMIN — Medication 1 TABLET(S): at 21:05

## 2019-04-24 RX ADMIN — OXYCODONE HYDROCHLORIDE 20 MILLIGRAM(S): 5 TABLET ORAL at 12:57

## 2019-04-24 RX ADMIN — OXYCODONE HYDROCHLORIDE 20 MILLIGRAM(S): 5 TABLET ORAL at 00:25

## 2019-04-24 RX ADMIN — POLYETHYLENE GLYCOL 3350 17 GRAM(S): 17 POWDER, FOR SOLUTION ORAL at 17:34

## 2019-04-24 RX ADMIN — Medication 1 TABLET(S): at 07:52

## 2019-04-24 RX ADMIN — LIDOCAINE 1 PATCH: 4 CREAM TOPICAL at 12:11

## 2019-04-24 RX ADMIN — LIDOCAINE 1 PATCH: 4 CREAM TOPICAL at 00:03

## 2019-04-24 RX ADMIN — Medication 1 PATCH: at 07:50

## 2019-04-24 RX ADMIN — Medication 1 PATCH: at 12:11

## 2019-04-24 RX ADMIN — NYSTATIN CREAM 1 APPLICATION(S): 100000 CREAM TOPICAL at 21:06

## 2019-04-24 RX ADMIN — LIDOCAINE 1 PATCH: 4 CREAM TOPICAL at 07:50

## 2019-04-24 RX ADMIN — ENOXAPARIN SODIUM 40 MILLIGRAM(S): 100 INJECTION SUBCUTANEOUS at 12:06

## 2019-04-24 RX ADMIN — OXYCODONE HYDROCHLORIDE 40 MILLIGRAM(S): 5 TABLET ORAL at 17:34

## 2019-04-24 RX ADMIN — OXYCODONE HYDROCHLORIDE 20 MILLIGRAM(S): 5 TABLET ORAL at 08:45

## 2019-04-24 RX ADMIN — OXYCODONE HYDROCHLORIDE 40 MILLIGRAM(S): 5 TABLET ORAL at 06:26

## 2019-04-24 RX ADMIN — OXYCODONE HYDROCHLORIDE 20 MILLIGRAM(S): 5 TABLET ORAL at 04:51

## 2019-04-24 RX ADMIN — OXYCODONE HYDROCHLORIDE 20 MILLIGRAM(S): 5 TABLET ORAL at 08:00

## 2019-04-24 RX ADMIN — LIDOCAINE 1 PATCH: 4 CREAM TOPICAL at 23:48

## 2019-04-24 RX ADMIN — Medication 1 PATCH: at 12:06

## 2019-04-24 RX ADMIN — CYCLOBENZAPRINE HYDROCHLORIDE 10 MILLIGRAM(S): 10 TABLET, FILM COATED ORAL at 12:07

## 2019-04-24 RX ADMIN — LIDOCAINE 1 PATCH: 4 CREAM TOPICAL at 00:02

## 2019-04-24 RX ADMIN — PANTOPRAZOLE SODIUM 40 MILLIGRAM(S): 20 TABLET, DELAYED RELEASE ORAL at 08:04

## 2019-04-24 RX ADMIN — Medication 1 TABLET(S): at 17:34

## 2019-04-24 RX ADMIN — OXYCODONE HYDROCHLORIDE 20 MILLIGRAM(S): 5 TABLET ORAL at 04:08

## 2019-04-24 RX ADMIN — SENNA PLUS 2 TABLET(S): 8.6 TABLET ORAL at 21:05

## 2019-04-24 RX ADMIN — GABAPENTIN 800 MILLIGRAM(S): 400 CAPSULE ORAL at 13:03

## 2019-04-24 RX ADMIN — Medication 1 TABLET(S): at 05:54

## 2019-04-24 RX ADMIN — GABAPENTIN 800 MILLIGRAM(S): 400 CAPSULE ORAL at 05:52

## 2019-04-24 RX ADMIN — OXYCODONE HYDROCHLORIDE 20 MILLIGRAM(S): 5 TABLET ORAL at 21:35

## 2019-04-24 RX ADMIN — OXYCODONE HYDROCHLORIDE 20 MILLIGRAM(S): 5 TABLET ORAL at 21:05

## 2019-04-24 RX ADMIN — Medication 250 MILLIGRAM(S): at 21:18

## 2019-04-24 RX ADMIN — LIDOCAINE 1 PATCH: 4 CREAM TOPICAL at 07:49

## 2019-04-24 RX ADMIN — Medication 250 MILLIGRAM(S): at 05:53

## 2019-04-24 RX ADMIN — Medication 250 MILLIGRAM(S): at 13:03

## 2019-04-24 RX ADMIN — CHLORHEXIDINE GLUCONATE 1 APPLICATION(S): 213 SOLUTION TOPICAL at 05:48

## 2019-04-24 RX ADMIN — NALOXEGOL OXALATE 25 MILLIGRAM(S): 12.5 TABLET, FILM COATED ORAL at 08:00

## 2019-04-24 RX ADMIN — OXYCODONE HYDROCHLORIDE 40 MILLIGRAM(S): 5 TABLET ORAL at 05:52

## 2019-04-24 RX ADMIN — Medication 650 MILLIGRAM(S): at 02:39

## 2019-04-24 RX ADMIN — Medication 500 MILLIGRAM(S): at 05:53

## 2019-04-24 RX ADMIN — CELECOXIB 200 MILLIGRAM(S): 200 CAPSULE ORAL at 12:58

## 2019-04-24 RX ADMIN — Medication 1 TABLET(S): at 13:03

## 2019-04-24 RX ADMIN — Medication 1 TABLET(S): at 12:07

## 2019-04-24 RX ADMIN — NYSTATIN CREAM 1 APPLICATION(S): 100000 CREAM TOPICAL at 13:03

## 2019-04-24 RX ADMIN — Medication 1 MILLIGRAM(S): at 12:07

## 2019-04-24 RX ADMIN — Medication 500 MILLIGRAM(S): at 17:34

## 2019-04-24 RX ADMIN — CYCLOBENZAPRINE HYDROCHLORIDE 10 MILLIGRAM(S): 10 TABLET, FILM COATED ORAL at 23:57

## 2019-04-24 RX ADMIN — OXYCODONE HYDROCHLORIDE 20 MILLIGRAM(S): 5 TABLET ORAL at 13:45

## 2019-04-24 RX ADMIN — GABAPENTIN 800 MILLIGRAM(S): 400 CAPSULE ORAL at 21:05

## 2019-04-24 NOTE — PROCEDURE NOTE - NSCOMPLICATION_GEN_A_CORE
no complications/Pt tolerated procedure well, NVI following procedure, no complications
no complications
no complications

## 2019-04-24 NOTE — CHART NOTE - NSCHARTNOTEFT_GEN_A_CORE
Assessment: Pt seen for follow up.  Pt reports decreased po intake due to drinking GoLYTELY, causing increased BM and watery stools.  Pt consuming 0-25% of meals; during assessment pt ate only a few bites of eggs.  Pt stated she was drinking the Ensure Enlive.  States she is afraid to eat and experience increased BM.  Pt educated on importance of consuming meals to meet energy and protein needs, as well as the role of fiber in forming normal bowel movements. No c/o n/v or constipation, chewing or swallowing issues.    Factors impacting intake: [ ] none [ ] nausea  [ ] vomiting [X] diarrhea [ ] constipation  [ ]chewing problems [ ] swallowing issues  [ ] other:     Diet Prescription: Diet, Regular:   Supplement Feeding Modality:  Oral  Ensure Enlive Cans or Servings Per Day:  3       Frequency:  Daily (03-29-19 @ 16:58)    Intake: Pt consuming 0-25% meals past 2 days; prior to pt intake was %.     Current Weight: last weight taken was 75.2 kg on 04-20; pt was 73.9 kg on 04-08; requested current weight    % Weight Change: 1.7% increase x 12 days     Edema: 1+ generalized; 2+ b/l feet and b/l ankles     Pertinent Medications: MEDICATIONS  (STANDING):  ascorbic acid 500 milliGRAM(s) Oral two times a day  calcium carbonate 1250 mG  + Vitamin D (OsCal 500 + D) 1 Tablet(s) Oral three times a day  celecoxib 200 milliGRAM(s) Oral daily  chlorhexidine 4% Liquid 1 Application(s) Topical <User Schedule>  enoxaparin Injectable 40 milliGRAM(s) SubCutaneous daily  folic acid 1 milliGRAM(s) Oral daily  gabapentin 800 milliGRAM(s) Oral three times a day  lactobacillus acidophilus 1 Tablet(s) Oral two times a day with meals  lidocaine   Patch 1 Patch Transdermal every 24 hours  lidocaine   Patch 1 Patch Transdermal every 24 hours  multivitamin 1 Tablet(s) Oral daily  naloxegol 25 milliGRAM(s) Oral before breakfast  nicotine -  14 mG/24Hr(s) Patch 1 patch Transdermal daily  nystatin Powder 1 Application(s) Topical three times a day  oxyCODONE  ER Tablet 40 milliGRAM(s) Oral every 12 hours  pantoprazole    Tablet 40 milliGRAM(s) Oral before breakfast  polyethylene glycol 3350 17 Gram(s) Oral two times a day  senna 2 Tablet(s) Oral at bedtime  vancomycin  IVPB 1000 milliGRAM(s) IV Intermittent every 8 hours    MEDICATIONS  (PRN):  acetaminophen   Tablet .. 650 milliGRAM(s) Oral every 6 hours PRN Mild Pain (1 - 3)  acetaminophen   Tablet .. 650 milliGRAM(s) Oral every 6 hours PRN Temp greater or equal to 38C (100.4F)  cyclobenzaprine 10 milliGRAM(s) Oral three times a day PRN Muscle Spasm  diphenhydrAMINE 25 milliGRAM(s) Oral every 4 hours PRN Rash and/or Itching  oxyCODONE    IR 20 milliGRAM(s) Oral every 4 hours PRN Moderate Pain (4 - 6)    Pertinent Labs: 04-22 Na141 mmol/L Glu 115 mg/dL<H> K+ 3.6 mmol/L Cr  0.38 mg/dL<L> BUN 11 mg/dL 04-02 PAB 13 mg/dL<L>    Skin: L lateral knee wound; L heel stage I    Estimated Needs:   [X] no change since previous assessment (04/17)  [ ] recalculated:     Previous Nutrition Diagnosis:   [ ] Inadequate Energy Intake   [ ]Inadequate   [ ] Excessive Energy Intake   [ ] Underweight [ ] Increased Nutrient Needs [ ] Overweight/Obesity   [ ] Altered GI Function [ ] Unintended Weight Loss [ ] Food & Nutrition Related Knowledge Deficit   [] Malnutrition     Nutrition Diagnosis is [ ] ongoing  [ ] resolved [X] not applicable     New Nutrition Diagnosis:  Inadequate Oral Intake related to decreased ability to consume sufficient energy due to diarrhea as evidenced by 0-25% po intake x 2 days.     Goal: Pt obtain >75% protein and energy need and to tolerate regular diet without GI distress.    Interventions:   Recommend  [X] Continue: Regular/ Ensure Enlive x 3/day (provides 1050 kcal, 60 g protein)   [ ] Change Diet To:  [ ] Nutrition Supplement  [ ] Nutrition Support  [ ] Other:     Monitoring and Evaluation:   [X] PO intake [ x ] Tolerance to diet prescription [ x ] weights [ x ] labs[ x ] follow up per protocol  [ ] other:

## 2019-04-24 NOTE — PROCEDURE NOTE - NSPOSTCAREGUIDE_GEN_A_CORE
Verbal/written post procedure instructions were given to patient/caregiver
Instructed patient/caregiver to follow-up with primary care physician/Instructed patient/caregiver regarding signs and symptoms of infection/Verbal/written post procedure instructions were given to patient/caregiver/Keep the cast/splint/dressing clean and dry/Care for catheter as per unit/ICU protocols
Care for catheter as per unit/ICU protocols/Instructed patient/caregiver to follow-up with primary care physician/Keep the cast/splint/dressing clean and dry/Instructed patient/caregiver regarding signs and symptoms of infection/Verbal/written post procedure instructions were given to patient/caregiver

## 2019-04-24 NOTE — PROCEDURE NOTE - NSINFORMCONSENT_GEN_A_CORE
Benefits, risks, and possible complications of procedure explained to patient/caregiver who verbalized understanding and gave written consent./2 physician consent
Benefits, risks, and possible complications of procedure explained to patient/caregiver who verbalized understanding and gave written consent.
Benefits, risks, and possible complications of procedure explained to patient/caregiver who verbalized understanding and gave written consent.

## 2019-04-24 NOTE — PROCEDURE NOTE - NSICDXPROCEDURE_GEN_ALL_CORE_FT
PROCEDURES:  Aspiration of both knees 28-Mar-2019 19:39:27  Kelly Eubanks
PROCEDURES:  Midline catheter insertion 25-Apr-2019 19:36:40  Elizabeth Carballo

## 2019-04-24 NOTE — PROCEDURE NOTE - NSSITEPREP_SKIN_A_CORE
chlorhexidine
chlorhexidine/povidone iodine (if allergic to chlorhexidine)
chlorhexidine/povidone-iodine ( under 2 weeks of age or 1500 grams)

## 2019-04-24 NOTE — PROGRESS NOTE ADULT - ASSESSMENT
43 F w/ history of IVDA, alcohol abuse, hx of pancreatitis, alcohol hepatitis, alcohol withdrawal, left frontoparietal subdural hematoma 2008 without need for neurosurgical intervention presented with severe sepsis with septic shock secondary to MRSA bacteremia w/ RLL PNA, UTI, endocarditis on LIZ and EFRAIN that has resolved and was diagnosis w/ HCV. Pt was initially intubated due to respiratory failure and put on pressors in ICU. She as extubated on 2/25 and transferred from ICU the following day. Pt had a C4-5 ACDF with irrigation and debridement on 3/20 due to spinal abscess and osteo and was kept intubated post procedure and transferred again to ICU, where she was extubated on 3/21 and transferred back to the med service the following day. HCV- Outpatient follow up for HCV     Septic arthritis involving BL Knees/hips;  - MRI of bilateral hips and knees were done demonstrating effusions and an abscess located in the right rectus femoris.   - Bilateral knees were aspirated by orthopedic team. on 3/28/19 ngtd   bilateral hips aspirated by orthopedic team on 3/29/19  ngtd but cppd crystals seen c/w psuedo gout  - as per ortho-No plan for orthopaedic operative intervention at this time. Should cultures grow pt will require operative intervention         Pseudogout: d/c colchicine     septic shock secondary to MRSA bacteremia w/ RLL PNA, UTI, endocarditis on LIZ at Tricuspid valve   - resolved     Endocarditis of tricuspid valve  - status post thoracentesis on 3/20  - as per ID, patient is on vanco  - repeat BC  blood cx  ngtd     - repeat echo showed a pedunculated mobile mass seen adherent to lateral leaflet of the tricuspid valve w/ no interval changes noted in valve structure or regurgitation  - repeated echo from 4/2/19 shows stable TR vegetation      Spinal osteo, abscess and cord compression    - CT of head and neck showed disc space narrowing and endplate irregularity at C4-5 which may represent typical degenerative change vs discitis   - MRI of the cervical spine w/ contrast showed C2-C7 discitis/osteomyelitis with a small right of midline ventral epidural abscess at C2-C7  - MRI of the thoracolumbar spine showed discitis/osteomyelitis at T11-T12 with paravertebral abscess, as well as discitis/osteomyelitis C6--T1, T9-T10, T10-T11, L4-L5, and L5-S1, and a phlegmon/early abscess dorsolaterally within the lumbar canal at the level of L4-L5. There was also a septic arthritis of  the left sternoclavicular joint space. There was also disc herniation at the L4-L5 level with contact and probable impingement of the descending right-sided nerve roots  - status post s/p C4-5 ACDF with irrigation and debridement on 3/20  - c/w gabapentin and lidocaine patch  - wound cx grew MRSA  - c/w [place] J  collar   - as per ID, patient is on vanco till may 15 2019  on 4/14/19 had mri t and l spine per ortho for stability see dr. muñoz note from 4/15/19        Bacteremia due to methicillin resistant Staphylococcus aureus with IE: diskitis/OM /Spinal abscesses /Parapneumonic effusion   - most recent blood cx from 3/21/19 negative   - s/p thoracentesis : fluids neg    Moderate protein-calorie malnutrition  - c/w soft diet w/Ensure Enlive 3 x day         IVDrug abuse  -off methadone    - - cont celebrex, gabapentin, oxycodone, Oxycontin to 40 mg bid    Anemia due multiple issues, surgery , blood draws,   poor nutrition and infection  - status post a few units of blood during this extensive hospital stay last one on 3/20/19  - c/w folic acid      constipation: resolved after  golytley   -  started movantic

## 2019-04-24 NOTE — PROGRESS NOTE ADULT - SUBJECTIVE AND OBJECTIVE BOX
Patient is a 43y old  Female who presents with a chief complaint of AMS (31 Mar 2019 07:49)      OVERNIGHT EVENTS:  none     MEDICATIONS  (STANDING):  ascorbic acid 500 milliGRAM(s) Oral two times a day  calcium carbonate 1250 mG  + Vitamin D (OsCal 500 + D) 1 Tablet(s) Oral three times a day  celecoxib 200 milliGRAM(s) Oral daily  chlorhexidine 4% Liquid 1 Application(s) Topical <User Schedule>  enoxaparin Injectable 40 milliGRAM(s) SubCutaneous daily  folic acid 1 milliGRAM(s) Oral daily  gabapentin 800 milliGRAM(s) Oral three times a day  lactobacillus acidophilus 1 Tablet(s) Oral two times a day with meals  lidocaine   Patch 1 Patch Transdermal every 24 hours  lidocaine   Patch 1 Patch Transdermal every 24 hours  multivitamin 1 Tablet(s) Oral daily  naloxegol 25 milliGRAM(s) Oral before breakfast  nicotine -  14 mG/24Hr(s) Patch 1 patch Transdermal daily  nystatin Powder 1 Application(s) Topical three times a day  oxyCODONE  ER Tablet 40 milliGRAM(s) Oral every 12 hours  pantoprazole    Tablet 40 milliGRAM(s) Oral before breakfast  polyethylene glycol 3350 17 Gram(s) Oral two times a day  senna 2 Tablet(s) Oral at bedtime  vancomycin  IVPB 1000 milliGRAM(s) IV Intermittent every 8 hours    MEDICATIONS  (PRN):  acetaminophen   Tablet .. 650 milliGRAM(s) Oral every 6 hours PRN Mild Pain (1 - 3)  acetaminophen   Tablet .. 650 milliGRAM(s) Oral every 6 hours PRN Temp greater or equal to 38C (100.4F)  cyclobenzaprine 10 milliGRAM(s) Oral three times a day PRN Muscle Spasm  diphenhydrAMINE 25 milliGRAM(s) Oral every 4 hours PRN Rash and/or Itching  oxyCODONE    IR 20 milliGRAM(s) Oral every 4 hours PRN Moderate Pain (4 - 6)        Allergies    penicillin (Short breath; Hives)    Intolerances                      PHYSICAL EXAM:  GENERAL: in chair in nad  well-groomed, well-developed  HEAD:  Atraumatic, Normocephalic  EYES: EOMI, PERRLA, conjunctiva and sclera clear  ENMT: No tonsillar erythema, exudates, or enlargement; Moist mucous membranes, Good dentition, No lesions, cervical collar in place   NECK: Supple, No JVD, Normal thyroid  NERVOUS SYSTEM:  Alert & Oriented X3, Good concentration; Motor Strength 4/5 B/L upper and lower extremities;  CHEST/LUNG: Clear to percussion bilaterally; No rales, rhonchi, wheezing, or rubs BL  HEART: Regular rate and rhythm; No murmurs, rubs, or gallops  ABDOMEN: Soft, mild non specific tenderness distended Bowel sounds present  BACK; Spinal tenderness  EXTREMITIES:  2+ Peripheral Pulses, No clubbing, cyanosis,  improved edema to lower extremities LE,   SKIN: No rashes or lesions      LABS:                              04-22    141  |  103  |  11  ----------------------------<  115<H>  3.6   |  27  |  0.38<L>    Ca    10.0      22 Apr 2019 09:03                       RADIOLOGY & ADDITIONAL TESTS:        Consultant(s) Notes Reviewed:  [x ] YES     Care Discussed with [ x] Consultants [X ] Patient [ ] Family  [x ]    [x ]  Other; RN

## 2019-04-25 LAB
ANION GAP SERPL CALC-SCNC: 8 MMOL/L — SIGNIFICANT CHANGE UP (ref 5–17)
BUN SERPL-MCNC: 14 MG/DL — SIGNIFICANT CHANGE UP (ref 7–23)
CALCIUM SERPL-MCNC: 9.7 MG/DL — SIGNIFICANT CHANGE UP (ref 8.5–10.1)
CHLORIDE SERPL-SCNC: 104 MMOL/L — SIGNIFICANT CHANGE UP (ref 96–108)
CO2 SERPL-SCNC: 29 MMOL/L — SIGNIFICANT CHANGE UP (ref 22–31)
CREAT SERPL-MCNC: 0.44 MG/DL — LOW (ref 0.5–1.3)
GLUCOSE SERPL-MCNC: 104 MG/DL — HIGH (ref 70–99)
HCT VFR BLD CALC: 33.5 % — LOW (ref 34.5–45)
HGB BLD-MCNC: 10.5 G/DL — LOW (ref 11.5–15.5)
MCHC RBC-ENTMCNC: 25.2 PG — LOW (ref 27–34)
MCHC RBC-ENTMCNC: 31.3 GM/DL — LOW (ref 32–36)
MCV RBC AUTO: 80.3 FL — SIGNIFICANT CHANGE UP (ref 80–100)
NRBC # BLD: 0 /100 WBCS — SIGNIFICANT CHANGE UP (ref 0–0)
PLATELET # BLD AUTO: 339 K/UL — SIGNIFICANT CHANGE UP (ref 150–400)
POTASSIUM SERPL-MCNC: 4 MMOL/L — SIGNIFICANT CHANGE UP (ref 3.5–5.3)
POTASSIUM SERPL-SCNC: 4 MMOL/L — SIGNIFICANT CHANGE UP (ref 3.5–5.3)
RBC # BLD: 4.17 M/UL — SIGNIFICANT CHANGE UP (ref 3.8–5.2)
RBC # FLD: 16 % — HIGH (ref 10.3–14.5)
SODIUM SERPL-SCNC: 141 MMOL/L — SIGNIFICANT CHANGE UP (ref 135–145)
VANCOMYCIN TROUGH SERPL-MCNC: 15.7 UG/ML — SIGNIFICANT CHANGE UP (ref 10–20)
WBC # BLD: 8.83 K/UL — SIGNIFICANT CHANGE UP (ref 3.8–10.5)
WBC # FLD AUTO: 8.83 K/UL — SIGNIFICANT CHANGE UP (ref 3.8–10.5)

## 2019-04-25 PROCEDURE — 99232 SBSQ HOSP IP/OBS MODERATE 35: CPT

## 2019-04-25 RX ORDER — IBUPROFEN 200 MG
400 TABLET ORAL ONCE
Qty: 0 | Refills: 0 | Status: COMPLETED | OUTPATIENT
Start: 2019-04-25 | End: 2019-04-25

## 2019-04-25 RX ADMIN — PANTOPRAZOLE SODIUM 40 MILLIGRAM(S): 20 TABLET, DELAYED RELEASE ORAL at 06:27

## 2019-04-25 RX ADMIN — CYCLOBENZAPRINE HYDROCHLORIDE 10 MILLIGRAM(S): 10 TABLET, FILM COATED ORAL at 08:29

## 2019-04-25 RX ADMIN — CELECOXIB 200 MILLIGRAM(S): 200 CAPSULE ORAL at 11:32

## 2019-04-25 RX ADMIN — Medication 1 PATCH: at 18:20

## 2019-04-25 RX ADMIN — Medication 1 TABLET(S): at 08:25

## 2019-04-25 RX ADMIN — POLYETHYLENE GLYCOL 3350 17 GRAM(S): 17 POWDER, FOR SOLUTION ORAL at 17:20

## 2019-04-25 RX ADMIN — Medication 500 MILLIGRAM(S): at 06:27

## 2019-04-25 RX ADMIN — LIDOCAINE 1 PATCH: 4 CREAM TOPICAL at 11:36

## 2019-04-25 RX ADMIN — Medication 1 TABLET(S): at 16:51

## 2019-04-25 RX ADMIN — OXYCODONE HYDROCHLORIDE 20 MILLIGRAM(S): 5 TABLET ORAL at 13:16

## 2019-04-25 RX ADMIN — Medication 650 MILLIGRAM(S): at 11:30

## 2019-04-25 RX ADMIN — Medication 250 MILLIGRAM(S): at 10:16

## 2019-04-25 RX ADMIN — CHLORHEXIDINE GLUCONATE 1 APPLICATION(S): 213 SOLUTION TOPICAL at 06:35

## 2019-04-25 RX ADMIN — Medication 400 MILLIGRAM(S): at 18:43

## 2019-04-25 RX ADMIN — GABAPENTIN 800 MILLIGRAM(S): 400 CAPSULE ORAL at 21:33

## 2019-04-25 RX ADMIN — Medication 1 TABLET(S): at 11:34

## 2019-04-25 RX ADMIN — OXYCODONE HYDROCHLORIDE 40 MILLIGRAM(S): 5 TABLET ORAL at 17:20

## 2019-04-25 RX ADMIN — GABAPENTIN 800 MILLIGRAM(S): 400 CAPSULE ORAL at 13:19

## 2019-04-25 RX ADMIN — OXYCODONE HYDROCHLORIDE 20 MILLIGRAM(S): 5 TABLET ORAL at 22:40

## 2019-04-25 RX ADMIN — Medication 1 PATCH: at 07:00

## 2019-04-25 RX ADMIN — LIDOCAINE 1 PATCH: 4 CREAM TOPICAL at 08:17

## 2019-04-25 RX ADMIN — OXYCODONE HYDROCHLORIDE 40 MILLIGRAM(S): 5 TABLET ORAL at 18:21

## 2019-04-25 RX ADMIN — Medication 1 PATCH: at 11:35

## 2019-04-25 RX ADMIN — Medication 250 MILLIGRAM(S): at 20:38

## 2019-04-25 RX ADMIN — OXYCODONE HYDROCHLORIDE 20 MILLIGRAM(S): 5 TABLET ORAL at 02:32

## 2019-04-25 RX ADMIN — CELECOXIB 200 MILLIGRAM(S): 200 CAPSULE ORAL at 12:15

## 2019-04-25 RX ADMIN — OXYCODONE HYDROCHLORIDE 40 MILLIGRAM(S): 5 TABLET ORAL at 08:17

## 2019-04-25 RX ADMIN — Medication 650 MILLIGRAM(S): at 02:42

## 2019-04-25 RX ADMIN — Medication 400 MILLIGRAM(S): at 18:06

## 2019-04-25 RX ADMIN — NALOXEGOL OXALATE 25 MILLIGRAM(S): 12.5 TABLET, FILM COATED ORAL at 08:25

## 2019-04-25 RX ADMIN — Medication 1 MILLIGRAM(S): at 11:32

## 2019-04-25 RX ADMIN — Medication 650 MILLIGRAM(S): at 21:20

## 2019-04-25 RX ADMIN — CYCLOBENZAPRINE HYDROCHLORIDE 10 MILLIGRAM(S): 10 TABLET, FILM COATED ORAL at 20:38

## 2019-04-25 RX ADMIN — CHLORHEXIDINE GLUCONATE 1 APPLICATION(S): 213 SOLUTION TOPICAL at 11:28

## 2019-04-25 RX ADMIN — Medication 1 PATCH: at 11:32

## 2019-04-25 RX ADMIN — OXYCODONE HYDROCHLORIDE 20 MILLIGRAM(S): 5 TABLET ORAL at 09:20

## 2019-04-25 RX ADMIN — Medication 650 MILLIGRAM(S): at 10:20

## 2019-04-25 RX ADMIN — SENNA PLUS 2 TABLET(S): 8.6 TABLET ORAL at 21:33

## 2019-04-25 RX ADMIN — OXYCODONE HYDROCHLORIDE 20 MILLIGRAM(S): 5 TABLET ORAL at 16:49

## 2019-04-25 RX ADMIN — Medication 650 MILLIGRAM(S): at 20:32

## 2019-04-25 RX ADMIN — Medication 1 TABLET(S): at 06:27

## 2019-04-25 RX ADMIN — OXYCODONE HYDROCHLORIDE 20 MILLIGRAM(S): 5 TABLET ORAL at 12:33

## 2019-04-25 RX ADMIN — Medication 500 MILLIGRAM(S): at 17:20

## 2019-04-25 RX ADMIN — OXYCODONE HYDROCHLORIDE 20 MILLIGRAM(S): 5 TABLET ORAL at 17:34

## 2019-04-25 RX ADMIN — OXYCODONE HYDROCHLORIDE 20 MILLIGRAM(S): 5 TABLET ORAL at 01:32

## 2019-04-25 RX ADMIN — Medication 650 MILLIGRAM(S): at 03:42

## 2019-04-25 RX ADMIN — NYSTATIN CREAM 1 APPLICATION(S): 100000 CREAM TOPICAL at 13:19

## 2019-04-25 RX ADMIN — Medication 1 TABLET(S): at 13:18

## 2019-04-25 RX ADMIN — Medication 1 TABLET(S): at 21:33

## 2019-04-25 RX ADMIN — GABAPENTIN 800 MILLIGRAM(S): 400 CAPSULE ORAL at 06:27

## 2019-04-25 RX ADMIN — OXYCODONE HYDROCHLORIDE 20 MILLIGRAM(S): 5 TABLET ORAL at 08:29

## 2019-04-25 RX ADMIN — OXYCODONE HYDROCHLORIDE 20 MILLIGRAM(S): 5 TABLET ORAL at 21:33

## 2019-04-25 RX ADMIN — OXYCODONE HYDROCHLORIDE 40 MILLIGRAM(S): 5 TABLET ORAL at 06:27

## 2019-04-25 RX ADMIN — ENOXAPARIN SODIUM 40 MILLIGRAM(S): 100 INJECTION SUBCUTANEOUS at 11:33

## 2019-04-25 NOTE — PROGRESS NOTE ADULT - ASSESSMENT
43 F w/ history of IVDA, alcohol abuse, hx of pancreatitis, alcohol hepatitis, alcohol withdrawal, left frontoparietal subdural hematoma 2008 without need for neurosurgical intervention presented with severe sepsis with septic shock secondary to MRSA bacteremia w/ RLL PNA, UTI, endocarditis on LIZ and EFRAIN that has resolved and was diagnosis w/ HCV. Pt was initially intubated due to respiratory failure and put on pressors in ICU. She was extubated on 2/25 and transferred from ICU the following day. Pt had a C4-5 ACDF with irrigation and debridement on 3/20 due to spinal abscess and osteo and was kept intubated post procedure and transferred again to ICU, where she was extubated on 3/21 and transferred back to the med service the following day. HCV- Outpatient follow up for HCV     Septic arthritis involving BL Knees/hips;  - MRI of bilateral hips and knees were done demonstrating effusions and an abscess located in the right rectus femoris.   - Bilateral knees were aspirated by orthopedic team. on 3/28/19 ngtd   bilateral hips aspirated by orthopedic team on 3/29/19  ngtd but cppd crystals seen c/w psuedo gout  - as per ortho-No plan for orthopaedic operative intervention at this time. Should cultures grow pt will require operative intervention         Pseudogout: d/c colchicine     septic shock secondary to MRSA bacteremia w/ RLL PNA, UTI, endocarditis on LIZ at Tricuspid valve   - resolved     Endocarditis of tricuspid valve  - status post thoracentesis on 3/20  - as per ID, patient is on vanco  - repeat BC  blood cx  ngtd     - repeat echo showed a pedunculated mobile mass seen adherent to lateral leaflet of the tricuspid valve w/ no interval changes noted in valve structure or regurgitation  - repeated echo from 4/2/19 shows stable TR vegetation      Spinal osteo, abscess and cord compression    - CT of head and neck showed disc space narrowing and endplate irregularity at C4-5 which may represent typical degenerative change vs discitis   - MRI of the cervical spine w/ contrast showed C2-C7 discitis/osteomyelitis with a small right of midline ventral epidural abscess at C2-C7  - MRI of the thoracolumbar spine showed discitis/osteomyelitis at T11-T12 with paravertebral abscess, as well as discitis/osteomyelitis C6--T1, T9-T10, T10-T11, L4-L5, and L5-S1, and a phlegmon/early abscess dorsolaterally within the lumbar canal at the level of L4-L5. There was also a septic arthritis of  the left sternoclavicular joint space. There was also disc herniation at the L4-L5 level with contact and probable impingement of the descending right-sided nerve roots  - status post s/p C4-5 ACDF with irrigation and debridement on 3/20  - c/w gabapentin and lidocaine patch  - wound cx grew MRSA  - c/w [place] J  collar   - as per ID, patient is on vanco till may 15 2019  on 4/14/19 had mri t and l spine per ortho for stability see dr. muñoz note from 4/15/19        Bacteremia due to methicillin resistant Staphylococcus aureus with IE: diskitis/OM /Spinal abscesses /Parapneumonic effusion   - most recent blood cx from 3/21/19 negative   - s/p thoracentesis : fluids neg    Moderate protein-calorie malnutrition  - c/w soft diet w/Ensure Enlive 3 x day         IVDrug abuse  -off methadone    - - Chronic Pain - cont celebrex, gabapentin, oxycodone, Oxycontin to 40 mg bid    Anemia due multiple issues, surgery , blood draws,   poor nutrition and infection  - status post a few units of blood during this extensive hospital stay last one on 3/20/19  - c/w folic acid      constipation: resolved after  golytley   -  started movantic         Awaiting accepting rehab facility

## 2019-04-25 NOTE — PROGRESS NOTE ADULT - SUBJECTIVE AND OBJECTIVE BOX
HPI:  42 Y/O female w/ PMHx of IV heroin abuse brought in by EMS for lethargy and cough. As per EMS, pt was found laying in bed at home, lethargic however able to follow some commands. EMS reports that pt was attempting to detox from heroin, and last known use was 3 days prior to ED presentation. As per ED, pts boyfriend reports a hx of + pt cough productive of white sputum and a fall down a flight of stairs 2/16. Labs showed serum lactate of 7.2, BUN/Cr 124/6.42, and WBC of of 11.93. Tmax 102.4, urine + for cocaine, opiates, nitrites and many bacteria. ICU called for further evaluation. (17 Feb 2019 22:56)      Allergies    penicillin (Short breath; Hives)    Intolerances        MEDICATIONS  (STANDING):  ascorbic acid 500 milliGRAM(s) Oral two times a day  calcium carbonate 1250 mG  + Vitamin D (OsCal 500 + D) 1 Tablet(s) Oral three times a day  celecoxib 200 milliGRAM(s) Oral daily  chlorhexidine 2% Cloths 1 Application(s) Topical daily  chlorhexidine 4% Liquid 1 Application(s) Topical <User Schedule>  enoxaparin Injectable 40 milliGRAM(s) SubCutaneous daily  folic acid 1 milliGRAM(s) Oral daily  gabapentin 800 milliGRAM(s) Oral three times a day  lactobacillus acidophilus 1 Tablet(s) Oral two times a day with meals  lidocaine   Patch 1 Patch Transdermal every 24 hours  lidocaine   Patch 1 Patch Transdermal every 24 hours  multivitamin 1 Tablet(s) Oral daily  naloxegol 25 milliGRAM(s) Oral before breakfast  nicotine -  14 mG/24Hr(s) Patch 1 patch Transdermal daily  nystatin Powder 1 Application(s) Topical three times a day  oxyCODONE  ER Tablet 40 milliGRAM(s) Oral every 12 hours  pantoprazole    Tablet 40 milliGRAM(s) Oral before breakfast  polyethylene glycol 3350 17 Gram(s) Oral two times a day  senna 2 Tablet(s) Oral at bedtime  vancomycin  IVPB 1000 milliGRAM(s) IV Intermittent every 8 hours    MEDICATIONS  (PRN):  acetaminophen   Tablet .. 650 milliGRAM(s) Oral every 6 hours PRN Mild Pain (1 - 3)  acetaminophen   Tablet .. 650 milliGRAM(s) Oral every 6 hours PRN Temp greater or equal to 38C (100.4F)  cyclobenzaprine 10 milliGRAM(s) Oral three times a day PRN Muscle Spasm  diphenhydrAMINE 25 milliGRAM(s) Oral every 4 hours PRN Rash and/or Itching  oxyCODONE    IR 20 milliGRAM(s) Oral every 4 hours PRN Moderate Pain (4 - 6)      REVIEW OF SYSTEMS:    CONSTITUTIONAL: No fever, chills, weight loss, or fatigue  HEENT: No sore throat, runny nose, ear ache  RESPIRATORY: No cough, wheezing, No shortness of breath  CARDIOVASCULAR: No chest pain, palpitations, dizziness  GASTROINTESTINAL: No abdominal pain. No nausea, vomiting, diarrhea  GENITOURINARY: No dysuria, increase frequency, hematuria, or incontinence  NEUROLOGICAL: No headaches, memory loss, loss of strength, numbness, or tremors, no weakness  EXTREMITY: No pedal edema BLE  SKIN: No itching, burning, rashes, or lesions     VITAL SIGNS:  T(C): 36.7 (04-25-19 @ 06:20), Max: 36.8 (04-25-19 @ 00:03)  T(F): 98 (04-25-19 @ 06:20), Max: 98.2 (04-25-19 @ 00:03)  HR: 96 (04-25-19 @ 06:20) (86 - 96)  BP: 132/87 (04-25-19 @ 06:20) (117/75 - 145/59)  RR: 16 (04-25-19 @ 06:20) (16 - 19)  SpO2: 99% (04-25-19 @ 06:20) (98% - 99%)  Wt(kg): --    PHYSICAL EXAM:    GENERAL: not in any distress, neck collar   HEENT: Neck is supple, normocephalic, atraumatic   CHEST/LUNG: Clear to percussion bilaterally; No rales, rhonchi, wheezing  HEART: Regular rate and rhythm; No murmurs, rubs, or gallops  ABDOMEN: Soft, Nontender, Nondistended; Bowel sounds present, no rebound   EXTREMITIES:  2+ Peripheral Pulses, No clubbing, cyanosis, or edema  GENITOURINARY:   SKIN: No rashes or lesions  BACK: no pressor sore   NERVOUS SYSTEM:  Alert & Oriented X3, Good concentration  PSYCH: normal affect     LABS:                         10.5   8.83  )-----------( 339      ( 25 Apr 2019 08:38 )             33.5     04-25    141  |  104  |  14  ----------------------------<  104<H>  4.0   |  29  |  0.44<L>    Ca    9.7      25 Apr 2019 08:38                          Vancomycin Level, Trough: 15.7 ug/mL (04-25 @ 08:38)                      Radiology:

## 2019-04-25 NOTE — PROGRESS NOTE ADULT - SUBJECTIVE AND OBJECTIVE BOX
Patient is a 43y old  Female who presents with a chief complaint of AMS (25 Apr 2019 10:36)      INTERVAL HPI/OVERNIGHT EVENTS:  no overnight events, pt. with c/o back pain, not new    MEDICATIONS  (STANDING):  ascorbic acid 500 milliGRAM(s) Oral two times a day  calcium carbonate 1250 mG  + Vitamin D (OsCal 500 + D) 1 Tablet(s) Oral three times a day  celecoxib 200 milliGRAM(s) Oral daily  chlorhexidine 2% Cloths 1 Application(s) Topical daily  chlorhexidine 4% Liquid 1 Application(s) Topical <User Schedule>  enoxaparin Injectable 40 milliGRAM(s) SubCutaneous daily  folic acid 1 milliGRAM(s) Oral daily  gabapentin 800 milliGRAM(s) Oral three times a day  lactobacillus acidophilus 1 Tablet(s) Oral two times a day with meals  lidocaine   Patch 1 Patch Transdermal every 24 hours  lidocaine   Patch 1 Patch Transdermal every 24 hours  multivitamin 1 Tablet(s) Oral daily  naloxegol 25 milliGRAM(s) Oral before breakfast  nicotine -  14 mG/24Hr(s) Patch 1 patch Transdermal daily  nystatin Powder 1 Application(s) Topical three times a day  oxyCODONE  ER Tablet 40 milliGRAM(s) Oral every 12 hours  pantoprazole    Tablet 40 milliGRAM(s) Oral before breakfast  polyethylene glycol 3350 17 Gram(s) Oral two times a day  senna 2 Tablet(s) Oral at bedtime  vancomycin  IVPB 1000 milliGRAM(s) IV Intermittent every 8 hours    MEDICATIONS  (PRN):  acetaminophen   Tablet .. 650 milliGRAM(s) Oral every 6 hours PRN Mild Pain (1 - 3)  acetaminophen   Tablet .. 650 milliGRAM(s) Oral every 6 hours PRN Temp greater or equal to 38C (100.4F)  cyclobenzaprine 10 milliGRAM(s) Oral three times a day PRN Muscle Spasm  diphenhydrAMINE 25 milliGRAM(s) Oral every 4 hours PRN Rash and/or Itching  oxyCODONE    IR 20 milliGRAM(s) Oral every 4 hours PRN Moderate Pain (4 - 6)      Allergies    penicillin (Short breath; Hives)    Intolerances        REVIEW OF SYSTEMS:  CONSTITUTIONAL: No fever, weight loss, + fatigue  EYES: No eye pain, visual disturbances, or discharge  ENMT:  No difficulty hearing, tinnitus, vertigo; No sinus or throat pain  RESPIRATORY: No cough, wheezing, chills or hemoptysis; No shortness of breath  CARDIOVASCULAR: No chest pain, palpitations, dizziness, or leg swelling  GASTROINTESTINAL: constipation resolved  GENITOURINARY: No dysuria, frequency, hematuria, or incontinence  NEUROLOGICAL: No headaches, memory loss, loss of strength, numbness, or tremors  SKIN: No itching, burning, rashes, or lesions   MUSCULOSKELETAL: + back pain  PSYCHIATRIC: mood stable      Vital Signs Last 24 Hrs  T(C): 36.6 (25 Apr 2019 11:55), Max: 36.8 (25 Apr 2019 00:03)  T(F): 97.8 (25 Apr 2019 11:55), Max: 98.2 (25 Apr 2019 00:03)  HR: 90 (25 Apr 2019 11:55) (86 - 96)  BP: 120/88 (25 Apr 2019 11:55) (117/75 - 145/59)  BP(mean): --  RR: 17 (25 Apr 2019 11:55) (16 - 19)  SpO2: 98% (25 Apr 2019 11:55) (98% - 99%)    PHYSICAL EXAM:  GENERAL: NAD, lying in bed comfortably  HEAD:  Atraumatic, Normocephalic  EYES: EOMI, PERRLA, conjunctiva and sclera clear  ENMT: No tonsillar erythema, exudates, or enlargement; Moist mucous membranes,  NECK: Supple, No JVD, Normal thyroid  NERVOUS SYSTEM:  Alert & Oriented X3,  Motor Strength 5intact  CHEST/LUNG: Clear to percussion bilaterally; No rales, rhonchi, wheezing, or rubs  HEART: Regular rate and rhythm; No murmurs, rubs, or gallops  ABDOMEN: Soft, Nontender, Nondistended; Bowel sounds present  EXTREMITIES:  2+ Peripheral Pulses, No clubbing, cyanosis, or edema      LABS:                        10.5   8.83  )-----------( 339      ( 25 Apr 2019 08:38 )             33.5     04-25    141  |  104  |  14  ----------------------------<  104<H>  4.0   |  29  |  0.44<L>    Ca    9.7      25 Apr 2019 08:38          CAPILLARY BLOOD GLUCOSE          RADIOLOGY & ADDITIONAL TESTS:    Imaging Personally Reviewed:  [x ] YES  [ ] NO    Consultant(s) Notes Reviewed:  [ x] YES  [ ] NO    Care Discussed with Consultants/Other Providers x[ ] YES  [ ] NO

## 2019-04-25 NOTE — PROGRESS NOTE ADULT - ASSESSMENT
43 year old intravenous drug addict admitted with methicillin resistant staph aureus bacteremia ; infectious endocarditis ; cx discitis   on iv antibiotics   cant add rifampin ?? on methadone as will need higher dose of methadone   already has a lot of addiction problems   continue current treatment   vanco trough is good today  spoke with pt , she has still lower back pain +   spoke with RN and patient

## 2019-04-26 PROCEDURE — 99232 SBSQ HOSP IP/OBS MODERATE 35: CPT

## 2019-04-26 RX ORDER — IBUPROFEN 200 MG
400 TABLET ORAL ONCE
Qty: 0 | Refills: 0 | Status: COMPLETED | OUTPATIENT
Start: 2019-04-26 | End: 2019-04-26

## 2019-04-26 RX ORDER — DIAZEPAM 5 MG
5 TABLET ORAL ONCE
Qty: 0 | Refills: 0 | Status: DISCONTINUED | OUTPATIENT
Start: 2019-04-26 | End: 2019-04-26

## 2019-04-26 RX ADMIN — Medication 500 MILLIGRAM(S): at 18:16

## 2019-04-26 RX ADMIN — OXYCODONE HYDROCHLORIDE 20 MILLIGRAM(S): 5 TABLET ORAL at 09:49

## 2019-04-26 RX ADMIN — LIDOCAINE 1 PATCH: 4 CREAM TOPICAL at 12:18

## 2019-04-26 RX ADMIN — Medication 650 MILLIGRAM(S): at 12:09

## 2019-04-26 RX ADMIN — OXYCODONE HYDROCHLORIDE 40 MILLIGRAM(S): 5 TABLET ORAL at 19:05

## 2019-04-26 RX ADMIN — OXYCODONE HYDROCHLORIDE 40 MILLIGRAM(S): 5 TABLET ORAL at 05:36

## 2019-04-26 RX ADMIN — CHLORHEXIDINE GLUCONATE 1 APPLICATION(S): 213 SOLUTION TOPICAL at 05:39

## 2019-04-26 RX ADMIN — NYSTATIN CREAM 1 APPLICATION(S): 100000 CREAM TOPICAL at 13:26

## 2019-04-26 RX ADMIN — ENOXAPARIN SODIUM 40 MILLIGRAM(S): 100 INJECTION SUBCUTANEOUS at 12:03

## 2019-04-26 RX ADMIN — POLYETHYLENE GLYCOL 3350 17 GRAM(S): 17 POWDER, FOR SOLUTION ORAL at 18:16

## 2019-04-26 RX ADMIN — LIDOCAINE 1 PATCH: 4 CREAM TOPICAL at 01:15

## 2019-04-26 RX ADMIN — OXYCODONE HYDROCHLORIDE 20 MILLIGRAM(S): 5 TABLET ORAL at 13:31

## 2019-04-26 RX ADMIN — CYCLOBENZAPRINE HYDROCHLORIDE 10 MILLIGRAM(S): 10 TABLET, FILM COATED ORAL at 20:28

## 2019-04-26 RX ADMIN — NALOXEGOL OXALATE 25 MILLIGRAM(S): 12.5 TABLET, FILM COATED ORAL at 09:16

## 2019-04-26 RX ADMIN — Medication 650 MILLIGRAM(S): at 02:56

## 2019-04-26 RX ADMIN — Medication 5 MILLIGRAM(S): at 13:25

## 2019-04-26 RX ADMIN — Medication 1 TABLET(S): at 12:04

## 2019-04-26 RX ADMIN — OXYCODONE HYDROCHLORIDE 20 MILLIGRAM(S): 5 TABLET ORAL at 16:54

## 2019-04-26 RX ADMIN — Medication 1 PATCH: at 12:11

## 2019-04-26 RX ADMIN — Medication 1 TABLET(S): at 08:48

## 2019-04-26 RX ADMIN — LIDOCAINE 1 PATCH: 4 CREAM TOPICAL at 01:16

## 2019-04-26 RX ADMIN — CHLORHEXIDINE GLUCONATE 1 APPLICATION(S): 213 SOLUTION TOPICAL at 12:25

## 2019-04-26 RX ADMIN — Medication 1 PATCH: at 18:13

## 2019-04-26 RX ADMIN — CELECOXIB 200 MILLIGRAM(S): 200 CAPSULE ORAL at 12:18

## 2019-04-26 RX ADMIN — OXYCODONE HYDROCHLORIDE 20 MILLIGRAM(S): 5 TABLET ORAL at 17:30

## 2019-04-26 RX ADMIN — Medication 1 TABLET(S): at 22:51

## 2019-04-26 RX ADMIN — OXYCODONE HYDROCHLORIDE 20 MILLIGRAM(S): 5 TABLET ORAL at 20:28

## 2019-04-26 RX ADMIN — PANTOPRAZOLE SODIUM 40 MILLIGRAM(S): 20 TABLET, DELAYED RELEASE ORAL at 08:49

## 2019-04-26 RX ADMIN — GABAPENTIN 800 MILLIGRAM(S): 400 CAPSULE ORAL at 22:51

## 2019-04-26 RX ADMIN — GABAPENTIN 800 MILLIGRAM(S): 400 CAPSULE ORAL at 05:37

## 2019-04-26 RX ADMIN — CELECOXIB 200 MILLIGRAM(S): 200 CAPSULE ORAL at 12:04

## 2019-04-26 RX ADMIN — Medication 250 MILLIGRAM(S): at 05:36

## 2019-04-26 RX ADMIN — Medication 650 MILLIGRAM(S): at 13:00

## 2019-04-26 RX ADMIN — Medication 650 MILLIGRAM(S): at 22:56

## 2019-04-26 RX ADMIN — Medication 1 PATCH: at 12:20

## 2019-04-26 RX ADMIN — OXYCODONE HYDROCHLORIDE 20 MILLIGRAM(S): 5 TABLET ORAL at 01:36

## 2019-04-26 RX ADMIN — Medication 1 TABLET(S): at 13:25

## 2019-04-26 RX ADMIN — Medication 1 TABLET(S): at 05:37

## 2019-04-26 RX ADMIN — Medication 250 MILLIGRAM(S): at 12:03

## 2019-04-26 RX ADMIN — Medication 1 TABLET(S): at 18:16

## 2019-04-26 RX ADMIN — Medication 500 MILLIGRAM(S): at 05:36

## 2019-04-26 RX ADMIN — CYCLOBENZAPRINE HYDROCHLORIDE 10 MILLIGRAM(S): 10 TABLET, FILM COATED ORAL at 12:28

## 2019-04-26 RX ADMIN — OXYCODONE HYDROCHLORIDE 20 MILLIGRAM(S): 5 TABLET ORAL at 08:49

## 2019-04-26 RX ADMIN — GABAPENTIN 800 MILLIGRAM(S): 400 CAPSULE ORAL at 13:26

## 2019-04-26 RX ADMIN — LIDOCAINE 1 PATCH: 4 CREAM TOPICAL at 12:19

## 2019-04-26 RX ADMIN — Medication 1 PATCH: at 08:47

## 2019-04-26 RX ADMIN — OXYCODONE HYDROCHLORIDE 20 MILLIGRAM(S): 5 TABLET ORAL at 12:43

## 2019-04-26 RX ADMIN — LIDOCAINE 1 PATCH: 4 CREAM TOPICAL at 08:47

## 2019-04-26 RX ADMIN — Medication 400 MILLIGRAM(S): at 13:43

## 2019-04-26 RX ADMIN — LIDOCAINE 1 PATCH: 4 CREAM TOPICAL at 08:45

## 2019-04-26 RX ADMIN — Medication 250 MILLIGRAM(S): at 22:51

## 2019-04-26 RX ADMIN — Medication 1 MILLIGRAM(S): at 12:04

## 2019-04-26 RX ADMIN — SENNA PLUS 2 TABLET(S): 8.6 TABLET ORAL at 22:51

## 2019-04-26 RX ADMIN — Medication 400 MILLIGRAM(S): at 12:43

## 2019-04-26 NOTE — PROGRESS NOTE ADULT - ASSESSMENT
43 F w/ history of IVDA, alcohol abuse, hx of pancreatitis, alcohol hepatitis, alcohol withdrawal, left frontoparietal subdural hematoma 2008 without need for neurosurgical intervention presented with severe sepsis with septic shock secondary to MRSA bacteremia w/ RLL PNA, UTI, endocarditis on LIZ and EFRAIN that has resolved and was diagnosis w/ HCV. Pt was initially intubated due to respiratory failure and put on pressors in ICU. She was extubated on 2/25 and transferred from ICU the following day. Pt had a C4-5 ACDF with irrigation and debridement on 3/20 due to spinal abscess and osteo and was kept intubated post procedure and transferred again to ICU, where she was extubated on 3/21 and transferred back to the med service the following day. HCV- Outpatient follow up for HCV     Septic arthritis involving BL Knees/hips;  - MRI of bilateral hips and knees were done demonstrating effusions and an abscess located in the right rectus femoris.   - Bilateral knees were aspirated by orthopedic team. on 3/28/19 ngtd   bilateral hips aspirated by orthopedic team on 3/29/19  ngtd but cppd crystals seen c/w psuedo gout  - as per ortho-No plan for orthopaedic operative intervention at this time. Should cultures grow pt will require operative intervention         Pseudogout: d/c colchicine     septic shock secondary to MRSA bacteremia w/ RLL PNA, UTI, endocarditis on LIZ at Tricuspid valve   - resolved     Endocarditis of tricuspid valve  - status post thoracentesis on 3/20  - as per ID, patient is on vanco  - repeat BC  blood cx  ngtd     - repeat echo showed a pedunculated mobile mass seen adherent to lateral leaflet of the tricuspid valve w/ no interval changes noted in valve structure or regurgitation  - repeated echo from 4/2/19 shows stable TR vegetation      Spinal osteo, abscess and cord compression    - CT of head and neck showed disc space narrowing and endplate irregularity at C4-5 which may represent typical degenerative change vs discitis   - MRI of the cervical spine w/ contrast showed C2-C7 discitis/osteomyelitis with a small right of midline ventral epidural abscess at C2-C7  - MRI of the thoracolumbar spine showed discitis/osteomyelitis at T11-T12 with paravertebral abscess, as well as discitis/osteomyelitis C6--T1, T9-T10, T10-T11, L4-L5, and L5-S1, and a phlegmon/early abscess dorsolaterally within the lumbar canal at the level of L4-L5. There was also a septic arthritis of  the left sternoclavicular joint space. There was also disc herniation at the L4-L5 level with contact and probable impingement of the descending right-sided nerve roots  - status post s/p C4-5 ACDF with irrigation and debridement on 3/20  - c/w gabapentin and lidocaine patch  - wound cx grew MRSA  - c/w [place] J  collar   - as per ID, patient is on vanco till may 15 2019  on 4/14/19 had mri t and l spine per ortho for stability see dr. muñoz note from 4/15/19        Bacteremia due to methicillin resistant Staphylococcus aureus with IE: diskitis/OM /Spinal abscesses /Parapneumonic effusion   - most recent blood cx from 3/21/19 negative   - s/p thoracentesis : fluids neg    Moderate protein-calorie malnutrition  - c/w soft diet w/Ensure Enlive 3 x day         IVDrug abuse  -off methadone    - - Chronic Pain - cont celebrex, gabapentin, oxycodone, Oxycontin to 40 mg bid  back spasms worse today, c/w flexerill, valium 5 mg x1    Anemia due multiple issues, surgery , blood draws,   poor nutrition and infection  - status post a few units of blood during this extensive hospital stay last one on 3/20/19  - c/w folic acid      constipation: resolved after  golytley   -  started movantic         Awaiting accepting rehab facility

## 2019-04-26 NOTE — PROGRESS NOTE ADULT - ASSESSMENT
43 year old intravenous drug addict admitted with methicillin resistant staph aureus bacteremia ; infectious endocarditis ; cx discitis   on iv antibiotics   cant add rifampin ?? on methadone as will need higher dose of methadone   already has a lot of addiction problems   continue current treatment   vanco trough is good today  still cc fo lower back pain , demend medication

## 2019-04-26 NOTE — PROGRESS NOTE ADULT - SUBJECTIVE AND OBJECTIVE BOX
HPI:  42 Y/O female w/ PMHx of IV heroin abuse brought in by EMS for lethargy and cough. As per EMS, pt was found laying in bed at home, lethargic however able to follow some commands. EMS reports that pt was attempting to detox from heroin, and last known use was 3 days prior to ED presentation. As per ED, pts boyfriend reports a hx of + pt cough productive of white sputum and a fall down a flight of stairs 2/16. Labs showed serum lactate of 7.2, BUN/Cr 124/6.42, and WBC of of 11.93. Tmax 102.4, urine + for cocaine, opiates, nitrites and many bacteria. ICU called for further evaluation. (17 Feb 2019 22:56)      Allergies    penicillin (Short breath; Hives)    Intolerances        MEDICATIONS  (STANDING):  ascorbic acid 500 milliGRAM(s) Oral two times a day  calcium carbonate 1250 mG  + Vitamin D (OsCal 500 + D) 1 Tablet(s) Oral three times a day  celecoxib 200 milliGRAM(s) Oral daily  chlorhexidine 2% Cloths 1 Application(s) Topical daily  chlorhexidine 4% Liquid 1 Application(s) Topical <User Schedule>  enoxaparin Injectable 40 milliGRAM(s) SubCutaneous daily  folic acid 1 milliGRAM(s) Oral daily  gabapentin 800 milliGRAM(s) Oral three times a day  ibuprofen  Tablet. 400 milliGRAM(s) Oral once  lactobacillus acidophilus 1 Tablet(s) Oral two times a day with meals  lidocaine   Patch 1 Patch Transdermal every 24 hours  lidocaine   Patch 1 Patch Transdermal every 24 hours  multivitamin 1 Tablet(s) Oral daily  naloxegol 25 milliGRAM(s) Oral before breakfast  nicotine -  14 mG/24Hr(s) Patch 1 patch Transdermal daily  nystatin Powder 1 Application(s) Topical three times a day  oxyCODONE  ER Tablet 40 milliGRAM(s) Oral every 12 hours  pantoprazole    Tablet 40 milliGRAM(s) Oral before breakfast  polyethylene glycol 3350 17 Gram(s) Oral two times a day  senna 2 Tablet(s) Oral at bedtime  vancomycin  IVPB 1000 milliGRAM(s) IV Intermittent every 8 hours    MEDICATIONS  (PRN):  acetaminophen   Tablet .. 650 milliGRAM(s) Oral every 6 hours PRN Mild Pain (1 - 3)  acetaminophen   Tablet .. 650 milliGRAM(s) Oral every 6 hours PRN Temp greater or equal to 38C (100.4F)  cyclobenzaprine 10 milliGRAM(s) Oral three times a day PRN Muscle Spasm  diphenhydrAMINE 25 milliGRAM(s) Oral every 4 hours PRN Rash and/or Itching  oxyCODONE    IR 20 milliGRAM(s) Oral every 4 hours PRN Moderate Pain (4 - 6)      REVIEW OF SYSTEMS:    CONSTITUTIONAL: No fever, chills, weight loss, or fatigue  HEENT: No sore throat, runny nose, ear ache  RESPIRATORY: No cough, wheezing, No shortness of breath  CARDIOVASCULAR: No chest pain, palpitations, dizziness  GASTROINTESTINAL: No abdominal pain. No nausea, vomiting, diarrhea  GENITOURINARY: No dysuria, increase frequency, hematuria, or incontinence  NEUROLOGICAL: No headaches, memory loss, loss of strength, numbness, or tremors, no weakness  EXTREMITY: No pedal edema BLE  SKIN: No itching, burning, rashes, or lesions     VITAL SIGNS:  T(C): 36.5 (04-26-19 @ 11:18), Max: 37 (04-26-19 @ 05:31)  T(F): 97.7 (04-26-19 @ 11:18), Max: 98.6 (04-26-19 @ 05:31)  HR: 87 (04-26-19 @ 11:18) (86 - 100)  BP: 145/100 (04-26-19 @ 11:18) (138/89 - 148/87)  RR: 17 (04-26-19 @ 11:18) (16 - 18)  SpO2: 97% (04-26-19 @ 11:18) (97% - 99%)  Wt(kg): --    PHYSICAL EXAM:    GENERAL: not in any distress  HEENT: Neck is supple, normocephalic, atraumatic   CHEST/LUNG: Clear to percussion bilaterally; No rales, rhonchi, wheezing  HEART: Regular rate and rhythm; No murmurs, rubs, or gallops  ABDOMEN: Soft, Nontender, Nondistended; Bowel sounds present, no rebound   EXTREMITIES:  2+ Peripheral Pulses, No clubbing, cyanosis, or edema  GENITOURINARY:   SKIN: No rashes or lesions  BACK: no pressor sore   NERVOUS SYSTEM:  Alert & Oriented X3, Good concentration  PSYCH: normal affect     LABS:                         10.5   8.83  )-----------( 339      ( 25 Apr 2019 08:38 )             33.5     04-25    141  |  104  |  14  ----------------------------<  104<H>  4.0   |  29  |  0.44<L>    Ca    9.7      25 Apr 2019 08:38                          Vancomycin Level, Trough: 15.7 ug/mL (04-25 @ 08:38)                      Radiology:

## 2019-04-26 NOTE — PROGRESS NOTE ADULT - SUBJECTIVE AND OBJECTIVE BOX
Patient is a 43y old  Female who presents with a chief complaint of AMS (26 Apr 2019 12:32)      INTERVAL HPI/OVERNIGHT EVENTS:  pt. with complaint of worsening back spasms    MEDICATIONS  (STANDING):  ascorbic acid 500 milliGRAM(s) Oral two times a day  calcium carbonate 1250 mG  + Vitamin D (OsCal 500 + D) 1 Tablet(s) Oral three times a day  celecoxib 200 milliGRAM(s) Oral daily  chlorhexidine 2% Cloths 1 Application(s) Topical daily  chlorhexidine 4% Liquid 1 Application(s) Topical <User Schedule>  diazepam    Tablet 5 milliGRAM(s) Oral once  enoxaparin Injectable 40 milliGRAM(s) SubCutaneous daily  folic acid 1 milliGRAM(s) Oral daily  gabapentin 800 milliGRAM(s) Oral three times a day  lactobacillus acidophilus 1 Tablet(s) Oral two times a day with meals  lidocaine   Patch 1 Patch Transdermal every 24 hours  lidocaine   Patch 1 Patch Transdermal every 24 hours  multivitamin 1 Tablet(s) Oral daily  naloxegol 25 milliGRAM(s) Oral before breakfast  nicotine -  14 mG/24Hr(s) Patch 1 patch Transdermal daily  nystatin Powder 1 Application(s) Topical three times a day  oxyCODONE  ER Tablet 40 milliGRAM(s) Oral every 12 hours  pantoprazole    Tablet 40 milliGRAM(s) Oral before breakfast  polyethylene glycol 3350 17 Gram(s) Oral two times a day  senna 2 Tablet(s) Oral at bedtime  vancomycin  IVPB 1000 milliGRAM(s) IV Intermittent every 8 hours    MEDICATIONS  (PRN):  acetaminophen   Tablet .. 650 milliGRAM(s) Oral every 6 hours PRN Mild Pain (1 - 3)  acetaminophen   Tablet .. 650 milliGRAM(s) Oral every 6 hours PRN Temp greater or equal to 38C (100.4F)  cyclobenzaprine 10 milliGRAM(s) Oral three times a day PRN Muscle Spasm  diphenhydrAMINE 25 milliGRAM(s) Oral every 4 hours PRN Rash and/or Itching  oxyCODONE    IR 20 milliGRAM(s) Oral every 4 hours PRN Moderate Pain (4 - 6)      Allergies    penicillin (Short breath; Hives)    Intolerances        REVIEW OF SYSTEMS:  CONSTITUTIONAL: No fever, weight loss, or fatigue  EYES: No eye pain, visual disturbances, or discharge  ENMT:  No difficulty hearing, tinnitus, vertigo; No sinus or throat pain  NECK: chronic neck pain  RESPIRATORY: No cough, wheezing, chills or hemoptysis; No shortness of breath  CARDIOVASCULAR: No chest pain, palpitations, dizziness, or leg swelling  GASTROINTESTINAL: constipation resolved  NEUROLOGICAL: No headaches, memory loss, loss of strength, neuropathy  MUSCULOSKELETAL: chronic back pain   PSYCHIATRIC: anxious in regards to pain management      Vital Signs Last 24 Hrs  T(C): 36.5 (26 Apr 2019 11:18), Max: 37 (26 Apr 2019 05:31)  T(F): 97.7 (26 Apr 2019 11:18), Max: 98.6 (26 Apr 2019 05:31)  HR: 87 (26 Apr 2019 11:18) (86 - 100)  BP: 145/100 (26 Apr 2019 11:18) (138/89 - 148/87)  BP(mean): --  RR: 17 (26 Apr 2019 11:18) (16 - 18)  SpO2: 97% (26 Apr 2019 11:18) (97% - 99%)    PHYSICAL EXAM:  GENERAL: in distress 2/2 back pain/spasm  HEAD:  Atraumatic, Normocephalic  EYES: EOMI, PERRLA, conjunctiva and sclera clear  ENMT: No tonsillar erythema, exudates, or enlargement; Moist mucous membranes  NECK: Supple, No JVD, Normal thyroid  NERVOUS SYSTEM:  Alert & Oriented X3, strength grossly intact  CHEST/LUNG: Clear to percussion bilaterally; No rales, rhonchi, wheezing, or rubs  HEART: Regular rate and rhythm; No murmurs, rubs, or gallops  ABDOMEN: Soft, Nontender, Nondistended; Bowel sounds present  EXTREMITIES:  2+ Peripheral Pulses, No clubbing, cyanosis, or edema  SKIN: No rashes or lesions    LABS:                        10.5   8.83  )-----------( 339      ( 25 Apr 2019 08:38 )             33.5     04-25    141  |  104  |  14  ----------------------------<  104<H>  4.0   |  29  |  0.44<L>    Ca    9.7      25 Apr 2019 08:38          CAPILLARY BLOOD GLUCOSE          RADIOLOGY & ADDITIONAL TESTS:    Imaging Personally Reviewed:  [ ] YES  [ ] NO    Consultant(s) Notes Reviewed:  [x ] YES  [ ] NO    Care Discussed with Consultants/Other Providers [x ] YES  [ ] NO

## 2019-04-27 LAB — VANCOMYCIN TROUGH SERPL-MCNC: 28.7 UG/ML — CRITICAL HIGH (ref 10–20)

## 2019-04-27 PROCEDURE — 99233 SBSQ HOSP IP/OBS HIGH 50: CPT

## 2019-04-27 RX ORDER — DIAZEPAM 5 MG
5 TABLET ORAL ONCE
Qty: 0 | Refills: 0 | Status: DISCONTINUED | OUTPATIENT
Start: 2019-04-27 | End: 2019-04-27

## 2019-04-27 RX ADMIN — ENOXAPARIN SODIUM 40 MILLIGRAM(S): 100 INJECTION SUBCUTANEOUS at 12:02

## 2019-04-27 RX ADMIN — Medication 5 MILLIGRAM(S): at 23:21

## 2019-04-27 RX ADMIN — Medication 500 MILLIGRAM(S): at 17:31

## 2019-04-27 RX ADMIN — GABAPENTIN 800 MILLIGRAM(S): 400 CAPSULE ORAL at 22:21

## 2019-04-27 RX ADMIN — Medication 250 MILLIGRAM(S): at 05:19

## 2019-04-27 RX ADMIN — PANTOPRAZOLE SODIUM 40 MILLIGRAM(S): 20 TABLET, DELAYED RELEASE ORAL at 09:32

## 2019-04-27 RX ADMIN — Medication 1 TABLET(S): at 22:21

## 2019-04-27 RX ADMIN — Medication 1 TABLET(S): at 13:33

## 2019-04-27 RX ADMIN — Medication 650 MILLIGRAM(S): at 11:55

## 2019-04-27 RX ADMIN — SENNA PLUS 2 TABLET(S): 8.6 TABLET ORAL at 22:21

## 2019-04-27 RX ADMIN — NYSTATIN CREAM 1 APPLICATION(S): 100000 CREAM TOPICAL at 22:22

## 2019-04-27 RX ADMIN — OXYCODONE HYDROCHLORIDE 40 MILLIGRAM(S): 5 TABLET ORAL at 18:31

## 2019-04-27 RX ADMIN — OXYCODONE HYDROCHLORIDE 20 MILLIGRAM(S): 5 TABLET ORAL at 14:33

## 2019-04-27 RX ADMIN — Medication 1 TABLET(S): at 07:10

## 2019-04-27 RX ADMIN — Medication 1 TABLET(S): at 08:57

## 2019-04-27 RX ADMIN — Medication 650 MILLIGRAM(S): at 12:55

## 2019-04-27 RX ADMIN — LIDOCAINE 1 PATCH: 4 CREAM TOPICAL at 00:51

## 2019-04-27 RX ADMIN — Medication 500 MILLIGRAM(S): at 07:10

## 2019-04-27 RX ADMIN — CELECOXIB 200 MILLIGRAM(S): 200 CAPSULE ORAL at 12:02

## 2019-04-27 RX ADMIN — GABAPENTIN 800 MILLIGRAM(S): 400 CAPSULE ORAL at 07:10

## 2019-04-27 RX ADMIN — NYSTATIN CREAM 1 APPLICATION(S): 100000 CREAM TOPICAL at 13:35

## 2019-04-27 RX ADMIN — CELECOXIB 200 MILLIGRAM(S): 200 CAPSULE ORAL at 13:02

## 2019-04-27 RX ADMIN — OXYCODONE HYDROCHLORIDE 20 MILLIGRAM(S): 5 TABLET ORAL at 10:33

## 2019-04-27 RX ADMIN — OXYCODONE HYDROCHLORIDE 20 MILLIGRAM(S): 5 TABLET ORAL at 05:18

## 2019-04-27 RX ADMIN — NALOXEGOL OXALATE 25 MILLIGRAM(S): 12.5 TABLET, FILM COATED ORAL at 08:58

## 2019-04-27 RX ADMIN — CYCLOBENZAPRINE HYDROCHLORIDE 10 MILLIGRAM(S): 10 TABLET, FILM COATED ORAL at 11:56

## 2019-04-27 RX ADMIN — Medication 1 TABLET(S): at 12:02

## 2019-04-27 RX ADMIN — CHLORHEXIDINE GLUCONATE 1 APPLICATION(S): 213 SOLUTION TOPICAL at 05:20

## 2019-04-27 RX ADMIN — Medication 1 PATCH: at 12:05

## 2019-04-27 RX ADMIN — LIDOCAINE 1 PATCH: 4 CREAM TOPICAL at 00:50

## 2019-04-27 RX ADMIN — Medication 5 MILLIGRAM(S): at 14:41

## 2019-04-27 RX ADMIN — OXYCODONE HYDROCHLORIDE 20 MILLIGRAM(S): 5 TABLET ORAL at 13:33

## 2019-04-27 RX ADMIN — OXYCODONE HYDROCHLORIDE 40 MILLIGRAM(S): 5 TABLET ORAL at 17:31

## 2019-04-27 RX ADMIN — Medication 1 PATCH: at 12:03

## 2019-04-27 RX ADMIN — Medication 1 MILLIGRAM(S): at 12:03

## 2019-04-27 RX ADMIN — OXYCODONE HYDROCHLORIDE 40 MILLIGRAM(S): 5 TABLET ORAL at 07:09

## 2019-04-27 RX ADMIN — LIDOCAINE 1 PATCH: 4 CREAM TOPICAL at 23:22

## 2019-04-27 RX ADMIN — POLYETHYLENE GLYCOL 3350 17 GRAM(S): 17 POWDER, FOR SOLUTION ORAL at 17:31

## 2019-04-27 RX ADMIN — Medication 1 TABLET(S): at 17:31

## 2019-04-27 RX ADMIN — CHLORHEXIDINE GLUCONATE 1 APPLICATION(S): 213 SOLUTION TOPICAL at 12:10

## 2019-04-27 RX ADMIN — OXYCODONE HYDROCHLORIDE 20 MILLIGRAM(S): 5 TABLET ORAL at 09:33

## 2019-04-27 RX ADMIN — OXYCODONE HYDROCHLORIDE 20 MILLIGRAM(S): 5 TABLET ORAL at 19:53

## 2019-04-27 RX ADMIN — NYSTATIN CREAM 1 APPLICATION(S): 100000 CREAM TOPICAL at 09:32

## 2019-04-27 RX ADMIN — GABAPENTIN 800 MILLIGRAM(S): 400 CAPSULE ORAL at 13:33

## 2019-04-27 RX ADMIN — OXYCODONE HYDROCHLORIDE 20 MILLIGRAM(S): 5 TABLET ORAL at 20:50

## 2019-04-27 RX ADMIN — OXYCODONE HYDROCHLORIDE 20 MILLIGRAM(S): 5 TABLET ORAL at 00:47

## 2019-04-27 NOTE — PROGRESS NOTE ADULT - ASSESSMENT
43 year old intravenous drug addict admitted with methicillin resitant staph aureus bacteremia ; infectious endocarditis ; cx discitis   on iv antibiotics   cant add rifampin ?? on methadone as will need higher dose of methadone   already has a lot of addiction problems   continue current treatment   dr nelson will assume care 4/29/2019 43 year old intravenous drug addict admitted with methicillin resitant staph aureus bacteremia ; infectious endocarditis ; cx discitis   on iv antibiotics   cant add rifampin ?? on methadone as will need higher dose of methadone   already has a lot of addiction problems   continue current treatment   hold vanco   level am and miguel nelson will assume care 4/29/2019

## 2019-04-27 NOTE — PROGRESS NOTE ADULT - SUBJECTIVE AND OBJECTIVE BOX
HPI:  42 Y/O female w/ PMHx of IV heroin abuse brought in by EMS for lethargy and cough. As per EMS, pt was found laying in bed at home, lethargic however able to follow some commands. EMS reports that pt was attempting to detox from heroin, and last known use was 3 days prior to ED presentation. As per ED, pts boyfriend reports a hx of + pt cough productive of white sputum and a fall down a flight of stairs 2/16. Labs showed serum lactate of 7.2, BUN/Cr 124/6.42, and WBC of of 11.93. Tmax 102.4, urine + for cocaine, opiates, nitrites and many bacteria.   adm feb 17   patient was admitted to critical care with severe sepsis with septic shock secondary to MRSA bacteremia w/ endocarditis on LIZ and EFRAIN thta has resolved and was diagnosis w/ HCV transferred out  of icu on 2/26/19.  While on the floor, patient c/o neck pain on 3/18  had CT neck done: < from: CT Cervical Spine No Cont (03.18.19 @ 11:49) >  Disc space narrowing and endplate irregularity at C4-5   Contrast-enhanced MRI of the cervical spine suggested   then MRI C/spine: < from: MR Cervical Spine w/ IV Cont (03.19.19 @ 17:18) >  C4/C5 discitis/osteomyelitis with a small right of midline ventral   epidural abscess. Mild cord compression.  Possible discitis/osteomyelitis at C6/C7.   Moderate anterior prevertebral effusion versus abscess/phlegmon within   the C2/C3-C6/C7 level.  Left C2 inferior facet osteomyelitis with adjacent soft tissue   enhancement.  Posterior soft tissue enhancement adjacent to the spinous processes of   C4, C5, C6.   Tiny soft tissue abscess measuring 5 mm adjacent to the C5 spinous   process tip.   on 3/20/2019 patient underwent  sx for Epidural Abscess C4-5, discitis C6-7, prevertebral abscess C2-T1, osteomyelitis C2, C4, C5, C6-C7,   stays on antibiotics at this time         Allergies    penicillin (Short breath; Hives)    Intolerances        MEDICATIONS  (STANDING):  ascorbic acid 500 milliGRAM(s) Oral two times a day  calcium carbonate 1250 mG  + Vitamin D (OsCal 500 + D) 1 Tablet(s) Oral three times a day  celecoxib 200 milliGRAM(s) Oral daily  chlorhexidine 2% Cloths 1 Application(s) Topical daily  chlorhexidine 4% Liquid 1 Application(s) Topical <User Schedule>  enoxaparin Injectable 40 milliGRAM(s) SubCutaneous daily  folic acid 1 milliGRAM(s) Oral daily  gabapentin 800 milliGRAM(s) Oral three times a day  lactobacillus acidophilus 1 Tablet(s) Oral two times a day with meals  lidocaine   Patch 1 Patch Transdermal every 24 hours  lidocaine   Patch 1 Patch Transdermal every 24 hours  multivitamin 1 Tablet(s) Oral daily  naloxegol 25 milliGRAM(s) Oral before breakfast  nicotine -  14 mG/24Hr(s) Patch 1 patch Transdermal daily  nystatin Powder 1 Application(s) Topical three times a day  oxyCODONE  ER Tablet 40 milliGRAM(s) Oral every 12 hours  pantoprazole    Tablet 40 milliGRAM(s) Oral before breakfast  polyethylene glycol 3350 17 Gram(s) Oral two times a day  senna 2 Tablet(s) Oral at bedtime  vancomycin  IVPB 1000 milliGRAM(s) IV Intermittent every 8 hours    MEDICATIONS  (PRN):  acetaminophen   Tablet .. 650 milliGRAM(s) Oral every 6 hours PRN Mild Pain (1 - 3)  acetaminophen   Tablet .. 650 milliGRAM(s) Oral every 6 hours PRN Temp greater or equal to 38C (100.4F)  cyclobenzaprine 10 milliGRAM(s) Oral three times a day PRN Muscle Spasm  diphenhydrAMINE 25 milliGRAM(s) Oral every 4 hours PRN Rash and/or Itching  oxyCODONE    IR 20 milliGRAM(s) Oral every 4 hours PRN Moderate Pain (4 - 6)      REVIEW OF SYSTEMS:    ongoing pain    VITAL SIGNS:  T(C): 36.7 (04-27-19 @ 05:03), Max: 36.7 (04-27-19 @ 00:29)  T(F): 98.1 (04-27-19 @ 05:03), Max: 98.1 (04-27-19 @ 05:03)  HR: 86 (04-27-19 @ 05:03) (86 - 101)  BP: 138/88 (04-27-19 @ 05:03) (129/85 - 145/100)  RR: 17 (04-27-19 @ 05:03) (17 - 18)  SpO2: 98% (04-27-19 @ 05:03) (97% - 98%)  Wt(kg): --    PHYSICAL EXAM:    GENERAL: not in any distress  HEENT: Neck is supple, normocephalic, atraumatic   CHEST/LUNG: Clear to auscultation bilaterally; No rales, rhonchi, wheezing  HEART: Regular rate and rhythm; No murmurs, rubs, or gallops  ABDOMEN: Soft, Nontender, Nondistended; Bowel sounds present, no rebound   EXTREMITIES:  2+ Peripheral Pulses, No clubbing, cyanosis, or edema  SKIN: No rashes or lesions  BACK: no pressor sore   NERVOUS SYSTEM:  Alert & Oriented X3, Good concentration  PSYCH: normal affect     LABS:                               Vancomycin Level, Trough: 15.7 ug/mL (04-25 @ 08:38)                      Radiology: HPI:  44 Y/O female w/ PMHx of IV heroin abuse brought in by EMS for lethargy and cough. As per EMS, pt was found laying in bed at home, lethargic however able to follow some commands. EMS reports that pt was attempting to detox from heroin, and last known use was 3 days prior to ED presentation. As per ED, pts boyfriend reports a hx of + pt cough productive of white sputum and a fall down a flight of stairs 2/16. Labs showed serum lactate of 7.2, BUN/Cr 124/6.42, and WBC of of 11.93. Tmax 102.4, urine + for cocaine, opiates, nitrites and many bacteria.   adm feb 17   patient was admitted to critical care with severe sepsis with septic shock secondary to MRSA bacteremia w/ endocarditis on LIZ and EFRAIN thta has resolved and was diagnosis w/ HCV transferred out  of icu on 2/26/19.  While on the floor, patient c/o neck pain on 3/18  had CT neck done: < from: CT Cervical Spine No Cont (03.18.19 @ 11:49) >  Disc space narrowing and endplate irregularity at C4-5   Contrast-enhanced MRI of the cervical spine suggested   then MRI C/spine: < from: MR Cervical Spine w/ IV Cont (03.19.19 @ 17:18) >  C4/C5 discitis/osteomyelitis with a small right of midline ventral   epidural abscess. Mild cord compression.  Possible discitis/osteomyelitis at C6/C7.   Moderate anterior prevertebral effusion versus abscess/phlegmon within   the C2/C3-C6/C7 level.  Left C2 inferior facet osteomyelitis with adjacent soft tissue   enhancement.  Posterior soft tissue enhancement adjacent to the spinous processes of   C4, C5, C6.   Tiny soft tissue abscess measuring 5 mm adjacent to the C5 spinous   process tip.   on 3/20/2019 patient underwent  sx for Epidural Abscess C4-5, discitis C6-7, prevertebral abscess C2-T1, osteomyelitis C2, C4, C5, C6-C7,   stays on antibiotics at this time   high vanco level today       Allergies    penicillin (Short breath; Hives)    Intolerances        MEDICATIONS  (STANDING):  ascorbic acid 500 milliGRAM(s) Oral two times a day  calcium carbonate 1250 mG  + Vitamin D (OsCal 500 + D) 1 Tablet(s) Oral three times a day  celecoxib 200 milliGRAM(s) Oral daily  chlorhexidine 2% Cloths 1 Application(s) Topical daily  chlorhexidine 4% Liquid 1 Application(s) Topical <User Schedule>  enoxaparin Injectable 40 milliGRAM(s) SubCutaneous daily  folic acid 1 milliGRAM(s) Oral daily  gabapentin 800 milliGRAM(s) Oral three times a day  lactobacillus acidophilus 1 Tablet(s) Oral two times a day with meals  lidocaine   Patch 1 Patch Transdermal every 24 hours  lidocaine   Patch 1 Patch Transdermal every 24 hours  multivitamin 1 Tablet(s) Oral daily  naloxegol 25 milliGRAM(s) Oral before breakfast  nicotine -  14 mG/24Hr(s) Patch 1 patch Transdermal daily  nystatin Powder 1 Application(s) Topical three times a day  oxyCODONE  ER Tablet 40 milliGRAM(s) Oral every 12 hours  pantoprazole    Tablet 40 milliGRAM(s) Oral before breakfast  polyethylene glycol 3350 17 Gram(s) Oral two times a day  senna 2 Tablet(s) Oral at bedtime  vancomycin  IVPB 1000 milliGRAM(s) IV Intermittent every 8 hours    MEDICATIONS  (PRN):  acetaminophen   Tablet .. 650 milliGRAM(s) Oral every 6 hours PRN Mild Pain (1 - 3)  acetaminophen   Tablet .. 650 milliGRAM(s) Oral every 6 hours PRN Temp greater or equal to 38C (100.4F)  cyclobenzaprine 10 milliGRAM(s) Oral three times a day PRN Muscle Spasm  diphenhydrAMINE 25 milliGRAM(s) Oral every 4 hours PRN Rash and/or Itching  oxyCODONE    IR 20 milliGRAM(s) Oral every 4 hours PRN Moderate Pain (4 - 6)      REVIEW OF SYSTEMS:    ongoing pain    VITAL SIGNS:  T(C): 36.7 (04-27-19 @ 05:03), Max: 36.7 (04-27-19 @ 00:29)  T(F): 98.1 (04-27-19 @ 05:03), Max: 98.1 (04-27-19 @ 05:03)  HR: 86 (04-27-19 @ 05:03) (86 - 101)  BP: 138/88 (04-27-19 @ 05:03) (129/85 - 145/100)  RR: 17 (04-27-19 @ 05:03) (17 - 18)  SpO2: 98% (04-27-19 @ 05:03) (97% - 98%)  Wt(kg): --    PHYSICAL EXAM:  cx collar   GENERAL: not in any distress  HEENT: Neck is supple, normocephalic, atraumatic   CHEST/LUNG: Clear to auscultation bilaterally; No rales, rhonchi, wheezing  HEART: Regular rate and rhythm; No murmurs, rubs, or gallops  ABDOMEN: Soft, Nontender, Nondistended; Bowel sounds present, no rebound   EXTREMITIES:  2+ Peripheral Pulses, No clubbing, cyanosis, or edema  SKIN: No rashes or lesions  BACK: no pressor sore   NERVOUS SYSTEM:  Alert & Oriented X3, Good concentration  PSYCH: normal affect     LABS:                               Vancomycin Level, Trough: 15.7 ug/mL (04-25 @ 08:38)                      Radiology:

## 2019-04-27 NOTE — PROGRESS NOTE ADULT - SUBJECTIVE AND OBJECTIVE BOX
Patient is a 43y old  Female who presents with a chief complaint of AMS (26 Apr 2019 12:32)      INTERVAL HPI/OVERNIGHT EVENTS:  pt. still c/o back spasms and states the valium helped     MEDICATIONS  (STANDING):  ascorbic acid 500 milliGRAM(s) Oral two times a day  calcium carbonate 1250 mG  + Vitamin D (OsCal 500 + D) 1 Tablet(s) Oral three times a day  celecoxib 200 milliGRAM(s) Oral daily  chlorhexidine 2% Cloths 1 Application(s) Topical daily  chlorhexidine 4% Liquid 1 Application(s) Topical <User Schedule>  enoxaparin Injectable 40 milliGRAM(s) SubCutaneous daily  folic acid 1 milliGRAM(s) Oral daily  gabapentin 800 milliGRAM(s) Oral three times a day  lactobacillus acidophilus 1 Tablet(s) Oral two times a day with meals  lidocaine   Patch 1 Patch Transdermal every 24 hours  lidocaine   Patch 1 Patch Transdermal every 24 hours  multivitamin 1 Tablet(s) Oral daily  naloxegol 25 milliGRAM(s) Oral before breakfast  nicotine -  14 mG/24Hr(s) Patch 1 patch Transdermal daily  nystatin Powder 1 Application(s) Topical three times a day  oxyCODONE  ER Tablet 40 milliGRAM(s) Oral every 12 hours  pantoprazole    Tablet 40 milliGRAM(s) Oral before breakfast  polyethylene glycol 3350 17 Gram(s) Oral two times a day  senna 2 Tablet(s) Oral at bedtime    MEDICATIONS  (PRN):  acetaminophen   Tablet .. 650 milliGRAM(s) Oral every 6 hours PRN Mild Pain (1 - 3)  acetaminophen   Tablet .. 650 milliGRAM(s) Oral every 6 hours PRN Temp greater or equal to 38C (100.4F)  cyclobenzaprine 10 milliGRAM(s) Oral three times a day PRN Muscle Spasm  diphenhydrAMINE 25 milliGRAM(s) Oral every 4 hours PRN Rash and/or Itching  oxyCODONE    IR 20 milliGRAM(s) Oral every 4 hours PRN Moderate Pain (4 - 6)        Allergies    penicillin (Short breath; Hives)    Intolerances        REVIEW OF SYSTEMS:  CONSTITUTIONAL: No fever, weight loss, or fatigue  EYES: No eye pain, visual disturbances, or discharge  ENMT:  No difficulty hearing, tinnitus, vertigo; No sinus or throat pain  NECK: chronic neck pain  RESPIRATORY: No cough, wheezing, chills or hemoptysis; No shortness of breath  CARDIOVASCULAR: No chest pain, palpitations, dizziness, or leg swelling  GASTROINTESTINAL: constipation resolved  NEUROLOGICAL: No headaches, memory loss, loss of strength, neuropathy  MUSCULOSKELETAL: chronic back pain   PSYCHIATRIC: anxious in regards to pain management      Vital Signs Last 24 Hrs  T(C): 36.6 (27 Apr 2019 11:08), Max: 36.7 (27 Apr 2019 00:29)  T(F): 97.8 (27 Apr 2019 11:08), Max: 98.1 (27 Apr 2019 05:03)  HR: 85 (27 Apr 2019 11:08) (84 - 101)  BP: 144/95 (27 Apr 2019 11:08) (127/87 - 144/95)  BP(mean): --  RR: 18 (27 Apr 2019 11:08) (17 - 18)  SpO2: 98% (27 Apr 2019 11:08) (97% - 98%)    PHYSICAL EXAM:  GENERAL: no distress   HEAD:  Atraumatic, Normocephalic  EYES: EOMI, PERRLA, conjunctiva and sclera clear  ENMT: No tonsillar erythema, exudates, or enlargement; Moist mucous membranes  NECK: Supple, No JVD, Normal thyroid  NERVOUS SYSTEM:  Alert & Oriented X3, strength grossly intact  CHEST/LUNG: Clear to percussion bilaterally; No rales, rhonchi, wheezing, or rubs  HEART: Regular rate and rhythm; No murmurs, rubs, or gallops  ABDOMEN: Soft, Nontender, Nondistended; Bowel sounds present  EXTREMITIES:  2+ Peripheral Pulses, No clubbing, cyanosis, or edema  SKIN: No rashes or lesions    LABS:                              CAPILLARY BLOOD GLUCOSE          RADIOLOGY & ADDITIONAL TESTS:    Imaging Personally Reviewed:  [ ] YES  [ ] NO    Consultant(s) Notes Reviewed:  [x ] YES  [ ] NO    Care Discussed with Consultants/Other Providers [x ] YES  [ ] NO

## 2019-04-27 NOTE — PROGRESS NOTE ADULT - ASSESSMENT
43 F w/ history of IVDA, alcohol abuse, hx of pancreatitis, alcohol hepatitis, alcohol withdrawal, left frontoparietal subdural hematoma 2008 without need for neurosurgical intervention presented with severe sepsis with septic shock secondary to MRSA bacteremia w/ RLL PNA, UTI, endocarditis on LIZ and EFRAIN that has resolved and was diagnosis w/ HCV. Pt was initially intubated due to respiratory failure and put on pressors in ICU. She was extubated on 2/25 and transferred from ICU the following day. Pt had a C4-5 ACDF with irrigation and debridement on 3/20 due to spinal abscess and osteo and was kept intubated post procedure and transferred again to ICU, where she was extubated on 3/21 and transferred back to the med service the following day. HCV- Outpatient follow up for HCV     Septic arthritis involving BL Knees/hips;  - MRI of bilateral hips and knees were done demonstrating effusions and an abscess located in the right rectus femoris.   - Bilateral knees were aspirated by orthopedic team. on 3/28/19 ngtd   bilateral hips aspirated by orthopedic team on 3/29/19  ngtd but cppd crystals seen c/w psuedo gout  - as per ortho-No plan for orthopaedic operative intervention at this time. Should cultures grow pt will require operative intervention         Pseudogout: d/c colchicine     septic shock secondary to MRSA bacteremia w/ RLL PNA, UTI, endocarditis on LIZ at Tricuspid valve   - resolved     Endocarditis of tricuspid valve  - status post thoracentesis on 3/20  - as per ID, patient is on vanco  - repeat BC  blood cx  ngtd     - repeat echo showed a pedunculated mobile mass seen adherent to lateral leaflet of the tricuspid valve w/ no interval changes noted in valve structure or regurgitation  - repeated echo from 4/2/19 shows stable TR vegetation      Spinal osteo, abscess and cord compression    - CT of head and neck showed disc space narrowing and endplate irregularity at C4-5 which may represent typical degenerative change vs discitis   - MRI of the cervical spine w/ contrast showed C2-C7 discitis/osteomyelitis with a small right of midline ventral epidural abscess at C2-C7  - MRI of the thoracolumbar spine showed discitis/osteomyelitis at T11-T12 with paravertebral abscess, as well as discitis/osteomyelitis C6--T1, T9-T10, T10-T11, L4-L5, and L5-S1, and a phlegmon/early abscess dorsolaterally within the lumbar canal at the level of L4-L5. There was also a septic arthritis of  the left sternoclavicular joint space. There was also disc herniation at the L4-L5 level with contact and probable impingement of the descending right-sided nerve roots  - status post s/p C4-5 ACDF with irrigation and debridement on 3/20  - c/w gabapentin and lidocaine patch  - wound cx grew MRSA  - c/w [place] J  collar   - as per ID, patient is on vanco till may 15 2019  on 4/14/19 had mri t and l spine per ortho for stability see dr. muñoz note from 4/15/19        Bacteremia due to methicillin resistant Staphylococcus aureus with IE: diskitis/OM /Spinal abscesses /Parapneumonic effusion   - most recent blood cx from 3/21/19 negative   - s/p thoracentesis : fluids neg    Moderate protein-calorie malnutrition  - c/w soft diet w/Ensure Enlive 3 x day         IVDrug abuse  -off methadone    - - Chronic Pain - cont celebrex, gabapentin, oxycodone, Oxycontin to 40 mg bid  -still c.o back spasms. will give one more dose of valium , c/w flexerill,       Anemia due multiple issues, surgery , blood draws,   poor nutrition and infection  - status post a few units of blood during this extensive hospital stay last one on 3/20/19  - c/w folic acid      constipation: resolved after  golytley   - doing well   - c.w  movantic         Awaiting accepting rehab facility 43 F w/ history of IVDA, alcohol abuse, hx of pancreatitis, alcohol hepatitis, alcohol withdrawal, left frontoparietal subdural hematoma 2008 without need for neurosurgical intervention presented with severe sepsis with septic shock secondary to MRSA bacteremia w/ RLL PNA, UTI, endocarditis on LIZ and EFRAIN that has resolved and was diagnosis w/ HCV. Pt was initially intubated due to respiratory failure and put on pressors in ICU. She was extubated on 2/25 and transferred from ICU the following day. Pt had a C4-5 ACDF with irrigation and debridement on 3/20 due to spinal abscess and osteo and was kept intubated post procedure and transferred again to ICU, where she was extubated on 3/21 and transferred back to the med service the following day. HCV- Outpatient follow up for HCV     Septic arthritis involving BL Knees/hips;  - MRI of bilateral hips and knees were done demonstrating effusions and an abscess located in the right rectus femoris.   - Bilateral knees were aspirated by orthopedic team. on 3/28/19 ngtd   bilateral hips aspirated by orthopedic team on 3/29/19  ngtd but cppd crystals seen c/w psuedo gout  - as per ortho-No plan for orthopaedic operative intervention at this time. Should cultures grow pt will require operative intervention         Pseudogout: d/c colchicine     septic shock secondary to MRSA bacteremia w/ RLL PNA, UTI, endocarditis on LIZ at Tricuspid valve   - resolved     Endocarditis of tricuspid valve  - status post thoracentesis on 3/20  - as per ID, patient is on vanco  - repeat BC  blood cx  ngtd     - repeat echo showed a pedunculated mobile mass seen adherent to lateral leaflet of the tricuspid valve w/ no interval changes noted in valve structure or regurgitation  - repeated echo from 4/2/19 shows stable TR vegetation      Spinal osteo, abscess and cord compression    - CT of head and neck showed disc space narrowing and endplate irregularity at C4-5 which may represent typical degenerative change vs discitis   - MRI of the cervical spine w/ contrast showed C2-C7 discitis/osteomyelitis with a small right of midline ventral epidural abscess at C2-C7  - MRI of the thoracolumbar spine showed discitis/osteomyelitis at T11-T12 with paravertebral abscess, as well as discitis/osteomyelitis C6--T1, T9-T10, T10-T11, L4-L5, and L5-S1, and a phlegmon/early abscess dorsolaterally within the lumbar canal at the level of L4-L5. There was also a septic arthritis of  the left sternoclavicular joint space. There was also disc herniation at the L4-L5 level with contact and probable impingement of the descending right-sided nerve roots  - status post s/p C4-5 ACDF with irrigation and debridement on 3/20  - c/w gabapentin and lidocaine patch  - wound cx grew MRSA  - c/w [place] J  collar   - as per ID, patient is on vanco till may 15 2019  on 4/14/19 had mri t and l spine per ortho for stability see dr. muñoz note from 4/15/19   - vanco trough elevated today. ID aware        Bacteremia due to methicillin resistant Staphylococcus aureus with IE: diskitis/OM /Spinal abscesses /Parapneumonic effusion   - most recent blood cx from 3/21/19 negative   - s/p thoracentesis : fluids neg    Moderate protein-calorie malnutrition  - c/w soft diet w/Ensure Enlive 3 x day         IVDrug abuse  -off methadone    - - Chronic Pain - cont celebrex, gabapentin, oxycodone, Oxycontin to 40 mg bid  -still c.o back spasms. will give one more dose of valium , c/w flexerill,       Anemia due multiple issues, surgery , blood draws,   poor nutrition and infection  - status post a few units of blood during this extensive hospital stay last one on 3/20/19  - c/w folic acid      constipation: resolved after  golytley   - doing well   - c.w  mov[place]         Awaiting accepting rehab facility

## 2019-04-28 LAB
ALBUMIN SERPL ELPH-MCNC: 2.8 G/DL — LOW (ref 3.3–5)
ALP SERPL-CCNC: 157 U/L — HIGH (ref 40–120)
ALT FLD-CCNC: 12 U/L — SIGNIFICANT CHANGE UP (ref 12–78)
ANION GAP SERPL CALC-SCNC: 9 MMOL/L — SIGNIFICANT CHANGE UP (ref 5–17)
AST SERPL-CCNC: 10 U/L — LOW (ref 15–37)
BILIRUB SERPL-MCNC: 0.2 MG/DL — SIGNIFICANT CHANGE UP (ref 0.2–1.2)
BUN SERPL-MCNC: 24 MG/DL — HIGH (ref 7–23)
CALCIUM SERPL-MCNC: 9.7 MG/DL — SIGNIFICANT CHANGE UP (ref 8.5–10.1)
CHLORIDE SERPL-SCNC: 102 MMOL/L — SIGNIFICANT CHANGE UP (ref 96–108)
CO2 SERPL-SCNC: 26 MMOL/L — SIGNIFICANT CHANGE UP (ref 22–31)
CREAT SERPL-MCNC: 0.42 MG/DL — LOW (ref 0.5–1.3)
CRP SERPL-MCNC: 2.26 MG/DL — HIGH (ref 0–0.4)
ERYTHROCYTE [SEDIMENTATION RATE] IN BLOOD: 53 MM/HR — HIGH (ref 0–15)
GLUCOSE SERPL-MCNC: 102 MG/DL — HIGH (ref 70–99)
HCT VFR BLD CALC: 32.3 % — LOW (ref 34.5–45)
HGB BLD-MCNC: 10 G/DL — LOW (ref 11.5–15.5)
MCHC RBC-ENTMCNC: 24.9 PG — LOW (ref 27–34)
MCHC RBC-ENTMCNC: 31 GM/DL — LOW (ref 32–36)
MCV RBC AUTO: 80.5 FL — SIGNIFICANT CHANGE UP (ref 80–100)
NRBC # BLD: 0 /100 WBCS — SIGNIFICANT CHANGE UP (ref 0–0)
PLATELET # BLD AUTO: 359 K/UL — SIGNIFICANT CHANGE UP (ref 150–400)
POTASSIUM SERPL-MCNC: 4.1 MMOL/L — SIGNIFICANT CHANGE UP (ref 3.5–5.3)
POTASSIUM SERPL-SCNC: 4.1 MMOL/L — SIGNIFICANT CHANGE UP (ref 3.5–5.3)
PROT SERPL-MCNC: 7.1 GM/DL — SIGNIFICANT CHANGE UP (ref 6–8.3)
RBC # BLD: 4.01 M/UL — SIGNIFICANT CHANGE UP (ref 3.8–5.2)
RBC # FLD: 15.9 % — HIGH (ref 10.3–14.5)
SODIUM SERPL-SCNC: 137 MMOL/L — SIGNIFICANT CHANGE UP (ref 135–145)
VANCOMYCIN FLD-MCNC: 6.4 UG/ML — SIGNIFICANT CHANGE UP
WBC # BLD: 9.06 K/UL — SIGNIFICANT CHANGE UP (ref 3.8–10.5)
WBC # FLD AUTO: 9.06 K/UL — SIGNIFICANT CHANGE UP (ref 3.8–10.5)

## 2019-04-28 PROCEDURE — 99233 SBSQ HOSP IP/OBS HIGH 50: CPT

## 2019-04-28 RX ORDER — DIAZEPAM 5 MG
5 TABLET ORAL ONCE
Qty: 0 | Refills: 0 | Status: DISCONTINUED | OUTPATIENT
Start: 2019-04-28 | End: 2019-04-28

## 2019-04-28 RX ORDER — VANCOMYCIN HCL 1 G
1000 VIAL (EA) INTRAVENOUS EVERY 8 HOURS
Qty: 0 | Refills: 0 | Status: DISCONTINUED | OUTPATIENT
Start: 2019-04-28 | End: 2019-04-29

## 2019-04-28 RX ADMIN — Medication 1 TABLET(S): at 22:04

## 2019-04-28 RX ADMIN — Medication 1 TABLET(S): at 06:40

## 2019-04-28 RX ADMIN — Medication 650 MILLIGRAM(S): at 23:53

## 2019-04-28 RX ADMIN — OXYCODONE HYDROCHLORIDE 20 MILLIGRAM(S): 5 TABLET ORAL at 06:30

## 2019-04-28 RX ADMIN — OXYCODONE HYDROCHLORIDE 40 MILLIGRAM(S): 5 TABLET ORAL at 17:30

## 2019-04-28 RX ADMIN — Medication 250 MILLIGRAM(S): at 14:19

## 2019-04-28 RX ADMIN — Medication 5 MILLIGRAM(S): at 23:42

## 2019-04-28 RX ADMIN — Medication 1 MILLIGRAM(S): at 11:56

## 2019-04-28 RX ADMIN — SENNA PLUS 2 TABLET(S): 8.6 TABLET ORAL at 22:50

## 2019-04-28 RX ADMIN — Medication 250 MILLIGRAM(S): at 22:04

## 2019-04-28 RX ADMIN — ENOXAPARIN SODIUM 40 MILLIGRAM(S): 100 INJECTION SUBCUTANEOUS at 12:00

## 2019-04-28 RX ADMIN — NYSTATIN CREAM 1 APPLICATION(S): 100000 CREAM TOPICAL at 06:41

## 2019-04-28 RX ADMIN — CHLORHEXIDINE GLUCONATE 1 APPLICATION(S): 213 SOLUTION TOPICAL at 06:41

## 2019-04-28 RX ADMIN — Medication 500 MILLIGRAM(S): at 06:40

## 2019-04-28 RX ADMIN — OXYCODONE HYDROCHLORIDE 20 MILLIGRAM(S): 5 TABLET ORAL at 23:51

## 2019-04-28 RX ADMIN — CHLORHEXIDINE GLUCONATE 1 APPLICATION(S): 213 SOLUTION TOPICAL at 12:04

## 2019-04-28 RX ADMIN — LIDOCAINE 1 PATCH: 4 CREAM TOPICAL at 23:43

## 2019-04-28 RX ADMIN — Medication 1 TABLET(S): at 18:43

## 2019-04-28 RX ADMIN — Medication 650 MILLIGRAM(S): at 06:32

## 2019-04-28 RX ADMIN — OXYCODONE HYDROCHLORIDE 20 MILLIGRAM(S): 5 TABLET ORAL at 02:20

## 2019-04-28 RX ADMIN — OXYCODONE HYDROCHLORIDE 20 MILLIGRAM(S): 5 TABLET ORAL at 19:50

## 2019-04-28 RX ADMIN — GABAPENTIN 800 MILLIGRAM(S): 400 CAPSULE ORAL at 14:19

## 2019-04-28 RX ADMIN — Medication 1 PATCH: at 12:00

## 2019-04-28 RX ADMIN — Medication 650 MILLIGRAM(S): at 22:05

## 2019-04-28 RX ADMIN — OXYCODONE HYDROCHLORIDE 20 MILLIGRAM(S): 5 TABLET ORAL at 22:51

## 2019-04-28 RX ADMIN — Medication 1 PATCH: at 12:03

## 2019-04-28 RX ADMIN — CELECOXIB 200 MILLIGRAM(S): 200 CAPSULE ORAL at 11:56

## 2019-04-28 RX ADMIN — Medication 1 PATCH: at 01:40

## 2019-04-28 RX ADMIN — NYSTATIN CREAM 1 APPLICATION(S): 100000 CREAM TOPICAL at 14:27

## 2019-04-28 RX ADMIN — NYSTATIN CREAM 1 APPLICATION(S): 100000 CREAM TOPICAL at 22:07

## 2019-04-28 RX ADMIN — Medication 500 MILLIGRAM(S): at 18:43

## 2019-04-28 RX ADMIN — Medication 1 TABLET(S): at 08:19

## 2019-04-28 RX ADMIN — OXYCODONE HYDROCHLORIDE 40 MILLIGRAM(S): 5 TABLET ORAL at 06:40

## 2019-04-28 RX ADMIN — CELECOXIB 200 MILLIGRAM(S): 200 CAPSULE ORAL at 12:56

## 2019-04-28 RX ADMIN — Medication 1 TABLET(S): at 14:19

## 2019-04-28 RX ADMIN — GABAPENTIN 800 MILLIGRAM(S): 400 CAPSULE ORAL at 22:05

## 2019-04-28 RX ADMIN — OXYCODONE HYDROCHLORIDE 40 MILLIGRAM(S): 5 TABLET ORAL at 18:30

## 2019-04-28 RX ADMIN — OXYCODONE HYDROCHLORIDE 20 MILLIGRAM(S): 5 TABLET ORAL at 01:28

## 2019-04-28 RX ADMIN — GABAPENTIN 800 MILLIGRAM(S): 400 CAPSULE ORAL at 06:40

## 2019-04-28 RX ADMIN — Medication 650 MILLIGRAM(S): at 04:22

## 2019-04-28 RX ADMIN — OXYCODONE HYDROCHLORIDE 20 MILLIGRAM(S): 5 TABLET ORAL at 18:50

## 2019-04-28 RX ADMIN — PANTOPRAZOLE SODIUM 40 MILLIGRAM(S): 20 TABLET, DELAYED RELEASE ORAL at 06:40

## 2019-04-28 RX ADMIN — CYCLOBENZAPRINE HYDROCHLORIDE 10 MILLIGRAM(S): 10 TABLET, FILM COATED ORAL at 10:14

## 2019-04-28 RX ADMIN — CYCLOBENZAPRINE HYDROCHLORIDE 10 MILLIGRAM(S): 10 TABLET, FILM COATED ORAL at 22:05

## 2019-04-28 RX ADMIN — OXYCODONE HYDROCHLORIDE 40 MILLIGRAM(S): 5 TABLET ORAL at 06:45

## 2019-04-28 RX ADMIN — Medication 1 TABLET(S): at 11:56

## 2019-04-28 RX ADMIN — OXYCODONE HYDROCHLORIDE 20 MILLIGRAM(S): 5 TABLET ORAL at 10:39

## 2019-04-28 RX ADMIN — OXYCODONE HYDROCHLORIDE 20 MILLIGRAM(S): 5 TABLET ORAL at 09:39

## 2019-04-28 RX ADMIN — OXYCODONE HYDROCHLORIDE 20 MILLIGRAM(S): 5 TABLET ORAL at 05:34

## 2019-04-28 RX ADMIN — Medication 5 MILLIGRAM(S): at 11:13

## 2019-04-28 RX ADMIN — CYCLOBENZAPRINE HYDROCHLORIDE 10 MILLIGRAM(S): 10 TABLET, FILM COATED ORAL at 01:31

## 2019-04-28 RX ADMIN — NALOXEGOL OXALATE 25 MILLIGRAM(S): 12.5 TABLET, FILM COATED ORAL at 06:40

## 2019-04-28 NOTE — PROGRESS NOTE ADULT - ASSESSMENT
43 F w/ history of IVDA, alcohol abuse, hx of pancreatitis, alcohol hepatitis, alcohol withdrawal, left frontoparietal subdural hematoma 2008 without need for neurosurgical intervention presented with severe sepsis with septic shock secondary to MRSA bacteremia w/ RLL PNA, UTI, endocarditis on LIZ and EFRAIN that has resolved and was diagnosis w/ HCV. Pt was initially intubated due to respiratory failure and put on pressors in ICU. She was extubated on 2/25 and transferred from ICU the following day. Pt had a C4-5 ACDF with irrigation and debridement on 3/20 due to spinal abscess and osteo and was kept intubated post procedure and transferred again to ICU, where she was extubated on 3/21 and transferred back to the med service the following day. HCV- Outpatient follow up for HCV     Septic arthritis involving BL Knees/hips;  - MRI of bilateral hips and knees were done demonstrating effusions and an abscess located in the right rectus femoris.   - Bilateral knees were aspirated by orthopedic team. on 3/28/19 ngtd   bilateral hips aspirated by orthopedic team on 3/29/19  ngtd but cppd crystals seen c/w psuedo gout  - as per ortho-No plan for orthopaedic operative intervention at this time. Should cultures grow pt will require operative intervention         Pseudogout: d/c colchicine     septic shock secondary to MRSA bacteremia w/ RLL PNA, UTI, endocarditis on LIZ at Tricuspid valve   - resolved     Endocarditis of tricuspid valve  - status post thoracentesis on 3/20  - as per ID, patient is on vanco  - repeat BC  blood cx  ngtd     - repeat echo showed a pedunculated mobile mass seen adherent to lateral leaflet of the tricuspid valve w/ no interval changes noted in valve structure or regurgitation  - repeated echo from 4/2/19 shows stable TR vegetation      Spinal osteo, abscess and cord compression    - CT of head and neck showed disc space narrowing and endplate irregularity at C4-5 which may represent typical degenerative change vs discitis   - MRI of the cervical spine w/ contrast showed C2-C7 discitis/osteomyelitis with a small right of midline ventral epidural abscess at C2-C7  - MRI of the thoracolumbar spine showed discitis/osteomyelitis at T11-T12 with paravertebral abscess, as well as discitis/osteomyelitis C6--T1, T9-T10, T10-T11, L4-L5, and L5-S1, and a phlegmon/early abscess dorsolaterally within the lumbar canal at the level of L4-L5. There was also a septic arthritis of  the left sternoclavicular joint space. There was also disc herniation at the L4-L5 level with contact and probable impingement of the descending right-sided nerve roots  - status post s/p C4-5 ACDF with irrigation and debridement on 3/20  - c/w gabapentin and lidocaine patch  - wound cx grew MRSA  - c/w Haddock J  collar   - as per ID, patient is on vanco till may 15 2019  on 4/14/19 had mri t and l spine per ortho for stability see dr. muñoz note from 4/15/19   - vanco was stopped d.t vanco elevation and trough was 6. will restart abx and repeat trough. bmp normal        Bacteremia due to methicillin resistant Staphylococcus aureus with IE: diskitis/OM /Spinal abscesses /Parapneumonic effusion   - most recent blood cx from 3/21/19 negative   - s/p thoracentesis : fluids neg    Moderate protein-calorie malnutrition  - c/w soft diet w/Ensure Enlive 3 x day         IVDrug abuse  -off methadone    - - Chronic Pain - cont celebrex, gabapentin, oxycodone, Oxycontin to 40 mg bid  -still c.o back spasms. and reports low dose of valium helps. will give one more dose today  , c/w flexerill,       Anemia due multiple issues, surgery , blood draws,   poor nutrition and infection  - status post a few units of blood during this extensive hospital stay last one on 3/20/19  - c/w folic acid      constipation: resolved after  golytley   - doing well   - c.w  movantic         Awaiting accepting rehab facility

## 2019-04-28 NOTE — PROGRESS NOTE ADULT - SUBJECTIVE AND OBJECTIVE BOX
Patient is a 43y old  Female who presents with a chief complaint of AMS (26 Apr 2019 12:32)      INTERVAL HPI/OVERNIGHT EVENTS:  pt. still c/o back spasms and states the valium helps     MEDICATIONS  (STANDING):  ascorbic acid 500 milliGRAM(s) Oral two times a day  calcium carbonate 1250 mG  + Vitamin D (OsCal 500 + D) 1 Tablet(s) Oral three times a day  celecoxib 200 milliGRAM(s) Oral daily  chlorhexidine 2% Cloths 1 Application(s) Topical daily  chlorhexidine 4% Liquid 1 Application(s) Topical <User Schedule>  enoxaparin Injectable 40 milliGRAM(s) SubCutaneous daily  folic acid 1 milliGRAM(s) Oral daily  gabapentin 800 milliGRAM(s) Oral three times a day  lactobacillus acidophilus 1 Tablet(s) Oral two times a day with meals  lidocaine   Patch 1 Patch Transdermal every 24 hours  lidocaine   Patch 1 Patch Transdermal every 24 hours  multivitamin 1 Tablet(s) Oral daily  naloxegol 25 milliGRAM(s) Oral before breakfast  nicotine -  14 mG/24Hr(s) Patch 1 patch Transdermal daily  nystatin Powder 1 Application(s) Topical three times a day  oxyCODONE  ER Tablet 40 milliGRAM(s) Oral every 12 hours  pantoprazole    Tablet 40 milliGRAM(s) Oral before breakfast  polyethylene glycol 3350 17 Gram(s) Oral two times a day  senna 2 Tablet(s) Oral at bedtime  vancomycin  IVPB 1000 milliGRAM(s) IV Intermittent every 8 hours    MEDICATIONS  (PRN):  acetaminophen   Tablet .. 650 milliGRAM(s) Oral every 6 hours PRN Mild Pain (1 - 3)  acetaminophen   Tablet .. 650 milliGRAM(s) Oral every 6 hours PRN Temp greater or equal to 38C (100.4F)  cyclobenzaprine 10 milliGRAM(s) Oral three times a day PRN Muscle Spasm  diphenhydrAMINE 25 milliGRAM(s) Oral every 4 hours PRN Rash and/or Itching  oxyCODONE    IR 20 milliGRAM(s) Oral every 4 hours PRN Moderate Pain (4 - 6)      Allergies    penicillin (Short breath; Hives)    Intolerances        REVIEW OF SYSTEMS:  CONSTITUTIONAL: No fever, weight loss, or fatigue  EYES: No eye pain, visual disturbances, or discharge  ENMT:  No difficulty hearing, tinnitus, vertigo; No sinus or throat pain  NECK: chronic neck pain  RESPIRATORY: No cough, wheezing, chills or hemoptysis; No shortness of breath  CARDIOVASCULAR: No chest pain, palpitations, dizziness, or leg swelling  GASTROINTESTINAL: constipation resolved  NEUROLOGICAL: No headaches, memory loss, loss of strength, neuropathy  MUSCULOSKELETAL: chronic back pain   PSYCHIATRIC: anxious in regards to pain management    Vital Signs Last 24 Hrs  T(C): 37 (28 Apr 2019 05:20), Max: 37 (28 Apr 2019 05:20)  T(F): 98.6 (28 Apr 2019 05:20), Max: 98.6 (28 Apr 2019 05:20)  HR: 90 (28 Apr 2019 05:20) (85 - 94)  BP: 121/85 (28 Apr 2019 05:20) (121/85 - 152/78)  BP(mean): --  RR: 16 (28 Apr 2019 05:20) (16 - 18)  SpO2: 99% (28 Apr 2019 05:20) (98% - 99%)    PHYSICAL EXAM:  GENERAL: no distress   HEAD:  Atraumatic, Normocephalic  EYES: EOMI, PERRLA, conjunctiva and sclera clear  ENMT: No tonsillar erythema, exudates, or enlargement; Moist mucous membranes  NECK: Supple, No JVD, Normal thyroid  NERVOUS SYSTEM:  Alert & Oriented X3, strength grossly intact  CHEST/LUNG: Clear to percussion bilaterally; No rales, rhonchi, wheezing, or rubs  HEART: Regular rate and rhythm; No murmurs, rubs, or gallops  ABDOMEN: Soft, Nontender, Nondistended; Bowel sounds present  EXTREMITIES:  2+ Peripheral Pulses, No clubbing, cyanosis, or edema  SKIN: No rashes or lesions    LABS:                                 10.0   9.06  )-----------( 359      ( 28 Apr 2019 09:03 )             32.3     04-28    137  |  102  |  24<H>  ----------------------------<  102<H>  4.1   |  26  |  0.42<L>    Ca    9.7      28 Apr 2019 09:03    TPro  7.1  /  Alb  2.8<L>  /  TBili  0.2  /  DBili  x   /  AST  10<L>  /  ALT  12  /  AlkPhos  157<H>  04-28                           CAPILLARY BLOOD GLUCOSE          RADIOLOGY & ADDITIONAL TESTS:    Imaging Personally Reviewed:  [ ] YES  [ ] NO    Consultant(s) Notes Reviewed:  [x ] YES  [ ] NO    Care Discussed with Consultants/Other Providers [x ] YES  [ ] NO

## 2019-04-29 LAB — VANCOMYCIN TROUGH SERPL-MCNC: 44 UG/ML — CRITICAL HIGH (ref 10–20)

## 2019-04-29 PROCEDURE — 99232 SBSQ HOSP IP/OBS MODERATE 35: CPT

## 2019-04-29 PROCEDURE — 99233 SBSQ HOSP IP/OBS HIGH 50: CPT

## 2019-04-29 RX ORDER — DAPTOMYCIN 500 MG/10ML
440 INJECTION, POWDER, LYOPHILIZED, FOR SOLUTION INTRAVENOUS EVERY 24 HOURS
Qty: 0 | Refills: 0 | Status: DISCONTINUED | OUTPATIENT
Start: 2019-04-29 | End: 2019-05-08

## 2019-04-29 RX ORDER — OXYCODONE HYDROCHLORIDE 5 MG/1
20 TABLET ORAL EVERY 4 HOURS
Qty: 0 | Refills: 0 | Status: DISCONTINUED | OUTPATIENT
Start: 2019-04-29 | End: 2019-05-03

## 2019-04-29 RX ORDER — DIAZEPAM 5 MG
5 TABLET ORAL EVERY 12 HOURS
Qty: 0 | Refills: 0 | Status: DISCONTINUED | OUTPATIENT
Start: 2019-04-29 | End: 2019-05-03

## 2019-04-29 RX ORDER — OXYCODONE HYDROCHLORIDE 5 MG/1
20 TABLET ORAL ONCE
Qty: 0 | Refills: 0 | Status: DISCONTINUED | OUTPATIENT
Start: 2019-04-29 | End: 2019-04-29

## 2019-04-29 RX ADMIN — LIDOCAINE 1 PATCH: 4 CREAM TOPICAL at 08:49

## 2019-04-29 RX ADMIN — OXYCODONE HYDROCHLORIDE 20 MILLIGRAM(S): 5 TABLET ORAL at 08:30

## 2019-04-29 RX ADMIN — Medication 1 TABLET(S): at 17:44

## 2019-04-29 RX ADMIN — Medication 250 MILLIGRAM(S): at 06:19

## 2019-04-29 RX ADMIN — CYCLOBENZAPRINE HYDROCHLORIDE 10 MILLIGRAM(S): 10 TABLET, FILM COATED ORAL at 20:40

## 2019-04-29 RX ADMIN — OXYCODONE HYDROCHLORIDE 20 MILLIGRAM(S): 5 TABLET ORAL at 18:17

## 2019-04-29 RX ADMIN — Medication 650 MILLIGRAM(S): at 10:05

## 2019-04-29 RX ADMIN — DAPTOMYCIN 117.6 MILLIGRAM(S): 500 INJECTION, POWDER, LYOPHILIZED, FOR SOLUTION INTRAVENOUS at 18:13

## 2019-04-29 RX ADMIN — CYCLOBENZAPRINE HYDROCHLORIDE 10 MILLIGRAM(S): 10 TABLET, FILM COATED ORAL at 06:18

## 2019-04-29 RX ADMIN — NYSTATIN CREAM 1 APPLICATION(S): 100000 CREAM TOPICAL at 06:19

## 2019-04-29 RX ADMIN — OXYCODONE HYDROCHLORIDE 40 MILLIGRAM(S): 5 TABLET ORAL at 17:44

## 2019-04-29 RX ADMIN — OXYCODONE HYDROCHLORIDE 20 MILLIGRAM(S): 5 TABLET ORAL at 20:32

## 2019-04-29 RX ADMIN — Medication 1 TABLET(S): at 12:03

## 2019-04-29 RX ADMIN — Medication 1 TABLET(S): at 06:19

## 2019-04-29 RX ADMIN — LIDOCAINE 1 PATCH: 4 CREAM TOPICAL at 11:54

## 2019-04-29 RX ADMIN — OXYCODONE HYDROCHLORIDE 20 MILLIGRAM(S): 5 TABLET ORAL at 16:39

## 2019-04-29 RX ADMIN — Medication 1 PATCH: at 01:40

## 2019-04-29 RX ADMIN — NYSTATIN CREAM 1 APPLICATION(S): 100000 CREAM TOPICAL at 13:30

## 2019-04-29 RX ADMIN — SENNA PLUS 2 TABLET(S): 8.6 TABLET ORAL at 20:49

## 2019-04-29 RX ADMIN — OXYCODONE HYDROCHLORIDE 40 MILLIGRAM(S): 5 TABLET ORAL at 06:18

## 2019-04-29 RX ADMIN — Medication 5 MILLIGRAM(S): at 15:51

## 2019-04-29 RX ADMIN — Medication 1 PATCH: at 18:17

## 2019-04-29 RX ADMIN — OXYCODONE HYDROCHLORIDE 40 MILLIGRAM(S): 5 TABLET ORAL at 06:20

## 2019-04-29 RX ADMIN — Medication 1 PATCH: at 12:03

## 2019-04-29 RX ADMIN — OXYCODONE HYDROCHLORIDE 20 MILLIGRAM(S): 5 TABLET ORAL at 07:27

## 2019-04-29 RX ADMIN — OXYCODONE HYDROCHLORIDE 20 MILLIGRAM(S): 5 TABLET ORAL at 03:13

## 2019-04-29 RX ADMIN — Medication 1 PATCH: at 08:50

## 2019-04-29 RX ADMIN — CELECOXIB 200 MILLIGRAM(S): 200 CAPSULE ORAL at 12:02

## 2019-04-29 RX ADMIN — CHLORHEXIDINE GLUCONATE 1 APPLICATION(S): 213 SOLUTION TOPICAL at 06:20

## 2019-04-29 RX ADMIN — Medication 500 MILLIGRAM(S): at 06:19

## 2019-04-29 RX ADMIN — Medication 1 MILLIGRAM(S): at 12:03

## 2019-04-29 RX ADMIN — Medication 1 TABLET(S): at 20:40

## 2019-04-29 RX ADMIN — Medication 250 MILLIGRAM(S): at 13:28

## 2019-04-29 RX ADMIN — OXYCODONE HYDROCHLORIDE 20 MILLIGRAM(S): 5 TABLET ORAL at 12:01

## 2019-04-29 RX ADMIN — GABAPENTIN 800 MILLIGRAM(S): 400 CAPSULE ORAL at 06:18

## 2019-04-29 RX ADMIN — PANTOPRAZOLE SODIUM 40 MILLIGRAM(S): 20 TABLET, DELAYED RELEASE ORAL at 06:19

## 2019-04-29 RX ADMIN — GABAPENTIN 800 MILLIGRAM(S): 400 CAPSULE ORAL at 20:40

## 2019-04-29 RX ADMIN — OXYCODONE HYDROCHLORIDE 20 MILLIGRAM(S): 5 TABLET ORAL at 13:23

## 2019-04-29 RX ADMIN — Medication 1 TABLET(S): at 13:28

## 2019-04-29 RX ADMIN — OXYCODONE HYDROCHLORIDE 20 MILLIGRAM(S): 5 TABLET ORAL at 21:32

## 2019-04-29 RX ADMIN — GABAPENTIN 800 MILLIGRAM(S): 400 CAPSULE ORAL at 13:28

## 2019-04-29 RX ADMIN — NYSTATIN CREAM 1 APPLICATION(S): 100000 CREAM TOPICAL at 20:53

## 2019-04-29 RX ADMIN — NALOXEGOL OXALATE 25 MILLIGRAM(S): 12.5 TABLET, FILM COATED ORAL at 06:18

## 2019-04-29 RX ADMIN — Medication 1 PATCH: at 12:07

## 2019-04-29 RX ADMIN — OXYCODONE HYDROCHLORIDE 40 MILLIGRAM(S): 5 TABLET ORAL at 18:43

## 2019-04-29 RX ADMIN — CHLORHEXIDINE GLUCONATE 1 APPLICATION(S): 213 SOLUTION TOPICAL at 11:59

## 2019-04-29 RX ADMIN — Medication 650 MILLIGRAM(S): at 08:56

## 2019-04-29 RX ADMIN — ENOXAPARIN SODIUM 40 MILLIGRAM(S): 100 INJECTION SUBCUTANEOUS at 12:03

## 2019-04-29 RX ADMIN — POLYETHYLENE GLYCOL 3350 17 GRAM(S): 17 POWDER, FOR SOLUTION ORAL at 17:44

## 2019-04-29 RX ADMIN — Medication 500 MILLIGRAM(S): at 17:44

## 2019-04-29 RX ADMIN — Medication 1 TABLET(S): at 08:56

## 2019-04-29 RX ADMIN — OXYCODONE HYDROCHLORIDE 20 MILLIGRAM(S): 5 TABLET ORAL at 04:25

## 2019-04-29 RX ADMIN — CELECOXIB 200 MILLIGRAM(S): 200 CAPSULE ORAL at 13:22

## 2019-04-29 NOTE — PROVIDER CONTACT NOTE (CRITICAL VALUE NOTIFICATION) - NAME OF MD/NP/PA/DO NOTIFIED:
Ayesha KENNEDY
Ayesha KENNEDY
Ayesha hopper
BRENT Gaines
BRENT Gaines
BRENT Parks
BRENT Parks
BRENT Rosenbaum
BRENT Smith
BRENT Smith made aware
DR. HINKLE
Dr Dallas
Dr Deng
Dr. Miller
JOSE MANZANARES
JOSE MANZANARES NP
Latesha, NP
Liana KENNEDY
Luis KENNEDY
Marcie Hill, PA
Marco KENNEDY
Marco KENNEDY
Marcus Hodge
Nikolas
PA Than
SUDHIR KENNEDY
Stephanie VELASCO
Thelma Brown
addison warner
BRENT Parks
Dr. Adams
Josefa Del Castillo

## 2019-04-29 NOTE — PROVIDER CONTACT NOTE (CRITICAL VALUE NOTIFICATION) - PERSON GIVING RESULT:
lab/kate
Blayne
[facility]
Bruna Grier
Carol Perdomo
Charla Darling [facility]
Christine core lab
Cindy
Cristina Donaldson [facility]
Dayan Sim
Derek Vela
Difuentes
Fabienne Zhang
Fabiola Clemente
[email or web address].O
Isaac [facility]
Kalpana Walters
Mehul Hernandez/ MAREK Jules
[facility]
Nichol
Pieter Méndez
RINA FROM [facility]
Rena Espinoza, lab
SIVAN
Tiburcio
Yuki
Zeenat Jules
Zisalentes
Zunilda JEFFRIES
effie stinson
helena Cisneros
joe
kate ferrell
lab dept, Cindy nugent
mrs Cardenas
mrs Cardenas
Leanne/ FELA

## 2019-04-29 NOTE — PROVIDER CONTACT NOTE (CRITICAL VALUE NOTIFICATION) - NS PROVIDER READ BACK TO LAB
yes

## 2019-04-29 NOTE — PROGRESS NOTE ADULT - SUBJECTIVE AND OBJECTIVE BOX
Patient is a 43y old  Female who presents with a chief complaint of AMS (28 Apr 2019 10:48)      INTERVAL HPI / OVERNIGHT EVENTS: c/o pain in lower back with spasms    MEDICATIONS  (STANDING):  ascorbic acid 500 milliGRAM(s) Oral two times a day  calcium carbonate 1250 mG  + Vitamin D (OsCal 500 + D) 1 Tablet(s) Oral three times a day  celecoxib 200 milliGRAM(s) Oral daily  chlorhexidine 2% Cloths 1 Application(s) Topical daily  chlorhexidine 4% Liquid 1 Application(s) Topical <User Schedule>  enoxaparin Injectable 40 milliGRAM(s) SubCutaneous daily  folic acid 1 milliGRAM(s) Oral daily  gabapentin 800 milliGRAM(s) Oral three times a day  lactobacillus acidophilus 1 Tablet(s) Oral two times a day with meals  lidocaine   Patch 1 Patch Transdermal every 24 hours  lidocaine   Patch 1 Patch Transdermal every 24 hours  multivitamin 1 Tablet(s) Oral daily  naloxegol 25 milliGRAM(s) Oral before breakfast  nicotine -  14 mG/24Hr(s) Patch 1 patch Transdermal daily  nystatin Powder 1 Application(s) Topical three times a day  oxyCODONE  ER Tablet 40 milliGRAM(s) Oral every 12 hours  pantoprazole    Tablet 40 milliGRAM(s) Oral before breakfast  polyethylene glycol 3350 17 Gram(s) Oral two times a day  senna 2 Tablet(s) Oral at bedtime  vancomycin  IVPB 1000 milliGRAM(s) IV Intermittent every 8 hours    MEDICATIONS  (PRN):  acetaminophen   Tablet .. 650 milliGRAM(s) Oral every 6 hours PRN Mild Pain (1 - 3)  acetaminophen   Tablet .. 650 milliGRAM(s) Oral every 6 hours PRN Temp greater or equal to 38C (100.4F)  cyclobenzaprine 10 milliGRAM(s) Oral three times a day PRN Muscle Spasm  diphenhydrAMINE 25 milliGRAM(s) Oral every 4 hours PRN Rash and/or Itching  oxyCODONE    IR 20 milliGRAM(s) Oral every 4 hours PRN Moderate Pain (4 - 6)      Vital Signs Last 24 Hrs  T(C): 36.7 (29 Apr 2019 05:28), Max: 36.9 (28 Apr 2019 23:57)  T(F): 98 (29 Apr 2019 05:28), Max: 98.5 (28 Apr 2019 23:57)  HR: 99 (29 Apr 2019 05:28) (98 - 99)  BP: 111/76 (29 Apr 2019 05:28) (111/76 - 136/89)  BP(mean): --  RR: 16 (29 Apr 2019 05:28) (16 - 18)  SpO2: 97% (29 Apr 2019 05:28) (97% - 98%)    Review of systems:  General : no fever /chills,fatigue  CVS : no chest pain, palpitations  Lungs : no shortness of breath, cough  GI : no abdominal pain,vomiting, diarrhea   : no dysuria, hematuria        PHYSICAL EXAM:  General :NAD, neck collar  Constitutional:  well-groomed, well-developed  Respiratory: CTAB/L  Cardiovascular: S1 and S2, RRR, no M/G/R  Gastrointestinal: BS+, soft, NT/ND  Extremities: No peripheral edema  Vascular: 2+ peripheral pulses  Skin: No rashes      LABS:                        10.0   9.06  )-----------( 359      ( 28 Apr 2019 09:03 )             32.3     04-28    137  |  102  |  24<H>  ----------------------------<  102<H>  4.1   |  26  |  0.42<L>    Ca    9.7      28 Apr 2019 09:03    TPro  7.1  /  Alb  2.8<L>  /  TBili  0.2  /  DBili  x   /  AST  10<L>  /  ALT  12  /  AlkPhos  157<H>  04-28          MICROBIOLOGY:  RECENT CULTURES:        RADIOLOGY & ADDITIONAL STUDIES:

## 2019-04-29 NOTE — PROGRESS NOTE ADULT - PROBLEM SELECTOR PLAN 1
on Iv vanco  ,restarted yesterday as level went low   plan to  cont antibiotics till 5/15 .repeat MRI prior to finish therapy   oral antibiotics to cont life long

## 2019-04-30 PROCEDURE — 99233 SBSQ HOSP IP/OBS HIGH 50: CPT

## 2019-04-30 RX ADMIN — LIDOCAINE 1 PATCH: 4 CREAM TOPICAL at 23:37

## 2019-04-30 RX ADMIN — Medication 1 TABLET(S): at 21:22

## 2019-04-30 RX ADMIN — Medication 1 PATCH: at 12:07

## 2019-04-30 RX ADMIN — PANTOPRAZOLE SODIUM 40 MILLIGRAM(S): 20 TABLET, DELAYED RELEASE ORAL at 07:39

## 2019-04-30 RX ADMIN — Medication 650 MILLIGRAM(S): at 03:01

## 2019-04-30 RX ADMIN — Medication 1 TABLET(S): at 13:52

## 2019-04-30 RX ADMIN — Medication 1 PATCH: at 07:42

## 2019-04-30 RX ADMIN — LIDOCAINE 1 PATCH: 4 CREAM TOPICAL at 00:33

## 2019-04-30 RX ADMIN — CHLORHEXIDINE GLUCONATE 1 APPLICATION(S): 213 SOLUTION TOPICAL at 06:15

## 2019-04-30 RX ADMIN — OXYCODONE HYDROCHLORIDE 20 MILLIGRAM(S): 5 TABLET ORAL at 21:22

## 2019-04-30 RX ADMIN — CELECOXIB 200 MILLIGRAM(S): 200 CAPSULE ORAL at 13:38

## 2019-04-30 RX ADMIN — Medication 650 MILLIGRAM(S): at 11:30

## 2019-04-30 RX ADMIN — NYSTATIN CREAM 1 APPLICATION(S): 100000 CREAM TOPICAL at 06:16

## 2019-04-30 RX ADMIN — LIDOCAINE 1 PATCH: 4 CREAM TOPICAL at 07:43

## 2019-04-30 RX ADMIN — Medication 500 MILLIGRAM(S): at 18:18

## 2019-04-30 RX ADMIN — DAPTOMYCIN 117.6 MILLIGRAM(S): 500 INJECTION, POWDER, LYOPHILIZED, FOR SOLUTION INTRAVENOUS at 18:18

## 2019-04-30 RX ADMIN — OXYCODONE HYDROCHLORIDE 40 MILLIGRAM(S): 5 TABLET ORAL at 07:16

## 2019-04-30 RX ADMIN — Medication 1 TABLET(S): at 06:16

## 2019-04-30 RX ADMIN — Medication 1 TABLET(S): at 12:07

## 2019-04-30 RX ADMIN — LIDOCAINE 1 PATCH: 4 CREAM TOPICAL at 12:23

## 2019-04-30 RX ADMIN — Medication 650 MILLIGRAM(S): at 10:35

## 2019-04-30 RX ADMIN — OXYCODONE HYDROCHLORIDE 20 MILLIGRAM(S): 5 TABLET ORAL at 08:45

## 2019-04-30 RX ADMIN — NYSTATIN CREAM 1 APPLICATION(S): 100000 CREAM TOPICAL at 13:54

## 2019-04-30 RX ADMIN — Medication 500 MILLIGRAM(S): at 06:16

## 2019-04-30 RX ADMIN — ENOXAPARIN SODIUM 40 MILLIGRAM(S): 100 INJECTION SUBCUTANEOUS at 12:07

## 2019-04-30 RX ADMIN — OXYCODONE HYDROCHLORIDE 20 MILLIGRAM(S): 5 TABLET ORAL at 05:40

## 2019-04-30 RX ADMIN — OXYCODONE HYDROCHLORIDE 40 MILLIGRAM(S): 5 TABLET ORAL at 19:38

## 2019-04-30 RX ADMIN — CYCLOBENZAPRINE HYDROCHLORIDE 10 MILLIGRAM(S): 10 TABLET, FILM COATED ORAL at 21:22

## 2019-04-30 RX ADMIN — Medication 1 TABLET(S): at 07:39

## 2019-04-30 RX ADMIN — LIDOCAINE 1 PATCH: 4 CREAM TOPICAL at 07:42

## 2019-04-30 RX ADMIN — GABAPENTIN 800 MILLIGRAM(S): 400 CAPSULE ORAL at 13:52

## 2019-04-30 RX ADMIN — Medication 650 MILLIGRAM(S): at 04:01

## 2019-04-30 RX ADMIN — Medication 1 MILLIGRAM(S): at 12:08

## 2019-04-30 RX ADMIN — Medication 1 TABLET(S): at 18:19

## 2019-04-30 RX ADMIN — Medication 5 MILLIGRAM(S): at 21:22

## 2019-04-30 RX ADMIN — OXYCODONE HYDROCHLORIDE 20 MILLIGRAM(S): 5 TABLET ORAL at 00:34

## 2019-04-30 RX ADMIN — OXYCODONE HYDROCHLORIDE 40 MILLIGRAM(S): 5 TABLET ORAL at 18:22

## 2019-04-30 RX ADMIN — Medication 650 MILLIGRAM(S): at 23:47

## 2019-04-30 RX ADMIN — Medication 5 MILLIGRAM(S): at 07:40

## 2019-04-30 RX ADMIN — NALOXEGOL OXALATE 25 MILLIGRAM(S): 12.5 TABLET, FILM COATED ORAL at 07:40

## 2019-04-30 RX ADMIN — CHLORHEXIDINE GLUCONATE 1 APPLICATION(S): 213 SOLUTION TOPICAL at 12:23

## 2019-04-30 RX ADMIN — GABAPENTIN 800 MILLIGRAM(S): 400 CAPSULE ORAL at 21:22

## 2019-04-30 RX ADMIN — OXYCODONE HYDROCHLORIDE 20 MILLIGRAM(S): 5 TABLET ORAL at 04:40

## 2019-04-30 RX ADMIN — CYCLOBENZAPRINE HYDROCHLORIDE 10 MILLIGRAM(S): 10 TABLET, FILM COATED ORAL at 10:35

## 2019-04-30 RX ADMIN — OXYCODONE HYDROCHLORIDE 20 MILLIGRAM(S): 5 TABLET ORAL at 09:38

## 2019-04-30 RX ADMIN — Medication 1 PATCH: at 12:08

## 2019-04-30 RX ADMIN — OXYCODONE HYDROCHLORIDE 20 MILLIGRAM(S): 5 TABLET ORAL at 22:15

## 2019-04-30 RX ADMIN — GABAPENTIN 800 MILLIGRAM(S): 400 CAPSULE ORAL at 06:16

## 2019-04-30 RX ADMIN — OXYCODONE HYDROCHLORIDE 20 MILLIGRAM(S): 5 TABLET ORAL at 01:35

## 2019-04-30 RX ADMIN — CELECOXIB 200 MILLIGRAM(S): 200 CAPSULE ORAL at 12:07

## 2019-04-30 RX ADMIN — OXYCODONE HYDROCHLORIDE 40 MILLIGRAM(S): 5 TABLET ORAL at 06:16

## 2019-04-30 RX ADMIN — LIDOCAINE 1 PATCH: 4 CREAM TOPICAL at 00:34

## 2019-04-30 NOTE — PROGRESS NOTE ADULT - SUBJECTIVE AND OBJECTIVE BOX
Patient is a 43y old  Female who presents with a chief complaint of AMS (26 Apr 2019 12:32)      INTERVAL HPI/OVERNIGHT EVENTS: none     MEDICATIONS  (STANDING):  ascorbic acid 500 milliGRAM(s) Oral two times a day  calcium carbonate 1250 mG  + Vitamin D (OsCal 500 + D) 1 Tablet(s) Oral three times a day  celecoxib 200 milliGRAM(s) Oral daily  chlorhexidine 2% Cloths 1 Application(s) Topical daily  chlorhexidine 4% Liquid 1 Application(s) Topical <User Schedule>  DAPTOmycin IVPB 440 milliGRAM(s) IV Intermittent every 24 hours  enoxaparin Injectable 40 milliGRAM(s) SubCutaneous daily  folic acid 1 milliGRAM(s) Oral daily  gabapentin 800 milliGRAM(s) Oral three times a day  lactobacillus acidophilus 1 Tablet(s) Oral two times a day with meals  lidocaine   Patch 1 Patch Transdermal every 24 hours  lidocaine   Patch 1 Patch Transdermal every 24 hours  multivitamin 1 Tablet(s) Oral daily  naloxegol 25 milliGRAM(s) Oral before breakfast  nicotine -  14 mG/24Hr(s) Patch 1 patch Transdermal daily  nystatin Powder 1 Application(s) Topical three times a day  oxyCODONE  ER Tablet 40 milliGRAM(s) Oral every 12 hours  pantoprazole    Tablet 40 milliGRAM(s) Oral before breakfast  polyethylene glycol 3350 17 Gram(s) Oral two times a day  senna 2 Tablet(s) Oral at bedtime    MEDICATIONS  (PRN):  acetaminophen   Tablet .. 650 milliGRAM(s) Oral every 6 hours PRN Mild Pain (1 - 3)  acetaminophen   Tablet .. 650 milliGRAM(s) Oral every 6 hours PRN Temp greater or equal to 38C (100.4F)  cyclobenzaprine 10 milliGRAM(s) Oral three times a day PRN Muscle Spasm  diazepam    Tablet 5 milliGRAM(s) Oral every 12 hours PRN muscle spasms  diphenhydrAMINE 25 milliGRAM(s) Oral every 4 hours PRN Rash and/or Itching  oxyCODONE    IR 20 milliGRAM(s) Oral every 4 hours PRN Moderate Pain (4 - 6)    Allergies    penicillin (Short breath; Hives)    Intolerances      Vital Signs Last 24 Hrs  T(C): 36.6 (30 Apr 2019 08:31), Max: 36.8 (30 Apr 2019 00:08)  T(F): 97.9 (30 Apr 2019 08:31), Max: 98.2 (30 Apr 2019 00:08)  HR: 98 (30 Apr 2019 08:31) (18 - 100)  BP: 153/91 (30 Apr 2019 08:31) (97/64 - 153/91)  BP(mean): --  RR: 17 (30 Apr 2019 08:31) (17 - 18)  SpO2: 97% (30 Apr 2019 08:31) (96% - 97%)    PHYSICAL EXAM:  GENERAL: no distress   HEAD:  Atraumatic, Normocephalic  EYES: EOMI, PERRLA, conjunctiva and sclera clear  ENMT: No tonsillar erythema, exudates, or enlargement; Moist mucous membranes  NECK: Supple, No JVD, Normal thyroid  NERVOUS SYSTEM:  Alert & Oriented X3, strength grossly intact  CHEST/LUNG: Clear to percussion bilaterally; No rales, rhonchi, wheezing, or rubs  HEART: Regular rate and rhythm; No murmurs, rubs, or gallops  ABDOMEN: Soft, Nontender, Nondistended; Bowel sounds present  EXTREMITIES:  2+ Peripheral Pulses, No clubbing, cyanosis, or edema  SKIN: No rashes or lesions    LABS:                                             CAPILLARY BLOOD GLUCOSE          RADIOLOGY & ADDITIONAL TESTS:    Imaging Personally Reviewed:  [ ] YES  [ ] NO    Consultant(s) Notes Reviewed:  [x ] YES  [ ] NO    Care Discussed with Consultants/Other Providers [x ] YES  [ ] NO

## 2019-04-30 NOTE — PROGRESS NOTE ADULT - ASSESSMENT
43 F w/ history of IVDA, alcohol abuse, hx of pancreatitis, alcohol hepatitis, alcohol withdrawal, left frontoparietal subdural hematoma 2008 without need for neurosurgical intervention presented with severe sepsis with septic shock secondary to MRSA bacteremia w/ RLL PNA, UTI, endocarditis on LIZ and EFRAIN that has resolved and was diagnosis w/ HCV. Pt was initially intubated due to respiratory failure and put on pressors in ICU. She was extubated on 2/25 and transferred from ICU the following day. Pt had a C4-5 ACDF with irrigation and debridement on 3/20 due to spinal abscess and osteo and was kept intubated post procedure and transferred again to ICU, where she was extubated on 3/21 and transferred back to the med service the following day. HCV- Outpatient follow up for HCV     Septic arthritis involving BL Knees/hips;  - MRI of bilateral hips and knees were done demonstrating effusions and an abscess located in the right rectus femoris.   - Bilateral knees were aspirated by orthopedic team. on 3/28/19 ngtd   bilateral hips aspirated by orthopedic team on 3/29/19  ngtd but cppd crystals seen c/w psuedo gout  - as per ortho-No plan for orthopaedic operative intervention at this time. Should cultures grow pt will require operative intervention         Pseudogout: d/c colchicine     septic shock secondary to MRSA bacteremia w/ RLL PNA, UTI, endocarditis on LIZ at Tricuspid valve   - resolved     Endocarditis of tricuspid valve  - status post thoracentesis on 3/20  - as per ID, patient is on vanco  - repeat BC  blood cx  ngtd     - repeat echo showed a pedunculated mobile mass seen adherent to lateral leaflet of the tricuspid valve w/ no interval changes noted in valve structure or regurgitation  - repeated echo from 4/2/19 shows stable TR vegetation      Spinal osteo, abscess and cord compression    - CT of head and neck showed disc space narrowing and endplate irregularity at C4-5 which may represent typical degenerative change vs discitis   - MRI of the cervical spine w/ contrast showed C2-C7 discitis/osteomyelitis with a small right of midline ventral epidural abscess at C2-C7  - MRI of the thoracolumbar spine showed discitis/osteomyelitis at T11-T12 with paravertebral abscess, as well as discitis/osteomyelitis C6--T1, T9-T10, T10-T11, L4-L5, and L5-S1, and a phlegmon/early abscess dorsolaterally within the lumbar canal at the level of L4-L5. There was also a septic arthritis of  the left sternoclavicular joint space. There was also disc herniation at the L4-L5 level with contact and probable impingement of the descending right-sided nerve roots  - status post s/p C4-5 ACDF with irrigation and debridement on 3/20  - c/w gabapentin and lidocaine patch  - wound cx grew MRSA  - c/w Altha J  collar   - as per ID, patient is on IV antibx till may 15 2019 then lifelong oral   on 4/14/19 had mri t and l spine per ortho for stability see dr. muñoz note from 4/15/19   - vanco was stopped d.t vanco elevation and trough was 6. will restart abx and repeat trough. bmp normal        Bacteremia due to methicillin resistant Staphylococcus aureus with IE: diskitis/OM /Spinal abscesses /Parapneumonic effusion   - most recent blood cx from 3/21/19 negative   - s/p thoracentesis : fluids neg    Moderate protein-calorie malnutrition  - c/w soft diet w/Ensure Enlive 3 x day         IVDrug abuse  -off methadone    - - Chronic Pain - cont celebrex, gabapentin, oxycodone, Oxycontin to 40 mg bid  c/o back spasms started valium prn educated pt and staff that if she is lethargic somnolent or sleepy to hold meds       Anemia due multiple issues, surgery , blood draws,   poor nutrition and infection  - status post a few units of blood during this extensive hospital stay last one on 3/20/19  - c/w folic acid      constipation: resolved after  GoLYTELY   - doing well   - c.w  movantic         Awaiting accepting rehab facility which likely not going to happen

## 2019-05-01 LAB
ANION GAP SERPL CALC-SCNC: 9 MMOL/L — SIGNIFICANT CHANGE UP (ref 5–17)
BUN SERPL-MCNC: 29 MG/DL — HIGH (ref 7–23)
CALCIUM SERPL-MCNC: 10.7 MG/DL — HIGH (ref 8.5–10.1)
CHLORIDE SERPL-SCNC: 99 MMOL/L — SIGNIFICANT CHANGE UP (ref 96–108)
CO2 SERPL-SCNC: 27 MMOL/L — SIGNIFICANT CHANGE UP (ref 22–31)
CREAT SERPL-MCNC: 0.51 MG/DL — SIGNIFICANT CHANGE UP (ref 0.5–1.3)
GLUCOSE SERPL-MCNC: 134 MG/DL — HIGH (ref 70–99)
HCT VFR BLD CALC: 36.1 % — SIGNIFICANT CHANGE UP (ref 34.5–45)
HGB BLD-MCNC: 11.1 G/DL — LOW (ref 11.5–15.5)
MAGNESIUM SERPL-MCNC: 2.2 MG/DL — SIGNIFICANT CHANGE UP (ref 1.6–2.6)
MCHC RBC-ENTMCNC: 24.6 PG — LOW (ref 27–34)
MCHC RBC-ENTMCNC: 30.7 GM/DL — LOW (ref 32–36)
MCV RBC AUTO: 80 FL — SIGNIFICANT CHANGE UP (ref 80–100)
NRBC # BLD: 0 /100 WBCS — SIGNIFICANT CHANGE UP (ref 0–0)
PHOSPHATE SERPL-MCNC: 5.3 MG/DL — HIGH (ref 2.5–4.5)
PLATELET # BLD AUTO: 345 K/UL — SIGNIFICANT CHANGE UP (ref 150–400)
POTASSIUM SERPL-MCNC: 4.1 MMOL/L — SIGNIFICANT CHANGE UP (ref 3.5–5.3)
POTASSIUM SERPL-SCNC: 4.1 MMOL/L — SIGNIFICANT CHANGE UP (ref 3.5–5.3)
RBC # BLD: 4.51 M/UL — SIGNIFICANT CHANGE UP (ref 3.8–5.2)
RBC # FLD: 15.7 % — HIGH (ref 10.3–14.5)
SODIUM SERPL-SCNC: 135 MMOL/L — SIGNIFICANT CHANGE UP (ref 135–145)
WBC # BLD: 10.01 K/UL — SIGNIFICANT CHANGE UP (ref 3.8–10.5)
WBC # FLD AUTO: 10.01 K/UL — SIGNIFICANT CHANGE UP (ref 3.8–10.5)

## 2019-05-01 PROCEDURE — 99232 SBSQ HOSP IP/OBS MODERATE 35: CPT

## 2019-05-01 PROCEDURE — 99233 SBSQ HOSP IP/OBS HIGH 50: CPT

## 2019-05-01 RX ORDER — OXYCODONE HYDROCHLORIDE 5 MG/1
40 TABLET ORAL EVERY 12 HOURS
Qty: 0 | Refills: 0 | Status: DISCONTINUED | OUTPATIENT
Start: 2019-05-01 | End: 2019-05-07

## 2019-05-01 RX ADMIN — Medication 1 TABLET(S): at 22:12

## 2019-05-01 RX ADMIN — GABAPENTIN 800 MILLIGRAM(S): 400 CAPSULE ORAL at 14:22

## 2019-05-01 RX ADMIN — CELECOXIB 200 MILLIGRAM(S): 200 CAPSULE ORAL at 11:59

## 2019-05-01 RX ADMIN — OXYCODONE HYDROCHLORIDE 20 MILLIGRAM(S): 5 TABLET ORAL at 12:05

## 2019-05-01 RX ADMIN — OXYCODONE HYDROCHLORIDE 20 MILLIGRAM(S): 5 TABLET ORAL at 06:42

## 2019-05-01 RX ADMIN — OXYCODONE HYDROCHLORIDE 40 MILLIGRAM(S): 5 TABLET ORAL at 18:04

## 2019-05-01 RX ADMIN — Medication 1 TABLET(S): at 12:06

## 2019-05-01 RX ADMIN — LIDOCAINE 1 PATCH: 4 CREAM TOPICAL at 11:51

## 2019-05-01 RX ADMIN — OXYCODONE HYDROCHLORIDE 20 MILLIGRAM(S): 5 TABLET ORAL at 12:45

## 2019-05-01 RX ADMIN — NYSTATIN CREAM 1 APPLICATION(S): 100000 CREAM TOPICAL at 22:12

## 2019-05-01 RX ADMIN — GABAPENTIN 800 MILLIGRAM(S): 400 CAPSULE ORAL at 05:29

## 2019-05-01 RX ADMIN — Medication 1 PATCH: at 07:52

## 2019-05-01 RX ADMIN — Medication 5 MILLIGRAM(S): at 22:11

## 2019-05-01 RX ADMIN — Medication 1 TABLET(S): at 05:30

## 2019-05-01 RX ADMIN — Medication 1 TABLET(S): at 08:10

## 2019-05-01 RX ADMIN — OXYCODONE HYDROCHLORIDE 20 MILLIGRAM(S): 5 TABLET ORAL at 16:05

## 2019-05-01 RX ADMIN — DAPTOMYCIN 117.6 MILLIGRAM(S): 500 INJECTION, POWDER, LYOPHILIZED, FOR SOLUTION INTRAVENOUS at 18:05

## 2019-05-01 RX ADMIN — NYSTATIN CREAM 1 APPLICATION(S): 100000 CREAM TOPICAL at 14:23

## 2019-05-01 RX ADMIN — Medication 650 MILLIGRAM(S): at 08:45

## 2019-05-01 RX ADMIN — Medication 650 MILLIGRAM(S): at 08:01

## 2019-05-01 RX ADMIN — LIDOCAINE 1 PATCH: 4 CREAM TOPICAL at 08:08

## 2019-05-01 RX ADMIN — LIDOCAINE 1 PATCH: 4 CREAM TOPICAL at 08:09

## 2019-05-01 RX ADMIN — Medication 1 MILLIGRAM(S): at 11:59

## 2019-05-01 RX ADMIN — Medication 1 TABLET(S): at 14:22

## 2019-05-01 RX ADMIN — Medication 500 MILLIGRAM(S): at 06:08

## 2019-05-01 RX ADMIN — OXYCODONE HYDROCHLORIDE 20 MILLIGRAM(S): 5 TABLET ORAL at 03:01

## 2019-05-01 RX ADMIN — Medication 500 MILLIGRAM(S): at 18:04

## 2019-05-01 RX ADMIN — Medication 1 TABLET(S): at 18:05

## 2019-05-01 RX ADMIN — Medication 1 PATCH: at 12:11

## 2019-05-01 RX ADMIN — CYCLOBENZAPRINE HYDROCHLORIDE 10 MILLIGRAM(S): 10 TABLET, FILM COATED ORAL at 23:18

## 2019-05-01 RX ADMIN — GABAPENTIN 800 MILLIGRAM(S): 400 CAPSULE ORAL at 22:11

## 2019-05-01 RX ADMIN — OXYCODONE HYDROCHLORIDE 20 MILLIGRAM(S): 5 TABLET ORAL at 23:15

## 2019-05-01 RX ADMIN — ENOXAPARIN SODIUM 40 MILLIGRAM(S): 100 INJECTION SUBCUTANEOUS at 11:59

## 2019-05-01 RX ADMIN — OXYCODONE HYDROCHLORIDE 20 MILLIGRAM(S): 5 TABLET ORAL at 16:45

## 2019-05-01 RX ADMIN — CYCLOBENZAPRINE HYDROCHLORIDE 10 MILLIGRAM(S): 10 TABLET, FILM COATED ORAL at 07:57

## 2019-05-01 RX ADMIN — POLYETHYLENE GLYCOL 3350 17 GRAM(S): 17 POWDER, FOR SOLUTION ORAL at 18:05

## 2019-05-01 RX ADMIN — PANTOPRAZOLE SODIUM 40 MILLIGRAM(S): 20 TABLET, DELAYED RELEASE ORAL at 11:59

## 2019-05-01 RX ADMIN — CHLORHEXIDINE GLUCONATE 1 APPLICATION(S): 213 SOLUTION TOPICAL at 12:01

## 2019-05-01 RX ADMIN — Medication 1 PATCH: at 12:00

## 2019-05-01 RX ADMIN — Medication 5 MILLIGRAM(S): at 09:44

## 2019-05-01 RX ADMIN — Medication 650 MILLIGRAM(S): at 00:10

## 2019-05-01 RX ADMIN — CHLORHEXIDINE GLUCONATE 1 APPLICATION(S): 213 SOLUTION TOPICAL at 06:08

## 2019-05-01 RX ADMIN — SENNA PLUS 2 TABLET(S): 8.6 TABLET ORAL at 22:13

## 2019-05-01 RX ADMIN — NALOXEGOL OXALATE 25 MILLIGRAM(S): 12.5 TABLET, FILM COATED ORAL at 11:59

## 2019-05-01 RX ADMIN — CELECOXIB 200 MILLIGRAM(S): 200 CAPSULE ORAL at 12:40

## 2019-05-01 RX ADMIN — OXYCODONE HYDROCHLORIDE 20 MILLIGRAM(S): 5 TABLET ORAL at 02:01

## 2019-05-01 RX ADMIN — OXYCODONE HYDROCHLORIDE 20 MILLIGRAM(S): 5 TABLET ORAL at 06:07

## 2019-05-01 RX ADMIN — Medication 1 PATCH: at 18:05

## 2019-05-01 NOTE — PROGRESS NOTE ADULT - SUBJECTIVE AND OBJECTIVE BOX
Patient is a 43y old  Female who presents with a chief complaint of AMS (01 May 2019 17:05)      INTERVAL HPI / OVERNIGHT EVENTS: c/o back pain ,says more pain as not on methadone     MEDICATIONS  (STANDING):  ascorbic acid 500 milliGRAM(s) Oral two times a day  calcium carbonate 1250 mG  + Vitamin D (OsCal 500 + D) 1 Tablet(s) Oral three times a day  celecoxib 200 milliGRAM(s) Oral daily  chlorhexidine 2% Cloths 1 Application(s) Topical daily  chlorhexidine 4% Liquid 1 Application(s) Topical <User Schedule>  DAPTOmycin IVPB 440 milliGRAM(s) IV Intermittent every 24 hours  enoxaparin Injectable 40 milliGRAM(s) SubCutaneous daily  folic acid 1 milliGRAM(s) Oral daily  gabapentin 800 milliGRAM(s) Oral three times a day  lactobacillus acidophilus 1 Tablet(s) Oral two times a day with meals  lidocaine   Patch 1 Patch Transdermal every 24 hours  lidocaine   Patch 1 Patch Transdermal every 24 hours  multivitamin 1 Tablet(s) Oral daily  naloxegol 25 milliGRAM(s) Oral before breakfast  nicotine -  14 mG/24Hr(s) Patch 1 patch Transdermal daily  nystatin Powder 1 Application(s) Topical three times a day  oxyCODONE  ER Tablet 40 milliGRAM(s) Oral every 12 hours  pantoprazole    Tablet 40 milliGRAM(s) Oral before breakfast  polyethylene glycol 3350 17 Gram(s) Oral two times a day  senna 2 Tablet(s) Oral at bedtime    MEDICATIONS  (PRN):  acetaminophen   Tablet .. 650 milliGRAM(s) Oral every 6 hours PRN Mild Pain (1 - 3)  acetaminophen   Tablet .. 650 milliGRAM(s) Oral every 6 hours PRN Temp greater or equal to 38C (100.4F)  cyclobenzaprine 10 milliGRAM(s) Oral three times a day PRN Muscle Spasm  diazepam    Tablet 5 milliGRAM(s) Oral every 12 hours PRN muscle spasms  diphenhydrAMINE 25 milliGRAM(s) Oral every 4 hours PRN Rash and/or Itching  oxyCODONE    IR 20 milliGRAM(s) Oral every 4 hours PRN Moderate Pain (4 - 6)      Vital Signs Last 24 Hrs  Tmax: afebrile    Review of systems:  General : no fever /chills, fatigue  CVS : no chest pain, palpitations  Lungs : no shortness of breath, cough  GI : no abdominal pain,vomiting, diarrhea   : no dysuria,hematuria        PHYSICAL EXAM:  General :back pain severe  Constitutional:  well-groomed, well-developed  Respiratory: CTAB/L  Cardiovascular: S1 and S2, RRR, no M/G/R  Gastrointestinal: BS+, soft, NT/ND  Extremities: No peripheral edema  Vascular: 2+ peripheral pulses  Skin: No rashes      LABS:                        11.1   10.01 )-----------( 345      ( 01 May 2019 10:43 )             36.1     05-01    135  |  99  |  29<H>  ----------------------------<  134<H>  4.1   |  27  |  0.51    Ca    10.7<H>      01 May 2019 10:43  Phos  5.3     05-01  Mg     2.2     05-01            MICROBIOLOGY:  RECENT CULTURES:        RADIOLOGY & ADDITIONAL STUDIES:

## 2019-05-01 NOTE — PROGRESS NOTE ADULT - PROBLEM SELECTOR PLAN 1
on Iv vanco  ,restarted yesterday as level went low   changed vanco back to daptomycin as vanco level is fluctuating a lot (b/w 6.4 to 44)  plan to  cont antibiotics till 5/15 .repeat MRI prior to finish therapy   oral antibiotics to cont life long

## 2019-05-01 NOTE — PROGRESS NOTE ADULT - ASSESSMENT
43 F w/ history of IVDA, alcohol abuse, hx of pancreatitis, alcohol hepatitis, alcohol withdrawal, left frontoparietal subdural hematoma 2008 without need for neurosurgical intervention presented with severe sepsis with septic shock secondary to MRSA bacteremia w/ RLL PNA, UTI, endocarditis on LIZ and EFRAIN that has resolved and was diagnosis w/ HCV. Pt was initially intubated due to respiratory failure and put on pressors in ICU. She was extubated on 2/25 and transferred from ICU the following day. Pt had a C4-5 ACDF with irrigation and debridement on 3/20 due to spinal abscess and osteo and was kept intubated post procedure and transferred again to ICU, where she was extubated on 3/21 and transferred back to the med service the following day. HCV- Outpatient follow up for HCV     Septic arthritis involving BL Knees/hips;  - MRI of bilateral hips and knees were done demonstrating effusions and an abscess located in the right rectus femoris.   - Bilateral knees were aspirated by orthopedic team. on 3/28/19 ngtd   bilateral hips aspirated by orthopedic team on 3/29/19  ngtd but cppd crystals seen c/w psuedo gout  - as per ortho-No plan for orthopaedic operative intervention at this time. Should cultures grow pt will require operative intervention         Pseudogout: d/c colchicine     septic shock secondary to MRSA bacteremia w/ RLL PNA, UTI, endocarditis on LIZ at Tricuspid valve   - resolved     Endocarditis of tricuspid valve  - status post thoracentesis on 3/20  - as per ID, patient is on vanco  - repeat BC  blood cx  ngtd     - repeat echo showed a pedunculated mobile mass seen adherent to lateral leaflet of the tricuspid valve w/ no interval changes noted in valve structure or regurgitation  - repeated echo from 4/2/19 shows stable TR vegetation      Spinal osteo, abscess and cord compression    - CT of head and neck showed disc space narrowing and endplate irregularity at C4-5 which may represent typical degenerative change vs discitis   - MRI of the cervical spine w/ contrast showed C2-C7 discitis/osteomyelitis with a small right of midline ventral epidural abscess at C2-C7  - MRI of the thoracolumbar spine showed discitis/osteomyelitis at T11-T12 with paravertebral abscess, as well as discitis/osteomyelitis C6--T1, T9-T10, T10-T11, L4-L5, and L5-S1, and a phlegmon/early abscess dorsolaterally within the lumbar canal at the level of L4-L5. There was also a septic arthritis of  the left sternoclavicular joint space. There was also disc herniation at the L4-L5 level with contact and probable impingement of the descending right-sided nerve roots  - status post s/p C4-5 ACDF with irrigation and debridement on 3/20  - c/w gabapentin and lidocaine patch  - wound cx grew MRSA  - c/w [place] J  collar   - as per ID, patient is on IV antibx till may 15 2019 then lifelong oral   on 4/14/19 had mri t and l spine per ortho for stability see dr. muñoz note from 4/15/19   - vanco was stopped d.t vanco elevation and trough was 6. will restart abx and repeat trough. bmp normal        Bacteremia due to methicillin resistant Staphylococcus aureus with IE: diskitis/OM /Spinal abscesses /Parapneumonic effusion   - most recent blood cx from 3/21/19 negative   - s/p thoracentesis : fluids neg    Moderate protein-calorie malnutrition  - c/w soft diet w/Ensure Enlive 3 x day         IVDrug abuse  -off methadone    - - Chronic Pain - cont celebrex, gabapentin, oxycodone, Oxycontin to 40 mg bid  c/o back spasms started valium prn educated pt and staff that if she is lethargic somnolent or sleepy to hold meds       Anemia due multiple issues, surgery , blood draws,   poor nutrition and infection  - status post a few units of blood during this extensive hospital stay last one on 3/20/19  - c/w folic acid      constipation: resolved after  GoLYTELY   - doing well   - c.w  movantic         Awaiting accepting rehab facility which likely not going to happen

## 2019-05-01 NOTE — PROGRESS NOTE ADULT - SUBJECTIVE AND OBJECTIVE BOX
Patient is a 43y old  Female who presents with a chief complaint of AMS (26 Apr 2019 12:32)      INTERVAL HPI/OVERNIGHT EVENTS: none       MEDICATIONS  (STANDING):  ascorbic acid 500 milliGRAM(s) Oral two times a day  calcium carbonate 1250 mG  + Vitamin D (OsCal 500 + D) 1 Tablet(s) Oral three times a day  celecoxib 200 milliGRAM(s) Oral daily  chlorhexidine 2% Cloths 1 Application(s) Topical daily  chlorhexidine 4% Liquid 1 Application(s) Topical <User Schedule>  DAPTOmycin IVPB 440 milliGRAM(s) IV Intermittent every 24 hours  enoxaparin Injectable 40 milliGRAM(s) SubCutaneous daily  folic acid 1 milliGRAM(s) Oral daily  gabapentin 800 milliGRAM(s) Oral three times a day  lactobacillus acidophilus 1 Tablet(s) Oral two times a day with meals  lidocaine   Patch 1 Patch Transdermal every 24 hours  lidocaine   Patch 1 Patch Transdermal every 24 hours  multivitamin 1 Tablet(s) Oral daily  naloxegol 25 milliGRAM(s) Oral before breakfast  nicotine -  14 mG/24Hr(s) Patch 1 patch Transdermal daily  nystatin Powder 1 Application(s) Topical three times a day  oxyCODONE  ER Tablet 40 milliGRAM(s) Oral every 12 hours  pantoprazole    Tablet 40 milliGRAM(s) Oral before breakfast  polyethylene glycol 3350 17 Gram(s) Oral two times a day  senna 2 Tablet(s) Oral at bedtime    MEDICATIONS  (PRN):  acetaminophen   Tablet .. 650 milliGRAM(s) Oral every 6 hours PRN Mild Pain (1 - 3)  acetaminophen   Tablet .. 650 milliGRAM(s) Oral every 6 hours PRN Temp greater or equal to 38C (100.4F)  cyclobenzaprine 10 milliGRAM(s) Oral three times a day PRN Muscle Spasm  diazepam    Tablet 5 milliGRAM(s) Oral every 12 hours PRN muscle spasms  diphenhydrAMINE 25 milliGRAM(s) Oral every 4 hours PRN Rash and/or Itching  oxyCODONE    IR 20 milliGRAM(s) Oral every 4 hours PRN Moderate Pain (4 - 6)  Allergies    penicillin (Short breath; Hives)    Intolerances    Vital Signs Last 24 Hrs  T(C): 36.5 (01 May 2019 05:55), Max: 36.5 (01 May 2019 05:55)  T(F): 97.7 (01 May 2019 05:55), Max: 97.7 (01 May 2019 05:55)  HR: 96 (01 May 2019 05:55) (96 - 103)  BP: 110/72 (01 May 2019 05:55) (110/72 - 120/75)  BP(mean): --  RR: 17 (01 May 2019 05:55) (16 - 18)  SpO2: 97% (01 May 2019 05:55) (96% - 99%)    PHYSICAL EXAM:  GENERAL: no distress   HEAD:  Atraumatic, Normocephalic  EYES: EOMI, PERRLA, conjunctiva and sclera clear  ENMT: No tonsillar erythema, exudates, or enlargement; Moist mucous membranes  NECK: Supple, No JVD, Normal thyroid  NERVOUS SYSTEM:  Alert & Oriented X3, strength grossly intact  CHEST/LUNG: Clear to percussion bilaterally; No rales, rhonchi, wheezing, or rubs  HEART: Regular rate and rhythm; No murmurs, rubs, or gallops  ABDOMEN: Soft, Nontender, Nondistended; Bowel sounds present  EXTREMITIES:  2+ Peripheral Pulses, No clubbing, cyanosis, or edema  SKIN: No rashes or lesions    LABS:                                                     11.1   10.01 )-----------( 345      ( 01 May 2019 10:43 )             36.1       05-01    135  |  99  |  29<H>  ----------------------------<  134<H>  4.1   |  27  |  0.51    Ca    10.7<H>      01 May 2019 10:43  Phos  5.3     05-01  Mg     2.2     05-01                  CAPILLARY BLOOD GLUCOSE          RADIOLOGY & ADDITIONAL TESTS:    Imaging Personally Reviewed:  [ ] YES  [ ] NO    Consultant(s) Notes Reviewed:  [x ] YES  [ ] NO    Care Discussed with Consultants/Other Providers [x ] YES  [ ] NO

## 2019-05-01 NOTE — CHART NOTE - NSCHARTNOTEFT_GEN_A_CORE
Assessment: Pt seen for follow-up. Pt with Endocarditis s/p thoracentesis 3/20 & spinal osteo, abscess, vertebral bacteremia & hx heroin on Methadone. Noted constipation resolved with Golytely. Last BM x1(4/29). Pt awaiting accepting rehab facility.    Factors impacting intake: [ ] none [ ] nausea  [ ] vomiting [ ] diarrhea [ ] constipation  [ ]chewing problems [ ] swallowing issues  [x ] other: back pain    Diet Prescription: 3/29/19 Regular/Ensure Enlive 3 x day(1050kcal & 60mg protein)    Intake: Po states po intake fluctuates 50-75% meals, however noted nursing documents <50% meals since 4/23 due to not feeling well or sleeping, however pt consumes 100% consumed Ensure Enlive daily. Pt enjoys fresh fruit daily & continue to cater to food preferences. Pt continues to frequently eat lying in bed due to c/o back pain.     Current Weight: Wt=69.6kg(5/1/19)(reweighed to verify wt), Wt=72.3kg(4/5/19)  % Weight Change questionable wt loss 2.8kg(4%) x 1 day; Wt loss 2.7kg(4%) x 26 days    Physical appearance: +1 generalized edema; +2 edema Sina feet & Sina ankle; Nutrition focused physical exam conducted; Subcutaneous fat loss: [WNL ] Orbital fat pads region, [WNL ]Buccal fat region, [WNL ]Triceps region,  [WNL ]Ribs region.  Muscle wasting: [WNL ]Temples region, [WNL ]Clavicle region, [WNL ]Shoulder region, [unable ]Scapula region, [WNL ]Interosseous region,  [WNL ]thigh region, [WNL ]Calf region    Pertinent Medications: MEDICATIONS  (STANDING):  ascorbic acid 500 milliGRAM(s) Oral two times a day  calcium carbonate 1250 mG  + Vitamin D (OsCal 500 + D) 1 Tablet(s) Oral three times a day  celecoxib 200 milliGRAM(s) Oral daily  chlorhexidine 2% Cloths 1 Application(s) Topical daily  chlorhexidine 4% Liquid 1 Application(s) Topical <User Schedule>  DAPTOmycin IVPB 440 milliGRAM(s) IV Intermittent every 24 hours  enoxaparin Injectable 40 milliGRAM(s) SubCutaneous daily  folic acid 1 milliGRAM(s) Oral daily  gabapentin 800 milliGRAM(s) Oral three times a day  lactobacillus acidophilus 1 Tablet(s) Oral two times a day with meals  lidocaine   Patch 1 Patch Transdermal every 24 hours  lidocaine   Patch 1 Patch Transdermal every 24 hours  multivitamin 1 Tablet(s) Oral daily  naloxegol 25 milliGRAM(s) Oral before breakfast  nicotine -  14 mG/24Hr(s) Patch 1 patch Transdermal daily  nystatin Powder 1 Application(s) Topical three times a day  pantoprazole    Tablet 40 milliGRAM(s) Oral before breakfast  polyethylene glycol 3350 17 Gram(s) Oral two times a day  senna 2 Tablet(s) Oral at bedtime    MEDICATIONS  (PRN):  acetaminophen   Tablet .. 650 milliGRAM(s) Oral every 6 hours PRN Mild Pain (1 - 3)  acetaminophen   Tablet .. 650 milliGRAM(s) Oral every 6 hours PRN Temp greater or equal to 38C (100.4F)  cyclobenzaprine 10 milliGRAM(s) Oral three times a day PRN Muscle Spasm  diazepam    Tablet 5 milliGRAM(s) Oral every 12 hours PRN muscle spasms  diphenhydrAMINE 25 milliGRAM(s) Oral every 4 hours PRN Rash and/or Itching  oxyCODONE    IR 20 milliGRAM(s) Oral every 4 hours PRN Moderate Pain (4 - 6)    Pertinent Labs: 04-28 Na 137 mmol/L Glu 102 mg/dL<H> K+ 4.1 mmol/L Cr 0.42 mg/dL<L> BUN 24 mg/dL<H> Phos n/a   Alb 2.8 g/dL<L> PAB n/a   Hgb 10.0 g/dL<L> Hct 32.3 %<L> HgA1C n/a     24Hr FS:  Skin: Lt heel stage I    Estimated Needs:   [x ] no change since previous assessment (4/17/19)  [ ] recalculated:     Previous Nutrition Diagnosis:   [ ] Inadequate Energy Intake [x ]Inadequate Oral Intake [ ] Excessive Energy Intake   [ ] Underweight [ ] Increased Nutrient Needs [ ] Overweight/Obesity   [ ] Altered GI Function [ ] Unintended Weight Loss [ ] Food & Nutrition Related Knowledge Deficit [ ] severe Malnutrition [ ] moderate malnutrition    Nutrition Diagnosis is [ x] ongoing  [ ] resolved  [ ] improved  [ ] not applicable   Previous Goal: Pt to consume >75% meals; not met  Pt to consume >75% supplements; met  Pt to tolerate po diet without GI distress; met    New Nutrition Diagnosis: [x] not applicable       Interventions:   Recommend  [ x] Continue: Regular/Ensure Enlive 3 x day(1050kcal & 60gm protein)  [ ] Change Diet To:  [ ] Nutrition Supplement:  [ ] Nutrition Support:  [ ] Other:     Monitoring and Evaluation:   [ x] PO intake [ x ] Tolerance to diet prescription [ x ] weights [ x ] labs[ x ] follow up per protocol  [ ] other: Assessment: Pt seen for follow-up. Pt with Endocarditis s/p thoracentesis 3/20 & spinal osteo, abscess, vertebral bacteremia & hx heroin on Methadone. Noted constipation resolved with Golytely. Last BM x1(4/29). Pt awaiting accepting rehab facility.    Factors impacting intake: [ ] none [ ] nausea  [ ] vomiting [ ] diarrhea [ ] constipation  [ ]chewing problems [ ] swallowing issues  [x ] other: back pain    Diet Prescription: 3/29/19 Regular/Ensure Enlive 3 x day(1050kcal & 60mg protein)    Intake: Po states po intake fluctuates 50-75% meals, however noted nursing documents <50% meals since 4/23 due to not feeling well or sleeping, however pt consumes 100% consumed Ensure Enlive daily. Pt enjoys grilled cheese & fresh fruit daily & continue to cater to food preferences. Pt continues to frequently eat lying in bed due to c/o back pain.     Current Weight: Wt=69.6kg(5/1/19)(reweighed to verify wt), Wt=72.3kg(4/5/19)  % Weight Change questionable wt loss 2.8kg(4%) x 1 day; Wt loss 2.7kg(4%) x 26 days    Physical appearance: +1 generalized edema; +2 edema Sina feet & Sina ankle; Nutrition focused physical exam conducted; Subcutaneous fat loss: [WNL ] Orbital fat pads region, [WNL ]Buccal fat region, [WNL ]Triceps region,  [WNL ]Ribs region.  Muscle wasting: [WNL ]Temples region, [WNL ]Clavicle region, [WNL ]Shoulder region, [unable ]Scapula region, [WNL ]Interosseous region,  [WNL ]thigh region, [WNL ]Calf region    Pertinent Medications: MEDICATIONS  (STANDING):  ascorbic acid 500 milliGRAM(s) Oral two times a day  calcium carbonate 1250 mG  + Vitamin D (OsCal 500 + D) 1 Tablet(s) Oral three times a day  celecoxib 200 milliGRAM(s) Oral daily  chlorhexidine 2% Cloths 1 Application(s) Topical daily  chlorhexidine 4% Liquid 1 Application(s) Topical <User Schedule>  DAPTOmycin IVPB 440 milliGRAM(s) IV Intermittent every 24 hours  enoxaparin Injectable 40 milliGRAM(s) SubCutaneous daily  folic acid 1 milliGRAM(s) Oral daily  gabapentin 800 milliGRAM(s) Oral three times a day  lactobacillus acidophilus 1 Tablet(s) Oral two times a day with meals  lidocaine   Patch 1 Patch Transdermal every 24 hours  lidocaine   Patch 1 Patch Transdermal every 24 hours  multivitamin 1 Tablet(s) Oral daily  naloxegol 25 milliGRAM(s) Oral before breakfast  nicotine -  14 mG/24Hr(s) Patch 1 patch Transdermal daily  nystatin Powder 1 Application(s) Topical three times a day  pantoprazole    Tablet 40 milliGRAM(s) Oral before breakfast  polyethylene glycol 3350 17 Gram(s) Oral two times a day  senna 2 Tablet(s) Oral at bedtime    MEDICATIONS  (PRN):  acetaminophen   Tablet .. 650 milliGRAM(s) Oral every 6 hours PRN Mild Pain (1 - 3)  acetaminophen   Tablet .. 650 milliGRAM(s) Oral every 6 hours PRN Temp greater or equal to 38C (100.4F)  cyclobenzaprine 10 milliGRAM(s) Oral three times a day PRN Muscle Spasm  diazepam    Tablet 5 milliGRAM(s) Oral every 12 hours PRN muscle spasms  diphenhydrAMINE 25 milliGRAM(s) Oral every 4 hours PRN Rash and/or Itching  oxyCODONE    IR 20 milliGRAM(s) Oral every 4 hours PRN Moderate Pain (4 - 6)    Pertinent Labs: 04-28 Na 137 mmol/L Glu 102 mg/dL<H> K+ 4.1 mmol/L Cr 0.42 mg/dL<L> BUN 24 mg/dL<H> Phos n/a   Alb 2.8 g/dL<L> PAB n/a   Hgb 10.0 g/dL<L> Hct 32.3 %<L> HgA1C n/a     24Hr FS:  Skin: Lt heel stage I    Estimated Needs:   [x ] no change since previous assessment (4/17/19)  [ ] recalculated:     Previous Nutrition Diagnosis:   [ ] Inadequate Energy Intake [x ]Inadequate Oral Intake [ ] Excessive Energy Intake   [ ] Underweight [ ] Increased Nutrient Needs [ ] Overweight/Obesity   [ ] Altered GI Function [ ] Unintended Weight Loss [ ] Food & Nutrition Related Knowledge Deficit [ ] severe Malnutrition [ ] moderate malnutrition    Nutrition Diagnosis is [ x] ongoing  [ ] resolved  [ ] improved  [ ] not applicable   Previous Goal: Pt to consume >75% meals; not met  Pt to consume >75% supplements; met  Pt to tolerate po diet without GI distress; met    New Nutrition Diagnosis: [x] not applicable       Interventions:   Recommend  [ x] Continue: Regular/Ensure Enlive 3 x day(1050kcal & 60gm protein)  [ ] Change Diet To:  [ ] Nutrition Supplement:  [ ] Nutrition Support:  [ ] Other:     Monitoring and Evaluation:   [ x] PO intake [ x ] Tolerance to diet prescription [ x ] weights [ x ] labs[ x ] follow up per protocol  [ ] other:

## 2019-05-02 PROCEDURE — 99232 SBSQ HOSP IP/OBS MODERATE 35: CPT

## 2019-05-02 RX ADMIN — OXYCODONE HYDROCHLORIDE 20 MILLIGRAM(S): 5 TABLET ORAL at 03:34

## 2019-05-02 RX ADMIN — OXYCODONE HYDROCHLORIDE 20 MILLIGRAM(S): 5 TABLET ORAL at 07:46

## 2019-05-02 RX ADMIN — OXYCODONE HYDROCHLORIDE 40 MILLIGRAM(S): 5 TABLET ORAL at 06:28

## 2019-05-02 RX ADMIN — NYSTATIN CREAM 1 APPLICATION(S): 100000 CREAM TOPICAL at 13:43

## 2019-05-02 RX ADMIN — Medication 1 TABLET(S): at 07:46

## 2019-05-02 RX ADMIN — POLYETHYLENE GLYCOL 3350 17 GRAM(S): 17 POWDER, FOR SOLUTION ORAL at 17:25

## 2019-05-02 RX ADMIN — Medication 1 PATCH: at 11:55

## 2019-05-02 RX ADMIN — Medication 500 MILLIGRAM(S): at 06:27

## 2019-05-02 RX ADMIN — GABAPENTIN 800 MILLIGRAM(S): 400 CAPSULE ORAL at 21:19

## 2019-05-02 RX ADMIN — CHLORHEXIDINE GLUCONATE 1 APPLICATION(S): 213 SOLUTION TOPICAL at 13:22

## 2019-05-02 RX ADMIN — POLYETHYLENE GLYCOL 3350 17 GRAM(S): 17 POWDER, FOR SOLUTION ORAL at 06:29

## 2019-05-02 RX ADMIN — OXYCODONE HYDROCHLORIDE 20 MILLIGRAM(S): 5 TABLET ORAL at 11:54

## 2019-05-02 RX ADMIN — SENNA PLUS 2 TABLET(S): 8.6 TABLET ORAL at 21:19

## 2019-05-02 RX ADMIN — OXYCODONE HYDROCHLORIDE 20 MILLIGRAM(S): 5 TABLET ORAL at 16:01

## 2019-05-02 RX ADMIN — Medication 5 MILLIGRAM(S): at 10:55

## 2019-05-02 RX ADMIN — Medication 500 MILLIGRAM(S): at 17:25

## 2019-05-02 RX ADMIN — NYSTATIN CREAM 1 APPLICATION(S): 100000 CREAM TOPICAL at 06:29

## 2019-05-02 RX ADMIN — CELECOXIB 200 MILLIGRAM(S): 200 CAPSULE ORAL at 13:24

## 2019-05-02 RX ADMIN — Medication 1 TABLET(S): at 11:57

## 2019-05-02 RX ADMIN — CHLORHEXIDINE GLUCONATE 1 APPLICATION(S): 213 SOLUTION TOPICAL at 06:28

## 2019-05-02 RX ADMIN — Medication 1 MILLIGRAM(S): at 11:53

## 2019-05-02 RX ADMIN — Medication 1 TABLET(S): at 13:43

## 2019-05-02 RX ADMIN — LIDOCAINE 1 PATCH: 4 CREAM TOPICAL at 07:15

## 2019-05-02 RX ADMIN — Medication 650 MILLIGRAM(S): at 01:17

## 2019-05-02 RX ADMIN — NYSTATIN CREAM 1 APPLICATION(S): 100000 CREAM TOPICAL at 21:19

## 2019-05-02 RX ADMIN — LIDOCAINE 1 PATCH: 4 CREAM TOPICAL at 13:24

## 2019-05-02 RX ADMIN — OXYCODONE HYDROCHLORIDE 20 MILLIGRAM(S): 5 TABLET ORAL at 21:48

## 2019-05-02 RX ADMIN — GABAPENTIN 800 MILLIGRAM(S): 400 CAPSULE ORAL at 06:27

## 2019-05-02 RX ADMIN — LIDOCAINE 1 PATCH: 4 CREAM TOPICAL at 22:58

## 2019-05-02 RX ADMIN — CYCLOBENZAPRINE HYDROCHLORIDE 10 MILLIGRAM(S): 10 TABLET, FILM COATED ORAL at 11:56

## 2019-05-02 RX ADMIN — OXYCODONE HYDROCHLORIDE 40 MILLIGRAM(S): 5 TABLET ORAL at 07:28

## 2019-05-02 RX ADMIN — DAPTOMYCIN 117.6 MILLIGRAM(S): 500 INJECTION, POWDER, LYOPHILIZED, FOR SOLUTION INTRAVENOUS at 17:25

## 2019-05-02 RX ADMIN — Medication 650 MILLIGRAM(S): at 14:22

## 2019-05-02 RX ADMIN — Medication 1 TABLET(S): at 17:25

## 2019-05-02 RX ADMIN — Medication 1 PATCH: at 07:58

## 2019-05-02 RX ADMIN — Medication 1 TABLET(S): at 06:27

## 2019-05-02 RX ADMIN — NALOXEGOL OXALATE 25 MILLIGRAM(S): 12.5 TABLET, FILM COATED ORAL at 07:46

## 2019-05-02 RX ADMIN — OXYCODONE HYDROCHLORIDE 20 MILLIGRAM(S): 5 TABLET ORAL at 00:15

## 2019-05-02 RX ADMIN — ENOXAPARIN SODIUM 40 MILLIGRAM(S): 100 INJECTION SUBCUTANEOUS at 11:55

## 2019-05-02 RX ADMIN — OXYCODONE HYDROCHLORIDE 20 MILLIGRAM(S): 5 TABLET ORAL at 13:24

## 2019-05-02 RX ADMIN — OXYCODONE HYDROCHLORIDE 20 MILLIGRAM(S): 5 TABLET ORAL at 20:48

## 2019-05-02 RX ADMIN — Medication 5 MILLIGRAM(S): at 22:57

## 2019-05-02 RX ADMIN — PANTOPRAZOLE SODIUM 40 MILLIGRAM(S): 20 TABLET, DELAYED RELEASE ORAL at 07:46

## 2019-05-02 RX ADMIN — LIDOCAINE 1 PATCH: 4 CREAM TOPICAL at 00:51

## 2019-05-02 RX ADMIN — OXYCODONE HYDROCHLORIDE 40 MILLIGRAM(S): 5 TABLET ORAL at 17:25

## 2019-05-02 RX ADMIN — GABAPENTIN 800 MILLIGRAM(S): 400 CAPSULE ORAL at 13:43

## 2019-05-02 RX ADMIN — OXYCODONE HYDROCHLORIDE 20 MILLIGRAM(S): 5 TABLET ORAL at 10:37

## 2019-05-02 RX ADMIN — Medication 1 PATCH: at 19:30

## 2019-05-02 RX ADMIN — CYCLOBENZAPRINE HYDROCHLORIDE 10 MILLIGRAM(S): 10 TABLET, FILM COATED ORAL at 21:19

## 2019-05-02 RX ADMIN — Medication 650 MILLIGRAM(S): at 02:17

## 2019-05-02 RX ADMIN — OXYCODONE HYDROCHLORIDE 20 MILLIGRAM(S): 5 TABLET ORAL at 17:19

## 2019-05-02 RX ADMIN — Medication 1 TABLET(S): at 21:19

## 2019-05-02 RX ADMIN — Medication 650 MILLIGRAM(S): at 15:57

## 2019-05-02 RX ADMIN — Medication 1 PATCH: at 13:24

## 2019-05-02 RX ADMIN — CELECOXIB 200 MILLIGRAM(S): 200 CAPSULE ORAL at 11:56

## 2019-05-02 NOTE — PROGRESS NOTE ADULT - ASSESSMENT
43 F w/ history of IVDA, alcohol abuse, hx of pancreatitis, alcohol hepatitis, alcohol withdrawal, left frontoparietal subdural hematoma 2008 without need for neurosurgical intervention presented with severe sepsis with septic shock secondary to MRSA bacteremia w/ RLL PNA, UTI, endocarditis on LIZ and EFRAIN that has resolved and was diagnosis w/ HCV. Pt was initially intubated due to respiratory failure and put on pressors in ICU. She was extubated on 2/25 and transferred from ICU the following day. Pt had a C4-5 ACDF with irrigation and debridement on 3/20 due to spinal abscess and osteo and was kept intubated post procedure and transferred again to ICU, where she was extubated on 3/21 and transferred back to the med service the following day. HCV- Outpatient follow up for HCV     Septic arthritis involving BL Knees/hips;  - MRI of bilateral hips and knees were done demonstrating effusions and an abscess located in the right rectus femoris.   - Bilateral knees were aspirated by orthopedic team. on 3/28/19 ngtd   bilateral hips aspirated by orthopedic team on 3/29/19  ngtd but cppd crystals seen c/w psuedo gout  - as per ortho-No plan for orthopaedic operative intervention at this time. Should cultures grow pt will require operative intervention         Pseudogout: d/c colchicine     septic shock secondary to MRSA bacteremia w/ RLL PNA, UTI, endocarditis on LIZ at Tricuspid valve   - resolved     Endocarditis of tricuspid valve  - status post thoracentesis on 3/20  - as per ID, patient is on vanco  - repeat BC  blood cx  ngtd     - repeat echo showed a pedunculated mobile mass seen adherent to lateral leaflet of the tricuspid valve w/ no interval changes noted in valve structure or regurgitation  - repeated echo from 4/2/19 shows stable TR vegetation      Spinal osteo, abscess and cord compression    - CT of head and neck showed disc space narrowing and endplate irregularity at C4-5 which may represent typical degenerative change vs discitis   - MRI of the cervical spine w/ contrast showed C2-C7 discitis/osteomyelitis with a small right of midline ventral epidural abscess at C2-C7  - MRI of the thoracolumbar spine showed discitis/osteomyelitis at T11-T12 with paravertebral abscess, as well as discitis/osteomyelitis C6--T1, T9-T10, T10-T11, L4-L5, and L5-S1, and a phlegmon/early abscess dorsolaterally within the lumbar canal at the level of L4-L5. There was also a septic arthritis of  the left sternoclavicular joint space. There was also disc herniation at the L4-L5 level with contact and probable impingement of the descending right-sided nerve roots  - status post s/p C4-5 ACDF with irrigation and debridement on 3/20  - c/w gabapentin and lidocaine patch  - wound cx grew MRSA  - c/w [place] J  collar   - as per ID, patient is on IV antibx till may 15 2019 then lifelong oral   on 4/14/19 had mri t and l spine per ortho for stability see dr. muñoz note from 4/15/19   - vanco was stopped d.t vanco elevation and trough was 6. will restart abx and repeat trough. bmp normal        Bacteremia due to methicillin resistant Staphylococcus aureus with IE: diskitis/OM /Spinal abscesses /Parapneumonic effusion   - most recent blood cx from 3/21/19 negative   - s/p thoracentesis : fluids neg    Moderate protein-calorie malnutrition  - c/w soft diet w/Ensure Enlive 3 x day         IVDrug abuse  -off methadone    - - Chronic Pain - cont celebrex, gabapentin, oxycodone, Oxycontin to 40 mg bid  c/o back spasms started valium prn educated pt and staff that if she is lethargic somnolent or sleepy to hold meds       Anemia due multiple issues, surgery , blood draws,   poor nutrition and infection  - status post a few units of blood during this extensive hospital stay last one on 3/20/19  - c/w folic acid      constipation: resolved after  GoLYTELY   - doing well   - c.w  movantic         Awaiting accepting rehab facility which likely not going to happen

## 2019-05-02 NOTE — PROGRESS NOTE ADULT - SUBJECTIVE AND OBJECTIVE BOX
Patient is a 43y old  Female who presents with a chief complaint of AMS (26 Apr 2019 12:32)      INTERVAL HPI/OVERNIGHT EVENTS: none       MEDICATIONS  (STANDING):  ascorbic acid 500 milliGRAM(s) Oral two times a day  calcium carbonate 1250 mG  + Vitamin D (OsCal 500 + D) 1 Tablet(s) Oral three times a day  celecoxib 200 milliGRAM(s) Oral daily  chlorhexidine 2% Cloths 1 Application(s) Topical daily  chlorhexidine 4% Liquid 1 Application(s) Topical <User Schedule>  DAPTOmycin IVPB 440 milliGRAM(s) IV Intermittent every 24 hours  enoxaparin Injectable 40 milliGRAM(s) SubCutaneous daily  folic acid 1 milliGRAM(s) Oral daily  gabapentin 800 milliGRAM(s) Oral three times a day  lactobacillus acidophilus 1 Tablet(s) Oral two times a day with meals  lidocaine   Patch 1 Patch Transdermal every 24 hours  lidocaine   Patch 1 Patch Transdermal every 24 hours  multivitamin 1 Tablet(s) Oral daily  naloxegol 25 milliGRAM(s) Oral before breakfast  nicotine -  14 mG/24Hr(s) Patch 1 patch Transdermal daily  nystatin Powder 1 Application(s) Topical three times a day  oxyCODONE  ER Tablet 40 milliGRAM(s) Oral every 12 hours  pantoprazole    Tablet 40 milliGRAM(s) Oral before breakfast  polyethylene glycol 3350 17 Gram(s) Oral two times a day  senna 2 Tablet(s) Oral at bedtime    MEDICATIONS  (PRN):  acetaminophen   Tablet .. 650 milliGRAM(s) Oral every 6 hours PRN Mild Pain (1 - 3)  acetaminophen   Tablet .. 650 milliGRAM(s) Oral every 6 hours PRN Temp greater or equal to 38C (100.4F)  cyclobenzaprine 10 milliGRAM(s) Oral three times a day PRN Muscle Spasm  diazepam    Tablet 5 milliGRAM(s) Oral every 12 hours PRN muscle spasms  diphenhydrAMINE 25 milliGRAM(s) Oral every 4 hours PRN Rash and/or Itching  oxyCODONE    IR 20 milliGRAM(s) Oral every 4 hours PRN Moderate Pain (4 - 6)  Allergies    penicillin (Short breath; Hives)    Intolerances    Vital Signs Last 24 Hrs  T(C): 97.2  HR 97  BP: 110/72 (01 May 2019 05:55) (110/72 - 120/75)  BP(mean): --  RR: 17 (01 May 2019 05:55) (16 - 18)  SpO2: 97% (01 May 2019 05:55) (96% - 99%)    PHYSICAL EXAM:  GENERAL: no distress   HEAD:  Atraumatic, Normocephalic  EYES: EOMI, PERRLA, conjunctiva and sclera clear  ENMT: No tonsillar erythema, exudates, or enlargement; Moist mucous membranes  NECK: Supple, No JVD, Normal thyroid  NERVOUS SYSTEM:  Alert & Oriented X3, strength grossly intact  CHEST/LUNG: Clear to percussion bilaterally; No rales, rhonchi, wheezing, or rubs  HEART: Regular rate and rhythm; No murmurs, rubs, or gallops  ABDOMEN: Soft, Nontender, Nondistended; Bowel sounds present  EXTREMITIES:  2+ Peripheral Pulses, No clubbing, cyanosis, or edema  SKIN: No rashes or lesions    LABS:                                                     11.1   10.01 )-----------( 345      ( 01 May 2019 10:43 )             36.1       05-01    135  |  99  |  29<H>  ----------------------------<  134<H>  4.1   |  27  |  0.51    Ca    10.7<H>      01 May 2019 10:43  Phos  5.3     05-01  Mg     2.2     05-01                  CAPILLARY BLOOD GLUCOSE          RADIOLOGY & ADDITIONAL TESTS:    Imaging Personally Reviewed:  [ ] YES  [ ] NO    Consultant(s) Notes Reviewed:  [x ] YES  [ ] NO    Care Discussed with Consultants/Other Providers [x ] YES  [ ] NO Patient is a 43y old  Female who presents with a chief complaint of AMS (26 Apr 2019 12:32)      INTERVAL HPI/OVERNIGHT EVENTS: none       MEDICATIONS  (STANDING):  ascorbic acid 500 milliGRAM(s) Oral two times a day  calcium carbonate 1250 mG  + Vitamin D (OsCal 500 + D) 1 Tablet(s) Oral three times a day  celecoxib 200 milliGRAM(s) Oral daily  chlorhexidine 2% Cloths 1 Application(s) Topical daily  chlorhexidine 4% Liquid 1 Application(s) Topical <User Schedule>  DAPTOmycin IVPB 440 milliGRAM(s) IV Intermittent every 24 hours  enoxaparin Injectable 40 milliGRAM(s) SubCutaneous daily  folic acid 1 milliGRAM(s) Oral daily  gabapentin 800 milliGRAM(s) Oral three times a day  lactobacillus acidophilus 1 Tablet(s) Oral two times a day with meals  lidocaine   Patch 1 Patch Transdermal every 24 hours  lidocaine   Patch 1 Patch Transdermal every 24 hours  multivitamin 1 Tablet(s) Oral daily  naloxegol 25 milliGRAM(s) Oral before breakfast  nicotine -  14 mG/24Hr(s) Patch 1 patch Transdermal daily  nystatin Powder 1 Application(s) Topical three times a day  oxyCODONE  ER Tablet 40 milliGRAM(s) Oral every 12 hours  pantoprazole    Tablet 40 milliGRAM(s) Oral before breakfast  polyethylene glycol 3350 17 Gram(s) Oral two times a day  senna 2 Tablet(s) Oral at bedtime    MEDICATIONS  (PRN):  acetaminophen   Tablet .. 650 milliGRAM(s) Oral every 6 hours PRN Mild Pain (1 - 3)  acetaminophen   Tablet .. 650 milliGRAM(s) Oral every 6 hours PRN Temp greater or equal to 38C (100.4F)  cyclobenzaprine 10 milliGRAM(s) Oral three times a day PRN Muscle Spasm  diazepam    Tablet 5 milliGRAM(s) Oral every 12 hours PRN muscle spasms  diphenhydrAMINE 25 milliGRAM(s) Oral every 4 hours PRN Rash and/or Itching  oxyCODONE    IR 20 milliGRAM(s) Oral every 4 hours PRN Moderate Pain (4 - 6)  Allergies    penicillin (Short breath; Hives)    Intolerances    Vital Signs Last 24 Hrs  T(C): 97.2  HR 97  BP: 109/72   RR: 18  SpO2: 97%     PHYSICAL EXAM:  GENERAL: no distress   HEAD:  Atraumatic, Normocephalic  EYES: EOMI, PERRLA, conjunctiva and sclera clear  ENMT: No tonsillar erythema, exudates, or enlargement; Moist mucous membranes  NECK: Supple, No JVD, Normal thyroid  NERVOUS SYSTEM:  sleepy but arousable   CHEST/LUNG: Clear to percussion bilaterally; No rales, rhonchi, wheezing, or rubs  HEART: Regular rate and rhythm; No murmurs, rubs, or gallops  ABDOMEN: Soft, Nontender, Nondistended; Bowel sounds present  EXTREMITIES:  2+ Peripheral Pulses, No clubbing, cyanosis, or edema  SKIN: No rashes or lesions    LABS:                                                     11.1   10.01 )-----------( 345      ( 01 May 2019 10:43 )             36.1       05-01    135  |  99  |  29<H>  ----------------------------<  134<H>  4.1   |  27  |  0.51    Ca    10.7<H>      01 May 2019 10:43  Phos  5.3     05-01  Mg     2.2     05-01                  CAPILLARY BLOOD GLUCOSE          RADIOLOGY & ADDITIONAL TESTS:    Imaging Personally Reviewed:  [ ] YES  [ ] NO    Consultant(s) Notes Reviewed:  [x ] YES  [ ] NO    Care Discussed with Consultants/Other Providers [x ] YES  [ ] NO

## 2019-05-03 LAB
ANION GAP SERPL CALC-SCNC: 9 MMOL/L — SIGNIFICANT CHANGE UP (ref 5–17)
BUN SERPL-MCNC: 40 MG/DL — HIGH (ref 7–23)
CALCIUM SERPL-MCNC: 10.3 MG/DL — HIGH (ref 8.5–10.1)
CHLORIDE SERPL-SCNC: 99 MMOL/L — SIGNIFICANT CHANGE UP (ref 96–108)
CO2 SERPL-SCNC: 29 MMOL/L — SIGNIFICANT CHANGE UP (ref 22–31)
CREAT SERPL-MCNC: 0.61 MG/DL — SIGNIFICANT CHANGE UP (ref 0.5–1.3)
GLUCOSE SERPL-MCNC: 119 MG/DL — HIGH (ref 70–99)
HCT VFR BLD CALC: 35.7 % — SIGNIFICANT CHANGE UP (ref 34.5–45)
HGB BLD-MCNC: 11 G/DL — LOW (ref 11.5–15.5)
MAGNESIUM SERPL-MCNC: 2.4 MG/DL — SIGNIFICANT CHANGE UP (ref 1.6–2.6)
MCHC RBC-ENTMCNC: 24.8 PG — LOW (ref 27–34)
MCHC RBC-ENTMCNC: 30.8 GM/DL — LOW (ref 32–36)
MCV RBC AUTO: 80.6 FL — SIGNIFICANT CHANGE UP (ref 80–100)
NRBC # BLD: 0 /100 WBCS — SIGNIFICANT CHANGE UP (ref 0–0)
PHOSPHATE SERPL-MCNC: 4.9 MG/DL — HIGH (ref 2.5–4.5)
PLATELET # BLD AUTO: 260 K/UL — SIGNIFICANT CHANGE UP (ref 150–400)
POTASSIUM SERPL-MCNC: 4.9 MMOL/L — SIGNIFICANT CHANGE UP (ref 3.5–5.3)
POTASSIUM SERPL-SCNC: 4.9 MMOL/L — SIGNIFICANT CHANGE UP (ref 3.5–5.3)
RBC # BLD: 4.43 M/UL — SIGNIFICANT CHANGE UP (ref 3.8–5.2)
RBC # FLD: 15.3 % — HIGH (ref 10.3–14.5)
SODIUM SERPL-SCNC: 137 MMOL/L — SIGNIFICANT CHANGE UP (ref 135–145)
WBC # BLD: 9.59 K/UL — SIGNIFICANT CHANGE UP (ref 3.8–10.5)
WBC # FLD AUTO: 9.59 K/UL — SIGNIFICANT CHANGE UP (ref 3.8–10.5)

## 2019-05-03 PROCEDURE — 99233 SBSQ HOSP IP/OBS HIGH 50: CPT

## 2019-05-03 RX ORDER — METHADONE HYDROCHLORIDE 40 MG/1
40 TABLET ORAL DAILY
Qty: 0 | Refills: 0 | Status: DISCONTINUED | OUTPATIENT
Start: 2019-05-03 | End: 2019-05-07

## 2019-05-03 RX ORDER — DIAZEPAM 5 MG
5 TABLET ORAL EVERY 8 HOURS
Qty: 0 | Refills: 0 | Status: DISCONTINUED | OUTPATIENT
Start: 2019-05-03 | End: 2019-05-08

## 2019-05-03 RX ORDER — OXYCODONE HYDROCHLORIDE 5 MG/1
15 TABLET ORAL EVERY 4 HOURS
Qty: 0 | Refills: 0 | Status: DISCONTINUED | OUTPATIENT
Start: 2019-05-03 | End: 2019-05-07

## 2019-05-03 RX ADMIN — SENNA PLUS 2 TABLET(S): 8.6 TABLET ORAL at 22:52

## 2019-05-03 RX ADMIN — Medication 650 MILLIGRAM(S): at 13:21

## 2019-05-03 RX ADMIN — Medication 1 TABLET(S): at 13:22

## 2019-05-03 RX ADMIN — ENOXAPARIN SODIUM 40 MILLIGRAM(S): 100 INJECTION SUBCUTANEOUS at 12:37

## 2019-05-03 RX ADMIN — NYSTATIN CREAM 1 APPLICATION(S): 100000 CREAM TOPICAL at 13:22

## 2019-05-03 RX ADMIN — OXYCODONE HYDROCHLORIDE 15 MILLIGRAM(S): 5 TABLET ORAL at 15:15

## 2019-05-03 RX ADMIN — Medication 1 TABLET(S): at 07:53

## 2019-05-03 RX ADMIN — LIDOCAINE 1 PATCH: 4 CREAM TOPICAL at 23:42

## 2019-05-03 RX ADMIN — CHLORHEXIDINE GLUCONATE 1 APPLICATION(S): 213 SOLUTION TOPICAL at 12:33

## 2019-05-03 RX ADMIN — LIDOCAINE 1 PATCH: 4 CREAM TOPICAL at 12:34

## 2019-05-03 RX ADMIN — OXYCODONE HYDROCHLORIDE 15 MILLIGRAM(S): 5 TABLET ORAL at 11:35

## 2019-05-03 RX ADMIN — Medication 500 MILLIGRAM(S): at 06:45

## 2019-05-03 RX ADMIN — Medication 1 PATCH: at 12:37

## 2019-05-03 RX ADMIN — Medication 5 MILLIGRAM(S): at 21:03

## 2019-05-03 RX ADMIN — CELECOXIB 200 MILLIGRAM(S): 200 CAPSULE ORAL at 13:21

## 2019-05-03 RX ADMIN — NALOXEGOL OXALATE 25 MILLIGRAM(S): 12.5 TABLET, FILM COATED ORAL at 06:46

## 2019-05-03 RX ADMIN — CELECOXIB 200 MILLIGRAM(S): 200 CAPSULE ORAL at 12:37

## 2019-05-03 RX ADMIN — PANTOPRAZOLE SODIUM 40 MILLIGRAM(S): 20 TABLET, DELAYED RELEASE ORAL at 06:45

## 2019-05-03 RX ADMIN — GABAPENTIN 800 MILLIGRAM(S): 400 CAPSULE ORAL at 22:52

## 2019-05-03 RX ADMIN — DAPTOMYCIN 117.6 MILLIGRAM(S): 500 INJECTION, POWDER, LYOPHILIZED, FOR SOLUTION INTRAVENOUS at 18:05

## 2019-05-03 RX ADMIN — Medication 1 PATCH: at 18:01

## 2019-05-03 RX ADMIN — CYCLOBENZAPRINE HYDROCHLORIDE 10 MILLIGRAM(S): 10 TABLET, FILM COATED ORAL at 20:24

## 2019-05-03 RX ADMIN — LIDOCAINE 1 PATCH: 4 CREAM TOPICAL at 23:43

## 2019-05-03 RX ADMIN — Medication 1 TABLET(S): at 12:37

## 2019-05-03 RX ADMIN — OXYCODONE HYDROCHLORIDE 40 MILLIGRAM(S): 5 TABLET ORAL at 18:05

## 2019-05-03 RX ADMIN — OXYCODONE HYDROCHLORIDE 20 MILLIGRAM(S): 5 TABLET ORAL at 04:04

## 2019-05-03 RX ADMIN — GABAPENTIN 800 MILLIGRAM(S): 400 CAPSULE ORAL at 13:22

## 2019-05-03 RX ADMIN — NYSTATIN CREAM 1 APPLICATION(S): 100000 CREAM TOPICAL at 06:46

## 2019-05-03 RX ADMIN — POLYETHYLENE GLYCOL 3350 17 GRAM(S): 17 POWDER, FOR SOLUTION ORAL at 06:45

## 2019-05-03 RX ADMIN — LIDOCAINE 1 PATCH: 4 CREAM TOPICAL at 07:51

## 2019-05-03 RX ADMIN — Medication 1 MILLIGRAM(S): at 12:37

## 2019-05-03 RX ADMIN — Medication 1 TABLET(S): at 22:52

## 2019-05-03 RX ADMIN — Medication 1 TABLET(S): at 06:45

## 2019-05-03 RX ADMIN — Medication 650 MILLIGRAM(S): at 12:44

## 2019-05-03 RX ADMIN — OXYCODONE HYDROCHLORIDE 15 MILLIGRAM(S): 5 TABLET ORAL at 21:00

## 2019-05-03 RX ADMIN — CHLORHEXIDINE GLUCONATE 1 APPLICATION(S): 213 SOLUTION TOPICAL at 06:46

## 2019-05-03 RX ADMIN — Medication 650 MILLIGRAM(S): at 22:52

## 2019-05-03 RX ADMIN — Medication 1 PATCH: at 12:35

## 2019-05-03 RX ADMIN — Medication 1 TABLET(S): at 18:05

## 2019-05-03 RX ADMIN — POLYETHYLENE GLYCOL 3350 17 GRAM(S): 17 POWDER, FOR SOLUTION ORAL at 18:05

## 2019-05-03 RX ADMIN — OXYCODONE HYDROCHLORIDE 15 MILLIGRAM(S): 5 TABLET ORAL at 10:53

## 2019-05-03 RX ADMIN — NYSTATIN CREAM 1 APPLICATION(S): 100000 CREAM TOPICAL at 22:52

## 2019-05-03 RX ADMIN — CYCLOBENZAPRINE HYDROCHLORIDE 10 MILLIGRAM(S): 10 TABLET, FILM COATED ORAL at 12:44

## 2019-05-03 RX ADMIN — Medication 5 MILLIGRAM(S): at 10:56

## 2019-05-03 RX ADMIN — Medication 1 PATCH: at 07:51

## 2019-05-03 RX ADMIN — Medication 500 MILLIGRAM(S): at 18:05

## 2019-05-03 RX ADMIN — GABAPENTIN 800 MILLIGRAM(S): 400 CAPSULE ORAL at 06:45

## 2019-05-03 RX ADMIN — OXYCODONE HYDROCHLORIDE 20 MILLIGRAM(S): 5 TABLET ORAL at 05:04

## 2019-05-03 RX ADMIN — METHADONE HYDROCHLORIDE 40 MILLIGRAM(S): 40 TABLET ORAL at 18:05

## 2019-05-03 RX ADMIN — LIDOCAINE 1 PATCH: 4 CREAM TOPICAL at 07:50

## 2019-05-03 RX ADMIN — OXYCODONE HYDROCHLORIDE 15 MILLIGRAM(S): 5 TABLET ORAL at 20:07

## 2019-05-03 NOTE — PROGRESS NOTE ADULT - ASSESSMENT
43 year old intravenous drug addict admitted with methicillin resitant staph aureus bacteremia ; infectious endocarditis ; cx discitis   on iv antibiotics

## 2019-05-03 NOTE — PROGRESS NOTE ADULT - SUBJECTIVE AND OBJECTIVE BOX
Patient is a 43y old  Female who presents with a chief complaint of AMS (03 May 2019 12:47)      INTERVAL HPI / OVERNIGHT EVENTS: back pain +    MEDICATIONS  (STANDING):  ascorbic acid 500 milliGRAM(s) Oral two times a day  calcium carbonate 1250 mG  + Vitamin D (OsCal 500 + D) 1 Tablet(s) Oral three times a day  celecoxib 200 milliGRAM(s) Oral daily  chlorhexidine 2% Cloths 1 Application(s) Topical daily  chlorhexidine 4% Liquid 1 Application(s) Topical <User Schedule>  DAPTOmycin IVPB 440 milliGRAM(s) IV Intermittent every 24 hours  enoxaparin Injectable 40 milliGRAM(s) SubCutaneous daily  folic acid 1 milliGRAM(s) Oral daily  gabapentin 800 milliGRAM(s) Oral three times a day  lactobacillus acidophilus 1 Tablet(s) Oral two times a day with meals  lidocaine   Patch 1 Patch Transdermal every 24 hours  lidocaine   Patch 1 Patch Transdermal every 24 hours  methadone    Tablet 40 milliGRAM(s) Oral daily  multivitamin 1 Tablet(s) Oral daily  naloxegol 25 milliGRAM(s) Oral before breakfast  nicotine -  14 mG/24Hr(s) Patch 1 patch Transdermal daily  nystatin Powder 1 Application(s) Topical three times a day  oxyCODONE  ER Tablet 40 milliGRAM(s) Oral every 12 hours  pantoprazole    Tablet 40 milliGRAM(s) Oral before breakfast  polyethylene glycol 3350 17 Gram(s) Oral two times a day  senna 2 Tablet(s) Oral at bedtime    MEDICATIONS  (PRN):  acetaminophen   Tablet .. 650 milliGRAM(s) Oral every 6 hours PRN Mild Pain (1 - 3)  acetaminophen   Tablet .. 650 milliGRAM(s) Oral every 6 hours PRN Temp greater or equal to 38C (100.4F)  cyclobenzaprine 10 milliGRAM(s) Oral three times a day PRN Muscle Spasm  diazepam    Tablet 5 milliGRAM(s) Oral every 8 hours PRN back spasms  diphenhydrAMINE 25 milliGRAM(s) Oral every 4 hours PRN Rash and/or Itching  oxyCODONE    IR 15 milliGRAM(s) Oral every 4 hours PRN Moderate Pain (4 - 6)      Vital Signs Last 24 Hrs  T(C): 36.7 (03 May 2019 11:07), Max: 36.8 (03 May 2019 05:25)  T(F): 98 (03 May 2019 11:07), Max: 98.2 (03 May 2019 05:25)  HR: 94 (03 May 2019 16:19) (93 - 97)  BP: 125/80 (03 May 2019 16:19) (113/73 - 130/93)  BP(mean): --  RR: 16 (03 May 2019 16:19) (14 - 18)  SpO2: 95% (03 May 2019 16:19) (95% - 97%)    Review of systems:  General : no fever /chills,fatigue  CVS : no chest pain, palpitations  Lungs : no shortness of breath, cough  GI : no abdominal pain,vomiting, diarrhea   : no dysuria,hematuria        PHYSICAL EXAM:  General :NAD  Constitutional: well-groomed, well-developed  Respiratory: CTAB/L  Cardiovascular: S1 and S2, RRR, no M/G/R  Gastrointestinal: BS+, soft, NT/ND  Extremities: No peripheral edema  Vascular: 2+ peripheral pulses  Skin: No rashes      LABS:                MICROBIOLOGY:  RECENT CULTURES:        RADIOLOGY & ADDITIONAL STUDIES:

## 2019-05-03 NOTE — PROGRESS NOTE ADULT - SUBJECTIVE AND OBJECTIVE BOX
Patient is a 43y old  Female who presents with a chief complaint of AMS (26 Apr 2019 12:32)      INTERVAL HPI/OVERNIGHT EVENTS: none   MEDICATIONS  (STANDING):  ascorbic acid 500 milliGRAM(s) Oral two times a day  calcium carbonate 1250 mG  + Vitamin D (OsCal 500 + D) 1 Tablet(s) Oral three times a day  celecoxib 200 milliGRAM(s) Oral daily  chlorhexidine 2% Cloths 1 Application(s) Topical daily  chlorhexidine 4% Liquid 1 Application(s) Topical <User Schedule>  DAPTOmycin IVPB 440 milliGRAM(s) IV Intermittent every 24 hours  enoxaparin Injectable 40 milliGRAM(s) SubCutaneous daily  folic acid 1 milliGRAM(s) Oral daily  gabapentin 800 milliGRAM(s) Oral three times a day  lactobacillus acidophilus 1 Tablet(s) Oral two times a day with meals  lidocaine   Patch 1 Patch Transdermal every 24 hours  lidocaine   Patch 1 Patch Transdermal every 24 hours  multivitamin 1 Tablet(s) Oral daily  naloxegol 25 milliGRAM(s) Oral before breakfast  nicotine -  14 mG/24Hr(s) Patch 1 patch Transdermal daily  nystatin Powder 1 Application(s) Topical three times a day  oxyCODONE  ER Tablet 40 milliGRAM(s) Oral every 12 hours  pantoprazole    Tablet 40 milliGRAM(s) Oral before breakfast  polyethylene glycol 3350 17 Gram(s) Oral two times a day  senna 2 Tablet(s) Oral at bedtime    MEDICATIONS  (PRN):  acetaminophen   Tablet .. 650 milliGRAM(s) Oral every 6 hours PRN Mild Pain (1 - 3)  acetaminophen   Tablet .. 650 milliGRAM(s) Oral every 6 hours PRN Temp greater or equal to 38C (100.4F)  cyclobenzaprine 10 milliGRAM(s) Oral three times a day PRN Muscle Spasm  diazepam    Tablet 5 milliGRAM(s) Oral every 12 hours PRN muscle spasms  diphenhydrAMINE 25 milliGRAM(s) Oral every 4 hours PRN Rash and/or Itching  oxyCODONE    IR 15 milliGRAM(s) Oral every 4 hours PRN Moderate Pain (4 - 6)    Allergies    penicillin (Short breath; Hives)    Intolerances    Vital Signs Last 24 Hrs  T(C): 36.7 (03 May 2019 11:07), Max: 36.8 (03 May 2019 05:25)  T(F): 98 (03 May 2019 11:07), Max: 98.2 (03 May 2019 05:25)  HR: 93 (03 May 2019 11:07) (93 - 97)  BP: 130/93 (03 May 2019 11:07) (113/73 - 130/93)  BP(mean): --  RR: 14 (03 May 2019 11:07) (14 - 18)  SpO2: 97% (03 May 2019 11:07) (97% - 97%)  PHYSICAL EXAM:  GENERAL: no distress sleepy  HEAD:  Atraumatic, Normocephalic  EYES: EOMI, PERRLA, conjunctiva and sclera clear  ENMT: No tonsillar erythema, exudates, or enlargement; Moist mucous membranes  NECK: Supple, No JVD, Normal thyroid  NERVOUS SYSTEM: sleepy but arousable Oriented X3, strength grossly intact  CHEST/LUNG: Clear to percussion bilaterally; No rales, rhonchi, wheezing, or rubs  HEART: Regular rate and rhythm; No murmurs, rubs, or gallops  ABDOMEN: Soft, Nontender, Nondistended; Bowel sounds present  EXTREMITIES:  2+ Peripheral Pulses, No clubbing, cyanosis, or edema  SKIN: No rashes or lesions    LABS:                                         CAPILLARY BLOOD GLUCOSE          RADIOLOGY & ADDITIONAL TESTS:    Imaging Personally Reviewed:  [ ] YES  [ ] NO    Consultant(s) Notes Reviewed:  [x ] YES  [ ] NO    Care Discussed with Consultants/Other Providers [x ] YES  [ ] NO Patient is a 43y old  Female who presents with a chief complaint of AMS (26 Apr 2019 12:32)      INTERVAL HPI/OVERNIGHT EVENTS: none   MEDICATIONS  (STANDING):  ascorbic acid 500 milliGRAM(s) Oral two times a day  calcium carbonate 1250 mG  + Vitamin D (OsCal 500 + D) 1 Tablet(s) Oral three times a day  celecoxib 200 milliGRAM(s) Oral daily  chlorhexidine 2% Cloths 1 Application(s) Topical daily  chlorhexidine 4% Liquid 1 Application(s) Topical <User Schedule>  DAPTOmycin IVPB 440 milliGRAM(s) IV Intermittent every 24 hours  enoxaparin Injectable 40 milliGRAM(s) SubCutaneous daily  folic acid 1 milliGRAM(s) Oral daily  gabapentin 800 milliGRAM(s) Oral three times a day  lactobacillus acidophilus 1 Tablet(s) Oral two times a day with meals  lidocaine   Patch 1 Patch Transdermal every 24 hours  lidocaine   Patch 1 Patch Transdermal every 24 hours  multivitamin 1 Tablet(s) Oral daily  naloxegol 25 milliGRAM(s) Oral before breakfast  nicotine -  14 mG/24Hr(s) Patch 1 patch Transdermal daily  nystatin Powder 1 Application(s) Topical three times a day  oxyCODONE  ER Tablet 40 milliGRAM(s) Oral every 12 hours  pantoprazole    Tablet 40 milliGRAM(s) Oral before breakfast  polyethylene glycol 3350 17 Gram(s) Oral two times a day  senna 2 Tablet(s) Oral at bedtime    MEDICATIONS  (PRN):  acetaminophen   Tablet .. 650 milliGRAM(s) Oral every 6 hours PRN Mild Pain (1 - 3)  acetaminophen   Tablet .. 650 milliGRAM(s) Oral every 6 hours PRN Temp greater or equal to 38C (100.4F)  cyclobenzaprine 10 milliGRAM(s) Oral three times a day PRN Muscle Spasm  diazepam    Tablet 5 milliGRAM(s) Oral every 12 hours PRN muscle spasms  diphenhydrAMINE 25 milliGRAM(s) Oral every 4 hours PRN Rash and/or Itching  oxyCODONE    IR 15 milliGRAM(s) Oral every 4 hours PRN Moderate Pain (4 - 6)    Allergies    penicillin (Short breath; Hives)    Intolerances    Vital Signs Last 24 Hrs  T(C): 36.7 (03 May 2019 11:07), Max: 36.8 (03 May 2019 05:25)  T(F): 98 (03 May 2019 11:07), Max: 98.2 (03 May 2019 05:25)  HR: 93 (03 May 2019 11:07) (93 - 97)  BP: 130/93 (03 May 2019 11:07) (113/73 - 130/93)  BP(mean): --  RR: 14 (03 May 2019 11:07) (14 - 18)  SpO2: 97% (03 May 2019 11:07) (97% - 97%)  PHYSICAL EXAM:  GENERAL: no distress sleepy  HEAD:  Atraumatic, Normocephalic  EYES: EOMI, PERRLA, conjunctiva and sclera clear  ENMT: No tonsillar erythema, exudates, or enlargement; Moist mucous membranes  NECK: Supple, No JVD, Normal thyroid  NERVOUS SYSTEM: sleepy but arousable Oriented X3, strength grossly intact but weak all over  CHEST/LUNG: Clear to percussion bilaterally; No rales, rhonchi, wheezing, or rubs  HEART: Regular rate and rhythm; No murmurs, rubs, or gallops  ABDOMEN: Soft, Nontender, Nondistended; Bowel sounds present  EXTREMITIES:  2+ Peripheral Pulses, No clubbing, cyanosis, or edema  SKIN: No rashes or lesions    LABS:                                         CAPILLARY BLOOD GLUCOSE          RADIOLOGY & ADDITIONAL TESTS:    Imaging Personally Reviewed:  [ ] YES  [ ] NO    Consultant(s) Notes Reviewed:  [x ] YES  [ ] NO    Care Discussed with Consultants/Other Providers [x ] YES  [ ] NO Patient is a 43y old  Female who presents with a chief complaint of AMS (26 Apr 2019 12:32)      INTERVAL HPI/OVERNIGHT EVENTS: none   MEDICATIONS  (STANDING):  ascorbic acid 500 milliGRAM(s) Oral two times a day  calcium carbonate 1250 mG  + Vitamin D (OsCal 500 + D) 1 Tablet(s) Oral three times a day  celecoxib 200 milliGRAM(s) Oral daily  chlorhexidine 2% Cloths 1 Application(s) Topical daily  chlorhexidine 4% Liquid 1 Application(s) Topical <User Schedule>  DAPTOmycin IVPB 440 milliGRAM(s) IV Intermittent every 24 hours  enoxaparin Injectable 40 milliGRAM(s) SubCutaneous daily  folic acid 1 milliGRAM(s) Oral daily  gabapentin 800 milliGRAM(s) Oral three times a day  lactobacillus acidophilus 1 Tablet(s) Oral two times a day with meals  lidocaine   Patch 1 Patch Transdermal every 24 hours  lidocaine   Patch 1 Patch Transdermal every 24 hours  multivitamin 1 Tablet(s) Oral daily  naloxegol 25 milliGRAM(s) Oral before breakfast  nicotine -  14 mG/24Hr(s) Patch 1 patch Transdermal daily  nystatin Powder 1 Application(s) Topical three times a day  oxyCODONE  ER Tablet 40 milliGRAM(s) Oral every 12 hours  pantoprazole    Tablet 40 milliGRAM(s) Oral before breakfast  polyethylene glycol 3350 17 Gram(s) Oral two times a day  senna 2 Tablet(s) Oral at bedtime    MEDICATIONS  (PRN):  acetaminophen   Tablet .. 650 milliGRAM(s) Oral every 6 hours PRN Mild Pain (1 - 3)  acetaminophen   Tablet .. 650 milliGRAM(s) Oral every 6 hours PRN Temp greater or equal to 38C (100.4F)  cyclobenzaprine 10 milliGRAM(s) Oral three times a day PRN Muscle Spasm  diazepam    Tablet 5 milliGRAM(s) Oral every 12 hours PRN muscle spasms  diphenhydrAMINE 25 milliGRAM(s) Oral every 4 hours PRN Rash and/or Itching  oxyCODONE    IR 15 milliGRAM(s) Oral every 4 hours PRN Moderate Pain (4 - 6)    Allergies    penicillin (Short breath; Hives)    Intolerances    Vital Signs Last 24 Hrs  T(C): 36.7 (03 May 2019 11:07), Max: 36.8 (03 May 2019 05:25)  T(F): 98 (03 May 2019 11:07), Max: 98.2 (03 May 2019 05:25)  HR: 93 (03 May 2019 11:07) (93 - 97)  BP: 130/93 (03 May 2019 11:07) (113/73 - 130/93)  BP(mean): --  RR: 14 (03 May 2019 11:07) (14 - 18)  SpO2: 97% (03 May 2019 11:07) (97% - 97%)    PHYSICAL EXAM:  GENERAL: no distress sleepy but c/o back pain  HEAD:  Atraumatic, Normocephalic  EYES: EOMI, PERRLA, conjunctiva and sclera clear  ENMT: No tonsillar erythema, exudates, or enlargement; Moist mucous membranes  NECK: Supple, No JVD, Normal thyroid  NERVOUS SYSTEM: sleepy but arousable Oriented X3, strength grossly intact but weak all over,   CHEST/LUNG: Clear to percussion bilaterally; No rales, rhonchi, wheezing, or rubs  HEART: Regular rate and rhythm; No murmurs, rubs, or gallops  ABDOMEN: Soft, Nontender, Nondistended; Bowel sounds present  EXTREMITIES:  2+ Peripheral Pulses, No clubbing, cyanosis, or edema, pain with palpation of spine   SKIN: No rashes or lesions    LABS:                                         CAPILLARY BLOOD GLUCOSE          RADIOLOGY & ADDITIONAL TESTS:    Imaging Personally Reviewed:  [ ] YES  [ ] NO    Consultant(s) Notes Reviewed:  [x ] YES  [ ] NO    Care Discussed with Consultants/Other Providers [x ] YES  [ ] NO

## 2019-05-03 NOTE — PROGRESS NOTE ADULT - ASSESSMENT
43 F w/ history of IVDA, alcohol abuse, hx of pancreatitis, alcohol hepatitis, alcohol withdrawal, left frontoparietal subdural hematoma 2008 without need for neurosurgical intervention presented with severe sepsis with septic shock secondary to MRSA bacteremia w/ RLL PNA, UTI, endocarditis on LIZ and EFRAIN that has resolved and was diagnosis w/ HCV. Pt was initially intubated due to respiratory failure and put on pressors in ICU. She was extubated on 2/25 and transferred from ICU the following day. Pt had a C4-5 ACDF with irrigation and debridement on 3/20 due to spinal abscess and osteo and was kept intubated post procedure and transferred again to ICU, where she was extubated on 3/21 and transferred back to the med service the following day. HCV- Outpatient follow up for HCV     Septic arthritis involving BL Knees/hips;  - MRI of bilateral hips and knees were done demonstrating effusions and an abscess located in the right rectus femoris.   - Bilateral knees were aspirated by orthopedic team. on 3/28/19 ngtd   bilateral hips aspirated by orthopedic team on 3/29/19  ngtd but cppd crystals seen c/w psuedo gout  - as per ortho-No plan for orthopaedic operative intervention at this time.          Pseudogout: s/p  colchicine     septic shock secondary to MRSA bacteremia w/ RLL PNA, UTI, endocarditis on LIZ at Tricuspid valve   - resolved     Endocarditis of tricuspid valve  - status post thoracentesis on 3/20  - as per ID, patient is on vanco  - repeat BC  blood cx  ngtd     - repeat echo showed a pedunculated mobile mass seen adherent to lateral leaflet of the tricuspid valve w/ no interval changes noted in valve structure or regurgitation  - repeated echo from 4/2/19 shows stable TR vegetation      Spinal osteo, abscess and cord compression    - CT of head and neck showed disc space narrowing and endplate irregularity at C4-5 which may represent typical degenerative change vs discitis   - MRI of the cervical spine w/ contrast showed C2-C7 discitis/osteomyelitis with a small right of midline ventral epidural abscess at C2-C7  - MRI of the thoracolumbar spine showed discitis/osteomyelitis at T11-T12 with paravertebral abscess, as well as discitis/osteomyelitis C6--T1, T9-T10, T10-T11, L4-L5, and L5-S1, and a phlegmon/early abscess dorsolaterally within the lumbar canal at the level of L4-L5. There was also a septic arthritis of  the left sternoclavicular joint space. There was also disc herniation at the L4-L5 level with contact and probable impingement of the descending right-sided nerve roots  - status post s/p C4-5 ACDF with irrigation and debridement on 3/20  - c/w gabapentin and lidocaine patch  - wound cx grew MRSA  - c/w [place] J  collar   - as per ID, patient is on IV antibx till may 15 2019 then lifelong oral   on 4/14/19 had mri t and l spine per ortho for stability see dr. muñoz note from 4/15/19   -      Bacteremia due to methicillin resistant Staphylococcus aureus with IE: diskitis/OM /Spinal abscesses /Parapneumonic effusion   - most recent blood cx from 3/21/19 negative   - s/p thoracentesis : fluids neg    Moderate protein-calorie malnutrition  - c/w soft diet w/Ensure Enlive 3 x day         IVDrug abuse  -off methadone    - - Chronic Pain - cont celebrex, gabapentin, oxycodone, Oxycontin to 40 mg bid  c/o back spasms started valium prn educated pt and staff that if she is lethargic somnolent or sleepy to hold meds       Anemia due multiple issues, surgery , blood draws,   poor nutrition and infection  - status post a few units of blood during this extensive hospital stay last one on 3/20/19  - c/w folic acid      constipation: resolved after  GoLYTELY   - doing well   - c.w  movantic    states she is having bm   refuses other laxatives   has been educated and d/w several times in front of family importance if being on laxatives as she is taking opioids       no rehab has acepted despite multiple attempts will need to complacent IV antibx here 43 F w/ history of IVDA, alcohol abuse, hx of pancreatitis, alcohol hepatitis, alcohol withdrawal, left frontoparietal subdural hematoma 2008 without need for neurosurgical intervention presented with severe sepsis with septic shock secondary to MRSA bacteremia w/ RLL PNA, UTI, endocarditis on LIZ and EFRAIN that has resolved and was diagnosis w/ HCV. Pt was initially intubated due to respiratory failure and put on pressors in ICU. She was extubated on 2/25 and transferred from ICU the following day. Pt had a C4-5 ACDF with irrigation and debridement on 3/20 due to spinal abscess and osteo and was kept intubated post procedure and transferred again to ICU, where she was extubated on 3/21 and transferred back to the med service the following day. HCV- Outpatient follow up for HCV     Septic arthritis involving BL Knees/hips;  - MRI of bilateral hips and knees were done demonstrating effusions and an abscess located in the right rectus femoris.   - Bilateral knees were aspirated by orthopedic team. on 3/28/19 ngtd   bilateral hips aspirated by orthopedic team on 3/29/19  ngtd but cppd crystals seen c/w psuedo gout  - as per ortho-No plan for orthopaedic operative intervention at this time.          Pseudogout: s/p  colchicine     septic shock secondary to MRSA bacteremia w/ RLL PNA, UTI, endocarditis on LIZ at Tricuspid valve   - resolved     Endocarditis of tricuspid valve  - status post thoracentesis on 3/20  - as per ID, patient is on vanco  - repeat BC  blood cx  ngtd     - repeat echo showed a pedunculated mobile mass seen adherent to lateral leaflet of the tricuspid valve w/ no interval changes noted in valve structure or regurgitation  - repeated echo from 4/2/19 shows stable TR vegetation      Spinal osteo, abscess and cord compression    - CT of head and neck showed disc space narrowing and endplate irregularity at C4-5 which may represent typical degenerative change vs discitis   - MRI of the cervical spine w/ contrast showed C2-C7 discitis/osteomyelitis with a small right of midline ventral epidural abscess at C2-C7  - MRI of the thoracolumbar spine showed discitis/osteomyelitis at T11-T12 with paravertebral abscess, as well as discitis/osteomyelitis C6--T1, T9-T10, T10-T11, L4-L5, and L5-S1, and a phlegmon/early abscess dorsolaterally within the lumbar canal at the level of L4-L5. There was also a septic arthritis of  the left sternoclavicular joint space. There was also disc herniation at the L4-L5 level with contact and probable impingement of the descending right-sided nerve roots  - status post s/p C4-5 ACDF with irrigation and debridement on 3/20  - c/w gabapentin and lidocaine patch  - wound cx grew MRSA  - c/w [place] J  collar   - as per ID, patient is on IV antibx till may 15 2019 then lifelong oral   on 4/14/19 had mri t and l spine per ortho for stability see dr. muñoz note from 4/15/19    ESR has been downtrending will get repeat on today   -      Bacteremia due to methicillin resistant Staphylococcus aureus with IE: diskitis/OM /Spinal abscesses /Parapneumonic effusion   - most recent blood cx from 3/21/19 negative   - s/p thoracentesis : fluids neg    Moderate protein-calorie malnutrition  - c/w soft diet w/Ensure Enlive 3 x day         IVDrug abuse  -was off methadone   as thought it would be helpful to get pt into [facility]   however will  restart Methadone as this maybe contributing to her decreased energy and desire to participate in PT  on this week .    - - Chronic Pain - cont celebrex, gabapentin, oxycodone titrate from 20 mg q4 to 15 mg q4  Oxycontin to 40 mg bid    c/o back spasms started valium prn on 4/29/19 educated pt and staff that if she is lethargic somnolent or sleepy to hold meds    5/3/19 had ortho come back and see patient who thinks that she is having back spasms and recommends changing valium from 5 q12 to 5 mg q8.   also obtain MRI spine on next week prior to discharge .           Anemia due multiple issues, surgery , blood draws,   poor nutrition and infection  - status post a few units of blood during this extensive hospital stay last one on 3/20/19  - c/w folic acid      constipation: resolved after  GoLYTELY   - doing well   - c.w  movantic    states she is having bm    refuses other laxatives   has been educated and d/w several times in front of family importance of being on laxatives as she is taking opioids       no rehab has accepted despite multiple attempts will need to complacent IV antibx here   5/3/19  D/W mom and patient in detail and they both are agreement to go to [facility] and or inpt addiction rehab,   ideally if can get pt stable physically and medically she would benefit form transfers to  addiction rehab

## 2019-05-04 LAB — ERYTHROCYTE [SEDIMENTATION RATE] IN BLOOD: 60 MM/HR — HIGH (ref 0–15)

## 2019-05-04 PROCEDURE — 99233 SBSQ HOSP IP/OBS HIGH 50: CPT

## 2019-05-04 RX ORDER — MORPHINE SULFATE 50 MG/1
2 CAPSULE, EXTENDED RELEASE ORAL ONCE
Qty: 0 | Refills: 0 | Status: DISCONTINUED | OUTPATIENT
Start: 2019-05-04 | End: 2019-05-04

## 2019-05-04 RX ADMIN — LIDOCAINE 1 PATCH: 4 CREAM TOPICAL at 11:42

## 2019-05-04 RX ADMIN — Medication 650 MILLIGRAM(S): at 01:04

## 2019-05-04 RX ADMIN — Medication 650 MILLIGRAM(S): at 13:32

## 2019-05-04 RX ADMIN — OXYCODONE HYDROCHLORIDE 40 MILLIGRAM(S): 5 TABLET ORAL at 19:45

## 2019-05-04 RX ADMIN — OXYCODONE HYDROCHLORIDE 15 MILLIGRAM(S): 5 TABLET ORAL at 04:09

## 2019-05-04 RX ADMIN — Medication 1 TABLET(S): at 12:14

## 2019-05-04 RX ADMIN — OXYCODONE HYDROCHLORIDE 15 MILLIGRAM(S): 5 TABLET ORAL at 23:31

## 2019-05-04 RX ADMIN — Medication 1 PATCH: at 12:14

## 2019-05-04 RX ADMIN — Medication 650 MILLIGRAM(S): at 05:09

## 2019-05-04 RX ADMIN — OXYCODONE HYDROCHLORIDE 15 MILLIGRAM(S): 5 TABLET ORAL at 08:36

## 2019-05-04 RX ADMIN — OXYCODONE HYDROCHLORIDE 15 MILLIGRAM(S): 5 TABLET ORAL at 01:10

## 2019-05-04 RX ADMIN — OXYCODONE HYDROCHLORIDE 40 MILLIGRAM(S): 5 TABLET ORAL at 20:42

## 2019-05-04 RX ADMIN — METHADONE HYDROCHLORIDE 40 MILLIGRAM(S): 40 TABLET ORAL at 12:27

## 2019-05-04 RX ADMIN — CHLORHEXIDINE GLUCONATE 1 APPLICATION(S): 213 SOLUTION TOPICAL at 06:22

## 2019-05-04 RX ADMIN — Medication 650 MILLIGRAM(S): at 12:26

## 2019-05-04 RX ADMIN — PANTOPRAZOLE SODIUM 40 MILLIGRAM(S): 20 TABLET, DELAYED RELEASE ORAL at 06:20

## 2019-05-04 RX ADMIN — Medication 1 TABLET(S): at 22:25

## 2019-05-04 RX ADMIN — Medication 5 MILLIGRAM(S): at 05:09

## 2019-05-04 RX ADMIN — CYCLOBENZAPRINE HYDROCHLORIDE 10 MILLIGRAM(S): 10 TABLET, FILM COATED ORAL at 05:09

## 2019-05-04 RX ADMIN — OXYCODONE HYDROCHLORIDE 40 MILLIGRAM(S): 5 TABLET ORAL at 06:23

## 2019-05-04 RX ADMIN — CYCLOBENZAPRINE HYDROCHLORIDE 10 MILLIGRAM(S): 10 TABLET, FILM COATED ORAL at 16:09

## 2019-05-04 RX ADMIN — Medication 1 MILLIGRAM(S): at 12:13

## 2019-05-04 RX ADMIN — GABAPENTIN 800 MILLIGRAM(S): 400 CAPSULE ORAL at 13:36

## 2019-05-04 RX ADMIN — OXYCODONE HYDROCHLORIDE 15 MILLIGRAM(S): 5 TABLET ORAL at 14:44

## 2019-05-04 RX ADMIN — OXYCODONE HYDROCHLORIDE 15 MILLIGRAM(S): 5 TABLET ORAL at 00:09

## 2019-05-04 RX ADMIN — Medication 5 MILLIGRAM(S): at 13:32

## 2019-05-04 RX ADMIN — GABAPENTIN 800 MILLIGRAM(S): 400 CAPSULE ORAL at 06:20

## 2019-05-04 RX ADMIN — CELECOXIB 200 MILLIGRAM(S): 200 CAPSULE ORAL at 12:14

## 2019-05-04 RX ADMIN — Medication 1 PATCH: at 19:30

## 2019-05-04 RX ADMIN — OXYCODONE HYDROCHLORIDE 40 MILLIGRAM(S): 5 TABLET ORAL at 06:20

## 2019-05-04 RX ADMIN — Medication 650 MILLIGRAM(S): at 06:23

## 2019-05-04 RX ADMIN — Medication 1 TABLET(S): at 06:20

## 2019-05-04 RX ADMIN — OXYCODONE HYDROCHLORIDE 15 MILLIGRAM(S): 5 TABLET ORAL at 09:36

## 2019-05-04 RX ADMIN — Medication 500 MILLIGRAM(S): at 06:20

## 2019-05-04 RX ADMIN — CHLORHEXIDINE GLUCONATE 1 APPLICATION(S): 213 SOLUTION TOPICAL at 12:25

## 2019-05-04 RX ADMIN — NALOXEGOL OXALATE 25 MILLIGRAM(S): 12.5 TABLET, FILM COATED ORAL at 06:20

## 2019-05-04 RX ADMIN — MORPHINE SULFATE 2 MILLIGRAM(S): 50 CAPSULE, EXTENDED RELEASE ORAL at 18:03

## 2019-05-04 RX ADMIN — NYSTATIN CREAM 1 APPLICATION(S): 100000 CREAM TOPICAL at 06:23

## 2019-05-04 RX ADMIN — Medication 5 MILLIGRAM(S): at 22:25

## 2019-05-04 RX ADMIN — DAPTOMYCIN 117.6 MILLIGRAM(S): 500 INJECTION, POWDER, LYOPHILIZED, FOR SOLUTION INTRAVENOUS at 18:41

## 2019-05-04 RX ADMIN — LIDOCAINE 1 PATCH: 4 CREAM TOPICAL at 07:23

## 2019-05-04 RX ADMIN — NYSTATIN CREAM 1 APPLICATION(S): 100000 CREAM TOPICAL at 13:36

## 2019-05-04 RX ADMIN — GABAPENTIN 800 MILLIGRAM(S): 400 CAPSULE ORAL at 22:26

## 2019-05-04 RX ADMIN — ENOXAPARIN SODIUM 40 MILLIGRAM(S): 100 INJECTION SUBCUTANEOUS at 12:14

## 2019-05-04 RX ADMIN — OXYCODONE HYDROCHLORIDE 15 MILLIGRAM(S): 5 TABLET ORAL at 15:44

## 2019-05-04 RX ADMIN — SENNA PLUS 2 TABLET(S): 8.6 TABLET ORAL at 22:25

## 2019-05-04 RX ADMIN — Medication 1 TABLET(S): at 18:03

## 2019-05-04 RX ADMIN — LIDOCAINE 1 PATCH: 4 CREAM TOPICAL at 07:24

## 2019-05-04 RX ADMIN — MORPHINE SULFATE 2 MILLIGRAM(S): 50 CAPSULE, EXTENDED RELEASE ORAL at 18:18

## 2019-05-04 RX ADMIN — CELECOXIB 200 MILLIGRAM(S): 200 CAPSULE ORAL at 13:14

## 2019-05-04 RX ADMIN — OXYCODONE HYDROCHLORIDE 15 MILLIGRAM(S): 5 TABLET ORAL at 05:00

## 2019-05-04 RX ADMIN — Medication 500 MILLIGRAM(S): at 18:03

## 2019-05-04 RX ADMIN — POLYETHYLENE GLYCOL 3350 17 GRAM(S): 17 POWDER, FOR SOLUTION ORAL at 18:04

## 2019-05-04 RX ADMIN — Medication 1 TABLET(S): at 13:32

## 2019-05-04 NOTE — PROGRESS NOTE ADULT - ASSESSMENT
43 F w/ history of IVDA, alcohol abuse, hx of pancreatitis, alcohol hepatitis, alcohol withdrawal, left frontoparietal subdural hematoma 2008 without need for neurosurgical intervention presented with severe sepsis with septic shock secondary to MRSA bacteremia w/ RLL PNA, UTI, endocarditis on LIZ and EFRAIN that has resolved and was diagnosis w/ HCV. Pt was initially intubated due to respiratory failure and put on pressors in ICU. She was extubated on 2/25 and transferred from ICU the following day. Pt had a C4-5 ACDF with irrigation and debridement on 3/20 due to spinal abscess and osteo and was kept intubated post procedure and transferred again to ICU, where she was extubated on 3/21 and transferred back to the med service the following day. HCV- Outpatient follow up for HCV     Septic arthritis involving BL Knees/hips;  - MRI of bilateral hips and knees were done demonstrating effusions and an abscess located in the right rectus femoris.   - Bilateral knees were aspirated by orthopedic team. on 3/28/19 ngtd   bilateral hips aspirated by orthopedic team on 3/29/19  ngtd but cppd crystals seen c/w psuedo gout  - as per ortho-No plan for orthopaedic operative intervention at this time.          Pseudogout: s/p  colchicine     septic shock secondary to MRSA bacteremia w/ RLL PNA, UTI, endocarditis on LIZ at Tricuspid valve   - resolved     Endocarditis of tricuspid valve  - status post thoracentesis on 3/20  - as per ID, patient is on vanco  - repeat BC  blood cx  ngtd     - repeat echo showed a pedunculated mobile mass seen adherent to lateral leaflet of the tricuspid valve w/ no interval changes noted in valve structure or regurgitation  - repeated echo from 4/2/19 shows stable TR vegetation      Spinal osteo, abscess and cord compression    - CT of head and neck showed disc space narrowing and endplate irregularity at C4-5 which may represent typical degenerative change vs discitis   - MRI of the cervical spine w/ contrast showed C2-C7 discitis/osteomyelitis with a small right of midline ventral epidural abscess at C2-C7  - MRI of the thoracolumbar spine showed discitis/osteomyelitis at T11-T12 with paravertebral abscess, as well as discitis/osteomyelitis C6--T1, T9-T10, T10-T11, L4-L5, and L5-S1, and a phlegmon/early abscess dorsolaterally within the lumbar canal at the level of L4-L5. There was also a septic arthritis of  the left sternoclavicular joint space. There was also disc herniation at the L4-L5 level with contact and probable impingement of the descending right-sided nerve roots  - status post s/p C4-5 ACDF with irrigation and debridement on 3/20  - c/w gabapentin and lidocaine patch  - wound cx grew MRSA  - c/w [place] J  collar   - as per ID, patient is on IV antibx till may 15 2019 then lifelong oral   on 4/14/19 had mri t and l spine per ortho for stability see dr. muñoz note from 4/15/19    ESR has been downtrending Blood drawn on 55/3/19 was  quantity insufficient and then on today 5/4/19 patient refused stating she only wants a specific  phlebotomist  to draw blood who isn't available  on today  .    -      Bacteremia due to methicillin resistant Staphylococcus aureus with IE: diskitis/OM /Spinal abscesses /Parapneumonic effusion   - most recent blood cx from 3/21/19 negative   - s/p thoracentesis : fluids neg   resolved    not hypoxia oxygenation on room wnl     Moderate protein-calorie malnutrition  - c/w soft diet w/Ensure Enlive 3 x day         IVDrug abuse  -was off methadone   as thought it would be helpful to get pt into [facility]   however will  restart Methadone as this maybe contributing to her decreased energy and desire to participate in PT  on this week .    - - Chronic Pain - cont celebrex, gabapentin, oxycodone titrate from 20 mg q4 to 15 mg q4  Oxycontin to 40 mg bid    c/o back spasms started valium prn on 4/29/19 educated pt and staff that if she is lethargic somnolent or sleepy to hold meds    5/3/19 had ortho come back and see patient who thinks that she is having back spasms and recommends changing valium from 5 q12 to 5 mg q8.   also obtain MRI spine on next week prior to discharge .           Anemia due multiple issues, surgery , blood draws,   poor nutrition and infection  - status post a few units of blood during this extensive hospital stay last one on 3/20/19  - c/w folic acid      constipation: resolved after  GoLYTELY   - doing well   - c.w  movantic    states she is having bm    refuses other laxatives   has been educated and d/w several times in front of family importance of being on laxatives as she is taking opioids       no rehab has accepted despite multiple attempts will need to complacent IV antibx here   5/3/19  D/W mom and patient in detail and they both are agreement to go to [facility] and or inpt addiction rehab,   ideally if can get pt stable physically and medically she would benefit form transfers to  addiction rehab 43 F w/ history of IVDA, alcohol abuse, hx of pancreatitis, alcohol hepatitis, alcohol withdrawal, left frontoparietal subdural hematoma 2008 without need for neurosurgical intervention presented with severe sepsis with septic shock secondary to MRSA bacteremia w/ RLL PNA, UTI, endocarditis on LIZ and EFRAIN that has resolved and was diagnosis w/ HCV. Pt was initially intubated due to respiratory failure and put on pressors in ICU. She was extubated on 2/25 and transferred from ICU the following day. Pt had a C4-5 ACDF with irrigation and debridement on 3/20 due to spinal abscess and osteo and was kept intubated post procedure and transferred again to ICU, where she was extubated on 3/21 and transferred back to the med service the following day. HCV- Outpatient follow up for HCV     Septic arthritis involving BL Knees/hips;  - MRI of bilateral hips and knees were done demonstrating effusions and an abscess located in the right rectus femoris.   - Bilateral knees were aspirated by orthopedic team. on 3/28/19 ngtd   bilateral hips aspirated by orthopedic team on 3/29/19  ngtd but cppd crystals seen c/w psuedo gout  - as per ortho-No plan for orthopaedic operative intervention at this time.          Pseudogout: s/p  colchicine     septic shock secondary to MRSA bacteremia w/ RLL PNA, UTI, endocarditis on LIZ at Tricuspid valve   - resolved     Endocarditis of tricuspid valve  - status post thoracentesis on 3/20  - as per ID, patient is on vanco  - repeat BC  blood cx  ngtd     - repeat echo showed a pedunculated mobile mass seen adherent to lateral leaflet of the tricuspid valve w/ no interval changes noted in valve structure or regurgitation  - repeated echo from 4/2/19 shows stable TR vegetation      Spinal osteo, abscess and cord compression    - CT of head and neck showed disc space narrowing and endplate irregularity at C4-5 which may represent typical degenerative change vs discitis   - MRI of the cervical spine w/ contrast showed C2-C7 discitis/osteomyelitis with a small right of midline ventral epidural abscess at C2-C7  - MRI of the thoracolumbar spine showed discitis/osteomyelitis at T11-T12 with paravertebral abscess, as well as discitis/osteomyelitis C6--T1, T9-T10, T10-T11, L4-L5, and L5-S1, and a phlegmon/early abscess dorsolaterally within the lumbar canal at the level of L4-L5. There was also a septic arthritis of  the left sternoclavicular joint space. There was also disc herniation at the L4-L5 level with contact and probable impingement of the descending right-sided nerve roots  - status post s/p C4-5 ACDF with irrigation and debridement on 3/20  - c/w gabapentin and lidocaine patch  - wound cx grew MRSA  - c/w [place] J  collar   - as per ID, patient is on IV antibx till may 15 2019 then lifelong oral   on 4/14/19 had mri t and l spine per ortho for stability see dr. muñoz note from 4/15/19    ESR has been downtrending Blood drawn on 5/3/19 was  quantity insufficient and then on today 5/4/19 patient refused stating she only wants a specific  phlebotomist  to draw blood who isn't available  on today  .    -      Bacteremia due to methicillin resistant Staphylococcus aureus with IE: diskitis/OM /Spinal abscesses /Parapneumonic effusion   - most recent blood cx from 3/21/19 negative   - s/p thoracentesis : fluids neg   resolved    not hypoxia oxygenation on room wnl     Moderate protein-calorie malnutrition  - c/w soft diet w/Ensure Enlive 3 x day         IVDrug abuse  -was off methadone   as thought it would be helpful to get pt into [facility]   however will  restart Methadone as this maybe contributing to her decreased energy and desire to participate in PT  on this week .    - - Chronic Pain - cont celebrex, gabapentin, oxycodone titrate from 20 mg q4 to 15 mg q4  Oxycontin to 40 mg bid    c/o back spasms started valium prn on 4/29/19 educated pt and staff that if she is lethargic somnolent or sleepy to hold meds    5/3/19 had ortho come back and see patient who thinks that she is having back spasms and recommends changing valium from 5 q12 to 5 mg q8.   also obtain MRI spine on next week prior to discharge.   howvere pt has had flexeril ordered since April 12 as prn but only reqeusts and asks nursees for narcotics educated Mom and patient again on toady that she should also request Flexeril and alternate between  this and Valium           Anemia due multiple issues, surgery , blood draws,   poor nutrition and infection  - status post a few units of blood during this extensive hospital stay last one on 3/20/19  - c/w folic acid      constipation: resolved after  GoLYTELY   - doing well   - c.w  movantic    states she is having bm    refuses other laxatives   has been educated and d/w several times in front of family importance of being on laxatives as she is taking opioids     no rehab has accepted despite multiple attempts will need to complacent IV antibx here   5/3/19  D/W mom and patient in detail and they both are agreement to go to [facility] and or inpt addiction rehab,   ideally if can get pt stable physically and medically she would benefit form transfers to  addiction rehab

## 2019-05-04 NOTE — PROGRESS NOTE ADULT - SUBJECTIVE AND OBJECTIVE BOX
Patient is a 43y old  Female who presents with a chief complaint of AMS (26 Apr 2019 12:32)      INTERVAL HPI/OVERNIGHT EVENTS: none     MEDICATIONS  (STANDING):  ascorbic acid 500 milliGRAM(s) Oral two times a day  calcium carbonate 1250 mG  + Vitamin D (OsCal 500 + D) 1 Tablet(s) Oral three times a day  celecoxib 200 milliGRAM(s) Oral daily  chlorhexidine 2% Cloths 1 Application(s) Topical daily  chlorhexidine 4% Liquid 1 Application(s) Topical <User Schedule>  DAPTOmycin IVPB 440 milliGRAM(s) IV Intermittent every 24 hours  enoxaparin Injectable 40 milliGRAM(s) SubCutaneous daily  folic acid 1 milliGRAM(s) Oral daily  gabapentin 800 milliGRAM(s) Oral three times a day  lactobacillus acidophilus 1 Tablet(s) Oral two times a day with meals  lidocaine   Patch 1 Patch Transdermal every 24 hours  lidocaine   Patch 1 Patch Transdermal every 24 hours  methadone    Tablet 40 milliGRAM(s) Oral daily  multivitamin 1 Tablet(s) Oral daily  naloxegol 25 milliGRAM(s) Oral before breakfast  nicotine -  14 mG/24Hr(s) Patch 1 patch Transdermal daily  nystatin Powder 1 Application(s) Topical three times a day  oxyCODONE  ER Tablet 40 milliGRAM(s) Oral every 12 hours  pantoprazole    Tablet 40 milliGRAM(s) Oral before breakfast  polyethylene glycol 3350 17 Gram(s) Oral two times a day  senna 2 Tablet(s) Oral at bedtime    MEDICATIONS  (PRN):  acetaminophen   Tablet .. 650 milliGRAM(s) Oral every 6 hours PRN Mild Pain (1 - 3)  acetaminophen   Tablet .. 650 milliGRAM(s) Oral every 6 hours PRN Temp greater or equal to 38C (100.4F)  cyclobenzaprine 10 milliGRAM(s) Oral three times a day PRN Muscle Spasm  diazepam    Tablet 5 milliGRAM(s) Oral every 8 hours PRN back spasms  diphenhydrAMINE 25 milliGRAM(s) Oral every 4 hours PRN Rash and/or Itching  oxyCODONE    IR 15 milliGRAM(s) Oral every 4 hours PRN Moderate Pain (4 - 6)      Allergies    penicillin (Short breath; Hives)    Intolerances    Vital Signs Last 24 Hrs  T(C): 36.1 (04 May 2019 05:38), Max: 36.8 (04 May 2019 00:12)  T(F): 97 (04 May 2019 05:38), Max: 98.3 (04 May 2019 00:12)  HR: 93 (04 May 2019 05:38) (88 - 94)  BP: 115/66 (04 May 2019 05:38) (115/66 - 136/75)  BP(mean): --  RR: 18 (04 May 2019 05:38) (14 - 18)  SpO2: 97% (04 May 2019 05:38) (95% - 100%)      PHYSICAL EXAM:  GENERAL: no distress sleepy but c/o back pain  HEAD:  Atraumatic, Normocephalic  EYES: EOMI, PERRLA, conjunctiva and sclera clear  ENMT: No tonsillar erythema, exudates, or enlargement; Moist mucous membranes  NECK: Supple, No JVD, Normal thyroid  NERVOUS SYSTEM: sleepy but arousable Oriented X3, strength grossly intact but weak all over,   CHEST/LUNG: Clear to percussion bilaterally; No rales, rhonchi, wheezing, or rubs  HEART: Regular rate and rhythm; No murmurs, rubs, or gallops  ABDOMEN: Soft, Nontender, Nondistended; Bowel sounds present  EXTREMITIES:  2+ Peripheral Pulses, No clubbing, cyanosis, or edema, pain with palpation of spine   SKIN: No rashes or lesions    LABS:                                            11.0   9.59  )-----------( 260      ( 03 May 2019 19:28 )             35.7   05-03    137  |  99  |  40<H>  ----------------------------<  119<H>  4.9   |  29  |  0.61    Ca    10.3<H>      03 May 2019 19:28  Phos  4.9     05-03  Mg     2.4     05-03                 ESR was quantity insufficient and then on today pt refused blood draw.         CAPILLARY BLOOD GLUCOSE          RADIOLOGY & ADDITIONAL TESTS:    Imaging Personally Reviewed:  [ ] YES  [ ] NO    Consultant(s) Notes Reviewed:  [x ] YES  [ ] NO    Care Discussed with Consultants/Other Providers [x ] YES  [ ] NO Patient is a 43y old  Female who presents with a chief complaint of AMS (26 Apr 2019 12:32)      INTERVAL HPI/OVERNIGHT EVENTS: none     MEDICATIONS  (STANDING):  ascorbic acid 500 milliGRAM(s) Oral two times a day  calcium carbonate 1250 mG  + Vitamin D (OsCal 500 + D) 1 Tablet(s) Oral three times a day  celecoxib 200 milliGRAM(s) Oral daily  chlorhexidine 2% Cloths 1 Application(s) Topical daily  chlorhexidine 4% Liquid 1 Application(s) Topical <User Schedule>  DAPTOmycin IVPB 440 milliGRAM(s) IV Intermittent every 24 hours  enoxaparin Injectable 40 milliGRAM(s) SubCutaneous daily  folic acid 1 milliGRAM(s) Oral daily  gabapentin 800 milliGRAM(s) Oral three times a day  lactobacillus acidophilus 1 Tablet(s) Oral two times a day with meals  lidocaine   Patch 1 Patch Transdermal every 24 hours  lidocaine   Patch 1 Patch Transdermal every 24 hours  methadone    Tablet 40 milliGRAM(s) Oral daily  multivitamin 1 Tablet(s) Oral daily  naloxegol 25 milliGRAM(s) Oral before breakfast  nicotine -  14 mG/24Hr(s) Patch 1 patch Transdermal daily  nystatin Powder 1 Application(s) Topical three times a day  oxyCODONE  ER Tablet 40 milliGRAM(s) Oral every 12 hours  pantoprazole    Tablet 40 milliGRAM(s) Oral before breakfast  polyethylene glycol 3350 17 Gram(s) Oral two times a day  senna 2 Tablet(s) Oral at bedtime    MEDICATIONS  (PRN):  acetaminophen   Tablet .. 650 milliGRAM(s) Oral every 6 hours PRN Mild Pain (1 - 3)  acetaminophen   Tablet .. 650 milliGRAM(s) Oral every 6 hours PRN Temp greater or equal to 38C (100.4F)  cyclobenzaprine 10 milliGRAM(s) Oral three times a day PRN Muscle Spasm  diazepam    Tablet 5 milliGRAM(s) Oral every 8 hours PRN back spasms  diphenhydrAMINE 25 milliGRAM(s) Oral every 4 hours PRN Rash and/or Itching  oxyCODONE    IR 15 milliGRAM(s) Oral every 4 hours PRN Moderate Pain (4 - 6)      Allergies    penicillin (Short breath; Hives)    Intolerances    Vital Signs Last 24 Hrs  T(C): 36.1 (04 May 2019 05:38), Max: 36.8 (04 May 2019 00:12)  T(F): 97 (04 May 2019 05:38), Max: 98.3 (04 May 2019 00:12)  HR: 93 (04 May 2019 05:38) (88 - 94)  BP: 115/66 (04 May 2019 05:38) (115/66 - 136/75)  BP(mean): --  RR: 18 (04 May 2019 05:38) (14 - 18)  SpO2: 97% (04 May 2019 05:38) (95% - 100%)    c/o back pain and spams    PHYSICAL EXAM:  GENERAL: no distress  but c/o back pain  HEAD:  Atraumatic, Normocephalic  EYES: EOMI, PERRLA, conjunctiva and sclera clear  ENMT: No tonsillar erythema, exudates, or enlargement; Moist mucous membranes  NECK: Supple, No JVD, Normal thyroid  NERVOUS SYSTEM: alert and oriented on today  strength grossly intact but weak all over,   CHEST/LUNG: Clear to percussion bilaterally; No rales, rhonchi, wheezing, or rubs  HEART: Regular rate and rhythm; No murmurs, rubs, or gallops  ABDOMEN: Soft, Nontender, Nondistended; Bowel sounds present  EXTREMITIES:  2+ Peripheral Pulses, No clubbing, cyanosis, or edema, pain with palpation of spine   SKIN: No rashes or lesions    LABS:                                            11.0   9.59  )-----------( 260      ( 03 May 2019 19:28 )             35.7   05-03    137  |  99  |  40<H>  ----------------------------<  119<H>  4.9   |  29  |  0.61    Ca    10.3<H>      03 May 2019 19:28  Phos  4.9     05-03  Mg     2.4     05-03                 ESR was quantity insufficient and then on today pt refused blood draw.         CAPILLARY BLOOD GLUCOSE          RADIOLOGY & ADDITIONAL TESTS:    Imaging Personally Reviewed:  [ ] YES  [ ] NO    Consultant(s) Notes Reviewed:  [x ] YES  [ ] NO    Care Discussed with Consultants/Other Providers [x ] YES  [ ] NO

## 2019-05-05 PROCEDURE — 99233 SBSQ HOSP IP/OBS HIGH 50: CPT

## 2019-05-05 RX ORDER — SODIUM CHLORIDE 9 MG/ML
1000 INJECTION INTRAMUSCULAR; INTRAVENOUS; SUBCUTANEOUS ONCE
Qty: 0 | Refills: 0 | Status: COMPLETED | OUTPATIENT
Start: 2019-05-05 | End: 2019-05-05

## 2019-05-05 RX ADMIN — Medication 650 MILLIGRAM(S): at 22:35

## 2019-05-05 RX ADMIN — OXYCODONE HYDROCHLORIDE 15 MILLIGRAM(S): 5 TABLET ORAL at 00:31

## 2019-05-05 RX ADMIN — SODIUM CHLORIDE 1000 MILLILITER(S): 9 INJECTION INTRAMUSCULAR; INTRAVENOUS; SUBCUTANEOUS at 19:15

## 2019-05-05 RX ADMIN — METHADONE HYDROCHLORIDE 40 MILLIGRAM(S): 40 TABLET ORAL at 13:16

## 2019-05-05 RX ADMIN — OXYCODONE HYDROCHLORIDE 15 MILLIGRAM(S): 5 TABLET ORAL at 14:40

## 2019-05-05 RX ADMIN — Medication 650 MILLIGRAM(S): at 23:35

## 2019-05-05 RX ADMIN — Medication 5 MILLIGRAM(S): at 10:23

## 2019-05-05 RX ADMIN — Medication 1 TABLET(S): at 13:16

## 2019-05-05 RX ADMIN — CYCLOBENZAPRINE HYDROCHLORIDE 10 MILLIGRAM(S): 10 TABLET, FILM COATED ORAL at 20:50

## 2019-05-05 RX ADMIN — GABAPENTIN 800 MILLIGRAM(S): 400 CAPSULE ORAL at 06:27

## 2019-05-05 RX ADMIN — OXYCODONE HYDROCHLORIDE 40 MILLIGRAM(S): 5 TABLET ORAL at 19:48

## 2019-05-05 RX ADMIN — OXYCODONE HYDROCHLORIDE 15 MILLIGRAM(S): 5 TABLET ORAL at 11:21

## 2019-05-05 RX ADMIN — Medication 1 TABLET(S): at 06:27

## 2019-05-05 RX ADMIN — CYCLOBENZAPRINE HYDROCHLORIDE 10 MILLIGRAM(S): 10 TABLET, FILM COATED ORAL at 00:10

## 2019-05-05 RX ADMIN — Medication 1 TABLET(S): at 08:45

## 2019-05-05 RX ADMIN — Medication 500 MILLIGRAM(S): at 06:27

## 2019-05-05 RX ADMIN — Medication 1 MILLIGRAM(S): at 13:14

## 2019-05-05 RX ADMIN — OXYCODONE HYDROCHLORIDE 15 MILLIGRAM(S): 5 TABLET ORAL at 15:40

## 2019-05-05 RX ADMIN — Medication 1 TABLET(S): at 13:22

## 2019-05-05 RX ADMIN — NALOXEGOL OXALATE 25 MILLIGRAM(S): 12.5 TABLET, FILM COATED ORAL at 08:45

## 2019-05-05 RX ADMIN — OXYCODONE HYDROCHLORIDE 15 MILLIGRAM(S): 5 TABLET ORAL at 21:49

## 2019-05-05 RX ADMIN — Medication 1 TABLET(S): at 20:50

## 2019-05-05 RX ADMIN — PANTOPRAZOLE SODIUM 40 MILLIGRAM(S): 20 TABLET, DELAYED RELEASE ORAL at 08:46

## 2019-05-05 RX ADMIN — NYSTATIN CREAM 1 APPLICATION(S): 100000 CREAM TOPICAL at 20:31

## 2019-05-05 RX ADMIN — OXYCODONE HYDROCHLORIDE 40 MILLIGRAM(S): 5 TABLET ORAL at 06:27

## 2019-05-05 RX ADMIN — Medication 1 TABLET(S): at 18:46

## 2019-05-05 RX ADMIN — GABAPENTIN 800 MILLIGRAM(S): 400 CAPSULE ORAL at 13:15

## 2019-05-05 RX ADMIN — CELECOXIB 200 MILLIGRAM(S): 200 CAPSULE ORAL at 20:31

## 2019-05-05 RX ADMIN — Medication 650 MILLIGRAM(S): at 02:18

## 2019-05-05 RX ADMIN — OXYCODONE HYDROCHLORIDE 15 MILLIGRAM(S): 5 TABLET ORAL at 03:37

## 2019-05-05 RX ADMIN — OXYCODONE HYDROCHLORIDE 15 MILLIGRAM(S): 5 TABLET ORAL at 10:21

## 2019-05-05 RX ADMIN — OXYCODONE HYDROCHLORIDE 40 MILLIGRAM(S): 5 TABLET ORAL at 20:48

## 2019-05-05 RX ADMIN — NYSTATIN CREAM 1 APPLICATION(S): 100000 CREAM TOPICAL at 06:37

## 2019-05-05 RX ADMIN — CHLORHEXIDINE GLUCONATE 1 APPLICATION(S): 213 SOLUTION TOPICAL at 17:07

## 2019-05-05 RX ADMIN — CYCLOBENZAPRINE HYDROCHLORIDE 10 MILLIGRAM(S): 10 TABLET, FILM COATED ORAL at 14:40

## 2019-05-05 RX ADMIN — DAPTOMYCIN 117.6 MILLIGRAM(S): 500 INJECTION, POWDER, LYOPHILIZED, FOR SOLUTION INTRAVENOUS at 18:04

## 2019-05-05 RX ADMIN — CELECOXIB 200 MILLIGRAM(S): 200 CAPSULE ORAL at 13:13

## 2019-05-05 RX ADMIN — GABAPENTIN 800 MILLIGRAM(S): 400 CAPSULE ORAL at 20:50

## 2019-05-05 RX ADMIN — Medication 5 MILLIGRAM(S): at 20:49

## 2019-05-05 RX ADMIN — OXYCODONE HYDROCHLORIDE 15 MILLIGRAM(S): 5 TABLET ORAL at 20:49

## 2019-05-05 RX ADMIN — OXYCODONE HYDROCHLORIDE 15 MILLIGRAM(S): 5 TABLET ORAL at 04:40

## 2019-05-05 RX ADMIN — Medication 650 MILLIGRAM(S): at 03:18

## 2019-05-05 RX ADMIN — Medication 500 MILLIGRAM(S): at 18:46

## 2019-05-05 NOTE — PROGRESS NOTE ADULT - SUBJECTIVE AND OBJECTIVE BOX
Patient is a 43y old  Female who presents with a chief complaint of AMS (26 Apr 2019 12:32)      INTERVAL HPI/OVERNIGHT EVENTS:   gets out of bed without TLSO brace with mom to ambulate to the bathroom . nearly falls but doesnt b/c mom to support but after that she c/o excruciating back pain      MEDICATIONS  (STANDING):  ascorbic acid 500 milliGRAM(s) Oral two times a day  calcium carbonate 1250 mG  + Vitamin D (OsCal 500 + D) 1 Tablet(s) Oral three times a day  celecoxib 200 milliGRAM(s) Oral daily  chlorhexidine 2% Cloths 1 Application(s) Topical daily  chlorhexidine 4% Liquid 1 Application(s) Topical <User Schedule>  DAPTOmycin IVPB 440 milliGRAM(s) IV Intermittent every 24 hours  enoxaparin Injectable 40 milliGRAM(s) SubCutaneous daily  folic acid 1 milliGRAM(s) Oral daily  gabapentin 800 milliGRAM(s) Oral three times a day  lactobacillus acidophilus 1 Tablet(s) Oral two times a day with meals  lidocaine   Patch 1 Patch Transdermal every 24 hours  lidocaine   Patch 1 Patch Transdermal every 24 hours  methadone    Tablet 40 milliGRAM(s) Oral daily  multivitamin 1 Tablet(s) Oral daily  naloxegol 25 milliGRAM(s) Oral before breakfast  nicotine -  14 mG/24Hr(s) Patch 1 patch Transdermal daily  nystatin Powder 1 Application(s) Topical three times a day  oxyCODONE  ER Tablet 40 milliGRAM(s) Oral every 12 hours  pantoprazole    Tablet 40 milliGRAM(s) Oral before breakfast  polyethylene glycol 3350 17 Gram(s) Oral two times a day  senna 2 Tablet(s) Oral at bedtime    MEDICATIONS  (PRN):  acetaminophen   Tablet .. 650 milliGRAM(s) Oral every 6 hours PRN Mild Pain (1 - 3)  acetaminophen   Tablet .. 650 milliGRAM(s) Oral every 6 hours PRN Temp greater or equal to 38C (100.4F)  cyclobenzaprine 10 milliGRAM(s) Oral three times a day PRN Muscle Spasm  diazepam    Tablet 5 milliGRAM(s) Oral every 8 hours PRN back spasms  diphenhydrAMINE 25 milliGRAM(s) Oral every 4 hours PRN Rash and/or Itching  oxyCODONE    IR 15 milliGRAM(s) Oral every 4 hours PRN Moderate Pain (4 - 6)      Allergies    penicillin (Short breath; Hives)    Intolerances    Vital Signs Last 24 Hrs  T(C): 36.1 (04 May 2019 05:38), Max: 36.8 (04 May 2019 00:12)  T(F): 97 (04 May 2019 05:38), Max: 98.3 (04 May 2019 00:12)  HR: 93 (04 May 2019 05:38) (88 - 94)  BP: 115/66 (04 May 2019 05:38) (115/66 - 136/75)  BP(mean): --  RR: 18 (04 May 2019 05:38) (14 - 18)  SpO2: 97% (04 May 2019 05:38) (95% - 100%)    c/o back pain and spasms and now not being able to walk     PHYSICAL EXAM:  GENERAL: no distress  but c/o back pain  HEAD:  Atraumatic, Normocephalic  EYES: EOMI, PERRLA, conjunctiva and sclera clear  ENMT: No tonsillar erythema, exudates, or enlargement; Moist mucous membranes  NECK: Supple, No JVD, Normal thyroid  NERVOUS SYSTEM: alert and oriented , decreased muscle strength in lower extremities, MS in upper 4+ /5  in lower extremities 3+/5 right weaker than the left,   reflexes intact , sensation intact ,    CHEST/LUNG: Clear to percussion bilaterally; No rales, rhonchi, wheezing, or rubs  HEART: Regular rate and rhythm; No murmurs, rubs, or gallops  ABDOMEN: Soft, Nontender, Nondistended; Bowel sounds present  EXTREMITIES:  2+ Peripheral Pulses, No clubbing, cyanosis, or edema, pain with palpation of lumbar paravertebral spine  SKIN: No rashes or lesions    LABS:                                            11.0   9.59  )-----------( 260      ( 03 May 2019 19:28 )             35.7     Sedimentation Rate, Erythrocyte (05.04.19 @ 15:23)    Sedimentation Rate, Erythrocyte: 60 mm/hr         CAPILLARY BLOOD GLUCOSE          RADIOLOGY & ADDITIONAL TESTS:    Imaging Personally Reviewed:  [ ] YES  [ ] NO    Consultant(s) Notes Reviewed:  [x ] YES  [ ] NO    Care Discussed with Consultants/Other Providers [x ] YES  [ ] NO

## 2019-05-05 NOTE — PROGRESS NOTE ADULT - ASSESSMENT
43 F w/ history of IVDA, alcohol abuse, hx of pancreatitis, alcohol hepatitis, alcohol withdrawal, left frontoparietal subdural hematoma 2008 without need for neurosurgical intervention presented with severe sepsis with septic shock secondary to MRSA bacteremia w/ RLL PNA, UTI, endocarditis on LIZ and EFRAIN that has resolved and was diagnosis w/ HCV. Pt was initially intubated due to respiratory failure and put on pressors in ICU. She was extubated on 2/25 and transferred from ICU the following day. Pt had a C4-5 ACDF with irrigation and debridement on 3/20 due to spinal abscess and osteo and was kept intubated post procedure and transferred again to ICU, where she was extubated on 3/21 and transferred back to the med service the following day. HCV- Outpatient follow up for HCV     Septic arthritis involving BL Knees/hips;  - MRI of bilateral hips and knees were done demonstrating effusions and an abscess located in the right rectus femoris.   - Bilateral knees were aspirated by orthopedic team. on 3/28/19 ngtd   bilateral hips aspirated by orthopedic team on 3/29/19  ngtd but cppd crystals seen c/w psuedo gout  - as per ortho-No plan for orthopaedic operative intervention at this time.          Pseudogout: s/p  colchicine     septic shock secondary to MRSA bacteremia w/ RLL PNA, UTI, endocarditis on LIZ at Tricuspid valve   - resolved     Endocarditis of tricuspid valve  - status post thoracentesis on 3/20  - as per ID, patient is on vanco  - repeat BC  blood cx  ngtd     - repeat echo showed a pedunculated mobile mass seen adherent to lateral leaflet of the tricuspid valve w/ no interval changes noted in valve structure or regurgitation  - repeated echo from 4/2/19 shows stable TR vegetation      Spinal osteo, abscess and cord compression    - CT of head and neck showed disc space narrowing and endplate irregularity at C4-5 which may represent typical degenerative change vs discitis   - MRI of the cervical spine w/ contrast showed C2-C7 discitis/osteomyelitis with a small right of midline ventral epidural abscess at C2-C7  - MRI of the thoracolumbar spine showed discitis/osteomyelitis at T11-T12 with paravertebral abscess, as well as discitis/osteomyelitis C6--T1, T9-T10, T10-T11, L4-L5, and L5-S1, and a phlegmon/early abscess dorsolaterally within the lumbar canal at the level of L4-L5. There was also a septic arthritis of  the left sternoclavicular joint space. There was also disc herniation at the L4-L5 level with contact and probable impingement of the descending right-sided nerve roots  - status post s/p C4-5 ACDF with irrigation and debridement on 3/20  - c/w gabapentin and lidocaine patch  - wound cx grew MRSA  - c/w [place] J  collar   - as per ID, patient is on IV antibx till may 15 2019 then lifelong oral   on 4/14/19 had mri t and l spine per ortho for stability see dr. muñoz note from 4/15/19    ESR has been downtrending Blood drawn on 5/3/19 was  quantity insufficient and then on today 5/4/19 patient refused stating she only wants a specific  phlebotomist  to draw blood who isn't available  on that day   ESR finally obtained and 60 which aboyut what is was last time .   Pt was seen ambulating with mom  to bathroom per nursing on 5/4/19 WITHOUT TLSO brace and nearly fell then complains of excruciating back pain after and now worsening weak ess in legs . WILL GET MRI      -      Bacteremia due to methicillin resistant Staphylococcus aureus with IE: diskitis/OM /Spinal abscesses /Parapneumonic effusion   - most recent blood cx from 3/21/19 negative   - s/p thoracentesis : fluids neg   resolved    not hypoxia oxygenation on room wnl     Moderate protein-calorie malnutrition  - c/w soft diet w/Ensure Enlive 3 x day         IVDrug abuse  -was off methadone   as thought it would be helpful to get pt into [facility]   however will  restart Methadone as this maybe contributing to her decreased energy and desire to participate in PT  on this week .    - - Chronic Pain - cont celebrex, gabapentin, oxycodone titrate from 20 mg q4 to 15 mg q4  Oxycontin to 40 mg bid    c/o back spasms started valium prn on 4/29/19 educated pt and staff that if she is lethargic somnolent or sleepy to hold meds    5/3/19 had ortho come back and see patient who thinks that she is having back spasms and recommends changing valium from 5 q12 to 5 mg q8.   also obtain MRI spine on next week prior to discharge.   veda pt has had flexeril ordered since April 12 as prn but only requests and asks nurses for narcotics educated Mom and patient again on toady that she should also request Flexeril and alternate between  this and Valium           Anemia due multiple issues, surgery , blood draws,   poor nutrition and infection  - status post a few units of blood during this extensive hospital stay last one on 3/20/19  - c/w folic acid      constipation: resolved after  GoLYTELY   - doing well   - c.w  movantic    states she is having bm    refuses other laxatives   has been educated and d/w several times in front of family importance of being on laxatives as she is taking opioids     no rehab has accepted despite multiple attempts will need to complacent IV antibx here   5/3/19  D/W mom and patient in detail and they both are agreement to go to [facility] and or inpt addiction rehab,   ideally if can get pt stable physically and medically she would benefit form transfers to  addiction rehab 43 F w/ history of IVDA, alcohol abuse, hx of pancreatitis, alcohol hepatitis, alcohol withdrawal, left frontoparietal subdural hematoma 2008 without need for neurosurgical intervention presented with severe sepsis with septic shock secondary to MRSA bacteremia w/ RLL PNA, UTI, endocarditis on LIZ and EFRAIN that has resolved and was diagnosis w/ HCV. Pt was initially intubated due to respiratory failure and put on pressors in ICU. She was extubated on 2/25 and transferred from ICU the following day. Pt had a C4-5 ACDF with irrigation and debridement on 3/20 due to spinal abscess and osteo and was kept intubated post procedure and transferred again to ICU, where she was extubated on 3/21 and transferred back to the med service the following day. HCV- Outpatient follow up for HCV     Septic arthritis involving BL Knees/hips;  - MRI of bilateral hips and knees were done demonstrating effusions and an abscess located in the right rectus femoris.   - Bilateral knees were aspirated by orthopedic team. on 3/28/19 ngtd   bilateral hips aspirated by orthopedic team on 3/29/19  ngtd but cppd crystals seen c/w psuedo gout  - as per ortho-No plan for orthopaedic operative intervention at this time.          Pseudogout: s/p  colchicine     septic shock secondary to MRSA bacteremia w/ RLL PNA, UTI, endocarditis on LIZ at Tricuspid valve   - resolved     Endocarditis of tricuspid valve  - status post thoracentesis on 3/20  - as per ID, patient is on vanco  - repeat BC  blood cx  ngtd     - repeat echo showed a pedunculated mobile mass seen adherent to lateral leaflet of the tricuspid valve w/ no interval changes noted in valve structure or regurgitation  - repeated echo from 4/2/19 shows stable TR vegetation      Spinal osteo, abscess and cord compression    - CT of head and neck showed disc space narrowing and endplate irregularity at C4-5 which may represent typical degenerative change vs discitis   - MRI of the cervical spine w/ contrast showed C2-C7 discitis/osteomyelitis with a small right of midline ventral epidural abscess at C2-C7  - MRI of the thoracolumbar spine showed discitis/osteomyelitis at T11-T12 with paravertebral abscess, as well as discitis/osteomyelitis C6--T1, T9-T10, T10-T11, L4-L5, and L5-S1, and a phlegmon/early abscess dorsolaterally within the lumbar canal at the level of L4-L5. There was also a septic arthritis of  the left sternoclavicular joint space. There was also disc herniation at the L4-L5 level with contact and probable impingement of the descending right-sided nerve roots  - status post s/p C4-5 ACDF with irrigation and debridement on 3/20  - c/w gabapentin and lidocaine patch  - wound cx grew MRSA  - c/w [place] J  collar   - as per ID, patient is on IV antibx till may 15 2019 then lifelong oral   on 4/14/19 had mri t and l spine per ortho for stability see dr. muñoz note from 4/15/19    ESR has been downtrending Blood drawn on 5/3/19 was  quantity insufficient and then on today 5/4/19 patient refused stating she only wants a specific  phlebotomist  to draw blood who isn't available  on that day   ESR finally obtained and 60 which aboyut what is was last time .   Pt was seen ambulating with mom  to bathroom per nursing on 5/4/19 WITHOUT TLSO brace and nearly fell then complains of excruciating back pain after and now worsening weak ess in legs . WILL GET MRI     also advised ortho to come back and revisit with pt given acute change spoke with dr. gomez    -      Bacteremia due to methicillin resistant Staphylococcus aureus with IE: diskitis/OM /Spinal abscesses /Parapneumonic effusion   - most recent blood cx from 3/21/19 negative   - s/p thoracentesis : fluids neg   resolved    not hypoxia oxygenation on room wnl     Moderate protein-calorie malnutrition  - c/w soft diet w/Ensure Enlive 3 x day         IVDrug abuse  -was off methadone   as thought it would be helpful to get pt into [facility]   however will  restart Methadone as this maybe contributing to her decreased energy and desire to participate in PT  on this week .    - - Chronic Pain - cont celebrex, gabapentin, oxycodone titrate from 20 mg q4 to 15 mg q4  Oxycontin to 40 mg bid    c/o back spasms started valium prn on 4/29/19 educated pt and staff that if she is lethargic somnolent or sleepy to hold meds    5/3/19 had ortho come back and see patient who thinks that she is having back spasms and recommends changing valium from 5 q12 to 5 mg q8.   also obtain MRI spine on next week prior to discharge.   [place] pt has had flexeril ordered since April 12 as prn but only requests and asks nurses for narcotics educated Mom and patient again on toady that she should also request Flexeril and alternate between  this and Valium           Anemia due multiple issues, surgery , blood draws,   poor nutrition and infection  - status post a few units of blood during this extensive hospital stay last one on 3/20/19  - c/w folic acid      constipation: resolved after  GoLYTELY   - doing well   - c.w  movantic    states she is having bm    refuses other laxatives   has been educated and d/w several times in front of family importance of being on laxatives as she is taking opioids     no rehab has accepted despite multiple attempts will need to complacent IV antibx here   5/3/19  D/W mom and patient in detail and they both are agreement to go to [facility] and or inpt addiction rehab,   ideally if can get pt stable physically and medically she would benefit form transfers to  addiction rehab

## 2019-05-06 LAB
ANION GAP SERPL CALC-SCNC: 7 MMOL/L — SIGNIFICANT CHANGE UP (ref 5–17)
BUN SERPL-MCNC: 29 MG/DL — HIGH (ref 7–23)
CALCIUM SERPL-MCNC: 10.4 MG/DL — HIGH (ref 8.5–10.1)
CHLORIDE SERPL-SCNC: 101 MMOL/L — SIGNIFICANT CHANGE UP (ref 96–108)
CO2 SERPL-SCNC: 31 MMOL/L — SIGNIFICANT CHANGE UP (ref 22–31)
CREAT SERPL-MCNC: 0.4 MG/DL — LOW (ref 0.5–1.3)
GLUCOSE SERPL-MCNC: 118 MG/DL — HIGH (ref 70–99)
HCT VFR BLD CALC: 32.5 % — LOW (ref 34.5–45)
HGB BLD-MCNC: 9.8 G/DL — LOW (ref 11.5–15.5)
MAGNESIUM SERPL-MCNC: 2 MG/DL — SIGNIFICANT CHANGE UP (ref 1.6–2.6)
MCHC RBC-ENTMCNC: 24.4 PG — LOW (ref 27–34)
MCHC RBC-ENTMCNC: 30.2 GM/DL — LOW (ref 32–36)
MCV RBC AUTO: 80.8 FL — SIGNIFICANT CHANGE UP (ref 80–100)
NRBC # BLD: 0 /100 WBCS — SIGNIFICANT CHANGE UP (ref 0–0)
PHOSPHATE SERPL-MCNC: 5 MG/DL — HIGH (ref 2.5–4.5)
PLATELET # BLD AUTO: 317 K/UL — SIGNIFICANT CHANGE UP (ref 150–400)
POTASSIUM SERPL-MCNC: 4.2 MMOL/L — SIGNIFICANT CHANGE UP (ref 3.5–5.3)
POTASSIUM SERPL-SCNC: 4.2 MMOL/L — SIGNIFICANT CHANGE UP (ref 3.5–5.3)
RBC # BLD: 4.02 M/UL — SIGNIFICANT CHANGE UP (ref 3.8–5.2)
RBC # FLD: 15 % — HIGH (ref 10.3–14.5)
SODIUM SERPL-SCNC: 139 MMOL/L — SIGNIFICANT CHANGE UP (ref 135–145)
TSH SERPL-MCNC: 0.55 UIU/ML — SIGNIFICANT CHANGE UP (ref 0.36–3.74)
WBC # BLD: 9.54 K/UL — SIGNIFICANT CHANGE UP (ref 3.8–10.5)
WBC # FLD AUTO: 9.54 K/UL — SIGNIFICANT CHANGE UP (ref 3.8–10.5)

## 2019-05-06 PROCEDURE — 99233 SBSQ HOSP IP/OBS HIGH 50: CPT

## 2019-05-06 PROCEDURE — 72156 MRI NECK SPINE W/O & W/DYE: CPT | Mod: 26

## 2019-05-06 PROCEDURE — 72157 MRI CHEST SPINE W/O & W/DYE: CPT | Mod: 26

## 2019-05-06 PROCEDURE — 72158 MRI LUMBAR SPINE W/O & W/DYE: CPT | Mod: 26

## 2019-05-06 RX ORDER — MORPHINE SULFATE 50 MG/1
4 CAPSULE, EXTENDED RELEASE ORAL ONCE
Qty: 0 | Refills: 0 | Status: DISCONTINUED | OUTPATIENT
Start: 2019-05-06 | End: 2019-05-06

## 2019-05-06 RX ORDER — SODIUM CHLORIDE 9 MG/ML
500 INJECTION INTRAMUSCULAR; INTRAVENOUS; SUBCUTANEOUS ONCE
Qty: 0 | Refills: 0 | Status: COMPLETED | OUTPATIENT
Start: 2019-05-06 | End: 2019-05-06

## 2019-05-06 RX ORDER — DEXAMETHASONE 0.5 MG/5ML
10 ELIXIR ORAL EVERY 8 HOURS
Qty: 0 | Refills: 0 | Status: COMPLETED | OUTPATIENT
Start: 2019-05-06 | End: 2019-05-07

## 2019-05-06 RX ADMIN — LIDOCAINE 1 PATCH: 4 CREAM TOPICAL at 23:19

## 2019-05-06 RX ADMIN — OXYCODONE HYDROCHLORIDE 40 MILLIGRAM(S): 5 TABLET ORAL at 07:51

## 2019-05-06 RX ADMIN — SENNA PLUS 2 TABLET(S): 8.6 TABLET ORAL at 21:39

## 2019-05-06 RX ADMIN — Medication 102 MILLIGRAM(S): at 17:05

## 2019-05-06 RX ADMIN — Medication 1 TABLET(S): at 07:49

## 2019-05-06 RX ADMIN — NYSTATIN CREAM 1 APPLICATION(S): 100000 CREAM TOPICAL at 21:39

## 2019-05-06 RX ADMIN — OXYCODONE HYDROCHLORIDE 40 MILLIGRAM(S): 5 TABLET ORAL at 19:00

## 2019-05-06 RX ADMIN — MORPHINE SULFATE 4 MILLIGRAM(S): 50 CAPSULE, EXTENDED RELEASE ORAL at 14:17

## 2019-05-06 RX ADMIN — OXYCODONE HYDROCHLORIDE 40 MILLIGRAM(S): 5 TABLET ORAL at 06:24

## 2019-05-06 RX ADMIN — OXYCODONE HYDROCHLORIDE 15 MILLIGRAM(S): 5 TABLET ORAL at 19:50

## 2019-05-06 RX ADMIN — CELECOXIB 200 MILLIGRAM(S): 200 CAPSULE ORAL at 12:04

## 2019-05-06 RX ADMIN — Medication 650 MILLIGRAM(S): at 10:36

## 2019-05-06 RX ADMIN — SODIUM CHLORIDE 500 MILLILITER(S): 9 INJECTION INTRAMUSCULAR; INTRAVENOUS; SUBCUTANEOUS at 07:33

## 2019-05-06 RX ADMIN — PANTOPRAZOLE SODIUM 40 MILLIGRAM(S): 20 TABLET, DELAYED RELEASE ORAL at 06:23

## 2019-05-06 RX ADMIN — OXYCODONE HYDROCHLORIDE 15 MILLIGRAM(S): 5 TABLET ORAL at 11:20

## 2019-05-06 RX ADMIN — Medication 1 MILLIGRAM(S): at 12:04

## 2019-05-06 RX ADMIN — OXYCODONE HYDROCHLORIDE 15 MILLIGRAM(S): 5 TABLET ORAL at 10:32

## 2019-05-06 RX ADMIN — DAPTOMYCIN 117.6 MILLIGRAM(S): 500 INJECTION, POWDER, LYOPHILIZED, FOR SOLUTION INTRAVENOUS at 18:01

## 2019-05-06 RX ADMIN — GABAPENTIN 800 MILLIGRAM(S): 400 CAPSULE ORAL at 21:39

## 2019-05-06 RX ADMIN — Medication 1 TABLET(S): at 12:05

## 2019-05-06 RX ADMIN — OXYCODONE HYDROCHLORIDE 15 MILLIGRAM(S): 5 TABLET ORAL at 22:36

## 2019-05-06 RX ADMIN — CELECOXIB 200 MILLIGRAM(S): 200 CAPSULE ORAL at 13:28

## 2019-05-06 RX ADMIN — LIDOCAINE 1 PATCH: 4 CREAM TOPICAL at 23:20

## 2019-05-06 RX ADMIN — Medication 102 MILLIGRAM(S): at 21:39

## 2019-05-06 RX ADMIN — Medication 1 TABLET(S): at 17:05

## 2019-05-06 RX ADMIN — Medication 5 MILLIGRAM(S): at 07:49

## 2019-05-06 RX ADMIN — Medication 1 PATCH: at 12:04

## 2019-05-06 RX ADMIN — Medication 500 MILLIGRAM(S): at 17:05

## 2019-05-06 RX ADMIN — NALOXEGOL OXALATE 25 MILLIGRAM(S): 12.5 TABLET, FILM COATED ORAL at 07:49

## 2019-05-06 RX ADMIN — OXYCODONE HYDROCHLORIDE 15 MILLIGRAM(S): 5 TABLET ORAL at 17:05

## 2019-05-06 RX ADMIN — NYSTATIN CREAM 1 APPLICATION(S): 100000 CREAM TOPICAL at 13:37

## 2019-05-06 RX ADMIN — OXYCODONE HYDROCHLORIDE 40 MILLIGRAM(S): 5 TABLET ORAL at 18:58

## 2019-05-06 RX ADMIN — Medication 1 TABLET(S): at 13:37

## 2019-05-06 RX ADMIN — CHLORHEXIDINE GLUCONATE 1 APPLICATION(S): 213 SOLUTION TOPICAL at 11:58

## 2019-05-06 RX ADMIN — Medication 1 TABLET(S): at 21:39

## 2019-05-06 RX ADMIN — GABAPENTIN 800 MILLIGRAM(S): 400 CAPSULE ORAL at 13:37

## 2019-05-06 RX ADMIN — Medication 500 MILLIGRAM(S): at 06:24

## 2019-05-06 RX ADMIN — CYCLOBENZAPRINE HYDROCHLORIDE 10 MILLIGRAM(S): 10 TABLET, FILM COATED ORAL at 23:19

## 2019-05-06 RX ADMIN — POLYETHYLENE GLYCOL 3350 17 GRAM(S): 17 POWDER, FOR SOLUTION ORAL at 17:05

## 2019-05-06 RX ADMIN — OXYCODONE HYDROCHLORIDE 15 MILLIGRAM(S): 5 TABLET ORAL at 00:35

## 2019-05-06 RX ADMIN — OXYCODONE HYDROCHLORIDE 15 MILLIGRAM(S): 5 TABLET ORAL at 01:35

## 2019-05-06 RX ADMIN — Medication 650 MILLIGRAM(S): at 23:19

## 2019-05-06 RX ADMIN — Medication 650 MILLIGRAM(S): at 23:20

## 2019-05-06 RX ADMIN — CHLORHEXIDINE GLUCONATE 1 APPLICATION(S): 213 SOLUTION TOPICAL at 06:24

## 2019-05-06 RX ADMIN — Medication 5 MILLIGRAM(S): at 17:05

## 2019-05-06 RX ADMIN — MORPHINE SULFATE 4 MILLIGRAM(S): 50 CAPSULE, EXTENDED RELEASE ORAL at 13:37

## 2019-05-06 RX ADMIN — OXYCODONE HYDROCHLORIDE 15 MILLIGRAM(S): 5 TABLET ORAL at 21:38

## 2019-05-06 RX ADMIN — ENOXAPARIN SODIUM 40 MILLIGRAM(S): 100 INJECTION SUBCUTANEOUS at 12:04

## 2019-05-06 RX ADMIN — METHADONE HYDROCHLORIDE 40 MILLIGRAM(S): 40 TABLET ORAL at 12:12

## 2019-05-06 RX ADMIN — CYCLOBENZAPRINE HYDROCHLORIDE 10 MILLIGRAM(S): 10 TABLET, FILM COATED ORAL at 06:23

## 2019-05-06 RX ADMIN — GABAPENTIN 800 MILLIGRAM(S): 400 CAPSULE ORAL at 06:23

## 2019-05-06 RX ADMIN — Medication 1 TABLET(S): at 06:23

## 2019-05-06 RX ADMIN — Medication 650 MILLIGRAM(S): at 11:20

## 2019-05-06 RX ADMIN — NYSTATIN CREAM 1 APPLICATION(S): 100000 CREAM TOPICAL at 06:24

## 2019-05-06 NOTE — PROGRESS NOTE ADULT - SUBJECTIVE AND OBJECTIVE BOX
Patient is a 43y old  Female who presents with a chief complaint of AMS (06 May 2019 16:17)      INTERVAL HPI / OVERNIGHT EVENTS: back pain continues ,weakness of both legs ,cant move them since friday afternoon ,cant control micturition since friday -saturday ,needs diaper. No fever or chills.  Events over the weekend noted ,says once while she was sitting in the chair  few days ago an  aid pulled her up to get back to bed and since then she noted sudden weakness of legs     MEDICATIONS  (STANDING):  ascorbic acid 500 milliGRAM(s) Oral two times a day  calcium carbonate 1250 mG  + Vitamin D (OsCal 500 + D) 1 Tablet(s) Oral three times a day  celecoxib 200 milliGRAM(s) Oral daily  chlorhexidine 2% Cloths 1 Application(s) Topical daily  chlorhexidine 4% Liquid 1 Application(s) Topical <User Schedule>  DAPTOmycin IVPB 440 milliGRAM(s) IV Intermittent every 24 hours  dexamethasone  IVPB 10 milliGRAM(s) IV Intermittent every 8 hours  folic acid 1 milliGRAM(s) Oral daily  gabapentin 800 milliGRAM(s) Oral three times a day  lactobacillus acidophilus 1 Tablet(s) Oral two times a day with meals  lidocaine   Patch 1 Patch Transdermal every 24 hours  lidocaine   Patch 1 Patch Transdermal every 24 hours  methadone    Tablet 40 milliGRAM(s) Oral daily  multivitamin 1 Tablet(s) Oral daily  naloxegol 25 milliGRAM(s) Oral before breakfast  nicotine -  14 mG/24Hr(s) Patch 1 patch Transdermal daily  nystatin Powder 1 Application(s) Topical three times a day  oxyCODONE  ER Tablet 40 milliGRAM(s) Oral every 12 hours  pantoprazole    Tablet 40 milliGRAM(s) Oral before breakfast  polyethylene glycol 3350 17 Gram(s) Oral two times a day  senna 2 Tablet(s) Oral at bedtime    MEDICATIONS  (PRN):  acetaminophen   Tablet .. 650 milliGRAM(s) Oral every 6 hours PRN Mild Pain (1 - 3)  acetaminophen   Tablet .. 650 milliGRAM(s) Oral every 6 hours PRN Temp greater or equal to 38C (100.4F)  cyclobenzaprine 10 milliGRAM(s) Oral three times a day PRN Muscle Spasm  diazepam    Tablet 5 milliGRAM(s) Oral every 8 hours PRN back spasms  diphenhydrAMINE 25 milliGRAM(s) Oral every 4 hours PRN Rash and/or Itching  oxyCODONE    IR 15 milliGRAM(s) Oral every 4 hours PRN Moderate Pain (4 - 6)      Vital Signs Last 24 Hrs  T(C): 36.9 (06 May 2019 12:00), Max: 36.9 (06 May 2019 12:00)  T(F): 98.4 (06 May 2019 12:00), Max: 98.4 (06 May 2019 12:00)  HR: 98 (06 May 2019 12:00) (85 - 98)  BP: 112/79 (06 May 2019 12:00) (89/58 - 121/85)  BP(mean): --  RR: 18 (06 May 2019 12:00) (16 - 18)  SpO2: 98% (06 May 2019 12:00) (94% - 98%)    Review of systems:  General : no fever /chills,fatigue  CVS : no chest pain, palpitations  Lungs : no shortness of breath, cough  GI : no abdominal pain,vomiting, diarrhea   : no dysuria,hematuria        PHYSICAL EXAM:  General :NAD, back pain +, neck collar  Constitutional:  well-groomed, well-developed  Respiratory: CTAB/L  Cardiovascular: S1 and S2, RRR, no M/G/R  Gastrointestinal: BS+, soft, NT/ND  Extremities: No peripheral edema  Vascular: 2+ peripheral pulses  Skin: No rashes  neuro : Leg strength 2/5       LABS:                        9.8    9.54  )-----------( 317      ( 06 May 2019 16:31 )             32.5                 MICROBIOLOGY:  RECENT CULTURES:        RADIOLOGY & ADDITIONAL STUDIES:  ALIGNMENT:  Straightening in relation deformity is again noted at the   T11-T12 level with an anterolisthesis. There is underlying scoliosis.  VERTEBRAL BODIES:  Again noted are compression deformities of T11 and T12   along with a inflammatory changes within the disc space and vertebral   bodies. The other thoracic and the lumbar vertebral bodies appear to be   intact. There is worsening collapse of T11 and T12 as compared to the   prior study  DISC SPACES:  There is a diffuse abnormality at T11-T12. There is some   moderate compression of both vertebral bodies with fluid within the disc   space. There is cephalad phlegmon posteriorly within T11 and T12, with   abnormal T2 signal. There is cord compression due to moderate canal   narrowing. There has been interval worsening as compared to theprior   study. With the STIR imaging, there is an extensive T2 abnormality. There   are additional abnormalities in the posterior T10 vertebral body. There   is prevertebral and paraspinal phlegmon and fluid, and a disc space   collection is extensive. There is a loculated abscess in the anterior   soft tissues on the right. With contrast demonstration, multiple disc   space abscesses are suggested along with extensive phlegmon in the   prevertebral paraspinal posterior soft tissues. There is also epidural   phlegmon in the lower thoracic canal, extending from the upper T10 level,   to the L1 level. The thoracic study suggests superior extent to the T9   level, along with low level inflammatory changes in the posterior T9 as   well as the posterior T10 vertebral bodies.  SPINAL CORD:  There is low-level cord edema, and there is subtle   leptomeningeal enhancement in the lower thoracic region which may be   reactive.    There is extensive enhancement in the posterior paraspinal musculature  suggesting myositis along with deep soft tissue extension of the   inflammatory process.    IMPRESSION:          1. Extensive disc space infection and osteomyelitis at the T11-T12 level,   with moderate compression deformities of both vertebrae. Extensive   epidural phlegmon from T9 to L1 with cord compression noted. In   conjunction there is cord edema and changes of myelopathy. There is   low-level intradural enhancement as well, which may be reactive.  2. There may be low level osteomyelitis in  the posterior T9 and T10   vertebral bodies as well, along with extensive inflammatory changes in   the prevertebral and posterior soft tissues throughout the lower thoracic   and lumbar region.

## 2019-05-06 NOTE — CHART NOTE - NSCHARTNOTEFT_GEN_A_CORE
Hospitalist NP.  RN called with unsuccessful Rubin cath insertion.  14 Fr Rubin cath inserted, good urine out put and connected to BSD.  Patient tolerated procedure well.

## 2019-05-06 NOTE — CHART NOTE - NSCHARTNOTEFT_GEN_A_CORE
Patient seen and evaluated at bedside  Since near fall patient has new motor weakness in bilateral LE  with reduced Bilateral hip flexion, knee flexion, no new sensation symptoms intact B/LE  no clonus  Denies bladder or bowel incontinence  New MRI shows likely fracture 2/2 to near fall without brace through previous site of Osteomyelitis at T11/12 with worsening phelgmon resulting in kyphosis and cord compression  Patient will need extensive decompression and thoracolumbar fusion  Patient received lovenox today so will need to wait for surgery  If ok with medicine recommend decadron to protect cord  Rubin if any symptoms of urinary retention  Plan for OR wednesday 5/8/19 for decompression and fusion with Dr. Leslie Patient seen and evaluated at bedside  Since near fall patient has new motor weakness in bilateral LE  with reduced Bilateral hip flexion, knee flexion, no new sensation symptoms intact B/LE  no clonus  Denies bladder or bowel incontinence  New MRI shows likely fracture 2/2 to near fall without brace through previous site of Osteomyelitis at T11/12 with worsening phelgmon resulting in kyphosis and cord compression  Patient will need extensive decompression and thoracolumbar fusion  Patient received lovenox today so will need to wait for surgery  If ok with medicine recommend decadron to protect cord  Rubin if any symptoms of urinary retention  Plan for OR wednesday 5/8/19 for decompression and fusion with Dr. Leslie      I personally examined the patient after her latest injury.  Patient was doing markedly well after her initial cervical surgery and had regained strength in arms and legs.  Her neuroexam was stable and due to extensive infection that was responding well and we had followed closely with multiple round of imaging studies and patient's neuro exams and overall functioanl improvement. patient was getting treatment with IV antibiotics and bracing.  Unfortunately, due to poor overall bone quality secondary to infectious osteomyelitis with latest injury without brace, patient fractured her infected thoracic vertebra.  Repeat work up was performed, which showed the above findings.  Due to marked opiod use, patient's treatment course was talked with her family and her family members are well informed.  Patient's mother and father have been present for multiple visits and are aware of her overall poor health and function.  Patient does not recollect over 20 minute conversation from sunday about her change in physical exam and findings and therefore, all her planned treatment was discussed with family member present in the presence of patient.  Informed discussion is performed.  May need anterior surgery with thoracic surgeon as a second stage in future.  However, current goals are stabilization and decompression.  Patient is aware she will need long term medical treatment and potential life long antiobiotics.

## 2019-05-06 NOTE — PROGRESS NOTE ADULT - SUBJECTIVE AND OBJECTIVE BOX
Patient is a 43y old  Female who presents with a chief complaint of AMS (26 Apr 2019 12:32)      INTERVAL HPI/OVERNIGHT EVENTS:  pt c/o of increased weakness in her legs today    urinating well. denies any numbness     MEDICATIONS  (STANDING):  ascorbic acid 500 milliGRAM(s) Oral two times a day  calcium carbonate 1250 mG  + Vitamin D (OsCal 500 + D) 1 Tablet(s) Oral three times a day  celecoxib 200 milliGRAM(s) Oral daily  chlorhexidine 2% Cloths 1 Application(s) Topical daily  chlorhexidine 4% Liquid 1 Application(s) Topical <User Schedule>  DAPTOmycin IVPB 440 milliGRAM(s) IV Intermittent every 24 hours  dexamethasone  IVPB 10 milliGRAM(s) IV Intermittent every 8 hours  folic acid 1 milliGRAM(s) Oral daily  gabapentin 800 milliGRAM(s) Oral three times a day  lactobacillus acidophilus 1 Tablet(s) Oral two times a day with meals  lidocaine   Patch 1 Patch Transdermal every 24 hours  lidocaine   Patch 1 Patch Transdermal every 24 hours  methadone    Tablet 40 milliGRAM(s) Oral daily  multivitamin 1 Tablet(s) Oral daily  naloxegol 25 milliGRAM(s) Oral before breakfast  nicotine -  14 mG/24Hr(s) Patch 1 patch Transdermal daily  nystatin Powder 1 Application(s) Topical three times a day  oxyCODONE  ER Tablet 40 milliGRAM(s) Oral every 12 hours  pantoprazole    Tablet 40 milliGRAM(s) Oral before breakfast  polyethylene glycol 3350 17 Gram(s) Oral two times a day  senna 2 Tablet(s) Oral at bedtime    MEDICATIONS  (PRN):  acetaminophen   Tablet .. 650 milliGRAM(s) Oral every 6 hours PRN Mild Pain (1 - 3)  acetaminophen   Tablet .. 650 milliGRAM(s) Oral every 6 hours PRN Temp greater or equal to 38C (100.4F)  cyclobenzaprine 10 milliGRAM(s) Oral three times a day PRN Muscle Spasm  diazepam    Tablet 5 milliGRAM(s) Oral every 8 hours PRN back spasms  diphenhydrAMINE 25 milliGRAM(s) Oral every 4 hours PRN Rash and/or Itching  oxyCODONE    IR 15 milliGRAM(s) Oral every 4 hours PRN Moderate Pain (4 - 6)          Allergies    penicillin (Short breath; Hives)    Intolerances    Vital Signs Last 24 Hrs  T(C): 36.9 (06 May 2019 12:00), Max: 36.9 (06 May 2019 12:00)  T(F): 98.4 (06 May 2019 12:00), Max: 98.4 (06 May 2019 12:00)  HR: 98 (06 May 2019 12:00) (85 - 98)  BP: 112/79 (06 May 2019 12:00) (89/58 - 121/85)  BP(mean): --  RR: 18 (06 May 2019 12:00) (16 - 18)  SpO2: 98% (06 May 2019 12:00) (94% - 98%)    PHYSICAL EXAM:  GENERAL:  c/o back pain  HEAD:  Atraumatic, Normocephalic  EYES: EOMI, PERRLA, conjunctiva and sclera clear  ENMT: No tonsillar erythema, exudates, or enlargement; Moist mucous membranes  NECK: Supple, No JVD, Normal thyroid  NERVOUS SYSTEM: alert and oriented , decreased muscle strength in lower extremities, MS in upper 4+ /5  in lower extremities 2+/5.  reflexes intact , sensation intact ,    CHEST/LUNG: Clear to percussion bilaterally; No rales, rhonchi, wheezing, or rubs  HEART: Regular rate and rhythm; No murmurs, rubs, or gallops  ABDOMEN: Soft, Nontender, Nondistended; Bowel sounds present  EXTREMITIES:  2+ Peripheral Pulses, No clubbing, cyanosis, or edema, pain with palpation of lumbar paravertebral spine  SKIN: No rashes or lesions    LABS:                                             RADIOLOGY & ADDITIONAL TESTS:    < from: MR Thoracic Spine w/wo IV Cont (05.06.19 @ 10:07) >  1. Extensive disc space infection and osteomyelitis at the T11-T12 level,   with moderate compression deformities of both vertebrae. Extensive   epidural phlegmon from T9 to L1 with cord compression noted. In   conjunction there is cord edema and changes of myelopathy. There is   low-level intradural enhancement as well, which may be reactive.  2. There may be low level osteomyelitisin the posterior T9 and T10   vertebral bodies as well, along with extensive inflammatory changes in   the prevertebral and posterior soft tissues throughout the lower thoracic   and lumbar region.    There is worsening as compared to the prior MRI study.    < end of copied text >      Imaging Personally Reviewed:  [ ] YES  [ ] NO    Consultant(s) Notes Reviewed:  [x ] YES  [ ] NO    Care Discussed with Consultants/Other Providers [x ] YES  [ ] NO

## 2019-05-06 NOTE — PROGRESS NOTE ADULT - PROBLEM SELECTOR PLAN 1
with T9-T12 OM ,diskitis ,,phlegmon, cord compression in the New MRI from today   Ortho  aware ,planned for surgery on Wednesday  on decadron sec to cord compression  cont daptomycin

## 2019-05-06 NOTE — PROGRESS NOTE ADULT - ASSESSMENT
43 F w/ history of IVDA, alcohol abuse, hx of pancreatitis, alcohol hepatitis, alcohol withdrawal, left frontoparietal subdural hematoma 2008 without need for neurosurgical intervention presented with severe sepsis with septic shock secondary to MRSA bacteremia w/ RLL PNA, UTI, endocarditis on LIZ and EFRAIN that has resolved and was diagnosis w/ HCV. Pt was initially intubated due to respiratory failure and put on pressors in ICU. She was extubated on 2/25 and transferred from ICU the following day. Pt had a C4-5 ACDF with irrigation and debridement on 3/20 due to spinal abscess and osteo and was kept intubated post procedure and transferred again to ICU, where she was extubated on 3/21 and transferred back to the med service the following day. HCV- Outpatient follow up for HCV     Septic arthritis involving BL Knees/hips;  - MRI of bilateral hips and knees were done demonstrating effusions and an abscess located in the right rectus femoris.   - Bilateral knees were aspirated by orthopedic team. on 3/28/19 ngtd   bilateral hips aspirated by orthopedic team on 3/29/19  ngtd but cppd crystals seen c/w psuedo gout  - as per ortho-No plan for orthopaedic operative intervention at this time.          Pseudogout: s/p  colchicine     septic shock secondary to MRSA bacteremia w/ RLL PNA, UTI, endocarditis on LIZ at Tricuspid valve   - resolved     Endocarditis of tricuspid valve  - status post thoracentesis on 3/20  - repeat BC  blood cx  ngtd     - repeat echo showed a pedunculated mobile mass seen adherent to lateral leaflet of the tricuspid valve w/ no interval changes noted in valve structure or regurgitation  - repeated echo from 4/2/19 shows stable TR vegetation      Spinal osteo, abscess and cord compression    - CT of head and neck showed disc space narrowing and endplate irregularity at C4-5 which may represent typical degenerative change vs discitis   - MRI of the cervical spine w/ contrast showed C2-C7 discitis/osteomyelitis with a small right of midline ventral epidural abscess at C2-C7  - status post s/p C4-5 ACDF with irrigation and debridement on 3/20  - MRI of the thoracolumbar spine showed discitis/osteomyelitis at T11-T12 with paravertebral abscess, as well as discitis/osteomyelitis C6--T1, T9-T10, T10-T11, L4-L5, and L5-S1, and a phlegmon/early abscess dorsolaterally within the lumbar canal at the level of L4-L5. There was also a septic arthritis of  the left sternoclavicular joint space. There was also disc herniation at the L4-L5 level with contact and probable impingement of the descending right-sided nerve roots    Pt was seen ambulating with mom  to bathroom per nursing on 5/4/19 WITHOUT TLSO brace and nearly fell then complains of excruciating back pain after and now worsening weakness in legs . Repeat MRI  shows worsening thoracic abscess and phlegmon with cord compression and veterbral compression fx from T11-T12,  will need extensive washout and fusion. Ortho plans for OR on Wednesday   - will place milan for any urinary retention. Pt needs no further work up  for urgent procedure Ok with starting steroids for cord compression     IVDrug abuse  -was off methadone   as thought it would be helpful to get pt into [facility], but recently restarted as per my  colleague   pt now asking to go back on methadone as she hopes to get off the pain medications    - Chronic Pain - cont celebrex, gabapentin, oxycodone titrate from 20 mg q4 to 15 mg q4  Oxycontin to 40 mg bid    Anemia due multiple issues, surgery , blood draws,   poor nutrition and infection  - status post a few units of blood during this extensive hospital stay last one on 3/20/19  - c/w folic acid      constipation:   - doing well   - c.w  movantic    states she is having bm    refuses other laxatives

## 2019-05-07 LAB
ALBUMIN SERPL ELPH-MCNC: 3 G/DL — LOW (ref 3.3–5)
ALP SERPL-CCNC: 377 U/L — HIGH (ref 40–120)
ALT FLD-CCNC: 31 U/L — SIGNIFICANT CHANGE UP (ref 12–78)
ANION GAP SERPL CALC-SCNC: 9 MMOL/L — SIGNIFICANT CHANGE UP (ref 5–17)
AST SERPL-CCNC: 14 U/L — LOW (ref 15–37)
BILIRUB SERPL-MCNC: 0.3 MG/DL — SIGNIFICANT CHANGE UP (ref 0.2–1.2)
BUN SERPL-MCNC: 28 MG/DL — HIGH (ref 7–23)
CALCIUM SERPL-MCNC: 10.4 MG/DL — HIGH (ref 8.5–10.1)
CHLORIDE SERPL-SCNC: 103 MMOL/L — SIGNIFICANT CHANGE UP (ref 96–108)
CO2 SERPL-SCNC: 27 MMOL/L — SIGNIFICANT CHANGE UP (ref 22–31)
CREAT SERPL-MCNC: 0.57 MG/DL — SIGNIFICANT CHANGE UP (ref 0.5–1.3)
GLUCOSE SERPL-MCNC: 216 MG/DL — HIGH (ref 70–99)
HCT VFR BLD CALC: 33.4 % — LOW (ref 34.5–45)
HGB BLD-MCNC: 10.3 G/DL — LOW (ref 11.5–15.5)
INR BLD: 1.03 RATIO — SIGNIFICANT CHANGE UP (ref 0.88–1.16)
MCHC RBC-ENTMCNC: 24.8 PG — LOW (ref 27–34)
MCHC RBC-ENTMCNC: 30.8 GM/DL — LOW (ref 32–36)
MCV RBC AUTO: 80.3 FL — SIGNIFICANT CHANGE UP (ref 80–100)
NRBC # BLD: 0 /100 WBCS — SIGNIFICANT CHANGE UP (ref 0–0)
PLATELET # BLD AUTO: 334 K/UL — SIGNIFICANT CHANGE UP (ref 150–400)
POTASSIUM SERPL-MCNC: 4.3 MMOL/L — SIGNIFICANT CHANGE UP (ref 3.5–5.3)
POTASSIUM SERPL-SCNC: 4.3 MMOL/L — SIGNIFICANT CHANGE UP (ref 3.5–5.3)
PROT SERPL-MCNC: 7.7 GM/DL — SIGNIFICANT CHANGE UP (ref 6–8.3)
PROTHROM AB SERPL-ACNC: 11.5 SEC — SIGNIFICANT CHANGE UP (ref 10–12.9)
RBC # BLD: 4.16 M/UL — SIGNIFICANT CHANGE UP (ref 3.8–5.2)
RBC # FLD: 14.6 % — HIGH (ref 10.3–14.5)
SODIUM SERPL-SCNC: 139 MMOL/L — SIGNIFICANT CHANGE UP (ref 135–145)
WBC # BLD: 6.07 K/UL — SIGNIFICANT CHANGE UP (ref 3.8–10.5)
WBC # FLD AUTO: 6.07 K/UL — SIGNIFICANT CHANGE UP (ref 3.8–10.5)

## 2019-05-07 PROCEDURE — 99233 SBSQ HOSP IP/OBS HIGH 50: CPT

## 2019-05-07 RX ORDER — OXYCODONE HYDROCHLORIDE 5 MG/1
20 TABLET ORAL EVERY 4 HOURS
Qty: 0 | Refills: 0 | Status: DISCONTINUED | OUTPATIENT
Start: 2019-05-07 | End: 2019-05-07

## 2019-05-07 RX ORDER — SODIUM CHLORIDE 9 MG/ML
1000 INJECTION, SOLUTION INTRAVENOUS
Qty: 0 | Refills: 0 | Status: DISCONTINUED | OUTPATIENT
Start: 2019-05-07 | End: 2019-05-08

## 2019-05-07 RX ORDER — OXYCODONE HYDROCHLORIDE 5 MG/1
30 TABLET ORAL EVERY 4 HOURS
Qty: 0 | Refills: 0 | Status: DISCONTINUED | OUTPATIENT
Start: 2019-05-07 | End: 2019-05-08

## 2019-05-07 RX ORDER — METHADONE HYDROCHLORIDE 40 MG/1
60 TABLET ORAL
Qty: 0 | Refills: 0 | Status: DISCONTINUED | OUTPATIENT
Start: 2019-05-07 | End: 2019-05-08

## 2019-05-07 RX ORDER — ACETAMINOPHEN 500 MG
1000 TABLET ORAL EVERY 8 HOURS
Qty: 0 | Refills: 0 | Status: DISCONTINUED | OUTPATIENT
Start: 2019-05-07 | End: 2019-05-08

## 2019-05-07 RX ORDER — HYDROMORPHONE HYDROCHLORIDE 2 MG/ML
1 INJECTION INTRAMUSCULAR; INTRAVENOUS; SUBCUTANEOUS EVERY 6 HOURS
Qty: 0 | Refills: 0 | Status: DISCONTINUED | OUTPATIENT
Start: 2019-05-07 | End: 2019-05-07

## 2019-05-07 RX ADMIN — SODIUM CHLORIDE 100 MILLILITER(S): 9 INJECTION, SOLUTION INTRAVENOUS at 23:15

## 2019-05-07 RX ADMIN — METHADONE HYDROCHLORIDE 60 MILLIGRAM(S): 40 TABLET ORAL at 19:34

## 2019-05-07 RX ADMIN — OXYCODONE HYDROCHLORIDE 15 MILLIGRAM(S): 5 TABLET ORAL at 07:00

## 2019-05-07 RX ADMIN — CYCLOBENZAPRINE HYDROCHLORIDE 10 MILLIGRAM(S): 10 TABLET, FILM COATED ORAL at 22:50

## 2019-05-07 RX ADMIN — Medication 500 MILLIGRAM(S): at 19:35

## 2019-05-07 RX ADMIN — GABAPENTIN 800 MILLIGRAM(S): 400 CAPSULE ORAL at 06:56

## 2019-05-07 RX ADMIN — CYCLOBENZAPRINE HYDROCHLORIDE 10 MILLIGRAM(S): 10 TABLET, FILM COATED ORAL at 06:54

## 2019-05-07 RX ADMIN — Medication 500 MILLIGRAM(S): at 06:56

## 2019-05-07 RX ADMIN — OXYCODONE HYDROCHLORIDE 30 MILLIGRAM(S): 5 TABLET ORAL at 16:07

## 2019-05-07 RX ADMIN — Medication 1 TABLET(S): at 06:56

## 2019-05-07 RX ADMIN — OXYCODONE HYDROCHLORIDE 15 MILLIGRAM(S): 5 TABLET ORAL at 06:00

## 2019-05-07 RX ADMIN — OXYCODONE HYDROCHLORIDE 15 MILLIGRAM(S): 5 TABLET ORAL at 11:22

## 2019-05-07 RX ADMIN — Medication 1 TABLET(S): at 22:49

## 2019-05-07 RX ADMIN — Medication 102 MILLIGRAM(S): at 06:55

## 2019-05-07 RX ADMIN — Medication 1 TABLET(S): at 09:16

## 2019-05-07 RX ADMIN — Medication 1000 MILLIGRAM(S): at 22:51

## 2019-05-07 RX ADMIN — CELECOXIB 200 MILLIGRAM(S): 200 CAPSULE ORAL at 12:04

## 2019-05-07 RX ADMIN — CHLORHEXIDINE GLUCONATE 1 APPLICATION(S): 213 SOLUTION TOPICAL at 12:59

## 2019-05-07 RX ADMIN — NYSTATIN CREAM 1 APPLICATION(S): 100000 CREAM TOPICAL at 06:56

## 2019-05-07 RX ADMIN — GABAPENTIN 800 MILLIGRAM(S): 400 CAPSULE ORAL at 13:40

## 2019-05-07 RX ADMIN — CELECOXIB 200 MILLIGRAM(S): 200 CAPSULE ORAL at 12:59

## 2019-05-07 RX ADMIN — CHLORHEXIDINE GLUCONATE 1 APPLICATION(S): 213 SOLUTION TOPICAL at 06:56

## 2019-05-07 RX ADMIN — Medication 1000 MILLIGRAM(S): at 14:01

## 2019-05-07 RX ADMIN — Medication 1 TABLET(S): at 11:59

## 2019-05-07 RX ADMIN — NYSTATIN CREAM 1 APPLICATION(S): 100000 CREAM TOPICAL at 22:50

## 2019-05-07 RX ADMIN — Medication 650 MILLIGRAM(S): at 06:57

## 2019-05-07 RX ADMIN — PANTOPRAZOLE SODIUM 40 MILLIGRAM(S): 20 TABLET, DELAYED RELEASE ORAL at 06:56

## 2019-05-07 RX ADMIN — POLYETHYLENE GLYCOL 3350 17 GRAM(S): 17 POWDER, FOR SOLUTION ORAL at 06:55

## 2019-05-07 RX ADMIN — GABAPENTIN 800 MILLIGRAM(S): 400 CAPSULE ORAL at 22:49

## 2019-05-07 RX ADMIN — OXYCODONE HYDROCHLORIDE 30 MILLIGRAM(S): 5 TABLET ORAL at 20:40

## 2019-05-07 RX ADMIN — LIDOCAINE 1 PATCH: 4 CREAM TOPICAL at 23:14

## 2019-05-07 RX ADMIN — OXYCODONE HYDROCHLORIDE 15 MILLIGRAM(S): 5 TABLET ORAL at 02:49

## 2019-05-07 RX ADMIN — OXYCODONE HYDROCHLORIDE 40 MILLIGRAM(S): 5 TABLET ORAL at 06:57

## 2019-05-07 RX ADMIN — Medication 1000 MILLIGRAM(S): at 19:45

## 2019-05-07 RX ADMIN — NALOXEGOL OXALATE 25 MILLIGRAM(S): 12.5 TABLET, FILM COATED ORAL at 06:55

## 2019-05-07 RX ADMIN — DAPTOMYCIN 117.6 MILLIGRAM(S): 500 INJECTION, POWDER, LYOPHILIZED, FOR SOLUTION INTRAVENOUS at 19:34

## 2019-05-07 RX ADMIN — Medication 1 MILLIGRAM(S): at 11:59

## 2019-05-07 RX ADMIN — Medication 1 TABLET(S): at 13:40

## 2019-05-07 RX ADMIN — Medication 1000 MILLIGRAM(S): at 22:50

## 2019-05-07 RX ADMIN — Medication 5 MILLIGRAM(S): at 19:36

## 2019-05-07 RX ADMIN — Medication 5 MILLIGRAM(S): at 09:17

## 2019-05-07 RX ADMIN — NYSTATIN CREAM 1 APPLICATION(S): 100000 CREAM TOPICAL at 16:00

## 2019-05-07 RX ADMIN — OXYCODONE HYDROCHLORIDE 15 MILLIGRAM(S): 5 TABLET ORAL at 10:22

## 2019-05-07 RX ADMIN — Medication 650 MILLIGRAM(S): at 06:55

## 2019-05-07 RX ADMIN — OXYCODONE HYDROCHLORIDE 40 MILLIGRAM(S): 5 TABLET ORAL at 06:54

## 2019-05-07 RX ADMIN — SENNA PLUS 2 TABLET(S): 8.6 TABLET ORAL at 22:50

## 2019-05-07 RX ADMIN — OXYCODONE HYDROCHLORIDE 30 MILLIGRAM(S): 5 TABLET ORAL at 15:07

## 2019-05-07 RX ADMIN — METHADONE HYDROCHLORIDE 40 MILLIGRAM(S): 40 TABLET ORAL at 11:59

## 2019-05-07 RX ADMIN — OXYCODONE HYDROCHLORIDE 15 MILLIGRAM(S): 5 TABLET ORAL at 01:49

## 2019-05-07 RX ADMIN — Medication 5 MILLIGRAM(S): at 01:06

## 2019-05-07 RX ADMIN — LIDOCAINE 1 PATCH: 4 CREAM TOPICAL at 23:15

## 2019-05-07 NOTE — CHART NOTE - NSCHARTNOTEFT_GEN_A_CORE
Dx: T 11/12 OM/VCF with T9-L1 phlegmon    Sx: Posterior laminectomy and fusion with I&D    Surgeon: Dr. Leslie    Clearance: Documented    Consent: in chart    H&P: Done                          9.8    9.54  )-----------( 317      ( 06 May 2019 16:31 )             32.5     06 May 2019 16:31    139    |  101    |  29     ----------------------------<  118    4.2     |  31     |  0.40     Ca    10.4       06 May 2019 16:31  Phos  5.0       06 May 2019 16:31  Mg     2.0       06 May 2019 16:31        T+S: New pending    EKG: SInus rhythm in chart    CXR: completed    A/P: 43F with diffuse bacterial infection and endocarditis s/p C4-5 ACDF for cervical OM on 3/20 now with T11/12 OM discitis with an epidural phlegmon from T9-L1   Plan for OR tomorrow 5/8 for posterior laminectomy and fusion with I&D  NPO after midnight for OR tomorrow  IVF while NPO  Hold chemical AC for OR  Pain control  DVT ppx with SCDs  NWB Strict bedrest with spine precautions and neuro checks before OR  COmplete 24 hours of Decadron today  Please continue to document medical clearance for OR  Discussed with Dr. Leslie who agrees with above Dx: T 11/12 OM/VCF with T9-L1 phlegmon    Sx: Posterior laminectomy and fusion with I&D    Surgeon: Dr. Leslie    Clearance: Documented    Consent: in chart    H&P: Done                          9.8    9.54  )-----------( 317      ( 06 May 2019 16:31 )             32.5     06 May 2019 16:31    139    |  101    |  29     ----------------------------<  118    4.2     |  31     |  0.40     Ca    10.4       06 May 2019 16:31  Phos  5.0       06 May 2019 16:31  Mg     2.0       06 May 2019 16:31        T+S: New pending    EKG: SInus rhythm in chart    CXR: completed    A/P: 43F with diffuse bacterial infection and endocarditis s/p C4-5 ACDF for cervical OM on 3/20 now with T11/12 OM discitis with an epidural phlegmon from T9-L1   Plan for OR tomorrow 5/8 for posterior laminectomy and fusion with I&D  NPO after midnight for OR tomorrow  IVF while NPO  Hold chemical AC for OR  Pain control  DVT ppx with SCDs  NWB Strict bedrest with spine precautions and neuro checks before OR  COmplete 24 hours of Decadron today  Please continue to document medical clearance for OR  Discussed with Dr. Leslie who agrees with above    Overall treatment course is reviewed.  Agree with the planned surgery.  Patient aware overall morbid prognosis.  Long term limitations expected.  May need use of assistive device long term

## 2019-05-07 NOTE — PROGRESS NOTE ADULT - SUBJECTIVE AND OBJECTIVE BOX
Pt S/E at bedside, no acute events overnight, pain controlled, patient reports improved movement of bilateral legs after starting steroids last night, denies CP, SOB, N, V, Palpitations.     Vital Signs Last 24 Hrs  T(C): 36.6 (06 May 2019 23:43), Max: 36.9 (06 May 2019 12:00)  T(F): 97.9 (06 May 2019 23:43), Max: 98.4 (06 May 2019 12:00)  HR: 88 (06 May 2019 23:43) (85 - 98)  BP: 120/79 (06 May 2019 23:43) (91/62 - 120/79)  BP(mean): --  RR: 16 (06 May 2019 23:43) (16 - 18)  SpO2: 98% (06 May 2019 23:43) (95% - 98%)  Gen: NAD    Spine PE:  Skin intact  No gross deformity  No midline TTP C/T/L/S spine  No bony step offs  + thoracolumbar paraspinal muscle TTP diffusely, with mild hypertonicity  Negative Clonus  Negative babinski  Negative olmstead    Motor:            ElbowFlex    WristExt   ElbowExt   FingerFlex   FingerAbd     R            4/5                4/5            4/5             4/5              4/5  L             4/5               4/5             4/5             4/5              4/5            HipFlex   HipExt   KneeFlex   KneeExt   AnkleDorsi   AnklePlantar   HaluxDorsi   HaluxPlantar  R        2/5        2/5            3/5            4/5             3/5                 4/5           4/5                4/5  L         2/5        2/5            3/5            4/5             3/5                 4/5          4/5                4/5    Sensory:            C5         C6         C7      C8       T1        (0=absent, 1=impaired, 2=normal, NT=not testable)  R        2          2              2        2         2  L         2          2              2        2         2               L2          L3         L4      L5       S1         (0=absent, 1=impaired, 2=normal, NT=not testable)  R          2          2              2        2         2  L          2          2              2        2         2

## 2019-05-07 NOTE — PROGRESS NOTE ADULT - SUBJECTIVE AND OBJECTIVE BOX
Patient is a 43y old  Female who presents with a chief complaint of AMS (26 Apr 2019 12:32)      INTERVAL HPI/OVERNIGHT EVENTS:  motor function in the legs improved today      MEDICATIONS  (STANDING):  acetaminophen   Tablet .. 1000 milliGRAM(s) Oral every 8 hours  ascorbic acid 500 milliGRAM(s) Oral two times a day  calcium carbonate 1250 mG  + Vitamin D (OsCal 500 + D) 1 Tablet(s) Oral three times a day  celecoxib 200 milliGRAM(s) Oral daily  chlorhexidine 2% Cloths 1 Application(s) Topical daily  chlorhexidine 4% Liquid 1 Application(s) Topical <User Schedule>  DAPTOmycin IVPB 440 milliGRAM(s) IV Intermittent every 24 hours  folic acid 1 milliGRAM(s) Oral daily  gabapentin 800 milliGRAM(s) Oral three times a day  lactated ringers. 1000 milliLiter(s) (100 mL/Hr) IV Continuous <Continuous>  lactobacillus acidophilus 1 Tablet(s) Oral two times a day with meals  lidocaine   Patch 1 Patch Transdermal every 24 hours  lidocaine   Patch 1 Patch Transdermal every 24 hours  methadone    Tablet 60 milliGRAM(s) Oral two times a day  multivitamin 1 Tablet(s) Oral daily  naloxegol 25 milliGRAM(s) Oral before breakfast  nicotine -  14 mG/24Hr(s) Patch 1 patch Transdermal daily  nystatin Powder 1 Application(s) Topical three times a day  pantoprazole    Tablet 40 milliGRAM(s) Oral before breakfast  polyethylene glycol 3350 17 Gram(s) Oral two times a day  senna 2 Tablet(s) Oral at bedtime    MEDICATIONS  (PRN):  acetaminophen   Tablet .. 650 milliGRAM(s) Oral every 6 hours PRN Mild Pain (1 - 3)  acetaminophen   Tablet .. 650 milliGRAM(s) Oral every 6 hours PRN Temp greater or equal to 38C (100.4F)  bisacodyl Suppository 10 milliGRAM(s) Rectal daily PRN Constipation  cyclobenzaprine 10 milliGRAM(s) Oral three times a day PRN Muscle Spasm  diazepam    Tablet 5 milliGRAM(s) Oral every 8 hours PRN back spasms  diphenhydrAMINE 25 milliGRAM(s) Oral every 4 hours PRN Rash and/or Itching  oxyCODONE    IR 30 milliGRAM(s) Oral every 4 hours PRN Moderate Pain (4 - 6)            Allergies    penicillin (Short breath; Hives)    Intolerances    Vital Signs Last 24 Hrs  T(C): 36.6 (07 May 2019 06:12), Max: 36.7 (06 May 2019 16:55)  T(F): 97.8 (07 May 2019 06:12), Max: 98 (06 May 2019 16:55)  HR: 83 (07 May 2019 06:12) (83 - 89)  BP: 131/- (07 May 2019 06:12) (104/74 - 131/-)  BP(mean): --  RR: 18 (07 May 2019 06:12) (16 - 18)  SpO2: 97% (07 May 2019 06:12) (95% - 98%)    PHYSICAL EXAM:  GENERAL: non-toxic   HEAD:  Atraumatic, Normocephalic  EYES: EOMI, PERRLA, conjunctiva and sclera clear  ENMT: No tonsillar erythema, exudates, or enlargement; Moist mucous membranes  NECK: Supple, No JVD, Normal thyroid  NERVOUS SYSTEM: alert and oriented , decreased muscle strength in lower extremities, MS in upper 4+ /5  in lower extremities 2+/5.  reflexes intact , sensation intact ,    CHEST/LUNG: Clear to percussion bilaterally; No rales, rhonchi, wheezing, or rubs  HEART: Regular rate and rhythm; No murmurs, rubs, or gallops  ABDOMEN: Soft, Nontender, Nondistended; Bowel sounds present  EXTREMITIES:  2+ Peripheral Pulses, No clubbing, cyanosis, or edema, pain with palpation of lumbar paravertebral spine  SKIN: No rashes or lesions    LABS:                                            10.3   6.07  )-----------( 334      ( 07 May 2019 08:39 )             33.4     05-07    139  |  103  |  28<H>  ----------------------------<  216<H>  4.3   |  27  |  0.57    Ca    10.4<H>      07 May 2019 08:39  Phos  5.0     05-06  Mg     2.0     05-06    TPro  7.7  /  Alb  3.0<L>  /  TBili  0.3  /  DBili  x   /  AST  14<L>  /  ALT  31  /  AlkPhos  377<H>  05-07                               RADIOLOGY & ADDITIONAL TESTS:    < from: MR Thoracic Spine w/wo IV Cont (05.06.19 @ 10:07) >  1. Extensive disc space infection and osteomyelitis at the T11-T12 level,   with moderate compression deformities of both vertebrae. Extensive   epidural phlegmon from T9 to L1 with cord compression noted. In   conjunction there is cord edema and changes of myelopathy. There is   low-level intradural enhancement as well, which may be reactive.  2. There may be low level osteomyelitisin the posterior T9 and T10   vertebral bodies as well, along with extensive inflammatory changes in   the prevertebral and posterior soft tissues throughout the lower thoracic   and lumbar region.    There is worsening as compared to the prior MRI study.    < end of copied text >      Imaging Personally Reviewed:  [ ] YES  [ ] NO    Consultant(s) Notes Reviewed:  [x ] YES  [ ] NO    Care Discussed with Consultants/Other Providers [x ] YES  [ ] NO

## 2019-05-07 NOTE — PROGRESS NOTE ADULT - ASSESSMENT
43 F w/ history of IVDA, alcohol abuse, hx of pancreatitis, alcohol hepatitis, alcohol withdrawal, left frontoparietal subdural hematoma 2008 without need for neurosurgical intervention presented with severe sepsis with septic shock secondary to MRSA bacteremia w/ RLL PNA, UTI, endocarditis on LIZ and EFRAIN that has resolved and was diagnosis w/ HCV. Pt was initially intubated due to respiratory failure and put on pressors in ICU. She was extubated on 2/25 and transferred from ICU the following day. Pt had a C4-5 ACDF with irrigation and debridement on 3/20 due to spinal abscess and osteo and was kept intubated post procedure and transferred again to ICU, where she was extubated on 3/21 and transferred back to the med service the following day. HCV- Outpatient follow up for HCV     Septic arthritis involving BL Knees/hips;  - MRI of bilateral hips and knees were done demonstrating effusions and an abscess located in the right rectus femoris.   - Bilateral knees were aspirated by orthopedic team. on 3/28/19 ngtd   bilateral hips aspirated by orthopedic team on 3/29/19  ngtd but cppd crystals seen c/w psuedo gout  - as per ortho-No plan for orthopaedic operative intervention at this time.          Pseudogout: s/p  colchicine       Endocarditis of tricuspid valve  - status post thoracentesis on 3/20  - repeat BC  blood cx  ngtd     - repeat echo showed a pedunculated mobile mass seen adherent to lateral leaflet of the tricuspid valve w/ no interval changes noted in valve structure or regurgitation  - repeated echo from 4/2/19 shows stable TR vegetation      Spinal osteo, abscess and cord compression    - CT of head and neck showed disc space narrowing and endplate irregularity at C4-5 which may represent typical degenerative change vs discitis   - MRI of the cervical spine w/ contrast showed C2-C7 discitis/osteomyelitis with a small right of midline ventral epidural abscess at C2-C7  - status post s/p C4-5 ACDF with irrigation and debridement on 3/20  - MRI of the thoracolumbar spine showed discitis/osteomyelitis at T11-T12 with paravertebral abscess, as well as discitis/osteomyelitis C6--T1, T9-T10, T10-T11, L4-L5, and L5-S1, and a phlegmon/early abscess dorsolaterally within the lumbar canal at the level of L4-L5. There was also a septic arthritis of  the left sternoclavicular joint space. There was also disc herniation at the L4-L5 level with contact and probable impingement of the descending right-sided nerve roots    Pt was seen ambulating with mom  to bathroom per nursing on 5/4/19 WITHOUT TLSO brace and nearly fell then complains of excruciating back pain after and now worsening weakness in legs . Repeat MRI  shows worsening thoracic abscess and phlegmon with cord compression and veterbral compression fx from T11-T12,  will need extensive washout and fusion. Ortho plans for OR on Wednesday   - will place milan for any urinary retention. Pt needs no further work up  for urgent procedure Ok with starting steroids for cord compression     pain control   - discussed pain regimen with Dr. Silva on 5/7 and gave recommendations   - cont celebrex, gabapentin,. increased oxycodone to 30 mg mg q4 and dc  Oxycontin to 40 mg bid. start methadone 60 mg bid. Plan to is to wean off oxycodone and only be on methadone after surgery   - monitor for respiratory depression     Anemia due multiple issues, surgery , blood draws,   poor nutrition and infection  - status post a few units of blood during this extensive hospital stay last one on 3/20/19  - c/w folic acid      constipation:   - doing well   - c.w  movantic   - add dulcolax prn

## 2019-05-07 NOTE — PROGRESS NOTE ADULT - ASSESSMENT
A/P: 43F with diffuse bacterial infection and endocarditis s/p C4-5 ACDF for cervical OM on 3/20 now with T11/12 OM discitis with an epidural phlegmon from T9-L1   Plan for OR tomorrow 5/8 for posterior laminectomy and fusion with I&D  NPO after midnight for OR tomorrow  IVF while NPO  Hold chemical AC for OR  Pain control  DVT ppx with SCDs  NWB Strict bedrest with spine precautions and neuro checks before OR  COmplete 24 hours of Decadron today  Please continue to document medical clearance for OR  Discussed with Dr. Leslie who agrees with above

## 2019-05-08 LAB
ANION GAP SERPL CALC-SCNC: 7 MMOL/L — SIGNIFICANT CHANGE UP (ref 5–17)
APTT BLD: 24.1 SEC — LOW (ref 27.5–36.3)
BUN SERPL-MCNC: 33 MG/DL — HIGH (ref 7–23)
CALCIUM SERPL-MCNC: 10 MG/DL — SIGNIFICANT CHANGE UP (ref 8.5–10.1)
CHLORIDE SERPL-SCNC: 105 MMOL/L — SIGNIFICANT CHANGE UP (ref 96–108)
CO2 SERPL-SCNC: 28 MMOL/L — SIGNIFICANT CHANGE UP (ref 22–31)
CREAT SERPL-MCNC: 0.54 MG/DL — SIGNIFICANT CHANGE UP (ref 0.5–1.3)
GLUCOSE SERPL-MCNC: 124 MG/DL — HIGH (ref 70–99)
HCG SERPL-ACNC: <1 MIU/ML — SIGNIFICANT CHANGE UP
HCT VFR BLD CALC: 34 % — LOW (ref 34.5–45)
HGB BLD-MCNC: 10.4 G/DL — LOW (ref 11.5–15.5)
INR BLD: 0.92 RATIO — SIGNIFICANT CHANGE UP (ref 0.88–1.16)
MCHC RBC-ENTMCNC: 24.9 PG — LOW (ref 27–34)
MCHC RBC-ENTMCNC: 30.6 GM/DL — LOW (ref 32–36)
MCV RBC AUTO: 81.3 FL — SIGNIFICANT CHANGE UP (ref 80–100)
NRBC # BLD: 0 /100 WBCS — SIGNIFICANT CHANGE UP (ref 0–0)
PLATELET # BLD AUTO: 336 K/UL — SIGNIFICANT CHANGE UP (ref 150–400)
POTASSIUM SERPL-MCNC: 4.2 MMOL/L — SIGNIFICANT CHANGE UP (ref 3.5–5.3)
POTASSIUM SERPL-SCNC: 4.2 MMOL/L — SIGNIFICANT CHANGE UP (ref 3.5–5.3)
PROTHROM AB SERPL-ACNC: 10.3 SEC — SIGNIFICANT CHANGE UP (ref 10–12.9)
RBC # BLD: 4.18 M/UL — SIGNIFICANT CHANGE UP (ref 3.8–5.2)
RBC # FLD: 14.8 % — HIGH (ref 10.3–14.5)
SODIUM SERPL-SCNC: 140 MMOL/L — SIGNIFICANT CHANGE UP (ref 135–145)
WBC # BLD: 14.66 K/UL — HIGH (ref 3.8–10.5)
WBC # FLD AUTO: 14.66 K/UL — HIGH (ref 3.8–10.5)

## 2019-05-08 PROCEDURE — 99233 SBSQ HOSP IP/OBS HIGH 50: CPT

## 2019-05-08 RX ORDER — NYSTATIN CREAM 100000 [USP'U]/G
1 CREAM TOPICAL THREE TIMES A DAY
Refills: 0 | Status: DISCONTINUED | OUTPATIENT
Start: 2019-05-08 | End: 2019-06-12

## 2019-05-08 RX ORDER — DAPTOMYCIN 500 MG/10ML
440 INJECTION, POWDER, LYOPHILIZED, FOR SOLUTION INTRAVENOUS EVERY 24 HOURS
Refills: 0 | Status: DISCONTINUED | OUTPATIENT
Start: 2019-05-08 | End: 2019-05-13

## 2019-05-08 RX ORDER — NALOXEGOL OXALATE 12.5 MG/1
25 TABLET, FILM COATED ORAL
Refills: 0 | Status: DISCONTINUED | OUTPATIENT
Start: 2019-05-08 | End: 2019-06-12

## 2019-05-08 RX ORDER — SODIUM CHLORIDE 9 MG/ML
1000 INJECTION, SOLUTION INTRAVENOUS
Refills: 0 | Status: DISCONTINUED | OUTPATIENT
Start: 2019-05-08 | End: 2019-05-10

## 2019-05-08 RX ORDER — CHLORHEXIDINE GLUCONATE 213 G/1000ML
1 SOLUTION TOPICAL
Refills: 0 | Status: DISCONTINUED | OUTPATIENT
Start: 2019-05-08 | End: 2019-06-12

## 2019-05-08 RX ORDER — METHADONE HYDROCHLORIDE 40 MG/1
60 TABLET ORAL
Refills: 0 | Status: DISCONTINUED | OUTPATIENT
Start: 2019-05-08 | End: 2019-05-15

## 2019-05-08 RX ORDER — POLYETHYLENE GLYCOL 3350 17 G/17G
17 POWDER, FOR SOLUTION ORAL
Refills: 0 | Status: DISCONTINUED | OUTPATIENT
Start: 2019-05-08 | End: 2019-06-12

## 2019-05-08 RX ORDER — HYDROMORPHONE HYDROCHLORIDE 2 MG/ML
1 INJECTION INTRAMUSCULAR; INTRAVENOUS; SUBCUTANEOUS ONCE
Qty: 0 | Refills: 0 | Status: DISCONTINUED | OUTPATIENT
Start: 2019-05-08 | End: 2019-05-08

## 2019-05-08 RX ORDER — SENNA PLUS 8.6 MG/1
2 TABLET ORAL AT BEDTIME
Refills: 0 | Status: DISCONTINUED | OUTPATIENT
Start: 2019-05-08 | End: 2019-06-12

## 2019-05-08 RX ORDER — GABAPENTIN 400 MG/1
800 CAPSULE ORAL THREE TIMES A DAY
Refills: 0 | Status: DISCONTINUED | OUTPATIENT
Start: 2019-05-08 | End: 2019-06-12

## 2019-05-08 RX ORDER — OXYCODONE HYDROCHLORIDE 5 MG/1
30 TABLET ORAL EVERY 4 HOURS
Refills: 0 | Status: DISCONTINUED | OUTPATIENT
Start: 2019-05-08 | End: 2019-05-15

## 2019-05-08 RX ORDER — ASCORBIC ACID 60 MG
500 TABLET,CHEWABLE ORAL
Refills: 0 | Status: DISCONTINUED | OUTPATIENT
Start: 2019-05-30 | End: 2019-06-12

## 2019-05-08 RX ORDER — CYCLOBENZAPRINE HYDROCHLORIDE 10 MG/1
10 TABLET, FILM COATED ORAL THREE TIMES A DAY
Refills: 0 | Status: DISCONTINUED | OUTPATIENT
Start: 2019-05-08 | End: 2019-06-01

## 2019-05-08 RX ORDER — ACETAMINOPHEN 500 MG
1000 TABLET ORAL EVERY 8 HOURS
Refills: 0 | Status: DISCONTINUED | OUTPATIENT
Start: 2019-05-08 | End: 2019-06-12

## 2019-05-08 RX ORDER — LACTOBACILLUS ACIDOPHILUS 100MM CELL
1 CAPSULE ORAL
Refills: 0 | Status: DISCONTINUED | OUTPATIENT
Start: 2019-05-08 | End: 2019-06-12

## 2019-05-08 RX ORDER — FOLIC ACID 0.8 MG
1 TABLET ORAL DAILY
Refills: 0 | Status: DISCONTINUED | OUTPATIENT
Start: 2019-05-30 | End: 2019-06-12

## 2019-05-08 RX ORDER — LIDOCAINE 4 G/100G
1 CREAM TOPICAL EVERY 24 HOURS
Refills: 0 | Status: DISCONTINUED | OUTPATIENT
Start: 2019-05-08 | End: 2019-06-06

## 2019-05-08 RX ORDER — DIAZEPAM 5 MG
5 TABLET ORAL EVERY 8 HOURS
Refills: 0 | Status: DISCONTINUED | OUTPATIENT
Start: 2019-05-08 | End: 2019-05-15

## 2019-05-08 RX ORDER — NICOTINE POLACRILEX 2 MG
1 GUM BUCCAL DAILY
Refills: 0 | Status: DISCONTINUED | OUTPATIENT
Start: 2019-05-08 | End: 2019-06-12

## 2019-05-08 RX ADMIN — Medication 1000 MILLIGRAM(S): at 06:50

## 2019-05-08 RX ADMIN — HYDROMORPHONE HYDROCHLORIDE 1 MILLIGRAM(S): 2 INJECTION INTRAMUSCULAR; INTRAVENOUS; SUBCUTANEOUS at 12:23

## 2019-05-08 RX ADMIN — CELECOXIB 200 MILLIGRAM(S): 200 CAPSULE ORAL at 13:28

## 2019-05-08 RX ADMIN — OXYCODONE HYDROCHLORIDE 30 MILLIGRAM(S): 5 TABLET ORAL at 01:38

## 2019-05-08 RX ADMIN — NYSTATIN CREAM 1 APPLICATION(S): 100000 CREAM TOPICAL at 06:49

## 2019-05-08 RX ADMIN — Medication 650 MILLIGRAM(S): at 12:40

## 2019-05-08 RX ADMIN — OXYCODONE HYDROCHLORIDE 30 MILLIGRAM(S): 5 TABLET ORAL at 04:41

## 2019-05-08 RX ADMIN — LIDOCAINE 1 PATCH: 4 CREAM TOPICAL at 10:01

## 2019-05-08 RX ADMIN — Medication 5 MILLIGRAM(S): at 11:39

## 2019-05-08 RX ADMIN — Medication 1000 MILLIGRAM(S): at 14:24

## 2019-05-08 RX ADMIN — Medication 500 MILLIGRAM(S): at 06:49

## 2019-05-08 RX ADMIN — GABAPENTIN 800 MILLIGRAM(S): 400 CAPSULE ORAL at 13:28

## 2019-05-08 RX ADMIN — LIDOCAINE 1 PATCH: 4 CREAM TOPICAL at 08:05

## 2019-05-08 RX ADMIN — Medication 1 TABLET(S): at 13:28

## 2019-05-08 RX ADMIN — Medication 1000 MILLIGRAM(S): at 13:24

## 2019-05-08 RX ADMIN — Medication 1 PATCH: at 13:30

## 2019-05-08 RX ADMIN — SODIUM CHLORIDE 100 MILLILITER(S): 9 INJECTION, SOLUTION INTRAVENOUS at 09:57

## 2019-05-08 RX ADMIN — LIDOCAINE 1 PATCH: 4 CREAM TOPICAL at 08:06

## 2019-05-08 RX ADMIN — OXYCODONE HYDROCHLORIDE 30 MILLIGRAM(S): 5 TABLET ORAL at 05:40

## 2019-05-08 RX ADMIN — Medication 1 TABLET(S): at 13:27

## 2019-05-08 RX ADMIN — OXYCODONE HYDROCHLORIDE 30 MILLIGRAM(S): 5 TABLET ORAL at 10:54

## 2019-05-08 RX ADMIN — Medication 1000 MILLIGRAM(S): at 06:49

## 2019-05-08 RX ADMIN — Medication 5 MILLIGRAM(S): at 03:36

## 2019-05-08 RX ADMIN — Medication 1 TABLET(S): at 06:49

## 2019-05-08 RX ADMIN — Medication 1 MILLIGRAM(S): at 13:28

## 2019-05-08 RX ADMIN — CYCLOBENZAPRINE HYDROCHLORIDE 10 MILLIGRAM(S): 10 TABLET, FILM COATED ORAL at 06:50

## 2019-05-08 RX ADMIN — NALOXEGOL OXALATE 25 MILLIGRAM(S): 12.5 TABLET, FILM COATED ORAL at 06:50

## 2019-05-08 RX ADMIN — PANTOPRAZOLE SODIUM 40 MILLIGRAM(S): 20 TABLET, DELAYED RELEASE ORAL at 06:50

## 2019-05-08 RX ADMIN — OXYCODONE HYDROCHLORIDE 30 MILLIGRAM(S): 5 TABLET ORAL at 09:54

## 2019-05-08 RX ADMIN — GABAPENTIN 800 MILLIGRAM(S): 400 CAPSULE ORAL at 06:51

## 2019-05-08 RX ADMIN — CHLORHEXIDINE GLUCONATE 1 APPLICATION(S): 213 SOLUTION TOPICAL at 06:49

## 2019-05-08 RX ADMIN — OXYCODONE HYDROCHLORIDE 30 MILLIGRAM(S): 5 TABLET ORAL at 00:40

## 2019-05-08 RX ADMIN — HYDROMORPHONE HYDROCHLORIDE 1 MILLIGRAM(S): 2 INJECTION INTRAMUSCULAR; INTRAVENOUS; SUBCUTANEOUS at 12:08

## 2019-05-08 RX ADMIN — Medication 650 MILLIGRAM(S): at 11:39

## 2019-05-08 RX ADMIN — METHADONE HYDROCHLORIDE 60 MILLIGRAM(S): 40 TABLET ORAL at 06:49

## 2019-05-08 RX ADMIN — CHLORHEXIDINE GLUCONATE 1 APPLICATION(S): 213 SOLUTION TOPICAL at 13:30

## 2019-05-08 RX ADMIN — CELECOXIB 200 MILLIGRAM(S): 200 CAPSULE ORAL at 14:28

## 2019-05-08 RX ADMIN — NYSTATIN CREAM 1 APPLICATION(S): 100000 CREAM TOPICAL at 13:31

## 2019-05-08 NOTE — PROGRESS NOTE ADULT - SUBJECTIVE AND OBJECTIVE BOX
Patient is a 43y old  Female who presents with a chief complaint of AMS (26 Apr 2019 12:32)      INTERVAL HPI/OVERNIGHT EVENTS:  none. asking for pain meds       MEDICATIONS  (STANDING):  acetaminophen   Tablet .. 1000 milliGRAM(s) Oral every 8 hours  ascorbic acid 500 milliGRAM(s) Oral two times a day  calcium carbonate 1250 mG  + Vitamin D (OsCal 500 + D) 1 Tablet(s) Oral three times a day  celecoxib 200 milliGRAM(s) Oral daily  chlorhexidine 2% Cloths 1 Application(s) Topical daily  chlorhexidine 4% Liquid 1 Application(s) Topical <User Schedule>  DAPTOmycin IVPB 440 milliGRAM(s) IV Intermittent every 24 hours  folic acid 1 milliGRAM(s) Oral daily  gabapentin 800 milliGRAM(s) Oral three times a day  lactated ringers. 1000 milliLiter(s) (100 mL/Hr) IV Continuous <Continuous>  lactobacillus acidophilus 1 Tablet(s) Oral two times a day with meals  lidocaine   Patch 1 Patch Transdermal every 24 hours  lidocaine   Patch 1 Patch Transdermal every 24 hours  methadone    Tablet 60 milliGRAM(s) Oral two times a day  multivitamin 1 Tablet(s) Oral daily  naloxegol 25 milliGRAM(s) Oral before breakfast  nicotine -  14 mG/24Hr(s) Patch 1 patch Transdermal daily  nystatin Powder 1 Application(s) Topical three times a day  pantoprazole    Tablet 40 milliGRAM(s) Oral before breakfast  polyethylene glycol 3350 17 Gram(s) Oral two times a day  senna 2 Tablet(s) Oral at bedtime    MEDICATIONS  (PRN):  acetaminophen   Tablet .. 650 milliGRAM(s) Oral every 6 hours PRN Mild Pain (1 - 3)  acetaminophen   Tablet .. 650 milliGRAM(s) Oral every 6 hours PRN Temp greater or equal to 38C (100.4F)  bisacodyl Suppository 10 milliGRAM(s) Rectal daily PRN Constipation  cyclobenzaprine 10 milliGRAM(s) Oral three times a day PRN Muscle Spasm  diazepam    Tablet 5 milliGRAM(s) Oral every 8 hours PRN back spasms  diphenhydrAMINE 25 milliGRAM(s) Oral every 4 hours PRN Rash and/or Itching  oxyCODONE    IR 30 milliGRAM(s) Oral every 4 hours PRN Moderate Pain (4 - 6)          Allergies    penicillin (Short breath; Hives)    Intolerances  Vital Signs Last 24 Hrs  T(C): 36.8 (08 May 2019 11:28), Max: 36.8 (08 May 2019 11:28)  T(F): 98.2 (08 May 2019 11:28), Max: 98.2 (08 May 2019 11:28)  HR: 89 (08 May 2019 11:28) (76 - 89)  BP: 123/77 (08 May 2019 11:28) (115/80 - 123/77)  BP(mean): --  RR: 17 (08 May 2019 11:28) (16 - 18)  SpO2: 97% (08 May 2019 11:28) (96% - 97%)    PHYSICAL EXAM:  GENERAL: non-toxic   HEAD:  Atraumatic, Normocephalic  EYES: EOMI, PERRLA, conjunctiva and sclera clear  ENMT: No tonsillar erythema, exudates, or enlargement; Moist mucous membranes  NECK: Supple, No JVD, Normal thyroid  NERVOUS SYSTEM: alert and oriented , decreased muscle strength in lower extremities, MS in upper 4+ /5  in lower extremities 2+/5.  reflexes intact , sensation intact ,    CHEST/LUNG: Clear to percussion bilaterally; No rales, rhonchi, wheezing, or rubs  HEART: Regular rate and rhythm; + murmurs, rubs, or gallops  ABDOMEN: Soft, Nontender, Nondistended; Bowel sounds present  EXTREMITIES:  2+ Peripheral Pulses, No clubbing, cyanosis, or edema, can move both legs but is limited d.t pain   SKIN: No rashes or lesions    LABS:                                                     10.4   14.66 )-----------( 336      ( 08 May 2019 05:53 )             34.0     05-08    140  |  105  |  33<H>  ----------------------------<  124<H>  4.2   |  28  |  0.54    Ca    10.0      08 May 2019 05:53  Phos  5.0     05-06  Mg     2.0     05-06    TPro  7.7  /  Alb  3.0<L>  /  TBili  0.3  /  DBili  x   /  AST  14<L>  /  ALT  31  /  AlkPhos  377<H>  05-07    PT/INR - ( 08 May 2019 05:53 )   PT: 10.3 sec;   INR: 0.92 ratio         PTT - ( 08 May 2019 05:53 )  PTT:24.1 sec      RADIOLOGY & ADDITIONAL TESTS:    < from: MR Thoracic Spine w/wo IV Cont (05.06.19 @ 10:07) >  1. Extensive disc space infection and osteomyelitis at the T11-T12 level,   with moderate compression deformities of both vertebrae. Extensive   epidural phlegmon from T9 to L1 with cord compression noted. In   conjunction there is cord edema and changes of myelopathy. There is   low-level intradural enhancement as well, which may be reactive.  2. There may be low level osteomyelitisin the posterior T9 and T10   vertebral bodies as well, along with extensive inflammatory changes in   the prevertebral and posterior soft tissues throughout the lower thoracic   and lumbar region.    There is worsening as compared to the prior MRI study.    < end of copied text >      Imaging Personally Reviewed:  [ ] YES  [ ] NO    Consultant(s) Notes Reviewed:  [x ] YES  [ ] NO    Care Discussed with Consultants/Other Providers [x ] YES  [ ] NO

## 2019-05-08 NOTE — PROGRESS NOTE ADULT - ASSESSMENT
A/P: 43F with diffuse bacterial infection and endocarditis s/p C4-5 ACDF for cervical OM on 3/20 now with T11/12 OM discitis with an epidural phlegmon from T9-L1   Plan for OR today 5/8 for posterior laminectomy and fusion with I&D  NPO  IVF while NPO  Hold chemical AC for OR  Pain control  DVT ppx with SCDs  NWB Strict bedrest with spine precautions and neuro checks before OR  COmplete 24 hours of Decadron today  Please continue to document medical clearance for OR  Discussed with Dr. Leslie who agrees with above

## 2019-05-08 NOTE — CHART NOTE - NSCHARTNOTEFT_GEN_A_CORE
Assessment: Pt seen for follow-up. Pt with bacterial infection & endocarditis.  NPO today pending OR for posterior laminectomy & infusion with I&D.    Factors impacting intake: [ ] none [ ] nausea  [ ] vomiting [ ] diarrhea [ ] constipation  [ ]chewing problems [ ] swallowing issues  [ x] other: Back pain    Diet Prescription: Diet, NPO after Midnight:      NPO Start Date: 07-May-2019,   NPO Start Time: 23:59  Except Medications (05-07-19 @ 06:22)    Intake: Pt NPO today for OR today. S/p pt on Regular diet; po intake 50-60% meals. Pt refuses meals @ times & consumes 100% Ensure Enlive supplement. Continue to cater to food preferences. Last BM x 1(5/7) managed with Miralax.     Current Weight: Weight (kg): Wt=72.6kg (05-08 @ 04:32), Wt=73.9kg(4/8/19)  % Weight Change   Wt remains stable x 1 month    Physical appearance: No edema    Pertinent Medications: MEDICATIONS  (STANDING):  acetaminophen   Tablet .. 1000 milliGRAM(s) Oral every 8 hours  ascorbic acid 500 milliGRAM(s) Oral two times a day  calcium carbonate 1250 mG  + Vitamin D (OsCal 500 + D) 1 Tablet(s) Oral three times a day  celecoxib 200 milliGRAM(s) Oral daily  chlorhexidine 2% Cloths 1 Application(s) Topical daily  chlorhexidine 4% Liquid 1 Application(s) Topical <User Schedule>  DAPTOmycin IVPB 440 milliGRAM(s) IV Intermittent every 24 hours  folic acid 1 milliGRAM(s) Oral daily  gabapentin 800 milliGRAM(s) Oral three times a day  lactated ringers. 1000 milliLiter(s) (100 mL/Hr) IV Continuous <Continuous>  lactobacillus acidophilus 1 Tablet(s) Oral two times a day with meals  lidocaine   Patch 1 Patch Transdermal every 24 hours  lidocaine   Patch 1 Patch Transdermal every 24 hours  methadone    Tablet 60 milliGRAM(s) Oral two times a day  multivitamin 1 Tablet(s) Oral daily  naloxegol 25 milliGRAM(s) Oral before breakfast  nicotine -  14 mG/24Hr(s) Patch 1 patch Transdermal daily  nystatin Powder 1 Application(s) Topical three times a day  pantoprazole    Tablet 40 milliGRAM(s) Oral before breakfast  polyethylene glycol 3350 17 Gram(s) Oral two times a day  senna 2 Tablet(s) Oral at bedtime    MEDICATIONS  (PRN):  acetaminophen   Tablet .. 650 milliGRAM(s) Oral every 6 hours PRN Mild Pain (1 - 3)  acetaminophen   Tablet .. 650 milliGRAM(s) Oral every 6 hours PRN Temp greater or equal to 38C (100.4F)  bisacodyl Suppository 10 milliGRAM(s) Rectal daily PRN Constipation  cyclobenzaprine 10 milliGRAM(s) Oral three times a day PRN Muscle Spasm  diazepam    Tablet 5 milliGRAM(s) Oral every 8 hours PRN back spasms  diphenhydrAMINE 25 milliGRAM(s) Oral every 4 hours PRN Rash and/or Itching  oxyCODONE    IR 30 milliGRAM(s) Oral every 4 hours PRN Moderate Pain (4 - 6)    Pertinent Labs: 05-08 Na 140 mmol/L Glu 124 mg/dL<H> K+ 4.2 mmol/L Cr 0.54 mg/dL BUN 33 mg/dL<H> Phos n/a   Alb 3.0(5/7)  PAB n/a   Hgb 10.4 g/dL<L> Hct 34.0 %<L> HgA1C n/a    Glucose, Serum: 124 mg/dL <H>, WBC=14.66(5/8)   24Hr FS:  Skin: Lt heel stage I    Estimated Needs:   [ ] no change since previous assessment (4/17/19)  [ ] recalculated:     Previous Nutrition Diagnosis:   [ ] Inadequate Energy Intake [x ]Inadequate Oral Intake [ ] Excessive Energy Intake   [ ] Underweight [ ] Increased Nutrient Needs [ ] Overweight/Obesity   [ ] Altered GI Function [ ] Unintended Weight Loss [ ] Food & Nutrition Related Knowledge Deficit [ ] severe Malnutrition [ ] moderate malnutrition    Etiology: Back pain  Signs/Symptoms: Po intake 50-75% meals    Nutrition Diagnosis is [ ] ongoing  [ ] resolved  [ ] improved  [ ] not applicable   Previous Goal: Pt to consume >75% meals; not met  Pt to consume >75% supplements; met      New Nutrition Diagnosis: [x ] not applicable       Interventions:   Recommend  [x ] Continue: NPO & resume po diet when medically feasible Regular/Ensure Enlive 3 x day(1050kcal & 60gm protein)  [ ] Change Diet To:  [ ] Nutrition Supplement:  [ ] Nutrition Support:  [x ] Other: Continue to cater to food preferences    Monitoring and Evaluation:   [x ] PO intake [ x ] Tolerance to diet prescription [ x ] weights [ x ] labs[ x ] follow up per protocol  [ ] other:

## 2019-05-08 NOTE — PROGRESS NOTE ADULT - ASSESSMENT
43 F w/ history of IVDA, alcohol abuse, hx of pancreatitis, alcohol hepatitis, alcohol withdrawal, left frontoparietal subdural hematoma 2008 without need for neurosurgical intervention presented with severe sepsis with septic shock secondary to MRSA bacteremia w/ RLL PNA, UTI, endocarditis on LIZ and EFRAIN that has resolved and was diagnosis w/ HCV. Pt was initially intubated due to respiratory failure and put on pressors in ICU. She was extubated on 2/25 and transferred from ICU the following day. Pt had a C4-5 ACDF with irrigation and debridement on 3/20 due to spinal abscess and osteo and was kept intubated post procedure and transferred again to ICU, where she was extubated on 3/21 and transferred back to the med service the following day. HCV- Outpatient follow up for HCV     Septic arthritis involving BL Knees/hips;  - MRI of bilateral hips and knees were done demonstrating effusions and an abscess located in the right rectus femoris.   - Bilateral knees were aspirated by orthopedic team. on 3/28/19 ngtd   bilateral hips aspirated by orthopedic team on 3/29/19  ngtd but cppd crystals seen c/w psuedo gout  - as per ortho-No plan for orthopaedic operative intervention         Pseudogout: s/p  colchicine       Endocarditis of tricuspid valve  - status post thoracentesis on 3/20  - repeat BC  blood cx  ngtd     - repeat echo showed a pedunculated mobile mass seen adherent to lateral leaflet of the tricuspid valve w/ no interval changes noted in valve structure or regurgitation  - repeated echo from 4/2/19 shows stable TR vegetation  - blood cx negative       Spinal osteo, abscess and cord compression    - CT of head and neck showed disc space narrowing and endplate irregularity at C4-5 which may represent typical degenerative change vs discitis   - MRI of the cervical spine w/ contrast showed C2-C7 discitis/osteomyelitis with a small right of midline ventral epidural abscess at C2-C7  - status post s/p C4-5 ACDF with irrigation and debridement on 3/20  - MRI of the thoracolumbar spine showed discitis/osteomyelitis at T11-T12 with paravertebral abscess, as well as discitis/osteomyelitis C6--T1, T9-T10, T10-T11, L4-L5, and L5-S1, and a phlegmon/early abscess dorsolaterally within the lumbar canal at the level of L4-L5. There was also a septic arthritis of  the left sternoclavicular joint space. There was also disc herniation at the L4-L5 level with contact and probable impingement of the descending right-sided nerve roots    Pt was seen ambulating with mom  to bathroom per nursing on 5/4/19 WITHOUT TLSO brace and nearly fell then complains of excruciating back pain after and now worsening weakness in legs . Repeat MRI  shows worsening thoracic abscess and phlegmon with cord compression and veterbral compression fx from T11-T12,  will need extensive washout and fusion. Ortho plans for OR  today   - will place milan for any urinary retention. Pt needs no further work up  for urgent procedure     pain control   - discussed pain regimen with Dr. Silva on 5/7 and gave recommendations   - cont celebrex, gabapentin,. increased oxycodone to 30 mg mg q4 and dc  Oxycontin to 40 mg bid. start methadone 60 mg bid. Plan to is to wean off oxycodone and only be on methadone after surgery   - monitor for respiratory depression     Anemia due multiple issues, surgery , blood draws,   poor nutrition and infection  - status post a few units of blood during this extensive hospital stay last one on 3/20/19  - c/w folic acid      constipation:   - doing well   - c.w  movantic   - add dulcolax prn

## 2019-05-08 NOTE — PROGRESS NOTE ADULT - SUBJECTIVE AND OBJECTIVE BOX
Patient is a 43y old  Female who presents with a chief complaint of AMS (06 May 2019 16:17)      INTERVAL HPI / OVERNIGHT EVENTS: back pain continues ,weakness of both legs ,cant move them since friday afternoon ,cant control micturition since friday -saturday ,needs diaper. No fever or chills.  Events over the weekend noted ,says once while she was sitting in the chair  few days ago an  aid pulled her up to get back to bed and since then she noted sudden weakness of legs     MEDICATIONS  (STANDING):  ascorbic acid 500 milliGRAM(s) Oral two times a day  calcium carbonate 1250 mG  + Vitamin D (OsCal 500 + D) 1 Tablet(s) Oral three times a day  celecoxib 200 milliGRAM(s) Oral daily  chlorhexidine 2% Cloths 1 Application(s) Topical daily  chlorhexidine 4% Liquid 1 Application(s) Topical <User Schedule>  DAPTOmycin IVPB 440 milliGRAM(s) IV Intermittent every 24 hours  dexamethasone  IVPB 10 milliGRAM(s) IV Intermittent every 8 hours  folic acid 1 milliGRAM(s) Oral daily  gabapentin 800 milliGRAM(s) Oral three times a day  lactobacillus acidophilus 1 Tablet(s) Oral two times a day with meals  lidocaine   Patch 1 Patch Transdermal every 24 hours  lidocaine   Patch 1 Patch Transdermal every 24 hours  methadone    Tablet 40 milliGRAM(s) Oral daily  multivitamin 1 Tablet(s) Oral daily  naloxegol 25 milliGRAM(s) Oral before breakfast  nicotine -  14 mG/24Hr(s) Patch 1 patch Transdermal daily  nystatin Powder 1 Application(s) Topical three times a day  oxyCODONE  ER Tablet 40 milliGRAM(s) Oral every 12 hours  pantoprazole    Tablet 40 milliGRAM(s) Oral before breakfast  polyethylene glycol 3350 17 Gram(s) Oral two times a day  senna 2 Tablet(s) Oral at bedtime    MEDICATIONS  (PRN):  acetaminophen   Tablet .. 650 milliGRAM(s) Oral every 6 hours PRN Mild Pain (1 - 3)  acetaminophen   Tablet .. 650 milliGRAM(s) Oral every 6 hours PRN Temp greater or equal to 38C (100.4F)  cyclobenzaprine 10 milliGRAM(s) Oral three times a day PRN Muscle Spasm  diazepam    Tablet 5 milliGRAM(s) Oral every 8 hours PRN back spasms  diphenhydrAMINE 25 milliGRAM(s) Oral every 4 hours PRN Rash and/or Itching  oxyCODONE    IR 15 milliGRAM(s) Oral every 4 hours PRN Moderate Pain (4 - 6)      Vital Signs Last 24 Hrs  Tmax; afebrile      Review of systems:  General : no fever /chills,fatigue  CVS : no chest pain, palpitations  Lungs : no shortness of breath, cough  GI : no abdominal pain,vomiting, diarrhea   : no dysuria,hematuria        PHYSICAL EXAM:  General :NAD, back pain +, neck collar  Constitutional:  well-groomed, well-developed  Respiratory: CTAB/L  Cardiovascular: S1 and S2, RRR, no M/G/R  Gastrointestinal: BS+, soft, NT/ND  Extremities: No peripheral edema  Vascular: 2+ peripheral pulses  Skin: No rashes  neuro : Leg strength 2/5       LABS:                                       10.4   14.66 )-----------( 336      ( 08 May 2019 05:53 )             34.0     05-08    140  |  105  |  33<H>  ----------------------------<  124<H>  4.2   |  28  |  0.54              MICROBIOLOGY:  RECENT CULTURES:        RADIOLOGY & ADDITIONAL STUDIES:  ALIGNMENT:  Straightening in relation deformity is again noted at the   T11-T12 level with an anterolisthesis. There is underlying scoliosis.  VERTEBRAL BODIES:  Again noted are compression deformities of T11 and T12   along with a inflammatory changes within the disc space and vertebral   bodies. The other thoracic and the lumbar vertebral bodies appear to be   intact. There is worsening collapse of T11 and T12 as compared to the   prior study  DISC SPACES:  There is a diffuse abnormality at T11-T12. There is some   moderate compression of both vertebral bodies with fluid within the disc   space. There is cephalad phlegmon posteriorly within T11 and T12, with   abnormal T2 signal. There is cord compression due to moderate canal   narrowing. There has been interval worsening as compared to theprior   study. With the ST[facility] imaging, there is an extensive T2 abnormality. There   are additional abnormalities in the posterior T10 vertebral body. There   is prevertebral and paraspinal phlegmon and fluid, and a disc space   collection is extensive. There is a loculated abscess in the anterior   soft tissues on the right. With contrast demonstration, multiple disc   space abscesses are suggested along with extensive phlegmon in the   prevertebral paraspinal posterior soft tissues. There is also epidural   phlegmon in the lower thoracic canal, extending from the upper T10 level,   to the L1 level. The thoracic study suggests superior extent to the T9   level, along with low level inflammatory changes in the posterior T9 as   well as the posterior T10 vertebral bodies.  SPINAL CORD:  There is low-level cord edema, and there is subtle   leptomeningeal enhancement in the lower thoracic region which may be   reactive.    There is extensive enhancement in the posterior paraspinal musculature  suggesting myositis along with deep soft tissue extension of the   inflammatory process.    IMPRESSION:          1. Extensive disc space infection and osteomyelitis at the T11-T12 level,   with moderate compression deformities of both vertebrae. Extensive   epidural phlegmon from T9 to L1 with cord compression noted. In   conjunction there is cord edema and changes of myelopathy. There is   low-level intradural enhancement as well, which may be reactive.  2. There may be low level osteomyelitis in  the posterior T9 and T10   vertebral bodies as well, along with extensive inflammatory changes in   the prevertebral and posterior soft tissues throughout the lower thoracic   and lumbar region.

## 2019-05-08 NOTE — BRIEF OPERATIVE NOTE - NSICDXBRIEFPROCEDURE_GEN_ALL_CORE_FT
PROCEDURES:  Fusion of 7 to 12 spinal segments by posterior approach 08-May-2019 21:31:15  Santosh Lopez  Fusion of 6 or less spinal segments by posterior approach 08-May-2019 21:30:35  Santosh Lopez
PROCEDURES:  Anterior cervical discectomy with fusion (ACDF) 20-Mar-2019 23:18:38  Jacinto Kim

## 2019-05-08 NOTE — PROGRESS NOTE ADULT - SUBJECTIVE AND OBJECTIVE BOX
Pt S/E at bedside, no acute events overnight, pain controlled, denies CP, SOB, N, V, Palpitations. OR today.    Vital Signs Last 24 Hrs  T(C): 36.3 (08 May 2019 04:54), Max: 36.6 (07 May 2019 06:12)  T(F): 97.3 (08 May 2019 04:54), Max: 97.8 (07 May 2019 06:12)  HR: 87 (08 May 2019 04:54) (76 - 87)  BP: 119/74 (08 May 2019 04:54) (115/80 - 131/-)  BP(mean): --  RR: 18 (08 May 2019 04:54) (16 - 18)  SpO2: 97% (08 May 2019 04:54) (96% - 97%)    Spine PE:  Skin intact  No gross deformity  No midline TTP C/T/L/S spine  No bony step offs  + thoracolumbar paraspinal muscle TTP diffusely, with mild hypertonicity  Negative Clonus  Negative babinski  Negative olmstead    Motor:            ElbowFlex    WristExt   ElbowExt   FingerFlex   FingerAbd     R            4/5                4/5            4/5             4/5              4/5  L             4/5               4/5             4/5             4/5              4/5            HipFlex   HipExt   KneeFlex   KneeExt   AnkleDorsi   AnklePlantar   HaluxDorsi   HaluxPlantar  R        2/5        2/5            3/5            4/5             3/5                 4/5           4/5                4/5  L         2/5        2/5            3/5            4/5             3/5                 4/5          4/5                4/5    Sensory:            C5         C6         C7      C8       T1        (0=absent, 1=impaired, 2=normal, NT=not testable)  R        2          2              2        2         2  L         2          2              2        2         2               L2          L3         L4      L5       S1         (0=absent, 1=impaired, 2=normal, NT=not testable)  R          2          2              2        2         2  L          2          2              2        2         2

## 2019-05-08 NOTE — BRIEF OPERATIVE NOTE - NSICDXBRIEFPREOP_GEN_ALL_CORE_FT
PRE-OP DIAGNOSIS:  Osteomyelitis 20-Mar-2019 23:19:07  Jacinto Kmi
PRE-OP DIAGNOSIS:  Osteomyelitis 20-Mar-2019 23:19:07  Jacinto Kim

## 2019-05-08 NOTE — PROGRESS NOTE ADULT - PROBLEM SELECTOR PLAN 1
with T9-T12 OM ,diskitis ,,phlegmon, cord compression in the New MRI from Monday  Ortho  aware ,planned for surgery today  on decadron sec to cord compression  cont daptomycin  leukocytosis sec to decadron ,no fever etc

## 2019-05-09 LAB
ANION GAP SERPL CALC-SCNC: 10 MMOL/L — SIGNIFICANT CHANGE UP (ref 5–17)
ANION GAP SERPL CALC-SCNC: 7 MMOL/L — SIGNIFICANT CHANGE UP (ref 5–17)
ANION GAP SERPL CALC-SCNC: 7 MMOL/L — SIGNIFICANT CHANGE UP (ref 5–17)
BASE EXCESS BLDA CALC-SCNC: -5 MMOL/L — LOW (ref -2–2)
BASOPHILS # BLD AUTO: 0.01 K/UL — SIGNIFICANT CHANGE UP (ref 0–0.2)
BASOPHILS NFR BLD AUTO: 0.1 % — SIGNIFICANT CHANGE UP (ref 0–2)
BLOOD GAS COMMENTS: SIGNIFICANT CHANGE UP
BLOOD GAS SOURCE: SIGNIFICANT CHANGE UP
BUN SERPL-MCNC: 30 MG/DL — HIGH (ref 7–23)
BUN SERPL-MCNC: 31 MG/DL — HIGH (ref 7–23)
BUN SERPL-MCNC: 34 MG/DL — HIGH (ref 7–23)
CALCIUM SERPL-MCNC: 9 MG/DL — SIGNIFICANT CHANGE UP (ref 8.5–10.1)
CALCIUM SERPL-MCNC: 9.2 MG/DL — SIGNIFICANT CHANGE UP (ref 8.5–10.1)
CALCIUM SERPL-MCNC: 9.7 MG/DL — SIGNIFICANT CHANGE UP (ref 8.5–10.1)
CHLORIDE SERPL-SCNC: 104 MMOL/L — SIGNIFICANT CHANGE UP (ref 96–108)
CHLORIDE SERPL-SCNC: 104 MMOL/L — SIGNIFICANT CHANGE UP (ref 96–108)
CHLORIDE SERPL-SCNC: 105 MMOL/L — SIGNIFICANT CHANGE UP (ref 96–108)
CO2 SERPL-SCNC: 26 MMOL/L — SIGNIFICANT CHANGE UP (ref 22–31)
CO2 SERPL-SCNC: 28 MMOL/L — SIGNIFICANT CHANGE UP (ref 22–31)
CO2 SERPL-SCNC: 28 MMOL/L — SIGNIFICANT CHANGE UP (ref 22–31)
CREAT SERPL-MCNC: 0.55 MG/DL — SIGNIFICANT CHANGE UP (ref 0.5–1.3)
CREAT SERPL-MCNC: 0.57 MG/DL — SIGNIFICANT CHANGE UP (ref 0.5–1.3)
CREAT SERPL-MCNC: 0.65 MG/DL — SIGNIFICANT CHANGE UP (ref 0.5–1.3)
EOSINOPHIL # BLD AUTO: 0 K/UL — SIGNIFICANT CHANGE UP (ref 0–0.5)
EOSINOPHIL NFR BLD AUTO: 0 % — SIGNIFICANT CHANGE UP (ref 0–6)
GLUCOSE SERPL-MCNC: 178 MG/DL — HIGH (ref 70–99)
GLUCOSE SERPL-MCNC: 180 MG/DL — HIGH (ref 70–99)
GLUCOSE SERPL-MCNC: 188 MG/DL — HIGH (ref 70–99)
HCO3 BLDA-SCNC: 19 MMOL/L — LOW (ref 21–29)
HCT VFR BLD CALC: 27.7 % — LOW (ref 34.5–45)
HCT VFR BLD CALC: 27.9 % — LOW (ref 34.5–45)
HCT VFR BLD CALC: 28.3 % — LOW (ref 34.5–45)
HCT VFR BLD CALC: 29.8 % — LOW (ref 34.5–45)
HGB BLD-MCNC: 8.5 G/DL — LOW (ref 11.5–15.5)
HGB BLD-MCNC: 8.7 G/DL — LOW (ref 11.5–15.5)
HGB BLD-MCNC: 8.9 G/DL — LOW (ref 11.5–15.5)
HGB BLD-MCNC: 9.2 G/DL — LOW (ref 11.5–15.5)
HOROWITZ INDEX BLDA+IHG-RTO: 50 — SIGNIFICANT CHANGE UP
IMM GRANULOCYTES NFR BLD AUTO: 0.5 % — SIGNIFICANT CHANGE UP (ref 0–1.5)
IMM GRANULOCYTES NFR BLD AUTO: 0.5 % — SIGNIFICANT CHANGE UP (ref 0–1.5)
IMM GRANULOCYTES NFR BLD AUTO: 0.6 % — SIGNIFICANT CHANGE UP (ref 0–1.5)
LYMPHOCYTES # BLD AUTO: 1.09 K/UL — SIGNIFICANT CHANGE UP (ref 1–3.3)
LYMPHOCYTES # BLD AUTO: 1.16 K/UL — SIGNIFICANT CHANGE UP (ref 1–3.3)
LYMPHOCYTES # BLD AUTO: 1.17 K/UL — SIGNIFICANT CHANGE UP (ref 1–3.3)
LYMPHOCYTES # BLD AUTO: 11.4 % — LOW (ref 13–44)
LYMPHOCYTES # BLD AUTO: 14.4 % — SIGNIFICANT CHANGE UP (ref 13–44)
LYMPHOCYTES # BLD AUTO: 14.5 % — SIGNIFICANT CHANGE UP (ref 13–44)
MAGNESIUM SERPL-MCNC: 1.9 MG/DL — SIGNIFICANT CHANGE UP (ref 1.6–2.6)
MCHC RBC-ENTMCNC: 24.8 PG — LOW (ref 27–34)
MCHC RBC-ENTMCNC: 25 PG — LOW (ref 27–34)
MCHC RBC-ENTMCNC: 25.1 PG — LOW (ref 27–34)
MCHC RBC-ENTMCNC: 25.5 PG — LOW (ref 27–34)
MCHC RBC-ENTMCNC: 30.7 GM/DL — LOW (ref 32–36)
MCHC RBC-ENTMCNC: 30.7 GM/DL — LOW (ref 32–36)
MCHC RBC-ENTMCNC: 30.9 GM/DL — LOW (ref 32–36)
MCHC RBC-ENTMCNC: 31.9 GM/DL — LOW (ref 32–36)
MCV RBC AUTO: 79.9 FL — LOW (ref 80–100)
MCV RBC AUTO: 80.8 FL — SIGNIFICANT CHANGE UP (ref 80–100)
MCV RBC AUTO: 81.3 FL — SIGNIFICANT CHANGE UP (ref 80–100)
MCV RBC AUTO: 81.4 FL — SIGNIFICANT CHANGE UP (ref 80–100)
MONOCYTES # BLD AUTO: 0.11 K/UL — SIGNIFICANT CHANGE UP (ref 0–0.9)
MONOCYTES # BLD AUTO: 0.11 K/UL — SIGNIFICANT CHANGE UP (ref 0–0.9)
MONOCYTES # BLD AUTO: 0.13 K/UL — SIGNIFICANT CHANGE UP (ref 0–0.9)
MONOCYTES NFR BLD AUTO: 1.1 % — LOW (ref 2–14)
MONOCYTES NFR BLD AUTO: 1.3 % — LOW (ref 2–14)
MONOCYTES NFR BLD AUTO: 1.6 % — LOW (ref 2–14)
NEUTROPHILS # BLD AUTO: 6.65 K/UL — SIGNIFICANT CHANGE UP (ref 1.8–7.4)
NEUTROPHILS # BLD AUTO: 6.82 K/UL — SIGNIFICANT CHANGE UP (ref 1.8–7.4)
NEUTROPHILS # BLD AUTO: 8.32 K/UL — HIGH (ref 1.8–7.4)
NEUTROPHILS NFR BLD AUTO: 83.2 % — HIGH (ref 43–77)
NEUTROPHILS NFR BLD AUTO: 83.7 % — HIGH (ref 43–77)
NEUTROPHILS NFR BLD AUTO: 86.9 % — HIGH (ref 43–77)
NRBC # BLD: 0 /100 WBCS — SIGNIFICANT CHANGE UP (ref 0–0)
PCO2 BLDA: 32 MMHG — SIGNIFICANT CHANGE UP (ref 32–46)
PH BLD: 7.39 — SIGNIFICANT CHANGE UP (ref 7.35–7.45)
PHOSPHATE SERPL-MCNC: 5.8 MG/DL — HIGH (ref 2.5–4.5)
PLATELET # BLD AUTO: 255 K/UL — SIGNIFICANT CHANGE UP (ref 150–400)
PLATELET # BLD AUTO: 269 K/UL — SIGNIFICANT CHANGE UP (ref 150–400)
PLATELET # BLD AUTO: 283 K/UL — SIGNIFICANT CHANGE UP (ref 150–400)
PLATELET # BLD AUTO: 284 K/UL — SIGNIFICANT CHANGE UP (ref 150–400)
PO2 BLDA: 198 MMHG — HIGH (ref 74–108)
POTASSIUM SERPL-MCNC: 4.6 MMOL/L — SIGNIFICANT CHANGE UP (ref 3.5–5.3)
POTASSIUM SERPL-MCNC: 4.6 MMOL/L — SIGNIFICANT CHANGE UP (ref 3.5–5.3)
POTASSIUM SERPL-MCNC: 5.2 MMOL/L — SIGNIFICANT CHANGE UP (ref 3.5–5.3)
POTASSIUM SERPL-SCNC: 4.6 MMOL/L — SIGNIFICANT CHANGE UP (ref 3.5–5.3)
POTASSIUM SERPL-SCNC: 4.6 MMOL/L — SIGNIFICANT CHANGE UP (ref 3.5–5.3)
POTASSIUM SERPL-SCNC: 5.2 MMOL/L — SIGNIFICANT CHANGE UP (ref 3.5–5.3)
RBC # BLD: 3.43 M/UL — LOW (ref 3.8–5.2)
RBC # BLD: 3.48 M/UL — LOW (ref 3.8–5.2)
RBC # BLD: 3.49 M/UL — LOW (ref 3.8–5.2)
RBC # BLD: 3.66 M/UL — LOW (ref 3.8–5.2)
RBC # FLD: 14.8 % — HIGH (ref 10.3–14.5)
RBC # FLD: 14.9 % — HIGH (ref 10.3–14.5)
RBC # FLD: 14.9 % — HIGH (ref 10.3–14.5)
RBC # FLD: 15 % — HIGH (ref 10.3–14.5)
SAO2 % BLDA: 100 % — HIGH (ref 92–96)
SODIUM SERPL-SCNC: 139 MMOL/L — SIGNIFICANT CHANGE UP (ref 135–145)
SODIUM SERPL-SCNC: 140 MMOL/L — SIGNIFICANT CHANGE UP (ref 135–145)
SODIUM SERPL-SCNC: 140 MMOL/L — SIGNIFICANT CHANGE UP (ref 135–145)
WBC # BLD: 10.83 K/UL — HIGH (ref 3.8–10.5)
WBC # BLD: 8 K/UL — SIGNIFICANT CHANGE UP (ref 3.8–10.5)
WBC # BLD: 8.15 K/UL — SIGNIFICANT CHANGE UP (ref 3.8–10.5)
WBC # BLD: 9.58 K/UL — SIGNIFICANT CHANGE UP (ref 3.8–10.5)
WBC # FLD AUTO: 10.83 K/UL — HIGH (ref 3.8–10.5)
WBC # FLD AUTO: 8 K/UL — SIGNIFICANT CHANGE UP (ref 3.8–10.5)
WBC # FLD AUTO: 8.15 K/UL — SIGNIFICANT CHANGE UP (ref 3.8–10.5)
WBC # FLD AUTO: 9.58 K/UL — SIGNIFICANT CHANGE UP (ref 3.8–10.5)

## 2019-05-09 PROCEDURE — 71045 X-RAY EXAM CHEST 1 VIEW: CPT | Mod: 26

## 2019-05-09 PROCEDURE — 99233 SBSQ HOSP IP/OBS HIGH 50: CPT

## 2019-05-09 PROCEDURE — 99291 CRITICAL CARE FIRST HOUR: CPT

## 2019-05-09 RX ORDER — NOREPINEPHRINE BITARTRATE/D5W 8 MG/250ML
0.05 PLASTIC BAG, INJECTION (ML) INTRAVENOUS
Qty: 8 | Refills: 0 | Status: DISCONTINUED | OUTPATIENT
Start: 2019-05-09 | End: 2019-05-09

## 2019-05-09 RX ORDER — SODIUM CHLORIDE 9 MG/ML
1000 INJECTION, SOLUTION INTRAVENOUS ONCE
Refills: 0 | Status: COMPLETED | OUTPATIENT
Start: 2019-05-09 | End: 2019-05-09

## 2019-05-09 RX ORDER — PHENYLEPHRINE HYDROCHLORIDE 10 MG/ML
1 INJECTION INTRAVENOUS
Qty: 40 | Refills: 0 | Status: DISCONTINUED | OUTPATIENT
Start: 2019-05-09 | End: 2019-05-09

## 2019-05-09 RX ORDER — PHENYLEPHRINE HYDROCHLORIDE 10 MG/ML
1 INJECTION INTRAVENOUS
Qty: 40 | Refills: 0 | Status: DISCONTINUED | OUTPATIENT
Start: 2019-05-09 | End: 2019-05-10

## 2019-05-09 RX ORDER — DEXAMETHASONE 0.5 MG/5ML
10 ELIXIR ORAL EVERY 8 HOURS
Refills: 0 | Status: DISCONTINUED | OUTPATIENT
Start: 2019-05-09 | End: 2019-05-09

## 2019-05-09 RX ORDER — SODIUM CHLORIDE 9 MG/ML
500 INJECTION, SOLUTION INTRAVENOUS ONCE
Refills: 0 | Status: COMPLETED | OUTPATIENT
Start: 2019-05-09 | End: 2019-05-09

## 2019-05-09 RX ORDER — DEXMEDETOMIDINE HYDROCHLORIDE IN 0.9% SODIUM CHLORIDE 4 UG/ML
0.3 INJECTION INTRAVENOUS
Qty: 200 | Refills: 0 | Status: DISCONTINUED | OUTPATIENT
Start: 2019-05-09 | End: 2019-05-09

## 2019-05-09 RX ORDER — DEXAMETHASONE 0.5 MG/5ML
10 ELIXIR ORAL EVERY 8 HOURS
Refills: 0 | Status: COMPLETED | OUTPATIENT
Start: 2019-05-09 | End: 2019-05-09

## 2019-05-09 RX ORDER — ACETAMINOPHEN 500 MG
1000 TABLET ORAL ONCE
Refills: 0 | Status: COMPLETED | OUTPATIENT
Start: 2019-05-09 | End: 2019-05-09

## 2019-05-09 RX ADMIN — SODIUM CHLORIDE 1000 MILLILITER(S): 9 INJECTION, SOLUTION INTRAVENOUS at 00:30

## 2019-05-09 RX ADMIN — LIDOCAINE 1 PATCH: 4 CREAM TOPICAL at 18:09

## 2019-05-09 RX ADMIN — Medication 102 MILLIGRAM(S): at 13:56

## 2019-05-09 RX ADMIN — Medication 400 MILLIGRAM(S): at 13:27

## 2019-05-09 RX ADMIN — Medication 6.92 MICROGRAM(S)/KG/MIN: at 02:20

## 2019-05-09 RX ADMIN — NYSTATIN CREAM 1 APPLICATION(S): 100000 CREAM TOPICAL at 05:45

## 2019-05-09 RX ADMIN — Medication 1 PATCH: at 13:58

## 2019-05-09 RX ADMIN — LIDOCAINE 1 PATCH: 4 CREAM TOPICAL at 06:10

## 2019-05-09 RX ADMIN — GABAPENTIN 800 MILLIGRAM(S): 400 CAPSULE ORAL at 13:17

## 2019-05-09 RX ADMIN — Medication 1000 MILLIGRAM(S): at 13:54

## 2019-05-09 RX ADMIN — NYSTATIN CREAM 1 APPLICATION(S): 100000 CREAM TOPICAL at 13:24

## 2019-05-09 RX ADMIN — Medication 1 TABLET(S): at 13:17

## 2019-05-09 RX ADMIN — Medication 1000 MILLIGRAM(S): at 22:21

## 2019-05-09 RX ADMIN — SENNA PLUS 2 TABLET(S): 8.6 TABLET ORAL at 22:21

## 2019-05-09 RX ADMIN — Medication 1 TABLET(S): at 13:16

## 2019-05-09 RX ADMIN — Medication 1000 MILLIGRAM(S): at 23:00

## 2019-05-09 RX ADMIN — SODIUM CHLORIDE 100 MILLILITER(S): 9 INJECTION, SOLUTION INTRAVENOUS at 00:25

## 2019-05-09 RX ADMIN — NYSTATIN CREAM 1 APPLICATION(S): 100000 CREAM TOPICAL at 22:30

## 2019-05-09 RX ADMIN — Medication 1 PATCH: at 13:57

## 2019-05-09 RX ADMIN — LIDOCAINE 1 PATCH: 4 CREAM TOPICAL at 19:00

## 2019-05-09 RX ADMIN — LIDOCAINE 1 PATCH: 4 CREAM TOPICAL at 07:00

## 2019-05-09 RX ADMIN — Medication 1 PATCH: at 19:30

## 2019-05-09 RX ADMIN — DAPTOMYCIN 117.6 MILLIGRAM(S): 500 INJECTION, POWDER, LYOPHILIZED, FOR SOLUTION INTRAVENOUS at 22:21

## 2019-05-09 RX ADMIN — PHENYLEPHRINE HYDROCHLORIDE 27.68 MICROGRAM(S)/KG/MIN: 10 INJECTION INTRAVENOUS at 14:28

## 2019-05-09 RX ADMIN — SODIUM CHLORIDE 1000 MILLILITER(S): 9 INJECTION, SOLUTION INTRAVENOUS at 13:54

## 2019-05-09 RX ADMIN — SODIUM CHLORIDE 500 MILLILITER(S): 9 INJECTION, SOLUTION INTRAVENOUS at 03:25

## 2019-05-09 RX ADMIN — GABAPENTIN 800 MILLIGRAM(S): 400 CAPSULE ORAL at 22:21

## 2019-05-09 RX ADMIN — Medication 102 MILLIGRAM(S): at 07:20

## 2019-05-09 RX ADMIN — Medication 1 TABLET(S): at 22:57

## 2019-05-09 RX ADMIN — Medication 1 TABLET(S): at 22:21

## 2019-05-09 RX ADMIN — Medication 102 MILLIGRAM(S): at 01:20

## 2019-05-09 NOTE — CHART NOTE - NSCHARTNOTEFT_GEN_A_CORE
Patients MAP dropped down to 72-77 after 1L of fluids so levophed 8/250 started at 0.05mcg/kg/min, titrated up to 0.08mcg/kg/min and suddenly heart rate dropped down from 70-80s to low 40s. Levophed stopped immediately, and heart rate slowly came up to the 70's.    Patient ordered another 1L of LR  if MAP still remain <80 will start phenylephrine drip.    Ortho wants 1 unit of RBC, ordered. Patients MAP dropped down to 72-77 after 1L of fluids so levophed 8/250 started at 0.05mcg/kg/min, titrated up to 0.08mcg/kg/min and suddenly heart rate dropped down from 70-80s to low 40s. Levophed stopped immediately, and heart rate slowly came up to the 70's.    Patient ordered another 1L of LR  if MAP still remain <80 will start phenylephrine drip.    Ortho wants 1 unit of RBC, ordered.    neuro exam: remain intact

## 2019-05-09 NOTE — AIRWAY REMOVAL NOTE  ADULT & PEDS - RESPIRATORY EXPANSION/ACCESSORY MUSCLES/RETRACTIONS
no use of accessory muscles
no retractions/expansion symmetric/no use of accessory muscles
no use of accessory muscles/expansion symmetric

## 2019-05-09 NOTE — CONSULT NOTE ADULT - SUBJECTIVE AND OBJECTIVE BOX
Patient is a 43y old  Female who presents with a chief complaint of AMS (08 May 2019 15:20)      Patient is a 42 Y/O female w/ PMHx of IV heroin abuse brought in by EMS for lethargy and cough. As per EMS, pt was found laying in bed at home, lethargic however able to follow some commands. EMS reports that pt was attempting to detox from heroin, and last known use was 3 days prior to ED presentation. As per ED, pts boyfriend reports a hx of + pt cough productive of white sputum and a fall down a flight of stairs 2/16. Labs showed serum lactate of 7.2, BUN/Cr 124/6.42, and WBC of of 11.93. Tmax 102.4, urine + for cocaine, opiates, nitrites and many bacteria. ICU called for further evaluation. (17 Feb 2019 22:56)      PAST MEDICAL & SURGICAL HISTORY:  ETOH abuse  Drug abuse  C Section    FAMILY HISTORY:    Social History: Allergies    penicillin (Short breath; Hives)    Intolerances        MEDICATIONS  (STANDING):  acetaminophen   Tablet .. 1000 milliGRAM(s) Oral every 8 hours  calcium carbonate 1250 mG  + Vitamin D (OsCal 500 + D) 1 Tablet(s) Oral three times a day  chlorhexidine 4% Liquid 1 Application(s) Topical <User Schedule>  DAPTOmycin IVPB 440 milliGRAM(s) IV Intermittent every 24 hours  dexamethasone  Injectable 10 milliGRAM(s) IV Push every 8 hours  dexmedetomidine Infusion 0.3 MICROgram(s)/kG/Hr (5.535 mL/Hr) IV Continuous <Continuous>  gabapentin 800 milliGRAM(s) Oral three times a day  lactated ringers. 1000 milliLiter(s) (100 mL/Hr) IV Continuous <Continuous>  lactobacillus acidophilus 1 Tablet(s) Oral two times a day with meals  lidocaine   Patch 1 Patch Transdermal every 24 hours  lidocaine   Patch 1 Patch Transdermal every 24 hours  methadone    Tablet 60 milliGRAM(s) Oral two times a day  multivitamin 1 Tablet(s) Oral daily  naloxegol 25 milliGRAM(s) Oral before breakfast  nicotine -  14 mG/24Hr(s) Patch 1 patch Transdermal daily  nystatin Powder 1 Application(s) Topical three times a day  polyethylene glycol 3350 17 Gram(s) Oral two times a day  senna 2 Tablet(s) Oral at bedtime    MEDICATIONS  (PRN):  bisacodyl Suppository 10 milliGRAM(s) Rectal daily PRN Constipation  cyclobenzaprine 10 milliGRAM(s) Oral three times a day PRN Muscle Spasm  diazepam    Tablet 5 milliGRAM(s) Oral every 8 hours PRN Back Spasms  oxyCODONE    IR 30 milliGRAM(s) Oral every 4 hours PRN Moderate Pain (4 - 6)      REVIEW OF SYSTEMS:  unable to obtain, patient sedated on MV    PHYSICAL EXAM:    T(C): 36.8 (08 May 2019 11:28), Max: 37.3 (08 May 2019 04:32)  T(F): 98.2 (08 May 2019 11:28), Max: 99.1 (08 May 2019 04:32)  HR: 84 (08 May 2019 23:45) (84 - 89)  BP: 123/77 (08 May 2019 11:28) (106/73 - 123/77)  RR: 17 (08 May 2019 11:28) (16 - 18)  SpO2: 100% (08 May 2019 23:45) (95% - 100%)    GENERAL: intubated, on MV  EYES: EOMI, PERRLA, conjunctiva and sclera clear  NERVOUS SYSTEM: sedated on MV   CHEST/LUNG: CTA bilaterally; No rales, rhonchi, wheezing, or rubs  HEART: Regular rate and rhythm; No murmurs, rubs, or gallops  ABDOMEN: Soft, Nontender, Nondistended; Bowel sounds present  EXTREMITIES:  2+ Peripheral Pulses, No clubbing, cyanosis, or edema  SKIN: No rashes or lesions        LABS:                        10.4   14.66 )-----------( 336      ( 08 May 2019 05:53 )             34.0     05-08    140  |  105  |  33<H>  ----------------------------<  124<H>  4.2   |  28  |  0.54    Ca    10.0      08 May 2019 05:53    TPro  7.7  /  Alb  3.0<L>  /  TBili  0.3  /  DBili  x   /  AST  14<L>  /  ALT  31  /  AlkPhos  377<H>  05-07    PT/INR - ( 08 May 2019 05:53 )   PT: 10.3 sec;   INR: 0.92 ratio         PTT - ( 08 May 2019 05:53 )  PTT:24.1 sec Patient is a 43y old  Female who presents with a chief complaint of AMS (08 May 2019 15:20)      Patient is a 44 Y/O female w/ PMHx of IV heroin abuse brought in by EMS for lethargy and cough. As per EMS, pt was found laying in bed at home, lethargic however able to follow some commands. EMS reports that pt was attempting to detox from heroin, and last known use was 3 days prior to ED presentation. As per ED, pts boyfriend reports a hx of + pt cough productive of white sputum and a fall down a flight of stairs 2/16. Labs showed serum lactate of 7.2, BUN/Cr 124/6.42, and WBC of of 11.93. Tmax 102.4, urine + for cocaine, opiates, nitrites and many bacteria. ICU called for further evaluation. (17 Feb 2019 22:56)  Patient went for  T11/12 OM discitis with an epidural phlegmon from T9-L1 s/p T8-L2 Laminectomy and PSF POD #0, with EBL of around 600ml, 2L of fluids and1L of urine output, post-op patient was kept intubated and transferred to critical care for further management.        PAST MEDICAL & SURGICAL HISTORY:  ETOH abuse  Drug abuse  C Section    FAMILY HISTORY:    Social History: Allergies    penicillin (Short breath; Hives)    Intolerances        MEDICATIONS  (STANDING):  acetaminophen   Tablet .. 1000 milliGRAM(s) Oral every 8 hours  calcium carbonate 1250 mG  + Vitamin D (OsCal 500 + D) 1 Tablet(s) Oral three times a day  chlorhexidine 4% Liquid 1 Application(s) Topical <User Schedule>  DAPTOmycin IVPB 440 milliGRAM(s) IV Intermittent every 24 hours  dexamethasone  Injectable 10 milliGRAM(s) IV Push every 8 hours  dexmedetomidine Infusion 0.3 MICROgram(s)/kG/Hr (5.535 mL/Hr) IV Continuous <Continuous>  gabapentin 800 milliGRAM(s) Oral three times a day  lactated ringers. 1000 milliLiter(s) (100 mL/Hr) IV Continuous <Continuous>  lactobacillus acidophilus 1 Tablet(s) Oral two times a day with meals  lidocaine   Patch 1 Patch Transdermal every 24 hours  lidocaine   Patch 1 Patch Transdermal every 24 hours  methadone    Tablet 60 milliGRAM(s) Oral two times a day  multivitamin 1 Tablet(s) Oral daily  naloxegol 25 milliGRAM(s) Oral before breakfast  nicotine -  14 mG/24Hr(s) Patch 1 patch Transdermal daily  nystatin Powder 1 Application(s) Topical three times a day  polyethylene glycol 3350 17 Gram(s) Oral two times a day  senna 2 Tablet(s) Oral at bedtime    MEDICATIONS  (PRN):  bisacodyl Suppository 10 milliGRAM(s) Rectal daily PRN Constipation  cyclobenzaprine 10 milliGRAM(s) Oral three times a day PRN Muscle Spasm  diazepam    Tablet 5 milliGRAM(s) Oral every 8 hours PRN Back Spasms  oxyCODONE    IR 30 milliGRAM(s) Oral every 4 hours PRN Moderate Pain (4 - 6)      REVIEW OF SYSTEMS:  unable to obtain, patient sedated on MV    PHYSICAL EXAM:    T(C): 36.8 (08 May 2019 11:28), Max: 37.3 (08 May 2019 04:32)  T(F): 98.2 (08 May 2019 11:28), Max: 99.1 (08 May 2019 04:32)  HR: 84 (08 May 2019 23:45) (84 - 89)  BP: 123/77 (08 May 2019 11:28) (106/73 - 123/77)  RR: 17 (08 May 2019 11:28) (16 - 18)  SpO2: 100% (08 May 2019 23:45) (95% - 100%)    GENERAL: intubated, on MV  EYES: EOMI, PERRLA, conjunctiva and sclera clear  NERVOUS SYSTEM: sedated on MV   CHEST/LUNG: CTA bilaterally; No rales, rhonchi, wheezing, or rubs  HEART: Regular rate and rhythm; No murmurs, rubs, or gallops  ABDOMEN: Soft, Nontender, Nondistended; Bowel sounds present  EXTREMITIES:  2+ Peripheral Pulses, No clubbing, cyanosis, or edema          LABS:                        10.4   14.66 )-----------( 336      ( 08 May 2019 05:53 )             34.0     05-08    140  |  105  |  33<H>  ----------------------------<  124<H>  4.2   |  28  |  0.54    Ca    10.0      08 May 2019 05:53    TPro  7.7  /  Alb  3.0<L>  /  TBili  0.3  /  DBili  x   /  AST  14<L>  /  ALT  31  /  AlkPhos  377<H>  05-07    PT/INR - ( 08 May 2019 05:53 )   PT: 10.3 sec;   INR: 0.92 ratio         PTT - ( 08 May 2019 05:53 )  PTT:24.1 sec Patient is a 43y old  Female who presents with a chief complaint of AMS (08 May 2019 15:20)      Patient is a 42 Y/O female w/ PMHx of IV heroin abuse brought in by EMS for lethargy and cough. As per EMS, pt was found laying in bed at home, lethargic however able to follow some commands. EMS reports that pt was attempting to detox from heroin, and last known use was 3 days prior to ED presentation. As per ED, pts boyfriend reports a hx of + pt cough productive of white sputum and a fall down a flight of stairs 2/16. Labs showed serum lactate of 7.2, BUN/Cr 124/6.42, and WBC of of 11.93. Tmax 102.4, urine + for cocaine, opiates, nitrites and many bacteria. ICU called for further evaluation. (17 Feb 2019 22:56)  Patient went for  T11/12 OM discitis with an epidural phlegmon from T9-L1 s/p T8-L2 Laminectomy and PSF POD #0, with EBL of around 600ml, 2L of fluids and1L of urine output, post-op patient was kept intubated and transferred to critical care for further management.    Of note, patient is currently being treated for MRSA bacteremia, endocarditis with associated multiple abscesses, osteomyelitis  and discitis (cervical, thoracic and lumbar).      PAST MEDICAL & SURGICAL HISTORY:  ETOH abuse  Drug abuse  C Section    FAMILY HISTORY:    Social History: Allergies    penicillin (Short breath; Hives)    Intolerances        MEDICATIONS  (STANDING):  acetaminophen   Tablet .. 1000 milliGRAM(s) Oral every 8 hours  calcium carbonate 1250 mG  + Vitamin D (OsCal 500 + D) 1 Tablet(s) Oral three times a day  chlorhexidine 4% Liquid 1 Application(s) Topical <User Schedule>  DAPTOmycin IVPB 440 milliGRAM(s) IV Intermittent every 24 hours  dexamethasone  Injectable 10 milliGRAM(s) IV Push every 8 hours  dexmedetomidine Infusion 0.3 MICROgram(s)/kG/Hr (5.535 mL/Hr) IV Continuous <Continuous>  gabapentin 800 milliGRAM(s) Oral three times a day  lactated ringers. 1000 milliLiter(s) (100 mL/Hr) IV Continuous <Continuous>  lactobacillus acidophilus 1 Tablet(s) Oral two times a day with meals  lidocaine   Patch 1 Patch Transdermal every 24 hours  lidocaine   Patch 1 Patch Transdermal every 24 hours  methadone    Tablet 60 milliGRAM(s) Oral two times a day  multivitamin 1 Tablet(s) Oral daily  naloxegol 25 milliGRAM(s) Oral before breakfast  nicotine -  14 mG/24Hr(s) Patch 1 patch Transdermal daily  nystatin Powder 1 Application(s) Topical three times a day  polyethylene glycol 3350 17 Gram(s) Oral two times a day  senna 2 Tablet(s) Oral at bedtime    MEDICATIONS  (PRN):  bisacodyl Suppository 10 milliGRAM(s) Rectal daily PRN Constipation  cyclobenzaprine 10 milliGRAM(s) Oral three times a day PRN Muscle Spasm  diazepam    Tablet 5 milliGRAM(s) Oral every 8 hours PRN Back Spasms  oxyCODONE    IR 30 milliGRAM(s) Oral every 4 hours PRN Moderate Pain (4 - 6)      REVIEW OF SYSTEMS:  unable to obtain, patient sedated on MV    PHYSICAL EXAM:    T(C): 36.8 (08 May 2019 11:28), Max: 37.3 (08 May 2019 04:32)  T(F): 98.2 (08 May 2019 11:28), Max: 99.1 (08 May 2019 04:32)  HR: 84 (08 May 2019 23:45) (84 - 89)  BP: 123/77 (08 May 2019 11:28) (106/73 - 123/77)  RR: 17 (08 May 2019 11:28) (16 - 18)  SpO2: 100% (08 May 2019 23:45) (95% - 100%)    GENERAL: intubated, on MV  EYES: EOMI, PERRLA, conjunctiva and sclera clear  NERVOUS SYSTEM: sedated on MV, following commands, sensation intact, moving lower extremities.  CHEST/LUNG: CTA bilaterally; No rales, rhonchi, wheezing, or rubs  HEART: Regular rate and rhythm; No murmurs, rubs, or gallops  ABDOMEN: Soft, Nontender, Nondistended; Bowel sounds present  EXTREMITIES:  2+ Peripheral Pulses, No clubbing, cyanosis, or edema          LABS:                        10.4   14.66 )-----------( 336      ( 08 May 2019 05:53 )             34.0     05-08    140  |  105  |  33<H>  ----------------------------<  124<H>  4.2   |  28  |  0.54    Ca    10.0      08 May 2019 05:53    TPro  7.7  /  Alb  3.0<L>  /  TBili  0.3  /  DBili  x   /  AST  14<L>  /  ALT  31  /  AlkPhos  377<H>  05-07    PT/INR - ( 08 May 2019 05:53 )   PT: 10.3 sec;   INR: 0.92 ratio         PTT - ( 08 May 2019 05:53 )  PTT:24.1 sec

## 2019-05-09 NOTE — PROGRESS NOTE ADULT - SUBJECTIVE AND OBJECTIVE BOX
HPI:  42 yo F IV drug abuser, admitted on 2/17/19 diagnosed with MRSA bacteremia, endocarditis, s/p C4-5 ACDF for cervical osteomyelitis on 3/20 and currently with T11/12 OM discitis with an epidural phlegmon from T9-L1   Pt taken to the OR 5/8, s/p instrumentation of posterior spinal Fusion T8,T9, T10, T11, T12, L1, L2 with laminectomy T11-12; bilateral hemilaminectomy of T10 and proximal L1 with intra-op findings of extensive scar tissue and epidural fibrosis due to discitis, osteomyelitis with poor lower extremity neurologic findings.        ## Labs:  CBC:                        8.9    10.83 )-----------( 255      ( 09 May 2019 16:48 )             27.9     Chem:  05-09    140  |  105  |  31<H>  ----------------------------<  178<H>  4.6   |  28  |  0.57    Ca    9.2      09 May 2019 04:00  Phos  5.8     05-09  Mg     1.9     05-09      Coags:  PT/INR - ( 08 May 2019 05:53 )   PT: 10.3 sec;   INR: 0.92 ratio         PTT - ( 08 May 2019 05:53 )  PTT:24.1 sec        ## Imaging:    ## Medications:  DAPTOmycin IVPB 440 milliGRAM(s) IV Intermittent every 24 hours    phenylephrine    Infusion 1 MICROgram(s)/kG/Min IV Continuous <Continuous>          bisacodyl Suppository 10 milliGRAM(s) Rectal daily PRN  naloxegol 25 milliGRAM(s) Oral before breakfast  polyethylene glycol 3350 17 Gram(s) Oral two times a day  senna 2 Tablet(s) Oral at bedtime    acetaminophen   Tablet .. 1000 milliGRAM(s) Oral every 8 hours  cyclobenzaprine 10 milliGRAM(s) Oral three times a day PRN  diazepam    Tablet 5 milliGRAM(s) Oral every 8 hours PRN  gabapentin 800 milliGRAM(s) Oral three times a day  methadone    Tablet 60 milliGRAM(s) Oral two times a day  oxyCODONE    IR 30 milliGRAM(s) Oral every 4 hours PRN      ## Vitals:  T(C): 36.3 (05-09-19 @ 19:27), Max: 37.3 (05-08-19 @ 23:30)  HR: 86 (05-09-19 @ 20:00) (49 - 86)  BP: 111/62 (05-09-19 @ 01:25) (97/61 - 111/62)  BP(mean): 80 (05-09-19 @ 01:25) (16 - 86)  RR: 23 (05-09-19 @ 20:00) (9 - 23)  SpO2: 78% (05-09-19 @ 20:00) (78% - 100%)  Wt(kg): --  Vent: Mode: CPAP with PS, FiO2: 35, PEEP: 5, PS: 5  ABG: ABG - ( 09 May 2019 00:37 )  pH, Arterial: x     pH, Blood: 7.39  /  pCO2: 32    /  pO2: 198   / HCO3: 19    / Base Excess: -5.0  /  SaO2: 100                   05-08 @ 07:01  -  05-09 @ 07:00  --------------------------------------------------------  IN: 3480 mL / OUT: 925 mL / NET: 2555 mL    05-09 @ 07:01  -  05-09 @ 20:49  --------------------------------------------------------  IN: 1276.7 mL / OUT: 1840 mL / NET: -563.3 mL          ## P/E:  Gen: lying comfortably in bed in no apparent distress  Mouth: (+) ETT  Neck: C-spine collar  Lungs: CTA  Heart: RRR  Abd: Soft/+BS  Ext: No sign edema  Neuro: Moving all extremities on command    CENTRAL LINE: [ ] YES [ ] NO  LOCATION:   DATE INSERTED:  REMOVE: [ ] YES [ ] NO      PAN: [x ] YES [ ] NO    DATE INSERTED:  REMOVE:  [ ] YES [ ] NO      A-LINE:  [ ] YES [ ] NO  LOCATION:   DATE INSERTED:  REMOVE:  [ ] YES [ ] NO  EXPLAIN:      CODE STATUS: [ x] full code  [ ] DNR  [ ] DNI  [ ] MOLST  Goals of care discussion: [ ] yes

## 2019-05-09 NOTE — PROGRESS NOTE ADULT - SUBJECTIVE AND OBJECTIVE BOX
Post-op check  Pt S/E at bedside remains intubated and sedated post op    Vital Signs Last 24 Hrs  T(C): 36.8 (08 May 2019 11:28), Max: 37.3 (08 May 2019 04:32)  T(F): 98.2 (08 May 2019 11:28), Max: 99.1 (08 May 2019 04:32)  HR: 84 (08 May 2019 23:45) (84 - 89)  BP: 123/77 (08 May 2019 11:28) (106/73 - 123/77)  BP(mean): --  RR: 17 (08 May 2019 11:28) (16 - 18)  SpO2: 100% (08 May 2019 23:45) (95% - 100%)    Gen: NAD,     Spine:  Dressing clean dry intact  +KIRILL drain  unable to perform motor and sensory exam 2/2 intubation sedation  +DP/PT Pulses  +Radial Pulse  Compartments soft

## 2019-05-09 NOTE — PROGRESS NOTE ADULT - ASSESSMENT
44 yo F IV drug abuser, admitted on 2/17/19 diagnosed with MRSA bacteremia, endocarditis, s/p C4-5 ACDF for cervical osteomyelitis on 3/20 and currently with T11/12 OM discitis with an epidural phlegmon from T9-L1   Pt taken to the OR 5/8, s/p instrumentation of posterior spinal Fusion T8,T9, T10, T11, T12, L1, L2 with laminectomy T11-12; bilateral hemilaminectomy of T10 and proximal L1 with intra-op findings of extensive scar tissue and epidural fibrosis due to discitis, osteomyelitis with poor lower extremity neurologic findings.    Resp: Weaning well on CPAP 5 + PS 5 and following commands; extubate  ID: Abx as per ID f/u  CVS: MAP parameters as per Ortho  HEME: Start DVT  chemoprophylaxis as per Ortho  FEN: Once extubated advance po diet as tolerated  F/u as per Ortho  Neuro: Cont pt's usual pain meds  PT/OT as per Ortho    CCT: 40 min

## 2019-05-09 NOTE — PROGRESS NOTE ADULT - SUBJECTIVE AND OBJECTIVE BOX
Pt S/E at bedside, no acute events overnight, remained in CCU for MAP goals intubated and lightly sedated, able to follow commands.     AVSS  Gen: NAD,     Spine:  Dressing clean dry intact  +KIRILL drain with serosanguinous output  +EHL/FHL/TA/GS  +AIN/PIN/M/R/U/Msc/Ax  SILT L3-S1  SILT C5-T1  +DP/PT Pulses  +Radial Pulse  Compartments soft  No calf TTP B/L

## 2019-05-09 NOTE — PROGRESS NOTE ADULT - SUBJECTIVE AND OBJECTIVE BOX
Patient is a 43y old  Female who presents with a chief complaint of AMS (09 May 2019 05:55)      INTERVAL HPI / OVERNIGHT EVENTS:    MEDICATIONS  (STANDING):  acetaminophen   Tablet .. 1000 milliGRAM(s) Oral every 8 hours  calcium carbonate 1250 mG  + Vitamin D (OsCal 500 + D) 1 Tablet(s) Oral three times a day  chlorhexidine 4% Liquid 1 Application(s) Topical <User Schedule>  DAPTOmycin IVPB 440 milliGRAM(s) IV Intermittent every 24 hours  gabapentin 800 milliGRAM(s) Oral three times a day  lactated ringers. 1000 milliLiter(s) (100 mL/Hr) IV Continuous <Continuous>  lactobacillus acidophilus 1 Tablet(s) Oral two times a day with meals  lidocaine   Patch 1 Patch Transdermal every 24 hours  lidocaine   Patch 1 Patch Transdermal every 24 hours  methadone    Tablet 60 milliGRAM(s) Oral two times a day  multivitamin 1 Tablet(s) Oral daily  naloxegol 25 milliGRAM(s) Oral before breakfast  nicotine -  14 mG/24Hr(s) Patch 1 patch Transdermal daily  nystatin Powder 1 Application(s) Topical three times a day  phenylephrine    Infusion 1 MICROgram(s)/kG/Min (27.675 mL/Hr) IV Continuous <Continuous>  polyethylene glycol 3350 17 Gram(s) Oral two times a day  senna 2 Tablet(s) Oral at bedtime    MEDICATIONS  (PRN):  bisacodyl Suppository 10 milliGRAM(s) Rectal daily PRN Constipation  cyclobenzaprine 10 milliGRAM(s) Oral three times a day PRN Muscle Spasm  diazepam    Tablet 5 milliGRAM(s) Oral every 8 hours PRN Back Spasms  oxyCODONE    IR 30 milliGRAM(s) Oral every 4 hours PRN Moderate Pain (4 - 6)      Vital Signs Last 24 Hrs  T(C): 36.4 (09 May 2019 15:31), Max: 37.3 (08 May 2019 23:30)  T(F): 97.6 (09 May 2019 15:31), Max: 99.2 (08 May 2019 23:30)  HR: 49 (09 May 2019 16:40) (49 - 84)  BP: 111/62 (09 May 2019 01:25) (97/61 - 111/62)  BP(mean): 80 (09 May 2019 01:25) (16 - 86)  RR: 14 (09 May 2019 16:40) (9 - 22)  SpO2: 100% (09 May 2019 16:40) (99% - 100%)    Review of systems:  General : no fever /chills,fatigue  CVS : no chest pain, palpitations  Lungs : no shortness of breath, cough  GI : no abdominal pain,vomiting, diarrhea   : no dysuria,hematuria        PHYSICAL EXAM:  General :NAD  Constitutional:  well-groomed, well-developed  Respiratory: CTAB/L  Cardiovascular: S1 and S2, RRR, no M/G/R  Gastrointestinal: BS+, soft, NT/ND  Extremities: No peripheral edema  Vascular: 2+ peripheral pulses  Skin: No rashes      LABS:                        8.9    10.83 )-----------( 255      ( 09 May 2019 16:48 )             27.9     05-09    140  |  105  |  31<H>  ----------------------------<  178<H>  4.6   |  28  |  0.57    Ca    9.2      09 May 2019 04:00  Phos  5.8     05-09  Mg     1.9     05-09            MICROBIOLOGY:  RECENT CULTURES:        RADIOLOGY & ADDITIONAL STUDIES: Patient is a 43y old  Female who presents with a chief complaint of AMS (09 May 2019 05:55)      INTERVAL HPI / OVERNIGHT EVENTS: lethargic in ICU     MEDICATIONS  (STANDING):  acetaminophen   Tablet .. 1000 milliGRAM(s) Oral every 8 hours  calcium carbonate 1250 mG  + Vitamin D (OsCal 500 + D) 1 Tablet(s) Oral three times a day  chlorhexidine 4% Liquid 1 Application(s) Topical <User Schedule>  DAPTOmycin IVPB 440 milliGRAM(s) IV Intermittent every 24 hours  gabapentin 800 milliGRAM(s) Oral three times a day  lactated ringers. 1000 milliLiter(s) (100 mL/Hr) IV Continuous <Continuous>  lactobacillus acidophilus 1 Tablet(s) Oral two times a day with meals  lidocaine   Patch 1 Patch Transdermal every 24 hours  lidocaine   Patch 1 Patch Transdermal every 24 hours  methadone    Tablet 60 milliGRAM(s) Oral two times a day  multivitamin 1 Tablet(s) Oral daily  naloxegol 25 milliGRAM(s) Oral before breakfast  nicotine -  14 mG/24Hr(s) Patch 1 patch Transdermal daily  nystatin Powder 1 Application(s) Topical three times a day  phenylephrine    Infusion 1 MICROgram(s)/kG/Min (27.675 mL/Hr) IV Continuous <Continuous>  polyethylene glycol 3350 17 Gram(s) Oral two times a day  senna 2 Tablet(s) Oral at bedtime    MEDICATIONS  (PRN):  bisacodyl Suppository 10 milliGRAM(s) Rectal daily PRN Constipation  cyclobenzaprine 10 milliGRAM(s) Oral three times a day PRN Muscle Spasm  diazepam    Tablet 5 milliGRAM(s) Oral every 8 hours PRN Back Spasms  oxyCODONE    IR 30 milliGRAM(s) Oral every 4 hours PRN Moderate Pain (4 - 6)      Vital Signs Last 24 Hrs  T(C): 36.4 (09 May 2019 15:31), Max: 37.3 (08 May 2019 23:30)  T(F): 97.6 (09 May 2019 15:31), Max: 99.2 (08 May 2019 23:30)  HR: 49 (09 May 2019 16:40) (49 - 84)  BP: 111/62 (09 May 2019 01:25) (97/61 - 111/62)  BP(mean): 80 (09 May 2019 01:25) (16 - 86)  RR: 14 (09 May 2019 16:40) (9 - 22)  SpO2: 100% (09 May 2019 16:40) (99% - 100%)    Review of systems:  General : no fever /chills, fatigue  CVS : no chest pain, palpitations  Lungs : no shortness of breath, cough  GI : no abdominal pain, vomiting, diarrhea   : no dysuria ,hematuria        PHYSICAL EXAM:  General :NAD,neck brace  Constitutional:  well-groomed, well-developed  Respiratory: CTAB/L  Cardiovascular: S1 and S2, RRR, no M/G/R  Gastrointestinal: BS+, soft, NT/ND  Extremities: No peripheral edema  Vascular: 2+ peripheral pulses  Skin: No rashes  back surgery with a hemo-vac      LABS:                        8.9    10.83 )-----------( 255      ( 09 May 2019 16:48 )             27.9     05-09    140  |  105  |  31<H>  ----------------------------<  178<H>  4.6   |  28  |  0.57    Ca    9.2      09 May 2019 04:00  Phos  5.8     05-09  Mg     1.9     05-09            MICROBIOLOGY:  RECENT CULTURES:        RADIOLOGY & ADDITIONAL STUDIES:

## 2019-05-09 NOTE — PROGRESS NOTE ADULT - PROBLEM SELECTOR PLAN 1
with T11/12 OM/ discitis with an epidural phlegmon from T9-L1   s/p T8-L2 Laminectomy and PSF POD1  f/u on OR c/s   on decadron sec to cord compression  cont daptomycin  on pressors post op

## 2019-05-09 NOTE — PROGRESS NOTE ADULT - SUBJECTIVE AND OBJECTIVE BOX
[facility]    Operative Report    Date of Surgery: 05/08/19    Patient: Kelly Fields    Surgeon: Rose Leslie DO – Orthopedic Surgery Attending    Assistant: Orthopedic Resident    Dictation:     Preop Diagnosis:  Acute bilateral lower extremity progressive weakness, myelomalacia, acute vertebral body fracture and phlegmon progression due to osteomyelitis/Discitis,      Post op Diagnosis: Same  Procedure Summary:   [id number] Posterolateral Fusion  [id number] Posterolateral Segmental Instrumented with pedicle screws and Adriel  Laminectomy T11, T12  Bilateral Hemilaminectomy T10  Proximal bilateral hemilaminectomy of L1  Irrigation and debridement of epidural abscess and epidural fibrosis   Bone allograft, BMP  Flouroscopy  Neuromonitoring  Anesthesia: General Endotracheal Intubation  Positioning: Prone on Rikki frame    Complication None:    Procedure in Detail:  Preoperative Consent was obtained. Risks and benefits discussed extensively with patient.  Patient has acute worsening after hyperflexion event while in hospital and fracture of the infected vertebral body with acute kyphosis, phlegmon, fracture and bilateral lower extremity weakness.  Goal is to provide enough decompression to help improve neurologic weakness and walking ability in a patient.  Extensive discussion regarding patient’s diagnosis, imaging, operative and non-operative options discussed with patient over multiple visits.  All questions are answered.  Informed consent in obtained.  No guarantees implied.  Goals are improvement in pain and quality of life.  Family is well informed as well.    Patient received general anesthesia.  Neuromonitoring devices were placed by neuromonitoring service. Baseline motor exam showed minimal neurologic function except a mild signal in one foot. Repeat motors were performed through out the case but not reliable due to poor baseline function.  Patient was placed prone on the operative table.  All bony prominences were padded.  Neuromonitoring signals were confirmed.      Patient’s thoracic and lumbar spine was prepped and draped in sterile manner.  Flouroscopy was used to confirm the location of the pedicle. Midline incision were made and lamina of T8-L2 exposed.  Interspaced confirmed with xray.  Subsequently, facet joints and TP was exposed from T8-L2.        Each pedicle was then tapped, probed and confirmed with imaging in AP and lateral view.  Screws not stimulated due to thoracic and upper lumbar instrumentation.  Subsequently 5.5*40 and 45 or 4.5*40mm Nuvasive pedicle screws were instrumented due to excellent bone quality otherwise.  TP was dissected laterally T8-L2 vertebra.   At this point wound was irrigated.  Subsequently, adriel was inserted and set screws placed.  Adriel was contoured to decrease kyphosis and reduce the deformity.  This also helped us stabilize the spine.  Final xrays were taken and I was satisfied with overall positioning of hardware and instrumentation.     Next attention was paid to decompression and Irrigation and debridement.  Laminectomy was performed of T11, t12, hemilaminectomy of T10 and proximal L1.   Lamina was circumferentially adherent to the dura and fibrosis.  Extensive neurolysis was performed and > 2 hours were spent to piece meal release reactive fibrosis from the lamina and to prevent dural tear.  No dural tear is noted post decompression.  Motor signals did not show any significant improvement which was expected.    Entire wound was irrigated with 3L of irrigation    Next, each incision was closed with number 1 PDS for fascia, 0 PDS for subcutaneous tissue and 2.0 vicryl.  Drain was inserted over the epidural space.   2gm of vancomycin powder was also placed in deep and superficial fascia.    Skin adhesive was applied.  Dry sterile dressing was applied.  Patient was transferred to supine position on regular bed.  Patient was extubated.  Patient had tolerated the procedure well.  Patient was moving b/l lower extremity    I personally spoke with patient's family at the end of the case.    Rose Leslie DO  Orthopedic Spine Surgeon [facility]    Operative Report    Date of Surgery: 05/08/19    Patient: Kelly Fields    Surgeon: Rose Leslie DO – Orthopedic Surgery Attending    Assistant: Orthopedic Resident    Dictation:     Preop Diagnosis:  Acute bilateral lower extremity progressive weakness, myelomalacia, acute vertebral body fracture and phlegmon progression due to osteomyelitis/Discitis,      Post op Diagnosis: Same  Procedure Summary:   [id number] Posterolateral Fusion  [id number] Posterolateral Segmental Instrumented with pedicle screws and Adriel  Laminectomy T11, T12  Bilateral Hemilaminectomy T10  Proximal bilateral hemilaminectomy of L1  Irrigation and debridement of epidural abscess and epidural fibrosis   Bone allograft, BMP  Flouroscopy  Neuromonitoring  Anesthesia: General Endotracheal Intubation  Positioning: Prone on Rikki frame    Complication None:    Procedure in Detail:  Preoperative Consent was obtained. Risks and benefits discussed extensively with patient.  Patient has acute worsening after hyperflexion event while in hospital and fracture of the infected vertebral body with acute kyphosis, phlegmon, fracture and bilateral lower extremity weakness.  Goal is to provide enough decompression to help improve neurologic weakness and walking ability in a patient.  Extensive discussion regarding patient’s diagnosis, imaging, operative and non-operative options discussed with patient over multiple visits.  All questions are answered.  Informed consent in obtained.  No guarantees implied.  Goals are improvement in pain and quality of life.  Family is well informed as well.    Patient received general anesthesia.  Neuromonitoring devices were placed by neuromonitoring service. Baseline motor exam showed minimal neurologic function except a mild signal in one foot. Repeat motors were performed through out the case but not reliable due to poor baseline function.  Patient was placed prone on the operative table.  All bony prominences were padded.  Neuromonitoring signals were confirmed.      Patient’s thoracic and lumbar spine was prepped and draped in sterile manner.  Flouroscopy was used to confirm the location of the pedicle. Midline incision were made and lamina of T8-L2 exposed.  Interspaced confirmed with xray.  Subsequently, facet joints and TP was exposed from T8-L2.        Each pedicle was then tapped, probed and confirmed with imaging in AP and lateral view.  Screws not stimulated due to thoracic and upper lumbar instrumentation.  Subsequently 5.5*40 and 45 or 4.5*40mm Nuvasive pedicle screws were instrumented due to excellent bone quality otherwise.  TP was dissected laterally T8-L2 vertebra.   At this point wound was irrigated.  Subsequently, adriel was inserted and set screws placed.  Adriel was contoured to decrease kyphosis and reduce the deformity.  This also helped us stabilize the spine.  Final xrays were taken and I was satisfied with overall positioning of hardware and instrumentation.     Next attention was paid to decompression and Irrigation and debridement.  Laminectomy was performed of T11, t12, hemilaminectomy of T10 and proximal L1.   Lamina was circumferentially adherent to the dura and fibrosis.  Extensive neurolysis was performed and > 2 hours were spent to piece meal release reactive fibrosis from the lamina and to prevent dural tear.  No dural tear is noted post decompression.  Motor signals did not show any significant improvement which was expected.    Entire wound was irrigated with 3L of irrigation    Next, each incision was closed with number 1 PDS for fascia, 0 PDS for subcutaneous tissue and 2.0 vicryl.  Drain was inserted over the epidural space.   2gm of vancomycin powder was also placed in deep and superficial fascia.    Skin adhesive was applied.  Dry sterile dressing was applied.  Patient was transferred to supine position on regular bed.  Patient was transferred to the ICU bed and kept intubated.  Patient's family was updated about the surgery.   	  Rose Leslie DO  Orthopedic Spine Surgeon

## 2019-05-09 NOTE — PROGRESS NOTE ADULT - ASSESSMENT
A/P: 43F with diffuse bacterial infection and endocarditis s/p C4-5 ACDF for cervical OM on 3/20 now with T11/12 OM discitis with an epidural phlegmon from T9-L1 s/p T8-L2 Laminectomy and PSF POD 1  Pain control  DVT ppx with SCDs  WBAT in TLSO brace at all times  Q1 hour neuro checks for 12 hours then Q4 neuro checks  IV decadron for 24 hours  MAP Goal 85 mmHg  Monitor KIRILL output  Care per CCU team  ABX per ID  Discussed with Dr. Leslie who agrees with above

## 2019-05-09 NOTE — PROGRESS NOTE ADULT - ASSESSMENT
A/P: 43F with diffuse bacterial infection and endocarditis s/p C4-5 ACDF for cervical OM on 3/20 now with T11/12 OM discitis with an epidural phlegmon from T9-L1 s/p T8-L2 Laminectomy and PSF POD 1  Pain control  DVT ppx with SCDs  WBAT in TLSO brace at all times  Q1 hour neuro checks for 12 hours then Q4 neuro checks  IV decadron for 24 hours  Monitor KIRILL output  Care per CCU team  ABX per ID  Discussed with Dr. Leslie who agrees with above

## 2019-05-09 NOTE — CONSULT NOTE ADULT - ATTENDING COMMENTS
I have seen and examined the patient. I agree with the above history, physical exam, and plan of care except for as detailed below.    42 y/o F w/polysubstance abuse presenting with severe sepsis with septic shock secondary to MRSA bacteremia. LIZ positive for endocarditis. EFRAIN likely secondary to sepsis now improved. Acute respiratory failure requiring intubation. Now found to have epidural abscesses and spinal osteomyelitis. Now s/p ACDF. Remains intubated post-operatively.    - Wean sedation  - SAT/SBT, plan for extubation in AM  - Continue antibiotics  - Post-operative care as per ortho    Attending critical care time 35 minutes
Gong: I have seen and examined the patient face to face, have reviewed and addended the HPI, PE and a/p as necessary.     44 yo F IV drug abuser, admitted on 2/17/19 diagnosed with MRSA bacteremia, endocarditis, now s/p C4-5 ACDF for cervical osteomyelitis on 3/20 and now s/p T8-L2 laminectomy for epidural phlegmon from T9-L1 and T11/12 osteo/discititis.  In the OR, patient was noted to have instrumentation of posterior spinal Fusion T8,T9, T10, T11, T12, L1, L2 with laminectomy T11-12; bilateral hemilaminectomy of T10 and proximal L1  extensive scar tissue and epidural fibrosis due to discitis, osteomyelitis with poor lower extremity neurologic findings.    Gen: Intubated paralyzed prior to transfer to ICU with Rocuronium 50mg; slowly regaining motor function, waking up  CARD -s1s2, RRR, no M,G,R; PULM - CTA b/l, symmetric breath sounds; ABD:  +BS, ND, NT, soft, no guarding, no rebound, no masses; BACK: no CVA tenderness, EXT: symmetric pulses, 2+ dp, capillary refill < 2 seconds, no clubbing, no cyanosis.  Moving lower extremity (toes to command), sensation intact in all four extremities.    A/P    Neuro: Noted to have poor lower extremity neuro exam prior to OR, moving toes on exam (recently paralyzed with Rocuronium post op by anesthesia).  Will continue neuro exam Q1H,  Pain control and precedex for sedation, WBAT in TLSO brace at all times, Q1 hour neuro checks for 12 hours then Q4 neuro checks, IV decadron for 24 hours  CV: Maintain MAP >80.  A-line in place  Pulm: Mechanically vented overnight post op, will sbt in AM.    GI: NPO   Renal/Metabolic: no acute issues  ID: discitis, osteomyelitis, endocarditis, on daptomycin, now s/p or with laminectomy, continue with dapto.  Follow up Ortho recs and ID recs in AM.  Heme: SCDs for now, will discuss with neuro when chemical ppx can be restarted; Trend h/h; KIRILL drains monitor output  Skin: Wound care as per ortho  Dispo: post-op will keep intubated, and transferred to critical care for observation.   Attending Critical Care Time Spent 45 minutes

## 2019-05-09 NOTE — CONSULT NOTE ADULT - ASSESSMENT
43 yr old IV drug abuse female seen with
A: Multiple Level Prevertebral C-spine Abscess and Discitis with Foraminal Narrowing   P: Pain Control      Continue IV Antibiotics     Patient seen and examined with Dr. Leslie      Patient informed of Surgical and Non-Surgical treatment plans     Risk/Benefits Reviewed and patients questions answered      Ortho to F/U
ACUTE MENTAL STATUS CHANGES WITH SUBSTANCE ABUSE IN ICU VENTILATOR SUPPORT>  HEPATITIS C AB +
Polysubstance abuse.  Pneumonia.  Bacteremia, likely endocarditis with septic emboli.  Lactic acidosis.  EFRAIN several days old likely hypovolemic/sepsis related ATN/and/or infection TIN/GN.  No immediate dialytic indications.  Continue with volume expansion.  Antibiotics as per I.D.  Dose all meds for eGFR of 10 cc/min.  Monitor Vanco levels.  Avoid nephrotoxins/hypotension as possible.  Monitor renal indices/electrolytes/urine output.  Monitor ABG, I.V. bicarb PRN to keep pH above 7.25  D/w CCM.  Thank you.
Patient was admitted to critical care with severe sepsis with septic shock secondary to MRSA bacteremia w/ endocarditis on LIZ and EFRAIN thta has resolved and was diagnosis w/ HCV transferred out  of icu on 2/26/19.  Today, patient underwent  Epidural Abscess C4-5, discitis C6-7, prevertebral abscess C2-T1, osteomyelitis C2, C4, C5, C6-C7, as per anesthesia, difficult intubation, and required some levophed during the procedure. Patient kept intubated and transferred to critical care post-op.  -transfer to critical care  -resp: on MV kept intubated post -op   wean as tolerated, in the am  cxr and abg    -cardio: patient received some levophed during or time, now off  Jasper in place from OR, keep  MAP >80  -neuro: post- surgery   with C collar and KIRILL drain, follow output and watch for hematoma  as per ortho: no chemical dvt prophylaxis, SCD only  fentanyl drip for sedation
A/P: 43F with diffuse bacterial infection and endocarditis s/p C4-5 ACDF for cervical OM on 3/20 now with T11/12 OM discitis with an epidural phlegmon from T9-L1 s/p T8-L2 Laminectomy and PSF POD #0  post-op kept intubated, and transferred to critical care for observation.   transfer to critical care  resp: intubated on MV      abg/cxr ordered reviewed     SBT in the am  neuro: sedated on MV,   keep MAP >80,  Pain control and precedex for sedation   WBAT in TLSO brace at all times  Q1 hour neuro checks for 12 hours then Q4 neuro checks  IV decadron for 24 hours  Monitor KIRILL output  DVT ppx with SCDs  ID: Daptomycin   npo for now, on protonix for GI prophylaxis

## 2019-05-10 LAB
ANION GAP SERPL CALC-SCNC: 5 MMOL/L — SIGNIFICANT CHANGE UP (ref 5–17)
BASOPHILS # BLD AUTO: 0.01 K/UL — SIGNIFICANT CHANGE UP (ref 0–0.2)
BASOPHILS NFR BLD AUTO: 0.1 % — SIGNIFICANT CHANGE UP (ref 0–2)
BUN SERPL-MCNC: 22 MG/DL — SIGNIFICANT CHANGE UP (ref 7–23)
CALCIUM SERPL-MCNC: 9 MG/DL — SIGNIFICANT CHANGE UP (ref 8.5–10.1)
CHLORIDE SERPL-SCNC: 104 MMOL/L — SIGNIFICANT CHANGE UP (ref 96–108)
CO2 SERPL-SCNC: 31 MMOL/L — SIGNIFICANT CHANGE UP (ref 22–31)
CREAT SERPL-MCNC: 0.37 MG/DL — LOW (ref 0.5–1.3)
EOSINOPHIL # BLD AUTO: 0 K/UL — SIGNIFICANT CHANGE UP (ref 0–0.5)
EOSINOPHIL NFR BLD AUTO: 0 % — SIGNIFICANT CHANGE UP (ref 0–6)
GLUCOSE SERPL-MCNC: 110 MG/DL — HIGH (ref 70–99)
HCT VFR BLD CALC: 26 % — LOW (ref 34.5–45)
HGB BLD-MCNC: 8.3 G/DL — LOW (ref 11.5–15.5)
IMM GRANULOCYTES NFR BLD AUTO: 0.6 % — SIGNIFICANT CHANGE UP (ref 0–1.5)
LYMPHOCYTES # BLD AUTO: 2.68 K/UL — SIGNIFICANT CHANGE UP (ref 1–3.3)
LYMPHOCYTES # BLD AUTO: 27.5 % — SIGNIFICANT CHANGE UP (ref 13–44)
MAGNESIUM SERPL-MCNC: 2.1 MG/DL — SIGNIFICANT CHANGE UP (ref 1.6–2.6)
MCHC RBC-ENTMCNC: 25.5 PG — LOW (ref 27–34)
MCHC RBC-ENTMCNC: 31.9 GM/DL — LOW (ref 32–36)
MCV RBC AUTO: 79.8 FL — LOW (ref 80–100)
MONOCYTES # BLD AUTO: 0.7 K/UL — SIGNIFICANT CHANGE UP (ref 0–0.9)
MONOCYTES NFR BLD AUTO: 7.2 % — SIGNIFICANT CHANGE UP (ref 2–14)
NEUTROPHILS # BLD AUTO: 6.28 K/UL — SIGNIFICANT CHANGE UP (ref 1.8–7.4)
NEUTROPHILS NFR BLD AUTO: 64.6 % — SIGNIFICANT CHANGE UP (ref 43–77)
NRBC # BLD: 0 /100 WBCS — SIGNIFICANT CHANGE UP (ref 0–0)
PHOSPHATE SERPL-MCNC: 2.6 MG/DL — SIGNIFICANT CHANGE UP (ref 2.5–4.5)
PLATELET # BLD AUTO: 259 K/UL — SIGNIFICANT CHANGE UP (ref 150–400)
POTASSIUM SERPL-MCNC: 3.7 MMOL/L — SIGNIFICANT CHANGE UP (ref 3.5–5.3)
POTASSIUM SERPL-SCNC: 3.7 MMOL/L — SIGNIFICANT CHANGE UP (ref 3.5–5.3)
RBC # BLD: 3.26 M/UL — LOW (ref 3.8–5.2)
RBC # FLD: 14.9 % — HIGH (ref 10.3–14.5)
SODIUM SERPL-SCNC: 140 MMOL/L — SIGNIFICANT CHANGE UP (ref 135–145)
WBC # BLD: 9.73 K/UL — SIGNIFICANT CHANGE UP (ref 3.8–10.5)
WBC # FLD AUTO: 9.73 K/UL — SIGNIFICANT CHANGE UP (ref 3.8–10.5)

## 2019-05-10 PROCEDURE — 99233 SBSQ HOSP IP/OBS HIGH 50: CPT

## 2019-05-10 RX ADMIN — OXYCODONE HYDROCHLORIDE 30 MILLIGRAM(S): 5 TABLET ORAL at 04:49

## 2019-05-10 RX ADMIN — LIDOCAINE 1 PATCH: 4 CREAM TOPICAL at 06:43

## 2019-05-10 RX ADMIN — NYSTATIN CREAM 1 APPLICATION(S): 100000 CREAM TOPICAL at 05:12

## 2019-05-10 RX ADMIN — OXYCODONE HYDROCHLORIDE 30 MILLIGRAM(S): 5 TABLET ORAL at 17:25

## 2019-05-10 RX ADMIN — NALOXEGOL OXALATE 25 MILLIGRAM(S): 12.5 TABLET, FILM COATED ORAL at 05:10

## 2019-05-10 RX ADMIN — METHADONE HYDROCHLORIDE 60 MILLIGRAM(S): 40 TABLET ORAL at 11:30

## 2019-05-10 RX ADMIN — SENNA PLUS 2 TABLET(S): 8.6 TABLET ORAL at 22:43

## 2019-05-10 RX ADMIN — Medication 1 TABLET(S): at 13:27

## 2019-05-10 RX ADMIN — Medication 1 TABLET(S): at 05:09

## 2019-05-10 RX ADMIN — OXYCODONE HYDROCHLORIDE 30 MILLIGRAM(S): 5 TABLET ORAL at 05:49

## 2019-05-10 RX ADMIN — OXYCODONE HYDROCHLORIDE 30 MILLIGRAM(S): 5 TABLET ORAL at 13:20

## 2019-05-10 RX ADMIN — POLYETHYLENE GLYCOL 3350 17 GRAM(S): 17 POWDER, FOR SOLUTION ORAL at 05:09

## 2019-05-10 RX ADMIN — GABAPENTIN 800 MILLIGRAM(S): 400 CAPSULE ORAL at 05:08

## 2019-05-10 RX ADMIN — OXYCODONE HYDROCHLORIDE 30 MILLIGRAM(S): 5 TABLET ORAL at 16:51

## 2019-05-10 RX ADMIN — GABAPENTIN 800 MILLIGRAM(S): 400 CAPSULE ORAL at 13:27

## 2019-05-10 RX ADMIN — Medication 1 PATCH: at 11:19

## 2019-05-10 RX ADMIN — LIDOCAINE 1 PATCH: 4 CREAM TOPICAL at 17:53

## 2019-05-10 RX ADMIN — OXYCODONE HYDROCHLORIDE 30 MILLIGRAM(S): 5 TABLET ORAL at 12:46

## 2019-05-10 RX ADMIN — NYSTATIN CREAM 1 APPLICATION(S): 100000 CREAM TOPICAL at 22:47

## 2019-05-10 RX ADMIN — LIDOCAINE 1 PATCH: 4 CREAM TOPICAL at 19:58

## 2019-05-10 RX ADMIN — POLYETHYLENE GLYCOL 3350 17 GRAM(S): 17 POWDER, FOR SOLUTION ORAL at 17:53

## 2019-05-10 RX ADMIN — Medication 1 TABLET(S): at 22:43

## 2019-05-10 RX ADMIN — OXYCODONE HYDROCHLORIDE 30 MILLIGRAM(S): 5 TABLET ORAL at 01:40

## 2019-05-10 RX ADMIN — OXYCODONE HYDROCHLORIDE 30 MILLIGRAM(S): 5 TABLET ORAL at 08:38

## 2019-05-10 RX ADMIN — LIDOCAINE 1 PATCH: 4 CREAM TOPICAL at 06:45

## 2019-05-10 RX ADMIN — GABAPENTIN 800 MILLIGRAM(S): 400 CAPSULE ORAL at 22:43

## 2019-05-10 RX ADMIN — OXYCODONE HYDROCHLORIDE 30 MILLIGRAM(S): 5 TABLET ORAL at 09:15

## 2019-05-10 RX ADMIN — Medication 1000 MILLIGRAM(S): at 06:37

## 2019-05-10 RX ADMIN — METHADONE HYDROCHLORIDE 60 MILLIGRAM(S): 40 TABLET ORAL at 22:43

## 2019-05-10 RX ADMIN — OXYCODONE HYDROCHLORIDE 30 MILLIGRAM(S): 5 TABLET ORAL at 00:40

## 2019-05-10 RX ADMIN — NYSTATIN CREAM 1 APPLICATION(S): 100000 CREAM TOPICAL at 13:28

## 2019-05-10 RX ADMIN — Medication 1000 MILLIGRAM(S): at 13:27

## 2019-05-10 RX ADMIN — Medication 1 PATCH: at 07:40

## 2019-05-10 RX ADMIN — Medication 1000 MILLIGRAM(S): at 14:10

## 2019-05-10 RX ADMIN — DAPTOMYCIN 117.6 MILLIGRAM(S): 500 INJECTION, POWDER, LYOPHILIZED, FOR SOLUTION INTRAVENOUS at 22:44

## 2019-05-10 RX ADMIN — Medication 1 PATCH: at 19:30

## 2019-05-10 RX ADMIN — Medication 1 TABLET(S): at 17:53

## 2019-05-10 RX ADMIN — Medication 1000 MILLIGRAM(S): at 23:00

## 2019-05-10 RX ADMIN — Medication 1 TABLET(S): at 11:30

## 2019-05-10 RX ADMIN — SODIUM CHLORIDE 100 MILLILITER(S): 9 INJECTION, SOLUTION INTRAVENOUS at 05:10

## 2019-05-10 RX ADMIN — Medication 5 MILLIGRAM(S): at 08:04

## 2019-05-10 RX ADMIN — Medication 5 MILLIGRAM(S): at 17:52

## 2019-05-10 RX ADMIN — Medication 1000 MILLIGRAM(S): at 05:08

## 2019-05-10 RX ADMIN — Medication 1 PATCH: at 08:32

## 2019-05-10 RX ADMIN — LIDOCAINE 1 PATCH: 4 CREAM TOPICAL at 16:58

## 2019-05-10 RX ADMIN — CHLORHEXIDINE GLUCONATE 1 APPLICATION(S): 213 SOLUTION TOPICAL at 08:37

## 2019-05-10 RX ADMIN — Medication 1 TABLET(S): at 08:04

## 2019-05-10 NOTE — PROGRESS NOTE ADULT - SUBJECTIVE AND OBJECTIVE BOX
Pt S/E at bedside, no acute events overnight, pain controlled. Pt successfully extubated yesterday without issue. Having some pain in back but improved with medication. Denies new numbness/tingling in extremities. No other complaints.    Vital Signs Last 24 Hrs  T(C): 36.7 (10 May 2019 05:51), Max: 36.7 (09 May 2019 23:34)  T(F): 98.1 (10 May 2019 05:51), Max: 98.1 (10 May 2019 05:51)  HR: 77 (10 May 2019 04:00) (49 - 98)  BP: --  BP(mean): --  RR: 15 (10 May 2019 04:00) (9 - 23)  SpO2: 100% (10 May 2019 04:00) (78% - 100%)    Gen: NAD, AAOx3    Spine:  Dressing clean dry intact  +KIRILL drain intact  +EHL/FHL/TA/GS  +AIN/PIN/M/R/U/Msc/Ax  SILT L3-S1  SILT C5-T1  +DP/PT Pulses  +Radial Pulse  Compartments soft  No calf TTP B/L

## 2019-05-10 NOTE — PROGRESS NOTE ADULT - SUBJECTIVE AND OBJECTIVE BOX
HPI:  43F PMH IVDA, alcohol abuse, hx of pancreatitis, alcohol hepatitis, alcohol withdrawal, left frontoparietal subdural hematoma 2008 without need for neurosurgical intervention, bacterial vaginitis here with severe sepsis, septic shock, persistent MRSA bacteremia, septic emboli, PNA, acute hypoxic respiratory failure requiring intubation, UTI, ARF with ATN, lactic acidosis, acute metabolic encephelopathy, endocarditis with tachy-guerline arrhythmias. s/p C4-5 ACDF for cervical osteomyelitis on 3/20 and currently with T11/12 OM discitis with an epidural phlegmon from T9-L1   Pt taken to the OR 5/8, s/p instrumentation of posterior spinal Fusion T8,T9, T10, T11, T12, L1, L2 with laminectomy T11-12; bilateral hemilaminectomy of T10 and proximal L1 with intra-op findings of extensive scar tissue and epidural fibrosis due to discitis, osteomyelitis       24 hr events:  no acute issues overnight  pt doing well post extubation, extubated 5/9  KIRILL drain with some bloody output  Hb stable  complaining of back pain  bilateral lower extremity paresthesia "tingling and numbness now only on the bottom of the feet and not entire foot"  c-collar remains in place    ## ROS:  CONSITUTIONAL: No fever, no chills, no fatigue  EYES: No eye pain, no visual disturbances  ENMT:  No difficulty hearing, No sinus or throat pain  RESPIRATORY: No cough; No shortness of breath  CARDIOVASCULAR: No chest pain, no palpitations  GASTROINTESTINAL: No abdominal or epigastric pain. No nausea, no vomiting  NEUROLOGICAL: No headaches, paresthesia of lower extremities improved per patient but still present  SKIN: No itching, burning, rashes, or lesions   MUSCULOSKELETAL: + back pain      ## Labs:  CBC:                        8.3    9.73  )-----------( 259      ( 10 May 2019 04:24 )             26.0     Chem:  05-10    140  |  104  |  22  ----------------------------<  110<H>  3.7   |  31  |  0.37<L>    Ca    9.0      10 May 2019 04:24  Phos  2.6     05-10  Mg     2.1     05-10    Culture - Surgical Swab (05.09.19 @ 09:47)    Specimen Source: .Surgical Swab Epidural C&S#1 esw    Culture Results: No growth    Culture - Surgical Swab (05.09.19 @ 09:46)    Specimen Source: .Surgical Swab Epidural Abscess C&S #2 esw    Culture Results: No growth    Culture - Blood (04.02.19 @ 01:11)    Specimen Source: .Blood    Culture Results: No growth at 5 days.    Culture - Body Fluid with Gram Stain (03.29.19 @ 17:43)    Specimen Source: Joint Fl left hip aspiration    Culture Results: No growth at 14 days.    Culture - Body Fluid with Gram Stain (03.29.19 @ 17:40)    Specimen Source: .Body Fluid right rectus femorus aspiration    Culture Results: No growth at 14 days.    Culture - Tissue with Gram Stain (03.21.19 @ 09:42)    Specimen Source: .Tissue cervical disc #2 culture    Culture Results: Few Methicillin resistant Staphylococcus aureus        Culture - Body Fluid with Gram Stain (03.21.19 @ 04:12)    Specimen Source: .Body Fluid LEFT THORACENTESIS    Culture Results: No growth at 5 days    Culture - Blood in AM (02.28.19 @ 11:50)    Specimen Source: .Blood    Culture Results: No growth at 5 days.      Culture - Blood in AM (02.27.19 @ 10:40)    Specimen Source: .Blood    Culture Results: Growth in anaerobic bottle: Methicillin resistant Staphylococcus aureus        ## Imaging:  no new imaging     ## Medications:  DAPTOmycin IVPB 440 milliGRAM(s) IV Intermittent every 24 hours      bisacodyl Suppository 10 milliGRAM(s) Rectal daily PRN  naloxegol 25 milliGRAM(s) Oral before breakfast  polyethylene glycol 3350 17 Gram(s) Oral two times a day  senna 2 Tablet(s) Oral at bedtime    acetaminophen   Tablet .. 1000 milliGRAM(s) Oral every 8 hours  cyclobenzaprine 10 milliGRAM(s) Oral three times a day PRN  diazepam    Tablet 5 milliGRAM(s) Oral every 8 hours PRN  gabapentin 800 milliGRAM(s) Oral three times a day  methadone    Tablet 60 milliGRAM(s) Oral two times a day  oxyCODONE    IR 30 milliGRAM(s) Oral every 4 hours PRN      ## Vitals:  T(C): 36.7 (05-10-19 @ 12:00), Max: 36.7 (05-09-19 @ 23:34)  HR: 82 (05-10-19 @ 13:26) (49 - 98)  BP: 103/55 (05-10-19 @ 13:26) (103/55 - 107/60)  BP(mean): 66 (05-10-19 @ 13:26) (66 - 71)  RR: 17 (05-10-19 @ 13:26) (9 - 24)  SpO2: 96% (05-10-19 @ 13:26) (78% - 100%)     ABG: ABG - ( 09 May 2019 00:37 )  pH, Arterial: x     pH, Blood: 7.39  /  pCO2: 32    /  pO2: 198   / HCO3: 19    / Base Excess: -5.0  /  SaO2: 100           05-09 @ 07:01  -  05-10 @ 07:00  --------------------------------------------------------  IN: 2626.7 mL / OUT: 3930 mL / NET: -1303.3 mL    05-10 @ 07:01  -  05-10 @ 13:40  --------------------------------------------------------  IN: 850 mL / OUT: 490 mL / NET: 360 mL          ## P/E:  Gen: lying comfortably in bed in no apparent distress, awake and alert  HEENT: PERRL, EOMI, cervical collar in place  Resp: CTA B/L, no wheeze, no rhonchi  CVS: RRR  Abd: soft NT/ND +BS  Ext: no c/c/e  Neuro: A&Ox3, moving all extremities    CENTRAL LINE: [ ] YES [x] NO  LOCATION:   DATE INSERTED:  REMOVE: [ ] YES [ ] NO      PAN: [x] YES [ ] NO    DATE INSERTED:  REMOVE:  [ ] YES [x] NO  - to be removed after 48 hours from surgery    A-LINE:  [ ] YES [x] NO  LOCATION:   DATE INSERTED:  REMOVE:  [ ] YES [ ] NO  EXPLAIN:    GLOBAL ISSUE/BEST PRACTICE:  Analgesia: oxycodone, methadone  Sedation: n/a  HOB elevation: yes  Stress ulcer prophylaxis: n/a  VTE prophylaxis: bilateral compression devices  Oral Care: n/a  Glycemic control: n/a  Nutrition: po diet    CODE STATUS: [x] full code  [ ] DNR  [ ] DNI  [ ] MOLST  Goals of care discussion: [x] yes

## 2019-05-10 NOTE — PROGRESS NOTE ADULT - SUBJECTIVE AND OBJECTIVE BOX
Patient is a 43y old  Female who presents with a chief complaint of AMS (09 May 2019 05:55)      INTERVAL HPI / OVERNIGHT EVENTS: lethargic in ICU     MEDICATIONS  (STANDING):  acetaminophen   Tablet .. 1000 milliGRAM(s) Oral every 8 hours  calcium carbonate 1250 mG  + Vitamin D (OsCal 500 + D) 1 Tablet(s) Oral three times a day  chlorhexidine 4% Liquid 1 Application(s) Topical <User Schedule>  DAPTOmycin IVPB 440 milliGRAM(s) IV Intermittent every 24 hours  gabapentin 800 milliGRAM(s) Oral three times a day  lactated ringers. 1000 milliLiter(s) (100 mL/Hr) IV Continuous <Continuous>  lactobacillus acidophilus 1 Tablet(s) Oral two times a day with meals  lidocaine   Patch 1 Patch Transdermal every 24 hours  lidocaine   Patch 1 Patch Transdermal every 24 hours  methadone    Tablet 60 milliGRAM(s) Oral two times a day  multivitamin 1 Tablet(s) Oral daily  naloxegol 25 milliGRAM(s) Oral before breakfast  nicotine -  14 mG/24Hr(s) Patch 1 patch Transdermal daily  nystatin Powder 1 Application(s) Topical three times a day  phenylephrine    Infusion 1 MICROgram(s)/kG/Min (27.675 mL/Hr) IV Continuous <Continuous>  polyethylene glycol 3350 17 Gram(s) Oral two times a day  senna 2 Tablet(s) Oral at bedtime    MEDICATIONS  (PRN):  bisacodyl Suppository 10 milliGRAM(s) Rectal daily PRN Constipation  cyclobenzaprine 10 milliGRAM(s) Oral three times a day PRN Muscle Spasm  diazepam    Tablet 5 milliGRAM(s) Oral every 8 hours PRN Back Spasms  oxyCODONE    IR 30 milliGRAM(s) Oral every 4 hours PRN Moderate Pain (4 - 6)      Vital Signs Last 24 Hrs  Tmax : Afebrile    Review of systems:  General : no fever /chills, fatigue  CVS : no chest pain, palpitations  Lungs : no shortness of breath, cough  GI : no abdominal pain, vomiting, diarrhea   : no dysuria ,hematuria        PHYSICAL EXAM:  General :NAD,neck brace  Constitutional:  well-groomed, well-developed  Respiratory: CTAB/L  Cardiovascular: S1 and S2, RRR, no M/G/R  Gastrointestinal: BS+, soft, NT/ND  Extremities: No peripheral edema  Vascular: 2+ peripheral pulses  Skin: No rashes  back surgery with a hemo-vac      LABS:           WBC stable            MICROBIOLOGY:  RECENT CULTURES:        RADIOLOGY & ADDITIONAL STUDIES:

## 2019-05-10 NOTE — CHART NOTE - NSCHARTNOTEFT_GEN_A_CORE
Assessment: Pt seen for Critical care admission. S/p or 5/8 for fusion of 1 to 12 spinal segment by posterior approach. Pt extubated 5/9 & started po diet 5/9. Pt tolerating po diet. Last BM x 195/10).     Factors impacting intake: [ ] none [ ] nausea  [ ] vomiting [ ] diarrhea [ ] constipation  [ ]chewing problems [ ] swallowing issues  [ x] other: Back pain    Diet Prescription: Diet, Regular:   Supplement Feeding Modality:  Oral  Ensure Enlive Cans or Servings Per Day:  1       Frequency:  Three Times a day (05-10-19 @ 10:40)    Intake: Pt consumed 75% breakfast this am & 100% Ensure Enlive. Pt likes eggs, reyes & fresh fruit & enjoys vanilla Ensure Enlive.     Current Weight: Weight (kg): Wt=73.6kg(5/10), Wt=73.9kg(4/9)  % Weight Change Wt remains stable wt x 1 month    Physical appearance: +1 generalized edema    Pertinent Medications: MEDICATIONS  (STANDING):  acetaminophen   Tablet .. 1000 milliGRAM(s) Oral every 8 hours  calcium carbonate 1250 mG  + Vitamin D (OsCal 500 + D) 1 Tablet(s) Oral three times a day  chlorhexidine 4% Liquid 1 Application(s) Topical <User Schedule>  DAPTOmycin IVPB 440 milliGRAM(s) IV Intermittent every 24 hours  gabapentin 800 milliGRAM(s) Oral three times a day  lactated ringers. 1000 milliLiter(s) (100 mL/Hr) IV Continuous <Continuous>  lactobacillus acidophilus 1 Tablet(s) Oral two times a day with meals  lidocaine   Patch 1 Patch Transdermal every 24 hours  lidocaine   Patch 1 Patch Transdermal every 24 hours  methadone    Tablet 60 milliGRAM(s) Oral two times a day  multivitamin 1 Tablet(s) Oral daily  naloxegol 25 milliGRAM(s) Oral before breakfast  nicotine -  14 mG/24Hr(s) Patch 1 patch Transdermal daily  nystatin Powder 1 Application(s) Topical three times a day  phenylephrine    Infusion 1 MICROgram(s)/kG/Min (27.675 mL/Hr) IV Continuous <Continuous>  polyethylene glycol 3350 17 Gram(s) Oral two times a day  senna 2 Tablet(s) Oral at bedtime    MEDICATIONS  (PRN):  bisacodyl Suppository 10 milliGRAM(s) Rectal daily PRN Constipation  cyclobenzaprine 10 milliGRAM(s) Oral three times a day PRN Muscle Spasm  diazepam    Tablet 5 milliGRAM(s) Oral every 8 hours PRN Back Spasms  oxyCODONE    IR 30 milliGRAM(s) Oral every 4 hours PRN Moderate Pain (4 - 6)    Pertinent Labs: 05-10 Na 140 mmol/L Glu 110 mg/dL<H> K+ 3.7 mmol/L Cr 0.37 mg/dL<L> BUN 22 mg/dL Phos 2.6 mg/dL Alb n/a   PAB n/a   Hgb 8.3 g/dL<L> Hct 26.0 %<L> HgA1C n/a    Glucose, Serum: 110 mg/dL <H>   24Hr FS:  Skin: intact    Estimated Needs:   [x ] no change since previous assessment (4/16/19)  [ ] recalculated:     Previous Nutrition Diagnosis:   [ ] Inadequate Energy Intake [x ]Inadequate Oral Intake [ ] Excessive Energy Intake   [ ] Underweight [ ] Increased Nutrient Needs [ ] Overweight/Obesity   [ ] Altered GI Function [ ] Unintended Weight Loss [ ] Food & Nutrition Related Knowledge Deficit [ ] severe Malnutrition [ ] moderate malnutrition    Etiology: Back pain  Signs/Symptoms: Pt NPO/Clear liquids x 3 days & s/p po intake 50-75% most meals x 1 month  Nutrition Diagnosis is [ ] ongoing  [ ] resolved  [x ] improved  [ ] not applicable   Previous Goal:  Pt to consume  >75% meals; improved  Pt to consume >75% supplements; met      New Nutrition Diagnosis: [x ] not applicable       Interventions:   Recommend  [ x] Continue: Regular/Ensure Enlive 3 x day(1050kcal & 60gm protein)  [ ] Change Diet To:  [ ] Nutrition Supplement:  [ ] Nutrition Support:  [ x] Other: Cater to food preferences     Monitoring and Evaluation:   [ x] PO intake [ x ] Tolerance to diet prescription [ x ] weights [ x ] labs[ x ] follow up per protocol  [ ] other:

## 2019-05-10 NOTE — PROGRESS NOTE ADULT - ASSESSMENT
43F PMH active IVDA, alcohol abuse, hx of pancreatitis, alcohol hepatitis, alcohol withdrawal, left frontoparietal subdural hematoma 2008 here with MRSA endocarditis of tricuspid valve, severe sepsis, septic shock, MRSA bacteremia, septic emboli, PNA, acute hypoxic respiratory failure requiring intubation, UTI, ARF with ATN, lactic acidosis, acute metabolic encephelopathy, tachy-guerline arrhythmias with underlying endocarditis. Course with osteomyelitis and discitis of vertebral spine s/p C4-5 ACDF for cervical osteomyelitis on 3/20, also with T11/12 OM discitis with an epidural phlegmon from T9-L1 taken to the OR 5/8, s/p instrumentation of posterior spinal Fusion T8,T9, T10, T11, T12, L1, L2 with laminectomy T11-12; bilateral hemilaminectomy of T10 and proximal L1 with intra-op findings of extensive scar tissue and epidural fibrosis due to discitis/osteomyelitis.    1. NEURO  - awake alert responsive  - able to move all extremities although still with complaints of bilateral lower extremity paresthesia though now only isolated to soles of feet and not the entire foot  - cont c-collar for C-spine stabilization  - awaiting arrival of Thoracic lumbar spine brace    2. PULM  - no acute issues  - doing well post extubation    3. CV  - hemodynamically stable    4. ID  - MRSA bacteremia with endocarditis, osteomyelitis and discitis  - on daptomycin   - ID follow up    5. ORTHO  - cont pain management with methadone and oxycodone  - if pt needs more pain medication due to post operative pain can try increasing methadone, IV tylenol an option, as well as ketamine if pain very difficult to control  - physical therapy once TLSO brace available    6. GEN  - stable for transfer to med/surg floor  - d/w patient who is in agreement

## 2019-05-10 NOTE — PROGRESS NOTE ADULT - ASSESSMENT
43F with diffuse bacterial infection and endocarditis s/p C4-5 ACDF for cervical OM on 3/20 now with T11/12 OM discitis with an epidural phlegmon from T9-L1 s/p T8-L2 Laminectomy and PSF POD 2    Pain control  DVT ppx with SCDs only  WBAT/OOB with TLSO brace at all times  Q4 neuro checks  MAP goal 85 mmHg  Monitor KIRILL output  Physical therapy  Cont care per CCU team  Apprec ID recs re abx  Will discuss with attending

## 2019-05-11 LAB
ANION GAP SERPL CALC-SCNC: 9 MMOL/L — SIGNIFICANT CHANGE UP (ref 5–17)
BASOPHILS # BLD AUTO: 0.03 K/UL — SIGNIFICANT CHANGE UP (ref 0–0.2)
BASOPHILS NFR BLD AUTO: 0.3 % — SIGNIFICANT CHANGE UP (ref 0–2)
BUN SERPL-MCNC: 24 MG/DL — HIGH (ref 7–23)
CALCIUM SERPL-MCNC: 8.8 MG/DL — SIGNIFICANT CHANGE UP (ref 8.5–10.1)
CHLORIDE SERPL-SCNC: 107 MMOL/L — SIGNIFICANT CHANGE UP (ref 96–108)
CO2 SERPL-SCNC: 30 MMOL/L — SIGNIFICANT CHANGE UP (ref 22–31)
CREAT SERPL-MCNC: 0.5 MG/DL — SIGNIFICANT CHANGE UP (ref 0.5–1.3)
EOSINOPHIL # BLD AUTO: 0.33 K/UL — SIGNIFICANT CHANGE UP (ref 0–0.5)
EOSINOPHIL NFR BLD AUTO: 3.2 % — SIGNIFICANT CHANGE UP (ref 0–6)
GLUCOSE SERPL-MCNC: 123 MG/DL — HIGH (ref 70–99)
HCT VFR BLD CALC: 29.4 % — LOW (ref 34.5–45)
HGB BLD-MCNC: 8.9 G/DL — LOW (ref 11.5–15.5)
IMM GRANULOCYTES NFR BLD AUTO: 0.8 % — SIGNIFICANT CHANGE UP (ref 0–1.5)
LYMPHOCYTES # BLD AUTO: 3.75 K/UL — HIGH (ref 1–3.3)
LYMPHOCYTES # BLD AUTO: 36.3 % — SIGNIFICANT CHANGE UP (ref 13–44)
MAGNESIUM SERPL-MCNC: 1.9 MG/DL — SIGNIFICANT CHANGE UP (ref 1.6–2.6)
MCHC RBC-ENTMCNC: 25.1 PG — LOW (ref 27–34)
MCHC RBC-ENTMCNC: 30.3 GM/DL — LOW (ref 32–36)
MCV RBC AUTO: 83.1 FL — SIGNIFICANT CHANGE UP (ref 80–100)
MONOCYTES # BLD AUTO: 0.6 K/UL — SIGNIFICANT CHANGE UP (ref 0–0.9)
MONOCYTES NFR BLD AUTO: 5.8 % — SIGNIFICANT CHANGE UP (ref 2–14)
NEUTROPHILS # BLD AUTO: 5.55 K/UL — SIGNIFICANT CHANGE UP (ref 1.8–7.4)
NEUTROPHILS NFR BLD AUTO: 53.6 % — SIGNIFICANT CHANGE UP (ref 43–77)
NRBC # BLD: 0 /100 WBCS — SIGNIFICANT CHANGE UP (ref 0–0)
PHOSPHATE SERPL-MCNC: 3.4 MG/DL — SIGNIFICANT CHANGE UP (ref 2.5–4.5)
PLATELET # BLD AUTO: 292 K/UL — SIGNIFICANT CHANGE UP (ref 150–400)
POTASSIUM SERPL-MCNC: 3.7 MMOL/L — SIGNIFICANT CHANGE UP (ref 3.5–5.3)
POTASSIUM SERPL-SCNC: 3.7 MMOL/L — SIGNIFICANT CHANGE UP (ref 3.5–5.3)
RBC # BLD: 3.54 M/UL — LOW (ref 3.8–5.2)
RBC # FLD: 15.3 % — HIGH (ref 10.3–14.5)
SODIUM SERPL-SCNC: 146 MMOL/L — HIGH (ref 135–145)
WBC # BLD: 10.34 K/UL — SIGNIFICANT CHANGE UP (ref 3.8–10.5)
WBC # FLD AUTO: 10.34 K/UL — SIGNIFICANT CHANGE UP (ref 3.8–10.5)

## 2019-05-11 PROCEDURE — 99233 SBSQ HOSP IP/OBS HIGH 50: CPT

## 2019-05-11 RX ADMIN — LIDOCAINE 1 PATCH: 4 CREAM TOPICAL at 05:01

## 2019-05-11 RX ADMIN — Medication 1 TABLET(S): at 11:09

## 2019-05-11 RX ADMIN — GABAPENTIN 800 MILLIGRAM(S): 400 CAPSULE ORAL at 13:37

## 2019-05-11 RX ADMIN — Medication 5 MILLIGRAM(S): at 14:30

## 2019-05-11 RX ADMIN — Medication 1000 MILLIGRAM(S): at 23:04

## 2019-05-11 RX ADMIN — OXYCODONE HYDROCHLORIDE 30 MILLIGRAM(S): 5 TABLET ORAL at 09:12

## 2019-05-11 RX ADMIN — Medication 1 TABLET(S): at 06:59

## 2019-05-11 RX ADMIN — POLYETHYLENE GLYCOL 3350 17 GRAM(S): 17 POWDER, FOR SOLUTION ORAL at 06:59

## 2019-05-11 RX ADMIN — NYSTATIN CREAM 1 APPLICATION(S): 100000 CREAM TOPICAL at 13:36

## 2019-05-11 RX ADMIN — OXYCODONE HYDROCHLORIDE 30 MILLIGRAM(S): 5 TABLET ORAL at 13:28

## 2019-05-11 RX ADMIN — Medication 1 TABLET(S): at 23:04

## 2019-05-11 RX ADMIN — METHADONE HYDROCHLORIDE 60 MILLIGRAM(S): 40 TABLET ORAL at 23:23

## 2019-05-11 RX ADMIN — Medication 1000 MILLIGRAM(S): at 13:36

## 2019-05-11 RX ADMIN — LIDOCAINE 1 PATCH: 4 CREAM TOPICAL at 07:01

## 2019-05-11 RX ADMIN — Medication 1 TABLET(S): at 09:23

## 2019-05-11 RX ADMIN — GABAPENTIN 800 MILLIGRAM(S): 400 CAPSULE ORAL at 23:03

## 2019-05-11 RX ADMIN — LIDOCAINE 1 PATCH: 4 CREAM TOPICAL at 19:49

## 2019-05-11 RX ADMIN — Medication 1 PATCH: at 19:49

## 2019-05-11 RX ADMIN — METHADONE HYDROCHLORIDE 60 MILLIGRAM(S): 40 TABLET ORAL at 10:56

## 2019-05-11 RX ADMIN — NYSTATIN CREAM 1 APPLICATION(S): 100000 CREAM TOPICAL at 23:04

## 2019-05-11 RX ADMIN — CYCLOBENZAPRINE HYDROCHLORIDE 10 MILLIGRAM(S): 10 TABLET, FILM COATED ORAL at 11:07

## 2019-05-11 RX ADMIN — Medication 1 TABLET(S): at 13:37

## 2019-05-11 RX ADMIN — Medication 5 MILLIGRAM(S): at 06:30

## 2019-05-11 RX ADMIN — LIDOCAINE 1 PATCH: 4 CREAM TOPICAL at 20:38

## 2019-05-11 RX ADMIN — Medication 1 PATCH: at 11:09

## 2019-05-11 RX ADMIN — GABAPENTIN 800 MILLIGRAM(S): 400 CAPSULE ORAL at 06:59

## 2019-05-11 RX ADMIN — CHLORHEXIDINE GLUCONATE 1 APPLICATION(S): 213 SOLUTION TOPICAL at 07:00

## 2019-05-11 RX ADMIN — Medication 1000 MILLIGRAM(S): at 07:49

## 2019-05-11 RX ADMIN — NALOXEGOL OXALATE 25 MILLIGRAM(S): 12.5 TABLET, FILM COATED ORAL at 07:00

## 2019-05-11 RX ADMIN — Medication 1000 MILLIGRAM(S): at 00:50

## 2019-05-11 RX ADMIN — NYSTATIN CREAM 1 APPLICATION(S): 100000 CREAM TOPICAL at 07:02

## 2019-05-11 RX ADMIN — SENNA PLUS 2 TABLET(S): 8.6 TABLET ORAL at 23:23

## 2019-05-11 RX ADMIN — Medication 1000 MILLIGRAM(S): at 14:30

## 2019-05-11 RX ADMIN — DAPTOMYCIN 117.6 MILLIGRAM(S): 500 INJECTION, POWDER, LYOPHILIZED, FOR SOLUTION INTRAVENOUS at 23:04

## 2019-05-11 RX ADMIN — Medication 1000 MILLIGRAM(S): at 07:00

## 2019-05-11 RX ADMIN — OXYCODONE HYDROCHLORIDE 30 MILLIGRAM(S): 5 TABLET ORAL at 14:30

## 2019-05-11 RX ADMIN — OXYCODONE HYDROCHLORIDE 30 MILLIGRAM(S): 5 TABLET ORAL at 10:12

## 2019-05-11 RX ADMIN — LIDOCAINE 1 PATCH: 4 CREAM TOPICAL at 20:40

## 2019-05-11 NOTE — PROGRESS NOTE ADULT - PROBLEM SELECTOR PLAN 5
pt is on methadone 60 mg q12 and Oxy 30 mg q 4. Plan is to titrate off oxy asn increase methadone to acceptable dose. Pt is an agreement with the plan

## 2019-05-11 NOTE — PROGRESS NOTE ADULT - SUBJECTIVE AND OBJECTIVE BOX
Pt S/E at bedside, no acute events overnight, pain controlled, patient transferred to floor from CCU yesterday.  Denies new numbness/tingling in extremities. No other complaints.    Vital Signs Last 24 Hrs  T(C): 36.3 (11 May 2019 05:42), Max: 36.8 (10 May 2019 14:03)  T(F): 97.4 (11 May 2019 05:42), Max: 98.3 (11 May 2019 00:05)  HR: 78 (11 May 2019 05:42) (69 - 88)  BP: 103/59 (11 May 2019 05:42) (90/58 - 108/65)  BP(mean): 66 (10 May 2019 13:26) (66 - 71)  RR: 18 (11 May 2019 05:42) (11 - 24)  SpO2: 97% (11 May 2019 05:42) (96% - 100%)    Gen: NAD,     Spine:  Dressing clean dry intact  +KIRILL in place  +EHL/FHL/TA/GS  +AIN/PIN/M/R/U/Msc/Ax  SILT L3-S1  SILT C5-T1  +DP/PT Pulses  +Radial Pulse  Compartments soft  No calf TTP B/L

## 2019-05-11 NOTE — PHYSICAL THERAPY INITIAL EVALUATION ADULT - CRITERIA FOR SKILLED THERAPEUTIC INTERVENTIONS
risk reduction/prevention/anticipated equipment needs at discharge/anticipated discharge recommendation/predicted duration of therapy intervention/functional limitations in following categories/rehab potential/therapy frequency/impairments found
anticipated discharge recommendation/Acute Rehab./risk reduction/prevention/therapy frequency/impairments found/functional limitations in following categories/rehab potential/predicted duration of therapy intervention/anticipated equipment needs at discharge
anticipated discharge recommendation/predicted duration of therapy intervention/rehab potential/therapy frequency/functional limitations in following categories/subacute rehab/risk reduction/prevention/impairments found

## 2019-05-11 NOTE — PHYSICAL THERAPY INITIAL EVALUATION ADULT - MANUAL MUSCLE TESTING RESULTS, REHAB EVAL
grossly graded 3-/5 on both upper and lower limbs
BL UE grossly 2-/5, BL LE 3/5 grossly
MMT 4/5 for bilateral upper and lower extremities

## 2019-05-11 NOTE — PHYSICAL THERAPY INITIAL EVALUATION ADULT - IMPAIRMENTS CONTRIBUTING IMPAIRED BED MOBILITY, REHAB EVAL
decreased strength/narrow base of support/impaired balance
impaired balance/decreased strength
pain/decreased strength/impaired balance/decreased ROM

## 2019-05-11 NOTE — PHYSICAL THERAPY INITIAL EVALUATION ADULT - BALANCE DISTURBANCE, IDENTIFIED IMPAIRMENT CONTRIBUTE, REHAB EVAL
decreased ROM/impaired postural control/decreased strength/impaired motor control
decreased ROM/impaired motor control/impaired postural control/decreased strength
impaired motor control/impaired postural control/pain/decreased ROM/decreased strength

## 2019-05-11 NOTE — PHYSICAL THERAPY INITIAL EVALUATION ADULT - GAIT DEVIATIONS NOTED, PT EVAL
decreased velocity of limb motion/decreased marija/decreased stride length/decreased weight-shifting ability/shuffling gait/decreased step length/increased time in double stance
increased time in double stance/decreased step length/decreased stride length/decreased weight-shifting ability/decreased marija/decreased velocity of limb motion
decreased step length/increased time in double stance/decreased marija/decreased velocity of limb motion/decreased stride length

## 2019-05-11 NOTE — PHYSICAL THERAPY INITIAL EVALUATION ADULT - LEVEL OF INDEPENDENCE: SIT/STAND, REHAB EVAL
minimum assist (75% patients effort)
moderate assist (50% patients effort)
moderate assist (50% patients effort)

## 2019-05-11 NOTE — PHYSICAL THERAPY INITIAL EVALUATION ADULT - PERTINENT HX OF CURRENT PROBLEM, REHAB EVAL
Patient re-evaluated due to increased length of stay. Stable vital signs as charted. Reports neck pain. Noted grossly slumped posture. Chart reviewed and noted admitted from ED in February 17, 2019 due to AMS with heroin abuse. Attempted to detox but has had productive coughing and AMS. Currently stable, AOx4, labs stable with low but acceptable Hb, LVEF 60%.
Sepsis due to unspecified organism
back pain

## 2019-05-11 NOTE — PHYSICAL THERAPY INITIAL EVALUATION ADULT - MD ORDER
s/p T11/12 OM discitis with an epidural phlegmon from T9-L1 s/p T8-L2 Laminectomy and PSF    s/p C4-5 ACDF for cervical OM on 3/20

## 2019-05-11 NOTE — PHYSICAL THERAPY INITIAL EVALUATION ADULT - DIAGNOSIS, PT EVAL
M62.81 Muscle Weakness, R26.2 Difficulty Walking, Z91.81 History of Falling
M62.8 Muscle Weakness (generalized)
M62.8 Muscle Weakness (generalized)

## 2019-05-11 NOTE — PHYSICAL THERAPY INITIAL EVALUATION ADULT - BED MOBILITY LIMITATIONS, REHAB EVAL
decreased ability to use arms for pushing/pulling/impaired ability to control trunk for mobility/decreased ability to use legs for bridging/pushing
impaired ability to control trunk for mobility/decreased ability to use arms for pushing/pulling/decreased ability to use legs for bridging/pushing
decreased ability to use arms for pushing/pulling/impaired ability to control trunk for mobility/decreased ability to use legs for bridging/pushing

## 2019-05-11 NOTE — PHYSICAL THERAPY INITIAL EVALUATION ADULT - BALANCE DISTURBANCE, SYSTEM IMPAIRMENT CONTRIBUTE, REHAB EVAL
musculoskeletal/neuromuscular
neuromuscular/musculoskeletal
musculoskeletal/somatosensory/neuromuscular

## 2019-05-11 NOTE — PHYSICAL THERAPY INITIAL EVALUATION ADULT - IMPAIRMENTS FOUND, PT EVAL
neuromotor development and sensory integration/aerobic capacity/endurance/gait, locomotion, and balance/posture/tone/muscle strength/ROM
muscle strength/gait, locomotion, and balance/ROM
ROM/gait, locomotion, and balance/muscle strength

## 2019-05-11 NOTE — PROGRESS NOTE ADULT - ASSESSMENT
43F with diffuse bacterial infection and endocarditis s/p C4-5 ACDF for cervical OM on 3/20 now with T11/12 OM discitis with an epidural phlegmon from T9-L1 s/p T8-L2 Laminectomy and PSF POD 2    Pain control  DVT ppx with SCDs only  WBAT/OOB with TLSO brace at all times  Q4 neuro checks  Monitor KIRILL output  Physical therapy  Primary care per medical team  Apprec ID recs re abx  Will discuss with attending

## 2019-05-11 NOTE — PHYSICAL THERAPY INITIAL EVALUATION ADULT - TRANSFER TRAINING, PT EVAL
Pt will independently perform sit to/from stand transfers without LOB using rolling walker by 2 weeks

## 2019-05-11 NOTE — PHYSICAL THERAPY INITIAL EVALUATION ADULT - BED MOBILITY TRAINING, PT EVAL
In 2 weeks, pt will be able to perform bed mobility without LOB in order to perform bed mobility independently

## 2019-05-11 NOTE — PHYSICAL THERAPY INITIAL EVALUATION ADULT - TRANSFER SAFETY CONCERNS NOTED: SIT/STAND, REHAB EVAL
decreased balance during turns/losing balance/decreased sequencing ability/decreased weight-shifting ability
decreased sequencing ability/decreased weight-shifting ability/losing balance
losing balance/decreased sequencing ability

## 2019-05-11 NOTE — PHYSICAL THERAPY INITIAL EVALUATION ADULT - IMPAIRMENTS CONTRIBUTING TO GAIT DEVIATIONS, PT EVAL
pain/impaired balance/decreased ROM/decreased strength/decreased flexibility
pain/decreased strength/impaired balance/decreased ROM/decreased flexibility
impaired balance/decreased ROM/decreased flexibility/pain/decreased strength

## 2019-05-11 NOTE — PHYSICAL THERAPY INITIAL EVALUATION ADULT - IMPAIRED TRANSFERS: SIT/STAND, REHAB EVAL
decreased strength/decreased ROM/impaired balance/narrow base of support
impaired motor control/decreased ROM/decreased strength/impaired coordination/decreased flexibility/impaired balance
impaired balance/decreased flexibility/pain/decreased strength

## 2019-05-11 NOTE — PHYSICAL THERAPY INITIAL EVALUATION ADULT - ADDITIONAL COMMENTS
Pt has 4 steps to enter home with bilateral railings followed by 10 steps, then c bilateral railings to second floor.
Pt has 4 steps to enter home  c BL railings followed by 10 steps c bilateral railings to second floor.
Pt has 4 steps to enter home with bilateral railings followed by 10 steps, then c bilateral railings to second floor.

## 2019-05-11 NOTE — PHYSICAL THERAPY INITIAL EVALUATION ADULT - GENERAL OBSERVATIONS, REHAB EVAL
Patient slumped supine in bed with kyphotic upper back.
Kwasi Smith
Pt seen supine in bed, alert and OX4, cervical collar intact, KIRILL drain intact, milan intact, dressing intact to thoracic/lumbar spine.

## 2019-05-11 NOTE — PHYSICAL THERAPY INITIAL EVALUATION ADULT - DISCHARGE DISPOSITION, PT EVAL
Acute Rehab to further improve balance, strength, gait, posutre and endurance Previous level of function is independent prior to admission. Patient now off functional baseline. Patient will tolerate 2-3 rehab disciplines to address functional needs post extended hospital stay. Patient will benefit from MD supervision and rehab nursing through course of rehab to monitor/address complex medical needs.
Acute Rehab
subacute rehab

## 2019-05-11 NOTE — PHYSICAL THERAPY INITIAL EVALUATION ADULT - PLANNED THERAPY INTERVENTIONS, PT EVAL
neuromuscular re-education/ROM/strengthening/gait training/postural re-education/transfer training/balance training/bed mobility training
ROM/strengthening/transfer training/gait training/balance training/bed mobility training
transfer training/balance training/bed mobility training/strengthening/gait training

## 2019-05-11 NOTE — PHYSICAL THERAPY INITIAL EVALUATION ADULT - ACTIVE RANGE OF MOTION EXAMINATION, REHAB EVAL
BL upper extremities PROM WNL, AROM limited by ~85% to WNL, BL lower extremities PROM WNL, BL LE AROM limited by ~60% to WNL/deficits as listed below
unable to assess spinal ROM secondary to spinal precautions, all other extremities WFL.

## 2019-05-11 NOTE — PROGRESS NOTE ADULT - SUBJECTIVE AND OBJECTIVE BOX
Patient is a 43y old  Female who presents with a chief complaint of AMS (11 May 2019 06:34)      INTERVAL HPI/OVERNIGHT EVENTS: none. reports her stenght in her legs are improving     MEDICATIONS  (STANDING):  acetaminophen   Tablet .. 1000 milliGRAM(s) Oral every 8 hours  calcium carbonate 1250 mG  + Vitamin D (OsCal 500 + D) 1 Tablet(s) Oral three times a day  chlorhexidine 4% Liquid 1 Application(s) Topical <User Schedule>  DAPTOmycin IVPB 440 milliGRAM(s) IV Intermittent every 24 hours  gabapentin 800 milliGRAM(s) Oral three times a day  lactobacillus acidophilus 1 Tablet(s) Oral two times a day with meals  lidocaine   Patch 1 Patch Transdermal every 24 hours  lidocaine   Patch 1 Patch Transdermal every 24 hours  methadone    Tablet 60 milliGRAM(s) Oral two times a day  multivitamin 1 Tablet(s) Oral daily  naloxegol 25 milliGRAM(s) Oral before breakfast  nicotine -  14 mG/24Hr(s) Patch 1 patch Transdermal daily  nystatin Powder 1 Application(s) Topical three times a day  polyethylene glycol 3350 17 Gram(s) Oral two times a day  senna 2 Tablet(s) Oral at bedtime    MEDICATIONS  (PRN):  bisacodyl Suppository 10 milliGRAM(s) Rectal daily PRN Constipation  cyclobenzaprine 10 milliGRAM(s) Oral three times a day PRN Muscle Spasm  diazepam    Tablet 5 milliGRAM(s) Oral every 8 hours PRN Back Spasms  oxyCODONE    IR 30 milliGRAM(s) Oral every 4 hours PRN Moderate Pain (4 - 6)      Allergies    penicillin (Short breath; Hives)    Intolerances        REVIEW OF SYSTEMS:  CONSTITUTIONAL: No fever, weight loss, or fatigue  EYES: No eye pain, visual disturbances, or discharge  ENMT:  No difficulty hearing, tinnitus, vertigo; No sinus or throat pain  RESPIRATORY: No cough, wheezing, chills or hemoptysis; No shortness of breath  CARDIOVASCULAR: No chest pain, palpitations, dizziness, or leg swelling  GASTROINTESTINAL: No abdominal or epigastric pain. No nausea, vomiting, or hematemesis; No diarrhea or constipation. No melena or hematochezia.  GENITOURINARY: No dysuria, frequency, hematuria, or incontinence  NEUROLOGICAL: No headaches, memory loss,  SKIN: No itching, burning, rashes, or lesions   LYMPH NODES: No enlarged glands  ENDOCRINE: No heat or cold intolerance; No hair loss  ALLERGY AND IMMUNOLOGIC: No hives or eczema    Vital Signs Last 24 Hrs  T(C): 36.8 (11 May 2019 11:18), Max: 36.8 (10 May 2019 14:03)  T(F): 98.3 (11 May 2019 11:18), Max: 98.3 (11 May 2019 00:05)  HR: 97 (11 May 2019 11:18) (78 - 97)  BP: 113/72 (11 May 2019 11:18) (90/58 - 113/72)  BP(mean): 83 (11 May 2019 11:18) (66 - 83)  RR: 19 (11 May 2019 11:18) (17 - 22)  SpO2: 100% (11 May 2019 11:18) (96% - 100%)    PHYSICAL EXAM:  GENERAL: NAD, well-groomed, well-developed  HEAD:  Atraumatic, Normocephalic  EYES: EOMI, PERRLA, conjunctiva and sclera clear  ENMT: No tonsillar erythema, exudates, or enlargement; Moist mucous membranes, Good dentition, No lesions  NECK: Supple, No JVD, Normal thyroid  NERVOUS SYSTEM:  Alert & Oriented X3, Good concentration; Motor Strength 5/5 B/L upper and lower extremities; DTRs 2+ intact and symmetric  CHEST/LUNG: Clear to percussion bilaterally; No rales, rhonchi, wheezing, or rubs  HEART: Regular rate and rhythm; No murmurs, rubs, or gallops  ABDOMEN: Soft, Nontender, Nondistended; Bowel sounds present  EXTREMITIES:  2+ Peripheral Pulses, No clubbing, cyanosis, or edema  LYMPH: No lymphadenopathy noted  SKIN: No rashes or lesions    LABS:                        8.9    10.34 )-----------( 292      ( 11 May 2019 10:59 )             29.4     05-11    146<H>  |  107  |  24<H>  ----------------------------<  123<H>  3.7   |  30  |  0.50    Ca    8.8      11 May 2019 10:59  Phos  3.4     05-11  Mg     1.9     05-11          CAPILLARY BLOOD GLUCOSE          RADIOLOGY & ADDITIONAL TESTS:    Imaging Personally Reviewed:  [ ] YES  [ ] NO    Consultant(s) Notes Reviewed:  [ ] YES  [ ] NO    Care Discussed with Consultants/Other Providers [ ] YES  [ ] NO

## 2019-05-11 NOTE — PHYSICAL THERAPY INITIAL EVALUATION ADULT - RANGE OF MOTION EXAMINATION, REHAB EVAL
deficits as listed below/limted shoulder flexion to <90 degrees bilaterally; left hip flexion <75% AROM
deficits as listed below/BL upper extremities PROM WNL, AROM limited by ~85% to WNL, BL lower extremities PROM WNL, BL LE AROM limited by ~60% to WNL
unable to assess spinal ROM secondary to spinal precautions, all other extremities WFL.

## 2019-05-11 NOTE — PROGRESS NOTE ADULT - ASSESSMENT
44 yo F IV drug abuser, admitted on 2/17/19 diagnosed with MRSA bacteremia, endocarditis, s/p C4-5 ACDF for cervical osteomyelitis on 3/20 and currently with T11/12 OM discitis with an epidural phlegmon from T9-L1   Pt taken to the OR 5/8, s/p instrumentation of posterior spinal Fusion T8,T9, T10, T11, T12, L1, L2 with laminectomy T11-12; bilateral hemilaminectomy of T10 and proximal L1 with intra-op findings of extensive scar tissue and epidural fibrosis due to discitis, osteomyelitis with poor lower extremity neurologic findings. Transferred to ICU for post op care and ventilator management. patient extubated without difficulty on 5/9. Remains on 2 liters nasal cannula. Continue antibiotics as per ID. Dr. Garcia on case. PT on case needs brace to get OOB. Patient seen and examined by ICU attending and stable for transfer to medicine service.

## 2019-05-11 NOTE — PHYSICAL THERAPY INITIAL EVALUATION ADULT - STRENGTHENING, PT EVAL
Pt will increase general upper and lower extremity strength to 5/5 to improve functional strength by 4 weeks

## 2019-05-12 LAB
ANION GAP SERPL CALC-SCNC: 8 MMOL/L — SIGNIFICANT CHANGE UP (ref 5–17)
BUN SERPL-MCNC: 16 MG/DL — SIGNIFICANT CHANGE UP (ref 7–23)
CALCIUM SERPL-MCNC: 9.5 MG/DL — SIGNIFICANT CHANGE UP (ref 8.5–10.1)
CHLORIDE SERPL-SCNC: 102 MMOL/L — SIGNIFICANT CHANGE UP (ref 96–108)
CO2 SERPL-SCNC: 30 MMOL/L — SIGNIFICANT CHANGE UP (ref 22–31)
CREAT SERPL-MCNC: 0.39 MG/DL — LOW (ref 0.5–1.3)
GLUCOSE SERPL-MCNC: 88 MG/DL — SIGNIFICANT CHANGE UP (ref 70–99)
HCT VFR BLD CALC: 28.8 % — LOW (ref 34.5–45)
HGB BLD-MCNC: 8.3 G/DL — LOW (ref 11.5–15.5)
MCHC RBC-ENTMCNC: 25 PG — LOW (ref 27–34)
MCHC RBC-ENTMCNC: 28.8 GM/DL — LOW (ref 32–36)
MCV RBC AUTO: 86.7 FL — SIGNIFICANT CHANGE UP (ref 80–100)
NRBC # BLD: 0 /100 WBCS — SIGNIFICANT CHANGE UP (ref 0–0)
PLATELET # BLD AUTO: 253 K/UL — SIGNIFICANT CHANGE UP (ref 150–400)
POTASSIUM SERPL-MCNC: 4.4 MMOL/L — SIGNIFICANT CHANGE UP (ref 3.5–5.3)
POTASSIUM SERPL-SCNC: 4.4 MMOL/L — SIGNIFICANT CHANGE UP (ref 3.5–5.3)
RBC # BLD: 3.32 M/UL — LOW (ref 3.8–5.2)
RBC # FLD: 15.5 % — HIGH (ref 10.3–14.5)
SODIUM SERPL-SCNC: 140 MMOL/L — SIGNIFICANT CHANGE UP (ref 135–145)
WBC # BLD: 12.19 K/UL — HIGH (ref 3.8–10.5)
WBC # FLD AUTO: 12.19 K/UL — HIGH (ref 3.8–10.5)

## 2019-05-12 PROCEDURE — 99233 SBSQ HOSP IP/OBS HIGH 50: CPT

## 2019-05-12 RX ADMIN — GABAPENTIN 800 MILLIGRAM(S): 400 CAPSULE ORAL at 06:19

## 2019-05-12 RX ADMIN — Medication 1 TABLET(S): at 06:19

## 2019-05-12 RX ADMIN — Medication 1 TABLET(S): at 17:53

## 2019-05-12 RX ADMIN — OXYCODONE HYDROCHLORIDE 20 MILLIGRAM(S): 5 TABLET ORAL at 12:45

## 2019-05-12 RX ADMIN — NYSTATIN CREAM 1 APPLICATION(S): 100000 CREAM TOPICAL at 13:35

## 2019-05-12 RX ADMIN — Medication 1 PATCH: at 11:58

## 2019-05-12 RX ADMIN — Medication 1000 MILLIGRAM(S): at 15:28

## 2019-05-12 RX ADMIN — OXYCODONE HYDROCHLORIDE 30 MILLIGRAM(S): 5 TABLET ORAL at 23:10

## 2019-05-12 RX ADMIN — LIDOCAINE 1 PATCH: 4 CREAM TOPICAL at 17:50

## 2019-05-12 RX ADMIN — Medication 1 TABLET(S): at 07:46

## 2019-05-12 RX ADMIN — LIDOCAINE 1 PATCH: 4 CREAM TOPICAL at 06:27

## 2019-05-12 RX ADMIN — OXYCODONE HYDROCHLORIDE 30 MILLIGRAM(S): 5 TABLET ORAL at 11:57

## 2019-05-12 RX ADMIN — Medication 1 PATCH: at 19:30

## 2019-05-12 RX ADMIN — LIDOCAINE 1 PATCH: 4 CREAM TOPICAL at 07:44

## 2019-05-12 RX ADMIN — OXYCODONE HYDROCHLORIDE 30 MILLIGRAM(S): 5 TABLET ORAL at 13:34

## 2019-05-12 RX ADMIN — Medication 1000 MILLIGRAM(S): at 13:35

## 2019-05-12 RX ADMIN — OXYCODONE HYDROCHLORIDE 30 MILLIGRAM(S): 5 TABLET ORAL at 00:27

## 2019-05-12 RX ADMIN — Medication 1 PATCH: at 07:44

## 2019-05-12 RX ADMIN — Medication 5 MILLIGRAM(S): at 09:18

## 2019-05-12 RX ADMIN — Medication 1000 MILLIGRAM(S): at 07:45

## 2019-05-12 RX ADMIN — NALOXEGOL OXALATE 25 MILLIGRAM(S): 12.5 TABLET, FILM COATED ORAL at 07:46

## 2019-05-12 RX ADMIN — OXYCODONE HYDROCHLORIDE 30 MILLIGRAM(S): 5 TABLET ORAL at 09:53

## 2019-05-12 RX ADMIN — METHADONE HYDROCHLORIDE 60 MILLIGRAM(S): 40 TABLET ORAL at 11:58

## 2019-05-12 RX ADMIN — Medication 1 TABLET(S): at 13:35

## 2019-05-12 RX ADMIN — SENNA PLUS 2 TABLET(S): 8.6 TABLET ORAL at 22:14

## 2019-05-12 RX ADMIN — OXYCODONE HYDROCHLORIDE 30 MILLIGRAM(S): 5 TABLET ORAL at 22:13

## 2019-05-12 RX ADMIN — DAPTOMYCIN 117.6 MILLIGRAM(S): 500 INJECTION, POWDER, LYOPHILIZED, FOR SOLUTION INTRAVENOUS at 22:11

## 2019-05-12 RX ADMIN — CHLORHEXIDINE GLUCONATE 1 APPLICATION(S): 213 SOLUTION TOPICAL at 06:19

## 2019-05-12 RX ADMIN — OXYCODONE HYDROCHLORIDE 30 MILLIGRAM(S): 5 TABLET ORAL at 01:15

## 2019-05-12 RX ADMIN — Medication 1000 MILLIGRAM(S): at 00:00

## 2019-05-12 RX ADMIN — OXYCODONE HYDROCHLORIDE 30 MILLIGRAM(S): 5 TABLET ORAL at 07:45

## 2019-05-12 RX ADMIN — Medication 1 TABLET(S): at 11:58

## 2019-05-12 RX ADMIN — NYSTATIN CREAM 1 APPLICATION(S): 100000 CREAM TOPICAL at 22:14

## 2019-05-12 RX ADMIN — GABAPENTIN 800 MILLIGRAM(S): 400 CAPSULE ORAL at 13:35

## 2019-05-12 RX ADMIN — Medication 1 PATCH: at 13:33

## 2019-05-12 RX ADMIN — NYSTATIN CREAM 1 APPLICATION(S): 100000 CREAM TOPICAL at 06:19

## 2019-05-12 RX ADMIN — GABAPENTIN 800 MILLIGRAM(S): 400 CAPSULE ORAL at 22:14

## 2019-05-12 RX ADMIN — Medication 1 TABLET(S): at 22:14

## 2019-05-12 RX ADMIN — LIDOCAINE 1 PATCH: 4 CREAM TOPICAL at 22:11

## 2019-05-12 RX ADMIN — CYCLOBENZAPRINE HYDROCHLORIDE 10 MILLIGRAM(S): 10 TABLET, FILM COATED ORAL at 13:41

## 2019-05-12 RX ADMIN — Medication 1000 MILLIGRAM(S): at 06:19

## 2019-05-12 RX ADMIN — METHADONE HYDROCHLORIDE 60 MILLIGRAM(S): 40 TABLET ORAL at 23:35

## 2019-05-12 NOTE — PROGRESS NOTE ADULT - ASSESSMENT
43F with diffuse bacterial infection and endocarditis s/p C4-5 ACDF for cervical OM on 3/20 now with T11/12 OM discitis with an epidural phlegmon from T9-L1 s/p T8-L2 Laminectomy and PSF POD 4    Pain control  DVT ppx with SCDs only  WBAT/OOB with TLSO brace at all times  Q4 neuro checks  Monitor KIRILL output  Physical therapy  Primary care per medical team  Apprec ID recs for abx  Will discuss with attending

## 2019-05-12 NOTE — PROGRESS NOTE ADULT - SUBJECTIVE AND OBJECTIVE BOX
Pt S/E at bedside, no acute events overnight, pain controlled, patient walked 25 feet with PT yesterday.     Vital Signs Last 24 Hrs  T(C): 37 (12 May 2019 05:22), Max: 37.6 (11 May 2019 23:45)  T(F): 98.6 (12 May 2019 05:22), Max: 99.7 (11 May 2019 23:45)  HR: 87 (12 May 2019 06:48) (82 - 107)  BP: 106/61 (12 May 2019 06:48) (96/57 - 113/72)  BP(mean): 83 (11 May 2019 11:18) (83 - 83)  RR: 18 (12 May 2019 06:48) (16 - 19)  SpO2: 98% (12 May 2019 06:48) (98% - 100%)    Gen: NAD,     Spine:  Dressing clean dry intact  +KIRILL with serosanguinous output  +EHL/FHL/TA/GS  +AIN/PIN/M/R/U/Msc/Ax  SILT L3-S1  SILT C5-T1  +DP/PT Pulses  +Radial Pulse  Compartments soft  No calf TTP B/L

## 2019-05-12 NOTE — PROGRESS NOTE ADULT - ASSESSMENT
42 yo F IV drug abuser, admitted on 2/17/19 diagnosed with MRSA bacteremia, endocarditis, s/p C4-5 ACDF for cervical osteomyelitis on 3/20 and currently with T11/12 OM discitis with an epidural phlegmon from T9-L1   Pt taken to the OR 5/8, s/p instrumentation of posterior spinal Fusion T8,T9, T10, T11, T12, L1, L2 with laminectomy T11-12; bilateral hemilaminectomy of T10 and proximal L1 with intra-op findings of extensive scar tissue and epidural fibrosis due to discitis, osteomyelitis with poor lower extremity neurologic findings. Transferred to ICU for post op care and ventilator management. patient extubated without difficulty on 5/9. Remains on 2 liters nasal cannula. Continue antibiotics as per ID. Dr. Garcia on case. PT on case needs brace to get OOB.   Worked well with PT today.   remove milan

## 2019-05-12 NOTE — PROGRESS NOTE ADULT - SUBJECTIVE AND OBJECTIVE BOX
Patient is a 43y old  Female who presents with a chief complaint of AMS (11 May 2019 06:34)      INTERVAL HPI/OVERNIGHT EVENTS: none. walked with pt today     MEDICATIONS  (STANDING):  acetaminophen   Tablet .. 1000 milliGRAM(s) Oral every 8 hours  calcium carbonate 1250 mG  + Vitamin D (OsCal 500 + D) 1 Tablet(s) Oral three times a day  chlorhexidine 4% Liquid 1 Application(s) Topical <User Schedule>  DAPTOmycin IVPB 440 milliGRAM(s) IV Intermittent every 24 hours  gabapentin 800 milliGRAM(s) Oral three times a day  lactobacillus acidophilus 1 Tablet(s) Oral two times a day with meals  lidocaine   Patch 1 Patch Transdermal every 24 hours  lidocaine   Patch 1 Patch Transdermal every 24 hours  methadone    Tablet 60 milliGRAM(s) Oral two times a day  multivitamin 1 Tablet(s) Oral daily  naloxegol 25 milliGRAM(s) Oral before breakfast  nicotine -  14 mG/24Hr(s) Patch 1 patch Transdermal daily  nystatin Powder 1 Application(s) Topical three times a day  polyethylene glycol 3350 17 Gram(s) Oral two times a day  senna 2 Tablet(s) Oral at bedtime    MEDICATIONS  (PRN):  bisacodyl Suppository 10 milliGRAM(s) Rectal daily PRN Constipation  cyclobenzaprine 10 milliGRAM(s) Oral three times a day PRN Muscle Spasm  diazepam    Tablet 5 milliGRAM(s) Oral every 8 hours PRN Back Spasms  oxyCODONE    IR 30 milliGRAM(s) Oral every 4 hours PRN Moderate Pain (4 - 6)        Allergies    penicillin (Short breath; Hives)    Intolerances        REVIEW OF SYSTEMS:  CONSTITUTIONAL: No fever, weight loss, or fatigue  EYES: No eye pain, visual disturbances, or discharge  ENMT:  No difficulty hearing, tinnitus, vertigo; No sinus or throat pain  RESPIRATORY: No cough, wheezing, chills or hemoptysis; No shortness of breath  CARDIOVASCULAR: No chest pain, palpitations, dizziness, or leg swelling  GASTROINTESTINAL: No abdominal or epigastric pain. No nausea, vomiting, or hematemesis; No diarrhea or constipation. No melena or hematochezia.  GENITOURINARY: No dysuria, frequency, hematuria, or incontinence  NEUROLOGICAL: No headaches, memory loss,  SKIN: No itching, burning, rashes, or lesions   LYMPH NODES: No enlarged glands  ENDOCRINE: No heat or cold intolerance; No hair loss  ALLERGY AND IMMUNOLOGIC: No hives or eczema    Vital Signs Last 24 Hrs  T(C): 37 (12 May 2019 05:22), Max: 37.6 (11 May 2019 23:45)  T(F): 98.6 (12 May 2019 05:22), Max: 99.7 (11 May 2019 23:45)  HR: 107 (12 May 2019 10:23) (82 - 107)  BP: 123/74 (12 May 2019 10:23) (96/57 - 123/74)  BP(mean): --  RR: 18 (12 May 2019 10:23) (16 - 18)  SpO2: 97% (12 May 2019 10:23) (97% - 98%)    PHYSICAL EXAM:  GENERAL: NAD, well-groomed, well-developed  HEAD:  Atraumatic, Normocephalic  EYES: EOMI, PERRLA, conjunctiva and sclera clear  ENMT: No tonsillar erythema, exudates, or enlargement; Moist mucous membranes, Good dentition, No lesions  NECK: Supple, No JVD, Normal thyroid  NERVOUS SYSTEM:  Alert & Oriented X3, Good concentration; Motor Strength 5/5 B/L upper and lower extremities; DTRs 2+ intact and symmetric  CHEST/LUNG: Clear to percussion bilaterally; No rales, rhonchi, wheezing, or rubs  HEART: Regular rate and rhythm; No murmurs, rubs, or gallops  ABDOMEN: Soft, Nontender, Nondistended; Bowel sounds present  EXTREMITIES:  2+ Peripheral Pulses, No clubbing, cyanosis, or edema  LYMPH: No lymphadenopathy noted  SKIN: No rashes or lesions    LABS:                          8.3    12.19 )-----------( 253      ( 12 May 2019 06:51 )             28.8     05-12    140  |  102  |  16  ----------------------------<  88  4.4   |  30  |  0.39<L>    Ca    9.5      12 May 2019 06:51  Phos  3.4     05-11  Mg     1.9     05-11                  CAPILLARY BLOOD GLUCOSE          RADIOLOGY & ADDITIONAL TESTS:    Imaging Personally Reviewed:  [ ] YES  [ ] NO    Consultant(s) Notes Reviewed:  [ ] YES  [ ] NO    Care Discussed with Consultants/Other Providers [ ] YES  [ ] NO

## 2019-05-12 NOTE — PROGRESS NOTE ADULT - PROBLEM SELECTOR PLAN 5
pt is on methadone 60 mg q12 and Oxy 30 mg q 4. Plan is to titrate off oxy and increase methadone to acceptable dose. Pt is an agreement with the plan  discuss with Dr. Silva about pain management plan

## 2019-05-13 PROCEDURE — 99233 SBSQ HOSP IP/OBS HIGH 50: CPT

## 2019-05-13 RX ORDER — DAPTOMYCIN 500 MG/10ML
600 INJECTION, POWDER, LYOPHILIZED, FOR SOLUTION INTRAVENOUS
Refills: 0 | Status: DISCONTINUED | OUTPATIENT
Start: 2019-05-13 | End: 2019-06-05

## 2019-05-13 RX ADMIN — Medication 1000 MILLIGRAM(S): at 15:06

## 2019-05-13 RX ADMIN — LIDOCAINE 1 PATCH: 4 CREAM TOPICAL at 05:48

## 2019-05-13 RX ADMIN — CHLORHEXIDINE GLUCONATE 1 APPLICATION(S): 213 SOLUTION TOPICAL at 05:48

## 2019-05-13 RX ADMIN — GABAPENTIN 800 MILLIGRAM(S): 400 CAPSULE ORAL at 21:38

## 2019-05-13 RX ADMIN — Medication 1 TABLET(S): at 21:37

## 2019-05-13 RX ADMIN — Medication 1 PATCH: at 13:43

## 2019-05-13 RX ADMIN — NALOXEGOL OXALATE 25 MILLIGRAM(S): 12.5 TABLET, FILM COATED ORAL at 08:01

## 2019-05-13 RX ADMIN — GABAPENTIN 800 MILLIGRAM(S): 400 CAPSULE ORAL at 05:49

## 2019-05-13 RX ADMIN — Medication 1000 MILLIGRAM(S): at 22:27

## 2019-05-13 RX ADMIN — OXYCODONE HYDROCHLORIDE 30 MILLIGRAM(S): 5 TABLET ORAL at 05:48

## 2019-05-13 RX ADMIN — Medication 1 TABLET(S): at 08:01

## 2019-05-13 RX ADMIN — OXYCODONE HYDROCHLORIDE 30 MILLIGRAM(S): 5 TABLET ORAL at 06:45

## 2019-05-13 RX ADMIN — Medication 1 TABLET(S): at 13:43

## 2019-05-13 RX ADMIN — OXYCODONE HYDROCHLORIDE 30 MILLIGRAM(S): 5 TABLET ORAL at 19:02

## 2019-05-13 RX ADMIN — LIDOCAINE 1 PATCH: 4 CREAM TOPICAL at 18:38

## 2019-05-13 RX ADMIN — METHADONE HYDROCHLORIDE 60 MILLIGRAM(S): 40 TABLET ORAL at 13:43

## 2019-05-13 RX ADMIN — SENNA PLUS 2 TABLET(S): 8.6 TABLET ORAL at 21:39

## 2019-05-13 RX ADMIN — Medication 5 MILLIGRAM(S): at 14:39

## 2019-05-13 RX ADMIN — Medication 1000 MILLIGRAM(S): at 21:37

## 2019-05-13 RX ADMIN — Medication 1000 MILLIGRAM(S): at 14:39

## 2019-05-13 RX ADMIN — OXYCODONE HYDROCHLORIDE 30 MILLIGRAM(S): 5 TABLET ORAL at 11:00

## 2019-05-13 RX ADMIN — METHADONE HYDROCHLORIDE 60 MILLIGRAM(S): 40 TABLET ORAL at 22:25

## 2019-05-13 RX ADMIN — Medication 5 MILLIGRAM(S): at 22:50

## 2019-05-13 RX ADMIN — Medication 1 TABLET(S): at 13:42

## 2019-05-13 RX ADMIN — Medication 1 TABLET(S): at 18:38

## 2019-05-13 RX ADMIN — Medication 5 MILLIGRAM(S): at 03:25

## 2019-05-13 RX ADMIN — LIDOCAINE 1 PATCH: 4 CREAM TOPICAL at 18:19

## 2019-05-13 RX ADMIN — OXYCODONE HYDROCHLORIDE 30 MILLIGRAM(S): 5 TABLET ORAL at 10:07

## 2019-05-13 RX ADMIN — OXYCODONE HYDROCHLORIDE 30 MILLIGRAM(S): 5 TABLET ORAL at 20:19

## 2019-05-13 RX ADMIN — Medication 1 PATCH: at 13:40

## 2019-05-13 RX ADMIN — Medication 1 TABLET(S): at 05:49

## 2019-05-13 RX ADMIN — GABAPENTIN 800 MILLIGRAM(S): 400 CAPSULE ORAL at 13:42

## 2019-05-13 RX ADMIN — NYSTATIN CREAM 1 APPLICATION(S): 100000 CREAM TOPICAL at 21:38

## 2019-05-13 RX ADMIN — DAPTOMYCIN 124 MILLIGRAM(S): 500 INJECTION, POWDER, LYOPHILIZED, FOR SOLUTION INTRAVENOUS at 21:38

## 2019-05-13 RX ADMIN — POLYETHYLENE GLYCOL 3350 17 GRAM(S): 17 POWDER, FOR SOLUTION ORAL at 05:49

## 2019-05-13 RX ADMIN — NYSTATIN CREAM 1 APPLICATION(S): 100000 CREAM TOPICAL at 05:49

## 2019-05-13 NOTE — OCCUPATIONAL THERAPY INITIAL EVALUATION ADULT - MUSCLE TONE ASSESSMENT, REHAB EVAL
right-side extremities/normal/left-side extremities
right-side extremities/left-side extremities/normal
left-side extremities/right-side extremities/normal

## 2019-05-13 NOTE — PROGRESS NOTE ADULT - PROBLEM SELECTOR PLAN 2
with repeat blood c/s negative and  TV endocarditis   pt septic today (no symptoms of UTI and pneumonia  send blood c/s

## 2019-05-13 NOTE — PROGRESS NOTE ADULT - PROBLEM SELECTOR PLAN 1
with T11/12 OM/ discitis with an epidural phlegmon from T9-L1   s/p T8-L2 Laminectomy and PSF POD1  OR c/s coag neg staph    cont daptomycin(dose increased to 8mg /kg and added rifampin

## 2019-05-13 NOTE — OCCUPATIONAL THERAPY INITIAL EVALUATION ADULT - PHYSICAL ASSIST/NONPHYSICAL ASSIST: SUPINE/SIT, REHAB EVAL
set-up required/verbal cues/2 person assist/supervision/nonverbal cues (demo/gestures)
1 person assist/nonverbal cues (demo/gestures)/verbal cues/set-up required/supervision
supervision/set-up required/nonverbal cues (demo/gestures)/1 person assist/verbal cues

## 2019-05-13 NOTE — OCCUPATIONAL THERAPY INITIAL EVALUATION ADULT - PHYSICAL ASSIST/NONPHYSICAL ASSIST: SIT/STAND, REHAB EVAL
verbal cues/set-up required/supervision/2 person assist/nonverbal cues (demo/gestures)
verbal cues/set-up required/supervision/nonverbal cues (demo/gestures)
1 person assist/verbal cues/nonverbal cues (demo/gestures)/set-up required/supervision

## 2019-05-13 NOTE — OCCUPATIONAL THERAPY INITIAL EVALUATION ADULT - IMPAIRED TRANSFERS: BED/CHAIR, REHAB EVAL
impaired balance/decreased strength/decreased flexibility
impaired balance/decreased strength/decreased ROM/decreased flexibility/pain

## 2019-05-13 NOTE — OCCUPATIONAL THERAPY INITIAL EVALUATION ADULT - ADDITIONAL COMMENTS
As per patient, patient lives in a private house with 4 steps to enter with rails on both sides. Once inside, the patient has 10 steps with a rail on R side to reach main floor where bedroom and bathroom is. The patients bathroom has a tub/shower combination with a regular toilet and no DME inside. The patient ambulates with no device and does not own any devices for ambulation. The patient is R handed, does not drive and does nor wear glasses.
As per previous OT Eval and pre-op, patient lives in a private house with 4 steps to enter with rails on both sides. Once inside, the patient has 10 steps with a rail on R side to reach main floor where bedroom and bathroom is. The patients bathroom has a tub/shower combination with a regular toilet and no DME inside. The patient ambulates with no device and does not own any devices for ambulation. The patient is R handed, does not drive and does nor wear glasses.
As per previous OT evaluation, patient lives in a private house with 4 steps to enter with rails on both sides. Once inside, the patient has 10 steps with a rail on R side to reach main floor where bedroom and bathroom is. The patients bathroom has a tub/shower combination with a regular toilet and no DME inside. The patient ambulates with no device and does not own any devices for ambulation. The patient is R handed, does not drive and does nor wear glasses.

## 2019-05-13 NOTE — OCCUPATIONAL THERAPY INITIAL EVALUATION ADULT - SOCIAL CONCERNS
hx of alcohol and drug abuse/Substance misuse/Complex psychosocial needs/coping issues
As per patient, patients mother can assist patient when patient comes home./None
Substance misuse/ETOH abuse

## 2019-05-13 NOTE — OCCUPATIONAL THERAPY INITIAL EVALUATION ADULT - BALANCE DISTURBANCE, IDENTIFIED IMPAIRMENT CONTRIBUTE, REHAB EVAL
pain/decreased strength/decreased ROM
decreased ROM/decreased strength/decreased endurance.
decreased strength

## 2019-05-13 NOTE — OCCUPATIONAL THERAPY INITIAL EVALUATION ADULT - TRANSFER SAFETY CONCERNS NOTED: SIT/STAND, REHAB EVAL
decreased safety awareness/decreased sequencing ability/inability to maintain weight-bearing restrictions w/o assist/decreased weight-shifting ability/decreased step length/decreased balance during turns
losing balance/decreased sequencing ability/decreased step length/decreased weight-shifting ability/decreased balance during turns
decreased weight-shifting ability/decreased balance during turns/decreased sequencing ability/decreased safety awareness/decreased step length/inability to maintain weight-bearing restrictions w/o assist

## 2019-05-13 NOTE — OCCUPATIONAL THERAPY INITIAL EVALUATION ADULT - TRANSFER SAFETY CONCERNS NOTED: TOILET, REHAB EVAL
decreased step length/decreased safety awareness/decreased sequencing ability/decreased balance during turns/inability to maintain weight-bearing restrictions w/o assist/decreased weight-shifting ability

## 2019-05-13 NOTE — OCCUPATIONAL THERAPY INITIAL EVALUATION ADULT - DIAGNOSIS, OT EVAL
M62.81 generalized weakness, decreased ADL management and functional transfers/mobility

## 2019-05-13 NOTE — PROGRESS NOTE ADULT - ASSESSMENT
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 She was also diagnosis w/ HCV. Pt was initially intubated due to respiratory failure and put on pressors in ICU. She as extubated on 2/25 and transferred from ICU the following day. Pt had a C4-5 ACDF with irrigation and debridement on 3/20 due to spinal abscess and osteo and was kept intubated post procedure and transferred again to ICU, where she was extubated on 3/21. Pt was subsequently found to have  T11/12 OM discitis with an epidural phlegmon from T9-L1. She was taken to the OR 5/8 for posterior spinal fusion & T8-L2 Laminectomy. She was again transferred to ICU for post op care and was extubated without difficulty on 5/9.     Sepsis due to methicillin resistant Staphylococcus aureus  - new wound cxs grew GPC so ID added Rifampin and increased Dapto dose, but now cx is growing coagulase NEGATIVE staph - may be contaminant  - new fever today, will get new blood cxs    Discitis of multiple sites of spine  - status post C4-5 ACDF with irrigation and debridement on 3/20  - status post posterior spinal fusion & T8-L2 Laminectomy on 5/8   - ortho following   - must use [place] j collar and TLSO brace at all times with ambulation   - care per surgery   - c/w methadone, Tylenol, flexeril, valium, Neurontin, lidocaine patch & PRN oxy  - c/w senna, Miralax, Naloxegol & dulcolax for opiate associated constipation   - TOV for Rubin placed in OR     Endocarditis of tricuspid valve  - repeat echo showed stable vegetation    Hepatitis C antibody test positive  - patient will need to f/u as an outpatient    Hx of IVDU   - pt is on methadone 60 mg q12       Anemia  - likely AOCD   - monitor CBC    Prophylaxis:   DVT: SCD  GI: PO diet

## 2019-05-13 NOTE — OCCUPATIONAL THERAPY INITIAL EVALUATION ADULT - IMPAIRED TRANSFERS: SIT/STAND, REHAB EVAL
decreased ROM/impaired balance/decreased flexibility/decreased strength/pain
decreased ROM/decreased strength/impaired balance/decreased flexibility
impaired balance/decreased flexibility/decreased strength

## 2019-05-13 NOTE — OCCUPATIONAL THERAPY INITIAL EVALUATION ADULT - PERTINENT HX OF CURRENT PROBLEM, REHAB EVAL
44 Y/O female with new C4-C5 ACDF with I&D on 3/20 due to C/S OM
Patient is a 42 Y/O female w/ PMHx of IV heroin abuse, HCV,  brought in by EMS for lethargy and cough.
42 y/o female S/P T11/12 OM discitis with an epidural phlegmon from T9-L1 s/p T8-L2 Laminectomy and PSF POD 4,

## 2019-05-13 NOTE — PROGRESS NOTE ADULT - SUBJECTIVE AND OBJECTIVE BOX
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 She was also diagnosis w/ HCV. Pt was initially intubated due to respiratory failure and put on pressors in ICU. She as extubated on 2/25 and transferred from ICU the following day. Pt had a C4-5 ACDF with irrigation and debridement on 3/20 due to spinal abscess and osteo and was kept intubated post procedure and transferred again to ICU, where she was extubated on 3/21. Pt was subsequently found to have  T11/12 OM discitis with an epidural phlegmon from T9-L1. She was taken to the OR 5/8 for posterior spinal fusion & T8-L2 Laminectomy. She was again transferred to ICU for post op care and was extubated without difficulty on 5/9. She is lying in bed in NAD.    MEDICATIONS  (STANDING):  acetaminophen   Tablet .. 1000 milliGRAM(s) Oral every 8 hours  calcium carbonate 1250 mG  + Vitamin D (OsCal 500 + D) 1 Tablet(s) Oral three times a day  chlorhexidine 4% Liquid 1 Application(s) Topical <User Schedule>  DAPTOmycin IVPB 600 milliGRAM(s) IV Intermittent <User Schedule>  gabapentin 800 milliGRAM(s) Oral three times a day  lactobacillus acidophilus 1 Tablet(s) Oral two times a day with meals  lidocaine   Patch 1 Patch Transdermal every 24 hours  lidocaine   Patch 1 Patch Transdermal every 24 hours  methadone    Tablet 60 milliGRAM(s) Oral two times a day  multivitamin 1 Tablet(s) Oral daily  naloxegol 25 milliGRAM(s) Oral before breakfast  nicotine -  14 mG/24Hr(s) Patch 1 patch Transdermal daily  nystatin Powder 1 Application(s) Topical three times a day  polyethylene glycol 3350 17 Gram(s) Oral two times a day  rifampin 300 milliGRAM(s) Oral two times a day  senna 2 Tablet(s) Oral at bedtime    MEDICATIONS  (PRN):  bisacodyl Suppository 10 milliGRAM(s) Rectal daily PRN Constipation  cyclobenzaprine 10 milliGRAM(s) Oral three times a day PRN Muscle Spasm  diazepam    Tablet 5 milliGRAM(s) Oral every 8 hours PRN Back Spasms  oxyCODONE    IR 30 milliGRAM(s) Oral every 4 hours PRN Moderate Pain (4 - 6)      Allergies    penicillin (Short breath; Hives)    Intolerances        Vital Signs Last 24 Hrs  T(C): 38.5 (13 May 2019 14:11), Max: 38.5 (13 May 2019 14:11)  T(F): 101.3 (13 May 2019 14:11), Max: 101.3 (13 May 2019 14:11)  HR: 101 (13 May 2019 14:45) (94 - 101)  BP: 106/60 (13 May 2019 14:45) (98/57 - 107/56)  BP(mean): --  RR: 19 (13 May 2019 14:45) (16 - 19)  SpO2: 98% (13 May 2019 14:45) (97% - 98%)    PHYSICAL EXAM:  GENERAL: NAD   HEAD:  Atraumatic, Normocephalic  EYES: EOMI, PERRLA    NECK: collar in place  NERVOUS SYSTEM: Alert & Oriented X3   CHEST/LUNG: Clear to auscultation bilaterally; No rales, rhonchi, wheezing, or rubs  HEART: Regular rate and rhythm; No murmurs, rubs, or gallops  ABDOMEN: Soft, Nontender, Nondistended; Bowel sounds present  EXTREMITIES:  No clubbing, cyanosis, or edema  BACK: Surgical site dressed    LABS:                        8.3    12.19 )-----------( 253      ( 12 May 2019 06:51 )             28.8     05-12    140  |  102  |  16  ----------------------------<  88  4.4   |  30  |  0.39<L>    Ca    9.5      12 May 2019 06:51          CAPILLARY BLOOD GLUCOSE          Culture - Surgical Swab (collected 09 May 2019 09:47)  Source: .Surgical Swab Epidural C&amp;S#1 esw  Preliminary Report (10 May 2019 11:00):    No growth    Culture - Surgical Swab (collected 09 May 2019 09:46)  Source: .Surgical Swab Epidural Abscess C&amp;S #2 esw  Preliminary Report (13 May 2019 12:36):    Growth in fluid media only Coag Negative Staphylococcus "Susceptibilities    not performed"      RADIOLOGY & ADDITIONAL TESTS:    05-12-19 @ 07:01  -  05-13-19 @ 07:00  --------------------------------------------------------  IN:  Total IN: 0 mL    OUT:    Drain: 40 mL    Indwelling Catheter - Urethral: 1525 mL  Total OUT: 1565 mL    Total NET: -1565 mL      05-13-19 @ 07:01  -  05-13-19 @ 15:56  --------------------------------------------------------  IN:  Total IN: 0 mL    OUT:    Drain: 55 mL  Total OUT: 55 mL    Total NET: -55 mL

## 2019-05-13 NOTE — OCCUPATIONAL THERAPY INITIAL EVALUATION ADULT - BED MOBILITY LIMITATIONS, REHAB EVAL
decreased ability to use legs for bridging/pushing/impaired ability to control trunk for mobility/decreased ability to use arms for pushing/pulling
decreased ability to use legs for bridging/pushing/decreased ability to use arms for pushing/pulling/impaired ability to control trunk for mobility
decreased ability to use legs for bridging/pushing/impaired ability to control trunk for mobility/decreased ability to use arms for pushing/pulling

## 2019-05-13 NOTE — OCCUPATIONAL THERAPY INITIAL EVALUATION ADULT - TRANSFER SAFETY CONCERNS NOTED: BED/CHAIR, REHAB EVAL
inability to maintain weight-bearing restrictions w/o assist/decreased weight-shifting ability/decreased step length/decreased safety awareness/decreased sequencing ability/decreased balance during turns
decreased safety awareness/decreased sequencing ability/inability to maintain weight-bearing restrictions w/o assist/decreased weight-shifting ability/decreased balance during turns/decreased step length

## 2019-05-13 NOTE — OCCUPATIONAL THERAPY INITIAL EVALUATION ADULT - PHYSICAL ASSIST/NONPHYSICAL ASSIST, REHAB EVAL
supervision/nonverbal cues (demo/gestures)/verbal cues/set-up required
1 person assist/set-up required/supervision/verbal cues/nonverbal cues (demo/gestures)

## 2019-05-13 NOTE — OCCUPATIONAL THERAPY INITIAL EVALUATION ADULT - PHYSICAL ASSIST/NONPHYSICAL ASSIST: BED TO CHAIR, REHAB EVAL
verbal cues/set-up required/supervision/nonverbal cues (demo/gestures)
1 person assist/set-up required/supervision/verbal cues/nonverbal cues (demo/gestures)

## 2019-05-13 NOTE — OCCUPATIONAL THERAPY INITIAL EVALUATION ADULT - GENERAL OBSERVATIONS, REHAB EVAL
OT re-eval completed. Pt was encountered semi-supine in bed; NAD, cardiac monitor on, KIRILL drain on and intact, continuous pulse ox on, milan on and intact, BP cuff to LUE, S/P C4-5 ACDF with irrigation and debridement POD 2, aspen collar on, cervical dressing clean, dry and intact, cooperative, followed commands. PT Kelly present.
Pt was encountered semi-supine in bed; NAD, cardiac monitor on, IV on and intact, Midline on and intact, Rubin on and intact, continuous pulse ox on, BP cuff LUE, Pneumatic pumps on, +2 any edema noted, AXOX4, cooperative, followed commands. PT Geovani present.
Pt was encountered semi-supine in bed; NAD, S/P T11/12 OM discitis with an epidural phlegmon from T9-L1 s/p T8-L2 Laminectomy and PSF POD 4, KIRILL drain on and intatc, milan on and intact, cervical collar on, AXOX4, cooperative, followed commands; pt c/o pain in lumbar/thoracic which limits pt performance with functional ADL's/transfers and mobility.

## 2019-05-13 NOTE — OCCUPATIONAL THERAPY INITIAL EVALUATION ADULT - PRECAUTIONS/LIMITATIONS, REHAB EVAL
Spinal precautions (No bending, twisting or lifting more then 8lbs)/spinal precautions
aspiration precautions/cardiac precautions/fall precautions
fall precautions/spinal precautions/No bending, lifting (More then 8lbs), twisting

## 2019-05-13 NOTE — PROGRESS NOTE ADULT - SUBJECTIVE AND OBJECTIVE BOX
Pt S/E at bedside, no acute events overnight, pain controlled, denies fevers, chills, SOB, CP, report small improvement in back pain, but feels "stiff".     Vital Signs Last 24 Hrs  T(C): 37.4 (13 May 2019 00:10), Max: 37.4 (13 May 2019 00:10)  T(F): 99.4 (13 May 2019 00:10), Max: 99.4 (13 May 2019 00:10)  HR: 94 (13 May 2019 00:10) (87 - 107)  BP: 98/57 (13 May 2019 00:10) (98/57 - 123/74)  BP(mean): --  RR: 18 (13 May 2019 00:10) (18 - 18)  SpO2: 98% (13 May 2019 00:10) (97% - 98%)    Gen: NAD,     Spine:  Dressing clean dry intact  +KIRILL with serosanguinous output  +EHL/FHL/TA/GS  +AIN/PIN/M/R/U/Msc/Ax  SILT L3-S1  SILT C5-T1  +DP/PT Pulses  +Radial Pulse  Compartments soft  No calf TTP B/L

## 2019-05-13 NOTE — OCCUPATIONAL THERAPY INITIAL EVALUATION ADULT - RANGE OF MOTION EXAMINATION, UPPER EXTREMITY
BUE AROM shoulder flexion grossly limited by more then 100%, BUE AROM elbow flexion grossly limited by more then 75%, BUE AROM distally to elbow grossly limited by more then 75%/bilateral UE Passive ROM was WFL  (within functional limits)
bilateral UE Active ROM was WFL  (within functional limits)/bilateral UE Passive ROM was WFL  (within functional limits)
BUE AROM shoulder flexion grossly 0-90, BUE PROM 0-90, BUE AROM/PROM distally to shoulder WFL.

## 2019-05-13 NOTE — OCCUPATIONAL THERAPY INITIAL EVALUATION ADULT - PHYSICAL ASSIST/NONPHYSICAL ASSIST: STAND/SIT, REHAB EVAL
2 person assist/supervision/nonverbal cues (demo/gestures)/set-up required/verbal cues
supervision/nonverbal cues (demo/gestures)/verbal cues/set-up required
set-up required/1 person assist/supervision/verbal cues/nonverbal cues (demo/gestures)

## 2019-05-13 NOTE — PROGRESS NOTE ADULT - SUBJECTIVE AND OBJECTIVE BOX
Patient is a 43y old  Female who presents with a chief complaint of AMS (13 May 2019 15:55)      INTERVAL HPI / OVERNIGHT EVENTS: fever today ,back pain +    MEDICATIONS  (STANDING):  acetaminophen   Tablet .. 1000 milliGRAM(s) Oral every 8 hours  calcium carbonate 1250 mG  + Vitamin D (OsCal 500 + D) 1 Tablet(s) Oral three times a day  chlorhexidine 4% Liquid 1 Application(s) Topical <User Schedule>  DAPTOmycin IVPB 600 milliGRAM(s) IV Intermittent <User Schedule>  gabapentin 800 milliGRAM(s) Oral three times a day  lactobacillus acidophilus 1 Tablet(s) Oral two times a day with meals  lidocaine   Patch 1 Patch Transdermal every 24 hours  lidocaine   Patch 1 Patch Transdermal every 24 hours  methadone    Tablet 60 milliGRAM(s) Oral two times a day  multivitamin 1 Tablet(s) Oral daily  naloxegol 25 milliGRAM(s) Oral before breakfast  nicotine -  14 mG/24Hr(s) Patch 1 patch Transdermal daily  nystatin Powder 1 Application(s) Topical three times a day  polyethylene glycol 3350 17 Gram(s) Oral two times a day  rifampin 300 milliGRAM(s) Oral two times a day  senna 2 Tablet(s) Oral at bedtime    MEDICATIONS  (PRN):  bisacodyl Suppository 10 milliGRAM(s) Rectal daily PRN Constipation  cyclobenzaprine 10 milliGRAM(s) Oral three times a day PRN Muscle Spasm  diazepam    Tablet 5 milliGRAM(s) Oral every 8 hours PRN Back Spasms  oxyCODONE    IR 30 milliGRAM(s) Oral every 4 hours PRN Moderate Pain (4 - 6)      Vital Signs Last 24 Hrs  T(C): 38.5 (13 May 2019 14:11), Max: 38.5 (13 May 2019 14:11)  T(F): 101.3 (13 May 2019 14:11), Max: 101.3 (13 May 2019 14:11)  HR: 101 (13 May 2019 14:45) (94 - 101)  BP: 106/60 (13 May 2019 14:45) (98/57 - 107/56)  BP(mean): --  RR: 19 (13 May 2019 14:45) (16 - 19)  SpO2: 98% (13 May 2019 14:45) (97% - 98%)    Review of systems:  General : + fever /chills,fatigue  CVS : no chest pain, palpitations  Lungs : no shortness of breath, cough  GI : no abdominal pain,vomiting, diarrhea   : no dysuria,hematuria        PHYSICAL EXAM:  General :NAD, neck collar +  Constitutional: well-groomed, well-developed  Respiratory: CTAB/L  Cardiovascular: S1 and S2, RRR, no M/G/R  Gastrointestinal: BS+, soft, NT/ND  Extremities: No peripheral edema  Vascular: 2+ peripheral pulses  Neuro leg strength 4-5 /5      LABS:                        8.3    12.19 )-----------( 253      ( 12 May 2019 06:51 )             28.8     05-12    140  |  102  |  16  ----------------------------<  88  4.4   |  30  |  0.39<L>    Ca    9.5      12 May 2019 06:51            MICROBIOLOGY:  RECENT CULTURES:  05-09 .Surgical Swab Epidural C&S#1 esw XXXX XXXX   No growth    05-09 .Surgical Swab Epidural Abscess C&S #2 esw XXXX XXXX   Growth in fluid media only Coag Negative Staphylococcus "Susceptibilities  not performed"          RADIOLOGY & ADDITIONAL STUDIES:

## 2019-05-13 NOTE — OCCUPATIONAL THERAPY INITIAL EVALUATION ADULT - PHYSICAL ASSIST/NONPHYSICAL ASSIST: SCOOT/BRIDGE, REHAB EVAL
2 person assist/verbal cues/set-up required/nonverbal cues (demo/gestures)/supervision
supervision/nonverbal cues (demo/gestures)/verbal cues/set-up required
1 person assist/supervision/set-up required/verbal cues/nonverbal cues (demo/gestures)

## 2019-05-13 NOTE — OCCUPATIONAL THERAPY INITIAL EVALUATION ADULT - MD ORDER
Occupational Therapy Evaluation and Treatment
Occupational Therapy Re-Evaluation and Treatment
Occupational Therapy Re-evaluation and Treatment

## 2019-05-13 NOTE — OCCUPATIONAL THERAPY INITIAL EVALUATION ADULT - PLANNED THERAPY INTERVENTIONS, OT EVAL
ROM/strengthening/transfer training/ADL retraining/balance training/bed mobility training
transfer training/ADL retraining/balance training/ROM/bed mobility training/strengthening
balance training/ADL retraining/bed mobility training/transfer training

## 2019-05-13 NOTE — OCCUPATIONAL THERAPY INITIAL EVALUATION ADULT - BALANCE TRAINING, PT EVAL
Patient will be able to increase static and dynamic sitting/standing by 1/2 grade in order to participate in self care tasks and functional mobility/transfers within 4 weeks

## 2019-05-14 LAB
ANION GAP SERPL CALC-SCNC: 8 MMOL/L — SIGNIFICANT CHANGE UP (ref 5–17)
BUN SERPL-MCNC: 14 MG/DL — SIGNIFICANT CHANGE UP (ref 7–23)
CALCIUM SERPL-MCNC: 9.6 MG/DL — SIGNIFICANT CHANGE UP (ref 8.5–10.1)
CHLORIDE SERPL-SCNC: 101 MMOL/L — SIGNIFICANT CHANGE UP (ref 96–108)
CO2 SERPL-SCNC: 32 MMOL/L — HIGH (ref 22–31)
CREAT SERPL-MCNC: 0.43 MG/DL — LOW (ref 0.5–1.3)
CULTURE RESULTS: SIGNIFICANT CHANGE UP
CULTURE RESULTS: SIGNIFICANT CHANGE UP
GLUCOSE SERPL-MCNC: 129 MG/DL — HIGH (ref 70–99)
HCT VFR BLD CALC: 28.3 % — LOW (ref 34.5–45)
HGB BLD-MCNC: 8.5 G/DL — LOW (ref 11.5–15.5)
MAGNESIUM SERPL-MCNC: 2.1 MG/DL — SIGNIFICANT CHANGE UP (ref 1.6–2.6)
MCHC RBC-ENTMCNC: 25.5 PG — LOW (ref 27–34)
MCHC RBC-ENTMCNC: 30 GM/DL — LOW (ref 32–36)
MCV RBC AUTO: 85 FL — SIGNIFICANT CHANGE UP (ref 80–100)
NRBC # BLD: 0 /100 WBCS — SIGNIFICANT CHANGE UP (ref 0–0)
PHOSPHATE SERPL-MCNC: 5.1 MG/DL — HIGH (ref 2.5–4.5)
PLATELET # BLD AUTO: 352 K/UL — SIGNIFICANT CHANGE UP (ref 150–400)
POTASSIUM SERPL-MCNC: 4.3 MMOL/L — SIGNIFICANT CHANGE UP (ref 3.5–5.3)
POTASSIUM SERPL-SCNC: 4.3 MMOL/L — SIGNIFICANT CHANGE UP (ref 3.5–5.3)
RBC # BLD: 3.33 M/UL — LOW (ref 3.8–5.2)
RBC # FLD: 15.7 % — HIGH (ref 10.3–14.5)
SODIUM SERPL-SCNC: 141 MMOL/L — SIGNIFICANT CHANGE UP (ref 135–145)
SPECIMEN SOURCE: SIGNIFICANT CHANGE UP
SPECIMEN SOURCE: SIGNIFICANT CHANGE UP
WBC # BLD: 9.26 K/UL — SIGNIFICANT CHANGE UP (ref 3.8–10.5)
WBC # FLD AUTO: 9.26 K/UL — SIGNIFICANT CHANGE UP (ref 3.8–10.5)

## 2019-05-14 PROCEDURE — 99233 SBSQ HOSP IP/OBS HIGH 50: CPT

## 2019-05-14 RX ORDER — CELECOXIB 200 MG/1
200 CAPSULE ORAL ONCE
Refills: 0 | Status: COMPLETED | OUTPATIENT
Start: 2019-05-14 | End: 2019-05-26

## 2019-05-14 RX ORDER — CELECOXIB 200 MG/1
100 CAPSULE ORAL DAILY
Refills: 0 | Status: DISCONTINUED | OUTPATIENT
Start: 2019-05-15 | End: 2019-06-12

## 2019-05-14 RX ADMIN — GABAPENTIN 800 MILLIGRAM(S): 400 CAPSULE ORAL at 05:38

## 2019-05-14 RX ADMIN — OXYCODONE HYDROCHLORIDE 30 MILLIGRAM(S): 5 TABLET ORAL at 20:42

## 2019-05-14 RX ADMIN — SENNA PLUS 2 TABLET(S): 8.6 TABLET ORAL at 22:39

## 2019-05-14 RX ADMIN — LIDOCAINE 1 PATCH: 4 CREAM TOPICAL at 06:00

## 2019-05-14 RX ADMIN — Medication 1 PATCH: at 19:17

## 2019-05-14 RX ADMIN — Medication 1000 MILLIGRAM(S): at 22:39

## 2019-05-14 RX ADMIN — Medication 5 MILLIGRAM(S): at 06:56

## 2019-05-14 RX ADMIN — OXYCODONE HYDROCHLORIDE 30 MILLIGRAM(S): 5 TABLET ORAL at 21:50

## 2019-05-14 RX ADMIN — Medication 1 PATCH: at 11:00

## 2019-05-14 RX ADMIN — Medication 1000 MILLIGRAM(S): at 16:19

## 2019-05-14 RX ADMIN — NYSTATIN CREAM 1 APPLICATION(S): 100000 CREAM TOPICAL at 05:39

## 2019-05-14 RX ADMIN — LIDOCAINE 1 PATCH: 4 CREAM TOPICAL at 05:39

## 2019-05-14 RX ADMIN — NALOXEGOL OXALATE 25 MILLIGRAM(S): 12.5 TABLET, FILM COATED ORAL at 06:02

## 2019-05-14 RX ADMIN — Medication 1 PATCH: at 11:10

## 2019-05-14 RX ADMIN — OXYCODONE HYDROCHLORIDE 30 MILLIGRAM(S): 5 TABLET ORAL at 11:12

## 2019-05-14 RX ADMIN — METHADONE HYDROCHLORIDE 60 MILLIGRAM(S): 40 TABLET ORAL at 11:11

## 2019-05-14 RX ADMIN — NYSTATIN CREAM 1 APPLICATION(S): 100000 CREAM TOPICAL at 14:49

## 2019-05-14 RX ADMIN — OXYCODONE HYDROCHLORIDE 30 MILLIGRAM(S): 5 TABLET ORAL at 12:10

## 2019-05-14 RX ADMIN — Medication 5 MILLIGRAM(S): at 15:57

## 2019-05-14 RX ADMIN — GABAPENTIN 800 MILLIGRAM(S): 400 CAPSULE ORAL at 22:39

## 2019-05-14 RX ADMIN — OXYCODONE HYDROCHLORIDE 30 MILLIGRAM(S): 5 TABLET ORAL at 15:57

## 2019-05-14 RX ADMIN — OXYCODONE HYDROCHLORIDE 30 MILLIGRAM(S): 5 TABLET ORAL at 05:54

## 2019-05-14 RX ADMIN — Medication 1000 MILLIGRAM(S): at 06:53

## 2019-05-14 RX ADMIN — Medication 1 TABLET(S): at 17:06

## 2019-05-14 RX ADMIN — LIDOCAINE 1 PATCH: 4 CREAM TOPICAL at 07:48

## 2019-05-14 RX ADMIN — GABAPENTIN 800 MILLIGRAM(S): 400 CAPSULE ORAL at 14:49

## 2019-05-14 RX ADMIN — POLYETHYLENE GLYCOL 3350 17 GRAM(S): 17 POWDER, FOR SOLUTION ORAL at 05:39

## 2019-05-14 RX ADMIN — NYSTATIN CREAM 1 APPLICATION(S): 100000 CREAM TOPICAL at 22:39

## 2019-05-14 RX ADMIN — OXYCODONE HYDROCHLORIDE 30 MILLIGRAM(S): 5 TABLET ORAL at 16:58

## 2019-05-14 RX ADMIN — Medication 1 TABLET(S): at 22:39

## 2019-05-14 RX ADMIN — CHLORHEXIDINE GLUCONATE 1 APPLICATION(S): 213 SOLUTION TOPICAL at 05:38

## 2019-05-14 RX ADMIN — Medication 1 TABLET(S): at 14:49

## 2019-05-14 RX ADMIN — Medication 1000 MILLIGRAM(S): at 05:38

## 2019-05-14 RX ADMIN — LIDOCAINE 1 PATCH: 4 CREAM TOPICAL at 19:16

## 2019-05-14 RX ADMIN — Medication 1 TABLET(S): at 05:38

## 2019-05-14 RX ADMIN — Medication 1 TABLET(S): at 08:06

## 2019-05-14 RX ADMIN — POLYETHYLENE GLYCOL 3350 17 GRAM(S): 17 POWDER, FOR SOLUTION ORAL at 17:08

## 2019-05-14 RX ADMIN — Medication 1000 MILLIGRAM(S): at 15:59

## 2019-05-14 RX ADMIN — OXYCODONE HYDROCHLORIDE 30 MILLIGRAM(S): 5 TABLET ORAL at 06:54

## 2019-05-14 RX ADMIN — LIDOCAINE 1 PATCH: 4 CREAM TOPICAL at 17:23

## 2019-05-14 RX ADMIN — LIDOCAINE 1 PATCH: 4 CREAM TOPICAL at 17:06

## 2019-05-14 RX ADMIN — DAPTOMYCIN 124 MILLIGRAM(S): 500 INJECTION, POWDER, LYOPHILIZED, FOR SOLUTION INTRAVENOUS at 22:38

## 2019-05-14 RX ADMIN — Medication 1000 MILLIGRAM(S): at 23:42

## 2019-05-14 RX ADMIN — Medication 1 TABLET(S): at 11:12

## 2019-05-14 NOTE — PROGRESS NOTE ADULT - SUBJECTIVE AND OBJECTIVE BOX
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 She was also diagnosis w/ HCV. Pt was initially intubated due to respiratory failure and put on pressors in ICU. She as extubated on 2/25 and transferred from ICU the following day. Pt had a C4-5 ACDF with irrigation and debridement on 3/20 due to spinal abscess and osteo and was kept intubated post procedure and transferred again to ICU, where she was extubated on 3/21. Pt was subsequently found to have  T11/12 OM discitis with an epidural phlegmon from T9-L1. She was taken to the OR 5/8 for posterior spinal fusion & T8-L2 Laminectomy. She was again transferred to ICU for post op care and was extubated without difficulty on 5/9. She is lying in bed in NAD.    MEDICATIONS  (STANDING):  acetaminophen   Tablet .. 1000 milliGRAM(s) Oral every 8 hours  calcium carbonate 1250 mG  + Vitamin D (OsCal 500 + D) 1 Tablet(s) Oral three times a day  chlorhexidine 4% Liquid 1 Application(s) Topical <User Schedule>  DAPTOmycin IVPB 600 milliGRAM(s) IV Intermittent <User Schedule>  gabapentin 800 milliGRAM(s) Oral three times a day  lactobacillus acidophilus 1 Tablet(s) Oral two times a day with meals  lidocaine   Patch 1 Patch Transdermal every 24 hours  lidocaine   Patch 1 Patch Transdermal every 24 hours  methadone    Tablet 60 milliGRAM(s) Oral two times a day  multivitamin 1 Tablet(s) Oral daily  naloxegol 25 milliGRAM(s) Oral before breakfast  nicotine -  14 mG/24Hr(s) Patch 1 patch Transdermal daily  nystatin Powder 1 Application(s) Topical three times a day  polyethylene glycol 3350 17 Gram(s) Oral two times a day  rifampin 300 milliGRAM(s) Oral two times a day  senna 2 Tablet(s) Oral at bedtime    MEDICATIONS  (PRN):  bisacodyl Suppository 10 milliGRAM(s) Rectal daily PRN Constipation  cyclobenzaprine 10 milliGRAM(s) Oral three times a day PRN Muscle Spasm  diazepam    Tablet 5 milliGRAM(s) Oral every 8 hours PRN Back Spasms  oxyCODONE    IR 30 milliGRAM(s) Oral every 4 hours PRN Moderate Pain (4 - 6)      Allergies    penicillin (Short breath; Hives)    Intolerances        Vital Signs Last 24 Hrs  T(C): 36.9 (14 May 2019 16:06), Max: 37.1 (13 May 2019 23:24)  T(F): 98.4 (14 May 2019 16:06), Max: 98.8 (13 May 2019 23:24)  HR: 100 (14 May 2019 16:06) (69 - 100)  BP: 118/86 (14 May 2019 16:06) (94/55 - 118/86)  BP(mean): --  RR: 18 (14 May 2019 16:06) (16 - 18)  SpO2: 98% (14 May 2019 16:06) (96% - 99%)    PHYSICAL EXAM:  GENERAL: NAD   HEAD:  Atraumatic, Normocephalic  EYES: EOMI, PERRLA    NECK: collar in place  NERVOUS SYSTEM: Alert & Oriented X3   CHEST/LUNG: Clear to auscultation bilaterally; No rales, rhonchi, wheezing, or rubs  HEART: Regular rate and rhythm; No murmurs, rubs, or gallops  ABDOMEN: Soft, Nontender, Nondistended; Bowel sounds present  EXTREMITIES:  No clubbing, cyanosis, or edema  BACK: Surgical site dressed    LABS:                        8.5    9.26  )-----------( 352      ( 14 May 2019 07:43 )             28.3     05-14    141  |  101  |  14  ----------------------------<  129<H>  4.3   |  32<H>  |  0.43<L>    Ca    9.6      14 May 2019 07:43  Phos  5.1     05-14  Mg     2.1     05-14        RADIOLOGY & ADDITIONAL TESTS:    05-13-19 @ 07:01  -  05-14-19 @ 07:00  --------------------------------------------------------  IN:  Total IN: 0 mL    OUT:    Drain: 55 mL    Indwelling Catheter - Urethral: 1500 mL  Total OUT: 1555 mL    Total NET: -1555 mL      05-14-19 @ 07:01  -  05-14-19 @ 18:36  --------------------------------------------------------  IN:  Total IN: 0 mL    OUT:    Drain: 30 mL  Total OUT: 30 mL    Total NET: -30 mL

## 2019-05-14 NOTE — PROGRESS NOTE ADULT - ASSESSMENT
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 She was also diagnosis w/ HCV. Pt was initially intubated due to respiratory failure and put on pressors in ICU. She as extubated on 2/25 and transferred from ICU the following day. Pt had a C4-5 ACDF with irrigation and debridement on 3/20 due to spinal abscess and osteo and was kept intubated post procedure and transferred again to ICU, where she was extubated on 3/21. Pt was subsequently found to have  T11/12 OM discitis with an epidural phlegmon from T9-L1. She was taken to the OR 5/8 for posterior spinal fusion & T8-L2 Laminectomy. She was again transferred to ICU for post op care and was extubated without difficulty on 5/9.     Sepsis due to methicillin resistant Staphylococcus aureus  - new wound cxs grew GPC so ID added Rifampin and increased Dapto dose, but now cx is growing coagulase NEGATIVE staph - may be contaminant  - new fever today, will get new blood cxs    Discitis of multiple sites of spine  - status post C4-5 ACDF with irrigation and debridement on 3/20  - status post posterior spinal fusion & T8-L2 Laminectomy on 5/8   - ortho following   - must use [place] j collar and TLSO brace at all times with ambulation   - care per surgery   - c/w methadone, Tylenol, flexeril, valium, Neurontin, lidocaine patch & PRN oxy - I asked her about increasing the methadone and dropping the oxy, but the patient has asked that oxy be kept the same (suspect possible drug seeking behavior) - will add celebrex and decrease oxy in a few days  - c/w senna, Miralax, Naloxegol & dulcolax for opiate associated constipation       Endocarditis of tricuspid valve  - repeat echo showed stable vegetation    Hepatitis C antibody test positive  - patient will need to f/u as an outpatient    Hx of IVDU   - pt is on methadone 60 mg q12       Anemia  - likely AOCD   - monitor CBC    Prophylaxis:   DVT: SCD  GI: PO diet

## 2019-05-14 NOTE — PROGRESS NOTE ADULT - SUBJECTIVE AND OBJECTIVE BOX
Pt S/E at bedside, no acute events overnight, pain controlled. Pt has been improving, has been out of bed and ambulating with PT. No new complaints this morning. Had temp of 101.3 yesterday, afebrile this morning.    Vital Signs Last 24 Hrs  T(C): 36.8 (14 May 2019 05:11), Max: 38.5 (13 May 2019 14:11)  T(F): 98.2 (14 May 2019 05:11), Max: 101.3 (13 May 2019 14:11)  HR: 92 (14 May 2019 06:55) (69 - 101)  BP: 100/72 (14 May 2019 06:55) (85/61 - 107/56)  BP(mean): --  RR: 18 (14 May 2019 06:55) (17 - 19)  SpO2: 96% (14 May 2019 05:11) (96% - 98%)    Gen: NAD, AAOx3    Spine:  Dressing clean dry intact  +KIRILL drain intact  +EHL/FHL/TA/GS  +AIN/PIN/M/R/U/Msc/Ax  SILT L3-S1  SILT C5-T1  +DP/PT Pulses  +Radial Pulse  Compartments soft  No calf TTP B/L

## 2019-05-14 NOTE — PROGRESS NOTE ADULT - ASSESSMENT
43F with diffuse bacterial infection and endocarditis s/p C4-5 ACDF for cervical OM on 3/20 now with T11/12 OM discitis with an epidural phlegmon from T9-L1 s/p T8-L2 Laminectomy and PSF POD 6    Pain control  DVT ppx with SCDs only  WBAT/OOB with TLSO brace at all times  Q4 neuro checks  Monitor KIRILL output  Physical therapy  Primary care per medical team  Apprec ID recs for abx  Will discuss with attending

## 2019-05-15 LAB
ANION GAP SERPL CALC-SCNC: 7 MMOL/L — SIGNIFICANT CHANGE UP (ref 5–17)
BUN SERPL-MCNC: 17 MG/DL — SIGNIFICANT CHANGE UP (ref 7–23)
CALCIUM SERPL-MCNC: 9.4 MG/DL — SIGNIFICANT CHANGE UP (ref 8.5–10.1)
CHLORIDE SERPL-SCNC: 100 MMOL/L — SIGNIFICANT CHANGE UP (ref 96–108)
CO2 SERPL-SCNC: 33 MMOL/L — HIGH (ref 22–31)
CREAT SERPL-MCNC: 0.46 MG/DL — LOW (ref 0.5–1.3)
GLUCOSE SERPL-MCNC: 99 MG/DL — SIGNIFICANT CHANGE UP (ref 70–99)
HCT VFR BLD CALC: 28.6 % — LOW (ref 34.5–45)
HGB BLD-MCNC: 8.4 G/DL — LOW (ref 11.5–15.5)
MAGNESIUM SERPL-MCNC: 2.2 MG/DL — SIGNIFICANT CHANGE UP (ref 1.6–2.6)
MCHC RBC-ENTMCNC: 25 PG — LOW (ref 27–34)
MCHC RBC-ENTMCNC: 29.4 GM/DL — LOW (ref 32–36)
MCV RBC AUTO: 85.1 FL — SIGNIFICANT CHANGE UP (ref 80–100)
NRBC # BLD: 0 /100 WBCS — SIGNIFICANT CHANGE UP (ref 0–0)
PHOSPHATE SERPL-MCNC: 5 MG/DL — HIGH (ref 2.5–4.5)
PLATELET # BLD AUTO: 396 K/UL — SIGNIFICANT CHANGE UP (ref 150–400)
POTASSIUM SERPL-MCNC: 4.3 MMOL/L — SIGNIFICANT CHANGE UP (ref 3.5–5.3)
POTASSIUM SERPL-SCNC: 4.3 MMOL/L — SIGNIFICANT CHANGE UP (ref 3.5–5.3)
RBC # BLD: 3.36 M/UL — LOW (ref 3.8–5.2)
RBC # FLD: 15.7 % — HIGH (ref 10.3–14.5)
SODIUM SERPL-SCNC: 140 MMOL/L — SIGNIFICANT CHANGE UP (ref 135–145)
WBC # BLD: 7.71 K/UL — SIGNIFICANT CHANGE UP (ref 3.8–10.5)
WBC # FLD AUTO: 7.71 K/UL — SIGNIFICANT CHANGE UP (ref 3.8–10.5)

## 2019-05-15 PROCEDURE — 99233 SBSQ HOSP IP/OBS HIGH 50: CPT

## 2019-05-15 RX ORDER — DIAZEPAM 5 MG
5 TABLET ORAL EVERY 8 HOURS
Refills: 0 | Status: DISCONTINUED | OUTPATIENT
Start: 2019-05-16 | End: 2019-05-23

## 2019-05-15 RX ORDER — OXYCODONE HYDROCHLORIDE 5 MG/1
30 TABLET ORAL EVERY 4 HOURS
Refills: 0 | Status: DISCONTINUED | OUTPATIENT
Start: 2019-05-16 | End: 2019-05-18

## 2019-05-15 RX ORDER — FLUCONAZOLE 150 MG/1
150 TABLET ORAL ONCE
Refills: 0 | Status: COMPLETED | OUTPATIENT
Start: 2019-05-15 | End: 2019-05-16

## 2019-05-15 RX ADMIN — METHADONE HYDROCHLORIDE 60 MILLIGRAM(S): 40 TABLET ORAL at 22:10

## 2019-05-15 RX ADMIN — LIDOCAINE 1 PATCH: 4 CREAM TOPICAL at 06:25

## 2019-05-15 RX ADMIN — POLYETHYLENE GLYCOL 3350 17 GRAM(S): 17 POWDER, FOR SOLUTION ORAL at 18:58

## 2019-05-15 RX ADMIN — GABAPENTIN 800 MILLIGRAM(S): 400 CAPSULE ORAL at 13:16

## 2019-05-15 RX ADMIN — Medication 1 TABLET(S): at 08:17

## 2019-05-15 RX ADMIN — NALOXEGOL OXALATE 25 MILLIGRAM(S): 12.5 TABLET, FILM COATED ORAL at 06:25

## 2019-05-15 RX ADMIN — LIDOCAINE 1 PATCH: 4 CREAM TOPICAL at 05:05

## 2019-05-15 RX ADMIN — OXYCODONE HYDROCHLORIDE 30 MILLIGRAM(S): 5 TABLET ORAL at 15:46

## 2019-05-15 RX ADMIN — Medication 1 TABLET(S): at 13:22

## 2019-05-15 RX ADMIN — NYSTATIN CREAM 1 APPLICATION(S): 100000 CREAM TOPICAL at 22:10

## 2019-05-15 RX ADMIN — Medication 1000 MILLIGRAM(S): at 13:17

## 2019-05-15 RX ADMIN — SENNA PLUS 2 TABLET(S): 8.6 TABLET ORAL at 22:09

## 2019-05-15 RX ADMIN — Medication 1 TABLET(S): at 06:24

## 2019-05-15 RX ADMIN — Medication 1 PATCH: at 22:51

## 2019-05-15 RX ADMIN — Medication 1000 MILLIGRAM(S): at 22:10

## 2019-05-15 RX ADMIN — GABAPENTIN 800 MILLIGRAM(S): 400 CAPSULE ORAL at 06:24

## 2019-05-15 RX ADMIN — METHADONE HYDROCHLORIDE 60 MILLIGRAM(S): 40 TABLET ORAL at 00:19

## 2019-05-15 RX ADMIN — NYSTATIN CREAM 1 APPLICATION(S): 100000 CREAM TOPICAL at 13:17

## 2019-05-15 RX ADMIN — DAPTOMYCIN 124 MILLIGRAM(S): 500 INJECTION, POWDER, LYOPHILIZED, FOR SOLUTION INTRAVENOUS at 22:09

## 2019-05-15 RX ADMIN — LIDOCAINE 1 PATCH: 4 CREAM TOPICAL at 08:22

## 2019-05-15 RX ADMIN — NYSTATIN CREAM 1 APPLICATION(S): 100000 CREAM TOPICAL at 06:26

## 2019-05-15 RX ADMIN — CELECOXIB 100 MILLIGRAM(S): 200 CAPSULE ORAL at 14:14

## 2019-05-15 RX ADMIN — Medication 1000 MILLIGRAM(S): at 14:17

## 2019-05-15 RX ADMIN — LIDOCAINE 1 PATCH: 4 CREAM TOPICAL at 20:49

## 2019-05-15 RX ADMIN — METHADONE HYDROCHLORIDE 60 MILLIGRAM(S): 40 TABLET ORAL at 10:58

## 2019-05-15 RX ADMIN — CELECOXIB 100 MILLIGRAM(S): 200 CAPSULE ORAL at 13:14

## 2019-05-15 RX ADMIN — CHLORHEXIDINE GLUCONATE 1 APPLICATION(S): 213 SOLUTION TOPICAL at 06:26

## 2019-05-15 RX ADMIN — Medication 1000 MILLIGRAM(S): at 06:25

## 2019-05-15 RX ADMIN — POLYETHYLENE GLYCOL 3350 17 GRAM(S): 17 POWDER, FOR SOLUTION ORAL at 06:24

## 2019-05-15 RX ADMIN — Medication 1 PATCH: at 11:11

## 2019-05-15 RX ADMIN — Medication 5 MILLIGRAM(S): at 01:56

## 2019-05-15 RX ADMIN — Medication 1000 MILLIGRAM(S): at 23:10

## 2019-05-15 RX ADMIN — GABAPENTIN 800 MILLIGRAM(S): 400 CAPSULE ORAL at 22:09

## 2019-05-15 RX ADMIN — Medication 5 MILLIGRAM(S): at 10:09

## 2019-05-15 RX ADMIN — Medication 1 TABLET(S): at 22:10

## 2019-05-15 RX ADMIN — OXYCODONE HYDROCHLORIDE 30 MILLIGRAM(S): 5 TABLET ORAL at 14:46

## 2019-05-15 RX ADMIN — LIDOCAINE 1 PATCH: 4 CREAM TOPICAL at 08:20

## 2019-05-15 RX ADMIN — OXYCODONE HYDROCHLORIDE 30 MILLIGRAM(S): 5 TABLET ORAL at 06:39

## 2019-05-15 RX ADMIN — LIDOCAINE 1 PATCH: 4 CREAM TOPICAL at 19:08

## 2019-05-15 RX ADMIN — Medication 1 TABLET(S): at 13:15

## 2019-05-15 RX ADMIN — Medication 1 PATCH: at 13:15

## 2019-05-15 NOTE — PROGRESS NOTE ADULT - SUBJECTIVE AND OBJECTIVE BOX
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 She was also diagnosis w/ HCV. Pt was initially intubated due to respiratory failure and put on pressors in ICU. She as extubated on 2/25 and transferred from ICU the following day. Pt had a C4-5 ACDF with irrigation and debridement on 3/20 due to spinal abscess and osteo and was kept intubated post procedure and transferred again to ICU, where she was extubated on 3/21. Pt was subsequently found to have  T11/12 OM discitis with an epidural phlegmon from T9-L1. She was taken to the OR 5/8 for posterior spinal fusion & T8-L2 Laminectomy. She was again transferred to ICU for post op care and was extubated without difficulty on 5/9. She is lying in bed in NAD.    MEDICATIONS  (STANDING):  acetaminophen   Tablet .. 1000 milliGRAM(s) Oral every 8 hours  calcium carbonate 1250 mG  + Vitamin D (OsCal 500 + D) 1 Tablet(s) Oral three times a day  celecoxib 100 milliGRAM(s) Oral daily  celecoxib 200 milliGRAM(s) Oral once  chlorhexidine 4% Liquid 1 Application(s) Topical <User Schedule>  DAPTOmycin IVPB 600 milliGRAM(s) IV Intermittent <User Schedule>  gabapentin 800 milliGRAM(s) Oral three times a day  lactobacillus acidophilus 1 Tablet(s) Oral two times a day with meals  lidocaine   Patch 1 Patch Transdermal every 24 hours  lidocaine   Patch 1 Patch Transdermal every 24 hours  methadone    Tablet 60 milliGRAM(s) Oral two times a day  multivitamin 1 Tablet(s) Oral daily  naloxegol 25 milliGRAM(s) Oral before breakfast  nicotine -  14 mG/24Hr(s) Patch 1 patch Transdermal daily  nystatin Powder 1 Application(s) Topical three times a day  polyethylene glycol 3350 17 Gram(s) Oral two times a day  rifampin 300 milliGRAM(s) Oral two times a day  senna 2 Tablet(s) Oral at bedtime    MEDICATIONS  (PRN):  bisacodyl Suppository 10 milliGRAM(s) Rectal daily PRN Constipation  cyclobenzaprine 10 milliGRAM(s) Oral three times a day PRN Muscle Spasm  diazepam    Tablet 5 milliGRAM(s) Oral every 8 hours PRN Back Spasms  oxyCODONE    IR 30 milliGRAM(s) Oral every 4 hours PRN Moderate Pain (4 - 6)      Allergies    penicillin (Short breath; Hives)    Intolerances        Vital Signs Last 24 Hrs  T(C): 36.4 (15 May 2019 16:25), Max: 36.6 (15 May 2019 00:08)  T(F): 97.5 (15 May 2019 16:25), Max: 97.8 (15 May 2019 00:08)  HR: 85 (15 May 2019 16:25) (73 - 87)  BP: 102/55 (15 May 2019 16:25) (95/42 - 105/63)  BP(mean): --  RR: 17 (15 May 2019 16:25) (16 - 18)  SpO2: 100% (15 May 2019 16:25) (97% - 100%)    PHYSICAL EXAM:  GENERAL: NAD   HEAD:  Atraumatic, Normocephalic  EYES: EOMI, PERRLA    NECK: collar in place  NERVOUS SYSTEM: Alert & Oriented X3   CHEST/LUNG: Clear to auscultation bilaterally; No rales, rhonchi, wheezing, or rubs  HEART: Regular rate and rhythm; No murmurs, rubs, or gallops  ABDOMEN: Soft, Nontender, Nondistended; Bowel sounds present  EXTREMITIES:  No clubbing, cyanosis, or edema  BACK: Surgical site dressed    LABS:                        8.4    7.71  )-----------( 396      ( 15 May 2019 08:30 )             28.6     05-15    140  |  100  |  17  ----------------------------<  99  4.3   |  33<H>  |  0.46<L>    Ca    9.4      15 May 2019 08:30  Phos  5.0     05-15  Mg     2.2     05-15          CAPILLARY BLOOD GLUCOSE          Culture - Blood (collected 14 May 2019 00:21)  Source: .Blood  Preliminary Report (15 May 2019 01:02):    No growth to date.    Culture - Blood (collected 14 May 2019 00:21)  Source: .Blood  Preliminary Report (15 May 2019 01:01):    No growth to date.      RADIOLOGY & ADDITIONAL TESTS:    05-14-19 @ 07:01  -  05-15-19 @ 07:00  --------------------------------------------------------  IN:    Solution: 50 mL  Total IN: 50 mL    OUT:    Drain: 100 mL    Voided: 1000 mL  Total OUT: 1100 mL    Total NET: -1050 mL

## 2019-05-15 NOTE — PROGRESS NOTE ADULT - SUBJECTIVE AND OBJECTIVE BOX
Pt S/E at bedside, no acute events overnight, pain controlled.     Vital Signs Last 24 Hrs  T(C): 36 (15 May 2019 05:18), Max: 36.9 (14 May 2019 16:06)  T(F): 96.8 (15 May 2019 05:18), Max: 98.4 (14 May 2019 16:06)  HR: 73 (15 May 2019 05:18) (73 - 100)  BP: 97/56 (15 May 2019 05:18) (94/60 - 118/86)  BP(mean): --  RR: 18 (15 May 2019 05:18) (16 - 18)  SpO2: 98% (15 May 2019 05:18) (97% - 99%)    Gen: NAD,     Spine:  Dressing clean dry intact, except for peeling at edges will be changed when drain removed today  +KIRILL with small amount of serosanguinous drainage  +EHL/FHL/TA/GS  +AIN/PIN/M/R/U/Msc/Ax  SILT L3-S1  SILT C5-T1  +DP/PT Pulses  +Radial Pulse  Compartments soft  No calf TTP B/L

## 2019-05-15 NOTE — PROGRESS NOTE ADULT - ASSESSMENT
43F with diffuse bacterial infection and endocarditis s/p C4-5 ACDF for cervical OM on 3/20 now with T11/12 OM discitis with an epidural phlegmon from T9-L1 s/p T8-L2 Laminectomy and PSF POD 7    Pain control  DVT ppx with SCDs only  WBAT/OOB with TLSO brace at all times  Q4 neuro checks  KIRILL will be removed today  Physical therapy  Primary care per medical team  Apprec ID recs for abx  Will discuss with attending

## 2019-05-15 NOTE — PROGRESS NOTE ADULT - ASSESSMENT
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 She was also diagnosis w/ HCV. Pt was initially intubated due to respiratory failure and put on pressors in ICU. She as extubated on 2/25 and transferred from ICU the following day. Pt had a C4-5 ACDF with irrigation and debridement on 3/20 due to spinal abscess and osteo and was kept intubated post procedure and transferred again to ICU, where she was extubated on 3/21. Pt was subsequently found to have  T11/12 OM discitis with an epidural phlegmon from T9-L1. She was taken to the OR 5/8 for posterior spinal fusion & T8-L2 Laminectomy. She was again transferred to ICU for post op care and was extubated without difficulty on 5/9.     Sepsis due to methicillin resistant Staphylococcus aureus  - new wound cxs grew GPC so ID added Rifampin and increased Dapto dose, but now cx is growing coagulase NEGATIVE staph - may be contaminant  - NGTD on new blood cxs    Discitis of multiple sites of spine  - status post C4-5 ACDF with irrigation and debridement on 3/20  - status post posterior spinal fusion & T8-L2 Laminectomy on 5/8   - ortho following   - must use [place] j collar and TLSO brace at all times with ambulation   - care per surgery   - c/w methadone, Tylenol, flexeril, valium, Neurontin, lidocaine patch & PRN oxy - I asked her about increasing the methadone and dropping the oxy, but the patient has asked that oxy be kept the same (suspect possible drug seeking behavior) - will add celebrex and decrease oxy in a few days  - c/w senna, Miralax, Naloxegol & dulcolax for opiate associated constipation   - KIRILL taken out today      Endocarditis of tricuspid valve  - repeat echo showed stable vegetation    Hepatitis C antibody test positive  - patient will need to f/u as an outpatient    Hx of IVDU   - pt is on methadone 60 mg q12       Anemia  - likely AOCD   - monitor CBC    Prophylaxis:   DVT: SCD  GI: PO diet

## 2019-05-15 NOTE — CHART NOTE - NSCHARTNOTEFT_GEN_A_CORE
KIRILL drain discontinued today, new dressing/aquacell placed  Continue PT and WBAT  Medical management per primary team  No further orthopedic surgical intervention indicated at this time  Ortho stable for DC  Follow up with Dr. Leslie in office in 2 weeks, call for an appt

## 2019-05-16 PROCEDURE — 99233 SBSQ HOSP IP/OBS HIGH 50: CPT

## 2019-05-16 RX ORDER — METHADONE HYDROCHLORIDE 40 MG/1
60 TABLET ORAL
Refills: 0 | Status: DISCONTINUED | OUTPATIENT
Start: 2019-05-16 | End: 2019-05-21

## 2019-05-16 RX ORDER — FLUCONAZOLE 150 MG/1
150 TABLET ORAL ONCE
Refills: 0 | Status: COMPLETED | OUTPATIENT
Start: 2019-05-16 | End: 2019-05-16

## 2019-05-16 RX ADMIN — LIDOCAINE 1 PATCH: 4 CREAM TOPICAL at 21:00

## 2019-05-16 RX ADMIN — Medication 1000 MILLIGRAM(S): at 23:30

## 2019-05-16 RX ADMIN — Medication 1 PATCH: at 08:28

## 2019-05-16 RX ADMIN — OXYCODONE HYDROCHLORIDE 30 MILLIGRAM(S): 5 TABLET ORAL at 05:09

## 2019-05-16 RX ADMIN — METHADONE HYDROCHLORIDE 60 MILLIGRAM(S): 40 TABLET ORAL at 15:03

## 2019-05-16 RX ADMIN — CELECOXIB 100 MILLIGRAM(S): 200 CAPSULE ORAL at 13:00

## 2019-05-16 RX ADMIN — Medication 1 TABLET(S): at 13:00

## 2019-05-16 RX ADMIN — LIDOCAINE 1 PATCH: 4 CREAM TOPICAL at 19:00

## 2019-05-16 RX ADMIN — OXYCODONE HYDROCHLORIDE 30 MILLIGRAM(S): 5 TABLET ORAL at 18:57

## 2019-05-16 RX ADMIN — GABAPENTIN 800 MILLIGRAM(S): 400 CAPSULE ORAL at 15:04

## 2019-05-16 RX ADMIN — Medication 5 MILLIGRAM(S): at 08:12

## 2019-05-16 RX ADMIN — Medication 1 TABLET(S): at 08:14

## 2019-05-16 RX ADMIN — NYSTATIN CREAM 1 APPLICATION(S): 100000 CREAM TOPICAL at 22:53

## 2019-05-16 RX ADMIN — LIDOCAINE 1 PATCH: 4 CREAM TOPICAL at 20:01

## 2019-05-16 RX ADMIN — GABAPENTIN 800 MILLIGRAM(S): 400 CAPSULE ORAL at 22:53

## 2019-05-16 RX ADMIN — OXYCODONE HYDROCHLORIDE 30 MILLIGRAM(S): 5 TABLET ORAL at 11:30

## 2019-05-16 RX ADMIN — Medication 1 TABLET(S): at 22:53

## 2019-05-16 RX ADMIN — FLUCONAZOLE 150 MILLIGRAM(S): 150 TABLET ORAL at 05:06

## 2019-05-16 RX ADMIN — POLYETHYLENE GLYCOL 3350 17 GRAM(S): 17 POWDER, FOR SOLUTION ORAL at 05:08

## 2019-05-16 RX ADMIN — CHLORHEXIDINE GLUCONATE 1 APPLICATION(S): 213 SOLUTION TOPICAL at 05:08

## 2019-05-16 RX ADMIN — NALOXEGOL OXALATE 25 MILLIGRAM(S): 12.5 TABLET, FILM COATED ORAL at 08:12

## 2019-05-16 RX ADMIN — NYSTATIN CREAM 1 APPLICATION(S): 100000 CREAM TOPICAL at 15:04

## 2019-05-16 RX ADMIN — GABAPENTIN 800 MILLIGRAM(S): 400 CAPSULE ORAL at 05:06

## 2019-05-16 RX ADMIN — Medication 1 TABLET(S): at 18:33

## 2019-05-16 RX ADMIN — Medication 1000 MILLIGRAM(S): at 22:53

## 2019-05-16 RX ADMIN — Medication 1 TABLET(S): at 05:06

## 2019-05-16 RX ADMIN — OXYCODONE HYDROCHLORIDE 30 MILLIGRAM(S): 5 TABLET ORAL at 10:31

## 2019-05-16 RX ADMIN — LIDOCAINE 1 PATCH: 4 CREAM TOPICAL at 08:12

## 2019-05-16 RX ADMIN — CELECOXIB 100 MILLIGRAM(S): 200 CAPSULE ORAL at 14:23

## 2019-05-16 RX ADMIN — Medication 5 MILLIGRAM(S): at 22:53

## 2019-05-16 RX ADMIN — DAPTOMYCIN 124 MILLIGRAM(S): 500 INJECTION, POWDER, LYOPHILIZED, FOR SOLUTION INTRAVENOUS at 22:53

## 2019-05-16 RX ADMIN — Medication 1000 MILLIGRAM(S): at 15:46

## 2019-05-16 RX ADMIN — LIDOCAINE 1 PATCH: 4 CREAM TOPICAL at 08:54

## 2019-05-16 RX ADMIN — SENNA PLUS 2 TABLET(S): 8.6 TABLET ORAL at 22:53

## 2019-05-16 RX ADMIN — POLYETHYLENE GLYCOL 3350 17 GRAM(S): 17 POWDER, FOR SOLUTION ORAL at 18:32

## 2019-05-16 RX ADMIN — Medication 1 PATCH: at 12:59

## 2019-05-16 RX ADMIN — LIDOCAINE 1 PATCH: 4 CREAM TOPICAL at 22:54

## 2019-05-16 RX ADMIN — Medication 1 TABLET(S): at 15:03

## 2019-05-16 RX ADMIN — NYSTATIN CREAM 1 APPLICATION(S): 100000 CREAM TOPICAL at 05:08

## 2019-05-16 RX ADMIN — Medication 1000 MILLIGRAM(S): at 05:06

## 2019-05-16 RX ADMIN — Medication 1000 MILLIGRAM(S): at 16:50

## 2019-05-16 RX ADMIN — FLUCONAZOLE 150 MILLIGRAM(S): 150 TABLET ORAL at 18:57

## 2019-05-16 NOTE — CHART NOTE - NSCHARTNOTEFT_GEN_A_CORE
Assessment:   Pt seen for nutrition follow up.   Pt c discitis of spine, s/p Fusion of spinal segments, c Hepatitis C, anemia, c  hx IVDA, alcoholic hepatitis, pancreatitis, alcoholic withdrawal, subdural hematoma.  Pt adm 02/17/19 with severe sepsis with septic shock secondary to MRSA bacteremia c pneumonia,  UTI, endocarditis, EFRAIN, s/p ICU stay.     Pt adm c sepsis, c hx of ETOH abuse, drug abuse  Factors impacting intake: [ x] none [ ] nausea  [ ] vomiting [ ] diarrhea [ ] constipation  [ ]chewing problems [ ] swallowing issues  [ ] other:     Diet Prescription: Diet, Regular:   Supplement Feeding Modality:  Oral  Ensure Enlive Cans or Servings Per Day:  1       Frequency:  Three Times a day (05-10-19 @ 10:40)    Intake: >75%-100% of meals & supplement since 05/13/19    Current Weight: 05/16/19, 74.6 kg, 02.17/19, 71.3 kg, c wt. gain of 3.3 kg  % Weight Change: 4.6%  1+ edema of feet & ankles noted    Nutrition focused physical exam conducted;  marks on legs noted; Subcutaneous fat Exam;  [ Mild  ]  Orbital fat pads region,  [ WNL  ]Buccal fat region,  [ Mild  ]triceps region, [ WNL  ]ribs region.  Muscle Exam; [  WNL ]temples region, [ WNL ]clavicle region, [ WNL   ]shoulder region, [ Unable  ]Scapula region, [ WNL   ]Interosseous region, [ WNL  ]thigh region, [ WNL   ]Calf region    Pertinent Medications: MEDICATIONS  (STANDING):  acetaminophen   Tablet .. 1000 milliGRAM(s) Oral every 8 hours  calcium carbonate 1250 mG  + Vitamin D (OsCal 500 + D) 1 Tablet(s) Oral three times a day  celecoxib 100 milliGRAM(s) Oral daily  celecoxib 200 milliGRAM(s) Oral once  chlorhexidine 4% Liquid 1 Application(s) Topical <User Schedule>  DAPTOmycin IVPB 600 milliGRAM(s) IV Intermittent <User Schedule>  gabapentin 800 milliGRAM(s) Oral three times a day  lactobacillus acidophilus 1 Tablet(s) Oral two times a day with meals  lidocaine   Patch 1 Patch Transdermal every 24 hours  lidocaine   Patch 1 Patch Transdermal every 24 hours  methadone    Tablet 60 milliGRAM(s) Oral two times a day  multivitamin 1 Tablet(s) Oral daily  naloxegol 25 milliGRAM(s) Oral before breakfast  nicotine -  14 mG/24Hr(s) Patch 1 patch Transdermal daily  nystatin Powder 1 Application(s) Topical three times a day  polyethylene glycol 3350 17 Gram(s) Oral two times a day  rifampin 300 milliGRAM(s) Oral two times a day  senna 2 Tablet(s) Oral at bedtime    MEDICATIONS  (PRN):  bisacodyl Suppository 10 milliGRAM(s) Rectal daily PRN Constipation  cyclobenzaprine 10 milliGRAM(s) Oral three times a day PRN Muscle Spasm  diazepam    Tablet 5 milliGRAM(s) Oral every 8 hours PRN Back Spasms  oxyCODONE    IR 30 milliGRAM(s) Oral every 4 hours PRN Moderate Pain (4 - 6)    Pertinent Labs: 05-15 Na140 mmol/L Glu 99 mg/dL K+ 4.3 mmol/L Cr  0.46 mg/dL<L> BUN 17 mg/dL 05-15 Phos 5.0 mg/dL<H> 05/07 Alb 3.0 g/dL <L>     CAPILLARY BLOOD GLUCOSE        Skin: left neck; surgical incision, dry, intact          tattoo to coccyx, hip & chest          left knee abrasion noted 03/22              Estimated Needs:   [ x] no change since previous assessment( 04/17/19) energy needs 1500-1800kcal (25-30kcal/kg IBW) & protein needs 60-75kg (1.0-1.2gm/kg IBW) & fluid needs 1500-1800ml(25-30ml/kg IBW)      [ ] recalculated:     Previous Nutrition Diagnosis:   Previous Nutrition Diagnosis:   [ ] Inadequate Energy Intake [x ]Inadequate Oral Intake [ ] Excessive Energy Intake   [ ] Underweight [ ] Increased Nutrient Needs [ ] Overweight/Obesity   [ ] Altered GI Function [ ] Unintended Weight Loss [ ] Food & Nutrition Related Knowledge Deficit [ ] severe Malnutrition [ ] moderate malnutrition    Etiology: Back pain  Signs/Symptoms: Pt NPO/Clear liquids x 3 days & s/p po intake 50-75% most meals x 1 month    Previous Goal:  Pt to consume  >75% meals; met   Pt to consume >75% supplements; met    Nutrition Diagnosis is [ ] ongoing  [ ] resolved  [x ] improved  [ ] not applicable       New Nutrition Diagnosis: [x ] not applicable       Interventions:   Recommend  [x] Continue c current diet regimen   [ ] Change Diet To:  [ ] Nutrition Supplement  [ ] Nutrition Support  [ ] Other:     Monitoring and Evaluation:   [x ] PO intake [ x ] Tolerance to diet prescription [ x ] weights [ x ] labs; phosphorus [ x ] follow up per protocol  [ ] other:

## 2019-05-16 NOTE — PROGRESS NOTE ADULT - ASSESSMENT
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 She was also diagnosis w/ HCV. Pt was initially intubated due to respiratory failure and put on pressors in ICU. She as extubated on 2/25 and transferred from ICU the following day. Pt had a C4-5 ACDF with irrigation and debridement on 3/20 due to spinal abscess and osteo and was kept intubated post procedure and transferred again to ICU, where she was extubated on 3/21. Pt was subsequently found to have  T11/12 OM discitis with an epidural phlegmon from T9-L1. She was taken to the OR 5/8 for posterior spinal fusion & T8-L2 Laminectomy. She was again transferred to ICU for post op care and was extubated without difficulty on 5/9.     Sepsis due to methicillin resistant Staphylococcus aureus  - new wound cxs grew GPC so ID added Rifampin and increased Dapto dose, but now cx is growing coagulase NEGATIVE staph - may be contaminant  - NGTD on new blood cxs    Discitis of multiple sites of spine  - status post C4-5 ACDF with irrigation and debridement on 3/20  - status post posterior spinal fusion & T8-L2 Laminectomy on 5/8   - ortho following   - must use [place] j collar and TLSO brace at all times with ambulation   - care per surgery   - c/w methadone, Tylenol, flexeril, valium, Neurontin, lidocaine patch & PRN oxy - I asked her about increasing the methadone and dropping the oxy, but the patient has asked that oxy be kept the same (suspect possible drug seeking behavior) - will add celebrex and decrease oxy in a few days  - c/w senna, Miralax, Naloxegol & dulcolax for opiate associated constipation   - KIRILL taken out today      Endocarditis of tricuspid valve  - repeat echo showed stable vegetation    Hepatitis C antibody test positive  - patient will need to f/u as an outpatient    Hx of IVDU   - pt is on methadone 60 mg q12       Anemia  - likely AOCD   - monitor CBC    Yeast infection  - diflucan x 1 dose    Prophylaxis:   DVT: SCD  GI: PO diet    Patient asked me to tell her mother that she's on methadone 60mg daily today. I informed her that since she is on 60mg BID this would be lying and I will not do this. Instead I've informed all of the staff to address all of the pt's mother's questions about the pt's meds directly to the patient herself.

## 2019-05-16 NOTE — PROGRESS NOTE ADULT - SUBJECTIVE AND OBJECTIVE BOX
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 She was also diagnosis w/ HCV. Pt was initially intubated due to respiratory failure and put on pressors in ICU. She as extubated on 2/25 and transferred from ICU the following day. Pt had a C4-5 ACDF with irrigation and debridement on 3/20 due to spinal abscess and osteo and was kept intubated post procedure and transferred again to ICU, where she was extubated on 3/21. Pt was subsequently found to have  T11/12 OM discitis with an epidural phlegmon from T9-L1. She was taken to the OR 5/8 for posterior spinal fusion & T8-L2 Laminectomy. She was again transferred to ICU for post op care and was extubated without difficulty on 5/9. She is lying in bed in NAD.    MEDICATIONS  (STANDING):  acetaminophen   Tablet .. 1000 milliGRAM(s) Oral every 8 hours  calcium carbonate 1250 mG  + Vitamin D (OsCal 500 + D) 1 Tablet(s) Oral three times a day  celecoxib 100 milliGRAM(s) Oral daily  celecoxib 200 milliGRAM(s) Oral once  chlorhexidine 4% Liquid 1 Application(s) Topical <User Schedule>  DAPTOmycin IVPB 600 milliGRAM(s) IV Intermittent <User Schedule>  gabapentin 800 milliGRAM(s) Oral three times a day  lactobacillus acidophilus 1 Tablet(s) Oral two times a day with meals  lidocaine   Patch 1 Patch Transdermal every 24 hours  lidocaine   Patch 1 Patch Transdermal every 24 hours  methadone    Tablet 60 milliGRAM(s) Oral two times a day  multivitamin 1 Tablet(s) Oral daily  naloxegol 25 milliGRAM(s) Oral before breakfast  nicotine -  14 mG/24Hr(s) Patch 1 patch Transdermal daily  nystatin Powder 1 Application(s) Topical three times a day  polyethylene glycol 3350 17 Gram(s) Oral two times a day  rifampin 300 milliGRAM(s) Oral two times a day  senna 2 Tablet(s) Oral at bedtime    MEDICATIONS  (PRN):  bisacodyl Suppository 10 milliGRAM(s) Rectal daily PRN Constipation  cyclobenzaprine 10 milliGRAM(s) Oral three times a day PRN Muscle Spasm  diazepam    Tablet 5 milliGRAM(s) Oral every 8 hours PRN Back Spasms  oxyCODONE    IR 30 milliGRAM(s) Oral every 4 hours PRN Moderate Pain (4 - 6)      Allergies    penicillin (Short breath; Hives)    Intolerances     Vital Signs Last 24 Hrs  T(C): 36.1 (16 May 2019 17:36), Max: 36.8 (15 May 2019 23:34)  T(F): 97 (16 May 2019 17:36), Max: 98.2 (15 May 2019 23:34)  HR: 83 (16 May 2019 17:36) (80 - 85)  BP: 98/52 (16 May 2019 17:36) (94/53 - 108/69)  BP(mean): --  RR: 17 (16 May 2019 17:36) (17 - 20)  SpO2: 95% (16 May 2019 17:36) (95% - 99%)    PHYSICAL EXAM:  GENERAL: NAD   HEAD:  Atraumatic, Normocephalic  EYES: EOMI, PERRLA    NECK: collar in place  NERVOUS SYSTEM: Alert & Oriented X3   CHEST/LUNG: Clear to auscultation bilaterally; No rales, rhonchi, wheezing, or rubs  HEART: Regular rate and rhythm; No murmurs, rubs, or gallops  ABDOMEN: Soft, Nontender, Nondistended; Bowel sounds present  EXTREMITIES:  No clubbing, cyanosis, or edema  BACK: Surgical site dressed      LABS:                        8.4    7.71  )-----------( 396      ( 15 May 2019 08:30 )             28.6     05-15    140  |  100  |  17  ----------------------------<  99  4.3   |  33<H>  |  0.46<L>    Ca    9.4      15 May 2019 08:30  Phos  5.0     05-15  Mg     2.2     05-15          CAPILLARY BLOOD GLUCOSE          Culture - Blood (collected 14 May 2019 00:21)  Source: .Blood  Preliminary Report (15 May 2019 01:02):    No growth to date.    Culture - Blood (collected 14 May 2019 00:21)  Source: .Blood  Preliminary Report (15 May 2019 01:01):    No growth to date.      RADIOLOGY & ADDITIONAL TESTS:    05-16-19 @ 07:01  -  05-16-19 @ 18:19  --------------------------------------------------------  IN:    Oral Fluid: 360 mL  Total IN: 360 mL    OUT:  Total OUT: 0 mL    Total NET: 360 mL

## 2019-05-17 LAB — CK SERPL-CCNC: 30 U/L — SIGNIFICANT CHANGE UP (ref 26–192)

## 2019-05-17 PROCEDURE — 99233 SBSQ HOSP IP/OBS HIGH 50: CPT

## 2019-05-17 RX ADMIN — METHADONE HYDROCHLORIDE 60 MILLIGRAM(S): 40 TABLET ORAL at 17:58

## 2019-05-17 RX ADMIN — GABAPENTIN 800 MILLIGRAM(S): 400 CAPSULE ORAL at 15:20

## 2019-05-17 RX ADMIN — OXYCODONE HYDROCHLORIDE 30 MILLIGRAM(S): 5 TABLET ORAL at 01:30

## 2019-05-17 RX ADMIN — LIDOCAINE 1 PATCH: 4 CREAM TOPICAL at 05:13

## 2019-05-17 RX ADMIN — Medication 1000 MILLIGRAM(S): at 06:14

## 2019-05-17 RX ADMIN — LIDOCAINE 1 PATCH: 4 CREAM TOPICAL at 07:20

## 2019-05-17 RX ADMIN — OXYCODONE HYDROCHLORIDE 30 MILLIGRAM(S): 5 TABLET ORAL at 10:56

## 2019-05-17 RX ADMIN — Medication 1000 MILLIGRAM(S): at 22:00

## 2019-05-17 RX ADMIN — NYSTATIN CREAM 1 APPLICATION(S): 100000 CREAM TOPICAL at 14:31

## 2019-05-17 RX ADMIN — Medication 1 PATCH: at 19:20

## 2019-05-17 RX ADMIN — CHLORHEXIDINE GLUCONATE 1 APPLICATION(S): 213 SOLUTION TOPICAL at 05:07

## 2019-05-17 RX ADMIN — OXYCODONE HYDROCHLORIDE 30 MILLIGRAM(S): 5 TABLET ORAL at 23:52

## 2019-05-17 RX ADMIN — Medication 1 PATCH: at 12:15

## 2019-05-17 RX ADMIN — OXYCODONE HYDROCHLORIDE 30 MILLIGRAM(S): 5 TABLET ORAL at 09:56

## 2019-05-17 RX ADMIN — NYSTATIN CREAM 1 APPLICATION(S): 100000 CREAM TOPICAL at 22:13

## 2019-05-17 RX ADMIN — POLYETHYLENE GLYCOL 3350 17 GRAM(S): 17 POWDER, FOR SOLUTION ORAL at 05:10

## 2019-05-17 RX ADMIN — Medication 1000 MILLIGRAM(S): at 23:50

## 2019-05-17 RX ADMIN — Medication 1 PATCH: at 07:20

## 2019-05-17 RX ADMIN — Medication 1 TABLET(S): at 17:58

## 2019-05-17 RX ADMIN — Medication 1000 MILLIGRAM(S): at 14:31

## 2019-05-17 RX ADMIN — Medication 1 TABLET(S): at 12:15

## 2019-05-17 RX ADMIN — POLYETHYLENE GLYCOL 3350 17 GRAM(S): 17 POWDER, FOR SOLUTION ORAL at 17:58

## 2019-05-17 RX ADMIN — LIDOCAINE 1 PATCH: 4 CREAM TOPICAL at 12:18

## 2019-05-17 RX ADMIN — METHADONE HYDROCHLORIDE 60 MILLIGRAM(S): 40 TABLET ORAL at 05:08

## 2019-05-17 RX ADMIN — LIDOCAINE 1 PATCH: 4 CREAM TOPICAL at 17:58

## 2019-05-17 RX ADMIN — OXYCODONE HYDROCHLORIDE 30 MILLIGRAM(S): 5 TABLET ORAL at 14:30

## 2019-05-17 RX ADMIN — DAPTOMYCIN 124 MILLIGRAM(S): 500 INJECTION, POWDER, LYOPHILIZED, FOR SOLUTION INTRAVENOUS at 21:59

## 2019-05-17 RX ADMIN — LIDOCAINE 1 PATCH: 4 CREAM TOPICAL at 19:19

## 2019-05-17 RX ADMIN — NALOXEGOL OXALATE 25 MILLIGRAM(S): 12.5 TABLET, FILM COATED ORAL at 09:57

## 2019-05-17 RX ADMIN — Medication 5 MILLIGRAM(S): at 12:19

## 2019-05-17 RX ADMIN — Medication 1000 MILLIGRAM(S): at 05:08

## 2019-05-17 RX ADMIN — Medication 1 TABLET(S): at 22:00

## 2019-05-17 RX ADMIN — OXYCODONE HYDROCHLORIDE 30 MILLIGRAM(S): 5 TABLET ORAL at 15:30

## 2019-05-17 RX ADMIN — NYSTATIN CREAM 1 APPLICATION(S): 100000 CREAM TOPICAL at 05:09

## 2019-05-17 RX ADMIN — Medication 1 TABLET(S): at 14:31

## 2019-05-17 RX ADMIN — SENNA PLUS 2 TABLET(S): 8.6 TABLET ORAL at 22:00

## 2019-05-17 RX ADMIN — Medication 1 TABLET(S): at 05:09

## 2019-05-17 RX ADMIN — Medication 5 MILLIGRAM(S): at 22:12

## 2019-05-17 RX ADMIN — Medication 1 TABLET(S): at 09:57

## 2019-05-17 RX ADMIN — OXYCODONE HYDROCHLORIDE 30 MILLIGRAM(S): 5 TABLET ORAL at 00:31

## 2019-05-17 RX ADMIN — GABAPENTIN 800 MILLIGRAM(S): 400 CAPSULE ORAL at 22:00

## 2019-05-17 RX ADMIN — CELECOXIB 100 MILLIGRAM(S): 200 CAPSULE ORAL at 12:17

## 2019-05-17 RX ADMIN — GABAPENTIN 800 MILLIGRAM(S): 400 CAPSULE ORAL at 05:09

## 2019-05-17 NOTE — PROGRESS NOTE ADULT - SUBJECTIVE AND OBJECTIVE BOX
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 She was also diagnosis w/ HCV. Pt was initially intubated due to respiratory failure and put on pressors in ICU. She as extubated on 2/25 and transferred from ICU the following day. Pt had a C4-5 ACDF with irrigation and debridement on 3/20 due to spinal abscess and osteo and was kept intubated post procedure and transferred again to ICU, where she was extubated on 3/21. Pt was subsequently found to have  T11/12 OM discitis with an epidural phlegmon from T9-L1. She was taken to the OR 5/8 for posterior spinal fusion & T8-L2 Laminectomy. She was again transferred to ICU for post op care and was extubated without difficulty on 5/9. She is lying in bed in NAD.    MEDICATIONS  (STANDING):  acetaminophen   Tablet .. 1000 milliGRAM(s) Oral every 8 hours  calcium carbonate 1250 mG  + Vitamin D (OsCal 500 + D) 1 Tablet(s) Oral three times a day  celecoxib 100 milliGRAM(s) Oral daily  celecoxib 200 milliGRAM(s) Oral once  chlorhexidine 4% Liquid 1 Application(s) Topical <User Schedule>  DAPTOmycin IVPB 600 milliGRAM(s) IV Intermittent <User Schedule>  gabapentin 800 milliGRAM(s) Oral three times a day  lactobacillus acidophilus 1 Tablet(s) Oral two times a day with meals  lidocaine   Patch 1 Patch Transdermal every 24 hours  lidocaine   Patch 1 Patch Transdermal every 24 hours  methadone    Tablet 60 milliGRAM(s) Oral two times a day  multivitamin 1 Tablet(s) Oral daily  naloxegol 25 milliGRAM(s) Oral before breakfast  nicotine -  14 mG/24Hr(s) Patch 1 patch Transdermal daily  nystatin Powder 1 Application(s) Topical three times a day  polyethylene glycol 3350 17 Gram(s) Oral two times a day  rifampin 300 milliGRAM(s) Oral two times a day  senna 2 Tablet(s) Oral at bedtime    MEDICATIONS  (PRN):  bisacodyl Suppository 10 milliGRAM(s) Rectal daily PRN Constipation  cyclobenzaprine 10 milliGRAM(s) Oral three times a day PRN Muscle Spasm  diazepam    Tablet 5 milliGRAM(s) Oral every 8 hours PRN Back Spasms  oxyCODONE    IR 30 milliGRAM(s) Oral every 4 hours PRN Moderate Pain (4 - 6)      Allergies    penicillin (Short breath; Hives)    Intolerances        Vital Signs Last 24 Hrs  T(C): 36.4 (17 May 2019 11:42), Max: 36.4 (17 May 2019 11:42)  T(F): 97.6 (17 May 2019 11:42), Max: 97.6 (17 May 2019 11:42)  HR: 66 (17 May 2019 16:34) (66 - 78)  BP: 122/68 (17 May 2019 16:34) (91/56 - 122/68)  BP(mean): --  RR: 17 (17 May 2019 16:34) (17 - 18)  SpO2: 95% (17 May 2019 16:34) (95% - 99%)    PHYSICAL EXAM:  GENERAL: NAD   HEAD:  Atraumatic, Normocephalic  EYES: EOMI, PERRLA    NECK: collar in place  NERVOUS SYSTEM: Alert & Oriented X3   CHEST/LUNG: Clear to auscultation bilaterally; No rales, rhonchi, wheezing, or rubs  HEART: Regular rate and rhythm; No murmurs, rubs, or gallops  ABDOMEN: Soft, Nontender, Nondistended; Bowel sounds present  EXTREMITIES:  No clubbing, cyanosis, or edema  BACK: Surgical site dressed      LABS:    CAPILLARY BLOOD GLUCOSE          Culture - Blood (collected 14 May 2019 00:21)  Source: .Blood  Preliminary Report (15 May 2019 01:02):    No growth to date.    Culture - Blood (collected 14 May 2019 00:21)  Source: .Blood  Preliminary Report (15 May 2019 01:01):    No growth to date.      RADIOLOGY & ADDITIONAL TESTS:    05-16-19 @ 07:01  -  05-17-19 @ 07:00  --------------------------------------------------------  IN:    Oral Fluid: 360 mL    Solution: 100 mL  Total IN: 460 mL    OUT:  Total OUT: 0 mL    Total NET: 460 mL      05-17-19 @ 07:01  -  05-17-19 @ 19:04  --------------------------------------------------------  IN:  Total IN: 0 mL    OUT:    Voided: 850 mL  Total OUT: 850 mL    Total NET: -850 mL

## 2019-05-17 NOTE — PROGRESS NOTE ADULT - ASSESSMENT
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 She was also diagnosis w/ HCV. Pt was initially intubated due to respiratory failure and put on pressors in ICU. She as extubated on 2/25 and transferred from ICU the following day. Pt had a C4-5 ACDF with irrigation and debridement on 3/20 due to spinal abscess and osteo and was kept intubated post procedure and transferred again to ICU, where she was extubated on 3/21. Pt was subsequently found to have  T11/12 OM discitis with an epidural phlegmon from T9-L1. She was taken to the OR 5/8 for posterior spinal fusion & T8-L2 Laminectomy. She was again transferred to ICU for post op care and was extubated without difficulty on 5/9.     Sepsis due to methicillin resistant Staphylococcus aureus  - new wound cxs grew GPC so ID added Rifampin and increased Dapto dose, but now cx is growing coagulase NEGATIVE staph - may be contaminant  - NGTD on new blood cxs    Discitis of multiple sites of spine  - status post C4-5 ACDF with irrigation and debridement on 3/20  - status post posterior spinal fusion & T8-L2 Laminectomy on 5/8   - ortho following   - must use [place] j collar and TLSO brace at all times with ambulation   - care per surgery   - c/w methadone, Tylenol, flexeril, valium, Neurontin, lidocaine patch & PRN oxy - I asked her about increasing the methadone and dropping the oxy, but the patient has asked that oxy be kept the same (suspect possible drug seeking behavior) - will add celebrex and decrease oxy in a few days  - c/w senna, Miralax, Naloxegol & dulcolax for opiate associated constipation   - KIRILL taken out today      Endocarditis of tricuspid valve  - repeat echo showed stable vegetation    Hepatitis C antibody test positive  - patient will need to f/u as an outpatient    Hx of IVDU   - pt is on methadone 60 mg q12       Anemia  - likely AOCD   - monitor CBC    Yeast infection  - s/p diflucan x 1 dose    Prophylaxis:   DVT: SCD  GI: PO diet

## 2019-05-17 NOTE — PROGRESS NOTE ADULT - SUBJECTIVE AND OBJECTIVE BOX
Patient is a 43y old  Female who presents with a chief complaint of AMS (16 May 2019 18:19)      INTERVAL HPI / OVERNIGHT EVENTS:    MEDICATIONS  (STANDING):  acetaminophen   Tablet .. 1000 milliGRAM(s) Oral every 8 hours  calcium carbonate 1250 mG  + Vitamin D (OsCal 500 + D) 1 Tablet(s) Oral three times a day  celecoxib 100 milliGRAM(s) Oral daily  celecoxib 200 milliGRAM(s) Oral once  chlorhexidine 4% Liquid 1 Application(s) Topical <User Schedule>  DAPTOmycin IVPB 600 milliGRAM(s) IV Intermittent <User Schedule>  gabapentin 800 milliGRAM(s) Oral three times a day  lactobacillus acidophilus 1 Tablet(s) Oral two times a day with meals  lidocaine   Patch 1 Patch Transdermal every 24 hours  lidocaine   Patch 1 Patch Transdermal every 24 hours  methadone    Tablet 60 milliGRAM(s) Oral two times a day  multivitamin 1 Tablet(s) Oral daily  naloxegol 25 milliGRAM(s) Oral before breakfast  nicotine -  14 mG/24Hr(s) Patch 1 patch Transdermal daily  nystatin Powder 1 Application(s) Topical three times a day  polyethylene glycol 3350 17 Gram(s) Oral two times a day  rifampin 300 milliGRAM(s) Oral two times a day  senna 2 Tablet(s) Oral at bedtime    MEDICATIONS  (PRN):  bisacodyl Suppository 10 milliGRAM(s) Rectal daily PRN Constipation  cyclobenzaprine 10 milliGRAM(s) Oral three times a day PRN Muscle Spasm  diazepam    Tablet 5 milliGRAM(s) Oral every 8 hours PRN Back Spasms  oxyCODONE    IR 30 milliGRAM(s) Oral every 4 hours PRN Moderate Pain (4 - 6)      Vital Signs Last 24 Hrs  T(C): 36.4 (17 May 2019 11:42), Max: 36.4 (17 May 2019 11:42)  T(F): 97.6 (17 May 2019 11:42), Max: 97.6 (17 May 2019 11:42)  HR: 73 (17 May 2019 11:42) (73 - 83)  BP: 103/65 (17 May 2019 11:42) (91/56 - 103/65)  BP(mean): --  RR: 18 (17 May 2019 11:42) (17 - 18)  SpO2: 96% (17 May 2019 11:42) (95% - 99%)    Review of systems:  General : no fever /chills,fatigue  CVS : no chest pain, palpitations  Lungs : no shortness of breath, cough  GI : no abdominal pain,vomiting, diarrhea   : no dysuria,hematuria        PHYSICAL EXAM:  General :NAD  Constitutional:  well-groomed, well-developed  Respiratory: CTAB/L  Cardiovascular: S1 and S2, RRR, no M/G/R  Gastrointestinal: BS+, soft, NT/ND  Extremities: No peripheral edema  Vascular: 2+ peripheral pulses  Skin: No rashes      LABS:                MICROBIOLOGY:  RECENT CULTURES:  05-14 .Blood XXXX XXXX   No growth to date.          RADIOLOGY & ADDITIONAL STUDIES: Patient is a 43y old  Female who presents with a chief complaint of AMS (16 May 2019 18:19)      INTERVAL HPI / OVERNIGHT EVENTS: no new c/o    MEDICATIONS  (STANDING):  acetaminophen   Tablet .. 1000 milliGRAM(s) Oral every 8 hours  calcium carbonate 1250 mG  + Vitamin D (OsCal 500 + D) 1 Tablet(s) Oral three times a day  celecoxib 100 milliGRAM(s) Oral daily  celecoxib 200 milliGRAM(s) Oral once  chlorhexidine 4% Liquid 1 Application(s) Topical <User Schedule>  DAPTOmycin IVPB 600 milliGRAM(s) IV Intermittent <User Schedule>  gabapentin 800 milliGRAM(s) Oral three times a day  lactobacillus acidophilus 1 Tablet(s) Oral two times a day with meals  lidocaine   Patch 1 Patch Transdermal every 24 hours  lidocaine   Patch 1 Patch Transdermal every 24 hours  methadone    Tablet 60 milliGRAM(s) Oral two times a day  multivitamin 1 Tablet(s) Oral daily  naloxegol 25 milliGRAM(s) Oral before breakfast  nicotine -  14 mG/24Hr(s) Patch 1 patch Transdermal daily  nystatin Powder 1 Application(s) Topical three times a day  polyethylene glycol 3350 17 Gram(s) Oral two times a day  rifampin 300 milliGRAM(s) Oral two times a day  senna 2 Tablet(s) Oral at bedtime    MEDICATIONS  (PRN):  bisacodyl Suppository 10 milliGRAM(s) Rectal daily PRN Constipation  cyclobenzaprine 10 milliGRAM(s) Oral three times a day PRN Muscle Spasm  diazepam    Tablet 5 milliGRAM(s) Oral every 8 hours PRN Back Spasms  oxyCODONE    IR 30 milliGRAM(s) Oral every 4 hours PRN Moderate Pain (4 - 6)      Vital Signs Last 24 Hrs  T(C): 36.4 (17 May 2019 11:42), Max: 36.4 (17 May 2019 11:42)  T(F): 97.6 (17 May 2019 11:42), Max: 97.6 (17 May 2019 11:42)  HR: 73 (17 May 2019 11:42) (73 - 83)  BP: 103/65 (17 May 2019 11:42) (91/56 - 103/65)  BP(mean): --  RR: 18 (17 May 2019 11:42) (17 - 18)  SpO2: 96% (17 May 2019 11:42) (95% - 99%)    Review of systems:  General : no fever /chills,fatigue  CVS : no chest pain, palpitations  Lungs : no shortness of breath, cough  GI : no abdominal pain,vomiting, diarrhea   : no dysuria,hematuria        PHYSICAL EXAM:  General :NAD, neck collar  Constitutional:  well-groomed, well-developed  Respiratory: CTAB/L  Cardiovascular: S1 and S2, RRR, no M/G/R  Gastrointestinal: BS+, soft, NT/ND  Extremities: No peripheral edema  Vascular: 2+ peripheral pulses  Skin: No rashes      LABS:                MICROBIOLOGY:  RECENT CULTURES:  05-14 .Blood XXXX XXXX   No growth to date.          RADIOLOGY & ADDITIONAL STUDIES:

## 2019-05-17 NOTE — PROGRESS NOTE ADULT - PROBLEM SELECTOR PLAN 1
with T11/12 OM/ discitis with an epidural phlegmon from T9-L1   s/p T8-L2 Laminectomy and PSF POD1  OR c/s coag neg staph    cont daptomycin(dose increased to 8mg /kg and added rifampin  repeat MRI at the end of 4 weeks and change to oral at that point if possible

## 2019-05-18 LAB
ANION GAP SERPL CALC-SCNC: 10 MMOL/L — SIGNIFICANT CHANGE UP (ref 5–17)
BUN SERPL-MCNC: 15 MG/DL — SIGNIFICANT CHANGE UP (ref 7–23)
CALCIUM SERPL-MCNC: 9.2 MG/DL — SIGNIFICANT CHANGE UP (ref 8.5–10.1)
CHLORIDE SERPL-SCNC: 101 MMOL/L — SIGNIFICANT CHANGE UP (ref 96–108)
CO2 SERPL-SCNC: 29 MMOL/L — SIGNIFICANT CHANGE UP (ref 22–31)
CREAT SERPL-MCNC: 0.53 MG/DL — SIGNIFICANT CHANGE UP (ref 0.5–1.3)
GLUCOSE SERPL-MCNC: 145 MG/DL — HIGH (ref 70–99)
HCT VFR BLD CALC: 27.3 % — LOW (ref 34.5–45)
HGB BLD-MCNC: 8.1 G/DL — LOW (ref 11.5–15.5)
MAGNESIUM SERPL-MCNC: 2.2 MG/DL — SIGNIFICANT CHANGE UP (ref 1.6–2.6)
MCHC RBC-ENTMCNC: 25.2 PG — LOW (ref 27–34)
MCHC RBC-ENTMCNC: 29.7 GM/DL — LOW (ref 32–36)
MCV RBC AUTO: 85 FL — SIGNIFICANT CHANGE UP (ref 80–100)
NRBC # BLD: 0 /100 WBCS — SIGNIFICANT CHANGE UP (ref 0–0)
PHOSPHATE SERPL-MCNC: 6 MG/DL — HIGH (ref 2.5–4.5)
PLATELET # BLD AUTO: 377 K/UL — SIGNIFICANT CHANGE UP (ref 150–400)
POTASSIUM SERPL-MCNC: 4.5 MMOL/L — SIGNIFICANT CHANGE UP (ref 3.5–5.3)
POTASSIUM SERPL-SCNC: 4.5 MMOL/L — SIGNIFICANT CHANGE UP (ref 3.5–5.3)
RBC # BLD: 3.21 M/UL — LOW (ref 3.8–5.2)
RBC # FLD: 15.9 % — HIGH (ref 10.3–14.5)
SODIUM SERPL-SCNC: 140 MMOL/L — SIGNIFICANT CHANGE UP (ref 135–145)
WBC # BLD: 7.91 K/UL — SIGNIFICANT CHANGE UP (ref 3.8–10.5)
WBC # FLD AUTO: 7.91 K/UL — SIGNIFICANT CHANGE UP (ref 3.8–10.5)

## 2019-05-18 PROCEDURE — 99233 SBSQ HOSP IP/OBS HIGH 50: CPT

## 2019-05-18 RX ORDER — OXYCODONE HYDROCHLORIDE 5 MG/1
30 TABLET ORAL EVERY 6 HOURS
Refills: 0 | Status: DISCONTINUED | OUTPATIENT
Start: 2019-05-18 | End: 2019-05-19

## 2019-05-18 RX ADMIN — Medication 1 TABLET(S): at 22:26

## 2019-05-18 RX ADMIN — Medication 1000 MILLIGRAM(S): at 13:50

## 2019-05-18 RX ADMIN — CHLORHEXIDINE GLUCONATE 1 APPLICATION(S): 213 SOLUTION TOPICAL at 06:42

## 2019-05-18 RX ADMIN — Medication 1000 MILLIGRAM(S): at 22:26

## 2019-05-18 RX ADMIN — Medication 1 TABLET(S): at 09:08

## 2019-05-18 RX ADMIN — Medication 1000 MILLIGRAM(S): at 06:40

## 2019-05-18 RX ADMIN — METHADONE HYDROCHLORIDE 60 MILLIGRAM(S): 40 TABLET ORAL at 18:21

## 2019-05-18 RX ADMIN — CELECOXIB 100 MILLIGRAM(S): 200 CAPSULE ORAL at 14:30

## 2019-05-18 RX ADMIN — NALOXEGOL OXALATE 25 MILLIGRAM(S): 12.5 TABLET, FILM COATED ORAL at 06:40

## 2019-05-18 RX ADMIN — LIDOCAINE 1 PATCH: 4 CREAM TOPICAL at 06:41

## 2019-05-18 RX ADMIN — SENNA PLUS 2 TABLET(S): 8.6 TABLET ORAL at 22:26

## 2019-05-18 RX ADMIN — GABAPENTIN 800 MILLIGRAM(S): 400 CAPSULE ORAL at 13:50

## 2019-05-18 RX ADMIN — METHADONE HYDROCHLORIDE 60 MILLIGRAM(S): 40 TABLET ORAL at 06:39

## 2019-05-18 RX ADMIN — POLYETHYLENE GLYCOL 3350 17 GRAM(S): 17 POWDER, FOR SOLUTION ORAL at 06:41

## 2019-05-18 RX ADMIN — OXYCODONE HYDROCHLORIDE 30 MILLIGRAM(S): 5 TABLET ORAL at 09:05

## 2019-05-18 RX ADMIN — NYSTATIN CREAM 1 APPLICATION(S): 100000 CREAM TOPICAL at 22:26

## 2019-05-18 RX ADMIN — Medication 1 PATCH: at 13:49

## 2019-05-18 RX ADMIN — Medication 5 MILLIGRAM(S): at 09:05

## 2019-05-18 RX ADMIN — NYSTATIN CREAM 1 APPLICATION(S): 100000 CREAM TOPICAL at 06:39

## 2019-05-18 RX ADMIN — OXYCODONE HYDROCHLORIDE 30 MILLIGRAM(S): 5 TABLET ORAL at 00:50

## 2019-05-18 RX ADMIN — LIDOCAINE 1 PATCH: 4 CREAM TOPICAL at 18:19

## 2019-05-18 RX ADMIN — Medication 1 PATCH: at 07:33

## 2019-05-18 RX ADMIN — Medication 1 TABLET(S): at 18:19

## 2019-05-18 RX ADMIN — Medication 1000 MILLIGRAM(S): at 14:30

## 2019-05-18 RX ADMIN — POLYETHYLENE GLYCOL 3350 17 GRAM(S): 17 POWDER, FOR SOLUTION ORAL at 18:21

## 2019-05-18 RX ADMIN — LIDOCAINE 1 PATCH: 4 CREAM TOPICAL at 18:20

## 2019-05-18 RX ADMIN — OXYCODONE HYDROCHLORIDE 30 MILLIGRAM(S): 5 TABLET ORAL at 22:27

## 2019-05-18 RX ADMIN — LIDOCAINE 1 PATCH: 4 CREAM TOPICAL at 19:45

## 2019-05-18 RX ADMIN — Medication 1 PATCH: at 19:46

## 2019-05-18 RX ADMIN — CELECOXIB 100 MILLIGRAM(S): 200 CAPSULE ORAL at 13:50

## 2019-05-18 RX ADMIN — LIDOCAINE 1 PATCH: 4 CREAM TOPICAL at 07:34

## 2019-05-18 RX ADMIN — Medication 1 TABLET(S): at 13:49

## 2019-05-18 RX ADMIN — GABAPENTIN 800 MILLIGRAM(S): 400 CAPSULE ORAL at 22:26

## 2019-05-18 RX ADMIN — Medication 1 TABLET(S): at 13:50

## 2019-05-18 RX ADMIN — GABAPENTIN 800 MILLIGRAM(S): 400 CAPSULE ORAL at 06:40

## 2019-05-18 RX ADMIN — Medication 1 PATCH: at 11:41

## 2019-05-18 RX ADMIN — Medication 1 TABLET(S): at 06:42

## 2019-05-18 RX ADMIN — NYSTATIN CREAM 1 APPLICATION(S): 100000 CREAM TOPICAL at 13:50

## 2019-05-18 RX ADMIN — OXYCODONE HYDROCHLORIDE 30 MILLIGRAM(S): 5 TABLET ORAL at 10:00

## 2019-05-18 RX ADMIN — LIDOCAINE 1 PATCH: 4 CREAM TOPICAL at 05:55

## 2019-05-18 RX ADMIN — Medication 1000 MILLIGRAM(S): at 07:22

## 2019-05-18 RX ADMIN — DAPTOMYCIN 124 MILLIGRAM(S): 500 INJECTION, POWDER, LYOPHILIZED, FOR SOLUTION INTRAVENOUS at 22:26

## 2019-05-18 NOTE — PROGRESS NOTE ADULT - ASSESSMENT
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 She was also diagnosis w/ HCV. Pt was initially intubated due to respiratory failure and put on pressors in ICU. She as extubated on 2/25 and transferred from ICU the following day. Pt had a C4-5 ACDF with irrigation and debridement on 3/20 due to spinal abscess and osteo and was kept intubated post procedure and transferred again to ICU, where she was extubated on 3/21. Pt was subsequently found to have  T11/12 OM discitis with an epidural phlegmon from T9-L1. She was taken to the OR 5/8 for posterior spinal fusion & T8-L2 Laminectomy. She was again transferred to ICU for post op care and was extubated without difficulty on 5/9.     Sepsis due to methicillin resistant Staphylococcus aureus  - new wound cxs grew GPC so ID added Rifampin and increased Dapto dose, but now cx is growing coagulase NEGATIVE staph - may be contaminant  - NGTD on new blood cxs    Discitis of multiple sites of spine  - status post C4-5 ACDF with irrigation and debridement on 3/20  - status post posterior spinal fusion & T8-L2 Laminectomy on 5/8   - ortho following   - must use [place] j collar and TLSO brace at all times with ambulation   - care per surgery   - c/w methadone, Tylenol, flexeril, celebrex, valium, Neurontin, lidocaine patch & PRN oxy - I asked her about increasing the methadone and dropping the oxy, but the patient has asked that oxy be kept the same (suspect possible drug seeking behavior) - will decrease oxy to Q6H  - c/w senna, Miralax, Naloxegol & dulcolax for opiate associated constipation   - KIRILL taken out today      Endocarditis of tricuspid valve  - repeat echo showed stable vegetation    Hepatitis C antibody test positive  - patient will need to f/u as an outpatient    Hx of IVDU   - pt is on methadone 60 mg q12       Anemia  - likely AOCD   - monitor CBC    Yeast infection  - s/p diflucan x 1 dose    Prophylaxis:   DVT: SCD  GI: PO diet

## 2019-05-18 NOTE — PROGRESS NOTE ADULT - SUBJECTIVE AND OBJECTIVE BOX
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 She was also diagnosis w/ HCV. Pt was initially intubated due to respiratory failure and put on pressors in ICU. She as extubated on 2/25 and transferred from ICU the following day. Pt had a C4-5 ACDF with irrigation and debridement on 3/20 due to spinal abscess and osteo and was kept intubated post procedure and transferred again to ICU, where she was extubated on 3/21. Pt was subsequently found to have  T11/12 OM discitis with an epidural phlegmon from T9-L1. She was taken to the OR 5/8 for posterior spinal fusion & T8-L2 Laminectomy. She was again transferred to ICU for post op care and was extubated without difficulty on 5/9. She is lying in bed in NAD.    MEDICATIONS  (STANDING):  acetaminophen   Tablet .. 1000 milliGRAM(s) Oral every 8 hours  calcium carbonate 1250 mG  + Vitamin D (OsCal 500 + D) 1 Tablet(s) Oral three times a day  celecoxib 100 milliGRAM(s) Oral daily  celecoxib 200 milliGRAM(s) Oral once  chlorhexidine 4% Liquid 1 Application(s) Topical <User Schedule>  DAPTOmycin IVPB 600 milliGRAM(s) IV Intermittent <User Schedule>  gabapentin 800 milliGRAM(s) Oral three times a day  lactobacillus acidophilus 1 Tablet(s) Oral two times a day with meals  lidocaine   Patch 1 Patch Transdermal every 24 hours  lidocaine   Patch 1 Patch Transdermal every 24 hours  methadone    Tablet 60 milliGRAM(s) Oral two times a day  multivitamin 1 Tablet(s) Oral daily  naloxegol 25 milliGRAM(s) Oral before breakfast  nicotine -  14 mG/24Hr(s) Patch 1 patch Transdermal daily  nystatin Powder 1 Application(s) Topical three times a day  polyethylene glycol 3350 17 Gram(s) Oral two times a day  rifampin 300 milliGRAM(s) Oral two times a day  senna 2 Tablet(s) Oral at bedtime    MEDICATIONS  (PRN):  bisacodyl Suppository 10 milliGRAM(s) Rectal daily PRN Constipation  cyclobenzaprine 10 milliGRAM(s) Oral three times a day PRN Muscle Spasm  diazepam    Tablet 5 milliGRAM(s) Oral every 8 hours PRN Back Spasms  oxyCODONE    IR 30 milliGRAM(s) Oral every 4 hours PRN Moderate Pain (4 - 6)      Allergies    penicillin (Short breath; Hives)    Intolerances        Vital Signs Last 24 Hrs  T(C): 36.2 (18 May 2019 11:33), Max: 36.6 (18 May 2019 05:26)  T(F): 97.2 (18 May 2019 11:33), Max: 97.8 (18 May 2019 05:26)  HR: 84 (18 May 2019 11:33) (66 - 84)  BP: 95/52 (18 May 2019 11:33) (95/52 - 122/68)  BP(mean): --  RR: 18 (18 May 2019 11:33) (17 - 18)  SpO2: 97% (18 May 2019 11:33) (94% - 97%)    PHYSICAL EXAM:  GENERAL: NAD   HEAD:  Atraumatic, Normocephalic  EYES: EOMI, PERRLA    NECK: collar in place  NERVOUS SYSTEM: Alert & Oriented X3   CHEST/LUNG: Clear to auscultation bilaterally; No rales, rhonchi, wheezing, or rubs  HEART: Regular rate and rhythm; No murmurs, rubs, or gallops  ABDOMEN: Soft, Nontender, Nondistended; Bowel sounds present  EXTREMITIES:  No clubbing, cyanosis, or edema  BACK: Surgical site dressed    LABS:                        8.1    7.91  )-----------( 377      ( 18 May 2019 08:33 )             27.3     05-18    140  |  101  |  15  ----------------------------<  145<H>  4.5   |  29  |  0.53    Ca    9.2      18 May 2019 08:33  Phos  6.0     05-18  Mg     2.2     05-18          CAPILLARY BLOOD GLUCOSE          Culture - Blood (collected 14 May 2019 00:21)  Source: .Blood  Preliminary Report (15 May 2019 01:02):    No growth to date.    Culture - Blood (collected 14 May 2019 00:21)  Source: .Blood  Preliminary Report (15 May 2019 01:01):    No growth to date.      RADIOLOGY & ADDITIONAL TESTS:    05-17-19 @ 07:01  -  05-18-19 @ 07:00  --------------------------------------------------------  IN:    Solution: 50 mL  Total IN: 50 mL    OUT:    Voided: 2250 mL  Total OUT: 2250 mL    Total NET: -2200 mL      05-18-19 @ 07:01  -  05-18-19 @ 13:14  --------------------------------------------------------  IN:    Oral Fluid: 100 mL  Total IN: 100 mL    OUT:  Total OUT: 0 mL    Total NET: 100 mL

## 2019-05-19 LAB
CULTURE RESULTS: SIGNIFICANT CHANGE UP
CULTURE RESULTS: SIGNIFICANT CHANGE UP
SPECIMEN SOURCE: SIGNIFICANT CHANGE UP
SPECIMEN SOURCE: SIGNIFICANT CHANGE UP

## 2019-05-19 PROCEDURE — 99233 SBSQ HOSP IP/OBS HIGH 50: CPT

## 2019-05-19 RX ORDER — OXYCODONE HYDROCHLORIDE 5 MG/1
20 TABLET ORAL EVERY 4 HOURS
Refills: 0 | Status: DISCONTINUED | OUTPATIENT
Start: 2019-05-19 | End: 2019-05-26

## 2019-05-19 RX ADMIN — Medication 5 MILLIGRAM(S): at 00:15

## 2019-05-19 RX ADMIN — Medication 1 PATCH: at 08:09

## 2019-05-19 RX ADMIN — Medication 1 PATCH: at 12:46

## 2019-05-19 RX ADMIN — OXYCODONE HYDROCHLORIDE 20 MILLIGRAM(S): 5 TABLET ORAL at 17:58

## 2019-05-19 RX ADMIN — Medication 1000 MILLIGRAM(S): at 13:30

## 2019-05-19 RX ADMIN — NALOXEGOL OXALATE 25 MILLIGRAM(S): 12.5 TABLET, FILM COATED ORAL at 09:10

## 2019-05-19 RX ADMIN — LIDOCAINE 1 PATCH: 4 CREAM TOPICAL at 20:13

## 2019-05-19 RX ADMIN — OXYCODONE HYDROCHLORIDE 30 MILLIGRAM(S): 5 TABLET ORAL at 09:15

## 2019-05-19 RX ADMIN — Medication 1000 MILLIGRAM(S): at 23:08

## 2019-05-19 RX ADMIN — Medication 1 TABLET(S): at 17:14

## 2019-05-19 RX ADMIN — Medication 1000 MILLIGRAM(S): at 05:49

## 2019-05-19 RX ADMIN — Medication 1 TABLET(S): at 09:10

## 2019-05-19 RX ADMIN — LIDOCAINE 1 PATCH: 4 CREAM TOPICAL at 17:15

## 2019-05-19 RX ADMIN — CHLORHEXIDINE GLUCONATE 1 APPLICATION(S): 213 SOLUTION TOPICAL at 05:49

## 2019-05-19 RX ADMIN — DAPTOMYCIN 124 MILLIGRAM(S): 500 INJECTION, POWDER, LYOPHILIZED, FOR SOLUTION INTRAVENOUS at 23:09

## 2019-05-19 RX ADMIN — OXYCODONE HYDROCHLORIDE 20 MILLIGRAM(S): 5 TABLET ORAL at 17:14

## 2019-05-19 RX ADMIN — OXYCODONE HYDROCHLORIDE 20 MILLIGRAM(S): 5 TABLET ORAL at 23:08

## 2019-05-19 RX ADMIN — Medication 5 MILLIGRAM(S): at 09:14

## 2019-05-19 RX ADMIN — SENNA PLUS 2 TABLET(S): 8.6 TABLET ORAL at 23:08

## 2019-05-19 RX ADMIN — OXYCODONE HYDROCHLORIDE 30 MILLIGRAM(S): 5 TABLET ORAL at 10:00

## 2019-05-19 RX ADMIN — METHADONE HYDROCHLORIDE 60 MILLIGRAM(S): 40 TABLET ORAL at 05:48

## 2019-05-19 RX ADMIN — Medication 5 MILLIGRAM(S): at 20:08

## 2019-05-19 RX ADMIN — CELECOXIB 100 MILLIGRAM(S): 200 CAPSULE ORAL at 12:47

## 2019-05-19 RX ADMIN — NYSTATIN CREAM 1 APPLICATION(S): 100000 CREAM TOPICAL at 23:09

## 2019-05-19 RX ADMIN — METHADONE HYDROCHLORIDE 60 MILLIGRAM(S): 40 TABLET ORAL at 17:13

## 2019-05-19 RX ADMIN — Medication 1000 MILLIGRAM(S): at 12:47

## 2019-05-19 RX ADMIN — GABAPENTIN 800 MILLIGRAM(S): 400 CAPSULE ORAL at 12:46

## 2019-05-19 RX ADMIN — LIDOCAINE 1 PATCH: 4 CREAM TOPICAL at 07:57

## 2019-05-19 RX ADMIN — NYSTATIN CREAM 1 APPLICATION(S): 100000 CREAM TOPICAL at 05:50

## 2019-05-19 RX ADMIN — NYSTATIN CREAM 1 APPLICATION(S): 100000 CREAM TOPICAL at 12:46

## 2019-05-19 RX ADMIN — Medication 1 TABLET(S): at 12:47

## 2019-05-19 RX ADMIN — Medication 1 PATCH: at 20:13

## 2019-05-19 RX ADMIN — GABAPENTIN 800 MILLIGRAM(S): 400 CAPSULE ORAL at 23:07

## 2019-05-19 RX ADMIN — Medication 1 TABLET(S): at 23:09

## 2019-05-19 RX ADMIN — GABAPENTIN 800 MILLIGRAM(S): 400 CAPSULE ORAL at 05:48

## 2019-05-19 RX ADMIN — LIDOCAINE 1 PATCH: 4 CREAM TOPICAL at 05:51

## 2019-05-19 RX ADMIN — POLYETHYLENE GLYCOL 3350 17 GRAM(S): 17 POWDER, FOR SOLUTION ORAL at 05:50

## 2019-05-19 RX ADMIN — POLYETHYLENE GLYCOL 3350 17 GRAM(S): 17 POWDER, FOR SOLUTION ORAL at 17:14

## 2019-05-19 RX ADMIN — LIDOCAINE 1 PATCH: 4 CREAM TOPICAL at 17:09

## 2019-05-19 RX ADMIN — Medication 1 TABLET(S): at 05:48

## 2019-05-19 RX ADMIN — Medication 1 PATCH: at 12:43

## 2019-05-19 RX ADMIN — CELECOXIB 100 MILLIGRAM(S): 200 CAPSULE ORAL at 13:30

## 2019-05-19 RX ADMIN — LIDOCAINE 1 PATCH: 4 CREAM TOPICAL at 05:45

## 2019-05-19 NOTE — PROGRESS NOTE ADULT - SUBJECTIVE AND OBJECTIVE BOX
Pt S/E at bedside, no acute events overnight, pain controlled. Continues to improve motor function. Dressing changed.     Vital Signs Last 24 Hrs  T(C): 36.5 (19 May 2019 05:18), Max: 36.5 (19 May 2019 05:18)  T(F): 97.7 (19 May 2019 05:18), Max: 97.7 (19 May 2019 05:18)  HR: 78 (19 May 2019 05:18) (78 - 84)  BP: 101/44 (19 May 2019 05:18) (95/48 - 101/44)  BP(mean): --  RR: 18 (19 May 2019 05:18) (17 - 18)  SpO2: 98% (19 May 2019 05:18) (96% - 98%)    Gen: NAD,     Spine:  Dressing clean dry intact, dressing changed  +EHL/FHL/TA/GS  +AIN/PIN/M/R/U/Msc/Ax  SILT L3-S1  SILT C5-T1  +DP/PT Pulses  +Radial Pulse  Compartments soft  No calf TTP B/L

## 2019-05-19 NOTE — PROGRESS NOTE ADULT - ASSESSMENT
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 She was also diagnosis w/ HCV. Pt was initially intubated due to respiratory failure and put on pressors in ICU. She as extubated on 2/25 and transferred from ICU the following day. Pt had a C4-5 ACDF with irrigation and debridement on 3/20 due to spinal abscess and osteo and was kept intubated post procedure and transferred again to ICU, where she was extubated on 3/21. Pt was subsequently found to have  T11/12 OM discitis with an epidural phlegmon from T9-L1. She was taken to the OR 5/8 for posterior spinal fusion & T8-L2 Laminectomy. She was again transferred to ICU for post op care and was extubated without difficulty on 5/9.     Sepsis due to methicillin resistant Staphylococcus aureus  - new wound cxs grew GPC so ID added Rifampin and increased Dapto dose, but now cx is growing coagulase NEGATIVE staph - may be contaminant  - new blood cxs negative    Discitis of multiple sites of spine  - status post C4-5 ACDF with irrigation and debridement on 3/20  - status post posterior spinal fusion & T8-L2 Laminectomy on 5/8   - ortho following   - must use [place] j collar and TLSO brace at all times with ambulation   - care per surgery   - c/w methadone, Tylenol, flexeril, celebrex, valium, Neurontin, lidocaine patch & PRN oxy - I asked her about increasing the methadone and dropping the oxy, but the patient has asked that oxy be kept the same (suspect possible drug seeking behavior)  - patient asked that I increase her oxy back to Q4H so I have increased it back to Q4H but told her I will lower oxy to 20mg - I offered to increase the methadone dose but she declined - this suggests a med seeking component   - c/w senna, Miralax, Naloxegol & dulcolax for opiate associated constipation   - KIRILL taken out       Endocarditis of tricuspid valve  - repeat echo showed stable vegetation    Hepatitis C antibody test positive  - patient will need to f/u as an outpatient    Hx of IVDU   - pt is on methadone 60 mg q12       Anemia  - likely AOCD   - monitor CBC    Yeast infection  - s/p diflucan x 1 dose    Prophylaxis:   DVT: SCD  GI: PO diet

## 2019-05-19 NOTE — PROGRESS NOTE ADULT - ASSESSMENT
43F with diffuse bacterial infection and endocarditis s/p C4-5 ACDF for cervical OM on 3/20 now with T11/12 OM discitis with an epidural phlegmon from T9-L1 s/p T8-L2 Laminectomy and PSF POD 11:  Motor function continues to improve  Pain control  DVT ppx with SCDs only  WBAT/OOB with TLSO brace at all times  Q4 neuro checks  Physical therapy  Primary care per medical team  Apprec ID recs for abx, consider PO abx for dispo to rehab  Dressing changed today  Will discuss with attending

## 2019-05-19 NOTE — PROGRESS NOTE ADULT - SUBJECTIVE AND OBJECTIVE BOX
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 She was also diagnosis w/ HCV. Pt was initially intubated due to respiratory failure and put on pressors in ICU. She as extubated on 2/25 and transferred from ICU the following day. Pt had a C4-5 ACDF with irrigation and debridement on 3/20 due to spinal abscess and osteo and was kept intubated post procedure and transferred again to ICU, where she was extubated on 3/21. Pt was subsequently found to have  T11/12 OM discitis with an epidural phlegmon from T9-L1. She was taken to the OR 5/8 for posterior spinal fusion & T8-L2 Laminectomy. She was again transferred to ICU for post op care and was extubated without difficulty on 5/9. She is lying in bed in NAD asking about her oxy dose.    MEDICATIONS  (STANDING):  acetaminophen   Tablet .. 1000 milliGRAM(s) Oral every 8 hours  calcium carbonate 1250 mG  + Vitamin D (OsCal 500 + D) 1 Tablet(s) Oral three times a day  celecoxib 100 milliGRAM(s) Oral daily  celecoxib 200 milliGRAM(s) Oral once  chlorhexidine 4% Liquid 1 Application(s) Topical <User Schedule>  DAPTOmycin IVPB 600 milliGRAM(s) IV Intermittent <User Schedule>  gabapentin 800 milliGRAM(s) Oral three times a day  lactobacillus acidophilus 1 Tablet(s) Oral two times a day with meals  lidocaine   Patch 1 Patch Transdermal every 24 hours  lidocaine   Patch 1 Patch Transdermal every 24 hours  methadone    Tablet 60 milliGRAM(s) Oral two times a day  multivitamin 1 Tablet(s) Oral daily  naloxegol 25 milliGRAM(s) Oral before breakfast  nicotine -  14 mG/24Hr(s) Patch 1 patch Transdermal daily  nystatin Powder 1 Application(s) Topical three times a day  polyethylene glycol 3350 17 Gram(s) Oral two times a day  rifampin 300 milliGRAM(s) Oral two times a day  senna 2 Tablet(s) Oral at bedtime    MEDICATIONS  (PRN):  bisacodyl Suppository 10 milliGRAM(s) Rectal daily PRN Constipation  cyclobenzaprine 10 milliGRAM(s) Oral three times a day PRN Muscle Spasm  diazepam    Tablet 5 milliGRAM(s) Oral every 8 hours PRN Back Spasms  oxyCODONE    IR 20 milliGRAM(s) Oral every 4 hours PRN Moderate Pain (4 - 6)      Allergies    penicillin (Short breath; Hives)    Intolerances        Vital Signs Last 24 Hrs  T(C): 36.7 (19 May 2019 12:25), Max: 36.7 (19 May 2019 12:25)  T(F): 98 (19 May 2019 12:25), Max: 98 (19 May 2019 12:25)  HR: 83 (19 May 2019 14:01) (78 - 83)  BP: 100/48 (19 May 2019 14:01) (99/54 - 102/59)  BP(mean): --  RR: 16 (19 May 2019 14:01) (16 - 18)  SpO2: 97% (19 May 2019 14:01) (95% - 98%)    PHYSICAL EXAM:  GENERAL: NAD   HEAD:  Atraumatic, Normocephalic  EYES: EOMI, PERRLA    NECK: collar in place  NERVOUS SYSTEM: Alert & Oriented X3   CHEST/LUNG: Clear to auscultation bilaterally; No rales, rhonchi, wheezing, or rubs  HEART: Regular rate and rhythm; No murmurs, rubs, or gallops  ABDOMEN: Soft, Nontender, Nondistended; Bowel sounds present  EXTREMITIES:  No clubbing, cyanosis, or edema  BACK: Surgical site dressed      LABS:                        8.1    7.91  )-----------( 377      ( 18 May 2019 08:33 )             27.3     05-18    140  |  101  |  15  ----------------------------<  145<H>  4.5   |  29  |  0.53    Ca    9.2      18 May 2019 08:33  Phos  6.0     05-18  Mg     2.2     05-18          CAPILLARY BLOOD GLUCOSE          RADIOLOGY & ADDITIONAL TESTS:    05-18-19 @ 07:01  -  05-19-19 @ 07:00  --------------------------------------------------------  IN:    Oral Fluid: 300 mL  Total IN: 300 mL    OUT:  Total OUT: 0 mL    Total NET: 300 mL

## 2019-05-20 LAB
ANION GAP SERPL CALC-SCNC: 10 MMOL/L — SIGNIFICANT CHANGE UP (ref 5–17)
BUN SERPL-MCNC: 15 MG/DL — SIGNIFICANT CHANGE UP (ref 7–23)
CALCIUM SERPL-MCNC: 10.3 MG/DL — HIGH (ref 8.5–10.1)
CHLORIDE SERPL-SCNC: 101 MMOL/L — SIGNIFICANT CHANGE UP (ref 96–108)
CO2 SERPL-SCNC: 30 MMOL/L — SIGNIFICANT CHANGE UP (ref 22–31)
CREAT SERPL-MCNC: 0.58 MG/DL — SIGNIFICANT CHANGE UP (ref 0.5–1.3)
GLUCOSE SERPL-MCNC: 107 MG/DL — HIGH (ref 70–99)
HCT VFR BLD CALC: 30.9 % — LOW (ref 34.5–45)
HGB BLD-MCNC: 9.3 G/DL — LOW (ref 11.5–15.5)
MAGNESIUM SERPL-MCNC: 2.2 MG/DL — SIGNIFICANT CHANGE UP (ref 1.6–2.6)
MCHC RBC-ENTMCNC: 25.5 PG — LOW (ref 27–34)
MCHC RBC-ENTMCNC: 30.1 GM/DL — LOW (ref 32–36)
MCV RBC AUTO: 84.9 FL — SIGNIFICANT CHANGE UP (ref 80–100)
NRBC # BLD: 0 /100 WBCS — SIGNIFICANT CHANGE UP (ref 0–0)
PHOSPHATE SERPL-MCNC: 6 MG/DL — HIGH (ref 2.5–4.5)
PLATELET # BLD AUTO: 390 K/UL — SIGNIFICANT CHANGE UP (ref 150–400)
POTASSIUM SERPL-MCNC: 4.6 MMOL/L — SIGNIFICANT CHANGE UP (ref 3.5–5.3)
POTASSIUM SERPL-SCNC: 4.6 MMOL/L — SIGNIFICANT CHANGE UP (ref 3.5–5.3)
RBC # BLD: 3.64 M/UL — LOW (ref 3.8–5.2)
RBC # FLD: 16.6 % — HIGH (ref 10.3–14.5)
SODIUM SERPL-SCNC: 141 MMOL/L — SIGNIFICANT CHANGE UP (ref 135–145)
WBC # BLD: 6.31 K/UL — SIGNIFICANT CHANGE UP (ref 3.8–10.5)
WBC # FLD AUTO: 6.31 K/UL — SIGNIFICANT CHANGE UP (ref 3.8–10.5)

## 2019-05-20 PROCEDURE — 99233 SBSQ HOSP IP/OBS HIGH 50: CPT

## 2019-05-20 RX ADMIN — Medication 1 PATCH: at 11:33

## 2019-05-20 RX ADMIN — Medication 1 TABLET(S): at 11:32

## 2019-05-20 RX ADMIN — CELECOXIB 100 MILLIGRAM(S): 200 CAPSULE ORAL at 11:32

## 2019-05-20 RX ADMIN — Medication 1000 MILLIGRAM(S): at 14:25

## 2019-05-20 RX ADMIN — OXYCODONE HYDROCHLORIDE 20 MILLIGRAM(S): 5 TABLET ORAL at 00:12

## 2019-05-20 RX ADMIN — GABAPENTIN 800 MILLIGRAM(S): 400 CAPSULE ORAL at 21:21

## 2019-05-20 RX ADMIN — Medication 1000 MILLIGRAM(S): at 06:14

## 2019-05-20 RX ADMIN — NYSTATIN CREAM 1 APPLICATION(S): 100000 CREAM TOPICAL at 21:22

## 2019-05-20 RX ADMIN — METHADONE HYDROCHLORIDE 60 MILLIGRAM(S): 40 TABLET ORAL at 06:13

## 2019-05-20 RX ADMIN — POLYETHYLENE GLYCOL 3350 17 GRAM(S): 17 POWDER, FOR SOLUTION ORAL at 06:14

## 2019-05-20 RX ADMIN — Medication 1000 MILLIGRAM(S): at 21:57

## 2019-05-20 RX ADMIN — Medication 1 TABLET(S): at 14:25

## 2019-05-20 RX ADMIN — OXYCODONE HYDROCHLORIDE 20 MILLIGRAM(S): 5 TABLET ORAL at 22:22

## 2019-05-20 RX ADMIN — GABAPENTIN 800 MILLIGRAM(S): 400 CAPSULE ORAL at 14:25

## 2019-05-20 RX ADMIN — LIDOCAINE 1 PATCH: 4 CREAM TOPICAL at 17:30

## 2019-05-20 RX ADMIN — SENNA PLUS 2 TABLET(S): 8.6 TABLET ORAL at 21:21

## 2019-05-20 RX ADMIN — Medication 1 TABLET(S): at 21:21

## 2019-05-20 RX ADMIN — METHADONE HYDROCHLORIDE 60 MILLIGRAM(S): 40 TABLET ORAL at 17:38

## 2019-05-20 RX ADMIN — Medication 1000 MILLIGRAM(S): at 07:26

## 2019-05-20 RX ADMIN — Medication 1 TABLET(S): at 17:38

## 2019-05-20 RX ADMIN — Medication 1 TABLET(S): at 06:14

## 2019-05-20 RX ADMIN — OXYCODONE HYDROCHLORIDE 20 MILLIGRAM(S): 5 TABLET ORAL at 23:10

## 2019-05-20 RX ADMIN — Medication 1000 MILLIGRAM(S): at 21:21

## 2019-05-20 RX ADMIN — NALOXEGOL OXALATE 25 MILLIGRAM(S): 12.5 TABLET, FILM COATED ORAL at 08:29

## 2019-05-20 RX ADMIN — CHLORHEXIDINE GLUCONATE 1 APPLICATION(S): 213 SOLUTION TOPICAL at 06:14

## 2019-05-20 RX ADMIN — Medication 1 PATCH: at 11:31

## 2019-05-20 RX ADMIN — DAPTOMYCIN 124 MILLIGRAM(S): 500 INJECTION, POWDER, LYOPHILIZED, FOR SOLUTION INTRAVENOUS at 21:22

## 2019-05-20 RX ADMIN — LIDOCAINE 1 PATCH: 4 CREAM TOPICAL at 07:00

## 2019-05-20 RX ADMIN — Medication 5 MILLIGRAM(S): at 08:33

## 2019-05-20 RX ADMIN — POLYETHYLENE GLYCOL 3350 17 GRAM(S): 17 POWDER, FOR SOLUTION ORAL at 17:38

## 2019-05-20 RX ADMIN — LIDOCAINE 1 PATCH: 4 CREAM TOPICAL at 06:15

## 2019-05-20 RX ADMIN — Medication 1000 MILLIGRAM(S): at 15:20

## 2019-05-20 RX ADMIN — Medication 1 PATCH: at 08:27

## 2019-05-20 RX ADMIN — CELECOXIB 100 MILLIGRAM(S): 200 CAPSULE ORAL at 12:15

## 2019-05-20 RX ADMIN — OXYCODONE HYDROCHLORIDE 20 MILLIGRAM(S): 5 TABLET ORAL at 15:20

## 2019-05-20 RX ADMIN — OXYCODONE HYDROCHLORIDE 20 MILLIGRAM(S): 5 TABLET ORAL at 08:33

## 2019-05-20 RX ADMIN — OXYCODONE HYDROCHLORIDE 20 MILLIGRAM(S): 5 TABLET ORAL at 09:10

## 2019-05-20 RX ADMIN — NYSTATIN CREAM 1 APPLICATION(S): 100000 CREAM TOPICAL at 06:14

## 2019-05-20 RX ADMIN — Medication 1 TABLET(S): at 08:30

## 2019-05-20 RX ADMIN — OXYCODONE HYDROCHLORIDE 20 MILLIGRAM(S): 5 TABLET ORAL at 14:25

## 2019-05-20 RX ADMIN — NYSTATIN CREAM 1 APPLICATION(S): 100000 CREAM TOPICAL at 14:25

## 2019-05-20 RX ADMIN — GABAPENTIN 800 MILLIGRAM(S): 400 CAPSULE ORAL at 06:14

## 2019-05-20 RX ADMIN — LIDOCAINE 1 PATCH: 4 CREAM TOPICAL at 08:27

## 2019-05-20 RX ADMIN — LIDOCAINE 1 PATCH: 4 CREAM TOPICAL at 17:38

## 2019-05-20 RX ADMIN — Medication 1000 MILLIGRAM(S): at 00:12

## 2019-05-20 NOTE — CHART NOTE - NSCHARTNOTEFT_GEN_A_CORE
Patient seen and evaluated with Dr. Leslie at bedside  Patient's bilateral lower extremities continue to increase in strength with hip flexors now 5/5  Discussed with patient at length the importance of weaning off of pain medications except for methadone  An ideal plan would be for the patient to wean off of all pain medications except for methadone over the next two weeks with the hope of obtaining placement in a rehab center upon completion of her course of antibiotics  The patient will need pain management upon DC

## 2019-05-20 NOTE — PROGRESS NOTE ADULT - SUBJECTIVE AND OBJECTIVE BOX
Patient is a 43y old  Female who presents with a chief complaint of AMS (20 May 2019 13:25)      INTERVAL HPI / OVERNIGHT EVENTS: doing ok ,back pain +    MEDICATIONS  (STANDING):  acetaminophen   Tablet .. 1000 milliGRAM(s) Oral every 8 hours  calcium carbonate 1250 mG  + Vitamin D (OsCal 500 + D) 1 Tablet(s) Oral three times a day  celecoxib 100 milliGRAM(s) Oral daily  celecoxib 200 milliGRAM(s) Oral once  chlorhexidine 4% Liquid 1 Application(s) Topical <User Schedule>  DAPTOmycin IVPB 600 milliGRAM(s) IV Intermittent <User Schedule>  gabapentin 800 milliGRAM(s) Oral three times a day  lactobacillus acidophilus 1 Tablet(s) Oral two times a day with meals  lidocaine   Patch 1 Patch Transdermal every 24 hours  lidocaine   Patch 1 Patch Transdermal every 24 hours  methadone    Tablet 60 milliGRAM(s) Oral two times a day  multivitamin 1 Tablet(s) Oral daily  naloxegol 25 milliGRAM(s) Oral before breakfast  nicotine -  14 mG/24Hr(s) Patch 1 patch Transdermal daily  nystatin Powder 1 Application(s) Topical three times a day  polyethylene glycol 3350 17 Gram(s) Oral two times a day  rifampin 300 milliGRAM(s) Oral two times a day  senna 2 Tablet(s) Oral at bedtime    MEDICATIONS  (PRN):  bisacodyl Suppository 10 milliGRAM(s) Rectal daily PRN Constipation  cyclobenzaprine 10 milliGRAM(s) Oral three times a day PRN Muscle Spasm  diazepam    Tablet 5 milliGRAM(s) Oral every 8 hours PRN Back Spasms  oxyCODONE    IR 20 milliGRAM(s) Oral every 4 hours PRN Moderate Pain (4 - 6)      Vital Signs Last 24 Hrs  T(C): 37 (20 May 2019 12:39), Max: 37 (20 May 2019 12:39)  T(F): 98.6 (20 May 2019 12:39), Max: 98.6 (20 May 2019 12:39)  HR: 84 (20 May 2019 12:39) (78 - 88)  BP: 91/56 (20 May 2019 12:39) (91/56 - 114/58)  BP(mean): --  RR: 16 (20 May 2019 12:39) (16 - 18)  SpO2: 98% (20 May 2019 12:39) (96% - 99%)    Review of systems:  General : no fever /chills, fatigue  CVS : no chest pain, palpitations  Lungs : no shortness of breath, cough  GI : no abdominal pain, vomiting, diarrhea   : no dysuria, hematuria        PHYSICAL EXAM:  General :NAD, neck collar  Constitutional:  well-groomed, well-developed  Respiratory: CTAB/L  Cardiovascular: S1 and S2, RRR, no M/G/R  Gastrointestinal: BS+, soft, NT/ND  Extremities: No peripheral edema  Vascular: 2+ peripheral pulses  Skin: back surgical wounds (clean )      LABS:                        9.3    6.31  )-----------( 390      ( 20 May 2019 07:40 )             30.9     05-20    141  |  101  |  15  ----------------------------<  107<H>  4.6   |  30  |  0.58    Ca    10.3<H>      20 May 2019 07:40  Phos  6.0     05-20  Mg     2.2     05-20            MICROBIOLOGY:  RECENT CULTURES:  05-14 .Blood XXXX XXXX   No growth at 5 days.          RADIOLOGY & ADDITIONAL STUDIES:

## 2019-05-20 NOTE — PROGRESS NOTE ADULT - ASSESSMENT
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 She was also diagnosis w/ HCV. Pt was initially intubated due to respiratory failure and put on pressors in ICU. She as extubated on 2/25 and transferred from ICU the following day. Pt had a C4-5 ACDF with irrigation and debridement on 3/20 due to spinal abscess and osteo and was kept intubated post procedure and transferred again to ICU, where she was extubated on 3/21. Pt was subsequently found to have  T11/12 OM discitis with an epidural phlegmon from T9-L1. She was taken to the OR 5/8 for posterior spinal fusion & T8-L2 Laminectomy. She was again transferred to ICU for post op care and was extubated without difficulty on 5/9.     Sepsis due to methicillin resistant Staphylococcus aureus  - new wound cxs grew GPC so ID added Rifampin and increased Dapto dose, but now cx is growing coagulase NEGATIVE staph - may be contaminant  - new blood cxs negative    Discitis of multiple sites of spine  - status post C4-5 ACDF with irrigation and debridement on 3/20  - status post posterior spinal fusion & T8-L2 Laminectomy on 5/8   - ortho following   - must use [place] j collar and TLSO brace at all times with ambulation   - care per surgery   - c/w methadone, Tylenol, flexeril, celebrex, valium, Neurontin, lidocaine patch & PRN oxy - I asked her about increasing the methadone and dropping the oxy, but the patient has asked that oxy be kept the same (suspect possible drug seeking behavior)  - patient asked that I increase her oxy back to Q4H so I have increased it back to Q4H but told her I will lower oxy to 20mg - I offered to increase the methadone dose but she declined - this suggests a med seeking component   - c/w senna, Miralax, Naloxegol & dulcolax for opiate associated constipation   - KIRILL taken out       Endocarditis of tricuspid valve  - repeat echo showed stable vegetation    Hepatitis C antibody test positive  - patient will need to f/u as an outpatient    Hx of IVDU   - pt is on methadone 60 mg q12       Anemia  - likely AOCD   - monitor CBC    Yeast infection  - s/p diflucan x 1 dose    Prophylaxis:   DVT: SCD  GI: PO diet

## 2019-05-20 NOTE — PROGRESS NOTE ADULT - SUBJECTIVE AND OBJECTIVE BOX
Patient is a 43y old  Female who presents with a chief complaint of AMS (19 May 2019 16:41)      OVERNIGHT EVENTS: none     REVIEW OF SYSTEMS: denies chest pain/SOB, diaphoresis, no F/C, cough, dizziness, headache, blurry vision, nausea, vomiting, abdominal pain. All others review of systems negative     MEDICATIONS  (STANDING):  acetaminophen   Tablet .. 1000 milliGRAM(s) Oral every 8 hours  calcium carbonate 1250 mG  + Vitamin D (OsCal 500 + D) 1 Tablet(s) Oral three times a day  celecoxib 100 milliGRAM(s) Oral daily  celecoxib 200 milliGRAM(s) Oral once  chlorhexidine 4% Liquid 1 Application(s) Topical <User Schedule>  DAPTOmycin IVPB 600 milliGRAM(s) IV Intermittent <User Schedule>  gabapentin 800 milliGRAM(s) Oral three times a day  lactobacillus acidophilus 1 Tablet(s) Oral two times a day with meals  lidocaine   Patch 1 Patch Transdermal every 24 hours  lidocaine   Patch 1 Patch Transdermal every 24 hours  methadone    Tablet 60 milliGRAM(s) Oral two times a day  multivitamin 1 Tablet(s) Oral daily  naloxegol 25 milliGRAM(s) Oral before breakfast  nicotine -  14 mG/24Hr(s) Patch 1 patch Transdermal daily  nystatin Powder 1 Application(s) Topical three times a day  polyethylene glycol 3350 17 Gram(s) Oral two times a day  rifampin 300 milliGRAM(s) Oral two times a day  senna 2 Tablet(s) Oral at bedtime    MEDICATIONS  (PRN):  bisacodyl Suppository 10 milliGRAM(s) Rectal daily PRN Constipation  cyclobenzaprine 10 milliGRAM(s) Oral three times a day PRN Muscle Spasm  diazepam    Tablet 5 milliGRAM(s) Oral every 8 hours PRN Back Spasms  oxyCODONE    IR 20 milliGRAM(s) Oral every 4 hours PRN Moderate Pain (4 - 6)      Allergies    penicillin (Short breath; Hives)    Intolerances        T(F): 98.6 (05-20-19 @ 12:39), Max: 98.6 (05-20-19 @ 12:39)  HR: 84 (05-20-19 @ 12:39) (78 - 88)  BP: 91/56 (05-20-19 @ 12:39) (91/56 - 114/58)  RR: 16 (05-20-19 @ 12:39) (16 - 18)  SpO2: 98% (05-20-19 @ 12:39) (96% - 99%)  Wt(kg): --    PHYSICAL EXAM:  GENERAL: NAD, well-groomed, well-developed  HEAD:  Atraumatic, Normocephalic  EYES: EOMI, PERRLA, conjunctiva and sclera clear  ENMT: Moist mucous membranes, Good dentition, No lesions  NECK: +collar   NERVOUS SYSTEM:  Alert & Oriented X3, Good concentration; Motor Strength 5/5 B/L upper and lower extremities; DTRs 2+ intact and symmetric  CHEST/LUNG: Clear to percussion bilaterally; No rales, rhonchi, wheezing, or rubs BL  HEART: Regular rate and rhythm; No murmurs, rubs, or gallops  ABDOMEN: Soft, Nontender, Nondistended; Bowel sounds present  EXTREMITIES:  2+ Peripheral Pulses, No clubbing, cyanosis, or edema BL LE  LYMPH: No lymphadenopathy noted  SKIN: No rashes or lesions    LABS:                        9.3    6.31  )-----------( 390      ( 20 May 2019 07:40 )             30.9     05-20    141  |  101  |  15  ----------------------------<  107<H>  4.6   |  30  |  0.58    Ca    10.3<H>      20 May 2019 07:40  Phos  6.0     05-20  Mg     2.2     05-20          Cultures;   CAPILLARY BLOOD GLUCOSE        Lipid panel:           RADIOLOGY & ADDITIONAL TESTS:    Imaging Personally Reviewed:  [x ] YES    < from: TTE Limited Echo w/o Cont (04.02.19 @ 18:33) >  Possible vegitation on tricuspid valve looks same as before 03/ 22/2019    < end of copied text >    Consultant(s) Notes Reviewed:  [x ] YES     Care Discussed with [ x] Consultants [X ] Patient [ ] Family  [x ]    [x ]  Other; RN

## 2019-05-20 NOTE — PROGRESS NOTE ADULT - PROBLEM SELECTOR PLAN 1
with T11/12 OM/ discitis with an epidural phlegmon from T9-L1   s/p T8-L2 Laminectomy and PSF POD1  OR c/s coag neg staph    cont daptomycin(dose increased to 8mg /kg and added rifampin)  repeat MRI in the week of June 3rd  and change to oral at that point if possible  Labs once a week  only   last cbc, cr and CPK wnl

## 2019-05-21 LAB
CRP SERPL-MCNC: 2.67 MG/DL — HIGH (ref 0–0.4)
ERYTHROCYTE [SEDIMENTATION RATE] IN BLOOD: 106 MM/HR — HIGH (ref 0–15)

## 2019-05-21 PROCEDURE — 99232 SBSQ HOSP IP/OBS MODERATE 35: CPT

## 2019-05-21 RX ORDER — METHADONE HYDROCHLORIDE 40 MG/1
60 TABLET ORAL EVERY 12 HOURS
Refills: 0 | Status: DISCONTINUED | OUTPATIENT
Start: 2019-05-21 | End: 2019-05-28

## 2019-05-21 RX ADMIN — OXYCODONE HYDROCHLORIDE 20 MILLIGRAM(S): 5 TABLET ORAL at 21:46

## 2019-05-21 RX ADMIN — SENNA PLUS 2 TABLET(S): 8.6 TABLET ORAL at 21:39

## 2019-05-21 RX ADMIN — Medication 5 MILLIGRAM(S): at 21:39

## 2019-05-21 RX ADMIN — Medication 1000 MILLIGRAM(S): at 05:27

## 2019-05-21 RX ADMIN — Medication 5 MILLIGRAM(S): at 01:13

## 2019-05-21 RX ADMIN — Medication 1000 MILLIGRAM(S): at 23:01

## 2019-05-21 RX ADMIN — OXYCODONE HYDROCHLORIDE 20 MILLIGRAM(S): 5 TABLET ORAL at 11:54

## 2019-05-21 RX ADMIN — Medication 1000 MILLIGRAM(S): at 21:38

## 2019-05-21 RX ADMIN — Medication 1000 MILLIGRAM(S): at 06:34

## 2019-05-21 RX ADMIN — Medication 1000 MILLIGRAM(S): at 14:46

## 2019-05-21 RX ADMIN — Medication 1 PATCH: at 11:56

## 2019-05-21 RX ADMIN — Medication 1 TABLET(S): at 14:47

## 2019-05-21 RX ADMIN — NYSTATIN CREAM 1 APPLICATION(S): 100000 CREAM TOPICAL at 14:47

## 2019-05-21 RX ADMIN — LIDOCAINE 1 PATCH: 4 CREAM TOPICAL at 05:29

## 2019-05-21 RX ADMIN — Medication 1 PATCH: at 21:47

## 2019-05-21 RX ADMIN — Medication 5 MILLIGRAM(S): at 10:33

## 2019-05-21 RX ADMIN — NYSTATIN CREAM 1 APPLICATION(S): 100000 CREAM TOPICAL at 21:38

## 2019-05-21 RX ADMIN — GABAPENTIN 800 MILLIGRAM(S): 400 CAPSULE ORAL at 05:27

## 2019-05-21 RX ADMIN — GABAPENTIN 800 MILLIGRAM(S): 400 CAPSULE ORAL at 21:38

## 2019-05-21 RX ADMIN — Medication 1 TABLET(S): at 07:49

## 2019-05-21 RX ADMIN — CELECOXIB 100 MILLIGRAM(S): 200 CAPSULE ORAL at 13:00

## 2019-05-21 RX ADMIN — GABAPENTIN 800 MILLIGRAM(S): 400 CAPSULE ORAL at 14:47

## 2019-05-21 RX ADMIN — Medication 1 TABLET(S): at 18:38

## 2019-05-21 RX ADMIN — Medication 1 TABLET(S): at 21:39

## 2019-05-21 RX ADMIN — LIDOCAINE 1 PATCH: 4 CREAM TOPICAL at 07:46

## 2019-05-21 RX ADMIN — Medication 1000 MILLIGRAM(S): at 15:46

## 2019-05-21 RX ADMIN — Medication 1 TABLET(S): at 05:27

## 2019-05-21 RX ADMIN — OXYCODONE HYDROCHLORIDE 20 MILLIGRAM(S): 5 TABLET ORAL at 20:43

## 2019-05-21 RX ADMIN — NYSTATIN CREAM 1 APPLICATION(S): 100000 CREAM TOPICAL at 05:28

## 2019-05-21 RX ADMIN — DAPTOMYCIN 124 MILLIGRAM(S): 500 INJECTION, POWDER, LYOPHILIZED, FOR SOLUTION INTRAVENOUS at 21:38

## 2019-05-21 RX ADMIN — LIDOCAINE 1 PATCH: 4 CREAM TOPICAL at 21:45

## 2019-05-21 RX ADMIN — METHADONE HYDROCHLORIDE 60 MILLIGRAM(S): 40 TABLET ORAL at 05:27

## 2019-05-21 RX ADMIN — NALOXEGOL OXALATE 25 MILLIGRAM(S): 12.5 TABLET, FILM COATED ORAL at 05:28

## 2019-05-21 RX ADMIN — CHLORHEXIDINE GLUCONATE 1 APPLICATION(S): 213 SOLUTION TOPICAL at 05:27

## 2019-05-21 RX ADMIN — OXYCODONE HYDROCHLORIDE 20 MILLIGRAM(S): 5 TABLET ORAL at 07:48

## 2019-05-21 RX ADMIN — Medication 1 TABLET(S): at 11:57

## 2019-05-21 RX ADMIN — METHADONE HYDROCHLORIDE 60 MILLIGRAM(S): 40 TABLET ORAL at 21:39

## 2019-05-21 RX ADMIN — CELECOXIB 100 MILLIGRAM(S): 200 CAPSULE ORAL at 12:00

## 2019-05-21 RX ADMIN — OXYCODONE HYDROCHLORIDE 20 MILLIGRAM(S): 5 TABLET ORAL at 13:00

## 2019-05-21 RX ADMIN — LIDOCAINE 1 PATCH: 4 CREAM TOPICAL at 05:25

## 2019-05-21 RX ADMIN — OXYCODONE HYDROCHLORIDE 20 MILLIGRAM(S): 5 TABLET ORAL at 08:30

## 2019-05-21 NOTE — PROGRESS NOTE ADULT - SUBJECTIVE AND OBJECTIVE BOX
Patient is a 43y old  Female who presents with a chief complaint of AMS (20 May 2019 15:56)      OVERNIGHT EVENTS:  none     REVIEW OF SYSTEMS: denies chest pain/SOB, diaphoresis, no F/C, cough, dizziness, headache, blurry vision, nausea, vomiting, abdominal pain. All others review of systems negative     MEDICATIONS  (STANDING):  acetaminophen   Tablet .. 1000 milliGRAM(s) Oral every 8 hours  calcium carbonate 1250 mG  + Vitamin D (OsCal 500 + D) 1 Tablet(s) Oral three times a day  celecoxib 100 milliGRAM(s) Oral daily  celecoxib 200 milliGRAM(s) Oral once  chlorhexidine 4% Liquid 1 Application(s) Topical <User Schedule>  DAPTOmycin IVPB 600 milliGRAM(s) IV Intermittent <User Schedule>  gabapentin 800 milliGRAM(s) Oral three times a day  lactobacillus acidophilus 1 Tablet(s) Oral two times a day with meals  lidocaine   Patch 1 Patch Transdermal every 24 hours  lidocaine   Patch 1 Patch Transdermal every 24 hours  multivitamin 1 Tablet(s) Oral daily  naloxegol 25 milliGRAM(s) Oral before breakfast  nicotine -  14 mG/24Hr(s) Patch 1 patch Transdermal daily  nystatin Powder 1 Application(s) Topical three times a day  polyethylene glycol 3350 17 Gram(s) Oral two times a day  rifampin 300 milliGRAM(s) Oral two times a day  senna 2 Tablet(s) Oral at bedtime    MEDICATIONS  (PRN):  bisacodyl Suppository 10 milliGRAM(s) Rectal daily PRN Constipation  cyclobenzaprine 10 milliGRAM(s) Oral three times a day PRN Muscle Spasm  diazepam    Tablet 5 milliGRAM(s) Oral every 8 hours PRN Back Spasms  oxyCODONE    IR 20 milliGRAM(s) Oral every 4 hours PRN Moderate Pain (4 - 6)      Allergies    penicillin (Short breath; Hives)    Intolerances        T(F): 98.2 (05-21-19 @ 10:51), Max: 98.2 (05-21-19 @ 06:28)  HR: 86 (05-21-19 @ 10:51) (73 - 86)  BP: 96/56 (05-21-19 @ 10:51) (96/56 - 97/62)  RR: 16 (05-21-19 @ 10:51) (16 - 97)  SpO2: 96% (05-21-19 @ 10:51) (96% - 97%)  Wt(kg): --    PHYSICAL EXAM:  GENERAL: NAD, well-groomed, well-developed  HEAD:  Atraumatic, Normocephalic  EYES: EOMI, PERRLA, conjunctiva and sclera clear  ENMT: Moist mucous membranes, Good dentition, No lesions  NECK:  [place] j collar and TLSO brace  NERVOUS SYSTEM:  Alert & Oriented X3, Good concentration; Motor Strength 5/5 B/L upper and lower extremities; DTRs 2+ intact and symmetric  CHEST/LUNG: Clear to percussion bilaterally; No rales, rhonchi, wheezing, or rubs BL  HEART: Regular rate and rhythm; No murmurs, rubs, or gallops  ABDOMEN: Soft, Nontender, Nondistended; Bowel sounds present  EXTREMITIES:  2+ Peripheral Pulses, No clubbing, cyanosis, or edema BL LE  LYMPH: No lymphadenopathy noted  SKIN: No rashes or lesions    LABS:                        9.3    6.31  )-----------( 390      ( 20 May 2019 07:40 )             30.9     05-20    141  |  101  |  15  ----------------------------<  107<H>  4.6   |  30  |  0.58    Ca    10.3<H>      20 May 2019 07:40  Phos  6.0     05-20  Mg     2.2     05-20          Cultures;   CAPILLARY BLOOD GLUCOSE      RADIOLOGY & ADDITIONAL TESTS:    Imaging Personally Reviewed:  [x ] YES    < from: TTE Limited Echo w/o Cont (04.02.19 @ 18:33) >  Possible vegitation on tricuspid valve looks same as before 03/ 22/2019    < end of copied text >    Consultant(s) Notes Reviewed:  [x ] YES     Care Discussed with [ x] Consultants [X ] Patient [ ] Family  [x ]    [x ]  Other; RN

## 2019-05-21 NOTE — CHART NOTE - NSCHARTNOTEFT_GEN_A_CORE
Called by RN for pt requesting PM dose of methadone.   Noted that methadone order self-discontinued today 5/21.  Review of chart shows pt with h/o IVDU, on chronic methadone, current dose 60mg q12hr.  Specifically reviewed hospitalist attending Dr. Rehman note from today 5/21 @ 14:47 stating pt to continue on 60mg q12hr.  Order placed.

## 2019-05-21 NOTE — PROGRESS NOTE ADULT - ASSESSMENT
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 She was also diagnosis w/ HCV. Pt was initially intubated due to respiratory failure and put on pressors in ICU. She as extubated on 2/25 and transferred from ICU the following day. Pt had a C4-5 ACDF with irrigation and debridement on 3/20 due to spinal abscess and osteo and was kept intubated post procedure and transferred again to ICU, where she was extubated on 3/21. Pt was subsequently found to have  T11/12 OM discitis with an epidural phlegmon from T9-L1. She was taken to the OR 5/8 for posterior spinal fusion & T8-L2 Laminectomy. She was again transferred to ICU for post op care and was extubated without difficulty on 5/9.     Sepsis due to methicillin resistant Staphylococcus aureus  - new wound cxs grew GPC so ID added Rifampin and increased Dapto dose, but now cx is growing coagulase NEGATIVE staph - may be contaminant  - new blood cxs negative    Discitis of multiple sites of spine  - status post C4-5 ACDF with irrigation and debridement on 3/20  - status post posterior spinal fusion & T8-L2 Laminectomy on 5/8   - ortho following   - must use [place] j collar and TLSO brace at all times with ambulation   - care per surgery   - c/w methadone, Tylenol, flexeril, celebrex, valium, Neurontin, lidocaine patch & PRN oxy  - c/w senna, Miralax, Naloxegol & dulcolax for opiate associated constipation       Endocarditis of tricuspid valve  - repeat echo showed stable vegetation    Hepatitis C antibody test positive  - patient will need to f/u as an outpatient    Hx of IVDU   - pt is on methadone 60 mg q12       Anemia  - likely AOCD   - monitor CBC      Prophylaxis:   DVT: SCD  GI: PO diet

## 2019-05-22 PROCEDURE — 99233 SBSQ HOSP IP/OBS HIGH 50: CPT

## 2019-05-22 RX ADMIN — CHLORHEXIDINE GLUCONATE 1 APPLICATION(S): 213 SOLUTION TOPICAL at 05:20

## 2019-05-22 RX ADMIN — SENNA PLUS 2 TABLET(S): 8.6 TABLET ORAL at 22:07

## 2019-05-22 RX ADMIN — OXYCODONE HYDROCHLORIDE 20 MILLIGRAM(S): 5 TABLET ORAL at 10:13

## 2019-05-22 RX ADMIN — Medication 1 TABLET(S): at 08:56

## 2019-05-22 RX ADMIN — Medication 1000 MILLIGRAM(S): at 05:17

## 2019-05-22 RX ADMIN — GABAPENTIN 800 MILLIGRAM(S): 400 CAPSULE ORAL at 13:56

## 2019-05-22 RX ADMIN — Medication 1 TABLET(S): at 22:08

## 2019-05-22 RX ADMIN — NALOXEGOL OXALATE 25 MILLIGRAM(S): 12.5 TABLET, FILM COATED ORAL at 05:18

## 2019-05-22 RX ADMIN — METHADONE HYDROCHLORIDE 60 MILLIGRAM(S): 40 TABLET ORAL at 05:19

## 2019-05-22 RX ADMIN — METHADONE HYDROCHLORIDE 60 MILLIGRAM(S): 40 TABLET ORAL at 18:08

## 2019-05-22 RX ADMIN — NYSTATIN CREAM 1 APPLICATION(S): 100000 CREAM TOPICAL at 13:56

## 2019-05-22 RX ADMIN — GABAPENTIN 800 MILLIGRAM(S): 400 CAPSULE ORAL at 22:07

## 2019-05-22 RX ADMIN — Medication 1000 MILLIGRAM(S): at 13:56

## 2019-05-22 RX ADMIN — Medication 1 TABLET(S): at 13:56

## 2019-05-22 RX ADMIN — Medication 5 MILLIGRAM(S): at 09:00

## 2019-05-22 RX ADMIN — Medication 1000 MILLIGRAM(S): at 15:40

## 2019-05-22 RX ADMIN — Medication 1 TABLET(S): at 12:36

## 2019-05-22 RX ADMIN — OXYCODONE HYDROCHLORIDE 20 MILLIGRAM(S): 5 TABLET ORAL at 23:51

## 2019-05-22 RX ADMIN — OXYCODONE HYDROCHLORIDE 20 MILLIGRAM(S): 5 TABLET ORAL at 03:25

## 2019-05-22 RX ADMIN — Medication 1000 MILLIGRAM(S): at 22:08

## 2019-05-22 RX ADMIN — POLYETHYLENE GLYCOL 3350 17 GRAM(S): 17 POWDER, FOR SOLUTION ORAL at 08:56

## 2019-05-22 RX ADMIN — Medication 1000 MILLIGRAM(S): at 22:45

## 2019-05-22 RX ADMIN — Medication 1 PATCH: at 12:41

## 2019-05-22 RX ADMIN — POLYETHYLENE GLYCOL 3350 17 GRAM(S): 17 POWDER, FOR SOLUTION ORAL at 18:06

## 2019-05-22 RX ADMIN — CELECOXIB 100 MILLIGRAM(S): 200 CAPSULE ORAL at 13:56

## 2019-05-22 RX ADMIN — GABAPENTIN 800 MILLIGRAM(S): 400 CAPSULE ORAL at 05:19

## 2019-05-22 RX ADMIN — Medication 5 MILLIGRAM(S): at 23:51

## 2019-05-22 RX ADMIN — DAPTOMYCIN 124 MILLIGRAM(S): 500 INJECTION, POWDER, LYOPHILIZED, FOR SOLUTION INTRAVENOUS at 22:08

## 2019-05-22 RX ADMIN — Medication 1 PATCH: at 07:42

## 2019-05-22 RX ADMIN — LIDOCAINE 1 PATCH: 4 CREAM TOPICAL at 07:42

## 2019-05-22 RX ADMIN — LIDOCAINE 1 PATCH: 4 CREAM TOPICAL at 05:24

## 2019-05-22 RX ADMIN — Medication 1 PATCH: at 12:36

## 2019-05-22 RX ADMIN — OXYCODONE HYDROCHLORIDE 20 MILLIGRAM(S): 5 TABLET ORAL at 16:40

## 2019-05-22 RX ADMIN — Medication 1 TABLET(S): at 05:18

## 2019-05-22 RX ADMIN — LIDOCAINE 1 PATCH: 4 CREAM TOPICAL at 22:09

## 2019-05-22 RX ADMIN — OXYCODONE HYDROCHLORIDE 20 MILLIGRAM(S): 5 TABLET ORAL at 15:39

## 2019-05-22 RX ADMIN — NYSTATIN CREAM 1 APPLICATION(S): 100000 CREAM TOPICAL at 05:18

## 2019-05-22 RX ADMIN — LIDOCAINE 1 PATCH: 4 CREAM TOPICAL at 17:45

## 2019-05-22 RX ADMIN — Medication 1 PATCH: at 20:10

## 2019-05-22 RX ADMIN — OXYCODONE HYDROCHLORIDE 20 MILLIGRAM(S): 5 TABLET ORAL at 11:10

## 2019-05-22 RX ADMIN — NYSTATIN CREAM 1 APPLICATION(S): 100000 CREAM TOPICAL at 22:28

## 2019-05-22 RX ADMIN — CELECOXIB 100 MILLIGRAM(S): 200 CAPSULE ORAL at 15:40

## 2019-05-22 RX ADMIN — Medication 1 TABLET(S): at 18:06

## 2019-05-22 NOTE — PROGRESS NOTE ADULT - SUBJECTIVE AND OBJECTIVE BOX
Patient is a 43y old  Female who presents with a chief complaint of AMS (22 May 2019 15:47)      INTERVAL HPI / OVERNIGHT EVENTS: doing ok     MEDICATIONS  (STANDING):  acetaminophen   Tablet .. 1000 milliGRAM(s) Oral every 8 hours  calcium carbonate 1250 mG  + Vitamin D (OsCal 500 + D) 1 Tablet(s) Oral three times a day  celecoxib 100 milliGRAM(s) Oral daily  celecoxib 200 milliGRAM(s) Oral once  chlorhexidine 4% Liquid 1 Application(s) Topical <User Schedule>  DAPTOmycin IVPB 600 milliGRAM(s) IV Intermittent <User Schedule>  gabapentin 800 milliGRAM(s) Oral three times a day  lactobacillus acidophilus 1 Tablet(s) Oral two times a day with meals  lidocaine   Patch 1 Patch Transdermal every 24 hours  lidocaine   Patch 1 Patch Transdermal every 24 hours  methadone    Tablet 60 milliGRAM(s) Oral every 12 hours  multivitamin 1 Tablet(s) Oral daily  naloxegol 25 milliGRAM(s) Oral before breakfast  nicotine -  14 mG/24Hr(s) Patch 1 patch Transdermal daily  nystatin Powder 1 Application(s) Topical three times a day  polyethylene glycol 3350 17 Gram(s) Oral two times a day  rifampin 300 milliGRAM(s) Oral two times a day  senna 2 Tablet(s) Oral at bedtime    MEDICATIONS  (PRN):  bisacodyl Suppository 10 milliGRAM(s) Rectal daily PRN Constipation  cyclobenzaprine 10 milliGRAM(s) Oral three times a day PRN Muscle Spasm  diazepam    Tablet 5 milliGRAM(s) Oral every 8 hours PRN Back Spasms  oxyCODONE    IR 20 milliGRAM(s) Oral every 4 hours PRN Moderate Pain (4 - 6)      Vital Signs Last 24 Hrs  T(C): 36.8 (22 May 2019 13:41), Max: 36.8 (22 May 2019 13:41)  T(F): 98.2 (22 May 2019 13:41), Max: 98.2 (22 May 2019 13:41)  HR: 81 (22 May 2019 13:41) (81 - 98)  BP: 105/69 (22 May 2019 13:41) (96/63 - 112/71)  BP(mean): --  RR: 18 (22 May 2019 13:41) (17 - 18)  SpO2: 97% (22 May 2019 13:41) (97% - 97%)    Review of systems:  General : no fever /chills,fatigue  CVS : no chest pain, palpitations  Lungs : no shortness of breath, cough  GI : no abdominal pain,vomiting, diarrhea   : no dysuria,hematuria        PHYSICAL EXAM:  General :NAD  Constitutional:  well-groomed, well-developed  Respiratory: CTAB/L  Cardiovascular: S1 and S2, RRR, no M/G/R  Gastrointestinal: BS+, soft, NT/ND  Extremities: No peripheral edema  Vascular: 2+ peripheral pulses  Skin: back surgical site ,clean ,no wound dehiscence ,suture in place  Neuro : LE strength 4/5      LABS:                MICROBIOLOGY:  RECENT CULTURES:        RADIOLOGY & ADDITIONAL STUDIES:

## 2019-05-22 NOTE — PROGRESS NOTE ADULT - ASSESSMENT
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 She was also diagnosis w/ HCV. Pt was initially intubated due to respiratory failure and put on pressors in ICU. She as extubated on 2/25 and transferred from ICU the following day. Pt had a C4-5 ACDF with irrigation and debridement on 3/20 due to spinal abscess and osteo and was kept intubated post procedure and transferred again to ICU, where she was extubated on 3/21. Pt was subsequently found to have  T11/12 OM discitis with an epidural phlegmon from T9-L1. She was taken to the OR 5/8 for posterior spinal fusion & T8-L2 Laminectomy. She was again transferred to ICU for post op care and was extubated without difficulty on 5/9.     Sepsis due to methicillin resistant Staphylococcus aureus  - new wound cxs grew GPC so ID added Rifampin and increased Dapto dose, but now cx is growing coagulase NEGATIVE staph - may be contaminant  - new blood cxs negative    Discitis of multiple sites of spine  - status post C4-5 ACDF with irrigation and debridement on 3/20  - status post posterior spinal fusion & T8-L2 Laminectomy on 5/8   - ortho following   - must use [place] j collar and TLSO brace at all times with ambulation   - care per surgery   - c/w methadone, Tylenol, flexeril, celebrex, valium, Neurontin, lidocaine patch & PRN oxy  - c/w senna, Miralax, Naloxegol & dulcolax for opiate associated constipation       Endocarditis of tricuspid valve  - repeat echo showed stable vegetation    Hepatitis C antibody test positive  - patient will need to f/u as an outpatient    Hx of IVDU   - pt is on methadone 60 mg q12       Anemia  - likely AOCD   - monitor CBC      Prophylaxis:   DVT: SCD  GI: PO diet

## 2019-05-22 NOTE — PROGRESS NOTE ADULT - SUBJECTIVE AND OBJECTIVE BOX
Patient is a 43y old  Female who presents with a chief complaint of AMS (21 May 2019 14:47)      OVERNIGHT EVENTS:  none     REVIEW OF SYSTEMS: denies chest pain/SOB, diaphoresis, no F/C, cough, dizziness, headache, blurry vision, nausea, vomiting, abdominal pain. All others review of systems negative     MEDICATIONS  (STANDING):  acetaminophen   Tablet .. 1000 milliGRAM(s) Oral every 8 hours  calcium carbonate 1250 mG  + Vitamin D (OsCal 500 + D) 1 Tablet(s) Oral three times a day  celecoxib 100 milliGRAM(s) Oral daily  celecoxib 200 milliGRAM(s) Oral once  chlorhexidine 4% Liquid 1 Application(s) Topical <User Schedule>  DAPTOmycin IVPB 600 milliGRAM(s) IV Intermittent <User Schedule>  gabapentin 800 milliGRAM(s) Oral three times a day  lactobacillus acidophilus 1 Tablet(s) Oral two times a day with meals  lidocaine   Patch 1 Patch Transdermal every 24 hours  lidocaine   Patch 1 Patch Transdermal every 24 hours  methadone    Tablet 60 milliGRAM(s) Oral every 12 hours  multivitamin 1 Tablet(s) Oral daily  naloxegol 25 milliGRAM(s) Oral before breakfast  nicotine -  14 mG/24Hr(s) Patch 1 patch Transdermal daily  nystatin Powder 1 Application(s) Topical three times a day  polyethylene glycol 3350 17 Gram(s) Oral two times a day  rifampin 300 milliGRAM(s) Oral two times a day  senna 2 Tablet(s) Oral at bedtime    MEDICATIONS  (PRN):  bisacodyl Suppository 10 milliGRAM(s) Rectal daily PRN Constipation  cyclobenzaprine 10 milliGRAM(s) Oral three times a day PRN Muscle Spasm  diazepam    Tablet 5 milliGRAM(s) Oral every 8 hours PRN Back Spasms  oxyCODONE    IR 20 milliGRAM(s) Oral every 4 hours PRN Moderate Pain (4 - 6)      Allergies    penicillin (Short breath; Hives)    Intolerances        T(F): 98.2 (05-22-19 @ 13:41), Max: 98.2 (05-22-19 @ 13:41)  HR: 81 (05-22-19 @ 13:41) (81 - 98)  BP: 105/69 (05-22-19 @ 13:41) (96/63 - 112/71)  RR: 18 (05-22-19 @ 13:41) (17 - 18)  SpO2: 97% (05-22-19 @ 13:41) (97% - 97%)  Wt(kg): --    PHYSICAL EXAM:  GENERAL: NAD, well-groomed, well-developed  HEAD:  Atraumatic, Normocephalic  EYES: EOMI, PERRLA, conjunctiva and sclera clear  ENMT: Moist mucous membranes, Good dentition, No lesions  NECK:  [place] j collar and TLSO brace  NERVOUS SYSTEM:  Alert & Oriented X3, Good concentration; Motor Strength 5/5 B/L upper and lower extremities; DTRs 2+ intact and symmetric  CHEST/LUNG: Clear to percussion bilaterally; No rales, rhonchi, wheezing, or rubs BL  HEART: Regular rate and rhythm; No murmurs, rubs, or gallops  ABDOMEN: Soft, Nontender, Nondistended; Bowel sounds present  EXTREMITIES:  2+ Peripheral Pulses, No clubbing, cyanosis, or edema BL LE  LYMPH: No lymphadenopathy noted  SKIN: No rashes or lesions      RADIOLOGY & ADDITIONAL TESTS:    Imaging Personally Reviewed:  [x ] YES    < from: TTE Limited Echo w/o Cont (04.02.19 @ 18:33) >  Possible vegitation on tricuspid valve looks same as before 03/ 22/2019    < end of copied text >    Consultant(s) Notes Reviewed:  [x ] YES     Care Discussed with [ x] Consultants [X ] Patient [ ] Family  [x ]    [x ]  Other; RN

## 2019-05-23 PROCEDURE — 36584 COMPL RPLCMT PICC RS&I: CPT

## 2019-05-23 PROCEDURE — 99232 SBSQ HOSP IP/OBS MODERATE 35: CPT

## 2019-05-23 RX ADMIN — Medication 1 TABLET(S): at 21:27

## 2019-05-23 RX ADMIN — Medication 1 TABLET(S): at 13:23

## 2019-05-23 RX ADMIN — Medication 1 TABLET(S): at 17:26

## 2019-05-23 RX ADMIN — CHLORHEXIDINE GLUCONATE 1 APPLICATION(S): 213 SOLUTION TOPICAL at 13:21

## 2019-05-23 RX ADMIN — POLYETHYLENE GLYCOL 3350 17 GRAM(S): 17 POWDER, FOR SOLUTION ORAL at 06:11

## 2019-05-23 RX ADMIN — OXYCODONE HYDROCHLORIDE 20 MILLIGRAM(S): 5 TABLET ORAL at 00:30

## 2019-05-23 RX ADMIN — Medication 1 PATCH: at 11:08

## 2019-05-23 RX ADMIN — LIDOCAINE 1 PATCH: 4 CREAM TOPICAL at 06:12

## 2019-05-23 RX ADMIN — CELECOXIB 100 MILLIGRAM(S): 200 CAPSULE ORAL at 13:20

## 2019-05-23 RX ADMIN — OXYCODONE HYDROCHLORIDE 20 MILLIGRAM(S): 5 TABLET ORAL at 14:00

## 2019-05-23 RX ADMIN — NYSTATIN CREAM 1 APPLICATION(S): 100000 CREAM TOPICAL at 21:27

## 2019-05-23 RX ADMIN — LIDOCAINE 1 PATCH: 4 CREAM TOPICAL at 17:22

## 2019-05-23 RX ADMIN — GABAPENTIN 800 MILLIGRAM(S): 400 CAPSULE ORAL at 06:11

## 2019-05-23 RX ADMIN — LIDOCAINE 1 PATCH: 4 CREAM TOPICAL at 18:10

## 2019-05-23 RX ADMIN — Medication 1 TABLET(S): at 09:14

## 2019-05-23 RX ADMIN — NYSTATIN CREAM 1 APPLICATION(S): 100000 CREAM TOPICAL at 13:23

## 2019-05-23 RX ADMIN — OXYCODONE HYDROCHLORIDE 20 MILLIGRAM(S): 5 TABLET ORAL at 04:03

## 2019-05-23 RX ADMIN — Medication 1 PATCH: at 19:16

## 2019-05-23 RX ADMIN — NALOXEGOL OXALATE 25 MILLIGRAM(S): 12.5 TABLET, FILM COATED ORAL at 09:15

## 2019-05-23 RX ADMIN — METHADONE HYDROCHLORIDE 60 MILLIGRAM(S): 40 TABLET ORAL at 06:11

## 2019-05-23 RX ADMIN — OXYCODONE HYDROCHLORIDE 20 MILLIGRAM(S): 5 TABLET ORAL at 21:27

## 2019-05-23 RX ADMIN — OXYCODONE HYDROCHLORIDE 20 MILLIGRAM(S): 5 TABLET ORAL at 22:27

## 2019-05-23 RX ADMIN — OXYCODONE HYDROCHLORIDE 20 MILLIGRAM(S): 5 TABLET ORAL at 10:00

## 2019-05-23 RX ADMIN — Medication 1 TABLET(S): at 06:11

## 2019-05-23 RX ADMIN — Medication 1 PATCH: at 13:28

## 2019-05-23 RX ADMIN — OXYCODONE HYDROCHLORIDE 20 MILLIGRAM(S): 5 TABLET ORAL at 13:20

## 2019-05-23 RX ADMIN — DAPTOMYCIN 124 MILLIGRAM(S): 500 INJECTION, POWDER, LYOPHILIZED, FOR SOLUTION INTRAVENOUS at 21:27

## 2019-05-23 RX ADMIN — Medication 1 PATCH: at 13:22

## 2019-05-23 RX ADMIN — Medication 5 MILLIGRAM(S): at 22:40

## 2019-05-23 RX ADMIN — Medication 1 TABLET(S): at 13:21

## 2019-05-23 RX ADMIN — GABAPENTIN 800 MILLIGRAM(S): 400 CAPSULE ORAL at 13:21

## 2019-05-23 RX ADMIN — Medication 5 MILLIGRAM(S): at 08:25

## 2019-05-23 RX ADMIN — OXYCODONE HYDROCHLORIDE 20 MILLIGRAM(S): 5 TABLET ORAL at 09:13

## 2019-05-23 RX ADMIN — POLYETHYLENE GLYCOL 3350 17 GRAM(S): 17 POWDER, FOR SOLUTION ORAL at 17:29

## 2019-05-23 RX ADMIN — NYSTATIN CREAM 1 APPLICATION(S): 100000 CREAM TOPICAL at 06:10

## 2019-05-23 RX ADMIN — METHADONE HYDROCHLORIDE 60 MILLIGRAM(S): 40 TABLET ORAL at 17:26

## 2019-05-23 RX ADMIN — Medication 1000 MILLIGRAM(S): at 06:11

## 2019-05-23 RX ADMIN — GABAPENTIN 800 MILLIGRAM(S): 400 CAPSULE ORAL at 21:27

## 2019-05-23 RX ADMIN — LIDOCAINE 1 PATCH: 4 CREAM TOPICAL at 19:47

## 2019-05-23 RX ADMIN — SENNA PLUS 2 TABLET(S): 8.6 TABLET ORAL at 21:27

## 2019-05-23 NOTE — PROGRESS NOTE ADULT - SUBJECTIVE AND OBJECTIVE BOX
Patient is a 43y old  Female who presents with a chief complaint of AMS (22 May 2019 16:29)      OVERNIGHT EVENTS:  none     REVIEW OF SYSTEMS: denies chest pain/SOB, diaphoresis, no F/C, cough, dizziness, headache, blurry vision, nausea, vomiting, abdominal pain. All others review of systems negative     MEDICATIONS  (STANDING):  acetaminophen   Tablet .. 1000 milliGRAM(s) Oral every 8 hours  calcium carbonate 1250 mG  + Vitamin D (OsCal 500 + D) 1 Tablet(s) Oral three times a day  celecoxib 100 milliGRAM(s) Oral daily  celecoxib 200 milliGRAM(s) Oral once  chlorhexidine 4% Liquid 1 Application(s) Topical <User Schedule>  DAPTOmycin IVPB 600 milliGRAM(s) IV Intermittent <User Schedule>  gabapentin 800 milliGRAM(s) Oral three times a day  lactobacillus acidophilus 1 Tablet(s) Oral two times a day with meals  lidocaine   Patch 1 Patch Transdermal every 24 hours  lidocaine   Patch 1 Patch Transdermal every 24 hours  methadone    Tablet 60 milliGRAM(s) Oral every 12 hours  multivitamin 1 Tablet(s) Oral daily  naloxegol 25 milliGRAM(s) Oral before breakfast  nicotine -  14 mG/24Hr(s) Patch 1 patch Transdermal daily  nystatin Powder 1 Application(s) Topical three times a day  polyethylene glycol 3350 17 Gram(s) Oral two times a day  rifampin 300 milliGRAM(s) Oral two times a day  senna 2 Tablet(s) Oral at bedtime    MEDICATIONS  (PRN):  bisacodyl Suppository 10 milliGRAM(s) Rectal daily PRN Constipation  cyclobenzaprine 10 milliGRAM(s) Oral three times a day PRN Muscle Spasm  diazepam    Tablet 5 milliGRAM(s) Oral every 8 hours PRN Back Spasms  oxyCODONE    IR 20 milliGRAM(s) Oral every 4 hours PRN Moderate Pain (4 - 6)      Allergies    penicillin (Short breath; Hives)    Intolerances        T(F): 96.8 (05-23-19 @ 11:20), Max: 97.6 (05-22-19 @ 23:35)  HR: 83 (05-23-19 @ 11:20) (68 - 85)  BP: 113/71 (05-23-19 @ 11:20) (97/61 - 113/71)  RR: 17 (05-23-19 @ 11:20) (17 - 17)  SpO2: 98% (05-23-19 @ 11:20) (97% - 100%)  Wt(kg): --    PHYSICAL EXAM:  GENERAL: NAD, well-groomed, well-developed  HEAD:  Atraumatic, Normocephalic  EYES: EOMI, PERRLA, conjunctiva and sclera clear  ENMT: Moist mucous membranes, Good dentition, No lesions  NECK:  [place] j collar and TLSO brace  NERVOUS SYSTEM:  Alert & Oriented X3, Good concentration; Motor Strength 5/5 B/L upper and lower extremities; DTRs 2+ intact and symmetric  CHEST/LUNG: Clear to percussion bilaterally; No rales, rhonchi, wheezing, or rubs BL  HEART: Regular rate and rhythm; No murmurs, rubs, or gallops  ABDOMEN: Soft, Nontender, Nondistended; Bowel sounds present  EXTREMITIES:  2+ Peripheral Pulses, No clubbing, cyanosis, or edema BL LE  LYMPH: No lymphadenopathy noted  SKIN: No rashes or lesions      LABS:              Cultures;   CAPILLARY BLOOD GLUCOSE        RADIOLOGY & ADDITIONAL TESTS:    Imaging Personally Reviewed:  [x ] YES    < from: TTE Limited Echo w/o Cont (04.02.19 @ 18:33) >  Possible vegitation on tricuspid valve looks same as before 03/ 22/2019    < end of copied text >    Consultant(s) Notes Reviewed:  [x ] YES

## 2019-05-23 NOTE — PROCEDURE NOTE - NSPROCDETAILS_GEN_ALL_CORE
MIDLINE EXCHANGE
sterile dressing applied/supine position/ultrasound guidance/sterile technique, catheter placed/ultrasound assessment
location identified, draped/prepped, sterile technique used/supine position/ultrasound assessment/sterile dressing applied/sterile technique, catheter placed/ultrasound guidance

## 2019-05-23 NOTE — PROGRESS NOTE ADULT - ASSESSMENT
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 She was also diagnosis w/ HCV. Pt was initially intubated due to respiratory failure and put on pressors in ICU. She as extubated on 2/25 and transferred from ICU the following day. Pt had a C4-5 ACDF with irrigation and debridement on 3/20 due to spinal abscess and osteo and was kept intubated post procedure and transferred again to ICU, where she was extubated on 3/21. Pt was subsequently found to have  T11/12 OM discitis with an epidural phlegmon from T9-L1. She was taken to the OR 5/8 for posterior spinal fusion & T8-L2 Laminectomy. She was again transferred to ICU for post op care and was extubated without difficulty on 5/9.     Sepsis due to methicillin resistant Staphylococcus aureus  - new wound cxs grew GPC so ID added Rifampin and increased Dapto dose, but now cx is growing coagulase NEGATIVE staph - may be contaminant  - new blood cxs negative    Discitis of multiple sites of spine  - status post C4-5 ACDF with irrigation and debridement on 3/20  - status post posterior spinal fusion & T8-L2 Laminectomy on 5/8   - ortho following   - must use [place] j collar and TLSO brace at all times with ambulation   - care per surgery   - c/w methadone, Tylenol, flexeril, celebrex, valium, Neurontin, lidocaine patch & PRN oxy  - c/w senna, Miralax, Naloxegol & dulcolax for opiate associated constipation       Endocarditis of tricuspid valve  - repeat echo showed stable vegetation    Hepatitis C antibody test positive  - patient will need to f/u as an outpatient    Hx of IVDU   - pt is on methadone 60 mg q12       Anemia  - likely AOCD   - monitor CBC      Prophylaxis:   DVT: SCD  GI: PO diet

## 2019-05-23 NOTE — PROCEDURE NOTE - ADDITIONAL PROCEDURE DETAILS
Pt s/p midline exchange over wire.  All 15cm of midline removed in its entirety without incident.  And 15cm SL placed.  Pt tolerated procedure well  -midline can be accessed
pt s/p diagnostic left thoracentesis.  Approx 15cm of straw color fluid removed.  Pt has a h/o of IVDU, septic emboli, discitis/OM of C and T spine.  Fluid sent to lab for analysis.  Hemostasis achieved.  CXR ordered  -f/u post procedure cxr results  -f/u pleural fluid study results
pt s/p midline exchange over a wire.  All 12cm removed in its entirety without incident and 12 cm cut with 10cm inserted.  Pt tolerated procedure well  -midline can be accessed
Patient seen at bedside for midline catheter placement.  Consent obtained from patient after risks/benefits/alternatives explained.   Upper extremity prepped and draped in usual sterile fashion. Time out performed with RN. 4Fr 12 cm single lumen midline catheter placed using ultrasound guided seldinger technique, Left cephalic vein. Flushes well, with good venous return. Patient tolerated procedure well without complication and was left in no acute distress.
5Fr double lumen 15 cm midline catheter placed under ultrasound guidance using seldinger technique in a sterile fashion.  Good venous return.  Flush easily.  Statlock used to secure device.  Midarm circ 29cm

## 2019-05-24 PROCEDURE — 99233 SBSQ HOSP IP/OBS HIGH 50: CPT

## 2019-05-24 PROCEDURE — 99232 SBSQ HOSP IP/OBS MODERATE 35: CPT

## 2019-05-24 RX ORDER — DIAZEPAM 5 MG
5 TABLET ORAL EVERY 8 HOURS
Refills: 0 | Status: DISCONTINUED | OUTPATIENT
Start: 2019-05-24 | End: 2019-05-31

## 2019-05-24 RX ADMIN — OXYCODONE HYDROCHLORIDE 20 MILLIGRAM(S): 5 TABLET ORAL at 22:00

## 2019-05-24 RX ADMIN — Medication 1000 MILLIGRAM(S): at 07:00

## 2019-05-24 RX ADMIN — NYSTATIN CREAM 1 APPLICATION(S): 100000 CREAM TOPICAL at 13:23

## 2019-05-24 RX ADMIN — Medication 1 TABLET(S): at 07:52

## 2019-05-24 RX ADMIN — Medication 1 TABLET(S): at 13:24

## 2019-05-24 RX ADMIN — OXYCODONE HYDROCHLORIDE 20 MILLIGRAM(S): 5 TABLET ORAL at 03:03

## 2019-05-24 RX ADMIN — GABAPENTIN 800 MILLIGRAM(S): 400 CAPSULE ORAL at 05:56

## 2019-05-24 RX ADMIN — Medication 5 MILLIGRAM(S): at 20:41

## 2019-05-24 RX ADMIN — DAPTOMYCIN 124 MILLIGRAM(S): 500 INJECTION, POWDER, LYOPHILIZED, FOR SOLUTION INTRAVENOUS at 21:43

## 2019-05-24 RX ADMIN — OXYCODONE HYDROCHLORIDE 20 MILLIGRAM(S): 5 TABLET ORAL at 17:45

## 2019-05-24 RX ADMIN — GABAPENTIN 800 MILLIGRAM(S): 400 CAPSULE ORAL at 13:23

## 2019-05-24 RX ADMIN — Medication 1 PATCH: at 13:44

## 2019-05-24 RX ADMIN — Medication 1 TABLET(S): at 21:49

## 2019-05-24 RX ADMIN — LIDOCAINE 1 PATCH: 4 CREAM TOPICAL at 05:59

## 2019-05-24 RX ADMIN — NALOXEGOL OXALATE 25 MILLIGRAM(S): 12.5 TABLET, FILM COATED ORAL at 06:01

## 2019-05-24 RX ADMIN — Medication 1 PATCH: at 21:59

## 2019-05-24 RX ADMIN — OXYCODONE HYDROCHLORIDE 20 MILLIGRAM(S): 5 TABLET ORAL at 20:41

## 2019-05-24 RX ADMIN — OXYCODONE HYDROCHLORIDE 20 MILLIGRAM(S): 5 TABLET ORAL at 13:45

## 2019-05-24 RX ADMIN — LIDOCAINE 1 PATCH: 4 CREAM TOPICAL at 17:58

## 2019-05-24 RX ADMIN — OXYCODONE HYDROCHLORIDE 20 MILLIGRAM(S): 5 TABLET ORAL at 07:52

## 2019-05-24 RX ADMIN — Medication 1000 MILLIGRAM(S): at 23:30

## 2019-05-24 RX ADMIN — METHADONE HYDROCHLORIDE 60 MILLIGRAM(S): 40 TABLET ORAL at 17:55

## 2019-05-24 RX ADMIN — LIDOCAINE 1 PATCH: 4 CREAM TOPICAL at 06:30

## 2019-05-24 RX ADMIN — OXYCODONE HYDROCHLORIDE 20 MILLIGRAM(S): 5 TABLET ORAL at 04:03

## 2019-05-24 RX ADMIN — Medication 1 PATCH: at 07:46

## 2019-05-24 RX ADMIN — NYSTATIN CREAM 1 APPLICATION(S): 100000 CREAM TOPICAL at 05:57

## 2019-05-24 RX ADMIN — LIDOCAINE 1 PATCH: 4 CREAM TOPICAL at 21:44

## 2019-05-24 RX ADMIN — CHLORHEXIDINE GLUCONATE 1 APPLICATION(S): 213 SOLUTION TOPICAL at 09:02

## 2019-05-24 RX ADMIN — Medication 1 PATCH: at 13:24

## 2019-05-24 RX ADMIN — Medication 1000 MILLIGRAM(S): at 14:44

## 2019-05-24 RX ADMIN — CELECOXIB 100 MILLIGRAM(S): 200 CAPSULE ORAL at 13:24

## 2019-05-24 RX ADMIN — Medication 1000 MILLIGRAM(S): at 06:10

## 2019-05-24 RX ADMIN — NYSTATIN CREAM 1 APPLICATION(S): 100000 CREAM TOPICAL at 21:50

## 2019-05-24 RX ADMIN — OXYCODONE HYDROCHLORIDE 20 MILLIGRAM(S): 5 TABLET ORAL at 09:03

## 2019-05-24 RX ADMIN — GABAPENTIN 800 MILLIGRAM(S): 400 CAPSULE ORAL at 21:50

## 2019-05-24 RX ADMIN — Medication 5 MILLIGRAM(S): at 09:39

## 2019-05-24 RX ADMIN — Medication 1 TABLET(S): at 05:56

## 2019-05-24 RX ADMIN — Medication 1 TABLET(S): at 17:55

## 2019-05-24 RX ADMIN — METHADONE HYDROCHLORIDE 60 MILLIGRAM(S): 40 TABLET ORAL at 05:57

## 2019-05-24 RX ADMIN — Medication 1000 MILLIGRAM(S): at 13:25

## 2019-05-24 RX ADMIN — CELECOXIB 100 MILLIGRAM(S): 200 CAPSULE ORAL at 14:44

## 2019-05-24 RX ADMIN — OXYCODONE HYDROCHLORIDE 20 MILLIGRAM(S): 5 TABLET ORAL at 12:04

## 2019-05-24 RX ADMIN — SENNA PLUS 2 TABLET(S): 8.6 TABLET ORAL at 21:49

## 2019-05-24 RX ADMIN — Medication 1000 MILLIGRAM(S): at 23:01

## 2019-05-24 RX ADMIN — OXYCODONE HYDROCHLORIDE 20 MILLIGRAM(S): 5 TABLET ORAL at 16:26

## 2019-05-24 RX ADMIN — Medication 1 TABLET(S): at 13:23

## 2019-05-24 RX ADMIN — LIDOCAINE 1 PATCH: 4 CREAM TOPICAL at 07:45

## 2019-05-24 NOTE — PROGRESS NOTE ADULT - PROBLEM SELECTOR PLAN 1
with T11/12 OM/ discitis with an epidural phlegmon from T9-L1   s/p T8-L2 Laminectomy and PSF POD1  OR c/s coag neg staph    cont daptomycin(dose increased to 8mg /kg and added rifampin)  repeat MRI in the week of June 3rd  and change to oral at that point if possible  Labs once a week  only   last cbc, cr and CPK wnl  c.o of malaise today check cbc in am

## 2019-05-24 NOTE — PROGRESS NOTE ADULT - SUBJECTIVE AND OBJECTIVE BOX
Patient is a 43y old  Female who presents with a chief complaint of AMS (24 May 2019 14:21)      OVERNIGHT EVENTS:  none     REVIEW OF SYSTEMS: denies chest pain/SOB, diaphoresis, no F/C, cough, dizziness, headache, blurry vision, nausea, vomiting, abdominal pain. All others review of systems negative     MEDICATIONS  (STANDING):  acetaminophen   Tablet .. 1000 milliGRAM(s) Oral every 8 hours  calcium carbonate 1250 mG  + Vitamin D (OsCal 500 + D) 1 Tablet(s) Oral three times a day  celecoxib 100 milliGRAM(s) Oral daily  celecoxib 200 milliGRAM(s) Oral once  chlorhexidine 4% Liquid 1 Application(s) Topical <User Schedule>  DAPTOmycin IVPB 600 milliGRAM(s) IV Intermittent <User Schedule>  gabapentin 800 milliGRAM(s) Oral three times a day  lactobacillus acidophilus 1 Tablet(s) Oral two times a day with meals  lidocaine   Patch 1 Patch Transdermal every 24 hours  lidocaine   Patch 1 Patch Transdermal every 24 hours  methadone    Tablet 60 milliGRAM(s) Oral every 12 hours  multivitamin 1 Tablet(s) Oral daily  naloxegol 25 milliGRAM(s) Oral before breakfast  nicotine -  14 mG/24Hr(s) Patch 1 patch Transdermal daily  nystatin Powder 1 Application(s) Topical three times a day  polyethylene glycol 3350 17 Gram(s) Oral two times a day  rifampin 300 milliGRAM(s) Oral two times a day  senna 2 Tablet(s) Oral at bedtime    MEDICATIONS  (PRN):  bisacodyl Suppository 10 milliGRAM(s) Rectal daily PRN Constipation  cyclobenzaprine 10 milliGRAM(s) Oral three times a day PRN Muscle Spasm  diazepam    Tablet 5 milliGRAM(s) Oral every 8 hours PRN muscle spasm  oxyCODONE    IR 20 milliGRAM(s) Oral every 4 hours PRN Moderate Pain (4 - 6)      Allergies    penicillin (Short breath; Hives)    Intolerances        T(F): 98.6 (05-24-19 @ 09:57), Max: 98.6 (05-24-19 @ 09:57)  HR: 85 (05-24-19 @ 09:57) (72 - 85)  BP: 134/92 (05-24-19 @ 09:57) (101/64 - 161/80)  RR: 16 (05-24-19 @ 09:57) (16 - 17)  SpO2: 96% (05-24-19 @ 09:57) (96% - 96%)  Wt(kg): --    PHYSICAL EXAM:  GENERAL: NAD, well-groomed, well-developed  HEAD:  Atraumatic, Normocephalic  EYES: EOMI, PERRLA, conjunctiva and sclera clear  ENMT: Moist mucous membranes, Good dentition, No lesions  NECK:  [place] j collar and TLSO brace  NERVOUS SYSTEM:  Alert & Oriented X3, Good concentration; Motor Strength 5/5 B/L upper and lower extremities; DTRs 2+ intact and symmetric  CHEST/LUNG: Clear to percussion bilaterally; No rales, rhonchi, wheezing, or rubs BL  HEART: Regular rate and rhythm; No murmurs, rubs, or gallops  ABDOMEN: Soft, Nontender, Nondistended; Bowel sounds present  EXTREMITIES:  2+ Peripheral Pulses, No clubbing, cyanosis, or edema BL LE  LYMPH: No lymphadenopathy noted  SKIN: No rashes or lesions      LABS:              Cultures;   CAPILLARY BLOOD GLUCOSE        RADIOLOGY & ADDITIONAL TESTS:    Imaging Personally Reviewed:  [x ] YES    < from: TTE Limited Echo w/o Cont (04.02.19 @ 18:33) >  Possible vegitation on tricuspid valve looks same as before 03/ 22/2019    < end of copied text >    Consultant(s) Notes Reviewed:  [x ] YES

## 2019-05-24 NOTE — PROGRESS NOTE ADULT - SUBJECTIVE AND OBJECTIVE BOX
Patient is a 43y old  Female who presents with a chief complaint of AMS (23 May 2019 16:24)      INTERVAL HPI / OVERNIGHT EVENTS: doing ok ,c/o malaise    MEDICATIONS  (STANDING):  acetaminophen   Tablet .. 1000 milliGRAM(s) Oral every 8 hours  calcium carbonate 1250 mG  + Vitamin D (OsCal 500 + D) 1 Tablet(s) Oral three times a day  celecoxib 100 milliGRAM(s) Oral daily  celecoxib 200 milliGRAM(s) Oral once  chlorhexidine 4% Liquid 1 Application(s) Topical <User Schedule>  DAPTOmycin IVPB 600 milliGRAM(s) IV Intermittent <User Schedule>  gabapentin 800 milliGRAM(s) Oral three times a day  lactobacillus acidophilus 1 Tablet(s) Oral two times a day with meals  lidocaine   Patch 1 Patch Transdermal every 24 hours  lidocaine   Patch 1 Patch Transdermal every 24 hours  methadone    Tablet 60 milliGRAM(s) Oral every 12 hours  multivitamin 1 Tablet(s) Oral daily  naloxegol 25 milliGRAM(s) Oral before breakfast  nicotine -  14 mG/24Hr(s) Patch 1 patch Transdermal daily  nystatin Powder 1 Application(s) Topical three times a day  polyethylene glycol 3350 17 Gram(s) Oral two times a day  rifampin 300 milliGRAM(s) Oral two times a day  senna 2 Tablet(s) Oral at bedtime    MEDICATIONS  (PRN):  bisacodyl Suppository 10 milliGRAM(s) Rectal daily PRN Constipation  cyclobenzaprine 10 milliGRAM(s) Oral three times a day PRN Muscle Spasm  diazepam    Tablet 5 milliGRAM(s) Oral every 8 hours PRN muscle spasm  oxyCODONE    IR 20 milliGRAM(s) Oral every 4 hours PRN Moderate Pain (4 - 6)      Vital Signs Last 24 Hrs  T(C): 37 (24 May 2019 09:57), Max: 37 (24 May 2019 09:57)  T(F): 98.6 (24 May 2019 09:57), Max: 98.6 (24 May 2019 09:57)  HR: 85 (24 May 2019 09:57) (72 - 85)  BP: 134/92 (24 May 2019 09:57) (101/64 - 161/80)  BP(mean): --  RR: 16 (24 May 2019 09:57) (16 - 17)  SpO2: 96% (24 May 2019 09:57) (96% - 96%)    Review of systems:  General : no fever /chills,fatigue  CVS : no chest pain, palpitations  Lungs : no shortness of breath, cough  GI : no abdominal pain,vomiting, diarrhea   : no dysuria,hematuria        PHYSICAL EXAM:  General :NAD  Constitutional:  well-groomed, well-developed  Respiratory: CTAB/L  Cardiovascular: S1 and S2, RRR,murmur  Gastrointestinal: BS+, soft, NT/ND  Extremities: No peripheral edema  Vascular: 2+ peripheral pulses  Skin: back SS wounds clean no dehiscence      LABS:                MICROBIOLOGY:  RECENT CULTURES:        RADIOLOGY & ADDITIONAL STUDIES:

## 2019-05-24 NOTE — CHART NOTE - NSCHARTNOTEFT_GEN_A_CORE
Assessment:   Pt seen for nutrition follow up for inadequate energy intake   Pt adm c sepsis, discitis of multiple sites, endocarditis, hepatitis C, Hx of IVDA, anemia.    Factors impacting intake: [x ] none [ ] nausea  [ ] vomiting [ ] diarrhea [ ] constipation  [ ]chewing problems [ ] swallowing issues  [ ] other:     Diet Prescription: Diet, Regular:   Supplement Feeding Modality:  Oral  Ensure Enlive Cans or Servings Per Day:  1       Frequency:  Three Times a day (05-10-19 @ 10:40)    Intake: >75% of meals & supplement    Current Weight: 05/24/19, 79.2 kg., 02/17/19, 71.3 kg, c wt. gain of 7.9 kg  % Weight Change: 11%  No edema noted, previous edema noted up to 05/12/19 from adm.     Pertinent Medications: MEDICATIONS  (STANDING):  acetaminophen   Tablet .. 1000 milliGRAM(s) Oral every 8 hours  calcium carbonate 1250 mG  + Vitamin D (OsCal 500 + D) 1 Tablet(s) Oral three times a day  celecoxib 100 milliGRAM(s) Oral daily  celecoxib 200 milliGRAM(s) Oral once  chlorhexidine 4% Liquid 1 Application(s) Topical <User Schedule>  DAPTOmycin IVPB 600 milliGRAM(s) IV Intermittent <User Schedule>  gabapentin 800 milliGRAM(s) Oral three times a day  lactobacillus acidophilus 1 Tablet(s) Oral two times a day with meals  lidocaine   Patch 1 Patch Transdermal every 24 hours  lidocaine   Patch 1 Patch Transdermal every 24 hours  methadone    Tablet 60 milliGRAM(s) Oral every 12 hours  multivitamin 1 Tablet(s) Oral daily  naloxegol 25 milliGRAM(s) Oral before breakfast  nicotine -  14 mG/24Hr(s) Patch 1 patch Transdermal daily  nystatin Powder 1 Application(s) Topical three times a day  polyethylene glycol 3350 17 Gram(s) Oral two times a day  rifampin 300 milliGRAM(s) Oral two times a day  senna 2 Tablet(s) Oral at bedtime    MEDICATIONS  (PRN):  bisacodyl Suppository 10 milliGRAM(s) Rectal daily PRN Constipation  cyclobenzaprine 10 milliGRAM(s) Oral three times a day PRN Muscle Spasm  diazepam    Tablet 5 milliGRAM(s) Oral every 8 hours PRN muscle spasm  oxyCODONE    IR 20 milliGRAM(s) Oral every 4 hours PRN Moderate Pain (4 - 6)    Pertinent Labs:  05-20 Phos 6.0 mg/dL<H>     CAPILLARY BLOOD GLUCOSE        Skin: wound of left medial knee noted 03/22/19      Estimated Needs:   [ x] no change since previous assessment( 04/17/19) energy needs 1500-1800kcal (25-30kcal/kg IBW) & protein needs 60-75kg (1.0-1.2gm/kg IBW) & fluid needs 1500-1800ml(25-30ml/kg IBW)    [ ] recalculated:     Previous Nutrition Diagnosis:   [ ] Inadequate Energy Intake [x ]Inadequate Oral Intake [ ] Excessive Energy Intake   [ ] Underweight [ ] Increased Nutrient Needs [ ] Overweight/Obesity   [ ] Altered GI Function [ ] Unintended Weight Loss [ ] Food & Nutrition Related Knowledge Deficit [ ] severe Malnutrition [ ] moderate malnutrition    Etiology: Back pain  Signs/Symptoms: Pt NPO/Clear liquids x 3 days & s/p po intake 50-75% most meals x 1 month  Previous Goal:  Pt to consume  >75% meals; met    Nutrition Diagnosis is [ ] ongoing  [x ] resolved [ ] not applicable     New Nutrition Diagnosis: [x ] not applicable       Interventions:   Recommend  [x] Continue c current diet regimen   [ ] Change Diet To:  [x ] Nutrition Supplement; consider decrease Ensure Enlive 1 x /day=375 calories, 20 grams protein due to good oral intake and elevated phosphorus   [ ] Nutrition Support  [ ] Other:     Monitoring and Evaluation:   [x ] PO intake [ x ] Tolerance to diet prescription [ x ] weights [ x ] labs; phosphorus [ x ] follow up per protocol  [ ] other: Assessment:   Pt seen for nutrition follow up for inadequate energy intake.  Pt adm c sepsis, discitis of multiple sites, endocarditis, hepatitis C, Hx of IVDA, anemia.    Factors impacting intake: [ ] none [ ] nausea  [ ] vomiting [ ] diarrhea [ ] constipation  [ ]chewing problems [ ] swallowing issues  [x ] other: pt c/o being tired of hospital food, has not eaten dinner for few days    Diet Prescription: Diet, Regular:   Supplement Feeding Modality:  Oral  Ensure Enlive Cans or Servings Per Day:  1       Frequency:  Three Times a day (05-10-19 @ 10:40)    Intake: inconsistent oral intake reported, >75% intake documented, pt is consuming  Ensure Enlive 3 x day=1125 calories, 60 grams protein     Current Weight: 05/24/19, 79.2 kg., 02/17/19, 71.3 kg, c wt. gain of 7.9 kg  % Weight Change: 11%  No edema noted, previous edema noted up to 05/12/19 from adm.   ? wt. accuracy    Nutrition focused physical exam conducted;  marks on legs noted; Subcutaneous fat Exam;  [ Mild  ]  Orbital fat pads region,  [ WNL  ]Buccal fat region,  [ WNL  ]triceps region, [ WNL  ]ribs region.  Muscle Exam; [  WNL ]temples region, [ Mild to Moderate  ]clavicle region, [ WNL   ]shoulder region, [ WNL ]Scapula region, [ WNL   ]Interosseous region, [ WNL  ]thigh region, [ WNL   ]Calf region      Pertinent Medications: MEDICATIONS  (STANDING):  acetaminophen   Tablet .. 1000 milliGRAM(s) Oral every 8 hours  calcium carbonate 1250 mG  + Vitamin D (OsCal 500 + D) 1 Tablet(s) Oral three times a day  celecoxib 100 milliGRAM(s) Oral daily  celecoxib 200 milliGRAM(s) Oral once  chlorhexidine 4% Liquid 1 Application(s) Topical <User Schedule>  DAPTOmycin IVPB 600 milliGRAM(s) IV Intermittent <User Schedule>  gabapentin 800 milliGRAM(s) Oral three times a day  lactobacillus acidophilus 1 Tablet(s) Oral two times a day with meals  lidocaine   Patch 1 Patch Transdermal every 24 hours  lidocaine   Patch 1 Patch Transdermal every 24 hours  methadone    Tablet 60 milliGRAM(s) Oral every 12 hours  multivitamin 1 Tablet(s) Oral daily  naloxegol 25 milliGRAM(s) Oral before breakfast  nicotine -  14 mG/24Hr(s) Patch 1 patch Transdermal daily  nystatin Powder 1 Application(s) Topical three times a day  polyethylene glycol 3350 17 Gram(s) Oral two times a day  rifampin 300 milliGRAM(s) Oral two times a day  senna 2 Tablet(s) Oral at bedtime    MEDICATIONS  (PRN):  bisacodyl Suppository 10 milliGRAM(s) Rectal daily PRN Constipation  cyclobenzaprine 10 milliGRAM(s) Oral three times a day PRN Muscle Spasm  diazepam    Tablet 5 milliGRAM(s) Oral every 8 hours PRN muscle spasm  oxyCODONE    IR 20 milliGRAM(s) Oral every 4 hours PRN Moderate Pain (4 - 6)    Pertinent Labs:  05-20 Phos 6.0 mg/dL<H> elevated phosphorus since 05/01/10 noted, 05/07/19 Alb 3.0 g/dL <L>    Skin: wound of left medial knee noted 03/22/19      Estimated Needs:   [ x] no change since previous assessment( 04/17/19) energy needs 1500-1800kcal (25-30kcal/kg IBW) & protein needs 60-75kg (1.0-1.2gm/kg IBW) & fluid needs 1500-1800ml(25-30ml/kg IBW)  [ ] recalculated:     Previous Nutrition Diagnosis:   [ ] Inadequate Energy Intake [x ]Inadequate Oral Intake [ ] Excessive Energy Intake   [ ] Underweight [ ] Increased Nutrient Needs [ ] Overweight/Obesity   [ ] Altered GI Function [ ] Unintended Weight Loss [ ] Food & Nutrition Related Knowledge Deficit [ ] severe Malnutrition [ ] moderate malnutrition    Etiology: Back pain  Signs/Symptoms: Pt NPO/Clear liquids x 3 days & s/p po intake 50-75% most meals x 1 month    Previous Goal:  Pt to consume  >75% meals; not met consistently    Nutrition Diagnosis is [ x] ongoing; improving  [ ] resolved [ ] not applicable     New Nutrition Diagnosis: [x ] not applicable       Interventions:   Recommend  [ ] Change Diet To: low phosphorus   [x ] Nutrition Supplement; decrease Ensure Enlive 2 x day=750 calories, 40 grams protein due to elevated phosphorus   [ ] Nutrition Support  [ ] Other:     Monitoring and Evaluation:   [x ] PO intake [ x ] Tolerance to diet prescription [ x ] weights [ x ] labs; phosphorus [ x ] follow up per protocol  [ ] other:

## 2019-05-24 NOTE — PROGRESS NOTE ADULT - ASSESSMENT
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 She was also diagnosis w/ HCV. Pt was initially intubated due to respiratory failure and put on pressors in ICU. She as extubated on 2/25 and transferred from ICU the following day. Pt had a C4-5 ACDF with irrigation and debridement on 3/20 due to spinal abscess and osteo and was kept intubated post procedure and transferred again to ICU, where she was extubated on 3/21. Pt was subsequently found to have  T11/12 OM discitis with an epidural phlegmon from T9-L1. She was taken to the OR 5/8 for posterior spinal fusion & T8-L2 Laminectomy. She was again transferred to ICU for post op care and was extubated without difficulty on 5/9.     Sepsis due to methicillin resistant Staphylococcus aureus  - new wound cxs grew GPC so ID added Rifampin and increased Dapto dose, but now cx is growing coagulase NEGATIVE staph - may be contaminant  - new blood cxs negative    Discitis of multiple sites of spine  - status post C4-5 ACDF with irrigation and debridement on 3/20  - status post posterior spinal fusion & T8-L2 Laminectomy on 5/8   - ortho following   - must use [place] j collar and TLSO brace at all times with ambulation   - care per surgery   - c/w methadone, Tylenol, flexeril, celebrex, valium, Neurontin, lidocaine patch & PRN oxy  - c/w senna, Miralax, Naloxegol & dulcolax for opiate associated constipation       Endocarditis of tricuspid valve  - repeat echo showed stable vegetation    Hepatitis C antibody test positive  - patient will need to f/u as an outpatient    Hx of IVDU   - pt is on methadone 60 mg q12       Anemia  - likely AOCD   - monitor CBC      Prophylaxis:   DVT: SCD  GI: PO diet

## 2019-05-25 DIAGNOSIS — D63.8 ANEMIA IN OTHER CHRONIC DISEASES CLASSIFIED ELSEWHERE: ICD-10-CM

## 2019-05-25 DIAGNOSIS — F19.90 OTHER PSYCHOACTIVE SUBSTANCE USE, UNSPECIFIED, UNCOMPLICATED: ICD-10-CM

## 2019-05-25 PROCEDURE — 99232 SBSQ HOSP IP/OBS MODERATE 35: CPT

## 2019-05-25 RX ADMIN — Medication 1 TABLET(S): at 11:58

## 2019-05-25 RX ADMIN — NALOXEGOL OXALATE 25 MILLIGRAM(S): 12.5 TABLET, FILM COATED ORAL at 05:33

## 2019-05-25 RX ADMIN — CELECOXIB 100 MILLIGRAM(S): 200 CAPSULE ORAL at 13:04

## 2019-05-25 RX ADMIN — Medication 1 PATCH: at 08:06

## 2019-05-25 RX ADMIN — CELECOXIB 100 MILLIGRAM(S): 200 CAPSULE ORAL at 12:04

## 2019-05-25 RX ADMIN — Medication 1000 MILLIGRAM(S): at 23:04

## 2019-05-25 RX ADMIN — NYSTATIN CREAM 1 APPLICATION(S): 100000 CREAM TOPICAL at 22:22

## 2019-05-25 RX ADMIN — POLYETHYLENE GLYCOL 3350 17 GRAM(S): 17 POWDER, FOR SOLUTION ORAL at 18:36

## 2019-05-25 RX ADMIN — Medication 1000 MILLIGRAM(S): at 17:00

## 2019-05-25 RX ADMIN — SENNA PLUS 2 TABLET(S): 8.6 TABLET ORAL at 22:20

## 2019-05-25 RX ADMIN — Medication 1 TABLET(S): at 15:50

## 2019-05-25 RX ADMIN — NYSTATIN CREAM 1 APPLICATION(S): 100000 CREAM TOPICAL at 05:34

## 2019-05-25 RX ADMIN — OXYCODONE HYDROCHLORIDE 20 MILLIGRAM(S): 5 TABLET ORAL at 21:17

## 2019-05-25 RX ADMIN — Medication 5 MILLIGRAM(S): at 23:08

## 2019-05-25 RX ADMIN — OXYCODONE HYDROCHLORIDE 20 MILLIGRAM(S): 5 TABLET ORAL at 20:50

## 2019-05-25 RX ADMIN — NYSTATIN CREAM 1 APPLICATION(S): 100000 CREAM TOPICAL at 15:51

## 2019-05-25 RX ADMIN — Medication 1 TABLET(S): at 07:47

## 2019-05-25 RX ADMIN — Medication 1 TABLET(S): at 18:35

## 2019-05-25 RX ADMIN — OXYCODONE HYDROCHLORIDE 20 MILLIGRAM(S): 5 TABLET ORAL at 01:17

## 2019-05-25 RX ADMIN — Medication 1 PATCH: at 12:04

## 2019-05-25 RX ADMIN — LIDOCAINE 1 PATCH: 4 CREAM TOPICAL at 20:36

## 2019-05-25 RX ADMIN — Medication 5 MILLIGRAM(S): at 07:47

## 2019-05-25 RX ADMIN — OXYCODONE HYDROCHLORIDE 20 MILLIGRAM(S): 5 TABLET ORAL at 08:47

## 2019-05-25 RX ADMIN — Medication 1 PATCH: at 12:05

## 2019-05-25 RX ADMIN — CHLORHEXIDINE GLUCONATE 1 APPLICATION(S): 213 SOLUTION TOPICAL at 05:34

## 2019-05-25 RX ADMIN — METHADONE HYDROCHLORIDE 60 MILLIGRAM(S): 40 TABLET ORAL at 05:33

## 2019-05-25 RX ADMIN — GABAPENTIN 800 MILLIGRAM(S): 400 CAPSULE ORAL at 05:34

## 2019-05-25 RX ADMIN — METHADONE HYDROCHLORIDE 60 MILLIGRAM(S): 40 TABLET ORAL at 18:34

## 2019-05-25 RX ADMIN — OXYCODONE HYDROCHLORIDE 20 MILLIGRAM(S): 5 TABLET ORAL at 20:39

## 2019-05-25 RX ADMIN — OXYCODONE HYDROCHLORIDE 20 MILLIGRAM(S): 5 TABLET ORAL at 07:47

## 2019-05-25 RX ADMIN — LIDOCAINE 1 PATCH: 4 CREAM TOPICAL at 18:37

## 2019-05-25 RX ADMIN — GABAPENTIN 800 MILLIGRAM(S): 400 CAPSULE ORAL at 15:50

## 2019-05-25 RX ADMIN — GABAPENTIN 800 MILLIGRAM(S): 400 CAPSULE ORAL at 22:20

## 2019-05-25 RX ADMIN — OXYCODONE HYDROCHLORIDE 20 MILLIGRAM(S): 5 TABLET ORAL at 11:58

## 2019-05-25 RX ADMIN — Medication 1 PATCH: at 20:40

## 2019-05-25 RX ADMIN — Medication 1 TABLET(S): at 05:33

## 2019-05-25 RX ADMIN — OXYCODONE HYDROCHLORIDE 20 MILLIGRAM(S): 5 TABLET ORAL at 16:04

## 2019-05-25 RX ADMIN — OXYCODONE HYDROCHLORIDE 20 MILLIGRAM(S): 5 TABLET ORAL at 13:00

## 2019-05-25 RX ADMIN — DAPTOMYCIN 124 MILLIGRAM(S): 500 INJECTION, POWDER, LYOPHILIZED, FOR SOLUTION INTRAVENOUS at 23:06

## 2019-05-25 RX ADMIN — Medication 1000 MILLIGRAM(S): at 05:39

## 2019-05-25 RX ADMIN — Medication 1000 MILLIGRAM(S): at 15:59

## 2019-05-25 RX ADMIN — OXYCODONE HYDROCHLORIDE 20 MILLIGRAM(S): 5 TABLET ORAL at 02:39

## 2019-05-25 RX ADMIN — Medication 1 TABLET(S): at 22:20

## 2019-05-25 NOTE — PROGRESS NOTE ADULT - ASSESSMENT
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 She was also diagnosis w/ HCV. Pt was initially intubated due to respiratory failure and put on pressors in ICU. She as extubated on 2/25 and transferred from ICU the following day. Pt had a C4-5 ACDF with irrigation and debridement on 3/20 due to spinal abscess and osteo and was kept intubated post procedure and transferred again to ICU, where she was extubated on 3/21. Pt was subsequently found to have  T11/12 OM discitis with an epidural phlegmon from T9-L1. She was taken to the OR 5/8 for posterior spinal fusion & T8-L2 Laminectomy. She was again transferred to ICU for post op care and was extubated without difficulty on 5/9.

## 2019-05-25 NOTE — PROGRESS NOTE ADULT - SUBJECTIVE AND OBJECTIVE BOX
Patient is a 43y old  Female who presents with a chief complaint of AMS (24 May 2019 14:38)      OVERNIGHT EVENTS: none     REVIEW OF SYSTEMS: denies chest pain/SOB, diaphoresis, no F/C, cough, dizziness, headache, blurry vision, nausea, vomiting, abdominal pain. All others review of systems negative     MEDICATIONS  (STANDING):  acetaminophen   Tablet .. 1000 milliGRAM(s) Oral every 8 hours  calcium carbonate 1250 mG  + Vitamin D (OsCal 500 + D) 1 Tablet(s) Oral three times a day  celecoxib 100 milliGRAM(s) Oral daily  celecoxib 200 milliGRAM(s) Oral once  chlorhexidine 4% Liquid 1 Application(s) Topical <User Schedule>  DAPTOmycin IVPB 600 milliGRAM(s) IV Intermittent <User Schedule>  gabapentin 800 milliGRAM(s) Oral three times a day  lactobacillus acidophilus 1 Tablet(s) Oral two times a day with meals  lidocaine   Patch 1 Patch Transdermal every 24 hours  lidocaine   Patch 1 Patch Transdermal every 24 hours  methadone    Tablet 60 milliGRAM(s) Oral every 12 hours  multivitamin 1 Tablet(s) Oral daily  naloxegol 25 milliGRAM(s) Oral before breakfast  nicotine -  14 mG/24Hr(s) Patch 1 patch Transdermal daily  nystatin Powder 1 Application(s) Topical three times a day  polyethylene glycol 3350 17 Gram(s) Oral two times a day  rifampin 300 milliGRAM(s) Oral two times a day  senna 2 Tablet(s) Oral at bedtime    MEDICATIONS  (PRN):  bisacodyl Suppository 10 milliGRAM(s) Rectal daily PRN Constipation  cyclobenzaprine 10 milliGRAM(s) Oral three times a day PRN Muscle Spasm  diazepam    Tablet 5 milliGRAM(s) Oral every 8 hours PRN muscle spasm  oxyCODONE    IR 20 milliGRAM(s) Oral every 4 hours PRN Moderate Pain (4 - 6)      Allergies    penicillin (Short breath; Hives)    Intolerances        T(F): 97 (05-25-19 @ 05:08), Max: 98.2 (05-24-19 @ 17:19)  HR: 79 (05-25-19 @ 05:08) (76 - 80)  BP: 119/70 (05-25-19 @ 05:08) (98/58 - 140/91)  RR: 17 (05-25-19 @ 05:08) (17 - 17)  SpO2: 98% (05-25-19 @ 05:08) (97% - 98%)  Wt(kg): --    PHYSICAL EXAM:  GENERAL: NAD, well-groomed, well-developed  HEAD:  Atraumatic, Normocephalic  EYES: EOMI, PERRLA, conjunctiva and sclera clear  ENMT: Moist mucous membranes, Good dentition, No lesions  NECK:  [place] j collar and TLSO brace  NERVOUS SYSTEM:  Alert & Oriented X3, Good concentration; Motor Strength 5/5 B/L upper and lower extremities; DTRs 2+ intact and symmetric  CHEST/LUNG: Clear to percussion bilaterally; No rales, rhonchi, wheezing, or rubs BL  HEART: Regular rate and rhythm; No murmurs, rubs, or gallops  ABDOMEN: Soft, Nontender, Nondistended; Bowel sounds present  EXTREMITIES:  2+ Peripheral Pulses, No clubbing, cyanosis, or edema BL LE  LYMPH: No lymphadenopathy noted  SKIN: No rashes or lesions    LABS:              Cultures;   CAPILLARY BLOOD GLUCOSE        RADIOLOGY & ADDITIONAL TESTS:    Imaging Personally Reviewed:  [x ] YES    < from: TTE Limited Echo w/o Cont (04.02.19 @ 18:33) >  Possible vegitation on tricuspid valve looks same as before 03/ 22/2019    < end of copied text >    Consultant(s) Notes Reviewed:  [x ] YES

## 2019-05-25 NOTE — PROGRESS NOTE ADULT - PROBLEM SELECTOR PLAN 1
- new wound cxs grew GPC so ID added Rifampin and increased Dapto dose, but now cx is growing coagulase NEGATIVE staph - may be contaminant  - new blood cxs negative

## 2019-05-25 NOTE — PROGRESS NOTE ADULT - PROBLEM SELECTOR PLAN 2
- status post C4-5 ACDF with irrigation and debridement on 3/20  - status post posterior spinal fusion & T8-L2 Laminectomy on 5/8   - ortho following   - must use [place] j collar and TLSO brace at all times with ambulation   - care per surgery   - c/w methadone, Tylenol, flexeril, celebrex, valium, Neurontin, lidocaine patch & PRN oxy  - c/w senna, Miralax, Naloxegol & dulcolax for opiate associated constipation

## 2019-05-26 PROCEDURE — 99232 SBSQ HOSP IP/OBS MODERATE 35: CPT

## 2019-05-26 RX ADMIN — OXYCODONE HYDROCHLORIDE 20 MILLIGRAM(S): 5 TABLET ORAL at 08:43

## 2019-05-26 RX ADMIN — OXYCODONE HYDROCHLORIDE 20 MILLIGRAM(S): 5 TABLET ORAL at 01:31

## 2019-05-26 RX ADMIN — LIDOCAINE 1 PATCH: 4 CREAM TOPICAL at 06:36

## 2019-05-26 RX ADMIN — GABAPENTIN 800 MILLIGRAM(S): 400 CAPSULE ORAL at 13:27

## 2019-05-26 RX ADMIN — CELECOXIB 100 MILLIGRAM(S): 200 CAPSULE ORAL at 12:47

## 2019-05-26 RX ADMIN — Medication 1 PATCH: at 12:46

## 2019-05-26 RX ADMIN — OXYCODONE HYDROCHLORIDE 20 MILLIGRAM(S): 5 TABLET ORAL at 00:58

## 2019-05-26 RX ADMIN — OXYCODONE HYDROCHLORIDE 20 MILLIGRAM(S): 5 TABLET ORAL at 07:43

## 2019-05-26 RX ADMIN — POLYETHYLENE GLYCOL 3350 17 GRAM(S): 17 POWDER, FOR SOLUTION ORAL at 06:49

## 2019-05-26 RX ADMIN — LIDOCAINE 1 PATCH: 4 CREAM TOPICAL at 17:56

## 2019-05-26 RX ADMIN — Medication 1 PATCH: at 07:54

## 2019-05-26 RX ADMIN — METHADONE HYDROCHLORIDE 60 MILLIGRAM(S): 40 TABLET ORAL at 06:47

## 2019-05-26 RX ADMIN — Medication 1 TABLET(S): at 13:27

## 2019-05-26 RX ADMIN — LIDOCAINE 1 PATCH: 4 CREAM TOPICAL at 19:00

## 2019-05-26 RX ADMIN — Medication 1 TABLET(S): at 21:41

## 2019-05-26 RX ADMIN — Medication 1000 MILLIGRAM(S): at 14:39

## 2019-05-26 RX ADMIN — METHADONE HYDROCHLORIDE 60 MILLIGRAM(S): 40 TABLET ORAL at 18:00

## 2019-05-26 RX ADMIN — Medication 1 TABLET(S): at 07:43

## 2019-05-26 RX ADMIN — Medication 1000 MILLIGRAM(S): at 06:51

## 2019-05-26 RX ADMIN — NALOXEGOL OXALATE 25 MILLIGRAM(S): 12.5 TABLET, FILM COATED ORAL at 07:43

## 2019-05-26 RX ADMIN — OXYCODONE HYDROCHLORIDE 20 MILLIGRAM(S): 5 TABLET ORAL at 13:45

## 2019-05-26 RX ADMIN — LIDOCAINE 1 PATCH: 4 CREAM TOPICAL at 18:05

## 2019-05-26 RX ADMIN — CHLORHEXIDINE GLUCONATE 1 APPLICATION(S): 213 SOLUTION TOPICAL at 06:47

## 2019-05-26 RX ADMIN — LIDOCAINE 1 PATCH: 4 CREAM TOPICAL at 06:48

## 2019-05-26 RX ADMIN — Medication 1000 MILLIGRAM(S): at 15:39

## 2019-05-26 RX ADMIN — Medication 1 TABLET(S): at 16:28

## 2019-05-26 RX ADMIN — CELECOXIB 100 MILLIGRAM(S): 200 CAPSULE ORAL at 13:47

## 2019-05-26 RX ADMIN — GABAPENTIN 800 MILLIGRAM(S): 400 CAPSULE ORAL at 06:48

## 2019-05-26 RX ADMIN — Medication 1000 MILLIGRAM(S): at 00:38

## 2019-05-26 RX ADMIN — Medication 5 MILLIGRAM(S): at 23:01

## 2019-05-26 RX ADMIN — Medication 1000 MILLIGRAM(S): at 22:30

## 2019-05-26 RX ADMIN — Medication 1000 MILLIGRAM(S): at 21:51

## 2019-05-26 RX ADMIN — Medication 1 PATCH: at 20:53

## 2019-05-26 RX ADMIN — CELECOXIB 200 MILLIGRAM(S): 200 CAPSULE ORAL at 06:49

## 2019-05-26 RX ADMIN — Medication 1 TABLET(S): at 06:47

## 2019-05-26 RX ADMIN — NYSTATIN CREAM 1 APPLICATION(S): 100000 CREAM TOPICAL at 06:46

## 2019-05-26 RX ADMIN — Medication 5 MILLIGRAM(S): at 12:46

## 2019-05-26 RX ADMIN — SENNA PLUS 2 TABLET(S): 8.6 TABLET ORAL at 21:41

## 2019-05-26 RX ADMIN — OXYCODONE HYDROCHLORIDE 20 MILLIGRAM(S): 5 TABLET ORAL at 12:45

## 2019-05-26 RX ADMIN — OXYCODONE HYDROCHLORIDE 20 MILLIGRAM(S): 5 TABLET ORAL at 23:01

## 2019-05-26 RX ADMIN — POLYETHYLENE GLYCOL 3350 17 GRAM(S): 17 POWDER, FOR SOLUTION ORAL at 17:55

## 2019-05-26 RX ADMIN — LIDOCAINE 1 PATCH: 4 CREAM TOPICAL at 18:04

## 2019-05-26 RX ADMIN — DAPTOMYCIN 124 MILLIGRAM(S): 500 INJECTION, POWDER, LYOPHILIZED, FOR SOLUTION INTRAVENOUS at 21:40

## 2019-05-26 RX ADMIN — Medication 1 TABLET(S): at 12:45

## 2019-05-26 RX ADMIN — GABAPENTIN 800 MILLIGRAM(S): 400 CAPSULE ORAL at 21:41

## 2019-05-26 NOTE — PROGRESS NOTE ADULT - PROBLEM SELECTOR PLAN 2
- status post C4-5 ACDF with irrigation and debridement on 3/20  - status post posterior spinal fusion & T8-L2 Laminectomy on 5/8   - ortho following   - must use [place] j collar and TLSO brace at all times with ambulation   - care per surgery   - c/w methadone, Tylenol, flexeril, celebrex, valium, Neurontin, lidocaine patch & PRN oxy  - c/w senna, Miralax, Naloxegol & dulcolax for opiate associated constipation

## 2019-05-26 NOTE — PROGRESS NOTE ADULT - SUBJECTIVE AND OBJECTIVE BOX
Patient is a 43y old  Female who presents with a chief complaint of AMS (25 May 2019 11:09)      OVERNIGHT EVENTS: none    REVIEW OF SYSTEMS: denies chest pain/SOB, diaphoresis, no F/C, cough, dizziness, headache, blurry vision, nausea, vomiting, abdominal pain. All others review of systems negative     MEDICATIONS  (STANDING):  acetaminophen   Tablet .. 1000 milliGRAM(s) Oral every 8 hours  calcium carbonate 1250 mG  + Vitamin D (OsCal 500 + D) 1 Tablet(s) Oral three times a day  celecoxib 100 milliGRAM(s) Oral daily  chlorhexidine 4% Liquid 1 Application(s) Topical <User Schedule>  DAPTOmycin IVPB 600 milliGRAM(s) IV Intermittent <User Schedule>  gabapentin 800 milliGRAM(s) Oral three times a day  lactobacillus acidophilus 1 Tablet(s) Oral two times a day with meals  lidocaine   Patch 1 Patch Transdermal every 24 hours  lidocaine   Patch 1 Patch Transdermal every 24 hours  methadone    Tablet 60 milliGRAM(s) Oral every 12 hours  multivitamin 1 Tablet(s) Oral daily  naloxegol 25 milliGRAM(s) Oral before breakfast  nicotine -  14 mG/24Hr(s) Patch 1 patch Transdermal daily  nystatin Powder 1 Application(s) Topical three times a day  polyethylene glycol 3350 17 Gram(s) Oral two times a day  rifampin 300 milliGRAM(s) Oral two times a day  senna 2 Tablet(s) Oral at bedtime    MEDICATIONS  (PRN):  bisacodyl Suppository 10 milliGRAM(s) Rectal daily PRN Constipation  cyclobenzaprine 10 milliGRAM(s) Oral three times a day PRN Muscle Spasm  diazepam    Tablet 5 milliGRAM(s) Oral every 8 hours PRN muscle spasm  oxyCODONE    IR 20 milliGRAM(s) Oral every 4 hours PRN Moderate Pain (4 - 6)      Allergies    penicillin (Short breath; Hives)    Intolerances        T(F): 97.4 (05-26-19 @ 06:06), Max: 97.6 (05-25-19 @ 11:24)  HR: 81 (05-26-19 @ 06:06) (78 - 83)  BP: 103/74 (05-26-19 @ 06:06) (103/74 - 122/81)  RR: 18 (05-26-19 @ 06:06) (16 - 18)  SpO2: 98% (05-26-19 @ 06:06) (98% - 100%)  Wt(kg): --    PHYSICAL EXAM:  GENERAL: NAD, well-groomed, well-developed  HEAD:  Atraumatic, Normocephalic  EYES: EOMI, PERRLA, conjunctiva and sclera clear  ENMT: Moist mucous membranes, Good dentition, No lesions  NECK:  [place] j collar and TLSO brace  NERVOUS SYSTEM:  Alert & Oriented X3, Good concentration; Motor Strength 5/5 B/L upper and lower extremities; DTRs 2+ intact and symmetric  CHEST/LUNG: Clear to percussion bilaterally; No rales, rhonchi, wheezing, or rubs BL  HEART: Regular rate and rhythm; No murmurs, rubs, or gallops  ABDOMEN: Soft, Nontender, Nondistended; Bowel sounds present  EXTREMITIES:  2+ Peripheral Pulses, No clubbing, cyanosis, or edema BL LE  LYMPH: No lymphadenopathy noted  SKIN: No rashes or lesions      LABS:              Cultures;   CAPILLARY BLOOD GLUCOSE        Lipid panel:           RADIOLOGY & ADDITIONAL TESTS:    Imaging Personally Reviewed:  [ ] YES      Consultant(s) Notes Reviewed:  [ ] YES     Care Discussed with [ ] Consultants [X ] Patient [ ] Family  [x ]    [x ]  Other; RN

## 2019-05-27 LAB
ANION GAP SERPL CALC-SCNC: 10 MMOL/L — SIGNIFICANT CHANGE UP (ref 5–17)
BUN SERPL-MCNC: 23 MG/DL — SIGNIFICANT CHANGE UP (ref 7–23)
CALCIUM SERPL-MCNC: 9.5 MG/DL — SIGNIFICANT CHANGE UP (ref 8.5–10.1)
CHLORIDE SERPL-SCNC: 105 MMOL/L — SIGNIFICANT CHANGE UP (ref 96–108)
CO2 SERPL-SCNC: 26 MMOL/L — SIGNIFICANT CHANGE UP (ref 22–31)
CREAT SERPL-MCNC: 0.76 MG/DL — SIGNIFICANT CHANGE UP (ref 0.5–1.3)
GLUCOSE SERPL-MCNC: 133 MG/DL — HIGH (ref 70–99)
POTASSIUM SERPL-MCNC: 4.3 MMOL/L — SIGNIFICANT CHANGE UP (ref 3.5–5.3)
POTASSIUM SERPL-SCNC: 4.3 MMOL/L — SIGNIFICANT CHANGE UP (ref 3.5–5.3)
SODIUM SERPL-SCNC: 141 MMOL/L — SIGNIFICANT CHANGE UP (ref 135–145)

## 2019-05-27 PROCEDURE — 99232 SBSQ HOSP IP/OBS MODERATE 35: CPT

## 2019-05-27 RX ORDER — OXYCODONE HYDROCHLORIDE 5 MG/1
20 TABLET ORAL EVERY 4 HOURS
Refills: 0 | Status: DISCONTINUED | OUTPATIENT
Start: 2019-05-27 | End: 2019-06-03

## 2019-05-27 RX ADMIN — METHADONE HYDROCHLORIDE 60 MILLIGRAM(S): 40 TABLET ORAL at 05:51

## 2019-05-27 RX ADMIN — OXYCODONE HYDROCHLORIDE 20 MILLIGRAM(S): 5 TABLET ORAL at 13:16

## 2019-05-27 RX ADMIN — OXYCODONE HYDROCHLORIDE 20 MILLIGRAM(S): 5 TABLET ORAL at 00:00

## 2019-05-27 RX ADMIN — OXYCODONE HYDROCHLORIDE 20 MILLIGRAM(S): 5 TABLET ORAL at 12:16

## 2019-05-27 RX ADMIN — CELECOXIB 100 MILLIGRAM(S): 200 CAPSULE ORAL at 13:16

## 2019-05-27 RX ADMIN — DAPTOMYCIN 124 MILLIGRAM(S): 500 INJECTION, POWDER, LYOPHILIZED, FOR SOLUTION INTRAVENOUS at 22:39

## 2019-05-27 RX ADMIN — GABAPENTIN 800 MILLIGRAM(S): 400 CAPSULE ORAL at 22:39

## 2019-05-27 RX ADMIN — Medication 1000 MILLIGRAM(S): at 15:06

## 2019-05-27 RX ADMIN — OXYCODONE HYDROCHLORIDE 20 MILLIGRAM(S): 5 TABLET ORAL at 23:39

## 2019-05-27 RX ADMIN — POLYETHYLENE GLYCOL 3350 17 GRAM(S): 17 POWDER, FOR SOLUTION ORAL at 06:11

## 2019-05-27 RX ADMIN — Medication 1 PATCH: at 18:15

## 2019-05-27 RX ADMIN — LIDOCAINE 1 PATCH: 4 CREAM TOPICAL at 19:48

## 2019-05-27 RX ADMIN — Medication 1 TABLET(S): at 15:05

## 2019-05-27 RX ADMIN — Medication 1 PATCH: at 19:49

## 2019-05-27 RX ADMIN — LIDOCAINE 1 PATCH: 4 CREAM TOPICAL at 17:43

## 2019-05-27 RX ADMIN — POLYETHYLENE GLYCOL 3350 17 GRAM(S): 17 POWDER, FOR SOLUTION ORAL at 17:37

## 2019-05-27 RX ADMIN — Medication 1 TABLET(S): at 07:54

## 2019-05-27 RX ADMIN — Medication 5 MILLIGRAM(S): at 23:40

## 2019-05-27 RX ADMIN — Medication 1 TABLET(S): at 12:16

## 2019-05-27 RX ADMIN — Medication 1000 MILLIGRAM(S): at 16:06

## 2019-05-27 RX ADMIN — Medication 1000 MILLIGRAM(S): at 06:10

## 2019-05-27 RX ADMIN — SENNA PLUS 2 TABLET(S): 8.6 TABLET ORAL at 22:39

## 2019-05-27 RX ADMIN — LIDOCAINE 1 PATCH: 4 CREAM TOPICAL at 17:38

## 2019-05-27 RX ADMIN — CELECOXIB 100 MILLIGRAM(S): 200 CAPSULE ORAL at 12:16

## 2019-05-27 RX ADMIN — Medication 1000 MILLIGRAM(S): at 07:13

## 2019-05-27 RX ADMIN — Medication 1 TABLET(S): at 22:39

## 2019-05-27 RX ADMIN — GABAPENTIN 800 MILLIGRAM(S): 400 CAPSULE ORAL at 05:51

## 2019-05-27 RX ADMIN — Medication 5 MILLIGRAM(S): at 07:54

## 2019-05-27 RX ADMIN — METHADONE HYDROCHLORIDE 60 MILLIGRAM(S): 40 TABLET ORAL at 17:37

## 2019-05-27 RX ADMIN — OXYCODONE HYDROCHLORIDE 20 MILLIGRAM(S): 5 TABLET ORAL at 08:53

## 2019-05-27 RX ADMIN — Medication 1 TABLET(S): at 05:51

## 2019-05-27 RX ADMIN — LIDOCAINE 1 PATCH: 4 CREAM TOPICAL at 05:51

## 2019-05-27 RX ADMIN — Medication 1 TABLET(S): at 17:37

## 2019-05-27 RX ADMIN — OXYCODONE HYDROCHLORIDE 20 MILLIGRAM(S): 5 TABLET ORAL at 07:53

## 2019-05-27 RX ADMIN — Medication 1 PATCH: at 12:16

## 2019-05-27 RX ADMIN — OXYCODONE HYDROCHLORIDE 20 MILLIGRAM(S): 5 TABLET ORAL at 22:39

## 2019-05-27 RX ADMIN — LIDOCAINE 1 PATCH: 4 CREAM TOPICAL at 05:45

## 2019-05-27 RX ADMIN — GABAPENTIN 800 MILLIGRAM(S): 400 CAPSULE ORAL at 15:05

## 2019-05-27 RX ADMIN — NALOXEGOL OXALATE 25 MILLIGRAM(S): 12.5 TABLET, FILM COATED ORAL at 07:54

## 2019-05-27 NOTE — PROGRESS NOTE ADULT - SUBJECTIVE AND OBJECTIVE BOX
Patient is a 43y old  Female who presents with a chief complaint of AMS (26 May 2019 10:24)      OVERNIGHT EVENTS:  none     REVIEW OF SYSTEMS: denies chest pain/SOB, diaphoresis, no F/C, cough, dizziness, headache, blurry vision, nausea, vomiting, abdominal pain. All others review of systems negative     MEDICATIONS  (STANDING):  acetaminophen   Tablet .. 1000 milliGRAM(s) Oral every 8 hours  calcium carbonate 1250 mG  + Vitamin D (OsCal 500 + D) 1 Tablet(s) Oral three times a day  celecoxib 100 milliGRAM(s) Oral daily  chlorhexidine 4% Liquid 1 Application(s) Topical <User Schedule>  DAPTOmycin IVPB 600 milliGRAM(s) IV Intermittent <User Schedule>  gabapentin 800 milliGRAM(s) Oral three times a day  lactobacillus acidophilus 1 Tablet(s) Oral two times a day with meals  lidocaine   Patch 1 Patch Transdermal every 24 hours  lidocaine   Patch 1 Patch Transdermal every 24 hours  methadone    Tablet 60 milliGRAM(s) Oral every 12 hours  multivitamin 1 Tablet(s) Oral daily  naloxegol 25 milliGRAM(s) Oral before breakfast  nicotine -  14 mG/24Hr(s) Patch 1 patch Transdermal daily  nystatin Powder 1 Application(s) Topical three times a day  polyethylene glycol 3350 17 Gram(s) Oral two times a day  rifampin 300 milliGRAM(s) Oral two times a day  senna 2 Tablet(s) Oral at bedtime    MEDICATIONS  (PRN):  bisacodyl Suppository 10 milliGRAM(s) Rectal daily PRN Constipation  cyclobenzaprine 10 milliGRAM(s) Oral three times a day PRN Muscle Spasm  diazepam    Tablet 5 milliGRAM(s) Oral every 8 hours PRN muscle spasm  oxyCODONE    IR 20 milliGRAM(s) Oral every 4 hours PRN Moderate Pain (4 - 6)      Allergies    penicillin (Short breath; Hives)    Intolerances        T(F): 97.7 (05-27-19 @ 04:40), Max: 98.1 (05-26-19 @ 18:11)  HR: 73 (05-27-19 @ 04:40) (73 - 87)  BP: 103/72 (05-27-19 @ 04:40) (102/63 - 106/66)  RR: 19 (05-27-19 @ 04:40) (17 - 19)  SpO2: 98% (05-27-19 @ 04:40) (98% - 99%)  Wt(kg): --    PHYSICAL EXAM:  GENERAL: NAD, well-groomed, well-developed  HEAD:  Atraumatic, Normocephalic  EYES: EOMI, PERRLA, conjunctiva and sclera clear  ENMT: Moist mucous membranes, Good dentition, No lesions  NECK:  [place] j collar and TLSO brace  NERVOUS SYSTEM:  Alert & Oriented X3, Good concentration; Motor Strength 5/5 B/L upper and lower extremities; DTRs 2+ intact and symmetric  CHEST/LUNG: Clear to percussion bilaterally; No rales, rhonchi, wheezing, or rubs BL  HEART: Regular rate and rhythm; No murmurs, rubs, or gallops  ABDOMEN: Soft, Nontender, Nondistended; Bowel sounds present  EXTREMITIES:  2+ Peripheral Pulses, No clubbing, cyanosis, or edema BL LE  LYMPH: No lymphadenopathy noted  SKIN: No rashes or lesions    LABS:    05-27    141  |  105  |  23  ----------------------------<  133<H>  4.3   |  26  |  0.76    Ca    9.5      27 May 2019 10:24          Cultures;   CAPILLARY BLOOD GLUCOSE      RADIOLOGY & ADDITIONAL TESTS:    Imaging Personally Reviewed:  [x ] YES    < from: TTE Limited Echo w/o Cont (04.02.19 @ 18:33) >  Possible vegitation on tricuspid valve looks same as before 03/ 22/2019    < end of copied text >    Consultant(s) Notes Reviewed:  [x ] YES     Care Discussed with [x ] Consultants [X ] Patient [ ] Family  [x ]    [x ]  Other; RN

## 2019-05-27 NOTE — PROGRESS NOTE ADULT - PROBLEM SELECTOR PLAN 2
- status post C4-5 ACDF with irrigation and debridement on 3/20  - status post posterior spinal fusion & T8-L2 Laminectomy on 5/8   - ortho following   - must use [place] j collar and TLSO brace at all times with ambulation   - care per surgery   - c/w methadone, Tylenol, flexeril, celebrex, valium, Neurontin, lidocaine patch & PRN oxy  - c/w senna, Miralax, Naloxegol & dulcolax for opiate associated constipation

## 2019-05-28 LAB
ANION GAP SERPL CALC-SCNC: 8 MMOL/L — SIGNIFICANT CHANGE UP (ref 5–17)
BUN SERPL-MCNC: 25 MG/DL — HIGH (ref 7–23)
CALCIUM SERPL-MCNC: 10.5 MG/DL — HIGH (ref 8.5–10.1)
CHLORIDE SERPL-SCNC: 99 MMOL/L — SIGNIFICANT CHANGE UP (ref 96–108)
CO2 SERPL-SCNC: 28 MMOL/L — SIGNIFICANT CHANGE UP (ref 22–31)
CREAT SERPL-MCNC: 0.6 MG/DL — SIGNIFICANT CHANGE UP (ref 0.5–1.3)
GLUCOSE SERPL-MCNC: 112 MG/DL — HIGH (ref 70–99)
HCT VFR BLD CALC: 33.8 % — LOW (ref 34.5–45)
HGB BLD-MCNC: 10.5 G/DL — LOW (ref 11.5–15.5)
MCHC RBC-ENTMCNC: 25.5 PG — LOW (ref 27–34)
MCHC RBC-ENTMCNC: 31.1 GM/DL — LOW (ref 32–36)
MCV RBC AUTO: 82.2 FL — SIGNIFICANT CHANGE UP (ref 80–100)
NRBC # BLD: 0 /100 WBCS — SIGNIFICANT CHANGE UP (ref 0–0)
PLATELET # BLD AUTO: 316 K/UL — SIGNIFICANT CHANGE UP (ref 150–400)
POTASSIUM SERPL-MCNC: 4.7 MMOL/L — SIGNIFICANT CHANGE UP (ref 3.5–5.3)
POTASSIUM SERPL-SCNC: 4.7 MMOL/L — SIGNIFICANT CHANGE UP (ref 3.5–5.3)
RBC # BLD: 4.11 M/UL — SIGNIFICANT CHANGE UP (ref 3.8–5.2)
RBC # FLD: 16.2 % — HIGH (ref 10.3–14.5)
SODIUM SERPL-SCNC: 135 MMOL/L — SIGNIFICANT CHANGE UP (ref 135–145)
WBC # BLD: 6.06 K/UL — SIGNIFICANT CHANGE UP (ref 3.8–10.5)
WBC # FLD AUTO: 6.06 K/UL — SIGNIFICANT CHANGE UP (ref 3.8–10.5)

## 2019-05-28 PROCEDURE — 99233 SBSQ HOSP IP/OBS HIGH 50: CPT

## 2019-05-28 RX ORDER — METHADONE HYDROCHLORIDE 40 MG/1
60 TABLET ORAL EVERY 12 HOURS
Refills: 0 | Status: DISCONTINUED | OUTPATIENT
Start: 2019-05-29 | End: 2019-06-04

## 2019-05-28 RX ADMIN — OXYCODONE HYDROCHLORIDE 20 MILLIGRAM(S): 5 TABLET ORAL at 07:24

## 2019-05-28 RX ADMIN — LIDOCAINE 1 PATCH: 4 CREAM TOPICAL at 17:52

## 2019-05-28 RX ADMIN — Medication 5 MILLIGRAM(S): at 20:26

## 2019-05-28 RX ADMIN — OXYCODONE HYDROCHLORIDE 20 MILLIGRAM(S): 5 TABLET ORAL at 21:26

## 2019-05-28 RX ADMIN — OXYCODONE HYDROCHLORIDE 20 MILLIGRAM(S): 5 TABLET ORAL at 20:26

## 2019-05-28 RX ADMIN — LIDOCAINE 1 PATCH: 4 CREAM TOPICAL at 06:03

## 2019-05-28 RX ADMIN — POLYETHYLENE GLYCOL 3350 17 GRAM(S): 17 POWDER, FOR SOLUTION ORAL at 06:58

## 2019-05-28 RX ADMIN — Medication 1 TABLET(S): at 07:27

## 2019-05-28 RX ADMIN — Medication 1 PATCH: at 06:48

## 2019-05-28 RX ADMIN — SENNA PLUS 2 TABLET(S): 8.6 TABLET ORAL at 21:36

## 2019-05-28 RX ADMIN — Medication 1 PATCH: at 11:10

## 2019-05-28 RX ADMIN — NALOXEGOL OXALATE 25 MILLIGRAM(S): 12.5 TABLET, FILM COATED ORAL at 06:02

## 2019-05-28 RX ADMIN — DAPTOMYCIN 124 MILLIGRAM(S): 500 INJECTION, POWDER, LYOPHILIZED, FOR SOLUTION INTRAVENOUS at 21:49

## 2019-05-28 RX ADMIN — Medication 1 TABLET(S): at 06:03

## 2019-05-28 RX ADMIN — METHADONE HYDROCHLORIDE 60 MILLIGRAM(S): 40 TABLET ORAL at 17:51

## 2019-05-28 RX ADMIN — OXYCODONE HYDROCHLORIDE 20 MILLIGRAM(S): 5 TABLET ORAL at 16:55

## 2019-05-28 RX ADMIN — LIDOCAINE 1 PATCH: 4 CREAM TOPICAL at 06:49

## 2019-05-28 RX ADMIN — OXYCODONE HYDROCHLORIDE 20 MILLIGRAM(S): 5 TABLET ORAL at 11:36

## 2019-05-28 RX ADMIN — GABAPENTIN 800 MILLIGRAM(S): 400 CAPSULE ORAL at 21:36

## 2019-05-28 RX ADMIN — NYSTATIN CREAM 1 APPLICATION(S): 100000 CREAM TOPICAL at 21:40

## 2019-05-28 RX ADMIN — Medication 1 TABLET(S): at 14:11

## 2019-05-28 RX ADMIN — Medication 1 TABLET(S): at 21:37

## 2019-05-28 RX ADMIN — CELECOXIB 100 MILLIGRAM(S): 200 CAPSULE ORAL at 11:10

## 2019-05-28 RX ADMIN — Medication 1 TABLET(S): at 11:10

## 2019-05-28 RX ADMIN — Medication 5 MILLIGRAM(S): at 07:24

## 2019-05-28 RX ADMIN — Medication 1 PATCH: at 19:39

## 2019-05-28 RX ADMIN — OXYCODONE HYDROCHLORIDE 20 MILLIGRAM(S): 5 TABLET ORAL at 16:18

## 2019-05-28 RX ADMIN — Medication 1000 MILLIGRAM(S): at 06:00

## 2019-05-28 RX ADMIN — GABAPENTIN 800 MILLIGRAM(S): 400 CAPSULE ORAL at 06:03

## 2019-05-28 RX ADMIN — POLYETHYLENE GLYCOL 3350 17 GRAM(S): 17 POWDER, FOR SOLUTION ORAL at 17:52

## 2019-05-28 RX ADMIN — GABAPENTIN 800 MILLIGRAM(S): 400 CAPSULE ORAL at 14:12

## 2019-05-28 RX ADMIN — OXYCODONE HYDROCHLORIDE 20 MILLIGRAM(S): 5 TABLET ORAL at 04:05

## 2019-05-28 RX ADMIN — Medication 1000 MILLIGRAM(S): at 21:36

## 2019-05-28 RX ADMIN — METHADONE HYDROCHLORIDE 60 MILLIGRAM(S): 40 TABLET ORAL at 06:02

## 2019-05-28 RX ADMIN — LIDOCAINE 1 PATCH: 4 CREAM TOPICAL at 06:00

## 2019-05-28 RX ADMIN — Medication 1000 MILLIGRAM(S): at 06:59

## 2019-05-28 RX ADMIN — LIDOCAINE 1 PATCH: 4 CREAM TOPICAL at 17:53

## 2019-05-28 RX ADMIN — OXYCODONE HYDROCHLORIDE 20 MILLIGRAM(S): 5 TABLET ORAL at 03:05

## 2019-05-28 RX ADMIN — LIDOCAINE 1 PATCH: 4 CREAM TOPICAL at 19:39

## 2019-05-28 RX ADMIN — OXYCODONE HYDROCHLORIDE 20 MILLIGRAM(S): 5 TABLET ORAL at 12:15

## 2019-05-28 RX ADMIN — Medication 1000 MILLIGRAM(S): at 14:17

## 2019-05-28 RX ADMIN — Medication 1000 MILLIGRAM(S): at 22:36

## 2019-05-28 RX ADMIN — CELECOXIB 100 MILLIGRAM(S): 200 CAPSULE ORAL at 12:15

## 2019-05-28 RX ADMIN — CHLORHEXIDINE GLUCONATE 1 APPLICATION(S): 213 SOLUTION TOPICAL at 07:29

## 2019-05-28 RX ADMIN — Medication 1 TABLET(S): at 17:51

## 2019-05-28 RX ADMIN — Medication 1000 MILLIGRAM(S): at 15:00

## 2019-05-28 NOTE — PROGRESS NOTE ADULT - ASSESSMENT
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 She was also diagnosis w/ HCV. Pt was initially intubated due to respiratory failure and put on pressors in ICU. She as extubated on 2/25 and transferred from ICU the following day. Pt had a C4-5 ACDF with irrigation and debridement on 3/20 due to spinal abscess and osteo and was kept intubated post procedure and transferred again to ICU, where she was extubated on 3/21. Pt was subsequently found to have  T11/12 OM discitis with an epidural phlegmon from T9-L1. She was taken to the OR 5/8 for posterior spinal fusion & T8-L2 Laminectomy. She was again transferred to ICU for post op care and was extubated without difficulty on 5/9.     Discitis of multiple sites of spine w/ sepsis POA due to methicillin resistant Staphylococcus aureus  - OR cxs grew GPC so ID added Rifampin and has c/w Dapto, but they were subsequently found to be growing coagulase NEGATIVE staph & likely a contaminant as repeat blood cxs were negative  - as per ID, will repeat MRI in the week of June 3rd  and change to oral at that point if possible  - status post C4-5 ACDF with irrigation and debridement on 3/20  - status post posterior spinal fusion & T8-L2 Laminectomy on 5/8   - ortho following   - must use [place] j collar and TLSO brace at all times with ambulation   - care per surgery   - c/w methadone, Tylenol, flexeril, celebrex, valium, Neurontin, lidocaine patch & PRN oxy - I asked her about increasing the methadone and dropping the oxy, but the patient has asked that oxy be kept the same (suspect possible drug seeking behavior)  - patient asked that I increase her oxy back to Q4H so I have increased it back to Q4H but told her I will lower oxy to 20mg - I offered to increase the methadone dose but she declined - this suggests a med seeking component   - c/w senna, Miralax, Naloxegol & dulcolax for opiate associated constipation   - KIRILL taken out       Endocarditis of tricuspid valve  - repeat echo showed stable vegetation    Hepatitis C antibody test positive  - patient will need to f/u as an outpatient    Hx of IVDU   - pt is on methadone 60 mg q12       Anemia  - likely AOCD   - monitor CBC    Prophylaxis:   DVT: SCD  GI: PO diet

## 2019-05-28 NOTE — PHARMACY COMMUNICATION NOTE - COMMENTS
Informed Dr. Dallas patient's methadone order dropped off for today. He requested pharmacy to renew the order for methadone 60mg PO Q12H to start tomorrow 5/29/19.

## 2019-05-28 NOTE — PROGRESS NOTE ADULT - SUBJECTIVE AND OBJECTIVE BOX
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 She was also diagnosis w/ HCV. Pt was initially intubated due to respiratory failure and put on pressors in ICU. She as extubated on 2/25 and transferred from ICU the following day. Pt had a C4-5 ACDF with irrigation and debridement on 3/20 due to spinal abscess and osteo and was kept intubated post procedure and transferred again to ICU, where she was extubated on 3/21. Pt was subsequently found to have  T11/12 OM discitis with an epidural phlegmon from T9-L1. She was taken to the OR 5/8 for posterior spinal fusion & T8-L2 Laminectomy. She was again transferred to ICU for post op care and was extubated without difficulty on 5/9. She is lying in bed in NAD.    MEDICATIONS  (STANDING):  acetaminophen   Tablet .. 1000 milliGRAM(s) Oral every 8 hours  calcium carbonate 1250 mG  + Vitamin D (OsCal 500 + D) 1 Tablet(s) Oral three times a day  celecoxib 100 milliGRAM(s) Oral daily  chlorhexidine 4% Liquid 1 Application(s) Topical <User Schedule>  DAPTOmycin IVPB 600 milliGRAM(s) IV Intermittent <User Schedule>  gabapentin 800 milliGRAM(s) Oral three times a day  lactobacillus acidophilus 1 Tablet(s) Oral two times a day with meals  lidocaine   Patch 1 Patch Transdermal every 24 hours  lidocaine   Patch 1 Patch Transdermal every 24 hours  methadone    Tablet 60 milliGRAM(s) Oral every 12 hours  multivitamin 1 Tablet(s) Oral daily  naloxegol 25 milliGRAM(s) Oral before breakfast  nicotine -  14 mG/24Hr(s) Patch 1 patch Transdermal daily  nystatin Powder 1 Application(s) Topical three times a day  polyethylene glycol 3350 17 Gram(s) Oral two times a day  rifampin 300 milliGRAM(s) Oral two times a day  senna 2 Tablet(s) Oral at bedtime    MEDICATIONS  (PRN):  bisacodyl Suppository 10 milliGRAM(s) Rectal daily PRN Constipation  cyclobenzaprine 10 milliGRAM(s) Oral three times a day PRN Muscle Spasm  diazepam    Tablet 5 milliGRAM(s) Oral every 8 hours PRN muscle spasm  oxyCODONE    IR 20 milliGRAM(s) Oral every 4 hours PRN Moderate Pain (4 - 6)      Allergies    penicillin (Short breath; Hives)    Intolerances        Vital Signs Last 24 Hrs  T(C): 36.5 (28 May 2019 10:58), Max: 36.9 (28 May 2019 04:42)  T(F): 97.7 (28 May 2019 10:58), Max: 98.5 (28 May 2019 04:42)  HR: 95 (28 May 2019 10:58) (83 - 95)  BP: 111/82 (28 May 2019 10:58) (101/66 - 114/70)  BP(mean): --  RR: 16 (28 May 2019 10:58) (16 - 19)  SpO2: 98% (28 May 2019 10:58) (91% - 98%)    PHYSICAL EXAM:  GENERAL: NAD   HEAD:  Atraumatic, Normocephalic  EYES: EOMI, PERRLA    NECK: collar in place  NERVOUS SYSTEM: Alert & Oriented X3   CHEST/LUNG: Clear to auscultation bilaterally; No rales, rhonchi, wheezing, or rubs  HEART: Regular rate and rhythm; No murmurs, rubs, or gallops  ABDOMEN: Soft, Nontender, Nondistended; Bowel sounds present  EXTREMITIES:  No clubbing, cyanosis, or edema  BACK: Surgical site dressed    LABS:                        10.5   6.06  )-----------( 316      ( 28 May 2019 08:09 )             33.8     05-28    135  |  99  |  25<H>  ----------------------------<  112<H>  4.7   |  28  |  0.60    Ca    10.5<H>      28 May 2019 08:09          CAPILLARY BLOOD GLUCOSE          RADIOLOGY & ADDITIONAL TESTS:    05-27-19 @ 07:01  -  05-28-19 @ 07:00  --------------------------------------------------------  IN:    Oral Fluid: 420 mL  Total IN: 420 mL    OUT:  Total OUT: 0 mL    Total NET: 420 mL      05-28-19 @ 07:01  -  05-28-19 @ 21:00  --------------------------------------------------------  IN:    Oral Fluid: 240 mL  Total IN: 240 mL    OUT:  Total OUT: 0 mL    Total NET: 240 mL

## 2019-05-28 NOTE — PROGRESS NOTE ADULT - PROBLEM SELECTOR PLAN 1
with T11/12 OM/ discitis with an epidural phlegmon from T9-L1   s/p T8-L2 Laminectomy and PSF POD1  OR c/s coag neg staph    cont daptomycin(dose increased to 8mg /kg and added rifampin)  repeat MRI in the week of June 3rd  and change to oral at that point if possible  Labs once a week  only   last cbc, cr and CPK wnl  ESR :high   check next week

## 2019-05-28 NOTE — PROGRESS NOTE ADULT - SUBJECTIVE AND OBJECTIVE BOX
Patient is a 43y old  Female who presents with a chief complaint of AMS (27 May 2019 11:33)      INTERVAL HPI / OVERNIGHT EVENTS: doing ok     MEDICATIONS  (STANDING):  acetaminophen   Tablet .. 1000 milliGRAM(s) Oral every 8 hours  calcium carbonate 1250 mG  + Vitamin D (OsCal 500 + D) 1 Tablet(s) Oral three times a day  celecoxib 100 milliGRAM(s) Oral daily  chlorhexidine 4% Liquid 1 Application(s) Topical <User Schedule>  DAPTOmycin IVPB 600 milliGRAM(s) IV Intermittent <User Schedule>  gabapentin 800 milliGRAM(s) Oral three times a day  lactobacillus acidophilus 1 Tablet(s) Oral two times a day with meals  lidocaine   Patch 1 Patch Transdermal every 24 hours  lidocaine   Patch 1 Patch Transdermal every 24 hours  methadone    Tablet 60 milliGRAM(s) Oral every 12 hours  multivitamin 1 Tablet(s) Oral daily  naloxegol 25 milliGRAM(s) Oral before breakfast  nicotine -  14 mG/24Hr(s) Patch 1 patch Transdermal daily  nystatin Powder 1 Application(s) Topical three times a day  polyethylene glycol 3350 17 Gram(s) Oral two times a day  rifampin 300 milliGRAM(s) Oral two times a day  senna 2 Tablet(s) Oral at bedtime    MEDICATIONS  (PRN):  bisacodyl Suppository 10 milliGRAM(s) Rectal daily PRN Constipation  cyclobenzaprine 10 milliGRAM(s) Oral three times a day PRN Muscle Spasm  diazepam    Tablet 5 milliGRAM(s) Oral every 8 hours PRN muscle spasm  oxyCODONE    IR 20 milliGRAM(s) Oral every 4 hours PRN Moderate Pain (4 - 6)      Vital Signs Last 24 Hrs  T(C): 36.5 (28 May 2019 10:58), Max: 36.9 (28 May 2019 04:42)  T(F): 97.7 (28 May 2019 10:58), Max: 98.5 (28 May 2019 04:42)  HR: 95 (28 May 2019 10:58) (83 - 95)  BP: 111/82 (28 May 2019 10:58) (101/66 - 114/70)  BP(mean): --  RR: 16 (28 May 2019 10:58) (16 - 19)  SpO2: 98% (28 May 2019 10:58) (91% - 100%)    Review of systems:  General : no fever /chills,+fatigue  CVS : no chest pain, palpitations  Lungs : no shortness of breath, cough  GI : no abdominal pain,vomiting, diarrhea   : no dysuria,hematuria        PHYSICAL EXAM:  General :NAD  Constitutional:  well-groomed, well-developed  Respiratory: CTAB/L  Cardiovascular: S1 and S2, RRR, no M/G/R  Gastrointestinal: BS+, soft, NT/ND  Extremities: No peripheral edema  Vascular: 2+ peripheral pulses  Skin:  back surgical site incision site : clean       LABS:                        10.5   6.06  )-----------( 316      ( 28 May 2019 08:09 )             33.8     05-28    135  |  99  |  25<H>  ----------------------------<  112<H>  4.7   |  28  |  0.60    Ca    10.5<H>      28 May 2019 08:09            MICROBIOLOGY:  RECENT CULTURES:        RADIOLOGY & ADDITIONAL STUDIES:

## 2019-05-29 PROCEDURE — 99233 SBSQ HOSP IP/OBS HIGH 50: CPT

## 2019-05-29 RX ADMIN — OXYCODONE HYDROCHLORIDE 20 MILLIGRAM(S): 5 TABLET ORAL at 16:50

## 2019-05-29 RX ADMIN — LIDOCAINE 1 PATCH: 4 CREAM TOPICAL at 05:41

## 2019-05-29 RX ADMIN — NYSTATIN CREAM 1 APPLICATION(S): 100000 CREAM TOPICAL at 05:51

## 2019-05-29 RX ADMIN — Medication 1 TABLET(S): at 17:49

## 2019-05-29 RX ADMIN — Medication 1 TABLET(S): at 11:39

## 2019-05-29 RX ADMIN — Medication 1 PATCH: at 11:39

## 2019-05-29 RX ADMIN — Medication 1000 MILLIGRAM(S): at 14:15

## 2019-05-29 RX ADMIN — GABAPENTIN 800 MILLIGRAM(S): 400 CAPSULE ORAL at 14:11

## 2019-05-29 RX ADMIN — Medication 1 TABLET(S): at 23:32

## 2019-05-29 RX ADMIN — CELECOXIB 100 MILLIGRAM(S): 200 CAPSULE ORAL at 11:39

## 2019-05-29 RX ADMIN — METHADONE HYDROCHLORIDE 60 MILLIGRAM(S): 40 TABLET ORAL at 05:52

## 2019-05-29 RX ADMIN — POLYETHYLENE GLYCOL 3350 17 GRAM(S): 17 POWDER, FOR SOLUTION ORAL at 18:30

## 2019-05-29 RX ADMIN — OXYCODONE HYDROCHLORIDE 20 MILLIGRAM(S): 5 TABLET ORAL at 08:20

## 2019-05-29 RX ADMIN — Medication 1000 MILLIGRAM(S): at 22:32

## 2019-05-29 RX ADMIN — Medication 1000 MILLIGRAM(S): at 14:10

## 2019-05-29 RX ADMIN — Medication 1000 MILLIGRAM(S): at 05:52

## 2019-05-29 RX ADMIN — Medication 5 MILLIGRAM(S): at 23:36

## 2019-05-29 RX ADMIN — Medication 1 TABLET(S): at 05:51

## 2019-05-29 RX ADMIN — CHLORHEXIDINE GLUCONATE 1 APPLICATION(S): 213 SOLUTION TOPICAL at 05:53

## 2019-05-29 RX ADMIN — Medication 1000 MILLIGRAM(S): at 06:11

## 2019-05-29 RX ADMIN — Medication 1 TABLET(S): at 07:52

## 2019-05-29 RX ADMIN — Medication 1 TABLET(S): at 14:11

## 2019-05-29 RX ADMIN — SENNA PLUS 2 TABLET(S): 8.6 TABLET ORAL at 22:00

## 2019-05-29 RX ADMIN — NYSTATIN CREAM 1 APPLICATION(S): 100000 CREAM TOPICAL at 16:23

## 2019-05-29 RX ADMIN — OXYCODONE HYDROCHLORIDE 20 MILLIGRAM(S): 5 TABLET ORAL at 23:33

## 2019-05-29 RX ADMIN — Medication 1000 MILLIGRAM(S): at 23:30

## 2019-05-29 RX ADMIN — NYSTATIN CREAM 1 APPLICATION(S): 100000 CREAM TOPICAL at 22:00

## 2019-05-29 RX ADMIN — OXYCODONE HYDROCHLORIDE 20 MILLIGRAM(S): 5 TABLET ORAL at 16:21

## 2019-05-29 RX ADMIN — METHADONE HYDROCHLORIDE 60 MILLIGRAM(S): 40 TABLET ORAL at 17:48

## 2019-05-29 RX ADMIN — GABAPENTIN 800 MILLIGRAM(S): 400 CAPSULE ORAL at 05:52

## 2019-05-29 RX ADMIN — OXYCODONE HYDROCHLORIDE 20 MILLIGRAM(S): 5 TABLET ORAL at 07:55

## 2019-05-29 RX ADMIN — DAPTOMYCIN 124 MILLIGRAM(S): 500 INJECTION, POWDER, LYOPHILIZED, FOR SOLUTION INTRAVENOUS at 21:59

## 2019-05-29 RX ADMIN — NALOXEGOL OXALATE 25 MILLIGRAM(S): 12.5 TABLET, FILM COATED ORAL at 07:52

## 2019-05-29 RX ADMIN — GABAPENTIN 800 MILLIGRAM(S): 400 CAPSULE ORAL at 22:00

## 2019-05-29 RX ADMIN — OXYCODONE HYDROCHLORIDE 20 MILLIGRAM(S): 5 TABLET ORAL at 12:13

## 2019-05-29 NOTE — PROGRESS NOTE ADULT - ASSESSMENT
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 She was also diagnosis w/ HCV. Pt was initially intubated due to respiratory failure and put on pressors in ICU. She as extubated on 2/25 and transferred from ICU the following day. Pt had a C4-5 ACDF with irrigation and debridement on 3/20 due to spinal abscess and osteo and was kept intubated post procedure and transferred again to ICU, where she was extubated on 3/21. Pt was subsequently found to have  T11/12 OM discitis with an epidural phlegmon from T9-L1. She was taken to the OR 5/8 for posterior spinal fusion & T8-L2 Laminectomy. She was again transferred to ICU for post op care and was extubated without difficulty on 5/9.     Discitis of multiple sites of spine w/ sepsis POA due to methicillin resistant Staphylococcus aureus  - OR cxs grew GPC so ID added Rifampin and has c/w Dapto, but they were subsequently found to be growing coagulase NEGATIVE staph & likely a contaminant as repeat blood cxs were negative  - as per ID, will repeat MRI in the week of June 3rd  and change to oral at that point if possible  - status post C4-5 ACDF with irrigation and debridement on 3/20  - status post posterior spinal fusion & T8-L2 Laminectomy on 5/8   - ortho following   - must use [place] j collar and TLSO brace at all times with ambulation   - care per surgery   - c/w methadone, Tylenol, flexeril, celebrex, valium, Neurontin, lidocaine patch & PRN oxy - I asked her about increasing the methadone and dropping the oxy, but the patient has asked that oxy be kept the same (suspect possible drug seeking behavior)  - patient asked that I increase her oxy back to Q4H so I have increased it back to Q4H but told her I will lower oxy to 20mg - I offered to increase the methadone dose but she declined - this suggests a med seeking component   - c/w senna, Miralax, Naloxegol & dulcolax for opiate associated constipation   - KIRILL taken out       Endocarditis of tricuspid valve  - repeat echo showed stable vegetation    Hepatitis C antibody test positive  - patient will need to f/u as an outpatient    Hx of IVDU   - pt is on methadone 60 mg q12       Anemia  - likely AOCD   - monitor CBC    Prophylaxis:   DVT: SCD  GI: PO diet

## 2019-05-29 NOTE — PROGRESS NOTE ADULT - SUBJECTIVE AND OBJECTIVE BOX
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 She was also diagnosis w/ HCV. Pt was initially intubated due to respiratory failure and put on pressors in ICU. She as extubated on 2/25 and transferred from ICU the following day. Pt had a C4-5 ACDF with irrigation and debridement on 3/20 due to spinal abscess and osteo and was kept intubated post procedure and transferred again to ICU, where she was extubated on 3/21. Pt was subsequently found to have  T11/12 OM discitis with an epidural phlegmon from T9-L1. She was taken to the OR 5/8 for posterior spinal fusion & T8-L2 Laminectomy. She was again transferred to ICU for post op care and was extubated without difficulty on 5/9. She is lying in bed in NAD.    MEDICATIONS  (STANDING):  acetaminophen   Tablet .. 1000 milliGRAM(s) Oral every 8 hours  calcium carbonate 1250 mG  + Vitamin D (OsCal 500 + D) 1 Tablet(s) Oral three times a day  celecoxib 100 milliGRAM(s) Oral daily  chlorhexidine 4% Liquid 1 Application(s) Topical <User Schedule>  DAPTOmycin IVPB 600 milliGRAM(s) IV Intermittent <User Schedule>  gabapentin 800 milliGRAM(s) Oral three times a day  lactobacillus acidophilus 1 Tablet(s) Oral two times a day with meals  lidocaine   Patch 1 Patch Transdermal every 24 hours  lidocaine   Patch 1 Patch Transdermal every 24 hours  methadone    Tablet 60 milliGRAM(s) Oral every 12 hours  multivitamin 1 Tablet(s) Oral daily  naloxegol 25 milliGRAM(s) Oral before breakfast  nicotine -  14 mG/24Hr(s) Patch 1 patch Transdermal daily  nystatin Powder 1 Application(s) Topical three times a day  polyethylene glycol 3350 17 Gram(s) Oral two times a day  rifampin 300 milliGRAM(s) Oral two times a day  senna 2 Tablet(s) Oral at bedtime    MEDICATIONS  (PRN):  bisacodyl Suppository 10 milliGRAM(s) Rectal daily PRN Constipation  cyclobenzaprine 10 milliGRAM(s) Oral three times a day PRN Muscle Spasm  diazepam    Tablet 5 milliGRAM(s) Oral every 8 hours PRN muscle spasm  oxyCODONE    IR 20 milliGRAM(s) Oral every 4 hours PRN Moderate Pain (4 - 6)      Allergies    penicillin (Short breath; Hives)    Intolerances       Vital Signs Last 24 Hrs  T(C): 36.7 (29 May 2019 17:32), Max: 36.7 (29 May 2019 17:32)  T(F): 98 (29 May 2019 17:32), Max: 98 (29 May 2019 17:32)  HR: 78 (29 May 2019 17:32) (76 - 91)  BP: 103/65 (29 May 2019 17:32) (103/65 - 167/71)  BP(mean): --  RR: 15 (29 May 2019 17:32) (15 - 19)  SpO2: 98% (29 May 2019 17:32) (97% - 100%)    PHYSICAL EXAM:  GENERAL: NAD   HEAD:  Atraumatic, Normocephalic  EYES: EOMI, PERRLA    NECK: collar in place  NERVOUS SYSTEM: Alert & Oriented X3   CHEST/LUNG: Clear to auscultation bilaterally; No rales, rhonchi, wheezing, or rubs  HEART: Regular rate and rhythm; No murmurs, rubs, or gallops  ABDOMEN: Soft, Nontender, Nondistended; Bowel sounds present  EXTREMITIES:  No clubbing, cyanosis, or edema  BACK: Surgical site dressed      LABS:                        10.5   6.06  )-----------( 316      ( 28 May 2019 08:09 )             33.8     05-28    135  |  99  |  25<H>  ----------------------------<  112<H>  4.7   |  28  |  0.60    Ca    10.5<H>      28 May 2019 08:09          CAPILLARY BLOOD GLUCOSE          RADIOLOGY & ADDITIONAL TESTS:    05-28-19 @ 07:01  -  05-29-19 @ 07:00  --------------------------------------------------------  IN:    Oral Fluid: 240 mL    Solution: 50 mL  Total IN: 290 mL    OUT:  Total OUT: 0 mL    Total NET: 290 mL

## 2019-05-30 PROCEDURE — 99233 SBSQ HOSP IP/OBS HIGH 50: CPT

## 2019-05-30 RX ADMIN — DAPTOMYCIN 124 MILLIGRAM(S): 500 INJECTION, POWDER, LYOPHILIZED, FOR SOLUTION INTRAVENOUS at 21:19

## 2019-05-30 RX ADMIN — OXYCODONE HYDROCHLORIDE 20 MILLIGRAM(S): 5 TABLET ORAL at 13:09

## 2019-05-30 RX ADMIN — GABAPENTIN 800 MILLIGRAM(S): 400 CAPSULE ORAL at 21:19

## 2019-05-30 RX ADMIN — Medication 1 TABLET(S): at 18:00

## 2019-05-30 RX ADMIN — CHLORHEXIDINE GLUCONATE 1 APPLICATION(S): 213 SOLUTION TOPICAL at 05:56

## 2019-05-30 RX ADMIN — OXYCODONE HYDROCHLORIDE 20 MILLIGRAM(S): 5 TABLET ORAL at 23:18

## 2019-05-30 RX ADMIN — CELECOXIB 100 MILLIGRAM(S): 200 CAPSULE ORAL at 13:08

## 2019-05-30 RX ADMIN — METHADONE HYDROCHLORIDE 60 MILLIGRAM(S): 40 TABLET ORAL at 05:55

## 2019-05-30 RX ADMIN — Medication 5 MILLIGRAM(S): at 12:16

## 2019-05-30 RX ADMIN — Medication 1000 MILLIGRAM(S): at 21:17

## 2019-05-30 RX ADMIN — Medication 1 TABLET(S): at 08:07

## 2019-05-30 RX ADMIN — OXYCODONE HYDROCHLORIDE 20 MILLIGRAM(S): 5 TABLET ORAL at 09:10

## 2019-05-30 RX ADMIN — OXYCODONE HYDROCHLORIDE 20 MILLIGRAM(S): 5 TABLET ORAL at 00:30

## 2019-05-30 RX ADMIN — GABAPENTIN 800 MILLIGRAM(S): 400 CAPSULE ORAL at 13:33

## 2019-05-30 RX ADMIN — NALOXEGOL OXALATE 25 MILLIGRAM(S): 12.5 TABLET, FILM COATED ORAL at 08:03

## 2019-05-30 RX ADMIN — POLYETHYLENE GLYCOL 3350 17 GRAM(S): 17 POWDER, FOR SOLUTION ORAL at 12:13

## 2019-05-30 RX ADMIN — Medication 1000 MILLIGRAM(S): at 22:17

## 2019-05-30 RX ADMIN — SENNA PLUS 2 TABLET(S): 8.6 TABLET ORAL at 22:18

## 2019-05-30 RX ADMIN — LIDOCAINE 1 PATCH: 4 CREAM TOPICAL at 17:38

## 2019-05-30 RX ADMIN — OXYCODONE HYDROCHLORIDE 20 MILLIGRAM(S): 5 TABLET ORAL at 22:18

## 2019-05-30 RX ADMIN — Medication 1 TABLET(S): at 13:33

## 2019-05-30 RX ADMIN — Medication 500 MILLIGRAM(S): at 17:38

## 2019-05-30 RX ADMIN — Medication 1 TABLET(S): at 21:18

## 2019-05-30 RX ADMIN — Medication 1000 MILLIGRAM(S): at 05:55

## 2019-05-30 RX ADMIN — Medication 1 PATCH: at 12:12

## 2019-05-30 RX ADMIN — NYSTATIN CREAM 1 APPLICATION(S): 100000 CREAM TOPICAL at 05:56

## 2019-05-30 RX ADMIN — Medication 1 MILLIGRAM(S): at 12:13

## 2019-05-30 RX ADMIN — Medication 1 PATCH: at 08:05

## 2019-05-30 RX ADMIN — Medication 1000 MILLIGRAM(S): at 06:50

## 2019-05-30 RX ADMIN — LIDOCAINE 1 PATCH: 4 CREAM TOPICAL at 18:01

## 2019-05-30 RX ADMIN — OXYCODONE HYDROCHLORIDE 20 MILLIGRAM(S): 5 TABLET ORAL at 12:16

## 2019-05-30 RX ADMIN — CELECOXIB 100 MILLIGRAM(S): 200 CAPSULE ORAL at 12:13

## 2019-05-30 RX ADMIN — OXYCODONE HYDROCHLORIDE 20 MILLIGRAM(S): 5 TABLET ORAL at 08:07

## 2019-05-30 RX ADMIN — Medication 1 TABLET(S): at 12:13

## 2019-05-30 RX ADMIN — Medication 1 TABLET(S): at 05:54

## 2019-05-30 RX ADMIN — METHADONE HYDROCHLORIDE 60 MILLIGRAM(S): 40 TABLET ORAL at 17:38

## 2019-05-30 RX ADMIN — GABAPENTIN 800 MILLIGRAM(S): 400 CAPSULE ORAL at 05:55

## 2019-05-30 RX ADMIN — Medication 1000 MILLIGRAM(S): at 14:55

## 2019-05-30 RX ADMIN — Medication 1 PATCH: at 18:02

## 2019-05-30 RX ADMIN — NYSTATIN CREAM 1 APPLICATION(S): 100000 CREAM TOPICAL at 13:33

## 2019-05-30 RX ADMIN — Medication 1 PATCH: at 13:32

## 2019-05-30 RX ADMIN — Medication 1000 MILLIGRAM(S): at 15:55

## 2019-05-30 RX ADMIN — Medication 5 MILLIGRAM(S): at 22:18

## 2019-05-30 NOTE — CHART NOTE - NSCHARTNOTEFT_GEN_A_CORE
Assessment: Pt seen for nutrition follow-up. Pt admitted with sepsis, discitis of multiple sites, ETOH, h/o IVDA, hepatitis C and anemia. Pt phos remains at 6.0, discussed with pt foods in phosphorus. Provided pt with handout with list of food high in phosphorus. Pt with good PO intake, no N/C/D/V at this times. Encouraged pt to comply with diet limit snacks and beverages high in phosphorus.     Factors impacting intake: [ x ] none [ ] nausea  [ ] vomiting [ ] diarrhea [ ] constipation  [ ]chewing problems [ ] swallowing issues  [ ] other:     Diet Prescription: Diet, Regular:   No Concentrated Phosphorus  Supplement Feeding Modality:  Oral  Ensure Enlive Cans or Servings Per Day:  2       Frequency:  Daily (05-24-19 @ 11:20)    Intake: 100%; % of ensure enlive bid ( Ensure Enlive x 2/day (provides 700 kcal, 40 g protein) )    Current Weight: 76.2 (05/30); 79.0 (05/23)  % Weight Change: 3%loss ( 7#) x 7days ( change in edema noted)     Physical appearance: BMI 26.3; R ankle edema 1+ noted     Pertinent Medications: MEDICATIONS  (STANDING):  acetaminophen   Tablet .. 1000 milliGRAM(s) Oral every 8 hours  ascorbic acid 500 milliGRAM(s) Oral two times a day  calcium carbonate 1250 mG  + Vitamin D (OsCal 500 + D) 1 Tablet(s) Oral three times a day  celecoxib 100 milliGRAM(s) Oral daily  chlorhexidine 4% Liquid 1 Application(s) Topical <User Schedule>  DAPTOmycin IVPB 600 milliGRAM(s) IV Intermittent <User Schedule>  folic acid 1 milliGRAM(s) Oral daily  gabapentin 800 milliGRAM(s) Oral three times a day  lactobacillus acidophilus 1 Tablet(s) Oral two times a day with meals  lidocaine   Patch 1 Patch Transdermal every 24 hours  lidocaine   Patch 1 Patch Transdermal every 24 hours  methadone    Tablet 60 milliGRAM(s) Oral every 12 hours  multivitamin 1 Tablet(s) Oral daily  naloxegol 25 milliGRAM(s) Oral before breakfast  nicotine -  14 mG/24Hr(s) Patch 1 patch Transdermal daily  nystatin Powder 1 Application(s) Topical three times a day  polyethylene glycol 3350 17 Gram(s) Oral daily  rifampin 300 milliGRAM(s) Oral two times a day  senna 2 Tablet(s) Oral at bedtime    MEDICATIONS  (PRN):  bisacodyl Suppository 10 milliGRAM(s) Rectal daily PRN Constipation  cyclobenzaprine 10 milliGRAM(s) Oral three times a day PRN Muscle Spasm  diazepam    Tablet 5 milliGRAM(s) Oral every 8 hours PRN muscle spasm  oxyCODONE    IR 20 milliGRAM(s) Oral every 4 hours PRN Moderate Pain (4 - 6)    Pertinent Labs: 05-28 Na 135 mmol/L Glu 112 mg/dL<H> K+ 4.7 mmol/L Cr 0.60 mg/dL BUN 25 mg/dL<H> Phos n/a   Alb n/a   PAB n/a   Hgb 10.5 g/dL<L> Hct 33.8 %<L> HgA1C n/a     24Hr FS:  Skin: wound L knee (abrasion)     Estimated Needs:   [ x ] no change since previous assessment (04-17)  [ ] recalculated:     Previous Nutrition Diagnosis:   Inadequate Oral Intake   Etiology: Back pain  Signs/Symptoms: Pt NPO/Clear liquids x 3 days & s/p po intake 50-75% most meals x 1 month  Previous Goal:  Pt to consume  >75% meals; not met consistently (met)   Nutrition Diagnosis is [ ] ongoing  [ x ] resolved  [ ] improved  [ ] not applicable       New Nutrition Diagnosis:    Altered nutrition-related lab values (phosphorus)    Etiology: Lack of compliance and adherence to low phosphorus diet  Signs/symptoms: As evidence by blood phosphorus lab value 6.0 H (05/20), food preference for high phosphorus foods       Interventions:   Recommend  [x ] Continue: current diet Rx.   [ ] Change Diet To:  [ ] Nutrition Supplement:  [ ] Nutrition Support:  [ ] Other:     Monitoring and Evaluation:   [x ] PO intake [ x ] Tolerance to diet prescription [ x ] weights [ x ] labs[ x ] follow up per protocol  [ ] other:

## 2019-05-30 NOTE — PROGRESS NOTE ADULT - ASSESSMENT
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 She was also diagnosis w/ HCV. Pt was initially intubated due to respiratory failure and put on pressors in ICU. She as extubated on 2/25 and transferred from ICU the following day. Pt had a C4-5 ACDF with irrigation and debridement on 3/20 due to spinal abscess and osteo and was kept intubated post procedure and transferred again to ICU, where she was extubated on 3/21. Pt was subsequently found to have  T11/12 OM discitis with an epidural phlegmon from T9-L1. She was taken to the OR 5/8 for posterior spinal fusion & T8-L2 Laminectomy. She was again transferred to ICU for post op care and was extubated without difficulty on 5/9.     Discitis of multiple sites of spine w/ sepsis POA due to methicillin resistant Staphylococcus aureus  - OR cxs grew GPC so ID added Rifampin and has c/w Dapto, but they were subsequently found to be growing coagulase NEGATIVE staph & likely a contaminant as repeat blood cxs were negative  - as per ID, will repeat MRI in the week of June 3rd  and change to oral at that point if possible  - status post C4-5 ACDF with irrigation and debridement on 3/20  - status post posterior spinal fusion & T8-L2 Laminectomy on 5/8   - ortho following   - must use [place] j collar and TLSO brace at all times with ambulation   - care per surgery   - c/w methadone, Tylenol, flexeril, celebrex, valium, Neurontin, lidocaine patch & PRN oxy - I asked her about increasing the methadone and dropping the oxy, but the patient has asked that oxy be kept the same (suspect possible drug seeking behavior)  - patient asked that I increase her oxy back to Q4H so I have increased it back to Q4H but told her I will lower oxy to 20mg - I offered to increase the methadone dose but she declined - this suggests a med seeking component   - c/w senna, Miralax, Naloxegol & dulcolax for opiate associated constipation   - KIRILL taken out       Endocarditis of tricuspid valve  - repeat echo showed stable vegetation    Hepatitis C antibody test positive  - patient will need to f/u as an outpatient    Hx of IVDU   - pt is on methadone 60 mg q12       Anemia  - likely AOCD   - monitor CBC    Prophylaxis:   DVT: SCD  GI: PO diet

## 2019-05-30 NOTE — PROGRESS NOTE ADULT - SUBJECTIVE AND OBJECTIVE BOX
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 She was also diagnosis w/ HCV. Pt was initially intubated due to respiratory failure and put on pressors in ICU. She as extubated on 2/25 and transferred from ICU the following day. Pt had a C4-5 ACDF with irrigation and debridement on 3/20 due to spinal abscess and osteo and was kept intubated post procedure and transferred again to ICU, where she was extubated on 3/21. Pt was subsequently found to have  T11/12 OM discitis with an epidural phlegmon from T9-L1. She was taken to the OR 5/8 for posterior spinal fusion & T8-L2 Laminectomy. She was again transferred to ICU for post op care and was extubated without difficulty on 5/9. She is lying in bed in NAD.    MEDICATIONS  (STANDING):  acetaminophen   Tablet .. 1000 milliGRAM(s) Oral every 8 hours  ascorbic acid 500 milliGRAM(s) Oral two times a day  calcium carbonate 1250 mG  + Vitamin D (OsCal 500 + D) 1 Tablet(s) Oral three times a day  celecoxib 100 milliGRAM(s) Oral daily  chlorhexidine 4% Liquid 1 Application(s) Topical <User Schedule>  DAPTOmycin IVPB 600 milliGRAM(s) IV Intermittent <User Schedule>  folic acid 1 milliGRAM(s) Oral daily  gabapentin 800 milliGRAM(s) Oral three times a day  lactobacillus acidophilus 1 Tablet(s) Oral two times a day with meals  lidocaine   Patch 1 Patch Transdermal every 24 hours  lidocaine   Patch 1 Patch Transdermal every 24 hours  methadone    Tablet 60 milliGRAM(s) Oral every 12 hours  multivitamin 1 Tablet(s) Oral daily  naloxegol 25 milliGRAM(s) Oral before breakfast  nicotine -  14 mG/24Hr(s) Patch 1 patch Transdermal daily  nystatin Powder 1 Application(s) Topical three times a day  polyethylene glycol 3350 17 Gram(s) Oral daily  rifampin 300 milliGRAM(s) Oral two times a day  senna 2 Tablet(s) Oral at bedtime    MEDICATIONS  (PRN):  bisacodyl Suppository 10 milliGRAM(s) Rectal daily PRN Constipation  cyclobenzaprine 10 milliGRAM(s) Oral three times a day PRN Muscle Spasm  diazepam    Tablet 5 milliGRAM(s) Oral every 8 hours PRN muscle spasm  oxyCODONE    IR 20 milliGRAM(s) Oral every 4 hours PRN Moderate Pain (4 - 6)      Allergies    penicillin (Short breath; Hives)    Intolerances        Vital Signs Last 24 Hrs  T(C): 36.4 (30 May 2019 11:00), Max: 36.5 (29 May 2019 23:43)  T(F): 97.5 (30 May 2019 11:00), Max: 97.7 (29 May 2019 23:43)  HR: 79 (30 May 2019 11:00) (75 - 85)  BP: 105/81 (30 May 2019 11:00) (105/81 - 152/56)  BP(mean): --  RR: 16 (30 May 2019 11:00) (16 - 18)  SpO2: 100% (30 May 2019 11:00) (99% - 100%)    PHYSICAL EXAM:  GENERAL: NAD   HEAD:  Atraumatic, Normocephalic  EYES: EOMI, PERRLA    NECK: collar in place  NERVOUS SYSTEM: Alert & Oriented X3   CHEST/LUNG: Clear to auscultation bilaterally; No rales, rhonchi, wheezing, or rubs  HEART: Regular rate and rhythm; No murmurs, rubs, or gallops  ABDOMEN: Soft, Nontender, Nondistended; Bowel sounds present  EXTREMITIES:  No clubbing, cyanosis, or edema  BACK: Surgical site dressed      RADIOLOGY & ADDITIONAL TESTS:    05-29-19 @ 07:01  -  05-30-19 @ 07:00  --------------------------------------------------------  IN:    Solution: 100 mL  Total IN: 100 mL    OUT:  Total OUT: 0 mL    Total NET: 100 mL

## 2019-05-31 LAB
ANION GAP SERPL CALC-SCNC: 10 MMOL/L — SIGNIFICANT CHANGE UP (ref 5–17)
BUN SERPL-MCNC: 25 MG/DL — HIGH (ref 7–23)
CALCIUM SERPL-MCNC: 9.9 MG/DL — SIGNIFICANT CHANGE UP (ref 8.5–10.1)
CHLORIDE SERPL-SCNC: 101 MMOL/L — SIGNIFICANT CHANGE UP (ref 96–108)
CK SERPL-CCNC: 23 U/L — LOW (ref 26–192)
CO2 SERPL-SCNC: 29 MMOL/L — SIGNIFICANT CHANGE UP (ref 22–31)
CREAT SERPL-MCNC: 0.62 MG/DL — SIGNIFICANT CHANGE UP (ref 0.5–1.3)
GLUCOSE SERPL-MCNC: 89 MG/DL — SIGNIFICANT CHANGE UP (ref 70–99)
HCT VFR BLD CALC: 36.1 % — SIGNIFICANT CHANGE UP (ref 34.5–45)
HGB BLD-MCNC: 11.1 G/DL — LOW (ref 11.5–15.5)
MAGNESIUM SERPL-MCNC: 2.1 MG/DL — SIGNIFICANT CHANGE UP (ref 1.6–2.6)
MCHC RBC-ENTMCNC: 25.9 PG — LOW (ref 27–34)
MCHC RBC-ENTMCNC: 30.7 GM/DL — LOW (ref 32–36)
MCV RBC AUTO: 84.3 FL — SIGNIFICANT CHANGE UP (ref 80–100)
NRBC # BLD: 0 /100 WBCS — SIGNIFICANT CHANGE UP (ref 0–0)
PHOSPHATE SERPL-MCNC: 4.1 MG/DL — SIGNIFICANT CHANGE UP (ref 2.5–4.5)
PLATELET # BLD AUTO: 273 K/UL — SIGNIFICANT CHANGE UP (ref 150–400)
POTASSIUM SERPL-MCNC: 4.2 MMOL/L — SIGNIFICANT CHANGE UP (ref 3.5–5.3)
POTASSIUM SERPL-SCNC: 4.2 MMOL/L — SIGNIFICANT CHANGE UP (ref 3.5–5.3)
RBC # BLD: 4.28 M/UL — SIGNIFICANT CHANGE UP (ref 3.8–5.2)
RBC # FLD: 17.1 % — HIGH (ref 10.3–14.5)
SODIUM SERPL-SCNC: 140 MMOL/L — SIGNIFICANT CHANGE UP (ref 135–145)
WBC # BLD: 5.8 K/UL — SIGNIFICANT CHANGE UP (ref 3.8–10.5)
WBC # FLD AUTO: 5.8 K/UL — SIGNIFICANT CHANGE UP (ref 3.8–10.5)

## 2019-05-31 PROCEDURE — 99233 SBSQ HOSP IP/OBS HIGH 50: CPT

## 2019-05-31 RX ADMIN — Medication 1 PATCH: at 11:52

## 2019-05-31 RX ADMIN — Medication 1 TABLET(S): at 06:19

## 2019-05-31 RX ADMIN — METHADONE HYDROCHLORIDE 60 MILLIGRAM(S): 40 TABLET ORAL at 06:20

## 2019-05-31 RX ADMIN — DAPTOMYCIN 124 MILLIGRAM(S): 500 INJECTION, POWDER, LYOPHILIZED, FOR SOLUTION INTRAVENOUS at 22:11

## 2019-05-31 RX ADMIN — Medication 5 MILLIGRAM(S): at 23:22

## 2019-05-31 RX ADMIN — Medication 500 MILLIGRAM(S): at 17:31

## 2019-05-31 RX ADMIN — OXYCODONE HYDROCHLORIDE 20 MILLIGRAM(S): 5 TABLET ORAL at 08:29

## 2019-05-31 RX ADMIN — CHLORHEXIDINE GLUCONATE 1 APPLICATION(S): 213 SOLUTION TOPICAL at 06:21

## 2019-05-31 RX ADMIN — OXYCODONE HYDROCHLORIDE 20 MILLIGRAM(S): 5 TABLET ORAL at 23:23

## 2019-05-31 RX ADMIN — GABAPENTIN 800 MILLIGRAM(S): 400 CAPSULE ORAL at 13:52

## 2019-05-31 RX ADMIN — GABAPENTIN 800 MILLIGRAM(S): 400 CAPSULE ORAL at 06:19

## 2019-05-31 RX ADMIN — LIDOCAINE 1 PATCH: 4 CREAM TOPICAL at 06:13

## 2019-05-31 RX ADMIN — Medication 1 TABLET(S): at 17:31

## 2019-05-31 RX ADMIN — METHADONE HYDROCHLORIDE 60 MILLIGRAM(S): 40 TABLET ORAL at 17:32

## 2019-05-31 RX ADMIN — Medication 1000 MILLIGRAM(S): at 22:11

## 2019-05-31 RX ADMIN — NALOXEGOL OXALATE 25 MILLIGRAM(S): 12.5 TABLET, FILM COATED ORAL at 06:20

## 2019-05-31 RX ADMIN — NYSTATIN CREAM 1 APPLICATION(S): 100000 CREAM TOPICAL at 13:54

## 2019-05-31 RX ADMIN — OXYCODONE HYDROCHLORIDE 20 MILLIGRAM(S): 5 TABLET ORAL at 12:52

## 2019-05-31 RX ADMIN — Medication 1000 MILLIGRAM(S): at 06:18

## 2019-05-31 RX ADMIN — Medication 1 TABLET(S): at 22:11

## 2019-05-31 RX ADMIN — Medication 1000 MILLIGRAM(S): at 23:11

## 2019-05-31 RX ADMIN — SENNA PLUS 2 TABLET(S): 8.6 TABLET ORAL at 22:11

## 2019-05-31 RX ADMIN — Medication 1000 MILLIGRAM(S): at 13:53

## 2019-05-31 RX ADMIN — Medication 1 MILLIGRAM(S): at 11:52

## 2019-05-31 RX ADMIN — Medication 1 TABLET(S): at 08:25

## 2019-05-31 RX ADMIN — OXYCODONE HYDROCHLORIDE 20 MILLIGRAM(S): 5 TABLET ORAL at 09:00

## 2019-05-31 RX ADMIN — OXYCODONE HYDROCHLORIDE 20 MILLIGRAM(S): 5 TABLET ORAL at 13:00

## 2019-05-31 RX ADMIN — Medication 1 PATCH: at 22:12

## 2019-05-31 RX ADMIN — Medication 1 TABLET(S): at 11:51

## 2019-05-31 RX ADMIN — CELECOXIB 100 MILLIGRAM(S): 200 CAPSULE ORAL at 11:51

## 2019-05-31 RX ADMIN — GABAPENTIN 800 MILLIGRAM(S): 400 CAPSULE ORAL at 22:11

## 2019-05-31 RX ADMIN — Medication 1000 MILLIGRAM(S): at 07:06

## 2019-05-31 RX ADMIN — Medication 1 TABLET(S): at 13:52

## 2019-05-31 RX ADMIN — Medication 500 MILLIGRAM(S): at 06:18

## 2019-05-31 NOTE — PROGRESS NOTE ADULT - SUBJECTIVE AND OBJECTIVE BOX
Patient is a 43y old  Female who presents with a chief complaint of AMS (30 May 2019 18:38)      INTERVAL HPI / OVERNIGHT EVENTS: doing ok     MEDICATIONS  (STANDING):  acetaminophen   Tablet .. 1000 milliGRAM(s) Oral every 8 hours  ascorbic acid 500 milliGRAM(s) Oral two times a day  calcium carbonate 1250 mG  + Vitamin D (OsCal 500 + D) 1 Tablet(s) Oral three times a day  celecoxib 100 milliGRAM(s) Oral daily  chlorhexidine 4% Liquid 1 Application(s) Topical <User Schedule>  DAPTOmycin IVPB 600 milliGRAM(s) IV Intermittent <User Schedule>  folic acid 1 milliGRAM(s) Oral daily  gabapentin 800 milliGRAM(s) Oral three times a day  lactobacillus acidophilus 1 Tablet(s) Oral two times a day with meals  lidocaine   Patch 1 Patch Transdermal every 24 hours  lidocaine   Patch 1 Patch Transdermal every 24 hours  methadone    Tablet 60 milliGRAM(s) Oral every 12 hours  multivitamin 1 Tablet(s) Oral daily  naloxegol 25 milliGRAM(s) Oral before breakfast  nicotine -  14 mG/24Hr(s) Patch 1 patch Transdermal daily  nystatin Powder 1 Application(s) Topical three times a day  polyethylene glycol 3350 17 Gram(s) Oral daily  rifampin 300 milliGRAM(s) Oral two times a day  senna 2 Tablet(s) Oral at bedtime    MEDICATIONS  (PRN):  bisacodyl Suppository 10 milliGRAM(s) Rectal daily PRN Constipation  cyclobenzaprine 10 milliGRAM(s) Oral three times a day PRN Muscle Spasm  diazepam    Tablet 5 milliGRAM(s) Oral every 8 hours PRN muscle spasm  oxyCODONE    IR 20 milliGRAM(s) Oral every 4 hours PRN Moderate Pain (4 - 6)      Vital Signs Last 24 Hrs  T(C): 36.2 (31 May 2019 11:37), Max: 36.6 (31 May 2019 04:55)  T(F): 97.2 (31 May 2019 11:37), Max: 97.9 (31 May 2019 04:55)  HR: 76 (31 May 2019 11:37) (72 - 76)  BP: 106/58 (31 May 2019 11:37) (96/47 - 106/58)  BP(mean): --  RR: 18 (31 May 2019 11:37) (16 - 19)  SpO2: 99% (31 May 2019 11:37) (98% - 99%)    Review of systems:  General : no fever /chills,fatigue  CVS : no chest pain, palpitations  Lungs : no shortness of breath, cough  GI : no abdominal pain,vomiting, diarrhea   : no dysuria,hematuria        PHYSICAL EXAM:  General :NAD  Constitutional:  well-groomed, well-developed  Respiratory: CTAB/L  Cardiovascular: S1 and S2, RRR, no M/G/R  Gastrointestinal: BS+, soft, NT/ND  Extremities: No peripheral edema  Vascular: 2+ peripheral pulses  Skin:back :SS incision ,clean and healing well      LABS:                        11.1   5.80  )-----------( 273      ( 31 May 2019 14:53 )             36.1                 MICROBIOLOGY:  RECENT CULTURES:        RADIOLOGY & ADDITIONAL STUDIES:

## 2019-05-31 NOTE — PROGRESS NOTE ADULT - SUBJECTIVE AND OBJECTIVE BOX
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 She was also diagnosis w/ HCV. Pt was initially intubated due to respiratory failure and put on pressors in ICU. She as extubated on 2/25 and transferred from ICU the following day. Pt had a C4-5 ACDF with irrigation and debridement on 3/20 due to spinal abscess and osteo and was kept intubated post procedure and transferred again to ICU, where she was extubated on 3/21. Pt was subsequently found to have  T11/12 OM discitis with an epidural phlegmon from T9-L1. She was taken to the OR 5/8 for posterior spinal fusion & T8-L2 Laminectomy. She was again transferred to ICU for post op care and was extubated without difficulty on 5/9. She is lying in bed in NAD.    MEDICATIONS  (STANDING):  acetaminophen   Tablet .. 1000 milliGRAM(s) Oral every 8 hours  ascorbic acid 500 milliGRAM(s) Oral two times a day  calcium carbonate 1250 mG  + Vitamin D (OsCal 500 + D) 1 Tablet(s) Oral three times a day  celecoxib 100 milliGRAM(s) Oral daily  chlorhexidine 4% Liquid 1 Application(s) Topical <User Schedule>  DAPTOmycin IVPB 600 milliGRAM(s) IV Intermittent <User Schedule>  folic acid 1 milliGRAM(s) Oral daily  gabapentin 800 milliGRAM(s) Oral three times a day  lactobacillus acidophilus 1 Tablet(s) Oral two times a day with meals  lidocaine   Patch 1 Patch Transdermal every 24 hours  lidocaine   Patch 1 Patch Transdermal every 24 hours  methadone    Tablet 60 milliGRAM(s) Oral every 12 hours  multivitamin 1 Tablet(s) Oral daily  naloxegol 25 milliGRAM(s) Oral before breakfast  nicotine -  14 mG/24Hr(s) Patch 1 patch Transdermal daily  nystatin Powder 1 Application(s) Topical three times a day  polyethylene glycol 3350 17 Gram(s) Oral daily  rifampin 300 milliGRAM(s) Oral two times a day  senna 2 Tablet(s) Oral at bedtime    MEDICATIONS  (PRN):  bisacodyl Suppository 10 milliGRAM(s) Rectal daily PRN Constipation  cyclobenzaprine 10 milliGRAM(s) Oral three times a day PRN Muscle Spasm  diazepam    Tablet 5 milliGRAM(s) Oral every 8 hours PRN muscle spasm  oxyCODONE    IR 20 milliGRAM(s) Oral every 4 hours PRN Moderate Pain (4 - 6)      Allergies    penicillin (Short breath; Hives)    Intolerances        Vital Signs Last 24 Hrs  T(C): 36.2 (31 May 2019 11:37), Max: 36.6 (31 May 2019 04:55)  T(F): 97.2 (31 May 2019 11:37), Max: 97.9 (31 May 2019 04:55)  HR: 76 (31 May 2019 11:37) (72 - 76)  BP: 106/58 (31 May 2019 11:37) (96/47 - 106/58)  BP(mean): --  RR: 18 (31 May 2019 11:37) (16 - 19)  SpO2: 99% (31 May 2019 11:37) (98% - 99%)    PHYSICAL EXAM:  GENERAL: NAD   HEAD:  Atraumatic, Normocephalic  EYES: EOMI, PERRLA    NECK: collar in place  NERVOUS SYSTEM: Alert & Oriented X3   CHEST/LUNG: Clear to auscultation bilaterally; No rales, rhonchi, wheezing, or rubs  HEART: Regular rate and rhythm; No murmurs, rubs, or gallops  ABDOMEN: Soft, Nontender, Nondistended; Bowel sounds present  EXTREMITIES:  No clubbing, cyanosis, or edema  BACK: Surgical site dressed      LABS:                        11.1   5.80  )-----------( 273      ( 31 May 2019 14:53 )             36.1     05-31    140  |  101  |  25<H>  ----------------------------<  89  4.2   |  29  |  0.62    Ca    9.9      31 May 2019 14:53  Phos  4.1     05-31  Mg     2.1     05-31          CAPILLARY BLOOD GLUCOSE          RADIOLOGY & ADDITIONAL TESTS:

## 2019-05-31 NOTE — PROGRESS NOTE ADULT - ASSESSMENT
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 She was also diagnosis w/ HCV. Pt was initially intubated due to respiratory failure and put on pressors in ICU. She as extubated on 2/25 and transferred from ICU the following day. Pt had a C4-5 ACDF with irrigation and debridement on 3/20 due to spinal abscess and osteo and was kept intubated post procedure and transferred again to ICU, where she was extubated on 3/21. Pt was subsequently found to have  T11/12 OM discitis with an epidural phlegmon from T9-L1. She was taken to the OR 5/8 for posterior spinal fusion & T8-L2 Laminectomy. She was again transferred to ICU for post op care and was extubated without difficulty on 5/9.     Discitis of multiple sites of spine w/ sepsis POA due to methicillin resistant Staphylococcus aureus  - OR cxs grew GPC so ID added Rifampin and has c/w Dapto, but they were subsequently found to be growing coagulase NEGATIVE staph & likely a contaminant as repeat blood cxs were negative  - as per ID, will repeat MRI in the week of June 3rd  and change to oral at that point if possible  - status post C4-5 ACDF with irrigation and debridement on 3/20  - status post posterior spinal fusion & T8-L2 Laminectomy on 5/8   - ortho following   - must use [place] j collar and TLSO brace at all times with ambulation   - care per surgery   - c/w methadone, Tylenol, flexeril, celebrex, valium, Neurontin, lidocaine patch & PRN oxy - I asked her about increasing the methadone and dropping the oxy, but the patient has asked that oxy be kept the same (suspect possible drug seeking behavior)  - patient asked that I increase her oxy back to Q4H so I have increased it back to Q4H but told her I will lower oxy to 20mg - I offered to increase the methadone dose but she declined - this suggests a med seeking component   - c/w senna, Miralax, Naloxegol & dulcolax for opiate associated constipation   - KIRILL taken out       Endocarditis of tricuspid valve  - repeat echo showed stable vegetation    Hepatitis C antibody test positive  - patient will need to f/u as an outpatient    Hx of IVDU   - pt is on methadone 60 mg q12       Anemia  - likely AOCD   - monitor CBC    Prophylaxis:   DVT: SCD  GI: PO diet

## 2019-06-01 PROCEDURE — 99233 SBSQ HOSP IP/OBS HIGH 50: CPT

## 2019-06-01 RX ORDER — DIAZEPAM 5 MG
5 TABLET ORAL EVERY 8 HOURS
Refills: 0 | Status: DISCONTINUED | OUTPATIENT
Start: 2019-06-01 | End: 2019-06-05

## 2019-06-01 RX ADMIN — OXYCODONE HYDROCHLORIDE 20 MILLIGRAM(S): 5 TABLET ORAL at 12:15

## 2019-06-01 RX ADMIN — Medication 1000 MILLIGRAM(S): at 14:45

## 2019-06-01 RX ADMIN — DAPTOMYCIN 124 MILLIGRAM(S): 500 INJECTION, POWDER, LYOPHILIZED, FOR SOLUTION INTRAVENOUS at 22:01

## 2019-06-01 RX ADMIN — GABAPENTIN 800 MILLIGRAM(S): 400 CAPSULE ORAL at 21:59

## 2019-06-01 RX ADMIN — OXYCODONE HYDROCHLORIDE 20 MILLIGRAM(S): 5 TABLET ORAL at 00:08

## 2019-06-01 RX ADMIN — Medication 1000 MILLIGRAM(S): at 21:59

## 2019-06-01 RX ADMIN — METHADONE HYDROCHLORIDE 60 MILLIGRAM(S): 40 TABLET ORAL at 06:26

## 2019-06-01 RX ADMIN — GABAPENTIN 800 MILLIGRAM(S): 400 CAPSULE ORAL at 06:26

## 2019-06-01 RX ADMIN — Medication 1 MILLIGRAM(S): at 11:22

## 2019-06-01 RX ADMIN — Medication 1 TABLET(S): at 06:25

## 2019-06-01 RX ADMIN — GABAPENTIN 800 MILLIGRAM(S): 400 CAPSULE ORAL at 14:45

## 2019-06-01 RX ADMIN — CHLORHEXIDINE GLUCONATE 1 APPLICATION(S): 213 SOLUTION TOPICAL at 06:26

## 2019-06-01 RX ADMIN — Medication 1 PATCH: at 07:49

## 2019-06-01 RX ADMIN — OXYCODONE HYDROCHLORIDE 20 MILLIGRAM(S): 5 TABLET ORAL at 11:22

## 2019-06-01 RX ADMIN — NALOXEGOL OXALATE 25 MILLIGRAM(S): 12.5 TABLET, FILM COATED ORAL at 08:12

## 2019-06-01 RX ADMIN — Medication 5 MILLIGRAM(S): at 23:33

## 2019-06-01 RX ADMIN — Medication 500 MILLIGRAM(S): at 06:25

## 2019-06-01 RX ADMIN — METHADONE HYDROCHLORIDE 60 MILLIGRAM(S): 40 TABLET ORAL at 17:34

## 2019-06-01 RX ADMIN — Medication 1 TABLET(S): at 17:33

## 2019-06-01 RX ADMIN — SENNA PLUS 2 TABLET(S): 8.6 TABLET ORAL at 21:59

## 2019-06-01 RX ADMIN — Medication 1000 MILLIGRAM(S): at 15:30

## 2019-06-01 RX ADMIN — OXYCODONE HYDROCHLORIDE 20 MILLIGRAM(S): 5 TABLET ORAL at 23:33

## 2019-06-01 RX ADMIN — NYSTATIN CREAM 1 APPLICATION(S): 100000 CREAM TOPICAL at 22:01

## 2019-06-01 RX ADMIN — Medication 1 TABLET(S): at 11:22

## 2019-06-01 RX ADMIN — Medication 1 PATCH: at 11:22

## 2019-06-01 RX ADMIN — Medication 1 TABLET(S): at 21:59

## 2019-06-01 RX ADMIN — POLYETHYLENE GLYCOL 3350 17 GRAM(S): 17 POWDER, FOR SOLUTION ORAL at 13:01

## 2019-06-01 RX ADMIN — CELECOXIB 100 MILLIGRAM(S): 200 CAPSULE ORAL at 15:30

## 2019-06-01 RX ADMIN — Medication 1 TABLET(S): at 08:12

## 2019-06-01 RX ADMIN — CELECOXIB 100 MILLIGRAM(S): 200 CAPSULE ORAL at 14:45

## 2019-06-01 RX ADMIN — Medication 1000 MILLIGRAM(S): at 22:59

## 2019-06-01 RX ADMIN — Medication 1 TABLET(S): at 14:45

## 2019-06-01 RX ADMIN — Medication 1000 MILLIGRAM(S): at 06:25

## 2019-06-01 RX ADMIN — Medication 1 PATCH: at 19:18

## 2019-06-01 RX ADMIN — Medication 500 MILLIGRAM(S): at 17:34

## 2019-06-01 RX ADMIN — Medication 1000 MILLIGRAM(S): at 07:48

## 2019-06-01 RX ADMIN — Medication 1 PATCH: at 11:23

## 2019-06-01 NOTE — PROGRESS NOTE ADULT - ASSESSMENT
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 She was also diagnosis w/ HCV. Pt was initially intubated due to respiratory failure and put on pressors in ICU. She as extubated on 2/25 and transferred from ICU the following day. Pt had a C4-5 ACDF with irrigation and debridement on 3/20 due to spinal abscess and osteo and was kept intubated post procedure and transferred again to ICU, where she was extubated on 3/21. Pt was subsequently found to have  T11/12 OM discitis with an epidural phlegmon from T9-L1. She was taken to the OR 5/8 for posterior spinal fusion & T8-L2 Laminectomy. She was again transferred to ICU for post op care and was extubated without difficulty on 5/9.     Discitis of multiple sites of spine w/ sepsis POA due to methicillin resistant Staphylococcus aureus  - OR cxs grew GPC so ID added Rifampin and has c/w Dapto, but they were subsequently found to be growing coagulase NEGATIVE staph & likely a contaminant as repeat blood cxs were negative  - as per ID, will repeat MRI in the week of June 3rd  and change to oral at that point if possible  - status post C4-5 ACDF with irrigation and debridement on 3/20  - status post posterior spinal fusion & T8-L2 Laminectomy on 5/8   - ortho following   - must use [place] j collar and TLSO brace at all times with ambulation   - care per surgery - ortho removed stitches on 5/31 as per RN  - c/w methadone, Tylenol, flexeril, celebrex, valium, Neurontin, lidocaine patch & PRN oxy - I asked her about increasing the methadone and dropping the oxy, but the patient has asked that oxy be kept the same (suspect possible drug seeking behavior)  - patient asked that I increase her oxy back to Q4H so I have increased it back to Q4H but told her I will lower oxy to 20mg - I offered to increase the methadone dose but she declined - this suggests a med seeking component   - c/w senna, Miralax, Naloxegol & dulcolax for opiate associated constipation   - KIRILL taken out       Endocarditis of tricuspid valve  - repeat echo showed stable vegetation    Hepatitis C antibody test positive  - patient will need to f/u as an outpatient    Hx of IVDU   - pt is on methadone 60 mg q12       Anemia  - likely AOCD   - monitor CBC    Prophylaxis:   DVT: SCD  GI: PO diet

## 2019-06-01 NOTE — PROGRESS NOTE ADULT - SUBJECTIVE AND OBJECTIVE BOX
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 She was also diagnosis w/ HCV. Pt was initially intubated due to respiratory failure and put on pressors in ICU. She as extubated on 2/25 and transferred from ICU the following day. Pt had a C4-5 ACDF with irrigation and debridement on 3/20 due to spinal abscess and osteo and was kept intubated post procedure and transferred again to ICU, where she was extubated on 3/21. Pt was subsequently found to have  T11/12 OM discitis with an epidural phlegmon from T9-L1. She was taken to the OR 5/8 for posterior spinal fusion & T8-L2 Laminectomy. She was again transferred to ICU for post op care and was extubated without difficulty on 5/9. She is lying in bed in NAD.    MEDICATIONS  (STANDING):  acetaminophen   Tablet .. 1000 milliGRAM(s) Oral every 8 hours  ascorbic acid 500 milliGRAM(s) Oral two times a day  calcium carbonate 1250 mG  + Vitamin D (OsCal 500 + D) 1 Tablet(s) Oral three times a day  celecoxib 100 milliGRAM(s) Oral daily  chlorhexidine 4% Liquid 1 Application(s) Topical <User Schedule>  DAPTOmycin IVPB 600 milliGRAM(s) IV Intermittent <User Schedule>  folic acid 1 milliGRAM(s) Oral daily  gabapentin 800 milliGRAM(s) Oral three times a day  lactobacillus acidophilus 1 Tablet(s) Oral two times a day with meals  lidocaine   Patch 1 Patch Transdermal every 24 hours  lidocaine   Patch 1 Patch Transdermal every 24 hours  methadone    Tablet 60 milliGRAM(s) Oral every 12 hours  multivitamin 1 Tablet(s) Oral daily  naloxegol 25 milliGRAM(s) Oral before breakfast  nicotine -  14 mG/24Hr(s) Patch 1 patch Transdermal daily  nystatin Powder 1 Application(s) Topical three times a day  polyethylene glycol 3350 17 Gram(s) Oral daily  rifampin 300 milliGRAM(s) Oral two times a day  senna 2 Tablet(s) Oral at bedtime    MEDICATIONS  (PRN):  bisacodyl Suppository 10 milliGRAM(s) Rectal daily PRN Constipation  cyclobenzaprine 10 milliGRAM(s) Oral three times a day PRN Muscle Spasm  diazepam    Tablet 5 milliGRAM(s) Oral every 8 hours PRN muscle spasm  oxyCODONE    IR 20 milliGRAM(s) Oral every 4 hours PRN Moderate Pain (4 - 6)      Allergies    penicillin (Short breath; Hives)    Intolerances        Vital Signs Last 24 Hrs  T(C): 36.7 (01 Jun 2019 13:11), Max: 36.7 (01 Jun 2019 13:11)  T(F): 98 (01 Jun 2019 13:11), Max: 98 (01 Jun 2019 13:11)  HR: 72 (01 Jun 2019 13:11) (70 - 78)  BP: 111/54 (01 Jun 2019 13:11) (93/41 - 112/77)  BP(mean): --  RR: 16 (01 Jun 2019 13:11) (16 - 18)  SpO2: 100% (01 Jun 2019 13:11) (97% - 100%)    PHYSICAL EXAM:  GENERAL: NAD   HEAD:  Atraumatic, Normocephalic  EYES: EOMI, PERRLA    NECK: collar in place  NERVOUS SYSTEM: Alert & Oriented X3   CHEST/LUNG: Clear to auscultation bilaterally; No rales, rhonchi, wheezing, or rubs  HEART: Regular rate and rhythm; No murmurs, rubs, or gallops  ABDOMEN: Soft, Nontender, Nondistended; Bowel sounds present  EXTREMITIES:  No clubbing, cyanosis, or edema  BACK: Surgical site dressed    LABS:                        11.1   5.80  )-----------( 273      ( 31 May 2019 14:53 )             36.1     05-31    140  |  101  |  25<H>  ----------------------------<  89  4.2   |  29  |  0.62    Ca    9.9      31 May 2019 14:53  Phos  4.1     05-31  Mg     2.1     05-31

## 2019-06-02 PROCEDURE — 99233 SBSQ HOSP IP/OBS HIGH 50: CPT

## 2019-06-02 RX ADMIN — Medication 1000 MILLIGRAM(S): at 06:44

## 2019-06-02 RX ADMIN — DAPTOMYCIN 124 MILLIGRAM(S): 500 INJECTION, POWDER, LYOPHILIZED, FOR SOLUTION INTRAVENOUS at 21:57

## 2019-06-02 RX ADMIN — OXYCODONE HYDROCHLORIDE 20 MILLIGRAM(S): 5 TABLET ORAL at 07:59

## 2019-06-02 RX ADMIN — CELECOXIB 100 MILLIGRAM(S): 200 CAPSULE ORAL at 11:15

## 2019-06-02 RX ADMIN — Medication 1 TABLET(S): at 06:20

## 2019-06-02 RX ADMIN — Medication 1 MILLIGRAM(S): at 11:15

## 2019-06-02 RX ADMIN — Medication 1000 MILLIGRAM(S): at 14:15

## 2019-06-02 RX ADMIN — OXYCODONE HYDROCHLORIDE 20 MILLIGRAM(S): 5 TABLET ORAL at 23:54

## 2019-06-02 RX ADMIN — POLYETHYLENE GLYCOL 3350 17 GRAM(S): 17 POWDER, FOR SOLUTION ORAL at 11:16

## 2019-06-02 RX ADMIN — Medication 1 PATCH: at 11:16

## 2019-06-02 RX ADMIN — Medication 1 TABLET(S): at 17:32

## 2019-06-02 RX ADMIN — METHADONE HYDROCHLORIDE 60 MILLIGRAM(S): 40 TABLET ORAL at 17:33

## 2019-06-02 RX ADMIN — Medication 1000 MILLIGRAM(S): at 21:57

## 2019-06-02 RX ADMIN — OXYCODONE HYDROCHLORIDE 20 MILLIGRAM(S): 5 TABLET ORAL at 14:15

## 2019-06-02 RX ADMIN — Medication 1 PATCH: at 07:43

## 2019-06-02 RX ADMIN — CHLORHEXIDINE GLUCONATE 1 APPLICATION(S): 213 SOLUTION TOPICAL at 06:19

## 2019-06-02 RX ADMIN — Medication 5 MILLIGRAM(S): at 22:54

## 2019-06-02 RX ADMIN — Medication 1 TABLET(S): at 21:58

## 2019-06-02 RX ADMIN — GABAPENTIN 800 MILLIGRAM(S): 400 CAPSULE ORAL at 06:20

## 2019-06-02 RX ADMIN — NALOXEGOL OXALATE 25 MILLIGRAM(S): 12.5 TABLET, FILM COATED ORAL at 07:46

## 2019-06-02 RX ADMIN — SENNA PLUS 2 TABLET(S): 8.6 TABLET ORAL at 21:57

## 2019-06-02 RX ADMIN — Medication 1 TABLET(S): at 13:20

## 2019-06-02 RX ADMIN — METHADONE HYDROCHLORIDE 60 MILLIGRAM(S): 40 TABLET ORAL at 06:20

## 2019-06-02 RX ADMIN — Medication 1000 MILLIGRAM(S): at 13:20

## 2019-06-02 RX ADMIN — GABAPENTIN 800 MILLIGRAM(S): 400 CAPSULE ORAL at 21:57

## 2019-06-02 RX ADMIN — NYSTATIN CREAM 1 APPLICATION(S): 100000 CREAM TOPICAL at 06:19

## 2019-06-02 RX ADMIN — Medication 500 MILLIGRAM(S): at 17:32

## 2019-06-02 RX ADMIN — OXYCODONE HYDROCHLORIDE 20 MILLIGRAM(S): 5 TABLET ORAL at 13:21

## 2019-06-02 RX ADMIN — CELECOXIB 100 MILLIGRAM(S): 200 CAPSULE ORAL at 11:45

## 2019-06-02 RX ADMIN — Medication 1 TABLET(S): at 11:15

## 2019-06-02 RX ADMIN — OXYCODONE HYDROCHLORIDE 20 MILLIGRAM(S): 5 TABLET ORAL at 00:33

## 2019-06-02 RX ADMIN — Medication 1 TABLET(S): at 07:46

## 2019-06-02 RX ADMIN — Medication 5 MILLIGRAM(S): at 07:59

## 2019-06-02 RX ADMIN — Medication 1000 MILLIGRAM(S): at 06:19

## 2019-06-02 RX ADMIN — Medication 1000 MILLIGRAM(S): at 22:57

## 2019-06-02 RX ADMIN — OXYCODONE HYDROCHLORIDE 20 MILLIGRAM(S): 5 TABLET ORAL at 08:45

## 2019-06-02 RX ADMIN — GABAPENTIN 800 MILLIGRAM(S): 400 CAPSULE ORAL at 13:20

## 2019-06-02 RX ADMIN — OXYCODONE HYDROCHLORIDE 20 MILLIGRAM(S): 5 TABLET ORAL at 22:54

## 2019-06-02 RX ADMIN — Medication 500 MILLIGRAM(S): at 06:19

## 2019-06-02 NOTE — PROGRESS NOTE ADULT - ASSESSMENT
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 She was also diagnosis w/ HCV. Pt was initially intubated due to respiratory failure and put on pressors in ICU. She as extubated on 2/25 and transferred from ICU the following day. Pt had a C4-5 ACDF with irrigation and debridement on 3/20 due to spinal abscess and osteo and was kept intubated post procedure and transferred again to ICU, where she was extubated on 3/21. Pt was subsequently found to have  T11/12 OM discitis with an epidural phlegmon from T9-L1. She was taken to the OR 5/8 for posterior spinal fusion & T8-L2 Laminectomy. She was again transferred to ICU for post op care and was extubated without difficulty on 5/9.     Discitis of multiple sites of spine w/ sepsis POA due to methicillin resistant Staphylococcus aureus  - OR cxs grew GPC so ID added Rifampin and has c/w Dapto, but they were subsequently found to be growing coagulase NEGATIVE staph & likely a contaminant as repeat blood cxs were negative  - as per ID, will repeat MRI in the week of June 3rd  and change to oral at that point if possible  - status post C4-5 ACDF with irrigation and debridement on 3/20  - status post posterior spinal fusion & T8-L2 Laminectomy on 5/8   - must use [place] j collar and TLSO brace at all times with ambulation   - care per surgery - ortho removed stitches on 5/31 as per RN  - c/w methadone, Tylenol, celebrex, valium, Neurontin, lidocaine patch & PRN oxy   - c/w senna, Miralax, Naloxegol & dulcolax for opiate associated constipation   - KIRILL taken out       Endocarditis of tricuspid valve  - repeat echo showed stable vegetation    Hepatitis C antibody test positive  - patient will need to f/u as an outpatient    Hx of IVDU   - pt is on methadone 60 mg q12       Anemia  - likely AOCD   - monitor CBC    Prophylaxis:   DVT: SCD  GI: PO diet

## 2019-06-03 DIAGNOSIS — R52 PAIN, UNSPECIFIED: ICD-10-CM

## 2019-06-03 DIAGNOSIS — A41.02 SEPSIS DUE TO METHICILLIN RESISTANT STAPHYLOCOCCUS AUREUS: ICD-10-CM

## 2019-06-03 LAB — ERYTHROCYTE [SEDIMENTATION RATE] IN BLOOD: 29 MM/HR — HIGH (ref 0–15)

## 2019-06-03 PROCEDURE — 93306 TTE W/DOPPLER COMPLETE: CPT | Mod: 26

## 2019-06-03 PROCEDURE — 72158 MRI LUMBAR SPINE W/O & W/DYE: CPT | Mod: 26

## 2019-06-03 PROCEDURE — 99232 SBSQ HOSP IP/OBS MODERATE 35: CPT

## 2019-06-03 PROCEDURE — 72157 MRI CHEST SPINE W/O & W/DYE: CPT | Mod: 26

## 2019-06-03 PROCEDURE — 72156 MRI NECK SPINE W/O & W/DYE: CPT | Mod: 26

## 2019-06-03 RX ADMIN — Medication 1 PATCH: at 18:18

## 2019-06-03 RX ADMIN — Medication 1 PATCH: at 00:36

## 2019-06-03 RX ADMIN — Medication 1 PATCH: at 07:56

## 2019-06-03 RX ADMIN — OXYCODONE HYDROCHLORIDE 20 MILLIGRAM(S): 5 TABLET ORAL at 23:49

## 2019-06-03 RX ADMIN — OXYCODONE HYDROCHLORIDE 20 MILLIGRAM(S): 5 TABLET ORAL at 08:01

## 2019-06-03 RX ADMIN — NALOXEGOL OXALATE 25 MILLIGRAM(S): 12.5 TABLET, FILM COATED ORAL at 07:58

## 2019-06-03 RX ADMIN — SENNA PLUS 2 TABLET(S): 8.6 TABLET ORAL at 21:58

## 2019-06-03 RX ADMIN — Medication 1 TABLET(S): at 13:48

## 2019-06-03 RX ADMIN — Medication 5 MILLIGRAM(S): at 08:01

## 2019-06-03 RX ADMIN — Medication 1000 MILLIGRAM(S): at 22:57

## 2019-06-03 RX ADMIN — OXYCODONE HYDROCHLORIDE 20 MILLIGRAM(S): 5 TABLET ORAL at 12:51

## 2019-06-03 RX ADMIN — Medication 1 PATCH: at 12:51

## 2019-06-03 RX ADMIN — GABAPENTIN 800 MILLIGRAM(S): 400 CAPSULE ORAL at 21:58

## 2019-06-03 RX ADMIN — NYSTATIN CREAM 1 APPLICATION(S): 100000 CREAM TOPICAL at 21:57

## 2019-06-03 RX ADMIN — OXYCODONE HYDROCHLORIDE 20 MILLIGRAM(S): 5 TABLET ORAL at 18:18

## 2019-06-03 RX ADMIN — Medication 5 MILLIGRAM(S): at 22:51

## 2019-06-03 RX ADMIN — OXYCODONE HYDROCHLORIDE 20 MILLIGRAM(S): 5 TABLET ORAL at 22:49

## 2019-06-03 RX ADMIN — POLYETHYLENE GLYCOL 3350 17 GRAM(S): 17 POWDER, FOR SOLUTION ORAL at 12:50

## 2019-06-03 RX ADMIN — Medication 1 TABLET(S): at 05:37

## 2019-06-03 RX ADMIN — OXYCODONE HYDROCHLORIDE 20 MILLIGRAM(S): 5 TABLET ORAL at 17:13

## 2019-06-03 RX ADMIN — OXYCODONE HYDROCHLORIDE 20 MILLIGRAM(S): 5 TABLET ORAL at 13:33

## 2019-06-03 RX ADMIN — METHADONE HYDROCHLORIDE 60 MILLIGRAM(S): 40 TABLET ORAL at 05:37

## 2019-06-03 RX ADMIN — CHLORHEXIDINE GLUCONATE 1 APPLICATION(S): 213 SOLUTION TOPICAL at 05:38

## 2019-06-03 RX ADMIN — CELECOXIB 100 MILLIGRAM(S): 200 CAPSULE ORAL at 12:50

## 2019-06-03 RX ADMIN — Medication 1000 MILLIGRAM(S): at 13:48

## 2019-06-03 RX ADMIN — Medication 500 MILLIGRAM(S): at 05:37

## 2019-06-03 RX ADMIN — Medication 1000 MILLIGRAM(S): at 21:57

## 2019-06-03 RX ADMIN — DAPTOMYCIN 124 MILLIGRAM(S): 500 INJECTION, POWDER, LYOPHILIZED, FOR SOLUTION INTRAVENOUS at 21:57

## 2019-06-03 RX ADMIN — OXYCODONE HYDROCHLORIDE 20 MILLIGRAM(S): 5 TABLET ORAL at 09:15

## 2019-06-03 RX ADMIN — Medication 1 PATCH: at 12:34

## 2019-06-03 RX ADMIN — Medication 1 TABLET(S): at 07:58

## 2019-06-03 RX ADMIN — Medication 1 TABLET(S): at 17:09

## 2019-06-03 RX ADMIN — Medication 500 MILLIGRAM(S): at 17:09

## 2019-06-03 RX ADMIN — CELECOXIB 100 MILLIGRAM(S): 200 CAPSULE ORAL at 13:32

## 2019-06-03 RX ADMIN — Medication 1 TABLET(S): at 21:58

## 2019-06-03 RX ADMIN — GABAPENTIN 800 MILLIGRAM(S): 400 CAPSULE ORAL at 05:37

## 2019-06-03 RX ADMIN — Medication 1000 MILLIGRAM(S): at 05:37

## 2019-06-03 RX ADMIN — GABAPENTIN 800 MILLIGRAM(S): 400 CAPSULE ORAL at 13:50

## 2019-06-03 RX ADMIN — METHADONE HYDROCHLORIDE 60 MILLIGRAM(S): 40 TABLET ORAL at 17:09

## 2019-06-03 RX ADMIN — Medication 1 MILLIGRAM(S): at 12:50

## 2019-06-03 RX ADMIN — Medication 1000 MILLIGRAM(S): at 13:50

## 2019-06-03 RX ADMIN — Medication 1 TABLET(S): at 12:50

## 2019-06-03 NOTE — PROGRESS NOTE ADULT - PROBLEM SELECTOR PLAN 2
- status post C4-5 ACDF with irrigation and debridement on 3/20  - status post posterior spinal fusion & T8-L2 Laminectomy on 5/8   - must use [place] j collar and TLSO brace at all times with ambulation   - care per surgery - ortho removed stitches on 5/31 as per RN

## 2019-06-03 NOTE — PROGRESS NOTE ADULT - PROBLEM SELECTOR PLAN 1
with T11/12 OM/ discitis with an epidural phlegmon from T9-L1   s/p T8-L2 Laminectomy and PSF POD1  OR c/s coag neg staph    cont daptomycin(dose increased to 8mg /kg and added rifampin)  repeat MRI spines ordered  ESR ,CRP<CPK and CMP ordered  last cbc, cr and CPK wnl

## 2019-06-03 NOTE — PROGRESS NOTE ADULT - SUBJECTIVE AND OBJECTIVE BOX
Patient is a 43y old  Female who presents with a chief complaint of AMS (02 Jun 2019 15:31)      INTERVAL HPI / OVERNIGHT EVENTS: doing ok     MEDICATIONS  (STANDING):  acetaminophen   Tablet .. 1000 milliGRAM(s) Oral every 8 hours  ascorbic acid 500 milliGRAM(s) Oral two times a day  calcium carbonate 1250 mG  + Vitamin D (OsCal 500 + D) 1 Tablet(s) Oral three times a day  celecoxib 100 milliGRAM(s) Oral daily  chlorhexidine 4% Liquid 1 Application(s) Topical <User Schedule>  DAPTOmycin IVPB 600 milliGRAM(s) IV Intermittent <User Schedule>  folic acid 1 milliGRAM(s) Oral daily  gabapentin 800 milliGRAM(s) Oral three times a day  lactobacillus acidophilus 1 Tablet(s) Oral two times a day with meals  lidocaine   Patch 1 Patch Transdermal every 24 hours  lidocaine   Patch 1 Patch Transdermal every 24 hours  methadone    Tablet 60 milliGRAM(s) Oral every 12 hours  multivitamin 1 Tablet(s) Oral daily  naloxegol 25 milliGRAM(s) Oral before breakfast  nicotine -  14 mG/24Hr(s) Patch 1 patch Transdermal daily  nystatin Powder 1 Application(s) Topical three times a day  polyethylene glycol 3350 17 Gram(s) Oral daily  rifampin 300 milliGRAM(s) Oral two times a day  senna 2 Tablet(s) Oral at bedtime    MEDICATIONS  (PRN):  bisacodyl Suppository 10 milliGRAM(s) Rectal daily PRN Constipation  diazepam    Tablet 5 milliGRAM(s) Oral every 8 hours PRN muscle spasm  oxyCODONE    IR 20 milliGRAM(s) Oral every 4 hours PRN Moderate Pain (4 - 6)      Vital Signs Last 24 Hrs  T(C): 35.9 (03 Jun 2019 12:00), Max: 36.3 (02 Jun 2019 23:35)  T(F): 96.6 (03 Jun 2019 12:00), Max: 97.3 (02 Jun 2019 23:35)  HR: 90 (03 Jun 2019 12:00) (73 - 90)  BP: 120/82 (03 Jun 2019 12:00) (97/64 - 120/82)  BP(mean): --  RR: 16 (03 Jun 2019 12:00) (16 - 16)  SpO2: 98% (03 Jun 2019 12:00) (96% - 98%)    Review of systems:  General : no fever /chills, fatigue  CVS : no chest pain, palpitations  Lungs : no shortness of breath, cough  GI : no abdominal pain, vomiting, diarrhea   : no dysuria, hematuria        PHYSICAL EXAM:  General :NAD  Constitutional:  well-groomed, well-developed  Respiratory: CTAB/L  Cardiovascular: S1 and S2, RRR,murmur+  Gastrointestinal: BS+, soft, NT/ND  Extremities: No peripheral edema  Vascular: 2+ peripheral pulses  Skin: back S S incision site (clean )       LABS:                MICROBIOLOGY:  RECENT CULTURES:        RADIOLOGY & ADDITIONAL STUDIES:

## 2019-06-03 NOTE — PROGRESS NOTE ADULT - PROBLEM SELECTOR PLAN 1
discitis of multiple sites of spine  repeating mri today and if ok will change to oral abx as per ID

## 2019-06-03 NOTE — PROGRESS NOTE ADULT - SUBJECTIVE AND OBJECTIVE BOX
Patient is a 43y old  Female who presents with a chief complaint of AMS (03 Jun 2019 14:57)      INTERVAL HPI/OVERNIGHT EVENTS:  overall feels well, some complaints about not being able to straighten toes, anxious about potential discharge if mri ok, some back muscle "tightness" but pain in much better control    MEDICATIONS  (STANDING):  acetaminophen   Tablet .. 1000 milliGRAM(s) Oral every 8 hours  ascorbic acid 500 milliGRAM(s) Oral two times a day  calcium carbonate 1250 mG  + Vitamin D (OsCal 500 + D) 1 Tablet(s) Oral three times a day  celecoxib 100 milliGRAM(s) Oral daily  chlorhexidine 4% Liquid 1 Application(s) Topical <User Schedule>  DAPTOmycin IVPB 600 milliGRAM(s) IV Intermittent <User Schedule>  folic acid 1 milliGRAM(s) Oral daily  gabapentin 800 milliGRAM(s) Oral three times a day  lactobacillus acidophilus 1 Tablet(s) Oral two times a day with meals  lidocaine   Patch 1 Patch Transdermal every 24 hours  lidocaine   Patch 1 Patch Transdermal every 24 hours  methadone    Tablet 60 milliGRAM(s) Oral every 12 hours  multivitamin 1 Tablet(s) Oral daily  naloxegol 25 milliGRAM(s) Oral before breakfast  nicotine -  14 mG/24Hr(s) Patch 1 patch Transdermal daily  nystatin Powder 1 Application(s) Topical three times a day  polyethylene glycol 3350 17 Gram(s) Oral daily  rifampin 300 milliGRAM(s) Oral two times a day  senna 2 Tablet(s) Oral at bedtime    MEDICATIONS  (PRN):  bisacodyl Suppository 10 milliGRAM(s) Rectal daily PRN Constipation  diazepam    Tablet 5 milliGRAM(s) Oral every 8 hours PRN muscle spasm  oxyCODONE    IR 20 milliGRAM(s) Oral every 4 hours PRN Moderate Pain (4 - 6)      Allergies    penicillin (Short breath; Hives)    Intolerances        REVIEW OF SYSTEMS:  CONSTITUTIONAL: No fever, weight loss, or fatigue  EYES: No eye pain, visual disturbances, or discharge  ENMT:  No difficulty hearing, tinnitus, vertigo; No sinus or throat pain  RESPIRATORY: No cough, wheezing, chills or hemoptysis; No shortness of breath  CARDIOVASCULAR: No chest pain, palpitations, dizziness, or leg swelling  GASTROINTESTINAL: No abdominal or epigastric pain. No nausea, vomiting, or hematemesis; No diarrhea or constipation. No melena or hematochezia.  GENITOURINARY: No dysuria, frequency, hematuria, or incontinence  NEUROLOGICAL: No headaches, memory loss, loss of strength, numbness, or tremors  SKIN: No itching, burning, rashes, or lesions   ENDOCRINE: No heat or cold intolerance; No hair loss  MUSCULOSKELETAL: muscle tightness, inability to straighten toes  PSYCHIATRIC: mood stable    Vital Signs Last 24 Hrs  T(C): 35.9 (03 Jun 2019 12:00), Max: 36.3 (02 Jun 2019 23:35)  T(F): 96.6 (03 Jun 2019 12:00), Max: 97.3 (02 Jun 2019 23:35)  HR: 90 (03 Jun 2019 12:00) (73 - 90)  BP: 120/82 (03 Jun 2019 12:00) (97/64 - 120/82)  BP(mean): --  RR: 16 (03 Jun 2019 12:00) (16 - 16)  SpO2: 98% (03 Jun 2019 12:00) (96% - 98%)    PHYSICAL EXAM:  GENERAL: NAD  HEAD:  Atraumatic, Normocephalic  EYES: EOMI, PERRLA, conjunctiva and sclera clear  ENMT: No tonsillar erythema, exudates, or enlargement; Moist mucous membranes,   NECK: Supple, No JVD, Normal thyroid  NERVOUS SYSTEM:  Alert & Oriented X3, Good concentration; Motor Strength 5/5 B/L upper and lower extremities, pt. able to ambulate independently  CHEST/LUNG: Clear to percussion bilaterally; No rales, rhonchi, wheezing, or rubs  HEART: Regular rate and rhythm; No murmurs, rubs, or gallops  ABDOMEN: Soft, Nontender, Nondistended; Bowel sounds present  EXTREMITIES:  2+ Peripheral Pulses, toes curled inward with bony prominence      LABS:              CAPILLARY BLOOD GLUCOSE          RADIOLOGY & ADDITIONAL TESTS:    Imaging Personally Reviewed:  [ ] YES  [ ] NO    Consultant(s) Notes Reviewed:  [ ] YES  [ ] NO    Care Discussed with Consultants/Other Providers [ ] YES  [ ] NO

## 2019-06-04 LAB
ALBUMIN SERPL ELPH-MCNC: 3.1 G/DL — LOW (ref 3.3–5)
ALP SERPL-CCNC: 169 U/L — HIGH (ref 40–120)
ALT FLD-CCNC: 21 U/L — SIGNIFICANT CHANGE UP (ref 12–78)
ANION GAP SERPL CALC-SCNC: 10 MMOL/L — SIGNIFICANT CHANGE UP (ref 5–17)
AST SERPL-CCNC: 13 U/L — LOW (ref 15–37)
BILIRUB SERPL-MCNC: 0.2 MG/DL — SIGNIFICANT CHANGE UP (ref 0.2–1.2)
BUN SERPL-MCNC: 20 MG/DL — SIGNIFICANT CHANGE UP (ref 7–23)
CALCIUM SERPL-MCNC: 9.3 MG/DL — SIGNIFICANT CHANGE UP (ref 8.5–10.1)
CHLORIDE SERPL-SCNC: 103 MMOL/L — SIGNIFICANT CHANGE UP (ref 96–108)
CK SERPL-CCNC: 22 U/L — LOW (ref 26–192)
CO2 SERPL-SCNC: 29 MMOL/L — SIGNIFICANT CHANGE UP (ref 22–31)
CREAT SERPL-MCNC: 0.7 MG/DL — SIGNIFICANT CHANGE UP (ref 0.5–1.3)
CRP SERPL-MCNC: 1.28 MG/DL — HIGH (ref 0–0.4)
GLUCOSE SERPL-MCNC: 108 MG/DL — HIGH (ref 70–99)
POTASSIUM SERPL-MCNC: 4.3 MMOL/L — SIGNIFICANT CHANGE UP (ref 3.5–5.3)
POTASSIUM SERPL-SCNC: 4.3 MMOL/L — SIGNIFICANT CHANGE UP (ref 3.5–5.3)
PROT SERPL-MCNC: 6.9 GM/DL — SIGNIFICANT CHANGE UP (ref 6–8.3)
SODIUM SERPL-SCNC: 142 MMOL/L — SIGNIFICANT CHANGE UP (ref 135–145)

## 2019-06-04 PROCEDURE — 99233 SBSQ HOSP IP/OBS HIGH 50: CPT

## 2019-06-04 PROCEDURE — 99356: CPT

## 2019-06-04 RX ORDER — FLUCONAZOLE 150 MG/1
150 TABLET ORAL DAILY
Refills: 0 | Status: COMPLETED | OUTPATIENT
Start: 2019-06-04 | End: 2019-06-07

## 2019-06-04 RX ORDER — OXYCODONE HYDROCHLORIDE 5 MG/1
5 TABLET ORAL EVERY 6 HOURS
Refills: 0 | Status: DISCONTINUED | OUTPATIENT
Start: 2019-06-04 | End: 2019-06-04

## 2019-06-04 RX ORDER — OXYCODONE HYDROCHLORIDE 5 MG/1
10 TABLET ORAL EVERY 4 HOURS
Refills: 0 | Status: DISCONTINUED | OUTPATIENT
Start: 2019-06-04 | End: 2019-06-05

## 2019-06-04 RX ADMIN — GABAPENTIN 800 MILLIGRAM(S): 400 CAPSULE ORAL at 21:42

## 2019-06-04 RX ADMIN — Medication 1000 MILLIGRAM(S): at 15:52

## 2019-06-04 RX ADMIN — NYSTATIN CREAM 1 APPLICATION(S): 100000 CREAM TOPICAL at 21:42

## 2019-06-04 RX ADMIN — Medication 1 TABLET(S): at 09:08

## 2019-06-04 RX ADMIN — LIDOCAINE 1 PATCH: 4 CREAM TOPICAL at 08:58

## 2019-06-04 RX ADMIN — Medication 500 MILLIGRAM(S): at 05:38

## 2019-06-04 RX ADMIN — GABAPENTIN 800 MILLIGRAM(S): 400 CAPSULE ORAL at 14:48

## 2019-06-04 RX ADMIN — OXYCODONE HYDROCHLORIDE 5 MILLIGRAM(S): 5 TABLET ORAL at 12:05

## 2019-06-04 RX ADMIN — OXYCODONE HYDROCHLORIDE 10 MILLIGRAM(S): 5 TABLET ORAL at 15:48

## 2019-06-04 RX ADMIN — Medication 1 PATCH: at 12:02

## 2019-06-04 RX ADMIN — Medication 1 TABLET(S): at 17:23

## 2019-06-04 RX ADMIN — CELECOXIB 100 MILLIGRAM(S): 200 CAPSULE ORAL at 13:00

## 2019-06-04 RX ADMIN — Medication 1000 MILLIGRAM(S): at 05:37

## 2019-06-04 RX ADMIN — FLUCONAZOLE 150 MILLIGRAM(S): 150 TABLET ORAL at 17:23

## 2019-06-04 RX ADMIN — LIDOCAINE 1 PATCH: 4 CREAM TOPICAL at 20:05

## 2019-06-04 RX ADMIN — Medication 1 TABLET(S): at 21:42

## 2019-06-04 RX ADMIN — CELECOXIB 100 MILLIGRAM(S): 200 CAPSULE ORAL at 12:00

## 2019-06-04 RX ADMIN — METHADONE HYDROCHLORIDE 60 MILLIGRAM(S): 40 TABLET ORAL at 17:23

## 2019-06-04 RX ADMIN — Medication 5 MILLIGRAM(S): at 07:58

## 2019-06-04 RX ADMIN — Medication 1000 MILLIGRAM(S): at 06:37

## 2019-06-04 RX ADMIN — Medication 1 MILLIGRAM(S): at 12:00

## 2019-06-04 RX ADMIN — NALOXEGOL OXALATE 25 MILLIGRAM(S): 12.5 TABLET, FILM COATED ORAL at 07:58

## 2019-06-04 RX ADMIN — POLYETHYLENE GLYCOL 3350 17 GRAM(S): 17 POWDER, FOR SOLUTION ORAL at 12:01

## 2019-06-04 RX ADMIN — OXYCODONE HYDROCHLORIDE 10 MILLIGRAM(S): 5 TABLET ORAL at 19:49

## 2019-06-04 RX ADMIN — SENNA PLUS 2 TABLET(S): 8.6 TABLET ORAL at 21:43

## 2019-06-04 RX ADMIN — Medication 1 TABLET(S): at 05:38

## 2019-06-04 RX ADMIN — Medication 1 PATCH: at 19:34

## 2019-06-04 RX ADMIN — METHADONE HYDROCHLORIDE 60 MILLIGRAM(S): 40 TABLET ORAL at 05:38

## 2019-06-04 RX ADMIN — DAPTOMYCIN 124 MILLIGRAM(S): 500 INJECTION, POWDER, LYOPHILIZED, FOR SOLUTION INTRAVENOUS at 21:42

## 2019-06-04 RX ADMIN — Medication 1 TABLET(S): at 14:52

## 2019-06-04 RX ADMIN — Medication 1000 MILLIGRAM(S): at 14:49

## 2019-06-04 RX ADMIN — Medication 1000 MILLIGRAM(S): at 21:42

## 2019-06-04 RX ADMIN — LIDOCAINE 1 PATCH: 4 CREAM TOPICAL at 19:33

## 2019-06-04 RX ADMIN — Medication 1000 MILLIGRAM(S): at 22:42

## 2019-06-04 RX ADMIN — OXYCODONE HYDROCHLORIDE 10 MILLIGRAM(S): 5 TABLET ORAL at 20:49

## 2019-06-04 RX ADMIN — Medication 500 MILLIGRAM(S): at 17:23

## 2019-06-04 RX ADMIN — Medication 1 TABLET(S): at 12:00

## 2019-06-04 RX ADMIN — Medication 5 MILLIGRAM(S): at 15:47

## 2019-06-04 RX ADMIN — OXYCODONE HYDROCHLORIDE 10 MILLIGRAM(S): 5 TABLET ORAL at 23:57

## 2019-06-04 RX ADMIN — OXYCODONE HYDROCHLORIDE 10 MILLIGRAM(S): 5 TABLET ORAL at 17:20

## 2019-06-04 RX ADMIN — Medication 5 MILLIGRAM(S): at 23:58

## 2019-06-04 RX ADMIN — CHLORHEXIDINE GLUCONATE 1 APPLICATION(S): 213 SOLUTION TOPICAL at 05:37

## 2019-06-04 RX ADMIN — NYSTATIN CREAM 1 APPLICATION(S): 100000 CREAM TOPICAL at 14:52

## 2019-06-04 RX ADMIN — NYSTATIN CREAM 1 APPLICATION(S): 100000 CREAM TOPICAL at 05:37

## 2019-06-04 RX ADMIN — OXYCODONE HYDROCHLORIDE 5 MILLIGRAM(S): 5 TABLET ORAL at 11:00

## 2019-06-04 RX ADMIN — GABAPENTIN 800 MILLIGRAM(S): 400 CAPSULE ORAL at 05:38

## 2019-06-04 NOTE — PROGRESS NOTE ADULT - SUBJECTIVE AND OBJECTIVE BOX
Patient is a 43y old  Female who presents with a chief complaint of AMS (04 Jun 2019 08:54)      INTERVAL HPI / OVERNIGHT EVENTS: c/o vaginal itching and discharge    MEDICATIONS  (STANDING):  acetaminophen   Tablet .. 1000 milliGRAM(s) Oral every 8 hours  ascorbic acid 500 milliGRAM(s) Oral two times a day  calcium carbonate 1250 mG  + Vitamin D (OsCal 500 + D) 1 Tablet(s) Oral three times a day  celecoxib 100 milliGRAM(s) Oral daily  chlorhexidine 4% Liquid 1 Application(s) Topical <User Schedule>  DAPTOmycin IVPB 600 milliGRAM(s) IV Intermittent <User Schedule>  fluconAZOLE   Tablet 150 milliGRAM(s) Oral daily  folic acid 1 milliGRAM(s) Oral daily  gabapentin 800 milliGRAM(s) Oral three times a day  lactobacillus acidophilus 1 Tablet(s) Oral two times a day with meals  lidocaine   Patch 1 Patch Transdermal every 24 hours  lidocaine   Patch 1 Patch Transdermal every 24 hours  methadone    Tablet 60 milliGRAM(s) Oral every 12 hours  multivitamin 1 Tablet(s) Oral daily  naloxegol 25 milliGRAM(s) Oral before breakfast  nicotine -  14 mG/24Hr(s) Patch 1 patch Transdermal daily  nystatin Powder 1 Application(s) Topical three times a day  polyethylene glycol 3350 17 Gram(s) Oral daily  rifampin 300 milliGRAM(s) Oral two times a day  senna 2 Tablet(s) Oral at bedtime    MEDICATIONS  (PRN):  bisacodyl Suppository 10 milliGRAM(s) Rectal daily PRN Constipation  diazepam    Tablet 5 milliGRAM(s) Oral every 8 hours PRN muscle spasm  oxyCODONE    IR 10 milliGRAM(s) Oral every 4 hours PRN Moderate Pain (4 - 6)      Vital Signs Last 24 Hrs  T(C): 36.6 (04 Jun 2019 11:33), Max: 36.6 (04 Jun 2019 11:33)  T(F): 97.9 (04 Jun 2019 11:33), Max: 97.9 (04 Jun 2019 11:33)  HR: 82 (04 Jun 2019 11:33) (76 - 91)  BP: 97/59 (04 Jun 2019 11:33) (97/59 - 121/81)  BP(mean): --  RR: 17 (04 Jun 2019 11:33) (16 - 17)  SpO2: 99% (04 Jun 2019 11:33) (96% - 99%)    Review of systems:  General : no fever /chills,fatigue  CVS : no chest pain, palpitations  Lungs : no shortness of breath, cough  GI : no abdominal pain,vomiting, diarrhea   : no dysuria,hematuria        PHYSICAL EXAM:  General :NAD  Constitutional:  well-groomed, well-developed  Respiratory: CTAB/L  Cardiovascular: S1 and S2, RRR, no M/G/R  Gastrointestinal: BS+, soft, NT/ND  Extremities: No peripheral edema  Vascular: 2+ peripheral pulses  Skin: surgical site incision healing well      LABS:    -->29  CRP 2.67-->1.28            MICROBIOLOGY:  RECENT CULTURES:        RADIOLOGY & ADDITIONAL STUDIES:    MRI Lumbar spine   Vertebrae: Anatomic alignment. No acute fracture seen. Thoracolumbar   posterolateral fusion and posterior pedicle screw fixation extends to the   L2   level. There is mild disc desiccation at L4-5 without significant disc   height   loss.    L1-L2: No significant disc disease. No stenosis.    L2-L3: No significant disc disease. No stenosis.    L3-L4: Mild facet arthropathy. No significant stenoses.    L4-L5: Mild disc bulge as well as moderate facet arthropathy. The   central   spinal canal remains patent. Mild bilateral neural foraminal stenoses.   The post   contrast imaging does demonstrate some articular/periarticular   enhancement   which may be consistent with ongoing infection/inflammation.    L5-S1: Moderate facet arthropathy. No stenoses. The post contrast   imaging does   demonstrate some articular/periarticular enhancement which may be   consistent   with ongoing infection/inflammation.    Epidural space: No evidence of epidural abscess.    Spinal cord: The conus medullaris ends normally.      Kidneys andureters: The right kidney demonstrates a cyst.    Soft tissues: Nonspecific edema in the subcutaneous fat, potentially   dependent/positional.     IMPRESSION:   1. Interval postsurgical changes associated susceptibility artifact from   postoperative instrumentation as described above.       MRI thoracic  FINDINGS:    Vertebrae: Slight exaggeration of the lower thoracic kyphosis. No acute   fracture seen. Anterior wedging of T11 and T12 again demonstrated.    Discs/Spinal canal/Neural foramina: Persistent T11-12 disc space edema   with a   tiny marginally enhancing abscess anteriorly within the disc space   measuring   approximately 3.2 mm in craniocaudal height; this represents an improved   appearance compared to the prior study, could reflect ongoing residual   discitis   versus scar tissue and postinflammatory fluid. No new disc space   inflammation   orenhancement identified elsewhere. Thoracolumbar posterolateral fusion   posterior pedicle screw fixation from T8-L2 in the interval. Some   limitations   visualizing the spinal canal due to hardware susceptibility artifacts.   The   thecal sac appears grossly patent. There appears to be somewhat improved   patency of the   spinal canal around the T11-12 level. There is a component of dorsal   T11-12   osteophytic ridging. The spinal canal is decompressed posteriorly at this   level.    Spinal cord: Grossly unremarkable signal, as visualized.     Lungs: Improved appearance of the right lung.    Soft tissues: The appearance of increased signal intensity in the   paravertebral soft tissues on the STIR sequence, some of this could be   related   to inhomogeneous fat suppression in the setting of hardware   susceptibility   artifacts. There is trace nonspecific subcutaneous edema, potentially   dependent/positional. No compressive fluid collections.   Other findings: Small left pleural effusion.     IMPRESSION:   As compared to previous exam, patient is status post decompression of   discitis osteomyelitis with multilevel fusion and instrumentation and   laminectomy above and below the level of osteomyelitis. Extensive this is   likely artifact, limits optimal assessment of the canal as described   above.      MRI cervical spine      FINDINGS:    Vertebrae: There is straightening of the cervical lordosis. Trace   degenerative   retrolisthesis of C5 on C6, as before. No acute fracture seen. Mild,   chronic   height loss at C6 and C7, similar to prior.   Prior ACDF at C4-5. Mild to moderate disc height loss and spondylosis at   C5-6   and C6-7, as before.    C2-C3: No significant interval change. Slight disc bulge and ligamentum   flavum   buckling without contribution to significant stenoses.    C3-C4: No significant interval change. Mild disc bulge without   contribution to   central spinal canal stenosis. Uncovertebral and facet arthropathy   causing mild   bilateral neural foraminal stenoses.    C4-C5: No significant interval change. Prior ACDF. No significant   stenoses.    C5-C6: No significant interval change. Disc osteophyte complex and   ligamentum   flavum buckling causing mild central spinal canal stenosis. Uncovertebral   and   facet arthropathy causing moderate to severe bilateral neural foraminal   stenoses.    C6-C7: Further subsidence of endplateand disc space edema with   enhancement at   C6-7 since the prior study. Posterior disc osteophyte complex without   contribution to significant central spinal canal stenosis. Uncovertebral   and   facet arthropathy causing moderate right and mild left neural foraminal   stenoses.    C7-T1: No central spinal canal is patent. Mild to moderate facet   arthropathy   without contribution to significant foraminal stenoses.    Spinal cord: Normal signal. No cord compression.      Vasculature: Expected flow voids in the vertebral arteries.     Soft tissues: Unremarkable    Other findings: No discrete epidural abscess identified.     IMPRESSION:   1. Persistent but slightly decreased endplate enhancement and disc   inflammatory changes/edema at C6-7   since the prior study.  2. Postoperative changes related to C4-5 instrumentation and extensive   susceptibility artifact limits optimal assessment.

## 2019-06-04 NOTE — PROGRESS NOTE ADULT - SUBJECTIVE AND OBJECTIVE BOX
Patient is a 43y old  Female who presents with a chief complaint of AMS (03 Jun 2019 17:08)      INTERVAL HPI/OVERNIGHT EVENTS:  s/p mri yesterday, prelim read in  patient with c/o pain in back, also with same complaint regarding not able to extend.  Ortho at bedside examining.  patient teary today    MEDICATIONS  (STANDING):  acetaminophen   Tablet .. 1000 milliGRAM(s) Oral every 8 hours  ascorbic acid 500 milliGRAM(s) Oral two times a day  calcium carbonate 1250 mG  + Vitamin D (OsCal 500 + D) 1 Tablet(s) Oral three times a day  celecoxib 100 milliGRAM(s) Oral daily  chlorhexidine 4% Liquid 1 Application(s) Topical <User Schedule>  DAPTOmycin IVPB 600 milliGRAM(s) IV Intermittent <User Schedule>  folic acid 1 milliGRAM(s) Oral daily  gabapentin 800 milliGRAM(s) Oral three times a day  lactobacillus acidophilus 1 Tablet(s) Oral two times a day with meals  lidocaine   Patch 1 Patch Transdermal every 24 hours  lidocaine   Patch 1 Patch Transdermal every 24 hours  methadone    Tablet 60 milliGRAM(s) Oral every 12 hours  multivitamin 1 Tablet(s) Oral daily  naloxegol 25 milliGRAM(s) Oral before breakfast  nicotine -  14 mG/24Hr(s) Patch 1 patch Transdermal daily  nystatin Powder 1 Application(s) Topical three times a day  polyethylene glycol 3350 17 Gram(s) Oral daily  rifampin 300 milliGRAM(s) Oral two times a day  senna 2 Tablet(s) Oral at bedtime    MEDICATIONS  (PRN):  bisacodyl Suppository 10 milliGRAM(s) Rectal daily PRN Constipation  diazepam    Tablet 5 milliGRAM(s) Oral every 8 hours PRN muscle spasm      Allergies    penicillin (Short breath; Hives)    Intolerances        REVIEW OF SYSTEMS:  CONSTITUTIONAL: No fever, weight loss, or fatigue  EYES: No eye pain, visual disturbances, or discharge  ENMT:  No difficulty hearing, tinnitus, vertigo; No sinus or throat pain  NECK: pain improved  RESPIRATORY: No cough, wheezing, chills or hemoptysis; No shortness of breath  CARDIOVASCULAR: No chest pain, palpitations, dizziness, or leg swelling  GASTROINTESTINAL: No abdominal or epigastric pain. No nausea, vomiting, or hematemesis; No diarrhea or constipation. No melena or hematochezia.  GENITOURINARY: No dysuria, frequency, hematuria, or incontinence  NEUROLOGICAL: No headaches, memory loss, loss of strength, numbness, or tremors  SKIN: No itching, burning, rashes, or lesions   LYMPH NODES: No enlarged glands  ENDOCRINE: No heat or cold intolerance; No hair loss  MUSCULOSKELETAL: c/o back pain and difficulty extending toes  PSYCHIATRIC: teary today in regards to relationship with mother    Vital Signs Last 24 Hrs  T(C): 35.9 (04 Jun 2019 04:00), Max: 36.1 (03 Jun 2019 18:19)  T(F): 96.6 (04 Jun 2019 04:00), Max: 97 (03 Jun 2019 18:19)  HR: 80 (04 Jun 2019 04:00) (76 - 91)  BP: 109/68 (04 Jun 2019 04:00) (107/66 - 121/81)  BP(mean): --  RR: 16 (04 Jun 2019 04:00) (16 - 17)  SpO2: 98% (04 Jun 2019 04:00) (96% - 98%)    PHYSICAL EXAM:  GENERAL: NAD, well-groomed, well-developed  HEAD:  Atraumatic, Normocephalic  EYES: EOMI, PERRLA, conjunctiva and sclera clear  ENMT: No tonsillar erythema, exudates, or enlargement; Moist mucous membranes, Good dentition, No lesions  NECK: Supple, No JVD, Normal thyroid  NERVOUS SYSTEM:  Alert & Oriented X3, Good concentration; motor strength grossly intact  CHEST/LUNG: Clear to percussion bilaterally; No rales, rhonchi, wheezing, or rubs  HEART: Regular rate and rhythm; No murmurs, rubs, or gallops  ABDOMEN: Soft, Nontender, Nondistended; Bowel sounds present  EXTREMITIES:  2+ Peripheral Pulses, No clubbing, cyanosis, or edema  LYMPH: No lymphadenopathy noted  SKIN: No rashes or lesions    LABS:              CAPILLARY BLOOD GLUCOSE          RADIOLOGY & ADDITIONAL TESTS:  < from: MR Lumbar Spine w/wo IV Cont (06.03.19 @ 16:06) >    ******PRELIMINARY REPORT******    ******PRELIMINARY REPORT******          EXAM:  MR SPINE LUMBAR WAW IC                            PROCEDURE DATE:  06/03/2019    ******PRELIMINARY REPORT******    ******PRELIMINARY REPORT*****    < end of copied text >  < from: MR Lumbar Spine w/wo IV Cont (06.03.19 @ 16:06) >    IMPRESSION:   1. Interval postsurgical changes.   2. No new findings or stenoses.    < end of copied text >  < from: MR Thoracic Spine w/wo IV Cont (06.03.19 @ 16:06) >  ******PRELIMINARY REPORT******    ******PRELIMINARY REPORT******          EXAM:  MR SPINE THORACIC WAW IC                            PROCEDURE DATE:  06/03/2019    ******PRELIMINARY REPORT******    ******PRELIMINARY REPORT******        < end of copied text >  < from: MR Thoracic Spine w/wo IV Cont (06.03.19 @ 16:06) >  IMPRESSION:   1. Improved appearance of the T11-12 disc space.   2. No new or progressive findings.   3. Improved patency of the central spinal canal at the T11-12 level.    < end of copied text >  < from: MR Cervical Spine w/wo IV Cont (06.03.19 @ 16:06) >  ******PRELIMINARY REPORT******    ******PRELIMINARY REPORT******          EXAM:  MR SPINE CERVICAL WAW IC                            PROCEDURE DATE:  06/03/2019    ******PRELIMINARY REPORT******    ******PRELIMINARY REPORT******     < end of copied text >  < from: MR Cervical Spine w/wo IV Cont (06.03.19 @ 16:06) >  IMPRESSION:   1. Further subsidence of endplate and disc space edema with enhancement   at C6-7   since the prior study.   2. No new or progressive findings.    < end of copied text >      Imaging Personally Reviewed:  [x ] YES  [ ] NO    Consultant(s) Notes Reviewed:  [ x] YES  [ ] NO    Care Discussed with Consultants/Other Providers [ x] YES  [ ] NO

## 2019-06-04 NOTE — PROGRESS NOTE ADULT - PROBLEM SELECTOR PLAN 1
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

## 2019-06-04 NOTE — CHART NOTE - NSCHARTNOTEFT_GEN_A_CORE
Pt seen and examined  Sutures removed, steristrips placed    Repeat MRI C/T/L Spine reviewed with Dr. Leslie, agreement that there is overall improvement  Pt currently 5/5, SILT, ambulating  Can use cervical collar and brace for comfort, may WBAT without them  Dr. Leslie recommending repeat XR C/T/L Spine  recommend 1 year PO suppressive antibiotics   re-evaluate in 1 year for possible need for lifelong suppressive PO abx  Discussed with Dr. Leslie, aware and in agreement with above plan

## 2019-06-04 NOTE — PROGRESS NOTE ADULT - PROBLEM SELECTOR PLAN 2
- status post C4-5 ACDF with irrigation and debridement on 3/20  - status post posterior spinal fusion & T8-L2 Laminectomy on 5/8   - must use [place] j collar and TLSO brace at all times with ambulation   - care per surgery - ortho removed stitches on 5/31 as per RN

## 2019-06-05 PROCEDURE — 72070 X-RAY EXAM THORAC SPINE 2VWS: CPT | Mod: 26

## 2019-06-05 PROCEDURE — 99233 SBSQ HOSP IP/OBS HIGH 50: CPT

## 2019-06-05 PROCEDURE — 72100 X-RAY EXAM L-S SPINE 2/3 VWS: CPT | Mod: 26

## 2019-06-05 PROCEDURE — 72040 X-RAY EXAM NECK SPINE 2-3 VW: CPT | Mod: 26

## 2019-06-05 RX ORDER — LINEZOLID 600 MG/300ML
600 INJECTION, SOLUTION INTRAVENOUS EVERY 12 HOURS
Refills: 0 | Status: DISCONTINUED | OUTPATIENT
Start: 2019-06-05 | End: 2019-06-12

## 2019-06-05 RX ORDER — METHADONE HYDROCHLORIDE 40 MG/1
60 TABLET ORAL EVERY 12 HOURS
Refills: 0 | Status: DISCONTINUED | OUTPATIENT
Start: 2019-06-05 | End: 2019-06-11

## 2019-06-05 RX ADMIN — OXYCODONE HYDROCHLORIDE 10 MILLIGRAM(S): 5 TABLET ORAL at 00:57

## 2019-06-05 RX ADMIN — Medication 1000 MILLIGRAM(S): at 15:57

## 2019-06-05 RX ADMIN — LINEZOLID 600 MILLIGRAM(S): 600 INJECTION, SOLUTION INTRAVENOUS at 17:30

## 2019-06-05 RX ADMIN — Medication 1 TABLET(S): at 22:58

## 2019-06-05 RX ADMIN — Medication 500 MILLIGRAM(S): at 05:43

## 2019-06-05 RX ADMIN — Medication 1 PATCH: at 11:57

## 2019-06-05 RX ADMIN — Medication 1000 MILLIGRAM(S): at 16:57

## 2019-06-05 RX ADMIN — Medication 1 TABLET(S): at 05:43

## 2019-06-05 RX ADMIN — Medication 500 MILLIGRAM(S): at 17:30

## 2019-06-05 RX ADMIN — Medication 1 PATCH: at 07:53

## 2019-06-05 RX ADMIN — CELECOXIB 100 MILLIGRAM(S): 200 CAPSULE ORAL at 13:22

## 2019-06-05 RX ADMIN — METHADONE HYDROCHLORIDE 60 MILLIGRAM(S): 40 TABLET ORAL at 06:17

## 2019-06-05 RX ADMIN — GABAPENTIN 800 MILLIGRAM(S): 400 CAPSULE ORAL at 14:42

## 2019-06-05 RX ADMIN — OXYCODONE HYDROCHLORIDE 10 MILLIGRAM(S): 5 TABLET ORAL at 07:52

## 2019-06-05 RX ADMIN — Medication 1 MILLIGRAM(S): at 11:57

## 2019-06-05 RX ADMIN — NALOXEGOL OXALATE 25 MILLIGRAM(S): 12.5 TABLET, FILM COATED ORAL at 07:51

## 2019-06-05 RX ADMIN — Medication 1000 MILLIGRAM(S): at 05:46

## 2019-06-05 RX ADMIN — Medication 5 MILLIGRAM(S): at 11:57

## 2019-06-05 RX ADMIN — Medication 1 TABLET(S): at 11:56

## 2019-06-05 RX ADMIN — Medication 1 TABLET(S): at 07:51

## 2019-06-05 RX ADMIN — OXYCODONE HYDROCHLORIDE 10 MILLIGRAM(S): 5 TABLET ORAL at 11:56

## 2019-06-05 RX ADMIN — POLYETHYLENE GLYCOL 3350 17 GRAM(S): 17 POWDER, FOR SOLUTION ORAL at 11:56

## 2019-06-05 RX ADMIN — GABAPENTIN 800 MILLIGRAM(S): 400 CAPSULE ORAL at 22:56

## 2019-06-05 RX ADMIN — METHADONE HYDROCHLORIDE 60 MILLIGRAM(S): 40 TABLET ORAL at 17:30

## 2019-06-05 RX ADMIN — SENNA PLUS 2 TABLET(S): 8.6 TABLET ORAL at 22:56

## 2019-06-05 RX ADMIN — LIDOCAINE 1 PATCH: 4 CREAM TOPICAL at 17:30

## 2019-06-05 RX ADMIN — NYSTATIN CREAM 1 APPLICATION(S): 100000 CREAM TOPICAL at 05:43

## 2019-06-05 RX ADMIN — CHLORHEXIDINE GLUCONATE 1 APPLICATION(S): 213 SOLUTION TOPICAL at 05:42

## 2019-06-05 RX ADMIN — OXYCODONE HYDROCHLORIDE 10 MILLIGRAM(S): 5 TABLET ORAL at 06:17

## 2019-06-05 RX ADMIN — Medication 1 TABLET(S): at 14:42

## 2019-06-05 RX ADMIN — GABAPENTIN 800 MILLIGRAM(S): 400 CAPSULE ORAL at 05:43

## 2019-06-05 RX ADMIN — FLUCONAZOLE 150 MILLIGRAM(S): 150 TABLET ORAL at 11:56

## 2019-06-05 RX ADMIN — Medication 1000 MILLIGRAM(S): at 22:57

## 2019-06-05 RX ADMIN — Medication 1000 MILLIGRAM(S): at 06:20

## 2019-06-05 RX ADMIN — Medication 1 PATCH: at 12:01

## 2019-06-05 RX ADMIN — Medication 1 TABLET(S): at 17:30

## 2019-06-05 RX ADMIN — Medication 1000 MILLIGRAM(S): at 23:45

## 2019-06-05 RX ADMIN — CELECOXIB 100 MILLIGRAM(S): 200 CAPSULE ORAL at 12:26

## 2019-06-05 RX ADMIN — OXYCODONE HYDROCHLORIDE 10 MILLIGRAM(S): 5 TABLET ORAL at 13:23

## 2019-06-05 NOTE — PROGRESS NOTE ADULT - NSHPATTENDINGPLANDISCUSS_GEN_ALL_CORE
pt, rn, cm, sw, Dr. Gutierrez, ortho, ID
Pt, RN, cm, sw
pt, rn, cm, sw, Dr. Gutierrez, ortho, ID
Pt, RN, cm, sw
RN
patient and nurse
patient and nurse
pt in detail
pt, rn, cm, sw
pt, rn, sw
pt, rn, sw, mother
pt, rn, sw, mother
Dr. Garcia, Dr. Hooper.
pt, rn, sw
NP
pt. medical staff
Pt, RN, cm, sw
ortho resident, pt family and PMD
pt. medical staff

## 2019-06-05 NOTE — PROGRESS NOTE ADULT - PROBLEM SELECTOR PLAN 1
discitis of multiple sites of spine  mris ok as per orthospine, awaiting final ID recs for po antibiotics after review of films with radiology.  esr and crp trending down

## 2019-06-05 NOTE — PROGRESS NOTE ADULT - ASSESSMENT
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 She was also diagnosis w/ HCV. Pt was initially intubated due to respiratory failure and put on pressors in ICU. She as extubated on 2/25 and transferred from ICU the following day. Pt had a C4-5 ACDF with irrigation and debridement on 3/20 due to spinal abscess and osteo and was kept intubated post procedure and transferred again to ICU, where she was extubated on 3/21. Pt was subsequently found to have  T11/12 OM discitis with an epidural phlegmon from T9-L1. She was taken to the OR 5/8 for posterior spinal fusion & T8-L2 Laminectomy. She was again transferred to ICU for post op care and was extubated without difficulty on 5/9.   Discharge planning in process     Hepatitis c ab positive - will need to fu as hepatology as outpatient, alk phos trending down

## 2019-06-05 NOTE — PROGRESS NOTE ADULT - PROBLEM SELECTOR PLAN 1
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  appreciate  Dr Leslie's recs   will repeat cervicothoracic MRI in 4 months   overall changes appear much resolved and post surgical   No new or worsening finding   switching to Linezolid for two weeks followed up by doxy BID for a year and eventually switching to  Q days for life  ESR and CRP trending down nicely

## 2019-06-05 NOTE — PROGRESS NOTE ADULT - PROBLEM SELECTOR PLAN 3
c/w methadone, tylenol, celebrex, valium, neurotin, lidocaine patch   still receiving some oxycodone prn

## 2019-06-05 NOTE — CHART NOTE - NSCHARTNOTEFT_GEN_A_CORE
Prolonged services performed 6/4/19  4:30 pm to 5:30 pm  Extensive meeting with counseling and medical education with patient and her Mother.  Reviewed hospital course, disposition planning, use of methadone in drug addiction, utility and feasibility of inpatient vs outpatient rehab.

## 2019-06-05 NOTE — PROGRESS NOTE ADULT - SUBJECTIVE AND OBJECTIVE BOX
Patient is a 43y old  Female who presents with a chief complaint of AMS (05 Jun 2019 10:37)      INTERVAL HPI / OVERNIGHT EVENTS: doing ok     MEDICATIONS  (STANDING):  acetaminophen   Tablet .. 1000 milliGRAM(s) Oral every 8 hours  ascorbic acid 500 milliGRAM(s) Oral two times a day  calcium carbonate 1250 mG  + Vitamin D (OsCal 500 + D) 1 Tablet(s) Oral three times a day  celecoxib 100 milliGRAM(s) Oral daily  chlorhexidine 4% Liquid 1 Application(s) Topical <User Schedule>  fluconAZOLE   Tablet 150 milliGRAM(s) Oral daily  folic acid 1 milliGRAM(s) Oral daily  gabapentin 800 milliGRAM(s) Oral three times a day  lactobacillus acidophilus 1 Tablet(s) Oral two times a day with meals  lidocaine   Patch 1 Patch Transdermal every 24 hours  lidocaine   Patch 1 Patch Transdermal every 24 hours  linezolid    Tablet 600 milliGRAM(s) Oral every 12 hours  methadone    Tablet 60 milliGRAM(s) Oral every 12 hours  multivitamin 1 Tablet(s) Oral daily  naloxegol 25 milliGRAM(s) Oral before breakfast  nicotine -  14 mG/24Hr(s) Patch 1 patch Transdermal daily  nystatin Powder 1 Application(s) Topical three times a day  polyethylene glycol 3350 17 Gram(s) Oral daily  rifampin 300 milliGRAM(s) Oral two times a day  senna 2 Tablet(s) Oral at bedtime    MEDICATIONS  (PRN):  bisacodyl Suppository 10 milliGRAM(s) Rectal daily PRN Constipation  diazepam    Tablet 5 milliGRAM(s) Oral every 8 hours PRN muscle spasm      Vital Signs Last 24 Hrs  T(C): 37 (05 Jun 2019 11:52), Max: 37 (05 Jun 2019 11:52)  T(F): 98.6 (05 Jun 2019 11:52), Max: 98.6 (05 Jun 2019 11:52)  HR: 76 (05 Jun 2019 14:15) (71 - 94)  BP: 114/75 (05 Jun 2019 11:52) (92/50 - 114/75)  BP(mean): --  RR: 17 (05 Jun 2019 14:15) (16 - 18)  SpO2: 98% (05 Jun 2019 14:15) (98% - 99%)    Review of systems:  General : no fever /chills, fatigue  CVS : no chest pain, palpitations  Lungs : no shortness of breath, cough  GI : no abdominal pain,vomiting, diarrhea   : no dysuria,hematuria        PHYSICAL EXAM:  General :NAD  Constitutional:  well-groomed, well-developed  Respiratory: CTAB/L  Cardiovascular: S1 and S2, RRR, no M/G/R  Gastrointestinal: BS+, soft, NT/ND  Extremities: No peripheral edema  Vascular: 2+ peripheral pulses  Skin: surgical site wounds (well healed)    LABS:    06-04    142  |  103  |  20  ----------------------------<  108<H>  4.3   |  29  |  0.70    Ca    9.3      04 Jun 2019 15:43    TPro  6.9  /  Alb  3.1<L>  /  TBili  0.2  /  DBili  x   /  AST  13<L>  /  ALT  21  /  AlkPhos  169<H>  06-04          MICROBIOLOGY:  RECENT CULTURES:        RADIOLOGY & ADDITIONAL STUDIES:

## 2019-06-05 NOTE — PROGRESS NOTE ADULT - SUBJECTIVE AND OBJECTIVE BOX
Patient is a 43y old  Female who presents with a chief complaint of AMS (04 Jun 2019 15:22)      INTERVAL HPI/OVERNIGHT EVENTS:  long meeting with mother and patient last evening regarding disposition. will address with SW today, patient would like to know what her drug rehab options are vs leaving and attending methadone clinic.  mother will not let her live with her if patient is on methadone  pain in control this morning    MEDICATIONS  (STANDING):  acetaminophen   Tablet .. 1000 milliGRAM(s) Oral every 8 hours  ascorbic acid 500 milliGRAM(s) Oral two times a day  calcium carbonate 1250 mG  + Vitamin D (OsCal 500 + D) 1 Tablet(s) Oral three times a day  celecoxib 100 milliGRAM(s) Oral daily  chlorhexidine 4% Liquid 1 Application(s) Topical <User Schedule>  DAPTOmycin IVPB 600 milliGRAM(s) IV Intermittent <User Schedule>  fluconAZOLE   Tablet 150 milliGRAM(s) Oral daily  folic acid 1 milliGRAM(s) Oral daily  gabapentin 800 milliGRAM(s) Oral three times a day  lactobacillus acidophilus 1 Tablet(s) Oral two times a day with meals  lidocaine   Patch 1 Patch Transdermal every 24 hours  lidocaine   Patch 1 Patch Transdermal every 24 hours  methadone    Tablet 60 milliGRAM(s) Oral every 12 hours  multivitamin 1 Tablet(s) Oral daily  naloxegol 25 milliGRAM(s) Oral before breakfast  nicotine -  14 mG/24Hr(s) Patch 1 patch Transdermal daily  nystatin Powder 1 Application(s) Topical three times a day  polyethylene glycol 3350 17 Gram(s) Oral daily  rifampin 300 milliGRAM(s) Oral two times a day  senna 2 Tablet(s) Oral at bedtime    MEDICATIONS  (PRN):  bisacodyl Suppository 10 milliGRAM(s) Rectal daily PRN Constipation  diazepam    Tablet 5 milliGRAM(s) Oral every 8 hours PRN muscle spasm  oxyCODONE    IR 10 milliGRAM(s) Oral every 4 hours PRN Moderate Pain (4 - 6)      Allergies    penicillin (Short breath; Hives)    Intolerances        REVIEW OF SYSTEMS:  CONSTITUTIONAL: No fever, weight loss, or fatigue  EYES: No eye pain, visual disturbances, or discharge  ENMT:  No difficulty hearing, tinnitus, vertigo; No sinus or throat pain  NECK: pain in control  RESPIRATORY: No cough, wheezing, chills or hemoptysis; No shortness of breath  CARDIOVASCULAR: No chest pain, palpitations, dizziness, or leg swelling  GASTROINTESTINAL: No abdominal or epigastric pain. No nausea, vomiting, or hematemesis; No diarrhea or constipation. No melena or hematochezia.  GENITOURINARY: No dysuria, frequency, hematuria, or incontinence  NEUROLOGICAL: No headaches, memory loss, some generalized loss of strengh  SKIN: No itching, burning, rashes, or lesions   MUSCULOSKELETAL: back pain in control  PSYCHIATRIC: tearful this am regarding relationship with mother    Vital Signs Last 24 Hrs  T(C): 36.6 (05 Jun 2019 05:10), Max: 36.7 (04 Jun 2019 18:37)  T(F): 97.9 (05 Jun 2019 05:10), Max: 98 (04 Jun 2019 18:37)  HR: 74 (05 Jun 2019 05:10) (71 - 82)  BP: 102/62 (05 Jun 2019 05:10) (92/50 - 110/63)  BP(mean): --  RR: 16 (05 Jun 2019 05:10) (16 - 18)  SpO2: 99% (05 Jun 2019 05:10) (98% - 99%)    PHYSICAL EXAM:  GENERAL: NAD, well-groomed, well-developed  HEAD:  Atraumatic, Normocephalic  EYES: EOMI, PERRLA, conjunctiva and sclera clear  ENMT: No tonsillar erythema, exudates, or enlargement; Moist mucous membranes,   NECK: Supple, No JVD, Normal thyroid  NERVOUS SYSTEM:  Alert & Oriented X3, Good concentration; motor strength improving  CHEST/LUNG: Clear to percussion bilaterally; No rales, rhonchi, wheezing, or rubs  HEART: Regular rate and rhythm; No murmurs, rubs, or gallops  ABDOMEN: Soft, Nontender, Nondistended; Bowel sounds present  EXTREMITIES:  2+ Peripheral Pulses, No clubbing, cyanosis, or edema  SKIN: No rashes or lesions    LABS:    06-04    142  |  103  |  20  ----------------------------<  108<H>  4.3   |  29  |  0.70    Ca    9.3      04 Jun 2019 15:43    TPro  6.9  /  Alb  3.1<L>  /  TBili  0.2  /  DBili  x   /  AST  13<L>  /  ALT  21  /  AlkPhos  169<H>  06-04        CAPILLARY BLOOD GLUCOSE          RADIOLOGY & ADDITIONAL TESTS:    Imaging Personally Reviewed:  [ x] YES  [ ] NO    Consultant(s) Notes Reviewed:  [x ] YES  [ ] NO    Care Discussed with Consultants/Other Providers [x ] YES  [ ] NO

## 2019-06-05 NOTE — PROGRESS NOTE ADULT - PROBLEM SELECTOR PLAN 2
- status post C4-5 ACDF with irrigation and debridement on 3/20  - status post posterior spinal fusion & T8-L2 Laminectomy on 5/8   - must use [place] j collar and TLSO brace at all times with ambulation   sutures removed  plain for x-rays today to assess hardware

## 2019-06-05 NOTE — PROGRESS NOTE ADULT - PROBLEM SELECTOR PLAN 2
with repeat blood c/s negative and  TV endocarditis   repeat c/s from 5/13 neg  pt curious to know if she still has MRSA infection/colonization will send MRS /MSSA PCR (nares)

## 2019-06-05 NOTE — CHART NOTE - NSCHARTNOTEFT_GEN_A_CORE
Repeat XRs and MRI reviewed with Orthopaedic Spine Attending (Dr. Leslie). All imaging is satisfactory. No further orthopaedic intervention. Ortho stable for discharge when cleared by medicine/ID.    Elizabeth Birmingham M.D.  PGY-1 Orthopaedic Surgery

## 2019-06-06 DIAGNOSIS — G93.41 METABOLIC ENCEPHALOPATHY: ICD-10-CM

## 2019-06-06 LAB
MRSA PCR RESULT.: SIGNIFICANT CHANGE UP
S AUREUS DNA NOSE QL NAA+PROBE: SIGNIFICANT CHANGE UP

## 2019-06-06 PROCEDURE — 99233 SBSQ HOSP IP/OBS HIGH 50: CPT

## 2019-06-06 RX ORDER — LIDOCAINE 4 G/100G
2 CREAM TOPICAL EVERY 24 HOURS
Refills: 0 | Status: DISCONTINUED | OUTPATIENT
Start: 2019-06-06 | End: 2019-06-12

## 2019-06-06 RX ORDER — LINEZOLID 600 MG/300ML
1 INJECTION, SOLUTION INTRAVENOUS
Qty: 28 | Refills: 0
Start: 2019-06-06 | End: 2019-06-19

## 2019-06-06 RX ORDER — IBUPROFEN 200 MG
600 TABLET ORAL ONCE
Refills: 0 | Status: COMPLETED | OUTPATIENT
Start: 2019-06-06 | End: 2019-06-06

## 2019-06-06 RX ORDER — LORATADINE 10 MG/1
10 TABLET ORAL DAILY
Refills: 0 | Status: DISCONTINUED | OUTPATIENT
Start: 2019-06-06 | End: 2019-06-12

## 2019-06-06 RX ORDER — ALBUTEROL 90 UG/1
2 AEROSOL, METERED ORAL EVERY 6 HOURS
Refills: 0 | Status: DISCONTINUED | OUTPATIENT
Start: 2019-06-06 | End: 2019-06-12

## 2019-06-06 RX ADMIN — LIDOCAINE 2 PATCH: 4 CREAM TOPICAL at 11:25

## 2019-06-06 RX ADMIN — POLYETHYLENE GLYCOL 3350 17 GRAM(S): 17 POWDER, FOR SOLUTION ORAL at 11:25

## 2019-06-06 RX ADMIN — SENNA PLUS 2 TABLET(S): 8.6 TABLET ORAL at 21:45

## 2019-06-06 RX ADMIN — Medication 1 TABLET(S): at 18:01

## 2019-06-06 RX ADMIN — Medication 1000 MILLIGRAM(S): at 07:30

## 2019-06-06 RX ADMIN — Medication 1 PATCH: at 12:19

## 2019-06-06 RX ADMIN — Medication 1 MILLIGRAM(S): at 11:25

## 2019-06-06 RX ADMIN — CELECOXIB 100 MILLIGRAM(S): 200 CAPSULE ORAL at 12:20

## 2019-06-06 RX ADMIN — FLUCONAZOLE 150 MILLIGRAM(S): 150 TABLET ORAL at 11:25

## 2019-06-06 RX ADMIN — LIDOCAINE 1 PATCH: 4 CREAM TOPICAL at 07:55

## 2019-06-06 RX ADMIN — Medication 600 MILLIGRAM(S): at 14:50

## 2019-06-06 RX ADMIN — Medication 1 TABLET(S): at 21:47

## 2019-06-06 RX ADMIN — METHADONE HYDROCHLORIDE 60 MILLIGRAM(S): 40 TABLET ORAL at 18:02

## 2019-06-06 RX ADMIN — Medication 1 TABLET(S): at 14:10

## 2019-06-06 RX ADMIN — Medication 1 PATCH: at 20:01

## 2019-06-06 RX ADMIN — LINEZOLID 600 MILLIGRAM(S): 600 INJECTION, SOLUTION INTRAVENOUS at 06:40

## 2019-06-06 RX ADMIN — LINEZOLID 600 MILLIGRAM(S): 600 INJECTION, SOLUTION INTRAVENOUS at 18:01

## 2019-06-06 RX ADMIN — Medication 500 MILLIGRAM(S): at 18:01

## 2019-06-06 RX ADMIN — Medication 1000 MILLIGRAM(S): at 21:45

## 2019-06-06 RX ADMIN — Medication 1 TABLET(S): at 11:25

## 2019-06-06 RX ADMIN — METHADONE HYDROCHLORIDE 60 MILLIGRAM(S): 40 TABLET ORAL at 06:39

## 2019-06-06 RX ADMIN — LIDOCAINE 2 PATCH: 4 CREAM TOPICAL at 20:00

## 2019-06-06 RX ADMIN — LIDOCAINE 1 PATCH: 4 CREAM TOPICAL at 06:42

## 2019-06-06 RX ADMIN — GABAPENTIN 800 MILLIGRAM(S): 400 CAPSULE ORAL at 14:10

## 2019-06-06 RX ADMIN — LIDOCAINE 1 PATCH: 4 CREAM TOPICAL at 11:20

## 2019-06-06 RX ADMIN — Medication 1000 MILLIGRAM(S): at 22:35

## 2019-06-06 RX ADMIN — Medication 1 PATCH: at 07:57

## 2019-06-06 RX ADMIN — Medication 1000 MILLIGRAM(S): at 16:30

## 2019-06-06 RX ADMIN — CELECOXIB 100 MILLIGRAM(S): 200 CAPSULE ORAL at 14:46

## 2019-06-06 RX ADMIN — Medication 600 MILLIGRAM(S): at 15:30

## 2019-06-06 RX ADMIN — Medication 1 TABLET(S): at 07:40

## 2019-06-06 RX ADMIN — Medication 500 MILLIGRAM(S): at 06:40

## 2019-06-06 RX ADMIN — NALOXEGOL OXALATE 25 MILLIGRAM(S): 12.5 TABLET, FILM COATED ORAL at 07:40

## 2019-06-06 RX ADMIN — GABAPENTIN 800 MILLIGRAM(S): 400 CAPSULE ORAL at 06:40

## 2019-06-06 RX ADMIN — NYSTATIN CREAM 1 APPLICATION(S): 100000 CREAM TOPICAL at 21:45

## 2019-06-06 RX ADMIN — ALBUTEROL 2 PUFF(S): 90 AEROSOL, METERED ORAL at 12:20

## 2019-06-06 RX ADMIN — Medication 1000 MILLIGRAM(S): at 15:59

## 2019-06-06 RX ADMIN — Medication 1 TABLET(S): at 06:40

## 2019-06-06 RX ADMIN — Medication 1000 MILLIGRAM(S): at 06:40

## 2019-06-06 RX ADMIN — LORATADINE 10 MILLIGRAM(S): 10 TABLET ORAL at 11:25

## 2019-06-06 RX ADMIN — GABAPENTIN 800 MILLIGRAM(S): 400 CAPSULE ORAL at 21:47

## 2019-06-06 NOTE — PROGRESS NOTE ADULT - PROBLEM SELECTOR PLAN 1
discitis of multiple sites of spine  mris ok as per orthospine, awaiting final ID recs for po antibiotics after review of films with radiology.  esr and crp trending down  appreciate ID input, patient changed to po meds, zyvox (which needs preauthorization), plan to take for 2 weeks then change to doxycycline bid for one year, then lifelong doxycycline daily for suppression

## 2019-06-06 NOTE — PROGRESS NOTE ADULT - PROBLEM SELECTOR PLAN 2
- status post C4-5 ACDF with irrigation and debridement on 3/20  - status post posterior spinal fusion & T8-L2 Laminectomy on 5/8   - must use [place] j collar and TLSO brace at all times with ambulation   sutures removed  plain x-rays ok as per orthospine are ok, pt. will fu with Dr. Leslie as outpatient

## 2019-06-06 NOTE — PROGRESS NOTE ADULT - SUBJECTIVE AND OBJECTIVE BOX
Patient is a 43y old  Female who presents with a chief complaint of AMS (05 Jun 2019 16:08)      INTERVAL HPI/OVERNIGHT EVENTS:  no opiates since yesterday afternoon, patient tolerating, understands she can't take opiates and be in a methadone maintenance program. but still with complaint of back pain , c/o seasonal allergy symptoms, asking for inhaler and claritin    MEDICATIONS  (STANDING):  acetaminophen   Tablet .. 1000 milliGRAM(s) Oral every 8 hours  ascorbic acid 500 milliGRAM(s) Oral two times a day  calcium carbonate 1250 mG  + Vitamin D (OsCal 500 + D) 1 Tablet(s) Oral three times a day  celecoxib 100 milliGRAM(s) Oral daily  chlorhexidine 4% Liquid 1 Application(s) Topical <User Schedule>  fluconAZOLE   Tablet 150 milliGRAM(s) Oral daily  folic acid 1 milliGRAM(s) Oral daily  gabapentin 800 milliGRAM(s) Oral three times a day  lactobacillus acidophilus 1 Tablet(s) Oral two times a day with meals  lidocaine   Patch 2 Patch Transdermal every 24 hours  linezolid    Tablet 600 milliGRAM(s) Oral every 12 hours  loratadine 10 milliGRAM(s) Oral daily  methadone    Tablet 60 milliGRAM(s) Oral every 12 hours  multivitamin 1 Tablet(s) Oral daily  naloxegol 25 milliGRAM(s) Oral before breakfast  nicotine -  14 mG/24Hr(s) Patch 1 patch Transdermal daily  nystatin Powder 1 Application(s) Topical three times a day  polyethylene glycol 3350 17 Gram(s) Oral daily  rifampin 300 milliGRAM(s) Oral two times a day  senna 2 Tablet(s) Oral at bedtime    MEDICATIONS  (PRN):  ALBUTerol    90 MICROgram(s) HFA Inhaler 2 Puff(s) Inhalation every 6 hours PRN Shortness of Breath and/or Wheezing  bisacodyl Suppository 10 milliGRAM(s) Rectal daily PRN Constipation  diazepam    Tablet 5 milliGRAM(s) Oral every 8 hours PRN muscle spasm      Allergies    penicillin (Short breath; Hives)    Intolerances        REVIEW OF SYSTEMS:  CONSTITUTIONAL: No fever, weight loss, or fatigue  EYES: No eye pain, visual disturbances, or discharge  ENMT:  No difficulty hearing, tinnitus, vertigo; No sinus or throat pain  NECK: moderate pain  RESPIRATORY:  c/o slight wheezing and cough  CARDIOVASCULAR: No chest pain, palpitations, dizziness, or leg swelling  GASTROINTESTINAL: No abdominal or epigastric pain. No nausea, vomiting, or hematemesis; No diarrhea or constipation. No melena or hematochezia.  GENITOURINARY: No dysuria, frequency, hematuria, or incontinence  NEUROLOGICAL: No headaches, memory loss, loss of strength, numbness, or tremors  SKIN: No itching, burning, rashes, or lesions   MUSCULOSKELETAL: +back pain  PSYCHIATRIC: mood stable      Vital Signs Last 24 Hrs  T(C): 36.4 (06 Jun 2019 11:13), Max: 37 (05 Jun 2019 11:52)  T(F): 97.6 (06 Jun 2019 11:13), Max: 98.6 (05 Jun 2019 11:52)  HR: 73 (06 Jun 2019 11:13) (64 - 94)  BP: 109/69 (06 Jun 2019 11:13) (103/63 - 114/75)  BP(mean): --  RR: 16 (06 Jun 2019 11:13) (16 - 18)  SpO2: 98% (06 Jun 2019 11:13) (98% - 100%)    PHYSICAL EXAM:  GENERAL: NAD,   HEAD:  Atraumatic, Normocephalic  EYES: EOMI, PERRLA, conjunctiva and sclera clear  ENMT: No tonsillar erythema, exudates, or enlargement; Moist mucous membranes, Good dentition, No lesions  NECK: Supple, No JVD, Normal thyroid  NERVOUS SYSTEM:  Alert & Oriented X3, Good concentration; Motor Strength 5/5 B/L upper and lower extremities; DTRs 2+ intact and symmetric  CHEST/LUNG: mild exp wheeze bilaterally  HEART: Regular rate and rhythm; No murmurs, rubs, or gallops  ABDOMEN: Soft, Nontender, Nondistended; Bowel sounds present  EXTREMITIES:  2+ Peripheral Pulses, No clubbing, cyanosis, or edema  LYMPH: No lymphadenopathy noted  SKIN: No rashes or lesions    LABS:    06-04    142  |  103  |  20  ----------------------------<  108<H>  4.3   |  29  |  0.70    Ca    9.3      04 Jun 2019 15:43    TPro  6.9  /  Alb  3.1<L>  /  TBili  0.2  /  DBili  x   /  AST  13<L>  /  ALT  21  /  AlkPhos  169<H>  06-04        CAPILLARY BLOOD GLUCOSE          RADIOLOGY & ADDITIONAL TESTS:    Imaging Personally Reviewed:  [ ] YES  [ ] NO    Consultant(s) Notes Reviewed:  [x ] YES  [ ] NO    Care Discussed with Consultants/Other Providers [x ] YES  [ ] NO

## 2019-06-06 NOTE — PROGRESS NOTE ADULT - PROBLEM SELECTOR PLAN 5
referral to methadone clinic vs inpatient drug rehab  spoke with Dr. Jose medical director, at [facility]. He will accept the patient but feels the safest plan is for her to be here until sunday and have monday appointment  sw still exploring inpatient substance abuse options as well

## 2019-06-06 NOTE — CHART NOTE - NSCHARTNOTEFT_GEN_A_CORE
Assessment: Pt admitted with sepsis due to MRSA, discitis of multiple sites of spine, endocarditis of tricuspid valve, hepatitis C, h/o IVDA & ETOH abuse as well as anemia.    Factors impacting intake: [x] none [ ] nausea  [ ] vomiting [ ] diarrhea [ ] constipation  [ ]chewing problems [ ] swallowing issues  [ ] other:     Diet Prescription: Diet, Regular (06-04-19 @ 08:54)    Intake: 100%; Pt currently on Regular diet (no longer on Ensure Enlive nutrition supplement); Pt eating well, tolerating diet well, no n/v/d/c, phosphorus levels have normalized    Current Weight: 77.6 kg (6/6), 76.2 kg (5/30)  % Weight Change: 1.8% (1.4 kg) wt gain x 1 week during hospitalization; No edema noted    Nutrition focused physical exam conducted; Subcutaneous fat loss: [WNL] Orbital fat pads region, [WNL]Buccal fat region, [WNL]Triceps region, [WNL]Ribs region.  Muscle wasting: [WNL]Temples region, [mild]Clavicle region, [WNL]Shoulder region, [WNL]Scapula region, [WNL]Interosseous region, [WNL]thigh region, [WNL]Calf region    Pertinent Medications: MEDICATIONS  (STANDING):  acetaminophen   Tablet .. 1000 milliGRAM(s) Oral every 8 hours  ascorbic acid 500 milliGRAM(s) Oral two times a day  calcium carbonate 1250 mG  + Vitamin D (OsCal 500 + D) 1 Tablet(s) Oral three times a day  celecoxib 100 milliGRAM(s) Oral daily  chlorhexidine 4% Liquid 1 Application(s) Topical <User Schedule>  fluconAZOLE   Tablet 150 milliGRAM(s) Oral daily  folic acid 1 milliGRAM(s) Oral daily  gabapentin 800 milliGRAM(s) Oral three times a day  lactobacillus acidophilus 1 Tablet(s) Oral two times a day with meals  lidocaine   Patch 2 Patch Transdermal every 24 hours  linezolid    Tablet 600 milliGRAM(s) Oral every 12 hours  loratadine 10 milliGRAM(s) Oral daily  methadone    Tablet 60 milliGRAM(s) Oral every 12 hours  multivitamin 1 Tablet(s) Oral daily  naloxegol 25 milliGRAM(s) Oral before breakfast  nicotine -  14 mG/24Hr(s) Patch 1 patch Transdermal daily  nystatin Powder 1 Application(s) Topical three times a day  polyethylene glycol 3350 17 Gram(s) Oral daily  rifampin 300 milliGRAM(s) Oral two times a day  senna 2 Tablet(s) Oral at bedtime    MEDICATIONS  (PRN):  ALBUTerol    90 MICROgram(s) HFA Inhaler 2 Puff(s) Inhalation every 6 hours PRN Shortness of Breath and/or Wheezing  bisacodyl Suppository 10 milliGRAM(s) Rectal daily PRN Constipation  diazepam    Tablet 5 milliGRAM(s) Oral every 8 hours PRN muscle spasm    Pertinent Labs: 06-04 Na142 mmol/L Glu 108 mg/dL<H> K+ 4.3 mmol/L Cr  0.70 mg/dL BUN 20 mg/dL 05-31 Phos 4.1 mg/dL 06-04 Alb 3.1 g/dL<L>    CAPILLARY BLOOD GLUCOSE      Skin: Pt without pressure ulcers    Estimated Needs:   [x] no change since previous assessment on 4/17/19  [ ] recalculated:     Previous Nutrition Diagnosis:  Altered nutrition-related lab values (phosphorus)    Etiology: Lack of compliance and adherence to low phosphorus diet  Signs/symptoms: As evidence by blood phosphorus lab value 6.0 H (05/20), food preference for high phosphorus foods     Nutrition Diagnosis is [ ] ongoing  [x] resolved [ ] not applicable     New Nutrition Diagnosis: [x] not applicable       Interventions:   Recommend  [x] Continue with current diet rx (Regular diet)  [ ] Change Diet To:  [ ] Nutrition Supplement  [ ] Nutrition Support  [ ] Other:     Monitoring and Evaluation:   [ x ] PO intake [ x ] Tolerance to diet prescription [ x ] weights [ x ] labs[ x ] follow up per protocol  [ ] other:

## 2019-06-07 PROCEDURE — 99233 SBSQ HOSP IP/OBS HIGH 50: CPT

## 2019-06-07 RX ORDER — SENNA PLUS 8.6 MG/1
2 TABLET ORAL
Qty: 60 | Refills: 0
Start: 2019-06-07 | End: 2019-07-06

## 2019-06-07 RX ORDER — LORATADINE 10 MG/1
1 TABLET ORAL
Qty: 30 | Refills: 0
Start: 2019-06-07 | End: 2019-07-06

## 2019-06-07 RX ORDER — FOLIC ACID 0.8 MG
1 TABLET ORAL
Qty: 0 | Refills: 0 | DISCHARGE
Start: 2019-06-07

## 2019-06-07 RX ORDER — NYSTATIN CREAM 100000 [USP'U]/G
1 CREAM TOPICAL
Qty: 1 | Refills: 0
Start: 2019-06-07 | End: 2019-06-16

## 2019-06-07 RX ORDER — ASCORBIC ACID 60 MG
1 TABLET,CHEWABLE ORAL
Qty: 0 | Refills: 0 | DISCHARGE
Start: 2019-06-07

## 2019-06-07 RX ORDER — CELECOXIB 200 MG/1
1 CAPSULE ORAL
Qty: 30 | Refills: 0
Start: 2019-06-07 | End: 2019-07-06

## 2019-06-07 RX ORDER — NICOTINE POLACRILEX 2 MG
1 GUM BUCCAL
Qty: 30 | Refills: 0
Start: 2019-06-07 | End: 2019-07-06

## 2019-06-07 RX ORDER — LIDOCAINE 4 G/100G
1 CREAM TOPICAL
Qty: 1 | Refills: 0
Start: 2019-06-07

## 2019-06-07 RX ORDER — METHADONE HYDROCHLORIDE 40 MG/1
6 TABLET ORAL
Qty: 0 | Refills: 0 | DISCHARGE
Start: 2019-06-07

## 2019-06-07 RX ORDER — GABAPENTIN 400 MG/1
2 CAPSULE ORAL
Qty: 180 | Refills: 0
Start: 2019-06-07 | End: 2019-07-06

## 2019-06-07 RX ORDER — ACETAMINOPHEN 500 MG
2 TABLET ORAL
Qty: 0 | Refills: 0 | DISCHARGE
Start: 2019-06-07

## 2019-06-07 RX ORDER — LACTOBACILLUS ACIDOPHILUS 100MM CELL
1 CAPSULE ORAL
Qty: 30 | Refills: 0
Start: 2019-06-07 | End: 2019-07-06

## 2019-06-07 RX ORDER — NALOXEGOL OXALATE 12.5 MG/1
1 TABLET, FILM COATED ORAL
Qty: 30 | Refills: 0
Start: 2019-06-07 | End: 2019-07-06

## 2019-06-07 RX ORDER — ALBUTEROL 90 UG/1
2 AEROSOL, METERED ORAL
Qty: 1 | Refills: 0
Start: 2019-06-07 | End: 2019-07-06

## 2019-06-07 RX ADMIN — GABAPENTIN 800 MILLIGRAM(S): 400 CAPSULE ORAL at 22:10

## 2019-06-07 RX ADMIN — Medication 1 TABLET(S): at 06:11

## 2019-06-07 RX ADMIN — Medication 500 MILLIGRAM(S): at 06:11

## 2019-06-07 RX ADMIN — CELECOXIB 100 MILLIGRAM(S): 200 CAPSULE ORAL at 12:23

## 2019-06-07 RX ADMIN — Medication 1000 MILLIGRAM(S): at 22:09

## 2019-06-07 RX ADMIN — NALOXEGOL OXALATE 25 MILLIGRAM(S): 12.5 TABLET, FILM COATED ORAL at 08:10

## 2019-06-07 RX ADMIN — LINEZOLID 600 MILLIGRAM(S): 600 INJECTION, SOLUTION INTRAVENOUS at 17:27

## 2019-06-07 RX ADMIN — Medication 1000 MILLIGRAM(S): at 06:52

## 2019-06-07 RX ADMIN — Medication 1000 MILLIGRAM(S): at 14:34

## 2019-06-07 RX ADMIN — LIDOCAINE 2 PATCH: 4 CREAM TOPICAL at 12:24

## 2019-06-07 RX ADMIN — POLYETHYLENE GLYCOL 3350 17 GRAM(S): 17 POWDER, FOR SOLUTION ORAL at 12:24

## 2019-06-07 RX ADMIN — GABAPENTIN 800 MILLIGRAM(S): 400 CAPSULE ORAL at 06:11

## 2019-06-07 RX ADMIN — LIDOCAINE 2 PATCH: 4 CREAM TOPICAL at 00:28

## 2019-06-07 RX ADMIN — FLUCONAZOLE 150 MILLIGRAM(S): 150 TABLET ORAL at 14:34

## 2019-06-07 RX ADMIN — Medication 1 PATCH: at 12:24

## 2019-06-07 RX ADMIN — METHADONE HYDROCHLORIDE 60 MILLIGRAM(S): 40 TABLET ORAL at 06:07

## 2019-06-07 RX ADMIN — Medication 500 MILLIGRAM(S): at 17:27

## 2019-06-07 RX ADMIN — Medication 1 PATCH: at 07:14

## 2019-06-07 RX ADMIN — Medication 1 TABLET(S): at 08:10

## 2019-06-07 RX ADMIN — Medication 1000 MILLIGRAM(S): at 15:00

## 2019-06-07 RX ADMIN — METHADONE HYDROCHLORIDE 60 MILLIGRAM(S): 40 TABLET ORAL at 17:28

## 2019-06-07 RX ADMIN — Medication 1 TABLET(S): at 22:09

## 2019-06-07 RX ADMIN — Medication 1 TABLET(S): at 12:26

## 2019-06-07 RX ADMIN — SENNA PLUS 2 TABLET(S): 8.6 TABLET ORAL at 22:11

## 2019-06-07 RX ADMIN — Medication 1 MILLIGRAM(S): at 12:26

## 2019-06-07 RX ADMIN — LIDOCAINE 2 PATCH: 4 CREAM TOPICAL at 20:10

## 2019-06-07 RX ADMIN — LINEZOLID 600 MILLIGRAM(S): 600 INJECTION, SOLUTION INTRAVENOUS at 06:11

## 2019-06-07 RX ADMIN — Medication 1 TABLET(S): at 14:34

## 2019-06-07 RX ADMIN — Medication 1000 MILLIGRAM(S): at 06:11

## 2019-06-07 RX ADMIN — CHLORHEXIDINE GLUCONATE 1 APPLICATION(S): 213 SOLUTION TOPICAL at 06:16

## 2019-06-07 RX ADMIN — Medication 1 TABLET(S): at 17:29

## 2019-06-07 RX ADMIN — CELECOXIB 100 MILLIGRAM(S): 200 CAPSULE ORAL at 13:15

## 2019-06-07 RX ADMIN — GABAPENTIN 800 MILLIGRAM(S): 400 CAPSULE ORAL at 14:36

## 2019-06-07 RX ADMIN — LORATADINE 10 MILLIGRAM(S): 10 TABLET ORAL at 12:22

## 2019-06-07 NOTE — PROGRESS NOTE ADULT - PROBLEM SELECTOR PLAN 5
referral to methadone clinic vs inpatient drug rehab  spoke with Dr. Jose medical director, at [facility]ad[facility]. He will accept the patient but feels the safest plan is for her to be here until sunday and have monday appointment  sw still exploring inpatient substance abuse options as well

## 2019-06-07 NOTE — PROGRESS NOTE ADULT - PROBLEM SELECTOR PLAN 2
- status post C4-5 ACDF with irrigation and debridement on 3/20  - status post posterior spinal fusion & T8-L2 Laminectomy on 5/8   - must use [place] j collar and TLSO brace at all times with ambulation   sutures removed  plain x-rays ok as per orthospine are ok, pt. will fu with Dr. Leslie as outpatient

## 2019-06-07 NOTE — PROGRESS NOTE ADULT - PROBLEM SELECTOR PLAN 1
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

## 2019-06-07 NOTE — CHART NOTE - NSCHARTNOTEFT_GEN_A_CORE
Discharge planning discussed with patient, social work and covering weekend physician  Plan is for patient to be discharged on monday am, will be picked up by tyree and taken to her methadone clinic appt. SAMARA Amador   Patient reports that she has a place to stay and does not want to go to inpatient rehab.  Patient understands and is agreeable to plan.  Rx originally sent to [facility] on [address]. but patient states she wants to go to different [facility] on [address]. in [place]. Spoke with both [facility] and meds will be ready at [address] [facility]. [place] on monday  lidocaine patch not covered - lidocaine cream instead or patient can purchase OTC patches  celebrex not covered , changed to naproxen bid  linezolid pre - authorization received.

## 2019-06-07 NOTE — PROGRESS NOTE ADULT - SUBJECTIVE AND OBJECTIVE BOX
Patient is a 43y old  Female who presents with a chief complaint of AMS (04 Jun 2019 08:54)      INTERVAL HPI / OVERNIGHT EVENTS: doing ok     MEDICATIONS  (STANDING):  acetaminophen   Tablet .. 1000 milliGRAM(s) Oral every 8 hours  ascorbic acid 500 milliGRAM(s) Oral two times a day  calcium carbonate 1250 mG  + Vitamin D (OsCal 500 + D) 1 Tablet(s) Oral three times a day  celecoxib 100 milliGRAM(s) Oral daily  chlorhexidine 4% Liquid 1 Application(s) Topical <User Schedule>  DAPTOmycin IVPB 600 milliGRAM(s) IV Intermittent <User Schedule>  fluconAZOLE   Tablet 150 milliGRAM(s) Oral daily  folic acid 1 milliGRAM(s) Oral daily  gabapentin 800 milliGRAM(s) Oral three times a day  lactobacillus acidophilus 1 Tablet(s) Oral two times a day with meals  lidocaine   Patch 1 Patch Transdermal every 24 hours  lidocaine   Patch 1 Patch Transdermal every 24 hours  methadone    Tablet 60 milliGRAM(s) Oral every 12 hours  multivitamin 1 Tablet(s) Oral daily  naloxegol 25 milliGRAM(s) Oral before breakfast  nicotine -  14 mG/24Hr(s) Patch 1 patch Transdermal daily  nystatin Powder 1 Application(s) Topical three times a day  polyethylene glycol 3350 17 Gram(s) Oral daily  rifampin 300 milliGRAM(s) Oral two times a day  senna 2 Tablet(s) Oral at bedtime    MEDICATIONS  (PRN):  bisacodyl Suppository 10 milliGRAM(s) Rectal daily PRN Constipation  diazepam    Tablet 5 milliGRAM(s) Oral every 8 hours PRN muscle spasm  oxyCODONE    IR 10 milliGRAM(s) Oral every 4 hours PRN Moderate Pain (4 - 6)      Vital Signs Last 24 Hrs  Tmax:afebirle    Review of systems:  General : no fever /chills,fatigue  CVS : no chest pain, palpitations  Lungs : no shortness of breath, cough  GI : no abdominal pain,vomiting, diarrhea   : no dysuria,hematuria        PHYSICAL EXAM:  General :NAD  Constitutional:  well-groomed, well-developed  Respiratory: CTAB/L  Cardiovascular: S1 and S2, RRR, no M/G/R  Gastrointestinal: BS+, soft, NT/ND  Extremities: No peripheral edema  Vascular: 2+ peripheral pulses  Skin: surgical site incision healing well      LABS:    -->29  CRP 2.67-->1.28            MICROBIOLOGY:  RECENT CULTURES:        RADIOLOGY & ADDITIONAL STUDIES:    MRI Lumbar spine   Vertebrae: Anatomic alignment. No acute fracture seen. Thoracolumbar   posterolateral fusion and posterior pedicle screw fixation extends to the   L2   level. There is mild disc desiccation at L4-5 without significant disc   height   loss.    L1-L2: No significant disc disease. No stenosis.    L2-L3: No significant disc disease. No stenosis.    L3-L4: Mild facet arthropathy. No significant stenoses.    L4-L5: Mild disc bulge as well as moderate facet arthropathy. The   central   spinal canal remains patent. Mild bilateral neural foraminal stenoses.   The post   contrast imaging does demonstrate some articular/periarticular   enhancement   which may be consistent with ongoing infection/inflammation.    L5-S1: Moderate facet arthropathy. No stenoses. The post contrast   imaging does   demonstrate some articular/periarticular enhancement which may be   consistent   with ongoing infection/inflammation.    Epidural space: No evidence of epidural abscess.    Spinal cord: The conus medullaris ends normally.      Kidneys andureters: The right kidney demonstrates a cyst.    Soft tissues: Nonspecific edema in the subcutaneous fat, potentially   dependent/positional.     IMPRESSION:   1. Interval postsurgical changes associated susceptibility artifact from   postoperative instrumentation as described above.       MRI thoracic  FINDINGS:    Vertebrae: Slight exaggeration of the lower thoracic kyphosis. No acute   fracture seen. Anterior wedging of T11 and T12 again demonstrated.    Discs/Spinal canal/Neural foramina: Persistent T11-12 disc space edema   with a   tiny marginally enhancing abscess anteriorly within the disc space   measuring   approximately 3.2 mm in craniocaudal height; this represents an improved   appearance compared to the prior study, could reflect ongoing residual   discitis   versus scar tissue and postinflammatory fluid. No new disc space   inflammation   orenhancement identified elsewhere. Thoracolumbar posterolateral fusion   posterior pedicle screw fixation from T8-L2 in the interval. Some   limitations   visualizing the spinal canal due to hardware susceptibility artifacts.   The   thecal sac appears grossly patent. There appears to be somewhat improved   patency of the   spinal canal around the T11-12 level. There is a component of dorsal   T11-12   osteophytic ridging. The spinal canal is decompressed posteriorly at this   level.    Spinal cord: Grossly unremarkable signal, as visualized.     Lungs: Improved appearance of the right lung.    Soft tissues: The appearance of increased signal intensity in the   paravertebral soft tissues on the STIR sequence, some of this could be   related   to inhomogeneous fat suppression in the setting of hardware   susceptibility   artifacts. There is trace nonspecific subcutaneous edema, potentially   dependent/positional. No compressive fluid collections.   Other findings: Small left pleural effusion.     IMPRESSION:   As compared to previous exam, patient is status post decompression of   discitis osteomyelitis with multilevel fusion and instrumentation and   laminectomy above and below the level of osteomyelitis. Extensive this is   likely artifact, limits optimal assessment of the canal as described   above.      MRI cervical spine      FINDINGS:    Vertebrae: There is straightening of the cervical lordosis. Trace   degenerative   retrolisthesis of C5 on C6, as before. No acute fracture seen. Mild,   chronic   height loss at C6 and C7, similar to prior.   Prior ACDF at C4-5. Mild to moderate disc height loss and spondylosis at   C5-6   and C6-7, as before.    C2-C3: No significant interval change. Slight disc bulge and ligamentum   flavum   buckling without contribution to significant stenoses.    C3-C4: No significant interval change. Mild disc bulge without   contribution to   central spinal canal stenosis. Uncovertebral and facet arthropathy   causing mild   bilateral neural foraminal stenoses.    C4-C5: No significant interval change. Prior ACDF. No significant   stenoses.    C5-C6: No significant interval change. Disc osteophyte complex and   ligamentum   flavum buckling causing mild central spinal canal stenosis. Uncovertebral   and   facet arthropathy causing moderate to severe bilateral neural foraminal   stenoses.    C6-C7: Further subsidence of endplateand disc space edema with   enhancement at   C6-7 since the prior study. Posterior disc osteophyte complex without   contribution to significant central spinal canal stenosis. Uncovertebral   and   facet arthropathy causing moderate right and mild left neural foraminal   stenoses.    C7-T1: No central spinal canal is patent. Mild to moderate facet   arthropathy   without contribution to significant foraminal stenoses.    Spinal cord: Normal signal. No cord compression.      Vasculature: Expected flow voids in the vertebral arteries.     Soft tissues: Unremarkable    Other findings: No discrete epidural abscess identified.     IMPRESSION:   1. Persistent but slightly decreased endplate enhancement and disc   inflammatory changes/edema at C6-7   since the prior study.  2. Postoperative changes related to C4-5 instrumentation and extensive   susceptibility artifact limits optimal assessment.

## 2019-06-07 NOTE — PROGRESS NOTE ADULT - SUBJECTIVE AND OBJECTIVE BOX
Patient is a 43y old  Female who presents with a chief complaint of AMS (06 Jun 2019 11:51)      INTERVAL HPI/OVERNIGHT EVENTS:  no overnight events, questions about pain management, but tolerating being off opioids, questions, concerns about disposition    MEDICATIONS  (STANDING):  acetaminophen   Tablet .. 1000 milliGRAM(s) Oral every 8 hours  ascorbic acid 500 milliGRAM(s) Oral two times a day  calcium carbonate 1250 mG  + Vitamin D (OsCal 500 + D) 1 Tablet(s) Oral three times a day  celecoxib 100 milliGRAM(s) Oral daily  chlorhexidine 4% Liquid 1 Application(s) Topical <User Schedule>  fluconAZOLE   Tablet 150 milliGRAM(s) Oral daily  folic acid 1 milliGRAM(s) Oral daily  gabapentin 800 milliGRAM(s) Oral three times a day  lactobacillus acidophilus 1 Tablet(s) Oral two times a day with meals  lidocaine   Patch 2 Patch Transdermal every 24 hours  linezolid    Tablet 600 milliGRAM(s) Oral every 12 hours  loratadine 10 milliGRAM(s) Oral daily  methadone    Tablet 60 milliGRAM(s) Oral every 12 hours  multivitamin 1 Tablet(s) Oral daily  naloxegol 25 milliGRAM(s) Oral before breakfast  nicotine -  14 mG/24Hr(s) Patch 1 patch Transdermal daily  nystatin Powder 1 Application(s) Topical three times a day  polyethylene glycol 3350 17 Gram(s) Oral daily  rifampin 300 milliGRAM(s) Oral two times a day  senna 2 Tablet(s) Oral at bedtime    MEDICATIONS  (PRN):  ALBUTerol    90 MICROgram(s) HFA Inhaler 2 Puff(s) Inhalation every 6 hours PRN Shortness of Breath and/or Wheezing  bisacodyl Suppository 10 milliGRAM(s) Rectal daily PRN Constipation  diazepam    Tablet 5 milliGRAM(s) Oral every 8 hours PRN muscle spasm      Allergies    penicillin (Short breath; Hives)    Intolerances        REVIEW OF SYSTEMS:  CONSTITUTIONAL: No fever, weight loss, or fatigue  EYES: No eye pain, visual disturbances, or discharge  ENMT:  No difficulty hearing, tinnitus, vertigo; No sinus or throat pain  RESPIRATORY: No cough, wheezing, chills or hemoptysis; No shortness of breath  CARDIOVASCULAR: No chest pain, palpitations, dizziness, or leg swelling  GASTROINTESTINAL: No abdominal or epigastric pain. No nausea, vomiting, or hematemesis; No diarrhea or constipation. No melena or hematochezia.  GENITOURINARY: No dysuria, frequency, hematuria, or incontinence  NEUROLOGICAL: No headaches, memory loss, loss of strength, numbness, or tremors  SKIN: No itching, burning, rashes, or lesions   MUSCULOSKELETAL: chronic back pain  PSYCHIATRIC: No depression, anxiety, mood swings, or difficulty sleeping  HEME/LYMPH: No easy bruising, or bleeding gums  ALLERGY AND IMMUNOLOGIC: No hives or eczema    Vital Signs Last 24 Hrs  T(C): 36.4 (07 Jun 2019 11:42), Max: 36.7 (06 Jun 2019 19:51)  T(F): 97.5 (07 Jun 2019 11:42), Max: 98 (06 Jun 2019 19:51)  HR: 75 (07 Jun 2019 11:42) (66 - 76)  BP: 104/53 (07 Jun 2019 11:42) (91/59 - 104/72)  BP(mean): --  RR: 16 (07 Jun 2019 11:42) (16 - 18)  SpO2: 97% (07 Jun 2019 11:42) (97% - 100%)    PHYSICAL EXAM:  GENERAL: NAD,   HEAD:  Atraumatic, Normocephalic  EYES: EOMI, PERRLA, conjunctiva and sclera clear  ENMT: No tonsillar erythema, exudates, or enlargement; Moist mucous membranes,   NECK: Supple, No JVD, Normal thyroid  NERVOUS SYSTEM:  Alert & Oriented X3, Good concentration; Motor Strength 5/5 B/L upper and lower extremities; DTRs 2+ intact and symmetric  CHEST/LUNG: Clear to percussion bilaterally; No rales, rhonchi, wheezing, or rubs  HEART: Regular rate and rhythm; No murmurs, rubs, or gallops  ABDOMEN: Soft, Nontender, Nondistended; Bowel sounds present  EXTREMITIES:  2+ Peripheral Pulses, No clubbing, cyanosis, or edema  PSYCH: mood stable    LABS:              CAPILLARY BLOOD GLUCOSE          RADIOLOGY & ADDITIONAL TESTS:    Imaging Personally Reviewed:  [ ] YES  [ ] NO    Consultant(s) Notes Reviewed:  [x ] YES  [ ] NO    Care Discussed with Consultants/Other Providers [x] YES  [ ] NO

## 2019-06-08 DIAGNOSIS — B37.3 CANDIDIASIS OF VULVA AND VAGINA: ICD-10-CM

## 2019-06-08 PROCEDURE — 99233 SBSQ HOSP IP/OBS HIGH 50: CPT

## 2019-06-08 RX ORDER — FLUCONAZOLE 150 MG/1
150 TABLET ORAL DAILY
Refills: 0 | Status: DISCONTINUED | OUTPATIENT
Start: 2019-06-08 | End: 2019-06-09

## 2019-06-08 RX ADMIN — GABAPENTIN 800 MILLIGRAM(S): 400 CAPSULE ORAL at 22:08

## 2019-06-08 RX ADMIN — POLYETHYLENE GLYCOL 3350 17 GRAM(S): 17 POWDER, FOR SOLUTION ORAL at 12:10

## 2019-06-08 RX ADMIN — Medication 1 PATCH: at 12:09

## 2019-06-08 RX ADMIN — NALOXEGOL OXALATE 25 MILLIGRAM(S): 12.5 TABLET, FILM COATED ORAL at 07:55

## 2019-06-08 RX ADMIN — Medication 1 PATCH: at 12:06

## 2019-06-08 RX ADMIN — Medication 1000 MILLIGRAM(S): at 07:04

## 2019-06-08 RX ADMIN — GABAPENTIN 800 MILLIGRAM(S): 400 CAPSULE ORAL at 06:05

## 2019-06-08 RX ADMIN — Medication 1 TABLET(S): at 07:54

## 2019-06-08 RX ADMIN — Medication 1 TABLET(S): at 14:46

## 2019-06-08 RX ADMIN — Medication 1 TABLET(S): at 12:30

## 2019-06-08 RX ADMIN — CELECOXIB 100 MILLIGRAM(S): 200 CAPSULE ORAL at 14:42

## 2019-06-08 RX ADMIN — LINEZOLID 600 MILLIGRAM(S): 600 INJECTION, SOLUTION INTRAVENOUS at 17:27

## 2019-06-08 RX ADMIN — GABAPENTIN 800 MILLIGRAM(S): 400 CAPSULE ORAL at 14:47

## 2019-06-08 RX ADMIN — LIDOCAINE 2 PATCH: 4 CREAM TOPICAL at 21:13

## 2019-06-08 RX ADMIN — Medication 1 PATCH: at 19:00

## 2019-06-08 RX ADMIN — Medication 500 MILLIGRAM(S): at 17:27

## 2019-06-08 RX ADMIN — METHADONE HYDROCHLORIDE 60 MILLIGRAM(S): 40 TABLET ORAL at 06:05

## 2019-06-08 RX ADMIN — CHLORHEXIDINE GLUCONATE 1 APPLICATION(S): 213 SOLUTION TOPICAL at 06:04

## 2019-06-08 RX ADMIN — Medication 1000 MILLIGRAM(S): at 22:08

## 2019-06-08 RX ADMIN — LIDOCAINE 2 PATCH: 4 CREAM TOPICAL at 12:09

## 2019-06-08 RX ADMIN — Medication 1 MILLIGRAM(S): at 12:10

## 2019-06-08 RX ADMIN — Medication 500 MILLIGRAM(S): at 06:04

## 2019-06-08 RX ADMIN — Medication 1 TABLET(S): at 22:08

## 2019-06-08 RX ADMIN — Medication 1000 MILLIGRAM(S): at 14:46

## 2019-06-08 RX ADMIN — Medication 1000 MILLIGRAM(S): at 15:51

## 2019-06-08 RX ADMIN — LORATADINE 10 MILLIGRAM(S): 10 TABLET ORAL at 12:10

## 2019-06-08 RX ADMIN — METHADONE HYDROCHLORIDE 60 MILLIGRAM(S): 40 TABLET ORAL at 17:27

## 2019-06-08 RX ADMIN — FLUCONAZOLE 150 MILLIGRAM(S): 150 TABLET ORAL at 17:27

## 2019-06-08 RX ADMIN — Medication 1000 MILLIGRAM(S): at 06:04

## 2019-06-08 RX ADMIN — Medication 1 TABLET(S): at 17:27

## 2019-06-08 RX ADMIN — Medication 1 TABLET(S): at 06:05

## 2019-06-08 RX ADMIN — CELECOXIB 100 MILLIGRAM(S): 200 CAPSULE ORAL at 12:10

## 2019-06-08 RX ADMIN — LINEZOLID 600 MILLIGRAM(S): 600 INJECTION, SOLUTION INTRAVENOUS at 06:06

## 2019-06-08 RX ADMIN — Medication 1 PATCH: at 07:55

## 2019-06-08 RX ADMIN — SENNA PLUS 2 TABLET(S): 8.6 TABLET ORAL at 22:08

## 2019-06-08 NOTE — PROGRESS NOTE ADULT - SUBJECTIVE AND OBJECTIVE BOX
Patient is a 43y old  Female who presents with a chief complaint of AMS (07 Jun 2019 12:15)      INTERVAL HPI/ OVERNIGHT EVENTS: Pt was seen and examined at bedside today, No significant overnight events, pt c/o vaginal white discharge, denies any CP, SoB      MEDICATIONS  (STANDING):  acetaminophen   Tablet .. 1000 milliGRAM(s) Oral every 8 hours  ascorbic acid 500 milliGRAM(s) Oral two times a day  calcium carbonate 1250 mG  + Vitamin D (OsCal 500 + D) 1 Tablet(s) Oral three times a day  celecoxib 100 milliGRAM(s) Oral daily  chlorhexidine 4% Liquid 1 Application(s) Topical <User Schedule>  fluconAZOLE   Tablet 150 milliGRAM(s) Oral daily  folic acid 1 milliGRAM(s) Oral daily  gabapentin 800 milliGRAM(s) Oral three times a day  lactobacillus acidophilus 1 Tablet(s) Oral two times a day with meals  lidocaine   Patch 2 Patch Transdermal every 24 hours  linezolid    Tablet 600 milliGRAM(s) Oral every 12 hours  loratadine 10 milliGRAM(s) Oral daily  methadone    Tablet 60 milliGRAM(s) Oral every 12 hours  multivitamin 1 Tablet(s) Oral daily  naloxegol 25 milliGRAM(s) Oral before breakfast  nicotine -  14 mG/24Hr(s) Patch 1 patch Transdermal daily  nystatin Powder 1 Application(s) Topical three times a day  polyethylene glycol 3350 17 Gram(s) Oral daily  rifampin 300 milliGRAM(s) Oral two times a day  senna 2 Tablet(s) Oral at bedtime    MEDICATIONS  (PRN):  ALBUTerol    90 MICROgram(s) HFA Inhaler 2 Puff(s) Inhalation every 6 hours PRN Shortness of Breath and/or Wheezing  bisacodyl Suppository 10 milliGRAM(s) Rectal daily PRN Constipation  diazepam    Tablet 5 milliGRAM(s) Oral every 8 hours PRN muscle spasm      Allergies    penicillin (Short breath; Hives)    Intolerances        REVIEW OF SYSTEMS:    Unable to examine due to [ ] Encephalopathy [ ] Advanced Dementia [ ] Expressive Aphasia [ ] Non-verbal patient    CONSTITUTIONAL: No fever, NO generalized weakness/Fatigue, No weight loss  EYES: No eye pain, visual disturbances, or discharge  ENMT:  No difficulty hearing, tinnitus, vertigo; No sinus or throat pain  NECK: No pain or stiffness  RESPIRATORY: No shortness of breath,  cough, wheezing, sputum or hemoptysis   CARDIOVASCULAR: No chest pain, palpitations, or leg swelling  GASTROINTESTINAL: No abdominal pain. No nausea, vomiting, diarrhea or constipation. No melena or hematochezia.  GENITOURINARY: positive discharge, No dysuria, frequency, hematuria, or incontinence  NEUROLOGICAL: No headaches, Dizziness, memory loss, loss of strength, numbness, or tremors  SKIN: No itching, burning, rashes, or lesions   MUSCULOSKELETAL: chronic back pain unchanged.   PSYCHIATRIC: No depression, anxiety, mood swings, or difficulty sleeping  HEME/LYMPH: No easy bruising, or bleeding gums      Vital Signs Last 24 Hrs  T(C): 36.7 (08 Jun 2019 11:25), Max: 36.7 (07 Jun 2019 18:14)  T(F): 98 (08 Jun 2019 11:25), Max: 98 (07 Jun 2019 18:14)  HR: 80 (08 Jun 2019 13:45) (63 - 88)  BP: 116/70 (08 Jun 2019 13:45) (107/63 - 116/70)  BP(mean): --  RR: 18 (08 Jun 2019 13:45) (15 - 18)  SpO2: 98% (08 Jun 2019 13:45) (98% - 99%)    PHYSICAL EXAM:  GENERAL: NAD, well-developed, well-groomed  HEAD:  Atraumatic, Normocephalic  EYES: conjunctiva and sclera clear  ENMT: Moist mucous membranes  NECK: Supple, No JVD, Normal thyroid  CHEST/LUNG: Clear to Auscultation bilaterally; No rales, rhonchi, wheezing, or rubs  HEART: Regular rate and rhythm; No murmurs, rubs, or gallops  ABDOMEN: Soft, Nontender, Nondistended; Bowel sounds present  EXTREMITIES:  2+ Peripheral Pulses, No clubbing, cyanosis, or edema  SKIN: No rashes or lesions  NERVOUS SYSTEM:  Alert & Oriented X3, Good concentration; Motor Strength 5/5 B/L upper and lower extremities    LABS:              CAPILLARY BLOOD GLUCOSE              RADIOLOGY & ADDITIONAL TESTS:          Imaging Personally Reviewed:  [ ] YES  [ ] NO    Consultant(s) Notes Reviewed:  [ ] YES  [ ] NO    Care Discussed with Consultants/Other Providers [x ] YES  [ ] NO

## 2019-06-08 NOTE — PROGRESS NOTE ADULT - PROBLEM SELECTOR PLAN 5
referral to methadone clinic vs inpatient drug rehab  spoke with Dr. Jose medical director, at [facility]. He will accept the patient but feels the safest plan is for her to be here until Sunday and have Monday appointment  sw still exploring inpatient substance abuse options as well

## 2019-06-08 NOTE — PROGRESS NOTE ADULT - PROBLEM SELECTOR PLAN 2
- status post C4-5 ACDF with irrigation and debridement on 3/20  - status post posterior spinal fusion & T8-L2 Laminectomy on 5/8   - must use [place] j collar and TLSO brace at all times with ambulation   sutures removed  plain x-rays ok as per orthospine are ok, pt. will fu with Dr. Leslie as outpatient

## 2019-06-09 DIAGNOSIS — B37.3 CANDIDIASIS OF VULVA AND VAGINA: ICD-10-CM

## 2019-06-09 PROCEDURE — 99233 SBSQ HOSP IP/OBS HIGH 50: CPT

## 2019-06-09 RX ADMIN — Medication 500 MILLIGRAM(S): at 17:14

## 2019-06-09 RX ADMIN — LINEZOLID 600 MILLIGRAM(S): 600 INJECTION, SOLUTION INTRAVENOUS at 17:16

## 2019-06-09 RX ADMIN — LIDOCAINE 2 PATCH: 4 CREAM TOPICAL at 00:05

## 2019-06-09 RX ADMIN — METHADONE HYDROCHLORIDE 60 MILLIGRAM(S): 40 TABLET ORAL at 17:14

## 2019-06-09 RX ADMIN — Medication 1000 MILLIGRAM(S): at 07:07

## 2019-06-09 RX ADMIN — CELECOXIB 100 MILLIGRAM(S): 200 CAPSULE ORAL at 11:44

## 2019-06-09 RX ADMIN — Medication 1000 MILLIGRAM(S): at 06:07

## 2019-06-09 RX ADMIN — Medication 500 MILLIGRAM(S): at 06:08

## 2019-06-09 RX ADMIN — CELECOXIB 100 MILLIGRAM(S): 200 CAPSULE ORAL at 12:35

## 2019-06-09 RX ADMIN — SENNA PLUS 2 TABLET(S): 8.6 TABLET ORAL at 21:40

## 2019-06-09 RX ADMIN — Medication 1000 MILLIGRAM(S): at 22:39

## 2019-06-09 RX ADMIN — Medication 1 TABLET(S): at 06:07

## 2019-06-09 RX ADMIN — POLYETHYLENE GLYCOL 3350 17 GRAM(S): 17 POWDER, FOR SOLUTION ORAL at 11:45

## 2019-06-09 RX ADMIN — Medication 1 PATCH: at 19:54

## 2019-06-09 RX ADMIN — Medication 1 MILLIGRAM(S): at 11:44

## 2019-06-09 RX ADMIN — Medication 1 TABLET(S): at 07:48

## 2019-06-09 RX ADMIN — LIDOCAINE 2 PATCH: 4 CREAM TOPICAL at 22:00

## 2019-06-09 RX ADMIN — CHLORHEXIDINE GLUCONATE 1 APPLICATION(S): 213 SOLUTION TOPICAL at 06:08

## 2019-06-09 RX ADMIN — FLUCONAZOLE 150 MILLIGRAM(S): 150 TABLET ORAL at 11:45

## 2019-06-09 RX ADMIN — Medication 1 TABLET(S): at 11:44

## 2019-06-09 RX ADMIN — Medication 1 PATCH: at 11:49

## 2019-06-09 RX ADMIN — Medication 1 TABLET(S): at 17:14

## 2019-06-09 RX ADMIN — LINEZOLID 600 MILLIGRAM(S): 600 INJECTION, SOLUTION INTRAVENOUS at 06:08

## 2019-06-09 RX ADMIN — NALOXEGOL OXALATE 25 MILLIGRAM(S): 12.5 TABLET, FILM COATED ORAL at 07:49

## 2019-06-09 RX ADMIN — LIDOCAINE 2 PATCH: 4 CREAM TOPICAL at 10:54

## 2019-06-09 RX ADMIN — LIDOCAINE 2 PATCH: 4 CREAM TOPICAL at 19:54

## 2019-06-09 RX ADMIN — Medication 1 PATCH: at 11:45

## 2019-06-09 RX ADMIN — NYSTATIN CREAM 1 APPLICATION(S): 100000 CREAM TOPICAL at 21:39

## 2019-06-09 RX ADMIN — Medication 1 TABLET(S): at 21:39

## 2019-06-09 RX ADMIN — Medication 1000 MILLIGRAM(S): at 14:36

## 2019-06-09 RX ADMIN — GABAPENTIN 800 MILLIGRAM(S): 400 CAPSULE ORAL at 06:07

## 2019-06-09 RX ADMIN — Medication 1 TABLET(S): at 13:35

## 2019-06-09 RX ADMIN — Medication 1 PATCH: at 07:49

## 2019-06-09 RX ADMIN — Medication 1000 MILLIGRAM(S): at 21:39

## 2019-06-09 RX ADMIN — LORATADINE 10 MILLIGRAM(S): 10 TABLET ORAL at 11:44

## 2019-06-09 RX ADMIN — GABAPENTIN 800 MILLIGRAM(S): 400 CAPSULE ORAL at 21:39

## 2019-06-09 RX ADMIN — Medication 1000 MILLIGRAM(S): at 13:36

## 2019-06-09 RX ADMIN — METHADONE HYDROCHLORIDE 60 MILLIGRAM(S): 40 TABLET ORAL at 06:08

## 2019-06-09 RX ADMIN — GABAPENTIN 800 MILLIGRAM(S): 400 CAPSULE ORAL at 13:36

## 2019-06-09 NOTE — PROGRESS NOTE ADULT - PROBLEM SELECTOR PLAN 2
- status post C4-5 ACDF with irrigation and debridement on 3/20  - status post posterior spinal fusion & T8-L2 Laminectomy on 5/8   - must use [place] j collar and TLSO brace at all times with ambulation   sutures removed  plain x-rays ok as per orthospine are ok, pt. will fu with Dr. Leslie as outpatient

## 2019-06-09 NOTE — PROGRESS NOTE ADULT - SUBJECTIVE AND OBJECTIVE BOX
Patient is a 43y old  Female who presents with a chief complaint of AMS (04 Jun 2019 08:54)      INTERVAL HPI / OVERNIGHT EVENTS: c/o vaginal itching and discharge    MEDICATIONS  (STANDING):  acetaminophen   Tablet .. 1000 milliGRAM(s) Oral every 8 hours  ascorbic acid 500 milliGRAM(s) Oral two times a day  calcium carbonate 1250 mG  + Vitamin D (OsCal 500 + D) 1 Tablet(s) Oral three times a day  celecoxib 100 milliGRAM(s) Oral daily  chlorhexidine 4% Liquid 1 Application(s) Topical <User Schedule>  DAPTOmycin IVPB 600 milliGRAM(s) IV Intermittent <User Schedule>  fluconAZOLE   Tablet 150 milliGRAM(s) Oral daily  folic acid 1 milliGRAM(s) Oral daily  gabapentin 800 milliGRAM(s) Oral three times a day  lactobacillus acidophilus 1 Tablet(s) Oral two times a day with meals  lidocaine   Patch 1 Patch Transdermal every 24 hours  lidocaine   Patch 1 Patch Transdermal every 24 hours  methadone    Tablet 60 milliGRAM(s) Oral every 12 hours  multivitamin 1 Tablet(s) Oral daily  naloxegol 25 milliGRAM(s) Oral before breakfast  nicotine -  14 mG/24Hr(s) Patch 1 patch Transdermal daily  nystatin Powder 1 Application(s) Topical three times a day  polyethylene glycol 3350 17 Gram(s) Oral daily  rifampin 300 milliGRAM(s) Oral two times a day  senna 2 Tablet(s) Oral at bedtime    MEDICATIONS  (PRN):  bisacodyl Suppository 10 milliGRAM(s) Rectal daily PRN Constipation  diazepam    Tablet 5 milliGRAM(s) Oral every 8 hours PRN muscle spasm  oxyCODONE    IR 10 milliGRAM(s) Oral every 4 hours PRN Moderate Pain (4 - 6)      Vital Signs Last 24 Hrs  Tmax:afebirle    Review of systems:  General : no fever /chills,fatigue  CVS : no chest pain, palpitations  Lungs : no shortness of breath, cough  GI : no abdominal pain,vomiting, diarrhea   : no dysuria,hematuria        PHYSICAL EXAM:  General :NAD  Constitutional:  well-groomed, well-developed  Respiratory: CTAB/L  Cardiovascular: S1 and S2, RRR, no M/G/R  Gastrointestinal: BS+, soft, NT/ND  Extremities: No peripheral edema  Vascular: 2+ peripheral pulses  Skin: surgical site incision healing well      LABS:    -->29  CRP 2.67-->1.28            MICROBIOLOGY:  RECENT CULTURES:        RADIOLOGY & ADDITIONAL STUDIES:    MRI Lumbar spine   Vertebrae: Anatomic alignment. No acute fracture seen. Thoracolumbar   posterolateral fusion and posterior pedicle screw fixation extends to the   L2   level. There is mild disc desiccation at L4-5 without significant disc   height   loss.    L1-L2: No significant disc disease. No stenosis.    L2-L3: No significant disc disease. No stenosis.    L3-L4: Mild facet arthropathy. No significant stenoses.    L4-L5: Mild disc bulge as well as moderate facet arthropathy. The   central   spinal canal remains patent. Mild bilateral neural foraminal stenoses.   The post   contrast imaging does demonstrate some articular/periarticular   enhancement   which may be consistent with ongoing infection/inflammation.    L5-S1: Moderate facet arthropathy. No stenoses. The post contrast   imaging does   demonstrate some articular/periarticular enhancement which may be   consistent   with ongoing infection/inflammation.    Epidural space: No evidence of epidural abscess.    Spinal cord: The conus medullaris ends normally.      Kidneys andureters: The right kidney demonstrates a cyst.    Soft tissues: Nonspecific edema in the subcutaneous fat, potentially   dependent/positional.     IMPRESSION:   1. Interval postsurgical changes associated susceptibility artifact from   postoperative instrumentation as described above.       MRI thoracic  FINDINGS:    Vertebrae: Slight exaggeration of the lower thoracic kyphosis. No acute   fracture seen. Anterior wedging of T11 and T12 again demonstrated.    Discs/Spinal canal/Neural foramina: Persistent T11-12 disc space edema   with a   tiny marginally enhancing abscess anteriorly within the disc space   measuring   approximately 3.2 mm in craniocaudal height; this represents an improved   appearance compared to the prior study, could reflect ongoing residual   discitis   versus scar tissue and postinflammatory fluid. No new disc space   inflammation   orenhancement identified elsewhere. Thoracolumbar posterolateral fusion   posterior pedicle screw fixation from T8-L2 in the interval. Some   limitations   visualizing the spinal canal due to hardware susceptibility artifacts.   The   thecal sac appears grossly patent. There appears to be somewhat improved   patency of the   spinal canal around the T11-12 level. There is a component of dorsal   T11-12   osteophytic ridging. The spinal canal is decompressed posteriorly at this   level.    Spinal cord: Grossly unremarkable signal, as visualized.     Lungs: Improved appearance of the right lung.    Soft tissues: The appearance of increased signal intensity in the   paravertebral soft tissues on the STIR sequence, some of this could be   related   to inhomogeneous fat suppression in the setting of hardware   susceptibility   artifacts. There is trace nonspecific subcutaneous edema, potentially   dependent/positional. No compressive fluid collections.   Other findings: Small left pleural effusion.     IMPRESSION:   As compared to previous exam, patient is status post decompression of   discitis osteomyelitis with multilevel fusion and instrumentation and   laminectomy above and below the level of osteomyelitis. Extensive this is   likely artifact, limits optimal assessment of the canal as described   above.      MRI cervical spine      FINDINGS:    Vertebrae: There is straightening of the cervical lordosis. Trace   degenerative   retrolisthesis of C5 on C6, as before. No acute fracture seen. Mild,   chronic   height loss at C6 and C7, similar to prior.   Prior ACDF at C4-5. Mild to moderate disc height loss and spondylosis at   C5-6   and C6-7, as before.    C2-C3: No significant interval change. Slight disc bulge and ligamentum   flavum   buckling without contribution to significant stenoses.    C3-C4: No significant interval change. Mild disc bulge without   contribution to   central spinal canal stenosis. Uncovertebral and facet arthropathy   causing mild   bilateral neural foraminal stenoses.    C4-C5: No significant interval change. Prior ACDF. No significant   stenoses.    C5-C6: No significant interval change. Disc osteophyte complex and   ligamentum   flavum buckling causing mild central spinal canal stenosis. Uncovertebral   and   facet arthropathy causing moderate to severe bilateral neural foraminal   stenoses.    C6-C7: Further subsidence of endplateand disc space edema with   enhancement at   C6-7 since the prior study. Posterior disc osteophyte complex without   contribution to significant central spinal canal stenosis. Uncovertebral   and   facet arthropathy causing moderate right and mild left neural foraminal   stenoses.    C7-T1: No central spinal canal is patent. Mild to moderate facet   arthropathy   without contribution to significant foraminal stenoses.    Spinal cord: Normal signal. No cord compression.      Vasculature: Expected flow voids in the vertebral arteries.     Soft tissues: Unremarkable    Other findings: No discrete epidural abscess identified.     IMPRESSION:   1. Persistent but slightly decreased endplate enhancement and disc   inflammatory changes/edema at C6-7   since the prior study.  2. Postoperative changes related to C4-5 instrumentation and extensive   susceptibility artifact limits optimal assessment.

## 2019-06-09 NOTE — PROGRESS NOTE ADULT - PROBLEM SELECTOR PLAN 5
referral to methadone clinic vs inpatient drug rehab  spoke with Dr. Jose medical director, at [facility]. He will accept the patient but feels the safest plan is for her to be here until Sunday and have Monday appointment  sw still exploring inpatient substance abuse options as well

## 2019-06-09 NOTE — PROGRESS NOTE ADULT - SUBJECTIVE AND OBJECTIVE BOX
Patient is a 43y old  Female who presents with a chief complaint of AMS (08 Jun 2019 15:41)      INTERVAL HPI/ OVERNIGHT EVENTS: Pt was seen and examined at bedside today, No significant overnight events     MEDICATIONS  (STANDING):  acetaminophen   Tablet .. 1000 milliGRAM(s) Oral every 8 hours  ascorbic acid 500 milliGRAM(s) Oral two times a day  calcium carbonate 1250 mG  + Vitamin D (OsCal 500 + D) 1 Tablet(s) Oral three times a day  celecoxib 100 milliGRAM(s) Oral daily  chlorhexidine 4% Liquid 1 Application(s) Topical <User Schedule>  folic acid 1 milliGRAM(s) Oral daily  gabapentin 800 milliGRAM(s) Oral three times a day  lactobacillus acidophilus 1 Tablet(s) Oral two times a day with meals  lidocaine   Patch 2 Patch Transdermal every 24 hours  linezolid    Tablet 600 milliGRAM(s) Oral every 12 hours  loratadine 10 milliGRAM(s) Oral daily  methadone    Tablet 60 milliGRAM(s) Oral every 12 hours  multivitamin 1 Tablet(s) Oral daily  naloxegol 25 milliGRAM(s) Oral before breakfast  nicotine -  14 mG/24Hr(s) Patch 1 patch Transdermal daily  nystatin Powder 1 Application(s) Topical three times a day  polyethylene glycol 3350 17 Gram(s) Oral daily  rifampin 300 milliGRAM(s) Oral two times a day  senna 2 Tablet(s) Oral at bedtime    MEDICATIONS  (PRN):  ALBUTerol    90 MICROgram(s) HFA Inhaler 2 Puff(s) Inhalation every 6 hours PRN Shortness of Breath and/or Wheezing  bisacodyl Suppository 10 milliGRAM(s) Rectal daily PRN Constipation      Allergies    penicillin (Short breath; Hives)    Intolerances        REVIEW OF SYSTEMS:    Unable to examine due to [ ] Encephalopathy [ ] Advanced Dementia [ ] Expressive Aphasia [ ] Non-verbal patient    CONSTITUTIONAL: No fever, NO generalized weakness/Fatigue, No weight loss  EYES: No eye pain, visual disturbances, or discharge  ENMT:  No difficulty hearing, tinnitus, vertigo; No sinus or throat pain  NECK: No pain or stiffness  RESPIRATORY: No shortness of breath,  cough, wheezing, sputum or hemoptysis   CARDIOVASCULAR: No chest pain, palpitations, or leg swelling  GASTROINTESTINAL: No abdominal pain. No nausea, vomiting, diarrhea or constipation. No melena or hematochezia.  GENITOURINARY: improved discharge, No dysuria, frequency, hematuria, or incontinence  NEUROLOGICAL: No headaches, Dizziness, memory loss, loss of strength, numbness, or tremors  SKIN: No itching, burning, rashes, or lesions   MUSCULOSKELETAL: chronic back pain unchanged.   PSYCHIATRIC: No depression, anxiety, mood swings, or difficulty sleeping  HEME/LYMPH: No easy bruising, or bleeding gums        Vital Signs Last 24 Hrs  T(C): 36.9 (09 Jun 2019 12:17), Max: 37.2 (08 Jun 2019 17:21)  T(F): 98.5 (09 Jun 2019 12:17), Max: 98.9 (08 Jun 2019 17:21)  HR: 74 (09 Jun 2019 12:17) (56 - 76)  BP: 128/77 (09 Jun 2019 12:17) (90/56 - 128/77)  BP(mean): --  RR: 17 (09 Jun 2019 12:17) (16 - 18)  SpO2: 100% (09 Jun 2019 12:17) (97% - 100%)    PHYSICAL EXAM:  GENERAL: NAD, well-developed, well-groomed  HEAD:  Atraumatic, Normocephalic  EYES: conjunctiva and sclera clear  ENMT: Moist mucous membranes  NECK: Supple, No JVD, Normal thyroid  CHEST/LUNG: Clear to Auscultation bilaterally; No rales, rhonchi, wheezing, or rubs  HEART: Regular rate and rhythm; No murmurs, rubs, or gallops  ABDOMEN: Soft, Nontender, Nondistended; Bowel sounds present  EXTREMITIES:  2+ Peripheral Pulses, No clubbing, cyanosis, or edema  SKIN: No rashes or lesions  NERVOUS SYSTEM:  Alert & Oriented X3, Good concentration; Motor Strength 5/5 B/L upper and lower extremities    LABS:              CAPILLARY BLOOD GLUCOSE              RADIOLOGY & ADDITIONAL TESTS:          Imaging Personally Reviewed:  [ ] YES  [ ] NO    Consultant(s) Notes Reviewed:  [ ] YES  [ ] NO    Care Discussed with Consultants/Other Providers [x ] YES  [ ] NO

## 2019-06-09 NOTE — PROGRESS NOTE ADULT - MINUTES
55
60
37
45
55
60
90
20
45
60
20
45
35
55
35
45
55
55
40
40

## 2019-06-10 PROCEDURE — 99232 SBSQ HOSP IP/OBS MODERATE 35: CPT

## 2019-06-10 RX ORDER — METHADONE HYDROCHLORIDE 40 MG/1
60 TABLET ORAL ONCE
Refills: 0 | Status: DISCONTINUED | OUTPATIENT
Start: 2019-06-10 | End: 2019-06-10

## 2019-06-10 RX ORDER — IBUPROFEN 200 MG
600 TABLET ORAL ONCE
Refills: 0 | Status: COMPLETED | OUTPATIENT
Start: 2019-06-10 | End: 2019-06-10

## 2019-06-10 RX ADMIN — CELECOXIB 100 MILLIGRAM(S): 200 CAPSULE ORAL at 11:42

## 2019-06-10 RX ADMIN — Medication 1 PATCH: at 07:30

## 2019-06-10 RX ADMIN — Medication 1000 MILLIGRAM(S): at 23:28

## 2019-06-10 RX ADMIN — GABAPENTIN 800 MILLIGRAM(S): 400 CAPSULE ORAL at 14:35

## 2019-06-10 RX ADMIN — GABAPENTIN 800 MILLIGRAM(S): 400 CAPSULE ORAL at 21:19

## 2019-06-10 RX ADMIN — NYSTATIN CREAM 1 APPLICATION(S): 100000 CREAM TOPICAL at 06:16

## 2019-06-10 RX ADMIN — Medication 500 MILLIGRAM(S): at 06:17

## 2019-06-10 RX ADMIN — Medication 1 TABLET(S): at 06:17

## 2019-06-10 RX ADMIN — METHADONE HYDROCHLORIDE 60 MILLIGRAM(S): 40 TABLET ORAL at 18:39

## 2019-06-10 RX ADMIN — POLYETHYLENE GLYCOL 3350 17 GRAM(S): 17 POWDER, FOR SOLUTION ORAL at 14:35

## 2019-06-10 RX ADMIN — NYSTATIN CREAM 1 APPLICATION(S): 100000 CREAM TOPICAL at 21:24

## 2019-06-10 RX ADMIN — Medication 1000 MILLIGRAM(S): at 15:15

## 2019-06-10 RX ADMIN — Medication 1 TABLET(S): at 18:42

## 2019-06-10 RX ADMIN — Medication 1 PATCH: at 11:43

## 2019-06-10 RX ADMIN — LINEZOLID 600 MILLIGRAM(S): 600 INJECTION, SOLUTION INTRAVENOUS at 18:39

## 2019-06-10 RX ADMIN — Medication 1 TABLET(S): at 11:42

## 2019-06-10 RX ADMIN — Medication 1 TABLET(S): at 21:19

## 2019-06-10 RX ADMIN — Medication 1 TABLET(S): at 07:49

## 2019-06-10 RX ADMIN — GABAPENTIN 800 MILLIGRAM(S): 400 CAPSULE ORAL at 06:16

## 2019-06-10 RX ADMIN — LORATADINE 10 MILLIGRAM(S): 10 TABLET ORAL at 11:43

## 2019-06-10 RX ADMIN — Medication 1000 MILLIGRAM(S): at 06:16

## 2019-06-10 RX ADMIN — Medication 1 TABLET(S): at 14:35

## 2019-06-10 RX ADMIN — LINEZOLID 600 MILLIGRAM(S): 600 INJECTION, SOLUTION INTRAVENOUS at 06:17

## 2019-06-10 RX ADMIN — CHLORHEXIDINE GLUCONATE 1 APPLICATION(S): 213 SOLUTION TOPICAL at 06:16

## 2019-06-10 RX ADMIN — Medication 600 MILLIGRAM(S): at 21:21

## 2019-06-10 RX ADMIN — Medication 500 MILLIGRAM(S): at 18:39

## 2019-06-10 RX ADMIN — Medication 1000 MILLIGRAM(S): at 14:35

## 2019-06-10 RX ADMIN — Medication 1000 MILLIGRAM(S): at 07:16

## 2019-06-10 RX ADMIN — NALOXEGOL OXALATE 25 MILLIGRAM(S): 12.5 TABLET, FILM COATED ORAL at 07:50

## 2019-06-10 RX ADMIN — METHADONE HYDROCHLORIDE 60 MILLIGRAM(S): 40 TABLET ORAL at 10:33

## 2019-06-10 RX ADMIN — Medication 1 MILLIGRAM(S): at 11:42

## 2019-06-10 RX ADMIN — LIDOCAINE 2 PATCH: 4 CREAM TOPICAL at 10:33

## 2019-06-10 RX ADMIN — CELECOXIB 100 MILLIGRAM(S): 200 CAPSULE ORAL at 12:15

## 2019-06-10 NOTE — PROGRESS NOTE ADULT - PROBLEM SELECTOR PLAN 2
with repeat blood c/s negative and  TV endocarditis   repeat c/s from 5/13 neg repeat blood c/s negative and  TV endocarditis   repeat c/s from 5/13 neg

## 2019-06-10 NOTE — PROGRESS NOTE ADULT - ASSESSMENT
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 She was also diagnosis w/ HCV. Pt was initially intubated due to respiratory failure and put on pressors in ICU. She as extubated on 2/25 and transferred from ICU the following day. Pt had a C4-5 ACDF with irrigation and debridement on 3/20 due to spinal abscess and osteo and was kept intubated post procedure and transferred again to ICU, where she was extubated on 3/21. Pt was subsequently found to have  T11/12 OM discitis with an epidural phlegmon from T9-L1. She was taken to the OR 5/8 for posterior spinal fusion & T8-L2 Laminectomy. She was again transferred to ICU for post op care and was extubated without difficulty on 5/9     Hepatitis c ab positive - will need to fu as hepatology as outpatient, alk phos trending down    JPlan was fro discharge this am but patient did not go due to numerous concerns/anxiety she had about the plan

## 2019-06-10 NOTE — PROGRESS NOTE ADULT - PROBLEM SELECTOR PLAN 5
referral to methadone clinic vs inpatient drug rehab  spoke with Dr. Jose medical director, at [facility]. He will accept the patient but feels the safest plan is for her to be here until Sunday and have Monday appointment  sw still exploring inpatient substance abuse options as well - pt. decline intake interviews for inpatient  pt. did not leave as planned this am - social work involved and working on discharge plan

## 2019-06-10 NOTE — PROGRESS NOTE ADULT - ASSESSMENT
43 yr old female with  h/o IVDA seen with last cbc, cr and CPK within normal limit/o IVDA seen with T11/12 OM/ discitis with an epidural phlegmon from T9-L1   s/p T8-L2 Laminectomy and PSF POD1  OR c/s coag neg staph   repeat MRI spines done : results reviewed  ESR and CRP trending down nicely   last cbc, cr and CPK wnl  last cbc, cr and CPK within normal limit then doxy x 1 year

## 2019-06-10 NOTE — PROGRESS NOTE ADULT - SUBJECTIVE AND OBJECTIVE BOX
Patient is a 43y old  Female who presents with a chief complaint of AMS (09 Jun 2019 16:29)      INTERVAL HPI/OVERNIGHT EVENTS:  pt. did not leave as planned. Discharge was planned for early morning to have taxi take her to methadone clinic. Pt. states she got nervous worried about several issues, still saying she wants to go to facility "near her", concerns about dosage of methadone, etc. no one answering at transportation center  social work aware. spoke with facility who states they can take her on weds. morning.  social work to give patient options regarding living situation . Pt. has declined to proceed with intake calls for inpatient rehab.    MEDICATIONS  (STANDING):  acetaminophen   Tablet .. 1000 milliGRAM(s) Oral every 8 hours  ascorbic acid 500 milliGRAM(s) Oral two times a day  calcium carbonate 1250 mG  + Vitamin D (OsCal 500 + D) 1 Tablet(s) Oral three times a day  celecoxib 100 milliGRAM(s) Oral daily  chlorhexidine 4% Liquid 1 Application(s) Topical <User Schedule>  folic acid 1 milliGRAM(s) Oral daily  gabapentin 800 milliGRAM(s) Oral three times a day  lactobacillus acidophilus 1 Tablet(s) Oral two times a day with meals  lidocaine   Patch 2 Patch Transdermal every 24 hours  linezolid    Tablet 600 milliGRAM(s) Oral every 12 hours  loratadine 10 milliGRAM(s) Oral daily  methadone    Tablet 60 milliGRAM(s) Oral every 12 hours  multivitamin 1 Tablet(s) Oral daily  naloxegol 25 milliGRAM(s) Oral before breakfast  nicotine -  14 mG/24Hr(s) Patch 1 patch Transdermal daily  nystatin Powder 1 Application(s) Topical three times a day  polyethylene glycol 3350 17 Gram(s) Oral daily  rifampin 300 milliGRAM(s) Oral two times a day  senna 2 Tablet(s) Oral at bedtime    MEDICATIONS  (PRN):  ALBUTerol    90 MICROgram(s) HFA Inhaler 2 Puff(s) Inhalation every 6 hours PRN Shortness of Breath and/or Wheezing  bisacodyl Suppository 10 milliGRAM(s) Rectal daily PRN Constipation      Allergies    penicillin (Short breath; Hives)    Intolerances        REVIEW OF SYSTEMS:  CONSTITUTIONAL: No fever, weight loss, or fatigue  EYES: No eye pain, visual disturbances, or discharge  ENMT:  No difficulty hearing, tinnitus, vertigo; No sinus or throat pain  RESPIRATORY: No cough, wheezing, chills or hemoptysis; No shortness of breath  CARDIOVASCULAR: No chest pain, palpitations, dizziness, or leg swelling  GASTROINTESTINAL: No abdominal or epigastric pain. No nausea, vomiting, or hematemesis; No diarrhea or constipation. No melena or hematochezia.  GENITOURINARY: No dysuria, frequency, hematuria, or incontinence  NEUROLOGICAL: No headaches, memory loss, loss of strength, numbness, or tremors  SKIN: No itching, burning, rashes, or lesions   MUSCULOSKELETAL: No joint pain or swelling; chronic back pain  PSYCHIATRIC: anxious about leaving this am    Vital Signs Last 24 Hrs  T(C): 36.3 (10 Alfred 2019 05:16), Max: 37 (09 Jun 2019 19:32)  T(F): 97.4 (10 Alfred 2019 05:16), Max: 98.6 (09 Jun 2019 19:32)  HR: 59 (10 Alfred 2019 05:16) (59 - 74)  BP: 98/54 (10 Alfred 2019 05:16) (98/54 - 128/77)  BP(mean): --  RR: 18 (10 Alfred 2019 05:16) (17 - 18)  SpO2: 100% (10 Alfred 2019 05:16) (96% - 100%)    PHYSICAL EXAM:  GENERAL: NAD, somewhat anxious  HEAD:  Atraumatic, Normocephalic  EYES: EOMI, PERRLA, conjunctiva and sclera clear  ENMT: No tonsillar erythema, exudates, or enlargement; Moist mucous membranes,   NECK: Supple, No JVD, Normal thyroid  NERVOUS SYSTEM:  Alert & Oriented X3, Good concentration; Motor Strength 5/5 B/L upper and lower extremities; DTRs 2+ intact and symmetric  CHEST/LUNG: Clear to percussion bilaterally; No rales, rhonchi, wheezing, or rubs  HEART: Regular rate and rhythm; No murmurs, rubs, or gallops  ABDOMEN: Soft, Nontender, Nondistended; Bowel sounds present  EXTREMITIES:  2+ Peripheral Pulses, No clubbing, cyanosis, or edema  PSYCH: anxious    LABS:              CAPILLARY BLOOD GLUCOSE          RADIOLOGY & ADDITIONAL TESTS:    Imaging Personally Reviewed:  [ ] YES  [ ] NO    Consultant(s) Notes Reviewed:  [x ] YES  [ ] NO    Care Discussed with Consultants/Other Providers [x ] YES  [ ] NO

## 2019-06-10 NOTE — CHART NOTE - NSCHARTNOTEFT_GEN_A_CORE
Hospitalist Medicine DNP    PROCEDURE NOTE:  Mid Line removal  Site: Left upper arm    Requested by PMD to remove the Midline. Double lumen 10 cm removed.  Explained the procedure. Catheter removed and tip intact. No hematoma or bleeding noted. Patient tolerated procedure well. A DSD applied. RN aware.

## 2019-06-10 NOTE — PROGRESS NOTE ADULT - SUBJECTIVE AND OBJECTIVE BOX
HPI:  44 Y/O female w/ PMHx of IV heroin abuse brought in by EMS for lethargy and cough. As per EMS, pt was found laying in bed at home, lethargic however able to follow some commands. EMS reports that pt was attempting to detox from heroin, and last known use was 3 days prior to ED presentation. As per ED, pts boyfriend reports a hx of + pt cough productive of white sputum and a fall down a flight of stairs 2/16. Labs showed serum lactate of 7.2, BUN/Cr 124/6.42, and WBC of of 11.93. Tmax 102.4, urine + for cocaine, opiates, nitrites and many bacteria. ICU called for further evaluation. (17 Feb 2019 22:56)      Allergies    penicillin (Short breath; Hives)    Intolerances        MEDICATIONS  (STANDING):  acetaminophen   Tablet .. 1000 milliGRAM(s) Oral every 8 hours  ascorbic acid 500 milliGRAM(s) Oral two times a day  calcium carbonate 1250 mG  + Vitamin D (OsCal 500 + D) 1 Tablet(s) Oral three times a day  celecoxib 100 milliGRAM(s) Oral daily  chlorhexidine 4% Liquid 1 Application(s) Topical <User Schedule>  folic acid 1 milliGRAM(s) Oral daily  gabapentin 800 milliGRAM(s) Oral three times a day  lactobacillus acidophilus 1 Tablet(s) Oral two times a day with meals  lidocaine   Patch 2 Patch Transdermal every 24 hours  linezolid    Tablet 600 milliGRAM(s) Oral every 12 hours  loratadine 10 milliGRAM(s) Oral daily  methadone    Tablet 60 milliGRAM(s) Oral every 12 hours  multivitamin 1 Tablet(s) Oral daily  naloxegol 25 milliGRAM(s) Oral before breakfast  nicotine -  14 mG/24Hr(s) Patch 1 patch Transdermal daily  nystatin Powder 1 Application(s) Topical three times a day  polyethylene glycol 3350 17 Gram(s) Oral daily  rifampin 300 milliGRAM(s) Oral two times a day  senna 2 Tablet(s) Oral at bedtime    MEDICATIONS  (PRN):  ALBUTerol    90 MICROgram(s) HFA Inhaler 2 Puff(s) Inhalation every 6 hours PRN Shortness of Breath and/or Wheezing  bisacodyl Suppository 10 milliGRAM(s) Rectal daily PRN Constipation      REVIEW OF SYSTEMS:    CONSTITUTIONAL: No fever, chills, weight loss, or fatigue  HEENT: No sore throat, runny nose, ear ache  RESPIRATORY: No cough, wheezing, No shortness of breath  CARDIOVASCULAR: No chest pain, palpitations, dizziness  GASTROINTESTINAL: No abdominal pain. No nausea, vomiting, diarrhea  GENITOURINARY: No dysuria, increase frequency, hematuria, or incontinence  NEUROLOGICAL: No headaches, memory loss, loss of strength, numbness, or tremors, no weakness  EXTREMITY: No pedal edema BLE  SKIN: No itching, burning, rashes, or lesions     VITAL SIGNS:  T(C): 36.3 (06-10-19 @ 05:16), Max: 36.3 (06-10-19 @ 05:16)  T(F): 97.4 (06-10-19 @ 05:16), Max: 97.4 (06-10-19 @ 05:16)  HR: 65 (06-10-19 @ 14:00) (59 - 65)  BP: 101/57 (06-10-19 @ 14:00) (98/54 - 101/57)  RR: 19 (06-10-19 @ 14:00) (18 - 19)  SpO2: 98% (06-10-19 @ 14:00) (98% - 100%)  Wt(kg): --    PHYSICAL EXAM:    GENERAL: not in any distress  HEENT: Neck is supple, normocephalic, atraumatic   CHEST/LUNG: Clear to auscultation bilaterally; No rales, rhonchi, wheezing  HEART: Regular rate and rhythm; No murmurs, rubs, or gallops  ABDOMEN: Soft, Nontender, Nondistended; Bowel sounds present, no rebound   EXTREMITIES:  2+ Peripheral Pulses, No clubbing, cyanosis, or edema  GENITOURINARY:   SKIN: No rashes or lesions  BACK: no pressor sore   NERVOUS SYSTEM:  Alert & Oriented X3, Good concentration  PSYCH: normal affect     LABS:                                                   Radiology: infectiuos disease coverage   3 F w/ history of IVDA, alcohol abuse, hx of pancreatitis, alcohol hepatitis, alcohol withdrawal, left frontoparietal subdural hematoma 2008 without need for neurosurgical intervention presented on 2/17/19 with severe sepsis with septic shock secondary to MRSA bacteremia w/ RLL PNA, UTI, endocarditis on LIZ, OM and EFRAIN. She was also diagnosis w/ HCV. Pt was initially intubated due to respiratory failure and put on pressors in ICU. She as extubated on 2/25 and transferred from ICU the following day. Pt had a C4-5 ACDF with irrigation and debridement on 3/20 due to spinal abscess and osteo and was kept intubated post procedure and transferred again to ICU, where she was extubated on 3/21. Pt was subsequently found to have  T11/12 OM discitis with an epidural phlegmon from T9-L1. She was taken to the OR 5/8 for posterior spinal fusion & T8-L2 Laminectomy. She was again transferred to ICU for post op care and was extubated without difficulty on 5/9.   Discharge planning in process      Allergies    penicillin (Short breath; Hives)    Intolerances        MEDICATIONS  (STANDING):  acetaminophen   Tablet .. 1000 milliGRAM(s) Oral every 8 hours  ascorbic acid 500 milliGRAM(s) Oral two times a day  calcium carbonate 1250 mG  + Vitamin D (OsCal 500 + D) 1 Tablet(s) Oral three times a day  celecoxib 100 milliGRAM(s) Oral daily  chlorhexidine 4% Liquid 1 Application(s) Topical <User Schedule>  folic acid 1 milliGRAM(s) Oral daily  gabapentin 800 milliGRAM(s) Oral three times a day  lactobacillus acidophilus 1 Tablet(s) Oral two times a day with meals  lidocaine   Patch 2 Patch Transdermal every 24 hours  linezolid    Tablet 600 milliGRAM(s) Oral every 12 hours  loratadine 10 milliGRAM(s) Oral daily  methadone    Tablet 60 milliGRAM(s) Oral every 12 hours  multivitamin 1 Tablet(s) Oral daily  naloxegol 25 milliGRAM(s) Oral before breakfast  nicotine -  14 mG/24Hr(s) Patch 1 patch Transdermal daily  nystatin Powder 1 Application(s) Topical three times a day  polyethylene glycol 3350 17 Gram(s) Oral daily  rifampin 300 milliGRAM(s) Oral two times a day  senna 2 Tablet(s) Oral at bedtime    MEDICATIONS  (PRN):  ALBUTerol    90 MICROgram(s) HFA Inhaler 2 Puff(s) Inhalation every 6 hours PRN Shortness of Breath and/or Wheezing  bisacodyl Suppository 10 milliGRAM(s) Rectal daily PRN Constipation      REVIEW OF SYSTEMS:    better overall   left knee pain   feels gout     VITAL SIGNS:  T(C): 36.3 (06-10-19 @ 05:16), Max: 36.3 (06-10-19 @ 05:16)  T(F): 97.4 (06-10-19 @ 05:16), Max: 97.4 (06-10-19 @ 05:16)  HR: 65 (06-10-19 @ 14:00) (59 - 65)  BP: 101/57 (06-10-19 @ 14:00) (98/54 - 101/57)  RR: 19 (06-10-19 @ 14:00) (18 - 19)  SpO2: 98% (06-10-19 @ 14:00) (98% - 100%)  Wt(kg): --    PHYSICAL EXAM:    GENERAL: not in any distress  HEENT: Neck is supple, normocephalic, atraumatic   CHEST/LUNG: Clear to auscultation bilaterally; No rales, rhonchi, wheezing  HEART: Regular rate and rhythm; No murmurs, rubs, or gallops  ABDOMEN: Soft, Nontender, Nondistended; Bowel sounds present, no rebound   EXTREMITIES:  2+ Peripheral Pulses, No clubbing, cyanosis, or edema  no knee issues noted  SKIN: No rashes or lesions  BACK: no pressor sore   NERVOUS SYSTEM:  Alert & Oriented X3, Good concentration  PSYCH: normal affect   no pow infectin  LABS:                                                   Radiology:

## 2019-06-11 DIAGNOSIS — E55.9 VITAMIN D DEFICIENCY, UNSPECIFIED: ICD-10-CM

## 2019-06-11 PROCEDURE — 99232 SBSQ HOSP IP/OBS MODERATE 35: CPT

## 2019-06-11 RX ORDER — IBUPROFEN 200 MG
600 TABLET ORAL ONCE
Refills: 0 | Status: COMPLETED | OUTPATIENT
Start: 2019-06-11 | End: 2019-06-11

## 2019-06-11 RX ORDER — METHADONE HYDROCHLORIDE 40 MG/1
60 TABLET ORAL ONCE
Refills: 0 | Status: DISCONTINUED | OUTPATIENT
Start: 2019-06-11 | End: 2019-06-11

## 2019-06-11 RX ADMIN — Medication 1 TABLET(S): at 07:28

## 2019-06-11 RX ADMIN — LINEZOLID 600 MILLIGRAM(S): 600 INJECTION, SOLUTION INTRAVENOUS at 18:14

## 2019-06-11 RX ADMIN — Medication 600 MILLIGRAM(S): at 21:13

## 2019-06-11 RX ADMIN — Medication 1000 MILLIGRAM(S): at 14:30

## 2019-06-11 RX ADMIN — Medication 1 MILLIGRAM(S): at 11:04

## 2019-06-11 RX ADMIN — METHADONE HYDROCHLORIDE 60 MILLIGRAM(S): 40 TABLET ORAL at 06:29

## 2019-06-11 RX ADMIN — Medication 500 MILLIGRAM(S): at 06:21

## 2019-06-11 RX ADMIN — Medication 1000 MILLIGRAM(S): at 22:15

## 2019-06-11 RX ADMIN — Medication 1000 MILLIGRAM(S): at 21:42

## 2019-06-11 RX ADMIN — POLYETHYLENE GLYCOL 3350 17 GRAM(S): 17 POWDER, FOR SOLUTION ORAL at 13:42

## 2019-06-11 RX ADMIN — Medication 1 TABLET(S): at 21:42

## 2019-06-11 RX ADMIN — Medication 1 TABLET(S): at 13:40

## 2019-06-11 RX ADMIN — CELECOXIB 100 MILLIGRAM(S): 200 CAPSULE ORAL at 11:50

## 2019-06-11 RX ADMIN — Medication 600 MILLIGRAM(S): at 11:50

## 2019-06-11 RX ADMIN — LORATADINE 10 MILLIGRAM(S): 10 TABLET ORAL at 11:04

## 2019-06-11 RX ADMIN — Medication 600 MILLIGRAM(S): at 20:48

## 2019-06-11 RX ADMIN — Medication 1 TABLET(S): at 13:41

## 2019-06-11 RX ADMIN — Medication 600 MILLIGRAM(S): at 11:04

## 2019-06-11 RX ADMIN — METHADONE HYDROCHLORIDE 60 MILLIGRAM(S): 40 TABLET ORAL at 18:14

## 2019-06-11 RX ADMIN — LIDOCAINE 2 PATCH: 4 CREAM TOPICAL at 11:05

## 2019-06-11 RX ADMIN — Medication 1000 MILLIGRAM(S): at 07:27

## 2019-06-11 RX ADMIN — GABAPENTIN 800 MILLIGRAM(S): 400 CAPSULE ORAL at 06:21

## 2019-06-11 RX ADMIN — Medication 500 MILLIGRAM(S): at 18:14

## 2019-06-11 RX ADMIN — GABAPENTIN 800 MILLIGRAM(S): 400 CAPSULE ORAL at 21:42

## 2019-06-11 RX ADMIN — Medication 1 PATCH: at 07:30

## 2019-06-11 RX ADMIN — Medication 1000 MILLIGRAM(S): at 06:21

## 2019-06-11 RX ADMIN — Medication 1000 MILLIGRAM(S): at 13:41

## 2019-06-11 RX ADMIN — Medication 1 PATCH: at 11:04

## 2019-06-11 RX ADMIN — SENNA PLUS 2 TABLET(S): 8.6 TABLET ORAL at 21:42

## 2019-06-11 RX ADMIN — GABAPENTIN 800 MILLIGRAM(S): 400 CAPSULE ORAL at 13:41

## 2019-06-11 RX ADMIN — NYSTATIN CREAM 1 APPLICATION(S): 100000 CREAM TOPICAL at 06:22

## 2019-06-11 RX ADMIN — LINEZOLID 600 MILLIGRAM(S): 600 INJECTION, SOLUTION INTRAVENOUS at 06:22

## 2019-06-11 RX ADMIN — Medication 1 TABLET(S): at 18:14

## 2019-06-11 RX ADMIN — Medication 1 TABLET(S): at 06:21

## 2019-06-11 RX ADMIN — CHLORHEXIDINE GLUCONATE 1 APPLICATION(S): 213 SOLUTION TOPICAL at 06:29

## 2019-06-11 RX ADMIN — CELECOXIB 100 MILLIGRAM(S): 200 CAPSULE ORAL at 11:04

## 2019-06-11 RX ADMIN — NYSTATIN CREAM 1 APPLICATION(S): 100000 CREAM TOPICAL at 21:43

## 2019-06-11 RX ADMIN — NALOXEGOL OXALATE 25 MILLIGRAM(S): 12.5 TABLET, FILM COATED ORAL at 07:28

## 2019-06-11 NOTE — PROGRESS NOTE ADULT - PROBLEM SELECTOR PROBLEM 5
EFRAIN (acute kidney injury)
Hepatitis C antibody test positive
Drug abuse
Hepatitis C antibody test positive
Heroin use disorder, severe, in controlled environment
Endocarditis of tricuspid valve
Endocarditis of tricuspid valve
Bacteremia due to methicillin resistant Staphylococcus aureus
Hepatitis C antibody test positive
Anemia, chronic disease
Heroin use disorder, severe, in controlled environment
Anemia, chronic disease
Anemia, chronic disease
Anemia, unspecified type
Bacteremia due to methicillin resistant Staphylococcus aureus
Discitis of multiple sites of spine
Drug abuse
EFRAIN (acute kidney injury)
Endocarditis of tricuspid valve
Hepatitis C antibody test positive
Heroin use disorder, severe, in controlled environment
Pneumonia due to Staphylococcus aureus
Anemia, unspecified type
Endocarditis of tricuspid valve
Heroin use disorder, severe, in controlled environment

## 2019-06-11 NOTE — PROGRESS NOTE ADULT - SUBJECTIVE AND OBJECTIVE BOX
Patient is a 43y old  Female who presents with a chief complaint of AMS (10 Alfred 2019 20:09)      INTERVAL HPI/OVERNIGHT EVENTS:  c/o of tiredness, difficulty sleeping last night due to back pain and left knee pain    MEDICATIONS  (STANDING):  acetaminophen   Tablet .. 1000 milliGRAM(s) Oral every 8 hours  ascorbic acid 500 milliGRAM(s) Oral two times a day  calcium carbonate 1250 mG  + Vitamin D (OsCal 500 + D) 1 Tablet(s) Oral three times a day  celecoxib 100 milliGRAM(s) Oral daily  chlorhexidine 4% Liquid 1 Application(s) Topical <User Schedule>  folic acid 1 milliGRAM(s) Oral daily  gabapentin 800 milliGRAM(s) Oral three times a day  ibuprofen  Tablet. 600 milliGRAM(s) Oral once  lactobacillus acidophilus 1 Tablet(s) Oral two times a day with meals  lidocaine   Patch 2 Patch Transdermal every 24 hours  linezolid    Tablet 600 milliGRAM(s) Oral every 12 hours  loratadine 10 milliGRAM(s) Oral daily  methadone    Tablet 60 milliGRAM(s) Oral once  multivitamin 1 Tablet(s) Oral daily  naloxegol 25 milliGRAM(s) Oral before breakfast  nicotine -  14 mG/24Hr(s) Patch 1 patch Transdermal daily  nystatin Powder 1 Application(s) Topical three times a day  polyethylene glycol 3350 17 Gram(s) Oral daily  rifampin 300 milliGRAM(s) Oral two times a day  senna 2 Tablet(s) Oral at bedtime    MEDICATIONS  (PRN):  ALBUTerol    90 MICROgram(s) HFA Inhaler 2 Puff(s) Inhalation every 6 hours PRN Shortness of Breath and/or Wheezing  bisacodyl Suppository 10 milliGRAM(s) Rectal daily PRN Constipation      Allergies    penicillin (Short breath; Hives)    Intolerances        REVIEW OF SYSTEMS:  CONSTITUTIONAL: No fever, weight loss, + fatigue  EYES: No eye pain, visual disturbances, or discharge  ENMT:  No difficulty hearing, tinnitus, vertigo; No sinus or throat pain  RESPIRATORY: No cough, wheezing, chills or hemoptysis; No shortness of breath  CARDIOVASCULAR: No chest pain, palpitations, dizziness, or leg swelling  GASTROINTESTINAL: No abdominal or epigastric pain. No nausea, vomiting, or hematemesis; No diarrhea or constipation. No melena or hematochezia.  GENITOURINARY: No dysuria, frequency, hematuria, or incontinence  NEUROLOGICAL: No headaches, memory loss, loss of strength, numbness, or tremors  SKIN: No itching, burning, rashes, or lesions   MUSCULOSKELETAL: back and left knee pain  PSYCHIATRIC: No depression, anxiety, mood swings, + difficulty sleeping  HEME/LYMPH: No easy bruising, or bleeding gums  ALLERGY AND IMMUNOLOGIC: No hives or eczema    Vital Signs Last 24 Hrs  T(C): 36.6 (11 Jun 2019 05:21), Max: 36.7 (10 Alfred 2019 20:12)  T(F): 97.8 (11 Jun 2019 05:21), Max: 98 (10 Alfred 2019 20:12)  HR: 53 (11 Jun 2019 05:21) (53 - 75)  BP: 103/66 (11 Jun 2019 05:21) (101/57 - 115/65)  BP(mean): --  RR: 17 (11 Jun 2019 05:21) (17 - 19)  SpO2: 98% (11 Jun 2019 05:21) (94% - 98%)    PHYSICAL EXAM:  GENERAL: NAD,   HEAD:  Atraumatic, Normocephalic  EYES: EOMI, PERRLA, conjunctiva and sclera clear  ENMT: No tonsillar erythema, exudates, or enlargement; Moist mucous membranes,   NECK: Supple, No JVD, Normal thyroid  NERVOUS SYSTEM:  Alert & Oriented X3, Good concentration; Motor Strength 5/5 B/L upper and lower extremities; DTRs 2+ intact and symmetric  CHEST/LUNG: Clear to percussion bilaterally; No rales, rhonchi, wheezing, or rubs  HEART: Regular rate and rhythm; No murmurs, rubs, or gallops  ABDOMEN: Soft, Nontender, Nondistended; Bowel sounds present  EXTREMITIES:  2+ Peripheral Pulses, No clubbing, cyanosis, or edema, left knee - no evidence of effusion  LYMPH: No lymphadenopathy noted  SKIN: No rashes or lesions    LABS:              CAPILLARY BLOOD GLUCOSE          RADIOLOGY & ADDITIONAL TESTS:    Imaging Personally Reviewed:  [ ] YES  [ ] NO    Consultant(s) Notes Reviewed:  [ x] YES  [ ] NO    Care Discussed with Consultants/Other Providers [x ] YES  [ ] NO

## 2019-06-11 NOTE — PROGRESS NOTE ADULT - PROBLEM SELECTOR PLAN 8
improved, diflucan course completed. outpt Gyn follow up recommended.
cont w/ Diflucan, f/u symptoms.
improved, diflucan course completed. outpt Gyn follow up recommended.
vitamin D supplement

## 2019-06-11 NOTE — PROGRESS NOTE ADULT - SUBJECTIVE AND OBJECTIVE BOX
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 She was also diagnosis w/ HCV. Pt was initially intubated due to respiratory failure and put on pressors in ICU. She as extubated on 2/25 and transferred from ICU the following day. Pt had a C4-5 ACDF with irrigation and debridement on 3/20 due to spinal abscess and osteo and was kept intubated post procedure and transferred again to ICU, where she was extubated on 3/21. Pt was subsequently found to have  T11/12 OM discitis with an epidural phlegmon from T9-L1. She was taken to the OR 5/8 for posterior spinal fusion & T8-L2 Laminectomy. She was again transferred to ICU for post op care and was extubated without difficulty on 5/9.   Discharge planning in process    Allergies    penicillin (Short breath; Hives)    Intolerances    COMPLEX CARE (Unknown)      MEDICATIONS  (STANDING):  acetaminophen   Tablet .. 1000 milliGRAM(s) Oral every 8 hours  ascorbic acid 500 milliGRAM(s) Oral two times a day  calcium carbonate 1250 mG  + Vitamin D (OsCal 500 + D) 1 Tablet(s) Oral three times a day  celecoxib 100 milliGRAM(s) Oral daily  chlorhexidine 4% Liquid 1 Application(s) Topical <User Schedule>  folic acid 1 milliGRAM(s) Oral daily  gabapentin 800 milliGRAM(s) Oral three times a day  lactobacillus acidophilus 1 Tablet(s) Oral two times a day with meals  lidocaine   Patch 2 Patch Transdermal every 24 hours  linezolid    Tablet 600 milliGRAM(s) Oral every 12 hours  loratadine 10 milliGRAM(s) Oral daily  multivitamin 1 Tablet(s) Oral daily  naloxegol 25 milliGRAM(s) Oral before breakfast  nicotine -  14 mG/24Hr(s) Patch 1 patch Transdermal daily  nystatin Powder 1 Application(s) Topical three times a day  polyethylene glycol 3350 17 Gram(s) Oral daily  rifampin 300 milliGRAM(s) Oral two times a day  senna 2 Tablet(s) Oral at bedtime    MEDICATIONS  (PRN):  ALBUTerol    90 MICROgram(s) HFA Inhaler 2 Puff(s) Inhalation every 6 hours PRN Shortness of Breath and/or Wheezing  bisacodyl Suppository 10 milliGRAM(s) Rectal daily PRN Constipation      REVIEW OF SYSTEMS:    CONSTITUTIONAL: No fever, chills, weight loss, or fatigue  HEENT: No sore throat, runny nose, ear ache  RESPIRATORY: No cough, wheezing, No shortness of breath  CARDIOVASCULAR: No chest pain, palpitations, dizziness  GASTROINTESTINAL: No abdominal pain. No nausea, vomiting, diarrhea  GENITOURINARY: No dysuria, increase frequency, hematuria, or incontinence  NEUROLOGICAL: No headaches, memory loss, loss of strength, numbness, or tremors, no weakness  EXTREMITY: No pedal edema BLE  aches and pains ; left knee  SKIN: No itching, burning, rashes, or lesions     VITAL SIGNS:  T(C): 36.4 (06-11-19 @ 17:13), Max: 36.7 (06-10-19 @ 20:12)  T(F): 97.5 (06-11-19 @ 17:13), Max: 98.1 (06-11-19 @ 10:57)  HR: 67 (06-11-19 @ 17:13) (53 - 75)  BP: 123/54 (06-11-19 @ 17:13) (103/66 - 123/54)  RR: 16 (06-11-19 @ 17:13) (16 - 19)  SpO2: 100% (06-11-19 @ 17:13) (94% - 100%)  Wt(kg): --    PHYSICAL EXAM:    GENERAL: not in any distress  HEENT: Neck is supple, normocephalic, atraumatic   CHEST/LUNG: Clear to auscultation bilaterally; No rales, rhonchi, wheezing  HEART: Regular rate and rhythm; No murmurs, rubs, or gallops  ABDOMEN: Soft, Nontender, Nondistended; Bowel sounds present, no rebound   EXTREMITIES:  2+ Peripheral Pulses, No clubbing, cyanosis, or edema  SKIN: No rashes or lesions  BACK: no pressor sore   NERVOUS SYSTEM:  Alert & Oriented X3, Good concentration  PSYCH: normal affect     LABS:                                                   Radiology:

## 2019-06-11 NOTE — PROGRESS NOTE ADULT - PROBLEM SELECTOR PROBLEM 4
Hepatitis C antibody test positive
Septic embolism
Endocarditis of tricuspid valve
Bacteremia due to methicillin resistant Staphylococcus aureus
Bacteremia due to methicillin resistant Staphylococcus aureus
Osteomyelitis
Septic embolism
Hepatitis C antibody test positive
Endocarditis of tricuspid valve
Acute respiratory failure, unspecified whether with hypoxia or hypercapnia
Bacteremia due to methicillin resistant Staphylococcus aureus
Discitis of multiple sites of spine
Discitis of multiple sites of spine
Drug abuse
Endocarditis of tricuspid valve
Hepatitis C antibody test positive
Septic embolism
Osteomyelitis
Bacteremia due to methicillin resistant Staphylococcus aureus
Endocarditis of tricuspid valve
Acute respiratory failure, unspecified whether with hypoxia or hypercapnia
Septic embolism

## 2019-06-11 NOTE — PROGRESS NOTE ADULT - PROBLEM SELECTOR PROBLEM 3
Fever, unspecified fever cause
Pain
Vertebral abscess
Vertebral abscess
Fever, unspecified fever cause
Heroin use disorder, severe, in controlled environment
Endocarditis of tricuspid valve
Heroin use disorder, severe, in controlled environment
Heroin use disorder, severe, in controlled environment
Pain
Bacteremia due to methicillin resistant Staphylococcus aureus
Endocarditis of tricuspid valve
Fever, unspecified fever cause
Osteomyelitis
Pain
Pneumonia due to Staphylococcus aureus
Vertebral abscess
Heroin use disorder, severe, in controlled environment
Pain
Vertebral abscess
Pneumonia due to Staphylococcus aureus
Fever, unspecified fever cause
Endocarditis of tricuspid valve

## 2019-06-11 NOTE — PROGRESS NOTE ADULT - REASON FOR ADMISSION
AMS
AMS, spinal cord compression, weakness in upper and lower extremities, sensory changes in upper and lower extremity
AMS
MRSA bactermia, endocarditis
AMS

## 2019-06-11 NOTE — PROGRESS NOTE ADULT - PROBLEM SELECTOR PLAN 1
discitis of multiple sites of spine  mris ok as per orthospine,   esr and crp trending down  appreciate ID input, patient changed to po meds, zyvox (which needs preauthorization), plan to take for 2 weeks then change to doxycycline bid for one year, then lifelong doxycycline daily for suppression

## 2019-06-11 NOTE — PROGRESS NOTE ADULT - PROBLEM SELECTOR PROBLEM 2
Bacteremia due to methicillin resistant Staphylococcus aureus
Septic arthritis of sternoclavicular joint, left
Septic arthritis of sternoclavicular joint, left
Endocarditis of tricuspid valve
Alcohol use disorder, moderate, in controlled environment
Discitis of multiple sites of spine
Alcohol use disorder, moderate, in controlled environment
Alcohol use disorder, moderate, in controlled environment
Bacteremia due to methicillin resistant Staphylococcus aureus
Bacteremia due to methicillin resistant Staphylococcus aureus
Discitis of multiple sites of spine
Bacteremia due to methicillin resistant Staphylococcus aureus
Discitis of multiple sites of spine
Endocarditis of tricuspid valve
Hepatitis C antibody test positive
Heroin use disorder, severe, in controlled environment
Moderate protein-calorie malnutrition
Osteomyelitis
Osteomyelitis
Septic arthritis of sternoclavicular joint, left
Bacteremia due to methicillin resistant Staphylococcus aureus
Bacteremia due to methicillin resistant Staphylococcus aureus
Alcohol use disorder, moderate, in controlled environment
Bacteremia due to methicillin resistant Staphylococcus aureus
Discitis of multiple sites of spine
Septic arthritis of sternoclavicular joint, left
Bacteremia due to methicillin resistant Staphylococcus aureus
Endocarditis of tricuspid valve
Endocarditis of tricuspid valve
Discitis of multiple sites of spine
Discitis of multiple sites of spine

## 2019-06-11 NOTE — PROGRESS NOTE ADULT - ATTENDING COMMENTS
cont dapto/zyvox  follow up on repeat MRIs done today   leukocytosis worsening though fever better today
cont vanco for 5 more weeks  increase the dose of vanco as level 13.5  bmp normal   repeat blood c/s negative  no indication of surgery for TV IE at this point
cont vanco for 5 more weeks, pt afebrile today  cont vanco and azactam   bmp normal   repeat blood c/s negative  no indication of surgery for TV IE at this point
mom again at bedside  will have mattress changed as it is flattened
tapered  off methadone so pt can be accepted in a facility as methadone has been the barrier to dc.
tapered  off methadone so pt can be accepted in a facility as methadone has been the barrier to dc. Pt is also not wanting to be on methadone
tapering off methadone so pt can be accepted in a facility as methadone has been the barrier to dc. Pt is also not wanting to be on methadone
On visit today, patient reports additional pain in left groin and left knee.  Reports worsening.  She can perform active ROM without significant distress in both hip and knee but given her risk factors, MRI of the left and left knee to rule out effusion.  No cellulitis or erythema noted.  Mom is present for the visit.
cont vanco for 5 more weeks  will monitor level carefully as fluctuating ,currently at 1250 Q 12 hrs  bmp normal   repeat blood c/s negative  no indication of surgery fot TV IE at this point
Care and management discussed at length with Dr. Clark, case was discussed with Dr. Gutierrez
mom again at bedside  will have mattress changed as it is flattened
Has had a very hard time getting accepted in to rehab d.t the methadone and IV drug use  will likely need another month or so of abx
Dr Krause covering service for tomorrow
Dr Krause covering service for tomorrow
Plan for discharge early am tomorrow to [facility] methadone clinic

## 2019-06-11 NOTE — PROGRESS NOTE ADULT - PROBLEM SELECTOR PLAN 6
AOCD   monitor cbc
will need inpatient follow-up with GI (referral to internist first)  alk phos trending down
- pt is on methadone 60 mg q12
AOCD   monitor cbc
SIADH consider water restriction   or NaCl tabs
sec to all above
will need inpatient follow-up with GI (referral to internist first)  alk phos trending down

## 2019-06-11 NOTE — PROGRESS NOTE ADULT - PROBLEM SELECTOR PLAN 5
referral to methadone clinic vs inpatient drug rehab  spoke with Dr. Jose medical director, at [facility]. He will accept the patient but feels the safest plan is for her to be here until Sunday and have Monday appointment  sw still exploring inpatient substance abuse options as well - pt. decline intake interviews for inpatient  pt. did not leave as planned this am - social work involved and working on discharge plan

## 2019-06-11 NOTE — PROGRESS NOTE ADULT - PROVIDER SPECIALTY LIST ADULT
Cardiology
Critical Care
Gastroenterology
Gastroenterology
Hospitalist
Infectious Disease
Neonatology
Nephrology
Orthopedics
Cardiology
Hospitalist
Orthopedics
Critical Care
Hospitalist
Infectious Disease
Nephrology
Orthopedics
Infectious Disease
Hospitalist
Hospitalist
Infectious Disease
Intervent Radiology
Infectious Disease
Hospitalist

## 2019-06-11 NOTE — PROGRESS NOTE ADULT - PROBLEM SELECTOR PLAN 2
- status post C4-5 ACDF with irrigation and debridement on 3/20  - status post posterior spinal fusion & T8-L2 Laminectomy on 5/8   - must use [place] j collar and TLSO brace at all times with ambulation   sutures removed  plain x-rays ok as per orthospine are ok, pt. will fu with Dr. Leslie as outpatient

## 2019-06-11 NOTE — PROGRESS NOTE ADULT - PROBLEM SELECTOR PROBLEM 6
Discitis of multiple sites of spine
Anemia, unspecified type
Hepatitis C antibody test positive
Discitis of multiple sites of spine
IVDU (intravenous drug user)
Anemia, unspecified type
Fever, unspecified fever cause
Hepatitis C antibody test positive
Hyponatremia
IVDU (intravenous drug user)
IVDU (intravenous drug user)
Hepatitis C antibody test positive

## 2019-06-11 NOTE — PROGRESS NOTE ADULT - ASSESSMENT
last cbc, cr and CPK within normal limit/o IVDA seen with T11/12 OM/ discitis with an epidural phlegmon from T9-L1   s/p T8-L2 Laminectomy and PSF POD1  OR c/s coag iveth caro   repeat MRI spines done : results reviewed  ESR and CRP trending down nicely   last cbc, cr and CPK wnl  last cbc, cr and CPK within normal limit then doxy x 1 year  dr nelson to assume care am

## 2019-06-11 NOTE — PROGRESS NOTE ADULT - PROBLEM SELECTOR PROBLEM 1
Bacteremia due to methicillin resistant Staphylococcus aureus
Sepsis due to methicillin resistant Staphylococcus aureus
Sepsis due to methicillin resistant Staphylococcus aureus (MRSA)
Vertebral abscess
Vertebral abscess
Acute respiratory failure, unspecified whether with hypoxia or hypercapnia
Bacteremia due to methicillin resistant Staphylococcus aureus
Discitis of multiple sites of spine
Endocarditis of tricuspid valve
Hepatitis C antibody test positive
Sepsis due to methicillin resistant Staphylococcus aureus
Sepsis due to methicillin resistant Staphylococcus aureus (MRSA)
Vertebral abscess
Discitis of multiple sites of spine
Sepsis due to methicillin resistant Staphylococcus aureus (MRSA)
Vertebral abscess
Vertebral abscess
Sepsis due to methicillin resistant Staphylococcus aureus
Bacteremia due to methicillin resistant Staphylococcus aureus
Vertebral abscess
Bacteremia due to methicillin resistant Staphylococcus aureus
Sepsis due to methicillin resistant Staphylococcus aureus
Discitis of multiple sites of spine
Discitis of multiple sites of spine
Sepsis due to methicillin resistant Staphylococcus aureus (MRSA)

## 2019-06-11 NOTE — PROGRESS NOTE ADULT - ASSESSMENT
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 She was also diagnosis w/ HCV. Pt was initially intubated due to respiratory failure and put on pressors in ICU. She as extubated on 2/25 and transferred from ICU the following day. Pt had a C4-5 ACDF with irrigation and debridement on 3/20 due to spinal abscess and osteo and was kept intubated post procedure and transferred again to ICU, where she was extubated on 3/21. Pt was subsequently found to have  T11/12 OM discitis with an epidural phlegmon from T9-L1. She was taken to the OR 5/8 for posterior spinal fusion & T8-L2 Laminectomy. She was again transferred to ICU for post op care and was extubated without difficulty on 5/9     Hepatitis c ab positive - will need to fu as hepatology as outpatient, alk phos trending down

## 2019-06-12 ENCOUNTER — OUTPATIENT (OUTPATIENT)
Dept: OUTPATIENT SERVICES | Facility: HOSPITAL | Age: 44
LOS: 1 days | Discharge: HOME | End: 2019-06-12

## 2019-06-12 VITALS
HEART RATE: 80 BPM | RESPIRATION RATE: 17 BRPM | SYSTOLIC BLOOD PRESSURE: 137 MMHG | DIASTOLIC BLOOD PRESSURE: 65 MMHG | TEMPERATURE: 97 F | OXYGEN SATURATION: 100 %

## 2019-06-12 LAB
AMPHET UR-MCNC: NEGATIVE — SIGNIFICANT CHANGE UP
APPEARANCE UR: SIGNIFICANT CHANGE UP
BACTERIA # UR AUTO: HIGH
BARBITURATES UR SCN-MCNC: NEGATIVE — SIGNIFICANT CHANGE UP
BENZODIAZ UR-MCNC: NEGATIVE — SIGNIFICANT CHANGE UP
BILIRUB UR-MCNC: NEGATIVE — SIGNIFICANT CHANGE UP
BLOOD UR QL VISUAL: NEGATIVE — SIGNIFICANT CHANGE UP
CANNABINOIDS UR-MCNC: NEGATIVE — SIGNIFICANT CHANGE UP
COCAINE METAB.OTHER UR-MCNC: NEGATIVE — SIGNIFICANT CHANGE UP
COLOR SPEC: SIGNIFICANT CHANGE UP
GLUCOSE UR-MCNC: NEGATIVE — SIGNIFICANT CHANGE UP
HCG UR-SCNC: NEGATIVE — SIGNIFICANT CHANGE UP
HYALINE CASTS # UR AUTO: NEGATIVE — SIGNIFICANT CHANGE UP
KETONES UR-MCNC: NEGATIVE — SIGNIFICANT CHANGE UP
LEUKOCYTE ESTERASE UR-ACNC: SIGNIFICANT CHANGE UP
METHADONE UR-MCNC: POSITIVE — SIGNIFICANT CHANGE UP
NITRITE UR-MCNC: POSITIVE — HIGH
OPIATES UR-MCNC: POSITIVE — SIGNIFICANT CHANGE UP
OXYCODONE UR-MCNC: NEGATIVE — SIGNIFICANT CHANGE UP
PCP UR-MCNC: NEGATIVE — SIGNIFICANT CHANGE UP
PH UR: 5.5 — SIGNIFICANT CHANGE UP (ref 5–8)
PROT UR-MCNC: NEGATIVE — SIGNIFICANT CHANGE UP
RBC CASTS # UR COMP ASSIST: HIGH (ref 0–?)
SP GR SPEC: 1.02 — SIGNIFICANT CHANGE UP (ref 1–1.04)
SP GR UR: 1.02 — SIGNIFICANT CHANGE UP (ref 1–1.03)
SQUAMOUS # UR AUTO: SIGNIFICANT CHANGE UP
UROBILINOGEN FLD QL: NORMAL — SIGNIFICANT CHANGE UP
WBC UR QL: HIGH (ref 0–?)

## 2019-06-12 PROCEDURE — 99239 HOSP IP/OBS DSCHRG MGMT >30: CPT

## 2019-06-12 RX ADMIN — LINEZOLID 600 MILLIGRAM(S): 600 INJECTION, SOLUTION INTRAVENOUS at 05:20

## 2019-06-12 RX ADMIN — LIDOCAINE 2 PATCH: 4 CREAM TOPICAL at 00:01

## 2019-06-12 RX ADMIN — GABAPENTIN 800 MILLIGRAM(S): 400 CAPSULE ORAL at 05:20

## 2019-06-12 RX ADMIN — Medication 1 TABLET(S): at 05:20

## 2019-06-12 RX ADMIN — NYSTATIN CREAM 1 APPLICATION(S): 100000 CREAM TOPICAL at 05:19

## 2019-06-12 RX ADMIN — Medication 1000 MILLIGRAM(S): at 05:20

## 2019-06-12 RX ADMIN — Medication 500 MILLIGRAM(S): at 05:20

## 2019-06-13 ENCOUNTER — APPOINTMENT (OUTPATIENT)
Dept: INTERNAL MEDICINE | Facility: CLINIC | Age: 44
End: 2019-06-13

## 2019-06-13 DIAGNOSIS — F11.20 OPIOID DEPENDENCE, UNCOMPLICATED: ICD-10-CM

## 2019-06-16 DIAGNOSIS — G93.41 METABOLIC ENCEPHALOPATHY: ICD-10-CM

## 2019-06-16 DIAGNOSIS — F17.210 NICOTINE DEPENDENCE, CIGARETTES, UNCOMPLICATED: ICD-10-CM
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
## 2019-06-16 DIAGNOSIS — G95.89 OTHER SPECIFIED DISEASES OF SPINAL CORD: ICD-10-CM

## 2019-06-16 DIAGNOSIS — F11.10 OPIOID ABUSE, UNCOMPLICATED: ICD-10-CM

## 2019-06-16 DIAGNOSIS — M40.205 UNSPECIFIED KYPHOSIS, THORACOLUMBAR REGION: ICD-10-CM

## 2019-06-16 DIAGNOSIS — R41.82 ALTERED MENTAL STATUS, UNSPECIFIED: ICD-10-CM

## 2019-06-16 DIAGNOSIS — E87.1 HYPO-OSMOLALITY AND HYPONATREMIA: ICD-10-CM

## 2019-06-16 DIAGNOSIS — M46.44 DISCITIS, UNSPECIFIED, THORACIC REGION: ICD-10-CM

## 2019-06-16 DIAGNOSIS — M84.68XA PATHOLOGICAL FRACTURE IN OTHER DISEASE, OTHER SITE, INITIAL ENCOUNTER FOR FRACTURE: ICD-10-CM

## 2019-06-16 DIAGNOSIS — E44.0 MODERATE PROTEIN-CALORIE MALNUTRITION: ICD-10-CM

## 2019-06-16 DIAGNOSIS — M40.204 UNSPECIFIED KYPHOSIS, THORACIC REGION: ICD-10-CM

## 2019-06-16 DIAGNOSIS — M25.462 EFFUSION, LEFT KNEE: ICD-10-CM

## 2019-06-16 DIAGNOSIS — N39.0 URINARY TRACT INFECTION, SITE NOT SPECIFIED: ICD-10-CM

## 2019-06-16 DIAGNOSIS — M16.6 OTHER BILATERAL SECONDARY OSTEOARTHRITIS OF HIP: ICD-10-CM

## 2019-06-16 DIAGNOSIS — F14.10 COCAINE ABUSE, UNCOMPLICATED: ICD-10-CM

## 2019-06-16 DIAGNOSIS — B37.3 CANDIDIASIS OF VULVA AND VAGINA: ICD-10-CM

## 2019-06-16 DIAGNOSIS — N17.0 ACUTE KIDNEY FAILURE WITH TUBULAR NECROSIS: ICD-10-CM

## 2019-06-16 DIAGNOSIS — B19.20 UNSPECIFIED VIRAL HEPATITIS C WITHOUT HEPATIC COMA: ICD-10-CM

## 2019-06-16 DIAGNOSIS — M46.24 OSTEOMYELITIS OF VERTEBRA, THORACIC REGION: ICD-10-CM

## 2019-06-16 DIAGNOSIS — G06.1 INTRASPINAL ABSCESS AND GRANULOMA: ICD-10-CM

## 2019-06-16 DIAGNOSIS — Z88.0 ALLERGY STATUS TO PENICILLIN: ICD-10-CM

## 2019-06-16 DIAGNOSIS — M17.4 OTHER BILATERAL SECONDARY OSTEOARTHRITIS OF KNEE: ICD-10-CM

## 2019-06-16 DIAGNOSIS — J96.22 ACUTE AND CHRONIC RESPIRATORY FAILURE WITH HYPERCAPNIA: ICD-10-CM

## 2019-06-16 DIAGNOSIS — M25.461 EFFUSION, RIGHT KNEE: ICD-10-CM

## 2019-06-16 DIAGNOSIS — J90 PLEURAL EFFUSION, NOT ELSEWHERE CLASSIFIED: ICD-10-CM

## 2019-06-16 DIAGNOSIS — F10.10 ALCOHOL ABUSE, UNCOMPLICATED: ICD-10-CM

## 2019-06-16 DIAGNOSIS — A41.02 SEPSIS DUE TO METHICILLIN RESISTANT STAPHYLOCOCCUS AUREUS: ICD-10-CM

## 2019-06-16 DIAGNOSIS — R65.21 SEVERE SEPSIS WITH SEPTIC SHOCK: ICD-10-CM

## 2019-06-16 DIAGNOSIS — J15.212 PNEUMONIA DUE TO METHICILLIN RESISTANT STAPHYLOCOCCUS AUREUS: ICD-10-CM

## 2019-06-16 DIAGNOSIS — I26.90 SEPTIC PULMONARY EMBOLISM WITHOUT ACUTE COR PULMONALE: ICD-10-CM

## 2019-06-16 DIAGNOSIS — Z78.1 PHYSICAL RESTRAINT STATUS: ICD-10-CM

## 2019-06-16 DIAGNOSIS — J96.21 ACUTE AND CHRONIC RESPIRATORY FAILURE WITH HYPOXIA: ICD-10-CM

## 2019-06-16 DIAGNOSIS — M46.22 OSTEOMYELITIS OF VERTEBRA, CERVICAL REGION: ICD-10-CM

## 2019-06-16 DIAGNOSIS — D69.59 OTHER SECONDARY THROMBOCYTOPENIA: ICD-10-CM

## 2019-06-16 DIAGNOSIS — I07.9 RHEUMATIC TRICUSPID VALVE DISEASE, UNSPECIFIED: ICD-10-CM

## 2019-06-16 DIAGNOSIS — E87.6 HYPOKALEMIA: ICD-10-CM

## 2019-08-09 NOTE — PROGRESS NOTE ADULT - SUBJECTIVE AND OBJECTIVE BOX
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 She was also diagnosis w/ HCV. Pt was initially intubated due to respiratory failure and put on pressors in ICU. She as extubated on 2/25 and transferred from ICU the following day. Pt had a C4-5 ACDF with irrigation and debridement on 3/20 due to spinal abscess and osteo and was kept intubated post procedure and transferred again to ICU, where she was extubated on 3/21. Pt was subsequently found to have  T11/12 OM discitis with an epidural phlegmon from T9-L1. She was taken to the OR 5/8 for posterior spinal fusion & T8-L2 Laminectomy. She was again transferred to ICU for post op care and was extubated without difficulty on 5/9. She is lying in bed in NAD.    MEDICATIONS  (STANDING):  acetaminophen   Tablet .. 1000 milliGRAM(s) Oral every 8 hours  ascorbic acid 500 milliGRAM(s) Oral two times a day  calcium carbonate 1250 mG  + Vitamin D (OsCal 500 + D) 1 Tablet(s) Oral three times a day  celecoxib 100 milliGRAM(s) Oral daily  chlorhexidine 4% Liquid 1 Application(s) Topical <User Schedule>  DAPTOmycin IVPB 600 milliGRAM(s) IV Intermittent <User Schedule>  folic acid 1 milliGRAM(s) Oral daily  gabapentin 800 milliGRAM(s) Oral three times a day  lactobacillus acidophilus 1 Tablet(s) Oral two times a day with meals  lidocaine   Patch 1 Patch Transdermal every 24 hours  lidocaine   Patch 1 Patch Transdermal every 24 hours  methadone    Tablet 60 milliGRAM(s) Oral every 12 hours  multivitamin 1 Tablet(s) Oral daily  naloxegol 25 milliGRAM(s) Oral before breakfast  nicotine -  14 mG/24Hr(s) Patch 1 patch Transdermal daily  nystatin Powder 1 Application(s) Topical three times a day  polyethylene glycol 3350 17 Gram(s) Oral daily  rifampin 300 milliGRAM(s) Oral two times a day  senna 2 Tablet(s) Oral at bedtime    MEDICATIONS  (PRN):  bisacodyl Suppository 10 milliGRAM(s) Rectal daily PRN Constipation  diazepam    Tablet 5 milliGRAM(s) Oral every 8 hours PRN muscle spasm  oxyCODONE    IR 20 milliGRAM(s) Oral every 4 hours PRN Moderate Pain (4 - 6)      Allergies    penicillin (Short breath; Hives)    Intolerances        Vital Signs Last 24 Hrs  T(C): 36.1 (02 Jun 2019 11:11), Max: 36.6 (01 Jun 2019 23:15)  T(F): 97 (02 Jun 2019 11:11), Max: 97.9 (01 Jun 2019 23:15)  HR: 95 (02 Jun 2019 11:11) (73 - 95)  BP: 117/91 (02 Jun 2019 11:11) (97/64 - 117/91)  BP(mean): --  RR: 16 (02 Jun 2019 11:11) (16 - 19)  SpO2: 99% (02 Jun 2019 11:11) (98% - 100%)    PHYSICAL EXAM:  GENERAL: NAD   HEAD:  Atraumatic, Normocephalic  EYES: EOMI, PERRLA    NECK: collar in place  NERVOUS SYSTEM: Alert & Oriented X3   CHEST/LUNG: Clear to auscultation bilaterally; No rales, rhonchi, wheezing, or rubs  HEART: Regular rate and rhythm; No murmurs, rubs, or gallops  ABDOMEN: Soft, Nontender, Nondistended; Bowel sounds present  EXTREMITIES:  No clubbing, cyanosis, or edema _________________________________________________________________________________________  ========>>  M E D I C A L   A T T E N D I N G    F O L L O W  U P  N O T E  <<=========  -----------------------------------------------------------------------------------------------------    - Patient seen and examined by me earlier today.   - In summary,  WU FARIAS is a 52y year old man who originally presented with abd pain, vomiting, ETOH   - Patient today overall doing ok, comfortable, + some pain in throat but tolerating some liquid diet : awaiting regular diet     ==================>> REVIEW OF SYSTEM <<=================    GEN: no fever, no chills, pain as above   RESP: no SOB, no cough, no sputum  CVS: no chest pain, no palpitations, no edema  GI: + abdominal pain, sore throat , no nausea  : no dysuria, no frequency, no hematuria  Neuro: no headache, no dizziness  Derm : no itching, no rash    ==================>> PHYSICAL EXAM <<=================    GEN: A&O X 3 , NAD , comfortable in bed   HEENT: NCAT, PERRL, MMM, hearing intact  Neck: supple , no JVD  CVS: S1S2 , regular , No M/R/G appreciated  PULM: CTA B/L,  no W/R/R appreciated  ABD.: soft. non tender, non distended,  bowel sounds present  Extrem: intact pulses , no edema   PSYCH : normal mood,  not anxious       ==================>> MEDICATIONS <<====================    benzocaine 15 mG/menthol 3.6 mG (Sugar-Free) Lozenge 1 Lozenge Oral three times a day  folic acid 1 milliGRAM(s) Oral daily  insulin glargine Injectable (LANTUS) 24 Unit(s) SubCutaneous at bedtime  insulin lispro (HumaLOG) corrective regimen sliding scale   SubCutaneous Before meals and at bedtime  lisinopril 10 milliGRAM(s) Oral daily  multivitamin 1 Tablet(s) Oral daily  ondansetron Injectable 4 milliGRAM(s) IV Push every 8 hours  pantoprazole  Injectable 40 milliGRAM(s) IV Push every 12 hours  potassium chloride  10 mEq/100 mL IVPB 10 milliEquivalent(s) IV Intermittent every 1 hour  sucralfate suspension 1 Gram(s) Oral four times a day    MEDICATIONS  (PRN):  aluminum hydroxide/magnesium hydroxide/simethicone Suspension 30 milliLiter(s) Oral every 6 hours PRN Dyspepsia  LORazepam   Injectable 2 milliGRAM(s) IV Push every 4 hours PRN CIWA-Ar score 8 or greater    ==================>> VITAL SIGNS <<==================    Vital Signs Last 24 Hrs  T(C): 36.9 (08-09-19 @ 13:52)  T(F): 98.4 (08-09-19 @ 13:52), Max: 98.4 (08-08-19 @ 21:30)  HR: 86 (08-09-19 @ 13:52) (80 - 86)  BP: 160/80 (08-09-19 @ 13:52)  RR: 17 (08-09-19 @ 13:52) (17 - 18)  SpO2: 99% (08-09-19 @ 13:52) (98% - 100%)      POCT Blood Glucose.: 75 mg/dL (09 Aug 2019 11:49)  POCT Blood Glucose.: 97 mg/dL (09 Aug 2019 08:02)  POCT Blood Glucose.: 145 mg/dL (08 Aug 2019 21:41)  POCT Blood Glucose.: 149 mg/dL (08 Aug 2019 16:55)     ==================>> LAB AND IMAGING <<==================                        13.6   8.85  )-----------( 198      ( 09 Aug 2019 06:31 )             40.7        08-09    142  |  105  |  18  ----------------------------<  120<H>  3.1<L>   |  31  |  1.03    Ca    8.2<L>      09 Aug 2019 06:31  Phos  2.6     08-09  Mg     2.0     08-09    WBC count:   8.85 <<== ,  9.14 <<== ,  8.15 <<== ,  10.21 <<== ,  14.17 <<== ,  16.53 <<==   Hemoglobin:   13.6 <<==,  14.2 <<==,  14.5 <<==,  14.3 <<==,  13.3 <<==,  16.2 <<==  platelets:  198 <==, 216 <==, 202 <==, 200 <==, 180 <==, 201 <==    Creatinine:  1.03  <<==, 1.10  <<==, 1.11  <<==, 1.25  <<==, 1.29  <<==, 1.50  <<==  Sodium:   142  <==, 144  <==, 147  <==, 145  <==, 139  <==       AST:          14 <== , 18 <== , 28 <==      ALT:        19  <== , 20  <== , 28  <==      AP:        95  <=, 103  <=, 131  <=     Bili:        0.6  <=, 0.5  <=, 1.0  <=    ENDOSCOPY::  Impression: 1- Severe LA Grade D esophagitis 2- Mild gastritis   Plan: 1- Resume diet and medications 2- Avoid NSAIDs 3- PPI Daily 4- Carafate Daily 5- Follow up pathology     ___________________________________________________________________________________  ===============>>  A S S E S S M E N T   A N D   P L A N <<===============  ------------------------------------------------------------------------------------------    · Assessment		  52 years old male from home with PMHx of diabetes (on insulin), alcoholism (1-2 drinks daily for last 2 months) presented to ED with complain of blood in vomitus.       Problem/Plan - 1:  ·  Problem: Hematemesis likely due to esophagitis   GI appreciated     EGD as above with severe esophagitis   monitor for further episodes.   protonix and Carafate as above , Maalox PRN   antiemetics as needed   diet as tolerated  will need follow up with GI as OP     Problem/Plan - 2:  ·  Problem: Cholelithiases.        pt is found to have cholelithiases on CT abdomen.  nausea and vomiting and leukocytosis resolved  pt tolerating diet for now   GI f/u    Problem/Plan - 3:  ·  Problem: Hyperglycemia     pt presented with hyperglycemia / diabetic ketosis without acidosis     pt has history of DM for last 11 years and takes variable number of lantus units based on his blood sugar levels. on average 24 units.  pt is started on lantus 20 units and insulin HSS  f/u hba1c.   for now better : endo as needed     Problem/Plan - 4:  ·  Problem: Alcohol withdrawal  CIWA monitoring.   overall doing well: no signs of withdrawal   social work follow up     -GI/DVT Prophylaxis.  OOB ambulate  DC planing and OP GI f/u   --------------------------------------------  Case discussed with pt, HS  Education given on findings and plan of care  ___________________________  H. DONOVAN Ellsworth.  Pager: [phone]

## 2020-01-29 ENCOUNTER — INPATIENT (INPATIENT)
Facility: HOSPITAL | Age: 45
LOS: 14 days | Discharge: NURSING, REHAB OR LONG-TERM CARE | End: 2020-02-13
Attending: INTERNAL MEDICINE | Admitting: INTERNAL MEDICINE
Payer: MEDICAID

## 2020-01-29 VITALS
WEIGHT: 179.9 LBS | HEIGHT: 64 IN | OXYGEN SATURATION: 97 % | RESPIRATION RATE: 16 BRPM | DIASTOLIC BLOOD PRESSURE: 64 MMHG | HEART RATE: 92 BPM | SYSTOLIC BLOOD PRESSURE: 103 MMHG | TEMPERATURE: 99 F

## 2020-01-29 DIAGNOSIS — Z98.890 OTHER SPECIFIED POSTPROCEDURAL STATES: Chronic | ICD-10-CM

## 2020-01-29 LAB
ALBUMIN SERPL ELPH-MCNC: 2.8 G/DL — LOW (ref 3.3–5)
ALP SERPL-CCNC: 153 U/L — HIGH (ref 40–120)
ALT FLD-CCNC: 13 U/L — SIGNIFICANT CHANGE UP (ref 12–78)
ANION GAP SERPL CALC-SCNC: 7 MMOL/L — SIGNIFICANT CHANGE UP (ref 5–17)
AST SERPL-CCNC: 13 U/L — LOW (ref 15–37)
BASOPHILS # BLD AUTO: 0.03 K/UL — SIGNIFICANT CHANGE UP (ref 0–0.2)
BASOPHILS NFR BLD AUTO: 0.4 % — SIGNIFICANT CHANGE UP (ref 0–2)
BILIRUB SERPL-MCNC: 0.2 MG/DL — SIGNIFICANT CHANGE UP (ref 0.2–1.2)
BUN SERPL-MCNC: 18 MG/DL — SIGNIFICANT CHANGE UP (ref 7–23)
CALCIUM SERPL-MCNC: 9.2 MG/DL — SIGNIFICANT CHANGE UP (ref 8.5–10.1)
CHLORIDE SERPL-SCNC: 108 MMOL/L — SIGNIFICANT CHANGE UP (ref 96–108)
CO2 SERPL-SCNC: 24 MMOL/L — SIGNIFICANT CHANGE UP (ref 22–31)
CREAT SERPL-MCNC: 0.62 MG/DL — SIGNIFICANT CHANGE UP (ref 0.5–1.3)
EOSINOPHIL # BLD AUTO: 0.27 K/UL — SIGNIFICANT CHANGE UP (ref 0–0.5)
EOSINOPHIL NFR BLD AUTO: 3.8 % — SIGNIFICANT CHANGE UP (ref 0–6)
ERYTHROCYTE [SEDIMENTATION RATE] IN BLOOD: 79 MM/HR — HIGH (ref 0–15)
GLUCOSE SERPL-MCNC: 100 MG/DL — HIGH (ref 70–99)
HCG SERPL-ACNC: 1 MIU/ML — SIGNIFICANT CHANGE UP
HCT VFR BLD CALC: 31.2 % — LOW (ref 34.5–45)
HGB BLD-MCNC: 9.8 G/DL — LOW (ref 11.5–15.5)
IMM GRANULOCYTES NFR BLD AUTO: 0.3 % — SIGNIFICANT CHANGE UP (ref 0–1.5)
LACTATE SERPL-SCNC: 1 MMOL/L — SIGNIFICANT CHANGE UP (ref 0.7–2)
LYMPHOCYTES # BLD AUTO: 2.3 K/UL — SIGNIFICANT CHANGE UP (ref 1–3.3)
LYMPHOCYTES # BLD AUTO: 32.1 % — SIGNIFICANT CHANGE UP (ref 13–44)
MCHC RBC-ENTMCNC: 25.9 PG — LOW (ref 27–34)
MCHC RBC-ENTMCNC: 31.4 GM/DL — LOW (ref 32–36)
MCV RBC AUTO: 82.3 FL — SIGNIFICANT CHANGE UP (ref 80–100)
MONOCYTES # BLD AUTO: 0.51 K/UL — SIGNIFICANT CHANGE UP (ref 0–0.9)
MONOCYTES NFR BLD AUTO: 7.1 % — SIGNIFICANT CHANGE UP (ref 2–14)
NEUTROPHILS # BLD AUTO: 4.04 K/UL — SIGNIFICANT CHANGE UP (ref 1.8–7.4)
NEUTROPHILS NFR BLD AUTO: 56.3 % — SIGNIFICANT CHANGE UP (ref 43–77)
NRBC # BLD: 0 /100 WBCS — SIGNIFICANT CHANGE UP (ref 0–0)
PLATELET # BLD AUTO: 239 K/UL — SIGNIFICANT CHANGE UP (ref 150–400)
POTASSIUM SERPL-MCNC: 4.2 MMOL/L — SIGNIFICANT CHANGE UP (ref 3.5–5.3)
POTASSIUM SERPL-SCNC: 4.2 MMOL/L — SIGNIFICANT CHANGE UP (ref 3.5–5.3)
PROT SERPL-MCNC: 7.6 GM/DL — SIGNIFICANT CHANGE UP (ref 6–8.3)
RBC # BLD: 3.79 M/UL — LOW (ref 3.8–5.2)
RBC # FLD: 18.5 % — HIGH (ref 10.3–14.5)
SODIUM SERPL-SCNC: 139 MMOL/L — SIGNIFICANT CHANGE UP (ref 135–145)
WBC # BLD: 7.17 K/UL — SIGNIFICANT CHANGE UP (ref 3.8–10.5)
WBC # FLD AUTO: 7.17 K/UL — SIGNIFICANT CHANGE UP (ref 3.8–10.5)

## 2020-01-29 PROCEDURE — 72146 MRI CHEST SPINE W/O DYE: CPT | Mod: 26

## 2020-01-29 PROCEDURE — 99285 EMERGENCY DEPT VISIT HI MDM: CPT

## 2020-01-29 PROCEDURE — 72148 MRI LUMBAR SPINE W/O DYE: CPT | Mod: 26

## 2020-01-29 PROCEDURE — 36573 INSJ PICC RS&I 5 YR+: CPT

## 2020-01-29 PROCEDURE — 99223 1ST HOSP IP/OBS HIGH 75: CPT

## 2020-01-29 RX ORDER — HYDROMORPHONE HYDROCHLORIDE 2 MG/ML
1 INJECTION INTRAMUSCULAR; INTRAVENOUS; SUBCUTANEOUS ONCE
Refills: 0 | Status: DISCONTINUED | OUTPATIENT
Start: 2020-01-29 | End: 2020-01-29

## 2020-01-29 NOTE — ED ADULT NURSE NOTE - ED STAT RN HANDOFF DETAILS
Received pt from mid shift RN Ambar. Pt found laying on stretcher in no acute distress. will continue to monitor awaiting MRI

## 2020-01-29 NOTE — ED PROVIDER NOTE - CLINICAL SUMMARY MEDICAL DECISION MAKING FREE TEXT BOX
Orthopedics recommend admission for pain control and likely surgical revision; admit to medicine for medical optimization

## 2020-01-29 NOTE — ED ADULT NURSE NOTE - ED STAT RN HANDOFF DETAILS 2
Pt endorsed to ED Hold MELITON Meyer. Flu swab sent. Dr. Chung paged x 2 for pain med and second orders.

## 2020-01-29 NOTE — ED ADULT TRIAGE NOTE - CHIEF COMPLAINT QUOTE
BIBA- Back pain since 6am unable to get better with pain management. HX back surgery April 2019.   Transfer paperwork states bump in spinal area, sent for CT/MRI of the spine R/O osteomyelitis, obstruction or abscess

## 2020-01-29 NOTE — CONSULT NOTE ADULT - ASSESSMENT
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

## 2020-01-29 NOTE — CONSULT NOTE ADULT - SUBJECTIVE AND OBJECTIVE BOX
HPI  44F complains of upper back pain for 1 day. Patient had T8-L2 PSF performed in 2019 for infection. Since that time, she notes that she has presented to outside hospitals and had bilateral septic knee I&Ds performed, followed by periods of time in which she has been intermittently compliant with her prescribed antibiotics. Patient states that the thoracic pain appeared suddenly without provocation and felt like a "strain or pull". Patient denies numbness, paresthesias, headstrike, loss of consciousness, change in bowel/bladder continence, saddle anesthesia, or any other signs/symptoms at this time.    Allergies: penicillin  PMH: VDA, alcohol abuse, hx of pancreatitis, alcohol hepatitis, alcohol withdrawal, left frontoparietal subdural hematoma 2008  PSH: ACDF C4-5 (Grewal 2019), T8-L2 PSF (Grewal 2019)  Social: Denies tobacco use  FH: Noncontributory  Imaging: MRI thoracic/lumbar spine - T7 osteomyelitis/discitis with collapsed vertebra producing kyphosis    ROS: Negative for all systems except as noted above in HPI    Physical exam  VS:  Vital Signs Last 24 Hrs  T(C): 37.2 (29 Jan 2020 17:23), Max: 37.2 (29 Jan 2020 17:23)  T(F): 98.9 (29 Jan 2020 17:23), Max: 98.9 (29 Jan 2020 17:23)  HR: 92 (29 Jan 2020 17:23) (92 - 92)  BP: 103/64 (29 Jan 2020 17:23) (103/64 - 103/64)  BP(mean): --  RR: 16 (29 Jan 2020 17:23) (16 - 16)  SpO2: 97% (29 Jan 2020 17:23) (97% - 97%)  Gen: NAD  Spine/extremities:  Spine incision well healed. Obvious kyphotic deformity at thoracic region. No erythema/ecchymosis/warmth. Diffuse TTP paraspinal muscles at thoracic spine. SILT C5-T1, L3-S1. Negative Babinski. Negative ankle clonus. Negative [place]. Capillary refill brisk.  Right UE: C5- 5/5  C6- 5/5  C7- 5/5  C8- 5/5  T1- 5/5  Left UE: C5- 5/5  C6- 5/5  C7- 5/5  C8- 5/5  T1- 5/5  Right LE: L2- 4/5  L3- 4/5  L4- 4/5  L5- 5/5  S1- 5/5  Left LE: L2- 5/5  L3- 5/5  L4- 5/5  L5- NA  S1- 5/5  Secondary survey: Small scab at left foot incision

## 2020-01-29 NOTE — ED ADULT NURSE NOTE - OBJECTIVE STATEMENT
44 y.o female with a hx of back pain and surgery complains of upper back pain. patient denies numbness and fever. patient is in nursing home for rehab, non-ambulatory

## 2020-01-29 NOTE — ED ADULT NURSE NOTE - NSIMPLEMENTINTERV_GEN_ALL_ED
Implemented All Fall Risk Interventions:  [place] to call system. Call bell, personal items and telephone within reach. Instruct patient to call for assistance. Room bathroom lighting operational. Non-slip footwear when patient is off stretcher. Physically safe environment: no spills, clutter or unnecessary equipment. Stretcher in lowest position, wheels locked, appropriate side rails in place. Provide visual cue, wrist band, yellow gown, etc. Monitor gait and stability. Monitor for mental status changes and reorient to person, place, and time. Review medications for side effects contributing to fall risk. Reinforce activity limits and safety measures with patient and family.

## 2020-01-30 DIAGNOSIS — M46.24 OSTEOMYELITIS OF VERTEBRA, THORACIC REGION: ICD-10-CM

## 2020-01-30 LAB
AMPHET UR-MCNC: NEGATIVE — SIGNIFICANT CHANGE UP
ANION GAP SERPL CALC-SCNC: 5 MMOL/L — SIGNIFICANT CHANGE UP (ref 5–17)
APTT BLD: 32.1 SEC — SIGNIFICANT CHANGE UP (ref 28.5–37)
BARBITURATES UR SCN-MCNC: NEGATIVE — SIGNIFICANT CHANGE UP
BASOPHILS # BLD AUTO: 0.03 K/UL — SIGNIFICANT CHANGE UP (ref 0–0.2)
BASOPHILS NFR BLD AUTO: 0.5 % — SIGNIFICANT CHANGE UP (ref 0–2)
BENZODIAZ UR-MCNC: NEGATIVE — SIGNIFICANT CHANGE UP
BUN SERPL-MCNC: 19 MG/DL — SIGNIFICANT CHANGE UP (ref 7–23)
CALCIUM SERPL-MCNC: 9.8 MG/DL — SIGNIFICANT CHANGE UP (ref 8.5–10.1)
CHLORIDE SERPL-SCNC: 108 MMOL/L — SIGNIFICANT CHANGE UP (ref 96–108)
CO2 SERPL-SCNC: 26 MMOL/L — SIGNIFICANT CHANGE UP (ref 22–31)
COCAINE METAB.OTHER UR-MCNC: NEGATIVE — SIGNIFICANT CHANGE UP
CREAT SERPL-MCNC: 0.56 MG/DL — SIGNIFICANT CHANGE UP (ref 0.5–1.3)
CRP SERPL-MCNC: 10.05 MG/DL — HIGH (ref 0–0.4)
EOSINOPHIL # BLD AUTO: 0.18 K/UL — SIGNIFICANT CHANGE UP (ref 0–0.5)
EOSINOPHIL NFR BLD AUTO: 2.7 % — SIGNIFICANT CHANGE UP (ref 0–6)
FLU A RESULT: SIGNIFICANT CHANGE UP
FLU A RESULT: SIGNIFICANT CHANGE UP
FLUAV AG NPH QL: SIGNIFICANT CHANGE UP
FLUBV AG NPH QL: SIGNIFICANT CHANGE UP
GLUCOSE SERPL-MCNC: 120 MG/DL — HIGH (ref 70–99)
HCT VFR BLD CALC: 31.9 % — LOW (ref 34.5–45)
HGB BLD-MCNC: 9.9 G/DL — LOW (ref 11.5–15.5)
IMM GRANULOCYTES NFR BLD AUTO: 0.3 % — SIGNIFICANT CHANGE UP (ref 0–1.5)
INR BLD: 1.14 RATIO — SIGNIFICANT CHANGE UP (ref 0.88–1.16)
LYMPHOCYTES # BLD AUTO: 1.97 K/UL — SIGNIFICANT CHANGE UP (ref 1–3.3)
LYMPHOCYTES # BLD AUTO: 30 % — SIGNIFICANT CHANGE UP (ref 13–44)
MCHC RBC-ENTMCNC: 25 PG — LOW (ref 27–34)
MCHC RBC-ENTMCNC: 31 GM/DL — LOW (ref 32–36)
MCV RBC AUTO: 80.6 FL — SIGNIFICANT CHANGE UP (ref 80–100)
METHADONE UR-MCNC: POSITIVE — SIGNIFICANT CHANGE UP
MONOCYTES # BLD AUTO: 0.41 K/UL — SIGNIFICANT CHANGE UP (ref 0–0.9)
MONOCYTES NFR BLD AUTO: 6.3 % — SIGNIFICANT CHANGE UP (ref 2–14)
NEUTROPHILS # BLD AUTO: 3.95 K/UL — SIGNIFICANT CHANGE UP (ref 1.8–7.4)
NEUTROPHILS NFR BLD AUTO: 60.2 % — SIGNIFICANT CHANGE UP (ref 43–77)
NRBC # BLD: 0 /100 WBCS — SIGNIFICANT CHANGE UP (ref 0–0)
OPIATES UR-MCNC: POSITIVE — SIGNIFICANT CHANGE UP
PCP SPEC-MCNC: SIGNIFICANT CHANGE UP
PCP UR-MCNC: NEGATIVE — SIGNIFICANT CHANGE UP
PLATELET # BLD AUTO: 234 K/UL — SIGNIFICANT CHANGE UP (ref 150–400)
POTASSIUM SERPL-MCNC: 4.1 MMOL/L — SIGNIFICANT CHANGE UP (ref 3.5–5.3)
POTASSIUM SERPL-SCNC: 4.1 MMOL/L — SIGNIFICANT CHANGE UP (ref 3.5–5.3)
PROTHROM AB SERPL-ACNC: 12.8 SEC — SIGNIFICANT CHANGE UP (ref 10–12.9)
RBC # BLD: 3.96 M/UL — SIGNIFICANT CHANGE UP (ref 3.8–5.2)
RBC # FLD: 18 % — HIGH (ref 10.3–14.5)
RSV RESULT: SIGNIFICANT CHANGE UP
RSV RNA RESP QL NAA+PROBE: SIGNIFICANT CHANGE UP
SODIUM SERPL-SCNC: 139 MMOL/L — SIGNIFICANT CHANGE UP (ref 135–145)
THC UR QL: NEGATIVE — SIGNIFICANT CHANGE UP
WBC # BLD: 6.56 K/UL — SIGNIFICANT CHANGE UP (ref 3.8–10.5)
WBC # FLD AUTO: 6.56 K/UL — SIGNIFICANT CHANGE UP (ref 3.8–10.5)

## 2020-01-30 PROCEDURE — 93010 ELECTROCARDIOGRAM REPORT: CPT

## 2020-01-30 PROCEDURE — 72040 X-RAY EXAM NECK SPINE 2-3 VW: CPT | Mod: 26

## 2020-01-30 PROCEDURE — 99253 IP/OBS CNSLTJ NEW/EST LOW 45: CPT

## 2020-01-30 PROCEDURE — 72156 MRI NECK SPINE W/O & W/DYE: CPT | Mod: 26

## 2020-01-30 PROCEDURE — 72070 X-RAY EXAM THORAC SPINE 2VWS: CPT | Mod: 26

## 2020-01-30 PROCEDURE — 72100 X-RAY EXAM L-S SPINE 2/3 VWS: CPT | Mod: 26

## 2020-01-30 PROCEDURE — 72133 CT LUMBAR SPINE W/O & W/DYE: CPT | Mod: 26

## 2020-01-30 PROCEDURE — 72130 CT CHEST SPINE W/O & W/DYE: CPT | Mod: 26

## 2020-01-30 PROCEDURE — 99233 SBSQ HOSP IP/OBS HIGH 50: CPT

## 2020-01-30 PROCEDURE — 71045 X-RAY EXAM CHEST 1 VIEW: CPT | Mod: 26

## 2020-01-30 RX ORDER — GABAPENTIN 400 MG/1
400 CAPSULE ORAL THREE TIMES A DAY
Refills: 0 | Status: DISCONTINUED | OUTPATIENT
Start: 2020-01-30 | End: 2020-01-31

## 2020-01-30 RX ORDER — VANCOMYCIN HCL 1 G
1250 VIAL (EA) INTRAVENOUS EVERY 8 HOURS
Refills: 0 | Status: DISCONTINUED | OUTPATIENT
Start: 2020-01-30 | End: 2020-02-01

## 2020-01-30 RX ORDER — HEPARIN SODIUM 5000 [USP'U]/ML
5000 INJECTION INTRAVENOUS; SUBCUTANEOUS EVERY 12 HOURS
Refills: 0 | Status: DISCONTINUED | OUTPATIENT
Start: 2020-01-30 | End: 2020-01-31

## 2020-01-30 RX ORDER — AZTREONAM 2 G
1000 VIAL (EA) INJECTION EVERY 8 HOURS
Refills: 0 | Status: DISCONTINUED | OUTPATIENT
Start: 2020-01-30 | End: 2020-01-30

## 2020-01-30 RX ORDER — KETOROLAC TROMETHAMINE 30 MG/ML
30 SYRINGE (ML) INJECTION EVERY 6 HOURS
Refills: 0 | Status: DISCONTINUED | OUTPATIENT
Start: 2020-01-30 | End: 2020-02-01

## 2020-01-30 RX ORDER — OXYCODONE HYDROCHLORIDE 5 MG/1
40 TABLET ORAL EVERY 12 HOURS
Refills: 0 | Status: DISCONTINUED | OUTPATIENT
Start: 2020-01-30 | End: 2020-01-31

## 2020-01-30 RX ORDER — AZTREONAM 2 G
1000 VIAL (EA) INJECTION ONCE
Refills: 0 | Status: COMPLETED | OUTPATIENT
Start: 2020-01-30 | End: 2020-01-30

## 2020-01-30 RX ORDER — OXYCODONE HYDROCHLORIDE 5 MG/1
20 TABLET ORAL EVERY 12 HOURS
Refills: 0 | Status: DISCONTINUED | OUTPATIENT
Start: 2020-01-30 | End: 2020-01-30

## 2020-01-30 RX ORDER — ACETAMINOPHEN 500 MG
650 TABLET ORAL EVERY 6 HOURS
Refills: 0 | Status: DISCONTINUED | OUTPATIENT
Start: 2020-01-30 | End: 2020-02-01

## 2020-01-30 RX ORDER — ALBUTEROL 90 UG/1
2 AEROSOL, METERED ORAL EVERY 6 HOURS
Refills: 0 | Status: DISCONTINUED | OUTPATIENT
Start: 2020-01-30 | End: 2020-02-01

## 2020-01-30 RX ORDER — AZTREONAM 2 G
VIAL (EA) INJECTION
Refills: 0 | Status: DISCONTINUED | OUTPATIENT
Start: 2020-01-30 | End: 2020-01-30

## 2020-01-30 RX ADMIN — Medication 30 MILLIGRAM(S): at 03:10

## 2020-01-30 RX ADMIN — Medication 30 MILLIGRAM(S): at 03:20

## 2020-01-30 RX ADMIN — OXYCODONE HYDROCHLORIDE 40 MILLIGRAM(S): 5 TABLET ORAL at 17:14

## 2020-01-30 RX ADMIN — Medication 650 MILLIGRAM(S): at 12:28

## 2020-01-30 RX ADMIN — Medication 650 MILLIGRAM(S): at 21:26

## 2020-01-30 RX ADMIN — HEPARIN SODIUM 5000 UNIT(S): 5000 INJECTION INTRAVENOUS; SUBCUTANEOUS at 08:40

## 2020-01-30 RX ADMIN — OXYCODONE HYDROCHLORIDE 40 MILLIGRAM(S): 5 TABLET ORAL at 18:00

## 2020-01-30 RX ADMIN — HYDROMORPHONE HYDROCHLORIDE 1 MILLIGRAM(S): 2 INJECTION INTRAMUSCULAR; INTRAVENOUS; SUBCUTANEOUS at 00:01

## 2020-01-30 RX ADMIN — GABAPENTIN 400 MILLIGRAM(S): 400 CAPSULE ORAL at 14:44

## 2020-01-30 RX ADMIN — Medication 650 MILLIGRAM(S): at 20:26

## 2020-01-30 RX ADMIN — Medication 50 MILLIGRAM(S): at 03:14

## 2020-01-30 RX ADMIN — Medication 650 MILLIGRAM(S): at 11:42

## 2020-01-30 RX ADMIN — Medication 50 MILLIGRAM(S): at 14:44

## 2020-01-30 RX ADMIN — OXYCODONE HYDROCHLORIDE 20 MILLIGRAM(S): 5 TABLET ORAL at 11:30

## 2020-01-30 RX ADMIN — HEPARIN SODIUM 5000 UNIT(S): 5000 INJECTION INTRAVENOUS; SUBCUTANEOUS at 20:26

## 2020-01-30 RX ADMIN — Medication 166.67 MILLIGRAM(S): at 14:27

## 2020-01-30 RX ADMIN — HYDROMORPHONE HYDROCHLORIDE 1 MILLIGRAM(S): 2 INJECTION INTRAMUSCULAR; INTRAVENOUS; SUBCUTANEOUS at 00:16

## 2020-01-30 RX ADMIN — GABAPENTIN 400 MILLIGRAM(S): 400 CAPSULE ORAL at 08:40

## 2020-01-30 RX ADMIN — GABAPENTIN 400 MILLIGRAM(S): 400 CAPSULE ORAL at 21:12

## 2020-01-30 RX ADMIN — OXYCODONE HYDROCHLORIDE 20 MILLIGRAM(S): 5 TABLET ORAL at 10:46

## 2020-01-30 NOTE — ED ADULT NURSE REASSESSMENT NOTE - NS ED NURSE REASSESS COMMENT FT1
This RN spoke with pt in an attempt to facilitate her imaging studies. Advised pt that the table is open for her to go now, but pt refuses, States she wants to speak with her admitting doctor. Explained that the physician is currently with another patient on an inpatient unit. The pt became verbally abusive towards staff and refused to go anywhere prior to speaking with the physician. Advised ED Hold MELITON Meyer. This RN spoke with pt in an attempt to facilitate her imaging studies. Advised pt that the table is open for her to go now, but pt refuses, states she wants to speak with her admitting doctor. Explained that the physician is currently with another patient on an inpatient unit, and in order to prevent a delay, it would be advisable she gets the test now. The pt became verbally abusive towards staff and refused to go anywhere prior to speaking with the physician. Advised ED Hold MELITON Meyer.

## 2020-01-30 NOTE — H&P ADULT - NSHPLABSRESULTS_GEN_ALL_CORE
LABS:                        9.8    7.17  )-----------( 239      ( 29 Jan 2020 20:30 )             31.2     01-29    139  |  108  |  18  ----------------------------<  100<H>  4.2   |  24  |  0.62    Ca    9.2      29 Jan 2020 20:30    TPro  7.6  /  Alb  2.8<L>  /  TBili  0.2  /  DBili  x   /  AST  13<L>  /  ALT  13  /  AlkPhos  153<H>  01-29        CAPILLARY BLOOD GLUCOSE  C-Reactive Protein, Serum: 10.05 mg/dL (01.30.20 @ 00:35)  Sedimentation Rate, Erythrocyte: 79 mm/hr (01.29.20 @ 20:30)  Lactate, Blood: 1.0 mmol/L (01.29.20 @ 20:30)  HCG 1          RADIOLOGY & ADDITIONAL TESTS:  < from: MR Thoracic Spine No Cont (01.29.20 @ 22:14) >    IMPRESSION:   1. New marrow edema T6 through T8, with vertebral body collapse T7 and T8 with   resulting focal anterior kyphosis. Fluid at the C6-C7 intervertebral disc space   with surrounding edematous change and fluid at the paravertebral soft tissues   consistent with discitis/osteomyelitis. Fluid collection/abscess left   paravertebral soft tissues adjacent to the T6-T7 disc space, approximately 10 x   15 x 27 mm. Prevertebral soft tissue edema extends from T3 through T10.   2. Mild central canal narrowing T6 through T9 without cord compression.   Evaluation of the central canal at the mid and lower thoracic region limited   secondary to artifact related to adjacent orthopedic hardware. No definite   epidural fluid collection identified.    < end of copied text >  < from: MR Lumbar Spine No Cont (01.29.20 @ 22:14) >    IMPRESSION:   1. Nonspecific edema dorsal subcutaneous soft tissues. No focal fluid   collection identified.   2. Degenerative changes as described.    < end of copied text >        Imaging Personally Reviewed:  [x ] YES  [ ] NO    Consultant(s) Notes Reviewed:  [x ] YES  [ ] NO

## 2020-01-30 NOTE — CONSULT NOTE ADULT - SUBJECTIVE AND OBJECTIVE BOX
Infectious Diseases - Attending at Dr. Alcantara    HPI:  45 y/o Female complains of upper back pain for 1 day. Patient had T8-L2 PSF performed in 2019 for infection. Since that time, she notes that she has presented to outside hospitals and had bilateral septic knee I&Ds performed, followed by periods of time in which she has been intermittently compliant with her prescribed antibiotics. Patient states that the thoracic pain appeared suddenly without provocation and felt like a "strain or pull". Patient denies numbness, paresthesias, head strike, loss of consciousness, change in bowel/bladder continence, saddle anesthesia, or any other signs/symptoms at this time.      Complex Care: Description: 44 yo F with LONG, complicated hospitalization (>100 days), long h/o IVDU, EtOH abuse, chronic pain, presented with sepsis, respiratory failure, intubated, found to have MRSA bacteremia, OM, vertebral abscess, multi-level discitis, s/p spinal surgery.  Pt. has been on methadone in the past, sober period at one point.  Restarted methadone on this admission.  60mg bid.  Pt. doing well on current pain regimen, weaned from opiates.  Referred to outpatient methadone clinic, [facility].  Had originally planned to go to Mother's home but Mother would not take her because of methadone (believes its still a way of getting high). Pt. is going to live with a friend. Declined interviews for inpatient rehab.  	- discharged on two weeks of zyvox, then change to Daptomycin 100mg po bid for one year, then daily dosing suppressive lifelong as per ID (Dr. Garcia)  	- orthospine - Dr. Leslie  	- PCP - referral made to Dr. Sumner.  	HIGH RISK for relapse  	Avoid narcotics for pain management (30 Jan 2020 02:36)      PAST MEDICAL & SURGICAL HISTORY:  History of pancreatitis  History of alcoholic hepatitis  ETOH abuse  Drug abuse  History of back surgery  C Section      Allergies    penicillin (Short breath; Hives)    Intolerances    COMPLEX CARE (Unknown)      FAMILY HISTORY:  Family history unknown      SOCIAL HISTORY:    REVIEW OF SYSTEMS:    Constitutional: No fever, weight loss or fatigue  Eyes: No eye pain, visual disturbances, or discharge  ENT:  No difficulty hearing, tinnitus, vertigo; No sinus or throat pain  Neck: No pain or stiffness  Respiratory: No cough, wheezing, chills or hemoptysis  Cardiovascular: No chest pain, palpitations, shortness of breath, dizziness or leg swelling  Gastrointestinal: No abdominal or epigastric pain. No nausea, vomiting or hematemesis; No diarrhea or constipation. No melena or hematochezia.  Genitourinary: No dysuria, frequency, hematuria or incontinence  Neurological: No headaches, memory loss, loss of strength, numbness or tremors  Skin: No itching, burning, rashes or lesions       MEDICATIONS  (STANDING):  gabapentin 400 milliGRAM(s) Oral three times a day  heparin  Injectable 5000 Unit(s) SubCutaneous every 12 hours  vancomycin  IVPB 1250 milliGRAM(s) IV Intermittent every 8 hours    MEDICATIONS  (PRN):  acetaminophen   Tablet .. 650 milliGRAM(s) Oral every 6 hours PRN Mild Pain (1 - 3)  ALBUTerol    90 MICROgram(s) HFA Inhaler 2 Puff(s) Inhalation every 6 hours PRN Shortness of Breath and/or Wheezing  ketorolac   Injectable 30 milliGRAM(s) IV Push every 6 hours PRN Severe Pain (7 - 10)      Vital Signs Last 24 Hrs  T(C): 36.4 (30 Jan 2020 11:27), Max: 37.2 (29 Jan 2020 17:23)  T(F): 97.5 (30 Jan 2020 11:27), Max: 98.9 (29 Jan 2020 17:23)  HR: 79 (30 Jan 2020 11:27) (73 - 92)  BP: 133/76 (30 Jan 2020 11:27) (103/64 - 145/70)  BP(mean): --  RR: 18 (30 Jan 2020 11:27) (16 - 18)  SpO2: 97% (30 Jan 2020 11:27) (97% - 100%)    PHYSICAL EXAM:    Constitutional: NAD, well-groomed, well-developed  HEENT: PERRLA, EOMI, Normal Hearing, MMM  Neck: No LAD, No JVD  Back: Normal spine flexure, No CVA tenderness  Respiratory: CTAB/L  Cardiovascular: S1 and S2, RRR, no M/G/R  Gastrointestinal: BS+, soft, NT/ND  Extremities: No peripheral edema  Vascular: 2+ peripheral pulses  Neurological: A/O x 3, no focal deficits  Skin: No rashes      LABS:                        9.9    6.56  )-----------( 234      ( 30 Jan 2020 07:55 )             31.9     01-30    139  |  108  |  19  ----------------------------<  120<H>  4.1   |  26  |  0.56    Ca    9.8      30 Jan 2020 07:55    TPro  7.6  /  Alb  2.8<L>  /  TBili  0.2  /  DBili  x   /  AST  13<L>  /  ALT  13  /  AlkPhos  153<H>  01-29    PT/INR - ( 30 Jan 2020 08:09 )   PT: 12.8 sec;   INR: 1.14 ratio         PTT - ( 30 Jan 2020 08:09 )  PTT:32.1 sec      MICROBIOLOGY:  RECENT CULTURES:        RADIOLOGY & ADDITIONAL STUDIES:      CLINICAL STATEMENT: Rule out osteomyelitis. Pain    TECHNIQUE: MRI of the lumbar spine was performed without gadolinium.    COMPARISON: MRI lumbar spine 6/3/2019    FINDINGS:  Partially visualized posterior fusion hardwares noted at L1 and L2. Artifacts from hardware limiting evaluation of surrounding structures. Nonspecific dorsal soft tissue edema/fluid noted.    Vertebral body heights and sagittal alignment intact in this lumbar spine.    The conus terminates at L1-2    Degenerative disc disease at L4-5 with loss of signal and mild disc space narrowing. Disc bulge at L4-5 with posterior annular tear and facet hypertrophy noted resulting in mild effacement of ventral thecal sac. Stable Mild bilateral neural foraminal narrowing.    IMPRESSION:  No MR evidence of osteomyelitis in the lumbar spine.    Preliminary report provided by [facility] on 1/29/2020    MR T spine  IMPRESSION:    Normalization of marrow signal related to previous abnormality centered at the T11-12 level with new similar abnormality atT6-7 and T7-8, also presumed infectious. 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 Suspected dorsal epidural collection with limitations secondary to noncontrast technique and metallic artifact        MR Cerv spine    IMPRESSION:    Spondylosis resulting in worsening spinal stenosis and mild spinal cord flattening at C5-C6 without acute findings Infectious Diseases - Attending at Dr. Alcantara    HPI:  45 y/o Female ,IV drug abuser, on methadone and still using IV drugs at last admission , chronic pain,ETOH abuse  was admitted from 2/17/19 to 5/28/19  with a, complicated hospitalization course long which caused sepsis and acute respiratory failure at presentation sec to MRSA bacteremia with septic emboli and  eventually lead to  OM, vertebral abscess, multi-level from T8 to L2  discitis, s/p spinal surgery T8 to L2 PSF sec to persistent bacteremia for initial 12 days of the stay. She had LIZ which showed IE of TV as well on admission. During the hospitalization she was initally on vanco which had to be switched dapto as pt continued to have progression of diskitis /OM of spine >She was eventually discharged on PO zyvox and then plan was to changed to PO doxy for life. Since then pt was not complaint with her regimen and got admitted in July at [facility] and underwent both knee aspiration for possible septic arthritis doesn't recall if she had positive c/s but was treated with 6 weeks of iv antibiotics .IN november had admission [facility] and per pt had hip aspitration with positive c/s and was treated with six weeks of IV antibiotics and switched to PO doxy and [facility] She [facility] the hospital from there with c/o thoracic pain appeared suddenly without provocation and felt like a "strain or pull". Patient denies numbness, paresthesias, head strike, loss of consciousness, change in bowel/bladder continence, saddle anesthesia, or any other signs/symptoms at this time.          PAST MEDICAL & SURGICAL HISTORY:  History of pancreatitis  History of alcoholic hepatitis  ETOH abuse  Drug abuse  History of back surgery  C Section      Allergies    penicillin (Short breath; Hives)    Intolerances    COMPLEX CARE (Unknown)      FAMILY HISTORY:  Family history unknown      SOCIAL HISTORY:denies IV drug use,on high dose of pain meds and methadone    REVIEW OF SYSTEMS:    Constitutional: No fever, weight loss or fatigue  Eyes: No eye pain, visual disturbances, or discharge  ENT:  No difficulty hearing, tinnitus, vertigo; No sinus or throat pain  Neck: No pain or stiffness  Respiratory: No cough, wheezing, chills or hemoptysis  Cardiovascular: No chest pain, palpitations, shortness of breath, dizziness or leg swelling  Gastrointestinal: No abdominal or epigastric pain. No nausea, vomiting or hematemesis; No diarrhea or constipation. No melena or hematochezia.  Genitourinary: No dysuria, frequency, hematuria or incontinence  Neurological: No headaches, memory loss, loss of strength, numbness or tremors  MS : right hip pain ,says cant walk  Skin: No itching, burning, rashes or lesions       MEDICATIONS  (STANDING):  gabapentin 400 milliGRAM(s) Oral three times a day  heparin  Injectable 5000 Unit(s) SubCutaneous every 12 hours  vancomycin  IVPB 1250 milliGRAM(s) IV Intermittent every 8 hours    MEDICATIONS  (PRN):  acetaminophen   Tablet .. 650 milliGRAM(s) Oral every 6 hours PRN Mild Pain (1 - 3)  ALBUTerol    90 MICROgram(s) HFA Inhaler 2 Puff(s) Inhalation every 6 hours PRN Shortness of Breath and/or Wheezing  ketorolac   Injectable 30 milliGRAM(s) IV Push every 6 hours PRN Severe Pain (7 - 10)      Vital Signs Last 24 Hrs  T(C): 36.4 (30 Jan 2020 11:27), Max: 37.2 (29 Jan 2020 17:23)  T(F): 97.5 (30 Jan 2020 11:27), Max: 98.9 (29 Jan 2020 17:23)  HR: 79 (30 Jan 2020 11:27) (73 - 92)  BP: 133/76 (30 Jan 2020 11:27) (103/64 - 145/70)  BP(mean): --  RR: 18 (30 Jan 2020 11:27) (16 - 18)  SpO2: 97% (30 Jan 2020 11:27) (97% - 100%)    PHYSICAL EXAM:    Constitutional: NAD, well-groomed, well-developed  HEENT: PERRLA, EOMI, Normal Hearing, MMM  Neck: No LAD, No JVD  Back: Normal spine flexure, No CVA tenderness  Respiratory: CTAB/L  Cardiovascular: S1 and S2, RRR, no M/G/R  Gastrointestinal: BS+, soft, NT/ND  Extremities: left first toe surgical scar clean, no signs of infection at foot  No peripheral edema  Vascular: 2+ peripheral pulses  Neurological: cant lift right leg ,left leg strength 5/5 otherwise normal motor strenght intact A/O x 3  Skin: No rashes      LABS:    ESR  71  CRP 10                        9.9    6.56  )-----------( 234      ( 30 Jan 2020 07:55 )             31.9     01-30    139  |  108  |  19  ----------------------------<  120<H>  4.1   |  26  |  0.56    Ca    9.8      30 Jan 2020 07:55    TPro  7.6  /  Alb  2.8<L>  /  TBili  0.2  /  DBili  x   /  AST  13<L>  /  ALT  13  /  AlkPhos  153<H>  01-29    PT/INR - ( 30 Jan 2020 08:09 )   PT: 12.8 sec;   INR: 1.14 ratio         PTT - ( 30 Jan 2020 08:09 )  PTT:32.1 sec      MICROBIOLOGY:  RECENT CULTURES:        RADIOLOGY & ADDITIONAL STUDIES:      CLINICAL STATEMENT: Rule out osteomyelitis. Pain    TECHNIQUE: MRI of the lumbar spine was performed without gadolinium.    COMPARISON: MRI lumbar spine 6/3/2019    FINDINGS:  Partially visualized posterior fusion hardwares noted at L1 and L2. Artifacts from hardware limiting evaluation of surrounding structures. Nonspecific dorsal soft tissue edema/fluid noted.    Vertebral body heights and sagittal alignment intact in this lumbar spine.    The conus terminates at L1-2    Degenerative disc disease at L4-5 with loss of signal and mild disc space narrowing. Disc bulge at L4-5 with posterior annular tear and facet hypertrophy noted resulting in mild effacement of ventral thecal sac. Stable Mild bilateral neural foraminal narrowing.    IMPRESSION:  No MR evidence of osteomyelitis in the lumbar spine.    Preliminary report provided by [facility] on 1/29/2020    MR T spine  IMPRESSION:    Normalization of marrow signal related to previous abnormality centered at the T11-12 level with new similar abnormality atT6-7 and T7-8, also presumed infectious. 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 Suspected dorsal epidural collection with limitations secondary to noncontrast technique and metallic artifact        MR Cerv spine    IMPRESSION:    Spondylosis resulting in worsening spinal stenosis and mild spinal cord flattening at C5-C6 without acute findings Infectious Diseases - Attending at Dr. Alcantara    HPI:  45 y/o Female ,IV drug abuser, on methadone and still using IV drugs at last admission , chronic pain,ETOH abuse  was admitted from 2/17/19 to 5/28/19  with a, complicated hospitalization course long which caused sepsis and acute respiratory failure at presentation sec to MRSA bacteremia with septic emboli and  eventually lead to  OM, vertebral abscess, multi-level from T8 to L2  discitis, s/p spinal surgery T8 to L2 PSF sec to persistent bacteremia for initial 12 days of the stay. She had LIZ which showed IE of TV as well on admission. During the hospitalization she was initally on vanco which had to be switched dapto as pt continued to have progression of diskitis /OM of spine >She was eventually discharged on PO zyvox and then plan was to changed to PO doxy for life. Since then pt was not complaint with her regimen and got admitted in July at [facility] and underwent both knee aspiration for possible septic arthritis doesn't recall if she had positive c/s but was treated with 6 weeks of iv antibiotics .IN november had admission [facility] and per pt had hip aspitration with positive c/s and was treated with six weeks of IV antibiotics and switched to PO doxy and [facility] She presented to the hospital from there with c/o thoracic pain appeared suddenly without provocation and felt like a "strain or pull". Patient denies numbness, paresthesias, head strike, loss of consciousness, change in bowel/bladder continence, saddle anesthesia, or any other signs/symptoms at this time.          PAST MEDICAL & SURGICAL HISTORY:  History of pancreatitis  History of alcoholic hepatitis  ETOH abuse  Drug abuse  History of back surgery  C Section      Allergies    penicillin (Short breath; Hives)    Intolerances    COMPLEX CARE (Unknown)      FAMILY HISTORY:  No Dm, HTN in mother and father      SOCIAL HISTORY:denies IV drug use,on high dose of pain meds and methadone    REVIEW OF SYSTEMS:    Constitutional: No fever, weight loss +fatigue  Eyes: No eye pain, visual disturbances, or discharge  ENT:  No difficulty hearing, tinnitus, vertigo; No sinus or throat pain  Neck: No pain or stiffness  Respiratory: No cough, wheezing, chills or hemoptysis  Cardiovascular: No chest pain, palpitations, shortness of breath, dizziness or leg swelling  Gastrointestinal: No abdominal or epigastric pain. No nausea, vomiting or hematemesis; No diarrhea or constipation. No melena or hematochezia.  Genitourinary: No dysuria, frequency, hematuria or incontinence  Neurological: No headaches, memory loss, loss of strength, numbness or tremors  MS : right hip pain ,says cant walk  Skin: No itching, burning, rashes or lesions       MEDICATIONS  (STANDING):  gabapentin 400 milliGRAM(s) Oral three times a day  heparin  Injectable 5000 Unit(s) SubCutaneous every 12 hours  vancomycin  IVPB 1250 milliGRAM(s) IV Intermittent every 8 hours    MEDICATIONS  (PRN):  acetaminophen   Tablet .. 650 milliGRAM(s) Oral every 6 hours PRN Mild Pain (1 - 3)  ALBUTerol    90 MICROgram(s) HFA Inhaler 2 Puff(s) Inhalation every 6 hours PRN Shortness of Breath and/or Wheezing  ketorolac   Injectable 30 milliGRAM(s) IV Push every 6 hours PRN Severe Pain (7 - 10)      Vital Signs Last 24 Hrs  T(C): 36.4 (30 Jan 2020 11:27), Max: 37.2 (29 Jan 2020 17:23)  T(F): 97.5 (30 Jan 2020 11:27), Max: 98.9 (29 Jan 2020 17:23)  HR: 79 (30 Jan 2020 11:27) (73 - 92)  BP: 133/76 (30 Jan 2020 11:27) (103/64 - 145/70)  BP(mean): --  RR: 18 (30 Jan 2020 11:27) (16 - 18)  SpO2: 97% (30 Jan 2020 11:27) (97% - 100%)    PHYSICAL EXAM:    Constitutional: NAD, well-groomed, well-developed  HEENT: PERRLA, EOMI, Normal Hearing, MMM  Neck: No LAD, No JVD  Back: Normal spine flexure, No CVA tenderness  Respiratory: CTAB/L  Cardiovascular: S1 and S2, RRR, no M/G/R  Gastrointestinal: BS+, soft, NT/ND  Extremities: left first toe surgical scar clean, no signs of infection at foot  No peripheral edema  Vascular: 2+ peripheral pulses  Neurological: cant lift right leg ,left leg strength 5/5 otherwise normal motor strenght intact A/O x 3  Skin: No rashes      LABS:    ESR  71  CRP 10                        9.9    6.56  )-----------( 234      ( 30 Jan 2020 07:55 )             31.9     01-30    139  |  108  |  19  ----------------------------<  120<H>  4.1   |  26  |  0.56    Ca    9.8      30 Jan 2020 07:55    TPro  7.6  /  Alb  2.8<L>  /  TBili  0.2  /  DBili  x   /  AST  13<L>  /  ALT  13  /  AlkPhos  153<H>  01-29    PT/INR - ( 30 Jan 2020 08:09 )   PT: 12.8 sec;   INR: 1.14 ratio         PTT - ( 30 Jan 2020 08:09 )  PTT:32.1 sec      MICROBIOLOGY:  RECENT CULTURES:        RADIOLOGY & ADDITIONAL STUDIES:      CLINICAL STATEMENT: Rule out osteomyelitis. Pain    TECHNIQUE: MRI of the lumbar spine was performed without gadolinium.    COMPARISON: MRI lumbar spine 6/3/2019    FINDINGS:  Partially visualized posterior fusion hardwares noted at L1 and L2. Artifacts from hardware limiting evaluation of surrounding structures. Nonspecific dorsal soft tissue edema/fluid noted.    Vertebral body heights and sagittal alignment intact in this lumbar spine.    The conus terminates at L1-2    Degenerative disc disease at L4-5 with loss of signal and mild disc space narrowing. Disc bulge at L4-5 with posterior annular tear and facet hypertrophy noted resulting in mild effacement of ventral thecal sac. Stable Mild bilateral neural foraminal narrowing.    IMPRESSION:  No MR evidence of osteomyelitis in the lumbar spine.    Preliminary report provided by [facility] on 1/29/2020    MR T spine  IMPRESSION:    Normalization of marrow signal related to previous abnormality centered at the T11-12 level with new similar abnormality atT6-7 and T7-8, also presumed infectious. 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 Suspected dorsal epidural collection with limitations secondary to noncontrast technique and metallic artifact        MR Cerv spine    IMPRESSION:    Spondylosis resulting in worsening spinal stenosis and mild spinal cord flattening at C5-C6 without acute findings

## 2020-01-30 NOTE — CHART NOTE - NSCHARTNOTEFT_GEN_A_CORE
45 y/o Female h/o of MRSA bacteremia, septic joints discitis, osteo presents with thoracic  back pain for 1 day. Patient had T8-L2 PSF performed in 2019 for infection. Since that time, she notes that she has presented to outside hospitals and had bilateral septic knee I&Ds performed, followed by periods of time in which she has been intermittently compliant with her prescribed antibiotics. Patient states that the thoracic pain appeared suddenly without provocation and felt like a "strain or pull". Patient denies numbness, paresthesias, head strike, loss of consciousness, change in bowel/bladder continence, saddle anesthesia, or any other signs/symptoms at this time. Seen by orthopedics, imaging shows osteomyelitis/discitis, and collapse of vertebe, possible epidural collection.  for possible OR 2/1/20, and needs medical clearance for surgery   remains afebrile and VSS.   will order echo due to the hx of recurrent MRSA infection

## 2020-01-30 NOTE — ED PEDIATRIC NURSE REASSESSMENT NOTE - NS ED NURSE REASSESS COMMENT FT2
Attempted to reassess pt's level of pain, pt currently at imaging, will assess upon return Note entered in error, please disregard

## 2020-01-30 NOTE — H&P ADULT - NSHPPHYSICALEXAM_GEN_ALL_CORE
T(C): 37.2 (29 Jan 2020 23:42), Max: 37.2 (29 Jan 2020 17:23)  T(F): 98.9 (29 Jan 2020 23:42), Max: 98.9 (29 Jan 2020 17:23)  HR: 85 (29 Jan 2020 23:42) (85 - 92)  BP: 145/70 (29 Jan 2020 23:42) (103/64 - 145/70)  BP(mean): --  RR: 18 (29 Jan 2020 23:42) (16 - 18)  SpO2: 100% (29 Jan 2020 23:42) (97% - 100%)    PHYSICAL EXAM:  GENERAL: NAD, well-groomed, well-developed  HEAD:  Atraumatic, Normocephalic  EYES: EOMI, PERRLA, conjunctiva and sclera clear  ENMT: No tonsillar erythema, exudates, or enlargement; Moist mucous membranes,  No lesions  NECK: Supple, No JVD, Normal thyroid  NERVOUS SYSTEM:  Alert & Oriented X3, Good concentration; Motor Strength 5/5 B/L upper, refuses to move lower extremities due to pain  CHEST/LUNG: Clear to percussion bilaterally; No rales, rhonchi, wheezing, or rubs  HEART: Regular rate and rhythm; No murmurs, rubs, or gallops  ABDOMEN: Soft, Nontender, Nondistended; Bowel sounds present  SPINE: TTP thoracic spine  EXTREMITIES:  + Peripheral Pulses, No clubbing, cyanosis, or edema  LYMPH: No lymphadenopathy noted  SKIN: No rashes or lesions

## 2020-01-30 NOTE — PROGRESS NOTE ADULT - SUBJECTIVE AND OBJECTIVE BOX
Patient seen and examined. Patient complains of upper back pain.    Physical exam  VS:  Vital Signs Last 24 Hrs  T(C): 37.2 (29 Jan 2020 23:42), Max: 37.2 (29 Jan 2020 17:23)  T(F): 98.9 (29 Jan 2020 23:42), Max: 98.9 (29 Jan 2020 17:23)  HR: 85 (29 Jan 2020 23:42) (85 - 92)  BP: 145/70 (29 Jan 2020 23:42) (103/64 - 145/70)  BP(mean): --  RR: 18 (29 Jan 2020 23:42) (16 - 18)  SpO2: 100% (29 Jan 2020 23:42) (97% - 100%)  Gen: NAD  Spine/extremities:  Spine incision well healed. Obvious kyphotic deformity at thoracic region. No erythema/ecchymosis/warmth. Diffuse TTP paraspinal muscles at thoracic spine. SILT C5-T1, L3-S1. Negative Babinski. Negative ankle clonus. Negative [place]. Capillary refill brisk.  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

## 2020-01-30 NOTE — ED ADULT NURSE REASSESSMENT NOTE - NS ED NURSE REASSESS COMMENT FT1
Spoke with Dr. Chung, advised him the pt is refusing imaging studies until she speaks with him. He stated he will see the patient as soon as possible, but if she refuses to go for testing, she can go to the unit prior to getting it performed.

## 2020-01-30 NOTE — H&P ADULT - ASSESSMENT
Pt here for C4D1 Taxol and Carbo. Denies any new complaints. Pt states the diarrhea she was having has really slowed down since starting Lomotil. Labs drawn from port and results reviewed. Pt seen by Dr Asmita Lomeli at chair side for follow up, refer to his note. Pt was treated without incident and d/c'd in stable condition. Pt will return in 1 week for C5D1. 45 y/o Female complains of upper back pain for 1 day. Patient had T8-L2 PSF performed in 2019 for infection. Since that time, she notes that she has presented to outside hospitals and had bilateral septic knee I&Ds performed, followed by periods of time in which she has been intermittently compliant with her prescribed antibiotics. Patient states that the thoracic pain appeared suddenly without provocation and felt like a "strain or pull". Patient denies numbness, paresthesias, head strike, loss of consciousness, change in bowel/bladder continence, saddle anesthesia, or any other signs/symptoms at this time. Seen by orthopedics, for possible OR 2/1/20, will avoid opiates due to recommendation above.  IMPROVE VTE Individual Risk Assessment          RISK                                                          Points    [  ] Previous VTE                                                3    [  ] Thrombophilia                                             2    [  ] Lower limb paralysis                                    2        (unable to hold up >15 seconds)      [  ] Current Cancer                                             2         (within 6 months)    [ x ] Immobilization > 24 hrs                              1    [  ] ICU/CCU stay > 24 hours                            1    [  ] Age > 60                                                    1    IMPROVE VTE Score ___1______ 45 y/o Female complains of upper back pain for 1 day. Patient had T8-L2 PSF performed in 2019 for infection. Since that time, she notes that she has presented to outside hospitals and had bilateral septic knee I&Ds performed, followed by periods of time in which she has been intermittently compliant with her prescribed antibiotics. Patient states that the thoracic pain appeared suddenly without provocation and felt like a "strain or pull". Patient denies numbness, paresthesias, head strike, loss of consciousness, change in bowel/bladder continence, saddle anesthesia, or any other signs/symptoms at this time. Seen by orthopedics, imaging shows osteomyelitis/discitis, for possible OR 2/1/20, will avoid opiates due to recommendation above.  IMPROVE VTE Individual Risk Assessment          RISK                                                          Points    [  ] Previous VTE                                                3    [  ] Thrombophilia                                             2    [  ] Lower limb paralysis                                    2        (unable to hold up >15 seconds)      [  ] Current Cancer                                             2         (within 6 months)    [ x ] Immobilization > 24 hrs                              1    [  ] ICU/CCU stay > 24 hours                            1    [  ] Age > 60                                                    1    IMPROVE VTE Score ___1______

## 2020-01-30 NOTE — H&P ADULT - NSICDXPASTMEDICALHX_GEN_ALL_CORE_FT
PAST MEDICAL HISTORY:  Drug abuse     ETOH abuse     History of alcoholic hepatitis     History of pancreatitis

## 2020-01-30 NOTE — H&P ADULT - NSHPREVIEWOFSYSTEMS_GEN_ALL_CORE
REVIEW OF SYSTEMS:  CONSTITUTIONAL: No fever, weight loss, or fatigue  EYES: No eye pain, visual disturbances, or discharge  ENMT:  No difficulty hearing, tinnitus, vertigo; No sinus or throat pain  NECK: No pain or stiffness  BREASTS: No pain, masses, or nipple discharge  RESPIRATORY: No cough, wheezing, chills or hemoptysis; No shortness of breath  CARDIOVASCULAR: No chest pain, palpitations, dizziness, or leg swelling  GASTROINTESTINAL: No abdominal or epigastric pain. No nausea, vomiting, or hematemesis; No diarrhea or constipation. No melena or hematochezia.  GENITOURINARY: No dysuria, frequency, hematuria, or incontinence  NEUROLOGICAL: No headaches, memory loss, loss of strength, numbness, or tremors  SKIN: No itching, burning, rashes, or lesions   LYMPH NODES: No enlarged glands  ENDOCRINE: No heat or cold intolerance; No hair loss  MUSCULOSKELETAL: + back  pain  PSYCHIATRIC: No depression, anxiety, mood swings, or difficulty sleeping  HEME/LYMPH: No easy bruising, or bleeding gums  ALLERGY AND IMMUNOLOGIC: No hives or eczema

## 2020-01-30 NOTE — CONSULT NOTE ADULT - ASSESSMENT
pt with sudden pop in back and new back pain   MRI shows T7-8 signal abnormality   worsening CRP   cover for MRSA with vanco   OR c/s sh be sent   planned for laminectomy and fusion  d/c azacatam for now   try to get records from [facility] (admitted in July ) for B/L knee pain ? septic knee arthritis says got antibiotics for 6 weeks  Try get records from [place] admitted from Nov to Jan and got aspiration of Right hip which grew bacteria ? which one   on IV antibiotics for 8 weeks followed by oral doxy for last two week.Aslo had left great to resection for infection in [place] (likely OM )    came from [facility]   follow up on blood c/s    denies IV drug use  uses a heavy dose of pain killers  check U tox 45 y/o Female ,IV drug abuser, on methadone and still using IV drugs at last admission , chronic pain,ETOH abuse  was admitted from 2/17/19 to 5/28/19  with a, complicated hospitalization course long which caused sepsis and acute respiratory failure at presentation sec to MRSA bacteremia with septic emboli and  eventually lead to  OM, vertebral abscess, multi-level from T8 to L2  discitis, s/p spinal surgery T8 to L2 PSF sec to persistent bacteremia for initial 12 days of the stay. She had LIZ which showed IE of TV as well on admission. During the hospitalization she was initally on vanco which had to be switched dapto as pt continued to have progression of diskitis /OM of spine >She was eventually discharged on PO zyvox and then plan was to changed to PO doxy for life. Since then pt was not complaint with her regimen and got admitted in July at [facility] and underwent both knee aspiration for possible septic arthritis doesn't recall if she had positive c/s but was treated with 6 weeks of iv antibiotics .IN november had admission ni [facility] and per pt had hip aspitration with positive c/s .Also had left great to resection for infection in [place] (likely OM ) .She was treated with six weeks of IV antibiotics and switched to PO doxy and transferred to [facility]. She presented to the hospital from there with c/o thoracic pain appeared suddenly without provocation and felt like a "strain or pull"pt with sudden pop in back and new back pain .Seen with   1.Diskitis /OM of thoracic spine  MRI shows T7-8 signal abnormality MR T spine  IMPRESSION:    Normalization of marrow signal related to previous abnormality centered at the T11-12 level with new similar abnormality atT6-7 and T7-8, also presumed infectious. 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 Suspected dorsal epidural collection with limitations secondary to noncontrast technique and metallic artifact  worsening CRP   cover for MRSA with vanco   OR c/s sh be sent   planned for laminectomy and fusion on 2/1  d/c azactam for now   try to get records from [facility] (admitted in July ) for B/L knee pain ? septic knee arthritis says got antibiotics for 6 weeks  Try get records from [facility] admitted from Nov to Jan and got aspiration of Right hip which grew bacteria ? which one   on IV antibiotics for 8 weeks followed by oral doxy for last two week.    came from [facility]   follow up on blood c/s    denies IV drug use  uses a heavy dose of pain killers  check U tox

## 2020-01-30 NOTE — H&P ADULT - PROBLEM SELECTOR PLAN 1
Has osteomyelitis/discitis, seen by ortho, needs ID consult  blood cultures obtained, started on vanco/azactam (PCN allergy)  pain control

## 2020-01-31 ENCOUNTER — TRANSCRIPTION ENCOUNTER (OUTPATIENT)
Age: 45
End: 2020-01-31

## 2020-01-31 LAB
ANION GAP SERPL CALC-SCNC: 5 MMOL/L — SIGNIFICANT CHANGE UP (ref 5–17)
APTT BLD: 32.3 SEC — SIGNIFICANT CHANGE UP (ref 27.5–36.3)
BUN SERPL-MCNC: 18 MG/DL — SIGNIFICANT CHANGE UP (ref 7–23)
CALCIUM SERPL-MCNC: 9.2 MG/DL — SIGNIFICANT CHANGE UP (ref 8.5–10.1)
CHLORIDE SERPL-SCNC: 107 MMOL/L — SIGNIFICANT CHANGE UP (ref 96–108)
CO2 SERPL-SCNC: 29 MMOL/L — SIGNIFICANT CHANGE UP (ref 22–31)
CREAT SERPL-MCNC: 0.61 MG/DL — SIGNIFICANT CHANGE UP (ref 0.5–1.3)
GLUCOSE SERPL-MCNC: 144 MG/DL — HIGH (ref 70–99)
HCT VFR BLD CALC: 32.8 % — LOW (ref 34.5–45)
HGB BLD-MCNC: 10.3 G/DL — LOW (ref 11.5–15.5)
INR BLD: 1.12 RATIO — SIGNIFICANT CHANGE UP (ref 0.88–1.16)
MCHC RBC-ENTMCNC: 25.4 PG — LOW (ref 27–34)
MCHC RBC-ENTMCNC: 31.4 GM/DL — LOW (ref 32–36)
MCV RBC AUTO: 81 FL — SIGNIFICANT CHANGE UP (ref 80–100)
NRBC # BLD: 0 /100 WBCS — SIGNIFICANT CHANGE UP (ref 0–0)
PLATELET # BLD AUTO: 244 K/UL — SIGNIFICANT CHANGE UP (ref 150–400)
POTASSIUM SERPL-MCNC: 3.8 MMOL/L — SIGNIFICANT CHANGE UP (ref 3.5–5.3)
POTASSIUM SERPL-SCNC: 3.8 MMOL/L — SIGNIFICANT CHANGE UP (ref 3.5–5.3)
PROTHROM AB SERPL-ACNC: 12.6 SEC — SIGNIFICANT CHANGE UP (ref 10–12.9)
RBC # BLD: 4.05 M/UL — SIGNIFICANT CHANGE UP (ref 3.8–5.2)
RBC # FLD: 17.7 % — HIGH (ref 10.3–14.5)
SODIUM SERPL-SCNC: 141 MMOL/L — SIGNIFICANT CHANGE UP (ref 135–145)
WBC # BLD: 5.3 K/UL — SIGNIFICANT CHANGE UP (ref 3.8–10.5)
WBC # FLD AUTO: 5.3 K/UL — SIGNIFICANT CHANGE UP (ref 3.8–10.5)

## 2020-01-31 PROCEDURE — 99222 1ST HOSP IP/OBS MODERATE 55: CPT

## 2020-01-31 PROCEDURE — 99233 SBSQ HOSP IP/OBS HIGH 50: CPT

## 2020-01-31 PROCEDURE — 99232 SBSQ HOSP IP/OBS MODERATE 35: CPT

## 2020-01-31 PROCEDURE — 93306 TTE W/DOPPLER COMPLETE: CPT | Mod: 26

## 2020-01-31 RX ORDER — SODIUM CHLORIDE 9 MG/ML
1000 INJECTION, SOLUTION INTRAVENOUS
Refills: 0 | Status: DISCONTINUED | OUTPATIENT
Start: 2020-02-01 | End: 2020-02-01

## 2020-01-31 RX ORDER — GABAPENTIN 400 MG/1
1200 CAPSULE ORAL THREE TIMES A DAY
Refills: 0 | Status: DISCONTINUED | OUTPATIENT
Start: 2020-01-31 | End: 2020-02-01

## 2020-01-31 RX ORDER — NALOXEGOL OXALATE 12.5 MG/1
12.5 TABLET, FILM COATED ORAL DAILY
Refills: 0 | Status: DISCONTINUED | OUTPATIENT
Start: 2020-01-31 | End: 2020-02-01

## 2020-01-31 RX ORDER — OXYCODONE HYDROCHLORIDE 5 MG/1
15 TABLET ORAL EVERY 6 HOURS
Refills: 0 | Status: DISCONTINUED | OUTPATIENT
Start: 2020-01-31 | End: 2020-02-01

## 2020-01-31 RX ORDER — OXYCODONE HYDROCHLORIDE 5 MG/1
60 TABLET ORAL EVERY 12 HOURS
Refills: 0 | Status: DISCONTINUED | OUTPATIENT
Start: 2020-01-31 | End: 2020-02-01

## 2020-01-31 RX ORDER — SODIUM CHLORIDE 9 MG/ML
1000 INJECTION, SOLUTION INTRAVENOUS
Refills: 0 | Status: DISCONTINUED | OUTPATIENT
Start: 2020-01-31 | End: 2020-01-31

## 2020-01-31 RX ADMIN — OXYCODONE HYDROCHLORIDE 40 MILLIGRAM(S): 5 TABLET ORAL at 07:25

## 2020-01-31 RX ADMIN — GABAPENTIN 400 MILLIGRAM(S): 400 CAPSULE ORAL at 05:15

## 2020-01-31 RX ADMIN — OXYCODONE HYDROCHLORIDE 15 MILLIGRAM(S): 5 TABLET ORAL at 11:48

## 2020-01-31 RX ADMIN — GABAPENTIN 1200 MILLIGRAM(S): 400 CAPSULE ORAL at 21:11

## 2020-01-31 RX ADMIN — OXYCODONE HYDROCHLORIDE 60 MILLIGRAM(S): 5 TABLET ORAL at 18:24

## 2020-01-31 RX ADMIN — NALOXEGOL OXALATE 12.5 MILLIGRAM(S): 12.5 TABLET, FILM COATED ORAL at 13:37

## 2020-01-31 RX ADMIN — OXYCODONE HYDROCHLORIDE 15 MILLIGRAM(S): 5 TABLET ORAL at 12:45

## 2020-01-31 RX ADMIN — OXYCODONE HYDROCHLORIDE 15 MILLIGRAM(S): 5 TABLET ORAL at 21:12

## 2020-01-31 RX ADMIN — OXYCODONE HYDROCHLORIDE 40 MILLIGRAM(S): 5 TABLET ORAL at 05:14

## 2020-01-31 RX ADMIN — Medication 166.67 MILLIGRAM(S): at 21:12

## 2020-01-31 RX ADMIN — OXYCODONE HYDROCHLORIDE 60 MILLIGRAM(S): 5 TABLET ORAL at 19:15

## 2020-01-31 RX ADMIN — OXYCODONE HYDROCHLORIDE 15 MILLIGRAM(S): 5 TABLET ORAL at 21:42

## 2020-01-31 RX ADMIN — GABAPENTIN 1200 MILLIGRAM(S): 400 CAPSULE ORAL at 13:35

## 2020-01-31 NOTE — CONSULT NOTE ADULT - SUBJECTIVE AND OBJECTIVE BOX
Patient is a 44y old  Female who presents with a chief complaint of Back pain. (31 Jan 2020 06:19)      HPI:  43 y/o Female complains of upper back pain for 1 day. Patient had T8-L2 PSF performed in 2019 for infection. Since that time, she notes that she has presented to outside hospitals and had bilateral septic knee I&Ds performed, followed by periods of time in which she has been intermittently compliant with her prescribed antibiotics. Patient states that the thoracic pain appeared suddenly without provocation and felt like a "strain or pull". Patient denies numbness, paresthesias, head strike, loss of consciousness, change in bowel/bladder continence, saddle anesthesia, or any other signs/symptoms at this time.    Complex Care: Description: 44 yo F with LONG, complicated hospitalization (>100 days), long h/o IVDU, EtOH abuse, chronic pain, presented with sepsis, respiratory failure, intubated, found to have MRSA bacteremia, OM, vertebral abscess, multi-level discitis, s/p spinal surgery.  Pt. has been on methadone in the past, sober period at one point.  Restarted methadone on this admission.  60mg bid.  Pt. doing well on current pain regimen, weaned from opiates.  Referred to outpatient methadone clinic, [facility].  Had originally planned to go to Mother's home but Mother would not take her because of methadone (believes its still a way of getting high). Pt. is going to live with a friend. Declined interviews for inpatient rehab.  	- discharged on two weeks of zyvox, then change to Daptomycin 100mg po bid for one year, then daily dosing suppressive lifelong as       PAST MEDICAL & SURGICAL HISTORY:  History of pancreatitis  History of alcoholic hepatitis  ETOH abuse  Drug abuse  History of back surgery  C Section      MEDICATIONS  (STANDING):  gabapentin 400 milliGRAM(s) Oral three times a day  heparin  Injectable 5000 Unit(s) SubCutaneous every 12 hours  oxyCODONE  ER Tablet 40 milliGRAM(s) Oral every 12 hours  vancomycin  IVPB 1250 milliGRAM(s) IV Intermittent every 8 hours    MEDICATIONS  (PRN):  acetaminophen   Tablet .. 650 milliGRAM(s) Oral every 6 hours PRN Mild Pain (1 - 3)  ALBUTerol    90 MICROgram(s) HFA Inhaler 2 Puff(s) Inhalation every 6 hours PRN Shortness of Breath and/or Wheezing  ketorolac   Injectable 30 milliGRAM(s) IV Push every 6 hours PRN Severe Pain (7 - 10)      Allergies    penicillin (Short breath; Hives)    Intolerances    COMPLEX CARE (Unknown)      VITALS:    Vital Signs Last 24 Hrs  T(C): 36.3 (30 Jan 2020 23:49), Max: 36.5 (30 Jan 2020 16:57)  T(F): 97.3 (30 Jan 2020 23:49), Max: 97.7 (30 Jan 2020 16:57)  HR: 75 (30 Jan 2020 23:49) (75 - 79)  BP: 96/61 (30 Jan 2020 23:49) (96/61 - 133/76)  BP(mean): --  RR: 18 (30 Jan 2020 23:49) (16 - 18)  SpO2: 98% (30 Jan 2020 23:49) (97% - 99%)    LABS:                          9.9    6.56  )-----------( 234      ( 30 Jan 2020 07:55 )             31.9       01-30    139  |  108  |  19  ----------------------------<  120<H>  4.1   |  26  |  0.56    Ca    9.8      30 Jan 2020 07:55    TPro  7.6  /  Alb  2.8<L>  /  TBili  0.2  /  DBili  x   /  AST  13<L>  /  ALT  13  /  AlkPhos  153<H>  01-29      CAPILLARY BLOOD GLUCOSE          PT/INR - ( 30 Jan 2020 08:09 )   PT: 12.8 sec;   INR: 1.14 ratio         PTT - ( 30 Jan 2020 08:09 )  PTT:32.1 sec    LOWER EXTREMITY PHYSICAL EXAM:    Vasular: DP/PT 2/4, B/L, CFT <3 seconds B/L, Temperature gradient warm B/L.   Neuro: Epicritic sensation intact to b/l feet  Skin: L foot post op scar to dorsomedial 1st MPJ, consistent w/ surgical history, small scab to distal aspect of scar, superficial no signs of infection. the rest of the incision site is well healed

## 2020-01-31 NOTE — PROGRESS NOTE ADULT - SUBJECTIVE AND OBJECTIVE BOX
Patient is a 44y old  Female who presents with a chief complaint of Back pain. (31 Jan 2020 10:50)      INTERVAL HPI/OVERNIGHT EVENTS: none, denies any cp or sob     MEDICATIONS  (STANDING):  gabapentin 1200 milliGRAM(s) Oral three times a day  lactated ringers. 1000 milliLiter(s) (75 mL/Hr) IV Continuous <Continuous>  naloxegol 12.5 milliGRAM(s) Oral daily  oxyCODONE  ER Tablet 60 milliGRAM(s) Oral every 12 hours  vancomycin  IVPB 1250 milliGRAM(s) IV Intermittent every 8 hours    MEDICATIONS  (PRN):  acetaminophen   Tablet .. 650 milliGRAM(s) Oral every 6 hours PRN Mild Pain (1 - 3)  ALBUTerol    90 MICROgram(s) HFA Inhaler 2 Puff(s) Inhalation every 6 hours PRN Shortness of Breath and/or Wheezing  ketorolac   Injectable 30 milliGRAM(s) IV Push every 6 hours PRN Severe Pain (7 - 10)  oxyCODONE    IR 15 milliGRAM(s) Oral every 6 hours PRN Severe Pain (7 - 10)      Allergies    penicillin (Short breath; Hives)    Intolerances    COMPLEX CARE (Unknown)      REVIEW OF SYSTEMS:  CONSTITUTIONAL: No fever, weight loss, or fatigue  EYES: No eye pain, visual disturbances, or discharge  ENMT:  No difficulty hearing, tinnitus, vertigo; No sinus or throat pain  NECK: No pain or stiffness  RESPIRATORY: No cough, wheezing, chills or hemoptysis; No shortness of breath  CARDIOVASCULAR: No chest pain, palpitations, dizziness, or leg swelling  GASTROINTESTINAL: No abdominal or epigastric pain. No nausea, vomiting, or hematemesis; No diarrhea or constipation. No melena or hematochezia.  GENITOURINARY: No dysuria, frequency, hematuria, or incontinence  NEUROLOGICAL: No headaches, memory loss, loss of strength, numbness, or tremors  SKIN: No itching, burning, rashes, or lesions   LYMPH NODES: No enlarged glands  ENDOCRINE: No heat or cold intolerance; No hair loss  PSYCHIATRIC: No depression, anxiety, mood swings, or difficulty sleeping  HEME/LYMPH: No easy bruising, or bleeding gums  ALLERGY AND IMMUNOLOGIC: No hives or eczema    Vital Signs Last 24 Hrs  T(C): 36.7 (31 Jan 2020 11:26), Max: 36.7 (31 Jan 2020 11:26)  T(F): 98 (31 Jan 2020 11:26), Max: 98 (31 Jan 2020 11:26)  HR: 79 (31 Jan 2020 11:26) (75 - 79)  BP: 121/78 (31 Jan 2020 11:26) (96/61 - 123/70)  BP(mean): --  RR: 17 (31 Jan 2020 11:26) (16 - 18)  SpO2: 97% (31 Jan 2020 11:26) (97% - 99%)    PHYSICAL EXAM:  GENERAL: NAD, well-groomed, well-developed  HEAD:  Atraumatic, Normocephalic  EYES: EOMI, PERRLA, conjunctiva and sclera clear  ENMT: No tonsillar erythema, exudates, or enlargement; Moist mucous membranes, Good dentition, No lesions  NECK: Supple, No JVD, Normal thyroid  NERVOUS SYSTEM:  Alert & Oriented X3, Good concentration; Motor Strength 3/5 B/L RLE and lower extremities;   CHEST/LUNG: Clear to percussion bilaterally; No rales, rhonchi, wheezing, or rubs  HEART: Regular rate and rhythm; No murmurs, rubs, or gallops  ABDOMEN: Soft, Nontender, Nondistended; Bowel sounds present  EXTREMITIES:  2+ Peripheral Pulses, No clubbing, cyanosis, or edema  LYMPH: No lymphadenopathy noted  SKIN: No rashes or lesions    LABS:                        10.3   5.30  )-----------( 244      ( 31 Jan 2020 12:42 )             32.8     01-31    141  |  107  |  18  ----------------------------<  144<H>  3.8   |  29  |  0.61    Ca    9.2      31 Jan 2020 12:42    TPro  7.6  /  Alb  2.8<L>  /  TBili  0.2  /  DBili  x   /  AST  13<L>  /  ALT  13  /  AlkPhos  153<H>  01-29    PT/INR - ( 31 Jan 2020 12:42 )   PT: 12.6 sec;   INR: 1.12 ratio         PTT - ( 31 Jan 2020 12:42 )  PTT:32.3 sec    CAPILLARY BLOOD GLUCOSE          RADIOLOGY & ADDITIONAL TESTS:    Imaging Personally Reviewed:  [ ] YES  [ ] NO    Consultant(s) Notes Reviewed:  [ ] YES  [ ] NO    Care Discussed with Consultants/Other Providers [ ] YES  [ ] NO

## 2020-01-31 NOTE — PRE-OP CHECKLIST - WEIGHT IN LBS
Reason for Admission:   Elevated PSA and BPH with urinary obstruction                   RRAT Score: 8                    Plan for utilizing home health:   na                       Likelihood of Readmission:  hlow                         Transition of Care Plan:    Home  Interviewed patient, he was independent prior to admission and agrees  to share his discharge information with his friend Lloyd oCrrea . He see Dr Jorge King for his primary care needs. Demographic information verified. His discharge plan is to return home. Patient and/or next of kin has been given and has signed the [facility] Outpatient Observation  Notification letter and all questions answered. Copy of this notice given to patient and copy placed on chart. Patient and/or next of kin has been given the Outpatient Observation Information and Notification letter and all questions answered  Care Management Interventions  PCP Verified by CM: Yes  Palliative Care Criteria Met (RRAT>21 & CHF Dx)?: No  Mode of Transport at Discharge: Other (see comment) (friend)  Hospital Transport Time of Discharge: 1000  Transition of Care Consult (CM Consult):  Other (home)  MyChart Signup: No  Discharge Durable Medical Equipment: No  Health Maintenance Reviewed: Yes  Physical Therapy Consult: No  Occupational Therapy Consult: No  Speech Therapy Consult: No  Current Support Network: Lives Alone  Confirm Follow Up Transport: Friends  Plan discussed with Pt/Family/Caregiver: Yes  [place] Resource Information Provided?: No  Discharge Location  Discharge Placement: Home 191.1

## 2020-01-31 NOTE — CONSULT NOTE ADULT - SUBJECTIVE AND OBJECTIVE BOX
CHIEF COMPLAINT:  Patient is a 44y old  Female who presents with a chief complaint of Back pain      HPI:  45 y/o Female complains of upper back pain for 1 day. Patient had T8-L2 PSF performed in 2019 for infection. Since that time, she notes that she has presented to outside hospitals and had bilateral septic knee I&Ds performed, followed by periods of time in which she has been intermittently compliant with her prescribed antibiotics. Patient states that the thoracic pain appeared suddenly without provocation and felt like a "strain or pull". Patient denies numbness, paresthesias, head strike, loss of consciousness, change in bowel/bladder continence, saddle anesthesia, or any other signs/symptoms at this time.      Complex Care: Description: 44 yo F with LONG, complicated hospitalization (>100 days), long h/o IVDU, EtOH abuse, chronic pain, presented with sepsis, respiratory failure, intubated, found to have MRSA bacteremia, OM, vertebral abscess, multi-level discitis, s/p spinal surgery.  Pt. has been on methadone in the past, sober period at one point.  Restarted methadone on this admission.  60mg bid.  Pt. doing well on current pain regimen, weaned from opiates.  Referred to outpatient methadone clinic, [facility].  Had originally planned to go to Mother's home but Mother would not take her because of methadone (believes its still a way of getting high). Pt. is going to live with a friend. Declined interviews for inpatient rehab.  	- discharged on two weeks of zyvox, then change to Daptomycin 100mg po bid for one year, then daily dosing suppressive lifelong as per ID (Dr. Garcia)  	- orthospine - Dr. Leslie  	- PCP - referral made to Dr. Sumner.  	HIGH RISK for relapse  	Avoid narcotics for pain management (30 Jan 2020 02:36)    Going for back surgery revision with concern of osteomyelitis. Preserved LVEF and no clear evidence vegetations on echo.     ALLERGIES:  penicillin (Short breath; Hives)      Home Medications:  acetaminophen 500 mg oral tablet: 2 tab(s) orally every 8 hours (07 Jun 2019 13:57)  ascorbic acid 500 mg oral tablet: 1 tab(s) orally 2 times a day (07 Jun 2019 14:19)  calcium-vitamin D 500 mg-200 intl units oral tablet: 1 tab(s) orally 3 times a day (07 Jun 2019 14:19)  folic acid 1 mg oral tablet: 1 tab(s) orally once a day (07 Jun 2019 14:19)  methadone 10 mg oral tablet: 6 tab(s) orally every 12 hours (07 Jun 2019 13:57)  Multiple Vitamins oral tablet: 1 tab(s) orally once a day (07 Jun 2019 14:19)    PAST MEDICAL & SURGICAL HISTORY:  History of pancreatitis  History of alcoholic hepatitis  ETOH abuse  Drug abuse  History of back surgery  C Section      FAMILY HISTORY:  Family history unknown      SOCIAL HISTORY:    REVIEW OF SYSTEMS:  General:  No wt loss, fevers, chills, night sweats  Eyes:  Good vision, no reported pain  ENT:  No sore throat, pain, runny nose, dysphagia  CV:  No pain, palpitations, hypo/hypertension  Resp:  No dyspnea, cough, tachypnea, wheezing  GI:  No pain, nausea, vomiting, diarrhea, constipation  :  No pain, bleeding, incontinence, nocturia  Muscle:  No pain, weakness  Breast:  No pain, abscess, mass, discharge  Neuro:  No weakness, tingling, memory problems  Psych:  No fatigue, insomnia, mood problems, depression  Endocrine:  No polyuria, polydipsia, cold/heat intolerance  Heme:  No petechiae, ecchymosis, easy bruisability  Skin:  No rash, edema    PHYSICAL EXAM:  Vital Signs:  Vital Signs Last 24 Hrs  T(C): 36.7 (31 Jan 2020 11:26), Max: 36.7 (31 Jan 2020 11:26)  T(F): 98 (31 Jan 2020 11:26), Max: 98 (31 Jan 2020 11:26)  HR: 79 (31 Jan 2020 11:26) (75 - 79)  BP: 121/78 (31 Jan 2020 11:26) (96/61 - 123/70)  RR: 17 (31 Jan 2020 11:26) (16 - 18)  SpO2: 97% (31 Jan 2020 11:26) (97% - 99%)  I&O's Summary    30 Jan 2020 07:01  -  31 Jan 2020 07:00  --------------------------------------------------------  IN: 1000 mL / OUT: 450 mL / NET: 550 mL      I&O's Detail    30 Jan 2020 07:01  -  31 Jan 2020 07:00  --------------------------------------------------------  IN:    Oral Fluid: 700 mL    Solution: 50 mL    Solution: 250 mL  Total IN: 1000 mL    OUT:    Voided: 450 mL  Total OUT: 450 mL    Total NET: 550 mL    Constitutional: well developed, normal appearance, well groomed, well nourished, no deformities and no acute distress.   Eyes: the conjunctiva exhibited no abnormalities and the eyelids demonstrated no xanthelasmas.   HEENT: normal oral mucosa, no oral pallor and no oral cyanosis.   Neck: normal jugular venous A waves present, normal jugular venous V waves present and no jugular venous valentin A waves.   Pulmonary: no respiratory distress, normal respiratory rhythm and effort, no accessory muscle use and lungs were clear to auscultation bilaterally.   Cardiovascular: heart rate and rhythm were normal, normal S1 and S2 and no murmur, gallop, rub, heave or thrill are present.   Abdomen: soft, non-tender, no hepato-splenomegaly and no abdominal mass palpated.   Musculoskeletal: the gait could not be assessed..   Extremities: no clubbing of the fingernails, no localized cyanosis, no petechial hemorrhages and no ischemic changes.   Skin: normal skin color and pigmentation, no rash, no venous stasis, no skin lesions, no skin ulcer and no xanthoma was observed.   Psychiatric: oriented to person, place, and time, the affect was normal, the mood was normal and not feeling anxious.      LABORATORY:                          10.3   5.30  )-----------( 244      ( 31 Jan 2020 12:42 )             32.8     01-31    141  |  107  |  18  ----------------------------<  144<H>  3.8   |  29  |  0.61    Ca    9.2      31 Jan 2020 12:42    TPro  7.6  /  Alb  2.8<L>  /  TBili  0.2  /  DBili  x   /  AST  13<L>  /  ALT  13  /  AlkPhos  153<H>  01-29    LIVER FUNCTIONS - ( 29 Jan 2020 20:30 )  Alb: 2.8 g/dL / Pro: 7.6 gm/dL / ALK PHOS: 153 U/L / ALT: 13 U/L / AST: 13 U/L / GGT: x           PT/INR - ( 31 Jan 2020 12:42 )   PT: 12.6 sec;   INR: 1.12 ratio         PTT - ( 31 Jan 2020 12:42 )  PTT:32.3 sec        IMAGING:  < from: 12 Lead ECG (03.22.19 @ 11:05) >  Normal sinus rhythm  Normal ECG  When compared with ECG of 20-MAR-2019 00:45,  No significant change was found    < end of copied text >      < from: Xray Chest 1 View- PORTABLE-Urgent (01.30.20 @ 14:28) >  EXAM:  XR CHEST PORTABLE URGENT 1V                            PROCEDURE DATE:  01/30/2020          INTERPRETATION:  XR CHEST URGENT    Single AP view    HISTORY:  Back pain    Comparison: Chest x-ray 5/8/2019    Spinal fixation rods.  The cardiac silhouette is within normal limits. Shallow inspiration with grossly clear lungs. No pleural abnormality.    IMPRESSION: No acute disease.    < end of copied text >    < from: Xray Cervical Spine AP and Lateral Only (01.30.20 @ 14:32) >  IMPRESSION: Slight increased degenerative slippage of C4 on C5. Otherwise no finding of note or change.    < end of copied text >    < from: Xray Lumbosacral Spine 4 View (01.30.20 @ 14:29) >  IMPRESSION:    Preserved vertebral body height and alignment.      < end of copied text >    < from: CT Lumbar Spine w/wo IV Cont (01.30.20 @ 13:47) >    < from: TTE Echo Doppler w/o Cont (01.31.20 @ 12:33) >  Summary:   1. Left ventricular ejection fraction, by visual estimation, is 55 to 60%.   2. Normal global left ventricular systolic function.   3. Normal left ventricular internal cavity size.   4. Spectral Doppler shows impaired relaxation pattern of left ventricular myocardial filling (Grade I diastolic dysfunction).   5. There is no evidence of pericardial effusion.   6. Structurally normal mitral valve, with normal leaflet excursion.   7. Trace mitral valve regurgitation.   8. Mild-moderate tricuspid regurgitation.   9. Normal trileaflet aortic valve with normal opening.    < end of copied text >    IMPRESSION:  Posterior thoracic lumbar fusion from T8 to L2 as described above. Compression deformities of T7 and T8 vertebral bodies with endplate irregularities. Lucencies at the screw/bone interface of the T8 bilateral pedicle screws with left T8 screw projecting into the T7-T8 disc space and right T8 screw projecting into the T7 vertebral body. Nonspecific paraspinal edema. Loosening and/or infection of the T8 pedicle screws not excluded.    Refer to MRI exam for evaluation of the spinal canal/epidural space.    < end of copied text >    ASSESSMENT:  Going for back surgery revision with concern of osteomyelitis. Preserved LVEF and no clear evidence vegetations on echo.     PLAN:     MEDICATIONS  (STANDING):  gabapentin 1200 milliGRAM(s) Oral three times a day  lactated ringers. 1000 milliLiter(s) (75 mL/Hr) IV Continuous <Continuous>  naloxegol 12.5 milliGRAM(s) Oral daily  oxyCODONE  ER Tablet 60 milliGRAM(s) Oral every 12 hours  vancomycin  IVPB 1250 milliGRAM(s) IV Intermittent every 8 hours    From current cardiac standpoint and updated echo results, she may proceed ahead with ortho back surgery with routine hemodynamic monitoring perioperatively.    Patricio Lawton MD, FACC, TAMIKO, TERESITA, FACP  Director, Heart Failure Services  [facility]  , Department of Cardiology  [facility]

## 2020-01-31 NOTE — CONSULT NOTE ADULT - ASSESSMENT
43 yo F s/p L foot 1st MPJ resection, now healed  - Pt seen and evaluated  - L foot surgical site healing well, small superficial scab  - Can apply a bandaid to the scab, but does not need a dressing  - Surgical site healing as expected, no signs of infection, no erythema, no edema, no signs of an abscess or a deeper process  - No further podiatric workup required at this time  - Podiatry signing off, please reconsult as needed  - D/w attending

## 2020-01-31 NOTE — PROGRESS NOTE ADULT - SUBJECTIVE AND OBJECTIVE BOX
Patient is a 44y old  Female who presents with a chief complaint of Back pain. (01 Feb 2020 17:52)      INTERVAL HPI / OVERNIGHT EVENTS: doing ok ,was told pt was refusing antibiotics which she denies said IV line came out   being planned for midline ,also surgery likely today per pt ,back pain +    MEDICATIONS  (STANDING):  chlorhexidine 0.12% Liquid 15 milliLiter(s) Oral Mucosa every 12 hours  chlorhexidine 4% Liquid 1 Application(s) Topical <User Schedule>  fentaNYL   Infusion. 0.5 MICROgram(s)/kG/Hr (4.335 mL/Hr) IV Continuous <Continuous>  gabapentin 1200 milliGRAM(s) Oral three times a day  methadone    Tablet 40 milliGRAM(s) Oral daily  propofol Infusion 10 MICROgram(s)/kG/Min (5.202 mL/Hr) IV Continuous <Continuous>  vancomycin  IVPB 1250 milliGRAM(s) IV Intermittent every 8 hours    MEDICATIONS  (PRN):      Vital Signs Last 24 Hrs  stable    Review of systems:  General : no fever /chills,fatigue  CVS : no chest pain, palpitations  Lungs : no shortness of breath, cough  GI : no abdominal pain,vomiting, diarrhea   : no dysuria,hematuria        PHYSICAL EXAM:  General :NAD  Constitutional:  well-groomed, well-developed  Respiratory: CTAB/L  Cardiovascular: S1 and S2, RRR, no M/G/R  Gastrointestinal: BS+, soft, NT/ND  Extremities: No peripheral edema  Vascular: 2+ peripheral pulses  Skin: No rashes      LABS:              wbc wnl            MICROBIOLOGY:  RECENT CULTURES:  01-30 .Blood Blood-Peripheral XXXX XXXX   No growth to date.          RADIOLOGY & ADDITIONAL STUDIES:

## 2020-01-31 NOTE — PROCEDURE NOTE - NSPROCDETAILS_GEN_ALL_CORE
location identified, draped/prepped, sterile technique used/ultrasound assessment/ultrasound guidance/supine position

## 2020-01-31 NOTE — PROGRESS NOTE ADULT - PROBLEM SELECTOR PLAN 1
Has osteomyelitis/discitis, seen by ortho,   ID consult appreciated   blood cultures obtained, started on vanco  pain control. was on 90 mg Oxycontin BID at rehab, will decrease OTC and add prn

## 2020-01-31 NOTE — PROGRESS NOTE ADULT - SUBJECTIVE AND OBJECTIVE BOX
Patient seen and examined. Patient complains of upper back pain. Moving all extremities. No acute events overnight. No fevers or chills.    Vital Signs Last 24 Hrs  T(C): 36.3 (30 Jan 2020 23:49), Max: 36.5 (30 Jan 2020 16:57)  T(F): 97.3 (30 Jan 2020 23:49), Max: 97.7 (30 Jan 2020 16:57)  HR: 75 (30 Jan 2020 23:49) (73 - 79)  BP: 96/61 (30 Jan 2020 23:49) (96/61 - 133/76)  BP(mean): --  RR: 18 (30 Jan 2020 23:49) (16 - 18)  SpO2: 98% (30 Jan 2020 23:49) (97% - 99%)    PE:  Gen: NAD  Spine/extremities:  Spine incision well healed. Obvious kyphotic deformity at thoracic region. No erythema/ecchymosis/warmth. Diffuse TTP paraspinal muscles at thoracic spine. SILT C5-T1, L3-S1. Negative Babinski. Negative ankle clonus. Negative [place]. Capillary refill brisk.  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

## 2020-02-01 ENCOUNTER — RESULT REVIEW (OUTPATIENT)
Age: 45
End: 2020-02-01

## 2020-02-01 LAB
ANION GAP SERPL CALC-SCNC: 9 MMOL/L — SIGNIFICANT CHANGE UP (ref 5–17)
APTT BLD: 31.1 SEC — SIGNIFICANT CHANGE UP (ref 27.5–36.3)
BASE EXCESS BLDA CALC-SCNC: -2.1 MMOL/L — LOW (ref -2–2)
BLOOD GAS COMMENTS: SIGNIFICANT CHANGE UP
BLOOD GAS COMMENTS: SIGNIFICANT CHANGE UP
BLOOD GAS SOURCE: SIGNIFICANT CHANGE UP
BUN SERPL-MCNC: 21 MG/DL — SIGNIFICANT CHANGE UP (ref 7–23)
CALCIUM SERPL-MCNC: 9.6 MG/DL — SIGNIFICANT CHANGE UP (ref 8.5–10.1)
CHLORIDE SERPL-SCNC: 106 MMOL/L — SIGNIFICANT CHANGE UP (ref 96–108)
CO2 SERPL-SCNC: 25 MMOL/L — SIGNIFICANT CHANGE UP (ref 22–31)
CREAT SERPL-MCNC: 0.68 MG/DL — SIGNIFICANT CHANGE UP (ref 0.5–1.3)
GLUCOSE SERPL-MCNC: 114 MG/DL — HIGH (ref 70–99)
HCO3 BLDA-SCNC: 23 MMOL/L — SIGNIFICANT CHANGE UP (ref 21–29)
HCT VFR BLD CALC: 33.8 % — LOW (ref 34.5–45)
HGB BLD-MCNC: 10.7 G/DL — LOW (ref 11.5–15.5)
HOROWITZ INDEX BLDA+IHG-RTO: 100 — SIGNIFICANT CHANGE UP
INR BLD: 1.1 RATIO — SIGNIFICANT CHANGE UP (ref 0.88–1.16)
MCHC RBC-ENTMCNC: 25.8 PG — LOW (ref 27–34)
MCHC RBC-ENTMCNC: 31.7 GM/DL — LOW (ref 32–36)
MCV RBC AUTO: 81.4 FL — SIGNIFICANT CHANGE UP (ref 80–100)
NRBC # BLD: 0 /100 WBCS — SIGNIFICANT CHANGE UP (ref 0–0)
PCO2 BLDA: 40 MMHG — SIGNIFICANT CHANGE UP (ref 32–46)
PH BLD: 7.37 — SIGNIFICANT CHANGE UP (ref 7.35–7.45)
PLATELET # BLD AUTO: 256 K/UL — SIGNIFICANT CHANGE UP (ref 150–400)
PO2 BLDA: 310 MMHG — HIGH (ref 74–108)
POTASSIUM SERPL-MCNC: 4.2 MMOL/L — SIGNIFICANT CHANGE UP (ref 3.5–5.3)
POTASSIUM SERPL-SCNC: 4.2 MMOL/L — SIGNIFICANT CHANGE UP (ref 3.5–5.3)
PROTHROM AB SERPL-ACNC: 12.4 SEC — SIGNIFICANT CHANGE UP (ref 10–12.9)
RBC # BLD: 4.15 M/UL — SIGNIFICANT CHANGE UP (ref 3.8–5.2)
RBC # FLD: 17.8 % — HIGH (ref 10.3–14.5)
SAO2 % BLDA: 100 % — HIGH (ref 92–96)
SODIUM SERPL-SCNC: 140 MMOL/L — SIGNIFICANT CHANGE UP (ref 135–145)
WBC # BLD: 6.91 K/UL — SIGNIFICANT CHANGE UP (ref 3.8–10.5)
WBC # FLD AUTO: 6.91 K/UL — SIGNIFICANT CHANGE UP (ref 3.8–10.5)

## 2020-02-01 PROCEDURE — 88300 SURGICAL PATH GROSS: CPT | Mod: 26

## 2020-02-01 PROCEDURE — 99233 SBSQ HOSP IP/OBS HIGH 50: CPT

## 2020-02-01 RX ORDER — ASCORBIC ACID 60 MG
500 TABLET,CHEWABLE ORAL
Refills: 0 | Status: DISCONTINUED | OUTPATIENT
Start: 2020-02-02 | End: 2020-02-13

## 2020-02-01 RX ORDER — CHLORHEXIDINE GLUCONATE 213 G/1000ML
15 SOLUTION TOPICAL EVERY 12 HOURS
Refills: 0 | Status: DISCONTINUED | OUTPATIENT
Start: 2020-02-01 | End: 2020-02-02

## 2020-02-01 RX ORDER — FENTANYL CITRATE 50 UG/ML
100 INJECTION INTRAVENOUS ONCE
Refills: 0 | Status: DISCONTINUED | OUTPATIENT
Start: 2020-02-01 | End: 2020-02-01

## 2020-02-01 RX ORDER — CHLORHEXIDINE GLUCONATE 213 G/1000ML
1 SOLUTION TOPICAL
Refills: 0 | Status: DISCONTINUED | OUTPATIENT
Start: 2020-02-01 | End: 2020-02-02

## 2020-02-01 RX ORDER — ONDANSETRON 8 MG/1
4 TABLET, FILM COATED ORAL EVERY 6 HOURS
Refills: 0 | Status: DISCONTINUED | OUTPATIENT
Start: 2020-02-02 | End: 2020-02-13

## 2020-02-01 RX ORDER — GABAPENTIN 400 MG/1
1200 CAPSULE ORAL THREE TIMES A DAY
Refills: 0 | Status: DISCONTINUED | OUTPATIENT
Start: 2020-02-01 | End: 2020-02-13

## 2020-02-01 RX ORDER — VANCOMYCIN HCL 1 G
1250 VIAL (EA) INTRAVENOUS EVERY 8 HOURS
Refills: 0 | Status: DISCONTINUED | OUTPATIENT
Start: 2020-02-01 | End: 2020-02-03

## 2020-02-01 RX ORDER — FENTANYL CITRATE 50 UG/ML
0.5 INJECTION INTRAVENOUS
Qty: 2500 | Refills: 0 | Status: DISCONTINUED | OUTPATIENT
Start: 2020-02-01 | End: 2020-02-02

## 2020-02-01 RX ORDER — PROPOFOL 10 MG/ML
10 INJECTION, EMULSION INTRAVENOUS
Qty: 1000 | Refills: 0 | Status: DISCONTINUED | OUTPATIENT
Start: 2020-02-01 | End: 2020-02-02

## 2020-02-01 RX ORDER — SENNA PLUS 8.6 MG/1
2 TABLET ORAL AT BEDTIME
Refills: 0 | Status: DISCONTINUED | OUTPATIENT
Start: 2020-02-02 | End: 2020-02-13

## 2020-02-01 RX ORDER — METHADONE HYDROCHLORIDE 40 MG/1
40 TABLET ORAL DAILY
Refills: 0 | Status: DISCONTINUED | OUTPATIENT
Start: 2020-02-01 | End: 2020-02-02

## 2020-02-01 RX ADMIN — PROPOFOL 5.2 MICROGRAM(S)/KG/MIN: 10 INJECTION, EMULSION INTRAVENOUS at 20:15

## 2020-02-01 RX ADMIN — FENTANYL CITRATE 4.33 MICROGRAM(S)/KG/HR: 50 INJECTION INTRAVENOUS at 17:45

## 2020-02-01 RX ADMIN — Medication 166.67 MILLIGRAM(S): at 07:11

## 2020-02-01 RX ADMIN — FENTANYL CITRATE 100 MICROGRAM(S): 50 INJECTION INTRAVENOUS at 17:40

## 2020-02-01 RX ADMIN — OXYCODONE HYDROCHLORIDE 60 MILLIGRAM(S): 5 TABLET ORAL at 07:07

## 2020-02-01 RX ADMIN — GABAPENTIN 1200 MILLIGRAM(S): 400 CAPSULE ORAL at 07:07

## 2020-02-01 RX ADMIN — CHLORHEXIDINE GLUCONATE 15 MILLILITER(S): 213 SOLUTION TOPICAL at 18:48

## 2020-02-01 RX ADMIN — FENTANYL CITRATE 100 MICROGRAM(S): 50 INJECTION INTRAVENOUS at 17:53

## 2020-02-01 RX ADMIN — Medication 166.67 MILLIGRAM(S): at 18:48

## 2020-02-01 RX ADMIN — GABAPENTIN 1200 MILLIGRAM(S): 400 CAPSULE ORAL at 22:41

## 2020-02-01 RX ADMIN — CHLORHEXIDINE GLUCONATE 1 APPLICATION(S): 213 SOLUTION TOPICAL at 17:53

## 2020-02-01 RX ADMIN — METHADONE HYDROCHLORIDE 40 MILLIGRAM(S): 40 TABLET ORAL at 20:15

## 2020-02-01 RX ADMIN — PROPOFOL 5.2 MICROGRAM(S)/KG/MIN: 10 INJECTION, EMULSION INTRAVENOUS at 17:45

## 2020-02-01 NOTE — CONSULT NOTE ADULT - SUBJECTIVE AND OBJECTIVE BOX
45 y/o Female ,IV drug abuser, on methadone and still using IV drugs at last admission , chronic pain,ETOH abuse  was admitted from 19 to 19  with a, complicated hospitalization course long which caused sepsis and acute respiratory failure at presentation sec to MRSA bacteremia with septic emboli and  eventually lead to  OM, vertebral abscess, multi-level from T8 to L2  discitis, s/p spinal surgery T8 to L2 PSF sec to persistent bacteremia for initial 12 days of the stay. She had LIZ which showed IE of TV as well on admission. During the hospitalization she was initally on vanco which had to be switched dapto as pt continued to have progression of diskitis /OM of spine >She was eventually discharged on PO zyvox and then plan was to changed to PO doxy for life. Since then pt was not complaint with her regimen and got admitted in July at [facility] and underwent both knee aspiration for possible septic arthritis doesn't recall if she had positive c/s but was treated with 6 weeks of iv antibiotics .IN november had admission [facility] and per pt had hip aspitration with positive c/s and was treated with six weeks of IV antibiotics and switched to PO doxy and [facility] She presented to the hospital from there with c/o thoracic pain appeared suddenly without provocation and felt like a "strain or pull". Patient now post op for osteomyelitis, diskitis t2 t-10 revision laminectomy and fusion. Removal and replacement of hardware. Patient received intubated. OGT placed. Patient awoke following commands.       Allergies: penicillin (Short breath; Hives)    Intolerances    MEDICATIONS  (STANDING):  chlorhexidine 0.12% Liquid 15 milliLiter(s) Oral Mucosa every 12 hours  chlorhexidine 4% Liquid 1 Application(s) Topical <User Schedule>  fentaNYL   Infusion. 0.5 MICROgram(s)/kG/Hr (4.335 mL/Hr) IV Continuous <Continuous>  propofol Infusion 10 MICROgram(s)/kG/Min (5.202 mL/Hr) IV Continuous <Continuous>  vancomycin  IVPB 1250 milliGRAM(s) IV Intermittent every 8 hours    MEDICATIONS  (PRN):       Daily Weight in k.2 (2020 05:10)    Drug Dosing Weight  Height (cm): 162.6 (2020 13:21)  Weight (kg): 88.7 (2020 17:40)  BMI (kg/m2): 33.5 (2020 17:40)  BSA (m2): 1.94 (2020 17:40)    PAST MEDICAL & SURGICAL HISTORY:  History of pancreatitis  History of alcoholic hepatitis  ETOH abuse  Drug abuse  History of back surgery  C Section        REVIEW OF SYSTEMS:    CONSTITUTIONAL: No fever, weight loss, or fatigue  EYES: No eye pain, visual disturbances, or discharge  ENMT:  No difficulty hearing, tinnitus, vertigo; No sinus or throat pain  NECK: No pain or stiffness  BREASTS: No pain, masses, or nipple discharge  RESPIRATORY: No cough, wheezing, chills or hemoptysis; No shortness of breath  CARDIOVASCULAR: No chest pain, palpitations, dizziness, or leg swelling  GASTROINTESTINAL: No abdominal or epigastric pain. No nausea, vomiting, or hematemesis; No diarrhea or constipation. No melena or hematochezia.  GENITOURINARY: No dysuria, frequency, hematuria, or incontinence  NEUROLOGICAL: No headaches, memory loss, loss of strength, numbness, or tremors  SKIN: No itching, burning, rashes, or lesions   LYMPH NODES: No enlarged glands  ENDOCRINE: No heat or cold intolerance; No hair loss  MUSCULOSKELETAL: No joint pain or swelling; No muscle, back, or extremity pain  PSYCHIATRIC: No depression, anxiety, mood swings, or difficulty sleeping  HEME/LYMPH: No easy bruising, or bleeding gums  ALLERGY AND IMMUNOLOGIC: No hives or eczema      Mode: AC/ CMV (Assist Control/ Continuous Mandatory Ventilation)  RR (machine): 16  TV (machine): 450  FiO2: 100  PEEP: 5  ITime: 1  MAP: 13  PIP: 27      ICU Vital Signs Last 24 Hrs  T(C): 37.7 (2020 17:40), Max: 37.7 (2020 17:40)  T(F): 99.9 (2020 17:40), Max: 99.9 (2020 17:40)  HR: 103 (2020 18:00) (71 - 108)  BP: 158/100 (2020 17:55) (104/70 - 165/113)  BP(mean): 117 (2020 17:55) (117 - 128)  ABP: --  ABP(mean): --  RR: 16 (2020 18:00) (16 - 23)  SpO2: 100% (2020 18:00) (96% - 100%)          I&O's Detail    2020 07:01  -  2020 18:04  --------------------------------------------------------  IN:    fentaNYL Infusion.: 8.9 mL    propofol Infusion: 8 mL    Solution: 250 mL  Total IN: 266.9 mL    OUT:  Total OUT: 0 mL    Total NET: 266.9 mL          PHYSICAL EXAM:    GENERAL: NAD, well-groomed, well-developed  HEAD:  Atraumatic, Normocephalic  EYES: EOMI, PERRLA, conjunctiva and sclera clear  ENMT: No tonsillar erythema, exudates, or enlargement; Moist mucous membranes, Good dentition, No lesions  NECK: Supple, No JVD, Normal thyroid  NERVOUS SYSTEM:  Alert & Oriented X3, Good concentration; Motor Strength 5/5 B/L upper and lower extremities; DTRs 2+ intact and symmetric  CHEST/LUNG: Clear to percussion bilaterally; No rales, rhonchi, wheezing, or rubs  HEART: Regular rate and rhythm; No murmurs, rubs, or gallops  ABDOMEN: Soft, Nontender, Nondistended; Bowel sounds present  EXTREMITIES:  2+ Peripheral Pulses, No clubbing, cyanosis, or edema  LYMPH: No lymphadenopathy noted  SKIN: No rashes or lesions    LABS:  CBC Full  -  ( 2020 06:53 )  WBC Count : 6.91 K/uL  RBC Count : 4.15 M/uL  Hemoglobin : 10.7 g/dL  Hematocrit : 33.8 %  Platelet Count - Automated : 256 K/uL  Mean Cell Volume : 81.4 fl  Mean Cell Hemoglobin : 25.8 pg  Mean Cell Hemoglobin Concentration : 31.7 gm/dL  Auto Neutrophil # : x  Auto Lymphocyte # : x  Auto Monocyte # : x  Auto Eosinophil # : x  Auto Basophil # : x  Auto Neutrophil % : x  Auto Lymphocyte % : x  Auto Monocyte % : x  Auto Eosinophil % : x  Auto Basophil % : x    02-    140  |  106  |  21  ----------------------------<  114<H>  4.2   |  25  |  0.68    Ca    9.6      2020 06:53      CAPILLARY BLOOD GLUCOSE        PT/INR - ( 2020 06:53 )   PT: 12.4 sec;   INR: 1.10 ratio         PTT - ( 2020 06:53 )  PTT:31.1 sec        Culture Results:   No growth to date. ( @ 00:39)  Culture Results:   No growth to date. ( @ 00:39) 45 y/o Female ,IV drug abuser, on methadone and still using IV drugs at last admission , chronic pain,ETOH abuse  was admitted from 19 to 19  with a, complicated hospitalization course long which caused sepsis and acute respiratory failure at presentation sec to MRSA bacteremia with septic emboli and  eventually lead to  OM, vertebral abscess, multi-level from T8 to L2  discitis, s/p spinal surgery T8 to L2 PSF sec to persistent bacteremia for initial 12 days of the stay. She had LIZ which showed IE of TV as well on admission. During the hospitalization she was initally on vanco which had to be switched dapto as pt continued to have progression of diskitis /OM of spine >She was eventually discharged on PO zyvox and then plan was to changed to PO doxy for life. Since then pt was not complaint with her regimen and got admitted in July at [facility] and underwent both knee aspiration for possible septic arthritis doesn't recall if she had positive c/s but was treated with 6 weeks of iv antibiotics .IN november had admission [facility] and per pt had hip aspitration with positive c/s and was treated with six weeks of IV antibiotics and switched to PO doxy [facility] She presented to the hospital from there with c/o thoracic pain appeared suddenly without provocation and felt like a "strain or pull". Patient now post op for osteomyelitis, diskitis t2 t-10 revision laminectomy and fusion. Removal and replacement of hardware. Patient received 3 units PRBC in OR.  Patient received intubated. OGT placed. Patient awoke following commands.       Allergies: penicillin (Short breath; Hives)      MEDICATIONS  (STANDING):  chlorhexidine 0.12% Liquid 15 milliLiter(s) Oral Mucosa every 12 hours  chlorhexidine 4% Liquid 1 Application(s) Topical <User Schedule>  fentaNYL   Infusion. 0.5 MICROgram(s)/kG/Hr (4.335 mL/Hr) IV Continuous <Continuous>  propofol Infusion 10 MICROgram(s)/kG/Min (5.202 mL/Hr) IV Continuous <Continuous>  vancomycin  IVPB 1250 milliGRAM(s) IV Intermittent every 8 hours    MEDICATIONS  (PRN):       Daily Weight in k.2 (2020 05:10)    Drug Dosing Weight  Height (cm): 162.6 (2020 13:21)  Weight (kg): 88.7 (2020 17:40)  BMI (kg/m2): 33.5 (2020 17:40)  BSA (m2): 1.94 (2020 17:40)    PAST MEDICAL & SURGICAL HISTORY:  History of pancreatitis  History of alcoholic hepatitis  ETOH abuse  Drug abuse  History of back surgery  C Section        REVIEW OF SYSTEMS:    unable to assess 2/2 clinical condition    Mode: AC/ CMV (Assist Control/ Continuous Mandatory Ventilation)  RR (machine): 16  TV (machine): 450  FiO2: 100  PEEP: 5  ITime: 1  MAP: 13  PIP: 27      Vital Signs Last 24 Hrs  T(C): 37.7 (2020 17:40), Max: 37.7 (2020 17:40)  T(F): 99.9 (2020 17:40), Max: 99.9 (2020 17:40)  HR: 103 (2020 18:00) (71 - 108)  BP: 158/100 (2020 17:55) (104/70 - 165/113)  BP(mean): 117 (2020 17:55) (117 - 128)  RR: 16 (2020 18:00) (16 - 23)  SpO2: 100% (2020 18:00) (96% - 100%)          I&O's Detail    2020 07:01  -  2020 18:04  --------------------------------------------------------  IN:    fentaNYL Infusion.: 8.9 mL    propofol Infusion: 8 mL    Solution: 250 mL  Total IN: 266.9 mL    OUT:  Total OUT: 0 mL    Total NET: 266.9 mL          PHYSICAL EXAM:    GENERAL: Sedated   HEAD:  Atraumatic, Normocephalic  EYES: EOMI, PERRLA, conjunctiva and sclera clear  ENMT: ETT in place 7.5 ar 23 lip  NECK: Supple, No JVD, Normal thyroid  NERVOUS SYSTEM:  initially sedated but awoke and following commands , LITTLE's  CHEST/LUNG: Clear to percussion bilaterally; No rales, rhonchi, wheezing, or rubs  HEART: Regular rate and rhythm; No murmurs, rubs, or gallops  ABDOMEN: Soft, Nontender, Nondistended; Bowel sounds present  EXTREMITIES:  2+ Peripheral Pulses, No clubbing, cyanosis, or edema  LYMPH: No lymphadenopathy noted  SKIN: multiple blanchable and non blanchable areas noted face to knees anteriorly, Posterior back with blanchable erythema from neck to end of dressing         LABS:  CBC Full  -  ( 2020 06:53 )  WBC Count : 6.91 K/uL  RBC Count : 4.15 M/uL  Hemoglobin : 10.7 g/dL  Hematocrit : 33.8 %  Platelet Count - Automated : 256 K/uL  Mean Cell Volume : 81.4 fl  Mean Cell Hemoglobin : 25.8 pg  Mean Cell Hemoglobin Concentration : 31.7 gm/dL  Auto Neutrophil # : x  Auto Lymphocyte # : x  Auto Monocyte # : x  Auto Eosinophil # : x  Auto Basophil # : x  Auto Neutrophil % : x  Auto Lymphocyte % : x  Auto Monocyte % : x  Auto Eosinophil % : x  Auto Basophil % : x        140  |  106  |  21  ----------------------------<  114<H>  4.2   |  25  |  0.68    Ca    9.6      2020 06:53      CAPILLARY BLOOD GLUCOSE        PT/INR - ( 2020 06:53 )   PT: 12.4 sec;   INR: 1.10 ratio    PTT - ( 2020 06:53 )  PTT:31.1 sec        Culture Results:   No growth to date. ( @ 00:39)  Culture Results:   No growth to date. ( @ 00:39)

## 2020-02-01 NOTE — PROGRESS NOTE ADULT - ATTENDING COMMENTS
Patient is seen for ER admission.  Extensive discussion with patient.   She is well known to me as we treated her for 4 months during last admission at this hospital.  She did not follow up in the office.  She developed adjacent segment discitis/osteomyelitis it appears and leading to fractures at above the prior fusion.  Her prior fusion appears to have healed nicely.  Top screws due to fractures are loosened and will need to be replaced.  Goals of surgery to stabilize the fractures and improve her deformity.  She has baseline neurologic deficits and lower and upper extremity limitation due to multiple joint surgeries and spine surgeries from infection, septic joints, osteomyelitis, drug abuse.  she may need laminectomies or osteotomies based on change in motor status or to help improve alignment.  Most of her infection is in spine appears to have improved with exception of fractures above fusion.  She is likely going to need life long antibiotics.  Overall functional prognosis is guarded to poor with respect becoming independent in ADL's.  Goal of this surgery is to allow her to maintain sitting posture, prevent worsening of lower extremity function, exposure of hardware due to kyphosis.  Risks reviewed one more time.  Informed consent obtained.

## 2020-02-01 NOTE — CONSULT NOTE ADULT - ATTENDING COMMENTS
44F w/ IVDA (NOT on methadone), chronic pain, ETOH abuse. Had a prolonged hospitalization in 2019 with sepsis, MRSA bacteremia, septic emboli w/ OM, vertebral abscess, discitis s/p T8-L2 PSF, IE of TV as well. Eventually d/c'ed but non compliant with abx regimen. Has had several hospitalizations since for septic embolic events. Eventually d/c'ed to rehab. Presented from rehab w/ thoracic pain appeared suddenly without provocation. Found to have osteomyelitis, diskitis s/p t4-t10 revision laminectomy and fusion. Removal and replacement of hardware. Remained intubated post-procedure transferred to ICU for management. Extubated this morning. C/O severe pain, crying and yelling out loud. Stating she is having back pain and right hip pain as well. Exam is unremarkable otherwise. Of note, clarified w/ pt, she is not on chronic methadone but she took methadone in rehab since she felt like she was withdrawing, which could explain methadone on Utox.    - pain control - restart standing oxycontin; PRN dilaudid, oxycodone and tordal ordered; flexeril; lidocaine patch; gabapentin;  - bowel regimen ordered, anti-emetics  - monitor KIRILL drain  - continue w/ vancomycin; check vanc level before evening dose - appreciate ID input  - d/c milan  - xrays, MRI of hip as per ortho  - OOB to chair, PT, no weight bearing restrictions per ortho  - SCDs for DVT ppx    Likely can transfer to medical floor later this afternoon

## 2020-02-01 NOTE — PROGRESS NOTE ADULT - SUBJECTIVE AND OBJECTIVE BOX
Patient seen and examined. Patient complains of upper back pain. Moving all extremities. Pt agitated. No acute events overnight. No fevers or chills.    Vital Signs Last 24 Hrs  T(C): 36.9 (01 Feb 2020 00:37), Max: 36.9 (01 Feb 2020 00:37)  T(F): 98.4 (01 Feb 2020 00:37), Max: 98.4 (01 Feb 2020 00:37)  HR: 80 (01 Feb 2020 00:37) (75 - 80)  BP: 104/70 (01 Feb 2020 00:37) (104/70 - 126/70)  BP(mean): --  RR: 18 (01 Feb 2020 00:37) (17 - 18)  SpO2: 96% (01 Feb 2020 00:37) (95% - 97%)    PE:  Gen: NAD  Spine/extremities:  Spine incision well healed. Obvious kyphotic deformity at thoracic region. No erythema/ecchymosis/warmth. Diffuse TTP paraspinal muscles at thoracic spine. SILT C5-T1, L3-S1. Negative Babinski. Negative ankle clonus. Negative [place]. Capillary refill brisk.  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

## 2020-02-02 ENCOUNTER — TRANSCRIPTION ENCOUNTER (OUTPATIENT)
Age: 45
End: 2020-02-02

## 2020-02-02 LAB
ALBUMIN SERPL ELPH-MCNC: 2.7 G/DL — LOW (ref 3.3–5)
ALP SERPL-CCNC: 105 U/L — SIGNIFICANT CHANGE UP (ref 40–120)
ALT FLD-CCNC: 14 U/L — SIGNIFICANT CHANGE UP (ref 12–78)
ANION GAP SERPL CALC-SCNC: 8 MMOL/L — SIGNIFICANT CHANGE UP (ref 5–17)
AST SERPL-CCNC: 14 U/L — LOW (ref 15–37)
BILIRUB SERPL-MCNC: 0.4 MG/DL — SIGNIFICANT CHANGE UP (ref 0.2–1.2)
BUN SERPL-MCNC: 16 MG/DL — SIGNIFICANT CHANGE UP (ref 7–23)
CALCIUM SERPL-MCNC: 8.8 MG/DL — SIGNIFICANT CHANGE UP (ref 8.5–10.1)
CHLORIDE SERPL-SCNC: 108 MMOL/L — SIGNIFICANT CHANGE UP (ref 96–108)
CO2 SERPL-SCNC: 24 MMOL/L — SIGNIFICANT CHANGE UP (ref 22–31)
CREAT SERPL-MCNC: 0.58 MG/DL — SIGNIFICANT CHANGE UP (ref 0.5–1.3)
GLUCOSE SERPL-MCNC: 153 MG/DL — HIGH (ref 70–99)
HCT VFR BLD CALC: 33 % — LOW (ref 34.5–45)
HGB BLD-MCNC: 11.1 G/DL — LOW (ref 11.5–15.5)
MAGNESIUM SERPL-MCNC: 1.9 MG/DL — SIGNIFICANT CHANGE UP (ref 1.6–2.6)
MCHC RBC-ENTMCNC: 26.4 PG — LOW (ref 27–34)
MCHC RBC-ENTMCNC: 33.6 GM/DL — SIGNIFICANT CHANGE UP (ref 32–36)
MCV RBC AUTO: 78.4 FL — LOW (ref 80–100)
NRBC # BLD: 0 /100 WBCS — SIGNIFICANT CHANGE UP (ref 0–0)
PHOSPHATE SERPL-MCNC: 4.5 MG/DL — SIGNIFICANT CHANGE UP (ref 2.5–4.5)
PLATELET # BLD AUTO: 274 K/UL — SIGNIFICANT CHANGE UP (ref 150–400)
POTASSIUM SERPL-MCNC: 4.4 MMOL/L — SIGNIFICANT CHANGE UP (ref 3.5–5.3)
POTASSIUM SERPL-SCNC: 4.4 MMOL/L — SIGNIFICANT CHANGE UP (ref 3.5–5.3)
PROT SERPL-MCNC: 6.4 GM/DL — SIGNIFICANT CHANGE UP (ref 6–8.3)
RBC # BLD: 4.21 M/UL — SIGNIFICANT CHANGE UP (ref 3.8–5.2)
RBC # FLD: 16.3 % — HIGH (ref 10.3–14.5)
SODIUM SERPL-SCNC: 140 MMOL/L — SIGNIFICANT CHANGE UP (ref 135–145)
VANCOMYCIN TROUGH SERPL-MCNC: 25.2 UG/ML — CRITICAL HIGH (ref 10–20)
WBC # BLD: 12.76 K/UL — HIGH (ref 3.8–10.5)
WBC # FLD AUTO: 12.76 K/UL — HIGH (ref 3.8–10.5)

## 2020-02-02 PROCEDURE — 73521 X-RAY EXAM HIPS BI 2 VIEWS: CPT | Mod: 26

## 2020-02-02 PROCEDURE — 99291 CRITICAL CARE FIRST HOUR: CPT

## 2020-02-02 PROCEDURE — 73562 X-RAY EXAM OF KNEE 3: CPT | Mod: 26,50

## 2020-02-02 RX ORDER — HYDROMORPHONE HYDROCHLORIDE 2 MG/ML
2 INJECTION INTRAMUSCULAR; INTRAVENOUS; SUBCUTANEOUS ONCE
Refills: 0 | Status: DISCONTINUED | OUTPATIENT
Start: 2020-02-02 | End: 2020-02-02

## 2020-02-02 RX ORDER — MAGNESIUM SULFATE 500 MG/ML
1 VIAL (ML) INJECTION ONCE
Refills: 0 | Status: COMPLETED | OUTPATIENT
Start: 2020-02-02 | End: 2020-02-02

## 2020-02-02 RX ORDER — MAGNESIUM HYDROXIDE 400 MG/1
30 TABLET, CHEWABLE ORAL EVERY 12 HOURS
Refills: 0 | Status: DISCONTINUED | OUTPATIENT
Start: 2020-02-02 | End: 2020-02-13

## 2020-02-02 RX ORDER — KETOROLAC TROMETHAMINE 30 MG/ML
30 SYRINGE (ML) INJECTION EVERY 6 HOURS
Refills: 0 | Status: DISCONTINUED | OUTPATIENT
Start: 2020-02-02 | End: 2020-02-07

## 2020-02-02 RX ORDER — OXYCODONE HYDROCHLORIDE 5 MG/1
15 TABLET ORAL EVERY 6 HOURS
Refills: 0 | Status: DISCONTINUED | OUTPATIENT
Start: 2020-02-02 | End: 2020-02-07

## 2020-02-02 RX ORDER — KETOROLAC TROMETHAMINE 30 MG/ML
30 SYRINGE (ML) INJECTION EVERY 6 HOURS
Refills: 0 | Status: DISCONTINUED | OUTPATIENT
Start: 2020-02-02 | End: 2020-02-02

## 2020-02-02 RX ORDER — FENTANYL CITRATE 50 UG/ML
100 INJECTION INTRAVENOUS ONCE
Refills: 0 | Status: DISCONTINUED | OUTPATIENT
Start: 2020-02-02 | End: 2020-02-02

## 2020-02-02 RX ORDER — NICOTINE POLACRILEX 2 MG
1 GUM BUCCAL DAILY
Refills: 0 | Status: DISCONTINUED | OUTPATIENT
Start: 2020-02-02 | End: 2020-02-13

## 2020-02-02 RX ORDER — FENTANYL CITRATE 50 UG/ML
1 INJECTION INTRAVENOUS
Refills: 0 | Status: DISCONTINUED | OUTPATIENT
Start: 2020-02-02 | End: 2020-02-07

## 2020-02-02 RX ORDER — HYDROMORPHONE HYDROCHLORIDE 2 MG/ML
2 INJECTION INTRAMUSCULAR; INTRAVENOUS; SUBCUTANEOUS EVERY 4 HOURS
Refills: 0 | Status: DISCONTINUED | OUTPATIENT
Start: 2020-02-02 | End: 2020-02-07

## 2020-02-02 RX ORDER — OXYCODONE HYDROCHLORIDE 5 MG/1
60 TABLET ORAL EVERY 12 HOURS
Refills: 0 | Status: DISCONTINUED | OUTPATIENT
Start: 2020-02-02 | End: 2020-02-07

## 2020-02-02 RX ORDER — OXYCODONE HYDROCHLORIDE 5 MG/1
60 TABLET ORAL ONCE
Refills: 0 | Status: DISCONTINUED | OUTPATIENT
Start: 2020-02-02 | End: 2020-02-02

## 2020-02-02 RX ORDER — OXYCODONE HYDROCHLORIDE 5 MG/1
15 TABLET ORAL ONCE
Refills: 0 | Status: DISCONTINUED | OUTPATIENT
Start: 2020-02-02 | End: 2020-02-02

## 2020-02-02 RX ORDER — NALOXEGOL OXALATE 12.5 MG/1
12.5 TABLET, FILM COATED ORAL DAILY
Refills: 0 | Status: DISCONTINUED | OUTPATIENT
Start: 2020-02-02 | End: 2020-02-05

## 2020-02-02 RX ORDER — ACETAMINOPHEN 500 MG
650 TABLET ORAL EVERY 6 HOURS
Refills: 0 | Status: DISCONTINUED | OUTPATIENT
Start: 2020-02-02 | End: 2020-02-02

## 2020-02-02 RX ORDER — OXYCODONE HYDROCHLORIDE 5 MG/1
15 TABLET ORAL EVERY 6 HOURS
Refills: 0 | Status: DISCONTINUED | OUTPATIENT
Start: 2020-02-02 | End: 2020-02-02

## 2020-02-02 RX ORDER — FOLIC ACID 0.8 MG
1 TABLET ORAL DAILY
Refills: 0 | Status: DISCONTINUED | OUTPATIENT
Start: 2020-02-02 | End: 2020-02-13

## 2020-02-02 RX ORDER — CYCLOBENZAPRINE HYDROCHLORIDE 10 MG/1
10 TABLET, FILM COATED ORAL EVERY 8 HOURS
Refills: 0 | Status: DISCONTINUED | OUTPATIENT
Start: 2020-02-02 | End: 2020-02-13

## 2020-02-02 RX ADMIN — NALOXEGOL OXALATE 12.5 MILLIGRAM(S): 12.5 TABLET, FILM COATED ORAL at 12:49

## 2020-02-02 RX ADMIN — HYDROMORPHONE HYDROCHLORIDE 2 MILLIGRAM(S): 2 INJECTION INTRAMUSCULAR; INTRAVENOUS; SUBCUTANEOUS at 16:45

## 2020-02-02 RX ADMIN — OXYCODONE HYDROCHLORIDE 60 MILLIGRAM(S): 5 TABLET ORAL at 13:30

## 2020-02-02 RX ADMIN — OXYCODONE HYDROCHLORIDE 15 MILLIGRAM(S): 5 TABLET ORAL at 17:53

## 2020-02-02 RX ADMIN — HYDROMORPHONE HYDROCHLORIDE 2 MILLIGRAM(S): 2 INJECTION INTRAMUSCULAR; INTRAVENOUS; SUBCUTANEOUS at 21:58

## 2020-02-02 RX ADMIN — Medication 500 MILLIGRAM(S): at 17:16

## 2020-02-02 RX ADMIN — Medication 1 TABLET(S): at 12:49

## 2020-02-02 RX ADMIN — GABAPENTIN 1200 MILLIGRAM(S): 400 CAPSULE ORAL at 06:14

## 2020-02-02 RX ADMIN — Medication 166.67 MILLIGRAM(S): at 02:33

## 2020-02-02 RX ADMIN — FENTANYL CITRATE 100 MICROGRAM(S): 50 INJECTION INTRAVENOUS at 17:47

## 2020-02-02 RX ADMIN — Medication 1 PATCH: at 19:55

## 2020-02-02 RX ADMIN — Medication 1 TABLET(S): at 15:18

## 2020-02-02 RX ADMIN — Medication 100 GRAM(S): at 06:28

## 2020-02-02 RX ADMIN — GABAPENTIN 1200 MILLIGRAM(S): 400 CAPSULE ORAL at 21:59

## 2020-02-02 RX ADMIN — FENTANYL CITRATE 1 PATCH: 50 INJECTION INTRAVENOUS at 19:55

## 2020-02-02 RX ADMIN — GABAPENTIN 1200 MILLIGRAM(S): 400 CAPSULE ORAL at 15:18

## 2020-02-02 RX ADMIN — Medication 30 MILLIGRAM(S): at 23:15

## 2020-02-02 RX ADMIN — OXYCODONE HYDROCHLORIDE 60 MILLIGRAM(S): 5 TABLET ORAL at 12:45

## 2020-02-02 RX ADMIN — CYCLOBENZAPRINE HYDROCHLORIDE 10 MILLIGRAM(S): 10 TABLET, FILM COATED ORAL at 15:19

## 2020-02-02 RX ADMIN — CHLORHEXIDINE GLUCONATE 15 MILLILITER(S): 213 SOLUTION TOPICAL at 06:14

## 2020-02-02 RX ADMIN — Medication 1 TABLET(S): at 21:58

## 2020-02-02 RX ADMIN — Medication 166.67 MILLIGRAM(S): at 11:11

## 2020-02-02 RX ADMIN — HYDROMORPHONE HYDROCHLORIDE 2 MILLIGRAM(S): 2 INJECTION INTRAMUSCULAR; INTRAVENOUS; SUBCUTANEOUS at 08:43

## 2020-02-02 RX ADMIN — HYDROMORPHONE HYDROCHLORIDE 2 MILLIGRAM(S): 2 INJECTION INTRAMUSCULAR; INTRAVENOUS; SUBCUTANEOUS at 08:59

## 2020-02-02 RX ADMIN — HYDROMORPHONE HYDROCHLORIDE 2 MILLIGRAM(S): 2 INJECTION INTRAMUSCULAR; INTRAVENOUS; SUBCUTANEOUS at 09:18

## 2020-02-02 RX ADMIN — FENTANYL CITRATE 100 MICROGRAM(S): 50 INJECTION INTRAVENOUS at 18:09

## 2020-02-02 RX ADMIN — HYDROMORPHONE HYDROCHLORIDE 2 MILLIGRAM(S): 2 INJECTION INTRAMUSCULAR; INTRAVENOUS; SUBCUTANEOUS at 22:13

## 2020-02-02 RX ADMIN — PROPOFOL 5.2 MICROGRAM(S)/KG/MIN: 10 INJECTION, EMULSION INTRAVENOUS at 03:23

## 2020-02-02 RX ADMIN — METHADONE HYDROCHLORIDE 40 MILLIGRAM(S): 40 TABLET ORAL at 11:15

## 2020-02-02 RX ADMIN — CHLORHEXIDINE GLUCONATE 1 APPLICATION(S): 213 SOLUTION TOPICAL at 08:36

## 2020-02-02 RX ADMIN — FENTANYL CITRATE 1 PATCH: 50 INJECTION INTRAVENOUS at 17:16

## 2020-02-02 RX ADMIN — OXYCODONE HYDROCHLORIDE 15 MILLIGRAM(S): 5 TABLET ORAL at 23:38

## 2020-02-02 RX ADMIN — Medication 30 MILLIGRAM(S): at 23:30

## 2020-02-02 RX ADMIN — Medication 1 MILLIGRAM(S): at 12:49

## 2020-02-02 RX ADMIN — HYDROMORPHONE HYDROCHLORIDE 2 MILLIGRAM(S): 2 INJECTION INTRAMUSCULAR; INTRAVENOUS; SUBCUTANEOUS at 09:03

## 2020-02-02 RX ADMIN — Medication 1 PATCH: at 13:43

## 2020-02-02 RX ADMIN — OXYCODONE HYDROCHLORIDE 15 MILLIGRAM(S): 5 TABLET ORAL at 17:27

## 2020-02-02 RX ADMIN — HYDROMORPHONE HYDROCHLORIDE 2 MILLIGRAM(S): 2 INJECTION INTRAMUSCULAR; INTRAVENOUS; SUBCUTANEOUS at 16:25

## 2020-02-02 NOTE — CHART NOTE - NSCHARTNOTEFT_GEN_A_CORE
Pt extubated in CCU. Reporting severe pain throughout entire back and R hip. Yelling and uncooperative with physical exam. Stating she has numbness on RLE then declining numbness few minutes later. Pt states she wants surgery for R hip.    Physical Exam:  Gen: NAD    Spine Exam:  Dressing C/D/I with drain in place  No gross deformity  No bony step offs  No paraspinal muscle TTP/Hypertonicity   Negative clonus  Negative babinski  Negative olmstead  No saddle anesthesia  No calf TTP    Motor:               C5           C6            C7               C8           T1   R         5/5          5/5            5/5             5/5          5/5  L          5/5          5/5            5/5             5/5          5/5                L2             L3             L4               L5            S1  R         3/5           3/5          3/5             3/5           3/5  L          5/5          5/5           5/5             N/A          5/5    Sensory:            C5         C6         C7      C8       T1        (0=absent, 1=impaired, 2=normal, NT=not testable)  R         2            2           2        2         2  L          2            2           2        2         2               L2          L3         L4      L5       S1         (0=absent, 1=impaired, 2=normal, NT=not testable)  R         2            2            2        2        2  L          2            2           2        2         2    Monitor drain outputs  PT/OT  WBAT  Medical management per primary team  Difficult to assess accuracy of neuro exam due to patient agitation  XR R hip demonstrate severely dysplastic R hip - consider infectious etiology vs chronic dislocation vs congential  MRI R hip w/wout contrast ordered given history of infections and dysplastic hip; will follow  Will discuss with attending Dr. Leslie and advise if plan changes    Braydon Uribe,  PGY1  Orthopaedic Surgery

## 2020-02-02 NOTE — DISCHARGE NOTE PROVIDER - HOSPITAL COURSE
44 year old woman with a past medical history of IVDA (not on methadone), chronic pain, ETOH abuse. Had a prolonged hospitalization in 2019 with sepsis, MRSA bacteremia, septic emboli with OM, vertebral abscess, discitis s/p T8-L2 PSF. Eventually discharged but non compliant with abx regimen. Has had several hospitalizations since for septic embolic events. Eventually discharged to rehab. Presented from rehab w/ thoracic pain appeared suddenly without provocation. Found to have osteomyelitis, diskitis s/p t4-t10 revision laminectomy and fusion. Removal and replacement of hardware. Remained intubated post-procedure transferred to ICU for management.  Extubated, then transferred to med/surg floor.         Problem/Plan - 1:    ·  Problem: Osteomyelitis of vertebra of thoracic region.  Plan: Has osteomyelitis/discitis, seen by ortho, ID    s/p removal of hardware T6, T4-T10 PSF, exploration of Hardware, junctional kyphosis    pain control regimen discussed with anesthesia- Fentanyl patch increased to 25 mcg, Dilaudid changed from IV to PO (8mg TID), Oxycodone ER 60 TID.    Better pain control since starting new regimen    -Will start DC planning as she is now off IV analgesia.     Continue Vancomycin:    will likely require a 6 weeks course of antibiotics again (on day 11 of vanco as of 2/11, can switch to PO zyvox starting 2/14 for last two weeks)         Problem/Plan - 2:    ·  Problem: Acute midline back pain, unspecified back location.  Plan: pain control w/Fentanyl, Dilaudid, Oxy as above.          Problem/Plan - 3:    ·  Problem: Acute blood loss anemia.  Plan: Blood loss during OR estimated 950 milliLiter    trend cbc    GI eval/Dr. Meyer appreciated.    Has been stable.         DC to [facility] once accepted. 44 year old woman with a past medical history of IVDA (not on methadone), chronic pain, ETOH abuse. Had a prolonged hospitalization in 2019 with sepsis, MRSA bacteremia, septic emboli with OM, vertebral abscess, discitis s/p T8-L2 PSF. Eventually discharged but non compliant with abx regimen. Has had several hospitalizations since for septic embolic events. Eventually discharged to rehab. Presented from rehab w/ thoracic pain appeared suddenly without provocation. Found to have osteomyelitis, diskitis s/p t4-t10 revision laminectomy and fusion. Removal and replacement of hardware. Remained intubated post-procedure transferred to ICU for management.  Extubated, then transferred to med/surg floor.         Problem/Plan - 1:    ·  Problem: Osteomyelitis of vertebra of thoracic region.  Plan: Has osteomyelitis/discitis, seen by ortho, ID    s/p removal of hardware T6, T4-T10 PSF, exploration of Hardware, junctional kyphosis    pain control regimen discussed with anesthesia- Fentanyl patch increased to 25 mcg, Dilaudid changed from IV to PO (8mg TID), Oxycodone ER 60 TID.    Better pain control since starting new regimen    -Will start DC planning as she is now off IV analgesia.     Continue Vancomycin:    PICC-line for another 4 weeks of IV vanco (total 6 weeks ), end date 3/11/20 : as per ID recommendations.    after that she will be started on life long prophylaxis with PO doxy BID     sh f/u in 6 weeks             Problem/Plan - 2:    ·  Problem: Acute midline back pain, unspecified back location.  Plan: pain control w/Fentanyl, Dilaudid, Oxy as above.              Problem/Plan - 3:    ·  Problem: Acute blood loss anemia.  Plan: Blood loss during OR estimated 950 milliLiter    GI eval/Dr. Meyer appreciated.    Has been stable.         DC to [facility]         It took 35 minutes to discharge the patient.

## 2020-02-02 NOTE — PHYSICAL THERAPY INITIAL EVALUATION ADULT - DISCHARGE DISPOSITION, PT EVAL
Sub-Acute Rehab to further improve strength, balance and falls prevention. Patient demonstrates higher level of functional assistance on this encounter, compared to status pre-admission

## 2020-02-02 NOTE — PHYSICAL THERAPY INITIAL EVALUATION ADULT - PERTINENT HX OF CURRENT PROBLEM, REHAB EVAL
Patient came to [facility] ED on 01/30/2020 from [facility] reporting sudden midback pain. MR show new marrow edema T6 through T8, with vertebral body collapse T7 and T8 with resulting focal anterior kyphosis. Now POD 1 Extension of spinal fusion to T4. Surgical history of T8-L2 PSF in 2019. Patient came to [facility] ED on 01/30/2020 from [facility] reporting sudden midback pain. MR show new marrow edema T6 through T8, with vertebral body collapse T7 and T8 with resulting focal anterior kyphosis. Now POD 1 Extension of spinal fusion to T4. Surgical history of T8-L2 PSF in 2019. Transferred from OR to CCU intubated. Successfully extubated today. Now Self-ventilating on room air.

## 2020-02-02 NOTE — PHYSICAL THERAPY INITIAL EVALUATION ADULT - NEUROMUSCULAR RE-EDUCATION, PT EVAL
GOAL: To enable functional strength and balance with good neuromuscular integrity (balance, proprioception and strength) without falls risk, in 4 weeks

## 2020-02-02 NOTE — PROGRESS NOTE ADULT - SUBJECTIVE AND OBJECTIVE BOX
Patient seen and examined. Pt intubated and sedated. No acute events overnight.    Vital Signs Last 24 Hrs  T(C): 36.9 (02 Feb 2020 04:30), Max: 37.7 (01 Feb 2020 17:40)  T(F): 98.5 (02 Feb 2020 04:30), Max: 99.9 (01 Feb 2020 17:40)  HR: 86 (02 Feb 2020 06:30) (84 - 108)  BP: 121/87 (02 Feb 2020 06:30) (98/83 - 165/113)  BP(mean): 99 (02 Feb 2020 06:30) (80 - 142)  RR: 19 (02 Feb 2020 06:30) (0 - 29)  SpO2: 99% (02 Feb 2020 06:00) (98% - 100%)    PE:  Gen: NAD  Spine/extremities:  Exam deferred as patient is intubated and sedated      45 yo F s/p extension of fusion to T4, exploration of fusion/decompression POD 1:  -intubated and sedated, FU extubation  -ID following, continue vanc, BCx NGTD  -WBAT/PT  -KIRILL drain, record drainage  -Rubin  -Pain control  -Please call for any change in, new, or worsening signs/symptoms  -FU XR Hips/knee as pt would like a joint replacement while in the hospital, pt is not a candidate for hip replacement at this time  -Discussed with Dr Leslie, who agrees with above

## 2020-02-02 NOTE — DISCHARGE NOTE PROVIDER - NSDCCPCAREPLAN_GEN_ALL_CORE_FT
PRINCIPAL DISCHARGE DIAGNOSIS  Diagnosis: Osteomyelitis of vertebra of thoracic region  Assessment and Plan of Treatment:

## 2020-02-02 NOTE — DISCHARGE NOTE PROVIDER - NSDCFUADDINST_GEN_ALL_CORE_FT
1. Pain Control  2. Walking with full weight bearing as tolerated, with assistive devices (walker/cane) as needed  3. PT as needed  4. Follow up with Dr. Leslie as Outpatient in 7-10 days after discharge from the hospital or rehab. Call office for appointment.  5. Keep dressing clean and dry. Remove on post-op day 3  6. No bath/hot tubs or soaks  7. TLSO brace for comfort 1. Pain Control  2. Walking with full weight bearing as tolerated, with assistive devices (walker/cane) as needed  3. PT as needed  4. Follow up with Dr. Leslie as Outpatient in 7-10 days after discharge from the hospital or rehab. Call office for appointment.  5. Keep dressing clean and dry. May change as needed. May discontinue dressings on POD #14 if surgical site well healed. Otherwise continue dressing changes until well healed. Do not remove sutures until POD#21.   6. No bath/hot tubs or soaks  7. TLSO brace for comfort

## 2020-02-02 NOTE — PHYSICAL THERAPY INITIAL EVALUATION ADULT - ADDITIONAL COMMENTS
Per patient, currently homeless. Intends to return to rehab. Reports cannot stand independently in rehab but able c assist. Per DPT Wil on re-evaluation back in May 2019, patient was independent prior to initial spinal surgery.

## 2020-02-02 NOTE — PHYSICAL THERAPY INITIAL EVALUATION ADULT - CRITERIA FOR SKILLED THERAPEUTIC INTERVENTIONS
impairments found/rehab potential/functional limitations in following categories/risk reduction/prevention/anticipated discharge recommendation

## 2020-02-02 NOTE — DISCHARGE NOTE PROVIDER - NSDCMRMEDTOKEN_GEN_ALL_CORE_FT
acetaminophen 500 mg oral tablet: 2 tab(s) orally every 8 hours  albuterol 90 mcg/inh inhalation aerosol: 2 puff(s) inhaled every 6 hours, As needed, Shortness of Breath and/or Wheezing  ascorbic acid 500 mg oral tablet: 1 tab(s) orally 2 times a day  Aspen Collar: (1) Aspen Collar  ICD: G06.1  RYAN: 999  calcium-vitamin D 500 mg-200 intl units oral tablet: 1 tab(s) orally 3 times a day  celecoxib 100 mg oral capsule: 1 cap(s) orally once a day  doxycycline hyclate 100 mg oral capsule: 1 cap(s) orally 2 times a day TO START AFTER FINISHING LINEZOLID   folic acid 1 mg oral tablet: 1 tab(s) orally once a day  gabapentin 400 mg oral capsule: 2 cap(s) orally 3 times a day  lactobacillus acidophilus oral capsule: 1 tab(s) orally once a day   lidocaine 5% topical film: Apply topically to affected area once a day   linezolid 600 mg oral tablet: 1 tab(s) orally every 12 hours   loratadine 10 mg oral tablet: 1 tab(s) orally once a day  LSO: Thoracic and Lumbar OM  ICD10: M46.26  RYAN: 99  methadone 10 mg oral tablet: 6 tab(s) orally every 12 hours  [place] J : ICD10: G06.1  RYAN: 99  Multiple Vitamins oral tablet: 1 tab(s) orally once a day  naloxegol 25 mg oral tablet: 1 tab(s) orally once a day (before a meal)  nicotine 14 mg/24 hr transdermal film, extended release: 1 patch transdermal once a day   nystatin 100,000 units/g topical powder: 1 application topically 3 times a day  senna oral tablet: 2 tab(s) orally once a day (at bedtime) acetaminophen 500 mg oral tablet: 2 tab(s) orally every 8 hours  albuterol 90 mcg/inh inhalation aerosol: 2 puff(s) inhaled every 6 hours, As needed, Shortness of Breath and/or Wheezing  ascorbic acid 500 mg oral tablet: 1 tab(s) orally 2 times a day  Aspen Collar: (1) Aspen Collar  ICD: G06.1  RYAN: 999  calcium-vitamin D 500 mg-200 intl units oral tablet: 1 tab(s) orally 3 times a day  celecoxib 100 mg oral capsule: 1 cap(s) orally once a day  doxycycline hyclate 100 mg oral capsule: 1 cap(s) orally 2 times a day TO START AFTER FINISHING LINEZOLID   folic acid 1 mg oral tablet: 1 tab(s) orally once a day  gabapentin 400 mg oral capsule: 2 cap(s) orally 3 times a day  lactobacillus acidophilus oral capsule: 1 tab(s) orally once a day   lidocaine 5% topical film: Apply topically to affected area once a day   linezolid 600 mg oral tablet: 1 tab(s) orally every 12 hours   loratadine 10 mg oral tablet: 1 tab(s) orally once a day  LSO: Thoracic and Lumbar OM  ICD10: M46.26  RYAN: 99  methadone 10 mg oral tablet: 6 tab(s) orally every 12 hours  [place] J : ICD10: G06.1  RYAN: 99  Multiple Vitamins oral tablet: 1 tab(s) orally once a day  naloxegol 25 mg oral tablet: 1 tab(s) orally once a day (before a meal)  nicotine 14 mg/24 hr transdermal film, extended release: 1 patch transdermal once a day   nystatin 100,000 units/g topical powder: 1 application topically 3 times a day  senna oral tablet: 2 tab(s) orally once a day (at bedtime) acetaminophen 325 mg oral tablet: 2 tab(s) orally every 6 hours, As needed, Temp greater or equal to 38.5C (101.3F), Mild Pain (1 - 3)  albuterol 90 mcg/inh inhalation aerosol: 2 puff(s) inhaled every 6 hours, As needed, Shortness of Breath and/or Wheezing  ascorbic acid 500 mg oral tablet: 1 tab(s) orally 2 times a day  Aspen Collar: (1) Aspen Collar  ICD: G06.1  RYAN: 999  bisacodyl 10 mg rectal suppository: 1 suppository(ies) rectal once a day, As needed, Constipation  calcium-vitamin D 500 mg-200 intl units oral tablet: 1 tab(s) orally 3 times a day  cyclobenzaprine 10 mg oral tablet: 1 tab(s) orally every 8 hours, As needed, Muscle Spasm MDD:3 tabs  doxycycline hyclate 100 mg oral capsule: 1 cap(s) orally 2 times a day TO START AFTER FINISHING LINEZOLID   fentaNYL 25 mcg/hr transdermal film, extended release: 1 patch transdermal every 72 hours MDD:1 patch every 3 days  folic acid 1 mg oral tablet: 1 tab(s) orally once a day  gabapentin 400 mg oral capsule: 3 cap(s) orally 3 times a day  HYDROmorphone 8 mg oral tablet: 1 tab(s) orally every 8 hours, As needed, Severe Pain (7 - 10) MDD:3 tabs daily  ibuprofen 800 mg oral tablet: 1 tab(s) orally every 6 hours, As needed, Temp greater or equal to 38C (100.4F), Mild Pain (1 - 3)  lactobacillus acidophilus oral capsule: 1 tab(s) orally once a day   loratadine 10 mg oral tablet: 1 tab(s) orally once a day  LSO: Thoracic and Lumbar OM  ICD10: M46.26  YRAN: 99  magnesium hydroxide 8% oral suspension: 30 milliliter(s) orally every 12 hours, As needed, Constipation  methadone 10 mg oral tablet: 6 tab(s) orally every 12 hours  [place] J : ICD10: G06.1  RYAN: 99  Multiple Vitamins oral tablet: 1 tab(s) orally once a day  naloxegol 25 mg oral tablet: 1 tab(s) orally once a day  nicotine 14 mg/24 hr transdermal film, extended release: 1 patch transdermal once a day   oxyCODONE 60 mg oral tablet, extended release: 1 tab(s) orally every 8 hours, As needed, moderate pain MDD:3 tabs  petrolatum topical ointment: 1 application topically 4 times a day, As needed, dry, cracked lips  senna oral tablet: 2 tab(s) orally once a day (at bedtime)   vancomycin: 1 gram(s) intravenous once a day acetaminophen 325 mg oral tablet: 2 tab(s) orally every 6 hours, As needed, Temp greater or equal to 38.5C (101.3F), Mild Pain (1 - 3)  albuterol 90 mcg/inh inhalation aerosol: 2 puff(s) inhaled every 6 hours, As needed, Shortness of Breath and/or Wheezing  ascorbic acid 500 mg oral tablet: 1 tab(s) orally 2 times a day  Aspen Collar: (1) Aspen Collar  ICD: G06.1  RYAN: 999  bisacodyl 10 mg rectal suppository: 1 suppository(ies) rectal once a day, As needed, Constipation  calcium-vitamin D 500 mg-200 intl units oral tablet: 1 tab(s) orally 3 times a day  cyclobenzaprine 10 mg oral tablet: 1 tab(s) orally every 8 hours, As needed, Muscle Spasm MDD:3 tabs  doxycycline hyclate 100 mg oral capsule: 1 cap(s) orally 2 times a day TO START AFTER FINISHING LINEZOLID   fentaNYL 25 mcg/hr transdermal film, extended release: 1 patch transdermal every 72 hours MDD:1 patch every 3 days  folic acid 1 mg oral tablet: 1 tab(s) orally once a day  gabapentin 400 mg oral capsule: 3 cap(s) orally 3 times a day  HYDROmorphone 8 mg oral tablet: 1 tab(s) orally every 8 hours, As needed, Severe Pain (7 - 10) MDD:3 tabs daily  ibuprofen 800 mg oral tablet: 1 tab(s) orally every 6 hours, As needed, Temp greater or equal to 38C (100.4F), Mild Pain (1 - 3)  lactobacillus acidophilus oral capsule: 1 tab(s) orally once a day   loratadine 10 mg oral tablet: 1 tab(s) orally once a day  LSO: Thoracic and Lumbar OM  ICD10: M46.26  RYAN: 99  magnesium hydroxide 8% oral suspension: 30 milliliter(s) orally every 12 hours, As needed, Constipation  methadone 10 mg oral tablet: 6 tab(s) orally every 12 hours  [place] J : ICD10: G06.1  RYAN: 99  Multiple Vitamins oral tablet: 1 tab(s) orally once a day  naloxegol 25 mg oral tablet: 1 tab(s) orally once a day  nicotine 14 mg/24 hr transdermal film, extended release: 1 patch transdermal once a day   oxyCODONE 60 mg oral tablet, extended release: 1 tab(s) orally every 8 hours, As needed, moderate pain MDD:3 tabs  petrolatum topical ointment: 1 application topically 4 times a day, As needed, dry, cracked lips  senna oral tablet: 2 tab(s) orally once a day (at bedtime)   vancomycin: 1 gram(s) intravenous once a day acetaminophen 325 mg oral tablet: 2 tab(s) orally every 6 hours, As needed, Temp greater or equal to 38.5C (101.3F), Mild Pain (1 - 3)  albuterol 90 mcg/inh inhalation aerosol: 2 puff(s) inhaled every 6 hours, As needed, Shortness of Breath and/or Wheezing  ascorbic acid 500 mg oral tablet: 1 tab(s) orally 2 times a day  Aspen Collar: (1) Aspen Collar  ICD: G06.1  RYAN: 999  bisacodyl 10 mg rectal suppository: 1 suppository(ies) rectal once a day, As needed, Constipation  calcium-vitamin D 500 mg-200 intl units oral tablet: 1 tab(s) orally 3 times a day  cyclobenzaprine 10 mg oral tablet: 1 tab(s) orally every 8 hours, As needed, Muscle Spasm MDD:3 tabs  doxycycline hyclate 100 mg oral capsule: 1 cap(s) orally 2 times a day TO START AFTER FINISHING LINEZOLID   fentaNYL 25 mcg/hr transdermal film, extended release: 1 patch transdermal every 72 hours MDD:1 patch every 3 days  folic acid 1 mg oral tablet: 1 tab(s) orally once a day  gabapentin 400 mg oral capsule: 3 cap(s) orally 3 times a day  HYDROmorphone 8 mg oral tablet: 1 tab(s) orally every 8 hours, As needed, Severe Pain (7 - 10) MDD:3 tabs daily  ibuprofen 800 mg oral tablet: 1 tab(s) orally every 6 hours, As needed, Temp greater or equal to 38C (100.4F), Mild Pain (1 - 3)  lactobacillus acidophilus oral capsule: 1 tab(s) orally once a day   loratadine 10 mg oral tablet: 1 tab(s) orally once a day  LSO: Thoracic and Lumbar OM  ICD10: M46.26  RYAN: 99  magnesium hydroxide 8% oral suspension: 30 milliliter(s) orally every 12 hours, As needed, Constipation  methadone 10 mg oral tablet: 6 tab(s) orally every 12 hours  [place] J : ICD10: G06.1  RYAN: 99  Multiple Vitamins oral tablet: 1 tab(s) orally once a day  naloxegol 25 mg oral tablet: 1 tab(s) orally once a day  nicotine 14 mg/24 hr transdermal film, extended release: 1 patch transdermal once a day   oxyCODONE 60 mg oral tablet, extended release: 1 tab(s) orally every 8 hours, As needed, moderate pain MDD:3 tabs  petrolatum topical ointment: 1 application topically 4 times a day, As needed, dry, cracked lips  senna oral tablet: 2 tab(s) orally once a day (at bedtime)   vancomycin: 1 gram(s) intravenous once a day acetaminophen 325 mg oral tablet: 2 tab(s) orally every 6 hours, As needed, Temp greater or equal to 38.5C (101.3F), Mild Pain (1 - 3)  albuterol 90 mcg/inh inhalation aerosol: 2 puff(s) inhaled every 6 hours, As needed, Shortness of Breath and/or Wheezing  ascorbic acid 500 mg oral tablet: 1 tab(s) orally 2 times a day  Aspen Collar: (1) Aspen Collar  ICD: G06.1  RYAN: 999  bisacodyl 10 mg rectal suppository: 1 suppository(ies) rectal once a day, As needed, Constipation  calcium-vitamin D 500 mg-200 intl units oral tablet: 1 tab(s) orally 3 times a day  cyclobenzaprine 10 mg oral tablet: 1 tab(s) orally every 8 hours, As needed, Muscle Spasm MDD:3 tabs  doxycycline hyclate 100 mg oral capsule: 1 cap(s) orally 2 times a day TO START AFTER FINISHING LINEZOLID   fentaNYL 25 mcg/hr transdermal film, extended release: 1 patch transdermal every 72 hours MDD:1 patch every 3 days  folic acid 1 mg oral tablet: 1 tab(s) orally once a day  gabapentin 400 mg oral capsule: 3 cap(s) orally 3 times a day  HYDROmorphone 8 mg oral tablet: 1 tab(s) orally every 8 hours, As needed, Severe Pain (7 - 10) MDD:3 tabs daily  ibuprofen 800 mg oral tablet: 1 tab(s) orally every 6 hours, As needed, Temp greater or equal to 38C (100.4F), Mild Pain (1 - 3)  lactobacillus acidophilus oral capsule: 1 tab(s) orally once a day   loratadine 10 mg oral tablet: 1 tab(s) orally once a day  LSO: Thoracic and Lumbar OM  ICD10: M46.26  RYAN: 99  magnesium hydroxide 8% oral suspension: 30 milliliter(s) orally every 12 hours, As needed, Constipation  methadone 10 mg oral tablet: 6 tab(s) orally every 12 hours  [place] J : ICD10: G06.1  RYAN: 99  Multiple Vitamins oral tablet: 1 tab(s) orally once a day  naloxegol 25 mg oral tablet: 1 tab(s) orally once a day  nicotine 14 mg/24 hr transdermal film, extended release: 1 patch transdermal once a day   oxyCODONE 60 mg oral tablet, extended release: 1 tab(s) orally every 8 hours, As needed, moderate pain MDD:3 tabs  petrolatum topical ointment: 1 application topically 4 times a day, As needed, dry, cracked lips  senna oral tablet: 2 tab(s) orally once a day (at bedtime)   vancomycin: 1 gram(s) intravenous once a day

## 2020-02-02 NOTE — PHYSICAL THERAPY INITIAL EVALUATION ADULT - GENERAL OBSERVATIONS, REHAB EVAL
Patient supine in bed. + Left KIRILL drain to midback, aquacel dressing to low back; ICU monitoring, hep lock. Initially agitated and screaming, anxious with attempt to engage out of bed. Coordinated pain management c CCU RN & Intensivist prior to 2nd attempt. Patient supine in bed. + Left KIRILL drain to midback, aquacel dressing to low back; ICU monitoring, hep lock. Initially agitated and screaming, anxious with attempt to engage out of bed. Coordinated pain management c CCU RN & Intensivist prior to 2nd attempt. Aware of all spinal precautions.. Patient supine in bed. + Left KIRILL drain to midback, aquacel dressing to low back; ICU monitoring, hep lock. Initially agitated and screaming, anxious with attempt to engage out of bed. Coordinated pain management c CCU RN & Intensivist prior to 2nd attempt. Aware of all spinal precautions.

## 2020-02-02 NOTE — DISCHARGE NOTE PROVIDER - CARE PROVIDER_API CALL
Rose Leslie (DO)  Orthopaedic Surgery Surgery  [address], [place] [address]  Phone: [phone]  Fax: [phone]  Follow Up Time:

## 2020-02-02 NOTE — PHYSICAL THERAPY INITIAL EVALUATION ADULT - IMPAIRMENTS FOUND, PT EVAL
arousal, attention, and cognition/gait, locomotion, and balance/joint integrity and mobility/poor safety awareness/aerobic capacity/endurance/cognitive impairment/muscle strength/posture

## 2020-02-02 NOTE — CHART NOTE - NSCHARTNOTEFT_GEN_A_CORE
44F w/ IVDA (NOT on methadone), chronic pain, ETOH abuse. Had a prolonged hospitalization in 2019 with sepsis, MRSA bacteremia, septic emboli w/ OM, vertebral abscess, discitis s/p T8-L2 PSF, IE of TV as well. Eventually d/c'ed but non compliant with abx regimen. Has had several hospitalizations since for septic embolic events. Eventually d/c'ed to rehab. Presented from rehab w/ thoracic pain appeared suddenly without provocation. Found to have osteomyelitis, diskitis s/p t4-t10 revision laminectomy and fusion. Removal and replacement of hardware. Remained intubated post-procedure transferred to ICU for management. Extubated this morning. C/O severe pain, crying and yelling out loud. Stating she is having back pain and right hip pain as well. Exam is unremarkable otherwise. Of note, clarified w/ pt,     - pain control - restart standing oxycontin; PRN dilaudid, oxycodone and tordal ordered; flexeril; lidocaine patch; gabapentin;  - bowel regimen ordered, anti-emetics  - monitor KIRILL drain  - continue w/ vancomycin; check vanc level before evening dose - appreciate ID input  - d/c milan  - xrays, MRI of hip as per ortho  - OOB to chair, PT, no weight bearing restrictions per ortho  - SCDs for DVT ppx 44 year old woman with a past medical history of IVDA (not on methadone), chronic pain, ETOH abuse. Had a prolonged hospitalization in 2019 with sepsis, MRSA bacteremia, septic emboli with OM, vertebral abscess, discitis s/p T8-L2 PSF. Eventually discharged but non compliant with abx regimen. Has had several hospitalizations since for septic embolic events. Eventually discharged to rehab. Presented from rehab w/ thoracic pain appeared suddenly without provocation. Found to have osteomyelitis, diskitis s/p t4-t10 revision laminectomy and fusion. Removal and replacement of hardware. Remained intubated post-procedure transferred to ICU for management. Extubated this morning. Complaints of severe pain and yelling out loud. Stating she is having back pain and right hip pain as well. Multiple pain medications were given with some improvement. Exam is unremarkable otherwise. For pain control she was restarted on standing oxycontin; PRN dilaudid, oxycodone; flexeril; lidocaine patch; gabapentin; and fentanyl patch. Bowel regimen ordered and anti-emetics. Monitor KIRILL drain. Continue with vancomycin q 8 hrs. Vanco level 25 this evening, will hold this dose and repeat a vanco trough level prior to next dose.  The milan was removed. xrays, MRI of hip as per ortho. PT consulted. SCDs for DVT prophylaxis.  Patient seen and examined by the attending and deemed stable for transfer to medical floor. Ortho informed about being transferred to medical floor. Sign out given to Dr. Santiago. 44 year old woman with a past medical history of IVDA (not on methadone), chronic pain, ETOH abuse. Had a prolonged hospitalization in 2019 with sepsis, MRSA bacteremia, septic emboli with OM, vertebral abscess, discitis s/p T8-L2 PSF. Eventually discharged but non compliant with abx regimen. Has had several hospitalizations since for septic embolic events. Eventually discharged to rehab. Presented from rehab w/ thoracic pain appeared suddenly without provocation. Found to have osteomyelitis, diskitis s/p t4-t10 revision laminectomy and fusion. Removal and replacement of hardware. Remained intubated post-procedure transferred to ICU for management. Extubated this morning. Complaints of severe pain and yelling out loud. Stating she is having back pain and right hip pain as well. Multiple pain medications were given with some improvement. Exam is unremarkable otherwise. For pain control she was restarted on standing oxycontin; PRN dilaudid, oxycodone; flexeril; lidocaine patch; gabapentin; and fentanyl patch. Bowel regimen ordered and anti-emetics. Monitor KIRILL drain. Continue with vancomycin q 8 hrs. Vanco level 25 this evening, will hold this dose and repeat a vanco trough level prior to next dose.  The milan was removed. xrays, MRI of hip as per ortho. PT consulted. SCDs for DVT prophylaxis. Milan removed, follow up urine output. Patient seen and examined by the attending and deemed stable for transfer to medical floor. Ortho informed about being transferred to medical floor. Sign out given to Dr. Santiago.

## 2020-02-02 NOTE — PHYSICAL THERAPY INITIAL EVALUATION ADULT - DID THE PATIENT HAVE SURGERY?
yes/s/p extension of fusion to T4, exploration of fusion T4-T10/decompression
(0) No drift; limb holds 90 (or 45) degrees for full 10 secs

## 2020-02-02 NOTE — PHYSICAL THERAPY INITIAL EVALUATION ADULT - PLANNED THERAPY INTERVENTIONS, PT EVAL
gait training/balance training/bed mobility training/neuromuscular re-education/transfer training/strengthening

## 2020-02-02 NOTE — PHYSICAL THERAPY INITIAL EVALUATION ADULT - LEVEL OF INDEPENDENCE: SIT/STAND, REHAB EVAL
unable to perform/Vehemently refusing. Vehemently refusing to attempt to stand, reporting her right leg is weak, yet kicking bed frame with same leg rather forcibly./unable to perform

## 2020-02-03 DIAGNOSIS — M54.9 DORSALGIA, UNSPECIFIED: ICD-10-CM

## 2020-02-03 DIAGNOSIS — D62 ACUTE POSTHEMORRHAGIC ANEMIA: ICD-10-CM

## 2020-02-03 DIAGNOSIS — B19.20 UNSPECIFIED VIRAL HEPATITIS C WITHOUT HEPATIC COMA: ICD-10-CM

## 2020-02-03 LAB
ANION GAP SERPL CALC-SCNC: 5 MMOL/L — SIGNIFICANT CHANGE UP (ref 5–17)
BASOPHILS # BLD AUTO: 0.03 K/UL — SIGNIFICANT CHANGE UP (ref 0–0.2)
BASOPHILS NFR BLD AUTO: 0.3 % — SIGNIFICANT CHANGE UP (ref 0–2)
BUN SERPL-MCNC: 24 MG/DL — HIGH (ref 7–23)
CALCIUM SERPL-MCNC: 8.2 MG/DL — LOW (ref 8.5–10.1)
CHLORIDE SERPL-SCNC: 108 MMOL/L — SIGNIFICANT CHANGE UP (ref 96–108)
CO2 SERPL-SCNC: 27 MMOL/L — SIGNIFICANT CHANGE UP (ref 22–31)
CREAT SERPL-MCNC: 0.83 MG/DL — SIGNIFICANT CHANGE UP (ref 0.5–1.3)
EOSINOPHIL # BLD AUTO: 0.13 K/UL — SIGNIFICANT CHANGE UP (ref 0–0.5)
EOSINOPHIL NFR BLD AUTO: 1.2 % — SIGNIFICANT CHANGE UP (ref 0–6)
GLUCOSE SERPL-MCNC: 141 MG/DL — HIGH (ref 70–99)
HCT VFR BLD CALC: 27.4 % — LOW (ref 34.5–45)
HCT VFR BLD CALC: 27.8 % — LOW (ref 34.5–45)
HGB BLD-MCNC: 8.7 G/DL — LOW (ref 11.5–15.5)
HGB BLD-MCNC: 8.9 G/DL — LOW (ref 11.5–15.5)
IMM GRANULOCYTES NFR BLD AUTO: 1.2 % — SIGNIFICANT CHANGE UP (ref 0–1.5)
LYMPHOCYTES # BLD AUTO: 28 % — SIGNIFICANT CHANGE UP (ref 13–44)
LYMPHOCYTES # BLD AUTO: 3.04 K/UL — SIGNIFICANT CHANGE UP (ref 1–3.3)
MAGNESIUM SERPL-MCNC: 2.1 MG/DL — SIGNIFICANT CHANGE UP (ref 1.6–2.6)
MCHC RBC-ENTMCNC: 26.2 PG — LOW (ref 27–34)
MCHC RBC-ENTMCNC: 31.8 GM/DL — LOW (ref 32–36)
MCV RBC AUTO: 82.5 FL — SIGNIFICANT CHANGE UP (ref 80–100)
MONOCYTES # BLD AUTO: 1.1 K/UL — HIGH (ref 0–0.9)
MONOCYTES NFR BLD AUTO: 10.1 % — SIGNIFICANT CHANGE UP (ref 2–14)
NEUTROPHILS # BLD AUTO: 6.44 K/UL — SIGNIFICANT CHANGE UP (ref 1.8–7.4)
NEUTROPHILS NFR BLD AUTO: 59.2 % — SIGNIFICANT CHANGE UP (ref 43–77)
NRBC # BLD: 0 /100 WBCS — SIGNIFICANT CHANGE UP (ref 0–0)
PHOSPHATE SERPL-MCNC: 2.5 MG/DL — SIGNIFICANT CHANGE UP (ref 2.5–4.5)
PLATELET # BLD AUTO: 206 K/UL — SIGNIFICANT CHANGE UP (ref 150–400)
POTASSIUM SERPL-MCNC: 4 MMOL/L — SIGNIFICANT CHANGE UP (ref 3.5–5.3)
POTASSIUM SERPL-SCNC: 4 MMOL/L — SIGNIFICANT CHANGE UP (ref 3.5–5.3)
RBC # BLD: 3.32 M/UL — LOW (ref 3.8–5.2)
RBC # FLD: 17.4 % — HIGH (ref 10.3–14.5)
SODIUM SERPL-SCNC: 140 MMOL/L — SIGNIFICANT CHANGE UP (ref 135–145)
VANCOMYCIN TROUGH SERPL-MCNC: 20.6 UG/ML — HIGH (ref 10–20)
WBC # BLD: 10.87 K/UL — HIGH (ref 3.8–10.5)
WBC # FLD AUTO: 10.87 K/UL — HIGH (ref 3.8–10.5)

## 2020-02-03 PROCEDURE — 99233 SBSQ HOSP IP/OBS HIGH 50: CPT

## 2020-02-03 PROCEDURE — 99231 SBSQ HOSP IP/OBS SF/LOW 25: CPT

## 2020-02-03 RX ORDER — PETROLATUM,WHITE
1 JELLY (GRAM) TOPICAL
Refills: 0 | Status: DISCONTINUED | OUTPATIENT
Start: 2020-02-03 | End: 2020-02-13

## 2020-02-03 RX ORDER — ACETAMINOPHEN 500 MG
650 TABLET ORAL EVERY 6 HOURS
Refills: 0 | Status: DISCONTINUED | OUTPATIENT
Start: 2020-02-03 | End: 2020-02-13

## 2020-02-03 RX ADMIN — Medication 650 MILLIGRAM(S): at 15:43

## 2020-02-03 RX ADMIN — HYDROMORPHONE HYDROCHLORIDE 2 MILLIGRAM(S): 2 INJECTION INTRAMUSCULAR; INTRAVENOUS; SUBCUTANEOUS at 18:49

## 2020-02-03 RX ADMIN — Medication 1 PATCH: at 07:30

## 2020-02-03 RX ADMIN — NALOXEGOL OXALATE 12.5 MILLIGRAM(S): 12.5 TABLET, FILM COATED ORAL at 12:24

## 2020-02-03 RX ADMIN — Medication 1 TABLET(S): at 05:12

## 2020-02-03 RX ADMIN — OXYCODONE HYDROCHLORIDE 15 MILLIGRAM(S): 5 TABLET ORAL at 00:08

## 2020-02-03 RX ADMIN — OXYCODONE HYDROCHLORIDE 60 MILLIGRAM(S): 5 TABLET ORAL at 13:26

## 2020-02-03 RX ADMIN — GABAPENTIN 1200 MILLIGRAM(S): 400 CAPSULE ORAL at 21:35

## 2020-02-03 RX ADMIN — Medication 500 MILLIGRAM(S): at 05:12

## 2020-02-03 RX ADMIN — FENTANYL CITRATE 1 PATCH: 50 INJECTION INTRAVENOUS at 22:33

## 2020-02-03 RX ADMIN — OXYCODONE HYDROCHLORIDE 15 MILLIGRAM(S): 5 TABLET ORAL at 09:43

## 2020-02-03 RX ADMIN — Medication 1 TABLET(S): at 13:54

## 2020-02-03 RX ADMIN — Medication 1 PATCH: at 22:34

## 2020-02-03 RX ADMIN — OXYCODONE HYDROCHLORIDE 15 MILLIGRAM(S): 5 TABLET ORAL at 15:44

## 2020-02-03 RX ADMIN — OXYCODONE HYDROCHLORIDE 15 MILLIGRAM(S): 5 TABLET ORAL at 08:50

## 2020-02-03 RX ADMIN — HYDROMORPHONE HYDROCHLORIDE 2 MILLIGRAM(S): 2 INJECTION INTRAMUSCULAR; INTRAVENOUS; SUBCUTANEOUS at 23:40

## 2020-02-03 RX ADMIN — OXYCODONE HYDROCHLORIDE 60 MILLIGRAM(S): 5 TABLET ORAL at 00:38

## 2020-02-03 RX ADMIN — Medication 500 MILLIGRAM(S): at 17:58

## 2020-02-03 RX ADMIN — GABAPENTIN 1200 MILLIGRAM(S): 400 CAPSULE ORAL at 13:54

## 2020-02-03 RX ADMIN — Medication 30 MILLIGRAM(S): at 18:00

## 2020-02-03 RX ADMIN — SENNA PLUS 2 TABLET(S): 8.6 TABLET ORAL at 21:35

## 2020-02-03 RX ADMIN — Medication 1 PATCH: at 12:33

## 2020-02-03 RX ADMIN — HYDROMORPHONE HYDROCHLORIDE 2 MILLIGRAM(S): 2 INJECTION INTRAMUSCULAR; INTRAVENOUS; SUBCUTANEOUS at 05:12

## 2020-02-03 RX ADMIN — OXYCODONE HYDROCHLORIDE 15 MILLIGRAM(S): 5 TABLET ORAL at 22:32

## 2020-02-03 RX ADMIN — Medication 1 TABLET(S): at 12:24

## 2020-02-03 RX ADMIN — HYDROMORPHONE HYDROCHLORIDE 2 MILLIGRAM(S): 2 INJECTION INTRAMUSCULAR; INTRAVENOUS; SUBCUTANEOUS at 15:00

## 2020-02-03 RX ADMIN — Medication 1 MILLIGRAM(S): at 12:24

## 2020-02-03 RX ADMIN — OXYCODONE HYDROCHLORIDE 15 MILLIGRAM(S): 5 TABLET ORAL at 22:02

## 2020-02-03 RX ADMIN — GABAPENTIN 1200 MILLIGRAM(S): 400 CAPSULE ORAL at 05:48

## 2020-02-03 RX ADMIN — Medication 1 TABLET(S): at 21:35

## 2020-02-03 RX ADMIN — HYDROMORPHONE HYDROCHLORIDE 2 MILLIGRAM(S): 2 INJECTION INTRAMUSCULAR; INTRAVENOUS; SUBCUTANEOUS at 19:00

## 2020-02-03 RX ADMIN — HYDROMORPHONE HYDROCHLORIDE 2 MILLIGRAM(S): 2 INJECTION INTRAMUSCULAR; INTRAVENOUS; SUBCUTANEOUS at 11:08

## 2020-02-03 RX ADMIN — OXYCODONE HYDROCHLORIDE 15 MILLIGRAM(S): 5 TABLET ORAL at 16:44

## 2020-02-03 RX ADMIN — CYCLOBENZAPRINE HYDROCHLORIDE 10 MILLIGRAM(S): 10 TABLET, FILM COATED ORAL at 15:05

## 2020-02-03 RX ADMIN — HYDROMORPHONE HYDROCHLORIDE 2 MILLIGRAM(S): 2 INJECTION INTRAMUSCULAR; INTRAVENOUS; SUBCUTANEOUS at 10:53

## 2020-02-03 RX ADMIN — OXYCODONE HYDROCHLORIDE 60 MILLIGRAM(S): 5 TABLET ORAL at 01:09

## 2020-02-03 RX ADMIN — HYDROMORPHONE HYDROCHLORIDE 2 MILLIGRAM(S): 2 INJECTION INTRAMUSCULAR; INTRAVENOUS; SUBCUTANEOUS at 05:27

## 2020-02-03 RX ADMIN — Medication 30 MILLIGRAM(S): at 17:43

## 2020-02-03 RX ADMIN — Medication 1 PATCH: at 12:34

## 2020-02-03 RX ADMIN — FENTANYL CITRATE 1 PATCH: 50 INJECTION INTRAVENOUS at 07:30

## 2020-02-03 RX ADMIN — HYDROMORPHONE HYDROCHLORIDE 2 MILLIGRAM(S): 2 INJECTION INTRAMUSCULAR; INTRAVENOUS; SUBCUTANEOUS at 14:45

## 2020-02-03 RX ADMIN — OXYCODONE HYDROCHLORIDE 60 MILLIGRAM(S): 5 TABLET ORAL at 12:26

## 2020-02-03 RX ADMIN — CYCLOBENZAPRINE HYDROCHLORIDE 10 MILLIGRAM(S): 10 TABLET, FILM COATED ORAL at 04:20

## 2020-02-03 RX ADMIN — HYDROMORPHONE HYDROCHLORIDE 2 MILLIGRAM(S): 2 INJECTION INTRAMUSCULAR; INTRAVENOUS; SUBCUTANEOUS at 23:10

## 2020-02-03 RX ADMIN — Medication 650 MILLIGRAM(S): at 16:43

## 2020-02-03 NOTE — DIETITIAN INITIAL EVALUATION ADULT. - PERTINENT MEDS FT
acetaminophen   Tablet .. PRN  cyclobenzaprine PRN  fentaNYL   Patch  12 MICROgram(s)/Hr  folic acid  gabapentin  HYDROmorphone  Injectable PRN  ketorolac   Injectable PRN  magnesium hydroxide Suspension PRN  naloxegol  nicotine -  14 mG/24Hr(s) Patch  oxyCODONE    IR PRN  oxyCODONE  ER Tablet

## 2020-02-03 NOTE — OCCUPATIONAL THERAPY INITIAL EVALUATION ADULT - GENERAL OBSERVATIONS, REHAB EVAL
Pt was encountered supine in bed; NAD, cardiac monitor +, BP cuff +, S/P extension of fusion to T4, exploration of fusion/decompression POD 2, KIRILL bulb +, lumbar dressing clean, dry and intact, AXOX4, cooperative, followed commands; pt c/o pain in midback region which limits pt performance with functional ADL's/transfers and mobility. OT reviewed spinal precautions prior to tasks.

## 2020-02-03 NOTE — CONSULT NOTE ADULT - CONSULT REQUESTED DATE/TIME
01-Feb-2020 17:15
03-Feb-2020 16:52
29-Jan-2020 23:24
30-Jan-2020 16:27
31-Jan-2020 15:38
31-Jan-2020 10:51

## 2020-02-03 NOTE — OCCUPATIONAL THERAPY INITIAL EVALUATION ADULT - RANGE OF MOTION, PT EVAL
Pt will have WFL R Hip/knee ROM  in order to perform functional tasks/transfers with assist of 1 within 4 weeks.

## 2020-02-03 NOTE — OCCUPATIONAL THERAPY INITIAL EVALUATION ADULT - RANGE OF MOTION EXAMINATION, LOWER EXTREMITY
LLE AROM grossly WFL, RLE AROM hip flexion grossly limited by more then 50%, RLE AROM knee flexion grossly limited by more then 50%, RLE AROM distally to knee WFL. RLE PROM grossly WFL

## 2020-02-03 NOTE — PROGRESS NOTE ADULT - SUBJECTIVE AND OBJECTIVE BOX
Patient is a 44y old  Female who presents with a chief complaint of Back pain. (03 Feb 2020 06:01)      OVERNIGHT EVENTS:  back pain     REVIEW OF SYSTEMS: denies chest pain/SOB, diaphoresis, no F/C, cough, dizziness, headache, blurry vision, nausea, vomiting, abdominal pain. All others review of systems negative     MEDICATIONS  (STANDING):  ascorbic acid 500 milliGRAM(s) Oral two times a day  calcium carbonate 1250 mG  + Vitamin D (OsCal 500 + D) 1 Tablet(s) Oral three times a day  fentaNYL   Patch  12 MICROgram(s)/Hr 1 Patch Transdermal every 72 hours  folic acid 1 milliGRAM(s) Oral daily  gabapentin 1200 milliGRAM(s) Oral three times a day  multivitamin 1 Tablet(s) Oral daily  naloxegol 12.5 milliGRAM(s) Oral daily  nicotine -  14 mG/24Hr(s) Patch 1 patch Transdermal daily  oxyCODONE  ER Tablet 60 milliGRAM(s) Oral every 12 hours  senna 2 Tablet(s) Oral at bedtime    MEDICATIONS  (PRN):  acetaminophen   Tablet .. 650 milliGRAM(s) Oral every 6 hours PRN Temp greater or equal to 38.5C (101.3F), Mild Pain (1 - 3)  cyclobenzaprine 10 milliGRAM(s) Oral every 8 hours PRN Muscle Spasm  HYDROmorphone  Injectable 2 milliGRAM(s) IV Push every 4 hours PRN Severe Pain (7 - 10)  ketorolac   Injectable 30 milliGRAM(s) IV Push every 6 hours PRN Mild Pain (1 - 3)  magnesium hydroxide Suspension 30 milliLiter(s) Oral every 12 hours PRN Constipation  ondansetron Injectable 4 milliGRAM(s) IV Push every 6 hours PRN Nausea  oxyCODONE    IR 15 milliGRAM(s) Oral every 6 hours PRN Moderate Pain (4 - 6)      Allergies    penicillin (Short breath; Hives)    Intolerances    COMPLEX CARE (Unknown)      T(F): 102.2 (02-03-20 @ 13:25), Max: 102.2 (02-03-20 @ 13:25)  HR: 103 (02-03-20 @ 11:57) (80 - 103)  BP: 102/70 (02-03-20 @ 11:57) (87/49 - 149/135)  RR: 20 (02-03-20 @ 11:57) (9 - 22)  SpO2: 95% (02-03-20 @ 08:28) (93% - 100%)  Wt(kg): --    PHYSICAL EXAM:  GENERAL: NAD, ill looking   HEAD:  Atraumatic, Normocephalic  EYES: EOMI, PERRLA, conjunctiva and sclera clear  ENMT: Moist mucous membranes  NECK: Supple, No JVD, Normal thyroid  NERVOUS SYSTEM:  Alert & Oriented X3, Good concentration; Motor Strength 5/5 B/L upper and lower extremities; DTRs 2+ intact and symmetric  CHEST/LUNG: Clear to percussion bilaterally; No rales, rhonchi, wheezing, or rubs BL  HEART: Regular rate and rhythm; No murmurs, rubs, or gallops  ABDOMEN: Soft, Nontender, Nondistended; Bowel sounds present  EXTREMITIES:  2+ Peripheral Pulses, No clubbing, cyanosis, or edema BL LE  SKIN: multiple blanchable and non blanchable areas noted face to knees anteriorly, Posterior back with blanchable erythema from neck to end of dressing     LABS:                        8.9    x     )-----------( x        ( 03 Feb 2020 14:20 )             27.8     02-03    140  |  108  |  24<H>  ----------------------------<  141<H>  4.0   |  27  |  0.83    Ca    8.2<L>      03 Feb 2020 04:28  Phos  2.5     02-03  Mg     2.1     02-03    TPro  6.4  /  Alb  2.7<L>  /  TBili  0.4  /  DBili  x   /  AST  14<L>  /  ALT  14  /  AlkPhos  105  02-02        Cultures;   CAPILLARY BLOOD GLUCOSE        Lipid panel:           RADIOLOGY & ADDITIONAL TESTS:    Imaging Personally Reviewed:  [x ] YES    < from: CT Lumbar Spine w/wo IV Cont (01.30.20 @ 13:47) >  Posterior thoracic lumbar fusion from T8 to L2 as described above. Compression deformities of T7 and T8 vertebral bodies with endplate irregularities. Lucencies at the screw/bone interface of the T8 bilateral pedicle screws with left T8 screw projecting into the T7-T8 disc space and right T8 screw projecting into the T7 vertebral body. Nonspecific paraspinal edema. Loosening and/or infection of the T8 pedicle screws not excluded.    Consultant(s) Notes Reviewed:  [ x] YES     Care Discussed with [x ] Consultants [X ] Patient [ ] Family  [x ]    [x ]  Other; RN

## 2020-02-03 NOTE — CONSULT NOTE ADULT - ASSESSMENT
43 F complicated history, multiple hospitalizations (>100 days), long h/o IVDU, EtOH abuse, chronic pain, presented with sepsis, respiratory failure, intubated, found to have MRSA bacteremia, OM, vertebral abscess, multi-level discitis, s/p spinal surgery.     GI consulted for acute anemia. Hg ~ 10 and dropped to 8.9. Repeat stable. Of note recent surgery for osteomyelitis (posterior spinal fusion) s/p loss of 950 ml blood. This is most likely etiology of anemia. No signs/symptoms GI blood loss. Will continue to monitor closely. 43 F complicated history, multiple hospitalizations (>100 days), long h/o IVDU, EtOH abuse, chronic pain, presented with sepsis, respiratory failure, intubated, found to have MRSA bacteremia, OM, vertebral abscess, multi-level discitis, s/p spinal surgery.     GI consulted for acute anemia. Hg ~ 10 and dropped to 8.9. Repeat stable. Of note recent surgery for osteomyelitis (posterior spinal fusion) s/p loss of 950 ml blood. This is most likely etiology of anemia. No signs/symptoms GI blood loss. Will continue to monitor closely. Will repeat hepatitis C studies as she has poor outpatient follow up and long hospitalization. If positive will discuss close f/u for treatment.

## 2020-02-03 NOTE — DIETITIAN INITIAL EVALUATION ADULT. - OTHER INFO
Pt states multiple prolonged hospitalization.  Pt with s/p 2/1 revision fusion spine. Pt c/o back pain, however reports good appetite & po intake. Observed pt consumed 75% breakfast & lunch today. Pt report no BM x 5 days due to narcotics on Senna daily, MOM PRN; RN made aware    UBW obtained from previous dietitian's assessment in EMR. Pt with hx ETOHA & polysubstance abuse states multiple prolonged hospitalizations PTA and unable to walk.  S/p 2/1 revision fusion spine & extubated 2/2. Pt c/o back pain, however reports good appetite & po intake. Observed pt consumed 75% breakfast & lunch today. Pt report no BM x 5 days due to narcotics on Senna daily, MOM PRN; RN made aware    UBW obtained from previous dietitian's assessment in EMR.

## 2020-02-03 NOTE — OCCUPATIONAL THERAPY INITIAL EVALUATION ADULT - ADDITIONAL COMMENTS
Patient was here at [facility] from February of 2019 till June of 2019 requiring multiple surgery and prolonged medical stay. Patient was D/C in June, 2019 and reported that she was living at friends house for a little less then a month and then was admitted to [facility] to undergo B/L knee aspiration. Patient did not report where she was living after getting D/C from [facility]. Patient reported that in November she was admitted to [facility] for hip aspiration and then was transferred to [facility] until she was admitted to [facility] on 1/29/2020. Patient reported that will at [facility] she performed bed mobility with assistance and was able to perform sit to stand transfer and take multiple steps with two person assist with rolling walker.

## 2020-02-03 NOTE — PROGRESS NOTE ADULT - SUBJECTIVE AND OBJECTIVE BOX
Pt seen and examined at bedside, agitated and yelling. Pt states she is in severe pain and requesting more narcotics. Denies numbness/tingling, chest pain, SOB.    T(C): 37.9 (02-02-20 @ 23:54), Max: 37.9 (02-02-20 @ 23:54)  HR: 98 (02-03-20 @ 05:10) (76 - 99)  BP: 96/79 (02-03-20 @ 05:10) (87/49 - 149/135)  RR: 17 (02-03-20 @ 05:10) (8 - 24)  SpO2: 100% (02-03-20 @ 05:10) (93% - 100%)                          8.7    10.87 )-----------( 206      ( 03 Feb 2020 04:28 )             27.4     03 Feb 2020 04:28    140    |  108    |  24     ----------------------------<  141    4.0     |  27     |  0.83     Ca    8.2        03 Feb 2020 04:28  Phos  2.5       03 Feb 2020 04:28  Mg     2.1       03 Feb 2020 04:28    Physical Exam:  Gen: in distress due to pain    Spine Exam:  Dressing C/D/I with drain in place  No gross deformity  No bony step offs  No Radicular Symptoms with SLR  Negative clonus  Negative babinski  Negative olmstead  No calf TTP    Motor:               C5           C6            C7               C8           T1   R         5/5          5/5            5/5             5/5          5/5  L          5/5          5/5            5/5             5/5          5/5                L2             L3             L4               L5            S1  R         2/5           3/5          3/5             4/5           4/5  L          5/5          5/5           5/5             N/A           5/5    Sensory:            C5         C6         C7      C8       T1        (0=absent, 1=impaired, 2=normal, NT=not testable)  R         2            2           2        2         2  L          2            2           2        2         2               L2          L3         L4      L5       S1         (0=absent, 1=impaired, 2=normal, NT=not testable)  R         2            2            2        2        2  L          2            2           2        2         2      A/P:  43 yo F s/p extension of fusion to T4, exploration of fusion/decompression POD 2    -ID following, continue vanc, BCx NGTD  -WBAT/PT  -KIRILL drain, record drainage  -Pain control; pain management via primary team  -Please call for any change in, new, or worsening signs/symptoms  -XR R hip demonstrate pseudoacetabulum; likely from chronic dislocation/infection, pt not a candidate for joint replacement at this time, will require complex total hip arthroplasty in the future  -MRI R hip ordered for further evaluation of infection  -Discussed with Dr Leslie, who agrees with above    Braydon Uribe,  PGY1  Orthopaedic Surgery Pt seen and examined at bedside, agitated and yelling. Pt states she is in severe pain and requesting more narcotics. Denies numbness/tingling, chest pain, SOB.    T(C): 37.9 (02-02-20 @ 23:54), Max: 37.9 (02-02-20 @ 23:54)  HR: 98 (02-03-20 @ 05:10) (76 - 99)  BP: 96/79 (02-03-20 @ 05:10) (87/49 - 149/135)  RR: 17 (02-03-20 @ 05:10) (8 - 24)  SpO2: 100% (02-03-20 @ 05:10) (93% - 100%)                          8.7    10.87 )-----------( 206      ( 03 Feb 2020 04:28 )             27.4     03 Feb 2020 04:28    140    |  108    |  24     ----------------------------<  141    4.0     |  27     |  0.83     Ca    8.2        03 Feb 2020 04:28  Phos  2.5       03 Feb 2020 04:28  Mg     2.1       03 Feb 2020 04:28    Physical Exam:  Gen: in distress due to pain    Spine Exam:  Dressing C/D/I with drain in place  No gross deformity  No bony step offs  No Radicular Symptoms with SLR  Negative clonus  Negative babinski  Negative olmstead  No calf TTP    Motor:               C5           C6            C7               C8           T1   R         5/5          5/5            5/5             5/5          5/5  L          5/5          5/5            5/5             5/5          5/5                L2             L3             L4               L5            S1  R         2/5           3/5          3/5             4/5           4/5  L          5/5          5/5           5/5             N/A           5/5    Sensory:            C5         C6         C7      C8       T1        (0=absent, 1=impaired, 2=normal, NT=not testable)  R         2            2           2        2         2  L          2            2           2        2         2               L2          L3         L4      L5       S1         (0=absent, 1=impaired, 2=normal, NT=not testable)  R         2            2            2        2        2  L          2            2           2        2         2      A/P:  43 yo F s/p extension of fusion to T4, exploration of fusion/decompression POD 2    -ID following, continue vanc, BCx NGTD  -WBAT/PT  -KIRILL drain, record drainage  -Pain control; pain management via primary team; pt yelling at bedside and demanding more pain medicine; explained to pt that her respirations were dropping to 8 via nurse and it is unsafe to give higher dose of pain meds at this time given addition of low dose fentanyl patch yesterday on top of oxycodone, pt continued to yell and curse and was inconsolable for the rest of the encounter  -Please call for any change in, new, or worsening signs/symptoms  -XR R hip demonstrate pseudoacetabulum; likely from chronic dislocation/infection, pt not a candidate for joint replacement at this time, will require complex total hip arthroplasty in the future  -MRI R hip ordered for further evaluation of infection  -Discussed with Dr Leslie, who agrees with above    Braydon Uribe, DO PGY1  Orthopaedic Surgery Pt seen and examined at bedside, agitated and yelling. Pt states she is in severe pain and demanding more narcotics. Denies numbness/tingling, chest pain, SOB.    T(C): 37.9 (02-02-20 @ 23:54), Max: 37.9 (02-02-20 @ 23:54)  HR: 98 (02-03-20 @ 05:10) (76 - 99)  BP: 96/79 (02-03-20 @ 05:10) (87/49 - 149/135)  RR: 17 (02-03-20 @ 05:10) (8 - 24)  SpO2: 100% (02-03-20 @ 05:10) (93% - 100%)                          8.7    10.87 )-----------( 206      ( 03 Feb 2020 04:28 )             27.4     03 Feb 2020 04:28    140    |  108    |  24     ----------------------------<  141    4.0     |  27     |  0.83     Ca    8.2        03 Feb 2020 04:28  Phos  2.5       03 Feb 2020 04:28  Mg     2.1       03 Feb 2020 04:28    Physical Exam:  Gen: in distress due to pain    Spine Exam:  Dressing C/D/I with drain in place  No gross deformity  No bony step offs  No Radicular Symptoms with SLR  Negative clonus  Negative babinski  Negative olmstead  No calf TTP    Motor:               C5           C6            C7               C8           T1   R         5/5          5/5            5/5             5/5          5/5  L          5/5          5/5            5/5             5/5          5/5                L2             L3             L4               L5            S1  R         2/5           3/5          3/5             4/5           4/5  L          5/5          5/5           5/5             N/A           5/5    Sensory:            C5         C6         C7      C8       T1        (0=absent, 1=impaired, 2=normal, NT=not testable)  R         2            2           2        2         2  L          2            2           2        2         2               L2          L3         L4      L5       S1         (0=absent, 1=impaired, 2=normal, NT=not testable)  R         2            2            2        2        2  L          2            2           2        2         2      A/P:  45 yo F s/p extension of fusion to T4, exploration of fusion/decompression POD 2    -ID following, continue vanc, BCx NGTD  -WBAT/PT  -KIRILL drain, record drainage  -Pain control; pain management via primary team; pt yelling at bedside and demanding more pain medicine; explained to pt that her respirations were dropping to 8 via nurse and it is unsafe to give higher dose of pain meds at this time given addition of low dose fentanyl patch yesterday on top of oxycodone, pt continued to yell and curse and was inconsolable for the rest of the encounter  -Please call for any change in, new, or worsening signs/symptoms  -XR R hip demonstrate pseudoacetabulum; likely from chronic dislocation/infection, pt not a candidate for joint replacement at this time, will require complex total hip arthroplasty in the future  -MRI R hip ordered for further evaluation of infection  -Discussed with Dr Leslie, who agrees with above    Braydon Uribe, DO PGY1  Orthopaedic Surgery Pt seen and examined at bedside, agitated and yelling. Pt states she is in severe pain and demanding more narcotics. Denies numbness/tingling, chest pain, SOB.    T(C): 37.9 (02-02-20 @ 23:54), Max: 37.9 (02-02-20 @ 23:54)  HR: 98 (02-03-20 @ 05:10) (76 - 99)  BP: 96/79 (02-03-20 @ 05:10) (87/49 - 149/135)  RR: 17 (02-03-20 @ 05:10) (8 - 24)  SpO2: 100% (02-03-20 @ 05:10) (93% - 100%)                          8.7    10.87 )-----------( 206      ( 03 Feb 2020 04:28 )             27.4     03 Feb 2020 04:28    140    |  108    |  24     ----------------------------<  141    4.0     |  27     |  0.83     Ca    8.2        03 Feb 2020 04:28  Phos  2.5       03 Feb 2020 04:28  Mg     2.1       03 Feb 2020 04:28    Physical Exam:  Gen: in distress due to pain    Spine Exam:  Dressing C/D/I with drain in place  No gross deformity  No bony step offs  No Radicular Symptoms with SLR  Negative clonus  Negative babinski  Negative olmstead  No calf TTP    Motor:               C5           C6            C7               C8           T1   R         5/5          5/5            5/5             5/5          5/5  L          5/5          5/5            5/5             5/5          5/5                L2             L3             L4               L5            S1  R         2/5           3/5          3/5             4/5           4/5  L          5/5          5/5           5/5             N/A           5/5    Sensory:            C5         C6         C7      C8       T1        (0=absent, 1=impaired, 2=normal, NT=not testable)  R         2            2           2        2         2  L          2            2           2        2         2               L2          L3         L4      L5       S1         (0=absent, 1=impaired, 2=normal, NT=not testable)  R         2            2            2        2        2  L          2            2           2        2         2      A/P:  45 yo F s/p extension of fusion to T4, exploration of fusion/decompression POD 2    -ID following, continue vanc, BCx NGTD  -WBAT/PT  -KIRILL drain, record drainage  -Pain control; pain management via primary team; pt yelling at bedside and demanding more pain medicine; explained to pt that her respirations were dropping to 8 via nurse and it is unsafe to give higher dose of pain meds at this time given addition of low dose fentanyl patch yesterday on top of oxycodone, pt continued to yell and curse and was inconsolable for the rest of the encounter  -Please call for any change in, new, or worsening signs/symptoms  -XR R hip demonstrate pseudoacetabulum; likely from chronic dislocation/infection, pt not a candidate for joint replacement at this time, will require complex total hip arthroplasty in the future  -Discussed with Dr Leslie, who agrees with above    Braydon Uribe, DO PGY1  Orthopaedic Surgery

## 2020-02-03 NOTE — DIETITIAN INITIAL EVALUATION ADULT. - PERTINENT LABORATORY DATA
02-03 Na 140 mmol/L Glu 141 mg/dL<H> K+ 4.0 mmol/L Cr 0.83 mg/dL BUN 24 mg/dL<H> Phos 2.5 mg/dL Alb 2.7(2/2)  PAB n/a   Hgb 8.7 g/dL<L> Hct 27.4 %<L> HgA1C n/a    Glucose, Serum: 141 mg/dL <H>, WBC=10.87(2/3)   24Hr FS:

## 2020-02-03 NOTE — OCCUPATIONAL THERAPY INITIAL EVALUATION ADULT - PRECAUTIONS/LIMITATIONS, REHAB EVAL
spinal precautions/Pt educated on their spinal precuations inlcuding no lifting greater then 8lbs, no twisting, and no bending. Patient verbalized understanding of these precautions. Patient maintained spinal precuations ( no lifting greater than 8lbs, no twisting, and no bending) throughout session./cardiac precautions/fall precautions

## 2020-02-03 NOTE — PROGRESS NOTE ADULT - SUBJECTIVE AND OBJECTIVE BOX
Patient is a 44y old  Female who presents with a chief complaint of Back pain. (04 Feb 2020 10:07)      INTERVAL HPI / OVERNIGHT EVENTS: s/p ortho surgery over the weekend.now in ICU as was intubated post surgery    MEDICATIONS  (STANDING):  ascorbic acid 500 milliGRAM(s) Oral two times a day  calcium carbonate 1250 mG  + Vitamin D (OsCal 500 + D) 1 Tablet(s) Oral three times a day  fentaNYL   Patch  12 MICROgram(s)/Hr 1 Patch Transdermal every 72 hours  folic acid 1 milliGRAM(s) Oral daily  gabapentin 1200 milliGRAM(s) Oral three times a day  multivitamin 1 Tablet(s) Oral daily  naloxegol 12.5 milliGRAM(s) Oral daily  nicotine -  14 mG/24Hr(s) Patch 1 patch Transdermal daily  oxyCODONE  ER Tablet 60 milliGRAM(s) Oral every 12 hours  senna 2 Tablet(s) Oral at bedtime    MEDICATIONS  (PRN):  acetaminophen   Tablet .. 650 milliGRAM(s) Oral every 6 hours PRN Temp greater or equal to 38.5C (101.3F), Mild Pain (1 - 3)  cyclobenzaprine 10 milliGRAM(s) Oral every 8 hours PRN Muscle Spasm  HYDROmorphone  Injectable 2 milliGRAM(s) IV Push every 4 hours PRN Severe Pain (7 - 10)  ketorolac   Injectable 30 milliGRAM(s) IV Push every 6 hours PRN Mild Pain (1 - 3)  magnesium hydroxide Suspension 30 milliLiter(s) Oral every 12 hours PRN Constipation  ondansetron Injectable 4 milliGRAM(s) IV Push every 6 hours PRN Nausea  oxyCODONE    IR 15 milliGRAM(s) Oral every 6 hours PRN Moderate Pain (4 - 6)  petrolatum white Ointment 1 Application(s) Topical four times a day PRN dry, cracked lips      Vital Signs Last 24 Hrs  T max: 102.2      Review of systems:  General :+ fever + back pain +fatigue  CVS : no chest pain, palpitations  Lungs : no shortness of breath, cough  GI : no abdominal pain,vomiting, diarrhea   : no dysuria,hematuria        PHYSICAL EXAM:  General :NAD  Constitutional:  well-groomed, well-developed  Respiratory: CTAB/L  Cardiovascular: S1 and S2, RRR, no M/G/R  Gastrointestinal: BS+, soft, NT/ND  Extremities: No peripheral edema  Vascular: 2+ peripheral pulses  Skin: s/p back surgery      LABS:   wbc 10.8  cr 0.83  Hep C +             MICROBIOLOGY:  RECENT CULTURES:  01-30 .Blood Blood-Peripheral XXXX XXXX   No growth at 5 days.          RADIOLOGY & ADDITIONAL STUDIES:  Comparison: None     Findings: There is tricompartment degenerative changes of bothname most pronounced in the medial compartment with loss of joint space and osteophyte production. No acute fracture. No significant joint effusions.    Impression: Degenerative changes

## 2020-02-03 NOTE — CONSULT NOTE ADULT - CONSULT REASON
Anemia
New diskitis /osteomyelitis of spine
back pain
post op care
preop cardiac assessment
L foot post op

## 2020-02-03 NOTE — PROGRESS NOTE ADULT - SUBJECTIVE AND OBJECTIVE BOX
patient is seen.  Appears comfortable.  does not grossly appear in distress.  She is not diaphoretic or febrile.  She requests additional opiod treatment but given her depressed respiratory function when she sleeps, she is informed patient is not indicated for increased dosage at this point.  she will be at risk of respiratory failure which we would not prefer.    I went over findings of her right hip with her.  We had sent images of her right hip to [facility] joint replacement surgeon for further review and he felt that patient is not a candidate for surgical intervention given her current health, multiple sources of osteomyelitis and lack of compliance.    Patient was informed at [facility] that she has a dislocated hip from infection but is not a surgical candidate.  Current radiographic and surgical discussion with joint replacement surgeon confirms the consensus.  Looking at the images and amount of acetabular bone loss this is likely ongoing for few months.    Right leg weakness is likely associated with chronic hip dislocation and additional likely associated motor weakness as it tends to impinge on sciatic neurobundle.    Patient had become ambulatory with a walker prior to her discharge from [facility] last year and this is likely secondary to sequela from non compliance with long term suppressive treatment from which she was unfortunately non-compliant.  This will likely leave her wheel chair bound and patient is informed of this.  She has been non-ambulatory per patient for last three months.    Regarding her thoracic spine: post op dressing is clean and intact.  KIRILL drains are in place.  Skin is intact. will continue to follow    Plan:  Antibiotic regimen per ID  Long term suppressive antibiotics  PT evaluation - need to learn how to pivot on left leg for transfer from bed to wheel chair.  Long term nursing home / assisted living placement is recommended if available once patient is stable from spine and ID and Medicine service  Follow up in Joint replacement clinic but likely permanently non-operative candidate for hip and knee pain from joint injury due to septic arthrosis.  Follow up in spine clinic - [phone] - for follow up on spine healing.  Needs wheel chair, bedside commode  Brace not indicated since patient is non-ambulatory  She can be TTWB on right for transfers, wbat on left leg for transfers

## 2020-02-03 NOTE — OCCUPATIONAL THERAPY INITIAL EVALUATION ADULT - STRENGTHENING, PT EVAL
Pt will increase right lower extremity strength to greater then 3/5 to improve functional strength needed to engage in functional tasks by 4 weeks.

## 2020-02-03 NOTE — PROGRESS NOTE ADULT - PROBLEM SELECTOR PLAN 1
Has osteomyelitis/discitis, seen by ortho, ID  s/p removal of hardware T6, T4-T10 PSF, exploration of Hardware, junctional kyphosis  blood cultures, started on vanco  pain control w/Fentanyl, dilaudid, Oxycontin, oxycodone

## 2020-02-03 NOTE — OCCUPATIONAL THERAPY INITIAL EVALUATION ADULT - RLE MMT, REHAB EVAL
Grossly less then 3/5 hip flexion and knee flexion; Grossly greater then 3/5 ankle dorsiflexion/plantarflexion.

## 2020-02-03 NOTE — CONSULT NOTE ADULT - SUBJECTIVE AND OBJECTIVE BOX
Chief Complaint:  Patient is a 44y old  Female who presents with a chief complaint of Back pain. (2020 14:57)      HPI:  43 y/o Female complains of upper back pain for 1 day. Patient had T8-L2 PSF performed in 2019 for infection. Since that time, she notes that she has presented to outside hospitals and had bilateral septic knee I&Ds performed, followed by periods of time in which she has been intermittently compliant with her prescribed antibiotics. Patient states that the thoracic pain appeared suddenly without provocation and felt like a "strain or pull". Patient denies numbness, paresthesias, head strike, loss of consciousness, change in bowel/bladder continence, saddle anesthesia, or any other signs/symptoms at this time.      Complex Care: Description: 42 yo F with LONG, complicated hospitalization (>100 days), long h/o IVDU, EtOH abuse, chronic pain, presented with sepsis, respiratory failure, intubated, found to have MRSA bacteremia, OM, vertebral abscess, multi-level discitis, s/p spinal surgery.  Pt. has been on methadone in the past, sober period at one point.  Restarted methadone on this admission.  60mg bid.  Pt. doing well on current pain regimen, weaned from opiates.  Referred to outpatient methadone clinic, [facility].  Had originally planned to go to Mother's home but Mother would not take her because of methadone (believes its still a way of getting high). Pt. is going to live with a friend. Declined interviews for inpatient rehab.  	- discharged on two weeks of zyvox, then change to Daptomycin 100mg po bid for one year, then daily dosing suppressive lifelong as per ID (Dr. Garcia)  	- orthospine - Dr. Leslie  	- PCP - referral made to Dr. Sumner.  	HIGH RISK for relapse  	Avoid narcotics for pain management (2020 02:36)    GI consulted for further evaluation of anemia. Of note surgery for osteomyelitis  removal of hardware T6, T4-T10 PSF, exploration of Hardware, 950 ml blood lost. She denies any blood in stool, melena, Denies any previous history gastrointestinal bleeding. She denies any previous EGD or colonoscopy. Of note previously seen by GI for hepatitis C. She is unsure her status, if she completed treatment or not.       PMH/PSH:PAST MEDICAL & SURGICAL HISTORY:  History of pancreatitis  History of alcoholic hepatitis  ETOH abuse  Drug abuse  History of back surgery  C Section  Hepatitis C; unknown status       Allergies:  COMPLEX CARE (Unknown)  penicillin (Short breath; Hives)      Medications:  acetaminophen   Tablet .. 650 milliGRAM(s) Oral every 6 hours PRN  ascorbic acid 500 milliGRAM(s) Oral two times a day  calcium carbonate 1250 mG  + Vitamin D (OsCal 500 + D) 1 Tablet(s) Oral three times a day  cyclobenzaprine 10 milliGRAM(s) Oral every 8 hours PRN  fentaNYL   Patch  12 MICROgram(s)/Hr 1 Patch Transdermal every 72 hours  folic acid 1 milliGRAM(s) Oral daily  gabapentin 1200 milliGRAM(s) Oral three times a day  HYDROmorphone  Injectable 2 milliGRAM(s) IV Push every 4 hours PRN  ketorolac   Injectable 30 milliGRAM(s) IV Push every 6 hours PRN  magnesium hydroxide Suspension 30 milliLiter(s) Oral every 12 hours PRN  multivitamin 1 Tablet(s) Oral daily  naloxegol 12.5 milliGRAM(s) Oral daily  nicotine -  14 mG/24Hr(s) Patch 1 patch Transdermal daily  ondansetron Injectable 4 milliGRAM(s) IV Push every 6 hours PRN  oxyCODONE    IR 15 milliGRAM(s) Oral every 6 hours PRN  oxyCODONE  ER Tablet 60 milliGRAM(s) Oral every 12 hours  senna 2 Tablet(s) Oral at bedtime      Review of Systems:    General:  No weight loss, fevers, chills, night sweats, fatigue,   Eyes:  Good vision, no reported pain  ENT:  No sore throat, pain, runny nose, dysphagia  CV:  No pain, palpitations, hypo/hypertension  Resp:  No dyspnea, cough, tachypnea, wheezing  GI:  as per HPI   :  No pain, bleeding, incontinence, nocturia  Muscle:  + pain   Breast:  No pain, abscess, mass, discharge  Neuro:  No weakness, tingling, memory problems  Psych:  + fatigue, + depression   Endocrine:  No polyuria, polydipsia, cold/heat intolerance  Heme:  No petechiae, ecchymosis, easy bruisability  Skin:  No rash, tattoos, scars, edema    Relevant Family History:   FAMILY HISTORY:  Family history unknown      Relevant Social History: Alcohol ( -) , Tobacco ( -) , Illicit drugs (- )     Physical Exam:    Vital Signs:  Vital Signs Last 24 Hrs  T(C): 38.5 (2020 15:29), Max: 39 (2020 13:25)  T(F): 101.3 (2020 15:29), Max: 102.2 (2020 13:25)  HR: 103 (2020 11:57) (88 - 103)  BP: 102/70 (2020 11:57) (87/49 - 149/135)  BP(mean): 80 (2020 11:57) (60 - 141)  RR: 20 (2020 11:57) (14 - 22)  SpO2: 95% (2020 08:28) (95% - 100%)  Daily     Daily Weight in k (2020 14:20)    General:  Appears in pain, tearing   HEENT:  NC/AT,  conjunctivae clear and pink, no thyromegaly, nodules, adenopathy, no JVD, anicteric sclera  Chest:  Full & symmetric excursion, no increased effort, breath sounds clear  Cardiovascular:  Regular rhythm, S1, S2, no murmur/rub/S3/S4, no abdominal bruit, no edema  Abdomen:  Soft, non tender, non distended, normoactive bowel sounds,  no masses , no hepatosplenomegaly, no signs of chronic liver disease, + KIRILL drain   Extremities:  no cyanosis, clubbing or edema  Skin:  No rash/erythema/ecchymoses/petechiae/wounds/abscess/warm/dry  Neuro/Psych:  Alert, oriented, no asterixi    Laboratory:                          8.9    x     )-----------( x        ( 2020 14:20 )             27.8     02-03    140  |  108  |  24<H>  ----------------------------<  141<H>  4.0   |  27  |  0.83    Ca    8.2<L>      2020 04:28  Phos  2.5     02-03  Mg     2.1     02-03    TPro  6.4  /  Alb  2.7<L>  /  TBili  0.4  /  DBili  x   /  AST  14<L>  /  ALT  14  /  AlkPhos  105  02-02    LIVER FUNCTIONS - ( 2020 04:02 )  Alb: 2.7 g/dL / Pro: 6.4 gm/dL / ALK PHOS: 105 U/L / ALT: 14 U/L / AST: 14 U/L / GGT: x                   Intake and Output    20 @ 07:01  -  20 @ 07:00  --------------------------------------------------------  IN: 793.1 mL / OUT: 1660 mL / NET: -866.9 mL    20 @ 07:01  -  20 @ 16:53  --------------------------------------------------------  IN: 0 mL / OUT: 30 mL / NET: -30 mL        Imaging:

## 2020-02-03 NOTE — CONSULT NOTE ADULT - PROBLEM SELECTOR RECOMMENDATION 9
-Send iron studies, ferritin   -Trend hg daily   -Monitor for signs/symptoms gastrointestinal blood loss.

## 2020-02-03 NOTE — OCCUPATIONAL THERAPY INITIAL EVALUATION ADULT - BED MOBILITY TRAINING, PT EVAL
Patient will be able to perform bed mobility tasks CGA, using least restrictive device, within 4 weeks.

## 2020-02-04 LAB
ANION GAP SERPL CALC-SCNC: 7 MMOL/L — SIGNIFICANT CHANGE UP (ref 5–17)
BUN SERPL-MCNC: 24 MG/DL — HIGH (ref 7–23)
CALCIUM SERPL-MCNC: 8.5 MG/DL — SIGNIFICANT CHANGE UP (ref 8.5–10.1)
CHLORIDE SERPL-SCNC: 107 MMOL/L — SIGNIFICANT CHANGE UP (ref 96–108)
CO2 SERPL-SCNC: 26 MMOL/L — SIGNIFICANT CHANGE UP (ref 22–31)
CREAT SERPL-MCNC: 0.63 MG/DL — SIGNIFICANT CHANGE UP (ref 0.5–1.3)
CULTURE RESULTS: SIGNIFICANT CHANGE UP
CULTURE RESULTS: SIGNIFICANT CHANGE UP
FERRITIN SERPL-MCNC: 131 NG/ML — SIGNIFICANT CHANGE UP (ref 15–150)
GLUCOSE SERPL-MCNC: 119 MG/DL — HIGH (ref 70–99)
HCT VFR BLD CALC: 27.4 % — LOW (ref 34.5–45)
HCV AB S/CO SERPL IA: 14.72 S/CO — HIGH (ref 0–0.99)
HCV AB SERPL-IMP: REACTIVE
HGB BLD-MCNC: 8.7 G/DL — LOW (ref 11.5–15.5)
IRON SATN MFR SERPL: 17 UG/DL — LOW (ref 30–160)
IRON SATN MFR SERPL: 8 % — LOW (ref 14–50)
MCHC RBC-ENTMCNC: 26.8 PG — LOW (ref 27–34)
MCHC RBC-ENTMCNC: 31.8 GM/DL — LOW (ref 32–36)
MCV RBC AUTO: 84.3 FL — SIGNIFICANT CHANGE UP (ref 80–100)
NRBC # BLD: 0 /100 WBCS — SIGNIFICANT CHANGE UP (ref 0–0)
PLATELET # BLD AUTO: 195 K/UL — SIGNIFICANT CHANGE UP (ref 150–400)
POTASSIUM SERPL-MCNC: 3.9 MMOL/L — SIGNIFICANT CHANGE UP (ref 3.5–5.3)
POTASSIUM SERPL-SCNC: 3.9 MMOL/L — SIGNIFICANT CHANGE UP (ref 3.5–5.3)
RBC # BLD: 3.25 M/UL — LOW (ref 3.8–5.2)
RBC # FLD: 17.6 % — HIGH (ref 10.3–14.5)
SODIUM SERPL-SCNC: 140 MMOL/L — SIGNIFICANT CHANGE UP (ref 135–145)
SPECIMEN SOURCE: SIGNIFICANT CHANGE UP
SPECIMEN SOURCE: SIGNIFICANT CHANGE UP
SURGICAL PATHOLOGY STUDY: SIGNIFICANT CHANGE UP
TIBC SERPL-MCNC: 222 UG/DL — SIGNIFICANT CHANGE UP (ref 220–430)
UIBC SERPL-MCNC: 205 UG/DL — SIGNIFICANT CHANGE UP (ref 110–370)
VANCOMYCIN FLD-MCNC: 4.6 UG/ML — SIGNIFICANT CHANGE UP
WBC # BLD: 10.6 K/UL — HIGH (ref 3.8–10.5)
WBC # FLD AUTO: 10.6 K/UL — HIGH (ref 3.8–10.5)

## 2020-02-04 PROCEDURE — 99231 SBSQ HOSP IP/OBS SF/LOW 25: CPT

## 2020-02-04 PROCEDURE — 71045 X-RAY EXAM CHEST 1 VIEW: CPT | Mod: 26

## 2020-02-04 PROCEDURE — 99233 SBSQ HOSP IP/OBS HIGH 50: CPT

## 2020-02-04 RX ORDER — VANCOMYCIN HCL 1 G
1250 VIAL (EA) INTRAVENOUS EVERY 12 HOURS
Refills: 0 | Status: DISCONTINUED | OUTPATIENT
Start: 2020-02-04 | End: 2020-02-13

## 2020-02-04 RX ORDER — VANCOMYCIN HCL 1 G
1250 VIAL (EA) INTRAVENOUS ONCE
Refills: 0 | Status: COMPLETED | OUTPATIENT
Start: 2020-02-04 | End: 2020-02-04

## 2020-02-04 RX ORDER — BACITRACIN ZINC 500 UNIT/G
1 OINTMENT IN PACKET (EA) TOPICAL ONCE
Refills: 0 | Status: COMPLETED | OUTPATIENT
Start: 2020-02-04 | End: 2020-02-04

## 2020-02-04 RX ORDER — VANCOMYCIN HCL 1 G
VIAL (EA) INTRAVENOUS
Refills: 0 | Status: DISCONTINUED | OUTPATIENT
Start: 2020-02-04 | End: 2020-02-13

## 2020-02-04 RX ADMIN — OXYCODONE HYDROCHLORIDE 15 MILLIGRAM(S): 5 TABLET ORAL at 05:01

## 2020-02-04 RX ADMIN — Medication 1 APPLICATION(S): at 21:40

## 2020-02-04 RX ADMIN — NALOXEGOL OXALATE 12.5 MILLIGRAM(S): 12.5 TABLET, FILM COATED ORAL at 12:37

## 2020-02-04 RX ADMIN — Medication 1 APPLICATION(S): at 00:23

## 2020-02-04 RX ADMIN — HYDROMORPHONE HYDROCHLORIDE 2 MILLIGRAM(S): 2 INJECTION INTRAMUSCULAR; INTRAVENOUS; SUBCUTANEOUS at 23:02

## 2020-02-04 RX ADMIN — GABAPENTIN 1200 MILLIGRAM(S): 400 CAPSULE ORAL at 21:40

## 2020-02-04 RX ADMIN — HYDROMORPHONE HYDROCHLORIDE 2 MILLIGRAM(S): 2 INJECTION INTRAMUSCULAR; INTRAVENOUS; SUBCUTANEOUS at 18:57

## 2020-02-04 RX ADMIN — OXYCODONE HYDROCHLORIDE 60 MILLIGRAM(S): 5 TABLET ORAL at 13:35

## 2020-02-04 RX ADMIN — OXYCODONE HYDROCHLORIDE 60 MILLIGRAM(S): 5 TABLET ORAL at 12:38

## 2020-02-04 RX ADMIN — Medication 30 MILLIGRAM(S): at 15:29

## 2020-02-04 RX ADMIN — HYDROMORPHONE HYDROCHLORIDE 2 MILLIGRAM(S): 2 INJECTION INTRAMUSCULAR; INTRAVENOUS; SUBCUTANEOUS at 14:10

## 2020-02-04 RX ADMIN — Medication 30 MILLIGRAM(S): at 02:06

## 2020-02-04 RX ADMIN — OXYCODONE HYDROCHLORIDE 15 MILLIGRAM(S): 5 TABLET ORAL at 11:20

## 2020-02-04 RX ADMIN — Medication 1 TABLET(S): at 12:36

## 2020-02-04 RX ADMIN — Medication 30 MILLIGRAM(S): at 21:40

## 2020-02-04 RX ADMIN — HYDROMORPHONE HYDROCHLORIDE 2 MILLIGRAM(S): 2 INJECTION INTRAMUSCULAR; INTRAVENOUS; SUBCUTANEOUS at 03:46

## 2020-02-04 RX ADMIN — GABAPENTIN 1200 MILLIGRAM(S): 400 CAPSULE ORAL at 14:07

## 2020-02-04 RX ADMIN — OXYCODONE HYDROCHLORIDE 15 MILLIGRAM(S): 5 TABLET ORAL at 17:03

## 2020-02-04 RX ADMIN — HYDROMORPHONE HYDROCHLORIDE 2 MILLIGRAM(S): 2 INJECTION INTRAMUSCULAR; INTRAVENOUS; SUBCUTANEOUS at 14:25

## 2020-02-04 RX ADMIN — OXYCODONE HYDROCHLORIDE 60 MILLIGRAM(S): 5 TABLET ORAL at 00:47

## 2020-02-04 RX ADMIN — OXYCODONE HYDROCHLORIDE 15 MILLIGRAM(S): 5 TABLET ORAL at 18:00

## 2020-02-04 RX ADMIN — Medication 10 MILLIGRAM(S): at 23:02

## 2020-02-04 RX ADMIN — HYDROMORPHONE HYDROCHLORIDE 2 MILLIGRAM(S): 2 INJECTION INTRAMUSCULAR; INTRAVENOUS; SUBCUTANEOUS at 03:16

## 2020-02-04 RX ADMIN — Medication 30 MILLIGRAM(S): at 09:14

## 2020-02-04 RX ADMIN — HYDROMORPHONE HYDROCHLORIDE 2 MILLIGRAM(S): 2 INJECTION INTRAMUSCULAR; INTRAVENOUS; SUBCUTANEOUS at 23:17

## 2020-02-04 RX ADMIN — HYDROMORPHONE HYDROCHLORIDE 2 MILLIGRAM(S): 2 INJECTION INTRAMUSCULAR; INTRAVENOUS; SUBCUTANEOUS at 19:12

## 2020-02-04 RX ADMIN — SENNA PLUS 2 TABLET(S): 8.6 TABLET ORAL at 21:40

## 2020-02-04 RX ADMIN — Medication 30 MILLIGRAM(S): at 02:36

## 2020-02-04 RX ADMIN — GABAPENTIN 1200 MILLIGRAM(S): 400 CAPSULE ORAL at 05:01

## 2020-02-04 RX ADMIN — Medication 30 MILLIGRAM(S): at 15:45

## 2020-02-04 RX ADMIN — OXYCODONE HYDROCHLORIDE 15 MILLIGRAM(S): 5 TABLET ORAL at 05:31

## 2020-02-04 RX ADMIN — Medication 30 MILLIGRAM(S): at 21:55

## 2020-02-04 RX ADMIN — OXYCODONE HYDROCHLORIDE 60 MILLIGRAM(S): 5 TABLET ORAL at 00:17

## 2020-02-04 RX ADMIN — Medication 30 MILLIGRAM(S): at 09:30

## 2020-02-04 RX ADMIN — Medication 1 PATCH: at 12:42

## 2020-02-04 RX ADMIN — Medication 166.67 MILLIGRAM(S): at 11:19

## 2020-02-04 RX ADMIN — Medication 1 TABLET(S): at 05:00

## 2020-02-04 RX ADMIN — HYDROMORPHONE HYDROCHLORIDE 2 MILLIGRAM(S): 2 INJECTION INTRAMUSCULAR; INTRAVENOUS; SUBCUTANEOUS at 08:00

## 2020-02-04 RX ADMIN — Medication 1 MILLIGRAM(S): at 12:37

## 2020-02-04 RX ADMIN — Medication 1 TABLET(S): at 21:40

## 2020-02-04 RX ADMIN — Medication 1 PATCH: at 07:10

## 2020-02-04 RX ADMIN — HYDROMORPHONE HYDROCHLORIDE 2 MILLIGRAM(S): 2 INJECTION INTRAMUSCULAR; INTRAVENOUS; SUBCUTANEOUS at 07:42

## 2020-02-04 RX ADMIN — OXYCODONE HYDROCHLORIDE 15 MILLIGRAM(S): 5 TABLET ORAL at 12:20

## 2020-02-04 RX ADMIN — Medication 1 TABLET(S): at 14:07

## 2020-02-04 RX ADMIN — Medication 500 MILLIGRAM(S): at 05:01

## 2020-02-04 RX ADMIN — Medication 500 MILLIGRAM(S): at 17:06

## 2020-02-04 RX ADMIN — Medication 166.67 MILLIGRAM(S): at 23:02

## 2020-02-04 RX ADMIN — Medication 1 PATCH: at 12:34

## 2020-02-04 NOTE — PROGRESS NOTE ADULT - SUBJECTIVE AND OBJECTIVE BOX
Patient is a 44y old  Female who presents with a chief complaint of Back pain. (04 Feb 2020 10:07)      INTERVAL HPI / OVERNIGHT EVENTS: s/p ortho surgery on 2/1/20    MEDICATIONS  (STANDING):  ascorbic acid 500 milliGRAM(s) Oral two times a day  calcium carbonate 1250 mG  + Vitamin D (OsCal 500 + D) 1 Tablet(s) Oral three times a day  fentaNYL   Patch  12 MICROgram(s)/Hr 1 Patch Transdermal every 72 hours  folic acid 1 milliGRAM(s) Oral daily  gabapentin 1200 milliGRAM(s) Oral three times a day  multivitamin 1 Tablet(s) Oral daily  naloxegol 12.5 milliGRAM(s) Oral daily  nicotine -  14 mG/24Hr(s) Patch 1 patch Transdermal daily  oxyCODONE  ER Tablet 60 milliGRAM(s) Oral every 12 hours  senna 2 Tablet(s) Oral at bedtime    MEDICATIONS  (PRN):  acetaminophen   Tablet .. 650 milliGRAM(s) Oral every 6 hours PRN Temp greater or equal to 38.5C (101.3F), Mild Pain (1 - 3)  cyclobenzaprine 10 milliGRAM(s) Oral every 8 hours PRN Muscle Spasm  HYDROmorphone  Injectable 2 milliGRAM(s) IV Push every 4 hours PRN Severe Pain (7 - 10)  ketorolac   Injectable 30 milliGRAM(s) IV Push every 6 hours PRN Mild Pain (1 - 3)  magnesium hydroxide Suspension 30 milliLiter(s) Oral every 12 hours PRN Constipation  ondansetron Injectable 4 milliGRAM(s) IV Push every 6 hours PRN Nausea  oxyCODONE    IR 15 milliGRAM(s) Oral every 6 hours PRN Moderate Pain (4 - 6)  petrolatum white Ointment 1 Application(s) Topical four times a day PRN dry, cracked lips      Vital Signs Last 24 Hrs  T max: 101      Review of systems:  General :+ fever + back pain +fatigue  CVS : no chest pain, palpitations  Lungs : no shortness of breath, cough  GI : no abdominal pain,vomiting, diarrhea   : no dysuria,hematuria        PHYSICAL EXAM:  General :NAD  Constitutional:  well-groomed, well-developed  Respiratory: CTAB/L  Cardiovascular: S1 and S2, RRR, no M/G/R  Gastrointestinal: BS+, soft, NT/ND  Extremities: No peripheral edema  Vascular: 2+ peripheral pulses  Skin: s/p back surgery with hemovac      LABS:   wbc 10.8  cr 0.83  Hep C +             MICROBIOLOGY:  RECENT CULTURES:  01-30 .Blood Blood-Peripheral XXXX XXXX   No growth at 5 days.          RADIOLOGY & ADDITIONAL STUDIES:  Comparison: None     Findings: There is tricompartment degenerative changes of bothname most pronounced in the medial compartment with loss of joint space and osteophyte production. No acute fracture. No significant joint effusions.    Impression: Degenerative changes

## 2020-02-04 NOTE — PROGRESS NOTE ADULT - SUBJECTIVE AND OBJECTIVE BOX
Gastroenterology  Patient seen and examined bedside resting comfortably.  Continues to report pain post op   Denies any signs/symptoms GIB. Reports constipation related to opiod use, diet, immobility.     T(F): 98.7 (02-04-20 @ 05:07), Max: 102.2 (02-03-20 @ 13:25)  HR: 90 (02-04-20 @ 05:07) (90 - 105)  BP: 102/56 (02-04-20 @ 05:07) (91/59 - 112/71)  RR: 16 (02-04-20 @ 05:07) (16 - 27)  SpO2: 96% (02-04-20 @ 05:07) (96% - 97%)  Wt(kg): --  CAPILLARY BLOOD GLUCOSE          PHYSICAL EXAM:  General: Appears in pain   Neuro:  Alert & responsive  HEENT: NCAT, EOMI, conjunctiva clear  CV: +S1+S2 regular rate and rhythm  Lung: clear to ausculation bilaterally, respirations nonlabored, good inspiratory effort  Abdomen: soft, Non Tender, No distention Normal active BS  Extremities: no cyanosis, clubbing or edema    LABS:                        8.7    10.60 )-----------( 195      ( 04 Feb 2020 06:42 )             27.4     02-04    140  |  107  |  24<H>  ----------------------------<  119<H>  3.9   |  26  |  0.63    Ca    8.5      04 Feb 2020 06:42  Phos  2.5     02-03  Mg     2.1     02-03          I&O's Detail    03 Feb 2020 07:01  -  04 Feb 2020 07:00  --------------------------------------------------------  IN:  Total IN: 0 mL    OUT:    Bulb: 95 mL    Voided: 250 mL  Total OUT: 345 mL    Total NET: -345 mL        02-04 @ 06:42    140 | 107 | 24  /8.5 | -- | --  _______________________/  3.9 | 26 | 0.63                           \par   Iron - Total Binding Capacity.: 222 ug/dL (02-03 @ 23:11)

## 2020-02-04 NOTE — PROGRESS NOTE ADULT - SUBJECTIVE AND OBJECTIVE BOX
Patient seen and examined. Pain controlled.    Physical exam  VS:  Vital Signs Last 24 Hrs  T(C): 37.1 (04 Feb 2020 05:07), Max: 39 (03 Feb 2020 13:25)  T(F): 98.7 (04 Feb 2020 05:07), Max: 102.2 (03 Feb 2020 13:25)  HR: 90 (04 Feb 2020 05:07) (90 - 105)  BP: 102/56 (04 Feb 2020 05:07) (91/59 - 112/71)  BP(mean): 70 (03 Feb 2020 15:55) (70 - 80)  RR: 16 (04 Feb 2020 05:07) (16 - 27)  SpO2: 96% (04 Feb 2020 05:07) (95% - 97%)  Gen: NAD  Spine/extremities:  Dressing clean, dry, and intact. SILT C5-T1, L3-S1. Negative Babinski. Negative ankle clonus. Negative [place]. Capillary refill brisk.  Right UE: C5- 5/5  C6- 5/5  C7- 5/5  C8- 5/5  T1- 5/5  Left UE: C5- 5/5  C6- 5/5  C7- 5/5  C8- 5/5  T1- 5/5  Right LE: L2- 2/5  L3- 3/5  L4- 3/5  L5- 4/5  S1- 4/5  Left LE: L2- 5/5  L3- 5/5  L4- 5/5  L5- NA S1- 5/5      AP  44F s/p revision T4-L2 PSF with exploration/decompression POD3    RLE TTWB, LLE WBAT  FU drain output  ID following, continue vanc, BCx NGTD  Please call for any change in, new, or worsening signs/symptoms  Right hip - nonsurgical management at this time. Follow up with [facility] reconstructive joint surgery specialist as outpatient. Information to be provided in discharge paperwork  Discussed with Dr Leslie, who agrees with above

## 2020-02-04 NOTE — PROGRESS NOTE ADULT - SUBJECTIVE AND OBJECTIVE BOX
Patient is a 44y old  Female who presents with a chief complaint of Back pain. (04 Feb 2020 10:07)      OVERNIGHT EVENTS:  back pain     REVIEW OF SYSTEMS: denies chest pain/SOB, diaphoresis, no F/C, cough, dizziness, headache, blurry vision, nausea, vomiting, abdominal pain. All others review of systems negative     MEDICATIONS  (STANDING):  ascorbic acid 500 milliGRAM(s) Oral two times a day  calcium carbonate 1250 mG  + Vitamin D (OsCal 500 + D) 1 Tablet(s) Oral three times a day  fentaNYL   Patch  12 MICROgram(s)/Hr 1 Patch Transdermal every 72 hours  folic acid 1 milliGRAM(s) Oral daily  gabapentin 1200 milliGRAM(s) Oral three times a day  multivitamin 1 Tablet(s) Oral daily  naloxegol 12.5 milliGRAM(s) Oral daily  nicotine -  14 mG/24Hr(s) Patch 1 patch Transdermal daily  oxyCODONE  ER Tablet 60 milliGRAM(s) Oral every 12 hours  senna 2 Tablet(s) Oral at bedtime  vancomycin  IVPB      vancomycin  IVPB 1250 milliGRAM(s) IV Intermittent every 12 hours    MEDICATIONS  (PRN):  acetaminophen   Tablet .. 650 milliGRAM(s) Oral every 6 hours PRN Temp greater or equal to 38.5C (101.3F), Mild Pain (1 - 3)  cyclobenzaprine 10 milliGRAM(s) Oral every 8 hours PRN Muscle Spasm  HYDROmorphone  Injectable 2 milliGRAM(s) IV Push every 4 hours PRN Severe Pain (7 - 10)  ketorolac   Injectable 30 milliGRAM(s) IV Push every 6 hours PRN Mild Pain (1 - 3)  magnesium hydroxide Suspension 30 milliLiter(s) Oral every 12 hours PRN Constipation  ondansetron Injectable 4 milliGRAM(s) IV Push every 6 hours PRN Nausea  oxyCODONE    IR 15 milliGRAM(s) Oral every 6 hours PRN Moderate Pain (4 - 6)  petrolatum white Ointment 1 Application(s) Topical four times a day PRN dry, cracked lips      Allergies    penicillin (Short breath; Hives)    Intolerances    COMPLEX CARE (Unknown)      T(F): 98.7 (02-04-20 @ 11:21), Max: 98.8 (02-03-20 @ 23:39)  HR: 86 (02-04-20 @ 14:10) (82 - 99)  BP: 100/65 (02-04-20 @ 14:10) (92/55 - 112/71)  RR: 17 (02-04-20 @ 13:31) (16 - 18)  SpO2: 96% (02-04-20 @ 13:31) (95% - 97%)  Wt(kg): --    PHYSICAL EXAM:  GENERAL: NAD, ill looking   HEAD:  Atraumatic, Normocephalic  EYES: EOMI, PERRLA, conjunctiva and sclera clear  ENMT: Moist mucous membranes  NECK: Supple, No JVD, Normal thyroid  NERVOUS SYSTEM:  Alert & Oriented X3, Good concentration; Motor Strength 5/5 B/L upper and lower extremities; DTRs 2+ intact and symmetric  CHEST/LUNG: Clear to percussion bilaterally; No rales, rhonchi, wheezing, or rubs BL  HEART: Regular rate and rhythm; No murmurs, rubs, or gallops  ABDOMEN: Soft, Nontender, Nondistended; Bowel sounds present  EXTREMITIES:  2+ Peripheral Pulses, No clubbing, cyanosis, or edema BL LE    LABS:                        8.7    10.60 )-----------( 195      ( 04 Feb 2020 06:42 )             27.4     02-04    140  |  107  |  24<H>  ----------------------------<  119<H>  3.9   |  26  |  0.63    Ca    8.5      04 Feb 2020 06:42  Phos  2.5     02-03  Mg     2.1     02-03          Cultures;   CAPILLARY BLOOD GLUCOSE        RADIOLOGY & ADDITIONAL TESTS:    Imaging Personally Reviewed:  [x ] YES    < from: CT Lumbar Spine w/wo IV Cont (01.30.20 @ 13:47) >  Posterior thoracic lumbar fusion from T8 to L2 as described above. Compression deformities of T7 and T8 vertebral bodies with endplate irregularities. Lucencies at the screw/bone interface of the T8 bilateral pedicle screws with left T8 screw projecting into the T7-T8 disc space and right T8 screw projecting into the T7 vertebral body. Nonspecific paraspinal edema. Loosening and/or infection of the T8 pedicle screws not excluded.    Consultant(s) Notes Reviewed:  [ x] YES     Care Discussed with [x ] Consultants [X ] Patient [ ] Family  [x ]    [x ]  Other;

## 2020-02-04 NOTE — PROGRESS NOTE ADULT - PROBLEM SELECTOR PLAN 1
-Trend hg daily   -Iron supplementation when diarrhea improves; naloxagel just started   -Monitor for signs/symptoms gastrointestinal blood loss.

## 2020-02-05 DIAGNOSIS — K59.09 OTHER CONSTIPATION: ICD-10-CM

## 2020-02-05 LAB
ANION GAP SERPL CALC-SCNC: 6 MMOL/L — SIGNIFICANT CHANGE UP (ref 5–17)
BUN SERPL-MCNC: 26 MG/DL — HIGH (ref 7–23)
CALCIUM SERPL-MCNC: 8.6 MG/DL — SIGNIFICANT CHANGE UP (ref 8.5–10.1)
CHLORIDE SERPL-SCNC: 107 MMOL/L — SIGNIFICANT CHANGE UP (ref 96–108)
CO2 SERPL-SCNC: 30 MMOL/L — SIGNIFICANT CHANGE UP (ref 22–31)
CREAT SERPL-MCNC: 0.71 MG/DL — SIGNIFICANT CHANGE UP (ref 0.5–1.3)
GLUCOSE SERPL-MCNC: 131 MG/DL — HIGH (ref 70–99)
HCT VFR BLD CALC: 29 % — LOW (ref 34.5–45)
HCV RNA FLD QL NAA+PROBE: SIGNIFICANT CHANGE UP
HCV RNA SPEC QL PROBE+SIG AMP: SIGNIFICANT CHANGE UP
HGB BLD-MCNC: 9 G/DL — LOW (ref 11.5–15.5)
MCHC RBC-ENTMCNC: 25.9 PG — LOW (ref 27–34)
MCHC RBC-ENTMCNC: 31 GM/DL — LOW (ref 32–36)
MCV RBC AUTO: 83.6 FL — SIGNIFICANT CHANGE UP (ref 80–100)
NRBC # BLD: 0 /100 WBCS — SIGNIFICANT CHANGE UP (ref 0–0)
PLATELET # BLD AUTO: 223 K/UL — SIGNIFICANT CHANGE UP (ref 150–400)
POTASSIUM SERPL-MCNC: 4.2 MMOL/L — SIGNIFICANT CHANGE UP (ref 3.5–5.3)
POTASSIUM SERPL-SCNC: 4.2 MMOL/L — SIGNIFICANT CHANGE UP (ref 3.5–5.3)
RBC # BLD: 3.47 M/UL — LOW (ref 3.8–5.2)
RBC # FLD: 17.4 % — HIGH (ref 10.3–14.5)
SODIUM SERPL-SCNC: 143 MMOL/L — SIGNIFICANT CHANGE UP (ref 135–145)
VANCOMYCIN TROUGH SERPL-MCNC: 15.7 UG/ML — SIGNIFICANT CHANGE UP (ref 10–20)
WBC # BLD: 8.88 K/UL — SIGNIFICANT CHANGE UP (ref 3.8–10.5)
WBC # FLD AUTO: 8.88 K/UL — SIGNIFICANT CHANGE UP (ref 3.8–10.5)

## 2020-02-05 RX ORDER — NALOXEGOL OXALATE 12.5 MG/1
25 TABLET, FILM COATED ORAL ONCE
Refills: 0 | Status: COMPLETED | OUTPATIENT
Start: 2020-02-05 | End: 2020-02-05

## 2020-02-05 RX ADMIN — Medication 1 PATCH: at 01:52

## 2020-02-05 RX ADMIN — HYDROMORPHONE HYDROCHLORIDE 2 MILLIGRAM(S): 2 INJECTION INTRAMUSCULAR; INTRAVENOUS; SUBCUTANEOUS at 08:23

## 2020-02-05 RX ADMIN — FENTANYL CITRATE 1 PATCH: 50 INJECTION INTRAVENOUS at 18:02

## 2020-02-05 RX ADMIN — HYDROMORPHONE HYDROCHLORIDE 2 MILLIGRAM(S): 2 INJECTION INTRAMUSCULAR; INTRAVENOUS; SUBCUTANEOUS at 14:42

## 2020-02-05 RX ADMIN — OXYCODONE HYDROCHLORIDE 60 MILLIGRAM(S): 5 TABLET ORAL at 01:43

## 2020-02-05 RX ADMIN — HYDROMORPHONE HYDROCHLORIDE 2 MILLIGRAM(S): 2 INJECTION INTRAMUSCULAR; INTRAVENOUS; SUBCUTANEOUS at 08:07

## 2020-02-05 RX ADMIN — Medication 30 MILLIGRAM(S): at 18:10

## 2020-02-05 RX ADMIN — Medication 1 PATCH: at 12:34

## 2020-02-05 RX ADMIN — HYDROMORPHONE HYDROCHLORIDE 2 MILLIGRAM(S): 2 INJECTION INTRAMUSCULAR; INTRAVENOUS; SUBCUTANEOUS at 03:17

## 2020-02-05 RX ADMIN — FENTANYL CITRATE 1 PATCH: 50 INJECTION INTRAVENOUS at 07:29

## 2020-02-05 RX ADMIN — HYDROMORPHONE HYDROCHLORIDE 2 MILLIGRAM(S): 2 INJECTION INTRAMUSCULAR; INTRAVENOUS; SUBCUTANEOUS at 14:27

## 2020-02-05 RX ADMIN — HYDROMORPHONE HYDROCHLORIDE 2 MILLIGRAM(S): 2 INJECTION INTRAMUSCULAR; INTRAVENOUS; SUBCUTANEOUS at 19:59

## 2020-02-05 RX ADMIN — OXYCODONE HYDROCHLORIDE 15 MILLIGRAM(S): 5 TABLET ORAL at 13:43

## 2020-02-05 RX ADMIN — OXYCODONE HYDROCHLORIDE 60 MILLIGRAM(S): 5 TABLET ORAL at 12:35

## 2020-02-05 RX ADMIN — GABAPENTIN 1200 MILLIGRAM(S): 400 CAPSULE ORAL at 05:14

## 2020-02-05 RX ADMIN — FENTANYL CITRATE 1 PATCH: 50 INJECTION INTRAVENOUS at 19:32

## 2020-02-05 RX ADMIN — Medication 30 MILLIGRAM(S): at 11:39

## 2020-02-05 RX ADMIN — FENTANYL CITRATE 1 PATCH: 50 INJECTION INTRAVENOUS at 18:00

## 2020-02-05 RX ADMIN — Medication 1 TABLET(S): at 11:38

## 2020-02-05 RX ADMIN — OXYCODONE HYDROCHLORIDE 15 MILLIGRAM(S): 5 TABLET ORAL at 00:12

## 2020-02-05 RX ADMIN — OXYCODONE HYDROCHLORIDE 15 MILLIGRAM(S): 5 TABLET ORAL at 06:25

## 2020-02-05 RX ADMIN — Medication 166.67 MILLIGRAM(S): at 23:29

## 2020-02-05 RX ADMIN — Medication 30 MILLIGRAM(S): at 18:25

## 2020-02-05 RX ADMIN — GABAPENTIN 1200 MILLIGRAM(S): 400 CAPSULE ORAL at 13:47

## 2020-02-05 RX ADMIN — Medication 30 MILLIGRAM(S): at 05:14

## 2020-02-05 RX ADMIN — Medication 30 MILLIGRAM(S): at 11:55

## 2020-02-05 RX ADMIN — NALOXEGOL OXALATE 12.5 MILLIGRAM(S): 12.5 TABLET, FILM COATED ORAL at 11:33

## 2020-02-05 RX ADMIN — OXYCODONE HYDROCHLORIDE 15 MILLIGRAM(S): 5 TABLET ORAL at 07:25

## 2020-02-05 RX ADMIN — HYDROMORPHONE HYDROCHLORIDE 2 MILLIGRAM(S): 2 INJECTION INTRAMUSCULAR; INTRAVENOUS; SUBCUTANEOUS at 03:32

## 2020-02-05 RX ADMIN — OXYCODONE HYDROCHLORIDE 15 MILLIGRAM(S): 5 TABLET ORAL at 22:26

## 2020-02-05 RX ADMIN — Medication 1 PATCH: at 11:45

## 2020-02-05 RX ADMIN — OXYCODONE HYDROCHLORIDE 60 MILLIGRAM(S): 5 TABLET ORAL at 00:43

## 2020-02-05 RX ADMIN — NALOXEGOL OXALATE 25 MILLIGRAM(S): 12.5 TABLET, FILM COATED ORAL at 21:56

## 2020-02-05 RX ADMIN — GABAPENTIN 1200 MILLIGRAM(S): 400 CAPSULE ORAL at 21:56

## 2020-02-05 RX ADMIN — Medication 500 MILLIGRAM(S): at 05:14

## 2020-02-05 RX ADMIN — Medication 166.67 MILLIGRAM(S): at 11:28

## 2020-02-05 RX ADMIN — Medication 500 MILLIGRAM(S): at 18:02

## 2020-02-05 RX ADMIN — HYDROMORPHONE HYDROCHLORIDE 2 MILLIGRAM(S): 2 INJECTION INTRAMUSCULAR; INTRAVENOUS; SUBCUTANEOUS at 20:29

## 2020-02-05 RX ADMIN — Medication 1 PATCH: at 07:30

## 2020-02-05 RX ADMIN — SENNA PLUS 2 TABLET(S): 8.6 TABLET ORAL at 21:56

## 2020-02-05 RX ADMIN — OXYCODONE HYDROCHLORIDE 15 MILLIGRAM(S): 5 TABLET ORAL at 01:12

## 2020-02-05 RX ADMIN — Medication 1 PATCH: at 23:34

## 2020-02-05 RX ADMIN — Medication 1 TABLET(S): at 05:14

## 2020-02-05 RX ADMIN — OXYCODONE HYDROCHLORIDE 15 MILLIGRAM(S): 5 TABLET ORAL at 14:40

## 2020-02-05 RX ADMIN — Medication 1 TABLET(S): at 21:56

## 2020-02-05 RX ADMIN — Medication 1 MILLIGRAM(S): at 11:38

## 2020-02-05 RX ADMIN — OXYCODONE HYDROCHLORIDE 60 MILLIGRAM(S): 5 TABLET ORAL at 11:39

## 2020-02-05 RX ADMIN — OXYCODONE HYDROCHLORIDE 15 MILLIGRAM(S): 5 TABLET ORAL at 21:56

## 2020-02-05 RX ADMIN — Medication 1 TABLET(S): at 13:47

## 2020-02-05 RX ADMIN — Medication 30 MILLIGRAM(S): at 05:28

## 2020-02-05 NOTE — PROGRESS NOTE ADULT - PROBLEM SELECTOR PLAN 1
-Trend hg daily   -Iron supplementation when diarrhea improves; naloxagel just started   -Monitor for signs/symptoms gastrointestinal blood loss

## 2020-02-05 NOTE — PROGRESS NOTE ADULT - SUBJECTIVE AND OBJECTIVE BOX
Patient seen and examined. Pain controlled.    Physical exam  VS:  Vital Signs Last 24 Hrs  T(C): 36.4 (05 Feb 2020 05:20), Max: 37.1 (04 Feb 2020 11:21)  T(F): 97.5 (05 Feb 2020 05:20), Max: 98.7 (04 Feb 2020 11:21)  HR: 81 (05 Feb 2020 05:20) (77 - 88)  BP: 98/53 (05 Feb 2020 05:20) (92/55 - 115/57)  BP(mean): --  RR: 18 (05 Feb 2020 05:20) (16 - 18)  SpO2: 96% (05 Feb 2020 05:20) (95% - 98%)      Gen: NAD  Spine/extremities:  Dressing clean, dry, and intact. SILT C5-T1, L3-S1. Negative Babinski. Negative ankle clonus. Negative [place]. Capillary refill brisk.  Right UE: C5- 5/5  C6- 5/5  C7- 5/5  C8- 5/5  T1- 5/5  Left UE: C5- 5/5  C6- 5/5  C7- 5/5  C8- 5/5  T1- 5/5  Right LE: L2- 2/5  L3- 3/5  L4- 3/5  L5- 4/5  S1- 4/5  Left LE: L2- 5/5  L3- 5/5  L4- 5/5  L5- NA S1- 5/5      AP  44F s/p revision T4-L2 PSF with exploration/decompression POD4    RLE TTWB, LLE WBAT  FU drain output  ID following, continue vanc, BCx NGTD  Please call for any change in, new, or worsening signs/symptoms  Right hip - nonsurgical management at this time. Follow up with [facility] reconstructive joint surgery specialist as outpatient. Information to be provided in discharge paperwork  Discussed with Dr Leslie, who agrees with above Patient seen and examined. Pain controlled.    Physical exam  VS:  Vital Signs Last 24 Hrs  T(C): 36.4 (05 Feb 2020 05:20), Max: 37.1 (04 Feb 2020 11:21)  T(F): 97.5 (05 Feb 2020 05:20), Max: 98.7 (04 Feb 2020 11:21)  HR: 81 (05 Feb 2020 05:20) (77 - 88)  BP: 98/53 (05 Feb 2020 05:20) (92/55 - 115/57)  BP(mean): --  RR: 18 (05 Feb 2020 05:20) (16 - 18)  SpO2: 96% (05 Feb 2020 05:20) (95% - 98%)      Gen: NAD  Spine/extremities:  Dressing wrinkled and nonadherent to skin. SILT C5-T1, L3-S1. Negative Babinski. Negative ankle clonus. Negative [place]. Capillary refill brisk.  Right UE: C5- 5/5  C6- 5/5  C7- 5/5  C8- 5/5  T1- 5/5  Left UE: C5- 5/5  C6- 5/5  C7- 5/5  C8- 5/5  T1- 5/5  Right LE: L2- 2/5  L3- 3/5  L4- 3/5  L5- 4/5  S1- 4/5  Left LE: L2- 5/5  L3- 5/5  L4- 5/5  L5- NA S1- 5/5      AP  44F s/p revision T4-L2 PSF with exploration/decompression POD4    RLE TTWB, LLE WBAT  FU drain output  Dressing changed this morning  ID following, continue vanc, BCx NGTD  Please call for any change in, new, or worsening signs/symptoms  Right hip - nonsurgical management at this time. Follow up with [facility] reconstructive joint surgery specialist as outpatient. Information to be provided in discharge paperwork  Discussed with Dr Leslie, who agrees with above

## 2020-02-05 NOTE — PROGRESS NOTE ADULT - SUBJECTIVE AND OBJECTIVE BOX
Gastroenterology  Patient seen and examined bedside resting comfortably.  Appears in pain   Large BM with suppository last night     T(F): 97.9 (02-05-20 @ 11:26), Max: 98.4 (02-04-20 @ 17:12)  HR: 85 (02-05-20 @ 14:22) (77 - 86)  BP: 110/70 (02-05-20 @ 14:22) (98/53 - 115/57)  RR: 19 (02-05-20 @ 14:22) (17 - 19)  SpO2: 98% (02-05-20 @ 14:22) (96% - 98%)  Wt(kg): --  CAPILLARY BLOOD GLUCOSE          PHYSICAL EXAM:  General: Uncomfortable, in pain   Neuro:  Alert & responsive  HEENT: NCAT, EOMI, conjunctiva clear  CV: +S1+S2 regular rate and rhythm  Lung: clear to ausculation bilaterally, respirations nonlabored, good inspiratory effort  Abdomen: soft, Non Tender, No distention Normal active BS  Extremities: no cyanosis, clubbing or edema    LABS:                        9.0    8.88  )-----------( 223      ( 05 Feb 2020 06:36 )             29.0     02-05    143  |  107  |  26<H>  ----------------------------<  131<H>  4.2   |  30  |  0.71    Ca    8.6      05 Feb 2020 06:36          I&O's Detail    04 Feb 2020 07:01  -  05 Feb 2020 07:00  --------------------------------------------------------  IN:    Solution: 250 mL  Total IN: 250 mL    OUT:    Bulb: 285 mL    Voided: 300 mL  Total OUT: 585 mL    Total NET: -335 mL      05 Feb 2020 07:01  -  05 Feb 2020 15:45  --------------------------------------------------------  IN:    Oral Fluid: 500 mL    Solution: 250 mL  Total IN: 750 mL    OUT:  Total OUT: 0 mL    Total NET: 750 mL        02-05 @ 06:36    143 | 107 | 26  /8.6 | -- | --  _______________________/  4.2 | 30 | 0.71                           \par   Iron - Total Binding Capacity.: 222 ug/dL (02-03 @ 23:11)

## 2020-02-05 NOTE — PROGRESS NOTE ADULT - SUBJECTIVE AND OBJECTIVE BOX
Patient is a 44y old  Female who presents with a chief complaint of Back pain. (05 Feb 2020 15:45)      OVERNIGHT EVENTS:  +  back pain     REVIEW OF SYSTEMS: denies chest pain/SOB, diaphoresis, no F/C, cough, dizziness, headache, blurry vision, nausea, vomiting, abdominal pain. All others review of systems negative     MEDICATIONS  (STANDING):  ascorbic acid 500 milliGRAM(s) Oral two times a day  calcium carbonate 1250 mG  + Vitamin D (OsCal 500 + D) 1 Tablet(s) Oral three times a day  fentaNYL   Patch  12 MICROgram(s)/Hr 1 Patch Transdermal every 72 hours  folic acid 1 milliGRAM(s) Oral daily  gabapentin 1200 milliGRAM(s) Oral three times a day  multivitamin 1 Tablet(s) Oral daily  naloxegol 25 milliGRAM(s) Oral once  nicotine -  14 mG/24Hr(s) Patch 1 patch Transdermal daily  oxyCODONE  ER Tablet 60 milliGRAM(s) Oral every 12 hours  senna 2 Tablet(s) Oral at bedtime  vancomycin  IVPB      vancomycin  IVPB 1250 milliGRAM(s) IV Intermittent every 12 hours    MEDICATIONS  (PRN):  acetaminophen   Tablet .. 650 milliGRAM(s) Oral every 6 hours PRN Temp greater or equal to 38.5C (101.3F), Mild Pain (1 - 3)  cyclobenzaprine 10 milliGRAM(s) Oral every 8 hours PRN Muscle Spasm  HYDROmorphone  Injectable 2 milliGRAM(s) IV Push every 4 hours PRN Severe Pain (7 - 10)  ketorolac   Injectable 30 milliGRAM(s) IV Push every 6 hours PRN Mild Pain (1 - 3)  magnesium hydroxide Suspension 30 milliLiter(s) Oral every 12 hours PRN Constipation  ondansetron Injectable 4 milliGRAM(s) IV Push every 6 hours PRN Nausea  oxyCODONE    IR 15 milliGRAM(s) Oral every 6 hours PRN Moderate Pain (4 - 6)  petrolatum white Ointment 1 Application(s) Topical four times a day PRN dry, cracked lips      Allergies    penicillin (Short breath; Hives)    Intolerances    COMPLEX CARE (Unknown)      T(F): 97.9 (02-05-20 @ 11:26), Max: 98.4 (02-04-20 @ 17:12)  HR: 85 (02-05-20 @ 14:22) (77 - 86)  BP: 110/70 (02-05-20 @ 14:22) (98/53 - 115/57)  RR: 19 (02-05-20 @ 14:22) (17 - 19)  SpO2: 98% (02-05-20 @ 14:22) (96% - 98%)  Wt(kg): --    PHYSICAL EXAM:  GENERAL: NAD  Neuro:  Alert & responsive  HEENT: NCAT, EOMI, conjunctiva clear  CV: +S1+S2 regular rate and rhythm  Lung: clear to ausculation bilaterally, respirations nonlabored, good inspiratory effort  Abdomen: soft, Non Tender, No distention Normal active BS  Extremities: no cyanosis, clubbing or edema BL LE       LABS:                        9.0    8.88  )-----------( 223      ( 05 Feb 2020 06:36 )             29.0     02-05    143  |  107  |  26<H>  ----------------------------<  131<H>  4.2   |  30  |  0.71    Ca    8.6      05 Feb 2020 06:36          Cultures;   CAPILLARY BLOOD GLUCOSE        Lipid panel:           RADIOLOGY & ADDITIONAL TESTS:    Imaging Personally Reviewed:  [x ] YES    < from: CT Lumbar Spine w/wo IV Cont (01.30.20 @ 13:47) >  Posterior thoracic lumbar fusion from T8 to L2 as described above. Compression deformities of T7 and T8 vertebral bodies with endplate irregularities. Lucencies at the screw/bone interface of the T8 bilateral pedicle screws with left T8 screw projecting into the T7-T8 disc space and right T8 screw projecting into the T7 vertebral body. Nonspecific paraspinal edema. Loosening and/or infection of the T8 pedicle screws not excluded.    Consultant(s) Notes Reviewed:  [x ] YES     Care Discussed with [x ] Consultants [X ] Patient [ ] Family  [x ]    [x ]  Other; RN

## 2020-02-06 LAB
ANION GAP SERPL CALC-SCNC: 7 MMOL/L — SIGNIFICANT CHANGE UP (ref 5–17)
BUN SERPL-MCNC: 24 MG/DL — HIGH (ref 7–23)
CALCIUM SERPL-MCNC: 8.8 MG/DL — SIGNIFICANT CHANGE UP (ref 8.5–10.1)
CHLORIDE SERPL-SCNC: 107 MMOL/L — SIGNIFICANT CHANGE UP (ref 96–108)
CO2 SERPL-SCNC: 28 MMOL/L — SIGNIFICANT CHANGE UP (ref 22–31)
CREAT SERPL-MCNC: 0.69 MG/DL — SIGNIFICANT CHANGE UP (ref 0.5–1.3)
GLUCOSE SERPL-MCNC: 144 MG/DL — HIGH (ref 70–99)
HCT VFR BLD CALC: 29 % — LOW (ref 34.5–45)
HGB BLD-MCNC: 9.1 G/DL — LOW (ref 11.5–15.5)
MCHC RBC-ENTMCNC: 26.1 PG — LOW (ref 27–34)
MCHC RBC-ENTMCNC: 31.4 GM/DL — LOW (ref 32–36)
MCV RBC AUTO: 83.3 FL — SIGNIFICANT CHANGE UP (ref 80–100)
NRBC # BLD: 0 /100 WBCS — SIGNIFICANT CHANGE UP (ref 0–0)
PLATELET # BLD AUTO: 236 K/UL — SIGNIFICANT CHANGE UP (ref 150–400)
POTASSIUM SERPL-MCNC: 4.3 MMOL/L — SIGNIFICANT CHANGE UP (ref 3.5–5.3)
POTASSIUM SERPL-SCNC: 4.3 MMOL/L — SIGNIFICANT CHANGE UP (ref 3.5–5.3)
RBC # BLD: 3.48 M/UL — LOW (ref 3.8–5.2)
RBC # FLD: 17.2 % — HIGH (ref 10.3–14.5)
SODIUM SERPL-SCNC: 142 MMOL/L — SIGNIFICANT CHANGE UP (ref 135–145)
WBC # BLD: 6.92 K/UL — SIGNIFICANT CHANGE UP (ref 3.8–10.5)
WBC # FLD AUTO: 6.92 K/UL — SIGNIFICANT CHANGE UP (ref 3.8–10.5)

## 2020-02-06 PROCEDURE — 99233 SBSQ HOSP IP/OBS HIGH 50: CPT

## 2020-02-06 RX ORDER — BACITRACIN ZINC 500 UNIT/G
1 OINTMENT IN PACKET (EA) TOPICAL DAILY
Refills: 0 | Status: COMPLETED | OUTPATIENT
Start: 2020-02-06 | End: 2020-02-11

## 2020-02-06 RX ORDER — SODIUM CHLORIDE 9 MG/ML
1000 INJECTION INTRAMUSCULAR; INTRAVENOUS; SUBCUTANEOUS ONCE
Refills: 0 | Status: COMPLETED | OUTPATIENT
Start: 2020-02-06 | End: 2020-02-06

## 2020-02-06 RX ADMIN — Medication 1 TABLET(S): at 13:10

## 2020-02-06 RX ADMIN — Medication 1 PATCH: at 11:03

## 2020-02-06 RX ADMIN — Medication 30 MILLIGRAM(S): at 09:03

## 2020-02-06 RX ADMIN — HYDROMORPHONE HYDROCHLORIDE 2 MILLIGRAM(S): 2 INJECTION INTRAMUSCULAR; INTRAVENOUS; SUBCUTANEOUS at 20:22

## 2020-02-06 RX ADMIN — Medication 1 PATCH: at 19:30

## 2020-02-06 RX ADMIN — HYDROMORPHONE HYDROCHLORIDE 2 MILLIGRAM(S): 2 INJECTION INTRAMUSCULAR; INTRAVENOUS; SUBCUTANEOUS at 05:59

## 2020-02-06 RX ADMIN — FENTANYL CITRATE 1 PATCH: 50 INJECTION INTRAVENOUS at 19:30

## 2020-02-06 RX ADMIN — Medication 1 TABLET(S): at 11:04

## 2020-02-06 RX ADMIN — OXYCODONE HYDROCHLORIDE 60 MILLIGRAM(S): 5 TABLET ORAL at 00:14

## 2020-02-06 RX ADMIN — OXYCODONE HYDROCHLORIDE 15 MILLIGRAM(S): 5 TABLET ORAL at 08:35

## 2020-02-06 RX ADMIN — Medication 30 MILLIGRAM(S): at 09:15

## 2020-02-06 RX ADMIN — HYDROMORPHONE HYDROCHLORIDE 2 MILLIGRAM(S): 2 INJECTION INTRAMUSCULAR; INTRAVENOUS; SUBCUTANEOUS at 19:52

## 2020-02-06 RX ADMIN — Medication 30 MILLIGRAM(S): at 17:20

## 2020-02-06 RX ADMIN — Medication 30 MILLIGRAM(S): at 03:31

## 2020-02-06 RX ADMIN — Medication 1 MILLIGRAM(S): at 11:04

## 2020-02-06 RX ADMIN — Medication 166.67 MILLIGRAM(S): at 11:04

## 2020-02-06 RX ADMIN — Medication 30 MILLIGRAM(S): at 03:01

## 2020-02-06 RX ADMIN — OXYCODONE HYDROCHLORIDE 15 MILLIGRAM(S): 5 TABLET ORAL at 16:17

## 2020-02-06 RX ADMIN — Medication 30 MILLIGRAM(S): at 17:35

## 2020-02-06 RX ADMIN — OXYCODONE HYDROCHLORIDE 15 MILLIGRAM(S): 5 TABLET ORAL at 21:25

## 2020-02-06 RX ADMIN — HYDROMORPHONE HYDROCHLORIDE 2 MILLIGRAM(S): 2 INJECTION INTRAMUSCULAR; INTRAVENOUS; SUBCUTANEOUS at 01:28

## 2020-02-06 RX ADMIN — Medication 1 TABLET(S): at 05:59

## 2020-02-06 RX ADMIN — Medication 1 PATCH: at 09:06

## 2020-02-06 RX ADMIN — Medication 1 TABLET(S): at 21:24

## 2020-02-06 RX ADMIN — HYDROMORPHONE HYDROCHLORIDE 2 MILLIGRAM(S): 2 INJECTION INTRAMUSCULAR; INTRAVENOUS; SUBCUTANEOUS at 01:58

## 2020-02-06 RX ADMIN — Medication 1 APPLICATION(S): at 17:20

## 2020-02-06 RX ADMIN — Medication 1 PATCH: at 11:11

## 2020-02-06 RX ADMIN — OXYCODONE HYDROCHLORIDE 15 MILLIGRAM(S): 5 TABLET ORAL at 21:55

## 2020-02-06 RX ADMIN — GABAPENTIN 1200 MILLIGRAM(S): 400 CAPSULE ORAL at 13:10

## 2020-02-06 RX ADMIN — GABAPENTIN 1200 MILLIGRAM(S): 400 CAPSULE ORAL at 05:59

## 2020-02-06 RX ADMIN — GABAPENTIN 1200 MILLIGRAM(S): 400 CAPSULE ORAL at 21:24

## 2020-02-06 RX ADMIN — SENNA PLUS 2 TABLET(S): 8.6 TABLET ORAL at 21:24

## 2020-02-06 RX ADMIN — OXYCODONE HYDROCHLORIDE 60 MILLIGRAM(S): 5 TABLET ORAL at 12:00

## 2020-02-06 RX ADMIN — HYDROMORPHONE HYDROCHLORIDE 2 MILLIGRAM(S): 2 INJECTION INTRAMUSCULAR; INTRAVENOUS; SUBCUTANEOUS at 12:46

## 2020-02-06 RX ADMIN — SODIUM CHLORIDE 500 MILLILITER(S): 9 INJECTION INTRAMUSCULAR; INTRAVENOUS; SUBCUTANEOUS at 12:46

## 2020-02-06 RX ADMIN — OXYCODONE HYDROCHLORIDE 60 MILLIGRAM(S): 5 TABLET ORAL at 11:03

## 2020-02-06 RX ADMIN — HYDROMORPHONE HYDROCHLORIDE 2 MILLIGRAM(S): 2 INJECTION INTRAMUSCULAR; INTRAVENOUS; SUBCUTANEOUS at 06:29

## 2020-02-06 RX ADMIN — Medication 500 MILLIGRAM(S): at 17:20

## 2020-02-06 RX ADMIN — OXYCODONE HYDROCHLORIDE 15 MILLIGRAM(S): 5 TABLET ORAL at 15:19

## 2020-02-06 RX ADMIN — OXYCODONE HYDROCHLORIDE 60 MILLIGRAM(S): 5 TABLET ORAL at 00:44

## 2020-02-06 RX ADMIN — HYDROMORPHONE HYDROCHLORIDE 2 MILLIGRAM(S): 2 INJECTION INTRAMUSCULAR; INTRAVENOUS; SUBCUTANEOUS at 13:00

## 2020-02-06 RX ADMIN — Medication 500 MILLIGRAM(S): at 05:59

## 2020-02-06 RX ADMIN — FENTANYL CITRATE 1 PATCH: 50 INJECTION INTRAVENOUS at 09:07

## 2020-02-06 RX ADMIN — OXYCODONE HYDROCHLORIDE 15 MILLIGRAM(S): 5 TABLET ORAL at 07:39

## 2020-02-06 NOTE — PROGRESS NOTE ADULT - SUBJECTIVE AND OBJECTIVE BOX
Patient seen and examined. Pain controlled.    Physical exam  VS:  Vital Signs Last 24 Hrs  T(C): 36.3 (05 Feb 2020 23:10), Max: 36.9 (05 Feb 2020 18:11)  T(F): 97.3 (05 Feb 2020 23:10), Max: 98.4 (05 Feb 2020 18:11)  HR: 77 (05 Feb 2020 23:10) (77 - 86)  BP: 111/62 (05 Feb 2020 23:10) (108/69 - 113/73)  BP(mean): --  RR: 18 (05 Feb 2020 23:10) (17 - 19)  SpO2: 100% (05 Feb 2020 23:10) (96% - 100%)    Gen: NAD  Spine/extremities:  Dressing intact. SILT C5-T1, L3-S1. Negative Babinski. Negative ankle clonus. Negative [place]. Capillary refill brisk.  Right UE: C5- 5/5  C6- 5/5  C7- 5/5  C8- 5/5  T1- 5/5  Left UE: C5- 5/5  C6- 5/5  C7- 5/5  C8- 5/5  T1- 5/5  Right LE: L2- 2/5  L3- 3/5  L4- 3/5  L5- 4/5  S1- 4/5  Left LE: L2- 5/5  L3- 5/5  L4- 5/5  L5- NA S1- 5/5      AP  44F s/p revision T4-L2 PSF with exploration and decompression POD5    RLE TTWB, LLE WBAT  Monitor drain output - anticipate discontinue on day of discharge or before discharge when output decreased  Appreciate consulting recommendations  Please call for any change in, new, or worsening signs/symptoms  Right hip - nonsurgical management at this time. Follow up with [facility] reconstructive joint surgery specialist as outpatient. Information to be provided in discharge paperwork  Discussed with Dr Leslie, who agrees with above

## 2020-02-06 NOTE — PROGRESS NOTE ADULT - SUBJECTIVE AND OBJECTIVE BOX
44y old  Female who presents with a chief complaint of Back pain.  Admitted for mgmt of infected spinal hardware.    Today:  Pt complained of pain, states there have been no changes in severity or location.            PAST MEDICAL & SURGICAL HISTORY:  History of pancreatitis  History of alcoholic hepatitis  ETOH abuse  Drug abuse  History of back surgery  C Section      REVIEW OF SYSTEMS:  + pain      MEDICATIONS  (STANDING):  ascorbic acid 500 milliGRAM(s) Oral two times a day  calcium carbonate 1250 mG  + Vitamin D (OsCal 500 + D) 1 Tablet(s) Oral three times a day  fentaNYL   Patch  12 MICROgram(s)/Hr 1 Patch Transdermal every 72 hours  folic acid 1 milliGRAM(s) Oral daily  gabapentin 1200 milliGRAM(s) Oral three times a day  multivitamin 1 Tablet(s) Oral daily  nicotine -  14 mG/24Hr(s) Patch 1 patch Transdermal daily  oxyCODONE  ER Tablet 60 milliGRAM(s) Oral every 12 hours  senna 2 Tablet(s) Oral at bedtime  vancomycin  IVPB      vancomycin  IVPB 1250 milliGRAM(s) IV Intermittent every 12 hours    MEDICATIONS  (PRN):  acetaminophen   Tablet .. 650 milliGRAM(s) Oral every 6 hours PRN Temp greater or equal to 38.5C (101.3F), Mild Pain (1 - 3)  cyclobenzaprine 10 milliGRAM(s) Oral every 8 hours PRN Muscle Spasm  HYDROmorphone  Injectable 2 milliGRAM(s) IV Push every 4 hours PRN Severe Pain (7 - 10)  ketorolac   Injectable 30 milliGRAM(s) IV Push every 6 hours PRN Mild Pain (1 - 3)  magnesium hydroxide Suspension 30 milliLiter(s) Oral every 12 hours PRN Constipation  ondansetron Injectable 4 milliGRAM(s) IV Push every 6 hours PRN Nausea  oxyCODONE    IR 15 milliGRAM(s) Oral every 6 hours PRN Moderate Pain (4 - 6)  petrolatum white Ointment 1 Application(s) Topical four times a day PRN dry, cracked lips      Allergies  penicillin (Short breath; Hives)        FAMILY HISTORY:  Family history unknown      Vital Signs Last 24 Hrs  T(C): 36.5 (06 Feb 2020 11:28), Max: 36.9 (05 Feb 2020 18:11)  T(F): 97.7 (06 Feb 2020 11:28), Max: 98.4 (05 Feb 2020 18:11)  HR: 78 (06 Feb 2020 12:34) (72 - 85)  BP: 98/56 (06 Feb 2020 12:34) (98/56 - 116/67)  RR: 18 (06 Feb 2020 11:28) (18 - 19)  SpO2: 96% (06 Feb 2020 11:28) (96% - 100%)    PHYSICAL EXAM:    GENERAL: NAD  Neuro:  Alert & responsive  HEENT: NCAT, EOMI, conjunctiva clear  CV: +S1+S2 regular rate and rhythm  Lung: clear to ausculation bilaterally, respirations nonlabored, good inspiratory effort  Abdomen: soft, Non Tender, No distention Normal active BS  Extremities: no cyanosis, clubbing or edema BL LE      LABS:                        9.1    6.92  )-----------( 236      ( 06 Feb 2020 08:09 )             29.0     02-06    142  |  107  |  24<H>  ----------------------------<  144<H>  4.3   |  28  |  0.69    Ca    8.8      06 Feb 2020 08:09

## 2020-02-06 NOTE — H&P ADULT - HISTORY OF PRESENT ILLNESS
45 y/o Female complains of upper back pain for 1 day. Patient had T8-L2 PSF performed in 2019 for infection. Since that time, she notes that she has presented to outside hospitals and had bilateral septic knee I&Ds performed, followed by periods of time in which she has been intermittently compliant with her prescribed antibiotics. Patient states that the thoracic pain appeared suddenly without provocation and felt like a "strain or pull". Patient denies numbness, paresthesias, head strike, loss of consciousness, change in bowel/bladder continence, saddle anesthesia, or any other signs/symptoms at this time. 43 y/o Female complains of upper back pain for 1 day. Patient had T8-L2 PSF performed in 2019 for infection. Since that time, she notes that she has presented to outside hospitals and had bilateral septic knee I&Ds performed, followed by periods of time in which she has been intermittently compliant with her prescribed antibiotics. Patient states that the thoracic pain appeared suddenly without provocation and felt like a "strain or pull". Patient denies numbness, paresthesias, head strike, loss of consciousness, change in bowel/bladder continence, saddle anesthesia, or any other signs/symptoms at this time.      Complex Care: Description: 42 yo F with LONG, complicated hospitalization (>100 days), long h/o IVDU, EtOH abuse, chronic pain, presented with sepsis, respiratory failure, intubated, found to have MRSA bacteremia, OM, vertebral abscess, multi-level discitis, s/p spinal surgery.  Pt. has been on methadone in the past, sober period at one point.  Restarted methadone on this admission.  60mg bid.  Pt. doing well on current pain regimen, weaned from opiates.  Referred to outpatient methadone clinic, [facility].  Had originally planned to go to Mother's home but Mother would not take her because of methadone (believes its still a way of getting high). Pt. is going to live with a friend. Declined interviews for inpatient rehab.  	- discharged on two weeks of zyvox, then change to Daptomycin 100mg po bid for one year, then daily dosing suppressive lifelong as per ID (Dr. Garcia)  	- orthospine - Dr. Leslie  	- PCP - referral made to Dr. Sumner.  	HIGH RISK for relapse  	Avoid narcotics for pain management Birth Control Pills Pregnancy And Lactation Text: This medication should be avoided if pregnant and for the first 30 days post-partum.

## 2020-02-07 LAB
ANION GAP SERPL CALC-SCNC: 7 MMOL/L — SIGNIFICANT CHANGE UP (ref 5–17)
BASOPHILS # BLD AUTO: 0.03 K/UL — SIGNIFICANT CHANGE UP (ref 0–0.2)
BASOPHILS NFR BLD AUTO: 0.4 % — SIGNIFICANT CHANGE UP (ref 0–2)
BUN SERPL-MCNC: 22 MG/DL — SIGNIFICANT CHANGE UP (ref 7–23)
CALCIUM SERPL-MCNC: 8.8 MG/DL — SIGNIFICANT CHANGE UP (ref 8.5–10.1)
CHLORIDE SERPL-SCNC: 107 MMOL/L — SIGNIFICANT CHANGE UP (ref 96–108)
CO2 SERPL-SCNC: 25 MMOL/L — SIGNIFICANT CHANGE UP (ref 22–31)
CREAT SERPL-MCNC: 0.59 MG/DL — SIGNIFICANT CHANGE UP (ref 0.5–1.3)
EOSINOPHIL # BLD AUTO: 0.55 K/UL — HIGH (ref 0–0.5)
EOSINOPHIL NFR BLD AUTO: 7 % — HIGH (ref 0–6)
GLUCOSE SERPL-MCNC: 120 MG/DL — HIGH (ref 70–99)
HCT VFR BLD CALC: 28 % — LOW (ref 34.5–45)
HGB BLD-MCNC: 8.7 G/DL — LOW (ref 11.5–15.5)
IMM GRANULOCYTES NFR BLD AUTO: 1.1 % — SIGNIFICANT CHANGE UP (ref 0–1.5)
LYMPHOCYTES # BLD AUTO: 2.04 K/UL — SIGNIFICANT CHANGE UP (ref 1–3.3)
LYMPHOCYTES # BLD AUTO: 26 % — SIGNIFICANT CHANGE UP (ref 13–44)
MCHC RBC-ENTMCNC: 26 PG — LOW (ref 27–34)
MCHC RBC-ENTMCNC: 31.1 GM/DL — LOW (ref 32–36)
MCV RBC AUTO: 83.6 FL — SIGNIFICANT CHANGE UP (ref 80–100)
MONOCYTES # BLD AUTO: 0.48 K/UL — SIGNIFICANT CHANGE UP (ref 0–0.9)
MONOCYTES NFR BLD AUTO: 6.1 % — SIGNIFICANT CHANGE UP (ref 2–14)
NEUTROPHILS # BLD AUTO: 4.67 K/UL — SIGNIFICANT CHANGE UP (ref 1.8–7.4)
NEUTROPHILS NFR BLD AUTO: 59.4 % — SIGNIFICANT CHANGE UP (ref 43–77)
NRBC # BLD: 0 /100 WBCS — SIGNIFICANT CHANGE UP (ref 0–0)
PLATELET # BLD AUTO: 249 K/UL — SIGNIFICANT CHANGE UP (ref 150–400)
POTASSIUM SERPL-MCNC: 4.3 MMOL/L — SIGNIFICANT CHANGE UP (ref 3.5–5.3)
POTASSIUM SERPL-SCNC: 4.3 MMOL/L — SIGNIFICANT CHANGE UP (ref 3.5–5.3)
RBC # BLD: 3.35 M/UL — LOW (ref 3.8–5.2)
RBC # FLD: 17.2 % — HIGH (ref 10.3–14.5)
SODIUM SERPL-SCNC: 139 MMOL/L — SIGNIFICANT CHANGE UP (ref 135–145)
WBC # BLD: 7.86 K/UL — SIGNIFICANT CHANGE UP (ref 3.8–10.5)
WBC # FLD AUTO: 7.86 K/UL — SIGNIFICANT CHANGE UP (ref 3.8–10.5)

## 2020-02-07 PROCEDURE — 99233 SBSQ HOSP IP/OBS HIGH 50: CPT

## 2020-02-07 PROCEDURE — 99231 SBSQ HOSP IP/OBS SF/LOW 25: CPT

## 2020-02-07 RX ORDER — NALOXEGOL OXALATE 12.5 MG/1
25 TABLET, FILM COATED ORAL DAILY
Refills: 0 | Status: DISCONTINUED | OUTPATIENT
Start: 2020-02-07 | End: 2020-02-13

## 2020-02-07 RX ORDER — FENTANYL CITRATE 50 UG/ML
1 INJECTION INTRAVENOUS
Refills: 0 | Status: DISCONTINUED | OUTPATIENT
Start: 2020-02-07 | End: 2020-02-13

## 2020-02-07 RX ORDER — SODIUM CHLORIDE 9 MG/ML
500 INJECTION INTRAMUSCULAR; INTRAVENOUS; SUBCUTANEOUS ONCE
Refills: 0 | Status: COMPLETED | OUTPATIENT
Start: 2020-02-07 | End: 2020-02-07

## 2020-02-07 RX ORDER — HYDROMORPHONE HYDROCHLORIDE 2 MG/ML
8 INJECTION INTRAMUSCULAR; INTRAVENOUS; SUBCUTANEOUS EVERY 8 HOURS
Refills: 0 | Status: DISCONTINUED | OUTPATIENT
Start: 2020-02-07 | End: 2020-02-13

## 2020-02-07 RX ORDER — OXYCODONE HYDROCHLORIDE 5 MG/1
60 TABLET ORAL EVERY 8 HOURS
Refills: 0 | Status: DISCONTINUED | OUTPATIENT
Start: 2020-02-07 | End: 2020-02-07

## 2020-02-07 RX ORDER — OXYCODONE HYDROCHLORIDE 5 MG/1
60 TABLET ORAL EVERY 8 HOURS
Refills: 0 | Status: DISCONTINUED | OUTPATIENT
Start: 2020-02-07 | End: 2020-02-13

## 2020-02-07 RX ADMIN — GABAPENTIN 1200 MILLIGRAM(S): 400 CAPSULE ORAL at 21:17

## 2020-02-07 RX ADMIN — Medication 30 MILLIGRAM(S): at 05:10

## 2020-02-07 RX ADMIN — OXYCODONE HYDROCHLORIDE 60 MILLIGRAM(S): 5 TABLET ORAL at 00:12

## 2020-02-07 RX ADMIN — OXYCODONE HYDROCHLORIDE 15 MILLIGRAM(S): 5 TABLET ORAL at 08:35

## 2020-02-07 RX ADMIN — Medication 30 MILLIGRAM(S): at 05:57

## 2020-02-07 RX ADMIN — Medication 1 PATCH: at 20:52

## 2020-02-07 RX ADMIN — GABAPENTIN 1200 MILLIGRAM(S): 400 CAPSULE ORAL at 13:17

## 2020-02-07 RX ADMIN — Medication 1 PATCH: at 12:40

## 2020-02-07 RX ADMIN — OXYCODONE HYDROCHLORIDE 60 MILLIGRAM(S): 5 TABLET ORAL at 23:30

## 2020-02-07 RX ADMIN — Medication 1 PATCH: at 13:19

## 2020-02-07 RX ADMIN — Medication 30 MILLIGRAM(S): at 21:30

## 2020-02-07 RX ADMIN — SODIUM CHLORIDE 500 MILLILITER(S): 9 INJECTION INTRAMUSCULAR; INTRAVENOUS; SUBCUTANEOUS at 05:16

## 2020-02-07 RX ADMIN — OXYCODONE HYDROCHLORIDE 60 MILLIGRAM(S): 5 TABLET ORAL at 13:18

## 2020-02-07 RX ADMIN — HYDROMORPHONE HYDROCHLORIDE 2 MILLIGRAM(S): 2 INJECTION INTRAMUSCULAR; INTRAVENOUS; SUBCUTANEOUS at 10:24

## 2020-02-07 RX ADMIN — SODIUM CHLORIDE 666.67 MILLILITER(S): 9 INJECTION INTRAMUSCULAR; INTRAVENOUS; SUBCUTANEOUS at 03:59

## 2020-02-07 RX ADMIN — Medication 500 MILLIGRAM(S): at 05:15

## 2020-02-07 RX ADMIN — Medication 30 MILLIGRAM(S): at 16:00

## 2020-02-07 RX ADMIN — Medication 1 TABLET(S): at 21:17

## 2020-02-07 RX ADMIN — Medication 1 TABLET(S): at 05:15

## 2020-02-07 RX ADMIN — OXYCODONE HYDROCHLORIDE 60 MILLIGRAM(S): 5 TABLET ORAL at 00:42

## 2020-02-07 RX ADMIN — Medication 166.67 MILLIGRAM(S): at 00:06

## 2020-02-07 RX ADMIN — Medication 1 TABLET(S): at 13:17

## 2020-02-07 RX ADMIN — GABAPENTIN 1200 MILLIGRAM(S): 400 CAPSULE ORAL at 05:15

## 2020-02-07 RX ADMIN — HYDROMORPHONE HYDROCHLORIDE 8 MILLIGRAM(S): 2 INJECTION INTRAMUSCULAR; INTRAVENOUS; SUBCUTANEOUS at 18:30

## 2020-02-07 RX ADMIN — FENTANYL CITRATE 1 PATCH: 50 INJECTION INTRAVENOUS at 12:39

## 2020-02-07 RX ADMIN — HYDROMORPHONE HYDROCHLORIDE 2 MILLIGRAM(S): 2 INJECTION INTRAMUSCULAR; INTRAVENOUS; SUBCUTANEOUS at 02:27

## 2020-02-07 RX ADMIN — FENTANYL CITRATE 1 PATCH: 50 INJECTION INTRAVENOUS at 19:52

## 2020-02-07 RX ADMIN — OXYCODONE HYDROCHLORIDE 15 MILLIGRAM(S): 5 TABLET ORAL at 07:42

## 2020-02-07 RX ADMIN — HYDROMORPHONE HYDROCHLORIDE 2 MILLIGRAM(S): 2 INJECTION INTRAMUSCULAR; INTRAVENOUS; SUBCUTANEOUS at 01:57

## 2020-02-07 RX ADMIN — NALOXEGOL OXALATE 25 MILLIGRAM(S): 12.5 TABLET, FILM COATED ORAL at 18:30

## 2020-02-07 RX ADMIN — Medication 30 MILLIGRAM(S): at 15:46

## 2020-02-07 RX ADMIN — Medication 166.67 MILLIGRAM(S): at 10:24

## 2020-02-07 RX ADMIN — FENTANYL CITRATE 1 PATCH: 50 INJECTION INTRAVENOUS at 18:34

## 2020-02-07 RX ADMIN — HYDROMORPHONE HYDROCHLORIDE 8 MILLIGRAM(S): 2 INJECTION INTRAMUSCULAR; INTRAVENOUS; SUBCUTANEOUS at 19:26

## 2020-02-07 RX ADMIN — Medication 1 PATCH: at 13:17

## 2020-02-07 RX ADMIN — Medication 500 MILLIGRAM(S): at 17:11

## 2020-02-07 RX ADMIN — OXYCODONE HYDROCHLORIDE 60 MILLIGRAM(S): 5 TABLET ORAL at 22:25

## 2020-02-07 RX ADMIN — SENNA PLUS 2 TABLET(S): 8.6 TABLET ORAL at 21:17

## 2020-02-07 RX ADMIN — HYDROMORPHONE HYDROCHLORIDE 2 MILLIGRAM(S): 2 INJECTION INTRAMUSCULAR; INTRAVENOUS; SUBCUTANEOUS at 10:38

## 2020-02-07 RX ADMIN — Medication 166.67 MILLIGRAM(S): at 22:26

## 2020-02-07 RX ADMIN — FENTANYL CITRATE 1 PATCH: 50 INJECTION INTRAVENOUS at 18:30

## 2020-02-07 RX ADMIN — Medication 1 APPLICATION(S): at 13:17

## 2020-02-07 RX ADMIN — Medication 30 MILLIGRAM(S): at 21:14

## 2020-02-07 RX ADMIN — OXYCODONE HYDROCHLORIDE 60 MILLIGRAM(S): 5 TABLET ORAL at 14:15

## 2020-02-07 RX ADMIN — Medication 1 MILLIGRAM(S): at 13:17

## 2020-02-07 NOTE — PROGRESS NOTE ADULT - SUBJECTIVE AND OBJECTIVE BOX
Patient is a 44y old  Female who presents with a chief complaint of Back pain. (07 Feb 2020 17:11)      INTERVAL HPI / OVERNIGHT EVENTS:    MEDICATIONS  (STANDING):  ascorbic acid 500 milliGRAM(s) Oral two times a day  BACItracin   Ointment 1 Application(s) Topical daily  calcium carbonate 1250 mG  + Vitamin D (OsCal 500 + D) 1 Tablet(s) Oral three times a day  fentaNYL   Patch  25 MICROgram(s)/Hr 1 Patch Transdermal every 72 hours  folic acid 1 milliGRAM(s) Oral daily  gabapentin 1200 milliGRAM(s) Oral three times a day  multivitamin 1 Tablet(s) Oral daily  naloxegol 25 milliGRAM(s) Oral daily  nicotine -  14 mG/24Hr(s) Patch 1 patch Transdermal daily  senna 2 Tablet(s) Oral at bedtime  vancomycin  IVPB      vancomycin  IVPB 1250 milliGRAM(s) IV Intermittent every 12 hours    MEDICATIONS  (PRN):  acetaminophen   Tablet .. 650 milliGRAM(s) Oral every 6 hours PRN Temp greater or equal to 38.5C (101.3F), Mild Pain (1 - 3)  bisacodyl Suppository 10 milliGRAM(s) Rectal daily PRN Constipation  cyclobenzaprine 10 milliGRAM(s) Oral every 8 hours PRN Muscle Spasm  HYDROmorphone   Tablet 8 milliGRAM(s) Oral every 8 hours PRN Severe Pain (7 - 10)  magnesium hydroxide Suspension 30 milliLiter(s) Oral every 12 hours PRN Constipation  ondansetron Injectable 4 milliGRAM(s) IV Push every 6 hours PRN Nausea  oxyCODONE  ER Tablet 60 milliGRAM(s) Oral every 8 hours PRN moderate pain  petrolatum white Ointment 1 Application(s) Topical four times a day PRN dry, cracked lips      Vital Signs Last 24 Hrs  T(C): 36.4 (07 Feb 2020 18:08), Max: 36.7 (07 Feb 2020 05:15)  T(F): 97.5 (07 Feb 2020 18:08), Max: 98.1 (07 Feb 2020 05:15)  HR: 80 (07 Feb 2020 18:08) (78 - 86)  BP: 100/64 (07 Feb 2020 18:08) (90/54 - 113/64)  BP(mean): --  RR: 18 (07 Feb 2020 18:08) (17 - 19)  SpO2: 98% (07 Feb 2020 18:08) (96% - 98%)    Review of systems:  General : no fever /chills,fatigue  CVS : no chest pain, palpitations  Lungs : no shortness of breath, cough  GI : no abdominal pain,vomiting, diarrhea   : no dysuria,hematuria        PHYSICAL EXAM:  General :NAD  Constitutional:  well-groomed, well-developed  Respiratory: CTAB/L  Cardiovascular: S1 and S2, RRR, no M/G/R  Gastrointestinal: BS+, soft, NT/ND  Extremities: No peripheral edema  Vascular: 2+ peripheral pulses  Skin: No rashes      LABS:                        8.7    7.86  )-----------( 249      ( 07 Feb 2020 06:26 )             28.0     02-07    139  |  107  |  22  ----------------------------<  120<H>  4.3   |  25  |  0.59    Ca    8.8      07 Feb 2020 06:26            MICROBIOLOGY:  RECENT CULTURES:        RADIOLOGY & ADDITIONAL STUDIES: Patient is a 44y old  Female who presents with a chief complaint of Back pain. (07 Feb 2020 17:11)      INTERVAL HPI / OVERNIGHT EVENTS: back pain feel slightly better today    MEDICATIONS  (STANDING):  ascorbic acid 500 milliGRAM(s) Oral two times a day  BACItracin   Ointment 1 Application(s) Topical daily  calcium carbonate 1250 mG  + Vitamin D (OsCal 500 + D) 1 Tablet(s) Oral three times a day  fentaNYL   Patch  25 MICROgram(s)/Hr 1 Patch Transdermal every 72 hours  folic acid 1 milliGRAM(s) Oral daily  gabapentin 1200 milliGRAM(s) Oral three times a day  multivitamin 1 Tablet(s) Oral daily  naloxegol 25 milliGRAM(s) Oral daily  nicotine -  14 mG/24Hr(s) Patch 1 patch Transdermal daily  senna 2 Tablet(s) Oral at bedtime  vancomycin  IVPB      vancomycin  IVPB 1250 milliGRAM(s) IV Intermittent every 12 hours    MEDICATIONS  (PRN):  acetaminophen   Tablet .. 650 milliGRAM(s) Oral every 6 hours PRN Temp greater or equal to 38.5C (101.3F), Mild Pain (1 - 3)  bisacodyl Suppository 10 milliGRAM(s) Rectal daily PRN Constipation  cyclobenzaprine 10 milliGRAM(s) Oral every 8 hours PRN Muscle Spasm  HYDROmorphone   Tablet 8 milliGRAM(s) Oral every 8 hours PRN Severe Pain (7 - 10)  magnesium hydroxide Suspension 30 milliLiter(s) Oral every 12 hours PRN Constipation  ondansetron Injectable 4 milliGRAM(s) IV Push every 6 hours PRN Nausea  oxyCODONE  ER Tablet 60 milliGRAM(s) Oral every 8 hours PRN moderate pain  petrolatum white Ointment 1 Application(s) Topical four times a day PRN dry, cracked lips      Vital Signs Last 24 Hrs  T(C): 36.4 (07 Feb 2020 18:08), Max: 36.7 (07 Feb 2020 05:15)  T(F): 97.5 (07 Feb 2020 18:08), Max: 98.1 (07 Feb 2020 05:15)  HR: 80 (07 Feb 2020 18:08) (78 - 86)  BP: 100/64 (07 Feb 2020 18:08) (90/54 - 113/64)  BP(mean): --  RR: 18 (07 Feb 2020 18:08) (17 - 19)  SpO2: 98% (07 Feb 2020 18:08) (96% - 98%)    Review of systems:  General : no fever /chills,fatigue  CVS : no chest pain, palpitations  Lungs : no shortness of breath, cough  GI : no abdominal pain,vomiting, diarrhea   : no dysuria,hematuria        PHYSICAL EXAM:  General :NAD  Constitutional:  well-groomed, well-developed  Respiratory: CTAB/L  Cardiovascular: S1 and S2, RRR, no M/G/R  Gastrointestinal: BS+, soft, NT/ND  Extremities: No peripheral edema  Vascular: 2+ peripheral pulses  Skin: back incision with drain/hemovac+ bloody drainage +      LABS:                        8.7    7.86  )-----------( 249      ( 07 Feb 2020 06:26 )             28.0     02-07    139  |  107  |  22  ----------------------------<  120<H>  4.3   |  25  |  0.59    Ca    8.8      07 Feb 2020 06:26            MICROBIOLOGY:  RECENT CULTURES:        RADIOLOGY & ADDITIONAL STUDIES:

## 2020-02-07 NOTE — PROGRESS NOTE ADULT - SUBJECTIVE AND OBJECTIVE BOX
44y old  Female who presents with a chief complaint of Back pain.  Admitted for mgmt of infected spinal hardware.    Today:  Pt states her pain is unchanged, still requiring all doses of opiates.              PAST MEDICAL & SURGICAL HISTORY:  History of pancreatitis  History of alcoholic hepatitis  ETOH abuse  Drug abuse  History of back surgery  C Section      REVIEW OF SYSTEMS:  + back Pain      MEDICATIONS  (STANDING):  ascorbic acid 500 milliGRAM(s) Oral two times a day  BACItracin   Ointment 1 Application(s) Topical daily  calcium carbonate 1250 mG  + Vitamin D (OsCal 500 + D) 1 Tablet(s) Oral three times a day  fentaNYL   Patch  25 MICROgram(s)/Hr 1 Patch Transdermal every 72 hours  folic acid 1 milliGRAM(s) Oral daily  gabapentin 1200 milliGRAM(s) Oral three times a day  multivitamin 1 Tablet(s) Oral daily  nicotine -  14 mG/24Hr(s) Patch 1 patch Transdermal daily  senna 2 Tablet(s) Oral at bedtime  vancomycin  IVPB      vancomycin  IVPB 1250 milliGRAM(s) IV Intermittent every 12 hours    MEDICATIONS  (PRN):  acetaminophen   Tablet .. 650 milliGRAM(s) Oral every 6 hours PRN Temp greater or equal to 38.5C (101.3F), Mild Pain (1 - 3)  bisacodyl Suppository 10 milliGRAM(s) Rectal daily PRN Constipation  cyclobenzaprine 10 milliGRAM(s) Oral every 8 hours PRN Muscle Spasm  HYDROmorphone   Tablet 8 milliGRAM(s) Oral every 8 hours PRN Severe Pain (7 - 10)  ketorolac   Injectable 30 milliGRAM(s) IV Push every 6 hours PRN Mild Pain (1 - 3)  magnesium hydroxide Suspension 30 milliLiter(s) Oral every 12 hours PRN Constipation  ondansetron Injectable 4 milliGRAM(s) IV Push every 6 hours PRN Nausea  oxyCODONE  ER Tablet 60 milliGRAM(s) Oral every 8 hours PRN moderate pain  petrolatum white Ointment 1 Application(s) Topical four times a day PRN dry, cracked lips      Allergies  penicillin (Short breath; Hives)              Vital Signs Last 24 Hrs  T(C): 36.3 (07 Feb 2020 11:14), Max: 36.7 (07 Feb 2020 05:15)  T(F): 97.4 (07 Feb 2020 11:14), Max: 98.1 (07 Feb 2020 05:15)  HR: 78 (07 Feb 2020 14:29) (74 - 86)  BP: 100/70 (07 Feb 2020 14:29) (90/54 - 113/66)  RR: 19 (07 Feb 2020 14:29) (17 - 19)  SpO2: 98% (07 Feb 2020 14:29) (96% - 98%)    PHYSICAL EXAM:    GENERAL: NAD  Neuro:  Alert & responsive  HEENT: NCAT, EOMI, conjunctiva clear  CV: +S1+S2 regular rate and rhythm  Lung: clear to ausculation bilaterally, respirations nonlabored, good inspiratory effort  Abdomen: soft, Non Tender, No distention Normal active BS  Extremities: no cyanosis, clubbing or edema BL LE    LABS:                        8.7    7.86  )-----------( 249      ( 07 Feb 2020 06:26 )             28.0     02-07    139  |  107  |  22  ----------------------------<  120<H>  4.3   |  25  |  0.59    Ca    8.8      07 Feb 2020 06:26

## 2020-02-07 NOTE — PROGRESS NOTE ADULT - SUBJECTIVE AND OBJECTIVE BOX
Patient seen and examined. Pain controlled. No acute events overnight.    Physical exam  VS:  Vital Signs Last 24 Hrs  T(C): 36.7 (07 Feb 2020 05:15), Max: 36.7 (07 Feb 2020 05:15)  T(F): 98.1 (07 Feb 2020 05:15), Max: 98.1 (07 Feb 2020 05:15)  HR: 84 (07 Feb 2020 05:15) (74 - 85)  BP: 99/62 (07 Feb 2020 05:15) (96/61 - 116/67)  BP(mean): --  RR: 18 (07 Feb 2020 05:15) (18 - 18)  SpO2: 98% (07 Feb 2020 05:15) (95% - 98%)    Gen: NAD  Spine/extremities:  Dressing intact. SILT C5-T1, L3-S1. Negative Babinski. Negative ankle clonus. Negative [place]. Capillary refill brisk.  Right UE: C5- 5/5  C6- 5/5  C7- 5/5  C8- 5/5  T1- 5/5  Left UE: C5- 5/5  C6- 5/5  C7- 5/5  C8- 5/5  T1- 5/5  Right LE: L2- 2/5  L3- 3/5  L4- 3/5  L5- 4/5  S1- 4/5  Left LE: L2- 5/5  L3- 5/5  L4- 5/5  L5- NA S1- 5/5      AP  44F s/p revision T4-L2 PSF with exploration and decompression POD6    RLE TTWB, LLE WBAT  Monitor drain output - anticipate discontinue on day of discharge or before discharge when output decreased  Appreciate consulting recommendations  Please call for any change in, new, or worsening signs/symptoms  Right hip - nonsurgical management at this time. Follow up with [facility] reconstructive joint surgery specialist as outpatient. Information to be provided in discharge paperwork  Discussed with Dr Leslie, who agrees with above

## 2020-02-07 NOTE — PROGRESS NOTE ADULT - PROBLEM SELECTOR PLAN 1
Has osteomyelitis/discitis, seen by ortho, ID  s/p removal of hardware T6, T4-T10 PSF, exploration of Hardware, junctional kyphosis  Continue Vancomycin  pain control regimen discussed with anesthesia- Fentanyl patch increased to 25 mcg, Dilaudid changed from IV to PO (8mg TID), Oxycodone ER 60 TID.

## 2020-02-08 LAB
ANION GAP SERPL CALC-SCNC: 8 MMOL/L — SIGNIFICANT CHANGE UP (ref 5–17)
BASOPHILS # BLD AUTO: 0.04 K/UL — SIGNIFICANT CHANGE UP (ref 0–0.2)
BASOPHILS NFR BLD AUTO: 0.5 % — SIGNIFICANT CHANGE UP (ref 0–2)
BUN SERPL-MCNC: 23 MG/DL — SIGNIFICANT CHANGE UP (ref 7–23)
CALCIUM SERPL-MCNC: 8.7 MG/DL — SIGNIFICANT CHANGE UP (ref 8.5–10.1)
CHLORIDE SERPL-SCNC: 107 MMOL/L — SIGNIFICANT CHANGE UP (ref 96–108)
CO2 SERPL-SCNC: 26 MMOL/L — SIGNIFICANT CHANGE UP (ref 22–31)
CREAT SERPL-MCNC: 0.65 MG/DL — SIGNIFICANT CHANGE UP (ref 0.5–1.3)
EOSINOPHIL # BLD AUTO: 0.48 K/UL — SIGNIFICANT CHANGE UP (ref 0–0.5)
EOSINOPHIL NFR BLD AUTO: 6.5 % — HIGH (ref 0–6)
GLUCOSE SERPL-MCNC: 112 MG/DL — HIGH (ref 70–99)
HCT VFR BLD CALC: 30.5 % — LOW (ref 34.5–45)
HGB BLD-MCNC: 9.4 G/DL — LOW (ref 11.5–15.5)
IMM GRANULOCYTES NFR BLD AUTO: 1.5 % — SIGNIFICANT CHANGE UP (ref 0–1.5)
LYMPHOCYTES # BLD AUTO: 2.3 K/UL — SIGNIFICANT CHANGE UP (ref 1–3.3)
LYMPHOCYTES # BLD AUTO: 31 % — SIGNIFICANT CHANGE UP (ref 13–44)
MCHC RBC-ENTMCNC: 26.3 PG — LOW (ref 27–34)
MCHC RBC-ENTMCNC: 30.8 GM/DL — LOW (ref 32–36)
MCV RBC AUTO: 85.2 FL — SIGNIFICANT CHANGE UP (ref 80–100)
MONOCYTES # BLD AUTO: 0.5 K/UL — SIGNIFICANT CHANGE UP (ref 0–0.9)
MONOCYTES NFR BLD AUTO: 6.7 % — SIGNIFICANT CHANGE UP (ref 2–14)
NEUTROPHILS # BLD AUTO: 3.98 K/UL — SIGNIFICANT CHANGE UP (ref 1.8–7.4)
NEUTROPHILS NFR BLD AUTO: 53.8 % — SIGNIFICANT CHANGE UP (ref 43–77)
NRBC # BLD: 0 /100 WBCS — SIGNIFICANT CHANGE UP (ref 0–0)
PLATELET # BLD AUTO: 267 K/UL — SIGNIFICANT CHANGE UP (ref 150–400)
POTASSIUM SERPL-MCNC: 4.3 MMOL/L — SIGNIFICANT CHANGE UP (ref 3.5–5.3)
POTASSIUM SERPL-SCNC: 4.3 MMOL/L — SIGNIFICANT CHANGE UP (ref 3.5–5.3)
RBC # BLD: 3.58 M/UL — LOW (ref 3.8–5.2)
RBC # FLD: 17.1 % — HIGH (ref 10.3–14.5)
SODIUM SERPL-SCNC: 141 MMOL/L — SIGNIFICANT CHANGE UP (ref 135–145)
WBC # BLD: 7.41 K/UL — SIGNIFICANT CHANGE UP (ref 3.8–10.5)
WBC # FLD AUTO: 7.41 K/UL — SIGNIFICANT CHANGE UP (ref 3.8–10.5)

## 2020-02-08 PROCEDURE — 99233 SBSQ HOSP IP/OBS HIGH 50: CPT

## 2020-02-08 RX ORDER — KETOROLAC TROMETHAMINE 30 MG/ML
30 SYRINGE (ML) INJECTION EVERY 6 HOURS
Refills: 0 | Status: DISCONTINUED | OUTPATIENT
Start: 2020-02-08 | End: 2020-02-10

## 2020-02-08 RX ADMIN — OXYCODONE HYDROCHLORIDE 60 MILLIGRAM(S): 5 TABLET ORAL at 14:32

## 2020-02-08 RX ADMIN — SENNA PLUS 2 TABLET(S): 8.6 TABLET ORAL at 23:22

## 2020-02-08 RX ADMIN — Medication 30 MILLIGRAM(S): at 11:32

## 2020-02-08 RX ADMIN — GABAPENTIN 1200 MILLIGRAM(S): 400 CAPSULE ORAL at 23:22

## 2020-02-08 RX ADMIN — NALOXEGOL OXALATE 25 MILLIGRAM(S): 12.5 TABLET, FILM COATED ORAL at 11:21

## 2020-02-08 RX ADMIN — Medication 1 PATCH: at 11:22

## 2020-02-08 RX ADMIN — Medication 500 MILLIGRAM(S): at 17:32

## 2020-02-08 RX ADMIN — HYDROMORPHONE HYDROCHLORIDE 8 MILLIGRAM(S): 2 INJECTION INTRAMUSCULAR; INTRAVENOUS; SUBCUTANEOUS at 23:22

## 2020-02-08 RX ADMIN — Medication 1 TABLET(S): at 13:52

## 2020-02-08 RX ADMIN — Medication 1 PATCH: at 20:47

## 2020-02-08 RX ADMIN — HYDROMORPHONE HYDROCHLORIDE 8 MILLIGRAM(S): 2 INJECTION INTRAMUSCULAR; INTRAVENOUS; SUBCUTANEOUS at 02:33

## 2020-02-08 RX ADMIN — Medication 30 MILLIGRAM(S): at 11:45

## 2020-02-08 RX ADMIN — Medication 1 PATCH: at 10:08

## 2020-02-08 RX ADMIN — Medication 500 MILLIGRAM(S): at 06:30

## 2020-02-08 RX ADMIN — GABAPENTIN 1200 MILLIGRAM(S): 400 CAPSULE ORAL at 13:52

## 2020-02-08 RX ADMIN — HYDROMORPHONE HYDROCHLORIDE 8 MILLIGRAM(S): 2 INJECTION INTRAMUSCULAR; INTRAVENOUS; SUBCUTANEOUS at 11:28

## 2020-02-08 RX ADMIN — GABAPENTIN 1200 MILLIGRAM(S): 400 CAPSULE ORAL at 06:30

## 2020-02-08 RX ADMIN — Medication 1 APPLICATION(S): at 11:22

## 2020-02-08 RX ADMIN — CYCLOBENZAPRINE HYDROCHLORIDE 10 MILLIGRAM(S): 10 TABLET, FILM COATED ORAL at 08:13

## 2020-02-08 RX ADMIN — FENTANYL CITRATE 1 PATCH: 50 INJECTION INTRAVENOUS at 14:09

## 2020-02-08 RX ADMIN — Medication 30 MILLIGRAM(S): at 17:33

## 2020-02-08 RX ADMIN — Medication 166.67 MILLIGRAM(S): at 23:23

## 2020-02-08 RX ADMIN — Medication 1 TABLET(S): at 23:22

## 2020-02-08 RX ADMIN — HYDROMORPHONE HYDROCHLORIDE 8 MILLIGRAM(S): 2 INJECTION INTRAMUSCULAR; INTRAVENOUS; SUBCUTANEOUS at 03:30

## 2020-02-08 RX ADMIN — Medication 1 PATCH: at 13:43

## 2020-02-08 RX ADMIN — Medication 166.67 MILLIGRAM(S): at 10:39

## 2020-02-08 RX ADMIN — OXYCODONE HYDROCHLORIDE 60 MILLIGRAM(S): 5 TABLET ORAL at 15:30

## 2020-02-08 RX ADMIN — Medication 1 MILLIGRAM(S): at 11:21

## 2020-02-08 RX ADMIN — FENTANYL CITRATE 1 PATCH: 50 INJECTION INTRAVENOUS at 07:07

## 2020-02-08 RX ADMIN — HYDROMORPHONE HYDROCHLORIDE 8 MILLIGRAM(S): 2 INJECTION INTRAMUSCULAR; INTRAVENOUS; SUBCUTANEOUS at 10:28

## 2020-02-08 RX ADMIN — Medication 1 TABLET(S): at 11:22

## 2020-02-08 RX ADMIN — Medication 30 MILLIGRAM(S): at 17:48

## 2020-02-08 RX ADMIN — Medication 1 TABLET(S): at 06:30

## 2020-02-08 RX ADMIN — FENTANYL CITRATE 1 PATCH: 50 INJECTION INTRAVENOUS at 20:47

## 2020-02-08 RX ADMIN — OXYCODONE HYDROCHLORIDE 60 MILLIGRAM(S): 5 TABLET ORAL at 07:30

## 2020-02-08 RX ADMIN — OXYCODONE HYDROCHLORIDE 60 MILLIGRAM(S): 5 TABLET ORAL at 06:30

## 2020-02-08 NOTE — PROGRESS NOTE ADULT - SUBJECTIVE AND OBJECTIVE BOX
Patient seen and examined. Pain controlled. No acute events overnight.    Physical exam  VS:  Vital Signs Last 24 Hrs  T(C): 36.8 (08 Feb 2020 05:00), Max: 36.8 (07 Feb 2020 23:56)  T(F): 98.2 (08 Feb 2020 05:00), Max: 98.2 (07 Feb 2020 23:56)  HR: 82 (08 Feb 2020 05:00) (78 - 86)  BP: 103/65 (08 Feb 2020 05:00) (100/64 - 106/67)  BP(mean): --  RR: 16 (08 Feb 2020 05:00) (16 - 19)  SpO2: 97% (08 Feb 2020 05:00) (97% - 98%)    Gen: NAD  Spine/extremities:  Dressing intact. SILT C5-T1, L3-S1. Negative Babinski. Negative ankle clonus. Negative [place]. Capillary refill brisk.  Right UE: C5- 5/5  C6- 5/5  C7- 5/5  C8- 5/5  T1- 5/5  Left UE: C5- 5/5  C6- 5/5  C7- 5/5  C8- 5/5  T1- 5/5  Right LE: L2- 2/5  L3- 3/5  L4- 3/5  L5- 4/5  S1- 4/5  Left LE: L2- 5/5  L3- 5/5  L4- 5/5  L5- NA S1- 5/5      AP  44F s/p revision T4-L2 PSF with exploration and decompression POD7:    RLE TTWB, LLE WBAT  Monitor drain output - anticipate discontinue on day of discharge or before discharge when output decreased  Appreciate consulting recommendations  Please call for any change in, new, or worsening signs/symptoms  Right hip - nonsurgical management at this time. Follow up with [facility] reconstructive joint surgery specialist as outpatient. Information to be provided in discharge paperwork  Discussed with Dr Leslie, who agrees with above

## 2020-02-08 NOTE — PROGRESS NOTE ADULT - SUBJECTIVE AND OBJECTIVE BOX
44y old  Female who presents with a chief complaint of Back pain.  Admitted for mgmt of infected spinal hardware.    Today:  Pt states her pain has improved.              PAST MEDICAL & SURGICAL HISTORY:  History of pancreatitis  History of alcoholic hepatitis  ETOH abuse  Drug abuse  History of back surgery  C Section      REVIEW OF SYSTEMS:  +Pain        MEDICATIONS  (STANDING):  ascorbic acid 500 milliGRAM(s) Oral two times a day  BACItracin   Ointment 1 Application(s) Topical daily  calcium carbonate 1250 mG  + Vitamin D (OsCal 500 + D) 1 Tablet(s) Oral three times a day  fentaNYL   Patch  25 MICROgram(s)/Hr 1 Patch Transdermal every 72 hours  folic acid 1 milliGRAM(s) Oral daily  gabapentin 1200 milliGRAM(s) Oral three times a day  multivitamin 1 Tablet(s) Oral daily  naloxegol 25 milliGRAM(s) Oral daily  nicotine -  14 mG/24Hr(s) Patch 1 patch Transdermal daily  senna 2 Tablet(s) Oral at bedtime  vancomycin  IVPB      vancomycin  IVPB 1250 milliGRAM(s) IV Intermittent every 12 hours    MEDICATIONS  (PRN):  acetaminophen   Tablet .. 650 milliGRAM(s) Oral every 6 hours PRN Temp greater or equal to 38.5C (101.3F), Mild Pain (1 - 3)  bisacodyl Suppository 10 milliGRAM(s) Rectal daily PRN Constipation  cyclobenzaprine 10 milliGRAM(s) Oral every 8 hours PRN Muscle Spasm  HYDROmorphone   Tablet 8 milliGRAM(s) Oral every 8 hours PRN Severe Pain (7 - 10)  ketorolac   Injectable 30 milliGRAM(s) IV Push every 6 hours PRN Mild Pain (1 - 3)  magnesium hydroxide Suspension 30 milliLiter(s) Oral every 12 hours PRN Constipation  ondansetron Injectable 4 milliGRAM(s) IV Push every 6 hours PRN Nausea  oxyCODONE  ER Tablet 60 milliGRAM(s) Oral every 8 hours PRN moderate pain  petrolatum white Ointment 1 Application(s) Topical four times a day PRN dry, cracked lips      Allergies  penicillin (Short breath; Hives)        FAMILY HISTORY:  Family history unknown      Vital Signs Last 24 Hrs  T(C): 36.2 (08 Feb 2020 11:05), Max: 36.8 (07 Feb 2020 23:56)  T(F): 97.1 (08 Feb 2020 11:05), Max: 98.2 (07 Feb 2020 23:56)  HR: 79 (08 Feb 2020 11:05) (78 - 82)  BP: 99/61 (08 Feb 2020 11:05) (99/61 - 106/67)  RR: 16 (08 Feb 2020 11:05) (16 - 19)  SpO2: 97% (08 Feb 2020 11:05) (97% - 98%)    PHYSICAL EXAM:    GENERAL: NAD  Neuro:  Alert & responsive  HEENT: NCAT, EOMI, conjunctiva clear  CV: +S1+S2 regular rate and rhythm  Lung: clear to ausculation bilaterally, respirations nonlabored, good inspiratory effort  Abdomen: soft, Non Tender, No distention Normal active BS  Extremities: no cyanosis, clubbing or edema BL LE        LABS:                        9.4    7.41  )-----------( 267      ( 08 Feb 2020 07:11 )             30.5     02-08    141  |  107  |  23  ----------------------------<  112<H>  4.3   |  26  |  0.65    Ca    8.7      08 Feb 2020 07:11

## 2020-02-09 LAB
ALBUMIN SERPL ELPH-MCNC: 2.6 G/DL — LOW (ref 3.3–5)
ALP SERPL-CCNC: 144 U/L — HIGH (ref 40–120)
ALT FLD-CCNC: 14 U/L — SIGNIFICANT CHANGE UP (ref 12–78)
ANION GAP SERPL CALC-SCNC: 7 MMOL/L — SIGNIFICANT CHANGE UP (ref 5–17)
AST SERPL-CCNC: 12 U/L — LOW (ref 15–37)
BASOPHILS # BLD AUTO: 0.05 K/UL — SIGNIFICANT CHANGE UP (ref 0–0.2)
BASOPHILS NFR BLD AUTO: 0.7 % — SIGNIFICANT CHANGE UP (ref 0–2)
BILIRUB SERPL-MCNC: 0.3 MG/DL — SIGNIFICANT CHANGE UP (ref 0.2–1.2)
BUN SERPL-MCNC: 25 MG/DL — HIGH (ref 7–23)
CALCIUM SERPL-MCNC: 9.3 MG/DL — SIGNIFICANT CHANGE UP (ref 8.5–10.1)
CHLORIDE SERPL-SCNC: 106 MMOL/L — SIGNIFICANT CHANGE UP (ref 96–108)
CO2 SERPL-SCNC: 28 MMOL/L — SIGNIFICANT CHANGE UP (ref 22–31)
CREAT SERPL-MCNC: 0.85 MG/DL — SIGNIFICANT CHANGE UP (ref 0.5–1.3)
EOSINOPHIL # BLD AUTO: 0.44 K/UL — SIGNIFICANT CHANGE UP (ref 0–0.5)
EOSINOPHIL NFR BLD AUTO: 6.2 % — HIGH (ref 0–6)
GLUCOSE SERPL-MCNC: 130 MG/DL — HIGH (ref 70–99)
HCT VFR BLD CALC: 31.3 % — LOW (ref 34.5–45)
HGB BLD-MCNC: 9.6 G/DL — LOW (ref 11.5–15.5)
IMM GRANULOCYTES NFR BLD AUTO: 1.8 % — HIGH (ref 0–1.5)
LYMPHOCYTES # BLD AUTO: 2.3 K/UL — SIGNIFICANT CHANGE UP (ref 1–3.3)
LYMPHOCYTES # BLD AUTO: 32.2 % — SIGNIFICANT CHANGE UP (ref 13–44)
MAGNESIUM SERPL-MCNC: 2 MG/DL — SIGNIFICANT CHANGE UP (ref 1.6–2.6)
MCHC RBC-ENTMCNC: 25.7 PG — LOW (ref 27–34)
MCHC RBC-ENTMCNC: 30.7 GM/DL — LOW (ref 32–36)
MCV RBC AUTO: 83.7 FL — SIGNIFICANT CHANGE UP (ref 80–100)
MONOCYTES # BLD AUTO: 0.45 K/UL — SIGNIFICANT CHANGE UP (ref 0–0.9)
MONOCYTES NFR BLD AUTO: 6.3 % — SIGNIFICANT CHANGE UP (ref 2–14)
NEUTROPHILS # BLD AUTO: 3.78 K/UL — SIGNIFICANT CHANGE UP (ref 1.8–7.4)
NEUTROPHILS NFR BLD AUTO: 52.8 % — SIGNIFICANT CHANGE UP (ref 43–77)
NRBC # BLD: 0 /100 WBCS — SIGNIFICANT CHANGE UP (ref 0–0)
PHOSPHATE SERPL-MCNC: 4.6 MG/DL — HIGH (ref 2.5–4.5)
PLATELET # BLD AUTO: 295 K/UL — SIGNIFICANT CHANGE UP (ref 150–400)
POTASSIUM SERPL-MCNC: 4.6 MMOL/L — SIGNIFICANT CHANGE UP (ref 3.5–5.3)
POTASSIUM SERPL-SCNC: 4.6 MMOL/L — SIGNIFICANT CHANGE UP (ref 3.5–5.3)
PROT SERPL-MCNC: 6.4 GM/DL — SIGNIFICANT CHANGE UP (ref 6–8.3)
RBC # BLD: 3.74 M/UL — LOW (ref 3.8–5.2)
RBC # FLD: 17.4 % — HIGH (ref 10.3–14.5)
SODIUM SERPL-SCNC: 141 MMOL/L — SIGNIFICANT CHANGE UP (ref 135–145)
WBC # BLD: 7.15 K/UL — SIGNIFICANT CHANGE UP (ref 3.8–10.5)
WBC # FLD AUTO: 7.15 K/UL — SIGNIFICANT CHANGE UP (ref 3.8–10.5)

## 2020-02-09 PROCEDURE — 99233 SBSQ HOSP IP/OBS HIGH 50: CPT

## 2020-02-09 RX ADMIN — Medication 30 MILLIGRAM(S): at 16:31

## 2020-02-09 RX ADMIN — GABAPENTIN 1200 MILLIGRAM(S): 400 CAPSULE ORAL at 13:19

## 2020-02-09 RX ADMIN — Medication 1 TABLET(S): at 11:11

## 2020-02-09 RX ADMIN — Medication 1 TABLET(S): at 13:19

## 2020-02-09 RX ADMIN — Medication 30 MILLIGRAM(S): at 16:16

## 2020-02-09 RX ADMIN — Medication 166.67 MILLIGRAM(S): at 10:11

## 2020-02-09 RX ADMIN — Medication 1 TABLET(S): at 22:51

## 2020-02-09 RX ADMIN — GABAPENTIN 1200 MILLIGRAM(S): 400 CAPSULE ORAL at 22:51

## 2020-02-09 RX ADMIN — Medication 1 PATCH: at 12:17

## 2020-02-09 RX ADMIN — SENNA PLUS 2 TABLET(S): 8.6 TABLET ORAL at 22:51

## 2020-02-09 RX ADMIN — FENTANYL CITRATE 1 PATCH: 50 INJECTION INTRAVENOUS at 20:03

## 2020-02-09 RX ADMIN — Medication 1 PATCH: at 20:03

## 2020-02-09 RX ADMIN — HYDROMORPHONE HYDROCHLORIDE 8 MILLIGRAM(S): 2 INJECTION INTRAMUSCULAR; INTRAVENOUS; SUBCUTANEOUS at 10:09

## 2020-02-09 RX ADMIN — Medication 1 PATCH: at 11:11

## 2020-02-09 RX ADMIN — OXYCODONE HYDROCHLORIDE 60 MILLIGRAM(S): 5 TABLET ORAL at 22:50

## 2020-02-09 RX ADMIN — NALOXEGOL OXALATE 25 MILLIGRAM(S): 12.5 TABLET, FILM COATED ORAL at 11:10

## 2020-02-09 RX ADMIN — Medication 30 MILLIGRAM(S): at 01:20

## 2020-02-09 RX ADMIN — Medication 166.67 MILLIGRAM(S): at 22:52

## 2020-02-09 RX ADMIN — CYCLOBENZAPRINE HYDROCHLORIDE 10 MILLIGRAM(S): 10 TABLET, FILM COATED ORAL at 05:19

## 2020-02-09 RX ADMIN — GABAPENTIN 1200 MILLIGRAM(S): 400 CAPSULE ORAL at 05:17

## 2020-02-09 RX ADMIN — HYDROMORPHONE HYDROCHLORIDE 8 MILLIGRAM(S): 2 INJECTION INTRAMUSCULAR; INTRAVENOUS; SUBCUTANEOUS at 18:11

## 2020-02-09 RX ADMIN — OXYCODONE HYDROCHLORIDE 60 MILLIGRAM(S): 5 TABLET ORAL at 23:50

## 2020-02-09 RX ADMIN — FENTANYL CITRATE 1 PATCH: 50 INJECTION INTRAVENOUS at 07:47

## 2020-02-09 RX ADMIN — HYDROMORPHONE HYDROCHLORIDE 8 MILLIGRAM(S): 2 INJECTION INTRAMUSCULAR; INTRAVENOUS; SUBCUTANEOUS at 00:22

## 2020-02-09 RX ADMIN — Medication 500 MILLIGRAM(S): at 05:17

## 2020-02-09 RX ADMIN — OXYCODONE HYDROCHLORIDE 60 MILLIGRAM(S): 5 TABLET ORAL at 14:30

## 2020-02-09 RX ADMIN — OXYCODONE HYDROCHLORIDE 60 MILLIGRAM(S): 5 TABLET ORAL at 05:18

## 2020-02-09 RX ADMIN — HYDROMORPHONE HYDROCHLORIDE 8 MILLIGRAM(S): 2 INJECTION INTRAMUSCULAR; INTRAVENOUS; SUBCUTANEOUS at 10:21

## 2020-02-09 RX ADMIN — Medication 1 TABLET(S): at 05:17

## 2020-02-09 RX ADMIN — Medication 1 APPLICATION(S): at 11:11

## 2020-02-09 RX ADMIN — Medication 1 MILLIGRAM(S): at 11:10

## 2020-02-09 RX ADMIN — HYDROMORPHONE HYDROCHLORIDE 8 MILLIGRAM(S): 2 INJECTION INTRAMUSCULAR; INTRAVENOUS; SUBCUTANEOUS at 19:00

## 2020-02-09 RX ADMIN — Medication 1 PATCH: at 07:47

## 2020-02-09 RX ADMIN — Medication 500 MILLIGRAM(S): at 17:42

## 2020-02-09 RX ADMIN — OXYCODONE HYDROCHLORIDE 60 MILLIGRAM(S): 5 TABLET ORAL at 13:21

## 2020-02-09 RX ADMIN — Medication 30 MILLIGRAM(S): at 01:00

## 2020-02-09 RX ADMIN — OXYCODONE HYDROCHLORIDE 60 MILLIGRAM(S): 5 TABLET ORAL at 06:18

## 2020-02-09 NOTE — PROGRESS NOTE ADULT - PROBLEM SELECTOR PLAN 3
Blood loss during OR estimated 950 milliLiter  trend cbc  GI eval/Dr. Meyer appreciated.  Has been stable.

## 2020-02-09 NOTE — PROGRESS NOTE ADULT - SUBJECTIVE AND OBJECTIVE BOX
44y old  Female who presents with a chief complaint of Back pain.  Admitted for mgmt of infected spinal hardware.    Today:  Pt thinks her pain has improved since starting new regimen.            PAST MEDICAL & SURGICAL HISTORY:  History of pancreatitis  History of alcoholic hepatitis  ETOH abuse  Drug abuse  History of back surgery  C Section      REVIEW OF SYSTEMS:  +Pain      MEDICATIONS  (STANDING):  ascorbic acid 500 milliGRAM(s) Oral two times a day  BACItracin   Ointment 1 Application(s) Topical daily  calcium carbonate 1250 mG  + Vitamin D (OsCal 500 + D) 1 Tablet(s) Oral three times a day  fentaNYL   Patch  25 MICROgram(s)/Hr 1 Patch Transdermal every 72 hours  folic acid 1 milliGRAM(s) Oral daily  gabapentin 1200 milliGRAM(s) Oral three times a day  multivitamin 1 Tablet(s) Oral daily  naloxegol 25 milliGRAM(s) Oral daily  nicotine -  14 mG/24Hr(s) Patch 1 patch Transdermal daily  senna 2 Tablet(s) Oral at bedtime  vancomycin  IVPB      vancomycin  IVPB 1250 milliGRAM(s) IV Intermittent every 12 hours    MEDICATIONS  (PRN):  acetaminophen   Tablet .. 650 milliGRAM(s) Oral every 6 hours PRN Temp greater or equal to 38.5C (101.3F), Mild Pain (1 - 3)  bisacodyl Suppository 10 milliGRAM(s) Rectal daily PRN Constipation  cyclobenzaprine 10 milliGRAM(s) Oral every 8 hours PRN Muscle Spasm  HYDROmorphone   Tablet 8 milliGRAM(s) Oral every 8 hours PRN Severe Pain (7 - 10)  ketorolac   Injectable 30 milliGRAM(s) IV Push every 6 hours PRN Mild Pain (1 - 3)  magnesium hydroxide Suspension 30 milliLiter(s) Oral every 12 hours PRN Constipation  ondansetron Injectable 4 milliGRAM(s) IV Push every 6 hours PRN Nausea  oxyCODONE  ER Tablet 60 milliGRAM(s) Oral every 8 hours PRN moderate pain  petrolatum white Ointment 1 Application(s) Topical four times a day PRN dry, cracked lips      Allergies  penicillin (Short breath; Hives)        FAMILY HISTORY:  Family history unknown      Vital Signs Last 24 Hrs  T(C): 36.8 (09 Feb 2020 11:38), Max: 36.8 (09 Feb 2020 11:38)  T(F): 98.2 (09 Feb 2020 11:38), Max: 98.2 (09 Feb 2020 11:38)  HR: 81 (09 Feb 2020 11:38) (71 - 81)  BP: 104/65 (09 Feb 2020 11:38) (94/63 - 125/78)  RR: 16 (09 Feb 2020 11:38) (16 - 18)  SpO2: 96% (09 Feb 2020 11:38) (95% - 99%)    PHYSICAL EXAM:    GENERAL: NAD  Neuro:  Alert & responsive  HEENT: NCAT, EOMI, conjunctiva clear  CV: +S1+S2 regular rate and rhythm  Lung: clear to ausculation bilaterally, respirations nonlabored, good inspiratory effort  Abdomen: soft, Non Tender, No distention Normal active BS  Extremities: no cyanosis, clubbing or edema BL LE      LABS:                        9.6    7.15  )-----------( 295      ( 09 Feb 2020 10:57 )             31.3     02-09    141  |  106  |  25<H>  ----------------------------<  130<H>  4.6   |  28  |  0.85    Ca    9.3      09 Feb 2020 10:57  Phos  4.6     02-09  Mg     2.0     02-09    TPro  6.4  /  Alb  2.6<L>  /  TBili  0.3  /  DBili  x   /  AST  12<L>  /  ALT  14  /  AlkPhos  144<H>  02-09

## 2020-02-09 NOTE — PROGRESS NOTE ADULT - SUBJECTIVE AND OBJECTIVE BOX
Pt seen & examined. Pain controlled. No acute events overnight.  All vital signs stable  Gen: NAD  Spine:  Skin intact  Sensation intact to light touch in C5-T1 and L2-S1 nerve distributions bilaterally  Radial/Dorsalis pedis/Posterior tibial pulses 2+  Compartments soft and compressible    Right UE: C5- 5/5  C6- 5/5  C7- 5/5  C8- 5/5  T1- 5/5  Left UE: C5- 5/5  C6- 5/5  C7- 5/5  C8- 5/5  T1- 5/5  Right LE: L2- 2/5  L3- 3/5  L4- 3/5  L5- 4/5  S1- 4/5  Left LE: L2- 5/5  L3- 5/5  L4- 5/5  L5- NA S1- 5/5    KIRILL Drain 50/120

## 2020-02-10 LAB — VANCOMYCIN TROUGH SERPL-MCNC: 19.7 UG/ML — SIGNIFICANT CHANGE UP (ref 10–20)

## 2020-02-10 PROCEDURE — 99231 SBSQ HOSP IP/OBS SF/LOW 25: CPT

## 2020-02-10 PROCEDURE — 99233 SBSQ HOSP IP/OBS HIGH 50: CPT

## 2020-02-10 RX ORDER — IBUPROFEN 200 MG
800 TABLET ORAL EVERY 6 HOURS
Refills: 0 | Status: DISCONTINUED | OUTPATIENT
Start: 2020-02-10 | End: 2020-02-13

## 2020-02-10 RX ORDER — KETOROLAC TROMETHAMINE 30 MG/ML
10 SYRINGE (ML) INJECTION EVERY 6 HOURS
Refills: 0 | Status: DISCONTINUED | OUTPATIENT
Start: 2020-02-10 | End: 2020-02-10

## 2020-02-10 RX ADMIN — GABAPENTIN 1200 MILLIGRAM(S): 400 CAPSULE ORAL at 05:42

## 2020-02-10 RX ADMIN — Medication 1 PATCH: at 08:45

## 2020-02-10 RX ADMIN — HYDROMORPHONE HYDROCHLORIDE 8 MILLIGRAM(S): 2 INJECTION INTRAMUSCULAR; INTRAVENOUS; SUBCUTANEOUS at 20:26

## 2020-02-10 RX ADMIN — Medication 1 MILLIGRAM(S): at 11:36

## 2020-02-10 RX ADMIN — HYDROMORPHONE HYDROCHLORIDE 8 MILLIGRAM(S): 2 INJECTION INTRAMUSCULAR; INTRAVENOUS; SUBCUTANEOUS at 19:56

## 2020-02-10 RX ADMIN — Medication 166.67 MILLIGRAM(S): at 12:53

## 2020-02-10 RX ADMIN — OXYCODONE HYDROCHLORIDE 60 MILLIGRAM(S): 5 TABLET ORAL at 08:54

## 2020-02-10 RX ADMIN — FENTANYL CITRATE 1 PATCH: 50 INJECTION INTRAVENOUS at 08:45

## 2020-02-10 RX ADMIN — Medication 30 MILLIGRAM(S): at 04:06

## 2020-02-10 RX ADMIN — Medication 30 MILLIGRAM(S): at 04:25

## 2020-02-10 RX ADMIN — HYDROMORPHONE HYDROCHLORIDE 8 MILLIGRAM(S): 2 INJECTION INTRAMUSCULAR; INTRAVENOUS; SUBCUTANEOUS at 12:35

## 2020-02-10 RX ADMIN — Medication 1 PATCH: at 12:12

## 2020-02-10 RX ADMIN — FENTANYL CITRATE 1 PATCH: 50 INJECTION INTRAVENOUS at 17:45

## 2020-02-10 RX ADMIN — Medication 1 TABLET(S): at 05:42

## 2020-02-10 RX ADMIN — HYDROMORPHONE HYDROCHLORIDE 8 MILLIGRAM(S): 2 INJECTION INTRAMUSCULAR; INTRAVENOUS; SUBCUTANEOUS at 02:41

## 2020-02-10 RX ADMIN — CYCLOBENZAPRINE HYDROCHLORIDE 10 MILLIGRAM(S): 10 TABLET, FILM COATED ORAL at 15:58

## 2020-02-10 RX ADMIN — Medication 1 PATCH: at 19:30

## 2020-02-10 RX ADMIN — SENNA PLUS 2 TABLET(S): 8.6 TABLET ORAL at 22:47

## 2020-02-10 RX ADMIN — Medication 500 MILLIGRAM(S): at 05:42

## 2020-02-10 RX ADMIN — GABAPENTIN 1200 MILLIGRAM(S): 400 CAPSULE ORAL at 22:47

## 2020-02-10 RX ADMIN — Medication 1 TABLET(S): at 11:37

## 2020-02-10 RX ADMIN — OXYCODONE HYDROCHLORIDE 60 MILLIGRAM(S): 5 TABLET ORAL at 07:53

## 2020-02-10 RX ADMIN — Medication 500 MILLIGRAM(S): at 17:47

## 2020-02-10 RX ADMIN — HYDROMORPHONE HYDROCHLORIDE 8 MILLIGRAM(S): 2 INJECTION INTRAMUSCULAR; INTRAVENOUS; SUBCUTANEOUS at 11:35

## 2020-02-10 RX ADMIN — FENTANYL CITRATE 1 PATCH: 50 INJECTION INTRAVENOUS at 19:30

## 2020-02-10 RX ADMIN — OXYCODONE HYDROCHLORIDE 60 MILLIGRAM(S): 5 TABLET ORAL at 17:20

## 2020-02-10 RX ADMIN — Medication 10 MILLIGRAM(S): at 16:28

## 2020-02-10 RX ADMIN — Medication 1 APPLICATION(S): at 11:36

## 2020-02-10 RX ADMIN — NALOXEGOL OXALATE 25 MILLIGRAM(S): 12.5 TABLET, FILM COATED ORAL at 12:10

## 2020-02-10 RX ADMIN — Medication 1 TABLET(S): at 22:47

## 2020-02-10 RX ADMIN — HYDROMORPHONE HYDROCHLORIDE 8 MILLIGRAM(S): 2 INJECTION INTRAMUSCULAR; INTRAVENOUS; SUBCUTANEOUS at 03:43

## 2020-02-10 RX ADMIN — Medication 1 PATCH: at 12:08

## 2020-02-10 RX ADMIN — FENTANYL CITRATE 1 PATCH: 50 INJECTION INTRAVENOUS at 17:39

## 2020-02-10 RX ADMIN — OXYCODONE HYDROCHLORIDE 60 MILLIGRAM(S): 5 TABLET ORAL at 16:19

## 2020-02-10 RX ADMIN — GABAPENTIN 1200 MILLIGRAM(S): 400 CAPSULE ORAL at 14:40

## 2020-02-10 RX ADMIN — Medication 1 TABLET(S): at 14:41

## 2020-02-10 NOTE — PROGRESS NOTE ADULT - SUBJECTIVE AND OBJECTIVE BOX
Patient seen and examined. Pain controlled. No acute events overnight. No acute complaints.    Vital Signs Last 24 Hrs  T(C): 36.4 (10 Feb 2020 00:00), Max: 37.4 (09 Feb 2020 17:26)  T(F): 97.5 (10 Feb 2020 00:00), Max: 99.4 (09 Feb 2020 17:26)  HR: 74 (10 Feb 2020 00:00) (71 - 92)  BP: 101/61 (10 Feb 2020 00:00) (100/68 - 127/72)  BP(mean): --  RR: 16 (10 Feb 2020 00:00) (16 - 17)  SpO2: 96% (10 Feb 2020 00:00) (94% - 97%)    Gen: NAD  Spine/extremities:  Dressing intact. SILT C5-T1, L3-S1. Negative Babinski. Negative ankle clonus. Negative [place]. Capillary refill brisk.  Right UE: C5- 5/5  C6- 5/5  C7- 5/5  C8- 5/5  T1- 5/5  Left UE: C5- 5/5  C6- 5/5  C7- 5/5  C8- 5/5  T1- 5/5  Right LE: L2- 2/5  L3- 3/5  L4- 3/5  L5- 4/5  S1- 4/5  Left LE: L2- 5/5  L3- 5/5  L4- 5/5  L5- NA S1- 5/5      AP  44F s/p revision T4-L2 PSF with exploration and decompression POD9:  Continue Vancomycin per ID  RLE TTWB, LLE WBAT  PT/OT  Monitor drain output - anticipate d/c on day of discharge  Will DC drain if output decreases  Appreciate consulting recommendations  Please call for any change in, new, or worsening signs/symptoms  Right hip - nonsurgical management at this time.   Follow up with [facility] reconstructive joint surgery specialist as outpatient. Information to be provided in discharge paperwork  Discussed with Dr Leslie, who agrees with above

## 2020-02-10 NOTE — PROGRESS NOTE ADULT - SUBJECTIVE AND OBJECTIVE BOX
44y old  Female who presents with a chief complaint of Back pain.  Admitted for mgmt of infected spinal hardware.    Today:  Pt thinks her pain has improved since starting new regimen.            PAST MEDICAL & SURGICAL HISTORY:  History of pancreatitis  History of alcoholic hepatitis  ETOH abuse  Drug abuse  History of back surgery  C Section      REVIEW OF SYSTEMS:  Still complains of pain in back.      MEDICATIONS  (STANDING):  ascorbic acid 500 milliGRAM(s) Oral two times a day  BACItracin   Ointment 1 Application(s) Topical daily  calcium carbonate 1250 mG  + Vitamin D (OsCal 500 + D) 1 Tablet(s) Oral three times a day  fentaNYL   Patch  25 MICROgram(s)/Hr 1 Patch Transdermal every 72 hours  folic acid 1 milliGRAM(s) Oral daily  gabapentin 1200 milliGRAM(s) Oral three times a day  multivitamin 1 Tablet(s) Oral daily  naloxegol 25 milliGRAM(s) Oral daily  nicotine -  14 mG/24Hr(s) Patch 1 patch Transdermal daily  senna 2 Tablet(s) Oral at bedtime  vancomycin  IVPB      vancomycin  IVPB 1250 milliGRAM(s) IV Intermittent every 12 hours    MEDICATIONS  (PRN):  acetaminophen   Tablet .. 650 milliGRAM(s) Oral every 6 hours PRN Temp greater or equal to 38.5C (101.3F), Mild Pain (1 - 3)  bisacodyl Suppository 10 milliGRAM(s) Rectal daily PRN Constipation  cyclobenzaprine 10 milliGRAM(s) Oral every 8 hours PRN Muscle Spasm  HYDROmorphone   Tablet 8 milliGRAM(s) Oral every 8 hours PRN Severe Pain (7 - 10)  magnesium hydroxide Suspension 30 milliLiter(s) Oral every 12 hours PRN Constipation  ondansetron Injectable 4 milliGRAM(s) IV Push every 6 hours PRN Nausea  oxyCODONE  ER Tablet 60 milliGRAM(s) Oral every 8 hours PRN moderate pain  petrolatum white Ointment 1 Application(s) Topical four times a day PRN dry, cracked lips      Allergies  penicillin (Short breath; Hives)          Vital Signs Last 24 Hrs  T(C): 36.8 (10 Feb 2020 06:05), Max: 37.4 (09 Feb 2020 17:26)  T(F): 98.3 (10 Feb 2020 06:05), Max: 99.4 (09 Feb 2020 17:26)  HR: 82 (10 Feb 2020 06:05) (74 - 92)  BP: 103/62 (10 Feb 2020 06:05) (100/68 - 127/72)  RR: 16 (10 Feb 2020 06:05) (16 - 17)  SpO2: 98% (10 Feb 2020 06:05) (94% - 98%)    PHYSICAL EXAM:    GENERAL: NAD  Neuro:  Alert & responsive  HEENT: NCAT, EOMI, conjunctiva clear  CV: +S1+S2 regular rate and rhythm  Lung: clear to ausculation bilaterally, respirations nonlabored, good inspiratory effort  Abdomen: soft, Non Tender, No distention Normal active BS  Extremities: no cyanosis, clubbing or edema BL LE      LABS:                        9.6    7.15  )-----------( 295      ( 09 Feb 2020 10:57 )             31.3     02-09    141  |  106  |  25<H>  ----------------------------<  130<H>  4.6   |  28  |  0.85    Ca    9.3      09 Feb 2020 10:57  Phos  4.6     02-09  Mg     2.0     02-09    TPro  6.4  /  Alb  2.6<L>  /  TBili  0.3  /  DBili  x   /  AST  12<L>  /  ALT  14  /  AlkPhos  144<H>  02-09

## 2020-02-10 NOTE — PROGRESS NOTE ADULT - PROBLEM SELECTOR PLAN 1
Has osteomyelitis/discitis, seen by ortho, ID  s/p removal of hardware T6, T4-T10 PSF, exploration of Hardware, junctional kyphosis  Continue Vancomycin  pain control regimen discussed with anesthesia- Fentanyl patch increased to 25 mcg, Dilaudid changed from IV to PO (8mg TID), Oxycodone ER 60 TID.  Better pain control since starting new regimen  -Will start DC planning as she is now off IV analgesia

## 2020-02-10 NOTE — CHART NOTE - NSCHARTNOTEFT_GEN_A_CORE
Pt admitted c back pain; s/p revision fusion (infected hardware) 2/1.    Factors impacting intake: X] none [ ] nausea  [ ] vomiting [ ] diarrhea [ ] constipation  [ ]chewing problems [ ] swallowing issues  [ ] other:     Diet Prescription: Diet, Regular (02-08-20 @ 10:03)    Intake:   100% most meals    Current Weight:   90.8 (2/4); previous wt 90.6 (2/3)  % Weight Change: stable x 1 day but no wt since 2/4; requested new wt    Physical Appearance:  1+ generalized edema    Pertinent Medications: MEDICATIONS  (STANDING):  ascorbic acid 500 milliGRAM(s) Oral two times a day  BACItracin   Ointment 1 Application(s) Topical daily  calcium carbonate 1250 mG  + Vitamin D (OsCal 500 + D) 1 Tablet(s) Oral three times a day  fentaNYL   Patch  25 MICROgram(s)/Hr 1 Patch Transdermal every 72 hours  folic acid 1 milliGRAM(s) Oral daily  gabapentin 1200 milliGRAM(s) Oral three times a day  multivitamin 1 Tablet(s) Oral daily  naloxegol 25 milliGRAM(s) Oral daily  nicotine -  14 mG/24Hr(s) Patch 1 patch Transdermal daily  senna 2 Tablet(s) Oral at bedtime  vancomycin  IVPB      vancomycin  IVPB 1250 milliGRAM(s) IV Intermittent every 12 hours    MEDICATIONS  (PRN):  acetaminophen   Tablet .. 650 milliGRAM(s) Oral every 6 hours PRN Temp greater or equal to 38.5C (101.3F), Mild Pain (1 - 3)  bisacodyl Suppository 10 milliGRAM(s) Rectal daily PRN Constipation  cyclobenzaprine 10 milliGRAM(s) Oral every 8 hours PRN Muscle Spasm  HYDROmorphone   Tablet 8 milliGRAM(s) Oral every 8 hours PRN Severe Pain (7 - 10)  magnesium hydroxide Suspension 30 milliLiter(s) Oral every 12 hours PRN Constipation  ondansetron Injectable 4 milliGRAM(s) IV Push every 6 hours PRN Nausea  oxyCODONE  ER Tablet 60 milliGRAM(s) Oral every 8 hours PRN moderate pain  petrolatum white Ointment 1 Application(s) Topical four times a day PRN dry, cracked lips    Pertinent Labs:   02-09 Na141 mmol/L Glu 130 mg/dL<H> K+ 4.6 mmol/L Cr  0.85 mg/dL BUN 25 mg/dL<H> 02-09 Phos 4.6 mg/dL<H> 02-09 Alb 2.6 g/dL<L>    Skin: multiple stage I pressure ulcers noted as follows:  b/l thighs; b/l pannus/upper thighs, left cheek, forehead    Estimated Needs:   [X ] no change since previous assessment  (2/3)  [ ] recalculated:     Previous Nutrition Diagnosis:   NONE    Nutrition Diagnosis is [ ] ongoing  [ ] resolved [X ] not applicable     New Nutrition Diagnosis: [ X] not applicable    Interventions:   continue  current diet rx as noted  Recommend  [ ] Change Diet To:  [ ] Nutrition Supplement  [ ] Nutrition Support  [ ] Other:     Monitoring and Evaluation:   [X ] PO intake [ x ] Tolerance to diet prescription [ x ] weights [ x ] labs[ x ] follow up per protocol  [ ] other:

## 2020-02-10 NOTE — PROGRESS NOTE ADULT - SUBJECTIVE AND OBJECTIVE BOX
Patient is a 44y old  Female who presents with a chief complaint of Back pain. (11 Feb 2020 06:30)      INTERVAL HPI / OVERNIGHT EVENTS: doing ok     MEDICATIONS  (STANDING):  ascorbic acid 500 milliGRAM(s) Oral two times a day  BACItracin   Ointment 1 Application(s) Topical daily  calcium carbonate 1250 mG  + Vitamin D (OsCal 500 + D) 1 Tablet(s) Oral three times a day  fentaNYL   Patch  25 MICROgram(s)/Hr 1 Patch Transdermal every 72 hours  folic acid 1 milliGRAM(s) Oral daily  gabapentin 1200 milliGRAM(s) Oral three times a day  multivitamin 1 Tablet(s) Oral daily  naloxegol 25 milliGRAM(s) Oral daily  nicotine -  14 mG/24Hr(s) Patch 1 patch Transdermal daily  senna 2 Tablet(s) Oral at bedtime  vancomycin  IVPB      vancomycin  IVPB 1250 milliGRAM(s) IV Intermittent every 12 hours    MEDICATIONS  (PRN):  acetaminophen   Tablet .. 650 milliGRAM(s) Oral every 6 hours PRN Temp greater or equal to 38.5C (101.3F), Mild Pain (1 - 3)  bisacodyl Suppository 10 milliGRAM(s) Rectal daily PRN Constipation  cyclobenzaprine 10 milliGRAM(s) Oral every 8 hours PRN Muscle Spasm  HYDROmorphone   Tablet 8 milliGRAM(s) Oral every 8 hours PRN Severe Pain (7 - 10)  ibuprofen  Tablet. 800 milliGRAM(s) Oral every 6 hours PRN Temp greater or equal to 38C (100.4F), Mild Pain (1 - 3)  magnesium hydroxide Suspension 30 milliLiter(s) Oral every 12 hours PRN Constipation  ondansetron Injectable 4 milliGRAM(s) IV Push every 6 hours PRN Nausea  oxyCODONE  ER Tablet 60 milliGRAM(s) Oral every 8 hours PRN moderate pain  petrolatum white Ointment 1 Application(s) Topical four times a day PRN dry, cracked lips      Vital Signs Last 24 Hrs  Tmax :afebrile      Review of systems:  General : no fever /chills, fatigue  CVS : no chest pain, palpitations  Lungs : no shortness of breath, cough  GI : no abdominal pain, vomiting, diarrhea   : no dysuria, hematuria        PHYSICAL EXAM:  General :NAD  Constitutional:  well-groomed, well-developed  Respiratory: CTAB/L  Cardiovascular: S1 and S2, RRR, no M/G/R  Gastrointestinal: BS+, soft, NT/ND  Extremities: No peripheral edema  Vascular: 2+ peripheral pulses  Skin: drain from back surgery+      LABS:             wbc:  wnl             MICROBIOLOGY:  RECENT CULTURES:        RADIOLOGY & ADDITIONAL STUDIES:

## 2020-02-11 LAB
ANION GAP SERPL CALC-SCNC: 6 MMOL/L — SIGNIFICANT CHANGE UP (ref 5–17)
BASOPHILS # BLD AUTO: 0.05 K/UL — SIGNIFICANT CHANGE UP (ref 0–0.2)
BASOPHILS NFR BLD AUTO: 0.5 % — SIGNIFICANT CHANGE UP (ref 0–2)
BUN SERPL-MCNC: 29 MG/DL — HIGH (ref 7–23)
CALCIUM SERPL-MCNC: 9.2 MG/DL — SIGNIFICANT CHANGE UP (ref 8.5–10.1)
CHLORIDE SERPL-SCNC: 106 MMOL/L — SIGNIFICANT CHANGE UP (ref 96–108)
CO2 SERPL-SCNC: 30 MMOL/L — SIGNIFICANT CHANGE UP (ref 22–31)
CREAT SERPL-MCNC: 0.67 MG/DL — SIGNIFICANT CHANGE UP (ref 0.5–1.3)
EOSINOPHIL # BLD AUTO: 0.34 K/UL — SIGNIFICANT CHANGE UP (ref 0–0.5)
EOSINOPHIL NFR BLD AUTO: 3.4 % — SIGNIFICANT CHANGE UP (ref 0–6)
GLUCOSE SERPL-MCNC: 108 MG/DL — HIGH (ref 70–99)
HCT VFR BLD CALC: 31.8 % — LOW (ref 34.5–45)
HGB BLD-MCNC: 9.9 G/DL — LOW (ref 11.5–15.5)
IMM GRANULOCYTES NFR BLD AUTO: 1.4 % — SIGNIFICANT CHANGE UP (ref 0–1.5)
LYMPHOCYTES # BLD AUTO: 2.57 K/UL — SIGNIFICANT CHANGE UP (ref 1–3.3)
LYMPHOCYTES # BLD AUTO: 25.5 % — SIGNIFICANT CHANGE UP (ref 13–44)
MCHC RBC-ENTMCNC: 26.5 PG — LOW (ref 27–34)
MCHC RBC-ENTMCNC: 31.1 GM/DL — LOW (ref 32–36)
MCV RBC AUTO: 85.3 FL — SIGNIFICANT CHANGE UP (ref 80–100)
MONOCYTES # BLD AUTO: 0.73 K/UL — SIGNIFICANT CHANGE UP (ref 0–0.9)
MONOCYTES NFR BLD AUTO: 7.3 % — SIGNIFICANT CHANGE UP (ref 2–14)
NEUTROPHILS # BLD AUTO: 6.23 K/UL — SIGNIFICANT CHANGE UP (ref 1.8–7.4)
NEUTROPHILS NFR BLD AUTO: 61.9 % — SIGNIFICANT CHANGE UP (ref 43–77)
NRBC # BLD: 0 /100 WBCS — SIGNIFICANT CHANGE UP (ref 0–0)
PLATELET # BLD AUTO: 315 K/UL — SIGNIFICANT CHANGE UP (ref 150–400)
POTASSIUM SERPL-MCNC: 4.6 MMOL/L — SIGNIFICANT CHANGE UP (ref 3.5–5.3)
POTASSIUM SERPL-SCNC: 4.6 MMOL/L — SIGNIFICANT CHANGE UP (ref 3.5–5.3)
RBC # BLD: 3.73 M/UL — LOW (ref 3.8–5.2)
RBC # FLD: 17.7 % — HIGH (ref 10.3–14.5)
SODIUM SERPL-SCNC: 142 MMOL/L — SIGNIFICANT CHANGE UP (ref 135–145)
WBC # BLD: 10.06 K/UL — SIGNIFICANT CHANGE UP (ref 3.8–10.5)
WBC # FLD AUTO: 10.06 K/UL — SIGNIFICANT CHANGE UP (ref 3.8–10.5)

## 2020-02-11 PROCEDURE — 99233 SBSQ HOSP IP/OBS HIGH 50: CPT

## 2020-02-11 RX ORDER — HEPARIN SODIUM 5000 [USP'U]/ML
5000 INJECTION INTRAVENOUS; SUBCUTANEOUS EVERY 8 HOURS
Refills: 0 | Status: DISCONTINUED | OUTPATIENT
Start: 2020-02-11 | End: 2020-02-13

## 2020-02-11 RX ADMIN — HEPARIN SODIUM 5000 UNIT(S): 5000 INJECTION INTRAVENOUS; SUBCUTANEOUS at 22:56

## 2020-02-11 RX ADMIN — OXYCODONE HYDROCHLORIDE 60 MILLIGRAM(S): 5 TABLET ORAL at 00:41

## 2020-02-11 RX ADMIN — Medication 1 PATCH: at 12:27

## 2020-02-11 RX ADMIN — FENTANYL CITRATE 1 PATCH: 50 INJECTION INTRAVENOUS at 07:20

## 2020-02-11 RX ADMIN — Medication 1 TABLET(S): at 21:01

## 2020-02-11 RX ADMIN — HYDROMORPHONE HYDROCHLORIDE 8 MILLIGRAM(S): 2 INJECTION INTRAMUSCULAR; INTRAVENOUS; SUBCUTANEOUS at 21:29

## 2020-02-11 RX ADMIN — Medication 166.67 MILLIGRAM(S): at 00:11

## 2020-02-11 RX ADMIN — Medication 1 PATCH: at 12:00

## 2020-02-11 RX ADMIN — OXYCODONE HYDROCHLORIDE 60 MILLIGRAM(S): 5 TABLET ORAL at 00:11

## 2020-02-11 RX ADMIN — OXYCODONE HYDROCHLORIDE 60 MILLIGRAM(S): 5 TABLET ORAL at 08:12

## 2020-02-11 RX ADMIN — Medication 500 MILLIGRAM(S): at 17:24

## 2020-02-11 RX ADMIN — Medication 1 TABLET(S): at 13:40

## 2020-02-11 RX ADMIN — OXYCODONE HYDROCHLORIDE 60 MILLIGRAM(S): 5 TABLET ORAL at 09:10

## 2020-02-11 RX ADMIN — Medication 1 TABLET(S): at 12:28

## 2020-02-11 RX ADMIN — Medication 1 APPLICATION(S): at 12:26

## 2020-02-11 RX ADMIN — GABAPENTIN 1200 MILLIGRAM(S): 400 CAPSULE ORAL at 21:01

## 2020-02-11 RX ADMIN — HYDROMORPHONE HYDROCHLORIDE 8 MILLIGRAM(S): 2 INJECTION INTRAMUSCULAR; INTRAVENOUS; SUBCUTANEOUS at 04:55

## 2020-02-11 RX ADMIN — SENNA PLUS 2 TABLET(S): 8.6 TABLET ORAL at 21:01

## 2020-02-11 RX ADMIN — HYDROMORPHONE HYDROCHLORIDE 8 MILLIGRAM(S): 2 INJECTION INTRAMUSCULAR; INTRAVENOUS; SUBCUTANEOUS at 13:20

## 2020-02-11 RX ADMIN — NALOXEGOL OXALATE 25 MILLIGRAM(S): 12.5 TABLET, FILM COATED ORAL at 12:27

## 2020-02-11 RX ADMIN — Medication 1 MILLIGRAM(S): at 12:27

## 2020-02-11 RX ADMIN — Medication 1 PATCH: at 19:30

## 2020-02-11 RX ADMIN — Medication 500 MILLIGRAM(S): at 06:26

## 2020-02-11 RX ADMIN — GABAPENTIN 1200 MILLIGRAM(S): 400 CAPSULE ORAL at 13:40

## 2020-02-11 RX ADMIN — HYDROMORPHONE HYDROCHLORIDE 8 MILLIGRAM(S): 2 INJECTION INTRAMUSCULAR; INTRAVENOUS; SUBCUTANEOUS at 12:28

## 2020-02-11 RX ADMIN — HYDROMORPHONE HYDROCHLORIDE 8 MILLIGRAM(S): 2 INJECTION INTRAMUSCULAR; INTRAVENOUS; SUBCUTANEOUS at 04:25

## 2020-02-11 RX ADMIN — HYDROMORPHONE HYDROCHLORIDE 8 MILLIGRAM(S): 2 INJECTION INTRAMUSCULAR; INTRAVENOUS; SUBCUTANEOUS at 20:59

## 2020-02-11 RX ADMIN — Medication 1 TABLET(S): at 06:27

## 2020-02-11 RX ADMIN — OXYCODONE HYDROCHLORIDE 60 MILLIGRAM(S): 5 TABLET ORAL at 16:16

## 2020-02-11 RX ADMIN — Medication 1 PATCH: at 07:20

## 2020-02-11 RX ADMIN — GABAPENTIN 1200 MILLIGRAM(S): 400 CAPSULE ORAL at 06:26

## 2020-02-11 RX ADMIN — Medication 166.67 MILLIGRAM(S): at 22:56

## 2020-02-11 RX ADMIN — Medication 166.67 MILLIGRAM(S): at 12:28

## 2020-02-11 RX ADMIN — FENTANYL CITRATE 1 PATCH: 50 INJECTION INTRAVENOUS at 19:30

## 2020-02-11 RX ADMIN — OXYCODONE HYDROCHLORIDE 60 MILLIGRAM(S): 5 TABLET ORAL at 17:16

## 2020-02-11 NOTE — PROGRESS NOTE ADULT - SUBJECTIVE AND OBJECTIVE BOX
Patient seen and examined. Pain controlled well controlled. No acute events overnight. No acute complaints at this time, denies cp/sob. denies numbness/tingling. no fevers/chills.      Gen: NAD  Spine/extremities:  dressing intact  KIRILL intact   movings all extremities  SILT      AP  44F s/p revision T4-L2 PSF with exploration and decompression POD 10  Continue Vancomycin per ID  RLE TTWB, LLE WBAT  PT/OT  Monitor drain output - anticipate d/c on day of discharge  Will DC drain if output decreases  Appreciate consulting recommendations  Please call for any change in, new, or worsening signs/symptoms  Right hip - nonsurgical management at this time.   Follow up with [facility] reconstructive joint surgery specialist as outpatient. Information to be provided in discharge paperwork  ortho stable for DC Patient seen and examined. Pain controlled well controlled. No acute events overnight. No acute complaints at this time, denies cp/sob. denies numbness/tingling. no fevers/chills.      Gen: NAD  Spine/extremities:  dressing intact  KIRILL intact   movings all extremities  SILT      AP  44F s/p revision T4-L2 PSF with exploration and decompression POD 10  Continue Vancomycin per ID  RLE TTWB, LLE WBAT  PT/OT  SCDS, encourage ambulation/OOB  incentive spirometry   Monitor drain output - anticipate d/c on day of discharge  Will DC drain if output decreases  Appreciate consulting recommendations  Please call for any change in, new, or worsening signs/symptoms  Right hip - nonsurgical management at this time.   Follow up with [facility] reconstructive joint surgery specialist as outpatient. Information to be provided in discharge paperwork  ortho stable for DC Patient seen and examined. Pain controlled well controlled. No acute events overnight. No acute complaints at this time, denies cp/sob. denies numbness/tingling. no fevers/chills.      Gen: NAD  Spine/extremities:  uncooperative with exam this morning  dressing intact  KIRILL intact   movings all extremities  SILT      AP  44F s/p revision T4-L2 PSF with exploration and decompression POD 10  Continue Vancomycin per ID  RLE TTWB, LLE WBAT  PT/OT  SCDS, encourage ambulation/OOB  incentive spirometry   Monitor drain output - anticipate d/c on day of discharge  Will DC drain if output decreases  Appreciate consulting recommendations  Please call for any change in, new, or worsening signs/symptoms  Right hip - nonsurgical management at this time.   Follow up with [facility] reconstructive joint surgery specialist as outpatient. Information to be provided in discharge paperwork  ortho stable for DC

## 2020-02-11 NOTE — PROGRESS NOTE ADULT - SUBJECTIVE AND OBJECTIVE BOX
44y old  Female who presents with a chief complaint of Back pain.  Admitted for mgmt of infected spinal hardware.    Today:  Pt thinks her pain has improved since starting new regimen.          PAST MEDICAL & SURGICAL HISTORY:  History of pancreatitis  History of alcoholic hepatitis  ETOH abuse  Drug abuse  History of back surgery  C Section      REVIEW OF SYSTEMS:  + pain      MEDICATIONS  (STANDING):  ascorbic acid 500 milliGRAM(s) Oral two times a day  calcium carbonate 1250 mG  + Vitamin D (OsCal 500 + D) 1 Tablet(s) Oral three times a day  fentaNYL   Patch  25 MICROgram(s)/Hr 1 Patch Transdermal every 72 hours  folic acid 1 milliGRAM(s) Oral daily  gabapentin 1200 milliGRAM(s) Oral three times a day  multivitamin 1 Tablet(s) Oral daily  naloxegol 25 milliGRAM(s) Oral daily  nicotine -  14 mG/24Hr(s) Patch 1 patch Transdermal daily  senna 2 Tablet(s) Oral at bedtime  vancomycin  IVPB      vancomycin  IVPB 1250 milliGRAM(s) IV Intermittent every 12 hours    MEDICATIONS  (PRN):  acetaminophen   Tablet .. 650 milliGRAM(s) Oral every 6 hours PRN Temp greater or equal to 38.5C (101.3F), Mild Pain (1 - 3)  bisacodyl Suppository 10 milliGRAM(s) Rectal daily PRN Constipation  cyclobenzaprine 10 milliGRAM(s) Oral every 8 hours PRN Muscle Spasm  HYDROmorphone   Tablet 8 milliGRAM(s) Oral every 8 hours PRN Severe Pain (7 - 10)  ibuprofen  Tablet. 800 milliGRAM(s) Oral every 6 hours PRN Temp greater or equal to 38C (100.4F), Mild Pain (1 - 3)  magnesium hydroxide Suspension 30 milliLiter(s) Oral every 12 hours PRN Constipation  ondansetron Injectable 4 milliGRAM(s) IV Push every 6 hours PRN Nausea  oxyCODONE  ER Tablet 60 milliGRAM(s) Oral every 8 hours PRN moderate pain  petrolatum white Ointment 1 Application(s) Topical four times a day PRN dry, cracked lips      Allergies  penicillin (Short breath; Hives)        FAMILY HISTORY:  Family history unknown      Vital Signs Last 24 Hrs  T(C): 37.1 (11 Feb 2020 11:43), Max: 37.1 (11 Feb 2020 11:43)  T(F): 98.8 (11 Feb 2020 11:43), Max: 98.8 (11 Feb 2020 11:43)  HR: 88 (11 Feb 2020 11:43) (79 - 88)  BP: 100/62 (11 Feb 2020 11:43) (100/62 - 129/83)  RR: 17 (11 Feb 2020 11:43) (16 - 18)  SpO2: 96% (11 Feb 2020 11:43) (96% - 97%)    PHYSICAL EXAM:    GENERAL: NAD  Neuro:  Alert & responsive  HEENT: NCAT, EOMI, conjunctiva clear  CV: +S1+S2 regular rate and rhythm  Lung: clear to ausculation bilaterally, respirations nonlabored, good inspiratory effort  Abdomen: soft, Non Tender, No distention Normal active BS  Extremities: no cyanosis, clubbing or edema BL LE      LABS:                        9.9    10.06 )-----------( 315      ( 11 Feb 2020 07:19 )             31.8     02-11    142  |  106  |  29<H>  ----------------------------<  108<H>  4.6   |  30  |  0.67    Ca    9.2      11 Feb 2020 07:19

## 2020-02-12 LAB
ANION GAP SERPL CALC-SCNC: 6 MMOL/L — SIGNIFICANT CHANGE UP (ref 5–17)
BASOPHILS # BLD AUTO: 0.04 K/UL — SIGNIFICANT CHANGE UP (ref 0–0.2)
BASOPHILS NFR BLD AUTO: 0.5 % — SIGNIFICANT CHANGE UP (ref 0–2)
BUN SERPL-MCNC: 23 MG/DL — SIGNIFICANT CHANGE UP (ref 7–23)
CALCIUM SERPL-MCNC: 9.4 MG/DL — SIGNIFICANT CHANGE UP (ref 8.5–10.1)
CHLORIDE SERPL-SCNC: 104 MMOL/L — SIGNIFICANT CHANGE UP (ref 96–108)
CO2 SERPL-SCNC: 29 MMOL/L — SIGNIFICANT CHANGE UP (ref 22–31)
CREAT SERPL-MCNC: 0.64 MG/DL — SIGNIFICANT CHANGE UP (ref 0.5–1.3)
EOSINOPHIL # BLD AUTO: 0.39 K/UL — SIGNIFICANT CHANGE UP (ref 0–0.5)
EOSINOPHIL NFR BLD AUTO: 4.6 % — SIGNIFICANT CHANGE UP (ref 0–6)
GLUCOSE SERPL-MCNC: 109 MG/DL — HIGH (ref 70–99)
HCT VFR BLD CALC: 32.9 % — LOW (ref 34.5–45)
HGB BLD-MCNC: 10.1 G/DL — LOW (ref 11.5–15.5)
IMM GRANULOCYTES NFR BLD AUTO: 1.4 % — SIGNIFICANT CHANGE UP (ref 0–1.5)
LYMPHOCYTES # BLD AUTO: 2.72 K/UL — SIGNIFICANT CHANGE UP (ref 1–3.3)
LYMPHOCYTES # BLD AUTO: 32 % — SIGNIFICANT CHANGE UP (ref 13–44)
MCHC RBC-ENTMCNC: 26 PG — LOW (ref 27–34)
MCHC RBC-ENTMCNC: 30.7 GM/DL — LOW (ref 32–36)
MCV RBC AUTO: 84.6 FL — SIGNIFICANT CHANGE UP (ref 80–100)
MONOCYTES # BLD AUTO: 0.66 K/UL — SIGNIFICANT CHANGE UP (ref 0–0.9)
MONOCYTES NFR BLD AUTO: 7.8 % — SIGNIFICANT CHANGE UP (ref 2–14)
NEUTROPHILS # BLD AUTO: 4.56 K/UL — SIGNIFICANT CHANGE UP (ref 1.8–7.4)
NEUTROPHILS NFR BLD AUTO: 53.7 % — SIGNIFICANT CHANGE UP (ref 43–77)
NRBC # BLD: 0 /100 WBCS — SIGNIFICANT CHANGE UP (ref 0–0)
PLATELET # BLD AUTO: 317 K/UL — SIGNIFICANT CHANGE UP (ref 150–400)
POTASSIUM SERPL-MCNC: 4.3 MMOL/L — SIGNIFICANT CHANGE UP (ref 3.5–5.3)
POTASSIUM SERPL-SCNC: 4.3 MMOL/L — SIGNIFICANT CHANGE UP (ref 3.5–5.3)
RBC # BLD: 3.89 M/UL — SIGNIFICANT CHANGE UP (ref 3.8–5.2)
RBC # FLD: 17.9 % — HIGH (ref 10.3–14.5)
SODIUM SERPL-SCNC: 139 MMOL/L — SIGNIFICANT CHANGE UP (ref 135–145)
VANCOMYCIN TROUGH SERPL-MCNC: 17.8 UG/ML — SIGNIFICANT CHANGE UP (ref 10–20)
WBC # BLD: 8.49 K/UL — SIGNIFICANT CHANGE UP (ref 3.8–10.5)
WBC # FLD AUTO: 8.49 K/UL — SIGNIFICANT CHANGE UP (ref 3.8–10.5)

## 2020-02-12 PROCEDURE — 99233 SBSQ HOSP IP/OBS HIGH 50: CPT

## 2020-02-12 PROCEDURE — 99231 SBSQ HOSP IP/OBS SF/LOW 25: CPT

## 2020-02-12 RX ADMIN — Medication 166.67 MILLIGRAM(S): at 11:30

## 2020-02-12 RX ADMIN — HYDROMORPHONE HYDROCHLORIDE 8 MILLIGRAM(S): 2 INJECTION INTRAMUSCULAR; INTRAVENOUS; SUBCUTANEOUS at 06:00

## 2020-02-12 RX ADMIN — GABAPENTIN 1200 MILLIGRAM(S): 400 CAPSULE ORAL at 21:57

## 2020-02-12 RX ADMIN — Medication 800 MILLIGRAM(S): at 11:27

## 2020-02-12 RX ADMIN — HYDROMORPHONE HYDROCHLORIDE 8 MILLIGRAM(S): 2 INJECTION INTRAMUSCULAR; INTRAVENOUS; SUBCUTANEOUS at 22:27

## 2020-02-12 RX ADMIN — Medication 500 MILLIGRAM(S): at 17:08

## 2020-02-12 RX ADMIN — OXYCODONE HYDROCHLORIDE 60 MILLIGRAM(S): 5 TABLET ORAL at 09:40

## 2020-02-12 RX ADMIN — Medication 500 MILLIGRAM(S): at 05:31

## 2020-02-12 RX ADMIN — Medication 1 MILLIGRAM(S): at 13:42

## 2020-02-12 RX ADMIN — Medication 1 TABLET(S): at 05:31

## 2020-02-12 RX ADMIN — Medication 800 MILLIGRAM(S): at 12:20

## 2020-02-12 RX ADMIN — CYCLOBENZAPRINE HYDROCHLORIDE 10 MILLIGRAM(S): 10 TABLET, FILM COATED ORAL at 11:25

## 2020-02-12 RX ADMIN — OXYCODONE HYDROCHLORIDE 60 MILLIGRAM(S): 5 TABLET ORAL at 00:32

## 2020-02-12 RX ADMIN — NALOXEGOL OXALATE 25 MILLIGRAM(S): 12.5 TABLET, FILM COATED ORAL at 11:28

## 2020-02-12 RX ADMIN — Medication 1 TABLET(S): at 11:28

## 2020-02-12 RX ADMIN — HEPARIN SODIUM 5000 UNIT(S): 5000 INJECTION INTRAVENOUS; SUBCUTANEOUS at 21:58

## 2020-02-12 RX ADMIN — HEPARIN SODIUM 5000 UNIT(S): 5000 INJECTION INTRAVENOUS; SUBCUTANEOUS at 05:31

## 2020-02-12 RX ADMIN — Medication 1 PATCH: at 19:30

## 2020-02-12 RX ADMIN — OXYCODONE HYDROCHLORIDE 60 MILLIGRAM(S): 5 TABLET ORAL at 01:02

## 2020-02-12 RX ADMIN — Medication 1 TABLET(S): at 13:42

## 2020-02-12 RX ADMIN — Medication 1 TABLET(S): at 21:57

## 2020-02-12 RX ADMIN — FENTANYL CITRATE 1 PATCH: 50 INJECTION INTRAVENOUS at 19:30

## 2020-02-12 RX ADMIN — OXYCODONE HYDROCHLORIDE 60 MILLIGRAM(S): 5 TABLET ORAL at 08:43

## 2020-02-12 RX ADMIN — HYDROMORPHONE HYDROCHLORIDE 8 MILLIGRAM(S): 2 INJECTION INTRAMUSCULAR; INTRAVENOUS; SUBCUTANEOUS at 21:57

## 2020-02-12 RX ADMIN — Medication 1 PATCH: at 11:27

## 2020-02-12 RX ADMIN — SENNA PLUS 2 TABLET(S): 8.6 TABLET ORAL at 21:57

## 2020-02-12 RX ADMIN — FENTANYL CITRATE 1 PATCH: 50 INJECTION INTRAVENOUS at 07:45

## 2020-02-12 RX ADMIN — Medication 1 PATCH: at 12:45

## 2020-02-12 RX ADMIN — HYDROMORPHONE HYDROCHLORIDE 8 MILLIGRAM(S): 2 INJECTION INTRAMUSCULAR; INTRAVENOUS; SUBCUTANEOUS at 14:30

## 2020-02-12 RX ADMIN — HEPARIN SODIUM 5000 UNIT(S): 5000 INJECTION INTRAVENOUS; SUBCUTANEOUS at 13:41

## 2020-02-12 RX ADMIN — Medication 1 PATCH: at 07:45

## 2020-02-12 RX ADMIN — Medication 166.67 MILLIGRAM(S): at 23:05

## 2020-02-12 RX ADMIN — GABAPENTIN 1200 MILLIGRAM(S): 400 CAPSULE ORAL at 13:41

## 2020-02-12 RX ADMIN — GABAPENTIN 1200 MILLIGRAM(S): 400 CAPSULE ORAL at 05:31

## 2020-02-12 RX ADMIN — HYDROMORPHONE HYDROCHLORIDE 8 MILLIGRAM(S): 2 INJECTION INTRAMUSCULAR; INTRAVENOUS; SUBCUTANEOUS at 05:30

## 2020-02-12 RX ADMIN — HYDROMORPHONE HYDROCHLORIDE 8 MILLIGRAM(S): 2 INJECTION INTRAMUSCULAR; INTRAVENOUS; SUBCUTANEOUS at 13:40

## 2020-02-12 NOTE — PROGRESS NOTE ADULT - SUBJECTIVE AND OBJECTIVE BOX
Patient is a 44y old  Female who presents with a chief complaint of Back pain. (12 Feb 2020 06:39), has no SOB       OVERNIGHT EVENTS: none    MEDICATIONS  (STANDING):  ascorbic acid 500 milliGRAM(s) Oral two times a day  calcium carbonate 1250 mG  + Vitamin D (OsCal 500 + D) 1 Tablet(s) Oral three times a day  fentaNYL   Patch  25 MICROgram(s)/Hr 1 Patch Transdermal every 72 hours  folic acid 1 milliGRAM(s) Oral daily  gabapentin 1200 milliGRAM(s) Oral three times a day  heparin  Injectable 5000 Unit(s) SubCutaneous every 8 hours  multivitamin 1 Tablet(s) Oral daily  naloxegol 25 milliGRAM(s) Oral daily  nicotine -  14 mG/24Hr(s) Patch 1 patch Transdermal daily  senna 2 Tablet(s) Oral at bedtime  vancomycin  IVPB      vancomycin  IVPB 1250 milliGRAM(s) IV Intermittent every 12 hours    MEDICATIONS  (PRN):  acetaminophen   Tablet .. 650 milliGRAM(s) Oral every 6 hours PRN Temp greater or equal to 38.5C (101.3F), Mild Pain (1 - 3)  bisacodyl Suppository 10 milliGRAM(s) Rectal daily PRN Constipation  cyclobenzaprine 10 milliGRAM(s) Oral every 8 hours PRN Muscle Spasm  HYDROmorphone   Tablet 8 milliGRAM(s) Oral every 8 hours PRN Severe Pain (7 - 10)  ibuprofen  Tablet. 800 milliGRAM(s) Oral every 6 hours PRN Temp greater or equal to 38C (100.4F), Mild Pain (1 - 3)  magnesium hydroxide Suspension 30 milliLiter(s) Oral every 12 hours PRN Constipation  ondansetron Injectable 4 milliGRAM(s) IV Push every 6 hours PRN Nausea  oxyCODONE  ER Tablet 60 milliGRAM(s) Oral every 8 hours PRN moderate pain  petrolatum white Ointment 1 Application(s) Topical four times a day PRN dry, cracked lips        REVIEW OF SYSTEMS: reviewed and negative     Vital Signs Last 24 Hrs  T(C): 36.8 (12 Feb 2020 11:44), Max: 37.1 (11 Feb 2020 17:34)  T(F): 98.3 (12 Feb 2020 11:44), Max: 98.8 (11 Feb 2020 17:34)  HR: 93 (12 Feb 2020 11:44) (83 - 94)  BP: 114/76 (12 Feb 2020 11:44) (101/59 - 114/76)  BP(mean): --  RR: 18 (12 Feb 2020 11:44) (17 - 19)  SpO2: 96% (12 Feb 2020 11:44) (95% - 98%)    PHYSICAL EXAM:  GENERAL: NAD, well-groomed, well-developed  HEAD:  Atraumatic, Normocephalic  EYES:  conjunctiva and sclera clear  ENMT:   Moist mucous membranes   NECK: Supple, No JVD   NERVOUS SYSTEM:  Alert & Oriented X3, Good concentration; Motor Strength 5/5 B/L upper and lower extremities;    CHEST/LUNG: Clear to auscultation  bilaterally; No rales, rhonchi, wheezing, or rubs  HEART: Regular rate and rhythm; No murmurs, rubs, or gallops  ABDOMEN: Soft, Nontender, Nondistended; Bowel sounds present  EXTREMITIES:  2+ Peripheral Pulses, No clubbing, cyanosis, or edema.  MIDLINE left UP ext.  LYMPH: No lymphadenopathy noted  SKIN: back dressing is clean and dry, drain was removed.    LABS:                        10.1   8.49  )-----------( 317      ( 12 Feb 2020 08:24 )             32.9     02-12    139  |  104  |  23  ----------------------------<  109<H>  4.3   |  29  |  0.64    Ca    9.4      12 Feb 2020 08:24         cardiac markers     CAPILLARY BLOOD GLUCOSE        Cultures    RADIOLOGY & ADDITIONAL TESTS:    Imaging Personally Reviewed:  [ ] YES  [ ] NO    Consultant(s) Notes Reviewed:  [ x] YES  [ ] NO    Care Discussed with Consultants/Other Providers [ ] YES  [ ] NO

## 2020-02-12 NOTE — PROGRESS NOTE ADULT - SUBJECTIVE AND OBJECTIVE BOX
Patient seen and examined at bedside, resting comfortably. Denies headache/dizziness/lightheadedness, chest pain, dyspnea, n/v. Reports intermittent numbness/tingling in the hands but patient is unsure if this was present prior to most recent surgery. Pain well controlled. No other complaints at this time.        Gen: NAD, AAOx3  Spine/extremities:  Dressing removed, surgical site c/d/i, new dressing placed  KIRILL Drain with small amount of serosanguineous fluid, drain removed   Moving all extremities  L2-S1 SILT

## 2020-02-12 NOTE — PROGRESS NOTE ADULT - SUBJECTIVE AND OBJECTIVE BOX
Patient is a 44y old  Female who presents with a chief complaint of Back pain. (12 Feb 2020 12:03)      INTERVAL HPI / OVERNIGHT EVENTS: doing ok     MEDICATIONS  (STANDING):  ascorbic acid 500 milliGRAM(s) Oral two times a day  calcium carbonate 1250 mG  + Vitamin D (OsCal 500 + D) 1 Tablet(s) Oral three times a day  fentaNYL   Patch  25 MICROgram(s)/Hr 1 Patch Transdermal every 72 hours  folic acid 1 milliGRAM(s) Oral daily  gabapentin 1200 milliGRAM(s) Oral three times a day  heparin  Injectable 5000 Unit(s) SubCutaneous every 8 hours  multivitamin 1 Tablet(s) Oral daily  naloxegol 25 milliGRAM(s) Oral daily  nicotine -  14 mG/24Hr(s) Patch 1 patch Transdermal daily  senna 2 Tablet(s) Oral at bedtime  vancomycin  IVPB      vancomycin  IVPB 1250 milliGRAM(s) IV Intermittent every 12 hours    MEDICATIONS  (PRN):  acetaminophen   Tablet .. 650 milliGRAM(s) Oral every 6 hours PRN Temp greater or equal to 38.5C (101.3F), Mild Pain (1 - 3)  bisacodyl Suppository 10 milliGRAM(s) Rectal daily PRN Constipation  cyclobenzaprine 10 milliGRAM(s) Oral every 8 hours PRN Muscle Spasm  HYDROmorphone   Tablet 8 milliGRAM(s) Oral every 8 hours PRN Severe Pain (7 - 10)  ibuprofen  Tablet. 800 milliGRAM(s) Oral every 6 hours PRN Temp greater or equal to 38C (100.4F), Mild Pain (1 - 3)  magnesium hydroxide Suspension 30 milliLiter(s) Oral every 12 hours PRN Constipation  ondansetron Injectable 4 milliGRAM(s) IV Push every 6 hours PRN Nausea  oxyCODONE  ER Tablet 60 milliGRAM(s) Oral every 8 hours PRN moderate pain  petrolatum white Ointment 1 Application(s) Topical four times a day PRN dry, cracked lips      Vital Signs Last 24 Hrs  T(C): 36.8 (12 Feb 2020 11:44), Max: 37.1 (11 Feb 2020 17:34)  T(F): 98.3 (12 Feb 2020 11:44), Max: 98.8 (11 Feb 2020 17:34)  HR: 90 (12 Feb 2020 13:50) (83 - 93)  BP: 118/76 (12 Feb 2020 13:50) (101/59 - 118/76)  BP(mean): --  RR: 18 (12 Feb 2020 11:44) (17 - 18)  SpO2: 97% (12 Feb 2020 13:50) (96% - 98%)    Review of systems:  General : no fever /chills,fatigue  CVS : no chest pain, palpitations  Lungs : no shortness of breath, cough  GI : no abdominal pain,vomiting, diarrhea   : no dysuria,hematuria        PHYSICAL EXAM:  General :NAD  Constitutional:  well-groomed, well-developed  Respiratory: CTAB/L  Cardiovascular: S1 and S2, RRR, no M/G/R  Gastrointestinal: BS+, soft, NT/ND  Extremities: No peripheral edema  Vascular: 2+ peripheral pulses  Skin: No rashes      LABS:                        10.1   8.49  )-----------( 317      ( 12 Feb 2020 08:24 )             32.9     02-12    139  |  104  |  23  ----------------------------<  109<H>  4.3   |  29  |  0.64    Ca    9.4      12 Feb 2020 08:24            MICROBIOLOGY:  RECENT CULTURES:        RADIOLOGY & ADDITIONAL STUDIES:

## 2020-02-13 ENCOUNTER — TRANSCRIPTION ENCOUNTER (OUTPATIENT)
Age: 45
End: 2020-02-13

## 2020-02-13 VITALS
DIASTOLIC BLOOD PRESSURE: 69 MMHG | RESPIRATION RATE: 18 BRPM | TEMPERATURE: 98 F | HEART RATE: 98 BPM | SYSTOLIC BLOOD PRESSURE: 103 MMHG | OXYGEN SATURATION: 97 %

## 2020-02-13 PROCEDURE — 99239 HOSP IP/OBS DSCHRG MGMT >30: CPT

## 2020-02-13 PROCEDURE — 36569 INSJ PICC 5 YR+ W/O IMAGING: CPT

## 2020-02-13 RX ORDER — IBUPROFEN 200 MG
1 TABLET ORAL
Qty: 0 | Refills: 0 | DISCHARGE
Start: 2020-02-13

## 2020-02-13 RX ORDER — NALOXEGOL OXALATE 12.5 MG/1
1 TABLET, FILM COATED ORAL
Qty: 0 | Refills: 0 | DISCHARGE
Start: 2020-02-13

## 2020-02-13 RX ORDER — ACETAMINOPHEN 500 MG
2 TABLET ORAL
Qty: 0 | Refills: 0 | DISCHARGE
Start: 2020-02-13

## 2020-02-13 RX ORDER — CHLORHEXIDINE GLUCONATE 213 G/1000ML
1 SOLUTION TOPICAL
Refills: 0 | Status: DISCONTINUED | OUTPATIENT
Start: 2020-02-13 | End: 2020-02-13

## 2020-02-13 RX ORDER — MAGNESIUM HYDROXIDE 400 MG/1
30 TABLET, CHEWABLE ORAL
Qty: 300 | Refills: 0
Start: 2020-02-13 | End: 2020-03-13

## 2020-02-13 RX ORDER — VANCOMYCIN HCL 1 G
1 VIAL (EA) INTRAVENOUS
Qty: 0 | Refills: 0 | DISCHARGE
Start: 2020-02-13 | End: 2020-03-11

## 2020-02-13 RX ORDER — FENTANYL CITRATE 50 UG/ML
1 INJECTION INTRAVENOUS
Qty: 10 | Refills: 0
Start: 2020-02-13 | End: 2020-03-13

## 2020-02-13 RX ORDER — PETROLATUM,WHITE
1 JELLY (GRAM) TOPICAL
Qty: 0 | Refills: 0 | DISCHARGE
Start: 2020-02-13

## 2020-02-13 RX ORDER — HYDROMORPHONE HYDROCHLORIDE 2 MG/ML
1 INJECTION INTRAMUSCULAR; INTRAVENOUS; SUBCUTANEOUS
Qty: 30 | Refills: 0
Start: 2020-02-13 | End: 2020-02-22

## 2020-02-13 RX ORDER — CYCLOBENZAPRINE HYDROCHLORIDE 10 MG/1
1 TABLET, FILM COATED ORAL
Qty: 30 | Refills: 0
Start: 2020-02-13 | End: 2020-02-22

## 2020-02-13 RX ORDER — GABAPENTIN 400 MG/1
3 CAPSULE ORAL
Qty: 0 | Refills: 0 | DISCHARGE
Start: 2020-02-13

## 2020-02-13 RX ORDER — OXYCODONE HYDROCHLORIDE 5 MG/1
1 TABLET ORAL
Qty: 21 | Refills: 0
Start: 2020-02-13 | End: 2020-02-19

## 2020-02-13 RX ADMIN — Medication 1 TABLET(S): at 14:14

## 2020-02-13 RX ADMIN — NALOXEGOL OXALATE 25 MILLIGRAM(S): 12.5 TABLET, FILM COATED ORAL at 12:16

## 2020-02-13 RX ADMIN — Medication 1 TABLET(S): at 12:16

## 2020-02-13 RX ADMIN — Medication 800 MILLIGRAM(S): at 03:10

## 2020-02-13 RX ADMIN — GABAPENTIN 1200 MILLIGRAM(S): 400 CAPSULE ORAL at 06:12

## 2020-02-13 RX ADMIN — FENTANYL CITRATE 1 PATCH: 50 INJECTION INTRAVENOUS at 16:57

## 2020-02-13 RX ADMIN — Medication 800 MILLIGRAM(S): at 14:19

## 2020-02-13 RX ADMIN — OXYCODONE HYDROCHLORIDE 60 MILLIGRAM(S): 5 TABLET ORAL at 01:58

## 2020-02-13 RX ADMIN — HYDROMORPHONE HYDROCHLORIDE 8 MILLIGRAM(S): 2 INJECTION INTRAMUSCULAR; INTRAVENOUS; SUBCUTANEOUS at 10:01

## 2020-02-13 RX ADMIN — HEPARIN SODIUM 5000 UNIT(S): 5000 INJECTION INTRAVENOUS; SUBCUTANEOUS at 14:14

## 2020-02-13 RX ADMIN — Medication 1 PATCH: at 12:16

## 2020-02-13 RX ADMIN — Medication 1 PATCH: at 12:17

## 2020-02-13 RX ADMIN — Medication 166.67 MILLIGRAM(S): at 12:15

## 2020-02-13 RX ADMIN — FENTANYL CITRATE 1 PATCH: 50 INJECTION INTRAVENOUS at 07:05

## 2020-02-13 RX ADMIN — Medication 800 MILLIGRAM(S): at 15:00

## 2020-02-13 RX ADMIN — GABAPENTIN 1200 MILLIGRAM(S): 400 CAPSULE ORAL at 14:15

## 2020-02-13 RX ADMIN — Medication 500 MILLIGRAM(S): at 06:12

## 2020-02-13 RX ADMIN — Medication 1 TABLET(S): at 06:13

## 2020-02-13 RX ADMIN — HYDROMORPHONE HYDROCHLORIDE 8 MILLIGRAM(S): 2 INJECTION INTRAMUSCULAR; INTRAVENOUS; SUBCUTANEOUS at 10:40

## 2020-02-13 RX ADMIN — CYCLOBENZAPRINE HYDROCHLORIDE 10 MILLIGRAM(S): 10 TABLET, FILM COATED ORAL at 03:11

## 2020-02-13 RX ADMIN — OXYCODONE HYDROCHLORIDE 60 MILLIGRAM(S): 5 TABLET ORAL at 12:19

## 2020-02-13 RX ADMIN — Medication 800 MILLIGRAM(S): at 03:40

## 2020-02-13 RX ADMIN — HEPARIN SODIUM 5000 UNIT(S): 5000 INJECTION INTRAVENOUS; SUBCUTANEOUS at 06:13

## 2020-02-13 RX ADMIN — OXYCODONE HYDROCHLORIDE 60 MILLIGRAM(S): 5 TABLET ORAL at 13:00

## 2020-02-13 RX ADMIN — Medication 1 MILLIGRAM(S): at 12:16

## 2020-02-13 RX ADMIN — OXYCODONE HYDROCHLORIDE 60 MILLIGRAM(S): 5 TABLET ORAL at 01:28

## 2020-02-13 NOTE — PROGRESS NOTE ADULT - ASSESSMENT
43 F complicated history, multiple hospitalizations (>100 days), long h/o IVDU, EtOH abuse, chronic pain, presented with sepsis, respiratory failure, intubated, found to have MRSA bacteremia, OM, vertebral abscess, multi-level discitis, s/p spinal surgery.     GI consulted for acute anemia. Hg ~ 10 and dropped to 8.9. Repeat stable. Of note recent surgery for osteomyelitis (posterior spinal fusion) s/p loss of 950 ml blood. This is most likely etiology of anemia. No signs/symptoms GI blood loss. Will continue to monitor closely.     HCV Ab +, RNA negative. This indicates either previous treatment or clearance of the virus. No further work-up/treatment indicated.
43 F complicated history, multiple hospitalizations (>100 days), long h/o IVDU, EtOH abuse, chronic pain, presented with sepsis, respiratory failure, intubated, found to have MRSA bacteremia, OM, vertebral abscess, multi-level discitis, s/p spinal surgery.     GI consulted for acute anemia. Hg ~ 10 and dropped to 8.9. Repeat stable. Of note recent surgery for osteomyelitis (posterior spinal fusion) s/p loss of 950 ml blood. This is most likely etiology of anemia. No signs/symptoms GI blood loss. Will continue to monitor closely. HCV Ab +, RNA pending; she will need close outpatient f/u for consideration of treatment.
43 y/o Female ,IV drug abuser, on methadone and still using IV drugs at last admission , chronic pain,ETOH abuse  was admitted from 2/17/19 to 5/28/19  with a, complicated hospitalization course long which caused sepsis and acute respiratory failure at presentation sec to MRSA bacteremia with septic emboli and  eventually lead to  OM, vertebral abscess, multi-level from T8 to L2  discitis, s/p spinal surgery T8 to L2 PSF sec to persistent bacteremia for initial 12 days of the stay. She had LIZ which showed IE of TV as well on admission. During the hospitalization she was initally on vanco which had to be switched dapto as pt continued to have progression of diskitis /OM of spine >She was eventually discharged on PO zyvox and then plan was to changed to PO doxy for life. Since then pt was not complaint with her regimen and got admitted in July at [place] and underwent both knee aspiration for possible septic arthritis doesn't recall if she had positive c/s but was treated with 6 weeks of iv antibiotics .In november had admission in [place] and per pt had hip aspitration with positive c/s . Also had left great to resection for infection  (likely OM ) .She was treated with eight  weeks of IV antibiotics and switched to PO doxy and [facility]. She presented to the hospital from there with c/o thoracic pain appeared suddenly without provocation and felt like a "strain or pull" pt with sudden pop in back and new back pain .Seen with   1.Diskitis /OM of thoracic spine:   MRI shows T7-8 signal abnormality MR T spine  IMPRESSION:    Normalization of marrow signal related to previous abnormality centered at the T11-12 level with new similar abnormality atT6-7 and T7-8, also presumed infectious. 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 Suspected dorsal epidural collection with limitations secondary to noncontrast technique and metallic artifact  covered for MRSA with vanco at 1250 mg IV q 12 hrs now with midine   will likely require a 6 weeks course of antibiotics again (day 10 of vanco ,can switch to PO zyvox for last two weeks)   midline +  s/p  extension of fusion to T4, exploration of fusion/decompression on 2/1/20  physical therapy per ortho recs      2. Fever : likely post op fever  resolved now    3.Right leg weakness: detailed note of Dr Leslie highly appreciated   chronic hip dislocation with septic arthrosis likely ongoing since last admission .pt was non compliant with her life long suppressive therapy with doxy which was planned and never followed with me or ortho since discharge   this was again discussed with pt in detail that she will have to be on chronic life long suppressive therapy to prevent any further infections
43 y/o Female ,IV drug abuser, on methadone and still using IV drugs at last admission , chronic pain,ETOH abuse  was admitted from 2/17/19 to 5/28/19  with a, complicated hospitalization course long which caused sepsis and acute respiratory failure at presentation sec to MRSA bacteremia with septic emboli and  eventually lead to  OM, vertebral abscess, multi-level from T8 to L2  discitis, s/p spinal surgery T8 to L2 PSF sec to persistent bacteremia for initial 12 days of the stay. She had LIZ which showed IE of TV as well on admission. During the hospitalization she was initally on vanco which had to be switched dapto as pt continued to have progression of diskitis /OM of spine >She was eventually discharged on PO zyvox and then plan was to changed to PO doxy for life. Since then pt was not complaint with her regimen and got admitted in July at [place] and underwent both knee aspiration for possible septic arthritis doesn't recall if she had positive c/s but was treated with 6 weeks of iv antibiotics .IN november had admission ni [place] and per pt had hip aspitration with positive c/s .Also had left great to resection for infection in [place] (likely OM ) .She was treated with six weeks of IV antibiotics and switched to PO doxy and [facility] to [facility]. She presented to the hospital from there with c/o thoracic pain appeared suddenly without provocation and felt like a "strain or pull"pt with sudden pop in back and new back pain .Seen with   1.Diskitis /OM of thoracic spine  MRI shows T7-8 signal abnormality MR T spine  IMPRESSION:    Normalization of marrow signal related to previous abnormality centered at the T11-12 level with new similar abnormality atT6-7 and T7-8, also presumed infectious. 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 Suspected dorsal epidural collection with limitations secondary to noncontrast technique and metallic artifact  worsening CRP   cover for MRSA with vanco   OR c/s sh be sent   planned for laminectomy and fusion on 2/1  d/c azactam for now   try to get records from [place] (admitted in July ) for B/L knee pain ? septic knee arthritis says got antibiotics for 6 weeks  Try get records from [place] admitted from Nov to Jan and got aspiration of Right hip which grew bacteria ? which one   on IV antibiotics for 8 weeks followed by oral doxy for last two week.    came from [facility]   follow up on blood c/s    denies IV drug use  uses a heavy dose of pain killers  check U tox
43 y/o Female ,IV drug abuser, on methadone and still using IV drugs at last admission , chronic pain,ETOH abuse  was admitted from 2/17/19 to 5/28/19  with a, complicated hospitalization course long which caused sepsis and acute respiratory failure at presentation sec to MRSA bacteremia with septic emboli and  eventually lead to  OM, vertebral abscess, multi-level from T8 to L2  discitis, s/p spinal surgery T8 to L2 PSF sec to persistent bacteremia for initial 12 days of the stay. She had LIZ which showed IE of TV as well on admission. During the hospitalization she was initally on vanco which had to be switched dapto as pt continued to have progression of diskitis /OM of spine >She was eventually discharged on PO zyvox and then plan was to changed to PO doxy for life. Since then pt was not complaint with her regimen and got admitted in July at [place] and underwent both knee aspiration for possible septic arthritis doesn't recall if she had positive c/s but was treated with 6 weeks of iv antibiotics .In november had admission in [place] and per pt had hip aspitration with positive c/s . Also had left great to resection for infection  (likely OM ) .She was treated with eight  weeks of IV antibiotics and switched to PO doxy and [facility] She presented to the hospital from there with c/o thoracic pain appeared suddenly without provocation and felt like a "strain or pull" pt with sudden pop in back and new back pain .Seen with   1.Diskitis /OM of thoracic spine:   MRI shows T7-8 signal abnormality MR T spine  IMPRESSION:    Normalization of marrow signal related to previous abnormality centered at the T11-12 level with new similar abnormality atT6-7 and T7-8, also presumed infectious. 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 Suspected dorsal epidural collection with limitations secondary to noncontrast technique and metallic artifact  covered for MRSA with vanco at 1250 mg IV q 12 hrs now with midine   will likely require a 6 weeks course of antibiotics again  s/p  extension of fusion to T4, exploration of fusion/decompression POD 2  physical therapy per ortho recs      2. Fever : likely post op fever    3.right leg weakness: detailed note of Dr Leslie highly appreciated   chronic hip dislocation with septic arthrosis likely ongoing since last admission .pt was non compliant with her life long suppressive therapy with doxy which was planned and never followed with me or ortho since discharge   this was again discussed with pt in detail that she will have to be on chronic life long suppressive therapy to prevent any further infections
43 y/o Female ,IV drug abuser, on methadone and still using IV drugs at last admission , chronic pain,ETOH abuse  was admitted from 2/17/19 to 5/28/19  with a, complicated hospitalization course long which caused sepsis and acute respiratory failure at presentation sec to MRSA bacteremia with septic emboli and  eventually lead to  OM, vertebral abscess, multi-level from T8 to L2  discitis, s/p spinal surgery T8 to L2 PSF sec to persistent bacteremia for initial 12 days of the stay. She had LIZ which showed IE of TV as well on admission. During the hospitalization she was initally on vanco which had to be switched dapto as pt continued to have progression of diskitis /OM of spine >She was eventually discharged on PO zyvox and then plan was to changed to PO doxy for life. Since then pt was not complaint with her regimen and got admitted in July at [place] and underwent both knee aspiration for possible septic arthritis doesn't recall if she had positive c/s but was treated with 6 weeks of iv antibiotics .In november had admission in [place] and per pt had hip aspitration with positive c/s . Also had left great to resection for infection  (likely OM ) .She was treated with eight  weeks of IV antibiotics and switched to PO doxy and [facility] She presented to the hospital from there with c/o thoracic pain appeared suddenly without provocation and felt like a "strain or pull" pt with sudden pop in back and new back pain .Seen with   1.Diskitis /OM of thoracic spine:   MRI shows T7-8 signal abnormality MR T spine  IMPRESSION:    Normalization of marrow signal related to previous abnormality centered at the T11-12 level with new similar abnormality atT6-7 and T7-8, also presumed infectious. 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 Suspected dorsal epidural collection with limitations secondary to noncontrast technique and metallic artifact  covered for MRSA with vanco at 1250 mg IV q 12 hrs now with midine   s/p  extension of fusion to T4, exploration of fusion/decompression on 2/1/20  physical therapy per ortho recs  picc line for another 4 weeks of IV vanco (total 6 weeks )   end date 3/11/20   after that sh be started on life long prophylaxis with PO doxy BID   sh f/u in 6 weeks  pt being evaluated for rehab placement      2. Fever : likely post op fever  resolved now    3.Right leg weakness: detailed note of Dr Leslie highly appreciated   chronic hip dislocation with septic arthrosis likely ongoing since last admission .pt was non compliant with her life long suppressive therapy with doxy which was planned and never followed with me or ortho since discharge   this was again discussed with pt in detail that she will have to be on chronic life long suppressive therapy to prevent any further infections
43 y/o Female complains of upper back pain for 1 day. Patient had T8-L2 PSF performed in 2019 for infection. Since that time, she notes that she has presented to outside hospitals and had bilateral septic knee I&Ds performed, followed by periods of time in which she has been intermittently compliant with her prescribed antibiotics. Patient states that the thoracic pain appeared suddenly without provocation and felt like a "strain or pull". Patient denies numbness, paresthesias, head strike, loss of consciousness, change in bowel/bladder continence, saddle anesthesia, or any other signs/symptoms at this time. Seen by orthopedics, imaging shows osteomyelitis/discitis, for possible OR 2/1/20,  Pt had possible IE on echo's from last year on the tricuspid valve and repeat from today shows no obvious vegetation but mild to mod tricuspid regurg. EF normal.  Pt denies any orthopnea and exercise tolerance is limited due to RLE pain and weakness. Pt is elevated risk for planned procedure. Nor further work up necessary if cleared by cardiology, who will see the pt.
44 year old woman with a past medical history of IVDA (not on methadone), chronic pain, ETOH abuse. Had a prolonged hospitalization in 2019 with sepsis, MRSA bacteremia, septic emboli with OM, vertebral abscess, discitis s/p T8-L2 PSF. Eventually discharged but non compliant with abx regimen. Has had several hospitalizations since for septic embolic events. Eventually discharged to rehab. Presented from rehab w/ thoracic pain appeared suddenly without provocation. Found to have osteomyelitis, diskitis s/p t4-t10 revision laminectomy and fusion. Removal and replacement of hardware. Remained intubated post-procedure transferred to ICU for management.  Extubated, then transferred to med/surg floor.
44 year old woman with a past medical history of IVDA (not on methadone), chronic pain, ETOH abuse. Had a prolonged hospitalization in 2019 with sepsis, MRSA bacteremia, septic emboli with OM, vertebral abscess, discitis s/p T8-L2 PSF. Eventually discharged but non compliant with abx regimen. Has had several hospitalizations since for septic embolic events. Eventually discharged to rehab. Presented from rehab w/ thoracic pain appeared suddenly without provocation. Found to have osteomyelitis, diskitis s/p t4-t10 revision laminectomy and fusion. Removal and replacement of hardware. Remained intubated post-procedure transferred to ICU for management.  Extubated, then transferred to med/surg floor.       Osteomyelitis of vertebra of thoracic region.   -osteomyelitis/discitis, seen by ortho, ID  s/p removal of hardware T6, T4-T10 PSF, exploration of Hardware, junctional kyphosis  pain control regimen discussed with anesthesia- Fentanyl patch increased to 25 mcg, Dilaudid changed from IV to PO (8mg TID), Oxycodone ER 60 TID.  Better pain control since starting new regimen  DC planning as she is now off IV analgesia.   Continue Vancomycin:  will likely require a 6-week course of antibiotics again (on day 12 of VANCO as of 2/12, can switch to PO ZYVOX for the last two weeks)       Acute midline back pain, unspecified back location.    - pain control w/Fentanyl, Dilaudid, Oxy as above.       Acute blood loss anemia.    Blood loss during OR estimated 950 milliLiter  H/H - stable  GI eval/Dr. Meyer appreciated.  Has been stable.     DC to [facility] once accepted.    D/c planning
44 year old woman with a past medical history of IVDA (not on methadone), chronic pain, ETOH abuse. Had a prolonged hospitalization in 2019 with sepsis, MRSA bacteremia, septic emboli with OM, vertebral abscess, discitis s/p T8-L2 PSF. Eventually discharged but non compliant with abx regimen. Has had several hospitalizations since for septic embolic events. Eventually discharged to rehab. Presented from rehab w/ thoracic pain appeared suddenly without provocation. Found to have osteomyelitis, diskitis s/p t4-t10 revision laminectomy and fusion. Removal and replacement of hardware. Remained intubated post-procedure transferred to ICU for management.  Extubated, then transferred to med/surg floor.       Osteomyelitis of vertebra of thoracic region.   -osteomyelitis/discitis, seen by ortho, ID  s/p removal of hardware T6, T4-T10 PSF, exploration of Hardware, junctional kyphosis  pain control regimen discussed with anesthesia- Fentanyl patch increased to 25 mcg, Dilaudid changed from IV to PO (8mg TID), Oxycodone ER 60 TID.  Better pain control since starting new regimen  DC planning as she is now off IV analgesia.   Continue Vancomycin:  will likely require a 6-week course of antibiotics again (on day 12 of VANCO as of 2/12, can switch to PO ZYVOX for the last two weeks)       Acute midline back pain, unspecified back location.    - pain control w/Fentanyl, Dilaudid, Oxy as above.       Acute blood loss anemia.    Blood loss during OR estimated 950 milliLiter  H/H - stable  GI eval/Dr. Meyer appreciated.  Has been stable.     DC to [facility] once accepted.    D/c planning    Awaiting transfer to [facility].
44 year old woman with a past medical history of IVDA (not on methadone), chronic pain, ETOH abuse. Had a prolonged hospitalization in 2019 with sepsis, MRSA bacteremia, septic emboli with OM, vertebral abscess, discitis s/p T8-L2 PSF. Eventually discharged but non compliant with abx regimen. Has had several hospitalizations since for septic embolic events. Eventually discharged to rehab. Presented from rehab w/ thoracic pain appeared suddenly without provocation. Found to have osteomyelitis, diskitis s/p t4-t10 revision laminectomy and fusion. Removal and replacement of hardware. Remained intubated post-procedure transferred to ICU for management.  Extubated, then transferred to med/surg floor.     Problem/Plan - 1:  ·  Problem: Osteomyelitis of vertebra of thoracic region.  Plan: Has osteomyelitis/discitis, seen by ortho, ID  s/p removal of hardware T6, T4-T10 PSF, exploration of Hardware, junctional kyphosis  pain control regimen discussed with anesthesia- Fentanyl patch increased to 25 mcg, Dilaudid changed from IV to PO (8mg TID), Oxycodone ER 60 TID.  Better pain control since starting new regimen  -Will start DC planning as she is now off IV analgesia.   Continue Vancomycin:  will likely require a 6 weeks course of antibiotics again (on day 11 of vanco as of 2/11, can switch to PO zyvox starting 2/14 for last two weeks)     Problem/Plan - 2:  ·  Problem: Acute midline back pain, unspecified back location.  Plan: pain control w/Fentanyl, Dilaudid, Oxy as above.      Problem/Plan - 3:  ·  Problem: Acute blood loss anemia.  Plan: Blood loss during OR estimated 950 milliLiter  trend cbc  GI eval/Dr. Meyer appreciated.  Has been stable.     DC to [facility] once accepted.
44 year old woman with a past medical history of IVDA (not on methadone), chronic pain, ETOH abuse. Had a prolonged hospitalization in 2019 with sepsis, MRSA bacteremia, septic emboli with OM, vertebral abscess, discitis s/p T8-L2 PSF. Eventually discharged but non compliant with abx regimen. Has had several hospitalizations since for septic embolic events. Eventually discharged to rehab. Presented from rehab w/ thoracic pain appeared suddenly without provocation. Found to have osteomyelitis, diskitis s/p t4-t10 revision laminectomy and fusion. Removal and replacement of hardware. Remained intubated post-procedure transferred to ICU for management. Extubated this morning. Complaints of severe pain and yelling out loud. Stating she is having back pain and right hip pain as well. Multiple pain medications were given with some improvement. Exam is unremarkable otherwise. For pain control she was restarted on standing oxycontin; PRN dilaudid, oxycodone; flexeril; lidocaine patch; gabapentin; and fentanyl patch. Bowel regimen ordered and anti-emetics. Monitor KIRILL drain. Continue with vancomycin q 8 hrs. Vanco level 25 this evening, will hold this dose and repeat a vanco trough level prior to next dose.  The Rubin was removed. xrays, MRI of hip as per ortho. PT consulted. SCDs for DVT prophylaxis. Rubin removed, follow up urine output
44 year old woman with a past medical history of IVDA (not on methadone), chronic pain, ETOH abuse. Had a prolonged hospitalization in 2019 with sepsis, MRSA bacteremia, septic emboli with OM, vertebral abscess, discitis s/p T8-L2 PSF. Eventually discharged but non compliant with abx regimen. Has had several hospitalizations since for septic embolic events. Eventually discharged to rehab. Presented from rehab w/ thoracic pain appeared suddenly without provocation. Found to have osteomyelitis, diskitis s/p t4-t10 revision laminectomy and fusion. Removal and replacement of hardware. Remained intubated post-procedure transferred to ICU for management. Extubated this morning. Complaints of severe pain and yelling out loud. Stating she is having back pain and right hip pain as well. Multiple pain medications were given with some improvement. Exam is unremarkable otherwise. For pain control she was restarted on standing oxycontin; PRN dilaudid, oxycodone; flexeril; lidocaine patch; gabapentin; and fentanyl patch. Bowel regimen ordered and anti-emetics. Monitor KIRILL drain. Continue with vancomycin q 8 hrs. Vanco level 25 this evening, will hold this dose and repeat a vanco trough level prior to next dose.  The Rubin was removed. xrays, MRI of hip as per ortho. PT consulted. SCDs for DVT prophylaxis. Rubin removed, follow up urine output
44 year old woman with a past medical history of IVDA (not on methadone), chronic pain, ETOH abuse. Had a prolonged hospitalization in 2019 with sepsis, MRSA bacteremia, septic emboli with OM, vertebral abscess, discitis s/p T8-L2 PSF. Eventually discharged but non compliant with abx regimen. Has had several hospitalizations since for septic embolic events. Eventually discharged to rehab. Presented from rehab w/ thoracic pain appeared suddenly without provocation. Found to have osteomyelitis, diskitis s/p t4-t10 revision laminectomy and fusion. Removal and replacement of hardware. Remained intubated post-procedure transferred to ICU for management. Extubated this morning. Complaints of severe pain and yelling out loud. Stating she is having back pain and right hip pain as well. Multiple pain medications were given with some improvement. Exam is unremarkable otherwise. For pain control she was restarted on standing oxycontin; PRN dilaudid, oxycodone; flexeril; lidocaine patch; gabapentin; and fentanyl patch. Bowel regimen ordered and anti-emetics. Monitor KIRILL drain. Continue with vancomycin q 8 hrs. Vanco level 25 this evening, will hold this dose and repeat a vanco trough level prior to next dose.  The Rubin was removed. xrays, MRI of hip as per ortho. PT consulted. SCDs for DVT prophylaxis. Rubin removed, follow up urine output.
44F s/p revision T4-L2 PSF with exploration and decompression POD 11    -Pain control as needed  -RLE TTWB, LLE WBAT  -PT/OT  -DVT ppx: SCDS, encourage ambulation/OOB  -Incentive spirometry encouraged  -Drain dc'd  -Continue abx per ID  -Medical management per primary team  -Appreciate consulting recommendations  -Please call for any change in, new, or worsening signs/symptoms  -Right hip - nonsurgical management at this time.   -Follow up with [facility] reconstructive joint surgery specialist as outpatient; Information to be provided in discharge paperwork  -Ortho stable for DC
44F s/p revision T4-L2 PSF with exploration and decompression POD 12:    -Pain control as needed  -RLE TTWB, LLE WBAT  -PT/OT  -DVT ppx: SCDS, encourage ambulation/OOB  -Incentive spirometry encouraged  -Drain dc'd  -Continue abx per ID  -Medical management per primary team  -Appreciate consulting recommendations  -Please call for any change in, new, or worsening signs/symptoms  -Right hip - nonsurgical management at this time.   -Follow up with [facility] [facility]; Information to be provided in discharge paperwork  -no further acute orthopedic surgical intervention indicated at this time  -Ortho stable for DC
45 y/o Female ,IV drug abuser, on methadone and still using IV drugs at last admission , chronic pain,ETOH abuse  was admitted from 2/17/19 to 5/28/19  with a, complicated hospitalization course long which caused sepsis and acute respiratory failure at presentation sec to MRSA bacteremia with septic emboli and  eventually lead to  OM, vertebral abscess, multi-level from T8 to L2  discitis, s/p spinal surgery T8 to L2 PSF sec to persistent bacteremia for initial 12 days of the stay. She had LIZ which showed IE of TV as well on admission. During the hospitalization she was initally on vanco which had to be switched dapto as pt continued to have progression of diskitis /OM of spine >She was eventually discharged on PO zyvox and then plan was to changed to PO doxy for life. Since then pt was not complaint with her regimen and got admitted in July at [place] and underwent both knee aspiration for possible septic arthritis doesn't recall if she had positive c/s but was treated with 6 weeks of iv antibiotics .IN november had admission ni [place] and per pt had hip aspitration with positive c/s .Also had left great to resection for infection in [place] (likely OM ) .She was treated with six weeks of IV antibiotics and switched to PO doxy and [facility]. She presented to the hospital from there with c/o thoracic pain appeared suddenly without provocation and felt like a "strain or pull"pt with sudden pop in back and new back pain .Seen with   1.Diskitis /OM of thoracic spine:   MRI shows T7-8 signal abnormality MR T spine  IMPRESSION:    Normalization of marrow signal related to previous abnormality centered at the T11-12 level with new similar abnormality atT6-7 and T7-8, also presumed infectious. 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 Suspected dorsal epidural collection with limitations secondary to noncontrast technique and metallic artifact  worsening CRP   cover for MRSA with vanco ,leve high so hold vanco and redose tomorrow with level   will likely require a 6 weeks course of antibiotics again  s/p  s/p extension of fusion to T4, exploration of fusion/decompression POD 2  physical therapy per ortho recs      2. Fever : ;likely post op fever
AP  44F s/p revision T4-L2 PSF with exploration and decompression POD8:    RLE TTWB, LLE WBAT  Monitor drain output - anticipate discontinue on day of discharge or before discharge when output decreased  Appreciate consulting recommendations  Please call for any change in, new, or worsening signs/symptoms  Right hip - nonsurgical management at this time. Follow up with [facility] reconstructive joint surgery specialist as outpatient. Information to be provided in discharge paperwork  Discussed with Dr Leslie, who agrees with above
45 y/o Female ,IV drug abuser, on methadone and still using IV drugs at last admission , chronic pain,ETOH abuse  was admitted from 2/17/19 to 5/28/19  with a, complicated hospitalization course long which caused sepsis and acute respiratory failure at presentation sec to MRSA bacteremia with septic emboli and  eventually lead to  OM, vertebral abscess, multi-level from T8 to L2  discitis, s/p spinal surgery T8 to L2 PSF sec to persistent bacteremia for initial 12 days of the stay. She had LIZ which showed IE of TV as well on admission. During the hospitalization she was initally on vanco which had to be switched dapto as pt continued to have progression of diskitis /OM of spine >She was eventually discharged on PO zyvox and then plan was to changed to PO doxy for life. Since then pt was not complaint with her regimen and got admitted in July at [place] and underwent both knee aspiration for possible septic arthritis doesn't recall if she had positive c/s but was treated with 6 weeks of iv antibiotics .In november had admission in [place] and per pt had hip aspitration with positive c/s . Also had left great to resection for infection  (likely OM ) .She was treated with eight  weeks of IV antibiotics and switched to PO doxy and [facility]. She presented to the hospital from there with c/o thoracic pain appeared suddenly without provocation and felt like a "strain or pull" pt with sudden pop in back and new back pain .Seen with   1.Diskitis /OM of thoracic spine:   MRI shows T7-8 signal abnormality MR T spine  IMPRESSION:    Normalization of marrow signal related to previous abnormality centered at the T11-12 level with new similar abnormality atT6-7 and T7-8, also presumed infectious. 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 Suspected dorsal epidural collection with limitations secondary to noncontrast technique and metallic artifact  covered for MRSA with vanco at 1250 mg IV q 12 hrs now with midine   will likely require a 6 weeks course of antibiotics again (day 10 of antibiotics )   s/p  extension of fusion to T4, exploration of fusion/decompression on 2/1/20  physical therapy per ortho recs      2. Fever : likely post op fever  resolved now    3.Right leg weakness: detailed note of Dr Leslie highly appreciated   chronic hip dislocation with septic arthrosis likely ongoing since last admission .pt was non compliant with her life long suppressive therapy with doxy which was planned and never followed with me or ortho since discharge   this was again discussed with pt in detail that she will have to be on chronic life long suppressive therapy to prevent any further infections

## 2020-02-13 NOTE — PROGRESS NOTE ADULT - REASON FOR ADMISSION
Back pain.

## 2020-02-13 NOTE — PROCEDURE NOTE - ADDITIONAL PROCEDURE DETAILS
pt s/p exchange of midline to PICC line over a wire.  All 20cm midline removed in its entirety without incident.  PICC line cut to 35cm inserted to 34 cm, catheter tip at the cavo-atrial junction.  Pt tolerated procedure well  -Catheter can be accessed

## 2020-02-13 NOTE — DISCHARGE NOTE NURSING/CASE MANAGEMENT/SOCIAL WORK - PATIENT PORTAL LINK FT
You can access the FollowMyHealth Patient Portal offered by [facility] by registering at the following website: [email or web address]. By joining [employer] FollowMyHealth portal, you will also be able to view your health information using other applications (apps) compatible with our system.

## 2020-02-13 NOTE — PROGRESS NOTE ADULT - SUBJECTIVE AND OBJECTIVE BOX
Patient seen and examined at bedside, resting comfortably. Denies headache/dizziness/lightheadedness, chest pain, dyspnea, n/v. Pain well controlled. No other complaints at this time. States she has some muscle cramps between the shoulder blades. Numbness tingling in the hands has improved.    Vital Signs Last 24 Hrs  T(C): 36.1 (13 Feb 2020 05:00), Max: 36.8 (12 Feb 2020 11:44)  T(F): 97 (13 Feb 2020 05:00), Max: 98.3 (12 Feb 2020 11:44)  HR: 90 (13 Feb 2020 05:00) (81 - 93)  BP: 96/48 (13 Feb 2020 05:00) (92/61 - 118/76)  BP(mean): --  RR: 17 (13 Feb 2020 05:00) (17 - 18)  SpO2: 96% (13 Feb 2020 05:00) (96% - 98%)    Gen: NAD, AAOx3  Spine/extremities:  Dressing CDI  Moving all extremities  L2-S1 SILT

## 2020-02-13 NOTE — PROGRESS NOTE ADULT - SUBJECTIVE AND OBJECTIVE BOX
Patient is a 44y old  Female who presents with a chief complaint of Back pain. (13 Feb 2020 06:13)       OVERNIGHT EVENTS:    MEDICATIONS  (STANDING):  ascorbic acid 500 milliGRAM(s) Oral two times a day  calcium carbonate 1250 mG  + Vitamin D (OsCal 500 + D) 1 Tablet(s) Oral three times a day  fentaNYL   Patch  25 MICROgram(s)/Hr 1 Patch Transdermal every 72 hours  folic acid 1 milliGRAM(s) Oral daily  gabapentin 1200 milliGRAM(s) Oral three times a day  heparin  Injectable 5000 Unit(s) SubCutaneous every 8 hours  multivitamin 1 Tablet(s) Oral daily  naloxegol 25 milliGRAM(s) Oral daily  nicotine -  14 mG/24Hr(s) Patch 1 patch Transdermal daily  senna 2 Tablet(s) Oral at bedtime  vancomycin  IVPB      vancomycin  IVPB 1250 milliGRAM(s) IV Intermittent every 12 hours    MEDICATIONS  (PRN):  acetaminophen   Tablet .. 650 milliGRAM(s) Oral every 6 hours PRN Temp greater or equal to 38.5C (101.3F), Mild Pain (1 - 3)  bisacodyl Suppository 10 milliGRAM(s) Rectal daily PRN Constipation  cyclobenzaprine 10 milliGRAM(s) Oral every 8 hours PRN Muscle Spasm  HYDROmorphone   Tablet 8 milliGRAM(s) Oral every 8 hours PRN Severe Pain (7 - 10)  ibuprofen  Tablet. 800 milliGRAM(s) Oral every 6 hours PRN Temp greater or equal to 38C (100.4F), Mild Pain (1 - 3)  magnesium hydroxide Suspension 30 milliLiter(s) Oral every 12 hours PRN Constipation  ondansetron Injectable 4 milliGRAM(s) IV Push every 6 hours PRN Nausea  oxyCODONE  ER Tablet 60 milliGRAM(s) Oral every 8 hours PRN moderate pain  petrolatum white Ointment 1 Application(s) Topical four times a day PRN dry, cracked lips        REVIEW OF SYSTEMS: reviewed and negative     Vital Signs Last 24 Hrs  T(C): 36.1 (13 Feb 2020 05:00), Max: 36.3 (12 Feb 2020 17:03)  T(F): 97 (13 Feb 2020 05:00), Max: 97.4 (12 Feb 2020 17:03)  HR: 97 (13 Feb 2020 10:00) (81 - 97)  BP: 105/61 (13 Feb 2020 10:00) (92/61 - 118/76)  BP(mean): --  RR: 17 (13 Feb 2020 05:00) (17 - 18)  SpO2: 96% (13 Feb 2020 05:00) (96% - 98%)    PHYSICAL EXAM:  GENERAL: NAD, well-groomed, well-developed  HEAD:  Atraumatic, Normocephalic  EYES:  conjunctiva and sclera clear  ENMT:   Moist mucous membranes   NECK: Supple, No JVD   NERVOUS SYSTEM:  Alert & Oriented X3, Motor Strength 5/5 B/L upper and lower extremities;    CHEST/LUNG: Clear to auscultation  bilaterally; No rales, rhonchi, wheezing, or rubs  HEART: Regular rate and rhythm; No murmurs, rubs, or gallops  ABDOMEN: Soft, Nontender, Nondistended; Bowel sounds present  EXTREMITIES:  2+ Peripheral Pulses, No clubbing, cyanosis, or edema.     SKIN: back dressing is clean and dry, drain was removed.  LYMPH: No lymphadenopathy noted      LABS:                        10.1   8.49  )-----------( 317      ( 12 Feb 2020 08:24 )             32.9     02-12    139  |  104  |  23  ----------------------------<  109<H>  4.3   |  29  |  0.64    Ca    9.4      12 Feb 2020 08:24         cardiac markers     CAPILLARY BLOOD GLUCOSE        Cultures    RADIOLOGY & ADDITIONAL TESTS:    Imaging Personally Reviewed:  [ ] YES  [ ] NO    Consultant(s) Notes Reviewed:  [ x ] YES  [ ] NO    Care Discussed with Consultants/Other Providers [ ] YES  [ ] NO

## 2020-02-19 DIAGNOSIS — F11.20 OPIOID DEPENDENCE, UNCOMPLICATED: ICD-10-CM

## 2020-02-19 DIAGNOSIS — B19.20 UNSPECIFIED VIRAL HEPATITIS C WITHOUT HEPATIC COMA: ICD-10-CM

## 2020-02-19 DIAGNOSIS — M46.24 OSTEOMYELITIS OF VERTEBRA, THORACIC REGION: ICD-10-CM

## 2020-02-19 DIAGNOSIS — M54.9 DORSALGIA, UNSPECIFIED: ICD-10-CM
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
## 2020-02-19 DIAGNOSIS — T84.69XA INFECTION AND INFLAMMATORY REACTION DUE TO INTERNAL FIXATION DEVICE OF OTHER SITE, INITIAL ENCOUNTER: ICD-10-CM

## 2020-02-19 DIAGNOSIS — M40.294 OTHER KYPHOSIS, THORACIC REGION: ICD-10-CM

## 2020-02-19 DIAGNOSIS — T84.296A OTHER MECHANICAL COMPLICATION OF INTERNAL FIXATION DEVICE OF VERTEBRAE, INITIAL ENCOUNTER: ICD-10-CM

## 2020-02-19 DIAGNOSIS — G89.29 OTHER CHRONIC PAIN: ICD-10-CM

## 2020-02-19 DIAGNOSIS — Z87.891 PERSONAL HISTORY OF NICOTINE DEPENDENCE: ICD-10-CM

## 2020-02-19 DIAGNOSIS — D62 ACUTE POSTHEMORRHAGIC ANEMIA: ICD-10-CM

## 2020-02-19 DIAGNOSIS — M24.351: ICD-10-CM

## 2020-02-19 DIAGNOSIS — Z89.412 ACQUIRED ABSENCE OF LEFT GREAT TOE: ICD-10-CM

## 2020-02-19 DIAGNOSIS — M84.68XA PATHOLOGICAL FRACTURE IN OTHER DISEASE, OTHER SITE, INITIAL ENCOUNTER FOR FRACTURE: ICD-10-CM

## 2020-02-19 DIAGNOSIS — K59.03 DRUG INDUCED CONSTIPATION: ICD-10-CM

## 2020-02-19 DIAGNOSIS — T40.2X5A ADVERSE EFFECT OF OTHER OPIOIDS, INITIAL ENCOUNTER: ICD-10-CM

## 2020-02-19 DIAGNOSIS — I07.1 RHEUMATIC TRICUSPID INSUFFICIENCY: ICD-10-CM

## 2020-02-19 DIAGNOSIS — Z79.1 LONG TERM (CURRENT) USE OF NON-STEROIDAL ANTI-INFLAMMATORIES (NSAID): ICD-10-CM

## 2020-02-19 DIAGNOSIS — Z88.0 ALLERGY STATUS TO PENICILLIN: ICD-10-CM

## 2020-02-19 DIAGNOSIS — Z91.14 PATIENT'S OTHER NONCOMPLIANCE WITH MEDICATION REGIMEN: ICD-10-CM

## 2020-02-19 DIAGNOSIS — Z99.3 DEPENDENCE ON WHEELCHAIR: ICD-10-CM

## 2020-02-19 DIAGNOSIS — M00.851 ARTHRITIS DUE TO OTHER BACTERIA, RIGHT HIP: ICD-10-CM

## 2020-02-19 DIAGNOSIS — R50.82 POSTPROCEDURAL FEVER: ICD-10-CM

## 2020-02-19 DIAGNOSIS — F10.10 ALCOHOL ABUSE, UNCOMPLICATED: ICD-10-CM

## 2020-02-19 DIAGNOSIS — M46.44 DISCITIS, UNSPECIFIED, THORACIC REGION: ICD-10-CM

## 2020-07-17 PROBLEM — Z00.00 ENCOUNTER FOR PREVENTIVE HEALTH EXAMINATION: Status: ACTIVE | Noted: 2020-07-17

## 2020-07-21 ENCOUNTER — APPOINTMENT (OUTPATIENT)
Dept: ORTHOPEDIC SURGERY | Facility: CLINIC | Age: 45
End: 2020-07-21

## 2020-07-28 ENCOUNTER — APPOINTMENT (OUTPATIENT)
Dept: ORTHOPEDIC SURGERY | Facility: CLINIC | Age: 45
End: 2020-07-28

## 2020-08-25 ENCOUNTER — APPOINTMENT (OUTPATIENT)
Dept: ORTHOPEDIC SURGERY | Facility: CLINIC | Age: 45
End: 2020-08-25

## 2020-09-30 ENCOUNTER — APPOINTMENT (OUTPATIENT)
Dept: ORTHOPEDIC SURGERY | Facility: CLINIC | Age: 45
End: 2020-09-30
Payer: MEDICAID

## 2020-09-30 VITALS — BODY MASS INDEX: 44.17 KG/M2 | WEIGHT: 225 LBS | HEIGHT: 60 IN

## 2020-09-30 DIAGNOSIS — M00.9 PYOGENIC ARTHRITIS, UNSPECIFIED: ICD-10-CM

## 2020-09-30 PROCEDURE — 99203 OFFICE O/P NEW LOW 30 MIN: CPT

## 2020-09-30 PROCEDURE — 73502 X-RAY EXAM HIP UNI 2-3 VIEWS: CPT | Mod: RT

## 2020-12-04 NOTE — ED ADULT NURSE NOTE - NSFALLRSKASSESASSIST_ED_ALL_ED
[Negative] : Genitourinary [Abdominal Pain] : no abdominal pain [Nausea] : no nausea [Constipation] : no constipation [Vomiting] : no vomiting [Headache] : no headache [Dizziness] : no dizziness [FreeTextEntry7] : episode of loose stool [de-identified] : studying for exams, not sleeping well.  yes

## 2021-01-01 NOTE — ED PROVIDER NOTE - NSTIMEPROVIDERCAREINITIATE_GEN_ER
infant bottled 20-70mL every 2-3 hours using Dr. Brown Preemie.  Pacing needed.  Bottling took 20-40 minutes.  Infant needs periods of rest while bottling- will take 30-40mL then put down and will start to cue again.  Parents present until 2000 and did all feedings and cares.  Voiding and stooling.   No A/B spells or desats.  Father requesting updates regarding nephrology consult and projected length of stay (how many days they need to be gaining weight in order to go home).  Contacted [facility] and Dr. Atkinson called parents to give update.   29-Jan-2020 19:07

## 2021-02-24 NOTE — PATIENT PROFILE ADULT - NSASFUNCLEVELADLTOILET_GEN_A_NUR
2 = assistive person

## 2021-04-16 NOTE — BRIEF OPERATIVE NOTE - OPERATION/FINDINGS
Instrumented Posterior Spinal Fusion T8-L2 with Laminectomy T11-12 Instrumented Posterior Spinal Fusion T8-L2 with Laminectomy T11-12. Instrumented Posterior Spinal Fusion T8,T9, T10, T11, T12, L1, L2 with Laminectomy T11-12. Bilateral hemilaminectomy of T10 and Proximal L1  Extensive scar tissue and epidural fibrosis due to discitis, osteomyelitis  Poor lower extremity neurologic findings <<----- Click to add NO significant Past Surgical History

## 2021-09-24 NOTE — OCCUPATIONAL THERAPY INITIAL EVALUATION ADULT - LEVEL OF INDEPENDENCE: SCOOT/BRIDGE, REHAB EVAL
Arrived to the [facility]. Hydration completed. Patient tolerated without problems. Any issues or concerns during appointment: no.  Patient aware of next infusion appointment on 9/27/21 (date) at 12 (time). Discharged ambulatory.
Problem: Knowledge Deficit  Goal: *Verbalizes understanding of procedures and medications  Outcome: Progressing Towards Goal
minimum assist (75% patients effort)
contact guard
minimum assist (75% patients effort)

## 2022-05-17 NOTE — PROVIDER CONTACT NOTE (CRITICAL VALUE NOTIFICATION) - ACTION/TREATMENT ORDERED:
Kidney function remains mildly elevated.   -Resume furosemide at 20 mg every other day (update med list).   - Recheck BMP IN 2 weeks. will follow up.

## 2022-09-14 NOTE — PATIENT PROFILE ADULT - BRADEN NUTRITION
Pt pt wants to know if she's able to get immunizations(flu shot, Covid Booster, etc) in her left arm where she had her surgery.    (2) probably inadequate

## 2022-09-26 NOTE — CONSULT NOTE ADULT - PROBLEM SELECTOR PROBLEM 3
Pneumonia due to Staphylococcus aureus Benzoyl Peroxide Pregnancy And Lactation Text: This medication is Pregnancy Category C. It is unknown if benzoyl peroxide is excreted in breast milk.

## 2022-12-15 NOTE — OCCUPATIONAL THERAPY INITIAL EVALUATION ADULT - PERTINENT HX OF CURRENT PROBLEM, REHAB EVAL
Patient admitted to [facility] due to back pain. Patient had CT thoracic spine done on 1/30/20 which revealed compression deformities of T7 and T8 vertebral bodies with endplate irregularities. Lucencies at the screw/bone interface of the T8 bilateral pedicle screws with left T8 screw projecting into the T7-T8 disc space and right T8 screw projecting into the T7 vertebral body.
Yes

## 2023-01-06 NOTE — PATIENT PROFILE ADULT - MUSCULOSKELETAL CONDITIONS MANAGED AT HOME
DERMATOLOGY PROGRESS NOTE  Follow up    1/6/2023  Liana Huffman  1958  MRN: [id number]   Last Visit:1/4/2022      Liana Huffman is a 64 year old female patient seen for follow skin exam with hx of bcc, pt also has acute intermittent porphyria diagnosed at [place], following w Dr. Aguilar. Sx relatively well controlled, pts children are all gene carriers and 1 daughter has sx.  Patient has her own safe list of medications she CAN take given the AIP. Does inquire re: white pinpoint papules on face, has squeezed some out, also breaking out on chin. Struggling w IBS, has pcp apt in 1 week. Getting dry lips/dry skin as well. Otherwise denies lesions that are new, changing, itchy, painful, bleeding, crusting, or otherwise bothersome. Feels otherwise well, no other cutaneous concerns today.     ROS: No fever, chills, hx of immunosuppression, nausea, vomiting, sore throat, diarrhea, constipation, increased thirst or temperature intolerance, chest pain, SOB, cough, headache, numbness, joint pain, changes in vision, unintended wt loss, anxiety/depression, easy bruising/bleeding, painful urination       Up to Date on Age Appropriate screenings: Y    Dermatology History  History of skin cancer--  5/11/2021  Pathologic Diagnosis   Skin, right anterior shoulder, shave biopsy (2 fragments):   -Basal cell carcinoma, fibroepithelioma of Pinkus variant.       History of other skin issues: per HPI    FAMILY HISTORY:  Family history of skin cancer--No family history of skin cancer  Other:  Heart disease: mother and father    HISTORY OF SUN EXPOSURE:  History of severe sunburns--Negative  History of tanning device use: no  Sunscreens:no  Work outdoors:no  Occupation:retired    SOCIAL:  Lives with:family  Smoking/Drinking/Drug use:no/ no/ o     Social History     Tobacco Use   • Smoking status: Never   • Smokeless tobacco: Never   Vaping Use   • Vaping Use: never used   Substance Use Topics   • Alcohol use: No   • Drug use: No        Past Medical History:   Diagnosis Date   • AIP (acute intermittent porphyria) (CMS/formerly [facility])    • Allergy    • Arthritis    • CKD (chronic kidney disease) stage 3, GFR 30-59 ml/min (CMS/formerly [facility])    • Diverticulosis    • Gastroesophageal reflux disease    • Hypertension    • Lung nodule, multiple 5/29/13    multiple pulmonary nodules less than 3 mm in size   • Mitral Valve Prolapse    • PONV (postoperative nausea and vomiting)    • Sinusitis (recurrent)    • Thyroid condition    • Urinary tract infection        ALLERGIES:   Allergen Reactions   • Cephalosporins Other (See Comments)     Unsafe to take due to acute intermittent porphyria.   • Sulfa Antibiotics        Current Outpatient Medications   Medication Sig Dispense Refill   • cyclobenzaprine (FLEXERIL) 10 MG tablet TAKE 1 TABLET TWICE A DAY AS NEEDED FOR MUSCLE SPASMS 180 tablet 0   • fluticasone (FLONASE) 50 MCG/ACT nasal spray Spray 2 sprays in each nostril daily. 48 g 3   • amitriptyline (ELAVIL) 25 MG tablet Take 1 tablet by mouth nightly. 30 tablet 1   • levothyroxine 50 MCG tablet TAKE 1 TABLET DAILY 90 tablet 3   • amLODIPine (Norvasc) 5 MG tablet Take 1 tablet by mouth daily. 90 tablet 0   • norethindrone-ethinyl estradiol (FEMHRT LOW DOSE) 0.5-2.5 MG-MCG per tablet Take 1 tablet by mouth daily. 84 tablet 3   • atenolol (TENORMIN) 25 MG tablet Take 25 mg by mouth daily.       No current facility-administered medications for this visit.       Visit Vitals  Pulse 76   Ht 5' 5\" (1.651 m)   LMP 04/17/2014   SpO2 98%   BMI 19.89 kg/m²         EXAM:   The patient is a very pleasant, well developed, Joel type II female and is alert and oriented x 3, with a normal mood and affect, and appears to be in no acute distress.    Skin exam -- Thorough evaluation of head (including face and scalp), eyelids, conjunctiva, ears, lips, neck, chest, back, abdomen, buttocks, bilateral upper and lower extremities, digits and nails, per patient preference, notable for:  -  b/l upper and lower extr, trunk with scattered, brown, and some skin colored, stuck-on appearing macules, papules, and plaques  - trunk with scattered cherry red macules and papules  - face, trunk, and b/l upper and lower extremities with scattered, brown, and skin colored, regular appearing macules and papules, with no features concerning for skin cancer today on exam and dermoscopy  - right anterior shoulder with well-healed scar, no evidence of recurrence by inspection/palpation  - pinpoint white dome-shaped papules to the face  - Rest of exam unremarkable    Labs/Imaging/Outside records: reviewed      IMPRESSION / PLAN:   Ms. Huffman has:      Hx of Nonmelanoma (keratinocyte) carcinoma (NMSC)  - dw pt in detail, requires regular monitoring due to increased risk of future skin cancer  - continue regular dermatology follow-up (q 6 months x 2 years, then yearly if no recurrence or new skin cancer)  - broad spectrum sunscreen recommended, SPF , reapply q 2 hours when out, pending FDA recall safe list of sunscreen link provided as rebecca  https://www.[employer]/wp-content/uploads/Ryjrrbmqwe-L-Gwfbg-6-sq-Gafecnym-FDA-Citizen-Petition-on-Sunscreen-v2.pdf  - monitor for changes     Seborrheic keratoses  - dw pt in detail, chronic, however benign appearing on exam today, reassurance provided  - handout given and reviewed  - monitor for changes  - call if changes occur    Cherry angioma(s)  - dw pt in detail, chronic, benign, reassurance provided  - monitor for changes  - call if changes occur      Multiple melanocytic nevi  - dw pt in detail, chronic, however no concerning features on exam/dermoscopy today  Sunscreen: potential FDA recall of certain sunscreens coming up, recommend sticking to safe list below, recommend SPF , reapply every 2 hours when out  Sun Screen Safe List:  https://www.[employer]/wp-content/uploads/Djfwoqxzwt-F-Iyxtc-5-xo-Wtyeawtm-FDA-Citizen-Petition-on-Sunscreen-v2.pdf  -  monitor for changes  - call if changes occur     Milia  - discussed with patient in detail  - chronic, flaring in terms of number of new lesions, bothersome  - After 1-2 months of retinoid use, recommend home extraction using an over the counter milia extractor, recommend [employer] Stainless Steel Extractor Tool (purchase on [employer])  - limit heavy face moisturizers  - cosmetic extraction if desired--citrine  - monitor for changes  - call if changes occur        Return to clinic in:  1 yr tbse,sooner as needed    The documentation recorded by the scribe María Campbell accurately and completely reflects the service(s) I personally performed and the decisions made by me        Caryn Rodriguez MD  Dermatology    I, María Campbell MA, attest that I performed the duties of a scribe for this entire encounter in the presence of Dr. Rodriguez     mobility limited

## 2023-01-10 NOTE — OCCUPATIONAL THERAPY INITIAL EVALUATION ADULT - RANGE OF MOTION EXAMINATION, LOWER EXTREMITY
bilateral LE Passive ROM was WFL  (within functional limits)/BLE AROM hip flexion grossly limited by more then 75%, BLE AROM distally to hip grossly limited by more then 75%.
bilateral LE Passive ROM was WFL  (within functional limits)/bilateral LE Active ROM was WFL  (within functional limits)
Health Care Proxy (HCP)
bilateral LE Passive ROM was WFL  (within functional limits)/bilateral LE Active ROM was WFL  (within functional limits)

## 2023-02-09 NOTE — PATIENT PROFILE ADULT - TOBACCO USE
Pt c/o left arm pain, she's on 3rd day of Medrol dos jitendra with no relief, advised pt that she needs to be seen.  Appt scheduled tomorrow.   Current every day smoker

## 2023-02-20 NOTE — PATIENT PROFILE ADULT - EQUIPMENT CURRENTLY USED AT HOME
Cantharidin Pregnancy And Lactation Text: The use of this medication during pregnancy or lactation is not recommended as there is insufficient data. no

## 2023-03-21 NOTE — OCCUPATIONAL THERAPY INITIAL EVALUATION ADULT - BATHING, PREVIOUS LEVEL OF FUNCTION, OT EVAL
Pt is calling regarding missed call from a nurse.     Pt requesting a call back, please follow up and advise   
See e-advice encounter.  
independent/After D/C from [facility] in June 2019

## 2023-08-08 NOTE — ED ADULT TRIAGE NOTE - LOCATION:
Left arm;
68 yo M with PMH HTN, HLD, asthma, diverticulitis with colovescicular fistula and colon cancer s/p R hemicolectomy (2016) and now s/p exploratory laparotomy, segmental colon resection, resection of colovesicular fistula, partial cystectomy, bladder diverticulectomy, colorectal anastomosis, loop ileostomy creation (10/06/22) now s/o ileostomy reversal 8/7

## 2023-08-19 NOTE — PATIENT PROFILE ADULT - ...
Speech Therapy    Patient not seen in therapy today.    Patient not available: not present in room       Patient is with other health care provider.      Re-attempt plan: per established plan of care    OBJECTIVE                          Therapy procedure time and total treatment time can be found documented on the Time Entry flowsheet   30-Jan-2020 17:59:48

## 2023-09-19 PROBLEM — Z87.19 PERSONAL HISTORY OF OTHER DISEASES OF THE DIGESTIVE SYSTEM: Chronic | Status: ACTIVE | Noted: 2020-01-30

## 2023-11-14 NOTE — PROCEDURE NOTE - NSPROCSEDATIONNOT_GEN_ALL_CORE
I have not seen the patient in 1 year, no scheduled appt, and no showed the last 2 appts with me.  I cannot continue to order controlled substances for her without keeping appointments so will need to be seen for any further refills.    No

## 2023-11-21 NOTE — PRE-OP CHECKLIST - BP NONINVASIVE DIASTOLIC (MM HG)
WOUND CARE HISTORY AND PHYSICAL    Patient Care Team:  EARLE Roldan - CNP as PCP - General (Nurse Practitioner)  EARLE Roldan CNP as PCP - Empaneled Provider  EARLE Garcia as Advanced Practice Nurse (Neurology)  EARLE Roldan CNP (Nurse Practitioner)  EARLE Jonas as Advanced Practice Nurse (Family Nurse Practitioner)  Crys Paul DO as Consulting Physician (Neurosurgery)  Savannah Downs as Nurse Practitioner (Family Nurse Practitioner)  Cherelle Saldivar MD as Consulting Physician (Neurology)  Ivett Evans MD as Consulting Physician (Cardiology)  Bhavik Velazco MD as Consulting Physician (Internal Medicine)     CC:     Kishore Roblero is a 81 y.o. female who presents today for wound evaluation.     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 ([address]) I83.221, L97.121    Venous insufficiency I87.2    Hypertriglyceridemia E78.1    Dysphagia R13.10    Paroxysmal supraventricular tachycardia I47.10    Vitamin D deficiency E55.9    Encounter for care related to Port-a-Cath Z45.2    Essential hypertension I10    History of colon polyps Z86.010    Port-A-Cath in place Z95.828    History of DVT (deep vein thrombosis) Z86.718    Avascular necrosis (HCC) M87.00    Pain in both lower extremities M79.604, M79.605    Numbness and tingling of both feet R20.0, R20.2    Acute deep vein thrombosis (DVT) of femoral vein of left lower extremity (HCC) I82.412    Cellulitis of left lower limb L03.116    Fracture of right foot S92.901A    Hypochloremia E87.8    CKD (chronic kidney disease) stage 3, GFR 30-59 ml/min ([facility]) N18.30    Malignant neoplasm of colon
69

## 2024-02-14 NOTE — PATIENT PROFILE ADULT - NSPROPOAURINARYCATHETER_GEN_A_NUR
capsule Take 1 capsule by mouth daily 90 capsule 3    allopurinol (ZYLOPRIM) 300 MG tablet Take 1 tablet by mouth daily 90 tablet 3    atorvastatin (LIPITOR) 10 MG tablet Take 1 tablet by mouth daily 90 tablet 1    levothyroxine (SYNTHROID) 100 MCG tablet Take 1 tablet by mouth daily 90 tablet 0    aspirin 81 MG EC tablet Take 1 tablet by mouth daily 30 tablet 5    Omega-3 Fatty Acids (FISH OIL PO) Take 2 g by mouth      Multiple Vitamins-Minerals (CENTRUM SILVER PO) Take by mouth daily       No current facility-administered medications for this visit.     Allergies   Allergen Reactions    Tramadol      dizziness    Vesicare [Solifenacin]      Swollen fingers       Health Maintenance   Topic Date Due    COVID-19 Vaccine (1) Never done    Annual Wellness Visit (Medicare)  11/27/2023    Lipids  04/12/2024    Depression Monitoring  02/14/2025    DTaP/Tdap/Td vaccine (2 - Td or Tdap) 11/13/2028    Flu vaccine  Completed    Shingles vaccine  Completed    Pneumococcal 65+ years Vaccine  Completed    Respiratory Syncytial Virus (RSV) Pregnant or age 60 yrs+  Completed    Hepatitis A vaccine  Aged Out    Hepatitis B vaccine  Aged Out    Hib vaccine  Aged Out    Polio vaccine  Aged Out    Meningococcal (ACWY) vaccine  Aged Out    DEXA (modify frequency per FRAX score)  Discontinued    Depression Screen  Discontinued         Review of Systems    Objective:     /80 (Site: Left Upper Arm, Position: Sitting, Cuff Size: Medium Adult)   Pulse 98   Wt 83.5 kg (184 lb)   SpO2 100%   BMI 33.65 kg/m²     Physical Exam  Vitals and nursing note reviewed.   Constitutional:       Appearance: Normal appearance. She is well-developed.   HENT:      Head: Normocephalic and atraumatic.   Eyes:      Conjunctiva/sclera: Conjunctivae normal.   Neck:      Thyroid: No thyromegaly.      Vascular: No JVD.   Cardiovascular:      Rate and Rhythm: Normal rate and regular rhythm.      Heart sounds: Normal heart sounds. No murmur heard.     No 
no

## 2024-10-23 NOTE — PRE-OP CHECKLIST - BP NONINVASIVE SYSTOLIC (MM HG)

## 2024-12-24 NOTE — BEHAVIORAL HEALTH ASSESSMENT NOTE - SECONDARY DX1
[facility] APPOINTMENT CONFIRMATION      Date: 12/31     Time: 3:30    Provider name: Dr. Domitila Pitts     Location: Neurology Locations: [place]: [address] [place] [address]    Zoom link sent (if applicable): N/A    Will patient be in [place] for the virtual visit? N/A    Is patient currently in a facility? No    Alternative contact information: none     Appointment confirmed: No and left phone message    \"Please ensure you bring the medication bottles, including the dates and times you take each medication.\"  New Patients: \"Please bring any outside records or images.\"    \"We do have free parking available in the main hospital parking lot. However, parking can be difficult to find so we ask that you arrive 40 minutes prior to your appointment.\"       Alcohol use disorder, moderate, in controlled environment

## 2025-01-23 NOTE — PATIENT PROFILE ADULT - RESOURCE/ENVIRONMENTAL CONCERNS
Beatriz Castillo was seen and treated in our emergency department on 1/23/2025.                Diagnosis:     Beatriz  may return to gym class or sports on return date.    She may return on this date: 01/28/2025         If you have any questions or concerns, please don't hesitate to call.      Trey Hoang, DO    ______________________________           _______________          _______________  Hospital Representative                              Date                                Time environmental/psychosocial

## 2025-03-20 NOTE — PROCEDURAL SAFETY CHECKLIST WITH OR WITHOUT SEDATION - NSPREIMAGEREVIEW_GEN_ALL_CORE
Contacted and spoke with the patient, informed her of the provider note listed below. Patient expressed understanding and agreed to that plan. No other questions or concerns at this time.     
Patient was seen at [facility] yesterday, 3/19/25  She states that a prescription was sent for her, but that was before her results were in. Now she saw her results of having a UTI and she's wondering if a different medication was going to be sent in. Wasn't sure who she should call.  
Per provider OV note from UC visit on 3/19/25-  2. Urinary frequency  - physical exam reassuring, she has no CVA tenderness  - Urine dip in office is not consistent with UTI  - will send for culture to confirm if there is infection, will hold antibiotics until cultures returns  - push fluids   - Can start AZO over the counter to help with dysuria        Please advise is patient needs abx as culture is not final at this time. Doxy was prescribed for HS.     Routing to Dr. Broderick for immediate answer as Rola Martinez APNP is out of the office.      
UACS as protein (not cinsistent with UTI) and small amount of WBC, which may be from contamination (skin). Will await culture results.    No tx at this time.     Nat Broderick MD, MPH    
not applicable
not applicable